# Patient Record
Sex: MALE | Race: WHITE | NOT HISPANIC OR LATINO | URBAN - METROPOLITAN AREA
[De-identification: names, ages, dates, MRNs, and addresses within clinical notes are randomized per-mention and may not be internally consistent; named-entity substitution may affect disease eponyms.]

---

## 2018-12-10 ENCOUNTER — INPATIENT (INPATIENT)
Facility: HOSPITAL | Age: 53
LOS: 86 days | Discharge: NURSING, REHAB OR LONG-TERM CARE | End: 2019-03-07
Attending: INTERNAL MEDICINE | Admitting: INTERNAL MEDICINE
Payer: MEDICAID

## 2018-12-10 VITALS
WEIGHT: 219.8 LBS | HEART RATE: 123 BPM | SYSTOLIC BLOOD PRESSURE: 78 MMHG | TEMPERATURE: 102 F | OXYGEN SATURATION: 96 % | RESPIRATION RATE: 20 BRPM | DIASTOLIC BLOOD PRESSURE: 49 MMHG | HEIGHT: 65 IN

## 2018-12-10 DIAGNOSIS — J96.00 ACUTE RESPIRATORY FAILURE, UNSPECIFIED WHETHER WITH HYPOXIA OR HYPERCAPNIA: ICD-10-CM

## 2018-12-10 DIAGNOSIS — Z93.1 GASTROSTOMY STATUS: Chronic | ICD-10-CM

## 2018-12-10 LAB
ALBUMIN SERPL ELPH-MCNC: 2.3 G/DL — LOW (ref 3.3–5)
ALP SERPL-CCNC: 95 U/L — SIGNIFICANT CHANGE UP (ref 40–120)
ALT FLD-CCNC: 39 U/L — SIGNIFICANT CHANGE UP (ref 4–41)
AMYLASE P1 CFR SERPL: 114 U/L — SIGNIFICANT CHANGE UP (ref 25–125)
ANISOCYTOSIS BLD QL: SIGNIFICANT CHANGE UP
APPEARANCE UR: SIGNIFICANT CHANGE UP
APTT BLD: 54.4 SEC — HIGH (ref 27.5–36.3)
AST SERPL-CCNC: 76 U/L — HIGH (ref 4–40)
BACTERIA # UR AUTO: HIGH
BASE EXCESS BLDA CALC-SCNC: 3.3 MMOL/L — SIGNIFICANT CHANGE UP
BASOPHILS # BLD AUTO: 0.17 K/UL — SIGNIFICANT CHANGE UP (ref 0–0.2)
BASOPHILS NFR BLD AUTO: 0.9 % — SIGNIFICANT CHANGE UP (ref 0–2)
BASOPHILS NFR SPEC: 0 % — SIGNIFICANT CHANGE UP (ref 0–2)
BILIRUB SERPL-MCNC: 1.6 MG/DL — HIGH (ref 0.2–1.2)
BILIRUB UR-MCNC: NEGATIVE — SIGNIFICANT CHANGE UP
BLASTS # FLD: 0 % — SIGNIFICANT CHANGE UP (ref 0–0)
BLD GP AB SCN SERPL QL: NEGATIVE — SIGNIFICANT CHANGE UP
BLOOD UR QL VISUAL: NEGATIVE — SIGNIFICANT CHANGE UP
BUN SERPL-MCNC: 35 MG/DL — HIGH (ref 7–23)
CALCIUM SERPL-MCNC: 6.9 MG/DL — LOW (ref 8.4–10.5)
CHLORIDE BLDA-SCNC: 106 MMOL/L — SIGNIFICANT CHANGE UP (ref 96–108)
CHLORIDE SERPL-SCNC: 102 MMOL/L — SIGNIFICANT CHANGE UP (ref 98–107)
CO2 SERPL-SCNC: 25 MMOL/L — SIGNIFICANT CHANGE UP (ref 22–31)
COLOR SPEC: SIGNIFICANT CHANGE UP
CREAT SERPL-MCNC: 0.76 MG/DL — SIGNIFICANT CHANGE UP (ref 0.5–1.3)
EOSINOPHIL # BLD AUTO: 0 K/UL — SIGNIFICANT CHANGE UP (ref 0–0.5)
EOSINOPHIL NFR BLD AUTO: 0 % — SIGNIFICANT CHANGE UP (ref 0–6)
EOSINOPHIL NFR FLD: 0.9 % — SIGNIFICANT CHANGE UP (ref 0–6)
GIANT PLATELETS BLD QL SMEAR: PRESENT — SIGNIFICANT CHANGE UP
GLUCOSE BLDA-MCNC: 132 MG/DL — HIGH (ref 70–99)
GLUCOSE SERPL-MCNC: 129 MG/DL — HIGH (ref 70–99)
GLUCOSE UR-MCNC: NEGATIVE — SIGNIFICANT CHANGE UP
GRAN CASTS # UR COMP ASSIST: SIGNIFICANT CHANGE UP
HCO3 BLDA-SCNC: 27 MMOL/L — HIGH (ref 22–26)
HCT VFR BLD CALC: 36.3 % — LOW (ref 39–50)
HCT VFR BLDA CALC: 37.7 % — LOW (ref 39–51)
HGB BLD-MCNC: 12.1 G/DL — LOW (ref 13–17)
HGB BLDA-MCNC: 12.2 G/DL — LOW (ref 13–17)
HYALINE CASTS # UR AUTO: HIGH
IMM GRANULOCYTES # BLD AUTO: 0.08 # — SIGNIFICANT CHANGE UP
IMM GRANULOCYTES NFR BLD AUTO: 0.4 % — SIGNIFICANT CHANGE UP (ref 0–1.5)
INR BLD: 1.75 — HIGH (ref 0.88–1.17)
KETONES UR-MCNC: SIGNIFICANT CHANGE UP
LACTATE BLDA-SCNC: 5.5 MMOL/L — CRITICAL HIGH (ref 0.5–2)
LACTATE BLDA-SCNC: 5.8 MMOL/L — CRITICAL HIGH (ref 0.5–2)
LEUKOCYTE ESTERASE UR-ACNC: NEGATIVE — SIGNIFICANT CHANGE UP
LIDOCAIN IGE QN: 122.6 U/L — HIGH (ref 7–60)
LYMPHOCYTES # BLD AUTO: 1.13 K/UL — SIGNIFICANT CHANGE UP (ref 1–3.3)
LYMPHOCYTES # BLD AUTO: 6.2 % — LOW (ref 13–44)
LYMPHOCYTES NFR SPEC AUTO: 0.9 % — LOW (ref 13–44)
MACROCYTES BLD QL: SIGNIFICANT CHANGE UP
MAGNESIUM SERPL-MCNC: 2.9 MG/DL — HIGH (ref 1.6–2.6)
MANUAL SMEAR VERIFICATION: SIGNIFICANT CHANGE UP
MCHC RBC-ENTMCNC: 33.3 % — SIGNIFICANT CHANGE UP (ref 32–36)
MCHC RBC-ENTMCNC: 33.6 PG — SIGNIFICANT CHANGE UP (ref 27–34)
MCV RBC AUTO: 100.8 FL — HIGH (ref 80–100)
METAMYELOCYTES # FLD: 15.2 % — HIGH (ref 0–1)
MONOCYTES # BLD AUTO: 0.56 K/UL — SIGNIFICANT CHANGE UP (ref 0–0.9)
MONOCYTES NFR BLD AUTO: 3.1 % — SIGNIFICANT CHANGE UP (ref 2–14)
MONOCYTES NFR BLD: 6.7 % — SIGNIFICANT CHANGE UP (ref 2–9)
MUCOUS THREADS # UR AUTO: SIGNIFICANT CHANGE UP
MYELOCYTES NFR BLD: 1.9 % — HIGH (ref 0–0)
NEUTROPHIL AB SER-ACNC: 64.8 % — SIGNIFICANT CHANGE UP (ref 43–77)
NEUTROPHILS # BLD AUTO: 16.38 K/UL — HIGH (ref 1.8–7.4)
NEUTROPHILS NFR BLD AUTO: 89.4 % — HIGH (ref 43–77)
NEUTS BAND # BLD: 8.6 % — HIGH (ref 0–6)
NITRITE UR-MCNC: NEGATIVE — SIGNIFICANT CHANGE UP
NRBC # BLD: 1.9 /100WBC — SIGNIFICANT CHANGE UP
NRBC # FLD: 0.1 — SIGNIFICANT CHANGE UP
OTHER - HEMATOLOGY %: 0 — SIGNIFICANT CHANGE UP
PCO2 BLDA: 46 MMHG — SIGNIFICANT CHANGE UP (ref 35–48)
PH BLDA: 7.4 PH — SIGNIFICANT CHANGE UP (ref 7.35–7.45)
PH UR: 6 — SIGNIFICANT CHANGE UP (ref 5–8)
PHOSPHATE SERPL-MCNC: 2.3 MG/DL — LOW (ref 2.5–4.5)
PLATELET # BLD AUTO: 102 K/UL — LOW (ref 150–400)
PLATELET COUNT - ESTIMATE: SIGNIFICANT CHANGE UP
PMV BLD: 11.5 FL — SIGNIFICANT CHANGE UP (ref 7–13)
PO2 BLDA: 101 MMHG — SIGNIFICANT CHANGE UP (ref 83–108)
POLYCHROMASIA BLD QL SMEAR: SLIGHT — SIGNIFICANT CHANGE UP
POTASSIUM BLDA-SCNC: 3.6 MMOL/L — SIGNIFICANT CHANGE UP (ref 3.4–4.5)
POTASSIUM SERPL-MCNC: 4 MMOL/L — SIGNIFICANT CHANGE UP (ref 3.5–5.3)
POTASSIUM SERPL-SCNC: 4 MMOL/L — SIGNIFICANT CHANGE UP (ref 3.5–5.3)
PROMYELOCYTES # FLD: 0 % — SIGNIFICANT CHANGE UP (ref 0–0)
PROT SERPL-MCNC: 4.3 G/DL — LOW (ref 6–8.3)
PROT UR-MCNC: 30 — SIGNIFICANT CHANGE UP
PROTHROM AB SERPL-ACNC: 20.3 SEC — HIGH (ref 9.8–13.1)
RBC # BLD: 3.6 M/UL — LOW (ref 4.2–5.8)
RBC # FLD: 15.2 % — HIGH (ref 10.3–14.5)
RBC CASTS # UR COMP ASSIST: HIGH (ref 0–?)
RH IG SCN BLD-IMP: POSITIVE — SIGNIFICANT CHANGE UP
SAO2 % BLDA: 98.1 % — SIGNIFICANT CHANGE UP (ref 95–99)
SMUDGE CELLS # BLD: PRESENT — SIGNIFICANT CHANGE UP
SODIUM BLDA-SCNC: 134 MMOL/L — LOW (ref 136–146)
SODIUM SERPL-SCNC: 142 MMOL/L — SIGNIFICANT CHANGE UP (ref 135–145)
SP GR SPEC: 1.03 — SIGNIFICANT CHANGE UP (ref 1–1.04)
SQUAMOUS # UR AUTO: SIGNIFICANT CHANGE UP
TROPONIN T, HIGH SENSITIVITY: 14 NG/L — SIGNIFICANT CHANGE UP (ref ?–14)
UROBILINOGEN FLD QL: HIGH
VARIANT LYMPHS # BLD: 1 % — SIGNIFICANT CHANGE UP
WBC # BLD: 18.32 K/UL — HIGH (ref 3.8–10.5)
WBC # FLD AUTO: 18.32 K/UL — HIGH (ref 3.8–10.5)
WBC UR QL: SIGNIFICANT CHANGE UP (ref 0–?)

## 2018-12-10 PROCEDURE — 99291 CRITICAL CARE FIRST HOUR: CPT

## 2018-12-10 PROCEDURE — 71045 X-RAY EXAM CHEST 1 VIEW: CPT | Mod: 26

## 2018-12-10 RX ORDER — NOREPINEPHRINE BITARTRATE/D5W 8 MG/250ML
0.05 PLASTIC BAG, INJECTION (ML) INTRAVENOUS
Qty: 16 | Refills: 0 | Status: DISCONTINUED | OUTPATIENT
Start: 2018-12-10 | End: 2018-12-12

## 2018-12-10 RX ORDER — VALACYCLOVIR 500 MG/1
1000 TABLET, FILM COATED ORAL EVERY 8 HOURS
Qty: 0 | Refills: 0 | Status: DISCONTINUED | OUTPATIENT
Start: 2018-12-10 | End: 2018-12-11

## 2018-12-10 RX ORDER — FENTANYL CITRATE 50 UG/ML
4 INJECTION INTRAVENOUS
Qty: 2500 | Refills: 0 | Status: DISCONTINUED | OUTPATIENT
Start: 2018-12-10 | End: 2018-12-11

## 2018-12-10 RX ORDER — ACETAMINOPHEN 500 MG
650 TABLET ORAL EVERY 6 HOURS
Qty: 0 | Refills: 0 | Status: DISCONTINUED | OUTPATIENT
Start: 2018-12-10 | End: 2019-03-07

## 2018-12-10 RX ORDER — SODIUM CHLORIDE 9 MG/ML
1000 INJECTION, SOLUTION INTRAVENOUS
Qty: 0 | Refills: 0 | Status: DISCONTINUED | OUTPATIENT
Start: 2018-12-10 | End: 2018-12-11

## 2018-12-10 RX ORDER — NOREPINEPHRINE BITARTRATE/D5W 8 MG/250ML
0.05 PLASTIC BAG, INJECTION (ML) INTRAVENOUS
Qty: 8 | Refills: 0 | Status: DISCONTINUED | OUTPATIENT
Start: 2018-12-10 | End: 2018-12-10

## 2018-12-10 RX ORDER — MEROPENEM 1 G/30ML
INJECTION INTRAVENOUS
Qty: 0 | Refills: 0 | Status: DISCONTINUED | OUTPATIENT
Start: 2018-12-10 | End: 2018-12-11

## 2018-12-10 RX ORDER — MEROPENEM 1 G/30ML
1000 INJECTION INTRAVENOUS ONCE
Qty: 0 | Refills: 0 | Status: COMPLETED | OUTPATIENT
Start: 2018-12-10 | End: 2018-12-10

## 2018-12-10 RX ORDER — PROPOFOL 10 MG/ML
10 INJECTION, EMULSION INTRAVENOUS
Qty: 1000 | Refills: 0 | Status: DISCONTINUED | OUTPATIENT
Start: 2018-12-10 | End: 2018-12-12

## 2018-12-10 RX ORDER — CHLORHEXIDINE GLUCONATE 213 G/1000ML
1 SOLUTION TOPICAL
Qty: 0 | Refills: 0 | Status: DISCONTINUED | OUTPATIENT
Start: 2018-12-10 | End: 2018-12-26

## 2018-12-10 RX ORDER — MEROPENEM 1 G/30ML
1000 INJECTION INTRAVENOUS EVERY 8 HOURS
Qty: 0 | Refills: 0 | Status: DISCONTINUED | OUTPATIENT
Start: 2018-12-10 | End: 2018-12-11

## 2018-12-10 RX ORDER — VASOPRESSIN 20 [USP'U]/ML
0.04 INJECTION INTRAVENOUS
Qty: 100 | Refills: 0 | Status: DISCONTINUED | OUTPATIENT
Start: 2018-12-10 | End: 2018-12-11

## 2018-12-10 RX ADMIN — Medication 650 MILLIGRAM(S): at 16:00

## 2018-12-10 RX ADMIN — PROPOFOL 5.98 MICROGRAM(S)/KG/MIN: 10 INJECTION, EMULSION INTRAVENOUS at 15:19

## 2018-12-10 RX ADMIN — VASOPRESSIN 2.4 UNIT(S)/MIN: 20 INJECTION INTRAVENOUS at 15:20

## 2018-12-10 RX ADMIN — Medication 4.67 MICROGRAM(S)/KG/MIN: at 20:08

## 2018-12-10 RX ADMIN — Medication 4.67 MICROGRAM(S)/KG/MIN: at 15:20

## 2018-12-10 RX ADMIN — CHLORHEXIDINE GLUCONATE 1 APPLICATION(S): 213 SOLUTION TOPICAL at 17:19

## 2018-12-10 RX ADMIN — SODIUM CHLORIDE 50 MILLILITER(S): 9 INJECTION, SOLUTION INTRAVENOUS at 15:20

## 2018-12-10 RX ADMIN — SODIUM CHLORIDE 200 MILLILITER(S): 9 INJECTION, SOLUTION INTRAVENOUS at 20:08

## 2018-12-10 RX ADMIN — PROPOFOL 5.98 MICROGRAM(S)/KG/MIN: 10 INJECTION, EMULSION INTRAVENOUS at 20:07

## 2018-12-10 RX ADMIN — FENTANYL CITRATE 4.99 MICROGRAM(S)/KG/HR: 50 INJECTION INTRAVENOUS at 15:19

## 2018-12-10 RX ADMIN — FENTANYL CITRATE 29.91 MICROGRAM(S)/KG/HR: 50 INJECTION INTRAVENOUS at 20:08

## 2018-12-10 RX ADMIN — FENTANYL CITRATE 4 MICROGRAM(S)/KG/HR: 50 INJECTION INTRAVENOUS at 15:20

## 2018-12-10 RX ADMIN — Medication 650 MILLIGRAM(S): at 15:19

## 2018-12-10 RX ADMIN — VASOPRESSIN 2.4 UNIT(S)/MIN: 20 INJECTION INTRAVENOUS at 20:09

## 2018-12-10 NOTE — CONSULT NOTE ADULT - SUBJECTIVE AND OBJECTIVE BOX
CONSULT RESULT - GENERAL SURGERY    Consulting surgical team: A Team  Consulting attending: Dr. Putnam    HPI:  54 yo M nursing home resident with a hx of TBI (residual right-sided paralysis and aphasia), seizures, contracture, G tube, presents as a transfer from [facility] 2/2 respiratory failure concerning for ARDS, likely secondary to pancreatitis. The patient was noted to be hypotensive and tachycardic to the 150s. The patient was also noted to have coffee ground material via the G tube. He was intubated, resuscitated and started on levophed and vasopressin drips, and started on empiric vancomycin, meropenem, and zosyn, as well as PPI drip. CT scan demonstrated extensive pancreatitis. The patient subsequently developed bilateral lung opacification concerning for ARDS and was transferred to [facility]. He was also noted have skin lesions possibly representing herpes zoster and was placed on contact and airborn precautions.    Patient is sedated and unable to participate in examination. He is sedated on fentanyl and propofol drips, with levophed and vasopressin for BP support.      PAST MEDICAL HISTORY:  Seizure  TBI (traumatic brain injury)      PAST SURGICAL HISTORY:  S/P percutaneous endoscopic gastrostomy (PEG) tube placement      MEDICATIONS:  acetaminophen    Suspension .. 650 milliGRAM(s) Oral every 6 hours PRN  chlorhexidine 4% Liquid 1 Application(s) Topical <User Schedule>  fentaNYL   Infusion 3 MICROgram(s)/kG/Hr IV Continuous <Continuous>  lactated ringers. 1000 milliLiter(s) IV Continuous <Continuous>  meropenem  IVPB 1000 milliGRAM(s) IV Intermittent once  meropenem  IVPB      meropenem  IVPB 1000 milliGRAM(s) IV Intermittent every 8 hours  norepinephrine Infusion 0.05 MICROgram(s)/kG/Min IV Continuous <Continuous>  propofol Infusion 10 MICROgram(s)/kG/Min IV Continuous <Continuous>  valACYclovir 1000 milliGRAM(s) Oral every 8 hours  vasopressin Infusion 0.04 Unit(s)/Min IV Continuous <Continuous>      ALLERGIES:  No Known Allergies      VITALS & I/Os:  Vital Signs Last 24 Hrs  T(C): 37.8 (10 Dec 2018 16:00), Max: 38.9 (10 Dec 2018 13:40)  T(F): 100 (10 Dec 2018 16:00), Max: 102 (10 Dec 2018 13:40)  HR: 94 (10 Dec 2018 18:00) (93 - 123)  BP: 78/49 (10 Dec 2018 13:40) (78/49 - 78/49)  BP(mean): 56 (10 Dec 2018 13:40) (56 - 56)  RR: 18 (10 Dec 2018 18:00) (17 - 20)  SpO2: 96% (10 Dec 2018 18:00) (95% - 97%)  Mode: AC/ CMV (Assist Control/ Continuous Mandatory Ventilation)  RR (machine): 20  TV (machine): 450  FiO2: 90  PEEP: 15  MAP: 21  PIP: 36    I&O's Summary    10 Dec 2018 07:01  -  10 Dec 2018 19:30  --------------------------------------------------------  IN: 992 mL / OUT: 210 mL / NET: 782 mL      PHYSICAL EXAM:  GEN: intubated, sedated  PULM: symmetric chest rise bilaterally  CV: regular rate, peripheral pulses intact  ABD: soft, ND  EXTR: no cyanosis or edema    LABS:                        12.1   18.32 )-----------( 102      ( 10 Dec 2018 15:00 )             36.3     12-10    142  |  102  |  35<H>  ----------------------------<  129<H>  4.0   |  25  |  0.76    Ca    6.9<L>      10 Dec 2018 15:00  Phos  2.3     12-10  Mg     2.9     12-10    TPro  4.3<L>  /  Alb  2.3<L>  /  TBili  1.6<H>  /  DBili  x   /  AST  76<H>  /  ALT  39  /  AlkPhos  95  12-10    Lactate:    PT/INR - ( 10 Dec 2018 15:00 )   PT: 20.3 SEC;   INR: 1.75          PTT - ( 10 Dec 2018 15:00 )  PTT:54.4 SEC  ABG - ( 10 Dec 2018 15:00 )  pH, Arterial: 7.40  pH, Blood: x     /  pCO2: 46    /  pO2: 101   / HCO3: 27    / Base Excess: 3.3   /  SaO2: 98.1      Urinalysis Basic - ( 10 Dec 2018 15:00 )    Color: IAN / Appearance: Lt TURBID / S.026 / pH: 6.0  Gluc: NEGATIVE / Ketone: TRACE  / Bili: NEGATIVE / Urobili: SMALL   Blood: NEGATIVE / Protein: 30 / Nitrite: NEGATIVE   Leuk Esterase: NEGATIVE / RBC: 11-25 / WBC 3-5   Sq Epi: FEW / Non Sq Epi: x / Bacteria: MODERATE      IMAGING:  CT A/P () [facility]  Gallbladder and ducts: gallbladder wall thickening with surrounding inflammation. Probable stone in the gallbladder fundus. Common bile duct is poorly visualized due to artifact    1. small bilateral pleural effusions  2. consolidative infiltrate or atelectasis in the lower lobes with near complete opacification of both lower lobes  3. gallbladder wall thickening with surrounding inflammation. Probable stone in GB fundus  4. Extensive inflammation around pancreas consistent with acute pancreatitis. Small amount of free fluid also seen in abdomen and pelvis  5. Fluid in the colon consistent with diarrhea    US Abdomen complete () [facility]  Limited image study with multiple organs not visualized due to rib and bowel gas shadowing. Gallbladder and pancreas not visualized  Trace perihepatic fluid

## 2018-12-10 NOTE — H&P ADULT - ATTENDING COMMENTS
Agree with above. Seen and examined with residents on rounds. Transferred from [facility] with pancreatitis and respiratory failure. Continue antibiotics. Surgery sugey, GABE jefferson. Vent management for ARDS. Supportive care.

## 2018-12-10 NOTE — H&P ADULT - NSHPPHYSICALEXAM_GEN_ALL_CORE
T(C): 38.9 (12-10-18 @ 13:40), Max: 38.9 (12-10-18 @ 13:40)  HR: 104 (12-10-18 @ 15:00) (93 - 123)  BP: 78/49 (12-10-18 @ 13:40) (78/49 - 78/49)  RR: 20 (12-10-18 @ 15:00) (17 - 20)  SpO2: 95% (12-10-18 @ 15:00) (95% - 97%)  Wt(kg): --  GENERAL: Sedated  HEAD:  Atraumatic, Normocephalic  EYES: EOMI, conjunctiva and sclera clear  NECK: Supple, No JVD  CHEST/LUNG: bilateral crackles  HEART: tachycardic, no murmur appreciated.  ABDOMEN: Soft, peg in place. Left flank crusted skin lesions.  EXTREMITIES:  2+ Peripheral Pulses, No clubbing, cyanosis, or edema  PSYCH: AAOx0  NEUROLOGY: unable to assess.
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
## 2018-12-10 NOTE — CONSULT NOTE ADULT - ASSESSMENT
54 yo M nursing home resident with a hx of TBI (residual right-sided paralysis and aphasia), seizures, contracture, G tube, presents as a transfer from [facility] 2/2 respiratory failure concerning for ARDS, likely secondary to pancreatitis. CT A/P demonstrates likely cholelithiasis.    NEURO: sedated, TBI with baseline paralysis, aphasia.  PULM: intubated, on full mechanical vent support. Moderate ARDS (P:F ratio 112).  CV: requiring levophed and vasopressin support.  GI: pancreatitis, gallstone vs. drug induced vs. idiopathic. Lipase 122.  : BUN/Cr stable.  ID: septic 2/2 pancreatitis. WBC 18. On empiric meropenem.  ENDO: no issues    -Consult interventional radiology for percutaneous cholecystostomy tube  -cont resuscitation, vasopressor support  -cont broad-spectrum abx  -A team will cont to follow, [id number]    Patient discussed with attending Dr. Putnam

## 2018-12-10 NOTE — H&P ADULT - ASSESSMENT
52yo M hx of TBI, G tube, contracture, seizure, resident of [facility] presented as a transfer from [facility] on 12/9 for resp failure concerning for ARDS s/p intubation, likely 2/2 pancreatitis.    Neuro: Sedated at this time.  - Will discuss with family regarding baseline neurologic status at baseline as hx of TBI.  - Currently sedated. Will need to add back anti-seizure meds once off sedation/after extubation.    CV: Sepsis leading to likely distributive shock  - Currently on levophed for BP control.  - C/w IVF for now.  - Will need to complete w/u to r/o cardiogenic shock once clinically stabilized.    Pulm: Patient 54yo M hx of TBI, G tube, contracture, seizure, resident of [facility] presented as a transfer from [facility] on 12/9 for resp failure concerning for ARDS s/p intubation, likely 2/2 pancreatitis.    Neuro: Sedated at this time.  - Will discuss with family regarding baseline neurologic status at baseline as hx of TBI.  - Currently sedated w/ propofol and fentanyl.   - Will need to add back anti-seizure meds once off sedation/after extubation.    CV: Sepsis leading to likely distributive shock  - Currently on levophed and vaso for BP control. Titrate to MAP>= 65.  - C/w IVF for now.  - Will need to complete w/u to r/o cardiogenic shock once clinically stabilized.    Pulm: Patient currently intubated for ARDS diagnosis.  - Will repeat blood gas to confirm diagnosis and repeat CT chest.  - Current vent setting: PEEP 15, FiO2 90, Tidal volume 450 and RR 20. Continue to monitor resp functions.     GI: Unclear etiology for pancreatitis at this time. Patient resident of NH and unclear alcohol history. OSH CT abd w/o evidence of ductal dilatation. Unclear medication hx: will need to inquire family for possible drug induced pancreatitis.  - Will repeat labs including TG, LFTs, lipase, amylase.  - C/w LR at high rate as tolerated.  - Keep patient off tube feeds for now. Restart when tolerated.  - OSH record identified coffee ground emesis requiring PPI drip.    :   - Monitor renal function.   - C/w tavares for now. Strict I&Os.     ID: Patient in shock on arrival. Likely source infectious, distributive.   - Repeat chest CT to r/o PNA.  - C/w broad spectrum abx vanc, zosyn for empiric coverage.  - Repeat BCx and UCx to identify source.  - Started valacyclovir for herpes zoster skin lesion.    Endo: No issues at this time.  - Continue to monitor FSG.   - Restart feeding when appropirate.    PPX:   - Pantoprazole BID  - DVT ppx w/ scds.

## 2018-12-11 LAB
ALBUMIN SERPL ELPH-MCNC: 1.6 G/DL — LOW (ref 3.3–5)
ALBUMIN SERPL ELPH-MCNC: 1.6 G/DL — LOW (ref 3.3–5)
ALP SERPL-CCNC: 126 U/L — HIGH (ref 40–120)
ALP SERPL-CCNC: 126 U/L — HIGH (ref 40–120)
ALT FLD-CCNC: 39 U/L — SIGNIFICANT CHANGE UP (ref 4–41)
ALT FLD-CCNC: 39 U/L — SIGNIFICANT CHANGE UP (ref 4–41)
APTT BLD: 46 SEC — HIGH (ref 27.5–36.3)
AST SERPL-CCNC: 76 U/L — HIGH (ref 4–40)
AST SERPL-CCNC: 76 U/L — HIGH (ref 4–40)
BASE EXCESS BLDA CALC-SCNC: 4.8 MMOL/L — SIGNIFICANT CHANGE UP
BASOPHILS # BLD AUTO: 0.09 K/UL — SIGNIFICANT CHANGE UP (ref 0–0.2)
BASOPHILS NFR BLD AUTO: 0.5 % — SIGNIFICANT CHANGE UP (ref 0–2)
BILIRUB DIRECT SERPL-MCNC: 1.7 MG/DL — HIGH (ref 0.1–0.2)
BILIRUB SERPL-MCNC: 2.1 MG/DL — HIGH (ref 0.2–1.2)
BILIRUB SERPL-MCNC: 2.1 MG/DL — HIGH (ref 0.2–1.2)
BUN SERPL-MCNC: 41 MG/DL — HIGH (ref 7–23)
BUN SERPL-MCNC: 41 MG/DL — HIGH (ref 7–23)
CA-I BLDA-SCNC: 1.07 MMOL/L — LOW (ref 1.15–1.29)
CALCIUM SERPL-MCNC: 6.8 MG/DL — LOW (ref 8.4–10.5)
CALCIUM SERPL-MCNC: 6.8 MG/DL — LOW (ref 8.4–10.5)
CHLORIDE SERPL-SCNC: 101 MMOL/L — SIGNIFICANT CHANGE UP (ref 98–107)
CHLORIDE SERPL-SCNC: 101 MMOL/L — SIGNIFICANT CHANGE UP (ref 98–107)
CO2 SERPL-SCNC: 26 MMOL/L — SIGNIFICANT CHANGE UP (ref 22–31)
CO2 SERPL-SCNC: 26 MMOL/L — SIGNIFICANT CHANGE UP (ref 22–31)
CREAT SERPL-MCNC: 0.67 MG/DL — SIGNIFICANT CHANGE UP (ref 0.5–1.3)
CREAT SERPL-MCNC: 0.67 MG/DL — SIGNIFICANT CHANGE UP (ref 0.5–1.3)
EOSINOPHIL # BLD AUTO: 0 K/UL — SIGNIFICANT CHANGE UP (ref 0–0.5)
EOSINOPHIL NFR BLD AUTO: 0 % — SIGNIFICANT CHANGE UP (ref 0–6)
GLUCOSE BLDA-MCNC: 104 MG/DL — HIGH (ref 70–99)
GLUCOSE SERPL-MCNC: 108 MG/DL — HIGH (ref 70–99)
GLUCOSE SERPL-MCNC: 108 MG/DL — HIGH (ref 70–99)
HCO3 BLDA-SCNC: 29 MMOL/L — HIGH (ref 22–26)
HCT VFR BLD CALC: 34.5 % — LOW (ref 39–50)
HCT VFR BLD CALC: 34.5 % — LOW (ref 39–50)
HCT VFR BLDA CALC: 35 % — LOW (ref 39–51)
HGB BLD-MCNC: 11.5 G/DL — LOW (ref 13–17)
HGB BLD-MCNC: 11.5 G/DL — LOW (ref 13–17)
HGB BLDA-MCNC: 11.4 G/DL — LOW (ref 13–17)
IMM GRANULOCYTES # BLD AUTO: 0.09 # — SIGNIFICANT CHANGE UP
IMM GRANULOCYTES NFR BLD AUTO: 0.5 % — SIGNIFICANT CHANGE UP (ref 0–1.5)
INR BLD: 1.34 — HIGH (ref 0.88–1.17)
LACTATE BLDA-SCNC: 3.7 MMOL/L — HIGH (ref 0.5–2)
LACTATE SERPL-SCNC: 4.7 MMOL/L — CRITICAL HIGH (ref 0.5–2)
LYMPHOCYTES # BLD AUTO: 1.69 K/UL — SIGNIFICANT CHANGE UP (ref 1–3.3)
LYMPHOCYTES # BLD AUTO: 9.9 % — LOW (ref 13–44)
MAGNESIUM SERPL-MCNC: 2.8 MG/DL — HIGH (ref 1.6–2.6)
MCHC RBC-ENTMCNC: 33.3 % — SIGNIFICANT CHANGE UP (ref 32–36)
MCHC RBC-ENTMCNC: 33.3 % — SIGNIFICANT CHANGE UP (ref 32–36)
MCHC RBC-ENTMCNC: 34.3 PG — HIGH (ref 27–34)
MCHC RBC-ENTMCNC: 34.3 PG — HIGH (ref 27–34)
MCV RBC AUTO: 103 FL — HIGH (ref 80–100)
MCV RBC AUTO: 103 FL — HIGH (ref 80–100)
MONOCYTES # BLD AUTO: 0.59 K/UL — SIGNIFICANT CHANGE UP (ref 0–0.9)
MONOCYTES NFR BLD AUTO: 3.5 % — SIGNIFICANT CHANGE UP (ref 2–14)
NEUTROPHILS # BLD AUTO: 14.63 K/UL — HIGH (ref 1.8–7.4)
NEUTROPHILS NFR BLD AUTO: 85.6 % — HIGH (ref 43–77)
NRBC # FLD: 0.12 — SIGNIFICANT CHANGE UP
NRBC # FLD: 0.12 — SIGNIFICANT CHANGE UP
PCO2 BLDA: 44 MMHG — SIGNIFICANT CHANGE UP (ref 35–48)
PH BLDA: 7.43 PH — SIGNIFICANT CHANGE UP (ref 7.35–7.45)
PHOSPHATE SERPL-MCNC: 2.1 MG/DL — LOW (ref 2.5–4.5)
PLATELET # BLD AUTO: 89 K/UL — LOW (ref 150–400)
PLATELET # BLD AUTO: 89 K/UL — LOW (ref 150–400)
PMV BLD: 11.9 FL — SIGNIFICANT CHANGE UP (ref 7–13)
PMV BLD: 11.9 FL — SIGNIFICANT CHANGE UP (ref 7–13)
PO2 BLDA: 110 MMHG — HIGH (ref 83–108)
POTASSIUM BLDA-SCNC: 3.3 MMOL/L — LOW (ref 3.4–4.5)
POTASSIUM SERPL-MCNC: 4.1 MMOL/L — SIGNIFICANT CHANGE UP (ref 3.5–5.3)
POTASSIUM SERPL-MCNC: 4.1 MMOL/L — SIGNIFICANT CHANGE UP (ref 3.5–5.3)
POTASSIUM SERPL-SCNC: 4.1 MMOL/L — SIGNIFICANT CHANGE UP (ref 3.5–5.3)
POTASSIUM SERPL-SCNC: 4.1 MMOL/L — SIGNIFICANT CHANGE UP (ref 3.5–5.3)
PROT SERPL-MCNC: 4 G/DL — LOW (ref 6–8.3)
PROT SERPL-MCNC: 4 G/DL — LOW (ref 6–8.3)
PROTHROM AB SERPL-ACNC: 15 SEC — HIGH (ref 9.8–13.1)
RBC # BLD: 3.35 M/UL — LOW (ref 4.2–5.8)
RBC # BLD: 3.35 M/UL — LOW (ref 4.2–5.8)
RBC # FLD: 15.4 % — HIGH (ref 10.3–14.5)
RBC # FLD: 15.4 % — HIGH (ref 10.3–14.5)
SAO2 % BLDA: 98.6 % — SIGNIFICANT CHANGE UP (ref 95–99)
SODIUM BLDA-SCNC: 135 MMOL/L — LOW (ref 136–146)
SODIUM SERPL-SCNC: 139 MMOL/L — SIGNIFICANT CHANGE UP (ref 135–145)
SODIUM SERPL-SCNC: 139 MMOL/L — SIGNIFICANT CHANGE UP (ref 135–145)
WBC # BLD: 17.09 K/UL — HIGH (ref 3.8–10.5)
WBC # BLD: 17.09 K/UL — HIGH (ref 3.8–10.5)
WBC # FLD AUTO: 17.09 K/UL — HIGH (ref 3.8–10.5)
WBC # FLD AUTO: 17.09 K/UL — HIGH (ref 3.8–10.5)

## 2018-12-11 PROCEDURE — 36556 INSERT NON-TUNNEL CV CATH: CPT

## 2018-12-11 PROCEDURE — 71045 X-RAY EXAM CHEST 1 VIEW: CPT | Mod: 26,76

## 2018-12-11 PROCEDURE — 99291 CRITICAL CARE FIRST HOUR: CPT

## 2018-12-11 PROCEDURE — 76700 US EXAM ABDOM COMPLETE: CPT | Mod: 26

## 2018-12-11 PROCEDURE — 74177 CT ABD & PELVIS W/CONTRAST: CPT | Mod: 26

## 2018-12-11 PROCEDURE — 36620 INSERTION CATHETER ARTERY: CPT

## 2018-12-11 RX ORDER — ACYCLOVIR SODIUM 500 MG
600 VIAL (EA) INTRAVENOUS EVERY 8 HOURS
Qty: 0 | Refills: 0 | Status: DISCONTINUED | OUTPATIENT
Start: 2018-12-12 | End: 2018-12-14

## 2018-12-11 RX ORDER — MIDAZOLAM HYDROCHLORIDE 1 MG/ML
0.12 INJECTION, SOLUTION INTRAMUSCULAR; INTRAVENOUS
Qty: 100 | Refills: 0 | Status: DISCONTINUED | OUTPATIENT
Start: 2018-12-11 | End: 2018-12-12

## 2018-12-11 RX ORDER — ACYCLOVIR SODIUM 500 MG
600 VIAL (EA) INTRAVENOUS ONCE
Qty: 0 | Refills: 0 | Status: COMPLETED | OUTPATIENT
Start: 2018-12-11 | End: 2018-12-11

## 2018-12-11 RX ORDER — PIPERACILLIN AND TAZOBACTAM 4; .5 G/20ML; G/20ML
3.38 INJECTION, POWDER, LYOPHILIZED, FOR SOLUTION INTRAVENOUS EVERY 8 HOURS
Qty: 0 | Refills: 0 | Status: DISCONTINUED | OUTPATIENT
Start: 2018-12-11 | End: 2018-12-12

## 2018-12-11 RX ORDER — ENOXAPARIN SODIUM 100 MG/ML
40 INJECTION SUBCUTANEOUS EVERY 24 HOURS
Qty: 0 | Refills: 0 | Status: DISCONTINUED | OUTPATIENT
Start: 2018-12-11 | End: 2018-12-24

## 2018-12-11 RX ORDER — DOCUSATE SODIUM 100 MG
100 CAPSULE ORAL
Qty: 0 | Refills: 0 | Status: DISCONTINUED | OUTPATIENT
Start: 2018-12-11 | End: 2018-12-18

## 2018-12-11 RX ORDER — VALPROIC ACID (AS SODIUM SALT) 250 MG/5ML
750 SOLUTION, ORAL ORAL EVERY 8 HOURS
Qty: 0 | Refills: 0 | Status: DISCONTINUED | OUTPATIENT
Start: 2018-12-11 | End: 2018-12-12

## 2018-12-11 RX ORDER — FUROSEMIDE 40 MG
40 TABLET ORAL ONCE
Qty: 0 | Refills: 0 | Status: COMPLETED | OUTPATIENT
Start: 2018-12-11 | End: 2018-12-11

## 2018-12-11 RX ORDER — SENNA PLUS 8.6 MG/1
10 TABLET ORAL DAILY
Qty: 0 | Refills: 0 | Status: DISCONTINUED | OUTPATIENT
Start: 2018-12-11 | End: 2018-12-18

## 2018-12-11 RX ORDER — POLYETHYLENE GLYCOL 3350 17 G/17G
17 POWDER, FOR SOLUTION ORAL DAILY
Qty: 0 | Refills: 0 | Status: DISCONTINUED | OUTPATIENT
Start: 2018-12-11 | End: 2018-12-18

## 2018-12-11 RX ORDER — PIPERACILLIN AND TAZOBACTAM 4; .5 G/20ML; G/20ML
3.38 INJECTION, POWDER, LYOPHILIZED, FOR SOLUTION INTRAVENOUS ONCE
Qty: 0 | Refills: 0 | Status: COMPLETED | OUTPATIENT
Start: 2018-12-11 | End: 2018-12-11

## 2018-12-11 RX ORDER — VALPROIC ACID (AS SODIUM SALT) 250 MG/5ML
1000 SOLUTION, ORAL ORAL ONCE
Qty: 0 | Refills: 0 | Status: COMPLETED | OUTPATIENT
Start: 2018-12-11 | End: 2018-12-11

## 2018-12-11 RX ORDER — ACYCLOVIR SODIUM 500 MG
VIAL (EA) INTRAVENOUS
Qty: 0 | Refills: 0 | Status: DISCONTINUED | OUTPATIENT
Start: 2018-12-11 | End: 2018-12-14

## 2018-12-11 RX ADMIN — Medication 100 MILLIGRAM(S): at 18:28

## 2018-12-11 RX ADMIN — MEROPENEM 100 MILLIGRAM(S): 1 INJECTION INTRAVENOUS at 08:26

## 2018-12-11 RX ADMIN — Medication 28.75 MILLIGRAM(S): at 23:56

## 2018-12-11 RX ADMIN — Medication 4.67 MICROGRAM(S)/KG/MIN: at 19:38

## 2018-12-11 RX ADMIN — VALACYCLOVIR 1000 MILLIGRAM(S): 500 TABLET, FILM COATED ORAL at 08:26

## 2018-12-11 RX ADMIN — Medication 40 MILLIGRAM(S): at 21:44

## 2018-12-11 RX ADMIN — Medication 10 MILLIGRAM(S): at 14:27

## 2018-12-11 RX ADMIN — SODIUM CHLORIDE 100 MILLILITER(S): 9 INJECTION, SOLUTION INTRAVENOUS at 19:38

## 2018-12-11 RX ADMIN — Medication 262 MILLIGRAM(S): at 21:43

## 2018-12-11 RX ADMIN — CHLORHEXIDINE GLUCONATE 1 APPLICATION(S): 213 SOLUTION TOPICAL at 16:36

## 2018-12-11 RX ADMIN — FENTANYL CITRATE 4 MICROGRAM(S)/KG/HR: 50 INJECTION INTRAVENOUS at 08:19

## 2018-12-11 RX ADMIN — SODIUM CHLORIDE 200 MILLILITER(S): 9 INJECTION, SOLUTION INTRAVENOUS at 08:18

## 2018-12-11 RX ADMIN — VALACYCLOVIR 1000 MILLIGRAM(S): 500 TABLET, FILM COATED ORAL at 01:38

## 2018-12-11 RX ADMIN — FENTANYL CITRATE 39.88 MICROGRAM(S)/KG/HR: 50 INJECTION INTRAVENOUS at 04:08

## 2018-12-11 RX ADMIN — ENOXAPARIN SODIUM 40 MILLIGRAM(S): 100 INJECTION SUBCUTANEOUS at 14:25

## 2018-12-11 RX ADMIN — MEROPENEM 100 MILLIGRAM(S): 1 INJECTION INTRAVENOUS at 01:13

## 2018-12-11 RX ADMIN — PIPERACILLIN AND TAZOBACTAM 200 GRAM(S): 4; .5 INJECTION, POWDER, LYOPHILIZED, FOR SOLUTION INTRAVENOUS at 15:13

## 2018-12-11 RX ADMIN — FENTANYL CITRATE 39.88 MICROGRAM(S)/KG/HR: 50 INJECTION INTRAVENOUS at 08:18

## 2018-12-11 RX ADMIN — Medication 4 MILLIGRAM(S): at 04:07

## 2018-12-11 RX ADMIN — Medication 4.67 MICROGRAM(S)/KG/MIN: at 08:18

## 2018-12-11 RX ADMIN — VALACYCLOVIR 1000 MILLIGRAM(S): 500 TABLET, FILM COATED ORAL at 18:27

## 2018-12-11 RX ADMIN — SENNA PLUS 10 MILLILITER(S): 8.6 TABLET ORAL at 18:28

## 2018-12-11 RX ADMIN — PROPOFOL 5.98 MICROGRAM(S)/KG/MIN: 10 INJECTION, EMULSION INTRAVENOUS at 19:38

## 2018-12-11 RX ADMIN — Medication 30 MILLIGRAM(S): at 14:26

## 2018-12-11 RX ADMIN — Medication 2 MILLIGRAM(S): at 13:50

## 2018-12-11 RX ADMIN — PROPOFOL 5.98 MICROGRAM(S)/KG/MIN: 10 INJECTION, EMULSION INTRAVENOUS at 08:18

## 2018-12-11 RX ADMIN — POLYETHYLENE GLYCOL 3350 17 GRAM(S): 17 POWDER, FOR SOLUTION ORAL at 14:26

## 2018-12-11 RX ADMIN — MIDAZOLAM HYDROCHLORIDE 1.99 MG/KG/HR: 1 INJECTION, SOLUTION INTRAMUSCULAR; INTRAVENOUS at 19:38

## 2018-12-11 RX ADMIN — PIPERACILLIN AND TAZOBACTAM 25 GRAM(S): 4; .5 INJECTION, POWDER, LYOPHILIZED, FOR SOLUTION INTRAVENOUS at 21:44

## 2018-12-11 NOTE — CHART NOTE - NSCHARTNOTEFT_GEN_A_CORE
: Oswaldo Alonzo MD    INDICATION: Hypoxic respiratory failure    PROCEDURE:  [ ] LIMITED ECHO  [ x ] LIMITED CHEST  [ ] LIMITED RETROPERITONEAL  [ ] LIMITED ABDOMINAL  [ ] LIMITED DVT  [ ] NEEDLE GUIDANCE VASCULAR  [ ] NEEDLE GUIDANCE THORACENTESIS  [ ] NEEDLE GUIDANCE PARACENTESIS  [ ] NEEDLE GUIDANCE PERICARDIOCENTESIS  [ ] OTHER    FINDINGS:  A-line predominant, otherwise limited heart and lung ultrasound exam.

## 2018-12-11 NOTE — PROGRESS NOTE ADULT - SUBJECTIVE AND OBJECTIVE BOX
CHIEF COMPLAINT: ARDS? pancreatitis, cholecystitis?    Interval Events: No acute events overnight. Patient remains intubated. Vasopressin stopped overnight and continued on levophed. Femoral line dced and A-line, left subclavian placed without complications. Last fever yesterday pm 102.    REVIEW OF SYSTEMS:  Constitutional: [ ] negative [ ] fevers [ ] chills [ ] weight loss [ ] weight gain  HEENT: [ ] negative [ ] dry eyes [ ] eye irritation [ ] postnasal drip [ ] nasal congestion  CV: [ ] negative  [ ] chest pain [ ] orthopnea [ ] palpitations [ ] murmur  Resp: [ ] negative [ ] cough [ ] shortness of breath [ ] dyspnea [ ] wheezing [ ] sputum [ ] hemoptysis  GI: [ ] negative [ ] nausea [ ] vomiting [ ] diarrhea [ ] constipation [ ] abd pain [ ] dysphagia   : [ ] negative [ ] dysuria [ ] nocturia [ ] hematuria [ ] increased urinary frequency  Musculoskeletal: [ ] negative [ ] back pain [ ] myalgias [ ] arthralgias [ ] fracture  Skin: [ ] negative [ ] rash [ ] itch  Neurological: [ ] negative [ ] headache [ ] dizziness [ ] syncope [ ] weakness [ ] numbness  Psychiatric: [ ] negative [ ] anxiety [ ] depression  Endocrine: [ ] negative [ ] diabetes [ ] thyroid problem  Hematologic/Lymphatic: [ ] negative [ ] anemia [ ] bleeding problem  Allergic/Immunologic: [ ] negative [ ] itchy eyes [ ] nasal discharge [ ] hives [ ] angioedema  [ ] All other systems negative  [x] Unable to assess ROS because patient sedated.    OBJECTIVE:  ICU Vital Signs Last 24 Hrs  T(C): 37.4 (11 Dec 2018 04:00), Max: 38.9 (10 Dec 2018 13:40)  T(F): 99.3 (11 Dec 2018 04:00), Max: 102 (10 Dec 2018 13:40)  HR: 96 (11 Dec 2018 07:34) (69 - 123)  BP: 78/49 (10 Dec 2018 13:40) (78/49 - 78/49)  BP(mean): 56 (10 Dec 2018 13:40) (56 - 56)  ABP: 101/58 (11 Dec 2018 06:00) (86/55 - 130/80)  ABP(mean): 72 (11 Dec 2018 06:00) (64 - 96)  RR: 18 (11 Dec 2018 06:00) (17 - 20)  SpO2: 98% (11 Dec 2018 07:34) (93% - 99%)    Mode: AC/ CMV (Assist Control/ Continuous Mandatory Ventilation), RR (machine): 18, TV (machine): 430, FiO2: 60, PEEP: 15, MAP: 20, PIP: 35    12-10 @ 07:01  -  12-11 @ 07:00  --------------------------------------------------------  IN: 3934.9 mL / OUT: 750 mL / NET: 3184.9 mL    GENERAL: Sedated  	HEAD:  Atraumatic, Normocephalic  	EYES: EOMI, conjunctiva and sclera clear  	NECK: Supple, No JVD  	CHEST/LUNG: bilateral crackles  	HEART: tachycardic, no murmur appreciated.  	ABDOMEN: distended, peg in place. Left flank/back crusted skin lesions.  	EXTREMITIES:  2+ Peripheral Pulses, No clubbing, cyanosis, or edema  	PSYCH: AAOx0  NEUROLOGY: unable to assess.    CAPILLARY BLOOD GLUCOSE    LINES: Left subclavian, left A-line.    HOSPITAL MEDICATIONS:    meropenem  IVPB      meropenem  IVPB 1000 milliGRAM(s) IV Intermittent every 8 hours  valACYclovir 1000 milliGRAM(s) Oral every 8 hours    norepinephrine Infusion 0.05 MICROgram(s)/kG/Min IV Continuous <Continuous>    vasopressin Infusion 0.04 Unit(s)/Min IV Continuous <Continuous>      acetaminophen    Suspension .. 650 milliGRAM(s) Oral every 6 hours PRN  fentaNYL   Infusion 4 MICROgram(s)/kG/Hr IV Continuous <Continuous>  propofol Infusion 10 MICROgram(s)/kG/Min IV Continuous <Continuous>          lactated ringers. 1000 milliLiter(s) IV Continuous <Continuous>      chlorhexidine 4% Liquid 1 Application(s) Topical <User Schedule>        LABS:                        11.5   17.09 )-----------( 89       ( 11 Dec 2018 03:00 )             34.5     Hgb Trend: 11.5<--, 12.1<--      139  |  101  |  41<H>  ----------------------------<  108<H>  4.1   |  26  |  0.67    Ca    6.8<L>      11 Dec 2018 03:00  Phos  2.1       Mg     2.8         TPro  4.0<L>  /  Alb  1.6<L>  /  TBili  2.1<H>  /  DBili  1.7<H>  /  AST  76<H>  /  ALT  39  /  AlkPhos  126<H>      Creatinine Trend: 0.67<--, 0.76<--  PT/INR - ( 10 Dec 2018 15:00 )   PT: 20.3 SEC;   INR: 1.75          PTT - ( 10 Dec 2018 15:00 )  PTT:54.4 SEC  Urinalysis Basic - ( 10 Dec 2018 15:00 )    Color: IAN / Appearance: Lt TURBID / S.026 / pH: 6.0  Gluc: NEGATIVE / Ketone: TRACE  / Bili: NEGATIVE / Urobili: SMALL   Blood: NEGATIVE / Protein: 30 / Nitrite: NEGATIVE   Leuk Esterase: NEGATIVE / RBC: 11-25 / WBC 3-5   Sq Epi: FEW / Non Sq Epi: x / Bacteria: MODERATE      Arterial Blood Gas:  12-10 @ 21:50  --/--/--/--/--/--  ABG lactate: 5.5  Arterial Blood Gas:  12-10 @ 15:00  7.40/46/101/27/98.1/3.3  ABG lactate: 5.8    MICROBIOLOGY:   BCx 12/10 NGTD    RADIOLOGY:  [x] Reviewed and interpreted by me: CT noncon abd/pelvis from OSH reveals pancreatitis, bilateral consolidation, gallbladder inflammation.    EKG: NSR

## 2018-12-11 NOTE — DIETITIAN INITIAL EVALUATION ADULT. - OTHER INFO
Pt referred for nutrition consult 2/2 enteral nutrition PTA, intubated greater than 48h.  At this time, PEG feeds not yet initiated 2/2 planned procedures.  Pt admitted w/dx of acute respiratory failure .  Pt transferred from OSH, but is a NH resident.  Met w/sister who provided hx,  Sister states pt was having difficulty swallowing several years  back, and PEG was placed about 4-5 years ago.  She is unaware of formula that pt had received in NH.  NH transfer documents not available in chart, and tube feeding information was not found in documents from OSH.  Noted pt had coffee ground emesis yesterday.

## 2018-12-11 NOTE — PROGRESS NOTE ADULT - ATTENDING COMMENTS
Acute pancreatitis complicated by acute hypoxic respiratory failure due to ARDS, transferred from [facility] for further care. Also with associated ileus, thrombocytopenia, lactic acidosis, and suspected acute cholecystitis on CT A/P.    - Continue diprivan. Hold fentanyl given ileus. Start midazolam. Restart home valproic acid  - Distributive shock, likely due to pancreatitis + vasoplegia 2/2 sedation. Taper norepinephrine as tolerated, goal MAP>65. No evidence of cardiogenic shock. Decrease IVF to 100/hr given anasarca  - Improved hypoxic respiratory failure. P:F today is 200 consistent with mild ARDS. Decrease FiO2 70-->40%, PEEP 15-->8. Keep TV at 6mL/kg. Continue lung-protective ventilation. If hypoxia continues to improve, will attempt SBT for extubation  - Change Meropenem to Zosyn given interaction with valproic acid (patient allergic to Keppra, phenytoin). F/up blood cultures. Also on valacyclovir for shingles in left lower back  - Keep NPO. Check CT A/P with po/IV contrast evaluate for acute rio, evaluate for pancreatitis. F/up IR for perc rio drainage  - Start senna, colace, miralax, lactulose for ileus. Rectal enema. May require reglan/erythromycin. Consider relistor for opioid-induced ileus  - Stable kidney function and lytes, adequate urine output  - DVT ppx  - Prognosis guarded, full code, discussed with sister at bedside  CC time spent: 35 min

## 2018-12-11 NOTE — PROGRESS NOTE ADULT - ASSESSMENT
52yo M hx of TBI, G tube, contracture, seizure, resident of [facility] presented as a transfer from [facility] on 12/9 for resp failure concerning for ARDS s/p intubation, likely 2/2 pancreatitis.    Neuro: Sedated at this time.  - Currently sedated w/ propofol and fentanyl.   - Will need to add back anti-seizure meds once off sedation/after extubation.    CV: Sepsis leading to likely distributive shock  - Currently on levophed for BP control. Titrate to MAP>= 65. Vasopressin turned off.  - C/w IVF for now.  - Will need to complete w/u to r/o cardiogenic shock once clinically stabilized.    Pulm: Patient currently intubated for ARDS diagnosis.  - Current vent setting: PEEP 15, FiO2 60, Tidal volume 450 and RR 18. Continue to monitor resp functions with serial blood gas.  - C/w meropenem for possible aspiration pna coverage.    GI: Unclear etiology for pancreatitis at this time. Patient resident of NH and unclear alcohol history. OSH CT abd w/ gallbladder pathology.  - Awaiting US abd to eval.  - C/w LR at high rate as tolerated 200cc/h.  - Keep patient off tube feeds for now. Restart when tolerated.  - OSH record identified coffee ground emesis requiring PPI drip. Continue to trend H&H.  - Consulted IR per surg recs for perc rio.    :   - Monitor renal function.   - C/w tavares for now. Strict I&Os.     ID: Patient in shock on arrival. Likely source infectious, distributive.   - Repeat chest CT to r/o PNA.  - C/w broad spectrum abx vanc, zosyn for empiric coverage.  - Repeat BCx and UCx to identify source.  - Started valacyclovir for herpes zoster skin lesion.    Endo: No issues at this time.  - Continue to monitor FSG.   - Restart feeding when appropirate.    PPX:   - DVT ppx w/ scds.

## 2018-12-12 LAB
ALBUMIN SERPL ELPH-MCNC: 1 G/DL — LOW (ref 3.3–5)
ALP SERPL-CCNC: 250 U/L — HIGH (ref 40–120)
ALT FLD-CCNC: 50 U/L — HIGH (ref 4–41)
AST SERPL-CCNC: 94 U/L — HIGH (ref 4–40)
BACTERIA UR CULT: SIGNIFICANT CHANGE UP
BASE EXCESS BLDA CALC-SCNC: 2.4 MMOL/L — SIGNIFICANT CHANGE UP
BASE EXCESS BLDA CALC-SCNC: 3.1 MMOL/L — SIGNIFICANT CHANGE UP
BASE EXCESS BLDA CALC-SCNC: 3.4 MMOL/L — SIGNIFICANT CHANGE UP
BASE EXCESS BLDA CALC-SCNC: 3.6 MMOL/L — SIGNIFICANT CHANGE UP
BASOPHILS # BLD AUTO: 0.05 K/UL — SIGNIFICANT CHANGE UP (ref 0–0.2)
BASOPHILS NFR BLD AUTO: 0.7 % — SIGNIFICANT CHANGE UP (ref 0–2)
BILIRUB SERPL-MCNC: 3.6 MG/DL — HIGH (ref 0.2–1.2)
BODY FLUID TYPE: SIGNIFICANT CHANGE UP
BUN SERPL-MCNC: 27 MG/DL — HIGH (ref 7–23)
CALCIUM SERPL-MCNC: 6.7 MG/DL — LOW (ref 8.4–10.5)
CHLORIDE BLDA-SCNC: 106 MMOL/L — SIGNIFICANT CHANGE UP (ref 96–108)
CHLORIDE BLDA-SCNC: 106 MMOL/L — SIGNIFICANT CHANGE UP (ref 96–108)
CHLORIDE SERPL-SCNC: 101 MMOL/L — SIGNIFICANT CHANGE UP (ref 98–107)
CLARITY SPEC: SIGNIFICANT CHANGE UP
CO2 SERPL-SCNC: 26 MMOL/L — SIGNIFICANT CHANGE UP (ref 22–31)
COLOR FLD: SIGNIFICANT CHANGE UP
CREAT SERPL-MCNC: 0.53 MG/DL — SIGNIFICANT CHANGE UP (ref 0.5–1.3)
EOSINOPHIL # BLD AUTO: 0.02 K/UL — SIGNIFICANT CHANGE UP (ref 0–0.5)
EOSINOPHIL NFR BLD AUTO: 0.3 % — SIGNIFICANT CHANGE UP (ref 0–6)
GLUCOSE BLDA-MCNC: 117 MG/DL — HIGH (ref 70–99)
GLUCOSE BLDA-MCNC: 196 MG/DL — HIGH (ref 70–99)
GLUCOSE SERPL-MCNC: 110 MG/DL — HIGH (ref 70–99)
GRAM STN SPT: SIGNIFICANT CHANGE UP
HCO3 BLDA-SCNC: 26 MMOL/L — SIGNIFICANT CHANGE UP (ref 22–26)
HCO3 BLDA-SCNC: 27 MMOL/L — HIGH (ref 22–26)
HCO3 BLDA-SCNC: 27 MMOL/L — HIGH (ref 22–26)
HCO3 BLDA-SCNC: 28 MMOL/L — HIGH (ref 22–26)
HCT VFR BLD CALC: 37.7 % — LOW (ref 39–50)
HCT VFR BLDA CALC: 37.6 % — LOW (ref 39–51)
HCT VFR BLDA CALC: 38.7 % — LOW (ref 39–51)
HGB BLD-MCNC: 12.9 G/DL — LOW (ref 13–17)
HGB BLDA-MCNC: 12.2 G/DL — LOW (ref 13–17)
HGB BLDA-MCNC: 12.6 G/DL — LOW (ref 13–17)
IMM GRANULOCYTES # BLD AUTO: 0.06 # — SIGNIFICANT CHANGE UP
IMM GRANULOCYTES NFR BLD AUTO: 0.9 % — SIGNIFICANT CHANGE UP (ref 0–1.5)
LACTATE BLDA-SCNC: 3.7 MMOL/L — HIGH (ref 0.5–2)
LACTATE BLDA-SCNC: 3.8 MMOL/L — HIGH (ref 0.5–2)
LYMPHOCYTES # BLD AUTO: 1.44 K/UL — SIGNIFICANT CHANGE UP (ref 1–3.3)
LYMPHOCYTES # BLD AUTO: 21.6 % — SIGNIFICANT CHANGE UP (ref 13–44)
LYMPHOCYTES NFR FLD: 3 % — SIGNIFICANT CHANGE UP
MAGNESIUM SERPL-MCNC: 2.6 MG/DL — SIGNIFICANT CHANGE UP (ref 1.6–2.6)
MCHC RBC-ENTMCNC: 34.2 % — SIGNIFICANT CHANGE UP (ref 32–36)
MCHC RBC-ENTMCNC: 34.3 PG — HIGH (ref 27–34)
MCV RBC AUTO: 100.3 FL — HIGH (ref 80–100)
MONOCYTES # BLD AUTO: 0.37 K/UL — SIGNIFICANT CHANGE UP (ref 0–0.9)
MONOCYTES # FLD: 6 % — SIGNIFICANT CHANGE UP
MONOCYTES NFR BLD AUTO: 5.5 % — SIGNIFICANT CHANGE UP (ref 2–14)
NEUTROPHILS # BLD AUTO: 4.74 K/UL — SIGNIFICANT CHANGE UP (ref 1.8–7.4)
NEUTROPHILS NFR BLD AUTO: 71 % — SIGNIFICANT CHANGE UP (ref 43–77)
NEUTS SEG NFR FLD MANUAL: 71 % — SIGNIFICANT CHANGE UP
NRBC # FLD: 0.17 — SIGNIFICANT CHANGE UP
NRBC FLD-RTO: 2.5 — SIGNIFICANT CHANGE UP
OTHER CELLS FLD MANUAL: 20 % — SIGNIFICANT CHANGE UP
PCO2 BLDA: 39 MMHG — SIGNIFICANT CHANGE UP (ref 35–48)
PCO2 BLDA: 41 MMHG — SIGNIFICANT CHANGE UP (ref 35–48)
PCO2 BLDA: 42 MMHG — SIGNIFICANT CHANGE UP (ref 35–48)
PCO2 BLDA: 42 MMHG — SIGNIFICANT CHANGE UP (ref 35–48)
PH BLDA: 7.42 PH — SIGNIFICANT CHANGE UP (ref 7.35–7.45)
PH BLDA: 7.43 PH — SIGNIFICANT CHANGE UP (ref 7.35–7.45)
PH BLDA: 7.43 PH — SIGNIFICANT CHANGE UP (ref 7.35–7.45)
PH BLDA: 7.46 PH — HIGH (ref 7.35–7.45)
PHOSPHATE SERPL-MCNC: 1.8 MG/DL — LOW (ref 2.5–4.5)
PLATELET # BLD AUTO: 78 K/UL — LOW (ref 150–400)
PMV BLD: 13.1 FL — HIGH (ref 7–13)
PO2 BLDA: 73 MMHG — LOW (ref 83–108)
PO2 BLDA: 84 MMHG — SIGNIFICANT CHANGE UP (ref 83–108)
PO2 BLDA: 91 MMHG — SIGNIFICANT CHANGE UP (ref 83–108)
PO2 BLDA: 94 MMHG — SIGNIFICANT CHANGE UP (ref 83–108)
POTASSIUM BLDA-SCNC: 3.1 MMOL/L — LOW (ref 3.4–4.5)
POTASSIUM BLDA-SCNC: 3.4 MMOL/L — SIGNIFICANT CHANGE UP (ref 3.4–4.5)
POTASSIUM SERPL-MCNC: 3.5 MMOL/L — SIGNIFICANT CHANGE UP (ref 3.5–5.3)
POTASSIUM SERPL-SCNC: 3.5 MMOL/L — SIGNIFICANT CHANGE UP (ref 3.5–5.3)
PROT SERPL-MCNC: 3.8 G/DL — LOW (ref 6–8.3)
RBC # BLD: 3.76 M/UL — LOW (ref 4.2–5.8)
RBC # FLD: 14.8 % — HIGH (ref 10.3–14.5)
SAO2 % BLDA: 95.8 % — SIGNIFICANT CHANGE UP (ref 95–99)
SAO2 % BLDA: 96.6 % — SIGNIFICANT CHANGE UP (ref 95–99)
SAO2 % BLDA: 97.3 % — SIGNIFICANT CHANGE UP (ref 95–99)
SAO2 % BLDA: 97.7 % — SIGNIFICANT CHANGE UP (ref 95–99)
SODIUM BLDA-SCNC: 132 MMOL/L — LOW (ref 136–146)
SODIUM BLDA-SCNC: 135 MMOL/L — LOW (ref 136–146)
SODIUM SERPL-SCNC: 138 MMOL/L — SIGNIFICANT CHANGE UP (ref 135–145)
SPECIMEN SOURCE: SIGNIFICANT CHANGE UP
TOTAL CELLS COUNTED, BODY FLUID: 100 CELLS — SIGNIFICANT CHANGE UP
TRIGL SERPL-MCNC: 1445 MG/DL — HIGH (ref 10–149)
WBC # BLD: 6.68 K/UL — SIGNIFICANT CHANGE UP (ref 3.8–10.5)
WBC # FLD AUTO: 6.68 K/UL — SIGNIFICANT CHANGE UP (ref 3.8–10.5)

## 2018-12-12 PROCEDURE — 88112 CYTOPATH CELL ENHANCE TECH: CPT | Mod: 26

## 2018-12-12 PROCEDURE — 99291 CRITICAL CARE FIRST HOUR: CPT | Mod: 25

## 2018-12-12 PROCEDURE — 93312 ECHO TRANSESOPHAGEAL: CPT | Mod: 26

## 2018-12-12 PROCEDURE — 88305 TISSUE EXAM BY PATHOLOGIST: CPT | Mod: 26

## 2018-12-12 PROCEDURE — 95819 EEG AWAKE AND ASLEEP: CPT | Mod: 26

## 2018-12-12 PROCEDURE — 31624 DX BRONCHOSCOPE/LAVAGE: CPT

## 2018-12-12 RX ORDER — POTASSIUM CHLORIDE 20 MEQ
20 PACKET (EA) ORAL
Qty: 0 | Refills: 0 | Status: COMPLETED | OUTPATIENT
Start: 2018-12-12 | End: 2018-12-12

## 2018-12-12 RX ORDER — IMIPENEM AND CILASTATIN 250; 250 MG/100ML; MG/100ML
500 INJECTION, POWDER, FOR SOLUTION INTRAVENOUS EVERY 6 HOURS
Qty: 0 | Refills: 0 | Status: DISCONTINUED | OUTPATIENT
Start: 2018-12-12 | End: 2018-12-22

## 2018-12-12 RX ORDER — IMIPENEM AND CILASTATIN 250; 250 MG/100ML; MG/100ML
500 INJECTION, POWDER, FOR SOLUTION INTRAVENOUS ONCE
Qty: 0 | Refills: 0 | Status: COMPLETED | OUTPATIENT
Start: 2018-12-12 | End: 2018-12-12

## 2018-12-12 RX ORDER — POTASSIUM PHOSPHATE, MONOBASIC POTASSIUM PHOSPHATE, DIBASIC 236; 224 MG/ML; MG/ML
15 INJECTION, SOLUTION INTRAVENOUS ONCE
Qty: 0 | Refills: 0 | Status: COMPLETED | OUTPATIENT
Start: 2018-12-12 | End: 2018-12-12

## 2018-12-12 RX ORDER — VANCOMYCIN HCL 1 G
1500 VIAL (EA) INTRAVENOUS EVERY 12 HOURS
Qty: 0 | Refills: 0 | Status: DISCONTINUED | OUTPATIENT
Start: 2018-12-12 | End: 2018-12-14

## 2018-12-12 RX ORDER — CISATRACURIUM BESYLATE 2 MG/ML
1 INJECTION INTRAVENOUS
Qty: 200 | Refills: 0 | Status: DISCONTINUED | OUTPATIENT
Start: 2018-12-12 | End: 2018-12-15

## 2018-12-12 RX ORDER — POTASSIUM CHLORIDE 20 MEQ
10 PACKET (EA) ORAL
Qty: 0 | Refills: 0 | Status: DISCONTINUED | OUTPATIENT
Start: 2018-12-12 | End: 2018-12-12

## 2018-12-12 RX ORDER — VANCOMYCIN HCL 1 G
1000 VIAL (EA) INTRAVENOUS ONCE
Qty: 0 | Refills: 0 | Status: DISCONTINUED | OUTPATIENT
Start: 2018-12-12 | End: 2018-12-12

## 2018-12-12 RX ORDER — NOREPINEPHRINE BITARTRATE/D5W 8 MG/250ML
0.32 PLASTIC BAG, INJECTION (ML) INTRAVENOUS
Qty: 16 | Refills: 0 | Status: DISCONTINUED | OUTPATIENT
Start: 2018-12-12 | End: 2018-12-17

## 2018-12-12 RX ORDER — FUROSEMIDE 40 MG
40 TABLET ORAL ONCE
Qty: 0 | Refills: 0 | Status: COMPLETED | OUTPATIENT
Start: 2018-12-12 | End: 2018-12-12

## 2018-12-12 RX ORDER — CISATRACURIUM BESYLATE 2 MG/ML
20 INJECTION INTRAVENOUS ONCE
Qty: 0 | Refills: 0 | Status: COMPLETED | OUTPATIENT
Start: 2018-12-12 | End: 2018-12-12

## 2018-12-12 RX ORDER — CHLORHEXIDINE GLUCONATE 213 G/1000ML
15 SOLUTION TOPICAL
Qty: 0 | Refills: 0 | Status: DISCONTINUED | OUTPATIENT
Start: 2018-12-12 | End: 2018-12-17

## 2018-12-12 RX ORDER — ACETAMINOPHEN 500 MG
1000 TABLET ORAL ONCE
Qty: 0 | Refills: 0 | Status: COMPLETED | OUTPATIENT
Start: 2018-12-12 | End: 2018-12-12

## 2018-12-12 RX ORDER — IMIPENEM AND CILASTATIN 250; 250 MG/100ML; MG/100ML
INJECTION, POWDER, FOR SOLUTION INTRAVENOUS
Qty: 0 | Refills: 0 | Status: DISCONTINUED | OUTPATIENT
Start: 2018-12-12 | End: 2018-12-22

## 2018-12-12 RX ORDER — MIDAZOLAM HYDROCHLORIDE 1 MG/ML
0.04 INJECTION, SOLUTION INTRAMUSCULAR; INTRAVENOUS
Qty: 100 | Refills: 0 | Status: DISCONTINUED | OUTPATIENT
Start: 2018-12-12 | End: 2018-12-19

## 2018-12-12 RX ORDER — CISATRACURIUM BESYLATE 2 MG/ML
1 INJECTION INTRAVENOUS
Qty: 200 | Refills: 0 | Status: DISCONTINUED | OUTPATIENT
Start: 2018-12-12 | End: 2018-12-12

## 2018-12-12 RX ADMIN — CISATRACURIUM BESYLATE 5.98 MICROGRAM(S)/KG/MIN: 2 INJECTION INTRAVENOUS at 12:31

## 2018-12-12 RX ADMIN — Medication 262 MILLIGRAM(S): at 16:55

## 2018-12-12 RX ADMIN — Medication 300 MILLIGRAM(S): at 18:57

## 2018-12-12 RX ADMIN — IMIPENEM AND CILASTATIN 100 MILLIGRAM(S): 250; 250 INJECTION, POWDER, FOR SOLUTION INTRAVENOUS at 23:51

## 2018-12-12 RX ADMIN — Medication 40 MILLIGRAM(S): at 16:13

## 2018-12-12 RX ADMIN — POLYETHYLENE GLYCOL 3350 17 GRAM(S): 17 POWDER, FOR SOLUTION ORAL at 22:36

## 2018-12-12 RX ADMIN — PIPERACILLIN AND TAZOBACTAM 25 GRAM(S): 4; .5 INJECTION, POWDER, LYOPHILIZED, FOR SOLUTION INTRAVENOUS at 05:30

## 2018-12-12 RX ADMIN — SENNA PLUS 10 MILLILITER(S): 8.6 TABLET ORAL at 22:36

## 2018-12-12 RX ADMIN — PROPOFOL 5.98 MICROGRAM(S)/KG/MIN: 10 INJECTION, EMULSION INTRAVENOUS at 07:42

## 2018-12-12 RX ADMIN — MIDAZOLAM HYDROCHLORIDE 11.96 MG/KG/HR: 1 INJECTION, SOLUTION INTRAMUSCULAR; INTRAVENOUS at 21:00

## 2018-12-12 RX ADMIN — IMIPENEM AND CILASTATIN 100 MILLIGRAM(S): 250; 250 INJECTION, POWDER, FOR SOLUTION INTRAVENOUS at 12:49

## 2018-12-12 RX ADMIN — Medication 262 MILLIGRAM(S): at 05:29

## 2018-12-12 RX ADMIN — Medication 40 MILLIGRAM(S): at 05:29

## 2018-12-12 RX ADMIN — Medication 50 MILLIEQUIVALENT(S): at 07:46

## 2018-12-12 RX ADMIN — Medication 4.67 MICROGRAM(S)/KG/MIN: at 07:42

## 2018-12-12 RX ADMIN — ENOXAPARIN SODIUM 40 MILLIGRAM(S): 100 INJECTION SUBCUTANEOUS at 16:15

## 2018-12-12 RX ADMIN — Medication 50 MILLIEQUIVALENT(S): at 05:52

## 2018-12-12 RX ADMIN — Medication 100 MILLIGRAM(S): at 17:45

## 2018-12-12 RX ADMIN — Medication 400 MILLIGRAM(S): at 00:29

## 2018-12-12 RX ADMIN — Medication 29.91 MICROGRAM(S)/KG/MIN: at 20:59

## 2018-12-12 RX ADMIN — Medication 1000 MILLIGRAM(S): at 01:08

## 2018-12-12 RX ADMIN — CISATRACURIUM BESYLATE 5.98 MICROGRAM(S)/KG/MIN: 2 INJECTION INTRAVENOUS at 21:00

## 2018-12-12 RX ADMIN — POTASSIUM PHOSPHATE, MONOBASIC POTASSIUM PHOSPHATE, DIBASIC 62.5 MILLIMOLE(S): 236; 224 INJECTION, SOLUTION INTRAVENOUS at 06:43

## 2018-12-12 RX ADMIN — CHLORHEXIDINE GLUCONATE 1 APPLICATION(S): 213 SOLUTION TOPICAL at 07:43

## 2018-12-12 RX ADMIN — Medication 28.75 MILLIGRAM(S): at 07:45

## 2018-12-12 RX ADMIN — CHLORHEXIDINE GLUCONATE 15 MILLILITER(S): 213 SOLUTION TOPICAL at 17:55

## 2018-12-12 RX ADMIN — MIDAZOLAM HYDROCHLORIDE 1.99 MG/KG/HR: 1 INJECTION, SOLUTION INTRAMUSCULAR; INTRAVENOUS at 07:43

## 2018-12-12 RX ADMIN — Medication 1 APPLICATION(S): at 17:45

## 2018-12-12 RX ADMIN — IMIPENEM AND CILASTATIN 100 MILLIGRAM(S): 250; 250 INJECTION, POWDER, FOR SOLUTION INTRAVENOUS at 17:51

## 2018-12-12 RX ADMIN — CISATRACURIUM BESYLATE 20 MILLIGRAM(S): 2 INJECTION INTRAVENOUS at 12:29

## 2018-12-12 NOTE — CHART NOTE - NSCHARTNOTEFT_GEN_A_CORE
: Judah De La Vega    INDICATION: Hypoxic respiratory failure    PROCEDURE:  [x] LIMITED ECHO  [x] LIMITED CHEST  [ ] LIMITED RETROPERITONEAL  [ ] LIMITED ABDOMINAL  [ ] LIMITED DVT  [ ] NEEDLE GUIDANCE VASCULAR  [ ] NEEDLE GUIDANCE THORACENTESIS  [ ] NEEDLE GUIDANCE PARACENTESIS  [ ] NEEDLE GUIDANCE PERICARDIOCENTESIS  [ ] OTHER    FINDINGS:  Anterior B-lines (L>R)  RLL and LLL consolidations  Limited heart exam, but EF appears grossly NL    INTERPRETATION:  Hypoxic respiratory failure 2/2 ARDS  LV functions appears grossly preserved (though exam was limited)

## 2018-12-12 NOTE — EEG REPORT - NS EEG TEXT BOX
[facility]   COMPREHENSIVE EPILEPSY CENTER   REPORT OF ROUTINE VIDEO EEG     [facility]: [address], [place] [address], Ph#: [phone]  [facility]: [address], [place] [address], Ph#: [phone]  Office: [address] [place] [address], [facility] [address], [place] [address] Ph#: [phone]    Patient Name: NADYA BRASHER  Age and : 53y ([birth year])  MRN #: [id number]  Location: [place] [address]  Referring Physician: Jimi Hare    Study Date: 18    _____________________________________________________________  TECHNICAL INFORMATION    Placement and Labeling of Electrodes:  The EEG was performed utilizing 20 channels referential EEG connections (coronal over temporal over parasagittal montage) using all standard 10-20 electrode placements with EKG.  Recording was at a sampling rate of 256 samples per second per channel.  Time synchronized digital video recording was done simultaneously with EEG recording.  A low light infrared camera was used for low light recording.  Shalom and seizure detection algorithms were utilized.    _____________________________________________________________  HISTORY    Patient is a 53y old  Male who presents with a chief complaint of ARDS?, sepsis (12 Dec 2018 07:49)      PERTINENT MEDICATION:  midazolam Infusion 0.02 mG/kG/Hr (1.994 mL/Hr) IV Continuous <Continuous>  propofol Infusion 10 MICROgram(s)/kG/Min (5.982 mL/Hr) IV Continuous <Continuous>  _____________________________________________________  STUDY INTERPRETATION    Findings: The background was discontinuous, non variable and non reactive.   Background predominantly consisted of theta and delta activities. No posterior dominant rhythm seen.    Focal Slowing:   None were present.    Sleep Background:  Stage II sleep transients were not recorded.    Other Non-Epileptiform Findings:  Diffuse attenuation intermixed with burst suppression for 2-4 s over the left hemisphere and 1-2 s over right one.    Interictal Epileptiform Activity:   None were present.    Events:  Clinical events: None recorded.  Seizures: None recorded.    Activation Procedures:   Hyperventilation was not performed.    Photic stimulation was not performed.     ECG:  The heart rate on single channel ECG was predominantly between 110-120 BPM.    _____________________________________________________________  EEG SUMMARY/CLASSIFICATION    Abnormal EEG in a sedated patient.  - Moderate to severe generalized slowing.    _____________________________________________________________  EEG IMPRESSION/CLINICAL CORRELATE  Abnormal EEG study.  1. Moderate to severe nonspecific diffuse or multifocal cerebral dysfunction ( burst suppression patern)  2. No epileptiform pattern or seizure seen.    Preliminary fellow report    Maria G Gallagher MD  Epilepsy Fellow, [facility] Epile[facility]   COMPREHENSIVE EPILEPSY CENTER   REPORT OF ROUTINE VIDEO EEG     [facility]: [address], [place] [address], Ph#: [phone]  [facility]: [address], [place] [address], Ph#: [phone]  Office: [address] [place] [address], [facility] [address], [place] [address] Ph#: [phone]    Patient Name: NADYA BRASHER  Age and : 53y (61)  MRN #: [id number]  Location: [place] [address]  Referring Physician: Jimi Hare    Study Date: 18    _____________________________________________________________  TECHNICAL INFORMATION    Placement and Labeling of Electrodes:  The EEG was performed utilizing 20 channels referential EEG connections (coronal over temporal over parasagittal montage) using all standard 10-20 electrode placements with EKG.  Recording was at a sampling rate of 256 samples per second per channel.  Time synchronized digital video recording was done simultaneously with EEG recording.  A low light infrared camera was used for low light recording.  Shalom and seizure detection algorithms were utilized.    _____________________________________________________________  HISTORY    Patient is a 53y old  Male who presents with a chief complaint of ARDS?, sepsis (12 Dec 2018 07:49)      PERTINENT MEDICATION:  midazolam Infusion 0.02 mG/kG/Hr (1.994 mL/Hr) IV Continuous <Continuous>  propofol Infusion 10 MICROgram(s)/kG/Min (5.982 mL/Hr) IV Continuous <Continuous>  _____________________________________________________  STUDY INTERPRETATION    Findings: The background was variable and nonreactive. Background was in burst suppression, predominantly consisted of asynchronous bursts of theta and delta activities with intermittent 2-8s of suppression. No posterior dominant rhythm seen.    Focal Slowing:   None were present.    Sleep Background:  Stage II sleep transients were not recorded.    Other Non-Epileptiform Findings:  None were present.    Interictal Epileptiform Activity:   None were present.    Events:  Clinical events: None recorded.  Seizures: None recorded.    Activation Procedures:   Hyperventilation was not performed.    Photic stimulation was not performed.     ECG:  The heart rate on single channel ECG was predominantly between 110-120 BPM.    _____________________________________________________________  EEG SUMMARY/CLASSIFICATION    Abnormal EEG in a sedated patient.  - Severe generalized slowing with burst suppression background.    _____________________________________________________________  EEG IMPRESSION/CLINICAL CORRELATE  Abnormal EEG study.  1. Severe nonspecific diffuse or multifocal cerebral dysfunction.  2. No epileptiform pattern or seizure seen.      Maria G Gallagher MD  Epilepsy Fellow, [facility]ile[facility] [place]    Shayla Murphy MD  Attending Physician, [facility] [place]

## 2018-12-12 NOTE — PROGRESS NOTE ADULT - ATTENDING COMMENTS
Patient seen and examined. Remains intubated and sedated requiring an increased amount of Levophed compared to yesterday morning.     < from: CT Abdomen and Pelvis w/ Oral Cont and w/ IV Cont (12.11.18 @ 17:33) >    GALLBLADDER: Punctate calcification in the gallbladder fundus wall.  < from: CT Abdomen and Pelvis w/ Oral Cont and w/ IV Cont (12.11.18 @ 17:33) >        < end of copied text >    < from: CT Abdomen and Pelvis w/ Oral Cont and w/ IV Cont (12.11.18 @ 17:33) >    Necrotizing pancreatitis with stable moderate peripancreatic free fluid   since 12/08/2018, but increased small abdominopelvic ascites and   increasing small to moderate bilateral pleural effusions. Additionally,   splenic vein thrombosis.    Modified CT severity index score: 8, compatible with severe pancreatitis.     Patchy opacities in the left upper lobe and lingula are suspicious for   pneumonia. Correlate clinically.    Mild rectal wall edema, nonspecific. Correlate clinically for proctitis.    68 year old man with pancreatitis  1. If concern for cholecystitis would obtain IR perc rio  2. Would not plan for cholecystectomy this admission  3. Continue supportive care for pancreatitis

## 2018-12-12 NOTE — CHART NOTE - NSCHARTNOTEFT_GEN_A_CORE
Indication: Hypoxemic respiratory failure    Operators: Genaro Cevallos    Pre-op Dx: ARDS, hypoxemic respiratory failure     History: Hx of TBI, G tube, seizure admitted with respiratory failure concerning for ARDS 2/2 pancreatitis.    Preop medications: propofol infusion, midazolam infusion, fentanyl injection, cisatracurium infusion    Xray/CT reviewed    Findings:  Bronchoscope inserted through ETT. ETT noted to be in good position. Airway inspection was performed to the subsegmental airways. All sub-segments were patent and no endobronchial lesions were noted. Airways were erythematous. Thin white secretions were noted in the right side, which were suctioned easily. Thick white secretions and mucus plugs were seen in the left side and were suctioned; BAL was performed in the LLL.  Bronchoscope then withdrawn from ETT. Pt tolerated procedure without complications.     Specimens:  BAL sent for cell count, micro, cytopath.     Xena Cevallos MD  Pulmonary Critical Care Fellow  [phone]

## 2018-12-12 NOTE — PROVIDER CONTACT NOTE (MEDICATION) - SITUATION
Pt critically ill on ventilator. Currently medically paralyzed. Propofol d/gonzalez due to hypertriglyceridemia. Midazolam increased due to d/c of propofol

## 2018-12-12 NOTE — PROGRESS NOTE ADULT - ASSESSMENT
52yo M hx of TBI, G tube, contracture, seizure, resident of [facility] presented as a transfer from [facility] on 12/9 for resp failure concerning for ARDS s/p intubation, likely 2/2 pancreatitis.    Neuro: Sedated at this time.  - Currently sedated w/ propofol and versed.   - Depakote added for seizure precautions. Prn ativan as needed.    CV: Sepsis leading to likely distributive shock  - Currently on levophed for BP control. Titrate to MAP>= 65. Vasopressin turned off.  - C/w IVF for now.  - Will need to complete w/u to r/o cardiogenic shock once clinically stabilized.    Pulm: Patient currently intubated for questionable ARDS diagnosis.  - Current vent setting: PEEP 8, FiO2 100, Tidal volume 450 and RR 18. Continue to monitor resp functions with serial blood gas.  - C/w zosyn for possible aspiration pna coverage.    GI: Unclear etiology for pancreatitis at this time. Patient resident of NH and unclear alcohol history. OSH CT abd w/ gallbladder pathology.  - Awaiting final read for CT a/p.  - C/w LR at high rate as tolerated 100cc/h  - Keep patient off tube feeds for now. Restart when tolerated.  - OSH record identified coffee ground emesis requiring PPI drip. Continue to trend H&H.  - Consulted IR and surgery for complicated pancreatitis. Consider GI consult?   - Will likely need per rio if confirmed diagnosis of cholecystitis.    :   - Monitor renal function.   - C/w tavares for now. Strict I&Os.     ID: Patient in shock on arrival. Likely source infectious, distributive.   - Repeat chest CT to r/o PNA.  - C/w broad spectrum abx zosyn for empiric coverage. Consider adding meropenem or flagyl for pancreatic penetration.  - Pan cultures NGTD.  - Started valacyclovir for herpes zoster skin lesion.    Endo: No issues at this time.  - Continue to monitor FSG.   - Restart feeding when appropirate.    PPX:   - DVT ppx w/ scds.

## 2018-12-12 NOTE — PROGRESS NOTE ADULT - ASSESSMENT
54 yo M nursing home resident with a hx of TBI (residual right-sided paralysis and aphasia), seizures, contracture, G tube, presents as a transfer from [facility] 2/2 respiratory failure concerning for ARDS, likely secondary to pancreatitis. CT A/P demonstrates cholelithiasis without gallbladder wall thickening or distention.    NEURO: sedated, TBI with baseline paralysis, aphasia.  PULM: intubated, on full mechanical vent support. Moderate ARDS (P:F ratio 140 from 112).  CV: requiring levophed for support, off vasopressin.  GI: pancreatitis, gallstone vs. drug induced vs. idiopathic. No signs of cholecystitis on CT (12/11). T. bili 3.6, alk phose 250, LFTs <100.  : BUN/Cr stable.  ID: septic 2/2 pancreatitis. Leukocytosis resolved, WBC 6.7. Afebrile. On zosyn. Blood cultures 12/11 negative. On acyclovir for possible disseminated zoster.  ENDO: no issues.    -patient not a candidate for cholecystectomy or other invasive surgical intervention due to hemodynamic instability and sepsis  -consult interventional radiology for percutaneous cholecystostomy tube  -follow up outpatient for elective cholecystectomy  -please recontact A team, [id number] if additional concerns arise    Patient discussed with attending Dr. Putnam 52 yo M nursing home resident with a hx of TBI (residual right-sided paralysis and aphasia), seizures, contracture, G tube, presents as a transfer from [facility] 2/2 respiratory failure concerning for ARDS, likely secondary to pancreatitis. CT A/P demonstrates cholelithiasis without gallbladder wall thickening or distention.    NEURO: sedated, TBI with baseline paralysis, aphasia.  PULM: intubated, on full mechanical vent support. Moderate ARDS (P:F ratio 140 from 112).  CV: requiring levophed for support, off vasopressin.  GI: pancreatitis, gallstone vs. drug induced vs. idiopathic. No signs of cholecystitis on CT (12/11). T. bili 3.6, alk phose 250, LFTs <100.  : BUN/Cr stable.  ID: septic 2/2 pancreatitis. Leukocytosis resolved, WBC 6.7. Afebrile. On zosyn. Blood cultures 12/11 negative. On acyclovir for possible disseminated zoster.  ENDO: no issues.    - If concerns for cholecystitis, would recommend IR percutaneous cholecystostomy tube.   - Would not plan for same admission cholecystectomy at this time  - please recontact A team, [phone] if additional concerns arise    Patient discussed with attending Dr. Putnam

## 2018-12-12 NOTE — PROGRESS NOTE ADULT - SUBJECTIVE AND OBJECTIVE BOX
SUBJECTIVE:  No acute overnight events. Patient weaned off vasopressin, currently on levophed. Sedated on propofol and versed drips.    OBJECTIVE:  Vital Signs Last 24 Hrs  T(C): 36.9 (12 Dec 2018 04:00), Max: 37.3 (12 Dec 2018 00:00)  T(F): 98.4 (12 Dec 2018 04:00), Max: 99.1 (12 Dec 2018 00:00)  HR: 113 (12 Dec 2018 07:19) (94 - 126)  BP: 96/61 (12 Dec 2018 02:00) (96/61 - 96/61)  BP(mean): 69 (12 Dec 2018 02:00) (69 - 69)  RR: 18 (12 Dec 2018 07:00) (16 - 18)  SpO2: 94% (12 Dec 2018 07:19) (90% - 98%)    I&O's Detail    11 Dec 2018 07:01  -  12 Dec 2018 07:00  --------------------------------------------------------  IN:    fentaNYL  Infusion: 199.5 mL    IV PiggyBack: 1100 mL    lactated ringers.: 1700 mL    midazolam Infusion: 57 mL    norepinephrine Infusion: 570 mL    propofol Infusion: 847.6 mL  Total IN: 4474.1 mL    OUT:    Indwelling Catheter - Urethral: 2325 mL  Total OUT: 2325 mL    Total NET: 2149.1 mL      Inpatient Medications:  MEDICATIONS  (STANDING):  acyclovir IVPB      acyclovir IVPB 600 milliGRAM(s) IV Intermittent every 8 hours  bisacodyl Suppository 10 milliGRAM(s) Rectal daily  chlorhexidine 4% Liquid 1 Application(s) Topical <User Schedule>  docusate sodium Liquid 100 milliGRAM(s) Oral two times a day  enoxaparin Injectable 40 milliGRAM(s) SubCutaneous every 24 hours  midazolam Infusion 0.02 mG/kG/Hr (1.994 mL/Hr) IV Continuous <Continuous>  norepinephrine Infusion 0.05 MICROgram(s)/kG/Min (4.673 mL/Hr) IV Continuous <Continuous>  piperacillin/tazobactam IVPB. 3.375 Gram(s) IV Intermittent every 8 hours  polyethylene glycol 3350 17 Gram(s) Oral daily  potassium chloride  20 mEq/100 mL IVPB 20 milliEquivalent(s) IV Intermittent every 2 hours  propofol Infusion 10 MICROgram(s)/kG/Min (5.982 mL/Hr) IV Continuous <Continuous>  senna Syrup 10 milliLiter(s) Oral daily  valproate sodium IVPB 750 milliGRAM(s) IV Intermittent every 8 hours    MEDICATIONS  (PRN):  acetaminophen    Suspension .. 650 milliGRAM(s) Oral every 6 hours PRN Temp greater or equal to 38C (100.4F), Mild Pain (1 - 3), Moderate Pain (4 - 6)      Physical Examination:  GEN: intubated, sedated  NEURO: sedated  PULM: symmetric chest rise bilaterally  CV: tachycardic, peripheral pulses intact  ABD: soft, abdominal wall edematous, PEG site c/d/i  EXTR: bilateral lower extremity and scrotal edema; right arm contracted    LABS:                        12.9   6.68  )-----------( 78       ( 12 Dec 2018 02:26 )             37.7       12-12    138  |  101  |  27<H>  ----------------------------<  110<H>  3.5   |  26  |  0.53    Ca    6.7<L>      12 Dec 2018 02:26  Phos  1.8     12-12  Mg     2.6     12-12    TPro  3.8<L>  /  Alb  1.0<L>  /  TBili  3.6<H>  /  DBili  x   /  AST  94<H>  /  ALT  50<H>  /  AlkPhos  250<H>  12-12      CULTURES:  BLOOD PERIPHERAL  12-11 @ 01:19 --    NO ORGANISMS ISOLATED  NO ORGANISMS ISOLATED AT 24 HOURS  --      IMAGING:  < from: CT Abdomen and Pelvis w/ Oral Cont and w/ IV Cont (12.11.18 @ 17:33) >  EXAM:  CT ABDOMEN AND PELVIS OC IC        PROCEDURE DATE:  Dec 11 2018     ******PRELIMINARY REPORT******    ******PRELIMINARY REPORT******            INTERPRETATION:  Comparison made to noncontrast CT on 12/8/2018. There is   redemonstration of acute interstitial pancreas which appears similar in   appearance to prior study. There is small to moderate amount of   peripancreatic edema, unchanged. Scattered areas of hypodensity   throughout the pancreatic parenchyma may represent edema versus early   necrosis. Close interval follow-up recommended.    Gallbladder stone, no gallbladder wall thickening, no distention      < from: US Abdomen Complete (12.11.18 @ 09:45) >  IMPRESSION:     Nondiagnostic examination due to patient body habitus and poor beam   penetration.

## 2018-12-12 NOTE — CHART NOTE - NSCHARTNOTEFT_GEN_A_CORE
Procedure: Transesophageal Echocardiogram  Indication:  Hypoxic Respiratory Failure  Consent: Obtained  Operators: DO Wayne; MD Genaro  Anesthesia:  Propofol gtt, Fentanyl gtt    Findings:    AV: appeared bicuspid in nature with evidence of doming; however, no evidence of AI      MV: normal morphology, trace MR     PV: normal function and morphology    TV: dec with mod enlargement, no fluid repsonse     LV: normal function with mild dysynchrony in the mid gastric view    RV: decreased function with mod enlargement, no fluid response      LA: normal size, no clot in GILL    RA: normal size     SVC:  no fluid response      Aorta: normal    Doppler:  Note: e-prime: 6.8    Assessment and Plan:  LV appears to be functioning normally with mild-moderate enlargement of RV  No evidence of fluid responsiveness  ?bicuspid aortic valve with no evidence of AI  Left pleural effusion with dense consolidation

## 2018-12-12 NOTE — PROGRESS NOTE ADULT - SUBJECTIVE AND OBJECTIVE BOX
CHIEF COMPLAINT:    Interval Events: No acute events overnight. Remains sedated and intubated in the am. Tolerated CT yesterday.    REVIEW OF SYSTEMS:  Constitutional: [ ] negative [ ] fevers [ ] chills [ ] weight loss [ ] weight gain  HEENT: [ ] negative [ ] dry eyes [ ] eye irritation [ ] postnasal drip [ ] nasal congestion  CV: [ ] negative  [ ] chest pain [ ] orthopnea [ ] palpitations [ ] murmur  Resp: [ ] negative [ ] cough [ ] shortness of breath [ ] dyspnea [ ] wheezing [ ] sputum [ ] hemoptysis  GI: [ ] negative [ ] nausea [ ] vomiting [ ] diarrhea [ ] constipation [ ] abd pain [ ] dysphagia   : [ ] negative [ ] dysuria [ ] nocturia [ ] hematuria [ ] increased urinary frequency  Musculoskeletal: [ ] negative [ ] back pain [ ] myalgias [ ] arthralgias [ ] fracture  Skin: [ ] negative [ ] rash [ ] itch  Neurological: [ ] negative [ ] headache [ ] dizziness [ ] syncope [ ] weakness [ ] numbness  Psychiatric: [ ] negative [ ] anxiety [ ] depression  Endocrine: [ ] negative [ ] diabetes [ ] thyroid problem  Hematologic/Lymphatic: [ ] negative [ ] anemia [ ] bleeding problem  Allergic/Immunologic: [ ] negative [ ] itchy eyes [ ] nasal discharge [ ] hives [ ] angioedema  [ ] All other systems negative  [x] Unable to assess ROS because ________    OBJECTIVE:  ICU Vital Signs Last 24 Hrs  T(C): 36.9 (12 Dec 2018 04:00), Max: 37.3 (12 Dec 2018 00:00)  T(F): 98.4 (12 Dec 2018 04:00), Max: 99.1 (12 Dec 2018 00:00)  HR: 113 (12 Dec 2018 07:19) (94 - 126)  BP: 96/61 (12 Dec 2018 02:00) (96/61 - 96/61)  BP(mean): 69 (12 Dec 2018 02:00) (69 - 69)  ABP: 112/72 (12 Dec 2018 07:00) (89/56 - 135/72)  ABP(mean): 84 (12 Dec 2018 07:00) (66 - 94)  RR: 18 (12 Dec 2018 07:00) (16 - 18)  SpO2: 94% (12 Dec 2018 07:19) (90% - 98%)    Mode: AC/ CMV (Assist Control/ Continuous Mandatory Ventilation), RR (machine): 18, TV (machine): 430, FiO2: 100, PEEP: 8, MAP: 14, PIP: 33    12-11 @ 07:01  -  12- @ 07:00  --------------------------------------------------------  IN: 4474.1 mL / OUT: 2325 mL / NET: 2149.1 mL    GENERAL: Sedated  	HEAD:  Atraumatic, Normocephalic  	EYES: EOMI, conjunctiva and sclera clear  	NECK: Supple, No JVD  	CHEST/LUNG: bilateral crackles  	HEART: tachycardic, no murmur appreciated.  	ABDOMEN: distended, peg in place. Left flank/back crusted skin lesions.  	EXTREMITIES:  2+ Peripheral Pulses, No clubbing, cyanosis, or edema  	PSYCH: AAOx0  NEUROLOGY: unable to assess.    LINES: left subclavian and left arterial line.    HOSPITAL MEDICATIONS:  enoxaparin Injectable 40 milliGRAM(s) SubCutaneous every 24 hours    acyclovir IVPB      acyclovir IVPB 600 milliGRAM(s) IV Intermittent every 8 hours  piperacillin/tazobactam IVPB. 3.375 Gram(s) IV Intermittent every 8 hours    norepinephrine Infusion 0.05 MICROgram(s)/kG/Min IV Continuous <Continuous>    acetaminophen    Suspension .. 650 milliGRAM(s) Oral every 6 hours PRN  midazolam Infusion 0.02 mG/kG/Hr IV Continuous <Continuous>  propofol Infusion 10 MICROgram(s)/kG/Min IV Continuous <Continuous>  valproate sodium IVPB 750 milliGRAM(s) IV Intermittent every 8 hours    bisacodyl Suppository 10 milliGRAM(s) Rectal daily  docusate sodium Liquid 100 milliGRAM(s) Oral two times a day  polyethylene glycol 3350 17 Gram(s) Oral daily  senna Syrup 10 milliLiter(s) Oral daily    chlorhexidine 4% Liquid 1 Application(s) Topical <User Schedule>    LABS:                        12.9   6.68  )-----------( 78       ( 12 Dec 2018 02:26 )             37.7     Hgb Trend: 12.9<--, 11.5<--, 12.1<--  12-12    138  |  101  |  27<H>  ----------------------------<  110<H>  3.5   |  26  |  0.53    Ca    6.7<L>      12 Dec 2018 02:26  Phos  1.8     1212  Mg     2.6     12    TPro  3.8<L>  /  Alb  1.0<L>  /  TBili  3.6<H>  /  DBili  x   /  AST  94<H>  /  ALT  50<H>  /  AlkPhos  250<H>      Creatinine Trend: 0.53<--, 0.67<--, 0.76<--  PT/INR - ( 11 Dec 2018 09:30 )   PT: 15.0 SEC;   INR: 1.34          PTT - ( 11 Dec 2018 09:30 )  PTT:46.0 SEC  Urinalysis Basic - ( 10 Dec 2018 15:00 )    Color: IAN / Appearance: Lt TURBID / S.026 / pH: 6.0  Gluc: NEGATIVE / Ketone: TRACE  / Bili: NEGATIVE / Urobili: SMALL   Blood: NEGATIVE / Protein: 30 / Nitrite: NEGATIVE   Leuk Esterase: NEGATIVE / RBC: 11-25 / WBC 3-5   Sq Epi: FEW / Non Sq Epi: x / Bacteria: MODERATE    Arterial Blood Gas:   @ 02:26  7.43/42/84/27/96.6/3.1  ABG lactate: 3.8  Arterial Blood Gas:   @ 09:30  7.43/44/110/29/98.6/4.8  ABG lactate: 3.7  Arterial Blood Gas:  12-10 @ 21:50  --/--/--/--/--/--  ABG lactate: 5.5  Arterial Blood Gas:  12-10 @ 15:00  7.40/46/101/27/98.1/3.3  ABG lactate: 5.8    MICROBIOLOGY:     RADIOLOGY:  [x] Reviewed and interpreted by me: CT a/p pancreatitis with peripancreatic fluid

## 2018-12-13 LAB
ALBUMIN SERPL ELPH-MCNC: 1.4 G/DL — LOW (ref 3.3–5)
ALP SERPL-CCNC: 269 U/L — HIGH (ref 40–120)
ALT FLD-CCNC: 59 U/L — HIGH (ref 4–41)
AST SERPL-CCNC: 89 U/L — HIGH (ref 4–40)
BASE EXCESS BLDA CALC-SCNC: 2.5 MMOL/L — SIGNIFICANT CHANGE UP
BASE EXCESS BLDA CALC-SCNC: 3.9 MMOL/L — SIGNIFICANT CHANGE UP
BASE EXCESS BLDA CALC-SCNC: 4.2 MMOL/L — SIGNIFICANT CHANGE UP
BASE EXCESS BLDA CALC-SCNC: 4.3 MMOL/L — SIGNIFICANT CHANGE UP
BASE EXCESS BLDA CALC-SCNC: 4.3 MMOL/L — SIGNIFICANT CHANGE UP
BASE EXCESS BLDA CALC-SCNC: 5.5 MMOL/L — SIGNIFICANT CHANGE UP
BASE EXCESS BLDA CALC-SCNC: 5.9 MMOL/L — SIGNIFICANT CHANGE UP
BASOPHILS # BLD AUTO: 0.04 K/UL — SIGNIFICANT CHANGE UP (ref 0–0.2)
BASOPHILS NFR BLD AUTO: 0.6 % — SIGNIFICANT CHANGE UP (ref 0–2)
BILIRUB DIRECT SERPL-MCNC: 4.7 MG/DL — HIGH (ref 0.1–0.2)
BILIRUB SERPL-MCNC: 5.4 MG/DL — HIGH (ref 0.2–1.2)
BUN SERPL-MCNC: 19 MG/DL — SIGNIFICANT CHANGE UP (ref 7–23)
BUN SERPL-MCNC: 22 MG/DL — SIGNIFICANT CHANGE UP (ref 7–23)
BUN SERPL-MCNC: 26 MG/DL — HIGH (ref 7–23)
CA-I BLDA-SCNC: 1.01 MMOL/L — LOW (ref 1.15–1.29)
CA-I BLDA-SCNC: 1.04 MMOL/L — LOW (ref 1.15–1.29)
CA-I BLDA-SCNC: 1.09 MMOL/L — LOW (ref 1.15–1.29)
CALCIUM SERPL-MCNC: 6.8 MG/DL — LOW (ref 8.4–10.5)
CALCIUM SERPL-MCNC: 7 MG/DL — LOW (ref 8.4–10.5)
CALCIUM SERPL-MCNC: 7 MG/DL — LOW (ref 8.4–10.5)
CHLORIDE BLDA-SCNC: 103 MMOL/L — SIGNIFICANT CHANGE UP (ref 96–108)
CHLORIDE BLDA-SCNC: 104 MMOL/L — SIGNIFICANT CHANGE UP (ref 96–108)
CHLORIDE BLDA-SCNC: 107 MMOL/L — SIGNIFICANT CHANGE UP (ref 96–108)
CHLORIDE SERPL-SCNC: 100 MMOL/L — SIGNIFICANT CHANGE UP (ref 98–107)
CHLORIDE SERPL-SCNC: 101 MMOL/L — SIGNIFICANT CHANGE UP (ref 98–107)
CHLORIDE SERPL-SCNC: 102 MMOL/L — SIGNIFICANT CHANGE UP (ref 98–107)
CHOLEST SERPL-MCNC: 96 MG/DL — LOW (ref 120–199)
CO2 SERPL-SCNC: 25 MMOL/L — SIGNIFICANT CHANGE UP (ref 22–31)
CO2 SERPL-SCNC: 27 MMOL/L — SIGNIFICANT CHANGE UP (ref 22–31)
CO2 SERPL-SCNC: 28 MMOL/L — SIGNIFICANT CHANGE UP (ref 22–31)
CREAT SERPL-MCNC: 0.35 MG/DL — LOW (ref 0.5–1.3)
CREAT SERPL-MCNC: 0.4 MG/DL — LOW (ref 0.5–1.3)
CREAT SERPL-MCNC: 0.44 MG/DL — LOW (ref 0.5–1.3)
CULTURE - ACID FAST SMEAR CONCENTRATED: SIGNIFICANT CHANGE UP
EOSINOPHIL # BLD AUTO: 0.02 K/UL — SIGNIFICANT CHANGE UP (ref 0–0.5)
EOSINOPHIL NFR BLD AUTO: 0.3 % — SIGNIFICANT CHANGE UP (ref 0–6)
GLUCOSE BLDA-MCNC: 172 MG/DL — HIGH (ref 70–99)
GLUCOSE BLDA-MCNC: 172 MG/DL — HIGH (ref 70–99)
GLUCOSE BLDA-MCNC: 173 MG/DL — HIGH (ref 70–99)
GLUCOSE BLDA-MCNC: 173 MG/DL — HIGH (ref 70–99)
GLUCOSE BLDA-MCNC: 177 MG/DL — HIGH (ref 70–99)
GLUCOSE BLDA-MCNC: 214 MG/DL — HIGH (ref 70–99)
GLUCOSE SERPL-MCNC: 164 MG/DL — HIGH (ref 70–99)
GLUCOSE SERPL-MCNC: 170 MG/DL — HIGH (ref 70–99)
GLUCOSE SERPL-MCNC: 170 MG/DL — HIGH (ref 70–99)
HCO3 BLDA-SCNC: 24 MMOL/L — SIGNIFICANT CHANGE UP (ref 22–26)
HCO3 BLDA-SCNC: 27 MMOL/L — HIGH (ref 22–26)
HCO3 BLDA-SCNC: 28 MMOL/L — HIGH (ref 22–26)
HCO3 BLDA-SCNC: 29 MMOL/L — HIGH (ref 22–26)
HCT VFR BLD CALC: 33.1 % — LOW (ref 39–50)
HCT VFR BLDA CALC: 33.8 % — LOW (ref 39–51)
HCT VFR BLDA CALC: 37 % — LOW (ref 39–51)
HCT VFR BLDA CALC: 37.1 % — LOW (ref 39–51)
HCT VFR BLDA CALC: 37.4 % — LOW (ref 39–51)
HCT VFR BLDA CALC: 37.9 % — LOW (ref 39–51)
HCT VFR BLDA CALC: 38.4 % — LOW (ref 39–51)
HDLC SERPL-MCNC: 8 MG/DL — LOW (ref 35–55)
HGB BLD-MCNC: 12 G/DL — LOW (ref 13–17)
HGB BLDA-MCNC: 11 G/DL — LOW (ref 13–17)
HGB BLDA-MCNC: 12 G/DL — LOW (ref 13–17)
HGB BLDA-MCNC: 12.1 G/DL — LOW (ref 13–17)
HGB BLDA-MCNC: 12.1 G/DL — LOW (ref 13–17)
HGB BLDA-MCNC: 12.3 G/DL — LOW (ref 13–17)
HGB BLDA-MCNC: 12.5 G/DL — LOW (ref 13–17)
IMM GRANULOCYTES # BLD AUTO: 0.54 # — SIGNIFICANT CHANGE UP
IMM GRANULOCYTES NFR BLD AUTO: 7.7 % — HIGH (ref 0–1.5)
LACTATE BLDA-SCNC: 2.2 MMOL/L — HIGH (ref 0.5–2)
LACTATE BLDA-SCNC: 2.3 MMOL/L — HIGH (ref 0.5–2)
LACTATE BLDA-SCNC: 2.3 MMOL/L — HIGH (ref 0.5–2)
LACTATE BLDA-SCNC: 2.5 MMOL/L — HIGH (ref 0.5–2)
LACTATE BLDA-SCNC: 3.2 MMOL/L — HIGH (ref 0.5–2)
LACTATE BLDA-SCNC: 3.7 MMOL/L — HIGH (ref 0.5–2)
LIPID PNL WITH DIRECT LDL SERPL: 12 MG/DL — SIGNIFICANT CHANGE UP
LYMPHOCYTES # BLD AUTO: 1.56 K/UL — SIGNIFICANT CHANGE UP (ref 1–3.3)
LYMPHOCYTES # BLD AUTO: 22.4 % — SIGNIFICANT CHANGE UP (ref 13–44)
MAGNESIUM SERPL-MCNC: 1.8 MG/DL — SIGNIFICANT CHANGE UP (ref 1.6–2.6)
MAGNESIUM SERPL-MCNC: 2 MG/DL — SIGNIFICANT CHANGE UP (ref 1.6–2.6)
MAGNESIUM SERPL-MCNC: 2.2 MG/DL — SIGNIFICANT CHANGE UP (ref 1.6–2.6)
MCHC RBC-ENTMCNC: 34 PG — SIGNIFICANT CHANGE UP (ref 27–34)
MCHC RBC-ENTMCNC: 36.3 % — HIGH (ref 32–36)
MCV RBC AUTO: 93.8 FL — SIGNIFICANT CHANGE UP (ref 80–100)
MONOCYTES # BLD AUTO: 0.34 K/UL — SIGNIFICANT CHANGE UP (ref 0–0.9)
MONOCYTES NFR BLD AUTO: 4.9 % — SIGNIFICANT CHANGE UP (ref 2–14)
NEUTROPHILS # BLD AUTO: 4.47 K/UL — SIGNIFICANT CHANGE UP (ref 1.8–7.4)
NEUTROPHILS NFR BLD AUTO: 64.1 % — SIGNIFICANT CHANGE UP (ref 43–77)
NRBC # FLD: 0.4 — SIGNIFICANT CHANGE UP
NRBC FLD-RTO: 5.7 — SIGNIFICANT CHANGE UP
PCO2 BLDA: 30 MMHG — LOW (ref 35–48)
PCO2 BLDA: 31 MMHG — LOW (ref 35–48)
PCO2 BLDA: 37 MMHG — SIGNIFICANT CHANGE UP (ref 35–48)
PCO2 BLDA: 44 MMHG — SIGNIFICANT CHANGE UP (ref 35–48)
PCO2 BLDA: 52 MMHG — HIGH (ref 35–48)
PCO2 BLDA: 65 MMHG — HIGH (ref 35–48)
PCO2 BLDA: 79 MMHG — CRITICAL HIGH (ref 35–48)
PH BLDA: 7.2 PH — CRITICAL LOW (ref 7.35–7.45)
PH BLDA: 7.29 PH — LOW (ref 7.35–7.45)
PH BLDA: 7.39 PH — SIGNIFICANT CHANGE UP (ref 7.35–7.45)
PH BLDA: 7.44 PH — SIGNIFICANT CHANGE UP (ref 7.35–7.45)
PH BLDA: 7.48 PH — HIGH (ref 7.35–7.45)
PH BLDA: 7.54 PH — HIGH (ref 7.35–7.45)
PH BLDA: 7.55 PH — HIGH (ref 7.35–7.45)
PHOSPHATE SERPL-MCNC: 1.9 MG/DL — LOW (ref 2.5–4.5)
PHOSPHATE SERPL-MCNC: 2.5 MG/DL — SIGNIFICANT CHANGE UP (ref 2.5–4.5)
PHOSPHATE SERPL-MCNC: 3 MG/DL — SIGNIFICANT CHANGE UP (ref 2.5–4.5)
PLATELET # BLD AUTO: 95 K/UL — LOW (ref 150–400)
PMV BLD: 12.4 FL — SIGNIFICANT CHANGE UP (ref 7–13)
PO2 BLDA: 65 MMHG — LOW (ref 83–108)
PO2 BLDA: 69 MMHG — LOW (ref 83–108)
PO2 BLDA: 73 MMHG — LOW (ref 83–108)
PO2 BLDA: 76 MMHG — LOW (ref 83–108)
PO2 BLDA: 77 MMHG — LOW (ref 83–108)
PO2 BLDA: 83 MMHG — SIGNIFICANT CHANGE UP (ref 83–108)
PO2 BLDA: 89 MMHG — SIGNIFICANT CHANGE UP (ref 83–108)
POTASSIUM BLDA-SCNC: 3.1 MMOL/L — LOW (ref 3.4–4.5)
POTASSIUM BLDA-SCNC: 3.1 MMOL/L — LOW (ref 3.4–4.5)
POTASSIUM BLDA-SCNC: 3.2 MMOL/L — LOW (ref 3.4–4.5)
POTASSIUM BLDA-SCNC: 3.3 MMOL/L — LOW (ref 3.4–4.5)
POTASSIUM BLDA-SCNC: 3.3 MMOL/L — LOW (ref 3.4–4.5)
POTASSIUM BLDA-SCNC: 3.9 MMOL/L — SIGNIFICANT CHANGE UP (ref 3.4–4.5)
POTASSIUM SERPL-MCNC: 3.5 MMOL/L — SIGNIFICANT CHANGE UP (ref 3.5–5.3)
POTASSIUM SERPL-MCNC: 3.7 MMOL/L — SIGNIFICANT CHANGE UP (ref 3.5–5.3)
POTASSIUM SERPL-MCNC: 4.3 MMOL/L — SIGNIFICANT CHANGE UP (ref 3.5–5.3)
POTASSIUM SERPL-SCNC: 3.5 MMOL/L — SIGNIFICANT CHANGE UP (ref 3.5–5.3)
POTASSIUM SERPL-SCNC: 3.7 MMOL/L — SIGNIFICANT CHANGE UP (ref 3.5–5.3)
POTASSIUM SERPL-SCNC: 4.3 MMOL/L — SIGNIFICANT CHANGE UP (ref 3.5–5.3)
PROT SERPL-MCNC: 4.1 G/DL — LOW (ref 6–8.3)
RBC # BLD: 3.53 M/UL — LOW (ref 4.2–5.8)
RBC # FLD: 14.5 % — SIGNIFICANT CHANGE UP (ref 10.3–14.5)
SAO2 % BLDA: 92.1 % — LOW (ref 95–99)
SAO2 % BLDA: 93.1 % — LOW (ref 95–99)
SAO2 % BLDA: 95 % — SIGNIFICANT CHANGE UP (ref 95–99)
SAO2 % BLDA: 95.6 % — SIGNIFICANT CHANGE UP (ref 95–99)
SAO2 % BLDA: 95.6 % — SIGNIFICANT CHANGE UP (ref 95–99)
SAO2 % BLDA: 96.1 % — SIGNIFICANT CHANGE UP (ref 95–99)
SAO2 % BLDA: 96.7 % — SIGNIFICANT CHANGE UP (ref 95–99)
SODIUM BLDA-SCNC: 133 MMOL/L — LOW (ref 136–146)
SODIUM BLDA-SCNC: 134 MMOL/L — LOW (ref 136–146)
SODIUM BLDA-SCNC: 135 MMOL/L — LOW (ref 136–146)
SODIUM BLDA-SCNC: 135 MMOL/L — LOW (ref 136–146)
SODIUM SERPL-SCNC: 138 MMOL/L — SIGNIFICANT CHANGE UP (ref 135–145)
SODIUM SERPL-SCNC: 138 MMOL/L — SIGNIFICANT CHANGE UP (ref 135–145)
SODIUM SERPL-SCNC: 140 MMOL/L — SIGNIFICANT CHANGE UP (ref 135–145)
SPECIMEN SOURCE: SIGNIFICANT CHANGE UP
TRIGL SERPL-MCNC: 337 MG/DL — HIGH (ref 10–149)
TRIGL SERPL-MCNC: 567 MG/DL — HIGH (ref 10–149)
WBC # BLD: 6.97 K/UL — SIGNIFICANT CHANGE UP (ref 3.8–10.5)
WBC # FLD AUTO: 6.97 K/UL — SIGNIFICANT CHANGE UP (ref 3.8–10.5)

## 2018-12-13 PROCEDURE — 99291 CRITICAL CARE FIRST HOUR: CPT

## 2018-12-13 PROCEDURE — 95819 EEG AWAKE AND ASLEEP: CPT | Mod: 26

## 2018-12-13 PROCEDURE — 74018 RADEX ABDOMEN 1 VIEW: CPT | Mod: 26

## 2018-12-13 PROCEDURE — 71045 X-RAY EXAM CHEST 1 VIEW: CPT | Mod: 26,76

## 2018-12-13 RX ORDER — FUROSEMIDE 40 MG
40 TABLET ORAL EVERY 6 HOURS
Qty: 0 | Refills: 0 | Status: DISCONTINUED | OUTPATIENT
Start: 2018-12-13 | End: 2018-12-18

## 2018-12-13 RX ORDER — POTASSIUM CHLORIDE 20 MEQ
20 PACKET (EA) ORAL
Qty: 0 | Refills: 0 | Status: COMPLETED | OUTPATIENT
Start: 2018-12-13 | End: 2018-12-13

## 2018-12-13 RX ADMIN — Medication 29.91 MICROGRAM(S)/KG/MIN: at 08:01

## 2018-12-13 RX ADMIN — CHLORHEXIDINE GLUCONATE 15 MILLILITER(S): 213 SOLUTION TOPICAL at 17:55

## 2018-12-13 RX ADMIN — CHLORHEXIDINE GLUCONATE 1 APPLICATION(S): 213 SOLUTION TOPICAL at 21:42

## 2018-12-13 RX ADMIN — IMIPENEM AND CILASTATIN 100 MILLIGRAM(S): 250; 250 INJECTION, POWDER, FOR SOLUTION INTRAVENOUS at 18:34

## 2018-12-13 RX ADMIN — Medication 63.75 MILLIMOLE(S): at 14:23

## 2018-12-13 RX ADMIN — IMIPENEM AND CILASTATIN 100 MILLIGRAM(S): 250; 250 INJECTION, POWDER, FOR SOLUTION INTRAVENOUS at 11:48

## 2018-12-13 RX ADMIN — MIDAZOLAM HYDROCHLORIDE 11.96 MG/KG/HR: 1 INJECTION, SOLUTION INTRAMUSCULAR; INTRAVENOUS at 05:06

## 2018-12-13 RX ADMIN — ENOXAPARIN SODIUM 40 MILLIGRAM(S): 100 INJECTION SUBCUTANEOUS at 14:23

## 2018-12-13 RX ADMIN — Medication 262 MILLIGRAM(S): at 16:35

## 2018-12-13 RX ADMIN — Medication 10 MILLIGRAM(S): at 11:47

## 2018-12-13 RX ADMIN — Medication 300 MILLIGRAM(S): at 17:40

## 2018-12-13 RX ADMIN — MIDAZOLAM HYDROCHLORIDE 11.96 MG/KG/HR: 1 INJECTION, SOLUTION INTRAMUSCULAR; INTRAVENOUS at 08:00

## 2018-12-13 RX ADMIN — IMIPENEM AND CILASTATIN 100 MILLIGRAM(S): 250; 250 INJECTION, POWDER, FOR SOLUTION INTRAVENOUS at 06:22

## 2018-12-13 RX ADMIN — CISATRACURIUM BESYLATE 5.98 MICROGRAM(S)/KG/MIN: 2 INJECTION INTRAVENOUS at 08:00

## 2018-12-13 RX ADMIN — SENNA PLUS 10 MILLILITER(S): 8.6 TABLET ORAL at 19:36

## 2018-12-13 RX ADMIN — Medication 50 MILLIEQUIVALENT(S): at 19:36

## 2018-12-13 RX ADMIN — CISATRACURIUM BESYLATE 5.98 MICROGRAM(S)/KG/MIN: 2 INJECTION INTRAVENOUS at 19:40

## 2018-12-13 RX ADMIN — Medication 100 MILLIGRAM(S): at 17:55

## 2018-12-13 RX ADMIN — POLYETHYLENE GLYCOL 3350 17 GRAM(S): 17 POWDER, FOR SOLUTION ORAL at 19:36

## 2018-12-13 RX ADMIN — Medication 40 MILLIGRAM(S): at 11:47

## 2018-12-13 RX ADMIN — Medication 262 MILLIGRAM(S): at 08:03

## 2018-12-13 RX ADMIN — CHLORHEXIDINE GLUCONATE 15 MILLILITER(S): 213 SOLUTION TOPICAL at 05:07

## 2018-12-13 RX ADMIN — Medication 100 MILLIGRAM(S): at 05:07

## 2018-12-13 RX ADMIN — IMIPENEM AND CILASTATIN 100 MILLIGRAM(S): 250; 250 INJECTION, POWDER, FOR SOLUTION INTRAVENOUS at 23:40

## 2018-12-13 RX ADMIN — Medication 262 MILLIGRAM(S): at 01:00

## 2018-12-13 RX ADMIN — Medication 29.91 MICROGRAM(S)/KG/MIN: at 19:40

## 2018-12-13 RX ADMIN — Medication 50 MILLIEQUIVALENT(S): at 17:55

## 2018-12-13 RX ADMIN — Medication 40 MILLIGRAM(S): at 23:40

## 2018-12-13 RX ADMIN — Medication 1 APPLICATION(S): at 05:07

## 2018-12-13 RX ADMIN — Medication 40 MILLIGRAM(S): at 17:55

## 2018-12-13 RX ADMIN — Medication 300 MILLIGRAM(S): at 04:00

## 2018-12-13 RX ADMIN — Medication 50 MILLIEQUIVALENT(S): at 20:44

## 2018-12-13 RX ADMIN — MIDAZOLAM HYDROCHLORIDE 11.96 MG/KG/HR: 1 INJECTION, SOLUTION INTRAMUSCULAR; INTRAVENOUS at 19:41
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
## 2018-12-13 NOTE — PROGRESS NOTE ADULT - SUBJECTIVE AND OBJECTIVE BOX
Interval Events: Patient remained on VDR overnight. Sedated in the am. Tolerated bronch and TY yesterday. Adequate UOP.    REVIEW OF SYSTEMS:  Constitutional: [ ] negative [ ] fevers [ ] chills [ ] weight loss [ ] weight gain  HEENT: [ ] negative [ ] dry eyes [ ] eye irritation [ ] postnasal drip [ ] nasal congestion  CV: [ ] negative  [ ] chest pain [ ] orthopnea [ ] palpitations [ ] murmur  Resp: [ ] negative [ ] cough [ ] shortness of breath [ ] dyspnea [ ] wheezing [ ] sputum [ ] hemoptysis  GI: [ ] negative [ ] nausea [ ] vomiting [ ] diarrhea [ ] constipation [ ] abd pain [ ] dysphagia   : [ ] negative [ ] dysuria [ ] nocturia [ ] hematuria [ ] increased urinary frequency  Musculoskeletal: [ ] negative [ ] back pain [ ] myalgias [ ] arthralgias [ ] fracture  Skin: [ ] negative [ ] rash [ ] itch  Neurological: [ ] negative [ ] headache [ ] dizziness [ ] syncope [ ] weakness [ ] numbness  Psychiatric: [ ] negative [ ] anxiety [ ] depression  Endocrine: [ ] negative [ ] diabetes [ ] thyroid problem  Hematologic/Lymphatic: [ ] negative [ ] anemia [ ] bleeding problem  Allergic/Immunologic: [ ] negative [ ] itchy eyes [ ] nasal discharge [ ] hives [ ] angioedema  [ ] All other systems negative  [x] Unable to assess ROS because sedated    OBJECTIVE:  T(C): 37.3 (12-13-18 @ 04:00), Max: 37.3 (12-12-18 @ 20:00)  HR: 116 (12-13-18 @ 07:08) (95 - 126)  BP: 102/61 (12-12-18 @ 09:00) (102/61 - 102/61)  RR: 15 (12-13-18 @ 06:00) (0 - 23)  SpO2: 97% (12-13-18 @ 07:08) (91% - 97%)  Wt(kg): --  GENERAL: sedated  HEAD:  Normocephalic  EYES: PERRLA  NECK: Supple, No JVD  CHEST/LUNG: bilateral rhonchi  HEART: Regular rate and rhythm; No murmurs, rubs, or gallops  ABDOMEN: Distended, hypoactive bowel sounds.  EXTREMITIES:  2+ Peripheral Pulses, No clubbing, cyanosis, or edema  NEUROLOGY: unable to eval.  SKIN: left back crusted lesion    LINES: Left arterial and left subclavian CVC    HOSPITAL MEDICATIONS:  enoxaparin Injectable 40 milliGRAM(s) SubCutaneous every 24 hours    acyclovir IVPB      acyclovir IVPB 600 milliGRAM(s) IV Intermittent every 8 hours  imipenem/cilastatin  IVPB      imipenem/cilastatin  IVPB 500 milliGRAM(s) IV Intermittent every 6 hours  vancomycin  IVPB 1500 milliGRAM(s) IV Intermittent every 12 hours    norepinephrine Infusion 0.32 MICROgram(s)/kG/Min IV Continuous <Continuous>        acetaminophen    Suspension .. 650 milliGRAM(s) Oral every 6 hours PRN  cisatracurium Infusion 1 MICROgram(s)/kG/Min IV Continuous <Continuous>  midazolam Infusion 0.12 mG/kG/Hr IV Continuous <Continuous>    bisacodyl Suppository 10 milliGRAM(s) Rectal daily  docusate sodium Liquid 100 milliGRAM(s) Oral two times a day  polyethylene glycol 3350 17 Gram(s) Oral daily  senna Syrup 10 milliLiter(s) Oral daily            chlorhexidine 0.12% Liquid 15 milliLiter(s) Oral Mucosa two times a day  chlorhexidine 4% Liquid 1 Application(s) Topical <User Schedule>  petrolatum Ophthalmic Ointment 1 Application(s) Both EYES two times a day        LABS:                        12.0   6.97  )-----------( 95       ( 13 Dec 2018 03:20 )             33.1     Hgb Trend: 12.0<--, 12.9<--, 11.5<--, 12.1<--  12-13    138  |  101  |  26<H>  ----------------------------<  170<H>  3.7   |  25  |  0.44<L>    Ca    6.8<L>      13 Dec 2018 03:20  Phos  1.9     12-13  Mg     2.2     12-13    TPro  4.1<L>  /  Alb  1.4<L>  /  TBili  5.4<H>  /  DBili  x   /  AST  89<H>  /  ALT  59<H>  /  AlkPhos  269<H>  12-13    Creatinine Trend: 0.44<--, 0.53<--, 0.67<--, 0.76<--  PT/INR - ( 11 Dec 2018 09:30 )   PT: 15.0 SEC;   INR: 1.34          PTT - ( 11 Dec 2018 09:30 )  PTT:46.0 SEC    MICROBIOLOGY:   12/8 BCx NGTD  12/10 Bronch: no organism identified    RADIOLOGY:  [x] Reviewed and interpreted by me: Necrotizing pancreatitis

## 2018-12-13 NOTE — PROGRESS NOTE ADULT - ATTENDING COMMENTS
sick irlanda with severe necritozing pancreatitis of unclear etiology/CT and ultrasound not demonstrate GB disease, in face of high bilirubin. he is in moderate to severe ARDS likely from the pancreatitis, we have him on the VDR, and now FiO2 down to 70%, his kidney function is normal for now and I have discussed his prognosis with family/very guarded

## 2018-12-13 NOTE — EEG REPORT - NS EEG TEXT BOX
[facility]   COMPREHENSIVE EPILEPSY CENTER   REPORT OF CONTINUOUS VIDEO EEG     [facility]: [address], [place] [address], Ph#: [phone]  [facility]: [address], [place] [address], Ph#: [phone]  Office: [address] [place] [address], [place] [address], [place] [address] Ph#: [phone]    Patient Name: NADYA BRASHER  Age and : 53y (61)  MRN #: [id number]  Location: [place] [address]  Referring Physician: Jimi Hare    Study Date: 18    _____________________________________________________________  STUDY INFORMATION    EEG Recording Technique:  The patient underwent continuous Video-EEG monitoring, using Telemetry System hardware on the XLTek Digital System. EEG and video data were stored on a computer hard drive with important events saved in digital archive files. The material was reviewed by a physician (electroencephalographer / epileptologist) on a daily basis. Shalom and seizure detection algorithms were utilized and reviewed. An EEG Technician attended to the patient, and was available throughout daytime work hours.  The epilepsy center neurologist was available in person or on call 24-hours per day.    EEG Placement and Labeling of Electrodes:  The EEG was performed utilizing 20 channel referential EEG connections (coronal over temporal over parasagittal montage) using all standard 10-20 electrode placements with EKG, with additional electrodes placed in the inferior temporal region using the modified 10-10 montage electrode placements for elective admissions, or if deemed necessary. Recording was at a sampling rate of 256 samples per second per channel. Time synchronized digital video recording was done simultaneously with EEG recording. A low light infrared camera was used for low light recording.     _____________________________________________________________  HISTORY    Patient is a 53y old  Male who presents with a chief complaint of ARDS?, sepsis (13 Dec 2018 07:33)      PERTINENT MEDICATION:  MEDICATIONS  (STANDING):  acyclovir IVPB      acyclovir IVPB 600 milliGRAM(s) IV Intermittent every 8 hours  bisacodyl Suppository 10 milliGRAM(s) Rectal daily  chlorhexidine 0.12% Liquid 15 milliLiter(s) Oral Mucosa two times a day  chlorhexidine 4% Liquid 1 Application(s) Topical <User Schedule>  cisatracurium Infusion 1 MICROgram(s)/kG/Min (5.982 mL/Hr) IV Continuous <Continuous>  docusate sodium Liquid 100 milliGRAM(s) Oral two times a day  enoxaparin Injectable 40 milliGRAM(s) SubCutaneous every 24 hours  imipenem/cilastatin  IVPB      imipenem/cilastatin  IVPB 500 milliGRAM(s) IV Intermittent every 6 hours  midazolam Infusion 0.12 mG/kG/Hr (11.964 mL/Hr) IV Continuous <Continuous>  norepinephrine Infusion 0.32 MICROgram(s)/kG/Min (29.91 mL/Hr) IV Continuous <Continuous>  petrolatum Ophthalmic Ointment 1 Application(s) Both EYES two times a day  polyethylene glycol 3350 17 Gram(s) Oral daily  senna Syrup 10 milliLiter(s) Oral daily  vancomycin  IVPB 1500 milliGRAM(s) IV Intermittent every 12 hours    _____________________________________________________________  INTERPRETATION    Findings: The background was diffusely suppressed, with a generalized burst-suppression pattern seen throughout the recording (burst duration of 1-2 seconds, interburst interval of 2-8 seconds).    Generalized Slowing:  -Burst Suppression, Generalized    Focal Slowing:   None was present.    Sleep Background:  Stage II sleep transients were not recorded.    Other Non-Epileptiform Findings:  None were present.    Interictal Epileptiform Activity:   None were present.    Events:  Clinical events: None recorded.  Seizures: None recorded.    Artifacts:  Intermittent myogenic and movement artifacts were noted.    ECG:  The heart rate on single channel ECG was predominantly between 60-80 BPM.    _____________________________________________________________  EEG SUMMARY/CLASSIFICATION  Abnormal EEG in an unresponsive patient / a sedated patient.    -Burst Suppression, Generalized  (burst duration of 1-2 seconds, interburst interval of 2-8 seconds)  _____________________________________________________________  EEG IMPRESSION/CLINICAL CORRELATE  There is evidence of a severe diffuse encephalopathy, with a generalized burst suppression pattern seen throughout the recording.  No seizures were recorded.    Santosh Munoz MD  EEG / Epilepsy Attending Physician [facility]   COMPREHENSIVE EPILEPSY CENTER   REPORT OF CONTINUOUS VIDEO EEG     [facility]: [address], [place] [address], Ph#: [phone]  [facility]: [address], [place] [address], Ph#: [phone]  Office: [address] [place] [address], [place] [address], [place] [address] Ph#: [phone]    Patient Name: NADYA BRASHER  Age and : 53y (61)  MRN #: [id number]  Location: [place] [address]  Referring Physician: Jimi Hare    Study Date: 18 - 18    _____________________________________________________________  STUDY INFORMATION    EEG Recording Technique:  The patient underwent continuous Video-EEG monitoring, using Telemetry System hardware on the XLTek Digital System. EEG and video data were stored on a computer hard drive with important events saved in digital archive files. The material was reviewed by a physician (electroencephalographer / epileptologist) on a daily basis. Shalom and seizure detection algorithms were utilized and reviewed. An EEG Technician attended to the patient, and was available throughout daytime work hours.  The epilepsy center neurologist was available in person or on call 24-hours per day.    EEG Placement and Labeling of Electrodes:  The EEG was performed utilizing 20 channel referential EEG connections (coronal over temporal over parasagittal montage) using all standard 10-20 electrode placements with EKG, with additional electrodes placed in the inferior temporal region using the modified 10-10 montage electrode placements for elective admissions, or if deemed necessary. Recording was at a sampling rate of 256 samples per second per channel. Time synchronized digital video recording was done simultaneously with EEG recording. A low light infrared camera was used for low light recording.     _____________________________________________________________  HISTORY    Patient is a 53y old  Male who presents with a chief complaint of ARDS?, sepsis (13 Dec 2018 07:33)      PERTINENT MEDICATION:  MEDICATIONS  (STANDING):  acyclovir IVPB      acyclovir IVPB 600 milliGRAM(s) IV Intermittent every 8 hours  bisacodyl Suppository 10 milliGRAM(s) Rectal daily  chlorhexidine 0.12% Liquid 15 milliLiter(s) Oral Mucosa two times a day  chlorhexidine 4% Liquid 1 Application(s) Topical <User Schedule>  cisatracurium Infusion 1 MICROgram(s)/kG/Min (5.982 mL/Hr) IV Continuous <Continuous>  docusate sodium Liquid 100 milliGRAM(s) Oral two times a day  enoxaparin Injectable 40 milliGRAM(s) SubCutaneous every 24 hours  imipenem/cilastatin  IVPB      imipenem/cilastatin  IVPB 500 milliGRAM(s) IV Intermittent every 6 hours  midazolam Infusion 0.12 mG/kG/Hr (11.964 mL/Hr) IV Continuous <Continuous>  norepinephrine Infusion 0.32 MICROgram(s)/kG/Min (29.91 mL/Hr) IV Continuous <Continuous>  petrolatum Ophthalmic Ointment 1 Application(s) Both EYES two times a day  polyethylene glycol 3350 17 Gram(s) Oral daily  senna Syrup 10 milliLiter(s) Oral daily  vancomycin  IVPB 1500 milliGRAM(s) IV Intermittent every 12 hours    _____________________________________________________________  INTERPRETATION    Findings: The background was diffusely suppressed, with a generalized burst-suppression pattern seen throughout the recording (burst duration of 1-2 seconds, interburst interval of 2-8 seconds).    Generalized Slowing:  -Burst Suppression, Generalized    Focal Slowing:   None was present.    Sleep Background:  Stage II sleep transients were not recorded.    Other Non-Epileptiform Findings:  None were present.    Interictal Epileptiform Activity:   None were present.    Events:  Clinical events: None recorded.  Seizures: None recorded.    Artifacts:  Intermittent myogenic and movement artifacts were noted.    ECG:  The heart rate on single channel ECG was predominantly between 60-80 BPM.    _____________________________________________________________  EEG SUMMARY/CLASSIFICATION  Abnormal EEG in an unresponsive patient / a sedated patient.    -Burst Suppression, Generalized  (burst duration of 1-2 seconds, interburst interval of 2-8 seconds)  _____________________________________________________________  EEG IMPRESSION/CLINICAL CORRELATE  There is evidence of a severe diffuse encephalopathy, with a generalized burst suppression pattern seen throughout the recording.  No seizures were recorded.    Santosh Munoz MD  EEG / Epilepsy Attending Physician

## 2018-12-14 LAB
ALBUMIN SERPL ELPH-MCNC: 1.5 G/DL — LOW (ref 3.3–5)
ALP SERPL-CCNC: 227 U/L — HIGH (ref 40–120)
ALT FLD-CCNC: 53 U/L — HIGH (ref 4–41)
ANISOCYTOSIS BLD QL: SIGNIFICANT CHANGE UP
AST SERPL-CCNC: 72 U/L — HIGH (ref 4–40)
BASE EXCESS BLDA CALC-SCNC: 5.9 MMOL/L — SIGNIFICANT CHANGE UP
BASE EXCESS BLDA CALC-SCNC: 6.5 MMOL/L — SIGNIFICANT CHANGE UP
BASE EXCESS BLDA CALC-SCNC: 7.4 MMOL/L — SIGNIFICANT CHANGE UP
BASE EXCESS BLDA CALC-SCNC: 7.5 MMOL/L — SIGNIFICANT CHANGE UP
BASE EXCESS BLDA CALC-SCNC: 8 MMOL/L — SIGNIFICANT CHANGE UP
BASOPHILS # BLD AUTO: 0.08 K/UL — SIGNIFICANT CHANGE UP (ref 0–0.2)
BASOPHILS NFR BLD AUTO: 0.8 % — SIGNIFICANT CHANGE UP (ref 0–2)
BASOPHILS NFR SPEC: 0.9 % — SIGNIFICANT CHANGE UP (ref 0–2)
BILIRUB SERPL-MCNC: 5.9 MG/DL — HIGH (ref 0.2–1.2)
BLASTS # FLD: 0 % — SIGNIFICANT CHANGE UP (ref 0–0)
BUN SERPL-MCNC: 17 MG/DL — SIGNIFICANT CHANGE UP (ref 7–23)
BUN SERPL-MCNC: 17 MG/DL — SIGNIFICANT CHANGE UP (ref 7–23)
BUN SERPL-MCNC: 18 MG/DL — SIGNIFICANT CHANGE UP (ref 7–23)
CA-I BLDA-SCNC: 1.12 MMOL/L — LOW (ref 1.15–1.29)
CA-I BLDA-SCNC: 1.15 MMOL/L — SIGNIFICANT CHANGE UP (ref 1.15–1.29)
CALCIUM SERPL-MCNC: 7.1 MG/DL — LOW (ref 8.4–10.5)
CALCIUM SERPL-MCNC: 7.1 MG/DL — LOW (ref 8.4–10.5)
CALCIUM SERPL-MCNC: 7.4 MG/DL — LOW (ref 8.4–10.5)
CHLORIDE BLDA-SCNC: 102 MMOL/L — SIGNIFICANT CHANGE UP (ref 96–108)
CHLORIDE BLDA-SCNC: 102 MMOL/L — SIGNIFICANT CHANGE UP (ref 96–108)
CHLORIDE BLDA-SCNC: 103 MMOL/L — SIGNIFICANT CHANGE UP (ref 96–108)
CHLORIDE SERPL-SCNC: 100 MMOL/L — SIGNIFICANT CHANGE UP (ref 98–107)
CHLORIDE SERPL-SCNC: 99 MMOL/L — SIGNIFICANT CHANGE UP (ref 98–107)
CHLORIDE SERPL-SCNC: 99 MMOL/L — SIGNIFICANT CHANGE UP (ref 98–107)
CO2 SERPL-SCNC: 29 MMOL/L — SIGNIFICANT CHANGE UP (ref 22–31)
CO2 SERPL-SCNC: 29 MMOL/L — SIGNIFICANT CHANGE UP (ref 22–31)
CO2 SERPL-SCNC: 30 MMOL/L — SIGNIFICANT CHANGE UP (ref 22–31)
CREAT SERPL-MCNC: 0.34 MG/DL — LOW (ref 0.5–1.3)
CREAT SERPL-MCNC: 0.37 MG/DL — LOW (ref 0.5–1.3)
CREAT SERPL-MCNC: 0.37 MG/DL — LOW (ref 0.5–1.3)
EOSINOPHIL # BLD AUTO: 0.04 K/UL — SIGNIFICANT CHANGE UP (ref 0–0.5)
EOSINOPHIL NFR BLD AUTO: 0.4 % — SIGNIFICANT CHANGE UP (ref 0–6)
EOSINOPHIL NFR FLD: 1.8 % — SIGNIFICANT CHANGE UP (ref 0–6)
GIANT PLATELETS BLD QL SMEAR: PRESENT — SIGNIFICANT CHANGE UP
GLUCOSE BLDA-MCNC: 134 MG/DL — HIGH (ref 70–99)
GLUCOSE BLDA-MCNC: 155 MG/DL — HIGH (ref 70–99)
GLUCOSE BLDA-MCNC: 157 MG/DL — HIGH (ref 70–99)
GLUCOSE BLDA-MCNC: 211 MG/DL — HIGH (ref 70–99)
GLUCOSE BLDA-MCNC: 239 MG/DL — HIGH (ref 70–99)
GLUCOSE SERPL-MCNC: 144 MG/DL — HIGH (ref 70–99)
GLUCOSE SERPL-MCNC: 158 MG/DL — HIGH (ref 70–99)
GLUCOSE SERPL-MCNC: 187 MG/DL — HIGH (ref 70–99)
HCO3 BLDA-SCNC: 29 MMOL/L — HIGH (ref 22–26)
HCO3 BLDA-SCNC: 30 MMOL/L — HIGH (ref 22–26)
HCO3 BLDA-SCNC: 30 MMOL/L — HIGH (ref 22–26)
HCO3 BLDA-SCNC: 31 MMOL/L — HIGH (ref 22–26)
HCO3 BLDA-SCNC: 31 MMOL/L — HIGH (ref 22–26)
HCT VFR BLD CALC: 31.9 % — LOW (ref 39–50)
HCT VFR BLDA CALC: 33.4 % — LOW (ref 39–51)
HCT VFR BLDA CALC: 34.9 % — LOW (ref 39–51)
HCT VFR BLDA CALC: 35.6 % — LOW (ref 39–51)
HCT VFR BLDA CALC: 35.7 % — LOW (ref 39–51)
HCT VFR BLDA CALC: 36.7 % — LOW (ref 39–51)
HGB BLD-MCNC: 11.2 G/DL — LOW (ref 13–17)
HGB BLDA-MCNC: 10.8 G/DL — LOW (ref 13–17)
HGB BLDA-MCNC: 11.3 G/DL — LOW (ref 13–17)
HGB BLDA-MCNC: 11.5 G/DL — LOW (ref 13–17)
HGB BLDA-MCNC: 11.6 G/DL — LOW (ref 13–17)
HGB BLDA-MCNC: 11.9 G/DL — LOW (ref 13–17)
HYPOCHROMIA BLD QL: SLIGHT — SIGNIFICANT CHANGE UP
IMM GRANULOCYTES # BLD AUTO: 0.93 # — SIGNIFICANT CHANGE UP
IMM GRANULOCYTES NFR BLD AUTO: 9.4 % — HIGH (ref 0–1.5)
LACTATE BLDA-SCNC: 1.7 MMOL/L — SIGNIFICANT CHANGE UP (ref 0.5–2)
LACTATE BLDA-SCNC: 1.9 MMOL/L — SIGNIFICANT CHANGE UP (ref 0.5–2)
LACTATE BLDA-SCNC: 1.9 MMOL/L — SIGNIFICANT CHANGE UP (ref 0.5–2)
LACTATE BLDA-SCNC: 2.1 MMOL/L — HIGH (ref 0.5–2)
LACTATE BLDA-SCNC: 2.8 MMOL/L — HIGH (ref 0.5–2)
LYMPHOCYTES # BLD AUTO: 1.65 K/UL — SIGNIFICANT CHANGE UP (ref 1–3.3)
LYMPHOCYTES # BLD AUTO: 16.6 % — SIGNIFICANT CHANGE UP (ref 13–44)
LYMPHOCYTES NFR SPEC AUTO: 3.7 % — LOW (ref 13–44)
MACROCYTES BLD QL: SIGNIFICANT CHANGE UP
MAGNESIUM SERPL-MCNC: 1.6 MG/DL — SIGNIFICANT CHANGE UP (ref 1.6–2.6)
MAGNESIUM SERPL-MCNC: 1.8 MG/DL — SIGNIFICANT CHANGE UP (ref 1.6–2.6)
MAGNESIUM SERPL-MCNC: 2.1 MG/DL — SIGNIFICANT CHANGE UP (ref 1.6–2.6)
MANUAL SMEAR VERIFICATION: SIGNIFICANT CHANGE UP
MCHC RBC-ENTMCNC: 34.5 PG — HIGH (ref 27–34)
MCHC RBC-ENTMCNC: 35.1 % — SIGNIFICANT CHANGE UP (ref 32–36)
MCV RBC AUTO: 98.2 FL — SIGNIFICANT CHANGE UP (ref 80–100)
METAMYELOCYTES # FLD: 0.9 % — SIGNIFICANT CHANGE UP (ref 0–1)
MONOCYTES # BLD AUTO: 0.5 K/UL — SIGNIFICANT CHANGE UP (ref 0–0.9)
MONOCYTES NFR BLD AUTO: 5 % — SIGNIFICANT CHANGE UP (ref 2–14)
MONOCYTES NFR BLD: 0.9 % — LOW (ref 2–9)
MYELOCYTES NFR BLD: 0 % — SIGNIFICANT CHANGE UP (ref 0–0)
NEUTROPHIL AB SER-ACNC: 79.8 % — HIGH (ref 43–77)
NEUTROPHILS # BLD AUTO: 6.72 K/UL — SIGNIFICANT CHANGE UP (ref 1.8–7.4)
NEUTROPHILS NFR BLD AUTO: 67.8 % — SIGNIFICANT CHANGE UP (ref 43–77)
NEUTS BAND # BLD: 9.2 % — HIGH (ref 0–6)
NRBC # BLD: 4.6 /100WBC — SIGNIFICANT CHANGE UP
NRBC # FLD: 0.37 — SIGNIFICANT CHANGE UP
NRBC FLD-RTO: 3.7 — SIGNIFICANT CHANGE UP
OTHER - HEMATOLOGY %: 0 — SIGNIFICANT CHANGE UP
PCO2 BLDA: 38 MMHG — SIGNIFICANT CHANGE UP (ref 35–48)
PCO2 BLDA: 52 MMHG — HIGH (ref 35–48)
PCO2 BLDA: 56 MMHG — HIGH (ref 35–48)
PCO2 BLDA: 56 MMHG — HIGH (ref 35–48)
PCO2 BLDA: 59 MMHG — HIGH (ref 35–48)
PH BLDA: 7.34 PH — LOW (ref 7.35–7.45)
PH BLDA: 7.38 PH — SIGNIFICANT CHANGE UP (ref 7.35–7.45)
PH BLDA: 7.39 PH — SIGNIFICANT CHANGE UP (ref 7.35–7.45)
PH BLDA: 7.4 PH — SIGNIFICANT CHANGE UP (ref 7.35–7.45)
PH BLDA: 7.51 PH — HIGH (ref 7.35–7.45)
PHOSPHATE SERPL-MCNC: 2.5 MG/DL — SIGNIFICANT CHANGE UP (ref 2.5–4.5)
PHOSPHATE SERPL-MCNC: 2.9 MG/DL — SIGNIFICANT CHANGE UP (ref 2.5–4.5)
PHOSPHATE SERPL-MCNC: 2.9 MG/DL — SIGNIFICANT CHANGE UP (ref 2.5–4.5)
PLATELET # BLD AUTO: 128 K/UL — LOW (ref 150–400)
PLATELET COUNT - ESTIMATE: SIGNIFICANT CHANGE UP
PMV BLD: 12 FL — SIGNIFICANT CHANGE UP (ref 7–13)
PO2 BLDA: 56 MMHG — LOW (ref 83–108)
PO2 BLDA: 60 MMHG — LOW (ref 83–108)
PO2 BLDA: 62 MMHG — LOW (ref 83–108)
PO2 BLDA: 64 MMHG — LOW (ref 83–108)
PO2 BLDA: 70 MMHG — LOW (ref 83–108)
POLYCHROMASIA BLD QL SMEAR: SLIGHT — SIGNIFICANT CHANGE UP
POTASSIUM BLDA-SCNC: 3.1 MMOL/L — LOW (ref 3.4–4.5)
POTASSIUM BLDA-SCNC: 3.3 MMOL/L — LOW (ref 3.4–4.5)
POTASSIUM BLDA-SCNC: 3.3 MMOL/L — LOW (ref 3.4–4.5)
POTASSIUM BLDA-SCNC: 3.4 MMOL/L — SIGNIFICANT CHANGE UP (ref 3.4–4.5)
POTASSIUM BLDA-SCNC: 3.5 MMOL/L — SIGNIFICANT CHANGE UP (ref 3.4–4.5)
POTASSIUM SERPL-MCNC: 3.5 MMOL/L — SIGNIFICANT CHANGE UP (ref 3.5–5.3)
POTASSIUM SERPL-MCNC: 3.7 MMOL/L — SIGNIFICANT CHANGE UP (ref 3.5–5.3)
POTASSIUM SERPL-MCNC: 3.8 MMOL/L — SIGNIFICANT CHANGE UP (ref 3.5–5.3)
POTASSIUM SERPL-SCNC: 3.5 MMOL/L — SIGNIFICANT CHANGE UP (ref 3.5–5.3)
POTASSIUM SERPL-SCNC: 3.7 MMOL/L — SIGNIFICANT CHANGE UP (ref 3.5–5.3)
POTASSIUM SERPL-SCNC: 3.8 MMOL/L — SIGNIFICANT CHANGE UP (ref 3.5–5.3)
PROMYELOCYTES # FLD: 0 % — SIGNIFICANT CHANGE UP (ref 0–0)
PROT SERPL-MCNC: 4.2 G/DL — LOW (ref 6–8.3)
RBC # BLD: 3.25 M/UL — LOW (ref 4.2–5.8)
RBC # FLD: 14.5 % — SIGNIFICANT CHANGE UP (ref 10.3–14.5)
SAO2 % BLDA: 87.6 % — LOW (ref 95–99)
SAO2 % BLDA: 90.3 % — LOW (ref 95–99)
SAO2 % BLDA: 91 % — LOW (ref 95–99)
SAO2 % BLDA: 91.8 % — LOW (ref 95–99)
SAO2 % BLDA: 95.1 % — SIGNIFICANT CHANGE UP (ref 95–99)
SMUDGE CELLS # BLD: PRESENT — SIGNIFICANT CHANGE UP
SODIUM BLDA-SCNC: 129 MMOL/L — LOW (ref 136–146)
SODIUM BLDA-SCNC: 132 MMOL/L — LOW (ref 136–146)
SODIUM BLDA-SCNC: 134 MMOL/L — LOW (ref 136–146)
SODIUM BLDA-SCNC: 134 MMOL/L — LOW (ref 136–146)
SODIUM BLDA-SCNC: 135 MMOL/L — LOW (ref 136–146)
SODIUM SERPL-SCNC: 136 MMOL/L — SIGNIFICANT CHANGE UP (ref 135–145)
SODIUM SERPL-SCNC: 137 MMOL/L — SIGNIFICANT CHANGE UP (ref 135–145)
SODIUM SERPL-SCNC: 137 MMOL/L — SIGNIFICANT CHANGE UP (ref 135–145)
VANCOMYCIN TROUGH SERPL-MCNC: 11.4 UG/ML — SIGNIFICANT CHANGE UP (ref 10–20)
VARIANT LYMPHS # BLD: 2.8 % — SIGNIFICANT CHANGE UP
WBC # BLD: 9.92 K/UL — SIGNIFICANT CHANGE UP (ref 3.8–10.5)
WBC # FLD AUTO: 9.92 K/UL — SIGNIFICANT CHANGE UP (ref 3.8–10.5)

## 2018-12-14 PROCEDURE — 71045 X-RAY EXAM CHEST 1 VIEW: CPT | Mod: 26

## 2018-12-14 PROCEDURE — 74018 RADEX ABDOMEN 1 VIEW: CPT | Mod: 26

## 2018-12-14 PROCEDURE — 95819 EEG AWAKE AND ASLEEP: CPT | Mod: 26

## 2018-12-14 RX ORDER — LACOSAMIDE 50 MG/1
100 TABLET ORAL EVERY 12 HOURS
Qty: 0 | Refills: 0 | Status: DISCONTINUED | OUTPATIENT
Start: 2018-12-15 | End: 2018-12-20

## 2018-12-14 RX ORDER — POTASSIUM CHLORIDE 20 MEQ
20 PACKET (EA) ORAL ONCE
Qty: 0 | Refills: 0 | Status: COMPLETED | OUTPATIENT
Start: 2018-12-14 | End: 2018-12-14

## 2018-12-14 RX ORDER — POTASSIUM CHLORIDE 20 MEQ
20 PACKET (EA) ORAL
Qty: 0 | Refills: 0 | Status: COMPLETED | OUTPATIENT
Start: 2018-12-14 | End: 2018-12-14

## 2018-12-14 RX ORDER — METOCLOPRAMIDE HCL 10 MG
10 TABLET ORAL EVERY 6 HOURS
Qty: 0 | Refills: 0 | Status: DISCONTINUED | OUTPATIENT
Start: 2018-12-14 | End: 2018-12-14

## 2018-12-14 RX ORDER — ERYTHROMYCIN ETHYLSUCCINATE 400 MG
TABLET ORAL
Qty: 0 | Refills: 0 | Status: DISCONTINUED | OUTPATIENT
Start: 2018-12-14 | End: 2018-12-14

## 2018-12-14 RX ORDER — MAGNESIUM SULFATE 500 MG/ML
2 VIAL (ML) INJECTION ONCE
Qty: 0 | Refills: 0 | Status: COMPLETED | OUTPATIENT
Start: 2018-12-14 | End: 2018-12-14

## 2018-12-14 RX ORDER — METHYLNALTREXONE BROMIDE 12 MG/.6ML
12 INJECTION, SOLUTION SUBCUTANEOUS ONCE
Qty: 0 | Refills: 0 | Status: COMPLETED | OUTPATIENT
Start: 2018-12-14 | End: 2018-12-14

## 2018-12-14 RX ORDER — LACTULOSE 10 G/15ML
200 SOLUTION ORAL ONCE
Qty: 0 | Refills: 0 | Status: COMPLETED | OUTPATIENT
Start: 2018-12-14 | End: 2018-12-14

## 2018-12-14 RX ORDER — LACOSAMIDE 50 MG/1
300 TABLET ORAL ONCE
Qty: 0 | Refills: 0 | Status: DISCONTINUED | OUTPATIENT
Start: 2018-12-14 | End: 2018-12-14

## 2018-12-14 RX ADMIN — Medication 29.91 MICROGRAM(S)/KG/MIN: at 08:02

## 2018-12-14 RX ADMIN — Medication 40 MILLIGRAM(S): at 11:53

## 2018-12-14 RX ADMIN — IMIPENEM AND CILASTATIN 100 MILLIGRAM(S): 250; 250 INJECTION, POWDER, FOR SOLUTION INTRAVENOUS at 06:53

## 2018-12-14 RX ADMIN — Medication 10 MILLIGRAM(S): at 11:56

## 2018-12-14 RX ADMIN — CHLORHEXIDINE GLUCONATE 15 MILLILITER(S): 213 SOLUTION TOPICAL at 05:53

## 2018-12-14 RX ADMIN — CISATRACURIUM BESYLATE 5.98 MICROGRAM(S)/KG/MIN: 2 INJECTION INTRAVENOUS at 08:03

## 2018-12-14 RX ADMIN — Medication 100 MILLIGRAM(S): at 17:41

## 2018-12-14 RX ADMIN — IMIPENEM AND CILASTATIN 100 MILLIGRAM(S): 250; 250 INJECTION, POWDER, FOR SOLUTION INTRAVENOUS at 11:52

## 2018-12-14 RX ADMIN — Medication 40 MILLIGRAM(S): at 05:53

## 2018-12-14 RX ADMIN — Medication 1 APPLICATION(S): at 17:41

## 2018-12-14 RX ADMIN — METHYLNALTREXONE BROMIDE 12 MILLIGRAM(S): 12 INJECTION, SOLUTION SUBCUTANEOUS at 12:10

## 2018-12-14 RX ADMIN — Medication 1 APPLICATION(S): at 05:53

## 2018-12-14 RX ADMIN — LACTULOSE 200 GRAM(S): 10 SOLUTION ORAL at 10:20

## 2018-12-14 RX ADMIN — CHLORHEXIDINE GLUCONATE 15 MILLILITER(S): 213 SOLUTION TOPICAL at 17:41

## 2018-12-14 RX ADMIN — Medication 262 MILLIGRAM(S): at 08:03

## 2018-12-14 RX ADMIN — IMIPENEM AND CILASTATIN 100 MILLIGRAM(S): 250; 250 INJECTION, POWDER, FOR SOLUTION INTRAVENOUS at 17:41

## 2018-12-14 RX ADMIN — Medication 50 GRAM(S): at 13:57

## 2018-12-14 RX ADMIN — Medication 100 MILLIEQUIVALENT(S): at 05:53

## 2018-12-14 RX ADMIN — Medication 100 MILLIEQUIVALENT(S): at 13:56

## 2018-12-14 RX ADMIN — Medication 100 MILLIEQUIVALENT(S): at 16:15

## 2018-12-14 RX ADMIN — Medication 100 MILLIGRAM(S): at 05:53

## 2018-12-14 RX ADMIN — SENNA PLUS 10 MILLILITER(S): 8.6 TABLET ORAL at 21:05

## 2018-12-14 RX ADMIN — ENOXAPARIN SODIUM 40 MILLIGRAM(S): 100 INJECTION SUBCUTANEOUS at 13:57

## 2018-12-14 RX ADMIN — Medication 300 MILLIGRAM(S): at 03:45

## 2018-12-14 RX ADMIN — MIDAZOLAM HYDROCHLORIDE 11.96 MG/KG/HR: 1 INJECTION, SOLUTION INTRAMUSCULAR; INTRAVENOUS at 08:05

## 2018-12-14 RX ADMIN — Medication 40 MILLIGRAM(S): at 17:41

## 2018-12-14 RX ADMIN — Medication 10 MILLIGRAM(S): at 08:43

## 2018-12-14 RX ADMIN — LACOSAMIDE 160 MILLIGRAM(S): 50 TABLET ORAL at 17:09

## 2018-12-14 RX ADMIN — Medication 262 MILLIGRAM(S): at 00:40

## 2018-12-14 RX ADMIN — POLYETHYLENE GLYCOL 3350 17 GRAM(S): 17 POWDER, FOR SOLUTION ORAL at 21:05

## 2018-12-14 RX ADMIN — CHLORHEXIDINE GLUCONATE 1 APPLICATION(S): 213 SOLUTION TOPICAL at 20:05
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
## 2018-12-14 NOTE — PROGRESS NOTE ADULT - SUBJECTIVE AND OBJECTIVE BOX
Interval Events: No acute events overnight. FiO2 weaned down to 50% on VDR. Tiger tube coiled on US. S/p manual disimpaction with large amount of residual stool.     REVIEW OF SYSTEMS:  Constitutional: [ ] negative [ ] fevers [ ] chills [ ] weight loss [ ] weight gain  HEENT: [ ] negative [ ] dry eyes [ ] eye irritation [ ] postnasal drip [ ] nasal congestion  CV: [ ] negative  [ ] chest pain [ ] orthopnea [ ] palpitations [ ] murmur  Resp: [ ] negative [ ] cough [ ] shortness of breath [ ] dyspnea [ ] wheezing [ ] sputum [ ] hemoptysis  GI: [ ] negative [ ] nausea [ ] vomiting [ ] diarrhea [ ] constipation [ ] abd pain [ ] dysphagia   : [ ] negative [ ] dysuria [ ] nocturia [ ] hematuria [ ] increased urinary frequency  Musculoskeletal: [ ] negative [ ] back pain [ ] myalgias [ ] arthralgias [ ] fracture  Skin: [ ] negative [ ] rash [ ] itch  Neurological: [ ] negative [ ] headache [ ] dizziness [ ] syncope [ ] weakness [ ] numbness  Psychiatric: [ ] negative [ ] anxiety [ ] depression  Endocrine: [ ] negative [ ] diabetes [ ] thyroid problem  Hematologic/Lymphatic: [ ] negative [ ] anemia [ ] bleeding problem  Allergic/Immunologic: [ ] negative [ ] itchy eyes [ ] nasal discharge [ ] hives [ ] angioedema  [ ] All other systems negative  [x] Unable to assess ROS because patient sedated.    OBJECTIVE:  ICU Vital Signs Last 24 Hrs  T(C): 36.2 (14 Dec 2018 04:00), Max: 36.8 (13 Dec 2018 08:00)  T(F): 97.1 (14 Dec 2018 04:00), Max: 98.2 (13 Dec 2018 08:00)  HR: 106 (14 Dec 2018 07:00) (95 - 123)  BP: --  BP(mean): --  ABP: 119/69 (14 Dec 2018 06:00) (93/57 - 128/72)  ABP(mean): 84 (14 Dec 2018 06:00) (69 - 90)  RR: 15 (14 Dec 2018 06:00) (14 - 15)  SpO2: 97% (14 Dec 2018 07:00) (88% - 99%)    Mode: VDR, RR (machine): 15, FiO2: 50, PEEP: 12, ITime: 2.6, MAP: 23, PIP: 26, Frequency: 410    12-13 @ 07:01  -  12-14 @ 07:00  --------------------------------------------------------  IN: 3844.7 mL / OUT: 4880 mL / NET: -1035.3 mL    CAPILLARY BLOOD GLUCOSE    PHYSICAL EXAM:  GENERAL: sedated  HEAD:  Normocephalic  EYES: PERRLA  NECK: Supple, No JVD  CHEST/LUNG: bilateral rhonchi  HEART: Regular rate and rhythm; No murmurs, rubs, or gallops  ABDOMEN: Distended, hypoactive bowel sounds.  EXTREMITIES:  2+ Peripheral Pulses, No clubbing, cyanosis, or edema  NEUROLOGY: unable to eval.  SKIN: left back crusted lesion    LINES: Left subclavian and A line    HOSPITAL MEDICATIONS:  enoxaparin Injectable 40 milliGRAM(s) SubCutaneous every 24 hours    acyclovir IVPB      acyclovir IVPB 600 milliGRAM(s) IV Intermittent every 8 hours  erythromycin   IVPB      imipenem/cilastatin  IVPB      imipenem/cilastatin  IVPB 500 milliGRAM(s) IV Intermittent every 6 hours  vancomycin  IVPB 1500 milliGRAM(s) IV Intermittent every 12 hours    furosemide   Injectable 40 milliGRAM(s) IV Push every 6 hours  norepinephrine Infusion 0.32 MICROgram(s)/kG/Min IV Continuous <Continuous>    acetaminophen    Suspension .. 650 milliGRAM(s) Oral every 6 hours PRN  cisatracurium Infusion 1 MICROgram(s)/kG/Min IV Continuous <Continuous>  midazolam Infusion 0.12 mG/kG/Hr IV Continuous <Continuous>    bisacodyl Suppository 10 milliGRAM(s) Rectal daily  docusate sodium Liquid 100 milliGRAM(s) Oral two times a day  lactulose Retention Enema 200 Gram(s) Rectal once  polyethylene glycol 3350 17 Gram(s) Oral daily  senna Syrup 10 milliLiter(s) Oral daily    chlorhexidine 0.12% Liquid 15 milliLiter(s) Oral Mucosa two times a day  chlorhexidine 4% Liquid 1 Application(s) Topical <User Schedule>  petrolatum Ophthalmic Ointment 1 Application(s) Both EYES two times a day    LABS:                        11.2   9.92  )-----------( 128      ( 14 Dec 2018 02:30 )             31.9     Hgb Trend: 11.2<--, 12.0<--, 12.9<--, 11.5<--, 12.1<--  12-14    136  |  99  |  18  ----------------------------<  187<H>  3.7   |  29  |  0.37<L>    Ca    7.1<L>      14 Dec 2018 02:30  Phos  2.5     12-14  Mg     1.8     12-14    TPro  4.2<L>  /  Alb  1.5<L>  /  TBili  5.9<H>  /  DBili  x   /  AST  72<H>  /  ALT  53<H>  /  AlkPhos  227<H>  12-14    Creatinine Trend: 0.37<--, 0.35<--, 0.40<--, 0.44<--, 0.53<--, 0.67<--      Arterial Blood Gas:  12-14 @ 05:30  7.51/38/70/31/95.1/7.4  ABG lactate: 2.8  Arterial Blood Gas:  12-14 @ 01:40  7.40/52/56/30/87.6/6.5  ABG lactate: 2.1  Arterial Blood Gas:  12-13 @ 21:50  7.39/52/65/29/93.1/5.9  ABG lactate: 2.5  Arterial Blood Gas:  12-13 @ 15:40  7.44/44/73/29/95.0/5.5  ABG lactate: 2.3  Arterial Blood Gas:  12-13 @ 12:45  7.29/65/89/27/95.6/4.3  ABG lactate: 2.3  Arterial Blood Gas:  12-13 @ 10:00  7.20/79/83/24/92.1/2.5  ABG lactate: 2.2  Arterial Blood Gas:  12-13 @ 05:40  7.55/30/77/29/96.7/4.2  ABG lactate: 3.2  Arterial Blood Gas:  12-13 @ 03:20  7.54/31/69/29/95.6/4.3  ABG lactate: 3.7  Arterial Blood Gas:  12-13 @ 00:10  7.48/37/76/28/96.1/3.9  ABG lactate: --  Arterial Blood Gas:  12-12 @ 20:00  7.46/39/94/28/97.7/3.6  ABG lactate: 3.7  Arterial Blood Gas:  12-12 @ 15:50  7.42/41/73/26/95.8/2.4  ABG lactate: --  Arterial Blood Gas:  12-12 @ 13:40  7.43/42/91/27/97.3/3.4  ABG lactate: --

## 2018-12-14 NOTE — PROGRESS NOTE ADULT - ASSESSMENT
54yo M hx of TBI, G tube, contracture, seizure, resident of [facility] presented as a transfer from [facility] on 12/9 for resp failure concerning for ARDS s/p intubation, likely 2/2 pancreatitis.    Neuro: Sedated at this time.  - Currently sedated w/ versed. Propofol stopped due to elevated TG.   - Depakote added for seizure precautions. Prn ativan as needed.   - EEG without signs for seizure.    CV: Sepsis leading to likely distributive shock  - Currently on levophed for BP control. Titrate to MAP>= 65. Currently dosage trending down.  - IVF dced as patient is edematous and net positive  - TY with LV appears to be functioning normally with mild-moderate enlargement of RV No evidence of fluid responsiveness ?bicuspid aortic valve with no evidence of AI    Pulm: Patient currently intubated for questionable ARDS diagnosis.  - Currently placed on VDR ventilator. Continue to monitor resp functions with serial blood gas.  - C/w impenem for possible aspiration pna coverage.  - Consider proning patient if worsening ARDS.    GI: Unclear etiology for pancreatitis at this time. Patient resident of NH and unclear alcohol history. OSH CT abd w/ gallbladder pathology. Repeat CT with normal gallbladder wall and duct. TG elevated to 1445 yesterday, likely 2/2 propofol.  - Keep patient off tube feeds for now. Restart when tolerated.  - Discussed with surgery and IR: no acute interventions planned for necrotizing pancreatitis  - C/w imipenem for necrotizing pancreatitis.  - Large stool burden on imaging: s/p manual disimpaction, lactulose enema ordered.   - Plan for Tiger tube placement for post-jejunal feeding; will give erythromycin for prokinetic effect.    :   - Monitor renal function. Currently at baseline.  - C/w tavares for now. Strict I&Os.     ID: Patient in shock on arrival. Likely source infectious, distributive.   - C/w broad spectrum abx imipenem + vanco for necrotizing pancreatitis and PNA coverage.  - Pan cultures NGTD.  - C/w valacyclovir for herpes zoster skin lesion.    Endo: No issues at this time.  - Continue to monitor FSG.   - Restart feeding when appropirate.    PPX:   - DVT ppx w/ scds and lovenox.

## 2018-12-14 NOTE — EEG REPORT - NS EEG TEXT BOX
[facility]   COMPREHENSIVE EPILEPSY CENTER   REPORT OF CONTINUOUS VIDEO EEG     [facility]: [address], [place] [address], Ph#: [phone]  [facility]: [address], [place] [address], Ph#: [phone]  Office: [address] [place] [address], [place] [address], [place] [address] Ph#: [phone]    Patient Name: NADYA BRASHER  Age and : 53y ([birth year])  MRN #: [id number]  Location: [place] [address]  Referring Physician: Jimi aHre    Study Date: 18 - 18    _____________________________________________________________  STUDY INFORMATION    EEG Recording Technique:  The patient underwent continuous Video-EEG monitoring, using Telemetry System hardware on the XLTek Digital System. EEG and video data were stored on a computer hard drive with important events saved in digital archive files. The material was reviewed by a physician (electroencephalographer / epileptologist) on a daily basis. Shalom and seizure detection algorithms were utilized and reviewed. An EEG Technician attended to the patient, and was available throughout daytime work hours.  The epilepsy center neurologist was available in person or on call 24-hours per day.    EEG Placement and Labeling of Electrodes:  The EEG was performed utilizing 20 channel referential EEG connections (coronal over temporal over parasagittal montage) using all standard 10-20 electrode placements with EKG, with additional electrodes placed in the inferior temporal region using the modified 10-10 montage electrode placements for elective admissions, or if deemed necessary. Recording was at a sampling rate of 256 samples per second per channel. Time synchronized digital video recording was done simultaneously with EEG recording. A low light infrared camera was used for low light recording.     _____________________________________________________________  HISTORY    Patient is a 53y old  Male who presents with a chief complaint of ARDS?, sepsis (13 Dec 2018 07:33)      PERTINENT MEDICATION:  MEDICATIONS  (STANDING):  acyclovir IVPB      acyclovir IVPB 600 milliGRAM(s) IV Intermittent every 8 hours  bisacodyl Suppository 10 milliGRAM(s) Rectal daily  chlorhexidine 0.12% Liquid 15 milliLiter(s) Oral Mucosa two times a day  chlorhexidine 4% Liquid 1 Application(s) Topical <User Schedule>  cisatracurium Infusion 1 MICROgram(s)/kG/Min (5.982 mL/Hr) IV Continuous <Continuous>  docusate sodium Liquid 100 milliGRAM(s) Oral two times a day  enoxaparin Injectable 40 milliGRAM(s) SubCutaneous every 24 hours  imipenem/cilastatin  IVPB      imipenem/cilastatin  IVPB 500 milliGRAM(s) IV Intermittent every 6 hours  midazolam Infusion 0.12 mG/kG/Hr (11.964 mL/Hr) IV Continuous <Continuous>  norepinephrine Infusion 0.32 MICROgram(s)/kG/Min (29.91 mL/Hr) IV Continuous <Continuous>  petrolatum Ophthalmic Ointment 1 Application(s) Both EYES two times a day  polyethylene glycol 3350 17 Gram(s) Oral daily  senna Syrup 10 milliLiter(s) Oral daily  vancomycin  IVPB 1500 milliGRAM(s) IV Intermittent every 12 hours    _____________________________________________________________  INTERPRETATION    Findings: The background consisted of diffuse delta and theta activity, non-reactive.  No PDR was seen.      Generalized Slowing:  -Continuous Slowing, Generalized (delta and theta)    Focal Slowing:   None was present.    Sleep Background:  Stage II sleep transients were not recorded.    Other Non-Epileptiform Findings:  None were present.    Interictal Epileptiform Activity:   None were present.    Events:  Clinical events: None recorded.  Seizures: None recorded.    Artifacts:  Intermittent myogenic and movement artifacts were noted.    ECG:  The heart rate on single channel ECG was predominantly between 60-80 BPM.    _____________________________________________________________  EEG SUMMARY/CLASSIFICATION  Abnormal EEG in an unresponsive patient / a sedated patient.    --Continuous Slowing, Generalized (delta and theta)  _____________________________________________________________  EEG IMPRESSION/CLINICAL CORRELATE  There is evidence of a severe diffuse encephalopathy.  No epileptiform abnormalities were recorded.    Santosh Munoz MD  EEG / Epilepsy Attending Physician
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
## 2018-12-15 LAB
-  AMIKACIN: SIGNIFICANT CHANGE UP
-  AZTREONAM: SIGNIFICANT CHANGE UP
-  CEFEPIME: SIGNIFICANT CHANGE UP
-  CEFTAZIDIME: SIGNIFICANT CHANGE UP
-  CIPROFLOXACIN: SIGNIFICANT CHANGE UP
-  GENTAMICIN: SIGNIFICANT CHANGE UP
-  IMIPENEM: SIGNIFICANT CHANGE UP
-  LEVOFLOXACIN: SIGNIFICANT CHANGE UP
-  MEROPENEM: SIGNIFICANT CHANGE UP
-  PIPERACILLIN/TAZOBACTAM: SIGNIFICANT CHANGE UP
-  TOBRAMYCIN: SIGNIFICANT CHANGE UP
ALBUMIN SERPL ELPH-MCNC: 1.5 G/DL — LOW (ref 3.3–5)
ALP SERPL-CCNC: 278 U/L — HIGH (ref 40–120)
ALT FLD-CCNC: 49 U/L — HIGH (ref 4–41)
AST SERPL-CCNC: 74 U/L — HIGH (ref 4–40)
BACTERIA SPT RESP CULT: SIGNIFICANT CHANGE UP
BASE EXCESS BLDA CALC-SCNC: 6.7 MMOL/L — SIGNIFICANT CHANGE UP
BASE EXCESS BLDA CALC-SCNC: 8.8 MMOL/L — SIGNIFICANT CHANGE UP
BASE EXCESS BLDA CALC-SCNC: 9.1 MMOL/L — SIGNIFICANT CHANGE UP
BASE EXCESS BLDA CALC-SCNC: 9.5 MMOL/L — SIGNIFICANT CHANGE UP
BASE EXCESS BLDA CALC-SCNC: 9.8 MMOL/L — SIGNIFICANT CHANGE UP
BILIRUB SERPL-MCNC: 4.9 MG/DL — HIGH (ref 0.2–1.2)
BUN SERPL-MCNC: 16 MG/DL — SIGNIFICANT CHANGE UP (ref 7–23)
BUN SERPL-MCNC: 18 MG/DL — SIGNIFICANT CHANGE UP (ref 7–23)
BUN SERPL-MCNC: 19 MG/DL — SIGNIFICANT CHANGE UP (ref 7–23)
BUN SERPL-MCNC: 20 MG/DL — SIGNIFICANT CHANGE UP (ref 7–23)
CA-I BLDA-SCNC: 1.08 MMOL/L — LOW (ref 1.15–1.29)
CA-I BLDA-SCNC: 1.11 MMOL/L — LOW (ref 1.15–1.29)
CA-I BLDA-SCNC: 1.12 MMOL/L — LOW (ref 1.15–1.29)
CALCIUM SERPL-MCNC: 7.3 MG/DL — LOW (ref 8.4–10.5)
CALCIUM SERPL-MCNC: 7.4 MG/DL — LOW (ref 8.4–10.5)
CALCIUM SERPL-MCNC: 7.5 MG/DL — LOW (ref 8.4–10.5)
CALCIUM SERPL-MCNC: 7.7 MG/DL — LOW (ref 8.4–10.5)
CHLORIDE BLDA-SCNC: 103 MMOL/L — SIGNIFICANT CHANGE UP (ref 96–108)
CHLORIDE BLDA-SCNC: 104 MMOL/L — SIGNIFICANT CHANGE UP (ref 96–108)
CHLORIDE SERPL-SCNC: 100 MMOL/L — SIGNIFICANT CHANGE UP (ref 98–107)
CHLORIDE SERPL-SCNC: 100 MMOL/L — SIGNIFICANT CHANGE UP (ref 98–107)
CHLORIDE SERPL-SCNC: 98 MMOL/L — SIGNIFICANT CHANGE UP (ref 98–107)
CHLORIDE SERPL-SCNC: 99 MMOL/L — SIGNIFICANT CHANGE UP (ref 98–107)
CO2 SERPL-SCNC: 29 MMOL/L — SIGNIFICANT CHANGE UP (ref 22–31)
CO2 SERPL-SCNC: 30 MMOL/L — SIGNIFICANT CHANGE UP (ref 22–31)
CO2 SERPL-SCNC: 31 MMOL/L — SIGNIFICANT CHANGE UP (ref 22–31)
CO2 SERPL-SCNC: 31 MMOL/L — SIGNIFICANT CHANGE UP (ref 22–31)
CREAT SERPL-MCNC: 0.38 MG/DL — LOW (ref 0.5–1.3)
CREAT SERPL-MCNC: 0.39 MG/DL — LOW (ref 0.5–1.3)
CREAT SERPL-MCNC: 0.4 MG/DL — LOW (ref 0.5–1.3)
CREAT SERPL-MCNC: 0.41 MG/DL — LOW (ref 0.5–1.3)
GLUCOSE BLDA-MCNC: 133 MG/DL — HIGH (ref 70–99)
GLUCOSE BLDA-MCNC: 133 MG/DL — HIGH (ref 70–99)
GLUCOSE BLDA-MCNC: 135 MG/DL — HIGH (ref 70–99)
GLUCOSE BLDA-MCNC: 144 MG/DL — HIGH (ref 70–99)
GLUCOSE BLDA-MCNC: 146 MG/DL — HIGH (ref 70–99)
GLUCOSE SERPL-MCNC: 123 MG/DL — HIGH (ref 70–99)
GLUCOSE SERPL-MCNC: 125 MG/DL — HIGH (ref 70–99)
GLUCOSE SERPL-MCNC: 130 MG/DL — HIGH (ref 70–99)
GLUCOSE SERPL-MCNC: 135 MG/DL — HIGH (ref 70–99)
GRAM STN SPT: SIGNIFICANT CHANGE UP
HCO3 BLDA-SCNC: 30 MMOL/L — HIGH (ref 22–26)
HCO3 BLDA-SCNC: 32 MMOL/L — HIGH (ref 22–26)
HCO3 BLDA-SCNC: 33 MMOL/L — HIGH (ref 22–26)
HCO3 BLDA-SCNC: 33 MMOL/L — HIGH (ref 22–26)
HCO3 BLDA-SCNC: 34 MMOL/L — HIGH (ref 22–26)
HCT VFR BLD CALC: 31.8 % — LOW (ref 39–50)
HCT VFR BLDA CALC: 29.2 % — LOW (ref 39–51)
HCT VFR BLDA CALC: 29.5 % — LOW (ref 39–51)
HCT VFR BLDA CALC: 30.1 % — LOW (ref 39–51)
HCT VFR BLDA CALC: 31.2 % — LOW (ref 39–51)
HCT VFR BLDA CALC: 32.1 % — LOW (ref 39–51)
HGB BLD-MCNC: 10.8 G/DL — LOW (ref 13–17)
HGB BLDA-MCNC: 10.1 G/DL — LOW (ref 13–17)
HGB BLDA-MCNC: 10.4 G/DL — LOW (ref 13–17)
HGB BLDA-MCNC: 9.4 G/DL — LOW (ref 13–17)
HGB BLDA-MCNC: 9.5 G/DL — LOW (ref 13–17)
HGB BLDA-MCNC: 9.7 G/DL — LOW (ref 13–17)
LACTATE BLDA-SCNC: 1.6 MMOL/L — SIGNIFICANT CHANGE UP (ref 0.5–2)
LACTATE BLDA-SCNC: 1.7 MMOL/L — SIGNIFICANT CHANGE UP (ref 0.5–2)
LACTATE BLDA-SCNC: 1.8 MMOL/L — SIGNIFICANT CHANGE UP (ref 0.5–2)
LACTATE BLDA-SCNC: 1.8 MMOL/L — SIGNIFICANT CHANGE UP (ref 0.5–2)
LACTATE BLDA-SCNC: 1.9 MMOL/L — SIGNIFICANT CHANGE UP (ref 0.5–2)
MAGNESIUM SERPL-MCNC: 1.8 MG/DL — SIGNIFICANT CHANGE UP (ref 1.6–2.6)
MAGNESIUM SERPL-MCNC: 1.9 MG/DL — SIGNIFICANT CHANGE UP (ref 1.6–2.6)
MCHC RBC-ENTMCNC: 33.1 PG — SIGNIFICANT CHANGE UP (ref 27–34)
MCHC RBC-ENTMCNC: 34 % — SIGNIFICANT CHANGE UP (ref 32–36)
MCV RBC AUTO: 97.5 FL — SIGNIFICANT CHANGE UP (ref 80–100)
METHOD TYPE: SIGNIFICANT CHANGE UP
NRBC # FLD: 0.76 — SIGNIFICANT CHANGE UP
NRBC FLD-RTO: 4.9 — SIGNIFICANT CHANGE UP
ORGANISM # SPEC MICROSCOPIC CNT: SIGNIFICANT CHANGE UP
ORGANISM # SPEC MICROSCOPIC CNT: SIGNIFICANT CHANGE UP
PCO2 BLDA: 41 MMHG — SIGNIFICANT CHANGE UP (ref 35–48)
PCO2 BLDA: 42 MMHG — SIGNIFICANT CHANGE UP (ref 35–48)
PCO2 BLDA: 42 MMHG — SIGNIFICANT CHANGE UP (ref 35–48)
PCO2 BLDA: 45 MMHG — SIGNIFICANT CHANGE UP (ref 35–48)
PCO2 BLDA: 47 MMHG — SIGNIFICANT CHANGE UP (ref 35–48)
PH BLDA: 7.43 PH — SIGNIFICANT CHANGE UP (ref 7.35–7.45)
PH BLDA: 7.49 PH — HIGH (ref 7.35–7.45)
PH BLDA: 7.5 PH — HIGH (ref 7.35–7.45)
PH BLDA: 7.5 PH — HIGH (ref 7.35–7.45)
PH BLDA: 7.52 PH — HIGH (ref 7.35–7.45)
PHOSPHATE SERPL-MCNC: 3 MG/DL — SIGNIFICANT CHANGE UP (ref 2.5–4.5)
PHOSPHATE SERPL-MCNC: 3.1 MG/DL — SIGNIFICANT CHANGE UP (ref 2.5–4.5)
PHOSPHATE SERPL-MCNC: 3.2 MG/DL — SIGNIFICANT CHANGE UP (ref 2.5–4.5)
PHOSPHATE SERPL-MCNC: 3.2 MG/DL — SIGNIFICANT CHANGE UP (ref 2.5–4.5)
PLATELET # BLD AUTO: 199 K/UL — SIGNIFICANT CHANGE UP (ref 150–400)
PMV BLD: 12.1 FL — SIGNIFICANT CHANGE UP (ref 7–13)
PO2 BLDA: 61 MMHG — LOW (ref 83–108)
PO2 BLDA: 64 MMHG — LOW (ref 83–108)
PO2 BLDA: 66 MMHG — LOW (ref 83–108)
PO2 BLDA: 69 MMHG — LOW (ref 83–108)
PO2 BLDA: 78 MMHG — LOW (ref 83–108)
POTASSIUM BLDA-SCNC: 3.1 MMOL/L — LOW (ref 3.4–4.5)
POTASSIUM BLDA-SCNC: 3.3 MMOL/L — LOW (ref 3.4–4.5)
POTASSIUM BLDA-SCNC: 3.3 MMOL/L — LOW (ref 3.4–4.5)
POTASSIUM SERPL-MCNC: 3.7 MMOL/L — SIGNIFICANT CHANGE UP (ref 3.5–5.3)
POTASSIUM SERPL-MCNC: 3.9 MMOL/L — SIGNIFICANT CHANGE UP (ref 3.5–5.3)
POTASSIUM SERPL-MCNC: 3.9 MMOL/L — SIGNIFICANT CHANGE UP (ref 3.5–5.3)
POTASSIUM SERPL-MCNC: 4 MMOL/L — SIGNIFICANT CHANGE UP (ref 3.5–5.3)
POTASSIUM SERPL-SCNC: 3.7 MMOL/L — SIGNIFICANT CHANGE UP (ref 3.5–5.3)
POTASSIUM SERPL-SCNC: 3.9 MMOL/L — SIGNIFICANT CHANGE UP (ref 3.5–5.3)
POTASSIUM SERPL-SCNC: 3.9 MMOL/L — SIGNIFICANT CHANGE UP (ref 3.5–5.3)
POTASSIUM SERPL-SCNC: 4 MMOL/L — SIGNIFICANT CHANGE UP (ref 3.5–5.3)
PROT SERPL-MCNC: 4.4 G/DL — LOW (ref 6–8.3)
RBC # BLD: 3.26 M/UL — LOW (ref 4.2–5.8)
RBC # FLD: 15 % — HIGH (ref 10.3–14.5)
SAO2 % BLDA: 90.9 % — LOW (ref 95–99)
SAO2 % BLDA: 93.5 % — LOW (ref 95–99)
SAO2 % BLDA: 93.6 % — LOW (ref 95–99)
SAO2 % BLDA: 95 % — SIGNIFICANT CHANGE UP (ref 95–99)
SAO2 % BLDA: 96.9 % — SIGNIFICANT CHANGE UP (ref 95–99)
SODIUM BLDA-SCNC: 129 MMOL/L — LOW (ref 136–146)
SODIUM BLDA-SCNC: 130 MMOL/L — LOW (ref 136–146)
SODIUM BLDA-SCNC: 132 MMOL/L — LOW (ref 136–146)
SODIUM BLDA-SCNC: 133 MMOL/L — LOW (ref 136–146)
SODIUM BLDA-SCNC: 135 MMOL/L — LOW (ref 136–146)
SODIUM SERPL-SCNC: 137 MMOL/L — SIGNIFICANT CHANGE UP (ref 135–145)
SODIUM SERPL-SCNC: 138 MMOL/L — SIGNIFICANT CHANGE UP (ref 135–145)
WBC # BLD: 15.42 K/UL — HIGH (ref 3.8–10.5)
WBC # FLD AUTO: 15.42 K/UL — HIGH (ref 3.8–10.5)

## 2018-12-15 PROCEDURE — 95819 EEG AWAKE AND ASLEEP: CPT | Mod: 26

## 2018-12-15 PROCEDURE — 99291 CRITICAL CARE FIRST HOUR: CPT

## 2018-12-15 RX ORDER — POTASSIUM CHLORIDE 20 MEQ
10 PACKET (EA) ORAL
Qty: 0 | Refills: 0 | Status: COMPLETED | OUTPATIENT
Start: 2018-12-15 | End: 2018-12-15

## 2018-12-15 RX ORDER — ACETAZOLAMIDE 250 MG/1
500 TABLET ORAL ONCE
Qty: 0 | Refills: 0 | Status: COMPLETED | OUTPATIENT
Start: 2018-12-15 | End: 2018-12-15

## 2018-12-15 RX ORDER — POTASSIUM CHLORIDE 20 MEQ
40 PACKET (EA) ORAL EVERY 4 HOURS
Qty: 0 | Refills: 0 | Status: COMPLETED | OUTPATIENT
Start: 2018-12-15 | End: 2018-12-15

## 2018-12-15 RX ORDER — ACETAZOLAMIDE 250 MG/1
400 TABLET ORAL ONCE
Qty: 0 | Refills: 0 | Status: DISCONTINUED | OUTPATIENT
Start: 2018-12-15 | End: 2018-12-15

## 2018-12-15 RX ADMIN — Medication 29.91 MICROGRAM(S)/KG/MIN: at 20:16

## 2018-12-15 RX ADMIN — Medication 1 APPLICATION(S): at 18:22

## 2018-12-15 RX ADMIN — MIDAZOLAM HYDROCHLORIDE 11.96 MG/KG/HR: 1 INJECTION, SOLUTION INTRAMUSCULAR; INTRAVENOUS at 07:58

## 2018-12-15 RX ADMIN — ENOXAPARIN SODIUM 40 MILLIGRAM(S): 100 INJECTION SUBCUTANEOUS at 15:16

## 2018-12-15 RX ADMIN — CHLORHEXIDINE GLUCONATE 15 MILLILITER(S): 213 SOLUTION TOPICAL at 06:02

## 2018-12-15 RX ADMIN — MIDAZOLAM HYDROCHLORIDE 11.96 MG/KG/HR: 1 INJECTION, SOLUTION INTRAMUSCULAR; INTRAVENOUS at 20:16

## 2018-12-15 RX ADMIN — Medication 100 MILLIEQUIVALENT(S): at 04:17

## 2018-12-15 RX ADMIN — IMIPENEM AND CILASTATIN 100 MILLIGRAM(S): 250; 250 INJECTION, POWDER, FOR SOLUTION INTRAVENOUS at 18:22

## 2018-12-15 RX ADMIN — CHLORHEXIDINE GLUCONATE 1 APPLICATION(S): 213 SOLUTION TOPICAL at 18:37

## 2018-12-15 RX ADMIN — LACOSAMIDE 120 MILLIGRAM(S): 50 TABLET ORAL at 04:46

## 2018-12-15 RX ADMIN — Medication 40 MILLIGRAM(S): at 05:17

## 2018-12-15 RX ADMIN — IMIPENEM AND CILASTATIN 100 MILLIGRAM(S): 250; 250 INJECTION, POWDER, FOR SOLUTION INTRAVENOUS at 23:05

## 2018-12-15 RX ADMIN — Medication 40 MILLIGRAM(S): at 23:06

## 2018-12-15 RX ADMIN — Medication 40 MILLIEQUIVALENT(S): at 22:06

## 2018-12-15 RX ADMIN — Medication 40 MILLIGRAM(S): at 13:07

## 2018-12-15 RX ADMIN — Medication 1 APPLICATION(S): at 06:02

## 2018-12-15 RX ADMIN — CISATRACURIUM BESYLATE 5.98 MICROGRAM(S)/KG/MIN: 2 INJECTION INTRAVENOUS at 07:58

## 2018-12-15 RX ADMIN — CHLORHEXIDINE GLUCONATE 15 MILLILITER(S): 213 SOLUTION TOPICAL at 18:22

## 2018-12-15 RX ADMIN — Medication 40 MILLIEQUIVALENT(S): at 18:22

## 2018-12-15 RX ADMIN — IMIPENEM AND CILASTATIN 100 MILLIGRAM(S): 250; 250 INJECTION, POWDER, FOR SOLUTION INTRAVENOUS at 13:07

## 2018-12-15 RX ADMIN — Medication 100 MILLIEQUIVALENT(S): at 05:17

## 2018-12-15 RX ADMIN — IMIPENEM AND CILASTATIN 100 MILLIGRAM(S): 250; 250 INJECTION, POWDER, FOR SOLUTION INTRAVENOUS at 01:03

## 2018-12-15 RX ADMIN — IMIPENEM AND CILASTATIN 100 MILLIGRAM(S): 250; 250 INJECTION, POWDER, FOR SOLUTION INTRAVENOUS at 06:02

## 2018-12-15 RX ADMIN — Medication 29.91 MICROGRAM(S)/KG/MIN: at 07:57

## 2018-12-15 RX ADMIN — Medication 40 MILLIGRAM(S): at 01:03

## 2018-12-15 RX ADMIN — LACOSAMIDE 120 MILLIGRAM(S): 50 TABLET ORAL at 17:36

## 2018-12-15 RX ADMIN — ACETAZOLAMIDE 500 MILLIGRAM(S): 250 TABLET ORAL at 22:06

## 2018-12-15 NOTE — EEG REPORT - NS EEG TEXT BOX
[facility]   COMPREHENSIVE EPILEPSY CENTER   REPORT OF CONTINUOUS VIDEO EEG     [facility]: [address], [place] [address], Ph#: [phone]  [facility]: [address], [place] [address], Ph#: [phone]  Office: [address] [place] [address], [place] [address], [place] [address] Ph#: [phone]    Patient Name: NADYA BRASHER  Age and : 53y (61)  MRN #: [id number]  Location: [place] [address]  Referring Physician: Jimi Hare    Study Date: 18 - 12-15-18    _____________________________________________________________  STUDY INFORMATION    EEG Recording Technique:  The patient underwent continuous Video-EEG monitoring, using Telemetry System hardware on the XLTek Digital System. EEG and video data were stored on a computer hard drive with important events saved in digital archive files. The material was reviewed by a physician (electroencephalographer / epileptologist) on a daily basis. Shalom and seizure detection algorithms were utilized and reviewed. An EEG Technician attended to the patient, and was available throughout daytime work hours.  The epilepsy center neurologist was available in person or on call 24-hours per day.    EEG Placement and Labeling of Electrodes:  The EEG was performed utilizing 20 channel referential EEG connections (coronal over temporal over parasagittal montage) using all standard 10-20 electrode placements with EKG, with additional electrodes placed in the inferior temporal region using the modified 10-10 montage electrode placements for elective admissions, or if deemed necessary. Recording was at a sampling rate of 256 samples per second per channel. Time synchronized digital video recording was done simultaneously with EEG recording. A low light infrared camera was used for low light recording.     _____________________________________________________________  HISTORY    Patient is a 53y old  Male who presents with a chief complaint of ARDS?, sepsis (13 Dec 2018 07:33)      PERTINENT MEDICATION:  lacosamide IVPB 100 milliGRAM(s) IV Intermittent every 12 hours  midazolam Infusion 0.12 mG/kG/Hr (11.964 mL/Hr) IV Continuous <Continuous>  _____________________________________________________________  INTERPRETATION    Findings: The background consisted of diffuse delta and theta activity, non-reactive.  No PDR was seen.      Generalized Slowing:  -Continuous Slowing, Generalized (delta and theta)    Focal Slowing:   None was present.    Sleep Background:  Stage II sleep transients were not recorded.    Other Non-Epileptiform Findings:  None were present.    Interictal Epileptiform Activity:   None were present.    Events:  Clinical events: None recorded.  Seizures: None recorded.    Artifacts:  Intermittent myogenic and movement artifacts were noted.    ECG:  The heart rate on single channel ECG was predominantly between 60-80 BPM.    _____________________________________________________________  EEG SUMMARY/CLASSIFICATION  Abnormal EEG in an unresponsive patient / a sedated patient.    --Continuous Slowing, Generalized (delta and theta)  _____________________________________________________________  EEG IMPRESSION/CLINICAL CORRELATE  There is evidence of a severe diffuse encephalopathy.  No epileptiform abnormalities were recorded.    Maria G Gallagher MD  Epilepsy fellow [facility]   COMPREHENSIVE EPILEPSY CENTER   REPORT OF CONTINUOUS VIDEO EEG     [facility]: [address], [place] [address], Ph#: [phone]  [facility]: [address], [place] [address], Ph#: [phone]  Office: [address] [place] [address], [place] [address], [place] [address] Ph#: [phone]    Patient Name: NADYA BRASHER  Age and : 53y (61)  MRN #: [id number]  Location: [place] [address]  Referring Physician: Jimi Hare    Study Date: 18 - 12-15-18    _____________________________________________________________  STUDY INFORMATION    EEG Recording Technique:  The patient underwent continuous Video-EEG monitoring, using Telemetry System hardware on the XLTek Digital System. EEG and video data were stored on a computer hard drive with important events saved in digital archive files. The material was reviewed by a physician (electroencephalographer / epileptologist) on a daily basis. Shalom and seizure detection algorithms were utilized and reviewed. An EEG Technician attended to the patient, and was available throughout daytime work hours.  The epilepsy center neurologist was available in person or on call 24-hours per day.    EEG Placement and Labeling of Electrodes:  The EEG was performed utilizing 20 channel referential EEG connections (coronal over temporal over parasagittal montage) using all standard 10-20 electrode placements with EKG, with additional electrodes placed in the inferior temporal region using the modified 10-10 montage electrode placements for elective admissions, or if deemed necessary. Recording was at a sampling rate of 256 samples per second per channel. Time synchronized digital video recording was done simultaneously with EEG recording. A low light infrared camera was used for low light recording.     _____________________________________________________________  HISTORY    Patient is a 53y old  Male who presents with a chief complaint of ARDS?, sepsis (13 Dec 2018 07:33)      PERTINENT MEDICATION:  lacosamide IVPB 100 milliGRAM(s) IV Intermittent every 12 hours  midazolam Infusion 0.12 mG/kG/Hr (11.964 mL/Hr) IV Continuous <Continuous>  _____________________________________________________________  INTERPRETATION    Findings: The background consisted of diffuse delta and theta activity, non-reactive.  No PDR was seen.      Generalized Slowing:  -Continuous Slowing, Generalized (delta and theta)    Focal Slowing:   None was present.    Sleep Background:  Stage II sleep transients were not recorded.    Other Non-Epileptiform Findings:  None were present.    Interictal Epileptiform Activity:   None were present.    Events:  Clinical events: None recorded.  Seizures: None recorded.    Artifacts:  Intermittent myogenic and movement artifacts were noted.    ECG:  The heart rate on single channel ECG was predominantly between 60-80 BPM.    _____________________________________________________________  EEG SUMMARY/CLASSIFICATION  Abnormal EEG in an unresponsive patient / a sedated patient.  -Continuous Slowing, Generalized (delta and theta)  _____________________________________________________________  EEG IMPRESSION/CLINICAL CORRELATE  There is evidence of a severe diffuse encephalopathy.  No epileptiform abnormalities were recorded.    Maria G Gallagher MD  Epilepsy fellow

## 2018-12-15 NOTE — PROGRESS NOTE ADULT - SUBJECTIVE AND OBJECTIVE BOX
CHIEF COMPLAINT:    Interval Events:    REVIEW OF SYSTEMS:  Constitutional: [ ] fevers [ ] chills [ ] weight loss [ ] weight gain  HEENT: [ ] dry eyes [ ] eye irritation [ ] postnasal drip [ ] nasal congestion  CV: [ ] chest pain [ ] orthopnea [ ] palpitations [ ] murmur  Resp: [ ] cough [ ] shortness of breath [ ] dyspnea [ ] wheezing [ ] sputum [ ] hemoptysis  GI: [ ] nausea [ ] vomiting [ ] diarrhea [ ] constipation [ ] abd pain [ ] dysphagia   : [ ] dysuria [ ] nocturia [ ] hematuria [ ] increased urinary frequency  Musculoskeletal: [ ] back pain [ ] myalgias [ ] arthralgias [ ] fracture  Skin: [ ] rash [ ] itch  Neurological: [ ] headache [ ] dizziness [ ] syncope [ ] weakness [ ] numbness  Psychiatric: [ ] anxiety [ ] depression  Endocrine: [ ] diabetes [ ] thyroid problem  Hematologic/Lymphatic: [ ] anemia [ ] bleeding problem  Allergic/Immunologic: [ ] itchy eyes [ ] nasal discharge [ ] hives [ ] angioedema  [ ] All other systems negative  [ ] Unable to assess ROS because ________    OBJECTIVE:  ICU Vital Signs Last 24 Hrs  T(C): 36.5 (15 Dec 2018 08:00), Max: 36.9 (15 Dec 2018 00:00)  T(F): 97.7 (15 Dec 2018 08:00), Max: 98.5 (15 Dec 2018 04:00)  HR: 107 (15 Dec 2018 08:00) (106 - 125)  BP: --  BP(mean): --  ABP: 89/61 (15 Dec 2018 08:00) (75/46 - 139/71)  ABP(mean): 71 (15 Dec 2018 08:00) (56 - 96)  RR: 0 (15 Dec 2018 08:00) (0 - 23)  SpO2: 98% (15 Dec 2018 08:00) (78% - 98%)    Mode: VDR4, RR (machine): 14, FiO2: 60, PEEP: 12, ITime: 1, MAP: 20, PIP: 26, Frequency: 415    12-14 @ 07:01  -  12-15 @ 07:00  --------------------------------------------------------  IN: 2229.2 mL / OUT: 2870 mL / NET: -640.8 mL    12-15 @ 07:01  -  12-15 @ 08:59  --------------------------------------------------------  IN: 64 mL / OUT: 200 mL / NET: -136 mL      CAPILLARY BLOOD GLUCOSE    ICU Vital Signs Last 24 Hrs  T(C): 36.5 (15 Dec 2018 08:00), Max: 36.9 (15 Dec 2018 00:00)  T(F): 97.7 (15 Dec 2018 08:00), Max: 98.5 (15 Dec 2018 04:00)  HR: 107 (15 Dec 2018 08:00) (106 - 125)  BP: --  BP(mean): --  ABP: 89/61 (15 Dec 2018 08:00) (75/46 - 139/71)  ABP(mean): 71 (15 Dec 2018 08:00) (56 - 96)  RR: 0 (15 Dec 2018 08:00) (0 - 23)  SpO2: 98% (15 Dec 2018 08:00) (78% - 98%)    PHYSICAL EXAM:  General:   HEENT:   Lymph Nodes:  Neck:   Respiratory:   Cardiovascular:   Abdomen:   Extremities:   Skin:   Neurological:  Psychiatry:    LINES:    HOSPITAL MEDICATIONS:  MEDICATIONS  (STANDING):  bisacodyl Suppository 10 milliGRAM(s) Rectal daily  chlorhexidine 0.12% Liquid 15 milliLiter(s) Oral Mucosa two times a day  chlorhexidine 4% Liquid 1 Application(s) Topical <User Schedule>  cisatracurium Infusion 1 MICROgram(s)/kG/Min (5.982 mL/Hr) IV Continuous <Continuous>  docusate sodium Liquid 100 milliGRAM(s) Oral two times a day  enoxaparin Injectable 40 milliGRAM(s) SubCutaneous every 24 hours  furosemide   Injectable 40 milliGRAM(s) IV Push every 6 hours  imipenem/cilastatin  IVPB      imipenem/cilastatin  IVPB 500 milliGRAM(s) IV Intermittent every 6 hours  lacosamide IVPB 100 milliGRAM(s) IV Intermittent every 12 hours  midazolam Infusion 0.12 mG/kG/Hr (11.964 mL/Hr) IV Continuous <Continuous>  norepinephrine Infusion 0.32 MICROgram(s)/kG/Min (29.91 mL/Hr) IV Continuous <Continuous>  petrolatum Ophthalmic Ointment 1 Application(s) Both EYES two times a day  polyethylene glycol 3350 17 Gram(s) Oral daily  senna Syrup 10 milliLiter(s) Oral daily    MEDICATIONS  (PRN):  acetaminophen    Suspension .. 650 milliGRAM(s) Oral every 6 hours PRN Temp greater or equal to 38C (100.4F), Mild Pain (1 - 3), Moderate Pain (4 - 6)      LABS:                        10.8   15.42 )-----------( 199      ( 15 Dec 2018 03:15 )             31.8     Hgb Trend: 10.8<--, 11.2<--, 12.0<--, 12.9<--, 11.5<--  12-15    137  |  98  |  18  ----------------------------<  130<H>  3.9   |  31  |  0.41<L>    Ca    7.5<L>      15 Dec 2018 06:00  Phos  3.1     12-15  Mg     1.8     12-15    TPro  4.4<L>  /  Alb  1.5<L>  /  TBili  4.9<H>  /  DBili  x   /  AST  74<H>  /  ALT  49<H>  /  AlkPhos  278<H>  12-15    Creatinine Trend: 0.41<--, 0.40<--, 0.37<--, 0.34<--, 0.37<--, 0.35<--      Arterial Blood Gas:  12-15 @ 03:15  7.43/47/61/30/90.9/6.7  ABG lactate: 1.8  Arterial Blood Gas:  12-14 @ 20:00  7.38/56/62/30/90.3/7.5  ABG lactate: 1.7  Arterial Blood Gas:  12-14 @ 14:38  7.39/56/64/31/91.0/8.0  ABG lactate: 1.9  Arterial Blood Gas:  12-14 @ 11:45  7.34/59/60/29/91.8/5.9  ABG lactate: 1.9  Arterial Blood Gas:  12-14 @ 05:30  7.51/38/70/31/95.1/7.4  ABG lactate: 2.8  Arterial Blood Gas:  12-14 @ 01:40  7.40/52/56/30/87.6/6.5  ABG lactate: 2.1  Arterial Blood Gas:  12-13 @ 21:50  7.39/52/65/29/93.1/5.9  ABG lactate: 2.5  Arterial Blood Gas:  12-13 @ 15:40  7.44/44/73/29/95.0/5.5  ABG lactate: 2.3  Arterial Blood Gas:  12-13 @ 12:45  7.29/65/89/27/95.6/4.3  ABG lactate: 2.3  Arterial Blood Gas:  12-13 @ 10:00  7.20/79/83/24/92.1/2.5  ABG lactate: 2.2        MICROBIOLOGY:     RADIOLOGY:  [ ] Reviewed and interpreted by me    EKG: CHIEF COMPLAINT: Shortness of breath    Interval Events: Patient with an episode of hypoxemia overnight which improved s/p filter exchange of VDR. Patient remains intubated and is on a Nimbex, Versed, and Levophed gtt. Unable to obtain further ROS as patient remains sedated.      REVIEW OF SYSTEMS:  [x] Unable to assess ROS because patient intubated and sedated    OBJECTIVE:  ICU Vital Signs Last 24 Hrs  T(C): 36.5 (15 Dec 2018 08:00), Max: 36.9 (15 Dec 2018 00:00)  T(F): 97.7 (15 Dec 2018 08:00), Max: 98.5 (15 Dec 2018 04:00)  HR: 107 (15 Dec 2018 08:00) (106 - 125)  BP: --  BP(mean): --  ABP: 89/61 (15 Dec 2018 08:00) (75/46 - 139/71)  ABP(mean): 71 (15 Dec 2018 08:00) (56 - 96)  RR: 0 (15 Dec 2018 08:00) (0 - 23)  SpO2: 98% (15 Dec 2018 08:00) (78% - 98%)    Mode: VDR4, RR (machine): 14, FiO2: 60, PEEP: 12, ITime: 1, MAP: 20, PIP: 26, Frequency: 415    12-14 @ 07:01  -  12-15 @ 07:00  --------------------------------------------------------  IN: 2229.2 mL / OUT: 2870 mL / NET: -640.8 mL    12-15 @ 07:01  -  12-15 @ 08:59  --------------------------------------------------------  IN: 64 mL / OUT: 200 mL / NET: -136 mL      CAPILLARY BLOOD GLUCOSE    ICU Vital Signs Last 24 Hrs  T(C): 36.5 (15 Dec 2018 08:00), Max: 36.9 (15 Dec 2018 00:00)  T(F): 97.7 (15 Dec 2018 08:00), Max: 98.5 (15 Dec 2018 04:00)  HR: 107 (15 Dec 2018 08:00) (106 - 125)  BP: --  BP(mean): --  ABP: 89/61 (15 Dec 2018 08:00) (75/46 - 139/71)  ABP(mean): 71 (15 Dec 2018 08:00) (56 - 96)  RR: 0 (15 Dec 2018 08:00) (0 - 23)  SpO2: 98% (15 Dec 2018 08:00) (78% - 98%)    PHYSICAL EXAM:  General:   HEENT:   Lymph Nodes:  Neck:   Respiratory:   Cardiovascular:   Abdomen:   Extremities:   Skin:   Neurological:  Psychiatry:    LINES:    HOSPITAL MEDICATIONS:  MEDICATIONS  (STANDING):  bisacodyl Suppository 10 milliGRAM(s) Rectal daily  chlorhexidine 0.12% Liquid 15 milliLiter(s) Oral Mucosa two times a day  chlorhexidine 4% Liquid 1 Application(s) Topical <User Schedule>  cisatracurium Infusion 1 MICROgram(s)/kG/Min (5.982 mL/Hr) IV Continuous <Continuous>  docusate sodium Liquid 100 milliGRAM(s) Oral two times a day  enoxaparin Injectable 40 milliGRAM(s) SubCutaneous every 24 hours  furosemide   Injectable 40 milliGRAM(s) IV Push every 6 hours  imipenem/cilastatin  IVPB      imipenem/cilastatin  IVPB 500 milliGRAM(s) IV Intermittent every 6 hours  lacosamide IVPB 100 milliGRAM(s) IV Intermittent every 12 hours  midazolam Infusion 0.12 mG/kG/Hr (11.964 mL/Hr) IV Continuous <Continuous>  norepinephrine Infusion 0.32 MICROgram(s)/kG/Min (29.91 mL/Hr) IV Continuous <Continuous>  petrolatum Ophthalmic Ointment 1 Application(s) Both EYES two times a day  polyethylene glycol 3350 17 Gram(s) Oral daily  senna Syrup 10 milliLiter(s) Oral daily    MEDICATIONS  (PRN):  acetaminophen    Suspension .. 650 milliGRAM(s) Oral every 6 hours PRN Temp greater or equal to 38C (100.4F), Mild Pain (1 - 3), Moderate Pain (4 - 6)      LABS:                        10.8   15.42 )-----------( 199      ( 15 Dec 2018 03:15 )             31.8     Hgb Trend: 10.8<--, 11.2<--, 12.0<--, 12.9<--, 11.5<--  12-15    137  |  98  |  18  ----------------------------<  130<H>  3.9   |  31  |  0.41<L>    Ca    7.5<L>      15 Dec 2018 06:00  Phos  3.1     12-15  Mg     1.8     12-15    TPro  4.4<L>  /  Alb  1.5<L>  /  TBili  4.9<H>  /  DBili  x   /  AST  74<H>  /  ALT  49<H>  /  AlkPhos  278<H>  12-15    Creatinine Trend: 0.41<--, 0.40<--, 0.37<--, 0.34<--, 0.37<--, 0.35<--      Arterial Blood Gas:  12-15 @ 03:15  7.43/47/61/30/90.9/6.7  ABG lactate: 1.8  Arterial Blood Gas:  12-14 @ 20:00  7.38/56/62/30/90.3/7.5  ABG lactate: 1.7  Arterial Blood Gas:  12-14 @ 14:38  7.39/56/64/31/91.0/8.0  ABG lactate: 1.9  Arterial Blood Gas:  12-14 @ 11:45  7.34/59/60/29/91.8/5.9  ABG lactate: 1.9  Arterial Blood Gas:  12-14 @ 05:30  7.51/38/70/31/95.1/7.4  ABG lactate: 2.8  Arterial Blood Gas:  12-14 @ 01:40  7.40/52/56/30/87.6/6.5  ABG lactate: 2.1  Arterial Blood Gas:  12-13 @ 21:50  7.39/52/65/29/93.1/5.9  ABG lactate: 2.5  Arterial Blood Gas:  12-13 @ 15:40  7.44/44/73/29/95.0/5.5  ABG lactate: 2.3  Arterial Blood Gas:  12-13 @ 12:45  7.29/65/89/27/95.6/4.3  ABG lactate: 2.3  Arterial Blood Gas:  12-13 @ 10:00  7.20/79/83/24/92.1/2.5  ABG lactate: 2.2        MICROBIOLOGY:     RADIOLOGY:  [ ] Reviewed and interpreted by me    EKG: CHIEF COMPLAINT: Shortness of breath    Interval Events: Patient with an episode of hypoxemia overnight which improved s/p filter exchange of VDR. Patient remains intubated and is on a Nimbex, Versed, and Levophed gtt. Unable to obtain further ROS as patient remains sedated.      REVIEW OF SYSTEMS:  [x] Unable to assess ROS because patient intubated and sedated    OBJECTIVE:  ICU Vital Signs Last 24 Hrs  T(C): 36.5 (15 Dec 2018 08:00), Max: 36.9 (15 Dec 2018 00:00)  T(F): 97.7 (15 Dec 2018 08:00), Max: 98.5 (15 Dec 2018 04:00)  HR: 107 (15 Dec 2018 08:00) (106 - 125)  BP: --  BP(mean): --  ABP: 89/61 (15 Dec 2018 08:00) (75/46 - 139/71)  ABP(mean): 71 (15 Dec 2018 08:00) (56 - 96)  RR: 0 (15 Dec 2018 08:00) (0 - 23)  SpO2: 98% (15 Dec 2018 08:00) (78% - 98%)    Mode: VDR4, RR (machine): 14, FiO2: 60, PEEP: 12, ITime: 1, MAP: 20, PIP: 26, Frequency: 415    12-14 @ 07:01  -  12-15 @ 07:00  --------------------------------------------------------  IN: 2229.2 mL / OUT: 2870 mL / NET: -640.8 mL    12-15 @ 07:01  -  12-15 @ 08:59  --------------------------------------------------------  IN: 64 mL / OUT: 200 mL / NET: -136 mL      CAPILLARY BLOOD GLUCOSE    ICU Vital Signs Last 24 Hrs  T(C): 36.5 (15 Dec 2018 08:00), Max: 36.9 (15 Dec 2018 00:00)  T(F): 97.7 (15 Dec 2018 08:00), Max: 98.5 (15 Dec 2018 04:00)  HR: 107 (15 Dec 2018 08:00) (106 - 125)  BP: --  BP(mean): --  ABP: 89/61 (15 Dec 2018 08:00) (75/46 - 139/71)  ABP(mean): 71 (15 Dec 2018 08:00) (56 - 96)  RR: 0 (15 Dec 2018 08:00) (0 - 23)  SpO2: 98% (15 Dec 2018 08:00) (78% - 98%)    PHYSICAL EXAM:  GENERAL: sedated  HEENT:  NC/AT, Slightly dry mucous membranes, PERRLA  NECK: Supple  CHEST/LUNG: bilateral rhonchi  HEART: Tachycardic, Regular rhyhtm  ABDOMEN: Distended, hypoactive bowel sounds, + PEG tube in place  GENITOURINARY: Ahuja in place draining clear yellow urine  EXTREMITIES:  2+ Peripheral Pulses, No clubbing, cyanosis, or edema  NEUROLOGY: Sedated, PERRLA  SKIN: left back crusted lesion, Warm and dry  PSYCHIATRY: Unable to assess    LINES: Left subclavian and A line    HOSPITAL MEDICATIONS:  MEDICATIONS  (STANDING):  bisacodyl Suppository 10 milliGRAM(s) Rectal daily  chlorhexidine 0.12% Liquid 15 milliLiter(s) Oral Mucosa two times a day  chlorhexidine 4% Liquid 1 Application(s) Topical <User Schedule>  cisatracurium Infusion 1 MICROgram(s)/kG/Min (5.982 mL/Hr) IV Continuous <Continuous>  docusate sodium Liquid 100 milliGRAM(s) Oral two times a day  enoxaparin Injectable 40 milliGRAM(s) SubCutaneous every 24 hours  furosemide   Injectable 40 milliGRAM(s) IV Push every 6 hours  imipenem/cilastatin  IVPB      imipenem/cilastatin  IVPB 500 milliGRAM(s) IV Intermittent every 6 hours  lacosamide IVPB 100 milliGRAM(s) IV Intermittent every 12 hours  midazolam Infusion 0.12 mG/kG/Hr (11.964 mL/Hr) IV Continuous <Continuous>  norepinephrine Infusion 0.32 MICROgram(s)/kG/Min (29.91 mL/Hr) IV Continuous <Continuous>  petrolatum Ophthalmic Ointment 1 Application(s) Both EYES two times a day  polyethylene glycol 3350 17 Gram(s) Oral daily  senna Syrup 10 milliLiter(s) Oral daily    MEDICATIONS  (PRN):  acetaminophen    Suspension .. 650 milliGRAM(s) Oral every 6 hours PRN Temp greater or equal to 38C (100.4F), Mild Pain (1 - 3), Moderate Pain (4 - 6)      LABS:                        10.8   15.42 )-----------( 199      ( 15 Dec 2018 03:15 )             31.8     Hgb Trend: 10.8<--, 11.2<--, 12.0<--, 12.9<--, 11.5<--  12-15    137  |  98  |  18  ----------------------------<  130<H>  3.9   |  31  |  0.41<L>    Ca    7.5<L>      15 Dec 2018 06:00  Phos  3.1     12-15  Mg     1.8     12-15    TPro  4.4<L>  /  Alb  1.5<L>  /  TBili  4.9<H>  /  DBili  x   /  AST  74<H>  /  ALT  49<H>  /  AlkPhos  278<H>  12-15    Creatinine Trend: 0.41<--, 0.40<--, 0.37<--, 0.34<--, 0.37<--, 0.35<--      Arterial Blood Gas:  12-15 @ 03:15  7.43/47/61/30/90.9/6.7  ABG lactate: 1.8  Arterial Blood Gas:  12-14 @ 20:00  7.38/56/62/30/90.3/7.5  ABG lactate: 1.7  Arterial Blood Gas:  12-14 @ 14:38  7.39/56/64/31/91.0/8.0  ABG lactate: 1.9  Arterial Blood Gas:  12-14 @ 11:45  7.34/59/60/29/91.8/5.9  ABG lactate: 1.9  Arterial Blood Gas:  12-14 @ 05:30  7.51/38/70/31/95.1/7.4  ABG lactate: 2.8  Arterial Blood Gas:  12-14 @ 01:40  7.40/52/56/30/87.6/6.5  ABG lactate: 2.1  Arterial Blood Gas:  12-13 @ 21:50  7.39/52/65/29/93.1/5.9  ABG lactate: 2.5  Arterial Blood Gas:  12-13 @ 15:40  7.44/44/73/29/95.0/5.5  ABG lactate: 2.3  Arterial Blood Gas:  12-13 @ 12:45  7.29/65/89/27/95.6/4.3  ABG lactate: 2.3  Arterial Blood Gas:  12-13 @ 10:00  7.20/79/83/24/92.1/2.5  ABG lactate: 2.2 CHIEF COMPLAINT: Shortness of breath    Interval Events: Patient with an episode of hypoxemia overnight which improved s/p filter exchange of VDR. Patient remains intubated and is on a Nimbex, Versed, and Levophed gtt. Unable to obtain further ROS as patient remains sedated.      REVIEW OF SYSTEMS:  [x] Unable to assess ROS because patient intubated and sedated    OBJECTIVE:  ICU Vital Signs Last 24 Hrs  T(C): 36.5 (15 Dec 2018 08:00), Max: 36.9 (15 Dec 2018 00:00)  T(F): 97.7 (15 Dec 2018 08:00), Max: 98.5 (15 Dec 2018 04:00)  HR: 107 (15 Dec 2018 08:00) (106 - 125)  BP: --  BP(mean): --  ABP: 89/61 (15 Dec 2018 08:00) (75/46 - 139/71)  ABP(mean): 71 (15 Dec 2018 08:00) (56 - 96)  RR: 0 (15 Dec 2018 08:00) (0 - 23)  SpO2: 98% (15 Dec 2018 08:00) (78% - 98%)    Mode: VDR4, RR (machine): 14, FiO2: 60, PEEP: 12, ITime: 1, MAP: 20, PIP: 26, Frequency: 415    12-14 @ 07:01  -  12-15 @ 07:00  --------------------------------------------------------  IN: 2229.2 mL / OUT: 2870 mL / NET: -640.8 mL    12-15 @ 07:01  -  12-15 @ 08:59  --------------------------------------------------------  IN: 64 mL / OUT: 200 mL / NET: -136 mL      CAPILLARY BLOOD GLUCOSE    ICU Vital Signs Last 24 Hrs  T(C): 36.5 (15 Dec 2018 08:00), Max: 36.9 (15 Dec 2018 00:00)  T(F): 97.7 (15 Dec 2018 08:00), Max: 98.5 (15 Dec 2018 04:00)  HR: 107 (15 Dec 2018 08:00) (106 - 125)  BP: --  BP(mean): --  ABP: 89/61 (15 Dec 2018 08:00) (75/46 - 139/71)  ABP(mean): 71 (15 Dec 2018 08:00) (56 - 96)  RR: 0 (15 Dec 2018 08:00) (0 - 23)  SpO2: 98% (15 Dec 2018 08:00) (78% - 98%)    PHYSICAL EXAM:  GENERAL: Sedated  HEENT:  NC/AT, Slightly dry mucous membranes  NECK: Supple  CHEST/LUNG: Vent sounds appreciated, Slightly decreased at bases  HEART: Tachycardic, Regular rhythm  ABDOMEN: Distended (slightly improved), hypoactive bowel sounds, + PEG tube in place  GENITOURINARY: Ahuja in place draining dark yellow urine  EXTREMITIES: 1+ edema of B/L LEs  NEUROLOGY: Sedated, (Pupils equal, round, and each react sluggishly to light)  SKIN: Left back crusted lesion, Warm and dry  PSYCHIATRY: Unable to assess    LINES: Left subclavian and A line    HOSPITAL MEDICATIONS:  MEDICATIONS  (STANDING):  bisacodyl Suppository 10 milliGRAM(s) Rectal daily  chlorhexidine 0.12% Liquid 15 milliLiter(s) Oral Mucosa two times a day  chlorhexidine 4% Liquid 1 Application(s) Topical <User Schedule>  cisatracurium Infusion 1 MICROgram(s)/kG/Min (5.982 mL/Hr) IV Continuous <Continuous>  docusate sodium Liquid 100 milliGRAM(s) Oral two times a day  enoxaparin Injectable 40 milliGRAM(s) SubCutaneous every 24 hours  furosemide   Injectable 40 milliGRAM(s) IV Push every 6 hours  imipenem/cilastatin  IVPB      imipenem/cilastatin  IVPB 500 milliGRAM(s) IV Intermittent every 6 hours  lacosamide IVPB 100 milliGRAM(s) IV Intermittent every 12 hours  midazolam Infusion 0.12 mG/kG/Hr (11.964 mL/Hr) IV Continuous <Continuous>  norepinephrine Infusion 0.32 MICROgram(s)/kG/Min (29.91 mL/Hr) IV Continuous <Continuous>  petrolatum Ophthalmic Ointment 1 Application(s) Both EYES two times a day  polyethylene glycol 3350 17 Gram(s) Oral daily  senna Syrup 10 milliLiter(s) Oral daily    MEDICATIONS  (PRN):  acetaminophen    Suspension .. 650 milliGRAM(s) Oral every 6 hours PRN Temp greater or equal to 38C (100.4F), Mild Pain (1 - 3), Moderate Pain (4 - 6)      LABS:                        10.8   15.42 )-----------( 199      ( 15 Dec 2018 03:15 )             31.8     Hgb Trend: 10.8<--, 11.2<--, 12.0<--, 12.9<--, 11.5<--  12-15    137  |  98  |  18  ----------------------------<  130<H>  3.9   |  31  |  0.41<L>    Ca    7.5<L>      15 Dec 2018 06:00  Phos  3.1     12-15  Mg     1.8     12-15    TPro  4.4<L>  /  Alb  1.5<L>  /  TBili  4.9<H>  /  DBili  x   /  AST  74<H>  /  ALT  49<H>  /  AlkPhos  278<H>  12-15    Creatinine Trend: 0.41<--, 0.40<--, 0.37<--, 0.34<--, 0.37<--, 0.35<--      Arterial Blood Gas:  12-15 @ 03:15  7.43/47/61/30/90.9/6.7  ABG lactate: 1.8  Arterial Blood Gas:  12-14 @ 20:00  7.38/56/62/30/90.3/7.5  ABG lactate: 1.7  Arterial Blood Gas:  12-14 @ 14:38  7.39/56/64/31/91.0/8.0  ABG lactate: 1.9  Arterial Blood Gas:  12-14 @ 11:45  7.34/59/60/29/91.8/5.9  ABG lactate: 1.9  Arterial Blood Gas:  12-14 @ 05:30  7.51/38/70/31/95.1/7.4  ABG lactate: 2.8  Arterial Blood Gas:  12-14 @ 01:40  7.40/52/56/30/87.6/6.5  ABG lactate: 2.1  Arterial Blood Gas:  12-13 @ 21:50  7.39/52/65/29/93.1/5.9  ABG lactate: 2.5  Arterial Blood Gas:  12-13 @ 15:40  7.44/44/73/29/95.0/5.5  ABG lactate: 2.3  Arterial Blood Gas:  12-13 @ 12:45  7.29/65/89/27/95.6/4.3  ABG lactate: 2.3  Arterial Blood Gas:  12-13 @ 10:00  7.20/79/83/24/92.1/2.5  ABG lactate: 2.2 CHIEF COMPLAINT: Shortness of breath    Interval Events: Patient with an episode of hypoxemia overnight which improved s/p filter exchange of VDR. Patient started having BMs overnight. Patient remains intubated and is on a Nimbex, Versed, and Levophed gtt. Unable to obtain further ROS as patient remains sedated.      REVIEW OF SYSTEMS:  [x] Unable to assess ROS because patient intubated and sedated    OBJECTIVE:  ICU Vital Signs Last 24 Hrs  T(C): 36.5 (15 Dec 2018 08:00), Max: 36.9 (15 Dec 2018 00:00)  T(F): 97.7 (15 Dec 2018 08:00), Max: 98.5 (15 Dec 2018 04:00)  HR: 107 (15 Dec 2018 08:00) (106 - 125)  BP: --  BP(mean): --  ABP: 89/61 (15 Dec 2018 08:00) (75/46 - 139/71)  ABP(mean): 71 (15 Dec 2018 08:00) (56 - 96)  RR: 0 (15 Dec 2018 08:00) (0 - 23)  SpO2: 98% (15 Dec 2018 08:00) (78% - 98%)    Mode: VDR4, RR (machine): 14, FiO2: 60, PEEP: 12, ITime: 1, MAP: 20, PIP: 26, Frequency: 415    12-14 @ 07:01  -  12-15 @ 07:00  --------------------------------------------------------  IN: 2229.2 mL / OUT: 2870 mL / NET: -640.8 mL    12-15 @ 07:01  -  12-15 @ 08:59  --------------------------------------------------------  IN: 64 mL / OUT: 200 mL / NET: -136 mL      CAPILLARY BLOOD GLUCOSE    ICU Vital Signs Last 24 Hrs  T(C): 36.5 (15 Dec 2018 08:00), Max: 36.9 (15 Dec 2018 00:00)  T(F): 97.7 (15 Dec 2018 08:00), Max: 98.5 (15 Dec 2018 04:00)  HR: 107 (15 Dec 2018 08:00) (106 - 125)  BP: --  BP(mean): --  ABP: 89/61 (15 Dec 2018 08:00) (75/46 - 139/71)  ABP(mean): 71 (15 Dec 2018 08:00) (56 - 96)  RR: 0 (15 Dec 2018 08:00) (0 - 23)  SpO2: 98% (15 Dec 2018 08:00) (78% - 98%)    PHYSICAL EXAM:  GENERAL: Sedated  HEENT:  NC/AT, Slightly dry mucous membranes  NECK: Supple  CHEST/LUNG: Vent sounds appreciated, Slightly decreased at bases  HEART: Tachycardic, Regular rhythm  ABDOMEN: Distended (slightly improved), hypoactive bowel sounds, + PEG tube in place  GENITOURINARY: Ahuja in place draining dark yellow urine  EXTREMITIES: 1+ edema of B/L LEs  NEUROLOGY: Sedated, (Pupils equal, round, and each react sluggishly to light)  SKIN: Left back crusted lesion, Warm and dry  PSYCHIATRY: Unable to assess    LINES: Left subclavian and A line    HOSPITAL MEDICATIONS:  MEDICATIONS  (STANDING):  bisacodyl Suppository 10 milliGRAM(s) Rectal daily  chlorhexidine 0.12% Liquid 15 milliLiter(s) Oral Mucosa two times a day  chlorhexidine 4% Liquid 1 Application(s) Topical <User Schedule>  cisatracurium Infusion 1 MICROgram(s)/kG/Min (5.982 mL/Hr) IV Continuous <Continuous>  docusate sodium Liquid 100 milliGRAM(s) Oral two times a day  enoxaparin Injectable 40 milliGRAM(s) SubCutaneous every 24 hours  furosemide   Injectable 40 milliGRAM(s) IV Push every 6 hours  imipenem/cilastatin  IVPB      imipenem/cilastatin  IVPB 500 milliGRAM(s) IV Intermittent every 6 hours  lacosamide IVPB 100 milliGRAM(s) IV Intermittent every 12 hours  midazolam Infusion 0.12 mG/kG/Hr (11.964 mL/Hr) IV Continuous <Continuous>  norepinephrine Infusion 0.32 MICROgram(s)/kG/Min (29.91 mL/Hr) IV Continuous <Continuous>  petrolatum Ophthalmic Ointment 1 Application(s) Both EYES two times a day  polyethylene glycol 3350 17 Gram(s) Oral daily  senna Syrup 10 milliLiter(s) Oral daily    MEDICATIONS  (PRN):  acetaminophen    Suspension .. 650 milliGRAM(s) Oral every 6 hours PRN Temp greater or equal to 38C (100.4F), Mild Pain (1 - 3), Moderate Pain (4 - 6)      LABS:                        10.8   15.42 )-----------( 199      ( 15 Dec 2018 03:15 )             31.8     Hgb Trend: 10.8<--, 11.2<--, 12.0<--, 12.9<--, 11.5<--  12-15    137  |  98  |  18  ----------------------------<  130<H>  3.9   |  31  |  0.41<L>    Ca    7.5<L>      15 Dec 2018 06:00  Phos  3.1     12-15  Mg     1.8     12-15    TPro  4.4<L>  /  Alb  1.5<L>  /  TBili  4.9<H>  /  DBili  x   /  AST  74<H>  /  ALT  49<H>  /  AlkPhos  278<H>  12-15    Creatinine Trend: 0.41<--, 0.40<--, 0.37<--, 0.34<--, 0.37<--, 0.35<--      Arterial Blood Gas:  12-15 @ 03:15  7.43/47/61/30/90.9/6.7  ABG lactate: 1.8  Arterial Blood Gas:  12-14 @ 20:00  7.38/56/62/30/90.3/7.5  ABG lactate: 1.7  Arterial Blood Gas:  12-14 @ 14:38  7.39/56/64/31/91.0/8.0  ABG lactate: 1.9  Arterial Blood Gas:  12-14 @ 11:45  7.34/59/60/29/91.8/5.9  ABG lactate: 1.9  Arterial Blood Gas:  12-14 @ 05:30  7.51/38/70/31/95.1/7.4  ABG lactate: 2.8  Arterial Blood Gas:  12-14 @ 01:40  7.40/52/56/30/87.6/6.5  ABG lactate: 2.1  Arterial Blood Gas:  12-13 @ 21:50  7.39/52/65/29/93.1/5.9  ABG lactate: 2.5  Arterial Blood Gas:  12-13 @ 15:40  7.44/44/73/29/95.0/5.5  ABG lactate: 2.3  Arterial Blood Gas:  12-13 @ 12:45  7.29/65/89/27/95.6/4.3  ABG lactate: 2.3  Arterial Blood Gas:  12-13 @ 10:00  7.20/79/83/24/92.1/2.5  ABG lactate: 2.2

## 2018-12-15 NOTE — PROGRESS NOTE ADULT - ATTENDING COMMENTS
ARDS in setting of severe necrotizing pancreatitis on vent support.  FiO2 requirements improving.   Will discontinue Nimbex.   Continue Imipenem.    Critical Care time: 40 minutes

## 2018-12-15 NOTE — PROGRESS NOTE ADULT - ASSESSMENT
Patient is a 52 y/o M (resident of [facility]) w/ a PMHx of TBI, s/p PEG tube, contracture, and seizure d/o who presented as a transfer from [facility] on 12/9 for respiratory failure 2/2 ARDS likely 2/2 necrotizing pancreatitis s/p intubation.    Neuro: Sedated at this time.  - Currently sedated w/ a Versed gtt. He is also on a Nimbex gtt. Propofol gtt was stopped due to elevated TG.   - Patient was on Depakote at home for history of seizure disorder; however, this was discontinued because Depakote can potentially cause pancreatitis. vEEGs have shown no epileptiform abnormalities.  - Neurology consulted yesterday for assistance with seizure management. Patient was subsequently started on IV Vimpat.      CV: Sepsis leading to likely distributive shock  - Currently on levophed for BP control. Titrate to MAP>= 65. Currently dosage trending down.  - IVF dced as patient is edematous and net positive  - TY with LV appears to be functioning normally with mild-moderate enlargement of RV No evidence of fluid responsiveness ?bicuspid aortic valve with no evidence of AI    Pulm: Patient currently intubated for questionable ARDS diagnosis.  - Currently placed on VDR ventilator. Continue to monitor resp functions with serial blood gas.  - C/w impenem for possible aspiration pna coverage.  - Consider proning patient if worsening ARDS.    GI: Unclear etiology for pancreatitis at this time. Patient resident of NH and unclear alcohol history. OSH CT abd w/ gallbladder pathology. Repeat CT with normal gallbladder wall and duct. TG elevated to 1445 yesterday, likely 2/2 propofol.  - Keep patient off tube feeds for now. Restart when tolerated.  - Discussed with surgery and IR: no acute interventions planned for necrotizing pancreatitis  - C/w imipenem for necrotizing pancreatitis.  - Large stool burden on imaging: s/p manual disimpaction, lactulose enema ordered.   - Plan for Tiger tube placement for post-jejunal feeding; will give erythromycin for prokinetic effect.    :   - Monitor renal function. Currently at baseline.  - C/w tavares for now. Strict I&Os.     ID: Patient in shock on arrival. Likely source infectious, distributive.   - C/w broad spectrum abx imipenem + vanco for necrotizing pancreatitis and PNA coverage.  - Pan cultures NGTD.  - C/w valacyclovir for herpes zoster skin lesion.    Endo: No issues at this time.  - Continue to monitor FSG.   - Restart feeding when appropirate.    PPX:   - DVT ppx w/ scds and lovenox. Patient is [facility] 54 [facility] w/ a PMHx of TBI, s/p PEG tube, contracture, and seizure d/o who presented as a transfer from [facility] on 12/9 for respiratory failure 2/2 ARDS likely 2/2 necrotizing pancreatitis s/p intubation.    Neuro: Sedated at this time.  - Currently sedated w/ a Versed gtt. He is also on a Nimbex gtt. Patient had been on a Propofol gtt earlier in his hospital course, but this was stopped due to elevated TG.   - Patient was on Depakote at home for history of seizure disorder; however, this was discontinued because Depakote can potentially cause pancreatitis. vEEGs have shown no epileptiform abnormalities.  - Neurology consulted yesterday for assistance with seizure management. Patient was subsequently started on IV Vimpat.      CV: Sepsis leading to likely distributive shock  - Currently on levophed for BP control. Titrate to MAP>= 65. Currently dosage trending down.  - IVF dced as patient is edematous and net positive  - TY with LV appears to be functioning normally with mild-moderate enlargement of RV No evidence of fluid responsiveness ?bicuspid aortic valve with no evidence of AI    Pulm: Patient currently intubated for questionable ARDS diagnosis.  - Currently placed on VDR ventilator. Continue to monitor resp functions with serial blood gas.  - C/w impenem for possible aspiration pna coverage.  - Consider proning patient if worsening ARDS.    GI: Unclear etiology for pancreatitis at this time. Patient resident of NH and unclear alcohol history. OSH CT abd w/ gallbladder pathology. Repeat CT with normal gallbladder wall and duct. TG elevated to 1445 yesterday, likely 2/2 propofol.  - Keep patient off tube feeds for now. Restart when tolerated.  - Discussed with surgery and IR: no acute interventions planned for necrotizing pancreatitis  - C/w imipenem for necrotizing pancreatitis.  - Large stool burden on imaging: s/p manual disimpaction, lactulose enema ordered.   - Plan for Tiger tube placement for post-jejunal feeding; will give erythromycin for prokinetic effect.    :   - Monitor renal function. Currently at baseline.  - C/w tavares for now. Strict I&Os.     ID: Patient in shock on arrival. Likely source infectious, distributive.   - C/w broad spectrum abx imipenem + vanco for necrotizing pancreatitis and PNA coverage.  - Pan cultures NGTD.  - C/w valacyclovir for herpes zoster skin lesion.    Endo: No issues at this time.  - Continue to monitor FSG.   - Restart feeding when appropirate.    PPX:   - DVT ppx w/ scds and lovenox. Patient is a 54 y/o M (resident of [facility]) w/ a PMHx of TBI, s/p PEG tube, contracture, and seizure d/o who presented as a transfer from [facility] on 12/9 for respiratory failure 2/2 ARDS likely 2/2 necrotizing pancreatitis s/p intubation.    Neuro: Sedated at this time.  - Currently sedated w/ a Versed gtt. He is also on a Nimbex gtt. Patient had been on a Propofol gtt earlier in his hospital course, but this was stopped due to elevated TG. Will try to wean off Nimbex gtt today.  - Patient was on Depakote at home for history of seizure disorder; however, this was discontinued because Depakote can potentially cause pancreatitis. vEEGs have shown no epileptiform abnormalities (though patient currently on a Versed gtt).  - Neurology consulted yesterday for assistance with seizure management. Patient was subsequently started on IV Vimpat.      CV: Sepsis leading to likely distributive shock  - Currently on levophed for BP control. Titrate to MAP>= 65. Currently dosage trending down.  - IVF dced as patient is edematous and net positive  - TY with LV appears to be functioning normally with mild-moderate enlargement of RV No evidence of fluid responsiveness ?bicuspid aortic valve with no evidence of AI    Pulm: Patient currently intubated for questionable ARDS diagnosis.  - Currently placed on VDR ventilator. Continue to monitor resp functions with serial blood gas.  - C/w impenem for possible aspiration pna coverage.  - Consider proning patient if worsening ARDS.    GI: Unclear etiology for pancreatitis at this time. Patient resident of NH and unclear alcohol history. OSH CT abd w/ gallbladder pathology. Repeat CT with normal gallbladder wall and duct. TG elevated to 1445 yesterday, likely 2/2 propofol.  - Keep patient off tube feeds for now. Restart when tolerated.  - Discussed with surgery and IR: no acute interventions planned for necrotizing pancreatitis  - C/w imipenem for necrotizing pancreatitis.  - Large stool burden on imaging: s/p manual disimpaction, lactulose enema ordered.   - Plan for Tiger tube placement for post-jejunal feeding; will give erythromycin for prokinetic effect.    :   - Monitor renal function. Currently at baseline.  - C/w tavares for now. Strict I&Os.     ID: Patient in shock on arrival. Likely source infectious, distributive.   - C/w broad spectrum abx imipenem + vanco for necrotizing pancreatitis and PNA coverage.  - Pan cultures NGTD.  - C/w valacyclovir for herpes zoster skin lesion.    Endo: No issues at this time.  - Continue to monitor FSG.   - Restart feeding when appropirate.    PPX:   - DVT ppx w/ scds and lovenox. Patient is a 54 y/o M (resident of [facility]) w/ a PMHx of TBI, s/p PEG tube, contracture, and seizure d/o who presented as a transfer from [facility] on 12/9 for respiratory failure 2/2 ARDS likely 2/2 necrotizing pancreatitis s/p intubation.    Neuro: Sedated at this time.  - Currently sedated w/ a Versed gtt. He is also on a Nimbex gtt. Patient had been on a Propofol gtt earlier in his hospital course, but this was stopped due to elevated TG. Will try to wean off Nimbex gtt today.  - Patient was on Depakote at home for history of seizure disorder; however, this was discontinued because Depakote can potentially cause pancreatitis. vEEGs have shown no epileptiform abnormalities (though patient currently on a Versed gtt).  - Neurology consulted yesterday for assistance with seizure management. Patient was subsequently started on IV Vimpat.  C/w IV Vimpat per Neuro recs    CV: Sepsis leading to likely distributive shock  - Currently on levophed for BP control. Titrate to MAP>= 65. Currently dosage trending down.  - IVF dced as patient is edematous and net positive  - TY with LV appears to be functioning normally with mild-moderate enlargement of RV No evidence of fluid responsiveness ?bicuspid aortic valve with no evidence of AI    Pulm: Patient currently intubated for questionable ARDS diagnosis.  - Currently placed on VDR ventilator. Continue to monitor resp functions with serial blood gas.  - C/w impenem for possible aspiration pna coverage.  - Consider proning patient if worsening ARDS.    GI: Unclear etiology for pancreatitis at this time. Patient resident of NH and unclear alcohol history. OSH CT abd w/ gallbladder pathology. Repeat CT with normal gallbladder wall and duct. TG elevated to 1445 yesterday, likely 2/2 propofol.  - Keep patient off tube feeds for now. Restart when tolerated.  - Discussed with surgery and IR: no acute interventions planned for necrotizing pancreatitis  - C/w imipenem for necrotizing pancreatitis.  - Large stool burden on imaging: s/p manual disimpaction, lactulose enema ordered.   - Plan for Tiger tube placement for post-jejunal feeding; will give erythromycin for prokinetic effect.    :   - Monitor renal function. Currently at baseline.  - C/w tavares for now. Strict I&Os.     ID: Patient in shock on arrival. Likely source infectious, distributive.   - C/w broad spectrum abx imipenem + vanco for necrotizing pancreatitis and PNA coverage.  - Pan cultures NGTD.  - C/w valacyclovir for herpes zoster skin lesion.    Endo: No issues at this time.  - Continue to monitor FSG.   - Restart feeding when appropirate.    PPX:   - DVT ppx w/ scds and lovenox. Patient is a 54 y/o M (resident of [facility]) w/ a PMHx of TBI, s/p PEG tube, contracture, and seizure d/o who presented as a transfer from [facility] on 12/9 for respiratory failure 2/2 ARDS likely 2/2 necrotizing pancreatitis s/p intubation.    # Neuro  - Currently sedated w/ a Versed gtt. He is also on a Nimbex gtt. Patient had been on a Propofol gtt earlier in his hospital course, but this was stopped due to elevated TG. Will try to wean off Nimbex gtt today.  - Patient was on Depakote at home for history of seizure disorder; however, this was discontinued because Depakote can potentially cause pancreatitis. vEEGs have shown no epileptiform abnormalities (though patient currently on a Versed gtt).  - Neurology consulted yesterday for assistance with seizure management. Patient was subsequently started on IV Vimpat. C/w IV Vimpat per Neuro recs    # CV   - Patient currently on Norepinephrine gtt for BP control. Hypotension likely 2/2 underlying pancreatitis and sedation. Will try to wean pressors as tolerated  - Bedside TY was performed which showed an LV that appears to be functioning normally with mild-moderate enlargement of RV, no evidence of fluid responsiveness, ?bicuspid aortic valve with no evidence of AI    # Resp:   - Patient currently intubated 2/2 moderate ARDS 2/2 necrotizing pancreatitis  - Patient currently on VDR ventilator. Will closely monitor respiratory status and check serial blood gases.  - C/w impenem for possible aspiration pna coverage    # GI  - Patient found to have necrotizing pancreatitis. Unclear etiology for pancreatitis at this time. Patient is a resident of NH and with no significant alcohol history. OSH CT A+P showed gallbladder pathology. Repeat CT with normal gallbladder wall and duct. TG was found to be elevated in the hospital, though patient was on a Propofol gtt at the time and it has been downtrending since the Propofol was discontinued.  - Patient currently on tube feeds  - Discussed with surgery and IR: no acute interventions planned for necrotizing pancreatitis  - C/w imipenem for necrotizing pancreatitis.  - Patient found to have a large stool burden on imaging. He is s/p manual disimpaction and has now started having BMs s/p aggressive bowel regimen     :   - Monitor renal function. Currently at baseline.  - C/w tavares for now. Strict I&Os.     ID: Patient in shock on arrival. Likely source infectious, distributive.   - C/w broad spectrum abx imipenem + vanco for necrotizing pancreatitis and PNA coverage.  - Pan cultures NGTD.  - C/w valacyclovir for herpes zoster skin lesion.    Endo: No issues at this time.  - Continue to monitor FSG.   - Restart feeding when appropirate.    PPX:   - DVT ppx w/ scds and lovenox. Patient is a 52 y/o M (resident of [facility]) w/ a PMHx of TBI, s/p PEG tube, contracture, and seizure d/o who presented as a transfer from [facility] on 12/9 for respiratory failure 2/2 ARDS likely 2/2 necrotizing pancreatitis s/p intubation.    # Neuro  - Currently sedated w/ a Versed gtt. He is also on a Nimbex gtt. Patient had been on a Propofol gtt earlier in his hospital course, but this was stopped due to elevated TG. Will try to wean off Nimbex gtt today.  - Patient was on Depakote at home for history of seizure disorder; however, this was discontinued because Depakote can potentially cause pancreatitis. vEEGs have shown no epileptiform abnormalities (though patient currently on a Versed gtt).  - Neurology consulted yesterday for assistance with seizure management. Patient was subsequently started on IV Vimpat. C/w IV Vimpat per Neuro recs    # CV   - Patient currently on Norepinephrine gtt for BP control. Hypotension likely 2/2 underlying pancreatitis and sedation. Will try to wean pressors as tolerated  - Bedside TY was performed which showed an LV that appears to be functioning normally with mild-moderate enlargement of RV, no evidence of fluid responsiveness, ?bicuspid aortic valve with no evidence of AI    # Resp:   - Patient currently intubated 2/2 moderate ARDS 2/2 necrotizing pancreatitis  - Patient currently on VDR ventilator. Will closely monitor respiratory status and check serial blood gases.  - C/w impenem for possible aspiration pna coverage    # GI  - Patient found to have necrotizing pancreatitis. Unclear etiology for pancreatitis at this time. Patient is a resident of NH and with no significant alcohol history. OSH CT A+P showed gallbladder pathology. Repeat CT with normal gallbladder wall and duct. TG was found to be elevated in the hospital, though patient was on a Propofol gtt at the time and it has been downtrending since the Propofol was discontinued.  - Patient currently on tube feeds  - Discussed with surgery and IR: no acute interventions planned for necrotizing pancreatitis  - C/w imipenem for necrotizing pancreatitis.  - Patient found to have a large stool burden on imaging. He is s/p manual disimpaction and has now started having BMs s/p aggressive bowel regimen     # /Renal   - ABG this AM was alkalotic (pH = 7.50). Possible respiratory in etiology as pCO2 decreased (56-->47-->42) vs ? contraction alkalosis (HCO3- 29-->31). PIP was decreased this AM. Will recheck an ABG and reevaluate vent settings at that time  - Patient was net negative ~640cc last 24 hours. C/w IV Lasix 40mg Q6H for now. Monitor Is/Os  - C/w Ahuja for now  - Monitor BMP    # ID   - Patient in shock on arrival. Likely source is infectious vs distributive   - C/w IV Imipenem for necrotizing pancreatitis and ? PNA coverage.  - Pan cultures NGTD.  - C/w valacyclovir for herpes zoster skin lesion.    Endo: No issues at this time.  - Continue to monitor FSG.   - Restart feeding when appropirate.    PPX:   - DVT ppx w/ scds and lovenox. Patient is a 52 y/o M (resident of [facility]) w/ a PMHx of TBI, s/p PEG tube, contracture, and seizure d/o who presented as a transfer from [facility] on 12/9 for respiratory failure 2/2 ARDS likely 2/2 necrotizing pancreatitis s/p intubation.    # Neuro  - Currently sedated w/ a Versed gtt. He is also on a Nimbex gtt. Patient had been on a Propofol gtt earlier in his hospital course, but this was stopped due to elevated TG. Will try to wean off Nimbex gtt today.  - Patient was on Depakote at home for history of seizure disorder; however, this was discontinued because Depakote can potentially cause pancreatitis. vEEGs have shown no epileptiform abnormalities (though patient currently on a Versed gtt).  - Neurology consulted yesterday for assistance with seizure management. Patient was subsequently started on IV Vimpat. C/w IV Vimpat per Neuro recs    # CV   - Patient currently on Norepinephrine gtt for BP control. Hypotension likely 2/2 underlying pancreatitis and sedation. Will try to wean pressors as tolerated  - Bedside TY was performed which showed an LV that appears to be functioning normally with mild-moderate enlargement of RV, no evidence of fluid responsiveness, ?bicuspid aortic valve with no evidence of AI    # Resp:   - Patient currently intubated 2/2 moderate ARDS 2/2 necrotizing pancreatitis  - Patient currently on VDR ventilator. Will closely monitor respiratory status and check serial blood gases.  - C/w impenem for possible aspiration pna coverage    # GI  - Patient found to have necrotizing pancreatitis. Unclear etiology for pancreatitis at this time. Patient is a resident of NH and with no significant alcohol history. OSH CT A+P showed gallbladder pathology. Repeat CT with normal gallbladder wall and duct. TG was found to be elevated in the hospital, though patient was on a Propofol gtt at the time and it has been downtrending since the Propofol was discontinued.  - Patient currently on tube feeds  - Discussed with surgery and IR: no acute interventions planned for necrotizing pancreatitis  - C/w imipenem for necrotizing pancreatitis.  - Patient found to have a large stool burden on imaging. He is s/p manual disimpaction and has now started having BMs s/p aggressive bowel regimen     # /Renal   - ABG this AM was alkalotic (pH = 7.50). Possible respiratory in etiology as pCO2 decreased (56-->47-->42) vs ? contraction alkalosis (HCO3- 29-->31). PIP was decreased this AM. Will recheck an ABG and reevaluate vent settings at that time  - Patient was net negative ~640cc last 24 hours. C/w IV Lasix 40mg Q6H for now. Monitor Is/Os  - C/w Ahuja for now  - Monitor BMP    # ID   - Patient in shock on arrival. Likely source is infectious vs distributive   - BAL on 12/12 was positive for pseudomonas aeruginosa.  C/w IV Imipenem for necrotizing pancreatitis and PNA coverage.  - Blood/urine Cx (12/11): NGTD  - Patient s/p antiviral regimen (Valtrex followed by acyclovir) for      Endo: No issues at this time.  - Continue to monitor FSG.   - Restart feeding when appropirate.    PPX:   - DVT ppx w/ scds and lovenox. Patient is a 54 y/o M (resident of [facility]) w/ a PMHx of TBI, s/p PEG tube, contracture, and seizure d/o who presented as a transfer from [facility] on 12/9 for respiratory failure 2/2 ARDS likely 2/2 necrotizing pancreatitis s/p intubation.    # Neuro  - Currently sedated w/ a Versed gtt. He is also on a Nimbex gtt. Patient had been on a Propofol gtt earlier in his hospital course, but this was stopped due to elevated TG. Will try to wean off Nimbex gtt today.  - Patient was on Depakote at home for history of seizure disorder; however, this was discontinued because Depakote can potentially cause pancreatitis. vEEGs have shown no epileptiform abnormalities (though patient currently on a Versed gtt).  - Neurology consulted yesterday for assistance with seizure management. Patient was subsequently started on IV Vimpat. C/w IV Vimpat per Neuro recs    # CV   - Patient currently on Norepinephrine gtt for BP control. Hypotension likely 2/2 underlying pancreatitis and sedation. Will try to wean pressors as tolerated  - Bedside TY was performed which showed an LV that appears to be functioning normally with mild-moderate enlargement of RV, no evidence of fluid responsiveness, ?bicuspid aortic valve with no evidence of AI    # Resp:   - Patient currently intubated 2/2 moderate ARDS 2/2 necrotizing pancreatitis  - Patient currently on VDR ventilator. Will closely monitor respiratory status and check serial blood gases.  - C/w impenem for possible aspiration pna coverage    # GI  - Patient found to have necrotizing pancreatitis. Unclear etiology for pancreatitis at this time. Patient is a resident of NH and with no significant alcohol history. OSH CT A+P showed gallbladder pathology. Repeat CT with normal gallbladder wall and duct. TG was found to be elevated in the hospital, though patient was on a Propofol gtt at the time and it has been downtrending since the Propofol was discontinued.  - Patient currently on tube feeds  - Discussed with surgery and IR: no acute interventions planned for necrotizing pancreatitis  - C/w imipenem for necrotizing pancreatitis.  - Patient found to have a large stool burden on imaging. He is s/p manual disimpaction and has now started having BMs s/p aggressive bowel regimen     # /Renal   - ABG this AM was alkalotic (pH = 7.50). Possible respiratory in etiology as pCO2 decreased (56-->47-->42) vs ? contraction alkalosis (HCO3- 29-->31). PIP was decreased this AM. Will recheck an ABG and reevaluate vent settings at that time  - Patient was net negative ~640cc last 24 hours. C/w IV Lasix 40mg Q6H for now. Monitor Is/Os  - C/w Ahuja for now  - Monitor BMP    # ID   - Patient in shock on arrival. Likely source is infectious vs distributive   - BAL on 12/12 was positive for pseudomonas aeruginosa.  C/w IV Imipenem for necrotizing pancreatitis and PNA coverage.  - Blood/urine Cx (12/11): NGTD  - Patient s/p antiviral regimen (Valtrex followed by acyclovir) for shingles (Acyclovir discontinued yesterday)  - Patient's WBC uptrended today (9.92 --> 15.42). Possibly reactionary to recent hypoxemia episode. Patient was afebrile over the past 24 hours.  - Monitor CBC and fever curve    # Endo  - No active issues (serum glucose WNL)    # DVT PPx:   - C/w Lovenox

## 2018-12-16 LAB
ALBUMIN SERPL ELPH-MCNC: 1.3 G/DL — LOW (ref 3.3–5)
ALP SERPL-CCNC: 295 U/L — HIGH (ref 40–120)
ALT FLD-CCNC: 47 U/L — HIGH (ref 4–41)
AST SERPL-CCNC: 73 U/L — HIGH (ref 4–40)
BACTERIA BLD CULT: SIGNIFICANT CHANGE UP
BACTERIA BLD CULT: SIGNIFICANT CHANGE UP
BASE EXCESS BLDA CALC-SCNC: 8.5 MMOL/L — SIGNIFICANT CHANGE UP
BASE EXCESS BLDA CALC-SCNC: 8.5 MMOL/L — SIGNIFICANT CHANGE UP
BASE EXCESS BLDA CALC-SCNC: 8.6 MMOL/L — SIGNIFICANT CHANGE UP
BASE EXCESS BLDA CALC-SCNC: 9 MMOL/L — SIGNIFICANT CHANGE UP
BASOPHILS # BLD AUTO: 0.06 K/UL — SIGNIFICANT CHANGE UP (ref 0–0.2)
BASOPHILS NFR BLD AUTO: 0.6 % — SIGNIFICANT CHANGE UP (ref 0–2)
BILIRUB SERPL-MCNC: 4.1 MG/DL — HIGH (ref 0.2–1.2)
BUN SERPL-MCNC: 17 MG/DL — SIGNIFICANT CHANGE UP (ref 7–23)
BUN SERPL-MCNC: 19 MG/DL — SIGNIFICANT CHANGE UP (ref 7–23)
CA-I BLDA-SCNC: 1.15 MMOL/L — SIGNIFICANT CHANGE UP (ref 1.15–1.29)
CALCIUM SERPL-MCNC: 7.3 MG/DL — LOW (ref 8.4–10.5)
CALCIUM SERPL-MCNC: 7.4 MG/DL — LOW (ref 8.4–10.5)
CHLORIDE BLDA-SCNC: 102 MMOL/L — SIGNIFICANT CHANGE UP (ref 96–108)
CHLORIDE BLDA-SCNC: 103 MMOL/L — SIGNIFICANT CHANGE UP (ref 96–108)
CHLORIDE BLDA-SCNC: 103 MMOL/L — SIGNIFICANT CHANGE UP (ref 96–108)
CHLORIDE SERPL-SCNC: 101 MMOL/L — SIGNIFICANT CHANGE UP (ref 98–107)
CHLORIDE SERPL-SCNC: 101 MMOL/L — SIGNIFICANT CHANGE UP (ref 98–107)
CO2 SERPL-SCNC: 27 MMOL/L — SIGNIFICANT CHANGE UP (ref 22–31)
CO2 SERPL-SCNC: 30 MMOL/L — SIGNIFICANT CHANGE UP (ref 22–31)
CREAT SERPL-MCNC: 0.36 MG/DL — LOW (ref 0.5–1.3)
CREAT SERPL-MCNC: 0.37 MG/DL — LOW (ref 0.5–1.3)
EOSINOPHIL # BLD AUTO: 0.02 K/UL — SIGNIFICANT CHANGE UP (ref 0–0.5)
EOSINOPHIL NFR BLD AUTO: 0.2 % — SIGNIFICANT CHANGE UP (ref 0–6)
GLUCOSE BLDA-MCNC: 134 MG/DL — HIGH (ref 70–99)
GLUCOSE BLDA-MCNC: 141 MG/DL — HIGH (ref 70–99)
GLUCOSE BLDA-MCNC: 168 MG/DL — HIGH (ref 70–99)
GLUCOSE BLDA-MCNC: 173 MG/DL — HIGH (ref 70–99)
GLUCOSE SERPL-MCNC: 135 MG/DL — HIGH (ref 70–99)
GLUCOSE SERPL-MCNC: 171 MG/DL — HIGH (ref 70–99)
HCO3 BLDA-SCNC: 32 MMOL/L — HIGH (ref 22–26)
HCO3 BLDA-SCNC: 33 MMOL/L — HIGH (ref 22–26)
HCT VFR BLD CALC: 27.8 % — LOW (ref 39–50)
HCT VFR BLDA CALC: 27.6 % — LOW (ref 39–51)
HCT VFR BLDA CALC: 29.6 % — LOW (ref 39–51)
HCT VFR BLDA CALC: 30 % — LOW (ref 39–51)
HCT VFR BLDA CALC: 30.8 % — LOW (ref 39–51)
HGB BLD-MCNC: 9.5 G/DL — LOW (ref 13–17)
HGB BLDA-MCNC: 8.9 G/DL — LOW (ref 13–17)
HGB BLDA-MCNC: 9.5 G/DL — LOW (ref 13–17)
HGB BLDA-MCNC: 9.7 G/DL — LOW (ref 13–17)
HGB BLDA-MCNC: 9.9 G/DL — LOW (ref 13–17)
IMM GRANULOCYTES # BLD AUTO: 0.48 # — SIGNIFICANT CHANGE UP
IMM GRANULOCYTES NFR BLD AUTO: 4.4 % — HIGH (ref 0–1.5)
LACTATE BLDA-SCNC: 1.2 MMOL/L — SIGNIFICANT CHANGE UP (ref 0.5–2)
LACTATE BLDA-SCNC: 1.3 MMOL/L — SIGNIFICANT CHANGE UP (ref 0.5–2)
LACTATE BLDA-SCNC: 1.5 MMOL/L — SIGNIFICANT CHANGE UP (ref 0.5–2)
LACTATE BLDA-SCNC: 1.6 MMOL/L — SIGNIFICANT CHANGE UP (ref 0.5–2)
LYMPHOCYTES # BLD AUTO: 1.69 K/UL — SIGNIFICANT CHANGE UP (ref 1–3.3)
LYMPHOCYTES # BLD AUTO: 15.6 % — SIGNIFICANT CHANGE UP (ref 13–44)
MAGNESIUM SERPL-MCNC: 1.7 MG/DL — SIGNIFICANT CHANGE UP (ref 1.6–2.6)
MAGNESIUM SERPL-MCNC: 2.2 MG/DL — SIGNIFICANT CHANGE UP (ref 1.6–2.6)
MANUAL SMEAR VERIFICATION: SIGNIFICANT CHANGE UP
MCHC RBC-ENTMCNC: 34.1 PG — HIGH (ref 27–34)
MCHC RBC-ENTMCNC: 34.2 % — SIGNIFICANT CHANGE UP (ref 32–36)
MCV RBC AUTO: 99.6 FL — SIGNIFICANT CHANGE UP (ref 80–100)
MONOCYTES # BLD AUTO: 0.39 K/UL — SIGNIFICANT CHANGE UP (ref 0–0.9)
MONOCYTES NFR BLD AUTO: 3.6 % — SIGNIFICANT CHANGE UP (ref 2–14)
NEUTROPHILS # BLD AUTO: 8.2 K/UL — HIGH (ref 1.8–7.4)
NEUTROPHILS NFR BLD AUTO: 75.6 % — SIGNIFICANT CHANGE UP (ref 43–77)
NRBC # FLD: 0.7 — SIGNIFICANT CHANGE UP
NRBC FLD-RTO: 6.5 — SIGNIFICANT CHANGE UP
PCO2 BLDA: 43 MMHG — SIGNIFICANT CHANGE UP (ref 35–48)
PCO2 BLDA: 46 MMHG — SIGNIFICANT CHANGE UP (ref 35–48)
PCO2 BLDA: 48 MMHG — SIGNIFICANT CHANGE UP (ref 35–48)
PCO2 BLDA: 54 MMHG — HIGH (ref 35–48)
PH BLDA: 7.41 PH — SIGNIFICANT CHANGE UP (ref 7.35–7.45)
PH BLDA: 7.45 PH — SIGNIFICANT CHANGE UP (ref 7.35–7.45)
PH BLDA: 7.47 PH — HIGH (ref 7.35–7.45)
PH BLDA: 7.49 PH — HIGH (ref 7.35–7.45)
PHOSPHATE SERPL-MCNC: 3.1 MG/DL — SIGNIFICANT CHANGE UP (ref 2.5–4.5)
PHOSPHATE SERPL-MCNC: 3.3 MG/DL — SIGNIFICANT CHANGE UP (ref 2.5–4.5)
PLATELET # BLD AUTO: 274 K/UL — SIGNIFICANT CHANGE UP (ref 150–400)
PMV BLD: 11.7 FL — SIGNIFICANT CHANGE UP (ref 7–13)
PO2 BLDA: 105 MMHG — SIGNIFICANT CHANGE UP (ref 83–108)
PO2 BLDA: 71 MMHG — LOW (ref 83–108)
PO2 BLDA: 74 MMHG — LOW (ref 83–108)
PO2 BLDA: 90 MMHG — SIGNIFICANT CHANGE UP (ref 83–108)
POTASSIUM BLDA-SCNC: 2.9 MMOL/L — LOW (ref 3.4–4.5)
POTASSIUM BLDA-SCNC: 3.2 MMOL/L — LOW (ref 3.4–4.5)
POTASSIUM BLDA-SCNC: 3.3 MMOL/L — LOW (ref 3.4–4.5)
POTASSIUM BLDA-SCNC: 3.6 MMOL/L — SIGNIFICANT CHANGE UP (ref 3.4–4.5)
POTASSIUM SERPL-MCNC: 3.3 MMOL/L — LOW (ref 3.5–5.3)
POTASSIUM SERPL-MCNC: 3.6 MMOL/L — SIGNIFICANT CHANGE UP (ref 3.5–5.3)
POTASSIUM SERPL-SCNC: 3.3 MMOL/L — LOW (ref 3.5–5.3)
POTASSIUM SERPL-SCNC: 3.6 MMOL/L — SIGNIFICANT CHANGE UP (ref 3.5–5.3)
PROT SERPL-MCNC: 4.2 G/DL — LOW (ref 6–8.3)
RBC # BLD: 2.79 M/UL — LOW (ref 4.2–5.8)
RBC # FLD: 15.1 % — HIGH (ref 10.3–14.5)
SAO2 % BLDA: 94.2 % — LOW (ref 95–99)
SAO2 % BLDA: 95.4 % — SIGNIFICANT CHANGE UP (ref 95–99)
SAO2 % BLDA: 96.5 % — SIGNIFICANT CHANGE UP (ref 95–99)
SAO2 % BLDA: 98.1 % — SIGNIFICANT CHANGE UP (ref 95–99)
SODIUM BLDA-SCNC: 134 MMOL/L — LOW (ref 136–146)
SODIUM BLDA-SCNC: 136 MMOL/L — SIGNIFICANT CHANGE UP (ref 136–146)
SODIUM SERPL-SCNC: 138 MMOL/L — SIGNIFICANT CHANGE UP (ref 135–145)
SODIUM SERPL-SCNC: 139 MMOL/L — SIGNIFICANT CHANGE UP (ref 135–145)
WBC # BLD: 10.84 K/UL — HIGH (ref 3.8–10.5)
WBC # FLD AUTO: 10.84 K/UL — HIGH (ref 3.8–10.5)

## 2018-12-16 PROCEDURE — 95819 EEG AWAKE AND ASLEEP: CPT | Mod: 26

## 2018-12-16 PROCEDURE — 99291 CRITICAL CARE FIRST HOUR: CPT

## 2018-12-16 RX ORDER — POTASSIUM CHLORIDE 20 MEQ
40 PACKET (EA) ORAL ONCE
Qty: 0 | Refills: 0 | Status: COMPLETED | OUTPATIENT
Start: 2018-12-16 | End: 2018-12-16

## 2018-12-16 RX ORDER — MAGNESIUM SULFATE 500 MG/ML
2 VIAL (ML) INJECTION ONCE
Qty: 0 | Refills: 0 | Status: COMPLETED | OUTPATIENT
Start: 2018-12-16 | End: 2018-12-16

## 2018-12-16 RX ADMIN — IMIPENEM AND CILASTATIN 100 MILLIGRAM(S): 250; 250 INJECTION, POWDER, FOR SOLUTION INTRAVENOUS at 13:13

## 2018-12-16 RX ADMIN — CHLORHEXIDINE GLUCONATE 1 APPLICATION(S): 213 SOLUTION TOPICAL at 11:28

## 2018-12-16 RX ADMIN — Medication 50 GRAM(S): at 11:28

## 2018-12-16 RX ADMIN — Medication 40 MILLIGRAM(S): at 18:38

## 2018-12-16 RX ADMIN — Medication 40 MILLIGRAM(S): at 13:10

## 2018-12-16 RX ADMIN — Medication 40 MILLIGRAM(S): at 05:08

## 2018-12-16 RX ADMIN — Medication 1 APPLICATION(S): at 05:09

## 2018-12-16 RX ADMIN — MIDAZOLAM HYDROCHLORIDE 11.96 MG/KG/HR: 1 INJECTION, SOLUTION INTRAMUSCULAR; INTRAVENOUS at 19:47

## 2018-12-16 RX ADMIN — IMIPENEM AND CILASTATIN 100 MILLIGRAM(S): 250; 250 INJECTION, POWDER, FOR SOLUTION INTRAVENOUS at 05:09

## 2018-12-16 RX ADMIN — MIDAZOLAM HYDROCHLORIDE 11.96 MG/KG/HR: 1 INJECTION, SOLUTION INTRAMUSCULAR; INTRAVENOUS at 08:14

## 2018-12-16 RX ADMIN — Medication 40 MILLIEQUIVALENT(S): at 18:37

## 2018-12-16 RX ADMIN — Medication 29.91 MICROGRAM(S)/KG/MIN: at 19:47

## 2018-12-16 RX ADMIN — CHLORHEXIDINE GLUCONATE 15 MILLILITER(S): 213 SOLUTION TOPICAL at 18:37

## 2018-12-16 RX ADMIN — Medication 1 APPLICATION(S): at 18:37

## 2018-12-16 RX ADMIN — LACOSAMIDE 120 MILLIGRAM(S): 50 TABLET ORAL at 16:43

## 2018-12-16 RX ADMIN — IMIPENEM AND CILASTATIN 100 MILLIGRAM(S): 250; 250 INJECTION, POWDER, FOR SOLUTION INTRAVENOUS at 18:37

## 2018-12-16 RX ADMIN — ENOXAPARIN SODIUM 40 MILLIGRAM(S): 100 INJECTION SUBCUTANEOUS at 14:00

## 2018-12-16 RX ADMIN — IMIPENEM AND CILASTATIN 100 MILLIGRAM(S): 250; 250 INJECTION, POWDER, FOR SOLUTION INTRAVENOUS at 23:22

## 2018-12-16 RX ADMIN — CHLORHEXIDINE GLUCONATE 15 MILLILITER(S): 213 SOLUTION TOPICAL at 05:09

## 2018-12-16 RX ADMIN — Medication 40 MILLIGRAM(S): at 23:17

## 2018-12-16 RX ADMIN — LACOSAMIDE 120 MILLIGRAM(S): 50 TABLET ORAL at 05:09

## 2018-12-16 RX ADMIN — Medication 29.91 MICROGRAM(S)/KG/MIN: at 08:15

## 2018-12-16 NOTE — PROGRESS NOTE ADULT - ASSESSMENT
Patient is a 54 y/o M (resident of [facility]) w/ a PMHx of TBI, s/p PEG tube, contracture, and seizure d/o who presented as a transfer from [facility] on 12/9 for respiratory failure 2/2 ARDS likely 2/2 necrotizing pancreatitis s/p intubation.    # Neuro  - Currently sedated w/ a Versed gtt. Nimbex dced. Patient had been on a Propofol gtt earlier in his hospital course, but this was stopped due to elevated TG.  - Patient was on Depakote at home for history of seizure disorder; however, this was discontinued because Depakote can potentially cause pancreatitis. vEEGs have shown no epileptiform abnormalities (though patient currently on a Versed gtt).  - Neurology consulted for assistance with seizure management. Patient was subsequently started on IV Vimpat. C/w IV Vimpat per Neuro recs    # CV   - Patient currently on Norepinephrine gtt for BP control. Hypotension likely 2/2 underlying pancreatitis and sedation. Will try to wean pressors as tolerated  - Bedside TY was performed which showed an LV that appears to be functioning normally with mild-moderate enlargement of RV, no evidence of fluid responsiveness, ?bicuspid aortic valve with no evidence of AI    # Resp:   - Patient currently intubated 2/2 moderate ARDS 2/2 necrotizing pancreatitis  - Patient currently on VDR ventilator. Will closely monitor respiratory status and check serial blood gases.  - C/w impenem for possible aspiration pna coverage    # GI  - Patient found to have necrotizing pancreatitis. Unclear etiology for pancreatitis at this time. Patient is a resident of NH and with no significant alcohol history. OSH CT A+P showed gallbladder pathology. Repeat CT with normal gallbladder wall and duct. TG was found to be elevated in the hospital, though patient was on a Propofol gtt at the time and it has been downtrending since the Propofol was discontinued.  - Patient currently on tube feeds via PEG tube  - Discussed with surgery and IR: no acute interventions planned for necrotizing pancreatitis  - C/w imipenem for necrotizing pancreatitis.  - Patient found to have a large stool burden on imaging. He is s/p manual disimpaction and has now started having BMs s/p aggressive bowel regimen     # /Renal   - C/w Ahuja for now  - Monitor BMP w/ aggressive diuresis lasix 40 QID. Cr stable.    # ID   - Patient in shock on arrival. Likely source is infectious vs distributive   - BAL on 12/12 was positive for pseudomonas aeruginosa.  C/w IV Imipenem for necrotizing pancreatitis and PNA coverage.  - Blood/urine Cx (12/11): NGTD  - Patient s/p antiviral regimen (Valtrex followed by acyclovir) for shingles (Acyclovir discontinued yesterday)  - Patient's WBC uptrended today (9.92 --> 15.42). Possibly reactionary to recent hypoxemia episode. Patient was afebrile over the past 24 hours.  - Monitor CBC and fever curve    # Endo  - No active issues (serum glucose WNL)    # DVT PPx:   - C/w Lovenox

## 2018-12-16 NOTE — PROGRESS NOTE ADULT - SUBJECTIVE AND OBJECTIVE BOX
Interval Events: No acute events overnight. Tiger tube extracted.    REVIEW OF SYSTEMS:  Constitutional: [ ] negative [ ] fevers [ ] chills [ ] weight loss [ ] weight gain  HEENT: [ ] negative [ ] dry eyes [ ] eye irritation [ ] postnasal drip [ ] nasal congestion  CV: [ ] negative  [ ] chest pain [ ] orthopnea [ ] palpitations [ ] murmur  Resp: [ ] negative [ ] cough [ ] shortness of breath [ ] dyspnea [ ] wheezing [ ] sputum [ ] hemoptysis  GI: [ ] negative [ ] nausea [ ] vomiting [ ] diarrhea [ ] constipation [ ] abd pain [ ] dysphagia   : [ ] negative [ ] dysuria [ ] nocturia [ ] hematuria [ ] increased urinary frequency  Musculoskeletal: [ ] negative [ ] back pain [ ] myalgias [ ] arthralgias [ ] fracture  Skin: [ ] negative [ ] rash [ ] itch  Neurological: [ ] negative [ ] headache [ ] dizziness [ ] syncope [ ] weakness [ ] numbness  Psychiatric: [ ] negative [ ] anxiety [ ] depression  Endocrine: [ ] negative [ ] diabetes [ ] thyroid problem  Hematologic/Lymphatic: [ ] negative [ ] anemia [ ] bleeding problem  Allergic/Immunologic: [ ] negative [ ] itchy eyes [ ] nasal discharge [ ] hives [ ] angioedema  [ ] All other systems negative  [x] Unable to assess ROS because ________    OBJECTIVE:  T(C): 36.3 (12-16-18 @ 04:00), Max: 36.6 (12-15-18 @ 12:00)  HR: 92 (12-16-18 @ 07:19) (84 - 108)  BP: --  RR: 0 (12-16-18 @ 07:00) (0 - 14)  SpO2: 98% (12-16-18 @ 07:19) (92% - 100%)  Wt(kg): --  GENERAL: Sedated  HEENT:  NC/AT, Slightly dry mucous membranes  NECK: Supple  CHEST/LUNG: Vent sounds appreciated, Slightly decreased at bases  HEART: Tachycardic, Regular rhythm  ABDOMEN: Distended (slightly improved), hypoactive bowel sounds, + PEG tube in place  GENITOURINARY: Ahuja in place draining dark yellow urine  EXTREMITIES: 1+ edema of B/L LEs  NEUROLOGY: Sedated, (Pupils equal, round, and each react sluggishly to light)  SKIN: Left back crusted lesion, Warm and dry  PSYCHIATRY: Unable to assess    HOSPITAL MEDICATIONS:  enoxaparin Injectable 40 milliGRAM(s) SubCutaneous every 24 hours  imipenem/cilastatin  IVPB      imipenem/cilastatin  IVPB 500 milliGRAM(s) IV Intermittent every 6 hours  furosemide   Injectable 40 milliGRAM(s) IV Push every 6 hours  norepinephrine Infusion 0.32 MICROgram(s)/kG/Min IV Continuous <Continuous>  acetaminophen    Suspension .. 650 milliGRAM(s) Oral every 6 hours PRN  lacosamide IVPB 100 milliGRAM(s) IV Intermittent every 12 hours  midazolam Infusion 0.12 mG/kG/Hr IV Continuous <Continuous>  bisacodyl Suppository 10 milliGRAM(s) Rectal daily  docusate sodium Liquid 100 milliGRAM(s) Oral two times a day  polyethylene glycol 3350 17 Gram(s) Oral daily  senna Syrup 10 milliLiter(s) Oral daily    chlorhexidine 0.12% Liquid 15 milliLiter(s) Oral Mucosa two times a day  chlorhexidine 4% Liquid 1 Application(s) Topical <User Schedule>  petrolatum Ophthalmic Ointment 1 Application(s) Both EYES two times a day    LABS:                        9.5    10.84 )-----------( 274      ( 16 Dec 2018 05:05 )             27.8     Hgb Trend: 9.5<--, 10.8<--, 11.2<--, 12.0<--, 12.9<--  12-16    138  |  101  |  19  ----------------------------<  135<H>  3.6   |  30  |  0.37<L>    Ca    7.3<L>      16 Dec 2018 05:05  Phos  3.1     12-16  Mg     1.7     12-16    TPro  4.2<L>  /  Alb  1.3<L>  /  TBili  4.1<H>  /  DBili  x   /  AST  73<H>  /  ALT  47<H>  /  AlkPhos  295<H>  12-16    Creatinine Trend: 0.37<--, 0.38<--, 0.39<--, 0.41<--, 0.40<--, 0.37<--      Arterial Blood Gas:  12-16 @ 05:05  7.45/48/105/32/98.1/8.5  ABG lactate: 1.2  Arterial Blood Gas:  12-16 @ 01:28  7.41/54/90/32/96.5/8.6  ABG lactate: 1.3  Arterial Blood Gas:  12-15 @ 18:00  7.49/45/66/33/93.6/9.5  ABG lactate: 1.6  Arterial Blood Gas:  12-15 @ 16:35  7.52/41/69/34/95.0/9.8  ABG lactate: 1.7  Arterial Blood Gas:  12-15 @ 11:20  7.50/42/64/32/93.5/8.8  ABG lactate: 1.8  Arterial Blood Gas:  12-15 @ 08:50  7.50/42/78/33/96.9/9.1  ABG lactate: 1.9  Arterial Blood Gas:  12-15 @ 03:15  7.43/47/61/30/90.9/6.7  ABG lactate: 1.8  Arterial Blood Gas:  12-14 @ 20:00  7.38/56/62/30/90.3/7.5  ABG lactate: 1.7  Arterial Blood Gas:  12-14 @ 14:38  7.39/56/64/31/91.0/8.0  ABG lactate: 1.9  Arterial Blood Gas:  12-14 @ 11:45  7.34/59/60/29/91.8/5.9  ABG lactate: 1.9    MICROBIOLOGY: NGTD Interval Events: No acute events overnight. Tiger tube extracted.    REVIEW OF SYSTEMS:  Constitutional: [ ] negative [ ] fevers [ ] chills [ ] weight loss [ ] weight gain  HEENT: [ ] negative [ ] dry eyes [ ] eye irritation [ ] postnasal drip [ ] nasal congestion  CV: [ ] negative  [ ] chest pain [ ] orthopnea [ ] palpitations [ ] murmur  Resp: [ ] negative [ ] cough [ ] shortness of breath [ ] dyspnea [ ] wheezing [ ] sputum [ ] hemoptysis  GI: [ ] negative [ ] nausea [ ] vomiting [ ] diarrhea [ ] constipation [ ] abd pain [ ] dysphagia   : [ ] negative [ ] dysuria [ ] nocturia [ ] hematuria [ ] increased urinary frequency  Musculoskeletal: [ ] negative [ ] back pain [ ] myalgias [ ] arthralgias [ ] fracture  Skin: [ ] negative [ ] rash [ ] itch  Neurological: [ ] negative [ ] headache [ ] dizziness [ ] syncope [ ] weakness [ ] numbness  Psychiatric: [ ] negative [ ] anxiety [ ] depression  Endocrine: [ ] negative [ ] diabetes [ ] thyroid problem  Hematologic/Lymphatic: [ ] negative [ ] anemia [ ] bleeding problem  Allergic/Immunologic: [ ] negative [ ] itchy eyes [ ] nasal discharge [ ] hives [ ] angioedema  [ ] All other systems negative  [x] Unable to assess ROS because patient sedated    OBJECTIVE:  T(C): 36.3 (12-16-18 @ 04:00), Max: 36.6 (12-15-18 @ 12:00)  HR: 92 (12-16-18 @ 07:19) (84 - 108)  BP: --  RR: 0 (12-16-18 @ 07:00) (0 - 14)  SpO2: 98% (12-16-18 @ 07:19) (92% - 100%)  Wt(kg): --  GENERAL: Sedated  HEENT:  NC/AT, Slightly dry mucous membranes  NECK: Supple  CHEST/LUNG: Vent sounds appreciated, Slightly decreased at bases  HEART: Tachycardic, Regular rhythm  ABDOMEN: Distended (slightly improved), hypoactive bowel sounds, + PEG tube in place  GENITOURINARY: Ahuja in place draining dark yellow urine  EXTREMITIES: 1+ edema of B/L LEs  NEUROLOGY: Sedated, (Pupils equal, round, and each react sluggishly to light)  SKIN: Left back crusted lesion, Warm and dry  PSYCHIATRY: Unable to assess    HOSPITAL MEDICATIONS:  enoxaparin Injectable 40 milliGRAM(s) SubCutaneous every 24 hours  imipenem/cilastatin  IVPB      imipenem/cilastatin  IVPB 500 milliGRAM(s) IV Intermittent every 6 hours  furosemide   Injectable 40 milliGRAM(s) IV Push every 6 hours  norepinephrine Infusion 0.32 MICROgram(s)/kG/Min IV Continuous <Continuous>  acetaminophen    Suspension .. 650 milliGRAM(s) Oral every 6 hours PRN  lacosamide IVPB 100 milliGRAM(s) IV Intermittent every 12 hours  midazolam Infusion 0.12 mG/kG/Hr IV Continuous <Continuous>  bisacodyl Suppository 10 milliGRAM(s) Rectal daily  docusate sodium Liquid 100 milliGRAM(s) Oral two times a day  polyethylene glycol 3350 17 Gram(s) Oral daily  senna Syrup 10 milliLiter(s) Oral daily    chlorhexidine 0.12% Liquid 15 milliLiter(s) Oral Mucosa two times a day  chlorhexidine 4% Liquid 1 Application(s) Topical <User Schedule>  petrolatum Ophthalmic Ointment 1 Application(s) Both EYES two times a day    LABS:                        9.5    10.84 )-----------( 274      ( 16 Dec 2018 05:05 )             27.8     Hgb Trend: 9.5<--, 10.8<--, 11.2<--, 12.0<--, 12.9<--  12-16    138  |  101  |  19  ----------------------------<  135<H>  3.6   |  30  |  0.37<L>    Ca    7.3<L>      16 Dec 2018 05:05  Phos  3.1     12-16  Mg     1.7     12-16    TPro  4.2<L>  /  Alb  1.3<L>  /  TBili  4.1<H>  /  DBili  x   /  AST  73<H>  /  ALT  47<H>  /  AlkPhos  295<H>  12-16    Creatinine Trend: 0.37<--, 0.38<--, 0.39<--, 0.41<--, 0.40<--, 0.37<--      Arterial Blood Gas:  12-16 @ 05:05  7.45/48/105/32/98.1/8.5  ABG lactate: 1.2  Arterial Blood Gas:  12-16 @ 01:28  7.41/54/90/32/96.5/8.6  ABG lactate: 1.3  Arterial Blood Gas:  12-15 @ 18:00  7.49/45/66/33/93.6/9.5  ABG lactate: 1.6  Arterial Blood Gas:  12-15 @ 16:35  7.52/41/69/34/95.0/9.8  ABG lactate: 1.7  Arterial Blood Gas:  12-15 @ 11:20  7.50/42/64/32/93.5/8.8  ABG lactate: 1.8  Arterial Blood Gas:  12-15 @ 08:50  7.50/42/78/33/96.9/9.1  ABG lactate: 1.9  Arterial Blood Gas:  12-15 @ 03:15  7.43/47/61/30/90.9/6.7  ABG lactate: 1.8  Arterial Blood Gas:  12-14 @ 20:00  7.38/56/62/30/90.3/7.5  ABG lactate: 1.7  Arterial Blood Gas:  12-14 @ 14:38  7.39/56/64/31/91.0/8.0  ABG lactate: 1.9  Arterial Blood Gas:  12-14 @ 11:45  7.34/59/60/29/91.8/5.9  ABG lactate: 1.9    MICROBIOLOGY: NGTD

## 2018-12-16 NOTE — PROGRESS NOTE ADULT - ATTENDING COMMENTS
ARDS in setting of severe necrotizing pancreatitis on vent support.  FiO2 requirements improving, down to 50%. Attempted transitioned to regular vent but patient did not tolerate and is back on VDR.  Wean sedation as tolerated.  Continue Imipenem.    Critical care time: 40 minutes

## 2018-12-16 NOTE — EEG REPORT - NS EEG TEXT BOX
[facility]   COMPREHENSIVE EPILEPSY CENTER   REPORT OF CONTINUOUS VIDEO EEG     [facility]: [address], [place] [address], Ph#: [phone]  [facility]: [address], [place] [address], Ph#: [phone]  Office: [address] [place] [address], [place] [address], [place] [address] Ph#: [phone]    Patient Name: NADYA BRASHER  Age and : 53y ([birth year])  MRN #: [id number]  Location: [place] [address]  Referring Physician: Jimi Hare    Study Date: 12-15-18 - 18    _____________________________________________________________  STUDY INFORMATION    EEG Recording Technique:  The patient underwent continuous Video-EEG monitoring, using Telemetry System hardware on the XLTek Digital System. EEG and video data were stored on a computer hard drive with important events saved in digital archive files. The material was reviewed by a physician (electroencephalographer / epileptologist) on a daily basis. Shalom and seizure detection algorithms were utilized and reviewed. An EEG Technician attended to the patient, and was available throughout daytime work hours.  The epilepsy center neurologist was available in person or on call 24-hours per day.    EEG Placement and Labeling of Electrodes:  The EEG was performed utilizing 20 channel referential EEG connections (coronal over temporal over parasagittal montage) using all standard 10-20 electrode placements with EKG, with additional electrodes placed in the inferior temporal region using the modified 10-10 montage electrode placements for elective admissions, or if deemed necessary. Recording was at a sampling rate of 256 samples per second per channel. Time synchronized digital video recording was done simultaneously with EEG recording. A low light infrared camera was used for low light recording.     _____________________________________________________________  HISTORY    Patient is a 53y old  Male who presents with a chief complaint of ARDS?, sepsis (13 Dec 2018 07:33)    MEDICATIONS  (STANDING):  bisacodyl Suppository 10 milliGRAM(s) Rectal daily  chlorhexidine 0.12% Liquid 15 milliLiter(s) Oral Mucosa two times a day  chlorhexidine 4% Liquid 1 Application(s) Topical <User Schedule>  docusate sodium Liquid 100 milliGRAM(s) Oral two times a day  enoxaparin Injectable 40 milliGRAM(s) SubCutaneous every 24 hours  furosemide   Injectable 40 milliGRAM(s) IV Push every 6 hours  imipenem/cilastatin  IVPB      imipenem/cilastatin  IVPB 500 milliGRAM(s) IV Intermittent every 6 hours  lacosamide IVPB 100 milliGRAM(s) IV Intermittent every 12 hours  magnesium sulfate  IVPB 2 Gram(s) IV Intermittent once  metolazone 5 milliGRAM(s) Oral once  midazolam Infusion 0.12 mG/kG/Hr (11.964 mL/Hr) IV Continuous <Continuous>  norepinephrine Infusion 0.32 MICROgram(s)/kG/Min (29.91 mL/Hr) IV Continuous <Continuous>  petrolatum Ophthalmic Ointment 1 Application(s) Both EYES two times a day  polyethylene glycol 3350 17 Gram(s) Oral daily  senna Syrup 10 milliLiter(s) Oral daily    _____________________________________________________________  INTERPRETATION    Findings: The background consisted of diffuse delta and theta activity, reactive.  No PDR was seen.    Continuous Slowing, Generalized (delta and theta).  Intermittent periodic attenuation and light burst suppression with attenuation for 1-4 seconds and alternating mixed frequency bursts of cerebral activity.    Focal Slowing:   None was present.    Sleep Background:  Stage II sleep transients were not recorded.    Other Non-Epileptiform Findings:  None were present.    Interictal Epileptiform Activity:   None were present.    Events:  Clinical events: None recorded.  Seizures: None recorded.    Artifacts:  Intermittent myogenic and movement artifacts were noted.    ECG:  The heart rate on single channel ECG was predominantly between 70-90BPM.    _____________________________________________________________  EEG SUMMARY/CLASSIFICATION  Abnormal EEG in an unresponsive patient / a sedated patient.  -Continuous Slowing, Generalized (delta and theta)  -Intermittent burst suppression, discontinuity  _____________________________________________________________  EEG IMPRESSION/CLINICAL CORRELATE  There is evidence of a severe diffuse encephalopathy.  No epileptiform abnormalities were recorded.    Maria G Gallagher MD  Epilepsy fellow [facility]   COMPREHENSIVE EPILEPSY CENTER   REPORT OF CONTINUOUS VIDEO EEG     [facility]: [address], [place] [address], Ph#: [phone]  [facility]: [address], [place] [address], Ph#: [phone]  Office: [address] [place] [address], [place] [address], [place] [address] Ph#: [phone]    Patient Name: NADYA BRASHER  Age and : 53y (61)  MRN #: [id number]  Location: [place] [address]  Referring Physician: Jimi Hare    Study Date: 12-15-18 - 18    _____________________________________________________________  STUDY INFORMATION    EEG Recording Technique:  The patient underwent continuous Video-EEG monitoring, using Telemetry System hardware on the XLTek Digital System. EEG and video data were stored on a computer hard drive with important events saved in digital archive files. The material was reviewed by a physician (electroencephalographer / epileptologist) on a daily basis. Shalom and seizure detection algorithms were utilized and reviewed. An EEG Technician attended to the patient, and was available throughout daytime work hours.  The epilepsy center neurologist was available in person or on call 24-hours per day.    EEG Placement and Labeling of Electrodes:  The EEG was performed utilizing 20 channel referential EEG connections (coronal over temporal over parasagittal montage) using all standard 10-20 electrode placements with EKG, with additional electrodes placed in the inferior temporal region using the modified 10-10 montage electrode placements for elective admissions, or if deemed necessary. Recording was at a sampling rate of 256 samples per second per channel. Time synchronized digital video recording was done simultaneously with EEG recording. A low light infrared camera was used for low light recording.     _____________________________________________________________  HISTORY    Patient is a 53y old  Male who presents with a chief complaint of ARDS?, sepsis (13 Dec 2018 07:33)    MEDICATIONS  (STANDING):  bisacodyl Suppository 10 milliGRAM(s) Rectal daily  chlorhexidine 0.12% Liquid 15 milliLiter(s) Oral Mucosa two times a day  chlorhexidine 4% Liquid 1 Application(s) Topical <User Schedule>  docusate sodium Liquid 100 milliGRAM(s) Oral two times a day  enoxaparin Injectable 40 milliGRAM(s) SubCutaneous every 24 hours  furosemide   Injectable 40 milliGRAM(s) IV Push every 6 hours  imipenem/cilastatin  IVPB      imipenem/cilastatin  IVPB 500 milliGRAM(s) IV Intermittent every 6 hours  lacosamide IVPB 100 milliGRAM(s) IV Intermittent every 12 hours  magnesium sulfate  IVPB 2 Gram(s) IV Intermittent once  metolazone 5 milliGRAM(s) Oral once  midazolam Infusion 0.12 mG/kG/Hr (11.964 mL/Hr) IV Continuous <Continuous>  norepinephrine Infusion 0.32 MICROgram(s)/kG/Min (29.91 mL/Hr) IV Continuous <Continuous>  petrolatum Ophthalmic Ointment 1 Application(s) Both EYES two times a day  polyethylene glycol 3350 17 Gram(s) Oral daily  senna Syrup 10 milliLiter(s) Oral daily    _____________________________________________________________  INTERPRETATION    Findings: The background consisted of diffuse delta and theta activity, reactive.  No PDR was seen.  More diffuse 7hz activity to 30uV seen.  Continuous Slowing, Generalized (delta and theta).  Intermittent periodic attenuation and light burst suppression with attenuation for 1-4 seconds and alternating mixed frequency bursts of cerebral activity, more so in earlier portions.    Focal Slowing:   None was present.    Sleep Background:  Stage II sleep transients were not recorded.    Other Non-Epileptiform Findings:  None were present.    Interictal Epileptiform Activity:   None were present.    Events:  Clinical events: None recorded.  Seizures: None recorded.    Artifacts:  Intermittent myogenic and movement artifacts were noted.    ECG:  The heart rate on single channel ECG was predominantly between 70-90BPM.    _____________________________________________________________  EEG SUMMARY/CLASSIFICATION  Abnormal EEG in an unresponsive patient / a sedated patient.  -Continuous Slowing, Generalized (delta and theta)  -Intermittent burst suppression, discontinuity, more so in earlier portions of the recording.  _____________________________________________________________  EEG IMPRESSION/CLINICAL CORRELATE  There is evidence of a severe diffuse encephalopathy.  No epileptiform abnormalities were recorded.    Maria G Gallagher MD  Epilepsy fellow

## 2018-12-17 LAB
ALBUMIN SERPL ELPH-MCNC: 1.4 G/DL — LOW (ref 3.3–5)
ALP SERPL-CCNC: 359 U/L — HIGH (ref 40–120)
ALT FLD-CCNC: 45 U/L — HIGH (ref 4–41)
AST SERPL-CCNC: 71 U/L — HIGH (ref 4–40)
BASE EXCESS BLDA CALC-SCNC: 7 MMOL/L — SIGNIFICANT CHANGE UP
BASE EXCESS BLDA CALC-SCNC: 7.8 MMOL/L — SIGNIFICANT CHANGE UP
BASE EXCESS BLDA CALC-SCNC: 8.5 MMOL/L — SIGNIFICANT CHANGE UP
BASE EXCESS BLDA CALC-SCNC: 9.3 MMOL/L — SIGNIFICANT CHANGE UP
BILIRUB SERPL-MCNC: 3.5 MG/DL — HIGH (ref 0.2–1.2)
BUN SERPL-MCNC: 16 MG/DL — SIGNIFICANT CHANGE UP (ref 7–23)
BUN SERPL-MCNC: 17 MG/DL — SIGNIFICANT CHANGE UP (ref 7–23)
CA-I BLDA-SCNC: 1.09 MMOL/L — LOW (ref 1.15–1.29)
CALCIUM SERPL-MCNC: 7.1 MG/DL — LOW (ref 8.4–10.5)
CALCIUM SERPL-MCNC: 7.5 MG/DL — LOW (ref 8.4–10.5)
CALCIUM SERPL-MCNC: 7.6 MG/DL — LOW (ref 8.4–10.5)
CALCIUM SERPL-MCNC: 7.7 MG/DL — LOW (ref 8.4–10.5)
CHLORIDE BLDA-SCNC: 100 MMOL/L — SIGNIFICANT CHANGE UP (ref 96–108)
CHLORIDE BLDA-SCNC: 102 MMOL/L — SIGNIFICANT CHANGE UP (ref 96–108)
CHLORIDE BLDA-SCNC: 102 MMOL/L — SIGNIFICANT CHANGE UP (ref 96–108)
CHLORIDE SERPL-SCNC: 100 MMOL/L — SIGNIFICANT CHANGE UP (ref 98–107)
CHLORIDE SERPL-SCNC: 97 MMOL/L — LOW (ref 98–107)
CHLORIDE SERPL-SCNC: 98 MMOL/L — SIGNIFICANT CHANGE UP (ref 98–107)
CHLORIDE SERPL-SCNC: 98 MMOL/L — SIGNIFICANT CHANGE UP (ref 98–107)
CO2 SERPL-SCNC: 28 MMOL/L — SIGNIFICANT CHANGE UP (ref 22–31)
CO2 SERPL-SCNC: 29 MMOL/L — SIGNIFICANT CHANGE UP (ref 22–31)
CO2 SERPL-SCNC: 30 MMOL/L — SIGNIFICANT CHANGE UP (ref 22–31)
CO2 SERPL-SCNC: 30 MMOL/L — SIGNIFICANT CHANGE UP (ref 22–31)
CREAT SERPL-MCNC: 0.33 MG/DL — LOW (ref 0.5–1.3)
CREAT SERPL-MCNC: 0.35 MG/DL — LOW (ref 0.5–1.3)
CREAT SERPL-MCNC: 0.36 MG/DL — LOW (ref 0.5–1.3)
CREAT SERPL-MCNC: 0.36 MG/DL — LOW (ref 0.5–1.3)
GLUCOSE BLDA-MCNC: 151 MG/DL — HIGH (ref 70–99)
GLUCOSE BLDA-MCNC: 166 MG/DL — HIGH (ref 70–99)
GLUCOSE BLDA-MCNC: 173 MG/DL — HIGH (ref 70–99)
GLUCOSE BLDA-MCNC: 192 MG/DL — HIGH (ref 70–99)
GLUCOSE SERPL-MCNC: 154 MG/DL — HIGH (ref 70–99)
GLUCOSE SERPL-MCNC: 155 MG/DL — HIGH (ref 70–99)
GLUCOSE SERPL-MCNC: 164 MG/DL — HIGH (ref 70–99)
GLUCOSE SERPL-MCNC: 181 MG/DL — HIGH (ref 70–99)
HCO3 BLDA-SCNC: 31 MMOL/L — HIGH (ref 22–26)
HCO3 BLDA-SCNC: 31 MMOL/L — HIGH (ref 22–26)
HCO3 BLDA-SCNC: 32 MMOL/L — HIGH (ref 22–26)
HCO3 BLDA-SCNC: 33 MMOL/L — HIGH (ref 22–26)
HCT VFR BLD CALC: 28.8 % — LOW (ref 39–50)
HCT VFR BLDA CALC: 26.5 % — LOW (ref 39–51)
HCT VFR BLDA CALC: 27.1 % — LOW (ref 39–51)
HCT VFR BLDA CALC: 27.6 % — LOW (ref 39–51)
HCT VFR BLDA CALC: 27.8 % — LOW (ref 39–51)
HGB BLD-MCNC: 9.8 G/DL — LOW (ref 13–17)
HGB BLDA-MCNC: 8.5 G/DL — LOW (ref 13–17)
HGB BLDA-MCNC: 8.7 G/DL — LOW (ref 13–17)
HGB BLDA-MCNC: 8.9 G/DL — LOW (ref 13–17)
HGB BLDA-MCNC: 8.9 G/DL — LOW (ref 13–17)
LACTATE BLDA-SCNC: 2.1 MMOL/L — HIGH (ref 0.5–2)
LACTATE BLDA-SCNC: 2.1 MMOL/L — HIGH (ref 0.5–2)
LACTATE BLDA-SCNC: 2.2 MMOL/L — HIGH (ref 0.5–2)
LACTATE BLDA-SCNC: 2.5 MMOL/L — HIGH (ref 0.5–2)
MAGNESIUM SERPL-MCNC: 1.9 MG/DL — SIGNIFICANT CHANGE UP (ref 1.6–2.6)
MAGNESIUM SERPL-MCNC: 2 MG/DL — SIGNIFICANT CHANGE UP (ref 1.6–2.6)
MAGNESIUM SERPL-MCNC: 2 MG/DL — SIGNIFICANT CHANGE UP (ref 1.6–2.6)
MAGNESIUM SERPL-MCNC: 2.1 MG/DL — SIGNIFICANT CHANGE UP (ref 1.6–2.6)
MCHC RBC-ENTMCNC: 33.8 PG — SIGNIFICANT CHANGE UP (ref 27–34)
MCHC RBC-ENTMCNC: 34 % — SIGNIFICANT CHANGE UP (ref 32–36)
MCV RBC AUTO: 99.3 FL — SIGNIFICANT CHANGE UP (ref 80–100)
NRBC # FLD: 0.51 — SIGNIFICANT CHANGE UP
NRBC FLD-RTO: 5.6 — SIGNIFICANT CHANGE UP
PCO2 BLDA: 40 MMHG — SIGNIFICANT CHANGE UP (ref 35–48)
PCO2 BLDA: 43 MMHG — SIGNIFICANT CHANGE UP (ref 35–48)
PCO2 BLDA: 44 MMHG — SIGNIFICANT CHANGE UP (ref 35–48)
PCO2 BLDA: 46 MMHG — SIGNIFICANT CHANGE UP (ref 35–48)
PH BLDA: 7.47 PH — HIGH (ref 7.35–7.45)
PH BLDA: 7.52 PH — HIGH (ref 7.35–7.45)
PHOSPHATE SERPL-MCNC: 3.8 MG/DL — SIGNIFICANT CHANGE UP (ref 2.5–4.5)
PHOSPHATE SERPL-MCNC: 3.9 MG/DL — SIGNIFICANT CHANGE UP (ref 2.5–4.5)
PHOSPHATE SERPL-MCNC: 4 MG/DL — SIGNIFICANT CHANGE UP (ref 2.5–4.5)
PHOSPHATE SERPL-MCNC: 4 MG/DL — SIGNIFICANT CHANGE UP (ref 2.5–4.5)
PLATELET # BLD AUTO: 307 K/UL — SIGNIFICANT CHANGE UP (ref 150–400)
PMV BLD: 11.7 FL — SIGNIFICANT CHANGE UP (ref 7–13)
PO2 BLDA: 110 MMHG — HIGH (ref 83–108)
PO2 BLDA: 70 MMHG — LOW (ref 83–108)
PO2 BLDA: 71 MMHG — LOW (ref 83–108)
PO2 BLDA: 82 MMHG — LOW (ref 83–108)
POTASSIUM BLDA-SCNC: 3.2 MMOL/L — LOW (ref 3.4–4.5)
POTASSIUM BLDA-SCNC: 3.2 MMOL/L — LOW (ref 3.4–4.5)
POTASSIUM BLDA-SCNC: 3.4 MMOL/L — SIGNIFICANT CHANGE UP (ref 3.4–4.5)
POTASSIUM BLDA-SCNC: 3.6 MMOL/L — SIGNIFICANT CHANGE UP (ref 3.4–4.5)
POTASSIUM SERPL-MCNC: 3.3 MMOL/L — LOW (ref 3.5–5.3)
POTASSIUM SERPL-MCNC: 3.6 MMOL/L — SIGNIFICANT CHANGE UP (ref 3.5–5.3)
POTASSIUM SERPL-MCNC: 3.7 MMOL/L — SIGNIFICANT CHANGE UP (ref 3.5–5.3)
POTASSIUM SERPL-MCNC: 3.8 MMOL/L — SIGNIFICANT CHANGE UP (ref 3.5–5.3)
POTASSIUM SERPL-SCNC: 3.3 MMOL/L — LOW (ref 3.5–5.3)
POTASSIUM SERPL-SCNC: 3.6 MMOL/L — SIGNIFICANT CHANGE UP (ref 3.5–5.3)
POTASSIUM SERPL-SCNC: 3.7 MMOL/L — SIGNIFICANT CHANGE UP (ref 3.5–5.3)
POTASSIUM SERPL-SCNC: 3.8 MMOL/L — SIGNIFICANT CHANGE UP (ref 3.5–5.3)
PROT SERPL-MCNC: 4.7 G/DL — LOW (ref 6–8.3)
RBC # BLD: 2.9 M/UL — LOW (ref 4.2–5.8)
RBC # FLD: 15.1 % — HIGH (ref 10.3–14.5)
SAO2 % BLDA: 94.8 % — LOW (ref 95–99)
SAO2 % BLDA: 95.2 % — SIGNIFICANT CHANGE UP (ref 95–99)
SAO2 % BLDA: 97.2 % — SIGNIFICANT CHANGE UP (ref 95–99)
SAO2 % BLDA: 98.3 % — SIGNIFICANT CHANGE UP (ref 95–99)
SODIUM BLDA-SCNC: 133 MMOL/L — LOW (ref 136–146)
SODIUM BLDA-SCNC: 135 MMOL/L — LOW (ref 136–146)
SODIUM BLDA-SCNC: 136 MMOL/L — SIGNIFICANT CHANGE UP (ref 136–146)
SODIUM BLDA-SCNC: 137 MMOL/L — SIGNIFICANT CHANGE UP (ref 136–146)
SODIUM SERPL-SCNC: 136 MMOL/L — SIGNIFICANT CHANGE UP (ref 135–145)
SODIUM SERPL-SCNC: 138 MMOL/L — SIGNIFICANT CHANGE UP (ref 135–145)
SODIUM SERPL-SCNC: 138 MMOL/L — SIGNIFICANT CHANGE UP (ref 135–145)
SODIUM SERPL-SCNC: 139 MMOL/L — SIGNIFICANT CHANGE UP (ref 135–145)
WBC # BLD: 9.12 K/UL — SIGNIFICANT CHANGE UP (ref 3.8–10.5)
WBC # FLD AUTO: 9.12 K/UL — SIGNIFICANT CHANGE UP (ref 3.8–10.5)

## 2018-12-17 PROCEDURE — 95951: CPT | Mod: 26

## 2018-12-17 PROCEDURE — 99291 CRITICAL CARE FIRST HOUR: CPT

## 2018-12-17 RX ORDER — POTASSIUM CHLORIDE 20 MEQ
40 PACKET (EA) ORAL EVERY 4 HOURS
Qty: 0 | Refills: 0 | Status: COMPLETED | OUTPATIENT
Start: 2018-12-17 | End: 2018-12-17

## 2018-12-17 RX ORDER — POTASSIUM CHLORIDE 20 MEQ
40 PACKET (EA) ORAL EVERY 4 HOURS
Qty: 0 | Refills: 0 | Status: DISCONTINUED | OUTPATIENT
Start: 2018-12-17 | End: 2018-12-17

## 2018-12-17 RX ADMIN — Medication 40 MILLIEQUIVALENT(S): at 05:08

## 2018-12-17 RX ADMIN — IMIPENEM AND CILASTATIN 100 MILLIGRAM(S): 250; 250 INJECTION, POWDER, FOR SOLUTION INTRAVENOUS at 05:09

## 2018-12-17 RX ADMIN — CHLORHEXIDINE GLUCONATE 15 MILLILITER(S): 213 SOLUTION TOPICAL at 18:42

## 2018-12-17 RX ADMIN — CHLORHEXIDINE GLUCONATE 15 MILLILITER(S): 213 SOLUTION TOPICAL at 05:08

## 2018-12-17 RX ADMIN — Medication 40 MILLIEQUIVALENT(S): at 23:40

## 2018-12-17 RX ADMIN — Medication 40 MILLIGRAM(S): at 05:08

## 2018-12-17 RX ADMIN — Medication 1 APPLICATION(S): at 05:08

## 2018-12-17 RX ADMIN — IMIPENEM AND CILASTATIN 100 MILLIGRAM(S): 250; 250 INJECTION, POWDER, FOR SOLUTION INTRAVENOUS at 23:39

## 2018-12-17 RX ADMIN — MIDAZOLAM HYDROCHLORIDE 7.98 MG/KG/HR: 1 INJECTION, SOLUTION INTRAMUSCULAR; INTRAVENOUS at 23:40

## 2018-12-17 RX ADMIN — IMIPENEM AND CILASTATIN 100 MILLIGRAM(S): 250; 250 INJECTION, POWDER, FOR SOLUTION INTRAVENOUS at 11:31

## 2018-12-17 RX ADMIN — Medication 40 MILLIEQUIVALENT(S): at 20:56

## 2018-12-17 RX ADMIN — Medication 40 MILLIGRAM(S): at 18:42

## 2018-12-17 RX ADMIN — Medication 40 MILLIGRAM(S): at 11:31

## 2018-12-17 RX ADMIN — LACOSAMIDE 120 MILLIGRAM(S): 50 TABLET ORAL at 18:40

## 2018-12-17 RX ADMIN — Medication 40 MILLIGRAM(S): at 23:39

## 2018-12-17 RX ADMIN — Medication 1 APPLICATION(S): at 18:42

## 2018-12-17 RX ADMIN — IMIPENEM AND CILASTATIN 100 MILLIGRAM(S): 250; 250 INJECTION, POWDER, FOR SOLUTION INTRAVENOUS at 18:42

## 2018-12-17 RX ADMIN — CHLORHEXIDINE GLUCONATE 1 APPLICATION(S): 213 SOLUTION TOPICAL at 07:47

## 2018-12-17 RX ADMIN — LACOSAMIDE 120 MILLIGRAM(S): 50 TABLET ORAL at 04:20

## 2018-12-17 RX ADMIN — ENOXAPARIN SODIUM 40 MILLIGRAM(S): 100 INJECTION SUBCUTANEOUS at 13:51

## 2018-12-17 RX ADMIN — Medication 40 MILLIEQUIVALENT(S): at 02:37

## 2018-12-17 NOTE — EEG REPORT - NS EEG TEXT BOX
[facility]   COMPREHENSIVE EPILEPSY CENTER   REPORT OF CONTINUOUS VIDEO EEG     [facility]: [address], [place] [address], Ph#: [phone]  [facility]: [address], [place] [address], Ph#: [phone]  Office: [address] [place] [address], [place] [address], [place] [address] Ph#: [phone]    Patient Name: NADYA BRASHER  Age and : 53y (61)  MRN #: [id number]  Location: [place] [address]  Referring Physician: Jimi Hare    Study Date: 18 - 18    _____________________________________________________________  STUDY INFORMATION    EEG Recording Technique:  The patient underwent continuous Video-EEG monitoring, using Telemetry System hardware on the XLTek Digital System. EEG and video data were stored on a computer hard drive with important events saved in digital archive files. The material was reviewed by a physician (electroencephalographer / epileptologist) on a daily basis. Shalom and seizure detection algorithms were utilized and reviewed. An EEG Technician attended to the patient, and was available throughout daytime work hours.  The epilepsy center neurologist was available in person or on call 24-hours per day.    EEG Placement and Labeling of Electrodes:  The EEG was performed utilizing 20 channel referential EEG connections (coronal over temporal over parasagittal montage) using all standard 10-20 electrode placements with EKG, with additional electrodes placed in the inferior temporal region using the modified 10-10 montage electrode placements for elective admissions, or if deemed necessary. Recording was at a sampling rate of 256 samples per second per channel. Time synchronized digital video recording was done simultaneously with EEG recording. A low light infrared camera was used for low light recording.     _____________________________________________________________  HISTORY    Patient is a 53y old  Male who presents with a chief complaint of ARDS?, sepsis (13 Dec 2018 07:33)    PERTINENT MEDICATIONS:  lacosamide IVPB 100 milliGRAM(s) IV Intermittent every 12 hours  midazolam Infusion 0.08 mG/kG/Hr (7.976 mL/Hr) IV Continuous <Continuous>    _____________________________________________________________  INTERPRETATION    Findings: The background spontaneously variable and reactive. Background was near continuous, predominantly consisted of diffuse delta and theta activities with intermittent 1-3s diffuse suppression.  No posterior dominant rhythm seen.    Focal Slowing:   None was present.    Sleep Background:  Drowsiness was characterized by fragmentation, attenuation, and slowing of the background activity.    Stage II sleep transients were not recorded.    Other Non-Epileptiform Findings:  None were present.    Interictal Epileptiform Activity:   None were present.    Events:  Clinical events: None recorded.  Seizures: None recorded.    Artifacts:  Intermittent myogenic and movement artifacts were noted.    ECG:  The heart rate on single channel ECG was predominantly between  BPM.    _____________________________________________________________  EEG SUMMARY/CLASSIFICATION    Abnormal EEG in a sedated patient.  - Moderate to severe generalized slowing.  _____________________________________________________________  EEG IMPRESSION/CLINICAL CORRELATE    Abnormal EEG.  1. Moderate to severe nonspecific diffuse or multifocal cerebral dysfunction.    2. No epileptiform abnormalities were recorded.      Shayla Murphy MD  Attending Physician, [facility] [place]

## 2018-12-17 NOTE — PROGRESS NOTE ADULT - ASSESSMENT
Patient is a 54 y/o M (resident of [facility]) w/ a PMHx of TBI, s/p PEG tube, contracture, and seizure d/o who presented as a transfer from [facility] on 12/9 for respiratory failure 2/2 ARDS likely 2/2 necrotizing pancreatitis s/p intubation.    # Neuro  - Currently sedated w/ a Versed gtt.  - Patient was on Depakote at home for history of seizure disorder; however, this was discontinued because Depakote can potentially cause pancreatitis. vEEGs have shown no epileptiform abnormalities (though patient currently on a Versed gtt).  - Neurology consulted for assistance with seizure management. Patient was subsequently started on IV Vimpat. C/w IV Vimpat per Neuro recs    # CV   - Patient currently off Norepinephrine gtt. Continue to monitor BP.  - Bedside TY was performed which showed an LV that appears to be functioning normally with mild-moderate enlargement of RV, no evidence of fluid responsiveness, ?bicuspid aortic valve with no evidence of AI    # Resp:   - Patient currently intubated 2/2 moderate ARDS 2/2 necrotizing pancreatitis  - Patient currently on VDR ventilator. Will closely monitor respiratory status and check serial blood gases.  - C/w impenem for possible aspiration pna coverage    # GI  - Patient found to have necrotizing pancreatitis. Unclear etiology for pancreatitis at this time. Patient is a resident of NH and with no significant alcohol history. OSH CT A+P showed gallbladder pathology. Repeat CT with normal gallbladder wall and duct. TG was found to be elevated in the hospital, though patient was on a Propofol gtt at the time and it has been downtrending since the Propofol was discontinued.  - Patient currently on tube feeds via PEG tube  - Discussed with surgery and IR: no acute interventions planned for necrotizing pancreatitis  - C/w imipenem for necrotizing pancreatitis.  - Patient found to have a large stool burden on imaging. He is s/p manual disimpaction and has now started having BMs s/p aggressive bowel regimen     # /Renal   - C/w Ahuja for now  - Monitor BMP w/ aggressive diuresis lasix 40 QID. Cr stable.    # ID   - Patient in shock on arrival. Likely source is infectious vs distributive   - BAL on 12/12 was positive for pseudomonas aeruginosa.  C/w IV Imipenem for necrotizing pancreatitis and PNA coverage.  - Blood/urine Cx (12/11): NGTD  - Patient s/p antiviral regimen (Valtrex followed by acyclovir) for shingles (Acyclovir discontinued yesterday)  - Monitor CBC and fever curve    # Endo  - No active issues (serum glucose WNL)    # DVT PPx:   - C/w Lovenox

## 2018-12-17 NOTE — PROGRESS NOTE ADULT - SUBJECTIVE AND OBJECTIVE BOX
Interval Events: No acute events overnight. Remained on VDR. Adequate UOP on diuretics.    REVIEW OF SYSTEMS:  Constitutional: [ ] negative [ ] fevers [ ] chills [ ] weight loss [ ] weight gain  HEENT: [ ] negative [ ] dry eyes [ ] eye irritation [ ] postnasal drip [ ] nasal congestion  CV: [ ] negative  [ ] chest pain [ ] orthopnea [ ] palpitations [ ] murmur  Resp: [ ] negative [ ] cough [ ] shortness of breath [ ] dyspnea [ ] wheezing [ ] sputum [ ] hemoptysis  GI: [ ] negative [ ] nausea [ ] vomiting [ ] diarrhea [ ] constipation [ ] abd pain [ ] dysphagia   : [ ] negative [ ] dysuria [ ] nocturia [ ] hematuria [ ] increased urinary frequency  Musculoskeletal: [ ] negative [ ] back pain [ ] myalgias [ ] arthralgias [ ] fracture  Skin: [ ] negative [ ] rash [ ] itch  Neurological: [ ] negative [ ] headache [ ] dizziness [ ] syncope [ ] weakness [ ] numbness  Psychiatric: [ ] negative [ ] anxiety [ ] depression  Endocrine: [ ] negative [ ] diabetes [ ] thyroid problem  Hematologic/Lymphatic: [ ] negative [ ] anemia [ ] bleeding problem  Allergic/Immunologic: [ ] negative [ ] itchy eyes [ ] nasal discharge [ ] hives [ ] angioedema  [ ] All other systems negative  [x] Unable to assess ROS because patient sedated.    T(C): 36.3 (12-17-18 @ 04:00), Max: 36.3 (12-16-18 @ 12:00)  HR: 94 (12-17-18 @ 07:00) (83 - 99)  BP: --  RR: 14 (12-17-18 @ 07:00) (11 - 18)  SpO2: 97% (12-17-18 @ 07:00) (86% - 99%)  Wt(kg): --  GENERAL: Sedated  HEENT:  NC/AT, Slightly dry mucous membranes  NECK: Supple  CHEST/LUNG: Vent sounds appreciated, Slightly decreased at bases  HEART: Tachycardic, Regular rhythm  ABDOMEN: Distended (slightly improved), hypoactive bowel sounds, + PEG tube in place  GENITOURINARY: Ahuja in place draining dark yellow urine  EXTREMITIES: 1+ edema of B/L LEs  NEUROLOGY: Sedated, (Pupils equal, round, and each react sluggishly to light)  SKIN: Left back crusted lesion, Warm and dry  PSYCHIATRY: Unable to assess    LINES: left subclavian, left arterial line.    HOSPITAL MEDICATIONS:  enoxaparin Injectable 40 milliGRAM(s) SubCutaneous every 24 hours    imipenem/cilastatin  IVPB      imipenem/cilastatin  IVPB 500 milliGRAM(s) IV Intermittent every 6 hours    furosemide   Injectable 40 milliGRAM(s) IV Push every 6 hours    acetaminophen    Suspension .. 650 milliGRAM(s) Oral every 6 hours PRN  lacosamide IVPB 100 milliGRAM(s) IV Intermittent every 12 hours  midazolam Infusion 0.08 mG/kG/Hr IV Continuous <Continuous>    bisacodyl Suppository 10 milliGRAM(s) Rectal daily  docusate sodium Liquid 100 milliGRAM(s) Oral two times a day  polyethylene glycol 3350 17 Gram(s) Oral daily  senna Syrup 10 milliLiter(s) Oral daily    chlorhexidine 0.12% Liquid 15 milliLiter(s) Oral Mucosa two times a day  chlorhexidine 4% Liquid 1 Application(s) Topical <User Schedule>  petrolatum Ophthalmic Ointment 1 Application(s) Both EYES two times a day    LABS:                        9.8    9.12  )-----------( 307      ( 17 Dec 2018 00:54 )             28.8     Hgb Trend: 9.8<--, 9.5<--, 10.8<--, 11.2<--, 12.0<--  12-17    138  |  98  |  16  ----------------------------<  154<H>  3.7   |  30  |  0.35<L>    Ca    7.5<L>      17 Dec 2018 05:00  Phos  4.0     12-17  Mg     2.1     12-17    TPro  4.7<L>  /  Alb  1.4<L>  /  TBili  3.5<H>  /  DBili  x   /  AST  71<H>  /  ALT  45<H>  /  AlkPhos  359<H>  12-17    Creatinine Trend: 0.35<--, 0.36<--, 0.36<--, 0.37<--, 0.38<--, 0.39<--      Arterial Blood Gas:  12-17 @ 05:00  7.47/43/110/31/98.3/7.0  ABG lactate: 2.1  Arterial Blood Gas:  12-17 @ 00:54  7.47/46/82/33/97.2/9.3  ABG lactate: 2.1  Arterial Blood Gas:  12-16 @ 17:39  7.49/43/74/33/95.4/9.0  ABG lactate: 1.6  Arterial Blood Gas:  12-16 @ 12:05  7.47/46/71/32/94.2/8.5  ABG lactate: 1.5  Arterial Blood Gas:  12-16 @ 05:05  7.45/48/105/32/98.1/8.5  ABG lactate: 1.2  Arterial Blood Gas:  12-16 @ 01:28  7.41/54/90/32/96.5/8.6  ABG lactate: 1.3  Arterial Blood Gas:  12-15 @ 18:00  7.49/45/66/33/93.6/9.5  ABG lactate: 1.6  Arterial Blood Gas:  12-15 @ 16:35  7.52/41/69/34/95.0/9.8  ABG lactate: 1.7  Arterial Blood Gas:  12-15 @ 11:20  7.50/42/64/32/93.5/8.8  ABG lactate: 1.8  Arterial Blood Gas:  12-15 @ 08:50  7.50/42/78/33/96.9/9.1  ABG lactate: 1.9

## 2018-12-18 LAB
ALBUMIN SERPL ELPH-MCNC: 1.5 G/DL — LOW (ref 3.3–5)
ALP SERPL-CCNC: 373 U/L — HIGH (ref 40–120)
ALT FLD-CCNC: 40 U/L — SIGNIFICANT CHANGE UP (ref 4–41)
APPEARANCE UR: CLEAR — SIGNIFICANT CHANGE UP
AST SERPL-CCNC: 64 U/L — HIGH (ref 4–40)
BACTERIA # UR AUTO: SIGNIFICANT CHANGE UP
BASE EXCESS BLDA CALC-SCNC: 7.7 MMOL/L — SIGNIFICANT CHANGE UP
BASE EXCESS BLDA CALC-SCNC: 7.8 MMOL/L — SIGNIFICANT CHANGE UP
BASE EXCESS BLDA CALC-SCNC: 8.1 MMOL/L — SIGNIFICANT CHANGE UP
BASE EXCESS BLDA CALC-SCNC: 8.7 MMOL/L — SIGNIFICANT CHANGE UP
BASOPHILS # BLD AUTO: 0.01 K/UL — SIGNIFICANT CHANGE UP (ref 0–0.2)
BASOPHILS # BLD AUTO: 0.03 K/UL — SIGNIFICANT CHANGE UP (ref 0–0.2)
BASOPHILS # BLD AUTO: 0.03 K/UL — SIGNIFICANT CHANGE UP (ref 0–0.2)
BASOPHILS NFR BLD AUTO: 0.1 % — SIGNIFICANT CHANGE UP (ref 0–2)
BASOPHILS NFR BLD AUTO: 0.2 % — SIGNIFICANT CHANGE UP (ref 0–2)
BASOPHILS NFR BLD AUTO: 0.3 % — SIGNIFICANT CHANGE UP (ref 0–2)
BILIRUB SERPL-MCNC: 2.8 MG/DL — HIGH (ref 0.2–1.2)
BILIRUB UR-MCNC: SIGNIFICANT CHANGE UP
BLOOD UR QL VISUAL: SIGNIFICANT CHANGE UP
BUN SERPL-MCNC: 16 MG/DL — SIGNIFICANT CHANGE UP (ref 7–23)
BUN SERPL-MCNC: 17 MG/DL — SIGNIFICANT CHANGE UP (ref 7–23)
BUN SERPL-MCNC: 18 MG/DL — SIGNIFICANT CHANGE UP (ref 7–23)
CA-I BLDA-SCNC: 1.1 MMOL/L — LOW (ref 1.15–1.29)
CA-I BLDA-SCNC: 1.11 MMOL/L — LOW (ref 1.15–1.29)
CALCIUM SERPL-MCNC: 7.2 MG/DL — LOW (ref 8.4–10.5)
CALCIUM SERPL-MCNC: 7.3 MG/DL — LOW (ref 8.4–10.5)
CALCIUM SERPL-MCNC: 7.6 MG/DL — LOW (ref 8.4–10.5)
CHLORIDE BLDA-SCNC: 102 MMOL/L — SIGNIFICANT CHANGE UP (ref 96–108)
CHLORIDE BLDA-SCNC: 104 MMOL/L — SIGNIFICANT CHANGE UP (ref 96–108)
CHLORIDE SERPL-SCNC: 100 MMOL/L — SIGNIFICANT CHANGE UP (ref 98–107)
CHLORIDE SERPL-SCNC: 101 MMOL/L — SIGNIFICANT CHANGE UP (ref 98–107)
CHLORIDE SERPL-SCNC: 98 MMOL/L — SIGNIFICANT CHANGE UP (ref 98–107)
CO2 SERPL-SCNC: 28 MMOL/L — SIGNIFICANT CHANGE UP (ref 22–31)
CO2 SERPL-SCNC: 29 MMOL/L — SIGNIFICANT CHANGE UP (ref 22–31)
CO2 SERPL-SCNC: 30 MMOL/L — SIGNIFICANT CHANGE UP (ref 22–31)
COLOR SPEC: SIGNIFICANT CHANGE UP
CREAT SERPL-MCNC: 0.35 MG/DL — LOW (ref 0.5–1.3)
CREAT SERPL-MCNC: 0.39 MG/DL — LOW (ref 0.5–1.3)
CREAT SERPL-MCNC: 0.41 MG/DL — LOW (ref 0.5–1.3)
EOSINOPHIL # BLD AUTO: 0.01 K/UL — SIGNIFICANT CHANGE UP (ref 0–0.5)
EOSINOPHIL # BLD AUTO: 0.01 K/UL — SIGNIFICANT CHANGE UP (ref 0–0.5)
EOSINOPHIL # BLD AUTO: 0.02 K/UL — SIGNIFICANT CHANGE UP (ref 0–0.5)
EOSINOPHIL NFR BLD AUTO: 0.1 % — SIGNIFICANT CHANGE UP (ref 0–6)
EOSINOPHIL NFR BLD AUTO: 0.1 % — SIGNIFICANT CHANGE UP (ref 0–6)
EOSINOPHIL NFR BLD AUTO: 0.2 % — SIGNIFICANT CHANGE UP (ref 0–6)
GLUCOSE BLDA-MCNC: 143 MG/DL — HIGH (ref 70–99)
GLUCOSE BLDA-MCNC: 158 MG/DL — HIGH (ref 70–99)
GLUCOSE BLDA-MCNC: 160 MG/DL — HIGH (ref 70–99)
GLUCOSE BLDA-MCNC: 178 MG/DL — HIGH (ref 70–99)
GLUCOSE SERPL-MCNC: 148 MG/DL — HIGH (ref 70–99)
GLUCOSE SERPL-MCNC: 164 MG/DL — HIGH (ref 70–99)
GLUCOSE SERPL-MCNC: 165 MG/DL — HIGH (ref 70–99)
GLUCOSE UR-MCNC: NEGATIVE — SIGNIFICANT CHANGE UP
GRAM STN SPT: SIGNIFICANT CHANGE UP
HCO3 BLDA-SCNC: 32 MMOL/L — HIGH (ref 22–26)
HCT VFR BLD CALC: 24.3 % — LOW (ref 39–50)
HCT VFR BLD CALC: 25.6 % — LOW (ref 39–50)
HCT VFR BLD CALC: 25.8 % — LOW (ref 39–50)
HCT VFR BLDA CALC: 25.7 % — LOW (ref 39–51)
HCT VFR BLDA CALC: 26.8 % — LOW (ref 39–51)
HCT VFR BLDA CALC: 27.3 % — LOW (ref 39–51)
HCT VFR BLDA CALC: 27.7 % — LOW (ref 39–51)
HGB BLD-MCNC: 8 G/DL — LOW (ref 13–17)
HGB BLD-MCNC: 8.6 G/DL — LOW (ref 13–17)
HGB BLD-MCNC: 8.8 G/DL — LOW (ref 13–17)
HGB BLDA-MCNC: 8.3 G/DL — LOW (ref 13–17)
HGB BLDA-MCNC: 8.6 G/DL — LOW (ref 13–17)
HGB BLDA-MCNC: 8.8 G/DL — LOW (ref 13–17)
HGB BLDA-MCNC: 8.9 G/DL — LOW (ref 13–17)
HYALINE CASTS # UR AUTO: HIGH
IMM GRANULOCYTES # BLD AUTO: 0.06 # — SIGNIFICANT CHANGE UP
IMM GRANULOCYTES # BLD AUTO: 0.08 # — SIGNIFICANT CHANGE UP
IMM GRANULOCYTES # BLD AUTO: 0.13 # — SIGNIFICANT CHANGE UP
IMM GRANULOCYTES NFR BLD AUTO: 0.7 % — SIGNIFICANT CHANGE UP (ref 0–1.5)
IMM GRANULOCYTES NFR BLD AUTO: 0.8 % — SIGNIFICANT CHANGE UP (ref 0–1.5)
IMM GRANULOCYTES NFR BLD AUTO: 1.1 % — SIGNIFICANT CHANGE UP (ref 0–1.5)
KETONES UR-MCNC: SIGNIFICANT CHANGE UP
LACTATE BLDA-SCNC: 1.4 MMOL/L — SIGNIFICANT CHANGE UP (ref 0.5–2)
LACTATE BLDA-SCNC: 1.8 MMOL/L — SIGNIFICANT CHANGE UP (ref 0.5–2)
LACTATE BLDA-SCNC: 2.3 MMOL/L — HIGH (ref 0.5–2)
LACTATE BLDA-SCNC: 2.4 MMOL/L — HIGH (ref 0.5–2)
LEUKOCYTE ESTERASE UR-ACNC: NEGATIVE — SIGNIFICANT CHANGE UP
LYMPHOCYTES # BLD AUTO: 1.35 K/UL — SIGNIFICANT CHANGE UP (ref 1–3.3)
LYMPHOCYTES # BLD AUTO: 1.71 K/UL — SIGNIFICANT CHANGE UP (ref 1–3.3)
LYMPHOCYTES # BLD AUTO: 14.4 % — SIGNIFICANT CHANGE UP (ref 13–44)
LYMPHOCYTES # BLD AUTO: 17.4 % — SIGNIFICANT CHANGE UP (ref 13–44)
LYMPHOCYTES # BLD AUTO: 17.9 % — SIGNIFICANT CHANGE UP (ref 13–44)
LYMPHOCYTES # BLD AUTO: 2.16 K/UL — SIGNIFICANT CHANGE UP (ref 1–3.3)
MAGNESIUM SERPL-MCNC: 1.9 MG/DL — SIGNIFICANT CHANGE UP (ref 1.6–2.6)
MAGNESIUM SERPL-MCNC: 1.9 MG/DL — SIGNIFICANT CHANGE UP (ref 1.6–2.6)
MAGNESIUM SERPL-MCNC: 2 MG/DL — SIGNIFICANT CHANGE UP (ref 1.6–2.6)
MCHC RBC-ENTMCNC: 32.9 % — SIGNIFICANT CHANGE UP (ref 32–36)
MCHC RBC-ENTMCNC: 33.2 PG — SIGNIFICANT CHANGE UP (ref 27–34)
MCHC RBC-ENTMCNC: 33.6 % — SIGNIFICANT CHANGE UP (ref 32–36)
MCHC RBC-ENTMCNC: 33.7 PG — SIGNIFICANT CHANGE UP (ref 27–34)
MCHC RBC-ENTMCNC: 33.8 PG — SIGNIFICANT CHANGE UP (ref 27–34)
MCHC RBC-ENTMCNC: 34.1 % — SIGNIFICANT CHANGE UP (ref 32–36)
MCV RBC AUTO: 100.4 FL — HIGH (ref 80–100)
MCV RBC AUTO: 100.8 FL — HIGH (ref 80–100)
MCV RBC AUTO: 99.2 FL — SIGNIFICANT CHANGE UP (ref 80–100)
MONOCYTES # BLD AUTO: 0.24 K/UL — SIGNIFICANT CHANGE UP (ref 0–0.9)
MONOCYTES # BLD AUTO: 0.46 K/UL — SIGNIFICANT CHANGE UP (ref 0–0.9)
MONOCYTES # BLD AUTO: 0.52 K/UL — SIGNIFICANT CHANGE UP (ref 0–0.9)
MONOCYTES NFR BLD AUTO: 3.2 % — SIGNIFICANT CHANGE UP (ref 2–14)
MONOCYTES NFR BLD AUTO: 3.9 % — SIGNIFICANT CHANGE UP (ref 2–14)
MONOCYTES NFR BLD AUTO: 4.2 % — SIGNIFICANT CHANGE UP (ref 2–14)
NEUTROPHILS # BLD AUTO: 5.88 K/UL — SIGNIFICANT CHANGE UP (ref 1.8–7.4)
NEUTROPHILS # BLD AUTO: 9.52 K/UL — HIGH (ref 1.8–7.4)
NEUTROPHILS # BLD AUTO: 9.59 K/UL — HIGH (ref 1.8–7.4)
NEUTROPHILS NFR BLD AUTO: 76.9 % — SIGNIFICANT CHANGE UP (ref 43–77)
NEUTROPHILS NFR BLD AUTO: 77.9 % — HIGH (ref 43–77)
NEUTROPHILS NFR BLD AUTO: 80.6 % — HIGH (ref 43–77)
NITRITE UR-MCNC: NEGATIVE — SIGNIFICANT CHANGE UP
NRBC # FLD: 0.3 — SIGNIFICANT CHANGE UP
NRBC # FLD: 0.33 — SIGNIFICANT CHANGE UP
NRBC # FLD: 0.36 — SIGNIFICANT CHANGE UP
NRBC FLD-RTO: 2.8 — SIGNIFICANT CHANGE UP
NRBC FLD-RTO: 2.9 — SIGNIFICANT CHANGE UP
NRBC FLD-RTO: 4 — SIGNIFICANT CHANGE UP
PCO2 BLDA: 39 MMHG — SIGNIFICANT CHANGE UP (ref 35–48)
PCO2 BLDA: 42 MMHG — SIGNIFICANT CHANGE UP (ref 35–48)
PCO2 BLDA: 42 MMHG — SIGNIFICANT CHANGE UP (ref 35–48)
PCO2 BLDA: 43 MMHG — SIGNIFICANT CHANGE UP (ref 35–48)
PH BLDA: 7.49 PH — HIGH (ref 7.35–7.45)
PH BLDA: 7.52 PH — HIGH (ref 7.35–7.45)
PH UR: 6.5 — SIGNIFICANT CHANGE UP (ref 5–8)
PHOSPHATE SERPL-MCNC: 3.4 MG/DL — SIGNIFICANT CHANGE UP (ref 2.5–4.5)
PHOSPHATE SERPL-MCNC: 3.7 MG/DL — SIGNIFICANT CHANGE UP (ref 2.5–4.5)
PHOSPHATE SERPL-MCNC: 4 MG/DL — SIGNIFICANT CHANGE UP (ref 2.5–4.5)
PLATELET # BLD AUTO: 288 K/UL — SIGNIFICANT CHANGE UP (ref 150–400)
PLATELET # BLD AUTO: 369 K/UL — SIGNIFICANT CHANGE UP (ref 150–400)
PLATELET # BLD AUTO: 389 K/UL — SIGNIFICANT CHANGE UP (ref 150–400)
PMV BLD: 11 FL — SIGNIFICANT CHANGE UP (ref 7–13)
PMV BLD: 11.2 FL — SIGNIFICANT CHANGE UP (ref 7–13)
PMV BLD: 11.5 FL — SIGNIFICANT CHANGE UP (ref 7–13)
PO2 BLDA: 104 MMHG — SIGNIFICANT CHANGE UP (ref 83–108)
PO2 BLDA: 68 MMHG — LOW (ref 83–108)
PO2 BLDA: 76 MMHG — LOW (ref 83–108)
PO2 BLDA: 77 MMHG — LOW (ref 83–108)
POTASSIUM BLDA-SCNC: 3.2 MMOL/L — LOW (ref 3.4–4.5)
POTASSIUM BLDA-SCNC: 3.3 MMOL/L — LOW (ref 3.4–4.5)
POTASSIUM BLDA-SCNC: 3.4 MMOL/L — SIGNIFICANT CHANGE UP (ref 3.4–4.5)
POTASSIUM BLDA-SCNC: 3.6 MMOL/L — SIGNIFICANT CHANGE UP (ref 3.4–4.5)
POTASSIUM SERPL-MCNC: 3.5 MMOL/L — SIGNIFICANT CHANGE UP (ref 3.5–5.3)
POTASSIUM SERPL-MCNC: 3.7 MMOL/L — SIGNIFICANT CHANGE UP (ref 3.5–5.3)
POTASSIUM SERPL-MCNC: 3.8 MMOL/L — SIGNIFICANT CHANGE UP (ref 3.5–5.3)
POTASSIUM SERPL-SCNC: 3.5 MMOL/L — SIGNIFICANT CHANGE UP (ref 3.5–5.3)
POTASSIUM SERPL-SCNC: 3.7 MMOL/L — SIGNIFICANT CHANGE UP (ref 3.5–5.3)
POTASSIUM SERPL-SCNC: 3.8 MMOL/L — SIGNIFICANT CHANGE UP (ref 3.5–5.3)
PROT SERPL-MCNC: 4.9 G/DL — LOW (ref 6–8.3)
PROT UR-MCNC: 30 — SIGNIFICANT CHANGE UP
RBC # BLD: 2.41 M/UL — LOW (ref 4.2–5.8)
RBC # BLD: 2.55 M/UL — LOW (ref 4.2–5.8)
RBC # BLD: 2.6 M/UL — LOW (ref 4.2–5.8)
RBC # FLD: 16.3 % — HIGH (ref 10.3–14.5)
RBC # FLD: 17.6 % — HIGH (ref 10.3–14.5)
RBC # FLD: 17.9 % — HIGH (ref 10.3–14.5)
RBC CASTS # UR COMP ASSIST: HIGH (ref 0–?)
SAO2 % BLDA: 93.9 % — LOW (ref 95–99)
SAO2 % BLDA: 95.1 % — SIGNIFICANT CHANGE UP (ref 95–99)
SAO2 % BLDA: 95.6 % — SIGNIFICANT CHANGE UP (ref 95–99)
SAO2 % BLDA: 98.3 % — SIGNIFICANT CHANGE UP (ref 95–99)
SODIUM BLDA-SCNC: 133 MMOL/L — LOW (ref 136–146)
SODIUM BLDA-SCNC: 133 MMOL/L — LOW (ref 136–146)
SODIUM BLDA-SCNC: 134 MMOL/L — LOW (ref 136–146)
SODIUM BLDA-SCNC: 137 MMOL/L — SIGNIFICANT CHANGE UP (ref 136–146)
SODIUM SERPL-SCNC: 137 MMOL/L — SIGNIFICANT CHANGE UP (ref 135–145)
SODIUM SERPL-SCNC: 137 MMOL/L — SIGNIFICANT CHANGE UP (ref 135–145)
SODIUM SERPL-SCNC: 139 MMOL/L — SIGNIFICANT CHANGE UP (ref 135–145)
SP GR SPEC: 1.03 — SIGNIFICANT CHANGE UP (ref 1–1.04)
SPECIMEN SOURCE: SIGNIFICANT CHANGE UP
SQUAMOUS # UR AUTO: SIGNIFICANT CHANGE UP
UROBILINOGEN FLD QL: HIGH
WBC # BLD: 11.88 K/UL — HIGH (ref 3.8–10.5)
WBC # BLD: 12.38 K/UL — HIGH (ref 3.8–10.5)
WBC # BLD: 7.55 K/UL — SIGNIFICANT CHANGE UP (ref 3.8–10.5)
WBC # FLD AUTO: 11.88 K/UL — HIGH (ref 3.8–10.5)
WBC # FLD AUTO: 12.38 K/UL — HIGH (ref 3.8–10.5)
WBC # FLD AUTO: 7.55 K/UL — SIGNIFICANT CHANGE UP (ref 3.8–10.5)
WBC UR QL: HIGH (ref 0–?)
YEAST BUDDING # UR COMP ASSIST: SIGNIFICANT CHANGE UP

## 2018-12-18 PROCEDURE — 99291 CRITICAL CARE FIRST HOUR: CPT

## 2018-12-18 PROCEDURE — 71045 X-RAY EXAM CHEST 1 VIEW: CPT | Mod: 26

## 2018-12-18 RX ORDER — VANCOMYCIN HCL 1 G
VIAL (EA) INTRAVENOUS
Qty: 0 | Refills: 0 | Status: DISCONTINUED | OUTPATIENT
Start: 2018-12-18 | End: 2018-12-19

## 2018-12-18 RX ORDER — VANCOMYCIN HCL 1 G
1500 VIAL (EA) INTRAVENOUS EVERY 12 HOURS
Qty: 0 | Refills: 0 | Status: DISCONTINUED | OUTPATIENT
Start: 2018-12-19 | End: 2018-12-19

## 2018-12-18 RX ORDER — METOCLOPRAMIDE HCL 10 MG
5 TABLET ORAL EVERY 8 HOURS
Qty: 0 | Refills: 0 | Status: DISCONTINUED | OUTPATIENT
Start: 2018-12-18 | End: 2018-12-18

## 2018-12-18 RX ORDER — NOREPINEPHRINE BITARTRATE/D5W 8 MG/250ML
0.1 PLASTIC BAG, INJECTION (ML) INTRAVENOUS
Qty: 16 | Refills: 0 | Status: DISCONTINUED | OUTPATIENT
Start: 2018-12-18 | End: 2018-12-20

## 2018-12-18 RX ORDER — ACETAMINOPHEN 500 MG
1000 TABLET ORAL ONCE
Qty: 0 | Refills: 0 | Status: COMPLETED | OUTPATIENT
Start: 2018-12-18 | End: 2018-12-18

## 2018-12-18 RX ORDER — NOREPINEPHRINE BITARTRATE/D5W 8 MG/250ML
0.05 PLASTIC BAG, INJECTION (ML) INTRAVENOUS
Qty: 8 | Refills: 0 | Status: DISCONTINUED | OUTPATIENT
Start: 2018-12-18 | End: 2018-12-18

## 2018-12-18 RX ORDER — METOCLOPRAMIDE HCL 10 MG
5 TABLET ORAL EVERY 8 HOURS
Qty: 0 | Refills: 0 | Status: DISCONTINUED | OUTPATIENT
Start: 2018-12-18 | End: 2018-12-28

## 2018-12-18 RX ORDER — VANCOMYCIN HCL 1 G
1500 VIAL (EA) INTRAVENOUS ONCE
Qty: 0 | Refills: 0 | Status: COMPLETED | OUTPATIENT
Start: 2018-12-18 | End: 2018-12-18

## 2018-12-18 RX ADMIN — MIDAZOLAM HYDROCHLORIDE 3.99 MG/KG/HR: 1 INJECTION, SOLUTION INTRAMUSCULAR; INTRAVENOUS at 22:45

## 2018-12-18 RX ADMIN — MIDAZOLAM HYDROCHLORIDE 3.99 MG/KG/HR: 1 INJECTION, SOLUTION INTRAMUSCULAR; INTRAVENOUS at 13:19

## 2018-12-18 RX ADMIN — IMIPENEM AND CILASTATIN 100 MILLIGRAM(S): 250; 250 INJECTION, POWDER, FOR SOLUTION INTRAVENOUS at 06:02

## 2018-12-18 RX ADMIN — CHLORHEXIDINE GLUCONATE 1 APPLICATION(S): 213 SOLUTION TOPICAL at 12:56

## 2018-12-18 RX ADMIN — Medication 1000 MILLIGRAM(S): at 22:45

## 2018-12-18 RX ADMIN — Medication 40 MILLIGRAM(S): at 06:02

## 2018-12-18 RX ADMIN — Medication 1 APPLICATION(S): at 17:26

## 2018-12-18 RX ADMIN — Medication 400 MILLIGRAM(S): at 22:42

## 2018-12-18 RX ADMIN — LACOSAMIDE 120 MILLIGRAM(S): 50 TABLET ORAL at 17:26

## 2018-12-18 RX ADMIN — Medication 9.35 MICROGRAM(S)/KG/MIN: at 13:19

## 2018-12-18 RX ADMIN — ENOXAPARIN SODIUM 40 MILLIGRAM(S): 100 INJECTION SUBCUTANEOUS at 13:20

## 2018-12-18 RX ADMIN — Medication 5 MILLIGRAM(S): at 13:18

## 2018-12-18 RX ADMIN — IMIPENEM AND CILASTATIN 100 MILLIGRAM(S): 250; 250 INJECTION, POWDER, FOR SOLUTION INTRAVENOUS at 12:56

## 2018-12-18 RX ADMIN — Medication 300 MILLIGRAM(S): at 22:44

## 2018-12-18 RX ADMIN — LACOSAMIDE 120 MILLIGRAM(S): 50 TABLET ORAL at 05:08

## 2018-12-18 RX ADMIN — IMIPENEM AND CILASTATIN 100 MILLIGRAM(S): 250; 250 INJECTION, POWDER, FOR SOLUTION INTRAVENOUS at 23:49

## 2018-12-18 RX ADMIN — Medication 9.35 MICROGRAM(S)/KG/MIN: at 22:44

## 2018-12-18 RX ADMIN — Medication 1 APPLICATION(S): at 06:02

## 2018-12-18 RX ADMIN — IMIPENEM AND CILASTATIN 100 MILLIGRAM(S): 250; 250 INJECTION, POWDER, FOR SOLUTION INTRAVENOUS at 17:26

## 2018-12-18 NOTE — PROGRESS NOTE ADULT - SUBJECTIVE AND OBJECTIVE BOX
Patient is a 53y old  Male who presents with a chief complaint of ARDS?, sepsis (17 Dec 2018 07:50)      SUBJECTIVE / OVERNIGHT EVENTS: No acute events overnight. Weaning down on versed. Patient continued with diuresis with adequate UOP. Diarrhea overnight, holding laxatives.    MEDICATIONS  (STANDING):  chlorhexidine 4% Liquid 1 Application(s) Topical <User Schedule>  enoxaparin Injectable 40 milliGRAM(s) SubCutaneous every 24 hours  furosemide   Injectable 40 milliGRAM(s) IV Push every 6 hours  imipenem/cilastatin  IVPB      imipenem/cilastatin  IVPB 500 milliGRAM(s) IV Intermittent every 6 hours  lacosamide IVPB 100 milliGRAM(s) IV Intermittent every 12 hours  midazolam Infusion 0.04 mG/kG/Hr (3.988 mL/Hr) IV Continuous <Continuous>  petrolatum Ophthalmic Ointment 1 Application(s) Both EYES two times a day    MEDICATIONS  (PRN):  acetaminophen    Suspension .. 650 milliGRAM(s) Oral every 6 hours PRN Temp greater or equal to 38C (100.4F), Mild Pain (1 - 3), Moderate Pain (4 - 6)    CAPILLARY BLOOD GLUCOSE    I&O's Summary    17 Dec 2018 07:01  -  18 Dec 2018 07:00  --------------------------------------------------------  IN: 1412 mL / OUT: 3685 mL / NET: -2273 mL    T(C): 37.1 (12-18-18 @ 04:00), Max: 37.3 (12-18-18 @ 00:00)  HR: 111 (12-18-18 @ 07:10) (97 - 111)  BP: --  RR: 14 (12-18-18 @ 06:00) (0 - 19)  SpO2: 95% (12-18-18 @ 07:10) (93% - 98%)  PHYSICAL EXAM:  GENERAL: Sedated  HEENT:  NC/AT, Slightly dry mucous membranes  NECK: Supple  CHEST/LUNG: Vent sounds appreciated, Slightly decreased at bases  HEART: Tachycardic, Regular rhythm  ABDOMEN: Distended (slightly improved), hypoactive bowel sounds, + PEG tube in place  GENITOURINARY: Ahuja in place draining dark yellow urine  EXTREMITIES: 1+ edema of B/L LEs  NEUROLOGY: Sedated, (Pupils equal, round, and each react sluggishly to light)  SKIN: Left back crusted lesion, Warm and dry  PSYCHIATRY: Unable to assess    LINES: left subclavian, left arterial line.    LABS:                        8.0    7.55  )-----------( 288      ( 18 Dec 2018 05:20 )             24.3     WBC Trend: 7.55<--, 9.12<--, 10.84<--  12-18    137  |  100  |  17  ----------------------------<  165<H>  3.8   |  28  |  0.39<L>    Ca    7.2<L>      18 Dec 2018 05:20  Phos  3.7     12-18  Mg     1.9     12-18    TPro  4.9<L>  /  Alb  1.5<L>  /  TBili  2.8<H>  /  DBili  x   /  AST  64<H>  /  ALT  40  /  AlkPhos  373<H>  12-18    Creatinine Trend: 0.39<--, 0.35<--, 0.36<--, 0.33<--, 0.35<--, 0.36<--

## 2018-12-18 NOTE — PROGRESS NOTE ADULT - ATTENDING COMMENTS
improving slowly, on 40% on VDR, all idicies slowly getting better, still severe necrotizing pancreatitis/guarded

## 2018-12-18 NOTE — PROGRESS NOTE ADULT - ASSESSMENT
Patient is a 54 y/o M (resident of [facility]) w/ a PMHx of TBI, s/p PEG tube, contracture, and seizure d/o who presented as a transfer from [facility] on 12/9 for respiratory failure 2/2 ARDS likely 2/2 necrotizing pancreatitis s/p intubation.    # Neuro  - Currently sedated w/ a Versed gtt. Weaning down on dose.  - Patient was on Depakote at home for history of seizure disorder; however, this was discontinued because Depakote can potentially cause pancreatitis. vEEGs have shown no epileptiform abnormalities (though patient currently on a Versed gtt).  - Neurology consulted for assistance with seizure management. Patient was subsequently started on IV Vimpat. C/w IV Vimpat per Neuro recs    # CV   - Patient currently off Norepinephrine gtt. Continue to monitor BP.  - Bedside TY was performed which showed an LV that appears to be functioning normally with mild-moderate enlargement of RV, no evidence of fluid responsiveness, ?bicuspid aortic valve with no evidence of AI    # Resp:   - Patient currently intubated 2/2 moderate ARDS 2/2 necrotizing pancreatitis  - Patient currently on VDR ventilator. Will closely monitor respiratory status and check serial blood gases.  - C/w impenem for possible aspiration pna coverage.    # GI  - Patient found to have necrotizing pancreatitis. Unclear etiology for pancreatitis at this time. Patient is a resident of NH and with no significant alcohol history. OSH CT A+P showed gallbladder pathology. Repeat CT with normal gallbladder wall and duct. TG was found to be elevated in the hospital, though patient was on a Propofol gtt at the time and it has been downtrending since the Propofol was discontinued.  - Patient currently on tube feeds via PEG tube  - Discussed with surgery and IR: no acute interventions planned for necrotizing pancreatitis  - C/w imipenem for necrotizing pancreatitis for now. Consider stopping abx no source identified and patient clinically stabilized.  - Patient found to have a large stool burden on imaging initially. He is s/p manual disimpaction and has now started having BMs s/p aggressive bowel regimen.     # /Renal   - C/w Ahuja for now  - Monitor BMP w/ aggressive diuresis lasix 40 QID. Cr stable.    # ID   - Patient in shock on arrival. Likely source is infectious vs distributive   - BAL on 12/12 was positive for pseudomonas aeruginosa.  C/w IV Imipenem for necrotizing pancreatitis and PNA coverage. Considering stopping abx after today (finished 7 day course).  - Patient s/p antiviral regimen (Valtrex followed by acyclovir) for shingles (Acyclovir discontinued yesterday)  - Monitor CBC and fever curve    # Endo  - No active issues (serum glucose WNL)    # DVT PPx:   - C/w Lovenox

## 2018-12-19 LAB
ALBUMIN SERPL ELPH-MCNC: 1.6 G/DL — LOW (ref 3.3–5)
ALP SERPL-CCNC: 390 U/L — HIGH (ref 40–120)
ALT FLD-CCNC: 38 U/L — SIGNIFICANT CHANGE UP (ref 4–41)
AST SERPL-CCNC: 58 U/L — HIGH (ref 4–40)
BASE EXCESS BLDA CALC-SCNC: 3.7 MMOL/L — SIGNIFICANT CHANGE UP
BASE EXCESS BLDA CALC-SCNC: 5.4 MMOL/L — SIGNIFICANT CHANGE UP
BASE EXCESS BLDA CALC-SCNC: 7.1 MMOL/L — SIGNIFICANT CHANGE UP
BASOPHILS # BLD AUTO: 0.02 K/UL — SIGNIFICANT CHANGE UP (ref 0–0.2)
BASOPHILS NFR BLD AUTO: 0.2 % — SIGNIFICANT CHANGE UP (ref 0–2)
BILIRUB SERPL-MCNC: 2.9 MG/DL — HIGH (ref 0.2–1.2)
BUN SERPL-MCNC: 17 MG/DL — SIGNIFICANT CHANGE UP (ref 7–23)
CA-I BLDA-SCNC: 1.11 MMOL/L — LOW (ref 1.15–1.29)
CA-I BLDA-SCNC: 1.12 MMOL/L — LOW (ref 1.15–1.29)
CA-I BLDA-SCNC: 1.18 MMOL/L — SIGNIFICANT CHANGE UP (ref 1.15–1.29)
CALCIUM SERPL-MCNC: 7.4 MG/DL — LOW (ref 8.4–10.5)
CHLORIDE SERPL-SCNC: 101 MMOL/L — SIGNIFICANT CHANGE UP (ref 98–107)
CO2 SERPL-SCNC: 26 MMOL/L — SIGNIFICANT CHANGE UP (ref 22–31)
CREAT SERPL-MCNC: 0.35 MG/DL — LOW (ref 0.5–1.3)
EOSINOPHIL # BLD AUTO: 0.01 K/UL — SIGNIFICANT CHANGE UP (ref 0–0.5)
EOSINOPHIL NFR BLD AUTO: 0.1 % — SIGNIFICANT CHANGE UP (ref 0–6)
GLUCOSE BLDA-MCNC: 139 MG/DL — HIGH (ref 70–99)
GLUCOSE BLDA-MCNC: 162 MG/DL — HIGH (ref 70–99)
GLUCOSE BLDA-MCNC: 169 MG/DL — HIGH (ref 70–99)
GLUCOSE SERPL-MCNC: 151 MG/DL — HIGH (ref 70–99)
HCO3 BLDA-SCNC: 28 MMOL/L — HIGH (ref 22–26)
HCO3 BLDA-SCNC: 29 MMOL/L — HIGH (ref 22–26)
HCO3 BLDA-SCNC: 31 MMOL/L — HIGH (ref 22–26)
HCT VFR BLD CALC: 25 % — LOW (ref 39–50)
HCT VFR BLDA CALC: 23.5 % — LOW (ref 39–51)
HCT VFR BLDA CALC: 25.1 % — LOW (ref 39–51)
HCT VFR BLDA CALC: 26 % — LOW (ref 39–51)
HGB BLD-MCNC: 8.3 G/DL — LOW (ref 13–17)
HGB BLDA-MCNC: 7.5 G/DL — LOW (ref 13–17)
HGB BLDA-MCNC: 8.1 G/DL — LOW (ref 13–17)
HGB BLDA-MCNC: 8.4 G/DL — LOW (ref 13–17)
IMM GRANULOCYTES # BLD AUTO: 0.05 # — SIGNIFICANT CHANGE UP
IMM GRANULOCYTES NFR BLD AUTO: 0.5 % — SIGNIFICANT CHANGE UP (ref 0–1.5)
LACTATE BLDA-SCNC: 1.6 MMOL/L — SIGNIFICANT CHANGE UP (ref 0.5–2)
LACTATE BLDA-SCNC: 1.6 MMOL/L — SIGNIFICANT CHANGE UP (ref 0.5–2)
LACTATE BLDA-SCNC: 2.1 MMOL/L — HIGH (ref 0.5–2)
LYMPHOCYTES # BLD AUTO: 1.97 K/UL — SIGNIFICANT CHANGE UP (ref 1–3.3)
LYMPHOCYTES # BLD AUTO: 21 % — SIGNIFICANT CHANGE UP (ref 13–44)
MAGNESIUM SERPL-MCNC: 2.2 MG/DL — SIGNIFICANT CHANGE UP (ref 1.6–2.6)
MCHC RBC-ENTMCNC: 33.2 % — SIGNIFICANT CHANGE UP (ref 32–36)
MCHC RBC-ENTMCNC: 33.9 PG — SIGNIFICANT CHANGE UP (ref 27–34)
MCV RBC AUTO: 102 FL — HIGH (ref 80–100)
MONOCYTES # BLD AUTO: 0.47 K/UL — SIGNIFICANT CHANGE UP (ref 0–0.9)
MONOCYTES NFR BLD AUTO: 5 % — SIGNIFICANT CHANGE UP (ref 2–14)
NEUTROPHILS # BLD AUTO: 6.86 K/UL — SIGNIFICANT CHANGE UP (ref 1.8–7.4)
NEUTROPHILS NFR BLD AUTO: 73.2 % — SIGNIFICANT CHANGE UP (ref 43–77)
NON-GYNECOLOGICAL CYTOLOGY STUDY: SIGNIFICANT CHANGE UP
NRBC # FLD: 0.2 — SIGNIFICANT CHANGE UP
NRBC FLD-RTO: 2.1 — SIGNIFICANT CHANGE UP
PCO2 BLDA: 38 MMHG — SIGNIFICANT CHANGE UP (ref 35–48)
PCO2 BLDA: 41 MMHG — SIGNIFICANT CHANGE UP (ref 35–48)
PCO2 BLDA: 43 MMHG — SIGNIFICANT CHANGE UP (ref 35–48)
PH BLDA: 7.43 PH — SIGNIFICANT CHANGE UP (ref 7.35–7.45)
PH BLDA: 7.49 PH — HIGH (ref 7.35–7.45)
PH BLDA: 7.5 PH — HIGH (ref 7.35–7.45)
PHOSPHATE SERPL-MCNC: 3 MG/DL — SIGNIFICANT CHANGE UP (ref 2.5–4.5)
PLATELET # BLD AUTO: 358 K/UL — SIGNIFICANT CHANGE UP (ref 150–400)
PMV BLD: 11 FL — SIGNIFICANT CHANGE UP (ref 7–13)
PO2 BLDA: 68 MMHG — LOW (ref 83–108)
PO2 BLDA: 76 MMHG — LOW (ref 83–108)
PO2 BLDA: 92 MMHG — SIGNIFICANT CHANGE UP (ref 83–108)
POTASSIUM BLDA-SCNC: 3.2 MMOL/L — LOW (ref 3.4–4.5)
POTASSIUM BLDA-SCNC: 3.3 MMOL/L — LOW (ref 3.4–4.5)
POTASSIUM BLDA-SCNC: 3.3 MMOL/L — LOW (ref 3.4–4.5)
POTASSIUM SERPL-MCNC: 3.4 MMOL/L — LOW (ref 3.5–5.3)
POTASSIUM SERPL-SCNC: 3.4 MMOL/L — LOW (ref 3.5–5.3)
PROT SERPL-MCNC: 5.4 G/DL — LOW (ref 6–8.3)
RBC # BLD: 2.45 M/UL — LOW (ref 4.2–5.8)
RBC # FLD: 17.6 % — HIGH (ref 10.3–14.5)
SAO2 % BLDA: 93.6 % — LOW (ref 95–99)
SAO2 % BLDA: 95.8 % — SIGNIFICANT CHANGE UP (ref 95–99)
SAO2 % BLDA: 97.6 % — SIGNIFICANT CHANGE UP (ref 95–99)
SODIUM BLDA-SCNC: 133 MMOL/L — LOW (ref 136–146)
SODIUM BLDA-SCNC: 133 MMOL/L — LOW (ref 136–146)
SODIUM BLDA-SCNC: 136 MMOL/L — SIGNIFICANT CHANGE UP (ref 136–146)
SODIUM SERPL-SCNC: 136 MMOL/L — SIGNIFICANT CHANGE UP (ref 135–145)
SPECIMEN SOURCE: SIGNIFICANT CHANGE UP
WBC # BLD: 9.38 K/UL — SIGNIFICANT CHANGE UP (ref 3.8–10.5)
WBC # FLD AUTO: 9.38 K/UL — SIGNIFICANT CHANGE UP (ref 3.8–10.5)

## 2018-12-19 PROCEDURE — 74177 CT ABD & PELVIS W/CONTRAST: CPT | Mod: 26

## 2018-12-19 PROCEDURE — 99291 CRITICAL CARE FIRST HOUR: CPT

## 2018-12-19 PROCEDURE — 71260 CT THORAX DX C+: CPT | Mod: 26

## 2018-12-19 RX ORDER — ALBUMIN HUMAN 25 %
50 VIAL (ML) INTRAVENOUS EVERY 6 HOURS
Qty: 0 | Refills: 0 | Status: COMPLETED | OUTPATIENT
Start: 2018-12-19 | End: 2018-12-20

## 2018-12-19 RX ORDER — MIDAZOLAM HYDROCHLORIDE 1 MG/ML
0.04 INJECTION, SOLUTION INTRAMUSCULAR; INTRAVENOUS
Qty: 100 | Refills: 0 | Status: DISCONTINUED | OUTPATIENT
Start: 2018-12-20 | End: 2018-12-20

## 2018-12-19 RX ORDER — POTASSIUM CHLORIDE 20 MEQ
40 PACKET (EA) ORAL ONCE
Qty: 0 | Refills: 0 | Status: COMPLETED | OUTPATIENT
Start: 2018-12-19 | End: 2018-12-19

## 2018-12-19 RX ORDER — FUROSEMIDE 40 MG
40 TABLET ORAL ONCE
Qty: 0 | Refills: 0 | Status: COMPLETED | OUTPATIENT
Start: 2018-12-19 | End: 2018-12-19

## 2018-12-19 RX ORDER — POTASSIUM CHLORIDE 20 MEQ
40 PACKET (EA) ORAL ONCE
Qty: 0 | Refills: 0 | Status: DISCONTINUED | OUTPATIENT
Start: 2018-12-19 | End: 2018-12-19

## 2018-12-19 RX ORDER — VANCOMYCIN HCL 1 G
1250 VIAL (EA) INTRAVENOUS EVERY 12 HOURS
Qty: 0 | Refills: 0 | Status: DISCONTINUED | OUTPATIENT
Start: 2018-12-19 | End: 2018-12-20

## 2018-12-19 RX ADMIN — CHLORHEXIDINE GLUCONATE 1 APPLICATION(S): 213 SOLUTION TOPICAL at 12:14

## 2018-12-19 RX ADMIN — Medication 1 APPLICATION(S): at 05:23

## 2018-12-19 RX ADMIN — Medication 1 APPLICATION(S): at 17:51

## 2018-12-19 RX ADMIN — MIDAZOLAM HYDROCHLORIDE 3.99 MG/KG/HR: 1 INJECTION, SOLUTION INTRAMUSCULAR; INTRAVENOUS at 02:17

## 2018-12-19 RX ADMIN — IMIPENEM AND CILASTATIN 100 MILLIGRAM(S): 250; 250 INJECTION, POWDER, FOR SOLUTION INTRAVENOUS at 23:56

## 2018-12-19 RX ADMIN — IMIPENEM AND CILASTATIN 100 MILLIGRAM(S): 250; 250 INJECTION, POWDER, FOR SOLUTION INTRAVENOUS at 17:48

## 2018-12-19 RX ADMIN — IMIPENEM AND CILASTATIN 100 MILLIGRAM(S): 250; 250 INJECTION, POWDER, FOR SOLUTION INTRAVENOUS at 05:54

## 2018-12-19 RX ADMIN — LACOSAMIDE 120 MILLIGRAM(S): 50 TABLET ORAL at 05:11

## 2018-12-19 RX ADMIN — Medication 50 MILLILITER(S): at 23:40

## 2018-12-19 RX ADMIN — Medication 40 MILLIEQUIVALENT(S): at 05:12

## 2018-12-19 RX ADMIN — Medication 50 MILLILITER(S): at 12:33

## 2018-12-19 RX ADMIN — ENOXAPARIN SODIUM 40 MILLIGRAM(S): 100 INJECTION SUBCUTANEOUS at 15:36

## 2018-12-19 RX ADMIN — Medication 9.35 MICROGRAM(S)/KG/MIN: at 09:46

## 2018-12-19 RX ADMIN — Medication 40 MILLIGRAM(S): at 12:13

## 2018-12-19 RX ADMIN — MIDAZOLAM HYDROCHLORIDE 3.99 MG/KG/HR: 1 INJECTION, SOLUTION INTRAMUSCULAR; INTRAVENOUS at 09:47

## 2018-12-19 RX ADMIN — Medication 9.35 MICROGRAM(S)/KG/MIN: at 02:17

## 2018-12-19 RX ADMIN — Medication 50 MILLILITER(S): at 17:48

## 2018-12-19 RX ADMIN — LACOSAMIDE 120 MILLIGRAM(S): 50 TABLET ORAL at 17:48

## 2018-12-19 RX ADMIN — Medication 166.67 MILLIGRAM(S): at 15:28

## 2018-12-19 RX ADMIN — IMIPENEM AND CILASTATIN 100 MILLIGRAM(S): 250; 250 INJECTION, POWDER, FOR SOLUTION INTRAVENOUS at 12:13

## 2018-12-19 NOTE — PROGRESS NOTE ADULT - ASSESSMENT
Patient is a 52 y/o M (resident of [facility]) w/ a PMHx of TBI, s/p PEG tube, contracture, and seizure d/o who presented as a transfer from [facility] on 12/9 for respiratory failure 2/2 ARDS likely 2/2 necrotizing pancreatitis s/p intubation.    # Neuro  - Currently sedated w/ a Versed gtt. Weaning down on dose.  - Patient was on Depakote at home for history of seizure disorder; however, this was discontinued because Depakote can potentially cause pancreatitis. vEEGs have shown no epileptiform abnormalities (though patient currently on a Versed gtt).  - Neurology consulted for assistance with seizure management. Patient was subsequently started on IV Vimpat. C/w IV Vimpat per Neuro recs    # CV   - Patient currently off Norepinephrine gtt. Continue to monitor BP.  - Bedside TY was performed which showed an LV that appears to be functioning normally with mild-moderate enlargement of RV, no evidence of fluid responsiveness, ?bicuspid aortic valve with no evidence of AI. ? small pericardial effusion, no tamponade physiology.    # Resp:   - Patient currently intubated 2/2 moderate ARDS 2/2 necrotizing pancreatitis  - Patient currently on VDR ventilator. Will closely monitor respiratory status and check serial blood gases.    # GI  - Patient found to have necrotizing pancreatitis. Unclear etiology for pancreatitis at this time. Patient is a resident of NH and with no significant alcohol history. OSH CT A+P showed gallbladder pathology. Repeat CT with normal gallbladder wall and duct. TG was found to be elevated in the hospital, though patient was on a Propofol gtt at the time and it has been downtrending since the Propofol was discontinued.  - C/w imipenem day 8 for necrotizing pancreatitis for now.   - Patient found to have a large stool burden on imaging initially. He is s/p manual disimpaction and has now started having BMs s/p aggressive bowel regimen.   - Patient currently on tube feeds via PEG tube    # /Renal   - C/w Ahuja for now  - Diuresis on hold as patient was hypotensive requiring pressors since yesterday.    # ID   - Patient in shock on arrival. Likely source is infectious vs distributive   - BAL on 12/12 was positive for pseudomonas aeruginosa.  C/w IV Imipenem for necrotizing pancreatitis and PNA coverage.   - Started on vanco 12/18 overnight for empiric coverage. Patient spiked fever.  - Patient s/p antiviral regimen (Valtrex followed by acyclovir) for shingles (Acyclovir discontinued yesterday)  - Monitor CBC and fever curve    # Endo  - No active issues (serum glucose WNL)    # DVT PPx:   - C/w Lovenox

## 2018-12-19 NOTE — CHART NOTE - NSCHARTNOTEFT_GEN_A_CORE
INTERVENTIONAL RADIOLOGY    53 year old male with necrotic pancreatitis and peripancreatic fluid collections, with increased size of the fluid in the lesser omentum noted on recent CT dated 12/19/2018, and recent fever. Imaging was reviewed with Dr. Ellis. There is no window to access the peripancreatic fluid in the lesser sac percutaneously, so percutaneous drainage is not feasible at this time.

## 2018-12-19 NOTE — CHART NOTE - NSCHARTNOTEFT_GEN_A_CORE
: Oswaldo Alonzo    INDICATION: ARDS    PROCEDURE:  [ x ] LIMITED ECHO  [ x ] LIMITED CHEST  [ ] LIMITED RETROPERITONEAL  [ ] LIMITED ABDOMINAL  [ ] LIMITED DVT  [ ] NEEDLE GUIDANCE VASCULAR  [ ] NEEDLE GUIDANCE THORACENTESIS  [ ] NEEDLE GUIDANCE PARACENTESIS  [ ] NEEDLE GUIDANCE PERICARDIOCENTESIS  [ ] OTHER    FINDINGS:  Bibasilar consolidations, improving on right.   Scattered anterior b-lines.  Small pericardial effusion.  Grossly preserved LV function.    INTERPRETATION:  Bibasilar consolidations consistent with ARDS.

## 2018-12-19 NOTE — PROGRESS NOTE ADULT - SUBJECTIVE AND OBJECTIVE BOX
Interval Events: Spiked fever last night. BCx sent. Patient required pressor support since yesterday.    REVIEW OF SYSTEMS:  Constitutional: [ ] negative [ ] fevers [ ] chills [ ] weight loss [ ] weight gain  HEENT: [ ] negative [ ] dry eyes [ ] eye irritation [ ] postnasal drip [ ] nasal congestion  CV: [ ] negative  [ ] chest pain [ ] orthopnea [ ] palpitations [ ] murmur  Resp: [ ] negative [ ] cough [ ] shortness of breath [ ] dyspnea [ ] wheezing [ ] sputum [ ] hemoptysis  GI: [ ] negative [ ] nausea [ ] vomiting [ ] diarrhea [ ] constipation [ ] abd pain [ ] dysphagia   : [ ] negative [ ] dysuria [ ] nocturia [ ] hematuria [ ] increased urinary frequency  Musculoskeletal: [ ] negative [ ] back pain [ ] myalgias [ ] arthralgias [ ] fracture  Skin: [ ] negative [ ] rash [ ] itch  Neurological: [ ] negative [ ] headache [ ] dizziness [ ] syncope [ ] weakness [ ] numbness  Psychiatric: [ ] negative [ ] anxiety [ ] depression  Endocrine: [ ] negative [ ] diabetes [ ] thyroid problem  Hematologic/Lymphatic: [ ] negative [ ] anemia [ ] bleeding problem  Allergic/Immunologic: [ ] negative [ ] itchy eyes [ ] nasal discharge [ ] hives [ ] angioedema  [ ] All other systems negative  [x] Unable to assess ROS because patient intubated.    OBJECTIVE:  ICU Vital Signs Last 24 Hrs  T(C): 37.1 (19 Dec 2018 00:00), Max: 38.4 (18 Dec 2018 20:00)  T(F): 98.8 (19 Dec 2018 00:00), Max: 101.2 (18 Dec 2018 20:00)  HR: 88 (19 Dec 2018 07:05) (80 - 127)  BP: 86/45 (18 Dec 2018 09:23) (86/45 - 86/45)  BP(mean): 53 (18 Dec 2018 09:23) (53 - 53)  ABP: 127/62 (19 Dec 2018 07:00) (77/55 - 127/62)  ABP(mean): 83 (19 Dec 2018 07:00) (55 - 85)  RR: 20 (19 Dec 2018 07:00) (7 - 20)  SpO2: 96% (19 Dec 2018 07:05) (94% - 97%)    Mode: VDR, RR (machine): 14, PEEP: 12, ITime: 1, MAP: 19, PC: 23, PIP: 43, Frequency: 600    12-18 @ 07:01  -   @ 07:00  --------------------------------------------------------  IN: 1868.7 mL / OUT: 1355 mL / NET: 513.7 mL    GENERAL: Sedated  HEENT:  NC/AT, Slightly dry mucous membranes  NECK: Supple  CHEST/LUNG: Vent sounds appreciated, Slightly decreased at bases  HEART: Tachycardic, Regular rhythm  ABDOMEN: Distended (slightly improved), hypoactive bowel sounds, + PEG tube in place  GENITOURINARY: Ahuja in place draining dark yellow urine  EXTREMITIES: 1+ edema of B/L LEs  NEUROLOGY: Sedated, (Pupils equal, round, and each react sluggishly to light)  SKIN: Left back crusted lesion, Warm and dry  PSYCHIATRY: Unable to assess    HOSPITAL MEDICATIONS:  enoxaparin Injectable 40 milliGRAM(s) SubCutaneous every 24 hours    imipenem/cilastatin  IVPB      imipenem/cilastatin  IVPB 500 milliGRAM(s) IV Intermittent every 6 hours  vancomycin  IVPB 1500 milliGRAM(s) IV Intermittent every 12 hours  vancomycin  IVPB        norepinephrine Infusion 0.1 MICROgram(s)/kG/Min IV Continuous <Continuous>    acetaminophen    Suspension .. 650 milliGRAM(s) Oral every 6 hours PRN  lacosamide IVPB 100 milliGRAM(s) IV Intermittent every 12 hours  metoclopramide Injectable 5 milliGRAM(s) IV Push every 8 hours PRN  midazolam Infusion 0.04 mG/kG/Hr IV Continuous <Continuous    chlorhexidine 4% Liquid 1 Application(s) Topical <User Schedule>  petrolatum Ophthalmic Ointment 1 Application(s) Both EYES two times a day        LABS:                        8.3    9.38  )-----------( 358      ( 19 Dec 2018 03:40 )             25.0     Hgb Trend: 8.3<--, 8.6<--, 8.8<--, 8.0<--, 9.8<--      136  |  101  |  17  ----------------------------<  151<H>  3.4<L>   |  26  |  0.35<L>    Ca    7.4<L>      19 Dec 2018 03:40  Phos  3.0       Mg     2.2         TPro  5.4<L>  /  Alb  1.6<L>  /  TBili  2.9<H>  /  DBili  x   /  AST  58<H>  /  ALT  38  /  AlkPhos  390<H>      Creatinine Trend: 0.35<--, 0.41<--, 0.39<--, 0.35<--, 0.36<--, 0.33<--    Urinalysis Basic - ( 18 Dec 2018 21:32 )    Color: DARK YELLOW / Appearance: CLEAR / S.029 / pH: 6.5  Gluc: NEGATIVE / Ketone: TRACE  / Bili: TRACE / Urobili: MODERATE   Blood: TRACE / Protein: 30 / Nitrite: NEGATIVE   Leuk Esterase: NEGATIVE / RBC: 6-10 / WBC 11-25   Sq Epi: FEW / Non Sq Epi: x / Bacteria: SMALL      Arterial Blood Gas:   @ 03:40  7.49/41/68/31/93.6/7.1  ABG lactate: 2.1  Arterial Blood Gas:   @ 18:30  7.49/42/76/32/95.1/7.8  ABG lactate: 1.4  Arterial Blood Gas:   @ 12:55  7.49/42/68/32/93.9/8.1  ABG lactate: 1.8  Arterial Blood Gas:   @ 05:20  7.52/39/77/32/95.6/7.7  ABG lactate: 2.3  Arterial Blood Gas:   @ 00:21  7.49/43/104/32/98.3/8.7  ABG lactate: 2.4  Arterial Blood Gas:   @ 18:52  7.52/40/71/32/94.8/8.5  ABG lactate: 2.5  Arterial Blood Gas:   @ 11:40  7.47/44/70/31/95.2/7.8  ABG lactate: 2.2

## 2018-12-20 LAB
ALBUMIN SERPL ELPH-MCNC: 2 G/DL — LOW (ref 3.3–5)
ALP SERPL-CCNC: 350 U/L — HIGH (ref 40–120)
ALT FLD-CCNC: 28 U/L — SIGNIFICANT CHANGE UP (ref 4–41)
AST SERPL-CCNC: 40 U/L — SIGNIFICANT CHANGE UP (ref 4–40)
BASE EXCESS BLDA CALC-SCNC: 5.1 MMOL/L — SIGNIFICANT CHANGE UP
BASOPHILS # BLD AUTO: 0.01 K/UL — SIGNIFICANT CHANGE UP (ref 0–0.2)
BASOPHILS NFR BLD AUTO: 0.2 % — SIGNIFICANT CHANGE UP (ref 0–2)
BILIRUB SERPL-MCNC: 2.3 MG/DL — HIGH (ref 0.2–1.2)
BUN SERPL-MCNC: 13 MG/DL — SIGNIFICANT CHANGE UP (ref 7–23)
CALCIUM SERPL-MCNC: 7.7 MG/DL — LOW (ref 8.4–10.5)
CHLORIDE BLDA-SCNC: 106 MMOL/L — SIGNIFICANT CHANGE UP (ref 96–108)
CHLORIDE SERPL-SCNC: 103 MMOL/L — SIGNIFICANT CHANGE UP (ref 98–107)
CO2 SERPL-SCNC: 27 MMOL/L — SIGNIFICANT CHANGE UP (ref 22–31)
CREAT SERPL-MCNC: 0.3 MG/DL — LOW (ref 0.5–1.3)
EOSINOPHIL # BLD AUTO: 0.02 K/UL — SIGNIFICANT CHANGE UP (ref 0–0.5)
EOSINOPHIL NFR BLD AUTO: 0.4 % — SIGNIFICANT CHANGE UP (ref 0–6)
GLUCOSE BLDA-MCNC: 141 MG/DL — HIGH (ref 70–99)
GLUCOSE SERPL-MCNC: 143 MG/DL — HIGH (ref 70–99)
HCO3 BLDA-SCNC: 29 MMOL/L — HIGH (ref 22–26)
HCT VFR BLD CALC: 22.4 % — LOW (ref 39–50)
HCT VFR BLDA CALC: 23.2 % — LOW (ref 39–51)
HGB BLD-MCNC: 7.2 G/DL — LOW (ref 13–17)
HGB BLDA-MCNC: 7.4 G/DL — LOW (ref 13–17)
IMM GRANULOCYTES # BLD AUTO: 0.01 # — SIGNIFICANT CHANGE UP
IMM GRANULOCYTES NFR BLD AUTO: 0.2 % — SIGNIFICANT CHANGE UP (ref 0–1.5)
LACTATE BLDA-SCNC: 1.7 MMOL/L — SIGNIFICANT CHANGE UP (ref 0.5–2)
LYMPHOCYTES # BLD AUTO: 0.94 K/UL — LOW (ref 1–3.3)
LYMPHOCYTES # BLD AUTO: 20.1 % — SIGNIFICANT CHANGE UP (ref 13–44)
MAGNESIUM SERPL-MCNC: 2.1 MG/DL — SIGNIFICANT CHANGE UP (ref 1.6–2.6)
MCHC RBC-ENTMCNC: 32.1 % — SIGNIFICANT CHANGE UP (ref 32–36)
MCHC RBC-ENTMCNC: 33.8 PG — SIGNIFICANT CHANGE UP (ref 27–34)
MCV RBC AUTO: 105.2 FL — HIGH (ref 80–100)
MONOCYTES # BLD AUTO: 0.31 K/UL — SIGNIFICANT CHANGE UP (ref 0–0.9)
MONOCYTES NFR BLD AUTO: 6.6 % — SIGNIFICANT CHANGE UP (ref 2–14)
NEUTROPHILS # BLD AUTO: 3.38 K/UL — SIGNIFICANT CHANGE UP (ref 1.8–7.4)
NEUTROPHILS NFR BLD AUTO: 72.5 % — SIGNIFICANT CHANGE UP (ref 43–77)
NRBC # FLD: 0.09 — SIGNIFICANT CHANGE UP
NRBC FLD-RTO: 1.9 — SIGNIFICANT CHANGE UP
PCO2 BLDA: 43 MMHG — SIGNIFICANT CHANGE UP (ref 35–48)
PH BLDA: 7.45 PH — SIGNIFICANT CHANGE UP (ref 7.35–7.45)
PHOSPHATE SERPL-MCNC: 2.6 MG/DL — SIGNIFICANT CHANGE UP (ref 2.5–4.5)
PLATELET # BLD AUTO: 253 K/UL — SIGNIFICANT CHANGE UP (ref 150–400)
PMV BLD: 10.7 FL — SIGNIFICANT CHANGE UP (ref 7–13)
PO2 BLDA: 107 MMHG — SIGNIFICANT CHANGE UP (ref 83–108)
POTASSIUM BLDA-SCNC: 3.4 MMOL/L — SIGNIFICANT CHANGE UP (ref 3.4–4.5)
POTASSIUM SERPL-MCNC: 3.6 MMOL/L — SIGNIFICANT CHANGE UP (ref 3.5–5.3)
POTASSIUM SERPL-SCNC: 3.6 MMOL/L — SIGNIFICANT CHANGE UP (ref 3.5–5.3)
PROT SERPL-MCNC: 5.3 G/DL — LOW (ref 6–8.3)
RBC # BLD: 2.13 M/UL — LOW (ref 4.2–5.8)
RBC # FLD: 18.1 % — HIGH (ref 10.3–14.5)
SAO2 % BLDA: 98.7 % — SIGNIFICANT CHANGE UP (ref 95–99)
SODIUM BLDA-SCNC: 134 MMOL/L — LOW (ref 136–146)
SODIUM SERPL-SCNC: 138 MMOL/L — SIGNIFICANT CHANGE UP (ref 135–145)
VANCOMYCIN TROUGH SERPL-MCNC: 7.6 UG/ML — LOW (ref 10–20)
WBC # BLD: 4.67 K/UL — SIGNIFICANT CHANGE UP (ref 3.8–10.5)
WBC # FLD AUTO: 4.67 K/UL — SIGNIFICANT CHANGE UP (ref 3.8–10.5)

## 2018-12-20 PROCEDURE — 99291 CRITICAL CARE FIRST HOUR: CPT

## 2018-12-20 RX ORDER — LACOSAMIDE 50 MG/1
100 TABLET ORAL EVERY 12 HOURS
Qty: 0 | Refills: 0 | Status: DISCONTINUED | OUTPATIENT
Start: 2018-12-20 | End: 2018-12-21

## 2018-12-20 RX ORDER — FOLIC ACID 0.8 MG
1 TABLET ORAL ONCE
Qty: 0 | Refills: 0 | Status: COMPLETED | OUTPATIENT
Start: 2018-12-20 | End: 2018-12-20

## 2018-12-20 RX ORDER — VANCOMYCIN HCL 1 G
1500 VIAL (EA) INTRAVENOUS EVERY 12 HOURS
Qty: 0 | Refills: 0 | Status: DISCONTINUED | OUTPATIENT
Start: 2018-12-20 | End: 2018-12-20

## 2018-12-20 RX ORDER — PREGABALIN 225 MG/1
1000 CAPSULE ORAL ONCE
Qty: 0 | Refills: 0 | Status: COMPLETED | OUTPATIENT
Start: 2018-12-20 | End: 2018-12-20

## 2018-12-20 RX ORDER — FUROSEMIDE 40 MG
40 TABLET ORAL ONCE
Qty: 0 | Refills: 0 | Status: COMPLETED | OUTPATIENT
Start: 2018-12-20 | End: 2018-12-20

## 2018-12-20 RX ORDER — ALBUMIN HUMAN 25 %
50 VIAL (ML) INTRAVENOUS ONCE
Qty: 0 | Refills: 0 | Status: COMPLETED | OUTPATIENT
Start: 2018-12-20 | End: 2018-12-20

## 2018-12-20 RX ADMIN — ENOXAPARIN SODIUM 40 MILLIGRAM(S): 100 INJECTION SUBCUTANEOUS at 13:47

## 2018-12-20 RX ADMIN — Medication 50 MILLILITER(S): at 16:49

## 2018-12-20 RX ADMIN — PREGABALIN 1000 MICROGRAM(S): 225 CAPSULE ORAL at 16:50

## 2018-12-20 RX ADMIN — Medication 1 APPLICATION(S): at 06:16

## 2018-12-20 RX ADMIN — CHLORHEXIDINE GLUCONATE 1 APPLICATION(S): 213 SOLUTION TOPICAL at 07:19

## 2018-12-20 RX ADMIN — IMIPENEM AND CILASTATIN 100 MILLIGRAM(S): 250; 250 INJECTION, POWDER, FOR SOLUTION INTRAVENOUS at 13:46

## 2018-12-20 RX ADMIN — IMIPENEM AND CILASTATIN 100 MILLIGRAM(S): 250; 250 INJECTION, POWDER, FOR SOLUTION INTRAVENOUS at 18:46

## 2018-12-20 RX ADMIN — LACOSAMIDE 120 MILLIGRAM(S): 50 TABLET ORAL at 05:43

## 2018-12-20 RX ADMIN — IMIPENEM AND CILASTATIN 100 MILLIGRAM(S): 250; 250 INJECTION, POWDER, FOR SOLUTION INTRAVENOUS at 23:22

## 2018-12-20 RX ADMIN — Medication 166.67 MILLIGRAM(S): at 03:42

## 2018-12-20 RX ADMIN — Medication 40 MILLIGRAM(S): at 13:47

## 2018-12-20 RX ADMIN — Medication 1 MILLIGRAM(S): at 13:47

## 2018-12-20 RX ADMIN — IMIPENEM AND CILASTATIN 100 MILLIGRAM(S): 250; 250 INJECTION, POWDER, FOR SOLUTION INTRAVENOUS at 06:16

## 2018-12-20 RX ADMIN — LACOSAMIDE 120 MILLIGRAM(S): 50 TABLET ORAL at 17:18

## 2018-12-20 RX ADMIN — Medication 50 MILLILITER(S): at 06:14

## 2018-12-20 RX ADMIN — Medication 1 APPLICATION(S): at 17:18

## 2018-12-20 RX ADMIN — MIDAZOLAM HYDROCHLORIDE 3.99 MG/KG/HR: 1 INJECTION, SOLUTION INTRAMUSCULAR; INTRAVENOUS at 00:05

## 2018-12-20 RX ADMIN — Medication 9.35 MICROGRAM(S)/KG/MIN: at 00:06

## 2018-12-20 NOTE — PROGRESS NOTE ADULT - ASSESSMENT
54 y/o M [facility] w/ a PMHx of TBI, s/p PEG tube, contracture, and seizure d/o who presented as a transfer from [facility] on 12/9 for respiratory failure 2/2 ARDS likely 2/2 necrotizing pancreatitis s/p intubation.    # Neuro  - Currently sedated w/ a Versed gtt. Weaning down on dose.  - Patient was on Depakote at home for history of seizure disorder; however, this was discontinued because Depakote can potentially cause pancreatitis. vEEGs have shown no epileptiform abnormalities (though patient currently on a Versed gtt).  - Neurology consulted for assistance with seizure management. Patient was subsequently started on IV Vimpat. C/w IV Vimpat per Neuro recs    # CV   - Patient currently off Norepinephrine gtt. Continue to monitor BP.  - Bedside TY was performed which showed an LV that appears to be functioning normally with mild-moderate enlargement of RV, no evidence of fluid responsiveness, ?bicuspid aortic valve with no evidence of AI. ? small pericardial effusion, no tamponade physiology.    # Resp:   - Patient currently intubated 2/2 moderate ARDS 2/2 necrotizing pancreatitis  - Will closely monitor respiratory status and check serial blood gases.    # GI  - Patient found to have necrotizing pancreatitis. Unclear etiology for pancreatitis at this time. Patient is a resident of NH and with no significant alcohol history. OSH CT A+P showed gallbladder pathology. Repeat CT with normal gallbladder wall and duct. TG was found to be elevated in the hospital, though patient was on a Propofol gtt at the time and it has been downtrending since the Propofol was discontinued.  - C/w imipenem day 10 for necrotizing pancreatitis for now.   - Colitis? on CT 12/19. Monitor for diarrhea. Unlikely c. diff given clinical scenario.  - Patient currently on tube feeds via PEG tube    # /Renal   - C/w Ahuja for now  - Diuresis as tolerated as bp now acceptable off pressors.    # ID   - Patient in shock on arrival. Likely source is infectious vs distributive   - BAL on 12/12 was positive for pseudomonas aeruginosa. Repeat sputum cx w/ GNR. C/w IV Imipenem for necrotizing pancreatitis and PNA coverage.   - Started on vanco 12/18 overnight for empiric coverage. Patient afebrile last 24h.  - Patient s/p antiviral regimen (Valtrex followed by acyclovir) for shingles (Acyclovir discontinued yesterday)  - Monitor CBC and fever curve    # Endo  - No active issues (serum glucose WNL)    # DVT PPx:   - C/w Lovenox

## 2018-12-20 NOTE — PROGRESS NOTE ADULT - SUBJECTIVE AND OBJECTIVE BOX
Interval Events: No acute events overnight. Last fever > 24h ago. Adequate UOP after lasix yesterday. Changed back to conventional AC vent settings.    REVIEW OF SYSTEMS:  Constitutional: [ ] negative [ ] fevers [ ] chills [ ] weight loss [ ] weight gain  HEENT: [ ] negative [ ] dry eyes [ ] eye irritation [ ] postnasal drip [ ] nasal congestion  CV: [ ] negative  [ ] chest pain [ ] orthopnea [ ] palpitations [ ] murmur  Resp: [ ] negative [ ] cough [ ] shortness of breath [ ] dyspnea [ ] wheezing [ ] sputum [ ] hemoptysis  GI: [ ] negative [ ] nausea [ ] vomiting [ ] diarrhea [ ] constipation [ ] abd pain [ ] dysphagia   : [ ] negative [ ] dysuria [ ] nocturia [ ] hematuria [ ] increased urinary frequency  Musculoskeletal: [ ] negative [ ] back pain [ ] myalgias [ ] arthralgias [ ] fracture  Skin: [ ] negative [ ] rash [ ] itch  Neurological: [ ] negative [ ] headache [ ] dizziness [ ] syncope [ ] weakness [ ] numbness  Psychiatric: [ ] negative [ ] anxiety [ ] depression  Endocrine: [ ] negative [ ] diabetes [ ] thyroid problem  Hematologic/Lymphatic: [ ] negative [ ] anemia [ ] bleeding problem  Allergic/Immunologic: [ ] negative [ ] itchy eyes [ ] nasal discharge [ ] hives [ ] angioedema  [ ] All other systems negative  [x] Unable to assess ROS because patient sedated.    OBJECTIVE:  T(C): 37.1 (18 @ 04:00), Max: 37.8 (18 @ 16:00)  HR: 102 (18 @ 07:00) (79 - 123)  BP: 108/53 (18 @ 07:00) (108/53 - 122/60)  RR: 20 (18 @ 07:00) (12 - 25)  SpO2: 99% (18 @ 07:00) (93% - 100%)  Wt(kg): --  GENERAL: Sedated  HEENT:  NC/AT, Slightly dry mucous membranes  NECK: Supple  CHEST/LUNG: Vent sounds appreciated, Slightly decreased at bases  HEART: Tachycardic, Regular rhythm  ABDOMEN: Distended (slightly improved), hypoactive bowel sounds, + PEG tube in place  GENITOURINARY: Ahuja in place draining dark yellow urine  EXTREMITIES: 1+ edema of B/L LEs  NEUROLOGY: Sedated, (Pupils equal, round, and each react sluggishly to light)  SKIN: Left back crusted lesion, Warm and dry  PSYCHIATRY: Unable to assess    HOSPITAL MEDICATIONS:  enoxaparin Injectable 40 milliGRAM(s) SubCutaneous every 24 hours    imipenem/cilastatin  IVPB      imipenem/cilastatin  IVPB 500 milliGRAM(s) IV Intermittent every 6 hours  vancomycin  IVPB 1500 milliGRAM(s) IV Intermittent every 12 hours    norepinephrine Infusion 0.1 MICROgram(s)/kG/Min IV Continuous <Continuous>    acetaminophen    Suspension .. 650 milliGRAM(s) Oral every 6 hours PRN  lacosamide IVPB 100 milliGRAM(s) IV Intermittent every 12 hours  metoclopramide Injectable 5 milliGRAM(s) IV Push every 8 hours PRN  midazolam Infusion 0.04 mG/kG/Hr IV Continuous <Continuous>    chlorhexidine 4% Liquid 1 Application(s) Topical <User Schedule>  petrolatum Ophthalmic Ointment 1 Application(s) Both EYES two times a day    LABS:                        7.2    4.67  )-----------( 253      ( 20 Dec 2018 03:30 )             22.4     Hgb Trend: 7.2<--, 8.3<--, 8.6<--, 8.8<--, 8.0<--  12-20    138  |  103  |  13  ----------------------------<  143<H>  3.6   |  27  |  0.30<L>    Ca    7.7<L>      20 Dec 2018 03:30  Phos  2.6     12-20  Mg     2.1     12-20    TPro  5.3<L>  /  Alb  2.0<L>  /  TBili  2.3<H>  /  DBili  x   /  AST  40  /  ALT  28  /  AlkPhos  350<H>  12-20    Creatinine Trend: 0.30<--, 0.35<--, 0.41<--, 0.39<--, 0.35<--, 0.36<--    Urinalysis Basic - ( 18 Dec 2018 21:32 )    Color: DARK YELLOW / Appearance: CLEAR / S.029 / pH: 6.5  Gluc: NEGATIVE / Ketone: TRACE  / Bili: TRACE / Urobili: MODERATE   Blood: TRACE / Protein: 30 / Nitrite: NEGATIVE   Leuk Esterase: NEGATIVE / RBC: 6-10 / WBC 11-25   Sq Epi: FEW / Non Sq Epi: x / Bacteria: SMALL      Arterial Blood Gas:   @ 03:30  7.45/43/107/29/98.7/5.1  ABG lactate: 1.7  Arterial Blood Gas:   @ 15:35  7.43/43/92/28/97.6/3.7  ABG lactate: 1.6  Arterial Blood Gas:   @ 13:10  7.50/38/76/29/95.8/5.4  ABG lactate: 1.6  Arterial Blood Gas:   @ 03:40  7.49/41/68/31/93.6/7.1  ABG lactate: 2.1  Arterial Blood Gas:   @ 18:30  7.49/42/76/32/95.1/7.8  ABG lactate: 1.4  Arterial Blood Gas:   @ 12:55  7.49/42/68/32/93.9/8.1  ABG lactate: 1.8    MICROBIOLOGY: GNR in sputum culture, BCx NGTD    RADIOLOGY:  [x] Reviewed and interpreted by me  Colitis, pancreatic small collection fluid in lessor sac, splenic vein thrombosis redemonstrated

## 2018-12-20 NOTE — PROGRESS NOTE ADULT - ATTENDING COMMENTS
doing better all around, despite CT findings of pancreatitis, ards resolving, on vent, guarded but improving

## 2018-12-21 LAB
ALBUMIN SERPL ELPH-MCNC: 2.2 G/DL — LOW (ref 3.3–5)
ALP SERPL-CCNC: 373 U/L — HIGH (ref 40–120)
ALT FLD-CCNC: 29 U/L — SIGNIFICANT CHANGE UP (ref 4–41)
AST SERPL-CCNC: 46 U/L — HIGH (ref 4–40)
BASOPHILS # BLD AUTO: 0.01 K/UL — SIGNIFICANT CHANGE UP (ref 0–0.2)
BASOPHILS NFR BLD AUTO: 0.3 % — SIGNIFICANT CHANGE UP (ref 0–2)
BILIRUB SERPL-MCNC: 2.2 MG/DL — HIGH (ref 0.2–1.2)
BUN SERPL-MCNC: 11 MG/DL — SIGNIFICANT CHANGE UP (ref 7–23)
CALCIUM SERPL-MCNC: 8.2 MG/DL — LOW (ref 8.4–10.5)
CHLORIDE SERPL-SCNC: 101 MMOL/L — SIGNIFICANT CHANGE UP (ref 98–107)
CO2 SERPL-SCNC: 27 MMOL/L — SIGNIFICANT CHANGE UP (ref 22–31)
CREAT SERPL-MCNC: 0.26 MG/DL — LOW (ref 0.5–1.3)
EOSINOPHIL # BLD AUTO: 0.01 K/UL — SIGNIFICANT CHANGE UP (ref 0–0.5)
EOSINOPHIL NFR BLD AUTO: 0.3 % — SIGNIFICANT CHANGE UP (ref 0–6)
GLUCOSE SERPL-MCNC: 112 MG/DL — HIGH (ref 70–99)
HCT VFR BLD CALC: 27.3 % — LOW (ref 39–50)
HGB BLD-MCNC: 8.6 G/DL — LOW (ref 13–17)
IMM GRANULOCYTES # BLD AUTO: 0.01 # — SIGNIFICANT CHANGE UP
IMM GRANULOCYTES NFR BLD AUTO: 0.3 % — SIGNIFICANT CHANGE UP (ref 0–1.5)
LYMPHOCYTES # BLD AUTO: 1.2 K/UL — SIGNIFICANT CHANGE UP (ref 1–3.3)
LYMPHOCYTES # BLD AUTO: 31.9 % — SIGNIFICANT CHANGE UP (ref 13–44)
MAGNESIUM SERPL-MCNC: 2.1 MG/DL — SIGNIFICANT CHANGE UP (ref 1.6–2.6)
MCHC RBC-ENTMCNC: 31.5 % — LOW (ref 32–36)
MCHC RBC-ENTMCNC: 33.6 PG — SIGNIFICANT CHANGE UP (ref 27–34)
MCV RBC AUTO: 106.6 FL — HIGH (ref 80–100)
MONOCYTES # BLD AUTO: 0.27 K/UL — SIGNIFICANT CHANGE UP (ref 0–0.9)
MONOCYTES NFR BLD AUTO: 7.2 % — SIGNIFICANT CHANGE UP (ref 2–14)
NEUTROPHILS # BLD AUTO: 2.26 K/UL — SIGNIFICANT CHANGE UP (ref 1.8–7.4)
NEUTROPHILS NFR BLD AUTO: 60 % — SIGNIFICANT CHANGE UP (ref 43–77)
NRBC # FLD: 0.06 — SIGNIFICANT CHANGE UP
NRBC FLD-RTO: 1.6 — SIGNIFICANT CHANGE UP
PHOSPHATE SERPL-MCNC: 2.4 MG/DL — LOW (ref 2.5–4.5)
PLATELET # BLD AUTO: 241 K/UL — SIGNIFICANT CHANGE UP (ref 150–400)
PMV BLD: 11.4 FL — SIGNIFICANT CHANGE UP (ref 7–13)
POTASSIUM SERPL-MCNC: 4.1 MMOL/L — SIGNIFICANT CHANGE UP (ref 3.5–5.3)
POTASSIUM SERPL-SCNC: 4.1 MMOL/L — SIGNIFICANT CHANGE UP (ref 3.5–5.3)
PROT SERPL-MCNC: 6 G/DL — SIGNIFICANT CHANGE UP (ref 6–8.3)
RBC # BLD: 2.56 M/UL — LOW (ref 4.2–5.8)
RBC # FLD: 19 % — HIGH (ref 10.3–14.5)
SODIUM SERPL-SCNC: 137 MMOL/L — SIGNIFICANT CHANGE UP (ref 135–145)
WBC # BLD: 3.76 K/UL — LOW (ref 3.8–10.5)
WBC # FLD AUTO: 3.76 K/UL — LOW (ref 3.8–10.5)

## 2018-12-21 PROCEDURE — 99291 CRITICAL CARE FIRST HOUR: CPT

## 2018-12-21 RX ORDER — FUROSEMIDE 40 MG
40 TABLET ORAL EVERY 12 HOURS
Qty: 0 | Refills: 0 | Status: DISCONTINUED | OUTPATIENT
Start: 2018-12-21 | End: 2018-12-24

## 2018-12-21 RX ORDER — LACOSAMIDE 50 MG/1
100 TABLET ORAL
Qty: 0 | Refills: 0 | Status: DISCONTINUED | OUTPATIENT
Start: 2018-12-21 | End: 2018-12-26

## 2018-12-21 RX ORDER — RISPERIDONE 4 MG/1
1 TABLET ORAL DAILY
Qty: 0 | Refills: 0 | Status: DISCONTINUED | OUTPATIENT
Start: 2018-12-21 | End: 2018-12-21

## 2018-12-21 RX ORDER — FUROSEMIDE 40 MG
40 TABLET ORAL EVERY 6 HOURS
Qty: 0 | Refills: 0 | Status: DISCONTINUED | OUTPATIENT
Start: 2018-12-21 | End: 2018-12-21

## 2018-12-21 RX ORDER — QUETIAPINE FUMARATE 200 MG/1
25 TABLET, FILM COATED ORAL DAILY
Qty: 0 | Refills: 0 | Status: DISCONTINUED | OUTPATIENT
Start: 2018-12-21 | End: 2019-01-04

## 2018-12-21 RX ORDER — ACETAMINOPHEN 500 MG
1000 TABLET ORAL ONCE
Qty: 0 | Refills: 0 | Status: COMPLETED | OUTPATIENT
Start: 2018-12-21 | End: 2018-12-21

## 2018-12-21 RX ORDER — ALBUMIN HUMAN 25 %
50 VIAL (ML) INTRAVENOUS ONCE
Qty: 0 | Refills: 0 | Status: COMPLETED | OUTPATIENT
Start: 2018-12-21 | End: 2018-12-21

## 2018-12-21 RX ORDER — FUROSEMIDE 40 MG
40 TABLET ORAL
Qty: 0 | Refills: 0 | Status: DISCONTINUED | OUTPATIENT
Start: 2018-12-21 | End: 2018-12-21

## 2018-12-21 RX ORDER — RISPERIDONE 4 MG/1
1 TABLET ORAL DAILY
Qty: 0 | Refills: 0 | Status: DISCONTINUED | OUTPATIENT
Start: 2018-12-21 | End: 2019-01-04

## 2018-12-21 RX ORDER — LACOSAMIDE 50 MG/1
100 TABLET ORAL
Qty: 0 | Refills: 0 | Status: DISCONTINUED | OUTPATIENT
Start: 2018-12-21 | End: 2018-12-21

## 2018-12-21 RX ADMIN — LACOSAMIDE 100 MILLIGRAM(S): 50 TABLET ORAL at 17:58

## 2018-12-21 RX ADMIN — Medication 1000 MILLIGRAM(S): at 21:30

## 2018-12-21 RX ADMIN — Medication 1 APPLICATION(S): at 17:42

## 2018-12-21 RX ADMIN — IMIPENEM AND CILASTATIN 100 MILLIGRAM(S): 250; 250 INJECTION, POWDER, FOR SOLUTION INTRAVENOUS at 11:33

## 2018-12-21 RX ADMIN — IMIPENEM AND CILASTATIN 100 MILLIGRAM(S): 250; 250 INJECTION, POWDER, FOR SOLUTION INTRAVENOUS at 23:35

## 2018-12-21 RX ADMIN — ENOXAPARIN SODIUM 40 MILLIGRAM(S): 100 INJECTION SUBCUTANEOUS at 15:41

## 2018-12-21 RX ADMIN — QUETIAPINE FUMARATE 25 MILLIGRAM(S): 200 TABLET, FILM COATED ORAL at 12:45

## 2018-12-21 RX ADMIN — LACOSAMIDE 120 MILLIGRAM(S): 50 TABLET ORAL at 05:42

## 2018-12-21 RX ADMIN — Medication 40 MILLIGRAM(S): at 21:10

## 2018-12-21 RX ADMIN — CHLORHEXIDINE GLUCONATE 1 APPLICATION(S): 213 SOLUTION TOPICAL at 07:55

## 2018-12-21 RX ADMIN — Medication 50 MILLILITER(S): at 12:44

## 2018-12-21 RX ADMIN — Medication 1 APPLICATION(S): at 05:43

## 2018-12-21 RX ADMIN — RISPERIDONE 1 MILLIGRAM(S): 4 TABLET ORAL at 15:41

## 2018-12-21 RX ADMIN — IMIPENEM AND CILASTATIN 100 MILLIGRAM(S): 250; 250 INJECTION, POWDER, FOR SOLUTION INTRAVENOUS at 17:42

## 2018-12-21 RX ADMIN — Medication 63.75 MILLIMOLE(S): at 06:29

## 2018-12-21 RX ADMIN — Medication 400 MILLIGRAM(S): at 21:12

## 2018-12-21 RX ADMIN — Medication 40 MILLIGRAM(S): at 11:33

## 2018-12-21 RX ADMIN — IMIPENEM AND CILASTATIN 100 MILLIGRAM(S): 250; 250 INJECTION, POWDER, FOR SOLUTION INTRAVENOUS at 05:43
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
## 2018-12-21 NOTE — PROGRESS NOTE ADULT - ASSESSMENT
54 y/o M (resident of [facility]) w/ a PMHx of TBI, s/p PEG tube, contracture, and seizure d/o who presented as a transfer from [facility] on 12/9 for respiratory failure 2/2 ARDS likely 2/2 necrotizing pancreatitis s/p intubation.    # Neuro  - Currently sedated w/ a Versed gtt. Weaning down on dose.  - Patient was on Depakote at home for history of seizure disorder; however, this was discontinued because Depakote can potentially cause pancreatitis. vEEGs have shown no epileptiform abnormalities (though patient currently on a Versed gtt).  - Neurology consulted for assistance with seizure management. Patient was subsequently started on IV Vimpat. C/w IV Vimpat per Neuro recs    # CV   - Patient currently off Norepinephrine gtt. Continue to monitor BP.  - Bedside TY was performed which showed an LV that appears to be functioning normally with mild-moderate enlargement of RV, no evidence of fluid responsiveness, ?bicuspid aortic valve with no evidence of AI. ? small pericardial effusion, no tamponade physiology.    # Resp:   - Patient currently intubated 2/2 moderate ARDS 2/2 necrotizing pancreatitis  - Will closely monitor respiratory status and check serial blood gases.  - CPAP again for resp function recovery.    # GI  - Patient found to have necrotizing pancreatitis. Unclear etiology for pancreatitis at this time. Patient is a resident of NH and with no significant alcohol history. OSH CT A+P showed gallbladder pathology. Repeat CT with normal gallbladder wall and duct. TG was found to be elevated in the hospital, though patient was on a Propofol gtt at the time and it has been downtrending since the Propofol was discontinued.  - C/w imipenem day 10 for necrotizing pancreatitis for now.   - Colitis? on CT 12/19. Monitor for diarrhea. Unlikely c. diff given clinical scenario.  - Patient currently on tube feeds via PEG tube    # /Renal   - C/w Ahuja for now  - Diuresis as tolerated as bp now acceptable off pressors.    # ID   - Patient in shock on arrival. Likely source is infectious vs distributive   - BAL on 12/12 was positive for pseudomonas aeruginosa. Repeat sputum cx w/ Acinetobacter. C/w IV Imipenem for necrotizing pancreatitis and PNA coverage.   - Started on vanco 12/18 overnight for empiric coverage. Patient afebrile last 24h.  - Patient s/p antiviral regimen (Valtrex followed by acyclovir) for shingles (Acyclovir discontinued yesterday)  - Monitor CBC and fever curve    # Endo  - No active issues (serum glucose WNL)    # DVT PPx:   - C/w Lovenox

## 2018-12-21 NOTE — CHART NOTE - NSCHARTNOTEFT_GEN_A_CORE
: Oswaldo Alonzo MD    INDICATION: ARDS, Pericardial effusion     PROCEDURE:  [ x ] LIMITED ECHO  [ x ] LIMITED CHEST  [ ] LIMITED RETROPERITONEAL  [ ] LIMITED ABDOMINAL  [ ] LIMITED DVT  [ ] NEEDLE GUIDANCE VASCULAR  [ ] NEEDLE GUIDANCE THORACENTESIS  [ ] NEEDLE GUIDANCE PARACENTESIS  [ ] NEEDLE GUIDANCE PERICARDIOCENTESIS  [ ] OTHER    FINDINGS:  Bilateral basilar consolidation.   Small right pleural effusion.  Preserved left ventricular function.   Small unchanged pericardial effusion.     INTERPRETATION:  Small unchanged pericardial effusion. Basilar consolidations appear to be improving.

## 2018-12-21 NOTE — PROGRESS NOTE ADULT - SUBJECTIVE AND OBJECTIVE BOX
Interval Events: No acute events overnight. Patient off sedation and awake in the am. Tolerated CPAP at 10 yesterday 2 hours.     REVIEW OF SYSTEMS:  Constitutional: [ ] negative [ ] fevers [ ] chills [ ] weight loss [ ] weight gain  HEENT: [ ] negative [ ] dry eyes [ ] eye irritation [ ] postnasal drip [ ] nasal congestion  CV: [ ] negative  [ ] chest pain [ ] orthopnea [ ] palpitations [ ] murmur  Resp: [ ] negative [ ] cough [ ] shortness of breath [ ] dyspnea [ ] wheezing [ ] sputum [ ] hemoptysis  GI: [ ] negative [ ] nausea [ ] vomiting [ ] diarrhea [ ] constipation [ ] abd pain [ ] dysphagia   : [ ] negative [ ] dysuria [ ] nocturia [ ] hematuria [ ] increased urinary frequency  Musculoskeletal: [ ] negative [ ] back pain [ ] myalgias [ ] arthralgias [ ] fracture  Skin: [ ] negative [ ] rash [ ] itch  Neurological: [ ] negative [ ] headache [ ] dizziness [ ] syncope [ ] weakness [ ] numbness  Psychiatric: [ ] negative [ ] anxiety [ ] depression  Endocrine: [ ] negative [ ] diabetes [ ] thyroid problem  Hematologic/Lymphatic: [ ] negative [ ] anemia [ ] bleeding problem  Allergic/Immunologic: [ ] negative [ ] itchy eyes [ ] nasal discharge [ ] hives [ ] angioedema  [ ] All other systems negative  [x] Unable to assess ROS because patient intubated.    OBJECTIVE:  T(C): 37.2 (12-21-18 @ 04:00), Max: 37.9 (12-20-18 @ 16:00)  HR: 114 (12-21-18 @ 06:51) (99 - 117)  BP: 161/69 (12-21-18 @ 06:00) (105/52 - 161/69)  RR: 18 (12-21-18 @ 06:00) (17 - 29)  SpO2: 96% (12-21-18 @ 06:51) (94% - 100%)  Wt(kg): --  GENERAL: Intubated  HEAD:  Atraumatic, Normocephalic  EYES: EOMI, PERRLA, conjunctiva and sclera clear  NECK: Supple, No JVD  CHEST/LUNG: Clear to auscultation bilaterally; No wheeze  HEART: Regular rate and rhythm; No murmurs, rubs, or gallops  ABDOMEN: Soft, Nontender, Nondistended; Bowel sounds present  EXTREMITIES:  2+ LE [facility] MEDICATIONS:  enoxaparin Injectable 40 milliGRAM(s) SubCutaneous every 24 hours    imipenem/cilastatin  IVPB      imipenem/cilastatin  IVPB 500 milliGRAM(s) IV Intermittent every 6 hours    acetaminophen    Suspension .. 650 milliGRAM(s) Oral every 6 hours PRN  lacosamide IVPB 100 milliGRAM(s) IV Intermittent every 12 hours  metoclopramide Injectable 5 milliGRAM(s) IV Push every 8 hours PRN    chlorhexidine 4% Liquid 1 Application(s) Topical <User Schedule>  petrolatum Ophthalmic Ointment 1 Application(s) Both EYES two times a day    LABS:                        8.6    3.76  )-----------( 241      ( 21 Dec 2018 02:50 )             27.3     Hgb Trend: 8.6<--, 7.2<--, 8.3<--, 8.6<--, 8.8<--  12-21    137  |  101  |  11  ----------------------------<  112<H>  4.1   |  27  |  0.26<L>    Ca    8.2<L>      21 Dec 2018 02:50  Phos  2.4     12-21  Mg     2.1     12-21    TPro  6.0  /  Alb  2.2<L>  /  TBili  2.2<H>  /  DBili  x   /  AST  46<H>  /  ALT  29  /  AlkPhos  373<H>  12-21    Creatinine Trend: 0.26<--, 0.30<--, 0.35<--, 0.41<--, 0.39<--, 0.35<--    Arterial Blood Gas:  12-20 @ 03:30  7.45/43/107/29/98.7/5.1  ABG lactate: 1.7  Arterial Blood Gas:  12-19 @ 15:35  7.43/43/92/28/97.6/3.7  ABG lactate: 1.6  Arterial Blood Gas:  12-19 @ 13:10  7.50/38/76/29/95.8/5.4  ABG lactate: 1.6

## 2018-12-22 LAB
-  AMIKACIN: SIGNIFICANT CHANGE UP
-  AMPICILLIN/SULBACTAM: SIGNIFICANT CHANGE UP
-  CEFEPIME: SIGNIFICANT CHANGE UP
-  CEFTAZIDIME: SIGNIFICANT CHANGE UP
-  CIPROFLOXACIN: SIGNIFICANT CHANGE UP
-  GENTAMICIN: SIGNIFICANT CHANGE UP
-  LEVOFLOXACIN: SIGNIFICANT CHANGE UP
-  MEROPENEM: SIGNIFICANT CHANGE UP
-  TOBRAMYCIN: SIGNIFICANT CHANGE UP
ALBUMIN SERPL ELPH-MCNC: 2.1 G/DL — LOW (ref 3.3–5)
ALP SERPL-CCNC: 295 U/L — HIGH (ref 40–120)
ALT FLD-CCNC: 26 U/L — SIGNIFICANT CHANGE UP (ref 4–41)
AST SERPL-CCNC: 34 U/L — SIGNIFICANT CHANGE UP (ref 4–40)
BACTERIA SPT RESP CULT: SIGNIFICANT CHANGE UP
BASOPHILS # BLD AUTO: 0.01 K/UL — SIGNIFICANT CHANGE UP (ref 0–0.2)
BASOPHILS NFR BLD AUTO: 0.3 % — SIGNIFICANT CHANGE UP (ref 0–2)
BILIRUB SERPL-MCNC: 1.7 MG/DL — HIGH (ref 0.2–1.2)
BUN SERPL-MCNC: 11 MG/DL — SIGNIFICANT CHANGE UP (ref 7–23)
CALCIUM SERPL-MCNC: 8 MG/DL — LOW (ref 8.4–10.5)
CHLORIDE SERPL-SCNC: 102 MMOL/L — SIGNIFICANT CHANGE UP (ref 98–107)
CO2 SERPL-SCNC: 29 MMOL/L — SIGNIFICANT CHANGE UP (ref 22–31)
CREAT SERPL-MCNC: 0.26 MG/DL — LOW (ref 0.5–1.3)
EOSINOPHIL # BLD AUTO: 0 K/UL — SIGNIFICANT CHANGE UP (ref 0–0.5)
EOSINOPHIL NFR BLD AUTO: 0 % — SIGNIFICANT CHANGE UP (ref 0–6)
GLUCOSE SERPL-MCNC: 122 MG/DL — HIGH (ref 70–99)
GRAM STN SPT: SIGNIFICANT CHANGE UP
HCT VFR BLD CALC: 23 % — LOW (ref 39–50)
HGB BLD-MCNC: 7.2 G/DL — LOW (ref 13–17)
IMM GRANULOCYTES # BLD AUTO: 0.02 # — SIGNIFICANT CHANGE UP
IMM GRANULOCYTES NFR BLD AUTO: 0.6 % — SIGNIFICANT CHANGE UP (ref 0–1.5)
LYMPHOCYTES # BLD AUTO: 1.18 K/UL — SIGNIFICANT CHANGE UP (ref 1–3.3)
LYMPHOCYTES # BLD AUTO: 34.4 % — SIGNIFICANT CHANGE UP (ref 13–44)
MAGNESIUM SERPL-MCNC: 2 MG/DL — SIGNIFICANT CHANGE UP (ref 1.6–2.6)
MCHC RBC-ENTMCNC: 31.3 % — LOW (ref 32–36)
MCHC RBC-ENTMCNC: 33.6 PG — SIGNIFICANT CHANGE UP (ref 27–34)
MCV RBC AUTO: 107.5 FL — HIGH (ref 80–100)
METHOD TYPE: SIGNIFICANT CHANGE UP
MONOCYTES # BLD AUTO: 0.34 K/UL — SIGNIFICANT CHANGE UP (ref 0–0.9)
MONOCYTES NFR BLD AUTO: 9.9 % — SIGNIFICANT CHANGE UP (ref 2–14)
NEUTROPHILS # BLD AUTO: 1.88 K/UL — SIGNIFICANT CHANGE UP (ref 1.8–7.4)
NEUTROPHILS NFR BLD AUTO: 54.8 % — SIGNIFICANT CHANGE UP (ref 43–77)
NRBC # FLD: 0.06 — SIGNIFICANT CHANGE UP
NRBC FLD-RTO: 1.7 — SIGNIFICANT CHANGE UP
ORGANISM # SPEC MICROSCOPIC CNT: SIGNIFICANT CHANGE UP
ORGANISM # SPEC MICROSCOPIC CNT: SIGNIFICANT CHANGE UP
PHOSPHATE SERPL-MCNC: 2.5 MG/DL — SIGNIFICANT CHANGE UP (ref 2.5–4.5)
PLATELET # BLD AUTO: 275 K/UL — SIGNIFICANT CHANGE UP (ref 150–400)
PMV BLD: 10.7 FL — SIGNIFICANT CHANGE UP (ref 7–13)
POTASSIUM SERPL-MCNC: 4.1 MMOL/L — SIGNIFICANT CHANGE UP (ref 3.5–5.3)
POTASSIUM SERPL-SCNC: 4.1 MMOL/L — SIGNIFICANT CHANGE UP (ref 3.5–5.3)
PROT SERPL-MCNC: 5.8 G/DL — LOW (ref 6–8.3)
RBC # BLD: 2.14 M/UL — LOW (ref 4.2–5.8)
RBC # FLD: 18.6 % — HIGH (ref 10.3–14.5)
SODIUM SERPL-SCNC: 137 MMOL/L — SIGNIFICANT CHANGE UP (ref 135–145)
WBC # BLD: 3.43 K/UL — LOW (ref 3.8–10.5)
WBC # FLD AUTO: 3.43 K/UL — LOW (ref 3.8–10.5)

## 2018-12-22 PROCEDURE — 99223 1ST HOSP IP/OBS HIGH 75: CPT

## 2018-12-22 PROCEDURE — 99291 CRITICAL CARE FIRST HOUR: CPT

## 2018-12-22 RX ORDER — METRONIDAZOLE 500 MG
TABLET ORAL
Qty: 0 | Refills: 0 | Status: DISCONTINUED | OUTPATIENT
Start: 2018-12-22 | End: 2018-12-28

## 2018-12-22 RX ORDER — ACETAMINOPHEN 500 MG
1000 TABLET ORAL ONCE
Qty: 0 | Refills: 0 | Status: COMPLETED | OUTPATIENT
Start: 2018-12-22 | End: 2018-12-22

## 2018-12-22 RX ORDER — METRONIDAZOLE 500 MG
500 TABLET ORAL ONCE
Qty: 0 | Refills: 0 | Status: COMPLETED | OUTPATIENT
Start: 2018-12-22 | End: 2018-12-22

## 2018-12-22 RX ORDER — METRONIDAZOLE 500 MG
500 TABLET ORAL EVERY 8 HOURS
Qty: 0 | Refills: 0 | Status: DISCONTINUED | OUTPATIENT
Start: 2018-12-22 | End: 2018-12-28

## 2018-12-22 RX ADMIN — Medication 40 MILLIGRAM(S): at 10:30

## 2018-12-22 RX ADMIN — IMIPENEM AND CILASTATIN 100 MILLIGRAM(S): 250; 250 INJECTION, POWDER, FOR SOLUTION INTRAVENOUS at 05:17

## 2018-12-22 RX ADMIN — RISPERIDONE 1 MILLIGRAM(S): 4 TABLET ORAL at 11:00

## 2018-12-22 RX ADMIN — ENOXAPARIN SODIUM 40 MILLIGRAM(S): 100 INJECTION SUBCUTANEOUS at 15:18

## 2018-12-22 RX ADMIN — LACOSAMIDE 100 MILLIGRAM(S): 50 TABLET ORAL at 21:23

## 2018-12-22 RX ADMIN — IMIPENEM AND CILASTATIN 100 MILLIGRAM(S): 250; 250 INJECTION, POWDER, FOR SOLUTION INTRAVENOUS at 11:00

## 2018-12-22 RX ADMIN — Medication 40 MILLIGRAM(S): at 21:22

## 2018-12-22 RX ADMIN — Medication 650 MILLIGRAM(S): at 17:46

## 2018-12-22 RX ADMIN — Medication 100 MILLIGRAM(S): at 15:18

## 2018-12-22 RX ADMIN — Medication 1 APPLICATION(S): at 17:45

## 2018-12-22 RX ADMIN — Medication 650 MILLIGRAM(S): at 16:30

## 2018-12-22 RX ADMIN — Medication 400 MILLIGRAM(S): at 05:25

## 2018-12-22 RX ADMIN — Medication 1 APPLICATION(S): at 05:17

## 2018-12-22 RX ADMIN — Medication 100 MILLIGRAM(S): at 21:23

## 2018-12-22 RX ADMIN — LACOSAMIDE 100 MILLIGRAM(S): 50 TABLET ORAL at 10:45

## 2018-12-22 RX ADMIN — Medication 1000 MILLIGRAM(S): at 06:00

## 2018-12-22 RX ADMIN — QUETIAPINE FUMARATE 25 MILLIGRAM(S): 200 TABLET, FILM COATED ORAL at 11:00

## 2018-12-22 RX ADMIN — CHLORHEXIDINE GLUCONATE 1 APPLICATION(S): 213 SOLUTION TOPICAL at 08:57

## 2018-12-22 NOTE — CONSULT NOTE ADULT - ATTENDING COMMENTS
Mr. Stewart is intubated and sedated, still requiring Levophed.     CT from [facility]'s reviewed. Inflammation around pancreas, duodenum, and gallbladder.     Suggest IR perc rio for concerns of cholecystitis.
I agree with the resident assessment and plan.  The patient was on versed during my evaluation making physical examination difficult.  I would recommended calling neurology for re-eval as patient is weaned off of sedation.  Continue with vimpat 100mg BID if seizure increase to 150 mg BID with low threshold to raise to 200mg BID.      If the patient is not going to be weaned of versed over next 24-48 hours consider EEG holiday to prevent any scalp irritation.
I have seen and evaluated the patient with the GI Fellow and GI Team.  I agree with the findings, formulation and plan of care as documented in the resident / fellows note and edited where appropriate.

## 2018-12-22 NOTE — PROGRESS NOTE ADULT - ATTENDING COMMENTS
Mr. Stewart is a 53 year old  male ([facility]) w/ a PMHx of TBI, s/p PEG tube, contracture, and seizure d/o who presented as a transfer from [facility] on 12/9 for respiratory failure 2/2 ARDS likely 2/2 necrotizing pancreatitis s/p intubation. Patient initially growing pseudomonas which was treated but now growing Acinetobacter which is resistant to Imipenem. Will call ID for recommendation on bed antibiotic given the fact that the antibiotic choices are limited and he will need an extended course. Tried CPAP today but unable to tolerate a pressure support of 10. Currently off sedation. Family at bedside and participated in rounds.    Critical Care Time >45 Minutes
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
## 2018-12-22 NOTE — CONSULT NOTE ADULT - ASSESSMENT
52 y/o M (resident of [facility]) w/ a PMHx of TBI, s/p PEG tube, contracture, and seizure d/o who presented as a transfer from [facility] on 12/9 for respiratory failure 2/2 ARDS likely 2/2 necrotizing pancreatitis s/p intubation.  spiking fevers, tachycardic, downtrending leukopenia   CT showing enlarged fluid collection, colitis   Is now on day 11 of Imipenem   Recultured last night  sputum while intubated is growing  acinetobacter. Unclear significance but in the setting of fevers and tachycardia as well as CT showing pneumonia would treat it as a true infection    Overall, Necrotising Pancreatitis with fluid collection, PNA, Sepsis    Suggest:  Stop Imipenem  Start Avycaz 2.5g q8hrs  Add flagyl for colitis and fluid collection in abdomen   Reconsult GI for possible necrosectomy   Reconsult General Surgery for enlarging fluid collection  follow up on repeat cultures sent yesterday   Discussed with MICu team 53  M (resident of [facility]) with TBI, s/p PEG tube, contracture, and seizure d/o who presented as a transfer from [facility] on 12/9 for respiratory failure 2/2 ARDS likely 2/2 necrotizing pancreatitis s/p intubation.   now on day 11 of Imipenem   bronc 12/11 with pseudomonas  pt with thick secretions and again febrile, sputum cx with  acinteobacter  repeat Ct with increased size of pancreatic collection    sepsis with fever, leukopenia tachycardia   Necrotising Pancreatitis with fluid collection,    acinetobacter PNA vs colonization     Suggest:  Stop Imipenem  Start Avycaz 2.5g q8hrs  Add flagyl for fluid collection in abdomen    GI f/u for possible necrosectomy    General Surgery f/u for enlarging fluid collection  follow up on repeat cultures sent yesterday   Discussed with MICu team

## 2018-12-22 NOTE — CHART NOTE - NSCHARTNOTEFT_GEN_A_CORE
Surgery re-consulted for increasing danay-pancreatic fluid collection appreciated on CT scan obtained for fever work up which could be a source for possible ongoign infection given pancreatitis. Images reviewed with attending and senior resident.    Gen: Intubated, pleasant, able to nod yes or no  Pulm: slight rhonchi to anterior chest wall  Cardiac: S1S2, tachy, (-)m/r/g  Abd: Softly distended, hypoactive bowel sounds; non-tender; dullness to percussion  Ext: 2+ pulses to bilat UE's; B/l LE's slightly cool to touch; 2+ b/l LE edema appreciated; b/l knees warm to touch    Images reviewed with attending and senior resident.  No surgical intervention warranted at this time.  Agree that chronic pancreatitis may be etiology of persistent fevers, however no role for surgical intervention to drain abscess to obtain fluid for culture and sensitivity.  Agree with either GI consult for EGD guided aspiration or IR guided aspiration of fluid. Continue current excellent medical management.  Please call with any questions or concerns.  Thank you.

## 2018-12-22 NOTE — CONSULT NOTE ADULT - ASSESSMENT
Impression:  1) Enlarging danay-pancreatic collection with pancreatitis  2) Septic, distributive shock- currently off pressors. Pt remained febrile with  acinetobacter PNA and pseudomonas in bronch.  3) Seizure disorder  4) Macrocytic anemia- Pt has no overt bleed     Recommendations:  -Will review imaging with radiology and advanced endoscopist to see if there is a window for necrosectomy/danay-pancreatic fluid drainage  -Check IgG4 level   -Rest of care per primary team  -Trend fever curves Impression:  1) Enlarging danay-pancreatic collection with pancreatitis  2) Septic, distributive shock- currently off pressors. Pt remained febrile with  acinetobacter PNA and pseudomonas in bronch.  3) Seizure disorder  4) Macrocytic anemia- Pt has no overt bleed     Recommendations:  -Will review imaging with radiology and advanced endoscopist to see if there is a window for necrosectomy/danay-pancreatic fluid drainage  -Check IgG4 level   -Rest of care per primary team  -Trend fever curves, abx per primary team

## 2018-12-22 NOTE — PROGRESS NOTE ADULT - SUBJECTIVE AND OBJECTIVE BOX
Asmita Muñoz, PGY3  Pager [phone]    CHIEF COMPLAINT: ARDS 2/2 to pancreatitis     Interval Events:    REVIEW OF SYSTEMS:  Constitutional: [ ] negative [ ] fevers [ ] chills [ ] weight loss [ ] weight gain  HEENT: [ ] negative [ ] dry eyes [ ] eye irritation [ ] postnasal drip [ ] nasal congestion  CV: [ ] negative  [ ] chest pain [ ] orthopnea [ ] palpitations [ ] murmur  Resp: [ ] negative [ ] cough [ ] shortness of breath [ ] dyspnea [ ] wheezing [ ] sputum [ ] hemoptysis  GI: [ ] negative [ ] nausea [ ] vomiting [ ] diarrhea [ ] constipation [ ] abd pain [ ] dysphagia   : [ ] negative [ ] dysuria [ ] nocturia [ ] hematuria [ ] increased urinary frequency  Musculoskeletal: [ ] negative [ ] back pain [ ] myalgias [ ] arthralgias [ ] fracture  Skin: [ ] negative [ ] rash [ ] itch  Neurological: [ ] negative [ ] headache [ ] dizziness [ ] syncope [ ] weakness [ ] numbness  Psychiatric: [ ] negative [ ] anxiety [ ] depression  Endocrine: [ ] negative [ ] diabetes [ ] thyroid problem  Hematologic/Lymphatic: [ ] negative [ ] anemia [ ] bleeding problem  Allergic/Immunologic: [ ] negative [ ] itchy eyes [ ] nasal discharge [ ] hives [ ] angioedema  [ ] All other systems negative  [ x] Unable to assess ROS because intubated    OBJECTIVE:  ICU Vital Signs Last 24 Hrs  T(C): 38.5 (22 Dec 2018 04:00), Max: 38.5 (22 Dec 2018 04:00)  T(F): 101.3 (22 Dec 2018 04:00), Max: 101.3 (22 Dec 2018 04:00)  HR: 98 (22 Dec 2018 06:40) (94 - 116)  BP: 128/63 (22 Dec 2018 05:00) (100/48 - 137/61)  BP(mean): 78 (22 Dec 2018 05:00) (57 - 79)  ABP: --  ABP(mean): --  RR: 26 (22 Dec 2018 05:00) (17 - 36)  SpO2: 99% (22 Dec 2018 06:40) (92% - 100%)    Mode: AC/ CMV (Assist Control/ Continuous Mandatory Ventilation), RR (machine): 18, TV (machine): 380, FiO2: 40, PEEP: 10, ITime: 0.5, MAP: 14, PIP: 28    12-20 @ 07:01  -  12-21 @ 07:00  --------------------------------------------------------  IN: 1790 mL / OUT: 1775 mL / NET: 15 mL    12-21 @ 07:01  -  12-22 @ 06:53  --------------------------------------------------------  IN: 1000 mL / OUT: 2895 mL / NET: -1895 mL      CAPILLARY BLOOD GLUCOSE          PHYSICAL EXAM:  GENERAL: Intubated  HEAD:  Atraumatic, Normocephalic  EYES: EOMI, PERRLA, conjunctiva and sclera clear  NECK: Supple, No JVD  CHEST/LUNG: vent sounds  HEART: Regular rate and rhythm; No murmurs, rubs, or gallops  ABDOMEN: Soft, Nontender, Nondistended; Bowel sounds present  EXTREMITIES:  2+ LE swelling  LINES:    HOSPITAL MEDICATIONS:  enoxaparin Injectable 40 milliGRAM(s) SubCutaneous every 24 hours    imipenem/cilastatin  IVPB      imipenem/cilastatin  IVPB 500 milliGRAM(s) IV Intermittent every 6 hours    furosemide   Injectable 40 milliGRAM(s) IV Push every 12 hours        acetaminophen    Suspension .. 650 milliGRAM(s) Oral every 6 hours PRN  lacosamide Solution 100 milliGRAM(s) Oral two times a day  metoclopramide Injectable 5 milliGRAM(s) IV Push every 8 hours PRN  QUEtiapine 25 milliGRAM(s) Oral daily  risperiDONE   Solution 1 milliGRAM(s) Oral daily              chlorhexidine 4% Liquid 1 Application(s) Topical <User Schedule>  petrolatum Ophthalmic Ointment 1 Application(s) Both EYES two times a day        LABS:                        7.2    3.43  )-----------( 275      ( 22 Dec 2018 05:35 )             23.0     Hgb Trend: 7.2<--, 8.6<--, 7.2<--, 8.3<--, 8.6<--  12-22    137  |  102  |  11  ----------------------------<  122<H>  4.1   |  29  |  0.26<L>    Ca    8.0<L>      22 Dec 2018 05:35  Phos  2.5     12-22  Mg     2.0     12-22    TPro  5.8<L>  /  Alb  2.1<L>  /  TBili  1.7<H>  /  DBili  x   /  AST  34  /  ALT  26  /  AlkPhos  295<H>  12-22    Creatinine Trend: 0.26<--, 0.26<--, 0.30<--, 0.35<--, 0.41<--, 0.39<--            MICROBIOLOGY:   Culture - Respiratory with Gram Stain (12.18.18 @ 21:36)    Culture - Respiratory:   ACIBAU^Acinetobacter baumannii  QUANTITY OF GROWTH: MANY    Culture - Respiratory:   Normal Respiratory Trisha Also Present  ACIBAU^Acinetobacter baumannii  QUANTITY OF GROWTH: MANY    Gram Stain Sputum:   WBC^White Blood Cells  QNTY CELLS IN GRAM STAIN: MODERATE (3+)  GNR^Gram Neg Rods  QUANTITY OF BACTERIA SEEN: FEW (2+)    Specimen Source: SPUTUM    Culture - Blood (12.18.18 @ 21:31)    Culture - Blood:   NO ORGANISMS ISOLATED  NO ORGANISMS ISOLATED AT 72 HRS.    Specimen Source: BLOOD VENOUS        RADIOLOGY:  [ ] Reviewed and interpreted by me    EKG: Asmita Muñoz, PGY3  Pager [phone]    CHIEF COMPLAINT: ARDS 2/2 to pancreatitis     Interval Events: Off sedation starting to wake up, grimaces to pain. Failed cpap trial this morning.    REVIEW OF SYSTEMS:  Constitutional: [ ] negative [ ] fevers [ ] chills [ ] weight loss [ ] weight gain  HEENT: [ ] negative [ ] dry eyes [ ] eye irritation [ ] postnasal drip [ ] nasal congestion  CV: [ ] negative  [ ] chest pain [ ] orthopnea [ ] palpitations [ ] murmur  Resp: [ ] negative [ ] cough [ ] shortness of breath [ ] dyspnea [ ] wheezing [ ] sputum [ ] hemoptysis  GI: [ ] negative [ ] nausea [ ] vomiting [ ] diarrhea [ ] constipation [ ] abd pain [ ] dysphagia   : [ ] negative [ ] dysuria [ ] nocturia [ ] hematuria [ ] increased urinary frequency  Musculoskeletal: [ ] negative [ ] back pain [ ] myalgias [ ] arthralgias [ ] fracture  Skin: [ ] negative [ ] rash [ ] itch  Neurological: [ ] negative [ ] headache [ ] dizziness [ ] syncope [ ] weakness [ ] numbness  Psychiatric: [ ] negative [ ] anxiety [ ] depression  Endocrine: [ ] negative [ ] diabetes [ ] thyroid problem  Hematologic/Lymphatic: [ ] negative [ ] anemia [ ] bleeding problem  Allergic/Immunologic: [ ] negative [ ] itchy eyes [ ] nasal discharge [ ] hives [ ] angioedema  [ ] All other systems negative  [ x] Unable to assess ROS because intubated    OBJECTIVE:  ICU Vital Signs Last 24 Hrs  T(C): 38.5 (22 Dec 2018 04:00), Max: 38.5 (22 Dec 2018 04:00)  T(F): 101.3 (22 Dec 2018 04:00), Max: 101.3 (22 Dec 2018 04:00)  HR: 98 (22 Dec 2018 06:40) (94 - 116)  BP: 128/63 (22 Dec 2018 05:00) (100/48 - 137/61)  BP(mean): 78 (22 Dec 2018 05:00) (57 - 79)  ABP: --  ABP(mean): --  RR: 26 (22 Dec 2018 05:00) (17 - 36)  SpO2: 99% (22 Dec 2018 06:40) (92% - 100%)    Mode: AC/ CMV (Assist Control/ Continuous Mandatory Ventilation), RR (machine): 18, TV (machine): 380, FiO2: 40, PEEP: 10, ITime: 0.5, MAP: 14, PIP: 28    12-20 @ 07:01  -  12-21 @ 07:00  --------------------------------------------------------  IN: 1790 mL / OUT: 1775 mL / NET: 15 mL    12-21 @ 07:01  -  12-22 @ 06:53  --------------------------------------------------------  IN: 1000 mL / OUT: 2895 mL / NET: -1895 mL      CAPILLARY BLOOD GLUCOSE          PHYSICAL EXAM:  GENERAL: Intubated  HEAD:  Atraumatic, Normocephalic  EYES: EOMI, PERRLA, conjunctiva and sclera clear  NECK: Supple, No JVD  CHEST/LUNG: vent sounds  HEART: Regular rate and rhythm; No murmurs, rubs, or gallops  ABDOMEN: Soft, Nontender, Nondistended; Bowel sounds present  EXTREMITIES:  2+ LE swelling  NEURO: grimaces to pain. contracted  LINES:    HOSPITAL MEDICATIONS:  enoxaparin Injectable 40 milliGRAM(s) SubCutaneous every 24 hours    imipenem/cilastatin  IVPB      imipenem/cilastatin  IVPB 500 milliGRAM(s) IV Intermittent every 6 hours    furosemide   Injectable 40 milliGRAM(s) IV Push every 12 hours        acetaminophen    Suspension .. 650 milliGRAM(s) Oral every 6 hours PRN  lacosamide Solution 100 milliGRAM(s) Oral two times a day  metoclopramide Injectable 5 milliGRAM(s) IV Push every 8 hours PRN  QUEtiapine 25 milliGRAM(s) Oral daily  risperiDONE   Solution 1 milliGRAM(s) Oral daily              chlorhexidine 4% Liquid 1 Application(s) Topical <User Schedule>  petrolatum Ophthalmic Ointment 1 Application(s) Both EYES two times a day        LABS:                        7.2    3.43  )-----------( 275      ( 22 Dec 2018 05:35 )             23.0     Hgb Trend: 7.2<--, 8.6<--, 7.2<--, 8.3<--, 8.6<--  12-22    137  |  102  |  11  ----------------------------<  122<H>  4.1   |  29  |  0.26<L>    Ca    8.0<L>      22 Dec 2018 05:35  Phos  2.5     12-22  Mg     2.0     12-22    TPro  5.8<L>  /  Alb  2.1<L>  /  TBili  1.7<H>  /  DBili  x   /  AST  34  /  ALT  26  /  AlkPhos  295<H>  12-22    Creatinine Trend: 0.26<--, 0.26<--, 0.30<--, 0.35<--, 0.41<--, 0.39<--            MICROBIOLOGY:   Culture - Respiratory with Gram Stain (12.18.18 @ 21:36)    -  Tobramycin: R >8 LITZY    -  Gentamicin: R >8 LITZY    -  Levofloxacin: R >4 LITZY    -  Meropenem: R >8 LITZY    Culture - Respiratory:   ACIBAU^Acinetobacter baumannii  QUANTITY OF GROWTH: MANY    Culture - Respiratory:   Normal Respiratory Trisha Also Present  RESULT CALLED TO / READ BACK: MD LACY,G/Y  DATE / TIME CALLED: 12/22/18 1103  CALLED BY: MARTI RANDLE    Gram Stain Sputum:   WBC White Blood Cells  QNTY CELLS IN GRAM STAIN: MODERATE (3+)  GNR^Gram Neg Rods  QUANTITY OF BACTERIA SEEN: FEW (2+)    -  Amikacin: S <=8 LITZY    -  Ampicillin/Sulbactam: R >16/8 LITZY    -  Cefepime: R >16 LITZY    -  Ceftazidime: S 4 LITZY    -  Ciprofloxacin: R >2 LITZY    Specimen Source: SPUTUM    Organism Identification: Acinetobacter baumannii     Organism: Acinetobacter baumannii   QUANTITY OF GROWTH: MANY    Method Type: NEGATIVE LITZY 43        Culture - Blood (12.18.18 @ 21:31)    Culture - Blood:   NO ORGANISMS ISOLATED  NO ORGANISMS ISOLATED AT 72 HRS.    Specimen Source: BLOOD VENOUS        RADIOLOGY:  [ ] Reviewed and interpreted by me    EKG:

## 2018-12-22 NOTE — PROGRESS NOTE ADULT - ASSESSMENT
54 y/o M (resident of [facility]) w/ a PMHx of TBI, s/p PEG tube, contracture, and seizure d/o who presented as a transfer from [facility] on 12/9 for respiratory failure 2/2 ARDS likely 2/2 necrotizing pancreatitis s/p intubation.    # Neuro  - Currently sedated w/ a Versed gtt. Weaning down on dose.  - Patient was on Depakote at home for history of seizure disorder; however, this was discontinued because Depakote can potentially cause pancreatitis. vEEGs have shown no epileptiform abnormalities (though patient currently on a Versed gtt).  - Neurology consulted for assistance with seizure management. Patient was subsequently started on IV Vimpat. C/w IV Vimpat per Neuro recs    # CV   - Patient currently off Norepinephrine gtt. Continue to monitor BP.  - Bedside TY was performed which showed an LV that appears to be functioning normally with mild-moderate enlargement of RV, no evidence of fluid responsiveness, ?bicuspid aortic valve with no evidence of AI. ? small pericardial effusion, no tamponade physiology.    # Resp:   - Patient currently intubated 2/2 moderate ARDS 2/2 necrotizing pancreatitis. Previously on VDR vent, now on conventional vent.  - Will closely monitor respiratory status and check serial blood gases.  - CPAP again for resp function recovery.    # GI  - Patient found to have necrotizing pancreatitis. Unclear etiology for pancreatitis at this time. Patient is a resident of NH and with no significant alcohol history. OSH CT A+P showed gallbladder pathology. Repeat CT with normal gallbladder wall and duct. TG was found to be elevated in the hospital, though patient was on a Propofol gtt at the time and it has been downtrending since the Propofol was discontinued.  - C/w imipenem day 11 for necrotizing pancreatitis for now.   - Colitis? on CT 12/19. Monitor for diarrhea. Unlikely c. diff given clinical scenario.  - Patient currently on tube feeds via PEG tube    # /Renal   - C/w Ahuja for now  - Diuresis as tolerated as bp now acceptable off pressors.    # ID   - Patient in shock on arrival. Likely source is infectious vs distributive   - BAL on 12/12 was positive for pseudomonas aeruginosa. Repeat sputum cx w/ Acinetobacter. C/w IV Imipenem for necrotizing pancreatitis and PNA coverage.   - Started on vanco 12/18 overnight for empiric coverage. Patient afebrile last 24h. Vanc discontinued 12/20  - Patient s/p antiviral regimen (Valtrex followed by acyclovir) for shingles (Acyclovir discontinued yesterday)  - Monitor CBC and fever curve    # Endo  - No active issues (serum glucose WNL)    # DVT PPx:   - C/w Lovenox 54 y/o M (resident of [facility]) w/ a PMHx of TBI, s/p PEG tube, contracture, and seizure d/o who presented as a transfer from [facility] on 12/9 for respiratory failure 2/2 ARDS likely 2/2 necrotizing pancreatitis s/p intubation.    # Neuro  - Off sedation, slowly waking up  - Patient was on Depakote at home for history of seizure disorder; however, this was discontinued because Depakote can potentially cause pancreatitis. vEEGs have shown no epileptiform abnormalities (though patient currently on a Versed gtt).  - Neurology consulted for assistance with seizure management. Patient was subsequently started on IV Vimpat. C/w IV Vimpat per Neuro recs    # CV   - Patient currently off Norepinephrine gtt. Continue to monitor BP.  - Bedside TY was performed which showed an LV that appears to be functioning normally with mild-moderate enlargement of RV, no evidence of fluid responsiveness, ?bicuspid aortic valve with no evidence of AI. ? small pericardial effusion, no tamponade physiology.    # Resp:   - Patient currently intubated 2/2 moderate ARDS 2/2 necrotizing pancreatitis. Previously on VDR vent, now on conventional vent.  - Will closely monitor respiratory status and check serial blood gases.  - CPAP again for resp function recovery.    # GI  - Patient found to have necrotizing pancreatitis. Unclear etiology for pancreatitis at this time. Patient is a resident of NH and with no significant alcohol history. OSH CT A+P showed gallbladder pathology. Repeat CT with normal gallbladder wall and duct. TG was found to be elevated in the hospital, though patient was on a Propofol gtt at the time and it has been downtrending since the Propofol was discontinued.  - C/w imipenem day 11 for necrotizing pancreatitis for now.   - Colitis? on CT 12/19. Monitor for diarrhea. Unlikely c. diff given clinical scenario.  - Patient currently on tube feeds via PEG tube    # /Renal   - C/w Ahuja for now  - Diuresis as tolerated as bp now acceptable off pressors.    # ID   - Patient in shock on arrival. Likely source is infectious vs distributive   - BAL on 12/12 was positive for pseudomonas aeruginosa. Repeat sputum cx w/ Acinetobacter resistant to penems. ID consulted for assistance with abx coverage for acinetobacter   - Started on vanco 12/18 overnight for empiric coverage. Patient afebrile last 24h. Vanc discontinued 12/20  - Patient s/p antiviral regimen (Valtrex followed by acyclovir) for shingles (Acyclovir discontinued yesterday)  - Monitor CBC and fever curve    # Endo  - No active issues (serum glucose WNL)    # DVT PPx:   - C/w Lovenox
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
## 2018-12-23 LAB
ALBUMIN SERPL ELPH-MCNC: 2.1 G/DL — LOW (ref 3.3–5)
ALP SERPL-CCNC: 263 U/L — HIGH (ref 40–120)
ALT FLD-CCNC: 24 U/L — SIGNIFICANT CHANGE UP (ref 4–41)
ANISOCYTOSIS BLD QL: SIGNIFICANT CHANGE UP
AST SERPL-CCNC: 25 U/L — SIGNIFICANT CHANGE UP (ref 4–40)
BACTERIA BLD CULT: SIGNIFICANT CHANGE UP
BACTERIA BLD CULT: SIGNIFICANT CHANGE UP
BASOPHILS # BLD AUTO: 0.02 K/UL — SIGNIFICANT CHANGE UP (ref 0–0.2)
BASOPHILS NFR BLD AUTO: 0.5 % — SIGNIFICANT CHANGE UP (ref 0–2)
BASOPHILS NFR SPEC: 0 % — SIGNIFICANT CHANGE UP (ref 0–2)
BILIRUB SERPL-MCNC: 1.5 MG/DL — HIGH (ref 0.2–1.2)
BLASTS # FLD: 0 % — SIGNIFICANT CHANGE UP (ref 0–0)
BUN SERPL-MCNC: 13 MG/DL — SIGNIFICANT CHANGE UP (ref 7–23)
CALCIUM SERPL-MCNC: 8 MG/DL — LOW (ref 8.4–10.5)
CHLORIDE SERPL-SCNC: 99 MMOL/L — SIGNIFICANT CHANGE UP (ref 98–107)
CO2 SERPL-SCNC: 29 MMOL/L — SIGNIFICANT CHANGE UP (ref 22–31)
CREAT SERPL-MCNC: 0.31 MG/DL — LOW (ref 0.5–1.3)
EOSINOPHIL # BLD AUTO: 0.01 K/UL — SIGNIFICANT CHANGE UP (ref 0–0.5)
EOSINOPHIL NFR BLD AUTO: 0.2 % — SIGNIFICANT CHANGE UP (ref 0–6)
EOSINOPHIL NFR FLD: 0 % — SIGNIFICANT CHANGE UP (ref 0–6)
GIANT PLATELETS BLD QL SMEAR: PRESENT — SIGNIFICANT CHANGE UP
GLUCOSE SERPL-MCNC: 122 MG/DL — HIGH (ref 70–99)
HCT VFR BLD CALC: 24.5 % — LOW (ref 39–50)
HGB BLD-MCNC: 7.7 G/DL — LOW (ref 13–17)
IMM GRANULOCYTES # BLD AUTO: 0.04 # — SIGNIFICANT CHANGE UP
IMM GRANULOCYTES NFR BLD AUTO: 1 % — SIGNIFICANT CHANGE UP (ref 0–1.5)
LYMPHOCYTES # BLD AUTO: 1.62 K/UL — SIGNIFICANT CHANGE UP (ref 1–3.3)
LYMPHOCYTES # BLD AUTO: 38.8 % — SIGNIFICANT CHANGE UP (ref 13–44)
LYMPHOCYTES NFR SPEC AUTO: 25.2 % — SIGNIFICANT CHANGE UP (ref 13–44)
MACROCYTES BLD QL: SIGNIFICANT CHANGE UP
MAGNESIUM SERPL-MCNC: 2 MG/DL — SIGNIFICANT CHANGE UP (ref 1.6–2.6)
MCHC RBC-ENTMCNC: 31.4 % — LOW (ref 32–36)
MCHC RBC-ENTMCNC: 33.8 PG — SIGNIFICANT CHANGE UP (ref 27–34)
MCV RBC AUTO: 107.5 FL — HIGH (ref 80–100)
METAMYELOCYTES # FLD: 0 % — SIGNIFICANT CHANGE UP (ref 0–1)
METHOD TYPE: SIGNIFICANT CHANGE UP
MONOCYTES # BLD AUTO: 0.46 K/UL — SIGNIFICANT CHANGE UP (ref 0–0.9)
MONOCYTES NFR BLD AUTO: 11 % — SIGNIFICANT CHANGE UP (ref 2–14)
MONOCYTES NFR BLD: 11.7 % — HIGH (ref 2–9)
MYELOCYTES NFR BLD: 1.8 % — HIGH (ref 0–0)
NEUTROPHIL AB SER-ACNC: 46 % — SIGNIFICANT CHANGE UP (ref 43–77)
NEUTROPHILS # BLD AUTO: 2.03 K/UL — SIGNIFICANT CHANGE UP (ref 1.8–7.4)
NEUTROPHILS NFR BLD AUTO: 48.5 % — SIGNIFICANT CHANGE UP (ref 43–77)
NEUTS BAND # BLD: 5.4 % — SIGNIFICANT CHANGE UP (ref 0–6)
NRBC # BLD: 2.7 /100WBC — SIGNIFICANT CHANGE UP
NRBC # FLD: 0.13 — SIGNIFICANT CHANGE UP
NRBC FLD-RTO: 3.1 — SIGNIFICANT CHANGE UP
ORGANISM # SPEC MICROSCOPIC CNT: SIGNIFICANT CHANGE UP
ORGANISM # SPEC MICROSCOPIC CNT: SIGNIFICANT CHANGE UP
OTHER - HEMATOLOGY %: 0 — SIGNIFICANT CHANGE UP
PHOSPHATE SERPL-MCNC: 2.9 MG/DL — SIGNIFICANT CHANGE UP (ref 2.5–4.5)
PLATELET # BLD AUTO: 350 K/UL — SIGNIFICANT CHANGE UP (ref 150–400)
PLATELET COUNT - ESTIMATE: NORMAL — SIGNIFICANT CHANGE UP
PMV BLD: 10.9 FL — SIGNIFICANT CHANGE UP (ref 7–13)
POLYCHROMASIA BLD QL SMEAR: SIGNIFICANT CHANGE UP
POTASSIUM SERPL-MCNC: 3.7 MMOL/L — SIGNIFICANT CHANGE UP (ref 3.5–5.3)
POTASSIUM SERPL-SCNC: 3.7 MMOL/L — SIGNIFICANT CHANGE UP (ref 3.5–5.3)
PROMYELOCYTES # FLD: 0 % — SIGNIFICANT CHANGE UP (ref 0–0)
PROT SERPL-MCNC: 6 G/DL — SIGNIFICANT CHANGE UP (ref 6–8.3)
RBC # BLD: 2.28 M/UL — LOW (ref 4.2–5.8)
RBC # FLD: 18.5 % — HIGH (ref 10.3–14.5)
SMUDGE CELLS # BLD: PRESENT — SIGNIFICANT CHANGE UP
SODIUM SERPL-SCNC: 137 MMOL/L — SIGNIFICANT CHANGE UP (ref 135–145)
SPECIMEN SOURCE: SIGNIFICANT CHANGE UP
TARGETS BLD QL SMEAR: SLIGHT — SIGNIFICANT CHANGE UP
VARIANT LYMPHS # BLD: 9.9 % — SIGNIFICANT CHANGE UP
WBC # BLD: 4.18 K/UL — SIGNIFICANT CHANGE UP (ref 3.8–10.5)
WBC # FLD AUTO: 4.18 K/UL — SIGNIFICANT CHANGE UP (ref 3.8–10.5)

## 2018-12-23 PROCEDURE — 99291 CRITICAL CARE FIRST HOUR: CPT

## 2018-12-23 PROCEDURE — 99222 1ST HOSP IP/OBS MODERATE 55: CPT | Mod: GC

## 2018-12-23 RX ADMIN — ENOXAPARIN SODIUM 40 MILLIGRAM(S): 100 INJECTION SUBCUTANEOUS at 14:48

## 2018-12-23 RX ADMIN — QUETIAPINE FUMARATE 25 MILLIGRAM(S): 200 TABLET, FILM COATED ORAL at 11:59

## 2018-12-23 RX ADMIN — LACOSAMIDE 100 MILLIGRAM(S): 50 TABLET ORAL at 10:18

## 2018-12-23 RX ADMIN — CHLORHEXIDINE GLUCONATE 1 APPLICATION(S): 213 SOLUTION TOPICAL at 11:59

## 2018-12-23 RX ADMIN — Medication 100 MILLIGRAM(S): at 14:48

## 2018-12-23 RX ADMIN — Medication 650 MILLIGRAM(S): at 22:30

## 2018-12-23 RX ADMIN — Medication 650 MILLIGRAM(S): at 08:00

## 2018-12-23 RX ADMIN — Medication 40 MILLIGRAM(S): at 10:17

## 2018-12-23 RX ADMIN — Medication 40 MILLIGRAM(S): at 22:05

## 2018-12-23 RX ADMIN — Medication 650 MILLIGRAM(S): at 06:39

## 2018-12-23 RX ADMIN — Medication 650 MILLIGRAM(S): at 21:41

## 2018-12-23 RX ADMIN — Medication 100 MILLIGRAM(S): at 22:07

## 2018-12-23 RX ADMIN — RISPERIDONE 1 MILLIGRAM(S): 4 TABLET ORAL at 11:59

## 2018-12-23 RX ADMIN — Medication 100 MILLIGRAM(S): at 05:38

## 2018-12-23 RX ADMIN — Medication 1 APPLICATION(S): at 18:29

## 2018-12-23 RX ADMIN — LACOSAMIDE 100 MILLIGRAM(S): 50 TABLET ORAL at 22:07

## 2018-12-23 RX ADMIN — Medication 1 APPLICATION(S): at 05:38
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
## 2018-12-23 NOTE — CHART NOTE - NSCHARTNOTEFT_GEN_A_CORE
Please keep patient NPO for potential endoscopic intervention tomorrow pending discussion with advanced team.

## 2018-12-23 NOTE — CHART NOTE - NSCHARTNOTEFT_GEN_A_CORE
Surgery re-consulted for increasing danay-pancreatic fluid collection appreciated on CT scan obtained for fever work up which could be a source for possible ongoign infection given pancreatitis. Images reviewed.    Gen: Intubated, pleasant, able to nod yes or no  Pulm: slight rhonchi to anterior chest wall  Cardiac: S1S2, tachy, (-)m/r/g  Abd: Softly distended, hypoactive bowel sounds; non-tender; dullness to percussion  Ext: 2+ pulses to bilat UE's; B/l LE's slightly cool to touch; 2+ b/l LE edema appreciated; b/l knees warm to touch    d/w MICU team.  No surgical intervention warranted at this time.  Agree that chronic pancreatitis may be etiology of persistent fevers, however no role for surgical intervention to drain abscess to obtain fluid for culture and sensitivity.  Agree with either GI consult for EGD guided aspiration or IR guided aspiration of fluid. Continue current excellent medical management.  Please call with any questions or concerns.  Thank you.

## 2018-12-23 NOTE — PROGRESS NOTE ADULT - SUBJECTIVE AND OBJECTIVE BOX
Asmita Muñoz, PGY3  Pager [phone]    CHIEF COMPLAINT: hypoxic resp failure     Interval Events: Pt continues to spike low grade fevers. Cultures growing coag negative staph.    REVIEW OF SYSTEMS:  Constitutional: [ ] negative [ ] fevers [ ] chills [ ] weight loss [ ] weight gain  HEENT: [ ] negative [ ] dry eyes [ ] eye irritation [ ] postnasal drip [ ] nasal congestion  CV: [ ] negative  [ ] chest pain [ ] orthopnea [ ] palpitations [ ] murmur  Resp: [ ] negative [ ] cough [ ] shortness of breath [ ] dyspnea [ ] wheezing [ ] sputum [ ] hemoptysis  GI: [ ] negative [ ] nausea [ ] vomiting [ ] diarrhea [ ] constipation [ ] abd pain [ ] dysphagia   : [ ] negative [ ] dysuria [ ] nocturia [ ] hematuria [ ] increased urinary frequency  Musculoskeletal: [ ] negative [ ] back pain [ ] myalgias [ ] arthralgias [ ] fracture  Skin: [ ] negative [ ] rash [ ] itch  Neurological: [ ] negative [ ] headache [ ] dizziness [ ] syncope [ ] weakness [ ] numbness  Psychiatric: [ ] negative [ ] anxiety [ ] depression  Endocrine: [ ] negative [ ] diabetes [ ] thyroid problem  Hematologic/Lymphatic: [ ] negative [ ] anemia [ ] bleeding problem  Allergic/Immunologic: [ ] negative [ ] itchy eyes [ ] nasal discharge [ ] hives [ ] angioedema  [ ] All other systems negative  [X ] Unable to assess ROS because intubated    OBJECTIVE:  ICU Vital Signs Last 24 Hrs  T(C): 38.2 (23 Dec 2018 04:00), Max: 38.2 (23 Dec 2018 04:00)  T(F): 100.7 (23 Dec 2018 04:00), Max: 100.7 (23 Dec 2018 04:00)  HR: 108 (23 Dec 2018 07:05) (93 - 124)  BP: 111/54 (23 Dec 2018 07:00) (99/50 - 130/59)  BP(mean): 64 (23 Dec 2018 07:00) (61 - 81)  ABP: --  ABP(mean): --  RR: 18 (23 Dec 2018 07:00) (14 - 32)  SpO2: 100% (23 Dec 2018 07:05) (88% - 100%)    Mode: AC/ CMV (Assist Control/ Continuous Mandatory Ventilation), RR (machine): 18, TV (machine): 380, FiO2: 40, PEEP: 10, MAP: 14, PIP: 29    12-22 @ 07:01  -  12-23 @ 07:00  --------------------------------------------------------  IN: 1240 mL / OUT: 3360 mL / NET: -2120 mL      CAPILLARY BLOOD GLUCOSE          PHYSICAL EXAM:  GENERAL: Intubated  HEAD:  Atraumatic, Normocephalic  EYES: EOMI, PERRLA, conjunctiva and sclera clear  NECK: Supple, No JVD  CHEST/LUNG: vent sounds  HEART: Regular rate and rhythm; No murmurs, rubs, or gallops  ABDOMEN: Soft, Nontender, Nondistended; Bowel sounds present  EXTREMITIES:  2+ LE swelling  NEURO: grimaces to pain. contracted  LINES:      HOSPITAL MEDICATIONS:  enoxaparin Injectable 40 milliGRAM(s) SubCutaneous every 24 hours    cefTAZidime/avibactam IVPB 2.5 Gram(s) IV Intermittent every 8 hours  metroNIDAZOLE  IVPB      metroNIDAZOLE  IVPB 500 milliGRAM(s) IV Intermittent every 8 hours    furosemide   Injectable 40 milliGRAM(s) IV Push every 12 hours        acetaminophen    Suspension .. 650 milliGRAM(s) Oral every 6 hours PRN  lacosamide Solution 100 milliGRAM(s) Oral two times a day  metoclopramide Injectable 5 milliGRAM(s) IV Push every 8 hours PRN  QUEtiapine 25 milliGRAM(s) Oral daily  risperiDONE   Solution 1 milliGRAM(s) Oral daily              chlorhexidine 4% Liquid 1 Application(s) Topical <User Schedule>  petrolatum Ophthalmic Ointment 1 Application(s) Both EYES two times a day        LABS:                        7.7    4.18  )-----------( 350      ( 23 Dec 2018 03:48 )             24.5     Hgb Trend: 7.7<--, 7.2<--, 8.6<--, 7.2<--, 8.3<--  12-23    137  |  99  |  13  ----------------------------<  122<H>  3.7   |  29  |  0.31<L>    Ca    8.0<L>      23 Dec 2018 03:48  Phos  2.9     12-23  Mg     2.0     12-23    TPro  6.0  /  Alb  2.1<L>  /  TBili  1.5<H>  /  DBili  x   /  AST  25  /  ALT  24  /  AlkPhos  263<H>  12-23    Creatinine Trend: 0.31<--, 0.26<--, 0.26<--, 0.30<--, 0.35<--, 0.41<--            MICROBIOLOGY:   Culture - Blood in AM (12.22.18 @ 06:03)    Culture - Blood:   ***Blood Panel PCR results on this specimen are available  approximately 3 hours after the Gram stain result***  Gram stain, PCR, and/or culture results may not always  correspond due to difference in methodologies  ------------------------------------------------------------  This PCR assay was performed using [employer].  The  following targets are tested for:  Enterococcus, vancomycin  resistant enterococci, Listeria monocytogenes,  coagulase  negative staphylococci, S. aureus, methicillin resistant S.  aureus, Streptococcus agalactiae (Group B), S. pneumoniae,  S. pyogenes (Group A), Acinetobacter baumannii, Enterobacter  cloacae, E. coli, Klebsiella oxytoca, K. pneumoniae, Proteus  sp., Serratia marcescens, Haemophilus influenzae, Neisseria  meningitidis, Pseudomonas aeruginosa, Candida albicans, C.  glabrata, C. krusei, C. parapsilosis, C. tropicalis and the  KPC resistance gene.  **NOTE: Due to technical problems, Proteus sp. will NOT be  reported as part of the BCID paneluntil further notice.    -  Coagulase negative Staphylococcus: + DETECT LITZY    Specimen Source: BLOOD VENOUS    Organism: BLOOD CULTURE PCR    Gram Stain Blood:   ***** CRITICAL RESULT *****  PERSON CALLED / READ-BACK: ALEJANDRO WHITLEY/PANCHO  DATE / TIME CALLED: 12/23/18 0423  CALLED BY: MARIANN PERRY  GPCCL^Gram Pos Cocci In Clusters  AFTER: 21 HOURS INCUBATION  BOTTLE: AEROBIC BOTTLE    Organism Identification: BLOOD CULTURE PCR    Method Type: PCR        RADIOLOGY:  Culture - Respiratory with Gram Stain (12.18.18 @ 21:36)    -  Gentamicin: R >8 LITZY    -  Levofloxacin: R >4 LITZY    -  Meropenem: R >8 ILTZY    Culture - Respiratory:   ACIBAU^Acinetobacter baumannii  QUANTITY OF GROWTH: MANY    Culture - Respiratory:   Normal Respiratory Trisha Also Present  RESULT CALLED TO / READ BACK: MD LACYG/Y  DATE / TIME CALLED: 12/22/18 1103  CALLED BY: MARTI RANDLE    -  Tobramycin: R >8 LITZY    -  Amikacin: S <=8 LITZY    -  Ampicillin/Sulbactam: R >16/8 LITZY    Gram Stain Sputum:   WBC White Blood Cells  QNTY CELLS IN GRAM STAIN: MODERATE (3+)  GNR^Gram Neg Rods  QUANTITY OF BACTERIA SEEN: FEW (2+)    -  Cefepime: R >16 LITZY    -  Ceftazidime: S 4 LITZY    -  Ciprofloxacin: R >2 LITZY    Specimen Source: SPUTUM    Organism Identification: Acinetobacter baumannii     Organism: Acinetobacter baumannii   QUANTITY OF GROWTH: MANY    Method Type: NEGATIVE LITZY 43    [x ] Reviewed and interpreted by me  < from: CT Abdomen and Pelvis w/ IV Cont (12.19.18 @ 14:50) >  IMPRESSION: Small bilateral pleural effusions, with associated   compressive atelectasis.    Patchy nodular parenchymal opacities in the upper lobes bilaterally,   suspicious for pneumonia.     Pancreatitis with areas of pancreatic necrosis, unchanged. Peripancreatic   fluid collections, with interval increase in size of a collection in the   lesser sac. 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    Moderate ascites, unchanged.    Moderate fecal material in the rectosigmoid colon, decreased since   12/11/2018. Mild wall thickening of the rectosigmoid colon is again   noted. Mild wall thickening of the mid descending colon may be secondary   to a colitis.    < end of copied text >    EKG: Asmita Muñoz, PGY3  Pager [phone]    CHIEF COMPLAINT: hypoxic resp failure     Interval Events: Pt continues to spike low grade fevers. Cultures growing coag negative staph. Pt awake, following commands. Able to nod yes or no to questions    REVIEW OF SYSTEMS:  Constitutional: [ ] negative [ ] fevers [ ] chills [ ] weight loss [ ] weight gain  HEENT: [ ] negative [ ] dry eyes [ ] eye irritation [ ] postnasal drip [ ] nasal congestion  CV: [ ] negative  [ ] chest pain [ ] orthopnea [ ] palpitations [ ] murmur  Resp: [ ] negative [ ] cough [ ] shortness of breath [ ] dyspnea [ ] wheezing [ ] sputum [ ] hemoptysis  GI: [ ] negative [ ] nausea [ ] vomiting [ ] diarrhea [ ] constipation [ ] abd pain [ ] dysphagia   : [ ] negative [ ] dysuria [ ] nocturia [ ] hematuria [ ] increased urinary frequency  Musculoskeletal: [ ] negative [ ] back pain [ ] myalgias [ ] arthralgias [ ] fracture  Skin: [ ] negative [ ] rash [ ] itch  Neurological: [ ] negative [ ] headache [ ] dizziness [ ] syncope [ ] weakness [ ] numbness  Psychiatric: [ ] negative [ ] anxiety [ ] depression  Endocrine: [ ] negative [ ] diabetes [ ] thyroid problem  Hematologic/Lymphatic: [ ] negative [ ] anemia [ ] bleeding problem  Allergic/Immunologic: [ ] negative [ ] itchy eyes [ ] nasal discharge [ ] hives [ ] angioedema  [ ] All other systems negative  [X ] Unable to assess ROS because intubated    OBJECTIVE:  ICU Vital Signs Last 24 Hrs  T(C): 38.2 (23 Dec 2018 04:00), Max: 38.2 (23 Dec 2018 04:00)  T(F): 100.7 (23 Dec 2018 04:00), Max: 100.7 (23 Dec 2018 04:00)  HR: 108 (23 Dec 2018 07:05) (93 - 124)  BP: 111/54 (23 Dec 2018 07:00) (99/50 - 130/59)  BP(mean): 64 (23 Dec 2018 07:00) (61 - 81)  ABP: --  ABP(mean): --  RR: 18 (23 Dec 2018 07:00) (14 - 32)  SpO2: 100% (23 Dec 2018 07:05) (88% - 100%)    Mode: AC/ CMV (Assist Control/ Continuous Mandatory Ventilation), RR (machine): 18, TV (machine): 380, FiO2: 40, PEEP: 10, MAP: 14, PIP: 29    12-22 @ 07:01  -  12-23 @ 07:00  --------------------------------------------------------  IN: 1240 mL / OUT: 3360 mL / NET: -2120 mL      CAPILLARY BLOOD GLUCOSE          PHYSICAL EXAM:  GENERAL: Intubated  HEAD:  Atraumatic, Normocephalic  EYES: EOMI, PERRLA, conjunctiva and sclera clear  NECK: Supple, No JVD  CHEST/LUNG: vent sounds  HEART: Regular rate and rhythm; No murmurs, rubs, or gallops  ABDOMEN: Soft, Nontender, Nondistended; Bowel sounds present  EXTREMITIES:  2+ LE swelling  NEURO: contracted.  Pt awake, following commands. Able to nod yes or no to questions  LINES:      HOSPITAL MEDICATIONS:  enoxaparin Injectable 40 milliGRAM(s) SubCutaneous every 24 hours    cefTAZidime/avibactam IVPB 2.5 Gram(s) IV Intermittent every 8 hours  metroNIDAZOLE  IVPB      metroNIDAZOLE  IVPB 500 milliGRAM(s) IV Intermittent every 8 hours    furosemide   Injectable 40 milliGRAM(s) IV Push every 12 hours        acetaminophen    Suspension .. 650 milliGRAM(s) Oral every 6 hours PRN  lacosamide Solution 100 milliGRAM(s) Oral two times a day  metoclopramide Injectable 5 milliGRAM(s) IV Push every 8 hours PRN  QUEtiapine 25 milliGRAM(s) Oral daily  risperiDONE   Solution 1 milliGRAM(s) Oral daily              chlorhexidine 4% Liquid 1 Application(s) Topical <User Schedule>  petrolatum Ophthalmic Ointment 1 Application(s) Both EYES two times a day        LABS:                        7.7    4.18  )-----------( 350      ( 23 Dec 2018 03:48 )             24.5     Hgb Trend: 7.7<--, 7.2<--, 8.6<--, 7.2<--, 8.3<--  12-23    137  |  99  |  13  ----------------------------<  122<H>  3.7   |  29  |  0.31<L>    Ca    8.0<L>      23 Dec 2018 03:48  Phos  2.9     12-23  Mg     2.0     12-23    TPro  6.0  /  Alb  2.1<L>  /  TBili  1.5<H>  /  DBili  x   /  AST  25  /  ALT  24  /  AlkPhos  263<H>  12-23    Creatinine Trend: 0.31<--, 0.26<--, 0.26<--, 0.30<--, 0.35<--, 0.41<--            MICROBIOLOGY:   Culture - Blood in AM (12.22.18 @ 06:03)    Culture - Blood:   ***Blood Panel PCR results on this specimen are available  approximately 3 hours after the Gram stain result***  Gram stain, PCR, and/or culture results may not always  correspond due to difference in methodologies  ------------------------------------------------------------  This PCR assay was performed using [employer].  The  following targets are tested for:  Enterococcus, vancomycin  resistant enterococci, Listeria monocytogenes,  coagulase  negative staphylococci, S. aureus, methicillin resistant S.  aureus, Streptococcus agalactiae (Group B), S. pneumoniae,  S. pyogenes (Group A), Acinetobacter baumannii, Enterobacter  cloacae, E. coli, Klebsiella oxytoca, K. pneumoniae, Proteus  sp., Serratia marcescens, Haemophilus influenzae, Neisseria  meningitidis, Pseudomonas aeruginosa, Candida albicans, C.  glabrata, C. krusei, C. parapsilosis, C. tropicalis and the  KPC resistance gene.  **NOTE: Due to technical problems, Proteus sp. will NOT be  reported as part of the BCID paneluntil further notice.    -  Coagulase negative Staphylococcus: + DETECT LITZY    Specimen Source: BLOOD VENOUS    Organism: BLOOD CULTURE PCR    Gram Stain Blood:   ***** CRITICAL RESULT *****  PERSON CALLED / READ-BACK: ALEJANDRO WHITLEY/Y  DATE / TIME CALLED: 12/23/18 0423  CALLED BY: MARIANN PERRY  GPCCL^Gram Pos Cocci In Clusters  AFTER: 21 HOURS INCUBATION  BOTTLE: AEROBIC BOTTLE    Organism Identification: BLOOD CULTURE PCR    Method Type: PCR        RADIOLOGY:  Culture - Respiratory with Gram Stain (12.18.18 @ 21:36)    -  Gentamicin: R >8 LITZY    -  Levofloxacin: R >4 LITZY    -  Meropenem: R >8 LITZY    Culture - Respiratory:   ACIBAU^Acinetobacter baumannii  QUANTITY OF GROWTH: MANY    Culture - Respiratory:   Normal Respiratory Trisha Also Present  RESULT CALLED TO / READ BACK: BIANKA SPRINGER/Y  DATE / TIME CALLED: 12/22/18 1103  CALLED BY: MARTI RANDLE    -  Tobramycin: R >8 LITZY    -  Amikacin: S <=8 LITZY    -  Ampicillin/Sulbactam: R >16/8 LITZY    Gram Stain Sputum:   WBC White Blood Cells  QNTY CELLS IN GRAM STAIN: MODERATE (3+)  GNR^Gram Neg Rods  QUANTITY OF BACTERIA SEEN: FEW (2+)    -  Cefepime: R >16 LITZY    -  Ceftazidime: S 4 LITZY    -  Ciprofloxacin: R >2 LITZY    Specimen Source: SPUTUM    Organism Identification: Acinetobacter baumannii     Organism: Acinetobacter baumannii   QUANTITY OF GROWTH: MANY    Method Type: NEGATIVE LITZY 43    [x ] Reviewed and interpreted by me  < from: CT Abdomen and Pelvis w/ IV Cont (12.19.18 @ 14:50) >  IMPRESSION: Small bilateral pleural effusions, with associated   compressive atelectasis.    Patchy nodular parenchymal opacities in the upper lobes bilaterally,   suspicious for pneumonia.     Pancreatitis with areas of pancreatic necrosis, unchanged. Peripancreatic   fluid collections, with interval increase in size of a collection in the   lesser sac. 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    Moderate ascites, unchanged.    Moderate fecal material in the rectosigmoid colon, decreased since   12/11/2018. Mild wall thickening of the rectosigmoid colon is again   noted. Mild wall thickening of the mid descending colon may be secondary   to a colitis.    < end of copied text >    EKG:
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
## 2018-12-24 LAB
-  COAGULASE NEGATIVE STAPHYLOCOCCUS: SIGNIFICANT CHANGE UP
ALBUMIN SERPL ELPH-MCNC: 1.7 G/DL — LOW (ref 3.3–5)
ALP SERPL-CCNC: 203 U/L — HIGH (ref 40–120)
ALT FLD-CCNC: 16 U/L — SIGNIFICANT CHANGE UP (ref 4–41)
APTT BLD: 33.4 SEC — SIGNIFICANT CHANGE UP (ref 27.5–36.3)
AST SERPL-CCNC: 15 U/L — SIGNIFICANT CHANGE UP (ref 4–40)
BACTERIA BLD CULT: SIGNIFICANT CHANGE UP
BASE EXCESS BLDA CALC-SCNC: 7.8 MMOL/L — SIGNIFICANT CHANGE UP
BASOPHILS # BLD AUTO: 0.01 K/UL — SIGNIFICANT CHANGE UP (ref 0–0.2)
BASOPHILS NFR BLD AUTO: 0.3 % — SIGNIFICANT CHANGE UP (ref 0–2)
BILIRUB SERPL-MCNC: 1.1 MG/DL — SIGNIFICANT CHANGE UP (ref 0.2–1.2)
BLD GP AB SCN SERPL QL: NEGATIVE — SIGNIFICANT CHANGE UP
BUN SERPL-MCNC: 12 MG/DL — SIGNIFICANT CHANGE UP (ref 7–23)
CALCIUM SERPL-MCNC: 7.7 MG/DL — LOW (ref 8.4–10.5)
CHLORIDE BLDA-SCNC: 102 MMOL/L — SIGNIFICANT CHANGE UP (ref 96–108)
CHLORIDE SERPL-SCNC: 99 MMOL/L — SIGNIFICANT CHANGE UP (ref 98–107)
CO2 SERPL-SCNC: 30 MMOL/L — SIGNIFICANT CHANGE UP (ref 22–31)
CREAT SERPL-MCNC: 0.32 MG/DL — LOW (ref 0.5–1.3)
EOSINOPHIL # BLD AUTO: 0.02 K/UL — SIGNIFICANT CHANGE UP (ref 0–0.5)
EOSINOPHIL NFR BLD AUTO: 0.5 % — SIGNIFICANT CHANGE UP (ref 0–6)
GLUCOSE BLDA-MCNC: 95 MG/DL — SIGNIFICANT CHANGE UP (ref 70–99)
GLUCOSE SERPL-MCNC: 91 MG/DL — SIGNIFICANT CHANGE UP (ref 70–99)
HCO3 BLDA-SCNC: 32 MMOL/L — HIGH (ref 22–26)
HCT VFR BLD CALC: 20.6 % — CRITICAL LOW (ref 39–50)
HCT VFR BLD CALC: 28.2 % — LOW (ref 39–50)
HCT VFR BLDA CALC: 20.5 % — CRITICAL LOW (ref 39–51)
HGB BLD-MCNC: 6.4 G/DL — CRITICAL LOW (ref 13–17)
HGB BLD-MCNC: 8.8 G/DL — LOW (ref 13–17)
HGB BLDA-MCNC: 6.5 G/DL — CRITICAL LOW (ref 13–17)
IMM GRANULOCYTES # BLD AUTO: 0.03 # — SIGNIFICANT CHANGE UP
IMM GRANULOCYTES NFR BLD AUTO: 0.8 % — SIGNIFICANT CHANGE UP (ref 0–1.5)
INR BLD: 1.54 — HIGH (ref 0.88–1.17)
LACTATE BLDA-SCNC: 2.7 MMOL/L — HIGH (ref 0.5–2)
LYMPHOCYTES # BLD AUTO: 1.41 K/UL — SIGNIFICANT CHANGE UP (ref 1–3.3)
LYMPHOCYTES # BLD AUTO: 37.7 % — SIGNIFICANT CHANGE UP (ref 13–44)
MAGNESIUM SERPL-MCNC: 2 MG/DL — SIGNIFICANT CHANGE UP (ref 1.6–2.6)
MCHC RBC-ENTMCNC: 31.1 % — LOW (ref 32–36)
MCHC RBC-ENTMCNC: 31.2 % — LOW (ref 32–36)
MCHC RBC-ENTMCNC: 32.2 PG — SIGNIFICANT CHANGE UP (ref 27–34)
MCHC RBC-ENTMCNC: 33.9 PG — SIGNIFICANT CHANGE UP (ref 27–34)
MCV RBC AUTO: 103.3 FL — HIGH (ref 80–100)
MCV RBC AUTO: 109 FL — HIGH (ref 80–100)
MONOCYTES # BLD AUTO: 0.43 K/UL — SIGNIFICANT CHANGE UP (ref 0–0.9)
MONOCYTES NFR BLD AUTO: 11.5 % — SIGNIFICANT CHANGE UP (ref 2–14)
NEUTROPHILS # BLD AUTO: 1.84 K/UL — SIGNIFICANT CHANGE UP (ref 1.8–7.4)
NEUTROPHILS NFR BLD AUTO: 49.2 % — SIGNIFICANT CHANGE UP (ref 43–77)
NRBC # FLD: 0.21 — SIGNIFICANT CHANGE UP
NRBC # FLD: 0.38 — SIGNIFICANT CHANGE UP
NRBC FLD-RTO: 5.6 — SIGNIFICANT CHANGE UP
NRBC FLD-RTO: 7.3 — SIGNIFICANT CHANGE UP
ORGANISM # SPEC MICROSCOPIC CNT: SIGNIFICANT CHANGE UP
PCO2 BLDA: 44 MMHG — SIGNIFICANT CHANGE UP (ref 35–48)
PH BLDA: 7.47 PH — HIGH (ref 7.35–7.45)
PHOSPHATE SERPL-MCNC: 3.1 MG/DL — SIGNIFICANT CHANGE UP (ref 2.5–4.5)
PLATELET # BLD AUTO: 363 K/UL — SIGNIFICANT CHANGE UP (ref 150–400)
PLATELET # BLD AUTO: 505 K/UL — HIGH (ref 150–400)
PMV BLD: 10.5 FL — SIGNIFICANT CHANGE UP (ref 7–13)
PMV BLD: 11.1 FL — SIGNIFICANT CHANGE UP (ref 7–13)
PO2 BLDA: 98 MMHG — SIGNIFICANT CHANGE UP (ref 83–108)
POTASSIUM BLDA-SCNC: 3.1 MMOL/L — LOW (ref 3.4–4.5)
POTASSIUM SERPL-MCNC: 3.3 MMOL/L — LOW (ref 3.5–5.3)
POTASSIUM SERPL-SCNC: 3.3 MMOL/L — LOW (ref 3.5–5.3)
PROT SERPL-MCNC: 5.2 G/DL — LOW (ref 6–8.3)
PROTHROM AB SERPL-ACNC: 17.8 SEC — HIGH (ref 9.8–13.1)
RBC # BLD: 1.89 M/UL — LOW (ref 4.2–5.8)
RBC # BLD: 2.73 M/UL — LOW (ref 4.2–5.8)
RBC # FLD: 18.6 % — HIGH (ref 10.3–14.5)
RBC # FLD: 22.1 % — HIGH (ref 10.3–14.5)
RH IG SCN BLD-IMP: POSITIVE — SIGNIFICANT CHANGE UP
SAO2 % BLDA: 98.9 % — SIGNIFICANT CHANGE UP (ref 95–99)
SODIUM BLDA-SCNC: 136 MMOL/L — SIGNIFICANT CHANGE UP (ref 136–146)
SODIUM SERPL-SCNC: 138 MMOL/L — SIGNIFICANT CHANGE UP (ref 135–145)
SPECIMEN SOURCE: SIGNIFICANT CHANGE UP
SPECIMEN SOURCE: SIGNIFICANT CHANGE UP
WBC # BLD: 3.74 K/UL — LOW (ref 3.8–10.5)
WBC # BLD: 5.23 K/UL — SIGNIFICANT CHANGE UP (ref 3.8–10.5)
WBC # FLD AUTO: 3.74 K/UL — LOW (ref 3.8–10.5)
WBC # FLD AUTO: 5.23 K/UL — SIGNIFICANT CHANGE UP (ref 3.8–10.5)

## 2018-12-24 PROCEDURE — 99233 SBSQ HOSP IP/OBS HIGH 50: CPT

## 2018-12-24 PROCEDURE — 99232 SBSQ HOSP IP/OBS MODERATE 35: CPT | Mod: GC

## 2018-12-24 PROCEDURE — 99291 CRITICAL CARE FIRST HOUR: CPT

## 2018-12-24 RX ORDER — ALBUMIN HUMAN 25 %
50 VIAL (ML) INTRAVENOUS EVERY 6 HOURS
Qty: 0 | Refills: 0 | Status: DISCONTINUED | OUTPATIENT
Start: 2018-12-24 | End: 2018-12-26

## 2018-12-24 RX ORDER — POTASSIUM CHLORIDE 20 MEQ
40 PACKET (EA) ORAL ONCE
Qty: 0 | Refills: 0 | Status: COMPLETED | OUTPATIENT
Start: 2018-12-24 | End: 2018-12-24

## 2018-12-24 RX ORDER — VANCOMYCIN HCL 1 G
1250 VIAL (EA) INTRAVENOUS ONCE
Qty: 0 | Refills: 0 | Status: COMPLETED | OUTPATIENT
Start: 2018-12-24 | End: 2018-12-24

## 2018-12-24 RX ORDER — FENTANYL CITRATE 50 UG/ML
100 INJECTION INTRAVENOUS ONCE
Qty: 0 | Refills: 0 | Status: DISCONTINUED | OUTPATIENT
Start: 2018-12-24 | End: 2018-12-24

## 2018-12-24 RX ORDER — MIDODRINE HYDROCHLORIDE 2.5 MG/1
10 TABLET ORAL ONCE
Qty: 0 | Refills: 0 | Status: COMPLETED | OUTPATIENT
Start: 2018-12-24 | End: 2018-12-24

## 2018-12-24 RX ORDER — PANTOPRAZOLE SODIUM 20 MG/1
40 TABLET, DELAYED RELEASE ORAL EVERY 12 HOURS
Qty: 0 | Refills: 0 | Status: DISCONTINUED | OUTPATIENT
Start: 2018-12-24 | End: 2018-12-26

## 2018-12-24 RX ORDER — NOREPINEPHRINE BITARTRATE/D5W 8 MG/250ML
0.05 PLASTIC BAG, INJECTION (ML) INTRAVENOUS
Qty: 8 | Refills: 0 | Status: DISCONTINUED | OUTPATIENT
Start: 2018-12-24 | End: 2018-12-29

## 2018-12-24 RX ORDER — PREGABALIN 225 MG/1
1000 CAPSULE ORAL DAILY
Qty: 0 | Refills: 0 | Status: DISCONTINUED | OUTPATIENT
Start: 2018-12-24 | End: 2019-01-04

## 2018-12-24 RX ORDER — POTASSIUM CHLORIDE 20 MEQ
40 PACKET (EA) ORAL EVERY 4 HOURS
Qty: 0 | Refills: 0 | Status: DISCONTINUED | OUTPATIENT
Start: 2018-12-24 | End: 2018-12-24

## 2018-12-24 RX ORDER — FOLIC ACID 0.8 MG
1 TABLET ORAL DAILY
Qty: 0 | Refills: 0 | Status: DISCONTINUED | OUTPATIENT
Start: 2018-12-24 | End: 2019-01-04

## 2018-12-24 RX ADMIN — PANTOPRAZOLE SODIUM 40 MILLIGRAM(S): 20 TABLET, DELAYED RELEASE ORAL at 18:15

## 2018-12-24 RX ADMIN — PANTOPRAZOLE SODIUM 40 MILLIGRAM(S): 20 TABLET, DELAYED RELEASE ORAL at 06:06

## 2018-12-24 RX ADMIN — Medication 50 MILLILITER(S): at 20:01

## 2018-12-24 RX ADMIN — Medication 9.35 MICROGRAM(S)/KG/MIN: at 08:47

## 2018-12-24 RX ADMIN — LACOSAMIDE 100 MILLIGRAM(S): 50 TABLET ORAL at 10:30

## 2018-12-24 RX ADMIN — LACOSAMIDE 100 MILLIGRAM(S): 50 TABLET ORAL at 21:32

## 2018-12-24 RX ADMIN — Medication 40 MILLIEQUIVALENT(S): at 08:48

## 2018-12-24 RX ADMIN — Medication 100 MILLIGRAM(S): at 21:32

## 2018-12-24 RX ADMIN — MIDODRINE HYDROCHLORIDE 10 MILLIGRAM(S): 2.5 TABLET ORAL at 00:21

## 2018-12-24 RX ADMIN — Medication 9.35 MICROGRAM(S)/KG/MIN: at 01:12

## 2018-12-24 RX ADMIN — CHLORHEXIDINE GLUCONATE 1 APPLICATION(S): 213 SOLUTION TOPICAL at 20:01

## 2018-12-24 RX ADMIN — Medication 166.67 MILLIGRAM(S): at 01:53

## 2018-12-24 RX ADMIN — Medication 100 MILLIGRAM(S): at 13:53

## 2018-12-24 RX ADMIN — FENTANYL CITRATE 100 MICROGRAM(S): 50 INJECTION INTRAVENOUS at 01:37

## 2018-12-24 RX ADMIN — Medication 50 MILLILITER(S): at 13:23

## 2018-12-24 RX ADMIN — Medication 1 MILLIGRAM(S): at 18:15

## 2018-12-24 RX ADMIN — Medication 100 MILLIGRAM(S): at 06:00

## 2018-12-24 RX ADMIN — PREGABALIN 1000 MICROGRAM(S): 225 CAPSULE ORAL at 18:15

## 2018-12-24 RX ADMIN — Medication 650 MILLIGRAM(S): at 13:21

## 2018-12-24 RX ADMIN — QUETIAPINE FUMARATE 25 MILLIGRAM(S): 200 TABLET, FILM COATED ORAL at 12:20

## 2018-12-24 RX ADMIN — Medication 650 MILLIGRAM(S): at 15:48

## 2018-12-24 RX ADMIN — Medication 1 APPLICATION(S): at 06:07

## 2018-12-24 RX ADMIN — RISPERIDONE 1 MILLIGRAM(S): 4 TABLET ORAL at 12:20
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
## 2018-12-24 NOTE — PROGRESS NOTE ADULT - ASSESSMENT
52 y/o M (resident of [facility]) w/ a PMHx of TBI, s/p PEG tube, contracture, and seizure d/o who presented as a transfer from [facility] on 12/9 for respiratory failure 2/2 ARDS likely 2/2 necrotizing pancreatitis s/p intubation.    # Neuro  - Off sedation,  Pt awake, following commands. Able to nod yes or no to questions  - Patient was on Depakote at home for history of seizure disorder; however, this was discontinued because Depakote can potentially cause pancreatitis. vEEGs have shown no epileptiform abnormalities   - Neurology consulted for assistance with seizure management. Patient was subsequently started on IV Vimpat. C/w IV Vimpat as per Neuro recs    # CV   - Patient currently on Norepinephrine gtt. Continue to monitor BP and wean as tolerated.   - Bedside TY was performed which showed an LV that appears to be functioning normally with mild-moderate enlargement of RV, no evidence of fluid responsiveness, ?bicuspid aortic valve with no evidence of AI. ? small pericardial effusion, no tamponade physiology.    # Resp:   - Patient currently intubated 2/2 moderate ARDS 2/2 necrotizing pancreatitis. Previously on VDR vent, now on conventional vent.  - Will closely monitor respiratory status and check serial blood gases.  - CPAP again for resp function recovery.    # GI  - Patient found to have necrotizing pancreatitis. Unclear etiology for pancreatitis at this time. Patient is a resident of NH and with no significant alcohol history. OSH CT A+P showed gallbladder pathology. Repeat CT with normal gallbladder wall and duct. TG was found to be elevated in the hospital, though patient was on a Propofol gtt at the time and it has been downtrending since the Propofol was discontinued.  - switched to avycaz and flagyl for necrotizing pancreatitis for now.   - Colitis? on CT 12/19. Monitor for diarrhea. Unlikely c. diff given clinical scenario.   - Repeat CT scan demonstrates pancreatitis unchanged but concern for enlarging danay-pancreatic fluid collection. Surgery reconsulted, rec GI/IR evaluation. Appreciate GI recs, awaiting review with radiology and advanced endoscopist to see if there is a window for necrosectomy/danay-pancreatic fluid drainage. NPO for now pending discussion with advanced team.     # /Renal   - C/w Ahuja for now    # ID   - Patient in shock on arrival. Likely source is infectious vs distributive   - BAL on 12/12 was positive for pseudomonas aeruginosa. Repeat sputum cx w/ Acinetobacter resistant to carbapenems. Appreciate ID recs, switched to avycaz and flagyl. Source likely PNA, pancreatitis and peripancreatic fluid collection. Blood cx growing coag neg staph likely contaminant. Follow up repeat cultures.   - Started on vanco 12/18 overnight for empiric coverage, but then discontinued 12/20 as patient no longer febrile   - Patient s/p antiviral regimen (Valtrex followed by acyclovir) for shingles   - Monitor CBC and fever curve    # Heme:  - Pancytopenic, with Hgb 6.4 this morning, no active signs of bleeding  - s/p 1 unit of pRBC's, will monitor response to prBC's   - continue to monitor CBC     # Endo  - No active issues (serum glucose WNL)    # DVT PPx:   - C/w Lovenox 54 y/o M (resident of [facility]) w/ a PMHx of TBI, s/p PEG tube, contracture, and seizure d/o who presented as a transfer from [facility] on 12/9 for respiratory failure 2/2 ARDS likely 2/2 necrotizing pancreatitis s/p intubation.    # Neuro  - Off sedation. Pt awake, following commands. Able to nod yes or no to questions  - Patient was on Depakote at home for history of seizure disorder; however, this was discontinued because Depakote can potentially cause pancreatitis. vEEGs have shown no epileptiform abnormalities   - Neurology consulted for assistance with seizure management. Patient was subsequently started on IV Vimpat. C/w IV Vimpat as per Neuro recs    # CV   - Patient currently on Norepinephrine gtt as hypotensive overnight. Continue to monitor BP and wean as tolerated.   - Bedside TY was performed which showed an LV that appears to be functioning normally with mild-moderate enlargement of RV, no evidence of fluid responsiveness, ?bicuspid aortic valve with no evidence of AI. ? small pericardial effusion, no tamponade physiology.    # Resp:   - Patient currently intubated 2/2 moderate ARDS 2/2 necrotizing pancreatitis. Previously on VDR vent, now on conventional vent.  - Will closely monitor respiratory status and check serial blood gases.  - CPAP again for resp function recovery.    # GI  - Patient found to have necrotizing pancreatitis. Unclear etiology for pancreatitis at this time. Patient is a resident of NH and with no significant alcohol history. OSH CT A+P showed gallbladder pathology. Repeat CT with normal gallbladder wall and duct. TG was found to be elevated in the hospital, though patient was on a Propofol gtt at the time and it has been downtrending since the Propofol was discontinued.  - switched to avycaz and flagyl for necrotizing pancreatitis for now.   - Colitis? on CT 12/19. Monitor for diarrhea. Unlikely c. diff given clinical scenario.   - Repeat CT scan demonstrates pancreatitis unchanged but concern for enlarging danay-pancreatic fluid collection. Surgery reconsulted, rec GI/IR evaluation. Appreciate GI recs, awaiting review with radiology and advanced endoscopist to see if there is a window for necrosectomy/danay-pancreatic fluid drainage. NPO for now pending discussion with advanced team.     # /Renal   - C/w Ahuja for now    # ID   - Patient in shock on arrival. Likely source is infectious vs distributive   - BAL on 12/12 was positive for pseudomonas aeruginosa. Repeat sputum cx w/ Acinetobacter resistant to carbapenems. Appreciate ID recs, switched to avycaz and flagyl. Source likely PNA, pancreatitis and peripancreatic fluid collection. Blood cx growing coag neg staph likely contaminant. Follow up repeat cultures.   - Started on vanco 12/18 overnight for empiric coverage, but then discontinued 12/20 as patient no longer febrile. Febrile to 100.4 overnight and received another dose of Vanc.    - Patient s/p antiviral regimen (Valtrex followed by acyclovir) for shingles   - Monitor CBC and fever curve    # Heme:  - Pancytopenic, with Hgb 6.4 this morning, no active signs of bleeding  - s/p 1 unit of pRBC's, will monitor response to prBC's   - continue to monitor CBC     # Endo  - No active issues (serum glucose WNL)    # DVT PPx:   - C/w Lovenox 54 y/o M (resident of [facility]) w/ a PMHx of TBI, s/p PEG tube, contracture, and seizure d/o who presented as a transfer from [facility] on 12/9 for respiratory failure 2/2 ARDS likely 2/2 necrotizing pancreatitis s/p intubation.    # Neuro  - Off sedation. Pt awake, following commands. Able to nod yes or no to questions  - Patient was on Depakote at home for history of seizure disorder; however, this was discontinued because Depakote can potentially cause pancreatitis. vEEGs have shown no epileptiform abnormalities   - Neurology consulted for assistance with seizure management. Patient was subsequently started on Vimpat. C/w Vimpat as per Neuro recs    # CV   - Patient currently on Norepinephrine gtt as hypotensive overnight. Continue to monitor BP and wean as tolerated.   - Bedside TY was performed which showed an LV that appears to be functioning normally with mild-moderate enlargement of RV, no evidence of fluid responsiveness, ?bicuspid aortic valve with no evidence of AI. ? small pericardial effusion, no tamponade physiology.    # Resp:   - Patient currently intubated 2/2 moderate ARDS 2/2 necrotizing pancreatitis. Previously on VDR vent, now on conventional vent.  - Will closely monitor respiratory status and check serial blood gases.  - CPAP again for resp function recovery.    # GI  - Patient found to have necrotizing pancreatitis. Unclear etiology for pancreatitis at this time. Patient is a resident of NH and with no significant alcohol history. OSH CT A+P showed gallbladder pathology. Repeat CT with normal gallbladder wall and duct. TG was found to be elevated in the hospital, though patient was on a Propofol gtt at the time and it has been downtrending since the Propofol was discontinued.  - switched to avycaz and flagyl for necrotizing pancreatitis for now.   - Colitis? on CT 12/19. Monitor for diarrhea. Unlikely c. diff given clinical scenario.   - Repeat CT scan demonstrates pancreatitis unchanged but concern for enlarging danay-pancreatic fluid collection. Surgery reconsulted, rec GI/IR evaluation. Appreciate GI recs, awaiting review with radiology and advanced endoscopist to see if there is a window for necrosectomy/danay-pancreatic fluid drainage. NPO for now pending discussion with advanced team.     # /Renal   - C/w Ahuja for now    # ID   - Patient in shock on arrival. Likely source is infectious vs distributive   - BAL on 12/12 was positive for pseudomonas aeruginosa. Repeat sputum cx w/ Acinetobacter resistant to carbapenems. Appreciate ID recs, switched to avycaz and flagyl. Source likely PNA, pancreatitis and peripancreatic fluid collection. Blood cx growing coag neg staph likely contaminant. Follow up repeat cultures.   - Started on vanco 12/18 overnight for empiric coverage, but then discontinued 12/20 as patient no longer febrile. Febrile to 100.4 overnight and received another dose of Vanc.    - Patient s/p antiviral regimen (Valtrex followed by acyclovir) for shingles   - Monitor CBC and fever curve    # Heme:  - Pancytopenic, with Hgb 6.4 this morning, no active signs of bleeding  - s/p 1 unit of pRBC's, will monitor response to prBC's   - continue to monitor CBC     # Endo  - No active issues (serum glucose WNL)    # DVT PPx:   - C/w Lovenox 52 y/o M (resident of [facility]) w/ a PMHx of TBI, s/p PEG tube, contracture, and seizure d/o who presented as a transfer from [facility] on 12/9 for respiratory failure 2/2 ARDS likely 2/2 necrotizing pancreatitis s/p intubation.    # Neuro  - Off sedation. Pt awake, following commands. Able to nod yes or no to questions  - Patient was on Depakote at home for history of seizure disorder; however, this was discontinued because Depakote can potentially cause pancreatitis. vEEGs have shown no epileptiform abnormalities   - Neurology consulted for assistance with seizure management. Patient was subsequently started on Vimpat. C/w Vimpat as per Neuro recs    # CV   - Patient currently on Norepinephrine gtt as hypotensive overnight. Continue to monitor BP and wean as tolerated.   - Bedside TY was performed which showed an LV that appears to be functioning normally with mild-moderate enlargement of RV, no evidence of fluid responsiveness, ?bicuspid aortic valve with no evidence of AI. ? small pericardial effusion, no tamponade physiology.    # Resp:   - Patient currently intubated 2/2 moderate ARDS 2/2 necrotizing pancreatitis. Previously on VDR vent, now on conventional vent.  - Will closely monitor respiratory status and check serial blood gases.  - CPAP again for resp function recovery.    # GI  - Patient found to have necrotizing pancreatitis. Unclear etiology for pancreatitis at this time. Patient is a resident of NH and with no significant alcohol history. OSH CT A+P showed gallbladder pathology. Repeat CT with normal gallbladder wall and duct. TG was found to be elevated in the hospital, though patient was on a Propofol gtt at the time and it has been downtrending since the Propofol was discontinued.  - switched to avycaz and flagyl for necrotizing pancreatitis for now.   - Colitis? on CT 12/19. Monitor for diarrhea. Unlikely c. diff given clinical scenario.   - Repeat CT scan demonstrates pancreatitis unchanged but concern for enlarging danay-pancreatic fluid collection. Surgery reconsulted, rec GI/IR evaluation. Appreciate GI recs, awaiting review with radiology and advanced endoscopist to see if there is a window for necrosectomy/danay-pancreatic fluid drainage. NPO for now pending discussion with advanced team.     # /Renal   - C/w Ahuja for now    # ID   - Patient in shock on arrival. Likely source is infectious vs distributive   - BAL on 12/12 was positive for pseudomonas aeruginosa. Repeat sputum cx w/ Acinetobacter resistant to carbapenems. Appreciate ID recs, switched to avycaz and flagyl. Source likely PNA, pancreatitis and peripancreatic fluid collection. Blood cx growing coag neg staph likely contaminant. Follow up repeat cultures.   - Started on vanco 12/18 overnight for empiric coverage, but then discontinued 12/20 as patient no longer febrile. Febrile to 100.4 overnight and received another dose of Vanc.    - Patient s/p antiviral regimen (Valtrex followed by acyclovir) for shingles   - Monitor CBC and fever curve    # Heme:  - Hgb 6.4 this morning, no active signs of bleeding  - Gastric lavage without bleeding  - s/p 1 unit of pRBC's, will monitor response to prBC's   - continue to monitor CBC     # Endo  - No active issues (serum glucose WNL)    # DVT PPx:   - C/w Lovenox 54 y/o M (resident of [facility]) w/ a PMHx of TBI, s/p PEG tube, contracture, and seizure d/o who presented as a transfer from [facility] on 12/9 for respiratory failure 2/2 ARDS likely 2/2 necrotizing pancreatitis s/p intubation.    # Neuro  - Off sedation. Pt awake, following commands. Able to nod yes or no to questions.   - Patient was on Depakote at home for history of seizure disorder; however, this was discontinued because Depakote can potentially cause pancreatitis. vEEGs have shown no epileptiform abnormalities   - Neurology consulted for assistance with seizure management. Patient was subsequently started on Vimpat. C/w Vimpat as per Neuro recs    # CV   - Patient currently on Norepinephrine gtt as hypotensive overnight. Continue to monitor BP and wean as tolerated.   - Bedside TY was performed which showed an LV that appears to be functioning normally with mild-moderate enlargement of RV, no evidence of fluid responsiveness, ?bicuspid aortic valve with no evidence of AI. ? small pericardial effusion, no tamponade physiology.    # Resp:   - Patient currently intubated 2/2 moderate ARDS 2/2 necrotizing pancreatitis. Previously on VDR vent, now on conventional vent.  - Will closely monitor respiratory status and check serial blood gases.  - CPAP again for resp function recovery.    # GI  - Patient found to have necrotizing pancreatitis. Unclear etiology for pancreatitis at this time. Patient is a resident of NH and with no significant alcohol history. OSH CT A+P showed gallbladder pathology. Repeat CT with normal gallbladder wall and duct. TG was found to be elevated in the hospital, though patient was on a Propofol gtt at the time and it has been downtrending since the Propofol was discontinued.  - switched to avycaz and flagyl for necrotizing pancreatitis for now.   - Colitis? on CT 12/19. Monitor for diarrhea. Unlikely c. diff given clinical scenario.   - Repeat CT scan demonstrates pancreatitis unchanged but concern for enlarging danay-pancreatic fluid collection. Surgery reconsulted, rec GI/IR evaluation. Appreciate GI recs, awaiting review with radiology and advanced endoscopist to see if there is a window for necrosectomy/danay-pancreatic fluid drainage. NPO for now pending discussion with advanced team.     # /Renal   - C/w Ahuja for now    # ID   - Patient in shock on arrival. Likely source is infectious vs distributive   - BAL on 12/12 was positive for pseudomonas aeruginosa. Repeat sputum cx w/ Acinetobacter resistant to carbapenems. Appreciate ID recs, switched to avycaz and flagyl. Source likely PNA, pancreatitis and peripancreatic fluid collection. Blood cx growing coag neg staph likely contaminant. Follow up repeat cultures.   - Started on vanco 12/18 overnight for empiric coverage, but then discontinued 12/20 as patient no longer febrile. Febrile to 100.4 overnight and received another dose of Vanc.    - Patient s/p antiviral regimen (Valtrex followed by acyclovir) for shingles   - Monitor CBC and fever curve    # Heme:  - Hgb 6.4 this morning, no active signs of bleeding  - Gastric lavage without bleeding  - s/p 1 unit of pRBC's, will monitor response to prBC's   - continue to monitor CBC     # Endo  - No active issues (serum glucose WNL)    # DVT PPx:   - C/w Lovenox 52 y/o M (resident of [facility]) w/ a PMHx of TBI, s/p PEG tube, contracture, and seizure d/o who presented as a transfer from [facility] on 12/9 for respiratory failure 2/2 ARDS likely 2/2 necrotizing pancreatitis s/p intubation.    # Neuro  - Off sedation. Pt awake, following commands. Able to nod yes or no to questions.   - Patient was on Depakote at home for history of seizure disorder; however, this was discontinued because Depakote can potentially cause pancreatitis. vEEGs have shown no epileptiform abnormalities   - Neurology consulted for assistance with seizure management. Patient was subsequently started on Vimpat. C/w Vimpat as per Neuro recs    # CV   - Patient currently on Norepinephrine gtt as hypotensive overnight. Continue to monitor BP and wean as tolerated.   - Bedside TY was performed which showed an LV that appears to be functioning normally with mild-moderate enlargement of RV, no evidence of fluid responsiveness, ?bicuspid aortic valve with no evidence of AI. ? small pericardial effusion, no tamponade physiology.    # Resp:   - Patient currently intubated 2/2 moderate ARDS 2/2 necrotizing pancreatitis. Previously on VDR vent, now on conventional vent.  - Will closely monitor respiratory status and check serial blood gases.  - CPAP again for resp function recovery.    # GI  - Patient found to have necrotizing pancreatitis. Unclear etiology for pancreatitis at this time. Patient is a resident of NH and with no significant alcohol history. OSH CT A+P showed gallbladder pathology. Repeat CT with normal gallbladder wall and duct. TG was found to be elevated in the hospital, though patient was on a Propofol gtt at the time and it has been downtrending since the Propofol was discontinued.  - switched to avycaz and flagyl for necrotizing pancreatitis for now.   - Colitis? on CT 12/19. Monitor for diarrhea. Unlikely c. diff given clinical scenario.   - Repeat CT scan demonstrates pancreatitis unchanged but concern for enlarging danay-pancreatic fluid collection. Surgery reconsulted, rec GI/IR evaluation. Appreciate GI recs, awaiting review with radiology and advanced endoscopist to see if there is a window for necrosectomy/danay-pancreatic fluid drainage. Would like repeat CT abdomen with IV contrast.      # /Renal   - C/w Ahuja for now    # ID   - BAL on 12/12 was positive for pseudomonas aeruginosa. Repeat sputum cx w/ Acinetobacter resistant to carbapenems. Appreciate ID recs, switched to avycaz and flagyl. Source likely PNA, pancreatitis and peripancreatic fluid collection. Blood cx growing coag neg staph likely contaminant. Follow up repeat cultures.   - Started on vanco 12/18 overnight for empiric coverage, but then discontinued 12/20 as patient no longer febrile. Febrile to 100.4 overnight and received another dose of Vanc.    - Patient s/p antiviral regimen (Valtrex followed by acyclovir) for shingles   - Monitor CBC and fever curve    # Heme:  - Hgb 6.4 this morning, no active signs of bleeding  - Gastric lavage without bleeding  - s/p 1 unit of pRBC's, with appropriate response to Hgb 8.8  - repeat CT abdomen with IV contrast as above   - continue to monitor CBC     # Endo  - No active issues (serum glucose WNL)    # DVT PPx:   - SCD's due to anemia

## 2018-12-24 NOTE — PROGRESS NOTE ADULT - SUBJECTIVE AND OBJECTIVE BOX
Teri Rea M.D.   PGY-2 | Internal Medicine   [phone] | [id number]      SUBJECTIVE / OVERNIGHT EVENTS: Patient's Hgb 6.4 overnight, received 1 unit of pRBC's. Patient seen and examined at bedside this morning.     ROS: [ - ] Fever [ - ] Chills [ - ] Nausea/Vomiting [ - ] Chest Pain [ - ] Shortness of breath      OBJECTIVE:  ICU Vital Signs Last 24 Hrs  T(C): 37.7 (24 Dec 2018 05:45), Max: 38 (23 Dec 2018 20:00)  T(F): 99.8 (24 Dec 2018 05:45), Max: 100.4 (23 Dec 2018 20:00)  HR: 104 (24 Dec 2018 06:00) (76 - 115)  BP: 106/48 (24 Dec 2018 06:00) (77/43 - 114/51)  BP(mean): 62 (24 Dec 2018 06:00) (50 - 71)  ABP: --  ABP(mean): --  RR: 22 (24 Dec 2018 06:00) (17 - 33)  SpO2: 100% (24 Dec 2018 06:00) (90% - 100%)    Mode: AC/ CMV (Assist Control/ Continuous Mandatory Ventilation), RR (machine): 18, TV (machine): 380, FiO2: 40, PEEP: 5, MAP: 8, PIP: 19    12-22 @ 07:01  -  12-23 @ 07:00  --------------------------------------------------------  IN: 1240 mL / OUT: 3360 mL / NET: -2120 mL    12-23 @ 07:01  -  12-24 @ 06:42  --------------------------------------------------------  IN: 1745.8 mL / OUT: 2010 mL / NET: -264.2 mL      CAPILLARY BLOOD GLUCOSE      PHYSICAL EXAM:    PHYSICAL EXAM:  GENERAL: Intubated  HEAD:  Atraumatic, Normocephalic  EYES: EOMI, PERRLA, conjunctiva and sclera clear  NECK: Supple, No JVD  CHEST/LUNG: vent sounds  HEART: Regular rate and rhythm; No murmurs, rubs, or gallops  ABDOMEN: Soft, Nontender, Nondistended; Bowel sounds present  EXTREMITIES:  2+ LE swelling  NEURO: contracted.  Pt awake, following commands. Able to nod yes or no to questions  LINES:    HOSPITAL MEDICATIONS:  Standing Meds:  cefTAZidime/avibactam IVPB 2.5 Gram(s) IV Intermittent every 8 hours  chlorhexidine 4% Liquid 1 Application(s) Topical <User Schedule>  lacosamide Solution 100 milliGRAM(s) Oral two times a day  metroNIDAZOLE  IVPB      metroNIDAZOLE  IVPB 500 milliGRAM(s) IV Intermittent every 8 hours  norepinephrine Infusion 0.05 MICROgram(s)/kG/Min IV Continuous <Continuous>  pantoprazole  Injectable 40 milliGRAM(s) IV Push every 12 hours  petrolatum Ophthalmic Ointment 1 Application(s) Both EYES two times a day  potassium chloride   Solution 40 milliEquivalent(s) Oral once  QUEtiapine 25 milliGRAM(s) Oral daily  risperiDONE   Solution 1 milliGRAM(s) Oral daily      PRN Meds:  acetaminophen    Suspension .. 650 milliGRAM(s) Oral every 6 hours PRN  metoclopramide Injectable 5 milliGRAM(s) IV Push every 8 hours PRN      LABS:                        6.4    3.74  )-----------( 363      ( 24 Dec 2018 01:18 )             20.6     Hgb Trend: 6.4<--, 7.7<--, 7.2<--, 8.6<--, 7.2<--  12-24    138  |  99  |  12  ----------------------------<  91  3.3<L>   |  30  |  0.32<L>    Ca    7.7<L>      24 Dec 2018 01:18  Phos  3.1     12-24  Mg     2.0     12-24    TPro  5.2<L>  /  Alb  1.7<L>  /  TBili  1.1  /  DBili  x   /  AST  15  /  ALT  16  /  AlkPhos  203<H>  12-24    Creatinine Trend: 0.32<--, 0.31<--, 0.26<--, 0.26<--, 0.30<--, 0.35<--  PT/INR - ( 24 Dec 2018 01:18 )   PT: 17.8 SEC;   INR: 1.54          PTT - ( 24 Dec 2018 01:18 )  PTT:33.4 SEC    Arterial Blood Gas:  12-24 @ 01:18  7.47/44/98/32/98.9/7.8  ABG lactate: 2.7 Teri Rea M.D.   PGY-2 | Internal Medicine   [phone] | [id number]      SUBJECTIVE / OVERNIGHT EVENTS: Patient's Hgb 6.4 overnight, received 1 unit of pRBC's. Patient seen and examined at bedside this morning.     ROS: unable to obtain       OBJECTIVE:  ICU Vital Signs Last 24 Hrs  T(C): 37.7 (24 Dec 2018 05:45), Max: 38 (23 Dec 2018 20:00)  T(F): 99.8 (24 Dec 2018 05:45), Max: 100.4 (23 Dec 2018 20:00)  HR: 104 (24 Dec 2018 06:00) (76 - 115)  BP: 106/48 (24 Dec 2018 06:00) (77/43 - 114/51)  BP(mean): 62 (24 Dec 2018 06:00) (50 - 71)  ABP: --  ABP(mean): --  RR: 22 (24 Dec 2018 06:00) (17 - 33)  SpO2: 100% (24 Dec 2018 06:00) (90% - 100%)    Mode: AC/ CMV (Assist Control/ Continuous Mandatory Ventilation), RR (machine): 18, TV (machine): 380, FiO2: 40, PEEP: 5, MAP: 8, PIP: 19    12-22 @ 07:01  -  12-23 @ 07:00  --------------------------------------------------------  IN: 1240 mL / OUT: 3360 mL / NET: -2120 mL    12-23 @ 07:01  -  12-24 @ 06:42  --------------------------------------------------------  IN: 1745.8 mL / OUT: 2010 mL / NET: -264.2 mL      CAPILLARY BLOOD GLUCOSE      PHYSICAL EXAM:    PHYSICAL EXAM:  GENERAL: Intubated  HEAD:  Atraumatic, Normocephalic  EYES: EOMI, PERRLA, conjunctiva and sclera clear  NECK: Supple, No JVD  CHEST/LUNG: vent sounds  HEART: Regular rate and rhythm; No murmurs, rubs, or gallops  ABDOMEN: Soft, Nontender, Nondistended; Bowel sounds present  EXTREMITIES:  2+ LE swelling  NEURO: contracted.  Pt awake, following commands. Able to nod yes or no to questions  LINES:    HOSPITAL MEDICATIONS:  Standing Meds:  cefTAZidime/avibactam IVPB 2.5 Gram(s) IV Intermittent every 8 hours  chlorhexidine 4% Liquid 1 Application(s) Topical <User Schedule>  lacosamide Solution 100 milliGRAM(s) Oral two times a day  metroNIDAZOLE  IVPB      metroNIDAZOLE  IVPB 500 milliGRAM(s) IV Intermittent every 8 hours  norepinephrine Infusion 0.05 MICROgram(s)/kG/Min IV Continuous <Continuous>  pantoprazole  Injectable 40 milliGRAM(s) IV Push every 12 hours  petrolatum Ophthalmic Ointment 1 Application(s) Both EYES two times a day  potassium chloride   Solution 40 milliEquivalent(s) Oral once  QUEtiapine 25 milliGRAM(s) Oral daily  risperiDONE   Solution 1 milliGRAM(s) Oral daily      PRN Meds:  acetaminophen    Suspension .. 650 milliGRAM(s) Oral every 6 hours PRN  metoclopramide Injectable 5 milliGRAM(s) IV Push every 8 hours PRN      LABS:                        6.4    3.74  )-----------( 363      ( 24 Dec 2018 01:18 )             20.6     Hgb Trend: 6.4<--, 7.7<--, 7.2<--, 8.6<--, 7.2<--  12-24    138  |  99  |  12  ----------------------------<  91  3.3<L>   |  30  |  0.32<L>    Ca    7.7<L>      24 Dec 2018 01:18  Phos  3.1     12-24  Mg     2.0     12-24    TPro  5.2<L>  /  Alb  1.7<L>  /  TBili  1.1  /  DBili  x   /  AST  15  /  ALT  16  /  AlkPhos  203<H>  12-24    Creatinine Trend: 0.32<--, 0.31<--, 0.26<--, 0.26<--, 0.30<--, 0.35<--  PT/INR - ( 24 Dec 2018 01:18 )   PT: 17.8 SEC;   INR: 1.54          PTT - ( 24 Dec 2018 01:18 )  PTT:33.4 SEC    Arterial Blood Gas:  12-24 @ 01:18  7.47/44/98/32/98.9/7.8  ABG lactate: 2.7 Teri Rea M.D.   PGY-2 | Internal Medicine   [phone] | [id number]      SUBJECTIVE / OVERNIGHT EVENTS: Patient's Hgb 6.4 overnight, received 1 unit of pRBC's. Patient with Tmax 100.4 overnight and hypotensive, started on Levophed gtt. Received 1 dose of Vancomycin. Patient seen and examined at bedside this morning.     ROS: unable to obtain       OBJECTIVE:  ICU Vital Signs Last 24 Hrs  T(C): 37.7 (24 Dec 2018 05:45), Max: 38 (23 Dec 2018 20:00)  T(F): 99.8 (24 Dec 2018 05:45), Max: 100.4 (23 Dec 2018 20:00)  HR: 104 (24 Dec 2018 06:00) (76 - 115)  BP: 106/48 (24 Dec 2018 06:00) (77/43 - 114/51)  BP(mean): 62 (24 Dec 2018 06:00) (50 - 71)  ABP: --  ABP(mean): --  RR: 22 (24 Dec 2018 06:00) (17 - 33)  SpO2: 100% (24 Dec 2018 06:00) (90% - 100%)    Mode: AC/ CMV (Assist Control/ Continuous Mandatory Ventilation), RR (machine): 18, TV (machine): 380, FiO2: 40, PEEP: 5, MAP: 8, PIP: 19    12-22 @ 07:01  -  12-23 @ 07:00  --------------------------------------------------------  IN: 1240 mL / OUT: 3360 mL / NET: -2120 mL    12-23 @ 07:01  -  12-24 @ 06:42  --------------------------------------------------------  IN: 1745.8 mL / OUT: 2010 mL / NET: -264.2 mL      CAPILLARY BLOOD GLUCOSE      PHYSICAL EXAM:    PHYSICAL EXAM:  GENERAL: Intubated  HEAD:  Atraumatic, Normocephalic  EYES: EOMI, PERRLA, conjunctiva and sclera clear  NECK: Supple, No JVD  CHEST/LUNG: vent sounds  HEART: Regular rate and rhythm; No murmurs, rubs, or gallops  ABDOMEN: Soft, Nontender, Nondistended; Bowel sounds present  EXTREMITIES:  2+ LE swelling  NEURO: contracted.  Pt awake, following commands. Able to nod yes or no to questions  LINES:    HOSPITAL MEDICATIONS:  Standing Meds:  cefTAZidime/avibactam IVPB 2.5 Gram(s) IV Intermittent every 8 hours  chlorhexidine 4% Liquid 1 Application(s) Topical <User Schedule>  lacosamide Solution 100 milliGRAM(s) Oral two times a day  metroNIDAZOLE  IVPB      metroNIDAZOLE  IVPB 500 milliGRAM(s) IV Intermittent every 8 hours  norepinephrine Infusion 0.05 MICROgram(s)/kG/Min IV Continuous <Continuous>  pantoprazole  Injectable 40 milliGRAM(s) IV Push every 12 hours  petrolatum Ophthalmic Ointment 1 Application(s) Both EYES two times a day  potassium chloride   Solution 40 milliEquivalent(s) Oral once  QUEtiapine 25 milliGRAM(s) Oral daily  risperiDONE   Solution 1 milliGRAM(s) Oral daily      PRN Meds:  acetaminophen    Suspension .. 650 milliGRAM(s) Oral every 6 hours PRN  metoclopramide Injectable 5 milliGRAM(s) IV Push every 8 hours PRN      LABS:                        6.4    3.74  )-----------( 363      ( 24 Dec 2018 01:18 )             20.6     Hgb Trend: 6.4<--, 7.7<--, 7.2<--, 8.6<--, 7.2<--  12-24    138  |  99  |  12  ----------------------------<  91  3.3<L>   |  30  |  0.32<L>    Ca    7.7<L>      24 Dec 2018 01:18  Phos  3.1     12-24  Mg     2.0     12-24    TPro  5.2<L>  /  Alb  1.7<L>  /  TBili  1.1  /  DBili  x   /  AST  15  /  ALT  16  /  AlkPhos  203<H>  12-24    Creatinine Trend: 0.32<--, 0.31<--, 0.26<--, 0.26<--, 0.30<--, 0.35<--  PT/INR - ( 24 Dec 2018 01:18 )   PT: 17.8 SEC;   INR: 1.54          PTT - ( 24 Dec 2018 01:18 )  PTT:33.4 SEC    Arterial Blood Gas:  12-24 @ 01:18  7.47/44/98/32/98.9/7.8  ABG lactate: 2.7

## 2018-12-24 NOTE — PROGRESS NOTE ADULT - SUBJECTIVE AND OBJECTIVE BOX
Mr. Stewart is intubated on Levophed in bed.    ICU Vital Signs Last 24 Hrs  T(C): 37.7 (24 Dec 2018 05:45), Max: 38 (23 Dec 2018 20:00)  T(F): 99.8 (24 Dec 2018 05:45), Max: 100.4 (23 Dec 2018 20:00)  HR: 107 (24 Dec 2018 08:00) (76 - 115)  BP: 99/48 (24 Dec 2018 08:00) (77/43 - 114/51)  BP(mean): 60 (24 Dec 2018 08:00) (50 - 71)  ABP: --  ABP(mean): --  RR: 16 (24 Dec 2018 08:00) (16 - 33)  SpO2: 100% (24 Dec 2018 08:00) (90% - 100%)    Gen: intubated on Levophed at 0.18  Abd: Soft, not distended    CT reviewed    53 year old man with pancreatitis, intubated on Levophed  1. No surgical intervention indicated at this time. Too early for necrosectomy.   2. Continue supportive care per MICU team

## 2018-12-24 NOTE — PROGRESS NOTE ADULT - ASSESSMENT
53  [facility] with TBI, s/p PEG tube, contracture, and seizure d/o who presented as a transfer from [facility] on 12/9 for respiratory failure 2/2 ARDS likely 2/2 necrotizing pancreatitis s/p intubation.   now on day 11 of Imipenem   bronc 12/11 with pseudomonas  pt with thick secretions and again febrile, sputum cx with  acinteobacter  repeat Ct with increased size of pancreatic collection    sepsis with fever, leukopenia tachycardia   Necrotising Pancreatitis with fluid collection,    acinetobacter PNA vs colonization   coag neg staph bacteremia likely contaminant, repeat negative      * c/w Avycaz 2.5g q8hrs, started 12/22, now day 3  * c/w flagyl for fluid collection in abdomen   * f/u with GI regarding the necrotizing pancreatitis   * f/u the repeat abd CT

## 2018-12-24 NOTE — CHART NOTE - NSCHARTNOTEFT_GEN_A_CORE
Discussed case with advanced attending, recommend repeating CT abdomen with IV contrast to further evaluate pancreatic necrosis (as pancreatic necrosis needs to be walled off in order for necrosectomy to be done).

## 2018-12-24 NOTE — PROGRESS NOTE ADULT - SUBJECTIVE AND OBJECTIVE BOX
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 650 milliGRAM(s) Oral every 6 hours PRN  albumin human 25% IVPB 50 milliLiter(s) IV Intermittent every 6 hours  chlorhexidine 4% Liquid 1 Application(s) Topical <User Schedule>  cyanocobalamin 1000 MICROGram(s) Oral daily  folic acid 1 milliGRAM(s) Enteral Tube daily  lacosamide Solution 100 milliGRAM(s) Oral two times a day  metoclopramide Injectable 5 milliGRAM(s) IV Push every 8 hours PRN  norepinephrine Infusion 0.05 MICROgram(s)/kG/Min IV Continuous <Continuous>  pantoprazole  Injectable 40 milliGRAM(s) IV Push every 12 hours  QUEtiapine 25 milliGRAM(s) Oral daily  risperiDONE   Solution 1 milliGRAM(s) Oral daily      Vital Signs Last 24 Hrs  T(C): 37.7 (24 Dec 2018 16:00), Max: 38.2 (24 Dec 2018 12:00)  T(F): 99.9 (24 Dec 2018 16:00), Max: 100.7 (24 Dec 2018 12:00)  HR: 87 (24 Dec 2018 17:00) (76 - 121)  BP: 110/61 (24 Dec 2018 17:00) (77/43 - 118/75)  BP(mean): 71 (24 Dec 2018 17:00) (50 - 83)  RR: 18 (24 Dec 2018 17:00) (16 - 28)  SpO2: 98% (24 Dec 2018 17:00) (94% - 100%)    Physical Exam:  General:    NAD,  non toxic  Head: atraumatic, normocephalic  Eye: normal sclera and conjunctiva  ENT:    ET tube,  no LAD,   neck supple  Cardio:     regular S1, S2,  no murmur  Respiratory:    clear b/l,    no wheezing  abd:     soft,   BS +,   no tenderness  :   no CVAT,  no suprapubic tenderness,   no  tavares  Musculoskeletal:   no joint swelling,   no edema  vascular: no lines, normal pulses  Skin:    no rash  Neurologic:    unable to assess as pt intubated                          8.8    5.23  )-----------( 505      ( 24 Dec 2018 12:36 )             28.2       12-24    138  |  99  |  12  ----------------------------<  91  3.3<L>   |  30  |  0.32<L>    Ca    7.7<L>      24 Dec 2018 01:18  Phos  3.1     12-24  Mg     2.0     12-24    TPro  5.2<L>  /  Alb  1.7<L>  /  TBili  1.1  /  DBili  x   /  AST  15  /  ALT  16  /  AlkPhos  203<H>  12-24          MICROBIOLOGY:  v  BLOOD PERIPHERAL  12-23-18 --  --  --      BLOOD VENOUS  12-22-18 --  --  BLOOD CULTURE PCR  Staphylococcus sp.,coag neg      SPUTUM  12-18-18 --  --  Acinetobacter baumannii       BLOOD PERIPHERAL  12-18-18 --  --  --      BRONCHIAL LAVAGE  12-12-18 --  --  Pseudomonas aeruginosa      URINE CATHETER  12-11-18 --  --  --      BLOOD PERIPHERAL  12-11-18 --  --  --                RADIOLOGY:  Images below reviewed personally  < from: CT Abdomen and Pelvis w/ IV Cont (12.19.18 @ 14:50) >  IMPRESSION: Small bilateral pleural effusions, with associated   compressive atelectasis.    Patchy nodular parenchymal opacities in the upper lobes bilaterally,   suspicious for pneumonia.     Pancreatitis with areas of pancreatic necrosis, unchanged. Peripancreatic   fluid collections, with interval increase in size of a collection in the   lesser sac. 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    Moderate ascites, unchanged.    Moderate fecal material in the rectosigmoid colon, decreased since   12/11/2018. Mild wall thickening of the rectosigmoid colon is again   noted. Mild wall thickening of the mid descending colon may be secondary   to a colitis.

## 2018-12-25 LAB
ALBUMIN SERPL ELPH-MCNC: 2.6 G/DL — LOW (ref 3.3–5)
ALP SERPL-CCNC: 225 U/L — HIGH (ref 40–120)
ALT FLD-CCNC: 16 U/L — SIGNIFICANT CHANGE UP (ref 4–41)
AST SERPL-CCNC: 19 U/L — SIGNIFICANT CHANGE UP (ref 4–40)
BASOPHILS # BLD AUTO: 0.03 K/UL — SIGNIFICANT CHANGE UP (ref 0–0.2)
BASOPHILS NFR BLD AUTO: 0.8 % — SIGNIFICANT CHANGE UP (ref 0–2)
BILIRUB SERPL-MCNC: 1.5 MG/DL — HIGH (ref 0.2–1.2)
BUN SERPL-MCNC: 7 MG/DL — SIGNIFICANT CHANGE UP (ref 7–23)
CALCIUM SERPL-MCNC: 8.2 MG/DL — LOW (ref 8.4–10.5)
CHLORIDE SERPL-SCNC: 101 MMOL/L — SIGNIFICANT CHANGE UP (ref 98–107)
CO2 SERPL-SCNC: 27 MMOL/L — SIGNIFICANT CHANGE UP (ref 22–31)
CREAT SERPL-MCNC: 0.28 MG/DL — LOW (ref 0.5–1.3)
EOSINOPHIL # BLD AUTO: 0.01 K/UL — SIGNIFICANT CHANGE UP (ref 0–0.5)
EOSINOPHIL NFR BLD AUTO: 0.3 % — SIGNIFICANT CHANGE UP (ref 0–6)
GLUCOSE SERPL-MCNC: 99 MG/DL — SIGNIFICANT CHANGE UP (ref 70–99)
HCT VFR BLD CALC: 28.3 % — LOW (ref 39–50)
HGB BLD-MCNC: 9 G/DL — LOW (ref 13–17)
IGG1 SER-MCNC: 1190 MG/DL — HIGH (ref 248–810)
IGG2 SER-MCNC: 679 MG/DL — HIGH (ref 130–555)
IGG3 SER-MCNC: 141 MG/DL — HIGH (ref 15–102)
IGG4 SER-MCNC: 101 MG/DL — HIGH (ref 2–96)
IMM GRANULOCYTES # BLD AUTO: 0.05 # — SIGNIFICANT CHANGE UP
IMM GRANULOCYTES NFR BLD AUTO: 1.3 % — SIGNIFICANT CHANGE UP (ref 0–1.5)
LYMPHOCYTES # BLD AUTO: 1.56 K/UL — SIGNIFICANT CHANGE UP (ref 1–3.3)
LYMPHOCYTES # BLD AUTO: 39 % — SIGNIFICANT CHANGE UP (ref 13–44)
MAGNESIUM SERPL-MCNC: 2.1 MG/DL — SIGNIFICANT CHANGE UP (ref 1.6–2.6)
MCHC RBC-ENTMCNC: 31.8 % — LOW (ref 32–36)
MCHC RBC-ENTMCNC: 32.8 PG — SIGNIFICANT CHANGE UP (ref 27–34)
MCV RBC AUTO: 103.3 FL — HIGH (ref 80–100)
MONOCYTES # BLD AUTO: 0.43 K/UL — SIGNIFICANT CHANGE UP (ref 0–0.9)
MONOCYTES NFR BLD AUTO: 10.8 % — SIGNIFICANT CHANGE UP (ref 2–14)
NEUTROPHILS # BLD AUTO: 1.92 K/UL — SIGNIFICANT CHANGE UP (ref 1.8–7.4)
NEUTROPHILS NFR BLD AUTO: 47.8 % — SIGNIFICANT CHANGE UP (ref 43–77)
NRBC # FLD: 0.19 — SIGNIFICANT CHANGE UP
NRBC FLD-RTO: 4.8 — SIGNIFICANT CHANGE UP
PHOSPHATE SERPL-MCNC: 2.9 MG/DL — SIGNIFICANT CHANGE UP (ref 2.5–4.5)
PLATELET # BLD AUTO: 410 K/UL — HIGH (ref 150–400)
PMV BLD: 10.7 FL — SIGNIFICANT CHANGE UP (ref 7–13)
POTASSIUM SERPL-MCNC: 4.2 MMOL/L — SIGNIFICANT CHANGE UP (ref 3.5–5.3)
POTASSIUM SERPL-SCNC: 4.2 MMOL/L — SIGNIFICANT CHANGE UP (ref 3.5–5.3)
PROT SERPL-MCNC: 6.6 G/DL — SIGNIFICANT CHANGE UP (ref 6–8.3)
RBC # BLD: 2.74 M/UL — LOW (ref 4.2–5.8)
RBC # FLD: 22.4 % — HIGH (ref 10.3–14.5)
SODIUM SERPL-SCNC: 138 MMOL/L — SIGNIFICANT CHANGE UP (ref 135–145)
SPECIMEN SOURCE: SIGNIFICANT CHANGE UP
SPECIMEN SOURCE: SIGNIFICANT CHANGE UP
WBC # BLD: 4 K/UL — SIGNIFICANT CHANGE UP (ref 3.8–10.5)
WBC # FLD AUTO: 4 K/UL — SIGNIFICANT CHANGE UP (ref 3.8–10.5)

## 2018-12-25 PROCEDURE — 99233 SBSQ HOSP IP/OBS HIGH 50: CPT | Mod: GC

## 2018-12-25 PROCEDURE — 74177 CT ABD & PELVIS W/CONTRAST: CPT | Mod: 26

## 2018-12-25 PROCEDURE — 99291 CRITICAL CARE FIRST HOUR: CPT

## 2018-12-25 RX ADMIN — Medication 9.35 MICROGRAM(S)/KG/MIN: at 08:17

## 2018-12-25 RX ADMIN — LACOSAMIDE 100 MILLIGRAM(S): 50 TABLET ORAL at 10:41

## 2018-12-25 RX ADMIN — Medication 100 MILLIGRAM(S): at 14:07

## 2018-12-25 RX ADMIN — Medication 9.35 MICROGRAM(S)/KG/MIN: at 05:14

## 2018-12-25 RX ADMIN — PANTOPRAZOLE SODIUM 40 MILLIGRAM(S): 20 TABLET, DELAYED RELEASE ORAL at 06:01

## 2018-12-25 RX ADMIN — PANTOPRAZOLE SODIUM 40 MILLIGRAM(S): 20 TABLET, DELAYED RELEASE ORAL at 18:07

## 2018-12-25 RX ADMIN — Medication 1 MILLIGRAM(S): at 12:04

## 2018-12-25 RX ADMIN — QUETIAPINE FUMARATE 25 MILLIGRAM(S): 200 TABLET, FILM COATED ORAL at 12:04

## 2018-12-25 RX ADMIN — PREGABALIN 1000 MICROGRAM(S): 225 CAPSULE ORAL at 12:04

## 2018-12-25 RX ADMIN — Medication 50 MILLILITER(S): at 20:59

## 2018-12-25 RX ADMIN — Medication 50 MILLILITER(S): at 02:15

## 2018-12-25 RX ADMIN — Medication 100 MILLIGRAM(S): at 21:39

## 2018-12-25 RX ADMIN — CHLORHEXIDINE GLUCONATE 1 APPLICATION(S): 213 SOLUTION TOPICAL at 14:08

## 2018-12-25 RX ADMIN — Medication 50 MILLILITER(S): at 08:17

## 2018-12-25 RX ADMIN — Medication 50 MILLILITER(S): at 14:07

## 2018-12-25 RX ADMIN — Medication 100 MILLIGRAM(S): at 06:02

## 2018-12-25 RX ADMIN — LACOSAMIDE 100 MILLIGRAM(S): 50 TABLET ORAL at 23:03

## 2018-12-25 RX ADMIN — RISPERIDONE 1 MILLIGRAM(S): 4 TABLET ORAL at 12:04

## 2018-12-25 NOTE — PROGRESS NOTE ADULT - SUBJECTIVE AND OBJECTIVE BOX
SUBJECTIVE / OVERNIGHT EVENTS: Patient had no acute events overnight. Patient seen and examined at bedside this morning.     ROS: unable to obtain       OBJECTIVE:  ICU Vital Signs Last 24 Hrs  T(C): 37.9 (25 Dec 2018 04:00), Max: 38.2 (24 Dec 2018 12:00)  T(F): 100.2 (25 Dec 2018 04:00), Max: 100.7 (24 Dec 2018 12:00)  HR: 107 (25 Dec 2018 06:00) (81 - 121)  BP: 119/61 (25 Dec 2018 06:00) (96/49 - 122/66)  BP(mean): 73 (25 Dec 2018 06:00) (59 - 83)  ABP: --  ABP(mean): --  RR: 22 (25 Dec 2018 06:00) (16 - 28)  SpO2: 97% (25 Dec 2018 06:00) (94% - 100%)    Mode: AC/ CMV (Assist Control/ Continuous Mandatory Ventilation), RR (machine): 18, TV (machine): 380, FiO2: 40, PEEP: 5, MAP: 11, PIP: 26    12-23 @ 07:01  -  12-24 @ 07:00  --------------------------------------------------------  IN: 1779.4 mL / OUT: 2010 mL / NET: -230.6 mL    12-24 @ 07:01  -  12-25 @ 06:45  --------------------------------------------------------  IN: 1370.1 mL / OUT: 1090 mL / NET: 280.1 mL        PHYSICAL EXAM:  GENERAL: Intubated  HEAD:  Atraumatic, Normocephalic  EYES: EOMI, PERRLA, conjunctiva and sclera clear  NECK: Supple, No JVD  CHEST/LUNG: vent sounds  HEART: Regular rate and rhythm; No murmurs, rubs, or gallops  ABDOMEN: Soft, Nontender, Nondistended; Bowel sounds present  EXTREMITIES:  2+ LE swelling  NEURO: contracted.  Pt awake, following commands. Able to nod yes or no to questions    HOSPITAL MEDICATIONS:  Standing Meds:  albumin human 25% IVPB 50 milliLiter(s) IV Intermittent every 6 hours  cefTAZidime/avibactam IVPB 2.5 Gram(s) IV Intermittent every 8 hours  chlorhexidine 4% Liquid 1 Application(s) Topical <User Schedule>  cyanocobalamin 1000 MICROGram(s) Oral daily  folic acid 1 milliGRAM(s) Enteral Tube daily  lacosamide Solution 100 milliGRAM(s) Oral two times a day  metroNIDAZOLE  IVPB      metroNIDAZOLE  IVPB 500 milliGRAM(s) IV Intermittent every 8 hours  norepinephrine Infusion 0.05 MICROgram(s)/kG/Min IV Continuous <Continuous>  pantoprazole  Injectable 40 milliGRAM(s) IV Push every 12 hours  QUEtiapine 25 milliGRAM(s) Oral daily  risperiDONE   Solution 1 milliGRAM(s) Oral daily      PRN Meds:  acetaminophen    Suspension .. 650 milliGRAM(s) Oral every 6 hours PRN  metoclopramide Injectable 5 milliGRAM(s) IV Push every 8 hours PRN      LABS:                        9.0    4.00  )-----------( 410      ( 25 Dec 2018 04:02 )             28.3     Hgb Trend: 9.0<--, 8.8<--, 6.4<--, 7.7<--, 7.2<--  12-25    138  |  101  |  7   ----------------------------<  99  4.2   |  27  |  0.28<L>    Ca    8.2<L>      25 Dec 2018 04:02  Phos  2.9     12-25  Mg     2.1     12-25    TPro  6.6  /  Alb  2.6<L>  /  TBili  1.5<H>  /  DBili  x   /  AST  19  /  ALT  16  /  AlkPhos  225<H>  12-25    Creatinine Trend: 0.28<--, 0.32<--, 0.31<--, 0.26<--, 0.26<--, 0.30<--  PT/INR - ( 24 Dec 2018 01:18 )   PT: 17.8 SEC;   INR: 1.54          PTT - ( 24 Dec 2018 01:18 )  PTT:33.4 SEC    Arterial Blood Gas:  12-24 @ 01:18  7.47/44/98/32/98.9/7.8  ABG lactate: 2.7

## 2018-12-25 NOTE — PROGRESS NOTE ADULT - ASSESSMENT
52 y/o M (resident of [facility]) w/ a PMHx of TBI, s/p PEG tube, contracture, and seizure d/o who presented as a transfer from [facility] on 12/9 for respiratory failure 2/2 ARDS likely 2/2 necrotizing pancreatitis s/p intubation.    # Neuro  - Off sedation. Pt awake, following commands. Able to nod yes or no to questions.   - Patient was on Depakote at home for history of seizure disorder; however, this was discontinued because Depakote can potentially cause pancreatitis. vEEGs have shown no epileptiform abnormalities   - Neurology consulted for assistance with seizure management. Patient was subsequently started on Vimpat. C/w Vimpat as per Neuro recs    # CV   - Patient currently on Norepinephrine gtt , continue to monitor BP and wean as tolerated.     # Resp:   - Patient currently intubated 2/2 moderate ARDS 2/2 necrotizing pancreatitis. Previously on VDR vent, now on conventional vent.  - Will closely monitor respiratory status  - CPAP again for resp function recovery.    # GI  - Patient found to have necrotizing pancreatitis. Unclear etiology for pancreatitis at this time. Patient is a resident of NH and with no significant alcohol history. OSH CT A+P showed gallbladder pathology. Repeat CT with normal gallbladder wall and duct. TG was found to be elevated in the hospital, though patient was on a Propofol gtt at the time and it has been downtrending since the Propofol was discontinued.  - switched to avycaz and flagyl for necrotizing pancreatitis as per ID  - Colitis? on CT 12/19. Monitor for diarrhea. Unlikely c. diff given clinical scenario.   - Repeat CT scan demonstrates pancreatitis unchanged but concern for enlarging danay-pancreatic fluid collection. Surgery reconsulted, rec GI/IR evaluation. Appreciate GI recs, discussed case with advanced attending, recommend repeating CT abdomen with IV contrast to further evaluate pancreatic necrosis (as pancreatic necrosis needs to be walled off in order for necrosectomy to be done).  - f/u CT abdomen with IV contrast     # /Renal   - C/w Ahuja for now    # ID   - BAL on 12/12 was positive for pseudomonas aeruginosa. Repeat sputum cx w/ Acinetobacter resistant to carbapenems. Appreciate ID recs, switched to avycaz and flagyl. Source likely PNA, pancreatitis and peripancreatic fluid collection. Blood cx growing coag neg staph likely contaminant. Follow up repeat cultures.   - Received one dose of Vanco 12/18 and one dose on 12/24 as with low grade fever, would hold off on further vanc at this time and f/u repeat blood cultures   - Patient s/p antiviral regimen (Valtrex followed by acyclovir) for shingles   - Monitor CBC and fever curve    # Heme:  - s/p 1 unit of pRBC's on 12/25 with appropriate response, Hgb stable this morning   - no active signs of bleeding, Gastric lavage without bleeding  - repeat CT abdomen with IV contrast as above   - continue to monitor CBC     # Endo  - No active issues (serum glucose WNL)    # DVT PPx:   - SCD's due to anemia

## 2018-12-25 NOTE — PROGRESS NOTE ADULT - ATTENDING COMMENTS
Agree with above. Seen and examined with residents at the bedside. For repeat abd CT today. Surgery and advanced endoscopy following. Continue supportive care, weaning trials.   Total CC time 35 min

## 2018-12-25 NOTE — PROGRESS NOTE ADULT - SUBJECTIVE AND OBJECTIVE BOX
Follow Up:  necrotizing pancreatitis and CRE acinetobacter in the sputum    Interval History: pt still febrile, surgery did not recommend any interventions, repeat CT with unchanged necrotizing pancreatitis and peripancreatic collections    ROS:    Unobtainable because: pt intubated          Allergies  Valproate Sodium (Other (Severe))        ANTIMICROBIALS:  cefTAZidime/avibactam IVPB 2.5 every 8 hours  metroNIDAZOLE  IVPB    metroNIDAZOLE  IVPB 500 every 8 hours      OTHER MEDS:  acetaminophen    Suspension .. 650 milliGRAM(s) Oral every 6 hours PRN  albumin human 25% IVPB 50 milliLiter(s) IV Intermittent every 6 hours  chlorhexidine 4% Liquid 1 Application(s) Topical <User Schedule>  cyanocobalamin 1000 MICROGram(s) Oral daily  folic acid 1 milliGRAM(s) Enteral Tube daily  lacosamide Solution 100 milliGRAM(s) Oral two times a day  metoclopramide Injectable 5 milliGRAM(s) IV Push every 8 hours PRN  norepinephrine Infusion 0.05 MICROgram(s)/kG/Min IV Continuous <Continuous>  pantoprazole  Injectable 40 milliGRAM(s) IV Push every 12 hours  QUEtiapine 25 milliGRAM(s) Oral daily  risperiDONE   Solution 1 milliGRAM(s) Oral daily      Vital Signs Last 24 Hrs  T(C): 37.8 (25 Dec 2018 12:00), Max: 37.9 (25 Dec 2018 04:00)  T(F): 100 (25 Dec 2018 12:00), Max: 100.2 (25 Dec 2018 04:00)  HR: 110 (25 Dec 2018 12:00) (81 - 111)  BP: 112/63 (25 Dec 2018 12:00) (96/49 - 122/66)  BP(mean): 73 (25 Dec 2018 12:00) (57 - 79)  RR: 29 (25 Dec 2018 12:00) (18 - 29)  SpO2: 96% (25 Dec 2018 12:00) (94% - 100%)    Physical Exam:  General:    NAD,  non toxic  Head: atraumatic, normocephalic  Eye: normal sclera and conjunctiva  ENT:    ET tube,  no LAD,   neck supple  Cardio:     regular S1, S2,  no murmur  Respiratory:    clear b/l,    no wheezing, still thick secretions  abd:     soft,   BS +,   no tenderness  :   no CVAT,  no suprapubic tenderness,  +  tavares  Musculoskeletal:   no joint swelling,   no edema  vascular: no lines, normal pulses  Skin:    no rash  Neurologic:    unable to assess as pt intubated                            9.0    4.00  )-----------( 410      ( 25 Dec 2018 04:02 )             28.3       12-25    138  |  101  |  7   ----------------------------<  99  4.2   |  27  |  0.28<L>    Ca    8.2<L>      25 Dec 2018 04:02  Phos  2.9     12-25  Mg     2.1     12-25    TPro  6.6  /  Alb  2.6<L>  /  TBili  1.5<H>  /  DBili  x   /  AST  19  /  ALT  16  /  AlkPhos  225<H>  12-25          MICROBIOLOGY:  v  BLOOD VENOUS  12-24-18 --  --  --      BLOOD PERIPHERAL  12-24-18 --  --  --      BLOOD PERIPHERAL  12-23-18 --  --  --      BLOOD VENOUS  12-22-18 --  --  BLOOD CULTURE PCR  Staphylococcus sp.,coag neg      SPUTUM  12-18-18 --  --  Acinetobacter baumannii       BLOOD PERIPHERAL  12-18-18 --  --  --      BRONCHIAL LAVAGE  12-12-18 --  --  Pseudomonas aeruginosa      URINE CATHETER  12-11-18 --  --  --      BLOOD PERIPHERAL  12-11-18 --  --  --                RADIOLOGY:  Images below reviewed personally  < from: CT Abdomen and Pelvis w/ IV Cont (12.25.18 @ 11:28) >  IMPRESSION: Stable appearance of the necrotizing pancreatitis with   multiple peripancreatic collections. No interval development of foci of   gas or thick peripheral wall to suggest infection of the collections.    Unchanged thrombosed splenic vein.    Unchanged wall thickening of the descending colon, which may compatible   with colitis. Moderate fecal material is noted in the rectosigmoid colon   with associated wall thickening, raising consideration of stercoral   colitis.    Slightly increased moderate bilateral pleural effusions with associated   atelectasis of the lower lobes. Patchy opacities in the lingula and right   middle lobe may be infectious in etiology.
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
## 2018-12-26 LAB
ALBUMIN SERPL ELPH-MCNC: 2.4 G/DL — LOW (ref 3.3–5)
ALP SERPL-CCNC: 194 U/L — HIGH (ref 40–120)
ALT FLD-CCNC: 13 U/L — SIGNIFICANT CHANGE UP (ref 4–41)
AST SERPL-CCNC: 22 U/L — SIGNIFICANT CHANGE UP (ref 4–40)
BASOPHILS # BLD AUTO: 0.01 K/UL — SIGNIFICANT CHANGE UP (ref 0–0.2)
BASOPHILS NFR BLD AUTO: 0.3 % — SIGNIFICANT CHANGE UP (ref 0–2)
BILIRUB SERPL-MCNC: 1.1 MG/DL — SIGNIFICANT CHANGE UP (ref 0.2–1.2)
BUN SERPL-MCNC: 4 MG/DL — LOW (ref 7–23)
CALCIUM SERPL-MCNC: 8.4 MG/DL — SIGNIFICANT CHANGE UP (ref 8.4–10.5)
CHLORIDE SERPL-SCNC: 104 MMOL/L — SIGNIFICANT CHANGE UP (ref 98–107)
CO2 SERPL-SCNC: 24 MMOL/L — SIGNIFICANT CHANGE UP (ref 22–31)
CREAT SERPL-MCNC: 0.27 MG/DL — LOW (ref 0.5–1.3)
EOSINOPHIL # BLD AUTO: 0.03 K/UL — SIGNIFICANT CHANGE UP (ref 0–0.5)
EOSINOPHIL NFR BLD AUTO: 0.8 % — SIGNIFICANT CHANGE UP (ref 0–6)
GLUCOSE SERPL-MCNC: 126 MG/DL — HIGH (ref 70–99)
HCT VFR BLD CALC: 25.8 % — LOW (ref 39–50)
HGB BLD-MCNC: 8 G/DL — LOW (ref 13–17)
IMM GRANULOCYTES # BLD AUTO: 0.02 # — SIGNIFICANT CHANGE UP
IMM GRANULOCYTES NFR BLD AUTO: 0.5 % — SIGNIFICANT CHANGE UP (ref 0–1.5)
INR BLD: 2 — HIGH (ref 0.88–1.17)
LYMPHOCYTES # BLD AUTO: 1.4 K/UL — SIGNIFICANT CHANGE UP (ref 1–3.3)
LYMPHOCYTES # BLD AUTO: 35.2 % — SIGNIFICANT CHANGE UP (ref 13–44)
MAGNESIUM SERPL-MCNC: 2.1 MG/DL — SIGNIFICANT CHANGE UP (ref 1.6–2.6)
MCHC RBC-ENTMCNC: 31 % — LOW (ref 32–36)
MCHC RBC-ENTMCNC: 33.3 PG — SIGNIFICANT CHANGE UP (ref 27–34)
MCV RBC AUTO: 107.5 FL — HIGH (ref 80–100)
MONOCYTES # BLD AUTO: 0.42 K/UL — SIGNIFICANT CHANGE UP (ref 0–0.9)
MONOCYTES NFR BLD AUTO: 10.6 % — SIGNIFICANT CHANGE UP (ref 2–14)
NEUTROPHILS # BLD AUTO: 2.1 K/UL — SIGNIFICANT CHANGE UP (ref 1.8–7.4)
NEUTROPHILS NFR BLD AUTO: 52.6 % — SIGNIFICANT CHANGE UP (ref 43–77)
NRBC # FLD: 0.09 — SIGNIFICANT CHANGE UP
NRBC FLD-RTO: 2.3 — SIGNIFICANT CHANGE UP
PHOSPHATE SERPL-MCNC: 2.7 MG/DL — SIGNIFICANT CHANGE UP (ref 2.5–4.5)
PLATELET # BLD AUTO: 375 K/UL — SIGNIFICANT CHANGE UP (ref 150–400)
PMV BLD: 10.2 FL — SIGNIFICANT CHANGE UP (ref 7–13)
POTASSIUM SERPL-MCNC: 4 MMOL/L — SIGNIFICANT CHANGE UP (ref 3.5–5.3)
POTASSIUM SERPL-SCNC: 4 MMOL/L — SIGNIFICANT CHANGE UP (ref 3.5–5.3)
PROT SERPL-MCNC: 5.7 G/DL — LOW (ref 6–8.3)
PROTHROM AB SERPL-ACNC: 22.7 SEC — HIGH (ref 9.8–13.1)
RBC # BLD: 2.4 M/UL — LOW (ref 4.2–5.8)
RBC # FLD: 21.9 % — HIGH (ref 10.3–14.5)
SODIUM SERPL-SCNC: 138 MMOL/L — SIGNIFICANT CHANGE UP (ref 135–145)
WBC # BLD: 3.98 K/UL — SIGNIFICANT CHANGE UP (ref 3.8–10.5)
WBC # FLD AUTO: 3.98 K/UL — SIGNIFICANT CHANGE UP (ref 3.8–10.5)

## 2018-12-26 PROCEDURE — 99233 SBSQ HOSP IP/OBS HIGH 50: CPT

## 2018-12-26 PROCEDURE — 99291 CRITICAL CARE FIRST HOUR: CPT

## 2018-12-26 PROCEDURE — 99232 SBSQ HOSP IP/OBS MODERATE 35: CPT | Mod: GC

## 2018-12-26 RX ORDER — CHLORHEXIDINE GLUCONATE 213 G/1000ML
1 SOLUTION TOPICAL
Qty: 0 | Refills: 0 | Status: DISCONTINUED | OUTPATIENT
Start: 2018-12-26 | End: 2019-01-10

## 2018-12-26 RX ORDER — PANTOPRAZOLE SODIUM 20 MG/1
40 TABLET, DELAYED RELEASE ORAL DAILY
Qty: 0 | Refills: 0 | Status: DISCONTINUED | OUTPATIENT
Start: 2018-12-26 | End: 2018-12-27

## 2018-12-26 RX ORDER — CHLORHEXIDINE GLUCONATE 213 G/1000ML
15 SOLUTION TOPICAL
Qty: 0 | Refills: 0 | Status: DISCONTINUED | OUTPATIENT
Start: 2018-12-26 | End: 2019-01-04

## 2018-12-26 RX ORDER — LACOSAMIDE 50 MG/1
100 TABLET ORAL
Qty: 0 | Refills: 0 | Status: DISCONTINUED | OUTPATIENT
Start: 2018-12-26 | End: 2019-01-02

## 2018-12-26 RX ADMIN — Medication 100 MILLIGRAM(S): at 05:38

## 2018-12-26 RX ADMIN — LACOSAMIDE 100 MILLIGRAM(S): 50 TABLET ORAL at 05:38

## 2018-12-26 RX ADMIN — Medication 9.35 MICROGRAM(S)/KG/MIN: at 17:05

## 2018-12-26 RX ADMIN — CHLORHEXIDINE GLUCONATE 1 APPLICATION(S): 213 SOLUTION TOPICAL at 12:18

## 2018-12-26 RX ADMIN — Medication 1 MILLIGRAM(S): at 12:18

## 2018-12-26 RX ADMIN — Medication 100 MILLIGRAM(S): at 14:43

## 2018-12-26 RX ADMIN — Medication 50 MILLILITER(S): at 03:41

## 2018-12-26 RX ADMIN — Medication 50 MILLILITER(S): at 08:04

## 2018-12-26 RX ADMIN — LACOSAMIDE 100 MILLIGRAM(S): 50 TABLET ORAL at 17:18

## 2018-12-26 RX ADMIN — CHLORHEXIDINE GLUCONATE 15 MILLILITER(S): 213 SOLUTION TOPICAL at 17:05

## 2018-12-26 RX ADMIN — PANTOPRAZOLE SODIUM 40 MILLIGRAM(S): 20 TABLET, DELAYED RELEASE ORAL at 05:38

## 2018-12-26 RX ADMIN — Medication 100 MILLIGRAM(S): at 21:27

## 2018-12-26 RX ADMIN — PANTOPRAZOLE SODIUM 40 MILLIGRAM(S): 20 TABLET, DELAYED RELEASE ORAL at 12:18

## 2018-12-26 RX ADMIN — Medication 650 MILLIGRAM(S): at 15:20

## 2018-12-26 RX ADMIN — QUETIAPINE FUMARATE 25 MILLIGRAM(S): 200 TABLET, FILM COATED ORAL at 12:18

## 2018-12-26 RX ADMIN — PREGABALIN 1000 MICROGRAM(S): 225 CAPSULE ORAL at 12:18

## 2018-12-26 RX ADMIN — Medication 650 MILLIGRAM(S): at 16:20

## 2018-12-26 RX ADMIN — RISPERIDONE 1 MILLIGRAM(S): 4 TABLET ORAL at 16:42

## 2018-12-26 RX ADMIN — Medication 9.35 MICROGRAM(S)/KG/MIN: at 21:27
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
## 2018-12-26 NOTE — PROGRESS NOTE ADULT - SUBJECTIVE AND OBJECTIVE BOX
SUBJECTIVE / OVERNIGHT EVENTS: Patient had no acute events overnight. Patient seen and examined at bedside this morning.     ROS: unable to obtain       OBJECTIVE:  ICU Vital Signs Last 24 Hrs  T(C): 37.9 (26 Dec 2018 04:00), Max: 37.9 (25 Dec 2018 16:00)  T(F): 100.3 (26 Dec 2018 04:00), Max: 100.3 (26 Dec 2018 04:00)  HR: 113 (26 Dec 2018 06:00) (94 - 114)  BP: 114/69 (26 Dec 2018 06:00) (96/46 - 124/64)  BP(mean): 79 (26 Dec 2018 06:00) (54 - 87)  ABP: --  ABP(mean): --  RR: 27 (26 Dec 2018 06:00) (16 - 32)  SpO2: 100% (26 Dec 2018 06:00) (95% - 100%)    Mode: AC/ CMV (Assist Control/ Continuous Mandatory Ventilation), RR (machine): 18, TV (machine): 380, FiO2: 40, PEEP: 5, MAP: 11, PIP: 26    12-24 @ 07:01 - 12-25 @ 07:00  --------------------------------------------------------  IN: 1388.8 mL / OUT: 1090 mL / NET: 298.8 mL    12-25 @ 07:01 - 12-26 @ 06:59  --------------------------------------------------------  IN: 1137.9 mL / OUT: 690 mL / NET: 447.9 mL      CAPILLARY BLOOD GLUCOSE          PHYSICAL EXAM:  GENERAL: Intubated  HEAD:  Atraumatic, Normocephalic  EYES: EOMI, PERRLA, conjunctiva and sclera clear  NECK: Supple, No JVD  CHEST/LUNG: vent sounds  HEART: Regular rate and rhythm; No murmurs, rubs, or gallops  ABDOMEN: Soft, Nontender, Nondistended; Bowel sounds present  EXTREMITIES:  2+ LE swelling  NEURO: contracted.  Pt awake, following commands. Able to nod yes or no to questions      HOSPITAL MEDICATIONS:  Standing Meds:  albumin human 25% IVPB 50 milliLiter(s) IV Intermittent every 6 hours  cefTAZidime/avibactam IVPB 2.5 Gram(s) IV Intermittent every 8 hours  chlorhexidine 4% Liquid 1 Application(s) Topical <User Schedule>  cyanocobalamin 1000 MICROGram(s) Oral daily  folic acid 1 milliGRAM(s) Enteral Tube daily  lacosamide Solution 100 milliGRAM(s) Oral two times a day  metroNIDAZOLE  IVPB      metroNIDAZOLE  IVPB 500 milliGRAM(s) IV Intermittent every 8 hours  norepinephrine Infusion 0.05 MICROgram(s)/kG/Min IV Continuous <Continuous>  pantoprazole  Injectable 40 milliGRAM(s) IV Push every 12 hours  QUEtiapine 25 milliGRAM(s) Oral daily  risperiDONE   Solution 1 milliGRAM(s) Oral daily      PRN Meds:  acetaminophen    Suspension .. 650 milliGRAM(s) Oral every 6 hours PRN  metoclopramide Injectable 5 milliGRAM(s) IV Push every 8 hours PRN      LABS:                        8.0    3.98  )-----------( 375      ( 26 Dec 2018 03:30 )             25.8     Hgb Trend: 8.0<--, 9.0<--, 8.8<--, 6.4<--, 7.7<--  12-26    138  |  104  |  4<L>  ----------------------------<  126<H>  4.0   |  24  |  0.27<L>    Ca    8.4      26 Dec 2018 03:30  Phos  2.7     12-26  Mg     2.1     12-26    TPro  5.7<L>  /  Alb  2.4<L>  /  TBili  1.1  /  DBili  x   /  AST  22  /  ALT  13  /  AlkPhos  194<H>  12-26    Creatinine Trend: 0.27<--, 0.28<--, 0.32<--, 0.31<--, 0.26<--, 0.26<--  PT/INR - ( 26 Dec 2018 03:30 )   PT: 22.7 SEC;   INR: 2.00              RADIOLOGY:  [x] Reviewed and interpreted by me      < from: CT Abdomen and Pelvis w/ IV Cont (12.25.18 @ 11:28) >  IMPRESSION: Stable appearance of the necrotizing pancreatitis with   multiple peripancreatic collections. No interval development of foci of   gas or thick peripheral wall to suggest infection of the collections.    Unchanged thrombosed splenic vein.    Unchanged wall thickening of the descending colon, which may compatible   with colitis. Moderate fecal material is noted in the rectosigmoid colon   with associated wall thickening, raising consideration of stercoral   colitis.    Slightly increased moderate bilateral pleural effusions with associated   atelectasis of the lower lobes. Patchy opacities in the lingula and right   middle lobe may be infectious in etiology.        < end of copied text >

## 2018-12-26 NOTE — PROGRESS NOTE ADULT - ASSESSMENT
52 y/o M (resident of [facility]) w/ a PMHx of TBI, s/p PEG tube, contracture, and seizure d/o who presented as a transfer from [facility] on 12/9 for respiratory failure 2/2 ARDS likely 2/2 necrotizing pancreatitis s/p intubation.    # Neuro  - Off sedation. Pt awake, following commands. Able to nod yes or no to questions.   - Patient was on Depakote at home for history of seizure disorder; however, this was discontinued because Depakote can potentially cause pancreatitis. vEEGs have shown no epileptiform abnormalities   - Neurology consulted for assistance with seizure management. Patient was subsequently started on Vimpat. C/w Vimpat as per Neuro recs    # CV   - Patient off Norepinephrine gtt , BP stable, continue to monitor BP     # Resp:   - Patient currently intubated 2/2 moderate ARDS 2/2 necrotizing pancreatitis. Previously on VDR vent, now on conventional vent.  - Will closely monitor respiratory status  - CPAP again for resp function recovery.    # GI  - Patient found to have necrotizing pancreatitis. Unclear etiology for pancreatitis at this time. Patient is a resident of NH and with no significant alcohol history. OSH CT A+P showed gallbladder pathology. Repeat CT with normal gallbladder wall and duct. TG was found to be elevated in the hospital, though patient was on a Propofol gtt at the time and it has been downtrending since the Propofol was discontinued.  - switched to avycaz and flagyl for necrotizing pancreatitis as per ID, day 5   - Colitis? on CT 12/19. Monitor for diarrhea. Unlikely c. diff given clinical scenario.   - Repeat CT scan demonstrates pancreatitis unchanged but concern for enlarging danay-pancreatic fluid collection. Surgery following, no acute surgical intervention.   - CT scan 12/25: stable Stable appearance of the necrotizing pancreatitis with multiple peripancreatic collections. No interval development of foci of gas or thick peripheral wall to suggest infection of the collections., GI will potentially do procedure today     # /Renal   - C/w Ahuja for now    # ID   - BAL on 12/12 was positive for pseudomonas aeruginosa. Repeat sputum cx w/ Acinetobacter resistant to carbapenems. Appreciate ID recs, switched to avycaz and flagyl. Source likely PNA, pancreatitis and peripancreatic fluid collection. Blood cx growing coag neg staph likely contaminant. Repeat cultures 12/24 NGTD .   - Received one dose of Vanco 12/18 and one dose on 12/24 as with low grade fever, would hold off on further vanc at this time and f/u repeat blood cultures   - Patient s/p antiviral regimen (Valtrex followed by acyclovir) for shingles   - Monitor CBC and fever curve    # Heme:  - s/p 1 unit of pRBC's on 12/25 with appropriate response, Hgb stable   - no active signs of bleeding, Gastric lavage without bleeding  - repeat CT abdomen stable  - continue to monitor CBC     # Endo  - No active issues (serum glucose WNL)    # DVT PPx:   - SCD's due to anemia 52 y/o M (resident of [facility]) w/ a PMHx of TBI, s/p PEG tube, contracture, and seizure d/o who presented as a transfer from [facility] on 12/9 for respiratory failure 2/2 ARDS likely 2/2 necrotizing pancreatitis s/p intubation.    # Neuro  - Off sedation. Pt awake, following commands. Able to nod yes or no to questions.   - Patient was on Depakote at home for history of seizure disorder; however, this was discontinued because Depakote can potentially cause pancreatitis. vEEGs have shown no epileptiform abnormalities   - Neurology consulted for assistance with seizure management. Patient was subsequently started on Vimpat. C/w Vimpat as per Neuro recs    # CV   - Patient currently on Norepinephrine gtt , continue to monitor BP and wean as tolerated.     # Resp:   - Patient currently intubated 2/2 moderate ARDS 2/2 necrotizing pancreatitis. Previously on VDR vent, now on conventional vent.  - Will closely monitor respiratory status  - CPAP again for resp function recovery.    # GI  - Patient found to have necrotizing pancreatitis. Unclear etiology for pancreatitis at this time. Patient is a resident of NH and with no significant alcohol history. OSH CT A+P showed gallbladder pathology. Repeat CT with normal gallbladder wall and duct. TG was found to be elevated in the hospital, though patient was on a Propofol gtt at the time and it has been downtrending since the Propofol was discontinued.  - switched to avycaz and flagyl for necrotizing pancreatitis as per ID, day 5   - Colitis? on CT 12/19. Monitor for diarrhea. Unlikely c. diff given clinical scenario.   - Repeat CT scan demonstrates pancreatitis unchanged but concern for enlarging danay-pancreatic fluid collection. Surgery following, no acute surgical intervention.   - CT scan 12/25: stable Stable appearance of the necrotizing pancreatitis with multiple peripancreatic collections. No interval development of foci of gas or thick peripheral wall to suggest infection of the collections., GI will potentially do procedure today     # /Renal   - C/w Ahuja for now    # ID   - BAL on 12/12 was positive for pseudomonas aeruginosa. Repeat sputum cx w/ Acinetobacter resistant to carbapenems. Appreciate ID recs, switched to avycaz and flagyl. Source likely PNA, pancreatitis and peripancreatic fluid collection. Blood cx growing coag neg staph likely contaminant. Repeat cultures 12/24 NGTD .   - Received one dose of Vanco 12/18 and one dose on 12/24 as with low grade fever, would hold off on further vanc at this time and f/u repeat blood cultures   - Patient s/p antiviral regimen (Valtrex followed by acyclovir) for shingles   - Monitor CBC and fever curve    # Heme:  - s/p 1 unit of pRBC's on 12/25 with appropriate response, Hgb stable   - no active signs of bleeding, Gastric lavage without bleeding  - repeat CT abdomen stable  - continue to monitor CBC     # Endo  - No active issues (serum glucose WNL)    # DVT PPx:   - SCD's due to anemia 54 y/o M (resident of [facility]) w/ a PMHx of TBI, s/p PEG tube, contracture, and seizure d/o who presented as a transfer from [facility] on 12/9 for respiratory failure 2/2 ARDS likely 2/2 necrotizing pancreatitis s/p intubation.    # Neuro  - Off sedation. Pt awake, following commands. Able to nod yes or no to questions.   - Patient was on Depakote at home for history of seizure disorder; however, this was discontinued because Depakote can potentially cause pancreatitis. vEEGs have shown no epileptiform abnormalities   - Neurology consulted for assistance with seizure management. Patient was subsequently started on Vimpat. C/w Vimpat as per Neuro recs    # CV   - Patient currently on Norepinephrine gtt , continue to monitor BP and wean as tolerated.     # Resp:   - Patient currently intubated 2/2 moderate ARDS 2/2 necrotizing pancreatitis. Previously on VDR vent, now on conventional vent.  - Will closely monitor respiratory status  - CPAP again for resp function recovery.    # GI  - Patient found to have necrotizing pancreatitis. Unclear etiology for pancreatitis at this time. Patient is a resident of NH and with no significant alcohol history. OSH CT A+P showed gallbladder pathology. Repeat CT with normal gallbladder wall and duct. TG was found to be elevated in the hospital, though patient was on a Propofol gtt at the time and it has been downtrending since the Propofol was discontinued.  - switched to avycaz and flagyl for necrotizing pancreatitis as per ID, day 5   - Colitis? on CT 12/19. Monitor for diarrhea. Unlikely c. diff given clinical scenario.   - CT scan 12/19: demonstrates pancreatitis unchanged but concern for enlarging danay-pancreatic fluid collection. Surgery following, no acute surgical intervention.   - GI following: repeat CT 12/25 with pancreatic necrosis, but no wall formed yet, thus patient is currently not a candidate for necrosectomy/danay-pancreatic fluid drainage with cystgastrostomy. Will recommend a repeat CT in 5 days to evaluate the pancreatic lesion.    # /Renal   - C/w Ahuja for now    # ID   - BAL on 12/12 was positive for pseudomonas aeruginosa. Repeat sputum cx w/ Acinetobacter resistant to carbapenems. Switched to avycaz and flagyl, day 5, will switch back to Imipenem after 7 days as per ID.   - Source likely PNA, pancreatitis and peripancreatic fluid collection. Blood cx growing coag neg staph likely contaminant. Repeat cultures 12/24 NGTD .   - Received one dose of Vanco 12/18 and one dose on 12/24 as with low grade fever, would hold off on further vanc at this time and f/u repeat blood cultures   - Patient s/p antiviral regimen (Valtrex followed by acyclovir) for shingles   - Monitor CBC and fever curve    # Heme:  - s/p 1 unit of pRBC's on 12/25 with appropriate response, Hgb stable   - no active signs of bleeding, Gastric lavage without bleeding  - repeat CT abdomen stable  - continue to monitor CBC     # Endo  - No active issues (serum glucose WNL)    # DVT PPx:   - SCD's due to anemia

## 2018-12-26 NOTE — PROGRESS NOTE ADULT - SUBJECTIVE AND OBJECTIVE BOX
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 650 milliGRAM(s) Oral every 6 hours PRN  chlorhexidine 4% Liquid 1 Application(s) Topical <User Schedule>  cyanocobalamin 1000 MICROGram(s) Oral daily  folic acid 1 milliGRAM(s) Enteral Tube daily  lacosamide Solution 100 milliGRAM(s) Oral two times a day  metoclopramide Injectable 5 milliGRAM(s) IV Push every 8 hours PRN  norepinephrine Infusion 0.05 MICROgram(s)/kG/Min IV Continuous <Continuous>  pantoprazole   Suspension 40 milliGRAM(s) Enteral Tube daily  QUEtiapine 25 milliGRAM(s) Oral daily  risperiDONE   Solution 1 milliGRAM(s) Oral daily      Vital Signs Last 24 Hrs  T(C): 37.9 (26 Dec 2018 08:00), Max: 37.9 (25 Dec 2018 16:00)  T(F): 100.3 (26 Dec 2018 08:00), Max: 100.3 (26 Dec 2018 04:00)  HR: 113 (26 Dec 2018 09:43) (94 - 119)  BP: 116/61 (26 Dec 2018 09:43) (96/46 - 124/64)  BP(mean): 72 (26 Dec 2018 09:43) (54 - 87)  RR: 32 (26 Dec 2018 09:43) (16 - 33)  SpO2: 97% (26 Dec 2018 09:43) (95% - 100%)    Physical Exam:  General:    NAD,  non toxic  Head: atraumatic, normocephalic  Eye: normal sclera and conjunctiva  ENT:    ET tube,  no LAD,   neck supple  Cardio:     regular S1, S2,  no murmur  Respiratory:    clear b/l,    no wheezing, still thick secretions  abd:     soft,   BS +,   no tenderness, PEG with no erythema or drainage  :   no CVAT,  no suprapubic tenderness,  +  tavares  Musculoskeletal:   no joint swelling,   no edema  vascular: no lines, normal pulses  Skin:    no rash  Neurologic:    unable to assess as pt intubated                          8.0    3.98  )-----------( 375      ( 26 Dec 2018 03:30 )             25.8       12-26    138  |  104  |  4<L>  ----------------------------<  126<H>  4.0   |  24  |  0.27<L>    Ca    8.4      26 Dec 2018 03:30  Phos  2.7     12-26  Mg     2.1     12-26    TPro  5.7<L>  /  Alb  2.4<L>  /  TBili  1.1  /  DBili  x   /  AST  22  /  ALT  13  /  AlkPhos  194<H>  12-26          MICROBIOLOGY:  v  BLOOD VENOUS  12-24-18 --  --  --      BLOOD PERIPHERAL  12-24-18 --  --  --      BLOOD PERIPHERAL  12-23-18 --  --  --      BLOOD VENOUS  12-22-18 --  --  BLOOD CULTURE PCR  Staphylococcus sp.,coag neg      SPUTUM  12-18-18 --  --  Acinetobacter baumannii       BLOOD PERIPHERAL  12-18-18 --  --  --      BRONCHIAL LAVAGE  12-12-18 --  --  Pseudomonas aeruginosa      URINE CATHETER  12-11-18 --  --  --      BLOOD PERIPHERAL  12-11-18 --  --  --                RADIOLOGY:  Images below reviewed personally  < from: CT Abdomen and Pelvis w/ IV Cont (12.25.18 @ 11:28) >  IMPRESSION: Stable appearance of the necrotizing pancreatitis with   multiple peripancreatic collections. No interval development of foci of   gas or thick peripheral wall to suggest infection of the collections.    Unchanged thrombosed splenic vein.    Unchanged wall thickening of the descending colon, which may compatible   with colitis. Moderate fecal material is noted in the rectosigmoid colon   with associated wall thickening, raising consideration of stercoral   colitis.    Slightly increased moderate bilateral pleural effusions with associated   atelectasis of the lower lobes. Patchy opacities in the lingula and right   middle lobe may be infectious in etiology.

## 2018-12-26 NOTE — PROGRESS NOTE ADULT - ASSESSMENT
Impression  1) Enlarging danay-pancreatic collection with pancreatitis  2) Septic, distributive shock.  acinetobacter PNA and pseudomonas in bronch.  3) Seizure disorder  4) Macrocytic anemia- Pt has no overt bleed     Recommendations    - trend CBC, CMP and fever curve  - repeat CT 12.25 with pancreatic necrosis, but no wall formed yet, thus patient is currently not a candidate for necrosectomy/danay-pancreatic fluid drainage with cystgastrostomy   - will recommend a repeat CT in 5 days to evaluate the pancreatic lesion  - rest of care per primary team      Isa Mike, PGY-5  GI fellow  B- [id number]/ [phone]  Please call GI fellow on call after 5pm and on weekends

## 2018-12-27 LAB
ALBUMIN SERPL ELPH-MCNC: 2.3 G/DL — LOW (ref 3.3–5)
ALP SERPL-CCNC: 156 U/L — HIGH (ref 40–120)
ALT FLD-CCNC: 10 U/L — SIGNIFICANT CHANGE UP (ref 4–41)
APTT BLD: 36.7 SEC — HIGH (ref 27.5–36.3)
APTT BLD: 36.9 SEC — HIGH (ref 27.5–36.3)
AST SERPL-CCNC: 15 U/L — SIGNIFICANT CHANGE UP (ref 4–40)
BASE EXCESS BLDA CALC-SCNC: 2.3 MMOL/L — SIGNIFICANT CHANGE UP
BASOPHILS # BLD AUTO: 0.02 K/UL — SIGNIFICANT CHANGE UP (ref 0–0.2)
BASOPHILS NFR BLD AUTO: 0.5 % — SIGNIFICANT CHANGE UP (ref 0–2)
BILIRUB SERPL-MCNC: 1.1 MG/DL — SIGNIFICANT CHANGE UP (ref 0.2–1.2)
BLD GP AB SCN SERPL QL: NEGATIVE — SIGNIFICANT CHANGE UP
BUN SERPL-MCNC: 4 MG/DL — LOW (ref 7–23)
CA-I BLDA-SCNC: 1.2 MMOL/L — SIGNIFICANT CHANGE UP (ref 1.15–1.29)
CALCIUM SERPL-MCNC: 8 MG/DL — LOW (ref 8.4–10.5)
CHLORIDE SERPL-SCNC: 105 MMOL/L — SIGNIFICANT CHANGE UP (ref 98–107)
CO2 SERPL-SCNC: 25 MMOL/L — SIGNIFICANT CHANGE UP (ref 22–31)
CREAT SERPL-MCNC: 0.23 MG/DL — LOW (ref 0.5–1.3)
EOSINOPHIL # BLD AUTO: 0.02 K/UL — SIGNIFICANT CHANGE UP (ref 0–0.5)
EOSINOPHIL NFR BLD AUTO: 0.5 % — SIGNIFICANT CHANGE UP (ref 0–6)
GLUCOSE BLDA-MCNC: 107 MG/DL — HIGH (ref 70–99)
GLUCOSE SERPL-MCNC: 108 MG/DL — HIGH (ref 70–99)
HCO3 BLDA-SCNC: 27 MMOL/L — HIGH (ref 22–26)
HCT VFR BLD CALC: 20.8 % — CRITICAL LOW (ref 39–50)
HCT VFR BLD CALC: 21.3 % — LOW (ref 39–50)
HCT VFR BLD CALC: 24.4 % — LOW (ref 39–50)
HCT VFR BLD CALC: 24.5 % — LOW (ref 39–50)
HCT VFR BLDA CALC: 24.2 % — LOW (ref 39–51)
HGB BLD-MCNC: 6.5 G/DL — CRITICAL LOW (ref 13–17)
HGB BLD-MCNC: 6.7 G/DL — CRITICAL LOW (ref 13–17)
HGB BLD-MCNC: 7.5 G/DL — LOW (ref 13–17)
HGB BLD-MCNC: 7.6 G/DL — LOW (ref 13–17)
HGB BLDA-MCNC: 7.8 G/DL — LOW (ref 13–17)
IMM GRANULOCYTES # BLD AUTO: 0.02 # — SIGNIFICANT CHANGE UP
IMM GRANULOCYTES NFR BLD AUTO: 0.5 % — SIGNIFICANT CHANGE UP (ref 0–1.5)
INR BLD: 1.94 — HIGH (ref 0.88–1.17)
INR BLD: 2.22 — HIGH (ref 0.88–1.17)
LACTATE BLDA-SCNC: 1.4 MMOL/L — SIGNIFICANT CHANGE UP (ref 0.5–2)
LYMPHOCYTES # BLD AUTO: 1.11 K/UL — SIGNIFICANT CHANGE UP (ref 1–3.3)
LYMPHOCYTES # BLD AUTO: 27.8 % — SIGNIFICANT CHANGE UP (ref 13–44)
MAGNESIUM SERPL-MCNC: 1.9 MG/DL — SIGNIFICANT CHANGE UP (ref 1.6–2.6)
MCHC RBC-ENTMCNC: 30.7 % — LOW (ref 32–36)
MCHC RBC-ENTMCNC: 31 % — LOW (ref 32–36)
MCHC RBC-ENTMCNC: 31.3 % — LOW (ref 32–36)
MCHC RBC-ENTMCNC: 31.5 % — LOW (ref 32–36)
MCHC RBC-ENTMCNC: 32.3 PG — SIGNIFICANT CHANGE UP (ref 27–34)
MCHC RBC-ENTMCNC: 32.8 PG — SIGNIFICANT CHANGE UP (ref 27–34)
MCHC RBC-ENTMCNC: 33 PG — SIGNIFICANT CHANGE UP (ref 27–34)
MCHC RBC-ENTMCNC: 33 PG — SIGNIFICANT CHANGE UP (ref 27–34)
MCV RBC AUTO: 104.3 FL — HIGH (ref 80–100)
MCV RBC AUTO: 104.4 FL — HIGH (ref 80–100)
MCV RBC AUTO: 105.6 FL — HIGH (ref 80–100)
MCV RBC AUTO: 107.5 FL — HIGH (ref 80–100)
MONOCYTES # BLD AUTO: 0.38 K/UL — SIGNIFICANT CHANGE UP (ref 0–0.9)
MONOCYTES NFR BLD AUTO: 9.5 % — SIGNIFICANT CHANGE UP (ref 2–14)
NEUTROPHILS # BLD AUTO: 2.44 K/UL — SIGNIFICANT CHANGE UP (ref 1.8–7.4)
NEUTROPHILS NFR BLD AUTO: 61.2 % — SIGNIFICANT CHANGE UP (ref 43–77)
NRBC # FLD: 0.04 — SIGNIFICANT CHANGE UP
NRBC # FLD: 0.05 — SIGNIFICANT CHANGE UP
NRBC # FLD: 0.05 — SIGNIFICANT CHANGE UP
NRBC # FLD: 0.06 — SIGNIFICANT CHANGE UP
NRBC FLD-RTO: 1 — SIGNIFICANT CHANGE UP
NRBC FLD-RTO: 1.1 — SIGNIFICANT CHANGE UP
NRBC FLD-RTO: 1.2 — SIGNIFICANT CHANGE UP
OB PNL STL: POSITIVE — SIGNIFICANT CHANGE UP
PCO2 BLDA: 40 MMHG — SIGNIFICANT CHANGE UP (ref 35–48)
PH BLDA: 7.43 PH — SIGNIFICANT CHANGE UP (ref 7.35–7.45)
PHOSPHATE SERPL-MCNC: 2.6 MG/DL — SIGNIFICANT CHANGE UP (ref 2.5–4.5)
PLATELET # BLD AUTO: 352 K/UL — SIGNIFICANT CHANGE UP (ref 150–400)
PLATELET # BLD AUTO: 400 K/UL — SIGNIFICANT CHANGE UP (ref 150–400)
PLATELET # BLD AUTO: 421 K/UL — HIGH (ref 150–400)
PLATELET # BLD AUTO: 560 K/UL — HIGH (ref 150–400)
PMV BLD: 10.3 FL — SIGNIFICANT CHANGE UP (ref 7–13)
PMV BLD: 10.6 FL — SIGNIFICANT CHANGE UP (ref 7–13)
PMV BLD: 10.7 FL — SIGNIFICANT CHANGE UP (ref 7–13)
PMV BLD: 11 FL — SIGNIFICANT CHANGE UP (ref 7–13)
PO2 BLDA: 86 MMHG — SIGNIFICANT CHANGE UP (ref 83–108)
POTASSIUM BLDA-SCNC: 3.8 MMOL/L — SIGNIFICANT CHANGE UP (ref 3.4–4.5)
POTASSIUM SERPL-MCNC: 3.8 MMOL/L — SIGNIFICANT CHANGE UP (ref 3.5–5.3)
POTASSIUM SERPL-SCNC: 3.8 MMOL/L — SIGNIFICANT CHANGE UP (ref 3.5–5.3)
PROT SERPL-MCNC: 5.7 G/DL — LOW (ref 6–8.3)
PROTHROM AB SERPL-ACNC: 22.6 SEC — HIGH (ref 9.8–13.1)
PROTHROM AB SERPL-ACNC: 25.3 SEC — HIGH (ref 9.8–13.1)
RBC # BLD: 1.97 M/UL — LOW (ref 4.2–5.8)
RBC # BLD: 2.04 M/UL — LOW (ref 4.2–5.8)
RBC # BLD: 2.27 M/UL — LOW (ref 4.2–5.8)
RBC # BLD: 2.35 M/UL — LOW (ref 4.2–5.8)
RBC # FLD: 21.2 % — HIGH (ref 10.3–14.5)
RBC # FLD: 21.2 % — HIGH (ref 10.3–14.5)
RBC # FLD: 21.3 % — HIGH (ref 10.3–14.5)
RBC # FLD: 21.3 % — HIGH (ref 10.3–14.5)
RH IG SCN BLD-IMP: POSITIVE — SIGNIFICANT CHANGE UP
SAO2 % BLDA: 97.3 % — SIGNIFICANT CHANGE UP (ref 95–99)
SODIUM BLDA-SCNC: 136 MMOL/L — SIGNIFICANT CHANGE UP (ref 136–146)
SODIUM SERPL-SCNC: 137 MMOL/L — SIGNIFICANT CHANGE UP (ref 135–145)
WBC # BLD: 3.99 K/UL — SIGNIFICANT CHANGE UP (ref 3.8–10.5)
WBC # BLD: 4.24 K/UL — SIGNIFICANT CHANGE UP (ref 3.8–10.5)
WBC # BLD: 4.52 K/UL — SIGNIFICANT CHANGE UP (ref 3.8–10.5)
WBC # BLD: 6.62 K/UL — SIGNIFICANT CHANGE UP (ref 3.8–10.5)
WBC # FLD AUTO: 3.99 K/UL — SIGNIFICANT CHANGE UP (ref 3.8–10.5)
WBC # FLD AUTO: 4.24 K/UL — SIGNIFICANT CHANGE UP (ref 3.8–10.5)
WBC # FLD AUTO: 4.52 K/UL — SIGNIFICANT CHANGE UP (ref 3.8–10.5)
WBC # FLD AUTO: 6.62 K/UL — SIGNIFICANT CHANGE UP (ref 3.8–10.5)

## 2018-12-27 PROCEDURE — 74174 CTA ABD&PLVS W/CONTRAST: CPT | Mod: 26

## 2018-12-27 PROCEDURE — 99232 SBSQ HOSP IP/OBS MODERATE 35: CPT | Mod: GC

## 2018-12-27 PROCEDURE — 99291 CRITICAL CARE FIRST HOUR: CPT

## 2018-12-27 PROCEDURE — 99233 SBSQ HOSP IP/OBS HIGH 50: CPT

## 2018-12-27 RX ORDER — PHYTONADIONE (VIT K1) 5 MG
10 TABLET ORAL ONCE
Qty: 0 | Refills: 0 | Status: COMPLETED | OUTPATIENT
Start: 2018-12-27 | End: 2018-12-27

## 2018-12-27 RX ORDER — SOD SULF/SODIUM/NAHCO3/KCL/PEG
1000 SOLUTION, RECONSTITUTED, ORAL ORAL
Qty: 0 | Refills: 0 | Status: COMPLETED | OUTPATIENT
Start: 2018-12-27 | End: 2018-12-28

## 2018-12-27 RX ORDER — PANTOPRAZOLE SODIUM 20 MG/1
40 TABLET, DELAYED RELEASE ORAL EVERY 12 HOURS
Qty: 0 | Refills: 0 | Status: DISCONTINUED | OUTPATIENT
Start: 2018-12-27 | End: 2018-12-28

## 2018-12-27 RX ADMIN — CHLORHEXIDINE GLUCONATE 15 MILLILITER(S): 213 SOLUTION TOPICAL at 17:09

## 2018-12-27 RX ADMIN — LACOSAMIDE 100 MILLIGRAM(S): 50 TABLET ORAL at 06:14

## 2018-12-27 RX ADMIN — Medication 9.35 MICROGRAM(S)/KG/MIN: at 07:44

## 2018-12-27 RX ADMIN — CHLORHEXIDINE GLUCONATE 15 MILLILITER(S): 213 SOLUTION TOPICAL at 06:14

## 2018-12-27 RX ADMIN — PANTOPRAZOLE SODIUM 40 MILLIGRAM(S): 20 TABLET, DELAYED RELEASE ORAL at 17:09

## 2018-12-27 RX ADMIN — Medication 100 MILLIGRAM(S): at 13:15

## 2018-12-27 RX ADMIN — Medication 102 MILLIGRAM(S): at 11:55

## 2018-12-27 RX ADMIN — LACOSAMIDE 100 MILLIGRAM(S): 50 TABLET ORAL at 17:09

## 2018-12-27 RX ADMIN — CHLORHEXIDINE GLUCONATE 1 APPLICATION(S): 213 SOLUTION TOPICAL at 11:55

## 2018-12-27 RX ADMIN — Medication 650 MILLIGRAM(S): at 19:08

## 2018-12-27 RX ADMIN — Medication 9.35 MICROGRAM(S)/KG/MIN: at 20:55

## 2018-12-27 RX ADMIN — QUETIAPINE FUMARATE 25 MILLIGRAM(S): 200 TABLET, FILM COATED ORAL at 11:55

## 2018-12-27 RX ADMIN — Medication 100 MILLIGRAM(S): at 21:30

## 2018-12-27 RX ADMIN — Medication 102 MILLIGRAM(S): at 20:55

## 2018-12-27 RX ADMIN — Medication 5 MILLIGRAM(S): at 23:20

## 2018-12-27 RX ADMIN — RISPERIDONE 1 MILLIGRAM(S): 4 TABLET ORAL at 11:55

## 2018-12-27 RX ADMIN — Medication 650 MILLIGRAM(S): at 17:10

## 2018-12-27 RX ADMIN — Medication 1 MILLIGRAM(S): at 11:55

## 2018-12-27 RX ADMIN — Medication 100 MILLIGRAM(S): at 06:14

## 2018-12-27 RX ADMIN — PREGABALIN 1000 MICROGRAM(S): 225 CAPSULE ORAL at 11:55

## 2018-12-27 NOTE — PROGRESS NOTE ADULT - SUBJECTIVE AND OBJECTIVE BOX
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 650 milliGRAM(s) Oral every 6 hours PRN  chlorhexidine 0.12% Liquid 15 milliLiter(s) Oral Mucosa two times a day  chlorhexidine 4% Liquid 1 Application(s) Topical <User Schedule>  cyanocobalamin 1000 MICROGram(s) Oral daily  folic acid 1 milliGRAM(s) Enteral Tube daily  lacosamide Solution 100 milliGRAM(s) Oral two times a day  metoclopramide Injectable 5 milliGRAM(s) IV Push every 8 hours PRN  norepinephrine Infusion 0.05 MICROgram(s)/kG/Min IV Continuous <Continuous>  pantoprazole  Injectable 40 milliGRAM(s) IV Push every 12 hours  QUEtiapine 25 milliGRAM(s) Oral daily  risperiDONE   Solution 1 milliGRAM(s) Oral daily      Vital Signs Last 24 Hrs  T(C): 37.7 (27 Dec 2018 12:00), Max: 37.7 (27 Dec 2018 08:00)  T(F): 99.8 (27 Dec 2018 12:00), Max: 99.9 (27 Dec 2018 08:00)  HR: 118 (27 Dec 2018 14:00) (88 - 119)  BP: 112/65 (27 Dec 2018 14:00) (88/44 - 118/69)  BP(mean): 76 (27 Dec 2018 14:00) (53 - 82)  RR: 20 (27 Dec 2018 14:00) (15 - 34)  SpO2: 96% (27 Dec 2018 14:00) (91% - 99%)    Physical Exam:  General:    NAD,  non toxic  Head: atraumatic, normocephalic  Eye: normal sclera and conjunctiva  ENT:    ET tube,  no LAD,   neck supple  Cardio:     regular S1, S2,  no murmur  Respiratory:    clear b/l,    no wheezing, still thick secretions  abd:     soft,   BS +,   no tenderness, PEG with no erythema or drainage  :   no CVAT,  no suprapubic tenderness,  +  tavares  Musculoskeletal:   no joint swelling,   no edema  vascular: no lines, normal pulses  Skin:    no rash  Neurologic:    unable to assess as pt intubated                        7.5    4.24  )-----------( 400      ( 27 Dec 2018 09:40 )             24.4       12-27    137  |  105  |  4<L>  ----------------------------<  108<H>  3.8   |  25  |  0.23<L>    Ca    8.0<L>      27 Dec 2018 03:30  Phos  2.6     12-27  Mg     1.9     12-27    TPro  5.7<L>  /  Alb  2.3<L>  /  TBili  1.1  /  DBili  x   /  AST  15  /  ALT  10  /  AlkPhos  156<H>  12-27          MICROBIOLOGY:  v  BLOOD VENOUS  12-24-18 --  --  --      BLOOD PERIPHERAL  12-24-18 --  --  --      BLOOD PERIPHERAL  12-23-18 --  --  --      BLOOD VENOUS  12-22-18 --  --  BLOOD CULTURE PCR  Staphylococcus sp.,coag neg      SPUTUM  12-18-18 --  --  Acinetobacter baumannii       BLOOD PERIPHERAL  12-18-18 --  --  --      BRONCHIAL LAVAGE  12-12-18 --  --  Pseudomonas aeruginosa      URINE CATHETER  12-11-18 --  --  --      BLOOD PERIPHERAL  12-11-18 --  --  --                RADIOLOGY:  Images below reviewed personally  < from: CT Abdomen and Pelvis w/ IV Cont (12.25.18 @ 11:28) >  IMPRESSION: Stable appearance of the necrotizing pancreatitis with   multiple peripancreatic collections. No interval development of foci of   gas or thick peripheral wall to suggest infection of the collections.    Unchanged thrombosed splenic vein.    Unchanged wall thickening of the descending colon, which may compatible   with colitis. Moderate fecal material is noted in the rectosigmoid colon   with associated wall thickening, raising consideration of stercoral   colitis.    Slightly increased moderate bilateral pleural effusions with associated   atelectasis of the lower lobes. Patchy opacities in the lingula and right   middle lobe may be infectious in etiology.

## 2018-12-27 NOTE — PROGRESS NOTE ADULT - ASSESSMENT
53  M (resident of [facility]) with TBI, s/p PEG tube, contracture, and seizure d/o who presented as a transfer from [facility] on 12/9 for respiratory failure 2/2 ARDS likely 2/2 necrotizing pancreatitis s/p intubation.   now on day 11 of Imipenem   bronc 12/11 with pseudomonas  pt with thick secretions and again febrile, sputum cx with  acinetobacter  repeat Ct with increased size of pancreatic collection    sepsis with fever, leukopenia tachycardia   Necrotising Pancreatitis with multiple peripancreatitis collections    acinetobacter PNA vs colonization, is being treated as pt had thick secretions  coag neg staph bacteremia likely contaminant, repeat negative  stercoral colitis    * c/w Avycaz 2.5g q8hrs, started 12/22, now day 6  * c/w flagyl for fluid collection in abdomen   * will switch back to Imipenem after 7 days of avycaz  * pt is going for EGD/colon tomorrow  * repeat CT in a few days
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
## 2018-12-27 NOTE — PROGRESS NOTE ADULT - SUBJECTIVE AND OBJECTIVE BOX
SUBJECTIVE / OVERNIGHT EVENTS: Patient had no acute events overnight. Patient seen and examined at bedside this morning.     ROS: unable to obtain       OBJECTIVE:  ICU Vital Signs Last 24 Hrs  T(C): 37.3 (27 Dec 2018 04:00), Max: 38.2 (26 Dec 2018 15:00)  T(F): 99.1 (27 Dec 2018 04:00), Max: 100.7 (26 Dec 2018 15:00)  HR: 114 (27 Dec 2018 06:00) (88 - 119)  BP: 113/61 (27 Dec 2018 06:00) (88/44 - 118/69)  BP(mean): 74 (27 Dec 2018 06:00) (53 - 82)  ABP: --  ABP(mean): --  RR: 18 (27 Dec 2018 06:00) (15 - 33)  SpO2: 96% (27 Dec 2018 06:00) (91% - 99%)    Mode: AC/ CMV (Assist Control/ Continuous Mandatory Ventilation), RR (machine): 18, TV (machine): 380, FiO2: 40, PEEP: 5, MAP: 10, PIP: 29    12-25 @ 07:01  -  12-26 @ 07:00  --------------------------------------------------------  IN: 1137.9 mL / OUT: 690 mL / NET: 447.9 mL    12-26 @ 07:01  -  12-27 @ 06:59  --------------------------------------------------------  IN: 857.3 mL / OUT: 1070 mL / NET: -212.7 mL        PHYSICAL EXAM:  GENERAL: Intubated  HEAD:  Atraumatic, Normocephalic  EYES: EOMI, PERRLA, conjunctiva and sclera clear  NECK: Supple, No JVD  CHEST/LUNG: vent sounds  HEART: Regular rate and rhythm; No murmurs, rubs, or gallops  ABDOMEN: Soft, Nontender, Nondistended; Bowel sounds present  EXTREMITIES:  2+ LE swelling  NEURO: contracted.  Pt awake, following commands. Able to nod yes or no to questions      HOSPITAL MEDICATIONS:  Standing Meds:  cefTAZidime/avibactam IVPB 2.5 Gram(s) IV Intermittent every 8 hours  chlorhexidine 0.12% Liquid 15 milliLiter(s) Oral Mucosa two times a day  chlorhexidine 4% Liquid 1 Application(s) Topical <User Schedule>  cyanocobalamin 1000 MICROGram(s) Oral daily  folic acid 1 milliGRAM(s) Enteral Tube daily  lacosamide Solution 100 milliGRAM(s) Oral two times a day  metroNIDAZOLE  IVPB      metroNIDAZOLE  IVPB 500 milliGRAM(s) IV Intermittent every 8 hours  norepinephrine Infusion 0.05 MICROgram(s)/kG/Min IV Continuous <Continuous>  pantoprazole   Suspension 40 milliGRAM(s) Enteral Tube daily  QUEtiapine 25 milliGRAM(s) Oral daily  risperiDONE   Solution 1 milliGRAM(s) Oral daily      PRN Meds:  acetaminophen    Suspension .. 650 milliGRAM(s) Oral every 6 hours PRN  metoclopramide Injectable 5 milliGRAM(s) IV Push every 8 hours PRN      LABS:                        7.6    3.99  )-----------( 352      ( 27 Dec 2018 03:30 )             24.5     Hgb Trend: 7.6<--, 8.0<--, 9.0<--, 8.8<--, 6.4<--  12-27    137  |  105  |  4<L>  ----------------------------<  108<H>  3.8   |  25  |  0.23<L>    Ca    8.0<L>      27 Dec 2018 03:30  Phos  2.6     12-27  Mg     1.9     12-27    TPro  5.7<L>  /  Alb  2.3<L>  /  TBili  1.1  /  DBili  x   /  AST  15  /  ALT  10  /  AlkPhos  156<H>  12-27    Creatinine Trend: 0.23<--, 0.27<--, 0.28<--, 0.32<--, 0.31<--, 0.26<--  PT/INR - ( 26 Dec 2018 03:30 )   PT: 22.7 SEC;   INR: 2.00              Arterial Blood Gas:  12-27 @ 03:00  7.43/40/86/27/97.3/2.3  ABG lactate: 1.4 SUBJECTIVE / OVERNIGHT EVENTS: Patient with red/brown stool overnight, Hg stable. Patient seen and examined at bedside this morning.     ROS: unable to obtain       OBJECTIVE:  ICU Vital Signs Last 24 Hrs  T(C): 37.3 (27 Dec 2018 04:00), Max: 38.2 (26 Dec 2018 15:00)  T(F): 99.1 (27 Dec 2018 04:00), Max: 100.7 (26 Dec 2018 15:00)  HR: 114 (27 Dec 2018 06:00) (88 - 119)  BP: 113/61 (27 Dec 2018 06:00) (88/44 - 118/69)  BP(mean): 74 (27 Dec 2018 06:00) (53 - 82)  ABP: --  ABP(mean): --  RR: 18 (27 Dec 2018 06:00) (15 - 33)  SpO2: 96% (27 Dec 2018 06:00) (91% - 99%)    Mode: AC/ CMV (Assist Control/ Continuous Mandatory Ventilation), RR (machine): 18, TV (machine): 380, FiO2: 40, PEEP: 5, MAP: 10, PIP: 29    12-25 @ 07:01 - 12-26 @ 07:00  --------------------------------------------------------  IN: 1137.9 mL / OUT: 690 mL / NET: 447.9 mL    12-26 @ 07:01 - 12-27 @ 06:59  --------------------------------------------------------  IN: 857.3 mL / OUT: 1070 mL / NET: -212.7 mL        PHYSICAL EXAM:  GENERAL: Intubated  HEAD:  Atraumatic, Normocephalic  EYES: EOMI, PERRLA, conjunctiva and sclera clear  NECK: Supple, No JVD  CHEST/LUNG: vent sounds  HEART: Regular rate and rhythm; No murmurs, rubs, or gallops  ABDOMEN: Soft, Nontender, Nondistended; Bowel sounds present  EXTREMITIES:  2+ LE swelling  NEURO: contracted.  Pt awake, following commands. Able to nod yes or no to questions      HOSPITAL MEDICATIONS:  Standing Meds:  cefTAZidime/avibactam IVPB 2.5 Gram(s) IV Intermittent every 8 hours  chlorhexidine 0.12% Liquid 15 milliLiter(s) Oral Mucosa two times a day  chlorhexidine 4% Liquid 1 Application(s) Topical <User Schedule>  cyanocobalamin 1000 MICROGram(s) Oral daily  folic acid 1 milliGRAM(s) Enteral Tube daily  lacosamide Solution 100 milliGRAM(s) Oral two times a day  metroNIDAZOLE  IVPB      metroNIDAZOLE  IVPB 500 milliGRAM(s) IV Intermittent every 8 hours  norepinephrine Infusion 0.05 MICROgram(s)/kG/Min IV Continuous <Continuous>  pantoprazole   Suspension 40 milliGRAM(s) Enteral Tube daily  QUEtiapine 25 milliGRAM(s) Oral daily  risperiDONE   Solution 1 milliGRAM(s) Oral daily      PRN Meds:  acetaminophen    Suspension .. 650 milliGRAM(s) Oral every 6 hours PRN  metoclopramide Injectable 5 milliGRAM(s) IV Push every 8 hours PRN      LABS:                        7.6    3.99  )-----------( 352      ( 27 Dec 2018 03:30 )             24.5     Hgb Trend: 7.6<--, 8.0<--, 9.0<--, 8.8<--, 6.4<--  12-27    137  |  105  |  4<L>  ----------------------------<  108<H>  3.8   |  25  |  0.23<L>    Ca    8.0<L>      27 Dec 2018 03:30  Phos  2.6     12-27  Mg     1.9     12-27    TPro  5.7<L>  /  Alb  2.3<L>  /  TBili  1.1  /  DBili  x   /  AST  15  /  ALT  10  /  AlkPhos  156<H>  12-27    Creatinine Trend: 0.23<--, 0.27<--, 0.28<--, 0.32<--, 0.31<--, 0.26<--  PT/INR - ( 26 Dec 2018 03:30 )   PT: 22.7 SEC;   INR: 2.00              Arterial Blood Gas:  12-27 @ 03:00  7.43/40/86/27/97.3/2.3  ABG lactate: 1.4

## 2018-12-27 NOTE — PROGRESS NOTE ADULT - ATTENDING COMMENTS
Pt seen and examined with GI fellow. I agree with the above. Plan for repeat CT. Once this collection matures the pt should be a candidate for eus-guided drainage of the danay-pancreatic collection.

## 2018-12-27 NOTE — CHART NOTE - NSCHARTNOTEFT_GEN_A_CORE
Interventional Radiology Brief Consult Note    Contacted by MICU, history obtained from resident and chart review.     52 y/o M w/ a PMHx of TBI, s/p PEG tube, contracture, and seizure d/o transfered from [facility] on 12/9 for respiratory failure 2/2 ARDS likely 2/2 necrotizing pancreatitis s/p intubation. Patient had an acute drop in Hg and hematochozia, CT a/p showed blush in the colon. The patient is receiving one unit PRBC, no repeat CBC yet, and INR of 2.2. AC stopped 3-4 days ago.     - Transfuse as needed.   - Correct the INR, after the INR is 1.5 or less, if at that point the patient does not respond to transfusion will consider embolization.   - Case discussed with IR chuck Coyne Interventional Radiology Brief Consult Note    Contacted by MICU, history obtained from resident and chart review.     52 y/o M w/ a PMHx of TBI, s/p PEG tube, contracture, and seizure d/o transfered from [facility] on 12/9 for respiratory failure 2/2 ARDS likely 2/2 necrotizing pancreatitis s/p intubation. Patient had an acute drop in Hg and hematochozia, CT a/p showed blush in the colon. The patient is receiving one unit PRBC, no repeat CBC yet, and INR of 2.2. AC stopped 3-4 days ago.     - Transfuse as needed.   - Correct the INR, after the INR is 1.5 or less, if after that point the patient does not respond to transfusion will consider embolization.   - Case discussed with IR chuck Coyne Interventional Radiology Brief Consult Note    Contacted by MICU, history obtained from resident and chart review.     52 y/o M w/ a PMHx of TBI, s/p PEG tube, contracture, and seizure d/o transferred from [facility] on 12/9 for respiratory failure 2/2 ARDS likely 2/2 necrotizing pancreatitis s/p intubation. Patient had an acute drop in Hg and hematochozia, CT a/p showed blush in the colon. The patient is receiving one unit PRBC, no repeat CBC yet, and INR of 2.2. AC stopped 3-4 days ago.     - Transfuse PRBCs, factors and platelets, as needed.   - Correct the INR, after the INR is 1.5 or less, if after that point the patient does not respond to transfusion, will consider embolization.   - Case discussed with IR chuck Coyne

## 2018-12-27 NOTE — PROGRESS NOTE ADULT - ASSESSMENT
Impression  - Hematochezia, Ddx: rectal ulcer, diverticulosis, colon cancer, large colonic polyps, angioectasia vs brisk UGI bleeding   - Enlarging danay-pancreatic collection with pancreatitis  - Septic, distributive shock.  acinetobacter PNA and pseudomonas in bronch.  - Seizure disorder    Recommendations    - trend CBC, CMP and fever curve  - repeat CT 12.25 with pancreatic necrosis, but no wall formed yet, thus patient is currently not a candidate for necrosectomy/danay-pancreatic fluid drainage with cystgastrostomy   - will recommend a repeat CT in 5 days to evaluate the pancreatic lesion  - will plan for EGD and colonoscopy tomorrow   - rest of care per primary team      Isa Mike, PGY-5  GI fellow  B- [id number]/ [phone]  Please call GI fellow on call after 5pm and on weekends Impression  - Hematochezia, Ddx: rectal ulcer, diverticulosis, colon cancer, large colonic polyps, angioectasia vs brisk UGI bleeding   - Enlarging danay-pancreatic collection with pancreatitis  - Septic, distributive shock.  acinetobacter PNA and pseudomonas in bronch.  - Seizure disorder    Recommendations    - trend CBC, CMP and fever curve  - repeat CT 12.25 with pancreatic necrosis, but no wall formed yet, thus patient is currently not a candidate for necrosectomy/danay-pancreatic fluid drainage with cystgastrostomy   - will recommend a repeat CT in 5 days to evaluate the pancreatic lesion  - will plan for EGD and colonoscopy tomorrow   - npo after midnight   - moviprep in pm tonight (ordered by GI fellow)  - rest of care per primary team      Isa Mike, PGY-5  GI fellow  B- [id number]/ [phone]  Please call GI fellow on call after 5pm and on weekends
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
## 2018-12-28 LAB
ALBUMIN SERPL ELPH-MCNC: 2.4 G/DL — LOW (ref 3.3–5)
ALP SERPL-CCNC: 153 U/L — HIGH (ref 40–120)
ALT FLD-CCNC: 10 U/L — SIGNIFICANT CHANGE UP (ref 4–41)
AST SERPL-CCNC: 13 U/L — SIGNIFICANT CHANGE UP (ref 4–40)
BACTERIA BLD CULT: SIGNIFICANT CHANGE UP
BACTERIA BLD CULT: SIGNIFICANT CHANGE UP
BILIRUB SERPL-MCNC: 1.6 MG/DL — HIGH (ref 0.2–1.2)
BUN SERPL-MCNC: 5 MG/DL — LOW (ref 7–23)
CALCIUM SERPL-MCNC: 7.7 MG/DL — LOW (ref 8.4–10.5)
CHLORIDE SERPL-SCNC: 99 MMOL/L — SIGNIFICANT CHANGE UP (ref 98–107)
CO2 SERPL-SCNC: 23 MMOL/L — SIGNIFICANT CHANGE UP (ref 22–31)
CREAT SERPL-MCNC: 0.31 MG/DL — LOW (ref 0.5–1.3)
GLUCOSE BLDC GLUCOMTR-MCNC: 124 MG/DL — HIGH (ref 70–99)
GLUCOSE BLDC GLUCOMTR-MCNC: 141 MG/DL — HIGH (ref 70–99)
GLUCOSE SERPL-MCNC: 298 MG/DL — HIGH (ref 70–99)
HBA1C BLD-MCNC: 4.6 % — SIGNIFICANT CHANGE UP (ref 4–5.6)
HCT VFR BLD CALC: 23.4 % — LOW (ref 39–50)
HCT VFR BLD CALC: 24.2 % — LOW (ref 39–50)
HCT VFR BLD CALC: 28.2 % — LOW (ref 39–50)
HGB BLD-MCNC: 7.7 G/DL — LOW (ref 13–17)
HGB BLD-MCNC: 7.7 G/DL — LOW (ref 13–17)
HGB BLD-MCNC: 8.9 G/DL — LOW (ref 13–17)
INR BLD: 1.86 — HIGH (ref 0.88–1.17)
INR BLD: 1.92 — HIGH (ref 0.88–1.17)
MAGNESIUM SERPL-MCNC: 1.8 MG/DL — SIGNIFICANT CHANGE UP (ref 1.6–2.6)
MCHC RBC-ENTMCNC: 30.9 PG — SIGNIFICANT CHANGE UP (ref 27–34)
MCHC RBC-ENTMCNC: 31.6 % — LOW (ref 32–36)
MCHC RBC-ENTMCNC: 31.8 % — LOW (ref 32–36)
MCHC RBC-ENTMCNC: 32.4 PG — SIGNIFICANT CHANGE UP (ref 27–34)
MCHC RBC-ENTMCNC: 32.8 PG — SIGNIFICANT CHANGE UP (ref 27–34)
MCHC RBC-ENTMCNC: 32.9 % — SIGNIFICANT CHANGE UP (ref 32–36)
MCV RBC AUTO: 102.5 FL — HIGH (ref 80–100)
MCV RBC AUTO: 103 FL — HIGH (ref 80–100)
MCV RBC AUTO: 94 FL — SIGNIFICANT CHANGE UP (ref 80–100)
NRBC # FLD: 0.08 — SIGNIFICANT CHANGE UP
NRBC # FLD: 0.11 — SIGNIFICANT CHANGE UP
NRBC # FLD: 0.15 — SIGNIFICANT CHANGE UP
NRBC FLD-RTO: 1.6 — SIGNIFICANT CHANGE UP
NRBC FLD-RTO: 2.1 — SIGNIFICANT CHANGE UP
NRBC FLD-RTO: 3.6 — SIGNIFICANT CHANGE UP
PHOSPHATE SERPL-MCNC: 3.1 MG/DL — SIGNIFICANT CHANGE UP (ref 2.5–4.5)
PLATELET # BLD AUTO: 400 K/UL — SIGNIFICANT CHANGE UP (ref 150–400)
PLATELET # BLD AUTO: 444 K/UL — HIGH (ref 150–400)
PLATELET # BLD AUTO: 525 K/UL — HIGH (ref 150–400)
PMV BLD: 10.2 FL — SIGNIFICANT CHANGE UP (ref 7–13)
PMV BLD: 10.5 FL — SIGNIFICANT CHANGE UP (ref 7–13)
PMV BLD: 10.5 FL — SIGNIFICANT CHANGE UP (ref 7–13)
POTASSIUM SERPL-MCNC: 4 MMOL/L — SIGNIFICANT CHANGE UP (ref 3.5–5.3)
POTASSIUM SERPL-SCNC: 4 MMOL/L — SIGNIFICANT CHANGE UP (ref 3.5–5.3)
PROT SERPL-MCNC: 6.3 G/DL — SIGNIFICANT CHANGE UP (ref 6–8.3)
PROTHROM AB SERPL-ACNC: 21 SEC — HIGH (ref 9.8–13.1)
PROTHROM AB SERPL-ACNC: 21.8 SEC — HIGH (ref 9.8–13.1)
RBC # BLD: 2.35 M/UL — LOW (ref 4.2–5.8)
RBC # BLD: 2.49 M/UL — LOW (ref 4.2–5.8)
RBC # BLD: 2.75 M/UL — LOW (ref 4.2–5.8)
RBC # FLD: 22.4 % — HIGH (ref 10.3–14.5)
RBC # FLD: 22.9 % — HIGH (ref 10.3–14.5)
RBC # FLD: 23.8 % — HIGH (ref 10.3–14.5)
SODIUM SERPL-SCNC: 135 MMOL/L — SIGNIFICANT CHANGE UP (ref 135–145)
SPECIMEN SOURCE: SIGNIFICANT CHANGE UP
SPECIMEN SOURCE: SIGNIFICANT CHANGE UP
WBC # BLD: 4.2 K/UL — SIGNIFICANT CHANGE UP (ref 3.8–10.5)
WBC # BLD: 5.02 K/UL — SIGNIFICANT CHANGE UP (ref 3.8–10.5)
WBC # BLD: 5.17 K/UL — SIGNIFICANT CHANGE UP (ref 3.8–10.5)
WBC # FLD AUTO: 4.2 K/UL — SIGNIFICANT CHANGE UP (ref 3.8–10.5)
WBC # FLD AUTO: 5.02 K/UL — SIGNIFICANT CHANGE UP (ref 3.8–10.5)
WBC # FLD AUTO: 5.17 K/UL — SIGNIFICANT CHANGE UP (ref 3.8–10.5)

## 2018-12-28 PROCEDURE — 45380 COLONOSCOPY AND BIOPSY: CPT | Mod: GC

## 2018-12-28 PROCEDURE — 88305 TISSUE EXAM BY PATHOLOGIST: CPT | Mod: 26

## 2018-12-28 PROCEDURE — 99291 CRITICAL CARE FIRST HOUR: CPT

## 2018-12-28 PROCEDURE — 99233 SBSQ HOSP IP/OBS HIGH 50: CPT

## 2018-12-28 RX ORDER — IMIPENEM AND CILASTATIN 250; 250 MG/100ML; MG/100ML
500 INJECTION, POWDER, FOR SOLUTION INTRAVENOUS EVERY 6 HOURS
Qty: 0 | Refills: 0 | Status: DISCONTINUED | OUTPATIENT
Start: 2018-12-28 | End: 2019-01-09

## 2018-12-28 RX ORDER — GLUCAGON INJECTION, SOLUTION 0.5 MG/.1ML
1 INJECTION, SOLUTION SUBCUTANEOUS ONCE
Qty: 0 | Refills: 0 | Status: DISCONTINUED | OUTPATIENT
Start: 2018-12-28 | End: 2019-03-07

## 2018-12-28 RX ORDER — DEXTROSE 50 % IN WATER 50 %
15 SYRINGE (ML) INTRAVENOUS ONCE
Qty: 0 | Refills: 0 | Status: DISCONTINUED | OUTPATIENT
Start: 2018-12-28 | End: 2019-03-07

## 2018-12-28 RX ORDER — PROPOFOL 10 MG/ML
20 INJECTION, EMULSION INTRAVENOUS
Qty: 1000 | Refills: 0 | Status: DISCONTINUED | OUTPATIENT
Start: 2018-12-28 | End: 2018-12-29

## 2018-12-28 RX ORDER — INSULIN LISPRO 100/ML
VIAL (ML) SUBCUTANEOUS AT BEDTIME
Qty: 0 | Refills: 0 | Status: DISCONTINUED | OUTPATIENT
Start: 2018-12-28 | End: 2018-12-28

## 2018-12-28 RX ORDER — INSULIN LISPRO 100/ML
VIAL (ML) SUBCUTANEOUS
Qty: 0 | Refills: 0 | Status: DISCONTINUED | OUTPATIENT
Start: 2018-12-28 | End: 2018-12-28

## 2018-12-28 RX ORDER — DEXTROSE 50 % IN WATER 50 %
25 SYRINGE (ML) INTRAVENOUS ONCE
Qty: 0 | Refills: 0 | Status: DISCONTINUED | OUTPATIENT
Start: 2018-12-28 | End: 2019-03-07

## 2018-12-28 RX ORDER — DEXTROSE 50 % IN WATER 50 %
12.5 SYRINGE (ML) INTRAVENOUS ONCE
Qty: 0 | Refills: 0 | Status: DISCONTINUED | OUTPATIENT
Start: 2018-12-28 | End: 2019-03-07

## 2018-12-28 RX ORDER — SODIUM CHLORIDE 9 MG/ML
1000 INJECTION, SOLUTION INTRAVENOUS
Qty: 0 | Refills: 0 | Status: DISCONTINUED | OUTPATIENT
Start: 2018-12-28 | End: 2019-03-07

## 2018-12-28 RX ORDER — INSULIN LISPRO 100/ML
VIAL (ML) SUBCUTANEOUS EVERY 6 HOURS
Qty: 0 | Refills: 0 | Status: DISCONTINUED | OUTPATIENT
Start: 2018-12-28 | End: 2019-03-07

## 2018-12-28 RX ADMIN — LACOSAMIDE 100 MILLIGRAM(S): 50 TABLET ORAL at 17:14

## 2018-12-28 RX ADMIN — Medication 1000 MILLILITER(S): at 08:04

## 2018-12-28 RX ADMIN — LACOSAMIDE 100 MILLIGRAM(S): 50 TABLET ORAL at 05:18

## 2018-12-28 RX ADMIN — Medication 1 MILLIGRAM(S): at 12:40

## 2018-12-28 RX ADMIN — Medication 100 MILLIGRAM(S): at 05:18

## 2018-12-28 RX ADMIN — IMIPENEM AND CILASTATIN 100 MILLIGRAM(S): 250; 250 INJECTION, POWDER, FOR SOLUTION INTRAVENOUS at 12:43

## 2018-12-28 RX ADMIN — QUETIAPINE FUMARATE 25 MILLIGRAM(S): 200 TABLET, FILM COATED ORAL at 12:40

## 2018-12-28 RX ADMIN — Medication 650 MILLIGRAM(S): at 09:15

## 2018-12-28 RX ADMIN — Medication 650 MILLIGRAM(S): at 08:42

## 2018-12-28 RX ADMIN — PREGABALIN 1000 MICROGRAM(S): 225 CAPSULE ORAL at 12:42

## 2018-12-28 RX ADMIN — Medication 1000 MILLILITER(S): at 04:12

## 2018-12-28 RX ADMIN — CHLORHEXIDINE GLUCONATE 15 MILLILITER(S): 213 SOLUTION TOPICAL at 05:18

## 2018-12-28 RX ADMIN — CHLORHEXIDINE GLUCONATE 15 MILLILITER(S): 213 SOLUTION TOPICAL at 17:13

## 2018-12-28 RX ADMIN — RISPERIDONE 1 MILLIGRAM(S): 4 TABLET ORAL at 12:43

## 2018-12-28 RX ADMIN — Medication 9.35 MICROGRAM(S)/KG/MIN: at 20:22

## 2018-12-28 RX ADMIN — PANTOPRAZOLE SODIUM 40 MILLIGRAM(S): 20 TABLET, DELAYED RELEASE ORAL at 05:18

## 2018-12-28 RX ADMIN — IMIPENEM AND CILASTATIN 100 MILLIGRAM(S): 250; 250 INJECTION, POWDER, FOR SOLUTION INTRAVENOUS at 17:13

## 2018-12-28 RX ADMIN — CHLORHEXIDINE GLUCONATE 1 APPLICATION(S): 213 SOLUTION TOPICAL at 12:43
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
## 2018-12-28 NOTE — PROGRESS NOTE ADULT - SUBJECTIVE AND OBJECTIVE BOX
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 650 milliGRAM(s) Oral every 6 hours PRN  chlorhexidine 0.12% Liquid 15 milliLiter(s) Oral Mucosa two times a day  chlorhexidine 4% Liquid 1 Application(s) Topical <User Schedule>  cyanocobalamin 1000 MICROGram(s) Oral daily  dextrose 40% Gel 15 Gram(s) Oral once PRN  dextrose 5%. 1000 milliLiter(s) IV Continuous <Continuous>  dextrose 50% Injectable 12.5 Gram(s) IV Push once  dextrose 50% Injectable 25 Gram(s) IV Push once  dextrose 50% Injectable 25 Gram(s) IV Push once  folic acid 1 milliGRAM(s) Enteral Tube daily  glucagon  Injectable 1 milliGRAM(s) IntraMuscular once PRN  insulin lispro (HumaLOG) corrective regimen sliding scale   SubCutaneous three times a day before meals  insulin lispro (HumaLOG) corrective regimen sliding scale   SubCutaneous at bedtime  lacosamide Solution 100 milliGRAM(s) Oral two times a day  norepinephrine Infusion 0.05 MICROgram(s)/kG/Min IV Continuous <Continuous>  phentolamine Injectable 5 milliGRAM(s) SubCutaneous once  propofol Infusion 20 MICROgram(s)/kG/Min IV Continuous <Continuous>  QUEtiapine 25 milliGRAM(s) Oral daily  risperiDONE   Solution 1 milliGRAM(s) Oral daily      Vital Signs Last 24 Hrs  T(C): 37.3 (28 Dec 2018 12:00), Max: 38.2 (28 Dec 2018 08:00)  T(F): 99.2 (28 Dec 2018 12:00), Max: 100.7 (28 Dec 2018 08:00)  HR: 118 (28 Dec 2018 13:00) (100 - 133)  BP: 105/61 (28 Dec 2018 13:00) (80/41 - 125/75)  BP(mean): 70 (28 Dec 2018 13:00) (49 - 102)  RR: 21 (28 Dec 2018 13:00) (17 - 42)  SpO2: 96% (28 Dec 2018 13:00) (93% - 100%)    Physical Exam:  General:    NAD,  non toxic  Head: atraumatic, normocephalic  Eye: normal sclera and conjunctiva  ENT:    ET tube,  no LAD,   neck supple  Cardio:     regular S1, S2,  no murmur  Respiratory:    clear b/l,    no wheezing, still thick secretions  abd:     soft,   BS +,   no tenderness, PEG with no erythema or drainage  :   no CVAT,  no suprapubic tenderness,  +  tavares  Musculoskeletal:   no joint swelling,   no edema  vascular: no lines, normal pulses  Skin:    no rash  Neurologic:    unable to assess as pt intubated                          7.7    5.17  )-----------( 444      ( 28 Dec 2018 08:30 )             24.2       12-28    135  |  99  |  5<L>  ----------------------------<  298<H>  4.0   |  23  |  0.31<L>    Ca    7.7<L>      28 Dec 2018 01:00  Phos  3.1     12-28  Mg     1.8     12-28    TPro  6.3  /  Alb  2.4<L>  /  TBili  1.6<H>  /  DBili  x   /  AST  13  /  ALT  10  /  AlkPhos  153<H>  12-28          MICROBIOLOGY:  v  BLOOD VENOUS  12-24-18 --  --  --      BLOOD PERIPHERAL  12-24-18 --  --  --      BLOOD PERIPHERAL  12-23-18 --  --  --      BLOOD VENOUS  12-22-18 --  --  BLOOD CULTURE PCR  Staphylococcus sp.,coag neg      SPUTUM  12-18-18 --  --  Acinetobacter baumannii       BLOOD PERIPHERAL  12-18-18 --  --  --      BRONCHIAL LAVAGE  12-12-18 --  --  Pseudomonas aeruginosa      URINE CATHETER  12-11-18 --  --  --      BLOOD PERIPHERAL  12-11-18 --  --  --                RADIOLOGY:  Images below reviewed personally  < from: CT Angio Abdomen and Pelvis w/ IV Cont (12.27.18 @ 16:00) >  IMPRESSION:     Focal site of active site of bleeding in the distal transverse colon.     Fluid-filled colon with stable nonspecific mural thickening of the   descending colon may represent colitis.    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 No interval development of foci of gas or   thick peripheral wall to suggest infection of the collections. Unchanged   thrombosed splenic vein.    Moderate bilateral pleural effusions with adjacent atelectasis. Patchy   opacities in the lingula and right middle lobe are stable and may be   infectious in etiology.

## 2018-12-28 NOTE — CHART NOTE - NSCHARTNOTEFT_GEN_A_CORE
NUTRITION FOLLOW-UP:  Pt. NPO pending EGD/Colonoscopy.  Prior to NPO status, was reportedly tolerating enteral feeding regimen.  No noted/stated vomiting/diarrhea/constipation at this time.  Significant weight decrease since admission (18.6%) is likely mostly fluid related.  Given frequently elevated glucose values, would obtain HbA1c.      Weight: 86kg on 12/28/18              93.7kg on 12/21/18              105.7kg on 12/14/18      Edema:  1+ generalized, 2+ scrotum    Skin:  No noted pressure injuries.    Pertinent Medications: MEDICATIONS  (STANDING):  cefTAZidime/avibactam IVPB 2.5 Gram(s) IV Intermittent every 8 hours  chlorhexidine 0.12% Liquid 15 milliLiter(s) Oral Mucosa two times a day  chlorhexidine 4% Liquid 1 Application(s) Topical <User Schedule>  cyanocobalamin 1000 MICROGram(s) Oral daily  folic acid 1 milliGRAM(s) Enteral Tube daily  lacosamide Solution 100 milliGRAM(s) Oral two times a day  metroNIDAZOLE  IVPB      metroNIDAZOLE  IVPB 500 milliGRAM(s) IV Intermittent every 8 hours  norepinephrine Infusion 0.05 MICROgram(s)/kG/Min (9.347 mL/Hr) IV Continuous <Continuous>  pantoprazole  Injectable 40 milliGRAM(s) IV Push every 12 hours  phentolamine Injectable 5 milliGRAM(s) SubCutaneous once  QUEtiapine 25 milliGRAM(s) Oral daily  risperiDONE   Solution 1 milliGRAM(s) Oral daily    MEDICATIONS  (PRN):  acetaminophen    Suspension .. 650 milliGRAM(s) Oral every 6 hours PRN Temp greater or equal to 38C (100.4F), Mild Pain (1 - 3), Moderate Pain (4 - 6)    Pertinent Labs:  12-28 Na135 mmol/L Glu 298 mg/dL<H> K+ 4.0 mmol/L Cr  0.31 mg/dL<L> BUN 5 mg/dL<L> 12-28 Phos 3.1 mg/dL 12-28 Alb 2.4 g/dL<L> 12-13 Chol --    LDL --    HDL --    Trig 337 mg/dL<H>          Current Diet:  NPO past midnight, except medications        PLAN/RECOMMENDATIONS:    1) If/when medically appropriate, resume enteral feedings of Jevity 1.2 via PEG @ 30mL/hr then increase by 10mL q 4hrs as tolerated to goal of 53mL/hr x 24hrs  + NoCarb Prosource 1 packet (30mL) daily              [provides 1526 Kcals & 85g protein, daily]  2) Obtain daily weights  3) Obtain HbA1c (depending on value, would consider changing enteral formula to Glucerna 1.2)  4) RDN remains available and will f/u PRN.          Amparo Pugh, FEDE, CDN pager [phone]

## 2018-12-28 NOTE — PROGRESS NOTE ADULT - SUBJECTIVE AND OBJECTIVE BOX
SUBJECTIVE / OVERNIGHT EVENTS: CT angio abdomen showed active bleeding in transverse colon. Hgb dropped to 6.5, patient received 1 unit of pRBC's and responded appropriately to 8.9. Surgery was called and deferred to IR. IR will not intervene unless INR <1.5. Patient seen and examined at bedside this morning. Patient planned for EGD/Colonoscopy today.     ROS: unable to obtain    OBJECTIVE:  ICU Vital Signs Last 24 Hrs  T(C): 37.1 (28 Dec 2018 04:00), Max: 38.1 (27 Dec 2018 16:00)  T(F): 98.8 (28 Dec 2018 04:00), Max: 100.6 (27 Dec 2018 16:00)  HR: 126 (28 Dec 2018 07:00) (100 - 129)  BP: 111/68 (28 Dec 2018 07:00) (80/41 - 125/75)  BP(mean): 76 (28 Dec 2018 07:00) (49 - 102)  ABP: --  ABP(mean): --  RR: 17 (28 Dec 2018 07:00) (17 - 39)  SpO2: 96% (28 Dec 2018 07:00) (93% - 100%)    Mode: AC/ CMV (Assist Control/ Continuous Mandatory Ventilation), RR (machine): 18, TV (machine): 380, FiO2: 40, PEEP: 5, MAP: 10, PIP: 26    12-27 @ 07:01  -  12-28 @ 07:00  --------------------------------------------------------  IN: 1531.7 mL / OUT: 1455 mL / NET: 76.7 mL        PHYSICAL EXAM:  GENERAL: Intubated  HEAD:  Atraumatic, Normocephalic  EYES: EOMI, PERRLA, conjunctiva and sclera clear  NECK: Supple, No JVD  CHEST/LUNG: vent sounds  HEART: Regular rate and rhythm; No murmurs, rubs, or gallops  ABDOMEN: Soft, Nontender, Nondistended; Bowel sounds present  EXTREMITIES:  2+ LE swelling  NEURO: contracted.  Pt awake, following commands. Able to nod yes or no to questions    HOSPITAL MEDICATIONS:  Standing Meds:  cefTAZidime/avibactam IVPB 2.5 Gram(s) IV Intermittent every 8 hours  chlorhexidine 0.12% Liquid 15 milliLiter(s) Oral Mucosa two times a day  chlorhexidine 4% Liquid 1 Application(s) Topical <User Schedule>  cyanocobalamin 1000 MICROGram(s) Oral daily  folic acid 1 milliGRAM(s) Enteral Tube daily  lacosamide Solution 100 milliGRAM(s) Oral two times a day  metroNIDAZOLE  IVPB      metroNIDAZOLE  IVPB 500 milliGRAM(s) IV Intermittent every 8 hours  norepinephrine Infusion 0.05 MICROgram(s)/kG/Min IV Continuous <Continuous>  pantoprazole  Injectable 40 milliGRAM(s) IV Push every 12 hours  phentolamine Injectable 5 milliGRAM(s) SubCutaneous once  polyethylene glycol/electrolyte Solution 1000 milliLiter(s) Oral every 2 hours  QUEtiapine 25 milliGRAM(s) Oral daily  risperiDONE   Solution 1 milliGRAM(s) Oral daily      PRN Meds:  acetaminophen    Suspension .. 650 milliGRAM(s) Oral every 6 hours PRN  metoclopramide Injectable 5 milliGRAM(s) IV Push every 8 hours PRN      LABS:                        8.9    5.02  )-----------( 525      ( 28 Dec 2018 01:00 )             28.2     Hgb Trend: 8.9<--, 6.5<--, 6.7<--, 7.5<--, 7.6<--  12-28    135  |  99  |  5<L>  ----------------------------<  298<H>  4.0   |  23  |  0.31<L>    Ca    7.7<L>      28 Dec 2018 01:00  Phos  3.1     12-28  Mg     1.8     12-28    TPro  6.3  /  Alb  2.4<L>  /  TBili  1.6<H>  /  DBili  x   /  AST  13  /  ALT  10  /  AlkPhos  153<H>  12-28    Creatinine Trend: 0.31<--, 0.23<--, 0.27<--, 0.28<--, 0.32<--, 0.31<--  PT/INR - ( 28 Dec 2018 01:00 )   PT: 21.0 SEC;   INR: 1.86          PTT - ( 27 Dec 2018 19:02 )  PTT:36.7 SEC    Arterial Blood Gas:  12-27 @ 03:00  7.43/40/86/27/97.3/2.3  ABG lactate: 1.4        MICROBIOLOGY:     Culture - Blood (12.24.18 @ 01:17)    Culture - Blood:   NO ORGANISMS ISOLATED  NO ORGANISMS ISOLATED AT 96 HOURS    Specimen Source: BLOOD VENOUS        RADIOLOGY:  [x ] Reviewed and interpreted by me    < from: CT Angio Abdomen and Pelvis w/ IV Cont (12.27.18 @ 16:00) >  IMPRESSION:     Focal site of active site of bleeding in the distal transverse colon.     Fluid-filled colon with stable nonspecific mural thickening of the   descending colon may represent colitis.    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 No interval development of foci of gas or   thick peripheral wall to suggest infection of the collections. Unchanged   thrombosed splenic vein.    Moderate bilateral pleural effusions with adjacent atelectasis. Patchy   opacities in the lingula and right middle lobe are stable and may be   infectious in etiology.    < end of copied text >

## 2018-12-28 NOTE — PROGRESS NOTE ADULT - ASSESSMENT
54 y/o M (resident of [facility]) w/ a PMHx of TBI, s/p PEG tube, contracture, and seizure d/o who presented as a transfer from [facility] on 12/9 for respiratory failure 2/2 ARDS likely 2/2 necrotizing pancreatitis s/p intubation, course complicated by GI bleed.     # Neuro  - Off sedation. Pt awake, following commands. Able to nod yes or no to questions.   - Patient was on Depakote at home for history of seizure disorder; however, this was discontinued because Depakote can potentially cause pancreatitis. vEEGs have shown no epileptiform abnormalities.   - Neurology consulted for assistance with seizure management. Patient was subsequently started on Vimpat. C/w Vimpat as per Neuro recs.    # CV   - Patient currently off Norepinephrine gtt , continue to monitor BP closely    # Resp:   - Patient currently intubated 2/2 moderate ARDS 2/2 necrotizing pancreatitis. Previously on VDR vent, now on conventional vent.  - Will closely monitor respiratory status  - CPAP again for resp function recovery.    # GI  #Pancreatitis   - Patient found to have necrotizing pancreatitis. Unclear etiology for pancreatitis at this time. Patient is a resident of NH and with no significant alcohol history. OSH CT A+P showed gallbladder pathology. Repeat CT with normal gallbladder wall and duct. TG was found to be elevated in the hospital, though patient was on a Propofol gtt at the time and it has been downtrending since the Propofol was discontinued.  - CT scan 12/19: demonstrates pancreatitis unchanged but concern for enlarging danay-pancreatic fluid collection. Surgery following, no acute surgical intervention.   - GI following: repeat CT 12/25 with pancreatic necrosis, but no wall formed yet, thus patient is currently not a candidate for necrosectomy/danay-pancreatic fluid drainage with cystgastrostomy. Will recommend a repeat CT in 5 days to evaluate the pancreatic lesion.  - switched to avycaz and flagyl for necrotizing pancreatitis as per ID, day 5 (will give 7 days total and switch back to Imipenem as per ID)    #GI bleed:  - Patient with bloody bowel movements on 12/27. CT angio with active bleeding in transverse colon. Surgery deferred to IR, who will not interven unless INR<1.5, vitamin K given overnight, will f/u repeat INR.   - Plan for EGD/colonoscopy today    - Hgb 6.5 yesterday, s/p 1 unit of pRBC's, responded appropriately to 8.9, CBC q 6 hrs      # /Renal   - C/w Ahuja for now    # ID   - BAL on 12/12 was positive for pseudomonas aeruginosa. Repeat sputum cx w/ Acinetobacter resistant to carbapenems. Switched to avycaz and flagyl, day 7, will switch back to Imipenem tomorrow  as per ID.   - Source likely PNA, pancreatitis and peripancreatic fluid collection. Blood cx growing coag neg staph likely contaminant. Repeat cultures 12/24 NGTD .    - Spiked a fever yesterday, recultured  - Received one dose of Vanco 12/18 and one dose on 12/24 as with low grade fever, would hold off on further vanc at this time - Patient s/p antiviral regimen (Valtrex followed by acyclovir) for shingles   - Monitor CBC and fever curve    # Heme:  - GI bleed as above with active bleeding on CT angio  - Hgb 6.5 yesterday, s/p 1 unit overnight, responded appropriately to 8.9    - continue to monitor CBC q 6hrs    # Endo  - No active issues (serum glucose WNL)    # DVT PPx:   - SCD's due to anemia 52 y/o M (resident of [facility]) w/ a PMHx of TBI, s/p PEG tube, contracture, and seizure d/o who presented as a transfer from [facility] on 12/9 for respiratory failure 2/2 ARDS likely 2/2 necrotizing pancreatitis s/p intubation, course complicated by GI bleed.     # Neuro  - Off sedation. Pt awake, following commands. Able to nod yes or no to questions.   - Patient was on Depakote at home for history of seizure disorder; however, this was discontinued because Depakote can potentially cause pancreatitis. vEEGs have shown no epileptiform abnormalities.   - Neurology consulted for assistance with seizure management. Patient was subsequently started on Vimpat. C/w Vimpat as per Neuro recs.    # CV   - Patient currently off Norepinephrine gtt , continue to monitor BP closely    # Resp:   - Patient currently intubated 2/2 moderate ARDS 2/2 necrotizing pancreatitis. Previously on VDR vent, now on conventional vent.  - Will closely monitor respiratory status  - CPAP for resp function recovery, will likely need Trach    # GI  #Pancreatitis   - Patient found to have necrotizing pancreatitis. Unclear etiology for pancreatitis at this time. Patient is a resident of NH and with no significant alcohol history. OSH CT A+P showed gallbladder pathology. Repeat CT with normal gallbladder wall and duct. TG was found to be elevated in the hospital, though patient was on a Propofol gtt at the time and it has been downtrending since the Propofol was discontinued.  - CT scan 12/19: demonstrates pancreatitis unchanged but concern for enlarging danay-pancreatic fluid collection. Surgery following, no acute surgical intervention.   - repeat CT 12/25 with pancreatic necrosis, but no wall formed yet, as per GI, patient is currently not a candidate for necrosectomy/danay-pancreatic fluid drainage with cystgastrostomy. Will recommend a repeat CT in 5 days to evaluate the pancreatic lesion.  - switched to avycaz and flagyl for necrotizing pancreatitis as per ID, day 7, will switch to Imipenem tomorrow as per ID    #GI bleed:  - Patient with bloody bowel movements on 12/27. CT angio with active bleeding in transverse colon. Surgery deferred to IR, who will consider emobolization if INR<1.5 and patient not responding to transfusions, vitamin K given overnight, will f/u repeat INR.   - Plan for EGD/colonoscopy today    - Hgb 6.5 yesterday, s/p 1 unit of pRBC's, responded appropriately to Hgb 8.9, CBC q 6 hrs      # /Renal   - C/w Ahuja for now    # ID   - BAL on 12/12 was positive for pseudomonas aeruginosa. Repeat sputum cx w/ Acinetobacter resistant to carbapenems. Switched to avycaz and flagyl, day 7, will switch back to Imipenem tomorrow  as per ID.   - Source likely PNA, pancreatitis and peripancreatic fluid collection. Blood cx growing coag neg staph likely contaminant. Repeat cultures 12/24 NGTD .    - Spiked a fever 12/27m recultured  - Received one dose of Vanco 12/18 and one dose on 12/24 as with low grade fever, would hold off on further vanc at this time - Patient s/p antiviral regimen (Valtrex followed by acyclovir) for shingles   - Monitor CBC and fever curve    # Heme:  - GI bleed as above with active bleeding on CT angio  - Hgb 6.5 yesterday, s/p 1 unit overnight, responded appropriately to 8.9    - continue to monitor CBC q 6hrs    # Endo  - No active issues (serum glucose WNL)    # DVT PPx:   - SCD's due to anemia 52 y/o M (resident of [facility]) w/ a PMHx of TBI, s/p PEG tube, contracture, and seizure d/o who presented as a transfer from [facility] on 12/9 for respiratory failure 2/2 ARDS likely 2/2 necrotizing pancreatitis s/p intubation, course complicated by GI bleed.     # Neuro  - Off sedation. Pt awake, following commands. Able to nod yes or no to questions.   - Patient was on Depakote at home for history of seizure disorder; however, this was discontinued because Depakote can potentially cause pancreatitis. vEEGs have shown no epileptiform abnormalities.   - Neurology consulted for assistance with seizure management. Patient was subsequently started on Vimpat. C/w Vimpat as per Neuro recs.    # CV   - Patient currently off Norepinephrine gtt , continue to monitor BP closely    # Resp:   - Patient currently intubated 2/2 moderate ARDS 2/2 necrotizing pancreatitis. Previously on VDR vent, now on conventional vent.  - Will closely monitor respiratory status  - CPAP for resp function recovery, will likely need Trach    # GI  #Pancreatitis   - Patient found to have necrotizing pancreatitis. Unclear etiology for pancreatitis at this time. Patient is a resident of NH and with no significant alcohol history. OSH CT A+P showed gallbladder pathology. Repeat CT with normal gallbladder wall and duct. TG was found to be elevated in the hospital, though patient was on a Propofol gtt at the time and it has been downtrending since the Propofol was discontinued.  - CT scan 12/19: demonstrates pancreatitis unchanged but concern for enlarging danay-pancreatic fluid collection. Surgery following, no acute surgical intervention.   - repeat CT 12/25 with pancreatic necrosis, but no wall formed yet, as per GI, patient is currently not a candidate for necrosectomy/danay-pancreatic fluid drainage with cystgastrostomy. Will recommend a repeat CT in 5 days to evaluate the pancreatic lesion.  - switched to avycaz and flagyl for necrotizing pancreatitis as per ID, day 7, will switch to Imipenem tomorrow as per ID    #GI bleed:  - Patient with bloody bowel movements on 12/27. CT angio with active bleeding in transverse colon. Surgery deferred to IR, who will consider emobolization if INR<1.5 and patient not responding to transfusions, vitamin K given overnight, will f/u repeat INR.   - Plan for EGD/colonoscopy today    - Hgb 6.5 yesterday, s/p 1 unit of pRBC's, responded appropriately to Hgb 8.9, CBC q 6 hrs      # /Renal   - C/w Ahuja for now    # ID   - BAL on 12/12 was positive for pseudomonas aeruginosa. Repeat sputum cx w/ Acinetobacter resistant to carbapenems. Switched to avycaz and flagyl, day 7, will switch back to Imipenem tomorrow  as per ID.   - Source likely PNA, pancreatitis and peripancreatic fluid collection. Blood cx growing coag neg staph likely contaminant. Repeat cultures 12/24 NGTD .    - Spiked a fever 12/27m recultured  - Received one dose of Vanco 12/18 and one dose on 12/24 as with low grade fever, would hold off on further vanc at this time - Patient s/p antiviral regimen (Valtrex followed by acyclovir) for shingles   - Monitor CBC and fever curve    # Heme:  - GI bleed as above with active bleeding on CT angio  - Hgb 6.5 yesterday, s/p 1 unit overnight, responded appropriately to 8.9    - continue to monitor CBC q 6hrs    # Endo  - serum glucose elevated today, may be stress from GI bleed and infection  - will monitor FSG's     # DVT PPx:   - SCD's due to anemia 54 y/o M (resident of [facility]) w/ a PMHx of TBI, s/p PEG tube, contracture, and seizure d/o who presented as a transfer from [facility] on 12/9 for respiratory failure 2/2 ARDS likely 2/2 necrotizing pancreatitis s/p intubation, course complicated by GI bleed.     # Neuro  - Off sedation. Pt awake, following commands. Able to nod yes or no to questions.   - Patient was on Depakote at home for history of seizure disorder; however, this was discontinued because Depakote can potentially cause pancreatitis. vEEGs have shown no epileptiform abnormalities.   - Neurology consulted for assistance with seizure management. Patient was subsequently started on Vimpat. C/w Vimpat as per Neuro recs.    # CV   - Patient currently off Norepinephrine gtt , continue to monitor BP closely    # Resp:   - Patient currently intubated 2/2 moderate ARDS 2/2 necrotizing pancreatitis. Previously on VDR vent, now on conventional vent.  - Will closely monitor respiratory status  - CPAP for resp function recovery, will likely need Trach    # GI  #Pancreatitis   - Patient found to have necrotizing pancreatitis. Unclear etiology for pancreatitis at this time. Patient is a resident of NH and with no significant alcohol history. OSH CT A+P showed gallbladder pathology. Repeat CT with normal gallbladder wall and duct. TG was found to be elevated in the hospital, though patient was on a Propofol gtt at the time and it has been downtrending since the Propofol was discontinued.  - CT scan 12/19: demonstrates pancreatitis unchanged but concern for enlarging danay-pancreatic fluid collection. Surgery following, no acute surgical intervention.   - repeat CT 12/25 with pancreatic necrosis, but no wall formed yet, as per GI, patient is currently not a candidate for necrosectomy/danay-pancreatic fluid drainage with cystgastrostomy. Will recommend a repeat CT in 5 days to evaluate the pancreatic lesion.  - switched to avycaz and flagyl for necrotizing pancreatitis as per ID, day 7, will switch to Imipenem tomorrow as per ID    #GI bleed:  - Patient with bloody bowel movements on 12/27. CT angio with active bleeding in transverse colon. Surgery deferred to IR, who will consider emobolization if INR<1.5 and patient not responding to transfusions, vitamin K given overnight, will f/u repeat INR.   - Plan for EGD/colonoscopy today    - Hgb 6.5 yesterday, s/p 1 unit of pRBC's, responded appropriately to Hgb 8.9, CBC q 6 hrs      # /Renal   - C/w Ahuja for now    # ID   - BAL on 12/12 was positive for pseudomonas aeruginosa. Repeat sputum cx w/ Acinetobacter resistant to carbapenems. Switched to avycaz and flagyl, day 7, will switch back to Imipenem tomorrow  as per ID.   - Source likely PNA, pancreatitis and peripancreatic fluid collection. Blood cx growing coag neg staph likely contaminant. Repeat cultures 12/24 NGTD .    - Spiked a fever 12/27m recultured  - Received one dose of Vanco 12/18 and one dose on 12/24 as with low grade fever, would hold off on further vanc at this time - Patient s/p antiviral regimen (Valtrex followed by acyclovir) for shingles   - Monitor CBC and fever curve    # Heme:  - GI bleed as above with active bleeding on CT angio  - Hgb 6.5 yesterday, s/p 1 unit overnight, responded appropriately to 8.9    - continue to monitor CBC q 6hrs    # Endo  - serum glucose elevated today, may be stress/sepsis related  - will monitor FSG's and start ISS, order A1C     # DVT PPx:   - SCD's due to anemia 52 y/o M (resident of [facility]) w/ a PMHx of TBI, s/p PEG tube, contracture, and seizure d/o who presented as a transfer from [facility] on 12/9 for respiratory failure 2/2 ARDS likely 2/2 necrotizing pancreatitis s/p intubation, course complicated by GI bleed.     # Neuro  - Off sedation. Pt awake, following commands. Able to nod yes or no to questions.   - Patient was on Depakote at home for history of seizure disorder; however, this was discontinued because Depakote can potentially cause pancreatitis. vEEGs have shown no epileptiform abnormalities.   - Neurology consulted for assistance with seizure management. Patient was subsequently started on Vimpat. C/w Vimpat as per Neuro recs.    # CV   - Patient currently off Norepinephrine gtt , continue to monitor BP closely    # Resp:   - Patient currently intubated 2/2 moderate ARDS 2/2 necrotizing pancreatitis. Previously on VDR vent, now on conventional vent.  - Will closely monitor respiratory status  - CPAP for resp function recovery, will likely need Trach    # GI  --Pancreatitis   - Patient found to have necrotizing pancreatitis. Unclear etiology for pancreatitis at this time. Patient is a resident of NH and with no significant alcohol history. OSH CT A+P showed gallbladder pathology. Repeat CT with normal gallbladder wall and duct. TG was found to be elevated in the hospital, though patient was on a Propofol gtt at the time and it has been downtrending since the Propofol was discontinued.  - CT scan 12/19: demonstrates pancreatitis unchanged but concern for enlarging danay-pancreatic fluid collection. Surgery following, no acute surgical intervention.   - repeat CT 12/25 with pancreatic necrosis, but no wall formed yet, as per GI, patient is currently not a candidate for necrosectomy/danay-pancreatic fluid drainage with cystgastrostomy. Will recommend a repeat CT in 5 days to evaluate the pancreatic lesion.  - switched to avycaz and flagyl for necrotizing pancreatitis as per ID, day 7, will switch to Imipenem tomorrow as per ID    --GI bleed:  - Patient with bloody bowel movements on 12/27. CT angio with active bleeding in transverse colon. Surgery deferred to IR, who will consider emobolization if INR<1.5 and patient not responding to transfusions, vitamin K given overnight, will give FFP and f/u repeat INR.   - Plan for EGD/colonoscopy today    - b 6.5 yesterday, s/p 1 unit of pRBC's, responded appropriately to Hgb 8.9, CBC q 6 hrs        # /Renal   - C/w Ahuja for now    # ID   - BAL on 12/12 was positive for pseudomonas aeruginosa. Repeat sputum cx w/ Acinetobacter resistant to carbapenems. Switched to avycaz and flagyl, day 7, will switch back to Imipenem tomorrow  as per ID.   - Source likely PNA, pancreatitis and peripancreatic fluid collection. Blood cx growing coag neg staph likely contaminant. Repeat cultures 12/24 NGTD .    - Spiked a fever 12/27m recultured  - Received one dose of Vanco 12/18 and one dose on 12/24 as with low grade fever, would hold off on further vanc at this time - Patient s/p antiviral regimen (Valtrex followed by acyclovir) for shingles   - Monitor CBC and fever curve    # Heme:  - GI bleed as above with active bleeding on CT angio  - Hgb 6.5 yesterday, s/p 1 unit overnight, responded appropriately to 8.9    - continue to monitor CBC q 6hrs    # Endo  - serum glucose elevated today, may be stress/sepsis related  - will monitor FSG's and start ISS, order A1C     # DVT PPx:   - SCD's due to anemia 54 y/o M (resident of [facility]) w/ a PMHx of TBI, s/p PEG tube, contracture, and seizure d/o who presented as a transfer from [facility] on 12/9 for respiratory failure 2/2 ARDS likely 2/2 necrotizing pancreatitis s/p intubation, course complicated by GI bleed.     # Neuro  - Off sedation. Pt awake, following commands. Able to nod yes or no to questions.   - Patient was on Depakote at home for history of seizure disorder; however, this was discontinued because Depakote can potentially cause pancreatitis. vEEGs have shown no epileptiform abnormalities.   - Neurology consulted for assistance with seizure management. Patient was subsequently started on Vimpat. C/w Vimpat as per Neuro recs.    # CV   - Patient currently off Norepinephrine gtt , continue to monitor BP closely    # Resp:   - Patient currently intubated 2/2 moderate ARDS 2/2 necrotizing pancreatitis. Previously on VDR vent, now on conventional vent.  - Will closely monitor respiratory status  - CPAP for resp function recovery, will likely need Trach    # GI  --Pancreatitis   - Patient found to have necrotizing pancreatitis. Unclear etiology for pancreatitis at this time. Patient is a resident of NH and with no significant alcohol history. OSH CT A+P showed gallbladder pathology. Repeat CT with normal gallbladder wall and duct. TG was found to be elevated in the hospital, though patient was on a Propofol gtt at the time and it has been downtrending since the Propofol was discontinued.  - CT scan 12/19: demonstrates pancreatitis unchanged but concern for enlarging danay-pancreatic fluid collection. Surgery following, no acute surgical intervention.   - repeat CT 12/25 with pancreatic necrosis, but no wall formed yet, as per GI, patient is currently not a candidate for necrosectomy/danay-pancreatic fluid drainage with cystgastrostomy. Will recommend a repeat CT in 5 days to evaluate the pancreatic lesion.  - switched to avycaz and flagyl for necrotizing pancreatitis as per ID, day 7, will switch to Imipenem as per ID    --GI bleed:  - Patient with bloody bowel movements on 12/27. CT angio with active bleeding in transverse colon. Surgery deferred to IR, who will consider emobolization if INR<1.5 and patient not responding to transfusions, vitamin K given overnight, will give FFP and f/u repeat INR.   - Plan for EGD/colonoscopy today    - Hgb 6.5 yesterday, s/p 1 unit of pRBC's, responded appropriately to Hgb 8.9, CBC q 6 hrs        # /Renal   - C/w Ahuja for now    # ID   - BAL on 12/12 was positive for pseudomonas aeruginosa. Repeat sputum cx w/ Acinetobacter resistant to carbapenems. Switched to avycaz and flagyl, day 7, will switch back to Imipenem as per ID.   - Source likely PNA, pancreatitis and peripancreatic fluid collection. Blood cx growing coag neg staph likely contaminant. Repeat cultures 12/24 NGTD .    - Spiked a fever 12/27m recultured  - Received one dose of Vanco 12/18 and one dose on 12/24 as with low grade fever, would hold off on further vanc at this time - Patient s/p antiviral regimen (Valtrex followed by acyclovir) for shingles   - Monitor CBC and fever curve    # Heme:  - GI bleed as above with active bleeding on CT angio  - Hgb 6.5 yesterday, s/p 1 unit overnight, responded appropriately to 8.9    - continue to monitor CBC q 6hrs    # Endo  - serum glucose elevated today, may be stress/sepsis related  - will monitor FSG's and start ISS, order A1C     # DVT PPx:   - SCD's due to anemia

## 2018-12-29 LAB
ALBUMIN SERPL ELPH-MCNC: 2 G/DL — LOW (ref 3.3–5)
ALP SERPL-CCNC: 134 U/L — HIGH (ref 40–120)
ALT FLD-CCNC: 10 U/L — SIGNIFICANT CHANGE UP (ref 4–41)
AST SERPL-CCNC: 14 U/L — SIGNIFICANT CHANGE UP (ref 4–40)
BACTERIA BLD CULT: SIGNIFICANT CHANGE UP
BACTERIA BLD CULT: SIGNIFICANT CHANGE UP
BILIRUB SERPL-MCNC: 1.1 MG/DL — SIGNIFICANT CHANGE UP (ref 0.2–1.2)
BUN SERPL-MCNC: 8 MG/DL — SIGNIFICANT CHANGE UP (ref 7–23)
BUN SERPL-MCNC: 8 MG/DL — SIGNIFICANT CHANGE UP (ref 7–23)
CALCIUM SERPL-MCNC: 7.6 MG/DL — LOW (ref 8.4–10.5)
CALCIUM SERPL-MCNC: 7.8 MG/DL — LOW (ref 8.4–10.5)
CHLORIDE SERPL-SCNC: 111 MMOL/L — HIGH (ref 98–107)
CHLORIDE SERPL-SCNC: 112 MMOL/L — HIGH (ref 98–107)
CO2 SERPL-SCNC: 25 MMOL/L — SIGNIFICANT CHANGE UP (ref 22–31)
CO2 SERPL-SCNC: 27 MMOL/L — SIGNIFICANT CHANGE UP (ref 22–31)
CREAT SERPL-MCNC: 0.25 MG/DL — LOW (ref 0.5–1.3)
CREAT SERPL-MCNC: 0.27 MG/DL — LOW (ref 0.5–1.3)
GLUCOSE BLDC GLUCOMTR-MCNC: 115 MG/DL — HIGH (ref 70–99)
GLUCOSE BLDC GLUCOMTR-MCNC: 76 MG/DL — SIGNIFICANT CHANGE UP (ref 70–99)
GLUCOSE BLDC GLUCOMTR-MCNC: 80 MG/DL — SIGNIFICANT CHANGE UP (ref 70–99)
GLUCOSE BLDC GLUCOMTR-MCNC: 99 MG/DL — SIGNIFICANT CHANGE UP (ref 70–99)
GLUCOSE SERPL-MCNC: 75 MG/DL — SIGNIFICANT CHANGE UP (ref 70–99)
GLUCOSE SERPL-MCNC: 97 MG/DL — SIGNIFICANT CHANGE UP (ref 70–99)
HBA1C BLD-MCNC: 4.8 % — SIGNIFICANT CHANGE UP (ref 4–5.6)
HCT VFR BLD CALC: 26.9 % — LOW (ref 39–50)
HCT VFR BLD CALC: 27.1 % — LOW (ref 39–50)
HCT VFR BLD CALC: 28.2 % — LOW (ref 39–50)
HGB BLD-MCNC: 8.9 G/DL — LOW (ref 13–17)
HGB BLD-MCNC: 9 G/DL — LOW (ref 13–17)
HGB BLD-MCNC: 9.2 G/DL — LOW (ref 13–17)
INR BLD: 1.76 — HIGH (ref 0.88–1.17)
MAGNESIUM SERPL-MCNC: 1.9 MG/DL — SIGNIFICANT CHANGE UP (ref 1.6–2.6)
MCHC RBC-ENTMCNC: 30.6 PG — SIGNIFICANT CHANGE UP (ref 27–34)
MCHC RBC-ENTMCNC: 30.6 PG — SIGNIFICANT CHANGE UP (ref 27–34)
MCHC RBC-ENTMCNC: 31.6 PG — SIGNIFICANT CHANGE UP (ref 27–34)
MCHC RBC-ENTMCNC: 32.6 % — SIGNIFICANT CHANGE UP (ref 32–36)
MCHC RBC-ENTMCNC: 33.1 % — SIGNIFICANT CHANGE UP (ref 32–36)
MCHC RBC-ENTMCNC: 33.2 % — SIGNIFICANT CHANGE UP (ref 32–36)
MCV RBC AUTO: 92.4 FL — SIGNIFICANT CHANGE UP (ref 80–100)
MCV RBC AUTO: 93.7 FL — SIGNIFICANT CHANGE UP (ref 80–100)
MCV RBC AUTO: 95.1 FL — SIGNIFICANT CHANGE UP (ref 80–100)
NRBC # FLD: 0.17 — SIGNIFICANT CHANGE UP
NRBC # FLD: 0.19 — SIGNIFICANT CHANGE UP
NRBC # FLD: 0.23 — SIGNIFICANT CHANGE UP
NRBC FLD-RTO: 4.1 — SIGNIFICANT CHANGE UP
NRBC FLD-RTO: 4.4 — SIGNIFICANT CHANGE UP
NRBC FLD-RTO: 4.9 — SIGNIFICANT CHANGE UP
PHOSPHATE SERPL-MCNC: 2.2 MG/DL — LOW (ref 2.5–4.5)
PLATELET # BLD AUTO: 370 K/UL — SIGNIFICANT CHANGE UP (ref 150–400)
PLATELET # BLD AUTO: 389 K/UL — SIGNIFICANT CHANGE UP (ref 150–400)
PLATELET # BLD AUTO: 393 K/UL — SIGNIFICANT CHANGE UP (ref 150–400)
PMV BLD: 10.1 FL — SIGNIFICANT CHANGE UP (ref 7–13)
PMV BLD: 10.1 FL — SIGNIFICANT CHANGE UP (ref 7–13)
PMV BLD: 10.6 FL — SIGNIFICANT CHANGE UP (ref 7–13)
POTASSIUM SERPL-MCNC: 3.3 MMOL/L — LOW (ref 3.5–5.3)
POTASSIUM SERPL-MCNC: 3.6 MMOL/L — SIGNIFICANT CHANGE UP (ref 3.5–5.3)
POTASSIUM SERPL-SCNC: 3.3 MMOL/L — LOW (ref 3.5–5.3)
POTASSIUM SERPL-SCNC: 3.6 MMOL/L — SIGNIFICANT CHANGE UP (ref 3.5–5.3)
PROT SERPL-MCNC: 5.3 G/DL — LOW (ref 6–8.3)
PROTHROM AB SERPL-ACNC: 20.4 SEC — HIGH (ref 9.8–13.1)
RBC # BLD: 2.85 M/UL — LOW (ref 4.2–5.8)
RBC # BLD: 2.91 M/UL — LOW (ref 4.2–5.8)
RBC # BLD: 3.01 M/UL — LOW (ref 4.2–5.8)
RBC # FLD: 22.5 % — HIGH (ref 10.3–14.5)
RBC # FLD: 23 % — HIGH (ref 10.3–14.5)
RBC # FLD: 23.6 % — HIGH (ref 10.3–14.5)
SODIUM SERPL-SCNC: 145 MMOL/L — SIGNIFICANT CHANGE UP (ref 135–145)
SODIUM SERPL-SCNC: 147 MMOL/L — HIGH (ref 135–145)
WBC # BLD: 4.15 K/UL — SIGNIFICANT CHANGE UP (ref 3.8–10.5)
WBC # BLD: 4.36 K/UL — SIGNIFICANT CHANGE UP (ref 3.8–10.5)
WBC # BLD: 4.68 K/UL — SIGNIFICANT CHANGE UP (ref 3.8–10.5)
WBC # FLD AUTO: 4.15 K/UL — SIGNIFICANT CHANGE UP (ref 3.8–10.5)
WBC # FLD AUTO: 4.36 K/UL — SIGNIFICANT CHANGE UP (ref 3.8–10.5)
WBC # FLD AUTO: 4.68 K/UL — SIGNIFICANT CHANGE UP (ref 3.8–10.5)

## 2018-12-29 PROCEDURE — 99291 CRITICAL CARE FIRST HOUR: CPT

## 2018-12-29 RX ORDER — POTASSIUM CHLORIDE 20 MEQ
40 PACKET (EA) ORAL ONCE
Qty: 0 | Refills: 0 | Status: DISCONTINUED | OUTPATIENT
Start: 2018-12-29 | End: 2018-12-29

## 2018-12-29 RX ORDER — POTASSIUM PHOSPHATE, MONOBASIC POTASSIUM PHOSPHATE, DIBASIC 236; 224 MG/ML; MG/ML
15 INJECTION, SOLUTION INTRAVENOUS ONCE
Qty: 0 | Refills: 0 | Status: COMPLETED | OUTPATIENT
Start: 2018-12-29 | End: 2018-12-29

## 2018-12-29 RX ADMIN — IMIPENEM AND CILASTATIN 100 MILLIGRAM(S): 250; 250 INJECTION, POWDER, FOR SOLUTION INTRAVENOUS at 17:48

## 2018-12-29 RX ADMIN — LACOSAMIDE 100 MILLIGRAM(S): 50 TABLET ORAL at 06:34

## 2018-12-29 RX ADMIN — CHLORHEXIDINE GLUCONATE 1 APPLICATION(S): 213 SOLUTION TOPICAL at 11:52

## 2018-12-29 RX ADMIN — Medication 1 MILLIGRAM(S): at 11:53

## 2018-12-29 RX ADMIN — LACOSAMIDE 100 MILLIGRAM(S): 50 TABLET ORAL at 17:49

## 2018-12-29 RX ADMIN — IMIPENEM AND CILASTATIN 100 MILLIGRAM(S): 250; 250 INJECTION, POWDER, FOR SOLUTION INTRAVENOUS at 06:33

## 2018-12-29 RX ADMIN — RISPERIDONE 1 MILLIGRAM(S): 4 TABLET ORAL at 11:53

## 2018-12-29 RX ADMIN — QUETIAPINE FUMARATE 25 MILLIGRAM(S): 200 TABLET, FILM COATED ORAL at 11:53

## 2018-12-29 RX ADMIN — IMIPENEM AND CILASTATIN 100 MILLIGRAM(S): 250; 250 INJECTION, POWDER, FOR SOLUTION INTRAVENOUS at 11:53

## 2018-12-29 RX ADMIN — IMIPENEM AND CILASTATIN 100 MILLIGRAM(S): 250; 250 INJECTION, POWDER, FOR SOLUTION INTRAVENOUS at 00:54

## 2018-12-29 RX ADMIN — CHLORHEXIDINE GLUCONATE 15 MILLILITER(S): 213 SOLUTION TOPICAL at 06:34

## 2018-12-29 RX ADMIN — CHLORHEXIDINE GLUCONATE 15 MILLILITER(S): 213 SOLUTION TOPICAL at 17:48

## 2018-12-29 RX ADMIN — PREGABALIN 1000 MICROGRAM(S): 225 CAPSULE ORAL at 11:53

## 2018-12-29 RX ADMIN — POTASSIUM PHOSPHATE, MONOBASIC POTASSIUM PHOSPHATE, DIBASIC 62.5 MILLIMOLE(S): 236; 224 INJECTION, SOLUTION INTRAVENOUS at 06:33

## 2018-12-29 NOTE — PROGRESS NOTE ADULT - ASSESSMENT
54 [facility] M [facility] w/ a PMHx of TBI, s/p PEG tube, contracture, and seizure d/o who presented as a transfer from [facility] on 12/9 for respiratory failure 2/2 ARDS likely 2/2 necrotizing pancreatitis s/p intubation. Course c/b Acinetobacter PNA s/p Avycaz and Flagyl 7 day course.  Course complicated by acute blood loss anemia s/p coloscopy with findings suggestive of ischemic colitis.     Neurology/Psych  Off sedation. Awake. Follows simple commands  Seizure Disorder- Continue Lacosamide.   Continue Risperidone and Quetiapine     Cardiovascular  Currently hemodynamically stable. Continue to monitor BP closely.    Respiratory  ARDS 2/2 Necrotizing Pancreatitis.   Acinetobacter PNA  s/p intubation. Continue daily CPAP trials.     GI  Necrotizing Pancreatitis with enlarging danay-pancreatitic fluid collections  Acute Blood Loss Anemia 2/2 Ischemic Colitis   Per GI currently not a candidate for endoscopic intervention. Consider r/p CT Scan in 5 days.   s/p Avycaz and flagyl for necrotizing pancreatitis. Currently on Imipenem Day 12/14.   Follow up ID recommendations.   Pending Colorectral Sx recommendations re: possible total colectomy.   CBC q6h. If stable in afternoon CBC q8h.    as per ID, day 7, will switch to Imipenem as per ID      Ahuja placement.    Renal/Electrolytes  No DIANA. Replete electrolytes as needed.     ID   Pseudomonas PNA  BAL on 12/12 was positive for pseudomonas aeruginosa. Repeat sputum cx w/ Acinetobacter resistant to carbapenems. s/p Avycaz and flagyl for 7 day course.     Hematology/Oncology   Acute Blood Loss Anemia as described above. CBC q6h. Transfusion goal Hgb >7.     Endocrine  Continue to monitor ISS and FS.     DVT PPx:   SCD's due to anemia     Ligia Trammell MD  Internal Medicine, PGY3  Pager [phone]

## 2018-12-29 NOTE — PROGRESS NOTE ADULT - ATTENDING COMMENTS
Necrotizing pancreatitis with complicated hospital course ( ARDS, GIB).  Sedation vacation, PST.  Continue abx for pancreatitis.  Serial cbc to monitor for bleed likely secondary to ischemic colitis

## 2018-12-29 NOTE — PROGRESS NOTE ADULT - SUBJECTIVE AND OBJECTIVE BOX
General Surgery    Called to evaluate as patient has been having bloody bowel movement. He underwent colonoscopy which showed a large area of oozing at the hepatic and splenic flexures as well as the transverse and descending colon. He received 2U of PRBCs yesterday and 1 unit of FFP. He is currently hemodynamically stable, no longer on pressors. He is awake and able to nod yes or no to our questions.    Vital Signs Last 24 Hrs  T(C): 37.3 (12-29-18 @ 16:00), Max: 37.3 (12-29-18 @ 08:00)  T(F): 99.2 (12-29-18 @ 16:00), Max: 99.2 (12-29-18 @ 08:00)  HR: 104 (12-29-18 @ 16:00) (75 - 105)  BP: 105/64 (12-29-18 @ 16:00) (98/57 - 134/87)  BP(mean): 74 (12-29-18 @ 16:00) (65 - 98)  RR: 18 (12-29-18 @ 16:00) (15 - 26)  SpO2: 99% (12-29-18 @ 16:00) (95% - 100%)  I&O's Detail    28 Dec 2018 07:01  -  29 Dec 2018 07:00  --------------------------------------------------------  IN:    Enteral Tube Flush: 900 mL    IV PiggyBack: 750 mL    norepinephrine Infusion: 226.6 mL  Total IN: 1876.6 mL    OUT:    Indwelling Catheter - Urethral: 875 mL    Rectal Tube: 1850 mL  Total OUT: 2725 mL    Total NET: -848.4 mL      29 Dec 2018 07:01  -  29 Dec 2018 17:06  --------------------------------------------------------  IN:    IV PiggyBack: 100 mL  Total IN: 100 mL    OUT:    Indwelling Catheter - Urethral: 145 mL  Total OUT: 145 mL    Total NET: -45 mL      PE  Gen: NAD  ABD: distended, moderately tender                        8.9    4.68  )-----------( 393      ( 29 Dec 2018 15:30 )             26.9     12-29    147<H>  |  112<H>  |  8   ----------------------------<  75  3.6   |  25  |  0.27<L>    Ca    7.8<L>      29 Dec 2018 15:30  Phos  2.2     12-29  Mg     1.9     12-29    TPro  5.3<L>  /  Alb  2.0<L>  /  TBili  1.1  /  DBili  x   /  AST  14  /  ALT  10  /  AlkPhos  134<H>  12-29    PT/INR - ( 29 Dec 2018 03:20 )   PT: 20.4 SEC;   INR: 1.76          PTT - ( 27 Dec 2018 19:02 )  PTT:36.7 SEC  CAPILLARY BLOOD GLUCOSE      POCT Blood Glucose.: 80 mg/dL (29 Dec 2018 12:14)  POCT Blood Glucose.: 99 mg/dL (29 Dec 2018 06:39)  POCT Blood Glucose.: 115 mg/dL (29 Dec 2018 00:53)  POCT Blood Glucose.: 124 mg/dL (28 Dec 2018 17:29)      MEDICATIONS  (STANDING):  chlorhexidine 0.12% Liquid 15 milliLiter(s) Oral Mucosa two times a day  chlorhexidine 4% Liquid 1 Application(s) Topical <User Schedule>  cyanocobalamin 1000 MICROGram(s) Oral daily  dextrose 5%. 1000 milliLiter(s) (50 mL/Hr) IV Continuous <Continuous>  dextrose 50% Injectable 12.5 Gram(s) IV Push once  dextrose 50% Injectable 25 Gram(s) IV Push once  dextrose 50% Injectable 25 Gram(s) IV Push once  folic acid 1 milliGRAM(s) Enteral Tube daily  imipenem/cilastatin  IVPB 500 milliGRAM(s) IV Intermittent every 6 hours  insulin lispro (HumaLOG) corrective regimen sliding scale   SubCutaneous every 6 hours  lacosamide Solution 100 milliGRAM(s) Oral two times a day  norepinephrine Infusion 0.05 MICROgram(s)/kG/Min (9.347 mL/Hr) IV Continuous <Continuous>  propofol Infusion 20 MICROgram(s)/kG/Min (11.964 mL/Hr) IV Continuous <Continuous>  QUEtiapine 25 milliGRAM(s) Oral daily  risperiDONE   Solution 1 milliGRAM(s) Oral daily    MEDICATIONS  (PRN):  acetaminophen    Suspension .. 650 milliGRAM(s) Oral every 6 hours PRN Temp greater or equal to 38C (100.4F), Mild Pain (1 - 3), Moderate Pain (4 - 6)  dextrose 40% Gel 15 Gram(s) Oral once PRN Blood Glucose LESS THAN 70 milliGRAM(s)/deciliter  glucagon  Injectable 1 milliGRAM(s) IntraMuscular once PRN Glucose LESS THAN 70 milligrams/deciliter

## 2018-12-29 NOTE — PROGRESS NOTE ADULT - SUBJECTIVE AND OBJECTIVE BOX
CHIEF COMPLAINT:    Interval Events:    REVIEW OF SYSTEMS:  Constitutional: [ ] negative [ ] fevers [ ] chills [ ] weight loss [ ] weight gain  HEENT: [ ] negative [ ] dry eyes [ ] eye irritation [ ] postnasal drip [ ] nasal congestion  CV: [ ] negative  [ ] chest pain [ ] orthopnea [ ] palpitations [ ] murmur  Resp: [ ] negative [ ] cough [ ] shortness of breath [ ] dyspnea [ ] wheezing [ ] sputum [ ] hemoptysis  GI: [ ] negative [ ] nausea [ ] vomiting [ ] diarrhea [ ] constipation [ ] abd pain [ ] dysphagia   : [ ] negative [ ] dysuria [ ] nocturia [ ] hematuria [ ] increased urinary frequency  Musculoskeletal: [ ] negative [ ] back pain [ ] myalgias [ ] arthralgias [ ] fracture  Skin: [ ] negative [ ] rash [ ] itch  Neurological: [ ] negative [ ] headache [ ] dizziness [ ] syncope [ ] weakness [ ] numbness  Psychiatric: [ ] negative [ ] anxiety [ ] depression  Endocrine: [ ] negative [ ] diabetes [ ] thyroid problem  Hematologic/Lymphatic: [ ] negative [ ] anemia [ ] bleeding problem  Allergic/Immunologic: [ ] negative [ ] itchy eyes [ ] nasal discharge [ ] hives [ ] angioedema  [ ] All other systems negative  [ ] Unable to assess ROS because ________    OBJECTIVE:  ICU Vital Signs Last 24 Hrs  T(C): 36.8 (29 Dec 2018 12:00), Max: 37.3 (29 Dec 2018 08:00)  T(F): 98.2 (29 Dec 2018 12:00), Max: 99.2 (29 Dec 2018 08:00)  HR: 102 (29 Dec 2018 12:52) (75 - 117)  BP: 103/61 (29 Dec 2018 12:00) (77/38 - 134/87)  BP(mean): 70 (29 Dec 2018 12:00) (47 - 98)  ABP: --  ABP(mean): --  RR: 22 (29 Dec 2018 12:00) (15 - 26)  SpO2: 97% (29 Dec 2018 12:52) (90% - 100%)    Mode: AC/ CMV (Assist Control/ Continuous Mandatory Ventilation), RR (machine): 18, TV (machine): 380, FiO2: 50, PEEP: 5, ITime: 1, MAP: 9, PIP: 21    12-28 @ 07:01 - 12-29 @ 07:00  --------------------------------------------------------  IN: 1876.6 mL / OUT: 2725 mL / NET: -848.4 mL    12-29 @ 07:01 - 12-29 @ 14:08  --------------------------------------------------------  IN: 100 mL / OUT: 80 mL / NET: 20 mL      CAPILLARY BLOOD GLUCOSE      POCT Blood Glucose.: 80 mg/dL (29 Dec 2018 12:14)      PHYSICAL EXAM:  General:   HEENT:   Lymph Nodes:  Neck:   Respiratory:   Cardiovascular:   Abdomen:   Extremities:   Skin:   Neurological:  Psychiatry:    LINES:    HOSPITAL MEDICATIONS:    imipenem/cilastatin  IVPB 500 milliGRAM(s) IV Intermittent every 6 hours    norepinephrine Infusion 0.05 MICROgram(s)/kG/Min IV Continuous <Continuous>    dextrose 40% Gel 15 Gram(s) Oral once PRN  dextrose 50% Injectable 12.5 Gram(s) IV Push once  dextrose 50% Injectable 25 Gram(s) IV Push once  dextrose 50% Injectable 25 Gram(s) IV Push once  glucagon  Injectable 1 milliGRAM(s) IntraMuscular once PRN  insulin lispro (HumaLOG) corrective regimen sliding scale   SubCutaneous every 6 hours      acetaminophen    Suspension .. 650 milliGRAM(s) Oral every 6 hours PRN  lacosamide Solution 100 milliGRAM(s) Oral two times a day  propofol Infusion 20 MICROgram(s)/kG/Min IV Continuous <Continuous>  QUEtiapine 25 milliGRAM(s) Oral daily  risperiDONE   Solution 1 milliGRAM(s) Oral daily          cyanocobalamin 1000 MICROGram(s) Oral daily  dextrose 5%. 1000 milliLiter(s) IV Continuous <Continuous>  folic acid 1 milliGRAM(s) Enteral Tube daily      chlorhexidine 0.12% Liquid 15 milliLiter(s) Oral Mucosa two times a day  chlorhexidine 4% Liquid 1 Application(s) Topical <User Schedule>        LABS:                        9.0    4.36  )-----------( 370      ( 29 Dec 2018 09:00 )             27.1     Hgb Trend: 9.0<--, 9.2<--, 7.7<--, 7.7<--, 8.9<--  12-29    145  |  111<H>  |  8   ----------------------------<  97  3.3<L>   |  27  |  0.25<L>    Ca    7.6<L>      29 Dec 2018 03:22  Phos  2.2     12-29  Mg     1.9     12-29    TPro  5.3<L>  /  Alb  2.0<L>  /  TBili  1.1  /  DBili  x   /  AST  14  /  ALT  10  /  AlkPhos  134<H>  12-29    Creatinine Trend: 0.25<--, 0.31<--, 0.23<--, 0.27<--, 0.28<--, 0.32<--  PT/INR - ( 29 Dec 2018 03:20 )   PT: 20.4 SEC;   INR: 1.76          PTT - ( 27 Dec 2018 19:02 )  PTT:36.7 SEC          MICROBIOLOGY:     RADIOLOGY:  [ ] Reviewed and interpreted by me    EKG: CHIEF COMPLAINT: Patient is a 53y old  Male who presents with a chief complaint of ARDS?, sepsis (29 Dec 2018 14:08)    Interval Events: s/p 2u overnight. Off Levophed since 6am. Not on sedation.     REVIEW OF SYSTEMS:  Constitutional: [ ] negative [ ] fevers [ ] chills [ ] weight loss [ ] weight gain  HEENT: [ ] negative [ ] dry eyes [ ] eye irritation [ ] postnasal drip [ ] nasal congestion  CV: [ ] negative  [ ] chest pain [ ] orthopnea [ ] palpitations [ ] murmur  Resp: [ ] negative [ ] cough [ ] shortness of breath [ ] dyspnea [ ] wheezing [ ] sputum [ ] hemoptysis  GI: [ ] negative [ ] nausea [ ] vomiting [ ] diarrhea [ ] constipation [ ] abd pain [ ] dysphagia   : [ ] negative [ ] dysuria [ ] nocturia [ ] hematuria [ ] increased urinary frequency  Musculoskeletal: [ ] negative [ ] back pain [ ] myalgias [ ] arthralgias [ ] fracture  Skin: [ ] negative [ ] rash [ ] itch  Neurological: [ ] negative [ ] headache [ ] dizziness [ ] syncope [ ] weakness [ ] numbness  Psychiatric: [ ] negative [ ] anxiety [ ] depression  Endocrine: [ ] negative [ ] diabetes [ ] thyroid problem  Hematologic/Lymphatic: [ ] negative [ ] anemia [ ] bleeding problem  Allergic/Immunologic: [ ] negative [ ] itchy eyes [ ] nasal discharge [ ] hives [ ] angioedema  [ ] All other systems negative  [x] Unable to assess ROS because intubated.    OBJECTIVE:  ICU Vital Signs Last 24 Hrs  T(C): 36.8 (29 Dec 2018 12:00), Max: 37.3 (29 Dec 2018 08:00)  T(F): 98.2 (29 Dec 2018 12:00), Max: 99.2 (29 Dec 2018 08:00)  HR: 102 (29 Dec 2018 12:52) (75 - 117)  BP: 103/61 (29 Dec 2018 12:00) (77/38 - 134/87)  BP(mean): 70 (29 Dec 2018 12:00) (47 - 98)  ABP: --  ABP(mean): --  RR: 22 (29 Dec 2018 12:00) (15 - 26)  SpO2: 97% (29 Dec 2018 12:52) (90% - 100%)    Mode: AC/ CMV (Assist Control/ Continuous Mandatory Ventilation), RR (machine): 18, TV (machine): 380, FiO2: 50, PEEP: 5, ITime: 1, MAP: 9, PIP: 21    12-28 @ 07:01  -  12-29 @ 07:00  --------------------------------------------------------  IN: 1876.6 mL / OUT: 2725 mL / NET: -848.4 mL    12-29 @ 07:01 - 12-29 @ 14:08  --------------------------------------------------------  IN: 100 mL / OUT: 80 mL / NET: 20 mL    CAPILLARY BLOOD GLUCOSE    POCT Blood Glucose.: 80 mg/dL (29 Dec 2018 12:14)    PHYSICAL EXAM  GENERAL: Intubated  HEAD:  Atraumatic, Normocephalic  EYES: EOMI, PERRLA, conjunctiva and sclera clear  NECK: Supple, No JVD  CHEST/LUNG: On Ventilator. CTABL   HEART: Regular rate and rhythm; No murmurs, rubs, or gallops  ABDOMEN: Soft, Nontender, Nondistended; Bowel sounds present  EXTREMITIES:  2+ LE swelling  NEURO: contracted.  Able to track. Follow limited commands    LINES:    HOSPITAL MEDICATIONS:  imipenem/cilastatin  IVPB 500 milliGRAM(s) IV Intermittent every 6 hours  norepinephrine Infusion 0.05 MICROgram(s)/kG/Min IV Continuous <Continuous>  dextrose 40% Gel 15 Gram(s) Oral once PRN  dextrose 50% Injectable 12.5 Gram(s) IV Push once  dextrose 50% Injectable 25 Gram(s) IV Push once  dextrose 50% Injectable 25 Gram(s) IV Push once  glucagon  Injectable 1 milliGRAM(s) IntraMuscular once PRN  insulin lispro (HumaLOG) corrective regimen sliding scale   SubCutaneous every 6 hours  acetaminophen    Suspension .. 650 milliGRAM(s) Oral every 6 hours PRN  lacosamide Solution 100 milliGRAM(s) Oral two times a day  propofol Infusion 20 MICROgram(s)/kG/Min IV Continuous <Continuous>  QUEtiapine 25 milliGRAM(s) Oral daily  risperiDONE   Solution 1 milliGRAM(s) Oral daily  cyanocobalamin 1000 MICROGram(s) Oral daily  dextrose 5%. 1000 milliLiter(s) IV Continuous <Continuous>  folic acid 1 milliGRAM(s) Enteral Tube daily  chlorhexidine 0.12% Liquid 15 milliLiter(s) Oral Mucosa two times a day  chlorhexidine 4% Liquid 1 Application(s) Topical <User Schedule>    LABS:                        9.0    4.36  )-----------( 370      ( 29 Dec 2018 09:00 )             27.1     Hgb Trend: 9.0<--, 9.2<--, 7.7<--, 7.7<--, 8.9<--  12-29    145  |  111<H>  |  8   ----------------------------<  97  3.3<L>   |  27  |  0.25<L>    Ca    7.6<L>      29 Dec 2018 03:22  Phos  2.2     12-29  Mg     1.9     12-29    TPro  5.3<L>  /  Alb  2.0<L>  /  TBili  1.1  /  DBili  x   /  AST  14  /  ALT  10  /  AlkPhos  134<H>  12-29    Creatinine Trend: 0.25<--, 0.31<--, 0.23<--, 0.27<--, 0.28<--, 0.32<--  PT/INR - ( 29 Dec 2018 03:20 )   PT: 20.4 SEC;   INR: 1.76     PTT - ( 27 Dec 2018 19:02 )  PTT:36.7 SEC    MICROBIOLOGY:     RADIOLOGY:  [x] Reviewed and interpreted by me    EKG: No new EKG

## 2018-12-29 NOTE — PROGRESS NOTE ADULT - ASSESSMENT
52 yo M nursing home resident with a hx of TBI (residual right-sided paralysis and aphasia), seizures, contracture, G tube, presents as a transfer from [facility] 2/2 respiratory failure concerning for ARDS, likely secondary to pancreatitis, now with lower GI bleeding likely secondary to ischemic colitis  - Trend CBCS, transfuse as necessary  - No surgical intervention at this time  - Recommend IR for embolization  - D/w Dr. Putnam    [id number]

## 2018-12-30 LAB
ALBUMIN SERPL ELPH-MCNC: 1.9 G/DL — LOW (ref 3.3–5)
ALP SERPL-CCNC: 150 U/L — HIGH (ref 40–120)
ALT FLD-CCNC: 8 U/L — SIGNIFICANT CHANGE UP (ref 4–41)
APTT BLD: 31.4 SEC — SIGNIFICANT CHANGE UP (ref 27.5–36.3)
AST SERPL-CCNC: 13 U/L — SIGNIFICANT CHANGE UP (ref 4–40)
BILIRUB SERPL-MCNC: 0.9 MG/DL — SIGNIFICANT CHANGE UP (ref 0.2–1.2)
BUN SERPL-MCNC: 10 MG/DL — SIGNIFICANT CHANGE UP (ref 7–23)
CALCIUM SERPL-MCNC: 7.8 MG/DL — LOW (ref 8.4–10.5)
CHLORIDE SERPL-SCNC: 113 MMOL/L — HIGH (ref 98–107)
CO2 SERPL-SCNC: 24 MMOL/L — SIGNIFICANT CHANGE UP (ref 22–31)
CREAT SERPL-MCNC: 0.28 MG/DL — LOW (ref 0.5–1.3)
GLUCOSE BLDC GLUCOMTR-MCNC: 103 MG/DL — HIGH (ref 70–99)
GLUCOSE BLDC GLUCOMTR-MCNC: 108 MG/DL — HIGH (ref 70–99)
GLUCOSE BLDC GLUCOMTR-MCNC: 130 MG/DL — HIGH (ref 70–99)
GLUCOSE BLDC GLUCOMTR-MCNC: 72 MG/DL — SIGNIFICANT CHANGE UP (ref 70–99)
GLUCOSE BLDC GLUCOMTR-MCNC: 73 MG/DL — SIGNIFICANT CHANGE UP (ref 70–99)
GLUCOSE BLDC GLUCOMTR-MCNC: 84 MG/DL — SIGNIFICANT CHANGE UP (ref 70–99)
GLUCOSE SERPL-MCNC: 78 MG/DL — SIGNIFICANT CHANGE UP (ref 70–99)
HCT VFR BLD CALC: 29.1 % — LOW (ref 39–50)
HCT VFR BLD CALC: 29.3 % — LOW (ref 39–50)
HGB BLD-MCNC: 9.4 G/DL — LOW (ref 13–17)
HGB BLD-MCNC: 9.6 G/DL — LOW (ref 13–17)
INR BLD: 1.59 — HIGH (ref 0.88–1.17)
MAGNESIUM SERPL-MCNC: 1.9 MG/DL — SIGNIFICANT CHANGE UP (ref 1.6–2.6)
MCHC RBC-ENTMCNC: 31.1 PG — SIGNIFICANT CHANGE UP (ref 27–34)
MCHC RBC-ENTMCNC: 31.2 PG — SIGNIFICANT CHANGE UP (ref 27–34)
MCHC RBC-ENTMCNC: 32.3 % — SIGNIFICANT CHANGE UP (ref 32–36)
MCHC RBC-ENTMCNC: 32.8 % — SIGNIFICANT CHANGE UP (ref 32–36)
MCV RBC AUTO: 94.8 FL — SIGNIFICANT CHANGE UP (ref 80–100)
MCV RBC AUTO: 96.7 FL — SIGNIFICANT CHANGE UP (ref 80–100)
NRBC # FLD: 0.12 — SIGNIFICANT CHANGE UP
NRBC # FLD: 0.15 — SIGNIFICANT CHANGE UP
NRBC FLD-RTO: 2.2 — SIGNIFICANT CHANGE UP
NRBC FLD-RTO: 3.3 — SIGNIFICANT CHANGE UP
PHOSPHATE SERPL-MCNC: 2.8 MG/DL — SIGNIFICANT CHANGE UP (ref 2.5–4.5)
PLATELET # BLD AUTO: 390 K/UL — SIGNIFICANT CHANGE UP (ref 150–400)
PLATELET # BLD AUTO: 411 K/UL — HIGH (ref 150–400)
PMV BLD: 10 FL — SIGNIFICANT CHANGE UP (ref 7–13)
PMV BLD: 10 FL — SIGNIFICANT CHANGE UP (ref 7–13)
POTASSIUM SERPL-MCNC: 3.6 MMOL/L — SIGNIFICANT CHANGE UP (ref 3.5–5.3)
POTASSIUM SERPL-SCNC: 3.6 MMOL/L — SIGNIFICANT CHANGE UP (ref 3.5–5.3)
PROT SERPL-MCNC: 5.7 G/DL — LOW (ref 6–8.3)
PROTHROM AB SERPL-ACNC: 18.4 SEC — HIGH (ref 9.8–13.1)
RBC # BLD: 3.01 M/UL — LOW (ref 4.2–5.8)
RBC # BLD: 3.09 M/UL — LOW (ref 4.2–5.8)
RBC # FLD: 24 % — HIGH (ref 10.3–14.5)
RBC # FLD: 24.2 % — HIGH (ref 10.3–14.5)
SODIUM SERPL-SCNC: 147 MMOL/L — HIGH (ref 135–145)
WBC # BLD: 4.59 K/UL — SIGNIFICANT CHANGE UP (ref 3.8–10.5)
WBC # BLD: 5.45 K/UL — SIGNIFICANT CHANGE UP (ref 3.8–10.5)
WBC # FLD AUTO: 4.59 K/UL — SIGNIFICANT CHANGE UP (ref 3.8–10.5)
WBC # FLD AUTO: 5.45 K/UL — SIGNIFICANT CHANGE UP (ref 3.8–10.5)

## 2018-12-30 PROCEDURE — 99291 CRITICAL CARE FIRST HOUR: CPT

## 2018-12-30 RX ORDER — SODIUM CHLORIDE 9 MG/ML
1000 INJECTION, SOLUTION INTRAVENOUS
Qty: 0 | Refills: 0 | Status: DISCONTINUED | OUTPATIENT
Start: 2018-12-30 | End: 2018-12-31

## 2018-12-30 RX ADMIN — IMIPENEM AND CILASTATIN 100 MILLIGRAM(S): 250; 250 INJECTION, POWDER, FOR SOLUTION INTRAVENOUS at 17:59

## 2018-12-30 RX ADMIN — SODIUM CHLORIDE 75 MILLILITER(S): 9 INJECTION, SOLUTION INTRAVENOUS at 18:06

## 2018-12-30 RX ADMIN — Medication 1 MILLIGRAM(S): at 12:30

## 2018-12-30 RX ADMIN — IMIPENEM AND CILASTATIN 100 MILLIGRAM(S): 250; 250 INJECTION, POWDER, FOR SOLUTION INTRAVENOUS at 12:29

## 2018-12-30 RX ADMIN — SODIUM CHLORIDE 75 MILLILITER(S): 9 INJECTION, SOLUTION INTRAVENOUS at 01:33

## 2018-12-30 RX ADMIN — Medication 650 MILLIGRAM(S): at 14:00

## 2018-12-30 RX ADMIN — PREGABALIN 1000 MICROGRAM(S): 225 CAPSULE ORAL at 12:33

## 2018-12-30 RX ADMIN — Medication 650 MILLIGRAM(S): at 19:54

## 2018-12-30 RX ADMIN — CHLORHEXIDINE GLUCONATE 15 MILLILITER(S): 213 SOLUTION TOPICAL at 05:44

## 2018-12-30 RX ADMIN — IMIPENEM AND CILASTATIN 100 MILLIGRAM(S): 250; 250 INJECTION, POWDER, FOR SOLUTION INTRAVENOUS at 00:12

## 2018-12-30 RX ADMIN — Medication 650 MILLIGRAM(S): at 12:36

## 2018-12-30 RX ADMIN — CHLORHEXIDINE GLUCONATE 1 APPLICATION(S): 213 SOLUTION TOPICAL at 12:30

## 2018-12-30 RX ADMIN — QUETIAPINE FUMARATE 25 MILLIGRAM(S): 200 TABLET, FILM COATED ORAL at 12:30

## 2018-12-30 RX ADMIN — CHLORHEXIDINE GLUCONATE 15 MILLILITER(S): 213 SOLUTION TOPICAL at 17:59

## 2018-12-30 RX ADMIN — LACOSAMIDE 100 MILLIGRAM(S): 50 TABLET ORAL at 17:58

## 2018-12-30 RX ADMIN — LACOSAMIDE 100 MILLIGRAM(S): 50 TABLET ORAL at 05:44

## 2018-12-30 RX ADMIN — IMIPENEM AND CILASTATIN 100 MILLIGRAM(S): 250; 250 INJECTION, POWDER, FOR SOLUTION INTRAVENOUS at 05:44

## 2018-12-30 RX ADMIN — RISPERIDONE 1 MILLIGRAM(S): 4 TABLET ORAL at 12:30

## 2018-12-30 NOTE — PROGRESS NOTE ADULT - ASSESSMENT
54 y/o M (resident of [facility]) w/ a PMHx of TBI, s/p PEG tube, contracture, and seizure d/o who presented as a transfer from [facility] on 12/9 for respiratory failure 2/2 ARDS likely 2/2 necrotizing pancreatitis s/p intubation. Course c/b Acinetobacter PNA s/p Avycaz and Flagyl 7 day course.  Course complicated by acute blood loss anemia s/p coloscopy with findings suggestive of ischemic colitis.     #Neurology/Psych  - Off sedation. Awake. Follows simple commands  - Seizure Disorder- Continue Lacosamide.   - Continue Risperidone and Quetiapine     # Cardiovascular  - Currently hemodynamically stable. Continue to monitor BP closely.    # Respiratory  - ARDS 2/2 Necrotizing Pancreatitis.   - Acinetobacter PNA  - s/p intubation. Continue daily CPAP trials.     #GI  - Necrotizing Pancreatitis with enlarging danay-pancreatitic fluid collections  - Acute Blood Loss Anemia 2/2 Ischemic Colitis   - Per GI currently not a candidate for endoscopic intervention. Consider r/p CT Scan in 5 days.   - s/p Avycaz and flagyl for necrotizing pancreatitis. Currently on Imipenem Day 13/14. Follow up ID recommendations.   - olorectral Sx: no surgical intervention at this time, consider IR for embolization    - CBC q8h.     #  - Ahuja placement.    # Renal/Electrolytes  - No active issues. Replete electrolytes as needed.     #ID   - Pseudomonas PNA: BAL on 12/12 was positive for pseudomonas aeruginosa. Repeat sputum cx w/ Acinetobacter resistant to carbapenems. s/p Avycaz and flagyl for 7 day course.   - Imipenem for pancreatitis as above     #Hematology/Oncology   - Acute Blood Loss Anemia as described above. CBC q6h. Transfusion goal Hgb >7.     #Endocrine  - Continue to monitor ISS and FS.     #DVT PPx:   - SCD's due to anemia 52 y/o M (resident of [facility]) w/ a PMHx of TBI, s/p PEG tube, contracture, and seizure d/o who presented as a transfer from [facility] on 12/9 for respiratory failure 2/2 ARDS likely 2/2 necrotizing pancreatitis s/p intubation. Course c/b Acinetobacter PNA s/p Avycaz and Flagyl 7 day course.  Course complicated by acute blood loss anemia s/p coloscopy with findings suggestive of ischemic colitis.     #Neurology/Psych  - Off sedation. Awake. Follows simple commands  - Seizure Disorder- Continue Lacosamide.   - Continue Risperidone and Quetiapine     # Cardiovascular  - Currently hemodynamically stable. Continue to monitor BP closely.    # Respiratory  - ARDS 2/2 Necrotizing Pancreatitis.   - Acinetobacter PNA  - s/p intubation. Continue daily CPAP trials.     #GI  - Necrotizing Pancreatitis with enlarging danay-pancreatitic fluid collections  - Acute Blood Loss Anemia 2/2 Ischemic Colitis   - Per GI currently not a candidate for endoscopic intervention. Consider r/p CT Scan in 5 days.   - s/p Avycaz and flagyl for necrotizing pancreatitis. Currently on Imipenem Day 13/14. Follow up ID recommendations.   - olorectral Sx: no surgical intervention at this time, consider IR for embolization    - CBC q8h.     #  - Ahuja placement.    # Renal/Electrolytes  - No active issues. Replete electrolytes as needed.     #ID   - Pseudomonas PNA: BAL on 12/12 was positive for pseudomonas aeruginosa. Repeat sputum cx w/ Acinetobacter resistant to carbapenems. s/p Avycaz and flagyl for 7 day course.   - Imipenem for pancreatitis as above     #Hematology/Oncology   - Acute Blood Loss Anemia as described above. CBC q6h. Transfusion goal Hgb >7.     #Endocrine  - FSG's on 70's overnight, started on D5 1/2 NS at75 cc/hr   - Continue to monitor ISS and FS.     #DVT PPx:   - SCD's due to anemia 54 y/o M (resident of [facility]) w/ a PMHx of TBI, s/p PEG tube, contracture, and seizure d/o who presented as a transfer from [facility] on 12/9 for respiratory failure 2/2 ARDS likely 2/2 necrotizing pancreatitis s/p intubation. Course c/b Acinetobacter PNA s/p Avycaz and Flagyl 7 day course.  Course complicated by acute blood loss anemia s/p coloscopy with findings suggestive of ischemic colitis.     #Neurology/Psych  - Off sedation. Awake. Follows simple commands  - Seizure Disorder- Continue Lacosamide.   - Continue Risperidone and Quetiapine     # Cardiovascular  - Currently hemodynamically stable. Continue to monitor BP closely.    # Respiratory  - ARDS 2/2 Necrotizing Pancreatitis.   - Acinetobacter PNA  - s/p intubation. Continue daily CPAP trials.     #GI  - Necrotizing Pancreatitis with enlarging danay-pancreatitic fluid collections  - Acute Blood Loss Anemia 2/2 Ischemic Colitis   - Per GI currently not a candidate for endoscopic intervention. Consider r/p CT Scan in 5 days.   - s/p Avycaz and flagyl for necrotizing pancreatitis. Currently on Imipenem. Follow up ID recommendations about duration.   - Colorectral Sx: no surgical intervention at this time, consider IR for embolization    - CBC q12h for now as Hgb has been stable above 9.     #  - Ahuja placement.    # Renal/Electrolytes  - No active issues. Replete electrolytes as needed.     #ID   - Pseudomonas PNA: BAL on 12/12 was positive for pseudomonas aeruginosa. Repeat sputum cx w/ Acinetobacter resistant to carbapenems. s/p Avycaz and flagyl for 7 day course.   - Imipenem for pancreatitis as above     #Hematology/Oncology   - Acute Blood Loss Anemia as described above. CBC q12h. Transfusion goal Hgb >7.     #Endocrine  - FSG's on 70's overnight, started on D5 1/2 NS at 75 cc/hr   - Continue to monitor ISS and FS.     #DVT PPx:   - SCD's due to anemia

## 2018-12-30 NOTE — PROGRESS NOTE ADULT - SUBJECTIVE AND OBJECTIVE BOX
SUBJECTIVE / OVERNIGHT EVENTS: Patient had no acute events overnight. Patient seen and examined at bedside this morning.     ROS: unable to obtain       OBJECTIVE:  ICU Vital Signs Last 24 Hrs  T(C): 37 (30 Dec 2018 04:00), Max: 37.3 (29 Dec 2018 08:00)  T(F): 98.6 (30 Dec 2018 04:00), Max: 99.2 (29 Dec 2018 08:00)  HR: 102 (30 Dec 2018 06:00) (93 - 110)  BP: 105/59 (30 Dec 2018 06:00) (93/55 - 109/64)  BP(mean): 69 (30 Dec 2018 06:00) (62 - 78)  ABP: --  ABP(mean): --  RR: 17 (30 Dec 2018 06:00) (15 - 24)  SpO2: 98% (30 Dec 2018 06:00) (95% - 100%)    Mode: AC/ CMV (Assist Control/ Continuous Mandatory Ventilation), RR (machine): 18, TV (machine): 380, FiO2: 50, PEEP: 5, ITime: 1, MAP: 9, PIP: 17    12-28 @ 07:01 - 12-29 @ 07:00  --------------------------------------------------------  IN: 1876.6 mL / OUT: 2725 mL / NET: -848.4 mL    12-29 @ 07:01 - 12-30 @ 06:49  --------------------------------------------------------  IN: 955 mL / OUT: 675 mL / NET: 280 mL      CAPILLARY BLOOD GLUCOSE      POCT Blood Glucose.: 84 mg/dL (30 Dec 2018 05:57)      PHYSICAL EXAM:    GENERAL: Intubated  HEAD:  Atraumatic, Normocephalic  EYES: EOMI, PERRLA, conjunctiva and sclera clear  NECK: Supple, No JVD  CHEST/LUNG: On Ventilator. CTABL   HEART: Regular rate and rhythm; No murmurs, rubs, or gallops  ABDOMEN: Soft, Nontender, Nondistended; Bowel sounds present  EXTREMITIES:  2+ LE swelling  NEURO: contracted.  Able to track. Follow limited commands      HOSPITAL MEDICATIONS:  Standing Meds:  chlorhexidine 0.12% Liquid 15 milliLiter(s) Oral Mucosa two times a day  chlorhexidine 4% Liquid 1 Application(s) Topical <User Schedule>  cyanocobalamin 1000 MICROGram(s) Oral daily  dextrose 5% + sodium chloride 0.45%. 1000 milliLiter(s) IV Continuous <Continuous>  dextrose 5%. 1000 milliLiter(s) IV Continuous <Continuous>  dextrose 50% Injectable 12.5 Gram(s) IV Push once  dextrose 50% Injectable 25 Gram(s) IV Push once  dextrose 50% Injectable 25 Gram(s) IV Push once  folic acid 1 milliGRAM(s) Enteral Tube daily  imipenem/cilastatin  IVPB 500 milliGRAM(s) IV Intermittent every 6 hours  insulin lispro (HumaLOG) corrective regimen sliding scale   SubCutaneous every 6 hours  lacosamide Solution 100 milliGRAM(s) Oral two times a day  QUEtiapine 25 milliGRAM(s) Oral daily  risperiDONE   Solution 1 milliGRAM(s) Oral daily      PRN Meds:  acetaminophen    Suspension .. 650 milliGRAM(s) Oral every 6 hours PRN  dextrose 40% Gel 15 Gram(s) Oral once PRN  glucagon  Injectable 1 milliGRAM(s) IntraMuscular once PRN      LABS:                        9.4    4.59  )-----------( 390      ( 30 Dec 2018 00:20 )             29.1     Hgb Trend: 9.4<--, 8.9<--, 9.0<--, 9.2<--, 7.7<--  12-30    147<H>  |  113<H>  |  10  ----------------------------<  78  3.6   |  24  |  0.28<L>    Ca    7.8<L>      30 Dec 2018 00:30  Phos  2.8     12-30  Mg     1.9     12-30    TPro  5.7<L>  /  Alb  1.9<L>  /  TBili  0.9  /  DBili  x   /  AST  13  /  ALT  8   /  AlkPhos  150<H>  12-30    Creatinine Trend: 0.28<--, 0.27<--, 0.25<--, 0.31<--, 0.23<--, 0.27<--  PT/INR - ( 30 Dec 2018 00:30 )   PT: 18.4 SEC;   INR: 1.59          PTT - ( 30 Dec 2018 00:30 )  PTT:31.4 SEC

## 2018-12-30 NOTE — PROGRESS NOTE ADULT - ATTENDING COMMENTS
Necrotizing pancreatitis with complicated hospital course ( ARDS, GIB).  Sedation vacation, PST daily again.  Continue abx for pancreatitis.  Serial cbc show stable counts, rectal tube output is decreasing. Will d/w GI and surgery restarting feeds considering no need to transfusion and stable count for last 48 hours.

## 2018-12-30 NOTE — PROGRESS NOTE ADULT - SUBJECTIVE AND OBJECTIVE BOX
Mr. Stewart is intubated, his sister is at the bedside.    ICU Vital Signs Last 24 Hrs  T(C): 38 (30 Dec 2018 12:00), Max: 38 (30 Dec 2018 12:00)  T(F): 100.4 (30 Dec 2018 12:00), Max: 100.4 (30 Dec 2018 12:00)  HR: 117 (30 Dec 2018 13:00) (93 - 117)  BP: 98/43 (30 Dec 2018 13:00) (92/53 - 110/76)  BP(mean): 54 (30 Dec 2018 13:00) (54 - 84)  ABP: --  ABP(mean): --  RR: 15 (30 Dec 2018 13:00) (15 - 21)  SpO2: 98% (30 Dec 2018 13:00) (97% - 100%)                          9.6    5.45  )-----------( 411      ( 30 Dec 2018 10:40 )             29.3     MEDICATIONS  (STANDING):  chlorhexidine 0.12% Liquid 15 milliLiter(s) Oral Mucosa two times a day  chlorhexidine 4% Liquid 1 Application(s) Topical <User Schedule>  cyanocobalamin 1000 MICROGram(s) Oral daily  dextrose 5% + sodium chloride 0.45%. 1000 milliLiter(s) (75 mL/Hr) IV Continuous <Continuous>  dextrose 5%. 1000 milliLiter(s) (50 mL/Hr) IV Continuous <Continuous>  dextrose 50% Injectable 12.5 Gram(s) IV Push once  dextrose 50% Injectable 25 Gram(s) IV Push once  dextrose 50% Injectable 25 Gram(s) IV Push once  folic acid 1 milliGRAM(s) Enteral Tube daily  imipenem/cilastatin  IVPB 500 milliGRAM(s) IV Intermittent every 6 hours  insulin lispro (HumaLOG) corrective regimen sliding scale   SubCutaneous every 6 hours  lacosamide Solution 100 milliGRAM(s) Oral two times a day  QUEtiapine 25 milliGRAM(s) Oral daily  risperiDONE   Solution 1 milliGRAM(s) Oral daily      Gen: intubated  Abd: Soft, distended, G-tube in place, rectal tube that is non-bloody.    53 year old man with necrotizing pancreatitis, and GI bleed that seems to be resolving.  1. Continue to trend H/H and transfuse as needed. No surgical intervention required at this time for GI bleed. If re-bleeds, consider localization study.   2. No surgical intervention indicated for necrotizing pancreatitis at this time, continue supportive care.   3. Will follow.

## 2018-12-31 LAB
ALBUMIN SERPL ELPH-MCNC: 1.6 G/DL — LOW (ref 3.3–5)
ALP SERPL-CCNC: 143 U/L — HIGH (ref 40–120)
ALT FLD-CCNC: 11 U/L — SIGNIFICANT CHANGE UP (ref 4–41)
APTT BLD: 34.7 SEC — SIGNIFICANT CHANGE UP (ref 27.5–36.3)
AST SERPL-CCNC: 18 U/L — SIGNIFICANT CHANGE UP (ref 4–40)
BILIRUB SERPL-MCNC: 0.8 MG/DL — SIGNIFICANT CHANGE UP (ref 0.2–1.2)
BUN SERPL-MCNC: 11 MG/DL — SIGNIFICANT CHANGE UP (ref 7–23)
CALCIUM SERPL-MCNC: 7.4 MG/DL — LOW (ref 8.4–10.5)
CHLORIDE SERPL-SCNC: 112 MMOL/L — HIGH (ref 98–107)
CO2 SERPL-SCNC: 23 MMOL/L — SIGNIFICANT CHANGE UP (ref 22–31)
CREAT SERPL-MCNC: 0.25 MG/DL — LOW (ref 0.5–1.3)
GLUCOSE BLDC GLUCOMTR-MCNC: 108 MG/DL — HIGH (ref 70–99)
GLUCOSE BLDC GLUCOMTR-MCNC: 110 MG/DL — HIGH (ref 70–99)
GLUCOSE BLDC GLUCOMTR-MCNC: 119 MG/DL — HIGH (ref 70–99)
GLUCOSE SERPL-MCNC: 112 MG/DL — HIGH (ref 70–99)
HCT VFR BLD CALC: 28.5 % — LOW (ref 39–50)
HGB BLD-MCNC: 9.2 G/DL — LOW (ref 13–17)
INR BLD: 1.64 — HIGH (ref 0.88–1.17)
MAGNESIUM SERPL-MCNC: 1.7 MG/DL — SIGNIFICANT CHANGE UP (ref 1.6–2.6)
MCHC RBC-ENTMCNC: 31.3 PG — SIGNIFICANT CHANGE UP (ref 27–34)
MCHC RBC-ENTMCNC: 32.3 % — SIGNIFICANT CHANGE UP (ref 32–36)
MCV RBC AUTO: 96.9 FL — SIGNIFICANT CHANGE UP (ref 80–100)
NRBC # FLD: 0.04 — SIGNIFICANT CHANGE UP
PHOSPHATE SERPL-MCNC: 2.9 MG/DL — SIGNIFICANT CHANGE UP (ref 2.5–4.5)
PLATELET # BLD AUTO: 386 K/UL — SIGNIFICANT CHANGE UP (ref 150–400)
PMV BLD: 10.3 FL — SIGNIFICANT CHANGE UP (ref 7–13)
POTASSIUM SERPL-MCNC: 3.5 MMOL/L — SIGNIFICANT CHANGE UP (ref 3.5–5.3)
POTASSIUM SERPL-SCNC: 3.5 MMOL/L — SIGNIFICANT CHANGE UP (ref 3.5–5.3)
PROT SERPL-MCNC: 5.2 G/DL — LOW (ref 6–8.3)
PROTHROM AB SERPL-ACNC: 19 SEC — HIGH (ref 9.8–13.1)
RBC # BLD: 2.94 M/UL — LOW (ref 4.2–5.8)
RBC # FLD: 24.4 % — HIGH (ref 10.3–14.5)
SODIUM SERPL-SCNC: 142 MMOL/L — SIGNIFICANT CHANGE UP (ref 135–145)
WBC # BLD: 4.96 K/UL — SIGNIFICANT CHANGE UP (ref 3.8–10.5)
WBC # FLD AUTO: 4.96 K/UL — SIGNIFICANT CHANGE UP (ref 3.8–10.5)

## 2018-12-31 PROCEDURE — 99232 SBSQ HOSP IP/OBS MODERATE 35: CPT | Mod: GC

## 2018-12-31 PROCEDURE — 99233 SBSQ HOSP IP/OBS HIGH 50: CPT

## 2018-12-31 PROCEDURE — 99291 CRITICAL CARE FIRST HOUR: CPT

## 2018-12-31 RX ORDER — SODIUM CHLORIDE 9 MG/ML
1000 INJECTION, SOLUTION INTRAVENOUS
Qty: 0 | Refills: 0 | Status: DISCONTINUED | OUTPATIENT
Start: 2018-12-31 | End: 2018-12-31

## 2018-12-31 RX ORDER — MIDODRINE HYDROCHLORIDE 2.5 MG/1
10 TABLET ORAL THREE TIMES A DAY
Qty: 0 | Refills: 0 | Status: DISCONTINUED | OUTPATIENT
Start: 2018-12-31 | End: 2019-01-01

## 2018-12-31 RX ORDER — FUROSEMIDE 40 MG
40 TABLET ORAL ONCE
Qty: 0 | Refills: 0 | Status: DISCONTINUED | OUTPATIENT
Start: 2018-12-31 | End: 2019-01-01

## 2018-12-31 RX ADMIN — LACOSAMIDE 100 MILLIGRAM(S): 50 TABLET ORAL at 05:07

## 2018-12-31 RX ADMIN — Medication 1 MILLIGRAM(S): at 11:17

## 2018-12-31 RX ADMIN — CHLORHEXIDINE GLUCONATE 15 MILLILITER(S): 213 SOLUTION TOPICAL at 17:28

## 2018-12-31 RX ADMIN — QUETIAPINE FUMARATE 25 MILLIGRAM(S): 200 TABLET, FILM COATED ORAL at 11:17

## 2018-12-31 RX ADMIN — IMIPENEM AND CILASTATIN 100 MILLIGRAM(S): 250; 250 INJECTION, POWDER, FOR SOLUTION INTRAVENOUS at 17:28

## 2018-12-31 RX ADMIN — IMIPENEM AND CILASTATIN 100 MILLIGRAM(S): 250; 250 INJECTION, POWDER, FOR SOLUTION INTRAVENOUS at 05:07

## 2018-12-31 RX ADMIN — LACOSAMIDE 100 MILLIGRAM(S): 50 TABLET ORAL at 17:28

## 2018-12-31 RX ADMIN — CHLORHEXIDINE GLUCONATE 1 APPLICATION(S): 213 SOLUTION TOPICAL at 11:17

## 2018-12-31 RX ADMIN — IMIPENEM AND CILASTATIN 100 MILLIGRAM(S): 250; 250 INJECTION, POWDER, FOR SOLUTION INTRAVENOUS at 11:17

## 2018-12-31 RX ADMIN — IMIPENEM AND CILASTATIN 100 MILLIGRAM(S): 250; 250 INJECTION, POWDER, FOR SOLUTION INTRAVENOUS at 00:07

## 2018-12-31 RX ADMIN — PREGABALIN 1000 MICROGRAM(S): 225 CAPSULE ORAL at 11:17

## 2018-12-31 RX ADMIN — CHLORHEXIDINE GLUCONATE 15 MILLILITER(S): 213 SOLUTION TOPICAL at 05:07

## 2018-12-31 RX ADMIN — MIDODRINE HYDROCHLORIDE 10 MILLIGRAM(S): 2.5 TABLET ORAL at 18:47

## 2018-12-31 RX ADMIN — RISPERIDONE 1 MILLIGRAM(S): 4 TABLET ORAL at 11:18
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
## 2018-12-31 NOTE — PROGRESS NOTE ADULT - ASSESSMENT
Impression  - Hematochezia, s/p colonoscopy on 12/28/2018 with Severe segemental colitis localized to the transverse colon and ascending colon (possible ischemic colitis, possible colitis related to contiguous danay-pancreatic inflammatory process). Biopsied.   - Enlarging danay-pancreatic collection with pancreatitis, CT 12.25 with pancreatic necrosis, but no wall formed yet  - Septic, distributive shock.  acinetobacter PNA and pseudomonas in bronch.  - Seizure disorder    Recommendations    - trend CBC, CMP and fever curve  - recommend a repeat CT abdomen on 1.1.2019 to evaluate the pancreatic lesion   - rest of care per primary team      Isa Mike, PGY-5  GI fellow  B- [phone]/ [phone]  Please call GI fellow on call after 5pm and on weekends

## 2018-12-31 NOTE — PROGRESS NOTE ADULT - SUBJECTIVE AND OBJECTIVE BOX
Follow Up:  necrotizing pancreatitis and CRE acinetobacter in the sputum    Interval History: pt still with low grad fevers, s/p colonoscopy 12/28 with active colitis  pt going for EGD, colon tomorrow    ROS:    Unobtainable because: pt intubated            Allergies  Valproate Sodium (Other (Severe))        ANTIMICROBIALS:  imipenem/cilastatin  IVPB 500 every 6 hours      OTHER MEDS:  acetaminophen    Suspension .. 650 milliGRAM(s) Oral every 6 hours PRN  chlorhexidine 0.12% Liquid 15 milliLiter(s) Oral Mucosa two times a day  chlorhexidine 4% Liquid 1 Application(s) Topical <User Schedule>  cyanocobalamin 1000 MICROGram(s) Oral daily  dextrose 40% Gel 15 Gram(s) Oral once PRN  dextrose 5%. 1000 milliLiter(s) IV Continuous <Continuous>  dextrose 50% Injectable 12.5 Gram(s) IV Push once  dextrose 50% Injectable 25 Gram(s) IV Push once  dextrose 50% Injectable 25 Gram(s) IV Push once  folic acid 1 milliGRAM(s) Enteral Tube daily  glucagon  Injectable 1 milliGRAM(s) IntraMuscular once PRN  insulin lispro (HumaLOG) corrective regimen sliding scale   SubCutaneous every 6 hours  lacosamide Solution 100 milliGRAM(s) Oral two times a day  QUEtiapine 25 milliGRAM(s) Oral daily  risperiDONE   Solution 1 milliGRAM(s) Oral daily      Vital Signs Last 24 Hrs  T(C): 37.7 (31 Dec 2018 08:00), Max: 37.7 (31 Dec 2018 08:00)  T(F): 99.9 (31 Dec 2018 08:00), Max: 99.9 (31 Dec 2018 08:00)  HR: 119 (31 Dec 2018 12:00) (96 - 119)  BP: 98/54 (31 Dec 2018 12:00) (92/51 - 107/69)  BP(mean): 64 (31 Dec 2018 12:00) (54 - 77)  RR: 27 (31 Dec 2018 12:00) (12 - 38)  SpO2: 98% (31 Dec 2018 12:00) (96% - 99%)    Physical Exam:  General:    NAD,  non toxic  Head: atraumatic, normocephalic  Eye: normal sclera and conjunctiva  ENT:    ET tube,  no LAD,   neck supple  Cardio:     regular S1, S2,  no murmur  Respiratory:    clear b/l,    no wheezing, still thick secretions  abd:     soft,   BS +,   no tenderness, PEG with no erythema or drainage  :   no CVAT,  no suprapubic tenderness,  +  tavares  Musculoskeletal:   no joint swelling  vascular: no lines, normal pulses  Skin:    no rash  Neurologic:    unable to assess as pt intubated                          9.2    4.96  )-----------( 386      ( 31 Dec 2018 00:05 )             28.5       12-31    142  |  112<H>  |  11  ----------------------------<  112<H>  3.5   |  23  |  0.25<L>    Ca    7.4<L>      31 Dec 2018 00:05  Phos  2.9     12-31  Mg     1.7     12-31    TPro  5.2<L>  /  Alb  1.6<L>  /  TBili  0.8  /  DBili  x   /  AST  18  /  ALT  11  /  AlkPhos  143<H>  12-31          MICROBIOLOGY:  v  BLOOD PERIPHERAL  12-27-18 --  --  --      BLOOD VENOUS  12-24-18 --  --  --      BLOOD PERIPHERAL  12-24-18 --  --  --      BLOOD PERIPHERAL  12-23-18 --  --  --      BLOOD VENOUS  12-22-18 --  --  BLOOD CULTURE PCR  Staphylococcus sp.,coag neg      SPUTUM  12-18-18 --  --  Acinetobacter baumannii       BLOOD PERIPHERAL  12-18-18 --  --  --      BRONCHIAL LAVAGE  12-12-18 --  --  Pseudomonas aeruginosa      URINE CATHETER  12-11-18 --  --  --      BLOOD PERIPHERAL  12-11-18 --  --  --                RADIOLOGY:  Images below reviewed personally  < from: CT Angio Abdomen and Pelvis w/ IV Cont (12.27.18 @ 16:00) >  IMPRESSION:     Focal site of active site of bleeding in the distal transverse colon.     Fluid-filled colon with stable nonspecific mural thickening of the   descending colon may represent colitis.    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 No interval development of foci of gas or   thick peripheral wall to suggest infection of the collections. Unchanged   thrombosed splenic vein.    Moderate bilateral pleural effusions with adjacent atelectasis. Patchy   opacities in the lingula and right middle lobe are stable and may be   infectious in etiology.

## 2018-12-31 NOTE — PROGRESS NOTE ADULT - ASSESSMENT
54 y/o M w/ a PMHx of TBI, s/p PEG tube, contracture, and seizure d/o transferred from [facility] on 12/9 for respiratory failure 2/2 ARDS likely 2/2 necrotizing pancreatitis s/p intubation. Patient had acute drop in Hgb/Hct on 12/27 and responded appropriately to transfusions. Pt s/p colonoscopy that showed findings of ischemic colitis at the splenic flexure.    Upon further review of CT images on 12/27, there is no active GI bleed seen. The CT findings likely represents a prominent enhancing vessel, because there was no pooling of contrast on delayed phase images.    Patient currently hemodynamically stable with stable Hgb/Hct.    Plan:  - No plans for angiography or embolization at this time.  - Will sign off. Please reconsult as needed.    Case discussed with Dr. Monte.

## 2019-01-01 LAB
ALBUMIN SERPL ELPH-MCNC: 1.6 G/DL — LOW (ref 3.3–5)
ALP SERPL-CCNC: 136 U/L — HIGH (ref 40–120)
ALT FLD-CCNC: 11 U/L — SIGNIFICANT CHANGE UP (ref 4–41)
APTT BLD: 36.8 SEC — HIGH (ref 27.5–36.3)
AST SERPL-CCNC: 19 U/L — SIGNIFICANT CHANGE UP (ref 4–40)
BACTERIA BLD CULT: SIGNIFICANT CHANGE UP
BACTERIA BLD CULT: SIGNIFICANT CHANGE UP
BASOPHILS # BLD AUTO: 0.02 K/UL — SIGNIFICANT CHANGE UP (ref 0–0.2)
BASOPHILS NFR BLD AUTO: 0.4 % — SIGNIFICANT CHANGE UP (ref 0–2)
BILIRUB SERPL-MCNC: 0.6 MG/DL — SIGNIFICANT CHANGE UP (ref 0.2–1.2)
BUN SERPL-MCNC: 10 MG/DL — SIGNIFICANT CHANGE UP (ref 7–23)
CALCIUM SERPL-MCNC: 7.6 MG/DL — LOW (ref 8.4–10.5)
CHLORIDE SERPL-SCNC: 112 MMOL/L — HIGH (ref 98–107)
CO2 SERPL-SCNC: 22 MMOL/L — SIGNIFICANT CHANGE UP (ref 22–31)
CREAT SERPL-MCNC: 0.24 MG/DL — LOW (ref 0.5–1.3)
EOSINOPHIL # BLD AUTO: 0.07 K/UL — SIGNIFICANT CHANGE UP (ref 0–0.5)
EOSINOPHIL NFR BLD AUTO: 1.2 % — SIGNIFICANT CHANGE UP (ref 0–6)
GLUCOSE BLDC GLUCOMTR-MCNC: 110 MG/DL — HIGH (ref 70–99)
GLUCOSE BLDC GLUCOMTR-MCNC: 113 MG/DL — HIGH (ref 70–99)
GLUCOSE BLDC GLUCOMTR-MCNC: 139 MG/DL — HIGH (ref 70–99)
GLUCOSE BLDC GLUCOMTR-MCNC: 88 MG/DL — SIGNIFICANT CHANGE UP (ref 70–99)
GLUCOSE BLDC GLUCOMTR-MCNC: 99 MG/DL — SIGNIFICANT CHANGE UP (ref 70–99)
GLUCOSE SERPL-MCNC: 111 MG/DL — HIGH (ref 70–99)
HCT VFR BLD CALC: 28.5 % — LOW (ref 39–50)
HGB BLD-MCNC: 9 G/DL — LOW (ref 13–17)
IMM GRANULOCYTES # BLD AUTO: 0.04 # — SIGNIFICANT CHANGE UP
IMM GRANULOCYTES NFR BLD AUTO: 0.7 % — SIGNIFICANT CHANGE UP (ref 0–1.5)
INR BLD: 1.7 — HIGH (ref 0.88–1.17)
LYMPHOCYTES # BLD AUTO: 1.52 K/UL — SIGNIFICANT CHANGE UP (ref 1–3.3)
LYMPHOCYTES # BLD AUTO: 27 % — SIGNIFICANT CHANGE UP (ref 13–44)
MAGNESIUM SERPL-MCNC: 1.8 MG/DL — SIGNIFICANT CHANGE UP (ref 1.6–2.6)
MCHC RBC-ENTMCNC: 31.3 PG — SIGNIFICANT CHANGE UP (ref 27–34)
MCHC RBC-ENTMCNC: 31.6 % — LOW (ref 32–36)
MCV RBC AUTO: 99 FL — SIGNIFICANT CHANGE UP (ref 80–100)
MONOCYTES # BLD AUTO: 0.48 K/UL — SIGNIFICANT CHANGE UP (ref 0–0.9)
MONOCYTES NFR BLD AUTO: 8.5 % — SIGNIFICANT CHANGE UP (ref 2–14)
NEUTROPHILS # BLD AUTO: 3.51 K/UL — SIGNIFICANT CHANGE UP (ref 1.8–7.4)
NEUTROPHILS NFR BLD AUTO: 62.2 % — SIGNIFICANT CHANGE UP (ref 43–77)
NRBC # FLD: 0.03 — SIGNIFICANT CHANGE UP
PHOSPHATE SERPL-MCNC: 2.7 MG/DL — SIGNIFICANT CHANGE UP (ref 2.5–4.5)
PLATELET # BLD AUTO: 411 K/UL — HIGH (ref 150–400)
PMV BLD: 10.5 FL — SIGNIFICANT CHANGE UP (ref 7–13)
POTASSIUM SERPL-MCNC: 3.7 MMOL/L — SIGNIFICANT CHANGE UP (ref 3.5–5.3)
POTASSIUM SERPL-SCNC: 3.7 MMOL/L — SIGNIFICANT CHANGE UP (ref 3.5–5.3)
PROT SERPL-MCNC: 5.2 G/DL — LOW (ref 6–8.3)
PROTHROM AB SERPL-ACNC: 19.2 SEC — HIGH (ref 9.8–13.1)
RBC # BLD: 2.88 M/UL — LOW (ref 4.2–5.8)
RBC # FLD: 23.9 % — HIGH (ref 10.3–14.5)
SODIUM SERPL-SCNC: 141 MMOL/L — SIGNIFICANT CHANGE UP (ref 135–145)
WBC # BLD: 5.64 K/UL — SIGNIFICANT CHANGE UP (ref 3.8–10.5)
WBC # FLD AUTO: 5.64 K/UL — SIGNIFICANT CHANGE UP (ref 3.8–10.5)

## 2019-01-01 PROCEDURE — 99291 CRITICAL CARE FIRST HOUR: CPT

## 2019-01-01 PROCEDURE — 71045 X-RAY EXAM CHEST 1 VIEW: CPT | Mod: 26

## 2019-01-01 RX ORDER — HEPARIN SODIUM 5000 [USP'U]/ML
5000 INJECTION INTRAVENOUS; SUBCUTANEOUS EVERY 8 HOURS
Qty: 0 | Refills: 0 | Status: DISCONTINUED | OUTPATIENT
Start: 2019-01-01 | End: 2019-01-03

## 2019-01-01 RX ORDER — MIDODRINE HYDROCHLORIDE 2.5 MG/1
20 TABLET ORAL THREE TIMES A DAY
Qty: 0 | Refills: 0 | Status: DISCONTINUED | OUTPATIENT
Start: 2019-01-01 | End: 2019-01-04

## 2019-01-01 RX ORDER — MIDAZOLAM HYDROCHLORIDE 1 MG/ML
4 INJECTION, SOLUTION INTRAMUSCULAR; INTRAVENOUS ONCE
Qty: 0 | Refills: 0 | Status: DISCONTINUED | OUTPATIENT
Start: 2019-01-01 | End: 2019-01-01

## 2019-01-01 RX ADMIN — IMIPENEM AND CILASTATIN 100 MILLIGRAM(S): 250; 250 INJECTION, POWDER, FOR SOLUTION INTRAVENOUS at 23:39

## 2019-01-01 RX ADMIN — IMIPENEM AND CILASTATIN 100 MILLIGRAM(S): 250; 250 INJECTION, POWDER, FOR SOLUTION INTRAVENOUS at 05:47

## 2019-01-01 RX ADMIN — LACOSAMIDE 100 MILLIGRAM(S): 50 TABLET ORAL at 05:47

## 2019-01-01 RX ADMIN — IMIPENEM AND CILASTATIN 100 MILLIGRAM(S): 250; 250 INJECTION, POWDER, FOR SOLUTION INTRAVENOUS at 00:44

## 2019-01-01 RX ADMIN — QUETIAPINE FUMARATE 25 MILLIGRAM(S): 200 TABLET, FILM COATED ORAL at 11:09

## 2019-01-01 RX ADMIN — PREGABALIN 1000 MICROGRAM(S): 225 CAPSULE ORAL at 11:09

## 2019-01-01 RX ADMIN — IMIPENEM AND CILASTATIN 100 MILLIGRAM(S): 250; 250 INJECTION, POWDER, FOR SOLUTION INTRAVENOUS at 17:37

## 2019-01-01 RX ADMIN — HEPARIN SODIUM 5000 UNIT(S): 5000 INJECTION INTRAVENOUS; SUBCUTANEOUS at 23:38

## 2019-01-01 RX ADMIN — HEPARIN SODIUM 5000 UNIT(S): 5000 INJECTION INTRAVENOUS; SUBCUTANEOUS at 14:00

## 2019-01-01 RX ADMIN — RISPERIDONE 1 MILLIGRAM(S): 4 TABLET ORAL at 11:09

## 2019-01-01 RX ADMIN — CHLORHEXIDINE GLUCONATE 15 MILLILITER(S): 213 SOLUTION TOPICAL at 05:47

## 2019-01-01 RX ADMIN — Medication 1 MILLIGRAM(S): at 11:09

## 2019-01-01 RX ADMIN — CHLORHEXIDINE GLUCONATE 15 MILLILITER(S): 213 SOLUTION TOPICAL at 17:37

## 2019-01-01 RX ADMIN — MIDODRINE HYDROCHLORIDE 20 MILLIGRAM(S): 2.5 TABLET ORAL at 00:44

## 2019-01-01 RX ADMIN — IMIPENEM AND CILASTATIN 100 MILLIGRAM(S): 250; 250 INJECTION, POWDER, FOR SOLUTION INTRAVENOUS at 11:09

## 2019-01-01 RX ADMIN — MIDODRINE HYDROCHLORIDE 20 MILLIGRAM(S): 2.5 TABLET ORAL at 17:37

## 2019-01-01 RX ADMIN — CHLORHEXIDINE GLUCONATE 1 APPLICATION(S): 213 SOLUTION TOPICAL at 11:09

## 2019-01-01 RX ADMIN — MIDODRINE HYDROCHLORIDE 20 MILLIGRAM(S): 2.5 TABLET ORAL at 08:27

## 2019-01-01 RX ADMIN — LACOSAMIDE 100 MILLIGRAM(S): 50 TABLET ORAL at 17:37

## 2019-01-01 RX ADMIN — MIDAZOLAM HYDROCHLORIDE 4 MILLIGRAM(S): 1 INJECTION, SOLUTION INTRAMUSCULAR; INTRAVENOUS at 13:26

## 2019-01-01 NOTE — PROGRESS NOTE ADULT - ATTENDING COMMENTS
Necrotizing pancreatitis with complicated hospital course ( ARDS, GIB).  Sedation vacation, PST daily again.  Continue abx for pancreatitis, will need 2 week course. Will check with GI feasibility of intervention and obtaining CT for that purpose.  Cbc stable, continue feeds, restating DVT prophylaxis.

## 2019-01-01 NOTE — PROGRESS NOTE ADULT - SUBJECTIVE AND OBJECTIVE BOX
SUBJECTIVE / OVERNIGHT EVENTS: Patient had no acute events overnight. Patient seen and examined at bedside this morning.     ROS: unable to obtain       OBJECTIVE:  ICU Vital Signs Last 24 Hrs  T(C): 37.4 (01 Jan 2019 04:00), Max: 37.7 (31 Dec 2018 08:00)  T(F): 99.4 (01 Jan 2019 04:00), Max: 99.9 (31 Dec 2018 08:00)  HR: 100 (01 Jan 2019 06:00) (87 - 119)  BP: 92/50 (01 Jan 2019 06:00) (83/43 - 108/53)  BP(mean): 59 (01 Jan 2019 06:00) (52 - 68)  ABP: --  ABP(mean): --  RR: 21 (01 Jan 2019 06:00) (12 - 38)  SpO2: 97% (01 Jan 2019 06:00) (94% - 99%)    Mode: AC/ CMV (Assist Control/ Continuous Mandatory Ventilation), RR (machine): 18, TV (machine): 380, FiO2: 50, PEEP: 5, MAP: 11, PIP: 24    12-30 @ 07:01  -  12-31 @ 07:00  --------------------------------------------------------  IN: 2195 mL / OUT: 405 mL / NET: 1790 mL    12-31 @ 07:01  - 01-01 @ 06:52  --------------------------------------------------------  IN: 1625 mL / OUT: 580 mL / NET: 1045 mL        POCT Blood Glucose.: 113 mg/dL (01 Jan 2019 05:43)      PHYSICAL EXAM:    GENERAL: Intubated  HEAD:  Atraumatic, Normocephalic  EYES: EOMI, PERRLA, conjunctiva and sclera clear  NECK: Supple, No JVD  CHEST/LUNG: On Ventilator. CTABL   HEART: Regular rate and rhythm; No murmurs, rubs, or gallops  ABDOMEN: Soft, Nontender, Nondistended; Bowel sounds present  EXTREMITIES:  2+ LE swelling  NEURO: contracted.  Able to track. Follow limited commands    HOSPITAL MEDICATIONS:  Standing Meds:  chlorhexidine 0.12% Liquid 15 milliLiter(s) Oral Mucosa two times a day  chlorhexidine 4% Liquid 1 Application(s) Topical <User Schedule>  cyanocobalamin 1000 MICROGram(s) Oral daily  dextrose 5%. 1000 milliLiter(s) IV Continuous <Continuous>  dextrose 50% Injectable 12.5 Gram(s) IV Push once  dextrose 50% Injectable 25 Gram(s) IV Push once  dextrose 50% Injectable 25 Gram(s) IV Push once  folic acid 1 milliGRAM(s) Enteral Tube daily  furosemide   Injectable 40 milliGRAM(s) IV Push once  imipenem/cilastatin  IVPB 500 milliGRAM(s) IV Intermittent every 6 hours  insulin lispro (HumaLOG) corrective regimen sliding scale   SubCutaneous every 6 hours  lacosamide Solution 100 milliGRAM(s) Oral two times a day  midodrine 20 milliGRAM(s) Oral three times a day  QUEtiapine 25 milliGRAM(s) Oral daily  risperiDONE   Solution 1 milliGRAM(s) Oral daily      PRN Meds:  acetaminophen    Suspension .. 650 milliGRAM(s) Oral every 6 hours PRN  dextrose 40% Gel 15 Gram(s) Oral once PRN  glucagon  Injectable 1 milliGRAM(s) IntraMuscular once PRN      LABS:                        9.0    5.64  )-----------( 411      ( 01 Jan 2019 02:30 )             28.5     Hgb Trend: 9.0<--, 9.2<--, 9.6<--, 9.4<--, 8.9<--  01-01    141  |  112<H>  |  10  ----------------------------<  111<H>  3.7   |  22  |  0.24<L>    Ca    7.6<L>      01 Jan 2019 02:30  Phos  2.7     01-01  Mg     1.8     01-01    TPro  5.2<L>  /  Alb  1.6<L>  /  TBili  0.6  /  DBili  x   /  AST  19  /  ALT  11  /  AlkPhos  136<H>  01-01    Creatinine Trend: 0.24<--, 0.25<--, 0.28<--, 0.27<--, 0.25<--, 0.31<--  PT/INR - ( 01 Jan 2019 02:30 )   PT: 19.2 SEC;   INR: 1.70          PTT - ( 01 Jan 2019 02:30 )  PTT:36.8 SEC

## 2019-01-01 NOTE — PROGRESS NOTE ADULT - ASSESSMENT
52 y/o M (resident of [facility]) w/ a PMHx of TBI, s/p PEG tube, contracture, and seizure d/o who presented as a transfer from [facility] on 12/9 for respiratory failure 2/2 ARDS likely 2/2 necrotizing pancreatitis s/p intubation. Course c/b Acinetobacter PNA s/p Avycaz and Flagyl 7 day course.  Course complicated by acute blood loss anemia s/p colonoscopy with findings suggestive of ischemic colitis, without further bleeding and Hgb remaining stable.     #Neurology/Psych  - Off sedation. Awake. Follows simple commands  - Seizure Disorder- Continue Lacosamide.   - Continue Risperidone and Quetiapine     # Cardiovascular  - Currently hemodynamically stable. On Midodrine 20 TID. Continue to monitor BP closely.    # Respiratory  - ARDS 2/2 Necrotizing Pancreatitis.   - Acinetobacter PNA  - s/p intubation. Continue daily CPAP trials.     #GI  - Necrotizing Pancreatitis with enlarging danay-pancreatitic fluid collections  - Acute Blood Loss Anemia 2/2 Ischemic Colitis   - Per GI currently not a candidate for endoscopic intervention. Consider r/p CT Scan in 5 days.   - s/p Avycaz and flagyl for necrotizing pancreatitis. Currently on Imipenem.  - Colorectal Sx: no surgical intervention at this time, can restart feeds  - IR: no role for embolization at this time as patient with ischemic colitis       #  - Ahuja placement.    # Renal/Electrolytes  - No active issues. Replete electrolytes as needed.     #ID   - Pseudomonas PNA: BAL on 12/12 was positive for pseudomonas aeruginosa. Repeat sputum cx w/ Acinetobacter resistant to carbapenems. s/p Avycaz and flagyl for 7 day course.   - Imipenem for pancreatitis as above     #Hematology/Oncology   - Acute Blood Loss Anemia as described above. Hgb stable above 9. Transfusion goal Hgb >7.     #Endocrine  - Continue to monitor ISS and FS.     #DVT PPx:   - SCD's due to anemia 54 y/o M (resident of [facility]) w/ a PMHx of TBI, s/p PEG tube, contracture, and seizure d/o who presented as a transfer from [facility] on 12/9 for respiratory failure 2/2 ARDS likely 2/2 necrotizing pancreatitis s/p intubation. Course c/b Acinetobacter PNA s/p Avycaz and Flagyl 7 day course.  Course complicated by acute blood loss anemia s/p colonoscopy with findings suggestive of ischemic colitis, without further bleeding and Hgb remaining stable.     #Neurology/Psych  - Off sedation. Awake. Follows simple commands  - Seizure Disorder- Continue Lacosamide.   - Continue Risperidone and Quetiapine     # Cardiovascular  - Currently hemodynamically stable. On Midodrine 20 TID. Continue to monitor BP closely.    # Respiratory  - ARDS 2/2 Necrotizing Pancreatitis.   - Acinetobacter PNA  - s/p intubation. Continue daily CPAP trials.     #GI  - Necrotizing Pancreatitis with enlarging danay-pancreatitic fluid collections  - Acute Blood Loss Anemia 2/2 Ischemic Colitis   - Per GI currently not a candidate for endoscopic intervention. Consider r/p CT Scan in 5 days.   - s/p Avycaz and flagyl for necrotizing pancreatitis. Currently on Imipenem.  - Colorectal Sx: no surgical intervention at this time, can restart feeds  - IR: no role for embolization at this time as patient with ischemic colitis       #  - Ahuja placement.    # Renal/Electrolytes  - No active issues. Replete electrolytes as needed.     #ID   - Pseudomonas PNA: BAL on 12/12 was positive for pseudomonas aeruginosa. Repeat sputum cx w/ Acinetobacter resistant to carbapenems. s/p Avycaz and flagyl for 7 day course.   - Imipenem for pancreatitis as above     #Hematology/Oncology   - Acute Blood Loss Anemia as described above. Hgb stable above 9. Transfusion goal Hgb >7.     #Endocrine  - Continue to monitor ISS and FS.     #DVT PPx:   - will try to restart hep sub q today as Hgb has remained stable

## 2019-01-02 LAB
ALBUMIN SERPL ELPH-MCNC: 0.6 G/DL — LOW (ref 3.3–5)
ALP SERPL-CCNC: 144 U/L — HIGH (ref 40–120)
ALT FLD-CCNC: 8 U/L — SIGNIFICANT CHANGE UP (ref 4–41)
APTT BLD: 40.7 SEC — HIGH (ref 27.5–36.3)
AST SERPL-CCNC: 28 U/L — SIGNIFICANT CHANGE UP (ref 4–40)
BILIRUB SERPL-MCNC: 0.7 MG/DL — SIGNIFICANT CHANGE UP (ref 0.2–1.2)
BLD GP AB SCN SERPL QL: NEGATIVE — SIGNIFICANT CHANGE UP
BUN SERPL-MCNC: 8 MG/DL — SIGNIFICANT CHANGE UP (ref 7–23)
CALCIUM SERPL-MCNC: 7.7 MG/DL — LOW (ref 8.4–10.5)
CHLORIDE SERPL-SCNC: 111 MMOL/L — HIGH (ref 98–107)
CO2 SERPL-SCNC: 16 MMOL/L — LOW (ref 22–31)
CORTIS SERPL-MCNC: 9.4 UG/DL — SIGNIFICANT CHANGE UP (ref 2.7–18.4)
CREAT SERPL-MCNC: < 0.2 MG/DL — LOW (ref 0.5–1.3)
GLUCOSE BLDC GLUCOMTR-MCNC: 103 MG/DL — HIGH (ref 70–99)
GLUCOSE BLDC GLUCOMTR-MCNC: 116 MG/DL — HIGH (ref 70–99)
GLUCOSE BLDC GLUCOMTR-MCNC: 118 MG/DL — HIGH (ref 70–99)
GLUCOSE SERPL-MCNC: 108 MG/DL — HIGH (ref 70–99)
HCT VFR BLD CALC: 31.8 % — LOW (ref 39–50)
HGB BLD-MCNC: 9.9 G/DL — LOW (ref 13–17)
INR BLD: 1.6 — HIGH (ref 0.88–1.17)
MAGNESIUM SERPL-MCNC: 1.7 MG/DL — SIGNIFICANT CHANGE UP (ref 1.6–2.6)
MCHC RBC-ENTMCNC: 31.1 % — LOW (ref 32–36)
MCHC RBC-ENTMCNC: 32.5 PG — SIGNIFICANT CHANGE UP (ref 27–34)
MCV RBC AUTO: 104.3 FL — HIGH (ref 80–100)
NRBC # FLD: 0 — SIGNIFICANT CHANGE UP
PHOSPHATE SERPL-MCNC: 2.9 MG/DL — SIGNIFICANT CHANGE UP (ref 2.5–4.5)
PLATELET # BLD AUTO: 180 K/UL — SIGNIFICANT CHANGE UP (ref 150–400)
PMV BLD: 11 FL — SIGNIFICANT CHANGE UP (ref 7–13)
POTASSIUM SERPL-MCNC: 4.5 MMOL/L — SIGNIFICANT CHANGE UP (ref 3.5–5.3)
POTASSIUM SERPL-SCNC: 4.5 MMOL/L — SIGNIFICANT CHANGE UP (ref 3.5–5.3)
PROT SERPL-MCNC: 5.3 G/DL — LOW (ref 6–8.3)
PROTHROM AB SERPL-ACNC: 18.5 SEC — HIGH (ref 9.8–13.1)
RBC # BLD: 3.05 M/UL — LOW (ref 4.2–5.8)
RBC # FLD: 23.1 % — HIGH (ref 10.3–14.5)
RH IG SCN BLD-IMP: POSITIVE — SIGNIFICANT CHANGE UP
SODIUM SERPL-SCNC: 139 MMOL/L — SIGNIFICANT CHANGE UP (ref 135–145)
SURGICAL PATHOLOGY STUDY: SIGNIFICANT CHANGE UP
WBC # BLD: 5.08 K/UL — SIGNIFICANT CHANGE UP (ref 3.8–10.5)
WBC # FLD AUTO: 5.08 K/UL — SIGNIFICANT CHANGE UP (ref 3.8–10.5)

## 2019-01-02 PROCEDURE — 99232 SBSQ HOSP IP/OBS MODERATE 35: CPT | Mod: GC

## 2019-01-02 PROCEDURE — 99291 CRITICAL CARE FIRST HOUR: CPT

## 2019-01-02 PROCEDURE — 99233 SBSQ HOSP IP/OBS HIGH 50: CPT

## 2019-01-02 RX ORDER — DEXMEDETOMIDINE HYDROCHLORIDE IN 0.9% SODIUM CHLORIDE 4 UG/ML
0.2 INJECTION INTRAVENOUS
Qty: 200 | Refills: 0 | Status: DISCONTINUED | OUTPATIENT
Start: 2019-01-02 | End: 2019-01-03

## 2019-01-02 RX ORDER — LACOSAMIDE 50 MG/1
100 TABLET ORAL
Qty: 0 | Refills: 0 | Status: DISCONTINUED | OUTPATIENT
Start: 2019-01-02 | End: 2019-01-04

## 2019-01-02 RX ADMIN — IMIPENEM AND CILASTATIN 100 MILLIGRAM(S): 250; 250 INJECTION, POWDER, FOR SOLUTION INTRAVENOUS at 05:44

## 2019-01-02 RX ADMIN — Medication 1 MILLIGRAM(S): at 12:31

## 2019-01-02 RX ADMIN — IMIPENEM AND CILASTATIN 100 MILLIGRAM(S): 250; 250 INJECTION, POWDER, FOR SOLUTION INTRAVENOUS at 18:25

## 2019-01-02 RX ADMIN — CHLORHEXIDINE GLUCONATE 15 MILLILITER(S): 213 SOLUTION TOPICAL at 17:09

## 2019-01-02 RX ADMIN — Medication 650 MILLIGRAM(S): at 18:27

## 2019-01-02 RX ADMIN — MIDODRINE HYDROCHLORIDE 20 MILLIGRAM(S): 2.5 TABLET ORAL at 17:09

## 2019-01-02 RX ADMIN — RISPERIDONE 1 MILLIGRAM(S): 4 TABLET ORAL at 12:32

## 2019-01-02 RX ADMIN — HEPARIN SODIUM 5000 UNIT(S): 5000 INJECTION INTRAVENOUS; SUBCUTANEOUS at 13:43

## 2019-01-02 RX ADMIN — CHLORHEXIDINE GLUCONATE 1 APPLICATION(S): 213 SOLUTION TOPICAL at 12:31

## 2019-01-02 RX ADMIN — MIDODRINE HYDROCHLORIDE 20 MILLIGRAM(S): 2.5 TABLET ORAL at 09:00

## 2019-01-02 RX ADMIN — PREGABALIN 1000 MICROGRAM(S): 225 CAPSULE ORAL at 12:30

## 2019-01-02 RX ADMIN — LACOSAMIDE 100 MILLIGRAM(S): 50 TABLET ORAL at 18:25

## 2019-01-02 RX ADMIN — HEPARIN SODIUM 5000 UNIT(S): 5000 INJECTION INTRAVENOUS; SUBCUTANEOUS at 05:45

## 2019-01-02 RX ADMIN — IMIPENEM AND CILASTATIN 100 MILLIGRAM(S): 250; 250 INJECTION, POWDER, FOR SOLUTION INTRAVENOUS at 12:32

## 2019-01-02 RX ADMIN — MIDODRINE HYDROCHLORIDE 20 MILLIGRAM(S): 2.5 TABLET ORAL at 03:27

## 2019-01-02 RX ADMIN — LACOSAMIDE 100 MILLIGRAM(S): 50 TABLET ORAL at 05:45

## 2019-01-02 RX ADMIN — CHLORHEXIDINE GLUCONATE 15 MILLILITER(S): 213 SOLUTION TOPICAL at 05:45

## 2019-01-02 RX ADMIN — QUETIAPINE FUMARATE 25 MILLIGRAM(S): 200 TABLET, FILM COATED ORAL at 12:30

## 2019-01-02 NOTE — PROGRESS NOTE ADULT - ASSESSMENT
Impression  - Hematochezia, s/p colonoscopy on 12/28/2018 with Severe segemental colitis localized to the transverse colon and ascending colon (possible ischemic colitis, possible colitis related to contiguous danay-pancreatic inflammatory process). Biopsied.   - Enlarging danay-pancreatic collection with pancreatitis, CT 12.25 with pancreatic necrosis, but no wall formed yet  - Septic, distributive shock.  acinetobacter PNA and pseudomonas in bronch.  - Seizure disorder    Recommendations    - trend CBC, CMP and fever curve  - pending CT abdomen to evaluate the pancreatic lesion (if patient has a wall around the collection, then will consider endoscopic intervention)  - rest of care per primary team      Isa Mike, PGY-5  GI fellow  B- [phone]/ [phone]  Please call GI fellow on call after 5pm and on weekends

## 2019-01-02 NOTE — PROGRESS NOTE ADULT - ASSESSMENT
54 y/o M (resident of [facility]) w/ a PMHx of TBI, s/p PEG tube, contracture, and seizure d/o who presented as a transfer from [facility] on 12/9 for respiratory failure 2/2 ARDS likely 2/2 necrotizing pancreatitis s/p intubation. Course c/b Acinetobacter PNA s/p Avycaz and Flagyl 7 day course.  Course complicated by acute blood loss anemia s/p colonoscopy with findings suggestive of ischemic colitis, without further bleeding and Hgb remaining stable.     #Neurology/Psych  - Off sedation. Awake. Follows simple commands  - Seizure Disorder- Continue Lacosamide.   - Continue Risperidone and Quetiapine     # Cardiovascular  - Currently hemodynamically stable. On Midodrine 20 TID. Continue to monitor BP closely.    # Respiratory  - ARDS 2/2 Necrotizing Pancreatitis.   - Acinetobacter PNA  - s/p intubation. Continue daily CPAP trials.     #GI  - Necrotizing Pancreatitis with enlarging danay-pancreatitic fluid collections  - Acute Blood Loss Anemia 2/2 Ischemic Colitis   - Per GI currently not a candidate for endoscopic intervention. Consider repeat CT Scan in 5 days, will obtain today.   - s/p Avycaz and flagyl for necrotizing pancreatitis. Currently on Imipenem.  - Colorectal Sx: no surgical intervention at this time, can restart feeds  - IR: no role for embolization at this time as patient with ischemic colitis       #  - Ahuja placement.    # Renal/Electrolytes  - No active issues. Replete electrolytes as needed.     #ID   - Pseudomonas PNA: BAL on 12/12 was positive for pseudomonas aeruginosa. Repeat sputum cx w/ Acinetobacter resistant to carbapenems. s/p Avycaz and flagyl for 7 day course.   - Imipenem for pancreatitis as above     #Hematology/Oncology   - Acute Blood Loss Anemia as described above. Hgb stable above 9. Transfusion goal Hgb >7.     #Endocrine  - Continue to monitor ISS and FS.     #DVT PPx:   - Hep subq as Hgb has remained stable 52 y/o M (resident of [facility]) w/ a PMHx of TBI, s/p PEG tube, contracture, and seizure d/o who presented as a transfer from [facility] on 12/9 for respiratory failure 2/2 ARDS likely 2/2 necrotizing pancreatitis s/p intubation. Course c/b Acinetobacter PNA s/p Avycaz and Flagyl 7 day course.  Course complicated by acute blood loss anemia s/p colonoscopy with findings suggestive of ischemic colitis, without further bleeding and Hgb remaining stable.     #Neurology/Psych  - Off sedation. Awake. Follows simple commands  - Seizure Disorder- Continue Lacosamide.   - Continue Risperidone and Quetiapine     # Cardiovascular  - On Midodrine 20 TID, but still with soft BP. Continue to monitor BP closely.  - will check Cortisol     # Respiratory  - ARDS 2/2 Necrotizing Pancreatitis.   - Acinetobacter PNA  - s/p intubation. Continue daily CPAP trials.     #GI  - Necrotizing Pancreatitis with enlarging danay-pancreatitic fluid collections  - Acute Blood Loss Anemia 2/2 Ischemic Colitis   - Per GI currently not a candidate for endoscopic intervention.   - s/p Avycaz and flagyl for necrotizing pancreatitis. Currently on Imipenem.  - Colorectal Sx: no surgical intervention at this time, can restart feeds  - IR: no role for embolization at this time as patient with ischemic colitis     - will check amylase/lipase     #  - Ahuja placement.    # Renal/Electrolytes  - No active issues. Replete electrolytes as needed.     #ID   - Pseudomonas PNA: BAL on 12/12 was positive for pseudomonas aeruginosa. Repeat sputum cx w/ Acinetobacter resistant to carbapenems. s/p Avycaz and flagyl for 7 day course.   - Imipenem for pancreatitis as above     #Hematology/Oncology   - Acute Blood Loss Anemia as described above. Hgb stable above 9. Transfusion goal Hgb >7.     #Endocrine  - Continue to monitor ISS and FS.   - will check cortisol as above     #DVT PPx:   - Hep subq as Hgb has remained stable

## 2019-01-02 NOTE — PROGRESS NOTE ADULT - ATTENDING COMMENTS
Agree with above. Seen and examined with residents. Intubated with pancreatitis, GI bleed, long hospital course. Weaning trials, supportive care, plan for tracheostomy. Total CC time 35 min
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
## 2019-01-02 NOTE — CHART NOTE - NSCHARTNOTEFT_GEN_A_CORE
: Miya Valle     INDICATION: ARDS    PROCEDURE:  [x ] LIMITED ECHO  [x ] LIMITED CHEST  [ ] LIMITED RETROPERITONEAL  [ ] LIMITED ABDOMINAL  [ ] LIMITED DVT  [ ] NEEDLE GUIDANCE VASCULAR  [ ] NEEDLE GUIDANCE THORACENTESIS  [ ] NEEDLE GUIDANCE PARACENTESIS  [ ] NEEDLE GUIDANCE PERICARDIOCENTESIS  [ ] OTHER    FINDINGS:  - Left lung A line predominant   - Right lung scattered B lines   - Large left lung consolidation with small pleural effusion  - Large right pleural effusion    INTERPRETATION: Hypoxic respiratory failure due to ARDS/left base pneumonia : Miya Valle     INDICATION: Hypoxic respiratory failure with ARDS    PROCEDURE:  [x ] LIMITED ECHO  [x ] LIMITED CHEST  [ ] LIMITED RETROPERITONEAL  [ ] LIMITED ABDOMINAL  [ ] LIMITED DVT  [ ] NEEDLE GUIDANCE VASCULAR  [ ] NEEDLE GUIDANCE THORACENTESIS  [ ] NEEDLE GUIDANCE PARACENTESIS  [ ] NEEDLE GUIDANCE PERICARDIOCENTESIS  [ ] OTHER    FINDINGS:  - Left lung A line predominant   - Right lung scattered B lines   - Large left lung consolidation with small pleural effusion  - Large right pleural effusion    INTERPRETATION: Hypoxic respiratory failure due to ARDS/left sided pneumonia : Miya Valle     INDICATION: Hypoxic respiratory failure with ARDS    PROCEDURE:  [x ] LIMITED ECHO  [x ] LIMITED CHEST  [ ] LIMITED RETROPERITONEAL  [ ] LIMITED ABDOMINAL  [ ] LIMITED DVT  [ ] NEEDLE GUIDANCE VASCULAR  [ ] NEEDLE GUIDANCE THORACENTESIS  [ ] NEEDLE GUIDANCE PARACENTESIS  [ ] NEEDLE GUIDANCE PERICARDIOCENTESIS  [ ] OTHER    FINDINGS:  - Left lung A line predominant   - Right lung scattered B lines   - Large left lung consolidation with small pleural effusion  - Large right pleural effusion  - Normal LV systolic function    INTERPRETATION: Hypoxic respiratory failure due to ARDS/left sided pneumonia

## 2019-01-02 NOTE — PROGRESS NOTE ADULT - SUBJECTIVE AND OBJECTIVE BOX
SUBJECTIVE / OVERNIGHT EVENTS: Patient had no acute events overnight. Patient seen and examined at bedside this morning.     ROS: unable to obtain       OBJECTIVE:  ICU Vital Signs Last 24 Hrs  T(C): 37.2 (02 Jan 2019 04:00), Max: 37.6 (01 Jan 2019 12:00)  T(F): 99 (02 Jan 2019 04:00), Max: 99.7 (01 Jan 2019 12:00)  HR: 118 (02 Jan 2019 07:00) (97 - 118)  BP: 90/50 (02 Jan 2019 07:00) (85/47 - 129/64)  BP(mean): 59 (02 Jan 2019 07:00) (55 - 71)  ABP: --  ABP(mean): --  RR: 26 (02 Jan 2019 07:00) (10 - 26)  SpO2: 97% (02 Jan 2019 07:00) (96% - 100%)    Mode: AC/ CMV (Assist Control/ Continuous Mandatory Ventilation), RR (machine): 14, TV (machine): 380, FiO2: 50, PEEP: 5, MAP: 10, PIP: 22    01-01 @ 07:01  -  01-02 @ 07:00  --------------------------------------------------------  IN: 1020 mL / OUT: 915 mL / NET: 105 mL      POCT Blood Glucose.: 103 mg/dL (02 Jan 2019 05:29)      PHYSICAL EXAM:    GENERAL: Intubated  HEAD:  Atraumatic, Normocephalic  EYES: EOMI, PERRLA, conjunctiva and sclera clear  NECK: Supple, No JVD  CHEST/LUNG: On Ventilator. CTABL   HEART: Regular rate and rhythm; No murmurs, rubs, or gallops  ABDOMEN: Soft, Nontender, Nondistended; Bowel sounds present  EXTREMITIES:  2+ LE swelling  NEURO: contracted.  Able to track. Follow limited commands    HOSPITAL MEDICATIONS:  Standing Meds:  chlorhexidine 0.12% Liquid 15 milliLiter(s) Oral Mucosa two times a day  chlorhexidine 4% Liquid 1 Application(s) Topical <User Schedule>  cyanocobalamin 1000 MICROGram(s) Oral daily  dextrose 5%. 1000 milliLiter(s) IV Continuous <Continuous>  dextrose 50% Injectable 12.5 Gram(s) IV Push once  dextrose 50% Injectable 25 Gram(s) IV Push once  dextrose 50% Injectable 25 Gram(s) IV Push once  folic acid 1 milliGRAM(s) Enteral Tube daily  heparin  Injectable 5000 Unit(s) SubCutaneous every 8 hours  imipenem/cilastatin  IVPB 500 milliGRAM(s) IV Intermittent every 6 hours  insulin lispro (HumaLOG) corrective regimen sliding scale   SubCutaneous every 6 hours  lacosamide Solution 100 milliGRAM(s) Oral two times a day  midodrine 20 milliGRAM(s) Oral three times a day  QUEtiapine 25 milliGRAM(s) Oral daily  risperiDONE   Solution 1 milliGRAM(s) Oral daily      PRN Meds:  acetaminophen    Suspension .. 650 milliGRAM(s) Oral every 6 hours PRN  dextrose 40% Gel 15 Gram(s) Oral once PRN  glucagon  Injectable 1 milliGRAM(s) IntraMuscular once PRN      LABS:                        9.9    5.08  )-----------( 180      ( 02 Jan 2019 06:05 )             31.8     Hgb Trend: 9.9<--, 9.0<--, 9.2<--, 9.6<--, 9.4<--  01-02    139  |  111<H>  |  8   ----------------------------<  108<H>  4.5   |  16<L>  |  < 0.20<L>    Ca    7.7<L>      02 Jan 2019 03:20  Phos  2.9     01-02  Mg     1.7     01-02    TPro  5.3<L>  /  Alb  0.6<L>  /  TBili  0.7  /  DBili  x   /  AST  28  /  ALT  8   /  AlkPhos  144<H>  01-02    Creatinine Trend: < 0.20<--, 0.24<--, 0.25<--, 0.28<--, 0.27<--, 0.25<--  PT/INR - ( 02 Jan 2019 03:20 )   PT: 18.5 SEC;   INR: 1.60          PTT - ( 02 Jan 2019 03:20 )  PTT:40.7 SEC

## 2019-01-02 NOTE — PROGRESS NOTE ADULT - ASSESSMENT
53  M (resident of [facility]) with TBI, s/p PEG tube, contracture, and seizure d/o who presented as a transfer from [facility] on 12/9 for respiratory failure 2/2 ARDS likely 2/2 necrotizing pancreatitis s/p intubation.   now on day 11 of Imipenem   [facility] 12/11 with pseudomonas  pt with thick secretions and again febrile, sputum cx with  acinetobacter  repeat Ct with increased size of pancreatic collection    sepsis with fever, leukopenia tachycardia   Necrotising Pancreatitis with multiple peripancreatitis collections    acinetobacter PNA vs colonization, was treated as pt had thick secretions  coag neg staph bacteremia likely contaminant, repeat negative  colitis, ?due to contiguous necrotizing pancreatitis and collection vs ischemic    no plans for IR embolization for surgical interventions    * s/p 7 days of acycaz and flagyl for  acinetobacter in the sputum  *  c/w Imipenem for the necrotizing pancreatitis for now as pt still febrile   * going for  repeat CT today

## 2019-01-02 NOTE — PROGRESS NOTE ADULT - SUBJECTIVE AND OBJECTIVE BOX
Follow Up:  necrotizing pancreatitis and CRE acinetobacter in the sputum    Interval History: pt still with low grad fevers, s/p colonoscopy 12/28 with active colitis  pending repeat CT for collection evaluation     ROS:    Unobtainable because: pt intubated            Allergies  Valproate Sodium (Other (Severe))        ANTIMICROBIALS:  imipenem/cilastatin  IVPB 500 every 6 hours      OTHER MEDS:  acetaminophen    Suspension .. 650 milliGRAM(s) Oral every 6 hours PRN  chlorhexidine 0.12% Liquid 15 milliLiter(s) Oral Mucosa two times a day  chlorhexidine 4% Liquid 1 Application(s) Topical <User Schedule>  cyanocobalamin 1000 MICROGram(s) Oral daily  dextrose 40% Gel 15 Gram(s) Oral once PRN  dextrose 5%. 1000 milliLiter(s) IV Continuous <Continuous>  dextrose 50% Injectable 12.5 Gram(s) IV Push once  dextrose 50% Injectable 25 Gram(s) IV Push once  dextrose 50% Injectable 25 Gram(s) IV Push once  folic acid 1 milliGRAM(s) Enteral Tube daily  glucagon  Injectable 1 milliGRAM(s) IntraMuscular once PRN  heparin  Injectable 5000 Unit(s) SubCutaneous every 8 hours  insulin lispro (HumaLOG) corrective regimen sliding scale   SubCutaneous every 6 hours  lacosamide Solution 100 milliGRAM(s) Oral two times a day  midodrine 20 milliGRAM(s) Oral three times a day  QUEtiapine 25 milliGRAM(s) Oral daily  risperiDONE   Solution 1 milliGRAM(s) Oral daily      Vital Signs Last 24 Hrs  T(C): 37.9 (02 Jan 2019 08:00), Max: 37.9 (02 Jan 2019 08:00)  T(F): 100.3 (02 Jan 2019 08:00), Max: 100.3 (02 Jan 2019 08:00)  HR: 117 (02 Jan 2019 12:00) (98 - 127)  BP: 101/57 (02 Jan 2019 12:00) (85/47 - 129/64)  BP(mean): 67 (02 Jan 2019 12:00) (55 - 72)  RR: 16 (02 Jan 2019 12:00) (10 - 36)  SpO2: 99% (02 Jan 2019 12:00) (96% - 100%)    Physical Exam:  General:    NAD,  non toxic  Head: atraumatic, normocephalic  Eye: normal sclera and conjunctiva  ENT:    ET tube,  no LAD,   neck supple  Cardio:     regular S1, S2,  no murmur  Respiratory:    clear b/l,    no wheezing, still thick secretions  abd:     soft,   BS +,   no tenderness, PEG with no erythema or drainage  :   no CVAT,  no suprapubic tenderness,  +  tavares  Musculoskeletal:   no joint swelling  vascular: no lines, normal pulses  Skin:    no rash  Neurologic:    unable to assess as pt intubated                          9.9    5.08  )-----------( 180      ( 02 Jan 2019 06:05 )             31.8       01-02    139  |  111<H>  |  8   ----------------------------<  108<H>  4.5   |  16<L>  |  < 0.20<L>    Ca    7.7<L>      02 Jan 2019 03:20  Phos  2.9     01-02  Mg     1.7     01-02    TPro  5.3<L>  /  Alb  0.6<L>  /  TBili  0.7  /  DBili  x   /  AST  28  /  ALT  8   /  AlkPhos  144<H>  01-02          MICROBIOLOGY:  v  BLOOD PERIPHERAL  12-27-18 --  --  --      BLOOD VENOUS  12-24-18 --  --  --      BLOOD PERIPHERAL  12-24-18 --  --  --      BLOOD PERIPHERAL  12-23-18 --  --  --      BLOOD VENOUS  12-22-18 --  --  BLOOD CULTURE PCR  Staphylococcus sp.,coag neg      SPUTUM  12-18-18 --  --  Acinetobacter baumannii       BLOOD PERIPHERAL  12-18-18 --  --  --      BRONCHIAL LAVAGE  12-12-18 --  --  Pseudomonas aeruginosa      URINE CATHETER  12-11-18 --  --  --      BLOOD PERIPHERAL  12-11-18 --  --  --                RADIOLOGY:  Images below reviewed personally  < from: CT Angio Abdomen and Pelvis w/ IV Cont (12.27.18 @ 16:00) >  IMPRESSION:     Focal site of active site of bleeding in the distal transverse colon.     Fluid-filled colon with stable nonspecific mural thickening of the   descending colon may represent colitis.    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 No interval development of foci of gas or   thick peripheral wall to suggest infection of the collections. Unchanged   thrombosed splenic vein.    Moderate bilateral pleural effusions with adjacent atelectasis. Patchy   opacities in the lingula and right middle lobe are stable and may be   infectious in etiology.

## 2019-01-03 LAB
AMYLASE P1 CFR SERPL: 13 U/L — LOW (ref 25–125)
APTT BLD: 35.3 SEC — SIGNIFICANT CHANGE UP (ref 27.5–36.3)
BASE EXCESS BLDA CALC-SCNC: 2.5 MMOL/L — SIGNIFICANT CHANGE UP
BASOPHILS # BLD AUTO: 0.02 K/UL — SIGNIFICANT CHANGE UP (ref 0–0.2)
BASOPHILS NFR BLD AUTO: 0.3 % — SIGNIFICANT CHANGE UP (ref 0–2)
BUN SERPL-MCNC: 7 MG/DL — SIGNIFICANT CHANGE UP (ref 7–23)
CA-I BLDA-SCNC: 1.22 MMOL/L — SIGNIFICANT CHANGE UP (ref 1.15–1.29)
CALCIUM SERPL-MCNC: 8 MG/DL — LOW (ref 8.4–10.5)
CHLORIDE SERPL-SCNC: 107 MMOL/L — SIGNIFICANT CHANGE UP (ref 98–107)
CO2 SERPL-SCNC: 25 MMOL/L — SIGNIFICANT CHANGE UP (ref 22–31)
CREAT SERPL-MCNC: 0.22 MG/DL — LOW (ref 0.5–1.3)
EOSINOPHIL # BLD AUTO: 0.07 K/UL — SIGNIFICANT CHANGE UP (ref 0–0.5)
EOSINOPHIL NFR BLD AUTO: 1.1 % — SIGNIFICANT CHANGE UP (ref 0–6)
GLUCOSE BLDA-MCNC: 104 MG/DL — HIGH (ref 70–99)
GLUCOSE BLDC GLUCOMTR-MCNC: 115 MG/DL — HIGH (ref 70–99)
GLUCOSE BLDC GLUCOMTR-MCNC: 126 MG/DL — HIGH (ref 70–99)
GLUCOSE BLDC GLUCOMTR-MCNC: 129 MG/DL — HIGH (ref 70–99)
GLUCOSE BLDC GLUCOMTR-MCNC: 79 MG/DL — SIGNIFICANT CHANGE UP (ref 70–99)
GLUCOSE BLDC GLUCOMTR-MCNC: 97 MG/DL — SIGNIFICANT CHANGE UP (ref 70–99)
GLUCOSE SERPL-MCNC: 86 MG/DL — SIGNIFICANT CHANGE UP (ref 70–99)
HCO3 BLDA-SCNC: 26 MMOL/L — SIGNIFICANT CHANGE UP (ref 22–26)
HCT VFR BLD CALC: 32.1 % — LOW (ref 39–50)
HCT VFR BLDA CALC: 29.1 % — LOW (ref 39–51)
HGB BLD-MCNC: 9.8 G/DL — LOW (ref 13–17)
HGB BLDA-MCNC: 9.4 G/DL — LOW (ref 13–17)
IMM GRANULOCYTES NFR BLD AUTO: 0.6 % — SIGNIFICANT CHANGE UP (ref 0–1.5)
INR BLD: 1.56 — HIGH (ref 0.88–1.17)
LACTATE BLDA-SCNC: 1.7 MMOL/L — SIGNIFICANT CHANGE UP (ref 0.5–2)
LIDOCAIN IGE QN: 14.7 U/L — SIGNIFICANT CHANGE UP (ref 7–60)
LYMPHOCYTES # BLD AUTO: 1.32 K/UL — SIGNIFICANT CHANGE UP (ref 1–3.3)
LYMPHOCYTES # BLD AUTO: 21.3 % — SIGNIFICANT CHANGE UP (ref 13–44)
MAGNESIUM SERPL-MCNC: 1.8 MG/DL — SIGNIFICANT CHANGE UP (ref 1.6–2.6)
MCHC RBC-ENTMCNC: 30.5 % — LOW (ref 32–36)
MCHC RBC-ENTMCNC: 31.6 PG — SIGNIFICANT CHANGE UP (ref 27–34)
MCV RBC AUTO: 103.5 FL — HIGH (ref 80–100)
MONOCYTES # BLD AUTO: 0.56 K/UL — SIGNIFICANT CHANGE UP (ref 0–0.9)
MONOCYTES NFR BLD AUTO: 9 % — SIGNIFICANT CHANGE UP (ref 2–14)
NEUTROPHILS # BLD AUTO: 4.19 K/UL — SIGNIFICANT CHANGE UP (ref 1.8–7.4)
NEUTROPHILS NFR BLD AUTO: 67.7 % — SIGNIFICANT CHANGE UP (ref 43–77)
NRBC # FLD: 0.02 — SIGNIFICANT CHANGE UP
PCO2 BLDA: 48 MMHG — SIGNIFICANT CHANGE UP (ref 35–48)
PH BLDA: 7.37 PH — SIGNIFICANT CHANGE UP (ref 7.35–7.45)
PHOSPHATE SERPL-MCNC: 2.9 MG/DL — SIGNIFICANT CHANGE UP (ref 2.5–4.5)
PLATELET # BLD AUTO: 371 K/UL — SIGNIFICANT CHANGE UP (ref 150–400)
PMV BLD: 10.7 FL — SIGNIFICANT CHANGE UP (ref 7–13)
PO2 BLDA: 88 MMHG — SIGNIFICANT CHANGE UP (ref 83–108)
POTASSIUM BLDA-SCNC: 4 MMOL/L — SIGNIFICANT CHANGE UP (ref 3.4–4.5)
POTASSIUM SERPL-MCNC: 4.1 MMOL/L — SIGNIFICANT CHANGE UP (ref 3.5–5.3)
POTASSIUM SERPL-SCNC: 4.1 MMOL/L — SIGNIFICANT CHANGE UP (ref 3.5–5.3)
PROTHROM AB SERPL-ACNC: 18 SEC — HIGH (ref 9.8–13.1)
RBC # BLD: 3.1 M/UL — LOW (ref 4.2–5.8)
RBC # FLD: 22.4 % — HIGH (ref 10.3–14.5)
SAO2 % BLDA: 97.5 % — SIGNIFICANT CHANGE UP (ref 95–99)
SODIUM BLDA-SCNC: 135 MMOL/L — LOW (ref 136–146)
SODIUM SERPL-SCNC: 140 MMOL/L — SIGNIFICANT CHANGE UP (ref 135–145)
SPECIMEN SOURCE: SIGNIFICANT CHANGE UP
SPECIMEN SOURCE: SIGNIFICANT CHANGE UP
WBC # BLD: 6.2 K/UL — SIGNIFICANT CHANGE UP (ref 3.8–10.5)
WBC # FLD AUTO: 6.2 K/UL — SIGNIFICANT CHANGE UP (ref 3.8–10.5)

## 2019-01-03 PROCEDURE — 99291 CRITICAL CARE FIRST HOUR: CPT

## 2019-01-03 PROCEDURE — 93308 TTE F-UP OR LMTD: CPT | Mod: 26,GC

## 2019-01-03 PROCEDURE — 99233 SBSQ HOSP IP/OBS HIGH 50: CPT

## 2019-01-03 PROCEDURE — 99232 SBSQ HOSP IP/OBS MODERATE 35: CPT | Mod: GC

## 2019-01-03 PROCEDURE — 76604 US EXAM CHEST: CPT | Mod: 26,GC

## 2019-01-03 RX ORDER — SODIUM CHLORIDE 9 MG/ML
1000 INJECTION, SOLUTION INTRAVENOUS
Qty: 0 | Refills: 0 | Status: DISCONTINUED | OUTPATIENT
Start: 2019-01-03 | End: 2019-01-04

## 2019-01-03 RX ORDER — HYDROCORTISONE 20 MG
100 TABLET ORAL EVERY 8 HOURS
Qty: 0 | Refills: 0 | Status: DISCONTINUED | OUTPATIENT
Start: 2019-01-03 | End: 2019-01-05

## 2019-01-03 RX ADMIN — CHLORHEXIDINE GLUCONATE 15 MILLILITER(S): 213 SOLUTION TOPICAL at 06:23

## 2019-01-03 RX ADMIN — SODIUM CHLORIDE 50 MILLILITER(S): 9 INJECTION, SOLUTION INTRAVENOUS at 12:06

## 2019-01-03 RX ADMIN — PREGABALIN 1000 MICROGRAM(S): 225 CAPSULE ORAL at 11:22

## 2019-01-03 RX ADMIN — HEPARIN SODIUM 5000 UNIT(S): 5000 INJECTION INTRAVENOUS; SUBCUTANEOUS at 22:00

## 2019-01-03 RX ADMIN — IMIPENEM AND CILASTATIN 100 MILLIGRAM(S): 250; 250 INJECTION, POWDER, FOR SOLUTION INTRAVENOUS at 23:41

## 2019-01-03 RX ADMIN — IMIPENEM AND CILASTATIN 100 MILLIGRAM(S): 250; 250 INJECTION, POWDER, FOR SOLUTION INTRAVENOUS at 12:04

## 2019-01-03 RX ADMIN — QUETIAPINE FUMARATE 25 MILLIGRAM(S): 200 TABLET, FILM COATED ORAL at 11:21

## 2019-01-03 RX ADMIN — MIDODRINE HYDROCHLORIDE 20 MILLIGRAM(S): 2.5 TABLET ORAL at 00:00

## 2019-01-03 RX ADMIN — Medication 100 MILLIGRAM(S): at 13:50

## 2019-01-03 RX ADMIN — Medication 100 MILLIGRAM(S): at 21:05

## 2019-01-03 RX ADMIN — CHLORHEXIDINE GLUCONATE 1 APPLICATION(S): 213 SOLUTION TOPICAL at 11:22

## 2019-01-03 RX ADMIN — MIDODRINE HYDROCHLORIDE 20 MILLIGRAM(S): 2.5 TABLET ORAL at 17:49

## 2019-01-03 RX ADMIN — IMIPENEM AND CILASTATIN 100 MILLIGRAM(S): 250; 250 INJECTION, POWDER, FOR SOLUTION INTRAVENOUS at 00:00

## 2019-01-03 RX ADMIN — CHLORHEXIDINE GLUCONATE 15 MILLILITER(S): 213 SOLUTION TOPICAL at 17:49

## 2019-01-03 RX ADMIN — MIDODRINE HYDROCHLORIDE 20 MILLIGRAM(S): 2.5 TABLET ORAL at 09:38

## 2019-01-03 RX ADMIN — Medication 650 MILLIGRAM(S): at 06:24

## 2019-01-03 RX ADMIN — Medication 1 MILLIGRAM(S): at 11:22

## 2019-01-03 RX ADMIN — RISPERIDONE 1 MILLIGRAM(S): 4 TABLET ORAL at 11:22

## 2019-01-03 RX ADMIN — Medication 650 MILLIGRAM(S): at 06:32

## 2019-01-03 RX ADMIN — SODIUM CHLORIDE 50 MILLILITER(S): 9 INJECTION, SOLUTION INTRAVENOUS at 06:25

## 2019-01-03 RX ADMIN — LACOSAMIDE 100 MILLIGRAM(S): 50 TABLET ORAL at 06:24

## 2019-01-03 RX ADMIN — IMIPENEM AND CILASTATIN 100 MILLIGRAM(S): 250; 250 INJECTION, POWDER, FOR SOLUTION INTRAVENOUS at 17:49

## 2019-01-03 RX ADMIN — LACOSAMIDE 100 MILLIGRAM(S): 50 TABLET ORAL at 19:23

## 2019-01-03 RX ADMIN — IMIPENEM AND CILASTATIN 100 MILLIGRAM(S): 250; 250 INJECTION, POWDER, FOR SOLUTION INTRAVENOUS at 06:23

## 2019-01-03 NOTE — PROGRESS NOTE ADULT - SUBJECTIVE AND OBJECTIVE BOX
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 650 milliGRAM(s) Oral every 6 hours PRN  chlorhexidine 0.12% Liquid 15 milliLiter(s) Oral Mucosa two times a day  chlorhexidine 4% Liquid 1 Application(s) Topical <User Schedule>  cyanocobalamin 1000 MICROGram(s) Oral daily  dextrose 40% Gel 15 Gram(s) Oral once PRN  dextrose 5% + sodium chloride 0.45%. 1000 milliLiter(s) IV Continuous <Continuous>  dextrose 5%. 1000 milliLiter(s) IV Continuous <Continuous>  dextrose 50% Injectable 12.5 Gram(s) IV Push once  dextrose 50% Injectable 25 Gram(s) IV Push once  dextrose 50% Injectable 25 Gram(s) IV Push once  folic acid 1 milliGRAM(s) Enteral Tube daily  glucagon  Injectable 1 milliGRAM(s) IntraMuscular once PRN  hydrocortisone sodium succinate Injectable 100 milliGRAM(s) IV Push every 8 hours  insulin lispro (HumaLOG) corrective regimen sliding scale   SubCutaneous every 6 hours  lacosamide Solution 100 milliGRAM(s) Oral two times a day  midodrine 20 milliGRAM(s) Oral three times a day  QUEtiapine 25 milliGRAM(s) Oral daily  risperiDONE   Solution 1 milliGRAM(s) Oral daily      Vital Signs Last 24 Hrs  T(C): 37.2 (03 Jan 2019 06:50), Max: 38.2 (02 Jan 2019 16:00)  T(F): 99 (03 Jan 2019 06:50), Max: 100.7 (02 Jan 2019 16:00)  HR: 118 (03 Jan 2019 09:00) (100 - 128)  BP: 91/58 (03 Jan 2019 09:00) (85/45 - 107/85)  BP(mean): 63 (03 Jan 2019 09:00) (50 - 89)  RR: 17 (03 Jan 2019 09:00) (14 - 28)  SpO2: 96% (03 Jan 2019 09:00) (92% - 100%)    Physical Exam:  General:    NAD,  non toxic, intubated on vent  Head: atraumatic, normocephalic  Eye: normal sclera and conjunctiva  ENT:    ET tube,  no LAD,   neck supple  Cardio:     regular S1, S2,  no murmur  Respiratory:    clear b/l,    no wheezing, still thick secretions  abd:     soft,   BS +,   no tenderness, PEG with no erythema or drainage  :   no CVAT,  no suprapubic tenderness,  +  tavares  Musculoskeletal:   no joint swelling  vascular: no lines, normal pulses  Skin:    no rash  Neurologic:    unable to assess as pt intubated                          9.8    6.20  )-----------( 371      ( 03 Jan 2019 03:40 )             32.1       01-03    140  |  107  |  7   ----------------------------<  86  4.1   |  25  |  0.22<L>    Ca    8.0<L>      03 Jan 2019 03:40  Phos  2.9     01-03  Mg     1.8     01-03    TPro  5.3<L>  /  Alb  0.6<L>  /  TBili  0.7  /  DBili  x   /  AST  28  /  ALT  8   /  AlkPhos  144<H>  01-02          MICROBIOLOGY:  v  BLOOD PERIPHERAL  12-27-18 --  --  --      BLOOD VENOUS  12-24-18 --  --  --      BLOOD PERIPHERAL  12-24-18 --  --  --      BLOOD PERIPHERAL  12-23-18 --  --  --      BLOOD VENOUS  12-22-18 --  --  BLOOD CULTURE PCR  Staphylococcus sp.,coag neg      SPUTUM  12-18-18 --  --  Acinetobacter baumannii       BLOOD PERIPHERAL  12-18-18 --  --  --      BRONCHIAL LAVAGE  12-12-18 --  --  Pseudomonas aeruginosa      URINE CATHETER  12-11-18 --  --  --      BLOOD PERIPHERAL  12-11-18 --  --  --                RADIOLOGY:  Images below reviewed personally  < from: Xray Chest 1 View- PORTABLE-Urgent (01.01.19 @ 13:48) >      IMPRESSION:  Bilateral layering pleural effusions with endotracheal tube   tip above the kieran.      < from: CT Angio Abdomen and Pelvis w/ IV Cont (12.27.18 @ 16:00) >    Focal site of active site of bleeding in the distal transverse colon.     Fluid-filled colon with stable nonspecific mural thickening of the   descending colon may represent colitis.    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 No interval development of foci of gas or   thick peripheral wall to suggest infection of the collections. Unchanged   thrombosed splenic vein.    Moderate bilateral pleural effusions with adjacent atelectasis. Patchy   opacities in the lingula and right middle lobe are stable and may be   infectious in etiology.

## 2019-01-03 NOTE — CHART NOTE - NSCHARTNOTEFT_GEN_A_CORE
Source:  nursing and EMR     Diet : NPO     Patient s/p extubation, maintained NPO for procedure , was on EN support, EN ordered was insufficient , pt. unable to speak, noted 2+ generalized edema and 3+ scrotum edema / penis edema . Will benefit from increased EN if enteral feed indicated .     Current Weight: - 92.3 kg on 1/3/18; 88kg on 1/2/18; 99.7 kg on 12/10/18 - wt. fluctuating; IBW - 62 KG      Pertinent Medications: MEDICATIONS  (STANDING):  chlorhexidine 0.12% Liquid 15 milliLiter(s) Oral Mucosa two times a day  chlorhexidine 4% Liquid 1 Application(s) Topical <User Schedule>  cyanocobalamin 1000 MICROGram(s) Oral daily  dextrose 5% + sodium chloride 0.45%. 1000 milliLiter(s) (50 mL/Hr) IV Continuous <Continuous>  dextrose 5%. 1000 milliLiter(s) (50 mL/Hr) IV Continuous <Continuous>  dextrose 50% Injectable 12.5 Gram(s) IV Push once  dextrose 50% Injectable 25 Gram(s) IV Push once  dextrose 50% Injectable 25 Gram(s) IV Push once  folic acid 1 milliGRAM(s) Enteral Tube daily  hydrocortisone sodium succinate Injectable 100 milliGRAM(s) IV Push every 8 hours  imipenem/cilastatin  IVPB 500 milliGRAM(s) IV Intermittent every 6 hours  insulin lispro (HumaLOG) corrective regimen sliding scale   SubCutaneous every 6 hours  lacosamide Solution 100 milliGRAM(s) Oral two times a day  midodrine 20 milliGRAM(s) Oral three times a day  QUEtiapine 25 milliGRAM(s) Oral daily  risperiDONE   Solution 1 milliGRAM(s) Oral daily    MEDICATIONS  (PRN):  acetaminophen    Suspension .. 650 milliGRAM(s) Oral every 6 hours PRN Temp greater or equal to 38C (100.4F), Mild Pain (1 - 3), Moderate Pain (4 - 6)  dextrose 40% Gel 15 Gram(s) Oral once PRN Blood Glucose LESS THAN 70 milliGRAM(s)/deciliter  glucagon  Injectable 1 milliGRAM(s) IntraMuscular once PRN Glucose LESS THAN 70 milligrams/deciliter    Pertinent Labs:  01-03 Na140 mmol/L Glu 86 mg/dL K+ 4.1 mmol/L Cr  0.22 mg/dL<L> BUN 7 mg/dL 01-03 Phos 2.9 mg/dL 01-02 Alb 0.6 g/dL<L> 12-29 [id number] 4.8 % 12-13 Chol --    LDL --    HDL --    Trig 337 mg/dL<H>      Recommend Jevity 1.2 @ 55ml/hr 24hrs if EN indicated = 1584 kcal &73 gm protein/d VS PO as per evaluation.    Monitoring and Evaluation: Tolerance to diet prescription , weights and follow up per protocol

## 2019-01-03 NOTE — CHART NOTE - NSCHARTNOTEFT_GEN_A_CORE
: Miya Valle    INDICATION: Hypoxic respiratory failure with ARDS    PROCEDURE:  [x ] LIMITED ECHO  [x ] LIMITED CHEST  [ ] LIMITED RETROPERITONEAL  [ ] LIMITED ABDOMINAL  [ ] LIMITED DVT  [ ] NEEDLE GUIDANCE VASCULAR  [ ] NEEDLE GUIDANCE THORACENTESIS  [ ] NEEDLE GUIDANCE PARACENTESIS  [ ] NEEDLE GUIDANCE PERICARDIOCENTESIS  [ ] OTHER    FINDINGS:  - A line predominant bilaterally  - Left lung consolidation   - Normal LV systolic function    INTERPRETATION: Hypoxic respiratory failure due to left-sided pneumonia; ARDS likely resolved : Miya Valle    INDICATION: Hypoxic respiratory failure with ARDS    PROCEDURE:  [X ] LIMITED ECHO  [X ] LIMITED CHEST  [ ] LIMITED RETROPERITONEAL  [ ] LIMITED ABDOMINAL  [ ] LIMITED DVT  [ ] NEEDLE GUIDANCE VASCULAR  [ ] NEEDLE GUIDANCE THORACENTESIS  [ ] NEEDLE GUIDANCE PARACENTESIS  [ ] NEEDLE GUIDANCE PERICARDIOCENTESIS  [ ] OTHER    FINDINGS:  - A line predominant bilaterally  - Left lung consolidation   - Normal LV systolic function    INTERPRETATION: Left-sided pneumonia; ARDS likely resolved : Miya Valle    INDICATION: Hypoxic respiratory failure with ARDS    PROCEDURE:  [X ] LIMITED ECHO  [X ] LIMITED CHEST  [ ] LIMITED RETROPERITONEAL  [ ] LIMITED ABDOMINAL  [ ] LIMITED DVT  [ ] NEEDLE GUIDANCE VASCULAR  [ ] NEEDLE GUIDANCE THORACENTESIS  [ ] NEEDLE GUIDANCE PARACENTESIS  [ ] NEEDLE GUIDANCE PERICARDIOCENTESIS  [ ] OTHER    FINDINGS:  - A line predominant bilaterally  - Left lung consolidation   - Normal LV systolic function    INTERPRETATION: Left-sided pneumonia; ARDS likely resolved    Attending note:  At bedside for procedure, agree with above.

## 2019-01-03 NOTE — PROGRESS NOTE ADULT - ASSESSMENT
52 y/o Male (resident of [facility]) w/ a PMHx of TBI, s/p PEG tube, contracture, and seizure d/o who presented as a transfer from [facility] on 12/9 for respiratory failure 2/2 ARDS likely 2/2 necrotizing pancreatitis s/p intubation. Course c/b Acinetobacter PNA s/p Avycaz and Flagyl 7 day course.  Course complicated by acute blood loss anemia s/p colonoscopy with findings suggestive of ischemic colitis, without further bleeding and Hgb remaining stable. Plan for trach today.     #Neurology/Psych  - Off sedation. Awake. Follows simple commands  - Seizure Disorder- Continue Lacosamide.   - Continue Risperidone and Quetiapine     # Cardiovascular  - On Midodrine 20 TID, but still with soft BP. Continue to monitor BP closely.  - Cortisol wnl    # Respiratory  - ARDS 2/2 Necrotizing Pancreatitis.   - Acinetobacter PNA  - s/p intubation. Plan for trach today.      #GI  - Necrotizing Pancreatitis with enlarging danay-pancreatitic fluid collections  - Acute Blood Loss Anemia 2/2 Ischemic Colitis   - Per GI currently not a candidate for endoscopic intervention.   - s/p Avycaz and flagyl, Currently on Imipenem for necrotizing pancreatitis.  - Colorectal Sx: no surgical intervention at this time, can restart feeds  - IR: no role for embolization at this time as patient with ischemic colitis     - amylase/lipase wnl this morning      #  - Ahuja placement.    # Renal/Electrolytes  - No active issues. Replete electrolytes as needed.     #ID   - Pseudomonas PNA: BAL on 12/12 was positive for pseudomonas aeruginosa. Repeat sputum cx w/ Acinetobacter resistant to carbapenems. s/p Avycaz and flagyl for 7 day course.   - Imipenem for pancreatitis as above     #Hematology/Oncology   - Acute Blood Loss Anemia as described above. Hgb stable above 9. Transfusion goal Hgb >7.     #Endocrine  - Continue to monitor ISS and FS.   - cortisol wnl     #DVT PPx:   - Hep subq (held this morning for Trach) 52 y/o Male (resident of [facility]) w/ a PMHx of TBI, s/p PEG tube, contracture, and seizure d/o who presented as a transfer from [facility] on 12/9 for respiratory failure 2/2 ARDS likely 2/2 necrotizing pancreatitis s/p intubation. Course c/b Acinetobacter PNA s/p Avycaz and Flagyl 7 day course.  Course complicated by acute blood loss anemia s/p colonoscopy with findings suggestive of ischemic colitis, without further bleeding and Hgb remaining stable. Plan for trach today.     #Neurology/Psych  - Off sedation. Awake. Follows simple commands  - Seizure Disorder- Continue Lacosamide.   - Continue Risperidone and Quetiapine     # Cardiovascular  - On Midodrine 20 TID, but still with soft BP. Continue to monitor BP closely.  - Cortisol wnl    # Respiratory  - ARDS 2/2 Necrotizing Pancreatitis.   - Acinetobacter PNA  - s/p intubation. Plan for trach today.      #GI  - Necrotizing Pancreatitis with enlarging danay-pancreatitic fluid collections  - Acute Blood Loss Anemia 2/2 Ischemic Colitis   - Per GI currently not a candidate for endoscopic intervention.   - s/p Avycaz and flagyl, Currently on Imipenem for necrotizing pancreatitis.  - Colorectal Sx: no surgical intervention at this time, can restart feeds  - IR: no role for embolization at this time as patient with ischemic colitis     - amylase/lipase wnl this morning      #  - Ahuja placement.    # Renal/Electrolytes  - No active issues. Replete electrolytes as needed.     #ID   - Pseudomonas PNA: BAL on 12/12 was positive for pseudomonas aeruginosa. Repeat sputum cx w/ Acinetobacter resistant to carbapenems. s/p Avycaz and flagyl for 7 day course.   - Imipenem for pancreatitis as above     #Hematology/Oncology   - Acute Blood Loss Anemia as described above. Hgb stable above 9. Transfusion goal Hgb >7.     #Endocrine  - FSG in 70's this morning, started D5 at 50 cc/hr   - Continue to monitor FSG's.   - cortisol wnl     #DVT PPx:   - Hep subq (held this morning for Trach) 54 y/o Male (resident of [facility]) w/ a PMHx of TBI, s/p PEG tube, contracture, and seizure d/o who presented as a transfer from [facility] on 12/9 for respiratory failure 2/2 ARDS likely 2/2 necrotizing pancreatitis s/p intubation. Course c/b Acinetobacter PNA s/p Avycaz and Flagyl 7 day course.  Course complicated by acute blood loss anemia s/p colonoscopy with findings suggestive of ischemic colitis, without further bleeding and Hgb remaining stable. Plan for trach today.     #Neurology/Psych  - Off sedation. Awake. Follows simple commands  - Seizure Disorder- Continue Lacosamide.   - Continue Risperidone and Quetiapine     # Cardiovascular  - On Midodrine 20 TID, but still with soft BP. Cortisol 9. Will start stress dose steroids. Continue to monitor BP closely.    # Respiratory  - ARDS 2/2 Necrotizing Pancreatitis.   - Acinetobacter PNA  - s/p intubation. Plan for trach today.      #GI  - Necrotizing Pancreatitis with enlarging danay-pancreatitic fluid collections  - Acute Blood Loss Anemia 2/2 Ischemic Colitis   - Per GI currently not a candidate for endoscopic intervention.   - s/p Avycaz and flagyl, Currently on Imipenem for necrotizing pancreatitis.  - Colorectal Sx: no surgical intervention at this time, can restart feeds  - IR: no role for embolization at this time as patient with ischemic colitis     - amylase/lipase wnl this morning      #  - Ahuja placement.    # Renal/Electrolytes  - No active issues. Replete electrolytes as needed.     #ID   - Pseudomonas PNA: BAL on 12/12 was positive for pseudomonas aeruginosa. Repeat sputum cx w/ Acinetobacter resistant to carbapenems. s/p Avycaz and flagyl for 7 day course.   - Imipenem for pancreatitis as above     #Hematology/Oncology   - Acute Blood Loss Anemia as described above. Hgb stable above 9. Transfusion goal Hgb >7.     #Endocrine  - FSG in 70's this morning, started D5 at 50 cc/hr   - Continue to monitor FSG's.   - cortisol wnl     #DVT PPx:   - Hep subq (held this morning for Trach)

## 2019-01-03 NOTE — PROGRESS NOTE ADULT - ASSESSMENT
53  M (resident of [facility]) with TBI, s/p PEG tube, contracture, and seizure d/o who presented as a transfer from [facility] on 12/9 for respiratory failure 2/2 ARDS likely 2/2 necrotizing pancreatitis s/p intubation.   now on day 11 of Imipenem   [facility] 12/11 with pseudomonas  pt with thick secretions and again febrile, sputum cx with  acinetobacter  repeat Ct with increased size of pancreatic collection but not walled off so no interventions were recommended  also had GIB and colitis due to ?contiguous necrotizing pancreatitis vs ischemic, s/p colonoscopy but not actively bleeding now  completed a 7 day course of avycaz and flagyl for the acinetobacter now back on imipenem    sepsis with fever, leukopenia resolved but still respiratory failure on vent   Necrotising Pancreatitis with multiple peripancreatitis collections    acinetobacter PNA vs colonization, was treated as pt had thick secretions, still with LLL pneumonia on u/s  coag neg staph bacteremia likely contaminant, repeat negative  colitis, ?due to contiguous necrotizing pancreatitis and collection vs ischemic    no plans for IR embolization for surgical interventions    * f/u the blood cx  * s/p 7 days of acycaz and flagyl for  acinetobacter in the sputum  *  c/w Imipenem for the necrotizing pancreatitis for now as pt still febrile   * f/u the  repeat CT

## 2019-01-03 NOTE — PROGRESS NOTE ADULT - ASSESSMENT
Impression  - Hematochezia, s/p colonoscopy on 12/28/2018 with Severe segemental colitis localized to the transverse colon and ascending colon (possible ischemic colitis, possible colitis related to contiguous danay-pancreatic inflammatory process). Biopsied.   - Enlarging danay-pancreatic collection with pancreatitis, CT 12.25 with pancreatic necrosis, but no wall formed yet  - Septic, distributive shock.  acinetobacter PNA and pseudomonas in bronch.  - Seizure disorder    Recommendations    - trend CBC, CMP and fever curve  - pending CT abdomen to evaluate the pancreatic lesion (if patient has a wall around the collection, then will consider endoscopic intervention)  - rest of care per primary team      Isa Mike, PGY-5  GI fellow  B- [phone]/ [phone]  Please call GI fellow on call after 5pm and on weekends

## 2019-01-03 NOTE — PROGRESS NOTE ADULT - ATTENDING COMMENTS
Agree with above. Seen and examined with residents. Weaning trials today. If fails will do tracheostomy.   Stable pancreatitis.
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
## 2019-01-03 NOTE — PROGRESS NOTE ADULT - SUBJECTIVE AND OBJECTIVE BOX
SUBJECTIVE / OVERNIGHT EVENTS: Patient had no acute events overnight. Patient seen and examined at bedside this morning.     ROS: unable to obtain       OBJECTIVE:  ICU Vital Signs Last 24 Hrs  T(C): 37.2 (03 Jan 2019 06:50), Max: 38.2 (02 Jan 2019 16:00)  T(F): 99 (03 Jan 2019 06:50), Max: 100.7 (02 Jan 2019 16:00)  HR: 114 (03 Jan 2019 07:00) (100 - 128)  BP: 99/72 (03 Jan 2019 07:00) (85/45 - 107/85)  BP(mean): 79 (03 Jan 2019 07:00) (50 - 89)  ABP: --  ABP(mean): --  RR: 18 (03 Jan 2019 07:00) (14 - 36)  SpO2: 99% (03 Jan 2019 07:00) (92% - 100%)    Mode: AC/ CMV (Assist Control/ Continuous Mandatory Ventilation), RR (machine): 14, TV (machine): 380, FiO2: 70, PEEP: 5, MAP: 10, PIP: 24    01-02 @ 07:01  -  01-03 @ 07:00  --------------------------------------------------------  IN: 1040 mL / OUT: 935 mL / NET: 105 mL      CAPILLARY BLOOD GLUCOSE      POCT Blood Glucose.: 79 mg/dL (03 Jan 2019 05:26)      PHYSICAL EXAM:    GENERAL: Intubated  HEAD:  Atraumatic, Normocephalic  EYES: EOMI, PERRLA, conjunctiva and sclera clear  NECK: Supple, No JVD  CHEST/LUNG: On Ventilator. CTABL   HEART: Regular rate and rhythm; No murmurs, rubs, or gallops  ABDOMEN: Soft, Nontender, Nondistended; Bowel sounds present  EXTREMITIES:  2+ LE swelling  NEURO: contracted.  Able to track. Follow limited commands    HOSPITAL MEDICATIONS:  Standing Meds:  chlorhexidine 0.12% Liquid 15 milliLiter(s) Oral Mucosa two times a day  chlorhexidine 4% Liquid 1 Application(s) Topical <User Schedule>  cyanocobalamin 1000 MICROGram(s) Oral daily  dextrose 5% + sodium chloride 0.45%. 1000 milliLiter(s) IV Continuous <Continuous>  dextrose 5%. 1000 milliLiter(s) IV Continuous <Continuous>  dextrose 50% Injectable 12.5 Gram(s) IV Push once  dextrose 50% Injectable 25 Gram(s) IV Push once  dextrose 50% Injectable 25 Gram(s) IV Push once  folic acid 1 milliGRAM(s) Enteral Tube daily  imipenem/cilastatin  IVPB 500 milliGRAM(s) IV Intermittent every 6 hours  insulin lispro (HumaLOG) corrective regimen sliding scale   SubCutaneous every 6 hours  lacosamide Solution 100 milliGRAM(s) Oral two times a day  midodrine 20 milliGRAM(s) Oral three times a day  QUEtiapine 25 milliGRAM(s) Oral daily  risperiDONE   Solution 1 milliGRAM(s) Oral daily      PRN Meds:  acetaminophen    Suspension .. 650 milliGRAM(s) Oral every 6 hours PRN  dextrose 40% Gel 15 Gram(s) Oral once PRN  glucagon  Injectable 1 milliGRAM(s) IntraMuscular once PRN      LABS:                        9.8    6.20  )-----------( 371      ( 03 Jan 2019 03:40 )             32.1     Hgb Trend: 9.8<--, 9.9<--, 9.0<--, 9.2<--, 9.6<--  01-03    140  |  107  |  7   ----------------------------<  86  4.1   |  25  |  0.22<L>    Ca    8.0<L>      03 Jan 2019 03:40  Phos  2.9     01-03  Mg     1.8     01-03    TPro  5.3<L>  /  Alb  0.6<L>  /  TBili  0.7  /  DBili  x   /  AST  28  /  ALT  8   /  AlkPhos  144<H>  01-02    Creatinine Trend: 0.22<--, < 0.20<--, 0.24<--, 0.25<--, 0.28<--, 0.27<--  PT/INR - ( 03 Jan 2019 03:40 )   PT: 18.0 SEC;   INR: 1.56          PTT - ( 03 Jan 2019 03:40 )  PTT:35.3 SEC

## 2019-01-04 LAB
ALBUMIN SERPL ELPH-MCNC: 0.9 G/DL — LOW (ref 3.3–5)
ALP SERPL-CCNC: 155 U/L — HIGH (ref 40–120)
ALT FLD-CCNC: 11 U/L — SIGNIFICANT CHANGE UP (ref 4–41)
AST SERPL-CCNC: 26 U/L — SIGNIFICANT CHANGE UP (ref 4–40)
BASE EXCESS BLDA CALC-SCNC: 4.3 MMOL/L — SIGNIFICANT CHANGE UP
BILIRUB SERPL-MCNC: 0.6 MG/DL — SIGNIFICANT CHANGE UP (ref 0.2–1.2)
BUN SERPL-MCNC: 6 MG/DL — LOW (ref 7–23)
BUN SERPL-MCNC: 7 MG/DL — SIGNIFICANT CHANGE UP (ref 7–23)
CALCIUM SERPL-MCNC: 7.8 MG/DL — LOW (ref 8.4–10.5)
CALCIUM SERPL-MCNC: 7.8 MG/DL — LOW (ref 8.4–10.5)
CHLORIDE BLDA-SCNC: 106 MMOL/L — SIGNIFICANT CHANGE UP (ref 96–108)
CHLORIDE SERPL-SCNC: 105 MMOL/L — SIGNIFICANT CHANGE UP (ref 98–107)
CHLORIDE SERPL-SCNC: 108 MMOL/L — HIGH (ref 98–107)
CO2 SERPL-SCNC: 21 MMOL/L — LOW (ref 22–31)
CO2 SERPL-SCNC: 25 MMOL/L — SIGNIFICANT CHANGE UP (ref 22–31)
CREAT SERPL-MCNC: < 0.2 MG/DL — LOW (ref 0.5–1.3)
CREAT SERPL-MCNC: < 0.2 MG/DL — LOW (ref 0.5–1.3)
GLUCOSE BLDA-MCNC: 146 MG/DL — HIGH (ref 70–99)
GLUCOSE BLDC GLUCOMTR-MCNC: 133 MG/DL — HIGH (ref 70–99)
GLUCOSE BLDC GLUCOMTR-MCNC: 139 MG/DL — HIGH (ref 70–99)
GLUCOSE BLDC GLUCOMTR-MCNC: 149 MG/DL — HIGH (ref 70–99)
GLUCOSE BLDC GLUCOMTR-MCNC: 152 MG/DL — HIGH (ref 70–99)
GLUCOSE SERPL-MCNC: 142 MG/DL — HIGH (ref 70–99)
GLUCOSE SERPL-MCNC: 151 MG/DL — HIGH (ref 70–99)
HCO3 BLDA-SCNC: 28 MMOL/L — HIGH (ref 22–26)
HCT VFR BLD CALC: 29.9 % — LOW (ref 39–50)
HCT VFR BLDA CALC: 17.3 % — CRITICAL LOW (ref 39–51)
HGB BLD-MCNC: 9.2 G/DL — LOW (ref 13–17)
HGB BLDA-MCNC: 5.5 G/DL — CRITICAL LOW (ref 13–17)
LACTATE BLDA-SCNC: 1 MMOL/L — SIGNIFICANT CHANGE UP (ref 0.5–2)
MAGNESIUM SERPL-MCNC: 1.8 MG/DL — SIGNIFICANT CHANGE UP (ref 1.6–2.6)
MAGNESIUM SERPL-MCNC: 1.8 MG/DL — SIGNIFICANT CHANGE UP (ref 1.6–2.6)
MCHC RBC-ENTMCNC: 30.8 % — LOW (ref 32–36)
MCHC RBC-ENTMCNC: 31.8 PG — SIGNIFICANT CHANGE UP (ref 27–34)
MCV RBC AUTO: 103.5 FL — HIGH (ref 80–100)
NRBC # FLD: 0 — SIGNIFICANT CHANGE UP
PCO2 BLDA: 58 MMHG — HIGH (ref 35–48)
PH BLDA: 7.33 PH — LOW (ref 7.35–7.45)
PHOSPHATE SERPL-MCNC: 3.3 MG/DL — SIGNIFICANT CHANGE UP (ref 2.5–4.5)
PHOSPHATE SERPL-MCNC: 3.7 MG/DL — SIGNIFICANT CHANGE UP (ref 2.5–4.5)
PLATELET # BLD AUTO: 441 K/UL — HIGH (ref 150–400)
PMV BLD: 10.7 FL — SIGNIFICANT CHANGE UP (ref 7–13)
PO2 BLDA: 140 MMHG — HIGH (ref 83–108)
POTASSIUM BLDA-SCNC: 6.3 MMOL/L — CRITICAL HIGH (ref 3.4–4.5)
POTASSIUM SERPL-MCNC: 4.6 MMOL/L — SIGNIFICANT CHANGE UP (ref 3.5–5.3)
POTASSIUM SERPL-MCNC: SIGNIFICANT CHANGE UP MMOL/L (ref 3.5–5.3)
POTASSIUM SERPL-SCNC: 4.6 MMOL/L — SIGNIFICANT CHANGE UP (ref 3.5–5.3)
POTASSIUM SERPL-SCNC: SIGNIFICANT CHANGE UP MMOL/L (ref 3.5–5.3)
PROT SERPL-MCNC: 5.5 G/DL — LOW (ref 6–8.3)
RBC # BLD: 2.89 M/UL — LOW (ref 4.2–5.8)
RBC # FLD: 21.2 % — HIGH (ref 10.3–14.5)
SAO2 % BLDA: 99.7 % — HIGH (ref 95–99)
SODIUM BLDA-SCNC: 132 MMOL/L — LOW (ref 136–146)
SODIUM SERPL-SCNC: 134 MMOL/L — LOW (ref 135–145)
SODIUM SERPL-SCNC: 135 MMOL/L — SIGNIFICANT CHANGE UP (ref 135–145)
WBC # BLD: 3.76 K/UL — LOW (ref 3.8–10.5)
WBC # FLD AUTO: 3.76 K/UL — LOW (ref 3.8–10.5)

## 2019-01-04 PROCEDURE — 99232 SBSQ HOSP IP/OBS MODERATE 35: CPT

## 2019-01-04 PROCEDURE — 99232 SBSQ HOSP IP/OBS MODERATE 35: CPT | Mod: GC

## 2019-01-04 PROCEDURE — 99291 CRITICAL CARE FIRST HOUR: CPT

## 2019-01-04 RX ORDER — RISPERIDONE 4 MG/1
1 TABLET ORAL DAILY
Qty: 0 | Refills: 0 | Status: DISCONTINUED | OUTPATIENT
Start: 2019-01-04 | End: 2019-03-07

## 2019-01-04 RX ORDER — MIDODRINE HYDROCHLORIDE 2.5 MG/1
20 TABLET ORAL THREE TIMES A DAY
Qty: 0 | Refills: 0 | Status: DISCONTINUED | OUTPATIENT
Start: 2019-01-04 | End: 2019-01-07

## 2019-01-04 RX ORDER — QUETIAPINE FUMARATE 200 MG/1
25 TABLET, FILM COATED ORAL DAILY
Qty: 0 | Refills: 0 | Status: DISCONTINUED | OUTPATIENT
Start: 2019-01-04 | End: 2019-03-07

## 2019-01-04 RX ORDER — LACOSAMIDE 50 MG/1
100 TABLET ORAL
Qty: 0 | Refills: 0 | Status: DISCONTINUED | OUTPATIENT
Start: 2019-01-04 | End: 2019-01-10

## 2019-01-04 RX ORDER — PREGABALIN 225 MG/1
1000 CAPSULE ORAL DAILY
Qty: 0 | Refills: 0 | Status: DISCONTINUED | OUTPATIENT
Start: 2019-01-04 | End: 2019-03-07

## 2019-01-04 RX ORDER — FOLIC ACID 0.8 MG
1 TABLET ORAL DAILY
Qty: 0 | Refills: 0 | Status: DISCONTINUED | OUTPATIENT
Start: 2019-01-04 | End: 2019-03-07

## 2019-01-04 RX ORDER — SODIUM CHLORIDE 9 MG/ML
4 INJECTION INTRAMUSCULAR; INTRAVENOUS; SUBCUTANEOUS EVERY 6 HOURS
Qty: 0 | Refills: 0 | Status: DISCONTINUED | OUTPATIENT
Start: 2019-01-04 | End: 2019-01-13

## 2019-01-04 RX ADMIN — QUETIAPINE FUMARATE 25 MILLIGRAM(S): 200 TABLET, FILM COATED ORAL at 11:41

## 2019-01-04 RX ADMIN — MIDODRINE HYDROCHLORIDE 20 MILLIGRAM(S): 2.5 TABLET ORAL at 14:13

## 2019-01-04 RX ADMIN — IMIPENEM AND CILASTATIN 100 MILLIGRAM(S): 250; 250 INJECTION, POWDER, FOR SOLUTION INTRAVENOUS at 17:01

## 2019-01-04 RX ADMIN — Medication 650 MILLIGRAM(S): at 17:30

## 2019-01-04 RX ADMIN — SODIUM CHLORIDE 4 MILLILITER(S): 9 INJECTION INTRAMUSCULAR; INTRAVENOUS; SUBCUTANEOUS at 10:26

## 2019-01-04 RX ADMIN — LACOSAMIDE 100 MILLIGRAM(S): 50 TABLET ORAL at 06:11

## 2019-01-04 RX ADMIN — MIDODRINE HYDROCHLORIDE 20 MILLIGRAM(S): 2.5 TABLET ORAL at 05:25

## 2019-01-04 RX ADMIN — PREGABALIN 1000 MICROGRAM(S): 225 CAPSULE ORAL at 11:41

## 2019-01-04 RX ADMIN — Medication 100 MILLIGRAM(S): at 14:12

## 2019-01-04 RX ADMIN — LACOSAMIDE 100 MILLIGRAM(S): 50 TABLET ORAL at 17:16

## 2019-01-04 RX ADMIN — Medication 2: at 23:38

## 2019-01-04 RX ADMIN — Medication 100 MILLIGRAM(S): at 05:25

## 2019-01-04 RX ADMIN — SODIUM CHLORIDE 4 MILLILITER(S): 9 INJECTION INTRAMUSCULAR; INTRAVENOUS; SUBCUTANEOUS at 04:19

## 2019-01-04 RX ADMIN — IMIPENEM AND CILASTATIN 100 MILLIGRAM(S): 250; 250 INJECTION, POWDER, FOR SOLUTION INTRAVENOUS at 11:55

## 2019-01-04 RX ADMIN — MIDODRINE HYDROCHLORIDE 20 MILLIGRAM(S): 2.5 TABLET ORAL at 21:02

## 2019-01-04 RX ADMIN — CHLORHEXIDINE GLUCONATE 1 APPLICATION(S): 213 SOLUTION TOPICAL at 11:41

## 2019-01-04 RX ADMIN — Medication 1 MILLIGRAM(S): at 11:41

## 2019-01-04 RX ADMIN — SODIUM CHLORIDE 4 MILLILITER(S): 9 INJECTION INTRAMUSCULAR; INTRAVENOUS; SUBCUTANEOUS at 16:53

## 2019-01-04 RX ADMIN — Medication 100 MILLIGRAM(S): at 21:02

## 2019-01-04 RX ADMIN — SODIUM CHLORIDE 4 MILLILITER(S): 9 INJECTION INTRAMUSCULAR; INTRAVENOUS; SUBCUTANEOUS at 21:55

## 2019-01-04 RX ADMIN — RISPERIDONE 1 MILLIGRAM(S): 4 TABLET ORAL at 11:42

## 2019-01-04 RX ADMIN — Medication 650 MILLIGRAM(S): at 17:01

## 2019-01-04 RX ADMIN — IMIPENEM AND CILASTATIN 100 MILLIGRAM(S): 250; 250 INJECTION, POWDER, FOR SOLUTION INTRAVENOUS at 05:25

## 2019-01-04 RX ADMIN — SODIUM CHLORIDE 50 MILLILITER(S): 9 INJECTION, SOLUTION INTRAVENOUS at 01:43

## 2019-01-04 NOTE — PROGRESS NOTE ADULT - ASSESSMENT
52 y/o Male (resident of [facility]) w/ a PMHx of TBI (s/p PEG tube, contracture, and seizure d/o) presented as a transfer from Health system on 12/9 for respiratory failure 2/2 ARDS likely 2/2 necrotizing pancreatitis s/p intubation. Course c/b Acinetobacter PNA s/p Avycaz and Flagyl 7 day course.  Course further complicated by acute blood loss anemia s/p colonoscopy with findings suggestive of ischemic colitis, without further bleeding and Hgb remaining stable. S/p extubation to high flow 1/3.      #Neurology/Psych  - Off sedation. Awake. Follows simple commands  - Seizure Disorder- Continue Lacosamide.   - Continue Risperidone and Quetiapine     # Cardiovascular  - On Midodrine 20 TID, but still with soft BP.   - Cortisol 9. Started on stress dose steroids 1/3  - Continue to monitor BP closely.    # Respiratory  - ARDS 2/2 Necrotizing Pancreatitis.   - Acinetobacter PNA s/p abx course   - s/p extubation 1/3 to high flow, airway clearance vest and metanebs ordered to mobilize secretions     #GI  - Necrotizing Pancreatitis with enlarging danay-pancreatitic fluid collections, per GI: currently not a candidate for endoscopic intervention  - On Imipenem for necrotizing pancreatitis, ID following   - Acute Blood Loss Anemia 2/2 Ischemic Colitis, colorectal surgery: no surgical intervention, Hgb now stable without further bleeding      #  - Ahuja placement.    # Renal/Electrolytes  - No active issues. Replete electrolytes as needed.     #ID   - Pseudomonas PNA: BAL on 12/12 was positive for pseudomonas aeruginosa. Repeat sputum cx w/ Acinetobacter resistant to carbapenems. s/p Avycaz and flagyl for 7 day course.   - Imipenem for pancreatitis as above     #Hematology/Oncology   - Acute Blood Loss Anemia as described above. Hgb stable above 9. Transfusion goal Hgb >7.     #Endocrine  - 's this morning, continue to monitor FSG's.     #DVT PPx:   - Hep subq 54 y/o Male (resident of [facility]) w/ a PMHx of TBI (s/p PEG tube, contracture, and seizure d/o) presented as a transfer from [facility] on 12/9 for respiratory failure 2/2 ARDS likely 2/2 necrotizing pancreatitis s/p intubation. Course c/b Acinetobacter PNA s/p Avycaz and Flagyl 7 day course.  Course further complicated by acute blood loss anemia s/p colonoscopy with findings suggestive of ischemic colitis, without further bleeding and Hgb remaining stable. S/p extubation to high flow 1/3.      #Neurology/Psych  - Off sedation. Awake. Follows simple commands. More lethargic this morning.   - Seizure Disorder- Continue Lacosamide.   - Continue Risperidone and Quetiapine     # Cardiovascular  - On Midodrine 20 TID, but still with soft BP.   - Cortisol 9. Started on stress dose steroids 1/3  - Continue to monitor BP closely.    # Respiratory  - ARDS 2/2 Necrotizing Pancreatitis.   - Acinetobacter PNA s/p abx course   - s/p extubation 1/3 to high flow, airway clearance vest and metanebs ordered to mobilize secretions     #GI  - Necrotizing Pancreatitis with enlarging danay-pancreatitic fluid collections, per GI: currently not a candidate for endoscopic intervention  - On Imipenem for necrotizing pancreatitis, ID following   - Acute Blood Loss Anemia 2/2 Ischemic Colitis, colorectal surgery: no surgical intervention, Hgb now stable without further bleeding      #  - Ahuja placement.    # Renal/Electrolytes  - No active issues. Replete electrolytes as needed.     #ID   - Pseudomonas PNA: BAL on 12/12 was positive for pseudomonas aeruginosa. Repeat sputum cx w/ Acinetobacter resistant to carbapenems. s/p Avycaz and flagyl for 7 day course.   - Imipenem for pancreatitis as above     #Hematology/Oncology   - Acute Blood Loss Anemia as described above. Hgb stable above 9. Transfusion goal Hgb >7.     #Endocrine  - 's this morning, continue to monitor FSG's.     #DVT PPx:   - Hep subq 52 y/o Male (resident of [facility]) w/ a PMHx of TBI (s/p PEG tube, contracture, and seizure d/o) presented as a transfer from [facility] on 12/9 for respiratory failure 2/2 ARDS likely 2/2 necrotizing pancreatitis s/p intubation. Course c/b Acinetobacter PNA s/p Avycaz and Flagyl 7 day course.  Course further complicated by acute blood loss anemia s/p colonoscopy with findings suggestive of ischemic colitis, without further bleeding and Hgb remaining stable. S/p extubation to high flow 1/3.      #Neurology/Psych  - Off sedation. Awake. Follows simple commands. More lethargic this morning.   - Seizure Disorder- Continue Lacosamide.   - Continue Risperidone and Quetiapine     # Cardiovascular  - On Midodrine 20 TID, but still with soft BP.   - Cortisol 9. Started on stress dose steroids 1/3  - Continue to monitor BP closely.    # Respiratory  - ARDS 2/2 Necrotizing Pancreatitis.   - Acinetobacter PNA s/p abx course   - s/p extubation 1/3 to high flow, c/w frequent suctioning, airway clearance vest and hypertonic saline to mobilize secretions     #GI  - Necrotizing Pancreatitis with enlarging danay-pancreatitic fluid collections, per GI: currently not a candidate for endoscopic intervention  - On Imipenem for necrotizing pancreatitis, ID following   - Acute Blood Loss Anemia 2/2 Ischemic Colitis, colorectal surgery: no surgical intervention, Hgb now stable without further bleeding      #  - Ahuja placement.    # Renal/Electrolytes  - No active issues. Replete electrolytes as needed.     #ID   - Pseudomonas PNA: BAL on 12/12 was positive for pseudomonas aeruginosa. Repeat sputum cx w/ Acinetobacter resistant to carbapenems. s/p Avycaz and flagyl for 7 day course.   - Imipenem for pancreatitis as above     #Hematology/Oncology   - Acute Blood Loss Anemia as described above. Hgb stable above 9. Transfusion goal Hgb >7.     #Endocrine  - 's this morning, continue to monitor FSG's.     #DVT PPx:   - Hep subq

## 2019-01-04 NOTE — PROGRESS NOTE ADULT - ASSESSMENT
53  M (resident of [facility]) with TBI, s/p PEG tube, contracture, and seizure d/o who presented as a transfer from [facility] on 12/9 for respiratory failure 2/2 ARDS likely 2/2 necrotizing pancreatitis s/p intubation.   now on day 11 of Imipenem   [facility] 12/11 with pseudomonas  pt with thick secretions and again febrile, sputum cx with  acinetobacter  repeat Ct with increased size of pancreatic collection but not walled off so no interventions were recommended  also had GIB and colitis due to ?contiguous necrotizing pancreatitis vs ischemic, s/p colonoscopy but not actively bleeding now  completed a 7 day course of avycaz and flagyl for the acinetobacter now back on imipenem    sepsis with fever, leukopenia resolved now extubated   Necrotising Pancreatitis with multiple peripancreatitis collections    acinetobacter PNA vs colonization, was treated as pt had thick secretions, still with LLL pneumonia on u/s  coag neg staph bacteremia likely contaminant, repeat negative  colitis, ?due to contiguous necrotizing pancreatitis and collection vs ischemic    no plans for IR embolization for surgical interventions      * s/p 7 days of acycaz and flagyl for  acinetobacter in the sputum  *  c/w Imipenem for the necrotizing pancreatitis for now as pt still febrile   * f/u the  repeat CT

## 2019-01-04 NOTE — PROGRESS NOTE ADULT - SUBJECTIVE AND OBJECTIVE BOX
Follow Up:  necrotizing pancreatitis and CRE acinetobacter in the sputum    Interval History: pt afebrile not, was extubated yesterday      ROS:    Unobtainable because: pt lethargic          Allergies  Valproate Sodium (Other (Severe))        ANTIMICROBIALS:  imipenem/cilastatin  IVPB 500 every 6 hours      OTHER MEDS:  acetaminophen    Suspension .. 650 milliGRAM(s) Oral every 6 hours PRN  chlorhexidine 4% Liquid 1 Application(s) Topical <User Schedule>  cyanocobalamin 1000 MICROGram(s) Oral daily  dextrose 40% Gel 15 Gram(s) Oral once PRN  dextrose 5%. 1000 milliLiter(s) IV Continuous <Continuous>  dextrose 50% Injectable 12.5 Gram(s) IV Push once  dextrose 50% Injectable 25 Gram(s) IV Push once  dextrose 50% Injectable 25 Gram(s) IV Push once  folic acid 1 milliGRAM(s) Enteral Tube daily  glucagon  Injectable 1 milliGRAM(s) IntraMuscular once PRN  hydrocortisone sodium succinate Injectable 100 milliGRAM(s) IV Push every 8 hours  insulin lispro (HumaLOG) corrective regimen sliding scale   SubCutaneous every 6 hours  lacosamide Solution 100 milliGRAM(s) Oral two times a day  midodrine 20 milliGRAM(s) Oral three times a day  QUEtiapine 25 milliGRAM(s) Oral daily  risperiDONE   Solution 1 milliGRAM(s) Oral daily  sodium chloride 3%  Inhalation 4 milliLiter(s) Inhalation every 6 hours      Vital Signs Last 24 Hrs  T(C): 35.8 (04 Jan 2019 08:00), Max: 37.2 (03 Jan 2019 16:00)  T(F): 96.4 (04 Jan 2019 08:00), Max: 98.9 (03 Jan 2019 16:00)  HR: 73 (04 Jan 2019 10:29) (72 - 118)  BP: 98/52 (04 Jan 2019 10:00) (90/52 - 119/70)  BP(mean): 64 (04 Jan 2019 10:00) (55 - 84)  RR: 25 (04 Jan 2019 10:29) (18 - 39)  SpO2: 94% (04 Jan 2019 10:29) (80% - 100%)    Physical Exam:  General:    NAD,  on high flow  Head: atraumatic, normocephalic  Eye: normal sclera and conjunctiva  ENT:    no LAD,   neck supple  Cardio:     regular S1, S2,  no murmur  Respiratory:    clear b/l,    no wheezing  abd:     soft,   BS +,   no tenderness, PEG with no erythema or drainage  :   no CVAT,  no suprapubic tenderness,  +  tavares  Musculoskeletal:   no joint swelling  vascular: no lines, normal pulses  Skin:    no rash  Neurologic:    unable to assess as pt was lethargic                        9.2    3.76  )-----------( 441      ( 04 Jan 2019 07:25 )             29.9       01-04    135  |  108<H>  |  6<L>  ----------------------------<  142<H>  4.6   |  21<L>  |  < 0.20<L>    Ca    7.8<L>      04 Jan 2019 07:25  Phos  3.3     01-04  Mg     1.8     01-04    TPro  5.5<L>  /  Alb  0.9<L>  /  TBili  0.6  /  DBili  x   /  AST  26  /  ALT  11  /  AlkPhos  155<H>  01-04          MICROBIOLOGY:  v  BLOOD VENOUS  01-02-19 --  --  --      BLOOD PERIPHERAL  01-02-19 --  --  --      BLOOD PERIPHERAL  12-27-18 --  --  --      BLOOD VENOUS  12-24-18 --  --  --      BLOOD PERIPHERAL  12-24-18 --  --  --      BLOOD PERIPHERAL  12-23-18 --  --  --      BLOOD VENOUS  12-22-18 --  --  BLOOD CULTURE PCR  Staphylococcus sp.,coag neg      SPUTUM  12-18-18 --  --  Acinetobacter baumannii       BLOOD PERIPHERAL  12-18-18 --  --  --      BRONCHIAL LAVAGE  12-12-18 --  --  Pseudomonas aeruginosa      URINE CATHETER  12-11-18 --  --  --      BLOOD PERIPHERAL  12-11-18 --  --  --                RADIOLOGY:  Images below reviewed personally  < from: CT Angio Abdomen and Pelvis w/ IV Cont (12.27.18 @ 16:00) >  IMPRESSION:     Focal site of active site of bleeding in the distal transverse colon.     Fluid-filled colon with stable nonspecific mural thickening of the   descending colon may represent colitis.    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 No interval development of foci of gas or   thick peripheral wall to suggest infection of the collections. Unchanged   thrombosed splenic vein.    Moderate bilateral pleural effusions with adjacent atelectasis. Patchy   opacities in the lingula and right middle lobe are stable and may be   infectious in etiology.

## 2019-01-04 NOTE — PROGRESS NOTE ADULT - ATTENDING COMMENTS
Agree with above. Seen and examined with residents on rounds. Extubated yesterday. Supportive care. Frequent suctioning. Still on imipenem for pancreatitis.

## 2019-01-04 NOTE — CHART NOTE - NSCHARTNOTEFT_GEN_A_CORE
GI follow up    - pending CT abdomen to evaluate the pancreatic necrosis  - please call us back when the CT is done       Isa Mike, PGY-5  GI fellow  B- [id number]/ [phone]  Please call GI fellow on call after 5pm and on weekends

## 2019-01-05 LAB
BASE EXCESS BLDA CALC-SCNC: 5.1 MMOL/L — SIGNIFICANT CHANGE UP
BASOPHILS # BLD AUTO: 0 K/UL — SIGNIFICANT CHANGE UP (ref 0–0.2)
BASOPHILS NFR BLD AUTO: 0 % — SIGNIFICANT CHANGE UP (ref 0–2)
BUN SERPL-MCNC: 9 MG/DL — SIGNIFICANT CHANGE UP (ref 7–23)
C DIFF TOX GENS STL QL NAA+PROBE: SIGNIFICANT CHANGE UP
CALCIUM SERPL-MCNC: 8.3 MG/DL — LOW (ref 8.4–10.5)
CHLORIDE BLDA-SCNC: 111 MMOL/L — HIGH (ref 96–108)
CHLORIDE SERPL-SCNC: 107 MMOL/L — SIGNIFICANT CHANGE UP (ref 98–107)
CO2 SERPL-SCNC: 27 MMOL/L — SIGNIFICANT CHANGE UP (ref 22–31)
CREAT SERPL-MCNC: 0.2 MG/DL — LOW (ref 0.5–1.3)
EOSINOPHIL # BLD AUTO: 0 K/UL — SIGNIFICANT CHANGE UP (ref 0–0.5)
EOSINOPHIL NFR BLD AUTO: 0 % — SIGNIFICANT CHANGE UP (ref 0–6)
GLUCOSE BLDA-MCNC: 143 MG/DL — HIGH (ref 70–99)
GLUCOSE BLDC GLUCOMTR-MCNC: 126 MG/DL — HIGH (ref 70–99)
GLUCOSE BLDC GLUCOMTR-MCNC: 132 MG/DL — HIGH (ref 70–99)
GLUCOSE BLDC GLUCOMTR-MCNC: 139 MG/DL — HIGH (ref 70–99)
GLUCOSE BLDC GLUCOMTR-MCNC: 139 MG/DL — HIGH (ref 70–99)
GLUCOSE SERPL-MCNC: 149 MG/DL — HIGH (ref 70–99)
HCO3 BLDA-SCNC: 28 MMOL/L — HIGH (ref 22–26)
HCT VFR BLD CALC: 31.4 % — LOW (ref 39–50)
HCT VFR BLDA CALC: 31.3 % — LOW (ref 39–51)
HGB BLD-MCNC: 9.6 G/DL — LOW (ref 13–17)
HGB BLDA-MCNC: 10.1 G/DL — LOW (ref 13–17)
IMM GRANULOCYTES NFR BLD AUTO: 0.9 % — SIGNIFICANT CHANGE UP (ref 0–1.5)
LACTATE BLDA-SCNC: 1.7 MMOL/L — SIGNIFICANT CHANGE UP (ref 0.5–2)
LYMPHOCYTES # BLD AUTO: 0.88 K/UL — LOW (ref 1–3.3)
LYMPHOCYTES # BLD AUTO: 19.4 % — SIGNIFICANT CHANGE UP (ref 13–44)
MAGNESIUM SERPL-MCNC: 1.8 MG/DL — SIGNIFICANT CHANGE UP (ref 1.6–2.6)
MCHC RBC-ENTMCNC: 30.6 % — LOW (ref 32–36)
MCHC RBC-ENTMCNC: 31 PG — SIGNIFICANT CHANGE UP (ref 27–34)
MCV RBC AUTO: 101.3 FL — HIGH (ref 80–100)
MONOCYTES # BLD AUTO: 0.24 K/UL — SIGNIFICANT CHANGE UP (ref 0–0.9)
MONOCYTES NFR BLD AUTO: 5.3 % — SIGNIFICANT CHANGE UP (ref 2–14)
NEUTROPHILS # BLD AUTO: 3.37 K/UL — SIGNIFICANT CHANGE UP (ref 1.8–7.4)
NEUTROPHILS NFR BLD AUTO: 74.4 % — SIGNIFICANT CHANGE UP (ref 43–77)
NRBC # FLD: 0 K/UL — LOW (ref 25–125)
PCO2 BLDA: 52 MMHG — HIGH (ref 35–48)
PH BLDA: 7.38 PH — SIGNIFICANT CHANGE UP (ref 7.35–7.45)
PHOSPHATE SERPL-MCNC: 2.5 MG/DL — SIGNIFICANT CHANGE UP (ref 2.5–4.5)
PLATELET # BLD AUTO: 443 K/UL — HIGH (ref 150–400)
PMV BLD: 10.4 FL — SIGNIFICANT CHANGE UP (ref 7–13)
PO2 BLDA: 62 MMHG — LOW (ref 83–108)
POTASSIUM BLDA-SCNC: 4 MMOL/L — SIGNIFICANT CHANGE UP (ref 3.4–4.5)
POTASSIUM SERPL-MCNC: 4.4 MMOL/L — SIGNIFICANT CHANGE UP (ref 3.5–5.3)
POTASSIUM SERPL-SCNC: 4.4 MMOL/L — SIGNIFICANT CHANGE UP (ref 3.5–5.3)
RBC # BLD: 3.1 M/UL — LOW (ref 4.2–5.8)
RBC # FLD: 20.8 % — HIGH (ref 10.3–14.5)
SAO2 % BLDA: 90.9 % — LOW (ref 95–99)
SODIUM BLDA-SCNC: 136 MMOL/L — SIGNIFICANT CHANGE UP (ref 136–146)
SODIUM SERPL-SCNC: 140 MMOL/L — SIGNIFICANT CHANGE UP (ref 135–145)
WBC # BLD: 4.53 K/UL — SIGNIFICANT CHANGE UP (ref 3.8–10.5)
WBC # FLD AUTO: 4.53 K/UL — SIGNIFICANT CHANGE UP (ref 3.8–10.5)

## 2019-01-05 PROCEDURE — 99291 CRITICAL CARE FIRST HOUR: CPT

## 2019-01-05 RX ORDER — HEPARIN SODIUM 5000 [USP'U]/ML
5000 INJECTION INTRAVENOUS; SUBCUTANEOUS EVERY 12 HOURS
Qty: 0 | Refills: 0 | Status: DISCONTINUED | OUTPATIENT
Start: 2019-01-05 | End: 2019-01-05

## 2019-01-05 RX ORDER — HYDROCORTISONE 20 MG
50 TABLET ORAL EVERY 8 HOURS
Qty: 0 | Refills: 0 | Status: DISCONTINUED | OUTPATIENT
Start: 2019-01-05 | End: 2019-01-06

## 2019-01-05 RX ADMIN — PREGABALIN 1000 MICROGRAM(S): 225 CAPSULE ORAL at 11:10

## 2019-01-05 RX ADMIN — SODIUM CHLORIDE 4 MILLILITER(S): 9 INJECTION INTRAMUSCULAR; INTRAVENOUS; SUBCUTANEOUS at 04:39

## 2019-01-05 RX ADMIN — IMIPENEM AND CILASTATIN 100 MILLIGRAM(S): 250; 250 INJECTION, POWDER, FOR SOLUTION INTRAVENOUS at 23:21

## 2019-01-05 RX ADMIN — LACOSAMIDE 100 MILLIGRAM(S): 50 TABLET ORAL at 05:54

## 2019-01-05 RX ADMIN — Medication 650 MILLIGRAM(S): at 11:40

## 2019-01-05 RX ADMIN — IMIPENEM AND CILASTATIN 100 MILLIGRAM(S): 250; 250 INJECTION, POWDER, FOR SOLUTION INTRAVENOUS at 00:05

## 2019-01-05 RX ADMIN — SODIUM CHLORIDE 4 MILLILITER(S): 9 INJECTION INTRAMUSCULAR; INTRAVENOUS; SUBCUTANEOUS at 16:58

## 2019-01-05 RX ADMIN — RISPERIDONE 1 MILLIGRAM(S): 4 TABLET ORAL at 11:10

## 2019-01-05 RX ADMIN — IMIPENEM AND CILASTATIN 100 MILLIGRAM(S): 250; 250 INJECTION, POWDER, FOR SOLUTION INTRAVENOUS at 05:28

## 2019-01-05 RX ADMIN — Medication 100 MILLIGRAM(S): at 05:28

## 2019-01-05 RX ADMIN — Medication 50 MILLIGRAM(S): at 23:21

## 2019-01-05 RX ADMIN — IMIPENEM AND CILASTATIN 100 MILLIGRAM(S): 250; 250 INJECTION, POWDER, FOR SOLUTION INTRAVENOUS at 11:11

## 2019-01-05 RX ADMIN — SODIUM CHLORIDE 4 MILLILITER(S): 9 INJECTION INTRAMUSCULAR; INTRAVENOUS; SUBCUTANEOUS at 23:18

## 2019-01-05 RX ADMIN — MIDODRINE HYDROCHLORIDE 20 MILLIGRAM(S): 2.5 TABLET ORAL at 14:57

## 2019-01-05 RX ADMIN — Medication 1 MILLIGRAM(S): at 11:10

## 2019-01-05 RX ADMIN — QUETIAPINE FUMARATE 25 MILLIGRAM(S): 200 TABLET, FILM COATED ORAL at 11:10

## 2019-01-05 RX ADMIN — MIDODRINE HYDROCHLORIDE 20 MILLIGRAM(S): 2.5 TABLET ORAL at 05:28

## 2019-01-05 RX ADMIN — CHLORHEXIDINE GLUCONATE 1 APPLICATION(S): 213 SOLUTION TOPICAL at 11:11

## 2019-01-05 RX ADMIN — IMIPENEM AND CILASTATIN 100 MILLIGRAM(S): 250; 250 INJECTION, POWDER, FOR SOLUTION INTRAVENOUS at 17:05

## 2019-01-05 RX ADMIN — LACOSAMIDE 100 MILLIGRAM(S): 50 TABLET ORAL at 17:05

## 2019-01-05 RX ADMIN — SODIUM CHLORIDE 4 MILLILITER(S): 9 INJECTION INTRAMUSCULAR; INTRAVENOUS; SUBCUTANEOUS at 11:04

## 2019-01-05 RX ADMIN — Medication 650 MILLIGRAM(S): at 11:10

## 2019-01-05 RX ADMIN — MIDODRINE HYDROCHLORIDE 20 MILLIGRAM(S): 2.5 TABLET ORAL at 23:21

## 2019-01-05 NOTE — PROGRESS NOTE ADULT - ASSESSMENT
54 y/o Male (resident of [facility]) w/ a PMHx of TBI (s/p PEG tube, contracture, and seizure d/o) presented as a transfer from [facility] on 12/9 for respiratory failure 2/2 ARDS likely 2/2 necrotizing pancreatitis s/p intubation. Course c/b Acinetobacter PNA s/p Avycaz and Flagyl 7 day course.  Course further complicated by acute blood loss anemia s/p colonoscopy with findings suggestive of ischemic colitis, without further bleeding and Hgb remaining stable. S/p extubation to high flow 1/3.      #Neurology/Psych  - Off sedation. Awake. Follows simple commands. More lethargic this morning.   - Seizure Disorder- Continue Lacosamide.   - Continue Risperidone and Quetiapine     # Cardiovascular  - On Midodrine 20 TID, but still with soft BP.   - Cortisol 9. Started on stress dose steroids 1/3  - Continue to monitor BP closely.    # Respiratory  - ARDS 2/2 Necrotizing Pancreatitis.   - Acinetobacter PNA s/p abx course   - s/p extubation 1/3 to high flow, c/w frequent suctioning, airway clearance vest and hypertonic saline to mobilize secretions     #GI  - Necrotizing Pancreatitis with enlarging danay-pancreatitic fluid collections, per GI: currently not a candidate for endoscopic intervention  - On Imipenem for necrotizing pancreatitis, ID following   - Acute Blood Loss Anemia 2/2 Ischemic Colitis, colorectal surgery: no surgical intervention, Hgb now stable without further bleeding      #  - Ahuja placement.    # Renal/Electrolytes  - No active issues. Replete electrolytes as needed.     #ID   - Pseudomonas PNA: BAL on 12/12 was positive for pseudomonas aeruginosa. Repeat sputum cx w/ Acinetobacter resistant to carbapenems. s/p Avycaz and flagyl for 7 day course.   - Imipenem for pancreatitis as above     #Hematology/Oncology   - Acute Blood Loss Anemia as described above. Hgb stable above 9. Transfusion goal Hgb >7.     #Endocrine  - 's this morning, continue to monitor FSG's.     #DVT PPx:   - Hep subq 52 y/o Male (resident of [facility]) w/ a PMHx of TBI (s/p PEG tube, contracture, and seizure d/o) presented as a transfer from [facility] on 12/9 for respiratory failure 2/2 ARDS likely 2/2 necrotizing pancreatitis s/p intubation. Course c/b Acinetobacter PNA s/p Avycaz and Flagyl 7 day course.  Course further complicated by acute blood loss anemia s/p colonoscopy with findings suggestive of ischemic colitis, without further bleeding and Hgb remaining stable. S/p extubation to high flow 1/3.      #Neurology/Psych  - Off sedation. Awake. Follows simple commands.    - Seizure Disorder- Continue Lacosamide.   - Continue Risperidone and Quetiapine     # Cardiovascular  - On Midodrine 20 TID   - Cortisol 9. Started on stress dose steroids 1/3  - Continue to monitor BP closely.    # Respiratory  - ARDS 2/2 Necrotizing Pancreatitis.   - Acinetobacter PNA s/p abx course   - s/p extubation 1/3 to high flow, c/w frequent suctioning, airway clearance vest and hypertonic saline to mobilize secretions     #GI  - Necrotizing Pancreatitis with enlarging danay-pancreatitic fluid collections, per GI: currently not a candidate for endoscopic intervention  - On Imipenem for necrotizing pancreatitis, ID following   - Acute Blood Loss Anemia 2/2 Ischemic Colitis, colorectal surgery: no surgical intervention, Hgb now stable without further bleeding      #  - Ahuja placement.    # Renal/Electrolytes  - No active issues. Replete electrolytes as needed.     #ID   - Pseudomonas PNA: BAL on 12/12 was positive for pseudomonas aeruginosa. Repeat sputum cx w/ Acinetobacter resistant to carbapenems. s/p Avycaz and flagyl for 7 day course.   - Imipenem for pancreatitis as above     #Hematology/Oncology   - Acute Blood Loss Anemia as described above. Hgb stable above 9. Transfusion goal Hgb >7.     #Endocrine  - 's this morning, continue to monitor FSG's.     #DVT PPx:   - Hep subq 52 y/o Male (resident of [facility]) w/ a PMHx of TBI (s/p PEG tube, contracture, and seizure d/o) presented as a transfer from [facility] on 12/9 for respiratory failure 2/2 ARDS likely 2/2 necrotizing pancreatitis s/p intubation. Course c/b Acinetobacter PNA s/p Avycaz and Flagyl 7 day course.  Course further complicated by acute blood loss anemia s/p colonoscopy with findings suggestive of ischemic colitis, without further bleeding and Hgb remaining stable. S/p extubation to high flow 1/3.      #Neurology/Psych  - Off sedation. Awake. Follows simple commands.    - Seizure Disorder- Continue Lacosamide.   - Continue Risperidone and Quetiapine     # Cardiovascular  - On Midodrine 20 TID   - Cortisol 9. Started on stress dose steroids 1/3  - Continue to monitor BP closely.    # Respiratory  - ARDS 2/2 Necrotizing Pancreatitis.   - Acinetobacter PNA s/p abx course   - s/p extubation 1/3 to high flow, c/w frequent suctioning, airway clearance vest and hypertonic saline to mobilize secretions     #GI  - Necrotizing Pancreatitis with enlarging danay-pancreatitic fluid collections, per GI: currently not a candidate for endoscopic intervention  - On Imipenem for necrotizing pancreatitis, ID following   - Acute Blood Loss Anemia 2/2 Ischemic Colitis, colorectal surgery: no surgical intervention, Hgb now stable without further bleeding      #  - Ahuja placement.    # Renal/Electrolytes  - No active issues. Replete electrolytes as needed.     #ID   - Pseudomonas PNA: BAL on 12/12 was positive for pseudomonas aeruginosa. Repeat sputum cx w/ Acinetobacter resistant to carbapenems. s/p Avycaz and flagyl for 7 day course.   - Imipenem for pancreatitis as above     #Hematology/Oncology   - Acute Blood Loss Anemia as described above. Hgb stable above 9. Transfusion goal Hgb >7.     #Endocrine  - 's this morning, continue to monitor FSG's.     #DVT PPx:   - scd

## 2019-01-05 NOTE — PROGRESS NOTE ADULT - SUBJECTIVE AND OBJECTIVE BOX
INTERVAL HPI/OVERNIGHT EVENTS:    SUBJECTIVE: Patient seen and examined at bedside.     CONSTITUTIONAL: No weakness, fevers or chills  EYES/ENT: No visual changes;  No vertigo or throat pain   NECK: No pain or stiffness  RESPIRATORY: No cough, wheezing, hemoptysis; No shortness of breath  CARDIOVASCULAR: No chest pain or palpitations  GASTROINTESTINAL: No abdominal or epigastric pain. No nausea, vomiting, or hematemesis; No diarrhea or constipation. No melena or hematochezia.  GENITOURINARY: No dysuria, frequency or hematuria  NEUROLOGICAL: No numbness or weakness  SKIN: No itching, rashes    OBJECTIVE:    VITAL SIGNS:  ICU Vital Signs Last 24 Hrs  T(C): 35.6 (05 Jan 2019 04:00), Max: 36 (04 Jan 2019 12:00)  T(F): 96.1 (05 Jan 2019 04:00), Max: 96.8 (04 Jan 2019 12:00)  HR: 89 (05 Jan 2019 06:00) (72 - 95)  BP: 113/80 (05 Jan 2019 06:00) (95/57 - 115/80)  BP(mean): 86 (05 Jan 2019 06:00) (64 - 88)  ABP: --  ABP(mean): --  RR: 29 (05 Jan 2019 06:00) (18 - 36)  SpO2: 93% (05 Jan 2019 06:00) (87% - 96%)        01-04 @ 07:01  -  01-05 @ 07:00  --------------------------------------------------------  IN: 1030 mL / OUT: 645 mL / NET: 385 mL      CAPILLARY BLOOD GLUCOSE      POCT Blood Glucose.: 139 mg/dL (05 Jan 2019 05:23)      PHYSICAL EXAM:    GENERAL: NAD, on high flow   HEAD:  Atraumatic, Normocephalic  EYES: EOMI, PERRLA, conjunctiva and sclera clear  NECK: Supple, No JVD  CHEST/LUNG: chest congestion   HEART: Regular rate and rhythm; No murmurs, rubs, or gallops  ABDOMEN: Soft, Nontender, Nondistended; Bowel sounds present  EXTREMITIES:  2+ LE swelling  NEURO: contracted.     MEDICATIONS:  MEDICATIONS  (STANDING):  chlorhexidine 4% Liquid 1 Application(s) Topical <User Schedule>  cyanocobalamin 1000 MICROGram(s) Oral daily  dextrose 5%. 1000 milliLiter(s) (50 mL/Hr) IV Continuous <Continuous>  dextrose 50% Injectable 12.5 Gram(s) IV Push once  dextrose 50% Injectable 25 Gram(s) IV Push once  dextrose 50% Injectable 25 Gram(s) IV Push once  folic acid 1 milliGRAM(s) Enteral Tube daily  hydrocortisone sodium succinate Injectable 100 milliGRAM(s) IV Push every 8 hours  imipenem/cilastatin  IVPB 500 milliGRAM(s) IV Intermittent every 6 hours  insulin lispro (HumaLOG) corrective regimen sliding scale   SubCutaneous every 6 hours  lacosamide Solution 100 milliGRAM(s) Oral two times a day  midodrine 20 milliGRAM(s) Oral three times a day  QUEtiapine 25 milliGRAM(s) Oral daily  risperiDONE   Solution 1 milliGRAM(s) Oral daily  sodium chloride 3%  Inhalation 4 milliLiter(s) Inhalation every 6 hours    MEDICATIONS  (PRN):  acetaminophen    Suspension .. 650 milliGRAM(s) Oral every 6 hours PRN Temp greater or equal to 38C (100.4F), Mild Pain (1 - 3), Moderate Pain (4 - 6)  dextrose 40% Gel 15 Gram(s) Oral once PRN Blood Glucose LESS THAN 70 milliGRAM(s)/deciliter  glucagon  Injectable 1 milliGRAM(s) IntraMuscular once PRN Glucose LESS THAN 70 milligrams/deciliter      ALLERGIES:  Allergies    Valproate Sodium (Other (Severe))    Intolerances        LABS:                        9.6    4.53  )-----------( 443      ( 05 Jan 2019 02:31 )             31.4     01-05    140  |  107  |  9   ----------------------------<  149<H>  4.4   |  27  |  0.20<L>    Ca    8.3<L>      05 Jan 2019 02:31  Phos  2.5     01-05  Mg     1.8     01-05    TPro  5.5<L>  /  Alb  0.9<L>  /  TBili  0.6  /  DBili  x   /  AST  26  /  ALT  11  /  AlkPhos  155<H>  01-04          RADIOLOGY & ADDITIONAL TESTS: Reviewed. INTERVAL HPI/OVERNIGHT EVENTS:    SUBJECTIVE: Patient seen and examined at bedside. No acute events over night- on high flow satting well. Frequent suctioning o/n.    CONSTITUTIONAL: No weakness, fevers or chills  EYES/ENT: No visual changes;  No vertigo or throat pain   NECK: No pain or stiffness  RESPIRATORY: No cough, wheezing, hemoptysis; No shortness of breath  CARDIOVASCULAR: No chest pain or palpitations  GASTROINTESTINAL: No abdominal or epigastric pain. No nausea, vomiting, or hematemesis; No diarrhea or constipation. No melena or hematochezia.  GENITOURINARY: No dysuria, frequency or hematuria  NEUROLOGICAL: No numbness or weakness  SKIN: No itching, rashes    OBJECTIVE:    VITAL SIGNS:  ICU Vital Signs Last 24 Hrs  T(C): 35.6 (05 Jan 2019 04:00), Max: 36 (04 Jan 2019 12:00)  T(F): 96.1 (05 Jan 2019 04:00), Max: 96.8 (04 Jan 2019 12:00)  HR: 89 (05 Jan 2019 06:00) (72 - 95)  BP: 113/80 (05 Jan 2019 06:00) (95/57 - 115/80)  BP(mean): 86 (05 Jan 2019 06:00) (64 - 88)  ABP: --  ABP(mean): --  RR: 29 (05 Jan 2019 06:00) (18 - 36)  SpO2: 93% (05 Jan 2019 06:00) (87% - 96%)        01-04 @ 07:01  -  01-05 @ 07:00  --------------------------------------------------------  IN: 1030 mL / OUT: 645 mL / NET: 385 mL      CAPILLARY BLOOD GLUCOSE      POCT Blood Glucose.: 139 mg/dL (05 Jan 2019 05:23)      PHYSICAL EXAM:    GENERAL: NAD, on high flow   HEAD:  Atraumatic, Normocephalic  EYES: EOMI, PERRLA, conjunctiva and sclera clear  NECK: Supple, No JVD  CHEST/LUNG: chest congestion   HEART: Regular rate and rhythm; No murmurs, rubs, or gallops  ABDOMEN: Soft, Nontender, Nondistended; Bowel sounds present  EXTREMITIES:  2+ LE swelling  NEURO: contracted.     MEDICATIONS:  MEDICATIONS  (STANDING):  chlorhexidine 4% Liquid 1 Application(s) Topical <User Schedule>  cyanocobalamin 1000 MICROGram(s) Oral daily  dextrose 5%. 1000 milliLiter(s) (50 mL/Hr) IV Continuous <Continuous>  dextrose 50% Injectable 12.5 Gram(s) IV Push once  dextrose 50% Injectable 25 Gram(s) IV Push once  dextrose 50% Injectable 25 Gram(s) IV Push once  folic acid 1 milliGRAM(s) Enteral Tube daily  hydrocortisone sodium succinate Injectable 100 milliGRAM(s) IV Push every 8 hours  imipenem/cilastatin  IVPB 500 milliGRAM(s) IV Intermittent every 6 hours  insulin lispro (HumaLOG) corrective regimen sliding scale   SubCutaneous every 6 hours  lacosamide Solution 100 milliGRAM(s) Oral two times a day  midodrine 20 milliGRAM(s) Oral three times a day  QUEtiapine 25 milliGRAM(s) Oral daily  risperiDONE   Solution 1 milliGRAM(s) Oral daily  sodium chloride 3%  Inhalation 4 milliLiter(s) Inhalation every 6 hours    MEDICATIONS  (PRN):  acetaminophen    Suspension .. 650 milliGRAM(s) Oral every 6 hours PRN Temp greater or equal to 38C (100.4F), Mild Pain (1 - 3), Moderate Pain (4 - 6)  dextrose 40% Gel 15 Gram(s) Oral once PRN Blood Glucose LESS THAN 70 milliGRAM(s)/deciliter  glucagon  Injectable 1 milliGRAM(s) IntraMuscular once PRN Glucose LESS THAN 70 milligrams/deciliter      ALLERGIES:  Allergies    Valproate Sodium (Other (Severe))    Intolerances        LABS:                        9.6    4.53  )-----------( 443      ( 05 Jan 2019 02:31 )             31.4     01-05    140  |  107  |  9   ----------------------------<  149<H>  4.4   |  27  |  0.20<L>    Ca    8.3<L>      05 Jan 2019 02:31  Phos  2.5     01-05  Mg     1.8     01-05    TPro  5.5<L>  /  Alb  0.9<L>  /  TBili  0.6  /  DBili  x   /  AST  26  /  ALT  11  /  AlkPhos  155<H>  01-04          RADIOLOGY & ADDITIONAL TESTS: Reviewed.

## 2019-01-05 NOTE — PROGRESS NOTE ADULT - ATTENDING COMMENTS
Continues to be hypoxemic requiring HFNC.   Has intermittent mucus plugging causing desaturations.   Continue aggressive airway clearance.  Had discussion with sister that he has high risk of requiring reintubation.  Continue imipenem for pancreatitis.   Check stook for C. Diff given diarrhea.    Critical care time: 45 minutes

## 2019-01-06 LAB
APTT BLD: 29.1 SEC — SIGNIFICANT CHANGE UP (ref 27.5–36.3)
B-OH-BUTYR SERPL-SCNC: 0.1 MMOL/L — SIGNIFICANT CHANGE UP (ref 0–0.4)
BASE EXCESS BLDA CALC-SCNC: 4.5 MMOL/L — SIGNIFICANT CHANGE UP
BASE EXCESS BLDA CALC-SCNC: 5.4 MMOL/L — SIGNIFICANT CHANGE UP
BASE EXCESS BLDV CALC-SCNC: 4.7 MMOL/L — SIGNIFICANT CHANGE UP
BLOOD GAS VENOUS - CREATININE: SIGNIFICANT CHANGE UP MG/DL (ref 0.5–1.3)
BUN SERPL-MCNC: 14 MG/DL — SIGNIFICANT CHANGE UP (ref 7–23)
CALCIUM SERPL-MCNC: 8.4 MG/DL — SIGNIFICANT CHANGE UP (ref 8.4–10.5)
CHLORIDE BLDA-SCNC: 109 MMOL/L — HIGH (ref 96–108)
CHLORIDE BLDA-SCNC: 110 MMOL/L — HIGH (ref 96–108)
CHLORIDE BLDV-SCNC: 109 MMOL/L — HIGH (ref 96–108)
CHLORIDE SERPL-SCNC: 107 MMOL/L — SIGNIFICANT CHANGE UP (ref 98–107)
CO2 SERPL-SCNC: 27 MMOL/L — SIGNIFICANT CHANGE UP (ref 22–31)
CREAT SERPL-MCNC: 0.21 MG/DL — LOW (ref 0.5–1.3)
GAS PNL BLDV: 140 MMOL/L — SIGNIFICANT CHANGE UP (ref 136–146)
GLUCOSE BLDA-MCNC: 121 MG/DL — HIGH (ref 70–99)
GLUCOSE BLDA-MCNC: 137 MG/DL — HIGH (ref 70–99)
GLUCOSE BLDC GLUCOMTR-MCNC: 116 MG/DL — HIGH (ref 70–99)
GLUCOSE BLDC GLUCOMTR-MCNC: 119 MG/DL — HIGH (ref 70–99)
GLUCOSE BLDC GLUCOMTR-MCNC: 134 MG/DL — HIGH (ref 70–99)
GLUCOSE BLDC GLUCOMTR-MCNC: 134 MG/DL — HIGH (ref 70–99)
GLUCOSE BLDV-MCNC: 127 — HIGH (ref 70–99)
GLUCOSE SERPL-MCNC: 124 MG/DL — HIGH (ref 70–99)
GRAM STN SPT: SIGNIFICANT CHANGE UP
HCO3 BLDA-SCNC: 27 MMOL/L — HIGH (ref 22–26)
HCO3 BLDA-SCNC: 29 MMOL/L — HIGH (ref 22–26)
HCO3 BLDV-SCNC: 28 MMOL/L — HIGH (ref 20–27)
HCT VFR BLD CALC: 26.6 % — LOW (ref 39–50)
HCT VFR BLD CALC: 26.6 % — LOW (ref 39–50)
HCT VFR BLD CALC: 28.5 % — LOW (ref 39–50)
HCT VFR BLDA CALC: 26.8 % — LOW (ref 39–51)
HCT VFR BLDA CALC: 33 % — LOW (ref 39–51)
HCT VFR BLDV CALC: 29.3 % — LOW (ref 39–51)
HGB BLD-MCNC: 8.3 G/DL — LOW (ref 13–17)
HGB BLD-MCNC: 8.3 G/DL — LOW (ref 13–17)
HGB BLD-MCNC: 8.7 G/DL — LOW (ref 13–17)
HGB BLDA-MCNC: 10.7 G/DL — LOW (ref 13–17)
HGB BLDA-MCNC: 8.6 G/DL — LOW (ref 13–17)
HGB BLDV-MCNC: 9.5 G/DL — LOW (ref 13–17)
LACTATE BLDA-SCNC: 1.3 MMOL/L — SIGNIFICANT CHANGE UP (ref 0.5–2)
LACTATE BLDA-SCNC: 1.8 MMOL/L — SIGNIFICANT CHANGE UP (ref 0.5–2)
LACTATE BLDV-MCNC: 1.6 MMOL/L — SIGNIFICANT CHANGE UP (ref 0.5–2)
MCHC RBC-ENTMCNC: 30.5 % — LOW (ref 32–36)
MCHC RBC-ENTMCNC: 31.2 % — LOW (ref 32–36)
MCHC RBC-ENTMCNC: 31.2 % — LOW (ref 32–36)
MCHC RBC-ENTMCNC: 31.6 PG — SIGNIFICANT CHANGE UP (ref 27–34)
MCHC RBC-ENTMCNC: 31.9 PG — SIGNIFICANT CHANGE UP (ref 27–34)
MCHC RBC-ENTMCNC: 32 PG — SIGNIFICANT CHANGE UP (ref 27–34)
MCV RBC AUTO: 101.1 FL — HIGH (ref 80–100)
MCV RBC AUTO: 102.3 FL — HIGH (ref 80–100)
MCV RBC AUTO: 104.8 FL — HIGH (ref 80–100)
NRBC # FLD: 0 K/UL — LOW (ref 25–125)
NRBC # FLD: 0.02 K/UL — LOW (ref 25–125)
NRBC # FLD: 0.02 K/UL — LOW (ref 25–125)
PCO2 BLDA: 52 MMHG — HIGH (ref 35–48)
PCO2 BLDA: 77 MMHG — CRITICAL HIGH (ref 35–48)
PCO2 BLDV: 66 MMHG — HIGH (ref 41–51)
PH BLDA: 7.24 PH — LOW (ref 7.35–7.45)
PH BLDA: 7.38 PH — SIGNIFICANT CHANGE UP (ref 7.35–7.45)
PH BLDV: 7.29 PH — LOW (ref 7.32–7.43)
PLATELET # BLD AUTO: 332 K/UL — SIGNIFICANT CHANGE UP (ref 150–400)
PLATELET # BLD AUTO: 371 K/UL — SIGNIFICANT CHANGE UP (ref 150–400)
PLATELET # BLD AUTO: 406 K/UL — HIGH (ref 150–400)
PMV BLD: 10.3 FL — SIGNIFICANT CHANGE UP (ref 7–13)
PMV BLD: 10.5 FL — SIGNIFICANT CHANGE UP (ref 7–13)
PMV BLD: 10.6 FL — SIGNIFICANT CHANGE UP (ref 7–13)
PO2 BLDA: 173 MMHG — HIGH (ref 83–108)
PO2 BLDA: 65 MMHG — LOW (ref 83–108)
PO2 BLDV: 76 MMHG — HIGH (ref 35–40)
POTASSIUM BLDA-SCNC: 3.9 MMOL/L — SIGNIFICANT CHANGE UP (ref 3.4–4.5)
POTASSIUM BLDA-SCNC: 4.1 MMOL/L — SIGNIFICANT CHANGE UP (ref 3.4–4.5)
POTASSIUM BLDV-SCNC: 4 MMOL/L — SIGNIFICANT CHANGE UP (ref 3.4–4.5)
POTASSIUM SERPL-MCNC: 4.2 MMOL/L — SIGNIFICANT CHANGE UP (ref 3.5–5.3)
POTASSIUM SERPL-SCNC: 4.2 MMOL/L — SIGNIFICANT CHANGE UP (ref 3.5–5.3)
RBC # BLD: 2.6 M/UL — LOW (ref 4.2–5.8)
RBC # BLD: 2.63 M/UL — LOW (ref 4.2–5.8)
RBC # BLD: 2.72 M/UL — LOW (ref 4.2–5.8)
RBC # FLD: 20.6 % — HIGH (ref 10.3–14.5)
RBC # FLD: 20.7 % — HIGH (ref 10.3–14.5)
RBC # FLD: 20.8 % — HIGH (ref 10.3–14.5)
SAO2 % BLDA: 88.5 % — LOW (ref 95–99)
SAO2 % BLDA: 99.7 % — HIGH (ref 95–99)
SAO2 % BLDV: 94.3 % — HIGH (ref 60–85)
SODIUM BLDA-SCNC: 138 MMOL/L — SIGNIFICANT CHANGE UP (ref 136–146)
SODIUM BLDA-SCNC: 139 MMOL/L — SIGNIFICANT CHANGE UP (ref 136–146)
SODIUM SERPL-SCNC: 141 MMOL/L — SIGNIFICANT CHANGE UP (ref 135–145)
SPECIMEN SOURCE: SIGNIFICANT CHANGE UP
WBC # BLD: 4.74 K/UL — SIGNIFICANT CHANGE UP (ref 3.8–10.5)
WBC # BLD: 6.99 K/UL — SIGNIFICANT CHANGE UP (ref 3.8–10.5)
WBC # BLD: 7.15 K/UL — SIGNIFICANT CHANGE UP (ref 3.8–10.5)
WBC # FLD AUTO: 4.74 K/UL — SIGNIFICANT CHANGE UP (ref 3.8–10.5)
WBC # FLD AUTO: 6.99 K/UL — SIGNIFICANT CHANGE UP (ref 3.8–10.5)
WBC # FLD AUTO: 7.15 K/UL — SIGNIFICANT CHANGE UP (ref 3.8–10.5)

## 2019-01-06 PROCEDURE — 71045 X-RAY EXAM CHEST 1 VIEW: CPT | Mod: 26

## 2019-01-06 PROCEDURE — 99291 CRITICAL CARE FIRST HOUR: CPT | Mod: 25

## 2019-01-06 PROCEDURE — 31500 INSERT EMERGENCY AIRWAY: CPT

## 2019-01-06 RX ORDER — PROPOFOL 10 MG/ML
20 INJECTION, EMULSION INTRAVENOUS
Qty: 500 | Refills: 0 | Status: DISCONTINUED | OUTPATIENT
Start: 2019-01-06 | End: 2019-01-06

## 2019-01-06 RX ORDER — NOREPINEPHRINE BITARTRATE/D5W 8 MG/250ML
0.05 PLASTIC BAG, INJECTION (ML) INTRAVENOUS
Qty: 8 | Refills: 0 | Status: DISCONTINUED | OUTPATIENT
Start: 2019-01-06 | End: 2019-01-06

## 2019-01-06 RX ORDER — PROPOFOL 10 MG/ML
50 INJECTION, EMULSION INTRAVENOUS
Qty: 1000 | Refills: 0 | Status: DISCONTINUED | OUTPATIENT
Start: 2019-01-06 | End: 2019-01-07

## 2019-01-06 RX ORDER — HEPARIN SODIUM 5000 [USP'U]/ML
5000 INJECTION INTRAVENOUS; SUBCUTANEOUS EVERY 8 HOURS
Qty: 0 | Refills: 0 | Status: COMPLETED | OUTPATIENT
Start: 2019-01-06 | End: 2019-01-06

## 2019-01-06 RX ORDER — HYDROCORTISONE 20 MG
25 TABLET ORAL EVERY 8 HOURS
Qty: 0 | Refills: 0 | Status: DISCONTINUED | OUTPATIENT
Start: 2019-01-06 | End: 2019-01-07

## 2019-01-06 RX ORDER — MIDAZOLAM HYDROCHLORIDE 1 MG/ML
2 INJECTION, SOLUTION INTRAMUSCULAR; INTRAVENOUS ONCE
Qty: 0 | Refills: 0 | Status: DISCONTINUED | OUTPATIENT
Start: 2019-01-06 | End: 2019-01-06

## 2019-01-06 RX ORDER — ADENOSINE 3 MG/ML
12 INJECTION INTRAVENOUS ONCE
Qty: 0 | Refills: 0 | Status: DISCONTINUED | OUTPATIENT
Start: 2019-01-06 | End: 2019-01-06

## 2019-01-06 RX ORDER — HEPARIN SODIUM 5000 [USP'U]/ML
5000 INJECTION INTRAVENOUS; SUBCUTANEOUS EVERY 12 HOURS
Qty: 0 | Refills: 0 | Status: DISCONTINUED | OUTPATIENT
Start: 2019-01-06 | End: 2019-01-06

## 2019-01-06 RX ORDER — HEPARIN SODIUM 5000 [USP'U]/ML
5000 INJECTION INTRAVENOUS; SUBCUTANEOUS EVERY 8 HOURS
Qty: 0 | Refills: 0 | Status: DISCONTINUED | OUTPATIENT
Start: 2019-01-06 | End: 2019-01-06

## 2019-01-06 RX ADMIN — IMIPENEM AND CILASTATIN 100 MILLIGRAM(S): 250; 250 INJECTION, POWDER, FOR SOLUTION INTRAVENOUS at 11:26

## 2019-01-06 RX ADMIN — PROPOFOL 11.96 MICROGRAM(S)/KG/MIN: 10 INJECTION, EMULSION INTRAVENOUS at 09:32

## 2019-01-06 RX ADMIN — SODIUM CHLORIDE 4 MILLILITER(S): 9 INJECTION INTRAMUSCULAR; INTRAVENOUS; SUBCUTANEOUS at 16:00

## 2019-01-06 RX ADMIN — SODIUM CHLORIDE 4 MILLILITER(S): 9 INJECTION INTRAMUSCULAR; INTRAVENOUS; SUBCUTANEOUS at 23:57

## 2019-01-06 RX ADMIN — MIDODRINE HYDROCHLORIDE 20 MILLIGRAM(S): 2.5 TABLET ORAL at 06:33

## 2019-01-06 RX ADMIN — MIDODRINE HYDROCHLORIDE 20 MILLIGRAM(S): 2.5 TABLET ORAL at 14:50

## 2019-01-06 RX ADMIN — HEPARIN SODIUM 5000 UNIT(S): 5000 INJECTION INTRAVENOUS; SUBCUTANEOUS at 14:53

## 2019-01-06 RX ADMIN — PROPOFOL 29.91 MICROGRAM(S)/KG/MIN: 10 INJECTION, EMULSION INTRAVENOUS at 22:15

## 2019-01-06 RX ADMIN — IMIPENEM AND CILASTATIN 100 MILLIGRAM(S): 250; 250 INJECTION, POWDER, FOR SOLUTION INTRAVENOUS at 18:05

## 2019-01-06 RX ADMIN — PROPOFOL 11.96 MICROGRAM(S)/KG/MIN: 10 INJECTION, EMULSION INTRAVENOUS at 02:10

## 2019-01-06 RX ADMIN — MIDODRINE HYDROCHLORIDE 20 MILLIGRAM(S): 2.5 TABLET ORAL at 22:06

## 2019-01-06 RX ADMIN — QUETIAPINE FUMARATE 25 MILLIGRAM(S): 200 TABLET, FILM COATED ORAL at 11:26

## 2019-01-06 RX ADMIN — Medication 1 MILLIGRAM(S): at 11:26

## 2019-01-06 RX ADMIN — MIDAZOLAM HYDROCHLORIDE 2 MILLIGRAM(S): 1 INJECTION, SOLUTION INTRAMUSCULAR; INTRAVENOUS at 01:59

## 2019-01-06 RX ADMIN — IMIPENEM AND CILASTATIN 100 MILLIGRAM(S): 250; 250 INJECTION, POWDER, FOR SOLUTION INTRAVENOUS at 06:01

## 2019-01-06 RX ADMIN — LACOSAMIDE 100 MILLIGRAM(S): 50 TABLET ORAL at 18:04

## 2019-01-06 RX ADMIN — CHLORHEXIDINE GLUCONATE 1 APPLICATION(S): 213 SOLUTION TOPICAL at 11:26

## 2019-01-06 RX ADMIN — PREGABALIN 1000 MICROGRAM(S): 225 CAPSULE ORAL at 11:26

## 2019-01-06 RX ADMIN — Medication 25 MILLIGRAM(S): at 22:06

## 2019-01-06 RX ADMIN — SODIUM CHLORIDE 4 MILLILITER(S): 9 INJECTION INTRAMUSCULAR; INTRAVENOUS; SUBCUTANEOUS at 04:04

## 2019-01-06 RX ADMIN — MIDAZOLAM HYDROCHLORIDE 2 MILLIGRAM(S): 1 INJECTION, SOLUTION INTRAMUSCULAR; INTRAVENOUS at 01:54

## 2019-01-06 RX ADMIN — HEPARIN SODIUM 5000 UNIT(S): 5000 INJECTION INTRAVENOUS; SUBCUTANEOUS at 22:07

## 2019-01-06 RX ADMIN — SODIUM CHLORIDE 4 MILLILITER(S): 9 INJECTION INTRAMUSCULAR; INTRAVENOUS; SUBCUTANEOUS at 11:59

## 2019-01-06 RX ADMIN — LACOSAMIDE 100 MILLIGRAM(S): 50 TABLET ORAL at 06:34

## 2019-01-06 RX ADMIN — Medication 50 MILLIGRAM(S): at 06:01

## 2019-01-06 RX ADMIN — Medication 25 MILLIGRAM(S): at 14:50

## 2019-01-06 RX ADMIN — RISPERIDONE 1 MILLIGRAM(S): 4 TABLET ORAL at 11:47

## 2019-01-06 NOTE — PROGRESS NOTE ADULT - SUBJECTIVE AND OBJECTIVE BOX
SUBJECTIVE / OVERNIGHT EVENTS: Patient desated to 70's overnight, and was subsequently re-intubated. Patient seen and examined at bedside this morning.     ROS: unable to obtain       OBJECTIVE:  ICU Vital Signs Last 24 Hrs  T(C): 35.1 (06 Jan 2019 04:00), Max: 35.8 (05 Jan 2019 12:00)  T(F): 95.1 (06 Jan 2019 04:00), Max: 96.4 (05 Jan 2019 12:00)  HR: 83 (06 Jan 2019 06:00) (74 - 142)  BP: 99/64 (06 Jan 2019 06:00) (89/56 - 132/79)  BP(mean): 72 (06 Jan 2019 06:00) (63 - 97)  ABP: --  ABP(mean): --  RR: 16 (06 Jan 2019 06:00) (12 - 42)  SpO2: 100% (06 Jan 2019 06:00) (85% - 100%)    Mode: AC/ CMV (Assist Control/ Continuous Mandatory Ventilation), RR (machine): 14, TV (machine): 350, FiO2: 60, PEEP: 5, MAP: 8, PIP: 20    01-04 @ 07:01  -  01-05 @ 07:00  --------------------------------------------------------  IN: 1030 mL / OUT: 645 mL / NET: 385 mL    01-05 @ 07:01 - 01-06 @ 06:53  --------------------------------------------------------  IN: 934 mL / OUT: 455 mL / NET: 479 mL        POCT Blood Glucose.: 116 mg/dL (06 Jan 2019 05:56)      PHYSICAL EXAM:  GENERAL: intubated, sedated   HEAD:  Atraumatic, Normocephalic  EYES: EOMI, PERRLA, conjunctiva and sclera clear  NECK: Supple, No JVD  CHEST/LUNG: ventilator sounds   HEART: Regular rate and rhythm; No murmurs, rubs, or gallops  ABDOMEN: Soft, Nontender, Nondistended; Bowel sounds present  EXTREMITIES:  2+ LE swelling  NEURO: sedated, contracted.       HOSPITAL MEDICATIONS:  Standing Meds:  chlorhexidine 4% Liquid 1 Application(s) Topical <User Schedule>  cyanocobalamin 1000 MICROGram(s) Oral daily  dextrose 5%. 1000 milliLiter(s) IV Continuous <Continuous>  dextrose 50% Injectable 12.5 Gram(s) IV Push once  dextrose 50% Injectable 25 Gram(s) IV Push once  dextrose 50% Injectable 25 Gram(s) IV Push once  folic acid 1 milliGRAM(s) Enteral Tube daily  hydrocortisone sodium succinate Injectable 50 milliGRAM(s) IV Push every 8 hours  imipenem/cilastatin  IVPB 500 milliGRAM(s) IV Intermittent every 6 hours  insulin lispro (HumaLOG) corrective regimen sliding scale   SubCutaneous every 6 hours  lacosamide Solution 100 milliGRAM(s) Oral two times a day  midodrine 20 milliGRAM(s) Oral three times a day  norepinephrine Infusion 0.05 MICROgram(s)/kG/Min IV Continuous <Continuous>  propofol Infusion 20 MICROgram(s)/kG/Min IV Continuous <Continuous>  QUEtiapine 25 milliGRAM(s) Oral daily  risperiDONE   Solution 1 milliGRAM(s) Oral daily  sodium chloride 3%  Inhalation 4 milliLiter(s) Inhalation every 6 hours      PRN Meds:  acetaminophen    Suspension .. 650 milliGRAM(s) Oral every 6 hours PRN  dextrose 40% Gel 15 Gram(s) Oral once PRN  glucagon  Injectable 1 milliGRAM(s) IntraMuscular once PRN      LABS:                        8.3    7.15  )-----------( 371      ( 06 Jan 2019 03:30 )             26.6     Hgb Trend: 8.3<--, 9.6<--, 9.2<--, 9.8<--, 9.9<--  01-06    141  |  107  |  14  ----------------------------<  124<H>  4.2   |  27  |  0.21<L>    Ca    8.4      06 Jan 2019 03:30  Phos  2.5     01-05  Mg     1.8     01-05    TPro  5.5<L>  /  Alb  0.9<L>  /  TBili  0.6  /  DBili  x   /  AST  26  /  ALT  11  /  AlkPhos  155<H>  01-04    Creatinine Trend: 0.21<--, 0.20<--, < 0.20<--, < 0.20<--, 0.22<--, < 0.20<--      Arterial Blood Gas:  01-06 @ 03:30  7.38/52/173/29/99.7/5.4  ABG lactate: 1.8  Arterial Blood Gas:  01-06 @ 01:56  7.24/77/65/27/88.5/4.5  ABG lactate: 1.3  Arterial Blood Gas:  01-05 @ 16:15  7.38/52/62/28/90.9/5.1  ABG lactate: 1.7    Venous Blood Gas:  01-06 @ 02:30  7.29/66/76/28/94.3  VBG Lactate: 1.6

## 2019-01-06 NOTE — PROGRESS NOTE ADULT - ATTENDING COMMENTS
Intubated overnight.  Continue aggressive airway clearance.  Patient will likely need trach.   Will discuss with family.     Critical Care Time: 35 minutes

## 2019-01-06 NOTE — PROCEDURE NOTE - NSINDICATIONS_GEN_A_CORE
respiratory failure
critical illness/venous access
monitoring purposes/critical patient/arterial puncture to obtain ABG's

## 2019-01-06 NOTE — PROGRESS NOTE ADULT - ASSESSMENT
54 y/o Male (resident of [facility]) w/ a PMHx of TBI (s/p PEG tube, contracture, and seizure d/o) presented as a transfer from [facility] on 12/9 for respiratory failure 2/2 ARDS likely 2/2 necrotizing pancreatitis s/p intubation. Course c/b Acinetobacter PNA s/p Avycaz and Flagyl 7 day course.  Course further complicated by acute blood loss anemia s/p colonoscopy with findings suggestive of ischemic colitis, without further bleeding and Hgb remaining stable. S/p extubation 1/3, and re-intubation 1/6.       #Neurology/Psych  - Sedated on propofol    - Seizure Disorder- Continue Lacosamide.   - Continue Risperidone and Quetiapine     # Cardiovascular  - On Levophed post intubation    - Cortisol 9. Started on stress dose steroids 1/3  - Continue to monitor BP closely.    # Respiratory  - ARDS 2/2 Necrotizing Pancreatitis.   - Acinetobacter PNA s/p abx course   - s/p extubation 1/3 to high flow, desated overnight, re-intubated 1/6     #GI  - Necrotizing Pancreatitis with enlarging danay-pancreatitic fluid collections, per GI: currently not a candidate for endoscopic intervention  - On Imipenem for necrotizing pancreatitis, ID following   - Acute Blood Loss Anemia 2/2 Ischemic Colitis, colorectal surgery: no surgical intervention, Hgb now stable without further bleeding      #  - Ahuja placement.    # Renal/Electrolytes  - No active issues. Replete electrolytes as needed.     #ID   - Pseudomonas PNA: BAL on 12/12 was positive for pseudomonas aeruginosa. Repeat sputum cx w/ Acinetobacter resistant to carbapenems. s/p Avycaz and flagyl for 7 day course.   - Imipenem for pancreatitis as above     #Hematology/Oncology   - Acute Blood Loss Anemia as described above. Hgb stable above 9. Transfusion goal Hgb >7.     #Endocrine  - 's this morning, continue to monitor FSG's.     #DVT PPx:   - scd 52 y/o Male (resident of [facility]) w/ a PMHx of TBI (s/p PEG tube, contracture, and seizure d/o) presented as a transfer from [facility] on 12/9 for respiratory failure 2/2 ARDS likely 2/2 necrotizing pancreatitis s/p intubation. Course c/b Acinetobacter PNA s/p Avycaz and Flagyl 7 day course.  Course further complicated by acute blood loss anemia s/p colonoscopy with findings suggestive of ischemic colitis, without further bleeding and Hgb remaining stable. S/p extubation 1/3, and re-intubation 1/6.       #Neurology/Psych  - Sedated on propofol    - Seizure Disorder- Continue Lacosamide.   - Continue Risperidone and Quetiapine     # Cardiovascular  - On Midodrine 20 TID     - Cortisol 9. Started on stress dose steroids 1/3  - Continue to monitor BP closely.    # Respiratory  - ARDS 2/2 Necrotizing Pancreatitis.   - Acinetobacter PNA s/p abx course   - s/p extubation 1/3 to high flow, desated overnight, re-intubated 1/6     #GI  - Necrotizing Pancreatitis with enlarging danay-pancreatitic fluid collections, per GI: currently not a candidate for endoscopic intervention  - On Imipenem for necrotizing pancreatitis, ID following   - Acute Blood Loss Anemia 2/2 Ischemic Colitis, colorectal surgery: no surgical intervention, Hgb now stable without further bleeding      #  - Ahuja placement.    # Renal/Electrolytes  - No active issues. Replete electrolytes as needed.     #ID   - Pseudomonas PNA: BAL on 12/12 was positive for pseudomonas aeruginosa. Repeat sputum cx w/ Acinetobacter resistant to carbapenems. s/p Avycaz and flagyl for 7 day course.   - Imipenem for pancreatitis as above     #Hematology/Oncology   - Acute Blood Loss Anemia as described above. Hgb stable above 9. Transfusion goal Hgb >7.     #Endocrine  - 's this morning, continue to monitor FSG's.     #DVT PPx:   - scd 52 y/o Male (resident of [facility]) w/ a PMHx of TBI (s/p PEG tube, contracture, and seizure d/o) presented as a transfer from [facility] on 12/9 for respiratory failure 2/2 ARDS likely 2/2 necrotizing pancreatitis s/p intubation. Course c/b Acinetobacter PNA s/p Avycaz and Flagyl 7 day course.  Course further complicated by acute blood loss anemia s/p colonoscopy with findings suggestive of ischemic colitis, without further bleeding and Hgb remaining stable. S/p extubation 1/3, and re-intubation 1/6.       #Neurology/Psych  - Sedated on propofol, will wean today    - Seizure Disorder- Continue Lacosamide.   - Continue Risperidone and Quetiapine     # Cardiovascular  - On Midodrine 20 TID     - Cortisol 9. Started on stress dose steroids 1/3, will switch to 25 q 8hrs today  - Continue to monitor BP closely.    # Respiratory  - ARDS 2/2 Necrotizing Pancreatitis.   - Acinetobacter PNA s/p abx course   - s/p extubation 1/3 to high flow, desated overnight, re-intubated 1/6   - will c/w Metanebs and frequent suctioning     #GI  - Necrotizing Pancreatitis with enlarging danay-pancreatitic fluid collections, per GI: currently not a candidate for endoscopic intervention  - On Imipenem for necrotizing pancreatitis, ID following   - Acute Blood Loss Anemia 2/2 Ischemic Colitis, colorectal surgery: no surgical intervention, without further bleeding      #  - Ahuja placement.    # Renal/Electrolytes  - No active issues. Replete electrolytes as needed.     #ID   - Pseudomonas PNA: BAL on 12/12 was positive for pseudomonas aeruginosa. Repeat sputum cx w/ Acinetobacter resistant to carbapenems. s/p Avycaz and flagyl for 7 day course.   - Imipenem for pancreatitis as above     #Hematology/Oncology   - Acute Blood Loss Anemia as described above. Hgb dropped to 8.3 today but without further bleeding, monitor closely. Transfusion goal Hgb >7.     #Endocrine  - 's this morning, continue to monitor FSG's.     #DVT PPx:   - scd for now, will repeat CBC and consider restarting hep subq

## 2019-01-07 LAB
APTT BLD: 25.5 SEC — LOW (ref 27.5–36.3)
BACTERIA BLD CULT: SIGNIFICANT CHANGE UP
BACTERIA BLD CULT: SIGNIFICANT CHANGE UP
BASOPHILS # BLD AUTO: 0 K/UL — SIGNIFICANT CHANGE UP (ref 0–0.2)
BASOPHILS NFR BLD AUTO: 0 % — SIGNIFICANT CHANGE UP (ref 0–2)
BLD GP AB SCN SERPL QL: NEGATIVE — SIGNIFICANT CHANGE UP
BUN SERPL-MCNC: 11 MG/DL — SIGNIFICANT CHANGE UP (ref 7–23)
CALCIUM SERPL-MCNC: 8.2 MG/DL — LOW (ref 8.4–10.5)
CHLORIDE SERPL-SCNC: 108 MMOL/L — HIGH (ref 98–107)
CO2 SERPL-SCNC: 28 MMOL/L — SIGNIFICANT CHANGE UP (ref 22–31)
CREAT SERPL-MCNC: 0.21 MG/DL — LOW (ref 0.5–1.3)
EOSINOPHIL # BLD AUTO: 0 K/UL — SIGNIFICANT CHANGE UP (ref 0–0.5)
EOSINOPHIL NFR BLD AUTO: 0 % — SIGNIFICANT CHANGE UP (ref 0–6)
GLUCOSE BLDC GLUCOMTR-MCNC: 105 MG/DL — HIGH (ref 70–99)
GLUCOSE BLDC GLUCOMTR-MCNC: 106 MG/DL — HIGH (ref 70–99)
GLUCOSE BLDC GLUCOMTR-MCNC: 107 MG/DL — HIGH (ref 70–99)
GLUCOSE BLDC GLUCOMTR-MCNC: 116 MG/DL — HIGH (ref 70–99)
GLUCOSE SERPL-MCNC: 114 MG/DL — HIGH (ref 70–99)
HCT VFR BLD CALC: 27.2 % — LOW (ref 39–50)
HGB BLD-MCNC: 8.2 G/DL — LOW (ref 13–17)
IMM GRANULOCYTES NFR BLD AUTO: 0.9 % — SIGNIFICANT CHANGE UP (ref 0–1.5)
INR BLD: 1.12 — SIGNIFICANT CHANGE UP (ref 0.88–1.17)
LYMPHOCYTES # BLD AUTO: 0.71 K/UL — LOW (ref 1–3.3)
LYMPHOCYTES # BLD AUTO: 12.1 % — LOW (ref 13–44)
MAGNESIUM SERPL-MCNC: 1.8 MG/DL — SIGNIFICANT CHANGE UP (ref 1.6–2.6)
MCHC RBC-ENTMCNC: 30.1 % — LOW (ref 32–36)
MCHC RBC-ENTMCNC: 31.7 PG — SIGNIFICANT CHANGE UP (ref 27–34)
MCV RBC AUTO: 105 FL — HIGH (ref 80–100)
MONOCYTES # BLD AUTO: 0.36 K/UL — SIGNIFICANT CHANGE UP (ref 0–0.9)
MONOCYTES NFR BLD AUTO: 6.1 % — SIGNIFICANT CHANGE UP (ref 2–14)
NEUTROPHILS # BLD AUTO: 4.75 K/UL — SIGNIFICANT CHANGE UP (ref 1.8–7.4)
NEUTROPHILS NFR BLD AUTO: 80.9 % — HIGH (ref 43–77)
NRBC # FLD: 0.03 K/UL — LOW (ref 25–125)
PHOSPHATE SERPL-MCNC: 2.8 MG/DL — SIGNIFICANT CHANGE UP (ref 2.5–4.5)
PLATELET # BLD AUTO: 350 K/UL — SIGNIFICANT CHANGE UP (ref 150–400)
PMV BLD: 10.1 FL — SIGNIFICANT CHANGE UP (ref 7–13)
POTASSIUM SERPL-MCNC: 3.9 MMOL/L — SIGNIFICANT CHANGE UP (ref 3.5–5.3)
POTASSIUM SERPL-SCNC: 3.9 MMOL/L — SIGNIFICANT CHANGE UP (ref 3.5–5.3)
PREALB SERPL-MCNC: 12 MG/DL — LOW (ref 20–40)
PROTHROM AB SERPL-ACNC: 12.8 SEC — SIGNIFICANT CHANGE UP (ref 9.8–13.1)
RBC # BLD: 2.59 M/UL — LOW (ref 4.2–5.8)
RBC # FLD: 20.6 % — HIGH (ref 10.3–14.5)
RH IG SCN BLD-IMP: POSITIVE — SIGNIFICANT CHANGE UP
SODIUM SERPL-SCNC: 143 MMOL/L — SIGNIFICANT CHANGE UP (ref 135–145)
WBC # BLD: 5.87 K/UL — SIGNIFICANT CHANGE UP (ref 3.8–10.5)
WBC # FLD AUTO: 5.87 K/UL — SIGNIFICANT CHANGE UP (ref 3.8–10.5)

## 2019-01-07 PROCEDURE — 76604 US EXAM CHEST: CPT | Mod: 26

## 2019-01-07 PROCEDURE — 99291 CRITICAL CARE FIRST HOUR: CPT | Mod: 25

## 2019-01-07 PROCEDURE — 99232 SBSQ HOSP IP/OBS MODERATE 35: CPT | Mod: GC

## 2019-01-07 PROCEDURE — 99233 SBSQ HOSP IP/OBS HIGH 50: CPT | Mod: GC

## 2019-01-07 PROCEDURE — 93308 TTE F-UP OR LMTD: CPT | Mod: 26

## 2019-01-07 RX ORDER — MIDODRINE HYDROCHLORIDE 2.5 MG/1
30 TABLET ORAL THREE TIMES A DAY
Qty: 0 | Refills: 0 | Status: DISCONTINUED | OUTPATIENT
Start: 2019-01-07 | End: 2019-01-11

## 2019-01-07 RX ORDER — HEPARIN SODIUM 5000 [USP'U]/ML
INJECTION INTRAVENOUS; SUBCUTANEOUS
Qty: 25000 | Refills: 0 | Status: DISCONTINUED | OUTPATIENT
Start: 2019-01-07 | End: 2019-01-07

## 2019-01-07 RX ORDER — FUROSEMIDE 40 MG
10 TABLET ORAL ONCE
Qty: 0 | Refills: 0 | Status: COMPLETED | OUTPATIENT
Start: 2019-01-07 | End: 2019-01-07

## 2019-01-07 RX ORDER — HYDROCORTISONE 20 MG
50 TABLET ORAL EVERY 8 HOURS
Qty: 0 | Refills: 0 | Status: DISCONTINUED | OUTPATIENT
Start: 2019-01-07 | End: 2019-01-11

## 2019-01-07 RX ORDER — SODIUM CHLORIDE 9 MG/ML
500 INJECTION, SOLUTION INTRAVENOUS ONCE
Qty: 0 | Refills: 0 | Status: COMPLETED | OUTPATIENT
Start: 2019-01-07 | End: 2019-01-07

## 2019-01-07 RX ORDER — MIDAZOLAM HYDROCHLORIDE 1 MG/ML
0.02 INJECTION, SOLUTION INTRAMUSCULAR; INTRAVENOUS
Qty: 100 | Refills: 0 | Status: DISCONTINUED | OUTPATIENT
Start: 2019-01-07 | End: 2019-01-10

## 2019-01-07 RX ADMIN — LACOSAMIDE 100 MILLIGRAM(S): 50 TABLET ORAL at 05:50

## 2019-01-07 RX ADMIN — QUETIAPINE FUMARATE 25 MILLIGRAM(S): 200 TABLET, FILM COATED ORAL at 12:35

## 2019-01-07 RX ADMIN — IMIPENEM AND CILASTATIN 100 MILLIGRAM(S): 250; 250 INJECTION, POWDER, FOR SOLUTION INTRAVENOUS at 00:11

## 2019-01-07 RX ADMIN — MIDAZOLAM HYDROCHLORIDE 1.99 MG/KG/HR: 1 INJECTION, SOLUTION INTRAMUSCULAR; INTRAVENOUS at 20:00

## 2019-01-07 RX ADMIN — SODIUM CHLORIDE 500 MILLILITER(S): 9 INJECTION, SOLUTION INTRAVENOUS at 01:25

## 2019-01-07 RX ADMIN — SODIUM CHLORIDE 4 MILLILITER(S): 9 INJECTION INTRAMUSCULAR; INTRAVENOUS; SUBCUTANEOUS at 22:20

## 2019-01-07 RX ADMIN — MIDAZOLAM HYDROCHLORIDE 1.99 MG/KG/HR: 1 INJECTION, SOLUTION INTRAMUSCULAR; INTRAVENOUS at 10:42

## 2019-01-07 RX ADMIN — SODIUM CHLORIDE 4 MILLILITER(S): 9 INJECTION INTRAMUSCULAR; INTRAVENOUS; SUBCUTANEOUS at 10:50

## 2019-01-07 RX ADMIN — MIDODRINE HYDROCHLORIDE 20 MILLIGRAM(S): 2.5 TABLET ORAL at 05:51

## 2019-01-07 RX ADMIN — CHLORHEXIDINE GLUCONATE 1 APPLICATION(S): 213 SOLUTION TOPICAL at 12:39

## 2019-01-07 RX ADMIN — IMIPENEM AND CILASTATIN 100 MILLIGRAM(S): 250; 250 INJECTION, POWDER, FOR SOLUTION INTRAVENOUS at 05:49

## 2019-01-07 RX ADMIN — Medication 10 MILLIGRAM(S): at 10:43

## 2019-01-07 RX ADMIN — HEPARIN SODIUM 1800 UNIT(S)/HR: 5000 INJECTION INTRAVENOUS; SUBCUTANEOUS at 10:41

## 2019-01-07 RX ADMIN — MIDODRINE HYDROCHLORIDE 30 MILLIGRAM(S): 2.5 TABLET ORAL at 21:17

## 2019-01-07 RX ADMIN — Medication 25 MILLIGRAM(S): at 05:48

## 2019-01-07 RX ADMIN — IMIPENEM AND CILASTATIN 100 MILLIGRAM(S): 250; 250 INJECTION, POWDER, FOR SOLUTION INTRAVENOUS at 12:35

## 2019-01-07 RX ADMIN — Medication 50 MILLIGRAM(S): at 13:00

## 2019-01-07 RX ADMIN — MIDODRINE HYDROCHLORIDE 30 MILLIGRAM(S): 2.5 TABLET ORAL at 13:00

## 2019-01-07 RX ADMIN — RISPERIDONE 1 MILLIGRAM(S): 4 TABLET ORAL at 12:35

## 2019-01-07 RX ADMIN — IMIPENEM AND CILASTATIN 100 MILLIGRAM(S): 250; 250 INJECTION, POWDER, FOR SOLUTION INTRAVENOUS at 23:22

## 2019-01-07 RX ADMIN — Medication 50 MILLIGRAM(S): at 21:17

## 2019-01-07 RX ADMIN — Medication 1 MILLIGRAM(S): at 12:35

## 2019-01-07 RX ADMIN — SODIUM CHLORIDE 4 MILLILITER(S): 9 INJECTION INTRAMUSCULAR; INTRAVENOUS; SUBCUTANEOUS at 04:38

## 2019-01-07 RX ADMIN — PREGABALIN 1000 MICROGRAM(S): 225 CAPSULE ORAL at 12:35

## 2019-01-07 RX ADMIN — IMIPENEM AND CILASTATIN 100 MILLIGRAM(S): 250; 250 INJECTION, POWDER, FOR SOLUTION INTRAVENOUS at 18:17

## 2019-01-07 RX ADMIN — LACOSAMIDE 100 MILLIGRAM(S): 50 TABLET ORAL at 18:21

## 2019-01-07 NOTE — PROGRESS NOTE ADULT - ASSESSMENT
Impression  - Hematochezia, s/p colonoscopy on 12/28/2018 with Severe segemental colitis localized to the transverse colon and ascending colon (possible ischemic colitis, possible colitis related to contiguous danay-pancreatic inflammatory process). Biopsied.   - Enlarging danay-pancreatic collection with pancreatitis, CT 12.25 with pancreatic necrosis, but no wall formed yet  - Septic, distributive shock.  acinetobacter PNA and pseudomonas in bronch, with resp failure requiring repeat intubation   - Seizure disorder    Recommendations    - trend CBC, CMP and fever curve  - please obtain a CT abdomen to evaluate the pancreatic lesion (if patient has a wall around the collection, then will consider endoscopic intervention for drainage)  - continue antibiotics with imepenem  - rest of care per primary team      Isa Mike, PGY-5  GI fellow  B- [id number]/ [phone]  Please call GI fellow on call after 5pm and on weekends

## 2019-01-07 NOTE — PROGRESS NOTE ADULT - SUBJECTIVE AND OBJECTIVE BOX
INTERVAL HPI/OVERNIGHT EVENTS: Low UO overnight, given 500cc LR. FiO2 increased from 60 to 70% this morning.     SUBJECTIVE: Patient seen and examined at bedside. Intubated and sedated, unable to obtain ROS    OBJECTIVE:    VITAL SIGNS:  ICU Vital Signs Last 24 Hrs  T(C): 36.2 (07 Jan 2019 04:00), Max: 36.3 (07 Jan 2019 00:00)  T(F): 97.2 (07 Jan 2019 04:00), Max: 97.4 (07 Jan 2019 00:00)  HR: 91 (07 Jan 2019 07:20) (62 - 102)  BP: 103/61 (07 Jan 2019 07:00) (87/48 - 107/65)  BP(mean): 71 (07 Jan 2019 07:00) (57 - 74)  RR: 14 (07 Jan 2019 07:20) (9 - 17)  SpO2: 93% (07 Jan 2019 07:20) (89% - 99%)    Mode: AC/ CMV (Assist Control/ Continuous Mandatory Ventilation), RR (machine): 14, TV (machine): 350, FiO2: 60, PEEP: 5, MAP: 9, PIP: 23    01-06 @ 07:01  -  01-07 @ 07:00  --------------------------------------------------------  IN: 2156.6 mL / OUT: 1035 mL / NET: 1121.6 mL      CAPILLARY BLOOD GLUCOSE      POCT Blood Glucose.: 107 mg/dL (07 Jan 2019 06:01)      PHYSICAL EXAM:    General: NAD  HEENT: NC/AT; clear conjunctiva  Neck: supple  Respiratory: CTA b/l  Cardiovascular: +S1/S2; RRR  Abdomen: soft, NT/ND  Extremities: WWP, no LE edema  Skin: normal color and turgor; no rash  Neurological: intubated, sedated    MEDICATIONS:  MEDICATIONS  (STANDING):  chlorhexidine 4% Liquid 1 Application(s) Topical <User Schedule>  cyanocobalamin 1000 MICROGram(s) Oral daily  dextrose 5%. 1000 milliLiter(s) (50 mL/Hr) IV Continuous <Continuous>  dextrose 50% Injectable 12.5 Gram(s) IV Push once  dextrose 50% Injectable 25 Gram(s) IV Push once  dextrose 50% Injectable 25 Gram(s) IV Push once  folic acid 1 milliGRAM(s) Enteral Tube daily  hydrocortisone sodium succinate Injectable 25 milliGRAM(s) IV Push every 8 hours  imipenem/cilastatin  IVPB 500 milliGRAM(s) IV Intermittent every 6 hours  insulin lispro (HumaLOG) corrective regimen sliding scale   SubCutaneous every 6 hours  lacosamide Solution 100 milliGRAM(s) Oral two times a day  midodrine 20 milliGRAM(s) Oral three times a day  propofol Infusion 50 MICROgram(s)/kG/Min (29.91 mL/Hr) IV Continuous <Continuous>  QUEtiapine 25 milliGRAM(s) Oral daily  risperiDONE   Solution 1 milliGRAM(s) Oral daily  sodium chloride 3%  Inhalation 4 milliLiter(s) Inhalation every 6 hours    MEDICATIONS  (PRN):  acetaminophen    Suspension .. 650 milliGRAM(s) Oral every 6 hours PRN Temp greater or equal to 38C (100.4F), Mild Pain (1 - 3), Moderate Pain (4 - 6)  dextrose 40% Gel 15 Gram(s) Oral once PRN Blood Glucose LESS THAN 70 milliGRAM(s)/deciliter  glucagon  Injectable 1 milliGRAM(s) IntraMuscular once PRN Glucose LESS THAN 70 milligrams/deciliter      ALLERGIES:  Allergies    Valproate Sodium (Other (Severe))    Intolerances        LABS:                        8.2    5.87  )-----------( 350      ( 07 Jan 2019 02:17 )             27.2     01-07    143  |  108<H>  |  11  ----------------------------<  114<H>  3.9   |  28  |  0.21<L>    Ca    8.2<L>      07 Jan 2019 02:17  Phos  2.8     01-07  Mg     1.8     01-07      PT/INR - ( 07 Jan 2019 02:12 )   PT: 12.8 SEC;   INR: 1.12          PTT - ( 07 Jan 2019 02:12 )  PTT:25.5 SEC      RADIOLOGY & ADDITIONAL TESTS: Reviewed. INTERVAL HPI/OVERNIGHT EVENTS: Low UO overnight, given 500cc LR. FiO2 increased from 60 to 70% this morning.     SUBJECTIVE: Patient seen and examined at bedside. Intubated and sedated, unable to obtain ROS    OBJECTIVE:    VITAL SIGNS:  ICU Vital Signs Last 24 Hrs  T(C): 36.2 (07 Jan 2019 04:00), Max: 36.3 (07 Jan 2019 00:00)  T(F): 97.2 (07 Jan 2019 04:00), Max: 97.4 (07 Jan 2019 00:00)  HR: 91 (07 Jan 2019 07:20) (62 - 102)  BP: 103/61 (07 Jan 2019 07:00) (87/48 - 107/65)  BP(mean): 71 (07 Jan 2019 07:00) (57 - 74)  RR: 14 (07 Jan 2019 07:20) (9 - 17)  SpO2: 93% (07 Jan 2019 07:20) (89% - 99%)    Mode: AC/ CMV (Assist Control/ Continuous Mandatory Ventilation), RR (machine): 14, TV (machine): 350, FiO2: 60, PEEP: 5, MAP: 9, PIP: 23    01-06 @ 07:01  -  01-07 @ 07:00  --------------------------------------------------------  IN: 2156.6 mL / OUT: 1035 mL / NET: 1121.6 mL      CAPILLARY BLOOD GLUCOSE      POCT Blood Glucose.: 107 mg/dL (07 Jan 2019 06:01)      PHYSICAL EXAM:    General: NAD  HEENT: NC/AT; clear conjunctiva  Neck: supple  Respiratory: CTA b/l  Cardiovascular: +S1/S2; RRR  Abdomen: soft, NT/ND  Extremities: WWP, +anasarca  Skin: normal color and turgor; no rash  Neurological: intubated, sedated    MEDICATIONS:  MEDICATIONS  (STANDING):  chlorhexidine 4% Liquid 1 Application(s) Topical <User Schedule>  cyanocobalamin 1000 MICROGram(s) Oral daily  dextrose 5%. 1000 milliLiter(s) (50 mL/Hr) IV Continuous <Continuous>  dextrose 50% Injectable 12.5 Gram(s) IV Push once  dextrose 50% Injectable 25 Gram(s) IV Push once  dextrose 50% Injectable 25 Gram(s) IV Push once  folic acid 1 milliGRAM(s) Enteral Tube daily  hydrocortisone sodium succinate Injectable 25 milliGRAM(s) IV Push every 8 hours  imipenem/cilastatin  IVPB 500 milliGRAM(s) IV Intermittent every 6 hours  insulin lispro (HumaLOG) corrective regimen sliding scale   SubCutaneous every 6 hours  lacosamide Solution 100 milliGRAM(s) Oral two times a day  midodrine 20 milliGRAM(s) Oral three times a day  propofol Infusion 50 MICROgram(s)/kG/Min (29.91 mL/Hr) IV Continuous <Continuous>  QUEtiapine 25 milliGRAM(s) Oral daily  risperiDONE   Solution 1 milliGRAM(s) Oral daily  sodium chloride 3%  Inhalation 4 milliLiter(s) Inhalation every 6 hours    MEDICATIONS  (PRN):  acetaminophen    Suspension .. 650 milliGRAM(s) Oral every 6 hours PRN Temp greater or equal to 38C (100.4F), Mild Pain (1 - 3), Moderate Pain (4 - 6)  dextrose 40% Gel 15 Gram(s) Oral once PRN Blood Glucose LESS THAN 70 milliGRAM(s)/deciliter  glucagon  Injectable 1 milliGRAM(s) IntraMuscular once PRN Glucose LESS THAN 70 milligrams/deciliter      ALLERGIES:  Allergies    Valproate Sodium (Other (Severe))    Intolerances        LABS:                        8.2    5.87  )-----------( 350      ( 07 Jan 2019 02:17 )             27.2     01-07    143  |  108<H>  |  11  ----------------------------<  114<H>  3.9   |  28  |  0.21<L>    Ca    8.2<L>      07 Jan 2019 02:17  Phos  2.8     01-07  Mg     1.8     01-07      PT/INR - ( 07 Jan 2019 02:12 )   PT: 12.8 SEC;   INR: 1.12          PTT - ( 07 Jan 2019 02:12 )  PTT:25.5 SEC      RADIOLOGY & ADDITIONAL TESTS: Reviewed.

## 2019-01-07 NOTE — PROGRESS NOTE ADULT - ASSESSMENT
54 y/o Male (resident of [facility]) w/ a PMHx of TBI (s/p PEG tube, contracture, and seizure d/o) presented as a transfer from Health system on 12/9 for respiratory failure 2/2 ARDS likely 2/2 necrotizing pancreatitis s/p intubation. Course c/b Acinetobacter PNA s/p Avycaz and Flagyl 7 day course.  Course further complicated by acute blood loss anemia s/p colonoscopy with findings suggestive of ischemic colitis, without further bleeding and Hgb remaining stable. S/p extubation 1/3, and re-intubation 1/6.       #Neurology/Psych  - Sedated on propofol, will wean today    - Seizure Disorder- Continue Lacosamide.   - Continue Risperidone and Quetiapine     # Cardiovascular  - On Midodrine 20 TID     - Cortisol 9. Started on stress dose steroids 1/3, will switch to 25 q 8hrs today  - Continue to monitor BP closely.    # Respiratory  - ARDS 2/2 Necrotizing Pancreatitis.   - Acinetobacter PNA s/p abx course   - s/p extubation 1/3 to high flow, desated overnight, re-intubated 1/6   - will c/w Metanebs and frequent suctioning     #GI  - Necrotizing Pancreatitis with enlarging danay-pancreatitic fluid collections, per GI: currently not a candidate for endoscopic intervention  - On Imipenem for necrotizing pancreatitis, ID following   - Acute Blood Loss Anemia 2/2 Ischemic Colitis, colorectal surgery: no surgical intervention, without further bleeding      #  - Ahuja placement.    # Renal/Electrolytes  - No active issues. Replete electrolytes as needed.     #ID   - Pseudomonas PNA: BAL on 12/12 was positive for pseudomonas aeruginosa. Repeat sputum cx w/ Acinetobacter resistant to carbapenems. s/p Avycaz and flagyl for 7 day course.   - Imipenem for pancreatitis as above     #Hematology/Oncology   - Acute Blood Loss Anemia as described above. Hgb dropped to 8.3 today but without further bleeding, monitor closely. Transfusion goal Hgb >7.     #Endocrine  - 's this morning, continue to monitor FSG's.     #DVT PPx:   - scd for now, will repeat CBC and consider restarting hep subq 53 Male w/ a PMHx of TBI (s/p PEG tube, contracture, and seizure d/o) presented as a transfer from [facility] on 12/9 for respiratory failure 2/2 ARDS likely 2/2 necrotizing pancreatitis s/p intubation. Course c/b Acinetobacter PNA s/p Avycaz and Flagyl 7 day course.  Course further complicated by acute blood loss anemia s/p colonoscopy with findings suggestive of ischemic colitis, without further bleeding and Hgb remaining stable. S/p extubation 1/3, and re-intubation 1/6.       #Neurology/Psych  - Seizure Disorder- Continue Lacosamide.   - Continue Risperidone and Quetiapine     # Cardiovascular  - Midodrine increased to 30 TID given borderline BP. Solucortef also increased to [id number]   - Lasix, goal net negative of -1L given anasarca   - Concern for LUE DVT, started on empiric hep gtt, close monitoring given recent GIB. F/u official E [facility] duplex     # Respiratory  - ARDS 2/2 Necrotizing Pancreatitis.   - Acinetobacter PNA s/p abx course   - s/p extubation 1/3 to high flow, desated overnight, re-intubated 1/6   - will c/w Metanebs and frequent suctioning     #GI  - Necrotizing Pancreatitis with enlarging danay-pancreatitic fluid collections, repeat CT A/P pending.   - On Imipenem for necrotizing pancreatitis, ID following   - Acute Blood Loss Anemia 2/2 Ischemic Colitis, colorectal surgery: no surgical intervention, without further bleeding      #  - Ahuja placement.    # Renal/Electrolytes  - lasix for goal net -1L . Replete electrolytes as needed.     #ID   - Pseudomonas PNA: BAL on 12/12 was positive for pseudomonas aeruginosa. Repeat sputum cx w/ Acinetobacter resistant to carbapenems. s/p Avycaz and flagyl for 7 day course.   - Imipenem for pancreatitis as above     #Hematology/Oncology   - Acute Blood Loss Anemia as described above. Hgb stable without further bleeding, monitor closely. Transfusion goal Hgb >7.     #Endocrine  - Continue to monitor FS, ISS    #DVT PPx:   - hep gtt as above

## 2019-01-07 NOTE — PROGRESS NOTE ADULT - ASSESSMENT
53  M (resident of [facility]) with TBI, s/p PEG tube, contracture, and seizure d/o who presented as a transfer from [facility] on 12/9 for respiratory failure 2/2 ARDS likely 2/2 necrotizing pancreatitis s/p intubation.  Imipenem 12/28/18 - continued   bronc 12/11 with pseudomonas  pt with thick secretions and again febrile, sputum cx with  acinetobacter  repeat Ct with increased size of pancreatic collection but not walled off so no interventions were recommended  also had GIB and colitis due to ?contiguous necrotizing pancreatitis vs ischemic, s/p colonoscopy but not actively bleeding now  completed a 7 day course of avycaz and flagyl for the acinetobacter now back on imipenem    sepsis with fever, leukopenia resolved, extubated then reintubated 1/6/19 for hypoxia, CXR with small effusions. aspiration event?    Necrotising Pancreatitis with multiple peripancreatitis collections    acinetobacter PNA vs colonization, was treated as pt had thick secretions, still with LLL pneumonia on u/s  coag neg staph bacteremia likely contaminant, repeat negative  colitis, ?due to contiguous necrotizing pancreatitis and collection vs ischemic    no plans for IR embolization for surgical interventions      * s/p 7 days of acycaz and flagyl for  acinetobacter in the sputum 12/22-12/28   *  c/w Imipenem for the necrotizing pancreatitis for now as pt still febrile   * planned for repeat CT abdomen today but would also do chest given worsening respiratory status 53  M (resident of [facility]) with TBI, s/p PEG tube, contracture, and seizure d/o who presented as a transfer from [facility] on 12/9 for respiratory failure 2/2 ARDS likely 2/2 necrotizing pancreatitis s/p intubation.  Imipenem 12/28/18 - continued   bronc 12/11 with pseudomonas  pt with thick secretions and again febrile, sputum cx with  acinetobacter  repeat Ct with increased size of pancreatic collection but not walled off so no interventions were recommended  also had GIB and colitis due to ?contiguous necrotizing pancreatitis vs ischemic, s/p colonoscopy but not actively bleeding now  completed a 7 day course of avycaz and flagyl for the acinetobacter now back on imipenem    sepsis with fever, leukopenia resolved, extubated then reintubated 1/6/19 for hypoxia and poor cough along with profuse secretions   Necrotising Pancreatitis with multiple peripancreatitis collections    acinetobacter PNA vs colonization, was treated as pt had thick secretions, still with LLL pneumonia on u/s  coag neg staph bacteremia likely contaminant, repeat negative  colitis, ?due to contiguous necrotizing pancreatitis and collection vs ischemic    no plans for IR embolization for surgical interventions      * s/p 7 days of avcaz and flagyl for  acinetobacter in the sputum 12/22-12/28   * on Imipenem for the necrotizing pancreatitis , was reintubated for resp failure and profuse thick secretions and also hypothermic  * planned for repeat CT abdomen today but would also do chest given worsening respiratory status   * if fever or hypothermia would switch back to avycaz and flagyl  * f/u the sputum cx

## 2019-01-07 NOTE — CHART NOTE - NSCHARTNOTEFT_GEN_A_CORE
Discussed w/ patient's sister Yeny Hoffman who also discussed with his mother Mrs. Katie Stewart and they are in agreement with proceeding with tracheostomy. Advised that the trach will likely be done tomorrow. Consent also obtained for IV contrast for CT A/P to assess necrotizing pancreatitis.     Thiago Moreira PGY3

## 2019-01-07 NOTE — CHART NOTE - NSCHARTNOTEFT_GEN_A_CORE
A line predominance with few scattered B lines  Bilateral PLEFF, right > left  IVC indeterminate    :    INDICATION:    PROCEDURE:  [ ] LIMITED ECHO  [ ] LIMITED CHEST  [ ] LIMITED RETROPERITONEAL  [ ] LIMITED ABDOMINAL  [ ] LIMITED DVT  [ ] NEEDLE GUIDANCE VASCULAR  [ ] NEEDLE GUIDANCE THORACENTESIS  [ ] NEEDLE GUIDANCE PARACENTESIS  [ ] NEEDLE GUIDANCE PERICARDIOCENTESIS  [ ] OTHER    FINDINGS:      INTERPRETATION: : Miya Valle     INDICATION: Hypoxic respiratory failure.     PROCEDURE:  [X ] LIMITED ECHO  [X ] LIMITED CHEST  [ ] LIMITED RETROPERITONEAL  [ ] LIMITED ABDOMINAL  [ ] LIMITED DVT  [ ] NEEDLE GUIDANCE VASCULAR  [ ] NEEDLE GUIDANCE THORACENTESIS  [ ] NEEDLE GUIDANCE PARACENTESIS  [ ] NEEDLE GUIDANCE PERICARDIOCENTESIS  [ ] OTHER    FINDINGS:  -A line predominance with few scattered B lines  -Bilateral PLEFF, R>L  -Normal LV systolic function  -IVC indeterminate  -Impaired compressibility of brachial vein    INTERPRETATION: No cardiac limitation. Left upper extremity DVT. Heparin infusion started.

## 2019-01-07 NOTE — PROGRESS NOTE ADULT - SUBJECTIVE AND OBJECTIVE BOX
Patient examined and case reviewed in detail on bedside rounds  Critically ill on vent with failed extubation Will need trach  Frequent bedside visits with therapy change today.   Crit Care Time Today 35 min+

## 2019-01-07 NOTE — PROGRESS NOTE ADULT - SUBJECTIVE AND OBJECTIVE BOX
Follow Up:  necrotizing pancreatitis and CRE acinetobacter in the sputum    Interval History/ROS: Reintubated 1/6/19 early morning due to hypoxia. Afebrile since 1/3 but hypothermic over the weekend. As per RN secretions are very thick. Increased FiO2 today from 60 to 70%.     Allergies  Valproate Sodium (Other (Severe))    ANTIMICROBIALS:  imipenem/cilastatin  IVPB 500 every 6 hours      OTHER MEDS:  acetaminophen    Suspension .. 650 milliGRAM(s) Oral every 6 hours PRN  chlorhexidine 4% Liquid 1 Application(s) Topical <User Schedule>  cyanocobalamin 1000 MICROGram(s) Oral daily  dextrose 40% Gel 15 Gram(s) Oral once PRN  dextrose 5%. 1000 milliLiter(s) IV Continuous <Continuous>  dextrose 50% Injectable 12.5 Gram(s) IV Push once  dextrose 50% Injectable 25 Gram(s) IV Push once  dextrose 50% Injectable 25 Gram(s) IV Push once  folic acid 1 milliGRAM(s) Enteral Tube daily  furosemide   Injectable 10 milliGRAM(s) IV Push once  glucagon  Injectable 1 milliGRAM(s) IntraMuscular once PRN  heparin  Infusion.  Unit(s)/Hr IV Continuous <Continuous>  hydrocortisone sodium succinate Injectable 50 milliGRAM(s) IV Push every 8 hours  insulin lispro (HumaLOG) corrective regimen sliding scale   SubCutaneous every 6 hours  lacosamide Solution 100 milliGRAM(s) Oral two times a day  midazolam Infusion 0.02 mG/kG/Hr IV Continuous <Continuous>  midodrine 30 milliGRAM(s) Oral three times a day  QUEtiapine 25 milliGRAM(s) Oral daily  risperiDONE   Solution 1 milliGRAM(s) Oral daily  sodium chloride 3%  Inhalation 4 milliLiter(s) Inhalation every 6 hours      Vital Signs Last 24 Hrs  T(C): 36.3 (07 Jan 2019 08:00), Max: 36.3 (07 Jan 2019 00:00)  T(F): 97.3 (07 Jan 2019 08:00), Max: 97.4 (07 Jan 2019 00:00)  HR: 67 (07 Jan 2019 10:00) (63 - 102)  BP: 85/54 (07 Jan 2019 10:00) (85/54 - 106/64)  BP(mean): 61 (07 Jan 2019 10:00) (57 - 73)  RR: 15 (07 Jan 2019 10:00) (10 - 17)  SpO2: 98% (07 Jan 2019 10:00) (89% - 98%)    Physical Exam:  General: not awake or alert. intubated   HEENT:  clear conjunctiva. ET tube.   Cardiovascular: regular rate and rhythm   Respiratory:  intubated mechanical ventilation. nonlabored. coarse breath sounds bilaterally   abd:  PEG tube. soft, bowel sounds present, nontender. rectal tube   :  no suprapubic tenderness. tavares dark yellow urine   Musculoskeletal:   no joint swelling. contractures, RUE mostly   Skin:    no rash  Neurologic:     Vascular: no phlebitis, no edema   Psych: unable to assess                           8.2    5.87  )-----------( 350      ( 07 Jan 2019 02:17 )             27.2       01-07    143  |  108<H>  |  11  ----------------------------<  114<H>  3.9   |  28  |  0.21<L>    Ca    8.2<L>      07 Jan 2019 02:17  Phos  2.8     01-07  Mg     1.8     01-07      MICROBIOLOGY:  v  ENDOTRACHEAL SPECIMEN  01-06-19 --  --  --      BLOOD VENOUS  01-02-19 --  --  --      BLOOD PERIPHERAL  01-02-19 --  --  --      BLOOD PERIPHERAL  12-27-18 --  --  --      BLOOD VENOUS  12-24-18 --  --  --      BLOOD PERIPHERAL  12-24-18 --  --  --      BLOOD PERIPHERAL  12-23-18 --  --  --      BLOOD VENOUS  12-22-18 --  --  BLOOD CULTURE PCR  Staphylococcus sp.,coag neg      SPUTUM  12-18-18 --  --  Acinetobacter baumannii       BLOOD PERIPHERAL  12-18-18 --  --  --      BRONCHIAL LAVAGE  12-12-18 --  --  Pseudomonas aeruginosa      URINE CATHETER  12-11-18 --  --  --      BLOOD PERIPHERAL  12-11-18 --  --  --    RADIOLOGY:  Xray Chest 1 View- PORTABLE-Urgent (01.06.19 @ 02:49)   Cardiac silhouette normal evaluated. Enteric tube in mid trachea. Small   bilateral pleural effusions. No pneumothorax. Follow Up:  necrotizing pancreatitis and CRE acinetobacter in the sputum    Interval History/ROS: Reintubated 1/6/19 early morning due to hypoxia. Afebrile since 1/3 but hypothermic over the weekend. As per RN secretions are very thick. Increased FiO2 today from 60 to 70%.         ROS:    Unobtainable because: pt intuabted        Allergies  Valproate Sodium (Other (Severe))    ANTIMICROBIALS:  imipenem/cilastatin  IVPB 500 every 6 hours      OTHER MEDS:  acetaminophen    Suspension .. 650 milliGRAM(s) Oral every 6 hours PRN  chlorhexidine 4% Liquid 1 Application(s) Topical <User Schedule>  cyanocobalamin 1000 MICROGram(s) Oral daily  dextrose 40% Gel 15 Gram(s) Oral once PRN  dextrose 5%. 1000 milliLiter(s) IV Continuous <Continuous>  dextrose 50% Injectable 12.5 Gram(s) IV Push once  dextrose 50% Injectable 25 Gram(s) IV Push once  dextrose 50% Injectable 25 Gram(s) IV Push once  folic acid 1 milliGRAM(s) Enteral Tube daily  furosemide   Injectable 10 milliGRAM(s) IV Push once  glucagon  Injectable 1 milliGRAM(s) IntraMuscular once PRN  heparin  Infusion.  Unit(s)/Hr IV Continuous <Continuous>  hydrocortisone sodium succinate Injectable 50 milliGRAM(s) IV Push every 8 hours  insulin lispro (HumaLOG) corrective regimen sliding scale   SubCutaneous every 6 hours  lacosamide Solution 100 milliGRAM(s) Oral two times a day  midazolam Infusion 0.02 mG/kG/Hr IV Continuous <Continuous>  midodrine 30 milliGRAM(s) Oral three times a day  QUEtiapine 25 milliGRAM(s) Oral daily  risperiDONE   Solution 1 milliGRAM(s) Oral daily  sodium chloride 3%  Inhalation 4 milliLiter(s) Inhalation every 6 hours      Vital Signs Last 24 Hrs  T(C): 36.3 (07 Jan 2019 08:00), Max: 36.3 (07 Jan 2019 00:00)  T(F): 97.3 (07 Jan 2019 08:00), Max: 97.4 (07 Jan 2019 00:00)  HR: 67 (07 Jan 2019 10:00) (63 - 102)  BP: 85/54 (07 Jan 2019 10:00) (85/54 - 106/64)  BP(mean): 61 (07 Jan 2019 10:00) (57 - 73)  RR: 15 (07 Jan 2019 10:00) (10 - 17)  SpO2: 98% (07 Jan 2019 10:00) (89% - 98%)    Physical Exam:  General: not awake or alert. intubated   HEENT:  clear conjunctiva. ET tube.   Cardiovascular: regular rate and rhythm   Respiratory:  intubated mechanical ventilation. nonlabored. coarse breath sounds bilaterally   abd:  PEG tube. soft, bowel sounds present, nontender. rectal tube   :  no suprapubic tenderness. tavares dark yellow urine   Musculoskeletal:   no joint swelling. contractures, RUE mostly   Skin:    no rash  Neurologic:     Vascular: no phlebitis, no edema   Psych: unable to assess                           8.2    5.87  )-----------( 350      ( 07 Jan 2019 02:17 )             27.2       01-07    143  |  108<H>  |  11  ----------------------------<  114<H>  3.9   |  28  |  0.21<L>    Ca    8.2<L>      07 Jan 2019 02:17  Phos  2.8     01-07  Mg     1.8     01-07      MICROBIOLOGY:  v  ENDOTRACHEAL SPECIMEN  01-06-19 --  --  --      BLOOD VENOUS  01-02-19 --  --  --      BLOOD PERIPHERAL  01-02-19 --  --  --      BLOOD PERIPHERAL  12-27-18 --  --  --      BLOOD VENOUS  12-24-18 --  --  --      BLOOD PERIPHERAL  12-24-18 --  --  --      BLOOD PERIPHERAL  12-23-18 --  --  --      BLOOD VENOUS  12-22-18 --  --  BLOOD CULTURE PCR  Staphylococcus sp.,coag neg      SPUTUM  12-18-18 --  --  Acinetobacter baumannii       BLOOD PERIPHERAL  12-18-18 --  --  --      BRONCHIAL LAVAGE  12-12-18 --  --  Pseudomonas aeruginosa      URINE CATHETER  12-11-18 --  --  --      BLOOD PERIPHERAL  12-11-18 --  --  --    RADIOLOGY:  Xray Chest 1 View- PORTABLE-Urgent (01.06.19 @ 02:49)   Cardiac silhouette normal evaluated. Enteric tube in mid trachea. Small   bilateral pleural effusions. No pneumothorax.
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
## 2019-01-08 LAB
-  AMIKACIN: SIGNIFICANT CHANGE UP
-  AMPICILLIN/SULBACTAM: SIGNIFICANT CHANGE UP
-  CEFEPIME: SIGNIFICANT CHANGE UP
-  CEFTAZIDIME: SIGNIFICANT CHANGE UP
-  CIPROFLOXACIN: SIGNIFICANT CHANGE UP
-  GENTAMICIN: SIGNIFICANT CHANGE UP
-  LEVOFLOXACIN: SIGNIFICANT CHANGE UP
-  MEROPENEM: SIGNIFICANT CHANGE UP
-  TOBRAMYCIN: SIGNIFICANT CHANGE UP
APTT BLD: 25.9 SEC — LOW (ref 27.5–36.3)
BACTERIA SPT RESP CULT: SIGNIFICANT CHANGE UP
BUN SERPL-MCNC: 7 MG/DL — SIGNIFICANT CHANGE UP (ref 7–23)
CALCIUM SERPL-MCNC: 7.7 MG/DL — LOW (ref 8.4–10.5)
CHLORIDE SERPL-SCNC: 105 MMOL/L — SIGNIFICANT CHANGE UP (ref 98–107)
CO2 SERPL-SCNC: 28 MMOL/L — SIGNIFICANT CHANGE UP (ref 22–31)
CREAT SERPL-MCNC: < 0.2 MG/DL — LOW (ref 0.5–1.3)
GLUCOSE BLDC GLUCOMTR-MCNC: 101 MG/DL — HIGH (ref 70–99)
GLUCOSE BLDC GLUCOMTR-MCNC: 114 MG/DL — HIGH (ref 70–99)
GLUCOSE BLDC GLUCOMTR-MCNC: 90 MG/DL — SIGNIFICANT CHANGE UP (ref 70–99)
GLUCOSE BLDC GLUCOMTR-MCNC: 95 MG/DL — SIGNIFICANT CHANGE UP (ref 70–99)
GLUCOSE SERPL-MCNC: 113 MG/DL — HIGH (ref 70–99)
GRAM STN SPT: SIGNIFICANT CHANGE UP
HCT VFR BLD CALC: 27.5 % — LOW (ref 39–50)
HGB BLD-MCNC: 8.7 G/DL — LOW (ref 13–17)
INR BLD: 1.12 — SIGNIFICANT CHANGE UP (ref 0.88–1.17)
MAGNESIUM SERPL-MCNC: 1.7 MG/DL — SIGNIFICANT CHANGE UP (ref 1.6–2.6)
MCHC RBC-ENTMCNC: 31.6 % — LOW (ref 32–36)
MCHC RBC-ENTMCNC: 32.1 PG — SIGNIFICANT CHANGE UP (ref 27–34)
MCV RBC AUTO: 101.5 FL — HIGH (ref 80–100)
METHOD TYPE: SIGNIFICANT CHANGE UP
NRBC # FLD: 0.02 K/UL — LOW (ref 25–125)
ORGANISM # SPEC MICROSCOPIC CNT: SIGNIFICANT CHANGE UP
ORGANISM # SPEC MICROSCOPIC CNT: SIGNIFICANT CHANGE UP
PHOSPHATE SERPL-MCNC: 3.4 MG/DL — SIGNIFICANT CHANGE UP (ref 2.5–4.5)
PLATELET # BLD AUTO: 342 K/UL — SIGNIFICANT CHANGE UP (ref 150–400)
PMV BLD: 10.5 FL — SIGNIFICANT CHANGE UP (ref 7–13)
POTASSIUM SERPL-MCNC: 3.7 MMOL/L — SIGNIFICANT CHANGE UP (ref 3.5–5.3)
POTASSIUM SERPL-SCNC: 3.7 MMOL/L — SIGNIFICANT CHANGE UP (ref 3.5–5.3)
PROTHROM AB SERPL-ACNC: 12.8 SEC — SIGNIFICANT CHANGE UP (ref 9.8–13.1)
RBC # BLD: 2.71 M/UL — LOW (ref 4.2–5.8)
RBC # FLD: 20.3 % — HIGH (ref 10.3–14.5)
SODIUM SERPL-SCNC: 142 MMOL/L — SIGNIFICANT CHANGE UP (ref 135–145)
TRIGL SERPL-MCNC: 181 MG/DL — HIGH (ref 10–149)
WBC # BLD: 4.85 K/UL — SIGNIFICANT CHANGE UP (ref 3.8–10.5)
WBC # FLD AUTO: 4.85 K/UL — SIGNIFICANT CHANGE UP (ref 3.8–10.5)

## 2019-01-08 PROCEDURE — 74178 CT ABD&PLV WO CNTR FLWD CNTR: CPT | Mod: 26

## 2019-01-08 PROCEDURE — 99233 SBSQ HOSP IP/OBS HIGH 50: CPT | Mod: GC

## 2019-01-08 PROCEDURE — 99232 SBSQ HOSP IP/OBS MODERATE 35: CPT | Mod: GC

## 2019-01-08 PROCEDURE — 99291 CRITICAL CARE FIRST HOUR: CPT

## 2019-01-08 PROCEDURE — 71260 CT THORAX DX C+: CPT | Mod: 26

## 2019-01-08 RX ORDER — HEPARIN SODIUM 5000 [USP'U]/ML
5000 INJECTION INTRAVENOUS; SUBCUTANEOUS EVERY 8 HOURS
Qty: 0 | Refills: 0 | Status: DISCONTINUED | OUTPATIENT
Start: 2019-01-08 | End: 2019-01-09

## 2019-01-08 RX ORDER — MIDAZOLAM HYDROCHLORIDE 1 MG/ML
2 INJECTION, SOLUTION INTRAMUSCULAR; INTRAVENOUS ONCE
Qty: 0 | Refills: 0 | Status: DISCONTINUED | OUTPATIENT
Start: 2019-01-08 | End: 2019-01-08

## 2019-01-08 RX ORDER — SODIUM CHLORIDE 9 MG/ML
250 INJECTION, SOLUTION INTRAVENOUS ONCE
Qty: 0 | Refills: 0 | Status: COMPLETED | OUTPATIENT
Start: 2019-01-08 | End: 2019-01-08

## 2019-01-08 RX ORDER — FUROSEMIDE 40 MG
10 TABLET ORAL ONCE
Qty: 0 | Refills: 0 | Status: COMPLETED | OUTPATIENT
Start: 2019-01-08 | End: 2019-01-08

## 2019-01-08 RX ADMIN — MIDODRINE HYDROCHLORIDE 30 MILLIGRAM(S): 2.5 TABLET ORAL at 13:07

## 2019-01-08 RX ADMIN — LACOSAMIDE 100 MILLIGRAM(S): 50 TABLET ORAL at 05:37

## 2019-01-08 RX ADMIN — RISPERIDONE 1 MILLIGRAM(S): 4 TABLET ORAL at 12:05

## 2019-01-08 RX ADMIN — IMIPENEM AND CILASTATIN 100 MILLIGRAM(S): 250; 250 INJECTION, POWDER, FOR SOLUTION INTRAVENOUS at 13:09

## 2019-01-08 RX ADMIN — IMIPENEM AND CILASTATIN 100 MILLIGRAM(S): 250; 250 INJECTION, POWDER, FOR SOLUTION INTRAVENOUS at 18:25

## 2019-01-08 RX ADMIN — MIDODRINE HYDROCHLORIDE 30 MILLIGRAM(S): 2.5 TABLET ORAL at 23:10

## 2019-01-08 RX ADMIN — Medication 50 MILLIGRAM(S): at 23:10

## 2019-01-08 RX ADMIN — MIDAZOLAM HYDROCHLORIDE 1.99 MG/KG/HR: 1 INJECTION, SOLUTION INTRAMUSCULAR; INTRAVENOUS at 23:11

## 2019-01-08 RX ADMIN — Medication 1 MILLIGRAM(S): at 12:05

## 2019-01-08 RX ADMIN — CHLORHEXIDINE GLUCONATE 1 APPLICATION(S): 213 SOLUTION TOPICAL at 18:25

## 2019-01-08 RX ADMIN — Medication 50 MILLIGRAM(S): at 05:37

## 2019-01-08 RX ADMIN — LACOSAMIDE 100 MILLIGRAM(S): 50 TABLET ORAL at 19:08

## 2019-01-08 RX ADMIN — SODIUM CHLORIDE 500 MILLILITER(S): 9 INJECTION, SOLUTION INTRAVENOUS at 06:28

## 2019-01-08 RX ADMIN — SODIUM CHLORIDE 4 MILLILITER(S): 9 INJECTION INTRAMUSCULAR; INTRAVENOUS; SUBCUTANEOUS at 11:01

## 2019-01-08 RX ADMIN — MIDODRINE HYDROCHLORIDE 30 MILLIGRAM(S): 2.5 TABLET ORAL at 05:37

## 2019-01-08 RX ADMIN — IMIPENEM AND CILASTATIN 100 MILLIGRAM(S): 250; 250 INJECTION, POWDER, FOR SOLUTION INTRAVENOUS at 05:37

## 2019-01-08 RX ADMIN — Medication 50 MILLIGRAM(S): at 13:07

## 2019-01-08 RX ADMIN — SODIUM CHLORIDE 4 MILLILITER(S): 9 INJECTION INTRAMUSCULAR; INTRAVENOUS; SUBCUTANEOUS at 03:20

## 2019-01-08 RX ADMIN — HEPARIN SODIUM 5000 UNIT(S): 5000 INJECTION INTRAVENOUS; SUBCUTANEOUS at 23:11

## 2019-01-08 RX ADMIN — MIDAZOLAM HYDROCHLORIDE 1.99 MG/KG/HR: 1 INJECTION, SOLUTION INTRAMUSCULAR; INTRAVENOUS at 08:09

## 2019-01-08 RX ADMIN — Medication 10 MILLIGRAM(S): at 13:06

## 2019-01-08 RX ADMIN — SODIUM CHLORIDE 4 MILLILITER(S): 9 INJECTION INTRAMUSCULAR; INTRAVENOUS; SUBCUTANEOUS at 22:06

## 2019-01-08 RX ADMIN — PREGABALIN 1000 MICROGRAM(S): 225 CAPSULE ORAL at 12:05

## 2019-01-08 RX ADMIN — QUETIAPINE FUMARATE 25 MILLIGRAM(S): 200 TABLET, FILM COATED ORAL at 12:06

## 2019-01-08 NOTE — PROGRESS NOTE ADULT - ASSESSMENT
53 Male w/ a PMHx of TBI (s/p PEG tube, contracture, and seizure d/o) presented as a transfer from [facility] on 12/9 for respiratory failure 2/2 ARDS likely 2/2 necrotizing pancreatitis s/p intubation. Course c/b Acinetobacter PNA s/p Avycaz and Flagyl 7 day course.  Course further complicated by acute blood loss anemia s/p colonoscopy with findings suggestive of ischemic colitis, without further bleeding and Hgb remaining stable. S/p extubation 1/3, and re-intubation 1/6.       #Neurology/Psych  - Seizure Disorder- Continue Lacosamide.   - Continue Risperidone and Quetiapine     # Cardiovascular  - Midodrine increased to 30 TID given borderline BP. Solucortef also increased to 50q8hh   - Lasix, goal net negative of -1L given anasarca   - Concern for LUE DVT, started on empiric hep gtt, close monitoring given recent GIB. F/u official E [facility] duplex     # Respiratory  - ARDS 2/2 Necrotizing Pancreatitis.   - Acinetobacter PNA s/p abx course   - s/p extubation 1/3 to high flow, desated overnight, re-intubated 1/6   - will c/w Metanebs and frequent suctioning     #GI  - Necrotizing Pancreatitis with enlarging danay-pancreatitic fluid collections, repeat CT A/P pending.   - On Imipenem for necrotizing pancreatitis, ID following   - Acute Blood Loss Anemia 2/2 Ischemic Colitis, colorectal surgery: no surgical intervention, without further bleeding      #  - Ahuja placement.    # Renal/Electrolytes  - lasix for goal net -1L . Replete electrolytes as needed.     #ID   - Pseudomonas PNA: BAL on 12/12 was positive for pseudomonas aeruginosa. Repeat sputum cx w/ Acinetobacter resistant to carbapenems. s/p Avycaz and flagyl for 7 day course.   - Imipenem for pancreatitis as above     #Hematology/Oncology   - Acute Blood Loss Anemia as described above. Hgb stable without further bleeding, monitor closely. Transfusion goal Hgb >7.     #Endocrine  - Continue to monitor FS, ISS    #DVT PPx:   - hep gtt as above 53 Male w/ a PMHx of TBI (s/p PEG tube, contracture, and seizure d/o) presented as a transfer from [facility] on 12/9 for respiratory failure 2/2 ARDS likely 2/2 necrotizing pancreatitis s/p intubation. Course c/b Acinetobacter PNA s/p Avycaz and Flagyl 7 day course.  Course further complicated by acute blood loss anemia s/p colonoscopy with findings suggestive of ischemic colitis, without further bleeding and Hgb remaining stable. S/p extubation 1/3, and re-intubation 1/6.       #Neurology/Psych  - Seizure Disorder- Continue Lacosamide.   - Continue Risperidone and Quetiapine     # Cardiovascular  - Midodrine increased to 30 TID given borderline BP. Solucortef also increased to 50q8hh   - Lasix, goal net negative of -1L given anasarca   - Concern for LUE DVT, started on empiric hep gtt, close monitoring given recent GIB. F/u official LUE [facility] duplex     # Respiratory  - ARDS 2/2 Necrotizing Pancreatitis.   - Acinetobacter PNA s/p abx course   - s/p extubation 1/3 to high flow, desated overnight, re-intubated 1/6   - will c/w Metanebs and frequent suctioning     #GI  - Necrotizing Pancreatitis with enlarging danay-pancreatitic fluid collections, repeat CT A/P pending.   - On Imipenem for necrotizing pancreatitis, ID following   - Acute Blood Loss Anemia 2/2 Ischemic Colitis, colorectal surgery: no surgical intervention, without further bleeding      #  - Ahuja placement.    # Renal/Electrolytes  - Lasix 10mg IVP PRN for goal net -1L .   - Replete electrolytes as needed.     #ID   - Pseudomonas PNA: BAL on 12/12 was positive for pseudomonas aeruginosa. Repeat sputum cx w/ Acinetobacter resistant to carbapenems. s/p Avycaz and flagyl for 7 day course.   - Imipenem for pancreatitis as above   - Follow up ID recs re: avycaz and flagyl    #Hematology/Oncology   - Acute Blood Loss Anemia as described above. Hgb stable without further bleeding, monitor closely. Transfusion goal Hgb >7.     #Endocrine  - Continue to monitor FS, ISS    #DVT PPx:   - hep gtt as above

## 2019-01-08 NOTE — PROGRESS NOTE ADULT - ASSESSMENT
Impression  - Enlarging danay-pancreatic collection with pancreatitis, CT 12.25 with pancreatic necrosis, but no wall formed yet  - Hematochezia, s/p colonoscopy on 12/28/2018 with Severe segemental colitis localized to the transverse colon and ascending colon (possible ischemic colitis, possible colitis related to contiguous danay-pancreatic inflammatory process). Biopsied.   - Septic, distributive shock.  acinetobacter PNA and pseudomonas in bronch, with resp failure requiring repeat intubation   - Seizure disorder    Recommendations    - trend CBC, CMP and fever curve  - please obtain a CT abdomen to evaluate the pancreatic lesion (if patient has a wall around the collection, then will consider endoscopic intervention for drainage)  - continue antibiotics with imepenem  - rest of care per primary team      Isa Mike, PGY-5  GI fellow  B- [phone]/ [phone]  Please call GI fellow on call after 5pm and on weekends

## 2019-01-08 NOTE — PROGRESS NOTE ADULT - ASSESSMENT
53  M (resident of [facility]) with TBI, s/p PEG tube, contracture, and seizure d/o who presented as a transfer from [facility] on 12/9 for respiratory failure 2/2 ARDS likely 2/2 necrotizing pancreatitis s/p intubation.  Imipenem 12/28/18 - continued   bronc 12/11 with pseudomonas  pt with thick secretions and again febrile, sputum cx with  acinetobacter  repeat Ct with increased size of pancreatic collection but not walled off so no interventions were recommended  also had GIB and colitis due to ?contiguous necrotizing pancreatitis vs ischemic, s/p colonoscopy but not actively bleeding now  completed a 7 day course of avycaz and flagyl for the acinetobacter now back on imipenem    sepsis with fever, leukopenia resolved, extubated but reintubated 1/6/19 for hypoxia and poor cough along with profuse secretions and sputum cx again with acinetobacter    Necrotising Pancreatitis with multiple peripancreatitis collections    acinetobacter PNA vs colonization, but still with resp failure and thick secretions  coag neg staph bacteremia likely contaminant, repeat negative  colitis, ?due to contiguous necrotizing pancreatitis and collection vs ischemic    no plans for IR embolization for surgical interventions      * s/p 7 days of avcaz and flagyl for  acinetobacter in the sputum 12/22-12/28   * on Imipenem for the necrotizing pancreatitis , was reintubated for resp failure and profuse thick secretions and also hypothermic, would switch back to avycaz and flagyl to cover the acinetobacter  * f/u the repeat CT chest/abdomen   * f/u the sputum cx sensitivities

## 2019-01-08 NOTE — PROGRESS NOTE ADULT - SUBJECTIVE AND OBJECTIVE BOX
Follow Up:  necrotizing pancreatitis and CRE acinetobacter in the sputum    Interval History/ROS: Reintubated 1/6/19  due to hypoxia and respiratory failure also hypothermic         ROS:    Unobtainable because: pt intuabted        Allergies  Valproate Sodium (Other (Severe))        ANTIMICROBIALS:  imipenem/cilastatin  IVPB 500 every 6 hours      OTHER MEDS:  acetaminophen    Suspension .. 650 milliGRAM(s) Oral every 6 hours PRN  chlorhexidine 4% Liquid 1 Application(s) Topical <User Schedule>  cyanocobalamin 1000 MICROGram(s) Oral daily  dextrose 40% Gel 15 Gram(s) Oral once PRN  dextrose 5%. 1000 milliLiter(s) IV Continuous <Continuous>  dextrose 50% Injectable 12.5 Gram(s) IV Push once  dextrose 50% Injectable 25 Gram(s) IV Push once  dextrose 50% Injectable 25 Gram(s) IV Push once  folic acid 1 milliGRAM(s) Enteral Tube daily  furosemide   Injectable 10 milliGRAM(s) IV Push once  glucagon  Injectable 1 milliGRAM(s) IntraMuscular once PRN  hydrocortisone sodium succinate Injectable 50 milliGRAM(s) IV Push every 8 hours  insulin lispro (HumaLOG) corrective regimen sliding scale   SubCutaneous every 6 hours  lacosamide Solution 100 milliGRAM(s) Oral two times a day  midazolam Infusion 0.02 mG/kG/Hr IV Continuous <Continuous>  midodrine 30 milliGRAM(s) Oral three times a day  QUEtiapine 25 milliGRAM(s) Oral daily  risperiDONE   Solution 1 milliGRAM(s) Oral daily  sodium chloride 3%  Inhalation 4 milliLiter(s) Inhalation every 6 hours      Vital Signs Last 24 Hrs  T(C): 36.3 (08 Jan 2019 08:00), Max: 36.7 (08 Jan 2019 04:00)  T(F): 97.3 (08 Jan 2019 08:00), Max: 98 (08 Jan 2019 04:00)  HR: 69 (08 Jan 2019 11:03) (64 - 95)  BP: 103/58 (08 Jan 2019 11:00) (87/50 - 123/58)  BP(mean): 69 (08 Jan 2019 11:00) (58 - 81)  RR: 14 (08 Jan 2019 11:00) (12 - 22)  SpO2: 97% (08 Jan 2019 11:03) (92% - 98%)    Physical Exam:  General: not awake or alert. intubated   Head: atraumatic normocephalic  eyes: normal sclera and conjunctivae   ENT:   ET tube, no LAD  Cardiovascular: regular rate and rhythm   Respiratory:  intubated mechanical ventilation. nonlabored, coarse breath sounds bilaterally with profuse thick secretions  abd:  PEG tube. soft, bowel sounds present, nontender. rectal tube   :  no suprapubic tenderness. tavares dark yellow urine   Musculoskeletal:   no joint swelling, contractures, RUE mostly   Skin:    no rash  Neurologic:     Vascular: no phlebitis, no edema   Psych: unable to assess                           8.7    4.85  )-----------( 342      ( 08 Jan 2019 02:40 )             27.5       01-08    142  |  105  |  7   ----------------------------<  113<H>  3.7   |  28  |  < 0.20<L>    Ca    7.7<L>      08 Jan 2019 02:40  Phos  3.4     01-08  Mg     1.7     01-08            MICROBIOLOGY:  v  ENDOTRACHEAL SPECIMEN  01-06-19 --  --  Acin.baumannii/haemoly [facility]      BLOOD VENOUS  01-02-19 --  --  --      BLOOD PERIPHERAL  01-02-19 --  --  --      BLOOD PERIPHERAL  12-27-18 --  --  --      BLOOD VENOUS  12-24-18 --  --  --      BLOOD PERIPHERAL  12-24-18 --  --  --      BLOOD PERIPHERAL  12-23-18 --  --  --      BLOOD VENOUS  12-22-18 --  --  BLOOD CULTURE PCR  Staphylococcus sp.,coag neg      SPUTUM  12-18-18 --  --  Acinetobacter baumannii       BLOOD PERIPHERAL  12-18-18 --  --  --      BRONCHIAL LAVAGE  12-12-18 --  --  Pseudomonas aeruginosa      URINE CATHETER  12-11-18 --  --  --      BLOOD PERIPHERAL  12-11-18 --  --  --                RADIOLOGY:  Images below reviewed personally  < from: Xray Chest 1 View- PORTABLE-Urgent (01.06.19 @ 02:49) >    IMPRESSION:   Small bilateral pleural effusions.      < from: CT Angio Abdomen and Pelvis w/ IV Cont (12.27.18 @ 16:00) >  IMPRESSION:     Focal site of active site of bleeding in the distal transverse colon.     Fluid-filled colon with stable nonspecific mural thickening of the   descending colon may represent colitis.    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 No interval development of foci of gas or   thick peripheral wall to suggest infection of the collections. Unchanged   thrombosed splenic vein.    Moderate bilateral pleural effusions with adjacent atelectasis. Patchy   opacities in the lingula and right middle lobe are stable and may be   infectious in etiology.

## 2019-01-08 NOTE — PROGRESS NOTE ADULT - SUBJECTIVE AND OBJECTIVE BOX
Patient is a 53y old  Male who presents with a chief complaint of ARDS?, sepsis (07 Jan 2019 16:43)        SUBJECTIVE / OVERNIGHT EVENTS:      MEDICATIONS  (STANDING):  chlorhexidine 4% Liquid 1 Application(s) Topical <User Schedule>  cyanocobalamin 1000 MICROGram(s) Oral daily  dextrose 5%. 1000 milliLiter(s) (50 mL/Hr) IV Continuous <Continuous>  dextrose 50% Injectable 12.5 Gram(s) IV Push once  dextrose 50% Injectable 25 Gram(s) IV Push once  dextrose 50% Injectable 25 Gram(s) IV Push once  folic acid 1 milliGRAM(s) Enteral Tube daily  hydrocortisone sodium succinate Injectable 50 milliGRAM(s) IV Push every 8 hours  imipenem/cilastatin  IVPB 500 milliGRAM(s) IV Intermittent every 6 hours  insulin lispro (HumaLOG) corrective regimen sliding scale   SubCutaneous every 6 hours  lacosamide Solution 100 milliGRAM(s) Oral two times a day  midazolam Infusion 0.02 mG/kG/Hr (1.994 mL/Hr) IV Continuous <Continuous>  midodrine 30 milliGRAM(s) Oral three times a day  QUEtiapine 25 milliGRAM(s) Oral daily  risperiDONE   Solution 1 milliGRAM(s) Oral daily  sodium chloride 3%  Inhalation 4 milliLiter(s) Inhalation every 6 hours    MEDICATIONS  (PRN):  acetaminophen    Suspension .. 650 milliGRAM(s) Oral every 6 hours PRN Temp greater or equal to 38C (100.4F), Mild Pain (1 - 3), Moderate Pain (4 - 6)  dextrose 40% Gel 15 Gram(s) Oral once PRN Blood Glucose LESS THAN 70 milliGRAM(s)/deciliter  glucagon  Injectable 1 milliGRAM(s) IntraMuscular once PRN Glucose LESS THAN 70 milligrams/deciliter        CAPILLARY BLOOD GLUCOSE      POCT Blood Glucose.: 114 mg/dL (08 Jan 2019 05:35)  POCT Blood Glucose.: 116 mg/dL (07 Jan 2019 23:18)  POCT Blood Glucose.: 106 mg/dL (07 Jan 2019 18:20)  POCT Blood Glucose.: 105 mg/dL (07 Jan 2019 12:43)    I&O's Summary    06 Jan 2019 07:01  -  07 Jan 2019 07:00  --------------------------------------------------------  IN: 2156.6 mL / OUT: 1035 mL / NET: 1121.6 mL    07 Jan 2019 07:01  -  08 Jan 2019 06:56  --------------------------------------------------------  IN: 1296.3 mL / OUT: 2715 mL / NET: -1418.7 mL        PHYSICAL EXAM  GENERAL: NAD, well-developed  HEAD:  Atraumatic, Normocephalic  EYES: EOMI, PERRLA, conjunctiva and sclera clear  NECK: Supple, No JVD  CHEST/LUNG: Clear to auscultation bilaterally; No wheeze  HEART: Regular rate and rhythm; No murmurs, rubs, or gallops  ABDOMEN: Soft, Nontender, Nondistended; Bowel sounds present  EXTREMITIES:  2+ Peripheral Pulses, No clubbing, cyanosis, or edema  PSYCH: AAOx3  SKIN: No rashes or lesions    LABS:                        8.7    4.85  )-----------( 342      ( 08 Jan 2019 02:40 )             27.5     01-08    142  |  105  |  7   ----------------------------<  113<H>  3.7   |  28  |  < 0.20<L>    Ca    7.7<L>      08 Jan 2019 02:40  Phos  3.4     01-08  Mg     1.7     01-08      PT/INR - ( 07 Jan 2019 02:12 )   PT: 12.8 SEC;   INR: 1.12          PTT - ( 07 Jan 2019 02:12 )  PTT:25.5 SEC          RADIOLOGY & ADDITIONAL TESTS:    Imaging Personally Reviewed:  Consultant(s) Notes Reviewed:    Care Discussed with Consultants/Other Providers: Patient is a 53y old  Male who presents with a chief complaint of ARDS?, sepsis (07 Jan 2019 16:43)        SUBJECTIVE / OVERNIGHT EVENTS: No events overnight      MEDICATIONS  (STANDING):  chlorhexidine 4% Liquid 1 Application(s) Topical <User Schedule>  cyanocobalamin 1000 MICROGram(s) Oral daily  dextrose 5%. 1000 milliLiter(s) (50 mL/Hr) IV Continuous <Continuous>  dextrose 50% Injectable 12.5 Gram(s) IV Push once  dextrose 50% Injectable 25 Gram(s) IV Push once  dextrose 50% Injectable 25 Gram(s) IV Push once  folic acid 1 milliGRAM(s) Enteral Tube daily  hydrocortisone sodium succinate Injectable 50 milliGRAM(s) IV Push every 8 hours  imipenem/cilastatin  IVPB 500 milliGRAM(s) IV Intermittent every 6 hours  insulin lispro (HumaLOG) corrective regimen sliding scale   SubCutaneous every 6 hours  lacosamide Solution 100 milliGRAM(s) Oral two times a day  midazolam Infusion 0.02 mG/kG/Hr (1.994 mL/Hr) IV Continuous <Continuous>  midodrine 30 milliGRAM(s) Oral three times a day  QUEtiapine 25 milliGRAM(s) Oral daily  risperiDONE   Solution 1 milliGRAM(s) Oral daily  sodium chloride 3%  Inhalation 4 milliLiter(s) Inhalation every 6 hours    MEDICATIONS  (PRN):  acetaminophen    Suspension .. 650 milliGRAM(s) Oral every 6 hours PRN Temp greater or equal to 38C (100.4F), Mild Pain (1 - 3), Moderate Pain (4 - 6)  dextrose 40% Gel 15 Gram(s) Oral once PRN Blood Glucose LESS THAN 70 milliGRAM(s)/deciliter  glucagon  Injectable 1 milliGRAM(s) IntraMuscular once PRN Glucose LESS THAN 70 milligrams/deciliter        CAPILLARY BLOOD GLUCOSE      POCT Blood Glucose.: 114 mg/dL (08 Jan 2019 05:35)  POCT Blood Glucose.: 116 mg/dL (07 Jan 2019 23:18)  POCT Blood Glucose.: 106 mg/dL (07 Jan 2019 18:20)  POCT Blood Glucose.: 105 mg/dL (07 Jan 2019 12:43)    I&O's Summary    06 Jan 2019 07:01  -  07 Jan 2019 07:00  --------------------------------------------------------  IN: 2156.6 mL / OUT: 1035 mL / NET: 1121.6 mL    07 Jan 2019 07:01  -  08 Jan 2019 06:56  --------------------------------------------------------  IN: 1296.3 mL / OUT: 2715 mL / NET: -1418.7 mL        PHYSICAL EXAM  GENERAL: NAD, well-developed  HEAD:  Atraumatic, Normocephalic  EYES: EOMI, PERRLA, conjunctiva and sclera clear  NECK: Supple, No JVD  CHEST/LUNG: Clear to auscultation bilaterally; No wheeze  HEART: Regular rate and rhythm; No murmurs, rubs, or gallops  ABDOMEN: Soft, Nontender, Nondistended; Bowel sounds present  EXTREMITIES:  2+ Peripheral Pulses, No clubbing, cyanosis, or edema  PSYCH: sedated  SKIN: No rashes or lesions    LABS:                        8.7    4.85  )-----------( 342      ( 08 Jan 2019 02:40 )             27.5     01-08    142  |  105  |  7   ----------------------------<  113<H>  3.7   |  28  |  < 0.20<L>    Ca    7.7<L>      08 Jan 2019 02:40  Phos  3.4     01-08  Mg     1.7     01-08      PT/INR - ( 07 Jan 2019 02:12 )   PT: 12.8 SEC;   INR: 1.12          PTT - ( 07 Jan 2019 02:12 )  PTT:25.5 SEC          RADIOLOGY & ADDITIONAL TESTS:    Imaging Personally Reviewed:  Consultant(s) Notes Reviewed:    Care Discussed with Consultants/Other Providers:
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
## 2019-01-08 NOTE — PROGRESS NOTE ADULT - ATTENDING COMMENTS
Patient examined and case reviewed in detail on bedside rounds  Critically ill on vent with failed extubation for trach  Frequent bedside visits with therapy change today.   Crit Care Time Today 35 min+

## 2019-01-09 LAB
ALBUMIN SERPL ELPH-MCNC: 1.7 G/DL — LOW (ref 3.3–5)
ALP SERPL-CCNC: 199 U/L — HIGH (ref 40–120)
ALT FLD-CCNC: 13 U/L — SIGNIFICANT CHANGE UP (ref 4–41)
ANION GAP SERPL CALC-SCNC: 8 MEQ/L — SIGNIFICANT CHANGE UP (ref 7–14)
ANION GAP SERPL CALC-SCNC: 8 MEQ/L — SIGNIFICANT CHANGE UP (ref 7–14)
APTT BLD: 20.8 SEC — LOW (ref 27.5–36.3)
AST SERPL-CCNC: 16 U/L — SIGNIFICANT CHANGE UP (ref 4–40)
BILIRUB SERPL-MCNC: 0.4 MG/DL — SIGNIFICANT CHANGE UP (ref 0.2–1.2)
BUN SERPL-MCNC: 6 MG/DL — LOW (ref 7–23)
BUN SERPL-MCNC: 7 MG/DL — SIGNIFICANT CHANGE UP (ref 7–23)
CALCIUM SERPL-MCNC: 7.4 MG/DL — LOW (ref 8.4–10.5)
CALCIUM SERPL-MCNC: 7.8 MG/DL — LOW (ref 8.4–10.5)
CHLORIDE SERPL-SCNC: 104 MMOL/L — SIGNIFICANT CHANGE UP (ref 98–107)
CHLORIDE SERPL-SCNC: 105 MMOL/L — SIGNIFICANT CHANGE UP (ref 98–107)
CO2 SERPL-SCNC: 29 MMOL/L — SIGNIFICANT CHANGE UP (ref 22–31)
CO2 SERPL-SCNC: 29 MMOL/L — SIGNIFICANT CHANGE UP (ref 22–31)
CREAT SERPL-MCNC: 0.2 MG/DL — LOW (ref 0.5–1.3)
CREAT SERPL-MCNC: < 0.2 MG/DL — LOW (ref 0.5–1.3)
GLUCOSE BLDC GLUCOMTR-MCNC: 100 MG/DL — HIGH (ref 70–99)
GLUCOSE BLDC GLUCOMTR-MCNC: 104 MG/DL — HIGH (ref 70–99)
GLUCOSE BLDC GLUCOMTR-MCNC: 119 MG/DL — HIGH (ref 70–99)
GLUCOSE BLDC GLUCOMTR-MCNC: 133 MG/DL — HIGH (ref 70–99)
GLUCOSE SERPL-MCNC: 104 MG/DL — HIGH (ref 70–99)
GLUCOSE SERPL-MCNC: 96 MG/DL — SIGNIFICANT CHANGE UP (ref 70–99)
HCT VFR BLD CALC: 27.3 % — LOW (ref 39–50)
HGB BLD-MCNC: 8.7 G/DL — LOW (ref 13–17)
INR BLD: 1.14 — SIGNIFICANT CHANGE UP (ref 0.88–1.17)
MAGNESIUM SERPL-MCNC: 1.7 MG/DL — SIGNIFICANT CHANGE UP (ref 1.6–2.6)
MAGNESIUM SERPL-MCNC: 2 MG/DL — SIGNIFICANT CHANGE UP (ref 1.6–2.6)
MCHC RBC-ENTMCNC: 31.8 PG — SIGNIFICANT CHANGE UP (ref 27–34)
MCHC RBC-ENTMCNC: 31.9 % — LOW (ref 32–36)
MCV RBC AUTO: 99.6 FL — SIGNIFICANT CHANGE UP (ref 80–100)
NRBC # FLD: 0 K/UL — LOW (ref 25–125)
PHOSPHATE SERPL-MCNC: 3.1 MG/DL — SIGNIFICANT CHANGE UP (ref 2.5–4.5)
PHOSPHATE SERPL-MCNC: 3.2 MG/DL — SIGNIFICANT CHANGE UP (ref 2.5–4.5)
PLATELET # BLD AUTO: 381 K/UL — SIGNIFICANT CHANGE UP (ref 150–400)
PMV BLD: 9.9 FL — SIGNIFICANT CHANGE UP (ref 7–13)
POTASSIUM SERPL-MCNC: 2.8 MMOL/L — CRITICAL LOW (ref 3.5–5.3)
POTASSIUM SERPL-MCNC: 3.9 MMOL/L — SIGNIFICANT CHANGE UP (ref 3.5–5.3)
POTASSIUM SERPL-SCNC: 2.8 MMOL/L — CRITICAL LOW (ref 3.5–5.3)
POTASSIUM SERPL-SCNC: 3.9 MMOL/L — SIGNIFICANT CHANGE UP (ref 3.5–5.3)
PROT SERPL-MCNC: 4.8 G/DL — LOW (ref 6–8.3)
PROTHROM AB SERPL-ACNC: 13.1 SEC — SIGNIFICANT CHANGE UP (ref 9.8–13.1)
RBC # BLD: 2.74 M/UL — LOW (ref 4.2–5.8)
RBC # FLD: 20.8 % — HIGH (ref 10.3–14.5)
SODIUM SERPL-SCNC: 141 MMOL/L — SIGNIFICANT CHANGE UP (ref 135–145)
SODIUM SERPL-SCNC: 142 MMOL/L — SIGNIFICANT CHANGE UP (ref 135–145)
WBC # BLD: 5.41 K/UL — SIGNIFICANT CHANGE UP (ref 3.8–10.5)
WBC # FLD AUTO: 5.41 K/UL — SIGNIFICANT CHANGE UP (ref 3.8–10.5)

## 2019-01-09 PROCEDURE — 71045 X-RAY EXAM CHEST 1 VIEW: CPT | Mod: 26

## 2019-01-09 PROCEDURE — 99291 CRITICAL CARE FIRST HOUR: CPT | Mod: 25

## 2019-01-09 PROCEDURE — 99232 SBSQ HOSP IP/OBS MODERATE 35: CPT | Mod: GC

## 2019-01-09 PROCEDURE — 31600 PLANNED TRACHEOSTOMY: CPT

## 2019-01-09 PROCEDURE — 99233 SBSQ HOSP IP/OBS HIGH 50: CPT | Mod: GC

## 2019-01-09 RX ORDER — POTASSIUM CHLORIDE 20 MEQ
40 PACKET (EA) ORAL ONCE
Qty: 0 | Refills: 0 | Status: DISCONTINUED | OUTPATIENT
Start: 2019-01-09 | End: 2019-01-09

## 2019-01-09 RX ORDER — FUROSEMIDE 40 MG
20 TABLET ORAL ONCE
Qty: 0 | Refills: 0 | Status: COMPLETED | OUTPATIENT
Start: 2019-01-09 | End: 2019-01-09

## 2019-01-09 RX ORDER — MAGNESIUM SULFATE 500 MG/ML
1 VIAL (ML) INJECTION ONCE
Qty: 0 | Refills: 0 | Status: COMPLETED | OUTPATIENT
Start: 2019-01-09 | End: 2019-01-09

## 2019-01-09 RX ORDER — CISATRACURIUM BESYLATE 2 MG/ML
20 INJECTION INTRAVENOUS ONCE
Qty: 0 | Refills: 0 | Status: COMPLETED | OUTPATIENT
Start: 2019-01-09 | End: 2019-01-09

## 2019-01-09 RX ORDER — PROPOFOL 10 MG/ML
5 INJECTION, EMULSION INTRAVENOUS
Qty: 500 | Refills: 0 | Status: DISCONTINUED | OUTPATIENT
Start: 2019-01-09 | End: 2019-01-10

## 2019-01-09 RX ORDER — CISATRACURIUM BESYLATE 2 MG/ML
20 INJECTION INTRAVENOUS ONCE
Qty: 0 | Refills: 0 | Status: DISCONTINUED | OUTPATIENT
Start: 2019-01-09 | End: 2019-01-09

## 2019-01-09 RX ORDER — METRONIDAZOLE 500 MG
500 TABLET ORAL EVERY 8 HOURS
Qty: 0 | Refills: 0 | Status: DISCONTINUED | OUTPATIENT
Start: 2019-01-09 | End: 2019-03-04

## 2019-01-09 RX ORDER — POTASSIUM CHLORIDE 20 MEQ
40 PACKET (EA) ORAL ONCE
Qty: 0 | Refills: 0 | Status: COMPLETED | OUTPATIENT
Start: 2019-01-09 | End: 2019-01-09

## 2019-01-09 RX ORDER — NOREPINEPHRINE BITARTRATE/D5W 8 MG/250ML
0.05 PLASTIC BAG, INJECTION (ML) INTRAVENOUS
Qty: 8 | Refills: 0 | Status: DISCONTINUED | OUTPATIENT
Start: 2019-01-09 | End: 2019-01-10

## 2019-01-09 RX ORDER — POTASSIUM CHLORIDE 20 MEQ
10 PACKET (EA) ORAL
Qty: 0 | Refills: 0 | Status: COMPLETED | OUTPATIENT
Start: 2019-01-09 | End: 2019-01-09

## 2019-01-09 RX ORDER — METRONIDAZOLE 500 MG
TABLET ORAL
Qty: 0 | Refills: 0 | Status: DISCONTINUED | OUTPATIENT
Start: 2019-01-09 | End: 2019-03-04

## 2019-01-09 RX ORDER — METRONIDAZOLE 500 MG
500 TABLET ORAL ONCE
Qty: 0 | Refills: 0 | Status: COMPLETED | OUTPATIENT
Start: 2019-01-09 | End: 2019-01-09

## 2019-01-09 RX ADMIN — CHLORHEXIDINE GLUCONATE 1 APPLICATION(S): 213 SOLUTION TOPICAL at 11:37

## 2019-01-09 RX ADMIN — Medication 50 MILLIGRAM(S): at 13:37

## 2019-01-09 RX ADMIN — Medication 50 MILLIGRAM(S): at 05:14

## 2019-01-09 RX ADMIN — Medication 100 GRAM(S): at 06:32

## 2019-01-09 RX ADMIN — SODIUM CHLORIDE 4 MILLILITER(S): 9 INJECTION INTRAMUSCULAR; INTRAVENOUS; SUBCUTANEOUS at 21:25

## 2019-01-09 RX ADMIN — Medication 100 MILLIEQUIVALENT(S): at 08:13

## 2019-01-09 RX ADMIN — Medication 20 MILLIGRAM(S): at 11:35

## 2019-01-09 RX ADMIN — LACOSAMIDE 100 MILLIGRAM(S): 50 TABLET ORAL at 05:15

## 2019-01-09 RX ADMIN — SODIUM CHLORIDE 4 MILLILITER(S): 9 INJECTION INTRAMUSCULAR; INTRAVENOUS; SUBCUTANEOUS at 10:44

## 2019-01-09 RX ADMIN — IMIPENEM AND CILASTATIN 100 MILLIGRAM(S): 250; 250 INJECTION, POWDER, FOR SOLUTION INTRAVENOUS at 00:16

## 2019-01-09 RX ADMIN — Medication 100 MILLIEQUIVALENT(S): at 11:34

## 2019-01-09 RX ADMIN — Medication 1 MILLIGRAM(S): at 11:36

## 2019-01-09 RX ADMIN — Medication 50 MILLIGRAM(S): at 21:50

## 2019-01-09 RX ADMIN — RISPERIDONE 1 MILLIGRAM(S): 4 TABLET ORAL at 12:27

## 2019-01-09 RX ADMIN — Medication 100 MILLIEQUIVALENT(S): at 10:36

## 2019-01-09 RX ADMIN — QUETIAPINE FUMARATE 25 MILLIGRAM(S): 200 TABLET, FILM COATED ORAL at 11:36

## 2019-01-09 RX ADMIN — LACOSAMIDE 100 MILLIGRAM(S): 50 TABLET ORAL at 20:16

## 2019-01-09 RX ADMIN — MIDODRINE HYDROCHLORIDE 30 MILLIGRAM(S): 2.5 TABLET ORAL at 05:15

## 2019-01-09 RX ADMIN — HEPARIN SODIUM 5000 UNIT(S): 5000 INJECTION INTRAVENOUS; SUBCUTANEOUS at 05:14

## 2019-01-09 RX ADMIN — PREGABALIN 1000 MICROGRAM(S): 225 CAPSULE ORAL at 11:36

## 2019-01-09 RX ADMIN — Medication 40 MILLIEQUIVALENT(S): at 08:13

## 2019-01-09 RX ADMIN — SODIUM CHLORIDE 4 MILLILITER(S): 9 INJECTION INTRAMUSCULAR; INTRAVENOUS; SUBCUTANEOUS at 03:36

## 2019-01-09 RX ADMIN — MIDODRINE HYDROCHLORIDE 30 MILLIGRAM(S): 2.5 TABLET ORAL at 21:50

## 2019-01-09 RX ADMIN — MIDODRINE HYDROCHLORIDE 30 MILLIGRAM(S): 2.5 TABLET ORAL at 13:37

## 2019-01-09 RX ADMIN — MIDAZOLAM HYDROCHLORIDE 1.99 MG/KG/HR: 1 INJECTION, SOLUTION INTRAMUSCULAR; INTRAVENOUS at 18:33

## 2019-01-09 RX ADMIN — Medication 100 MILLIGRAM(S): at 11:35

## 2019-01-09 RX ADMIN — IMIPENEM AND CILASTATIN 100 MILLIGRAM(S): 250; 250 INJECTION, POWDER, FOR SOLUTION INTRAVENOUS at 05:15

## 2019-01-09 RX ADMIN — CISATRACURIUM BESYLATE 20 MILLIGRAM(S): 2 INJECTION INTRAVENOUS at 15:00

## 2019-01-09 RX ADMIN — Medication 100 MILLIGRAM(S): at 21:50

## 2019-01-09 RX ADMIN — PROPOFOL 2.99 MICROGRAM(S)/KG/MIN: 10 INJECTION, EMULSION INTRAVENOUS at 18:34

## 2019-01-09 NOTE — PROGRESS NOTE ADULT - SUBJECTIVE AND OBJECTIVE BOX
Patient is a 53y old  Male who presents with a chief complaint of ARDS?, sepsis (08 Jan 2019 17:01)        SUBJECTIVE / OVERNIGHT EVENTS: No events overnight      MEDICATIONS  (STANDING):  chlorhexidine 4% Liquid 1 Application(s) Topical <User Schedule>  cyanocobalamin 1000 MICROGram(s) Oral daily  dextrose 5%. 1000 milliLiter(s) (50 mL/Hr) IV Continuous <Continuous>  dextrose 50% Injectable 12.5 Gram(s) IV Push once  dextrose 50% Injectable 25 Gram(s) IV Push once  dextrose 50% Injectable 25 Gram(s) IV Push once  folic acid 1 milliGRAM(s) Enteral Tube daily  heparin  Injectable 5000 Unit(s) SubCutaneous every 8 hours  hydrocortisone sodium succinate Injectable 50 milliGRAM(s) IV Push every 8 hours  imipenem/cilastatin  IVPB 500 milliGRAM(s) IV Intermittent every 6 hours  insulin lispro (HumaLOG) corrective regimen sliding scale   SubCutaneous every 6 hours  lacosamide Solution 100 milliGRAM(s) Oral two times a day  midazolam Infusion 0.02 mG/kG/Hr (1.994 mL/Hr) IV Continuous <Continuous>  midodrine 30 milliGRAM(s) Oral three times a day  potassium chloride   Powder 40 milliEquivalent(s) Oral once  potassium chloride  10 mEq/100 mL IVPB 10 milliEquivalent(s) IV Intermittent every 1 hour  QUEtiapine 25 milliGRAM(s) Oral daily  risperiDONE   Solution 1 milliGRAM(s) Oral daily  sodium chloride 3%  Inhalation 4 milliLiter(s) Inhalation every 6 hours    MEDICATIONS  (PRN):  acetaminophen    Suspension .. 650 milliGRAM(s) Oral every 6 hours PRN Temp greater or equal to 38C (100.4F), Mild Pain (1 - 3), Moderate Pain (4 - 6)  dextrose 40% Gel 15 Gram(s) Oral once PRN Blood Glucose LESS THAN 70 milliGRAM(s)/deciliter  glucagon  Injectable 1 milliGRAM(s) IntraMuscular once PRN Glucose LESS THAN 70 milligrams/deciliter        CAPILLARY BLOOD GLUCOSE      POCT Blood Glucose.: 100 mg/dL (09 Jan 2019 05:27)  POCT Blood Glucose.: 95 mg/dL (08 Jan 2019 23:33)  POCT Blood Glucose.: 101 mg/dL (08 Jan 2019 18:23)  POCT Blood Glucose.: 90 mg/dL (08 Jan 2019 12:05)    I&O's Summary    08 Jan 2019 07:01  -  09 Jan 2019 07:00  --------------------------------------------------------  IN: 724 mL / OUT: 2230 mL / NET: -1506 mL        PHYSICAL EXAM  GENERAL: NAD, well-developed  HEAD:  Atraumatic, Normocephalic  EYES: EOMI, PERRLA, conjunctiva and sclera clear  NECK: Supple, No JVD  CHEST/LUNG: Clear to auscultation bilaterally; No wheeze  HEART: Regular rate and rhythm; No murmurs, rubs, or gallops  ABDOMEN: Soft, Nontender, Nondistended; Bowel sounds present  EXTREMITIES:  2+ Peripheral Pulses, No clubbing, cyanosis, or edema  PSYCH: AAOx3  SKIN: No rashes or lesions    LABS:                        8.7    5.41  )-----------( 381      ( 09 Jan 2019 04:50 )             27.3     01-09    141  |  104  |  7   ----------------------------<  96  2.8<LL>   |  29  |  < 0.20<L>    Ca    7.8<L>      09 Jan 2019 04:50  Phos  3.1     01-09  Mg     1.7     01-09    TPro  4.8<L>  /  Alb  1.7<L>  /  TBili  0.4  /  DBili  x   /  AST  16  /  ALT  13  /  AlkPhos  199<H>  01-09    PT/INR - ( 09 Jan 2019 04:50 )   PT: 13.1 SEC;   INR: 1.14          PTT - ( 09 Jan 2019 04:50 )  PTT:20.8 SEC          RADIOLOGY & ADDITIONAL TESTS:    Imaging Personally Reviewed:  Consultant(s) Notes Reviewed:    Care Discussed with Consultants/Other Providers:

## 2019-01-09 NOTE — PROGRESS NOTE ADULT - SUBJECTIVE AND OBJECTIVE BOX
Follow Up:  necrotizing pancreatitis and CRE acinetobacter in the sputum    Interval History/ROS: Reintubated 1/6/19  due to hypoxia and respiratory failure also hypothermic, the sputum cx again with  acinetobacter        ROS:    Unobtainable because: pt intuabted          Allergies  Valproate Sodium (Other (Severe))        ANTIMICROBIALS:  cefTAZidime/avibactam IVPB 2.5 every 8 hours  cefTAZidime/avibactam IVPB    metroNIDAZOLE  IVPB    metroNIDAZOLE  IVPB 500 every 8 hours      OTHER MEDS:  acetaminophen    Suspension .. 650 milliGRAM(s) Oral every 6 hours PRN  chlorhexidine 4% Liquid 1 Application(s) Topical <User Schedule>  cyanocobalamin 1000 MICROGram(s) Oral daily  dextrose 40% Gel 15 Gram(s) Oral once PRN  dextrose 5%. 1000 milliLiter(s) IV Continuous <Continuous>  dextrose 50% Injectable 12.5 Gram(s) IV Push once  dextrose 50% Injectable 25 Gram(s) IV Push once  dextrose 50% Injectable 25 Gram(s) IV Push once  folic acid 1 milliGRAM(s) Enteral Tube daily  glucagon  Injectable 1 milliGRAM(s) IntraMuscular once PRN  hydrocortisone sodium succinate Injectable 50 milliGRAM(s) IV Push every 8 hours  insulin lispro (HumaLOG) corrective regimen sliding scale   SubCutaneous every 6 hours  lacosamide Solution 100 milliGRAM(s) Oral two times a day  midazolam Infusion 0.02 mG/kG/Hr IV Continuous <Continuous>  midodrine 30 milliGRAM(s) Oral three times a day  QUEtiapine 25 milliGRAM(s) Oral daily  risperiDONE   Solution 1 milliGRAM(s) Oral daily  sodium chloride 3%  Inhalation 4 milliLiter(s) Inhalation every 6 hours      Vital Signs Last 24 Hrs  T(C): 35.9 (09 Jan 2019 08:00), Max: 36.4 (08 Jan 2019 20:00)  T(F): 96.7 (09 Jan 2019 08:00), Max: 97.5 (08 Jan 2019 20:00)  HR: 62 (09 Jan 2019 11:00) (43 - 95)  BP: 119/67 (09 Jan 2019 11:00) (86/47 - 122/58)  BP(mean): 80 (09 Jan 2019 11:00) (56 - 81)  RR: 14 (09 Jan 2019 11:00) (11 - 23)  SpO2: 97% (09 Jan 2019 11:00) (92% - 100%)    Physical Exam:  General: not awake or alert. intubated   Head: atraumatic normocephalic  eyes: normal sclera and conjunctivae   ENT:   ET tube, no LAD  Cardiovascular: regular rate and rhythm   Respiratory:  intubated mechanical ventilation, coarse breath sounds bilaterally with profuse thick secretions  abd:  PEG tube. soft, bowel sounds present, nontender. rectal tube   :  no suprapubic tenderness. tavares    Musculoskeletal:   no joint swelling, contractures, RUE mostly   Skin:    no rash  Neurologic:     Vascular: no phlebitis, no edema   Psych: unable to assess                             8.7    5.41  )-----------( 381      ( 09 Jan 2019 04:50 )             27.3       01-09    141  |  104  |  7   ----------------------------<  96  2.8<LL>   |  29  |  < 0.20<L>    Ca    7.8<L>      09 Jan 2019 04:50  Phos  3.1     01-09  Mg     1.7     01-09    TPro  4.8<L>  /  Alb  1.7<L>  /  TBili  0.4  /  DBili  x   /  AST  16  /  ALT  13  /  AlkPhos  199<H>  01-09          MICROBIOLOGY:  v  ENDOTRACHEAL SPECIMEN  01-06-19 --  --  Acin.baumannii/[facility]emoly [facility]      BLOOD VENOUS  01-02-19 --  --  --      BLOOD PERIPHERAL  01-02-19 --  --  --      BLOOD PERIPHERAL  12-27-18 --  --  --      BLOOD VENOUS  12-24-18 --  --  --      BLOOD PERIPHERAL  12-24-18 --  --  --      BLOOD PERIPHERAL  12-23-18 --  --  --      BLOOD VENOUS  12-22-18 --  --  BLOOD CULTURE PCR  Staphylococcus sp.,coag neg      SPUTUM  12-18-18 --  --  Acinetobacter baumannii       BLOOD PERIPHERAL  12-18-18 --  --  --      BRONCHIAL LAVAGE  12-12-18 --  --  Pseudomonas aeruginosa      URINE CATHETER  12-11-18 --  --  --      BLOOD PERIPHERAL  12-11-18 --  --  --                RADIOLOGY:  Images below reviewed personally  < from: CT Chest w/ IV Cont (01.08.19 @ 15:29) >  IMPRESSION:   Unchangednecrotizing pancreatitis with acute necrotic collections.    Small bilateral pleural effusions with complete atelectasis of both lower   lobes with multiple probable parenchymal lung abscesses, measuring up to   1.1 cm in the superior segment.    Probable colitis involving the distal transverse colon and descending   colon.

## 2019-01-09 NOTE — PROGRESS NOTE ADULT - ASSESSMENT
Impression  - Enlarging danay-pancreatic collection with pancreatitis, CT 12.25 with pancreatic necrosis, but no wall formed yet  - Hematochezia, s/p colonoscopy on 12/28/2018 with Severe segemental colitis localized to the transverse colon and ascending colon (possible ischemic colitis, possible colitis related to contiguous danay-pancreatic inflammatory process). Biopsied.   - Septic, distributive shock.  acinetobacter PNA and pseudomonas in bronch, with resp failure requiring repeat intubation   - Seizure disorder    Recommendations    - trend CBC, CMP and fever curve  - given the CT abdomen on 1/9 shows pancreas necrosis but no clear wall, he is not a candidate for endoscopic drainage, we recommend a repeat CT in 7 days because these lesions usually form a wall over time and then it is possible to drain endoscopically   - continue antibiotics with imepenem  - rest of care per primary team      Isa Mike, PGY-5  GI fellow  B- [phone]/ [phone]  Please call GI fellow on call after 5pm and on weekends

## 2019-01-09 NOTE — CHART NOTE - NSCHARTNOTEFT_GEN_A_CORE
Consent was obtained explaining the risks and benefits of the procedure. The patient was placed supine with a roll under shoulders to hyperextend the neck.  The FiO2 was increased to 1.0 and endotracheal tube was adjusted under fiberoptic guidance to the highest position safely possible. The bronchoscope was introduced into the distal end of the endotracheal tube to monitor the procedure.  Tracheal landmarks were identified and marked.  The procedure site was prepped and draped and the patient sedated on a propofol drip.  The overlying skin was anesthetized with 1% lidocaine with epinephrine and a thin gauge needle gradually inserted below the second tracheal ring in the midline until air was aspirated and it was visualized endoscopically.  A wire was then threaded into the trachea cephalad.  A small horizontal incision was made adjacent to the wire through the skin only.  The dilator was then introduced into the trachea.  Subsequently, a tracheostomy tube was inserted over the obturator and the position confirmed and the wire and obturator were removed and the cuff inflated.  The ventilator circuit was then switched from the endotracheal tube to the tracheostomy and adequate ventilation assured.  The endotracheal tube cuff was then deflated and the endotracheal tube removed.  The entire procedure was monitored with ventilator checks, vital signs, ECG, pulse oximetry and endoscopy.  No complications were witnessed. Minimal blood loss noted. Follow up chest x-ray ordered.      ----------------------------------------  Rosita Moore MD PGY-5  Pulmonary/Critical Care Fellow  Pager # [phone] (NS), [phone] (RANDI)
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
## 2019-01-09 NOTE — PROGRESS NOTE ADULT - ASSESSMENT
53 Male w/ a PMHx of TBI (s/p PEG tube, contracture, and seizure d/o) presented as a transfer from [facility] on 12/9 for respiratory failure 2/2 ARDS likely 2/2 necrotizing pancreatitis s/p intubation. Course c/b Acinetobacter PNA s/p Avycaz and Flagyl 7 day course.  Course further complicated by acute blood loss anemia s/p colonoscopy with findings suggestive of ischemic colitis, without further bleeding and Hgb remaining stable. S/p extubation 1/3, and re-intubation 1/6.       #Neurology/Psych  - Seizure Disorder- Continue Lacosamide.   - Continue Risperidone and Quetiapine     # Cardiovascular  - Midodrine increased to 30 TID given borderline BP. Solucortef also increased to [id number]   - Lasix, goal net negative of -1L given anasarca   - Concern for LUE DVT, started on empiric hep gtt, close monitoring given recent GIB. F/u official LUE VA duplex     # Respiratory  - ARDS 2/2 Necrotizing Pancreatitis.   - Acinetobacter PNA s/p abx course   - s/p extubation 1/3 to high flow, desated overnight, re-intubated 1/6   - will c/w Metanebs and frequent suctioning     #GI  - Necrotizing Pancreatitis with enlarging danay-pancreatitic fluid collections, repeat CT A/P pending.   - On Imipenem for necrotizing pancreatitis, ID following   - Acute Blood Loss Anemia 2/2 Ischemic Colitis, colorectal surgery: no surgical intervention, without further bleeding      #  - Ahuja placement.    # Renal/Electrolytes  - Lasix 10mg IVP PRN for goal net -1L .   - Replete electrolytes as needed.     #ID   - Pseudomonas PNA: BAL on 12/12 was positive for pseudomonas aeruginosa. Repeat sputum cx w/ Acinetobacter resistant to carbapenems. s/p Avycaz and flagyl for 7 day course.   - Imipenem for pancreatitis as above   - Follow up ID recs re: avycaz and flagyl    #Hematology/Oncology   - Acute Blood Loss Anemia as described above. Hgb stable without further bleeding, monitor closely. Transfusion goal Hgb >7.     #Endocrine  - Continue to monitor FS, ISS    #DVT PPx:   - hep gtt as above

## 2019-01-09 NOTE — PROGRESS NOTE ADULT - ASSESSMENT
53  M (resident of [facility]) with TBI, s/p PEG tube, contracture, and seizure d/o who presented as a transfer from [facility] on 12/9 for respiratory failure 2/2 ARDS likely 2/2 necrotizing pancreatitis s/p intubation.  Imipenem 12/28/18 - continued   bronc 12/11 with pseudomonas  pt with thick secretions and again febrile, sputum cx with  acinetobacter  repeat Ct with increased size of pancreatic collection but not walled off so no interventions were recommended  also had GIB and colitis due to ?contiguous necrotizing pancreatitis vs ischemic, s/p colonoscopy but not actively bleeding now  completed a 7 day course of avycaz and flagyl for the acinetobacter now back on imipenem    sepsis with fever, leukopenia resolved, extubated but reintubated 1/6/19 for hypoxia and poor cough along with profuse secretions and sputum cx again with acinetobacter    Necrotising Pancreatitis with multiple peripancreatitis collections not walled off yet   acinetobacter PNA  with lung abscesses  coag neg staph bacteremia likely contaminant, repeat negative  colitis, ?due to contiguous necrotizing pancreatitis and collection vs ischemic    no plans for IR embolization for surgical interventions      * s/p 7 days of avcaz and flagyl for  acinetobacter in the sputum 12/22-12/28   * on Imipenem for the necrotizing pancreatitis , was reintubated for resp failure and profuse thick secretions and also hypothermic, would switch back to avycaz and flagyl to cover the acinetobacter  * repeat CT chest/abdomen showed unchanged acute necrotizing pancreatitis with acute collections and multiple probable lung abscesses, probable colitis  * f/u with GI

## 2019-01-10 ENCOUNTER — TRANSCRIPTION ENCOUNTER (OUTPATIENT)
Age: 54
End: 2019-01-10

## 2019-01-10 LAB
ALBUMIN SERPL ELPH-MCNC: 1.9 G/DL — LOW (ref 3.3–5)
ALP SERPL-CCNC: 189 U/L — HIGH (ref 40–120)
ALT FLD-CCNC: 12 U/L — SIGNIFICANT CHANGE UP (ref 4–41)
ANION GAP SERPL CALC-SCNC: 8 MEQ/L — SIGNIFICANT CHANGE UP (ref 7–14)
AST SERPL-CCNC: 17 U/L — SIGNIFICANT CHANGE UP (ref 4–40)
BILIRUB SERPL-MCNC: 0.5 MG/DL — SIGNIFICANT CHANGE UP (ref 0.2–1.2)
BUN SERPL-MCNC: 6 MG/DL — LOW (ref 7–23)
CALCIUM SERPL-MCNC: 7.8 MG/DL — LOW (ref 8.4–10.5)
CHLORIDE SERPL-SCNC: 103 MMOL/L — SIGNIFICANT CHANGE UP (ref 98–107)
CO2 SERPL-SCNC: 30 MMOL/L — SIGNIFICANT CHANGE UP (ref 22–31)
CREAT SERPL-MCNC: < 0.2 MG/DL — LOW (ref 0.5–1.3)
FUNGUS SPEC QL CULT: SIGNIFICANT CHANGE UP
GLUCOSE BLDC GLUCOMTR-MCNC: 107 MG/DL — HIGH (ref 70–99)
GLUCOSE BLDC GLUCOMTR-MCNC: 109 MG/DL — HIGH (ref 70–99)
GLUCOSE BLDC GLUCOMTR-MCNC: 131 MG/DL — HIGH (ref 70–99)
GLUCOSE BLDC GLUCOMTR-MCNC: 134 MG/DL — HIGH (ref 70–99)
GLUCOSE SERPL-MCNC: 117 MG/DL — HIGH (ref 70–99)
HCT VFR BLD CALC: 29.2 % — LOW (ref 39–50)
HGB BLD-MCNC: 9.3 G/DL — LOW (ref 13–17)
MAGNESIUM SERPL-MCNC: 1.9 MG/DL — SIGNIFICANT CHANGE UP (ref 1.6–2.6)
MCHC RBC-ENTMCNC: 31.5 PG — SIGNIFICANT CHANGE UP (ref 27–34)
MCHC RBC-ENTMCNC: 31.8 % — LOW (ref 32–36)
MCV RBC AUTO: 99 FL — SIGNIFICANT CHANGE UP (ref 80–100)
NRBC # FLD: 0.02 K/UL — LOW (ref 25–125)
PHOSPHATE SERPL-MCNC: 2.9 MG/DL — SIGNIFICANT CHANGE UP (ref 2.5–4.5)
PLATELET # BLD AUTO: 379 K/UL — SIGNIFICANT CHANGE UP (ref 150–400)
PMV BLD: 9.9 FL — SIGNIFICANT CHANGE UP (ref 7–13)
POTASSIUM SERPL-MCNC: 3 MMOL/L — LOW (ref 3.5–5.3)
POTASSIUM SERPL-SCNC: 3 MMOL/L — LOW (ref 3.5–5.3)
PROT SERPL-MCNC: 5.1 G/DL — LOW (ref 6–8.3)
RBC # BLD: 2.95 M/UL — LOW (ref 4.2–5.8)
RBC # FLD: 20.5 % — HIGH (ref 10.3–14.5)
SODIUM SERPL-SCNC: 141 MMOL/L — SIGNIFICANT CHANGE UP (ref 135–145)
WBC # BLD: 5.28 K/UL — SIGNIFICANT CHANGE UP (ref 3.8–10.5)
WBC # FLD AUTO: 5.28 K/UL — SIGNIFICANT CHANGE UP (ref 3.8–10.5)

## 2019-01-10 PROCEDURE — 99291 CRITICAL CARE FIRST HOUR: CPT

## 2019-01-10 PROCEDURE — 99233 SBSQ HOSP IP/OBS HIGH 50: CPT

## 2019-01-10 RX ORDER — POTASSIUM CHLORIDE 20 MEQ
40 PACKET (EA) ORAL EVERY 4 HOURS
Qty: 0 | Refills: 0 | Status: COMPLETED | OUTPATIENT
Start: 2019-01-10 | End: 2019-01-10

## 2019-01-10 RX ORDER — PROPOFOL 10 MG/ML
5 INJECTION, EMULSION INTRAVENOUS
Qty: 1000 | Refills: 0 | Status: DISCONTINUED | OUTPATIENT
Start: 2019-01-10 | End: 2019-01-10

## 2019-01-10 RX ORDER — LACOSAMIDE 50 MG/1
100 TABLET ORAL
Qty: 0 | Refills: 0 | Status: DISCONTINUED | OUTPATIENT
Start: 2019-01-10 | End: 2019-01-17

## 2019-01-10 RX ORDER — HEPARIN SODIUM 5000 [USP'U]/ML
5000 INJECTION INTRAVENOUS; SUBCUTANEOUS EVERY 8 HOURS
Qty: 0 | Refills: 0 | Status: DISCONTINUED | OUTPATIENT
Start: 2019-01-10 | End: 2019-01-18

## 2019-01-10 RX ORDER — ACETAMINOPHEN 500 MG
650 TABLET ORAL ONCE
Qty: 0 | Refills: 0 | Status: COMPLETED | OUTPATIENT
Start: 2019-01-10 | End: 2019-01-10

## 2019-01-10 RX ADMIN — LACOSAMIDE 100 MILLIGRAM(S): 50 TABLET ORAL at 05:59

## 2019-01-10 RX ADMIN — PREGABALIN 1000 MICROGRAM(S): 225 CAPSULE ORAL at 11:38

## 2019-01-10 RX ADMIN — RISPERIDONE 1 MILLIGRAM(S): 4 TABLET ORAL at 11:38

## 2019-01-10 RX ADMIN — Medication 1 MILLIGRAM(S): at 11:38

## 2019-01-10 RX ADMIN — Medication 50 MILLIGRAM(S): at 05:58

## 2019-01-10 RX ADMIN — HEPARIN SODIUM 5000 UNIT(S): 5000 INJECTION INTRAVENOUS; SUBCUTANEOUS at 14:37

## 2019-01-10 RX ADMIN — SODIUM CHLORIDE 4 MILLILITER(S): 9 INJECTION INTRAMUSCULAR; INTRAVENOUS; SUBCUTANEOUS at 09:10

## 2019-01-10 RX ADMIN — Medication 100 MILLIGRAM(S): at 21:51

## 2019-01-10 RX ADMIN — QUETIAPINE FUMARATE 25 MILLIGRAM(S): 200 TABLET, FILM COATED ORAL at 11:38

## 2019-01-10 RX ADMIN — Medication 650 MILLIGRAM(S): at 12:15

## 2019-01-10 RX ADMIN — Medication 50 MILLIGRAM(S): at 14:38

## 2019-01-10 RX ADMIN — Medication 40 MILLIEQUIVALENT(S): at 05:59

## 2019-01-10 RX ADMIN — Medication 100 MILLIGRAM(S): at 03:36

## 2019-01-10 RX ADMIN — SODIUM CHLORIDE 4 MILLILITER(S): 9 INJECTION INTRAMUSCULAR; INTRAVENOUS; SUBCUTANEOUS at 22:51

## 2019-01-10 RX ADMIN — Medication 50 MILLIGRAM(S): at 21:53

## 2019-01-10 RX ADMIN — SODIUM CHLORIDE 4 MILLILITER(S): 9 INJECTION INTRAMUSCULAR; INTRAVENOUS; SUBCUTANEOUS at 04:01

## 2019-01-10 RX ADMIN — HEPARIN SODIUM 5000 UNIT(S): 5000 INJECTION INTRAVENOUS; SUBCUTANEOUS at 21:54

## 2019-01-10 RX ADMIN — Medication 100 MILLIGRAM(S): at 12:15

## 2019-01-10 RX ADMIN — Medication 40 MILLIEQUIVALENT(S): at 09:23

## 2019-01-10 RX ADMIN — MIDODRINE HYDROCHLORIDE 30 MILLIGRAM(S): 2.5 TABLET ORAL at 22:03

## 2019-01-10 RX ADMIN — Medication 650 MILLIGRAM(S): at 12:45

## 2019-01-10 RX ADMIN — MIDODRINE HYDROCHLORIDE 30 MILLIGRAM(S): 2.5 TABLET ORAL at 05:58

## 2019-01-10 RX ADMIN — LACOSAMIDE 100 MILLIGRAM(S): 50 TABLET ORAL at 19:57

## 2019-01-10 RX ADMIN — MIDODRINE HYDROCHLORIDE 30 MILLIGRAM(S): 2.5 TABLET ORAL at 14:37

## 2019-01-10 NOTE — PROGRESS NOTE ADULT - ATTENDING COMMENTS
Critically ill on vent with new trach     Frequent bedside visits with therapy change today    Critical Care Time Today 35 min +

## 2019-01-10 NOTE — PROGRESS NOTE ADULT - SUBJECTIVE AND OBJECTIVE BOX
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 650 milliGRAM(s) Oral every 6 hours PRN  cyanocobalamin 1000 MICROGram(s) Oral daily  dextrose 40% Gel 15 Gram(s) Oral once PRN  dextrose 5%. 1000 milliLiter(s) IV Continuous <Continuous>  dextrose 50% Injectable 12.5 Gram(s) IV Push once  dextrose 50% Injectable 25 Gram(s) IV Push once  dextrose 50% Injectable 25 Gram(s) IV Push once  folic acid 1 milliGRAM(s) Enteral Tube daily  glucagon  Injectable 1 milliGRAM(s) IntraMuscular once PRN  heparin  Injectable 5000 Unit(s) SubCutaneous every 8 hours  hydrocortisone sodium succinate Injectable 50 milliGRAM(s) IV Push every 8 hours  insulin lispro (HumaLOG) corrective regimen sliding scale   SubCutaneous every 6 hours  lacosamide Solution 100 milliGRAM(s) Oral two times a day  midodrine 30 milliGRAM(s) Oral three times a day  QUEtiapine 25 milliGRAM(s) Oral daily  risperiDONE   Solution 1 milliGRAM(s) Oral daily  sodium chloride 3%  Inhalation 4 milliLiter(s) Inhalation every 6 hours      Vital Signs Last 24 Hrs  T(C): 35.6 (10 Antonio 2019 08:00), Max: 35.8 (09 Jan 2019 12:00)  T(F): 96 (10 Antonio 2019 08:00), Max: 96.5 (09 Jan 2019 12:00)  HR: 72 (10 Antonio 2019 10:10) (50 - 111)  BP: 131/71 (10 Antonio 2019 08:00) (91/50 - 148/70)  BP(mean): 86 (10 Antonio 2019 08:00) (59 - 88)  RR: 14 (10 Antonio 2019 08:00) (7 - 24)  SpO2: 97% (10 Antonio 2019 10:10) (96% - 100%)    Physical Exam:  General: not awake or alert. intubated   Head: atraumatic normocephalic  eyes: normal sclera and conjunctivae   ENT:   trach with some bloody drainage, no LAD  Cardiovascular: regular S1,S2  Respiratory:  intubated mechanical ventilation, coarse breath sounds bilaterally with profuse thick and bloody secretions  abd:  PEG tube. soft, bowel sounds present, nontender. rectal tube   :  no suprapubic tenderness. tavares    Musculoskeletal:   no joint swelling, contractures, RUE mostly   Skin:    no rash  Neurologic: awake, answers some questions with nodding    Vascular: no phlebitis  Psych: unable to assess                           9.3    5.28  )-----------( 379      ( 10 Antonio 2019 03:20 )             29.2       01-10    141  |  103  |  6<L>  ----------------------------<  117<H>  3.0<L>   |  30  |  < 0.20<L>    Ca    7.8<L>      10 Antonio 2019 03:20  Phos  2.9     01-10  Mg     1.9     01-10    TPro  5.1<L>  /  Alb  1.9<L>  /  TBili  0.5  /  DBili  x   /  AST  17  /  ALT  12  /  AlkPhos  189<H>  01-10          MICROBIOLOGY:  v  ENDOTRACHEAL SPECIMEN  01-06-19 --  --  Acin.baumannii/haemoly [facility]      BLOOD VENOUS  01-02-19 --  --  --      BLOOD PERIPHERAL  01-02-19 --  --  --      BLOOD PERIPHERAL  12-27-18 --  --  --      BLOOD VENOUS  12-24-18 --  --  --      BLOOD PERIPHERAL  12-24-18 --  --  --      BLOOD PERIPHERAL  12-23-18 --  --  --      BLOOD VENOUS  12-22-18 --  --  BLOOD CULTURE PCR  Staphylococcus sp.,coag neg      SPUTUM  12-18-18 --  --  Acinetobacter baumannii       BLOOD PERIPHERAL  12-18-18 --  --  --      BRONCHIAL LAVAGE  12-12-18 --  --  Pseudomonas aeruginosa                RADIOLOGY:  Images below reviewed personally  < from: CT Chest w/ IV Cont (01.08.19 @ 15:29) >  IMPRESSION:   Unchangednecrotizing pancreatitis with acute necrotic collections.    Small bilateral pleural effusions with complete atelectasis of both lower   lobes with multiple probable parenchymal lung abscesses, measuring up to   1.1 cm in the superior segment.    Probable colitis involving the distal transverse colon and descending   colon.

## 2019-01-10 NOTE — DISCHARGE NOTE ADULT - CARE PROVIDER_API CALL
Santo Jules)  Gastroenterology; Internal Medicine  [address] [place] [address] [address]  [place] [address]  Phone: [phone]  Fax: [phone]  Follow Up Time:     Lisker, Gita N (MD)  Medicine  Pulmonary Medicine  [address] [place] [address] [address]  [place] [address]  Phone: [phone]  Fax: [phone]  Follow Up Time:

## 2019-01-10 NOTE — DISCHARGE NOTE ADULT - PATIENT PORTAL LINK FT
You can access the [facility] Patient Portal, offered by [facility], by registering with the following website: [email or web address]

## 2019-01-10 NOTE — DISCHARGE NOTE ADULT - MEDICATION SUMMARY - MEDICATIONS TO TAKE
I will START or STAY ON the medications listed below when I get home from the hospital:    acetaminophen 160 mg/5 mL oral suspension  -- 20.31 milliliter(s) by mouth every 6 hours, As needed, Temp greater or equal to 38C (100.4F), Mild Pain (1 - 3), Moderate Pain (4 - 6)  -- Indication: For Fever    lacosamide 10 mg/mL oral solution  -- 10 milliliter(s) by mouth 2 times a day  -- Indication: For Seizure    HumaLOG 100 units/mL subcutaneous solution  -- 2 Unit(s) if Glucose 151 - 200  4 Unit(s) if Glucose 201 - 250  6 Unit(s) if Glucose 251 - 300  8 Unit(s) if Glucose 301 - 350  10 Unit(s) if Glucose 351 - 400  12 Unit(s) if Glucose Greater Than 400  -- Indication: For Tube Feeds    QUEtiapine 25 mg oral tablet  -- 1 tab(s) by mouth once a day  -- Indication: For Prophylactic measure    risperiDONE 1 mg/mL oral solution  -- 1 milliliter(s) by mouth once a day  -- Indication: For Prophylactic measure    albuterol 90 mcg/inh inhalation aerosol  -- 2 puff(s) inhaled every 6 hours  -- Indication: For Acute respiratory failure    bacitracin 500 units/g topical ointment  -- 1 application on skin once a day  -- Indication: For Skin/ PEG    zinc oxide 20% topical ointment  -- 1 application on skin 2 times a day  -- Indication: For Skin/ PEG    psyllium 3.4 g/7 g oral powder for reconstitution  -- 1 packet(s) by mouth once a day  -- Indication: For Stool bulking    sodium chloride 3% inhalation solution  -- 3 milliliter(s) inhaled 4 times a day  -- Indication: For Acute respiratory failure    lactobacillus acidophilus oral capsule  -- 1 tab(s) by mouth 3 times a day  -- Indication: For Probiotic    Protonix 40 mg oral granule, delayed release  -- 1 each by mouth once a day  -- Indication: For GERD    cyanocobalamin 1000 mcg oral tablet  -- 1 tab(s) by mouth once a day  -- Indication: For Vitamins    folic acid 1 mg oral tablet  -- 1 tab(s) by mouth once a day  -- Indication: For Vitamins

## 2019-01-10 NOTE — PROGRESS NOTE ADULT - ASSESSMENT
53 Male w/ a PMHx of TBI (s/p PEG tube, contracture, and seizure d/o) presented as a transfer from [facility] on 12/9 for respiratory failure 2/2 ARDS likely 2/2 necrotizing pancreatitis s/p intubation. Course c/b Acinetobacter PNA s/p Avycaz and Flagyl 7 day course.  Course further complicated by acute blood loss anemia s/p colonoscopy with findings suggestive of ischemic colitis, without further bleeding and Hgb remaining stable. S/p extubation 1/3, and re-intubation 1/6 now s/p tracheostomy    #Neurology/Psych  - Seizure Disorder- Continue Lacosamide.   - Continue Risperidone and Quetiapine     # Cardiovascular  - Continue Midodrine 30 TID given borderline BP. Solucortef [id number]   - Lasix, goal net negative of -1L given anasarca       # Respiratory: ARDS 2/2 Necrotizing Pancreatitis. With BAL showing psudomonas and carbapenem-resistant actinobacter  - Acinetobacter PNA s/p abx course   - s/p extubation 1/3 to high flow, desated overnight, re-intubated 1/6 -> now s/p trach  - will c/w Metanebs and frequent suctioning     #GI  - Necrotizing Pancreatitis with enlarging danay-pancreatitic fluid collections, repeat CT A/P pending.   - On Imipenem for necrotizing pancreatitis, ID following   - Acute Blood Loss Anemia 2/2 Ischemic Colitis, colorectal surgery: no surgical intervention, without further bleeding      #  - Ahuja placement.    # Renal/Electrolytes  - Lasix 10mg IVP PRN for goal net -1L .   - Replete electrolytes as needed.     #ID   - Pseudomonas PNA: BAL on 12/12 was positive for pseudomonas aeruginosa. Repeat sputum cx w/ Acinetobacter resistant to carbapenems. s/p Avycaz and flagyl for 7 day course.   - Now on second course of avycaz/flagyl  - Follow up ID recs    #Hematology/Oncology   - Acute Blood Loss Anemia as described above. Hgb stable without further bleeding, monitor closely. Transfusion goal Hgb >7.     #Endocrine  - Continue to monitor FS, ISS    #DVT PPx:   - HSQ

## 2019-01-10 NOTE — DISCHARGE NOTE ADULT - PLAN OF CARE
Trach placed on 1/9/19. Continue PEG feeds.  Aspiration precautions. Supportive care Resolution and return to baseline Monitor for worsening of disease, such as, abdominal pain, nausea vomiting, fever/chills or changes in mental status.  Follow up with your PCP as an outpatient for further medical management. Trach placed on 1/9/19.  Monitor for worsening of disease, such as, increased cough/sputum, difficulty breathing, fever/chills, or changes in mental status. Follow-up with your PCP as an outpatient for further medical care/recommendations. resolution Underwent EUS and drainage on 1/31.  Underwent necrostomy on -------------- Trach placed on 1/9/19.  Settings AC 14/350/5/40%.  Monitor for worsening of disease, such as, increased cough/sputum, difficulty breathing, fever/chills, or changes in mental status. Follow-up with your PCP as an outpatient for further medical care/recommendations. Trach placed on 1/9/19.  Tolerating trach collar 24/7.  Trach care daily, suction PRN.  Monitor for worsening of disease, such as, increased cough/sputum, difficulty breathing, fever/chills, or changes in mental status. Follow-up with your PCP as an outpatient for further medical care/recommendations. Supportive care. PT/OT. Underwent EUS and drainage on 1/31.  Underwent necrosectomy with stent placement, then subsequently removed on 3/1.  Completed antibiotics.  Please follow up with GI within 2-3 weeks of dischrge Trach placed on 1/9/19.  Vent settings AC 14/4002/5/50%.  Trach collar day, vent HS.  Chest PT and metanebs 4x daily to mobilize secretions.  Trach care daily, suction PRN.  Monitor for worsening of disease, such as, increased cough/sputum, difficulty breathing, fever/chills, or changes in mental status. Follow-up with your PCP as an outpatient for further medical care/recommendations. Continue PEG feeds.  Aspiration precautions.  Make sure bumper is at 3.5cm.  Cleanse danay peg area with water and dry apply skin prep around bumper.  Apply barrier cream around whole of area under PEG/bacitracin to open area and place fenestrated dressing under bumper and change PRN. Completed 6 weeks antibiotics with improving CT chest scans.  Repeat CT in 4 weeks.

## 2019-01-10 NOTE — DISCHARGE NOTE ADULT - CARE PROVIDERS DIRECT ADDRESSES
[email or web address]

## 2019-01-10 NOTE — DISCHARGE NOTE ADULT - SECONDARY DIAGNOSIS.
ARDS (adult respiratory distress syndrome) PEG (percutaneous endoscopic gastrostomy) status TBI (traumatic brain injury) Lung abscess

## 2019-01-10 NOTE — PROGRESS NOTE ADULT - ASSESSMENT
53  M (resident of [facility]) with TBI, s/p PEG tube, contracture, and seizure d/o who presented as a transfer from [facility] on 12/9 for respiratory failure 2/2 ARDS likely 2/2 necrotizing pancreatitis s/p intubation.  Imipenem 12/28/18 - continued   bronc 12/11 with pseudomonas  pt with thick secretions and again febrile, sputum cx with  acinetobacter  repeat Ct with increased size of pancreatic collection but not walled off so no interventions were recommended  also had GIB and colitis due to ?contiguous necrotizing pancreatitis vs ischemic, s/p colonoscopy but not actively bleeding now  completed a 7 day course of avycaz and flagyl for the acinetobacter then switched back to imipenem, but pt again had hypothermia with the same acinetobacter in sputum now s/p trach and back on avycaz + flagyl    sepsis with fever, leukopenia resolved, extubated but reintubated 1/6/19 for hypoxia and poor cough along with profuse secretions and sputum cx again with acinetobacter    Necrotising Pancreatitis with multiple peripancreatitis collections not walled off yet   acinetobacter PNA  with lung abscesses  coag neg staph bacteremia likely contaminant, repeat negative  colitis, ?due to contiguous necrotizing pancreatitis and collection vs ischemic    no plans for IR embolization for surgical interventions      * s/p 7 days of avcaz and flagyl for  acinetobacter in the sputum 12/22-12/28  then back on imipenem for necrotizing pancreatis  * restarted on  avycaz and flagyl to cover the acinetobacter again 1/9, now day 2  * repeat CT chest/abdomen showed unchanged acute necrotizing pancreatitis with acute collections and multiple probable lung abscesses, probable colitis  * no plan for endoscopic drainage yet No

## 2019-01-10 NOTE — CHART NOTE - NSCHARTNOTEFT_GEN_A_CORE
53 Male w/ a PMHx of TBI (s/p PEG tube, contracture, and seizure d/o) presented as a transfer from [facility] on 12/9 for respiratory failure 2/2 ARDS likely 2/2 necrotizing pancreatitis s/p intubation. Course c/b Acinetobacter PNA s/p Avycaz and Flagyl 7 day course.  Course further complicated by acute blood loss anemia s/p colonoscopy with findings suggestive of ischemic colitis, without further bleeding and Hgb remaining stable. S/p extubation 1/3, and re-intubation 1/6 now s/p tracheostomy    PLAN  - Patient seems to be Ventilator dependent at this time. Metanebs and suctioning prn   - Necrotizing Pancreatitis with enlarging danay-pancreatitic fluid collections, repeat CT A/P pending.   - On Imipenem for necrotizing pancreatitis, ID following   - Acute Blood Loss Anemia 2/2 Ischemic Colitis, colorectal surgery: no surgical intervention, without further bleeding      MD SHERINE Su
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
## 2019-01-10 NOTE — DISCHARGE NOTE ADULT - CARE PLAN
Principal Discharge DX:	Necrotizing pancreatitis  Secondary Diagnosis:	ARDS (adult respiratory distress syndrome)  Assessment and plan of treatment:	Trach placed on 1/9/19.  Secondary Diagnosis:	PEG (percutaneous endoscopic gastrostomy) status  Assessment and plan of treatment:	Continue PEG feeds.  Aspiration precautions.  Secondary Diagnosis:	TBI (traumatic brain injury)  Assessment and plan of treatment:	Supportive care Principal Discharge DX:	Necrotizing pancreatitis  Goal:	Resolution and return to baseline  Assessment and plan of treatment:	Monitor for worsening of disease, such as, abdominal pain, nausea vomiting, fever/chills or changes in mental status.  Follow up with your PCP as an outpatient for further medical management.  Secondary Diagnosis:	ARDS (adult respiratory distress syndrome)  Goal:	Resolution and return to baseline  Assessment and plan of treatment:	Trach placed on 1/9/19.  Monitor for worsening of disease, such as, increased cough/sputum, difficulty breathing, fever/chills, or changes in mental status. Follow-up with your PCP as an outpatient for further medical care/recommendations.  Secondary Diagnosis:	PEG (percutaneous endoscopic gastrostomy) status  Goal:	Resolution and return to baseline  Assessment and plan of treatment:	Continue PEG feeds.  Aspiration precautions.  Secondary Diagnosis:	TBI (traumatic brain injury)  Goal:	Resolution and return to baseline  Assessment and plan of treatment:	Supportive care Principal Discharge DX:	Necrotizing pancreatitis  Goal:	resolution  Assessment and plan of treatment:	Underwent EUS and drainage on 1/31.  Underwent necrostomy on --------------  Secondary Diagnosis:	ARDS (adult respiratory distress syndrome)  Assessment and plan of treatment:	Trach placed on 1/9/19.  Settings  14/350/5/40%.  Monitor for worsening of disease, such as, increased cough/sputum, difficulty breathing, fever/chills, or changes in mental status. Follow-up with your PCP as an outpatient for further medical care/recommendations.  Secondary Diagnosis:	PEG (percutaneous endoscopic gastrostomy) status  Assessment and plan of treatment:	Continue PEG feeds.  Aspiration precautions.  Secondary Diagnosis:	TBI (traumatic brain injury)  Assessment and plan of treatment:	Supportive care Principal Discharge DX:	Necrotizing pancreatitis  Goal:	resolution  Assessment and plan of treatment:	Underwent EUS and drainage on 1/31.  Underwent necrostomy on --------------  Secondary Diagnosis:	ARDS (adult respiratory distress syndrome)  Assessment and plan of treatment:	Trach placed on 1/9/19.  Tolerating trach collar 24/7.  Trach care daily, suction PRN.  Monitor for worsening of disease, such as, increased cough/sputum, difficulty breathing, fever/chills, or changes in mental status. Follow-up with your PCP as an outpatient for further medical care/recommendations.  Secondary Diagnosis:	PEG (percutaneous endoscopic gastrostomy) status  Assessment and plan of treatment:	Continue PEG feeds.  Aspiration precautions.  Secondary Diagnosis:	TBI (traumatic brain injury)  Assessment and plan of treatment:	Supportive care. PT/OT. Principal Discharge DX:	Necrotizing pancreatitis  Goal:	resolution  Assessment and plan of treatment:	Underwent EUS and drainage on 1/31.  Underwent necrosectomy with stent placement, then subsequently removed on 3/1.  Completed antibiotics.  Please follow up with GI within 2-3 weeks of dischrge  Secondary Diagnosis:	ARDS (adult respiratory distress syndrome)  Assessment and plan of treatment:	Trach placed on 1/9/19.  Vent settings AC 14/4002/5/50%.  Trach collar day, vent HS.  Chest PT and metanebs 4x daily to mobilize secretions.  Trach care daily, suction PRN.  Monitor for worsening of disease, such as, increased cough/sputum, difficulty breathing, fever/chills, or changes in mental status. Follow-up with your PCP as an outpatient for further medical care/recommendations.  Secondary Diagnosis:	PEG (percutaneous endoscopic gastrostomy) status  Assessment and plan of treatment:	Continue PEG feeds.  Aspiration precautions.  Make sure bumper is at 3.5cm.  Cleanse danay peg area with water and dry apply skin prep around bumper.  Apply barrier cream around whole of area under PEG/bacitracin to open area and place fenestrated dressing under bumper and change PRN.  Secondary Diagnosis:	TBI (traumatic brain injury)  Assessment and plan of treatment:	Supportive care. PT/OT.  Secondary Diagnosis:	Lung abscess  Assessment and plan of treatment:	Completed 6 weeks antibiotics with improving CT chest scans.  Repeat CT in 4 weeks.

## 2019-01-10 NOTE — DISCHARGE NOTE ADULT - BECAUSE OF A PHYSICAL, MENTAL OR EMOTIONAL CONDITION, DO YOU HAVE DIFFICULTY DOING  ERRANDS ALONE LIKE VISITING A DOCTOR'S OFFICE OR SHOPPING (15 YEARS AND OLDER)
We discussed the results of her bone density which is showing osteoporosis. She still on the Fosamax. Unfortunately this most recent bone density was done on a different scan and was unable to compare with a 2015 bone density. She is checking with her insurance regarding the verge for bone density. However at this time recommend stand the Fosamax and recheck bone density at [facility] in 1 year get back to me if there is any additional questions   Yes

## 2019-01-11 DIAGNOSIS — K85.91 ACUTE PANCREATITIS WITH UNINFECTED NECROSIS, UNSPECIFIED: ICD-10-CM

## 2019-01-11 DIAGNOSIS — S06.9X9A UNSPECIFIED INTRACRANIAL INJURY WITH LOSS OF CONSCIOUSNESS OF UNSPECIFIED DURATION, INITIAL ENCOUNTER: ICD-10-CM

## 2019-01-11 DIAGNOSIS — R56.9 UNSPECIFIED CONVULSIONS: ICD-10-CM

## 2019-01-11 DIAGNOSIS — Z29.9 ENCOUNTER FOR PROPHYLACTIC MEASURES, UNSPECIFIED: ICD-10-CM

## 2019-01-11 DIAGNOSIS — J18.9 PNEUMONIA, UNSPECIFIED ORGANISM: ICD-10-CM

## 2019-01-11 DIAGNOSIS — J80 ACUTE RESPIRATORY DISTRESS SYNDROME: ICD-10-CM

## 2019-01-11 LAB
ANION GAP SERPL CALC-SCNC: 10 MEQ/L — SIGNIFICANT CHANGE UP (ref 7–14)
ANION GAP SERPL CALC-SCNC: 6 MEQ/L — LOW (ref 7–14)
BUN SERPL-MCNC: 7 MG/DL — SIGNIFICANT CHANGE UP (ref 7–23)
BUN SERPL-MCNC: 8 MG/DL — SIGNIFICANT CHANGE UP (ref 7–23)
CALCIUM SERPL-MCNC: 7.5 MG/DL — LOW (ref 8.4–10.5)
CALCIUM SERPL-MCNC: 7.6 MG/DL — LOW (ref 8.4–10.5)
CHLORIDE SERPL-SCNC: 106 MMOL/L — SIGNIFICANT CHANGE UP (ref 98–107)
CHLORIDE SERPL-SCNC: 106 MMOL/L — SIGNIFICANT CHANGE UP (ref 98–107)
CO2 SERPL-SCNC: 23 MMOL/L — SIGNIFICANT CHANGE UP (ref 22–31)
CO2 SERPL-SCNC: 29 MMOL/L — SIGNIFICANT CHANGE UP (ref 22–31)
CREAT SERPL-MCNC: 0.22 MG/DL — LOW (ref 0.5–1.3)
CREAT SERPL-MCNC: < 0.2 MG/DL — LOW (ref 0.5–1.3)
GLUCOSE BLDC GLUCOMTR-MCNC: 104 MG/DL — HIGH (ref 70–99)
GLUCOSE BLDC GLUCOMTR-MCNC: 106 MG/DL — HIGH (ref 70–99)
GLUCOSE BLDC GLUCOMTR-MCNC: 110 MG/DL — HIGH (ref 70–99)
GLUCOSE BLDC GLUCOMTR-MCNC: 140 MG/DL — HIGH (ref 70–99)
GLUCOSE SERPL-MCNC: 100 MG/DL — HIGH (ref 70–99)
GLUCOSE SERPL-MCNC: 120 MG/DL — HIGH (ref 70–99)
HCT VFR BLD CALC: 28.9 % — LOW (ref 39–50)
HGB BLD-MCNC: 9 G/DL — LOW (ref 13–17)
MAGNESIUM SERPL-MCNC: 1.8 MG/DL — SIGNIFICANT CHANGE UP (ref 1.6–2.6)
MCHC RBC-ENTMCNC: 31.1 % — LOW (ref 32–36)
MCHC RBC-ENTMCNC: 32.1 PG — SIGNIFICANT CHANGE UP (ref 27–34)
MCV RBC AUTO: 103.2 FL — HIGH (ref 80–100)
NRBC # FLD: 0 K/UL — LOW (ref 25–125)
PHOSPHATE SERPL-MCNC: 2.5 MG/DL — SIGNIFICANT CHANGE UP (ref 2.5–4.5)
PLATELET # BLD AUTO: 387 K/UL — SIGNIFICANT CHANGE UP (ref 150–400)
PMV BLD: 10.3 FL — SIGNIFICANT CHANGE UP (ref 7–13)
POTASSIUM SERPL-MCNC: 3 MMOL/L — LOW (ref 3.5–5.3)
POTASSIUM SERPL-MCNC: 4.9 MMOL/L — SIGNIFICANT CHANGE UP (ref 3.5–5.3)
POTASSIUM SERPL-SCNC: 3 MMOL/L — LOW (ref 3.5–5.3)
POTASSIUM SERPL-SCNC: 4.9 MMOL/L — SIGNIFICANT CHANGE UP (ref 3.5–5.3)
RBC # BLD: 2.8 M/UL — LOW (ref 4.2–5.8)
RBC # FLD: 21.7 % — HIGH (ref 10.3–14.5)
SODIUM SERPL-SCNC: 139 MMOL/L — SIGNIFICANT CHANGE UP (ref 135–145)
SODIUM SERPL-SCNC: 141 MMOL/L — SIGNIFICANT CHANGE UP (ref 135–145)
WBC # BLD: 4.78 K/UL — SIGNIFICANT CHANGE UP (ref 3.8–10.5)
WBC # FLD AUTO: 4.78 K/UL — SIGNIFICANT CHANGE UP (ref 3.8–10.5)

## 2019-01-11 PROCEDURE — 99232 SBSQ HOSP IP/OBS MODERATE 35: CPT | Mod: GC

## 2019-01-11 PROCEDURE — 99233 SBSQ HOSP IP/OBS HIGH 50: CPT | Mod: GC

## 2019-01-11 PROCEDURE — 99232 SBSQ HOSP IP/OBS MODERATE 35: CPT

## 2019-01-11 RX ORDER — MIDODRINE HYDROCHLORIDE 2.5 MG/1
10 TABLET ORAL ONCE
Qty: 0 | Refills: 0 | Status: COMPLETED | OUTPATIENT
Start: 2019-01-11 | End: 2019-01-11

## 2019-01-11 RX ORDER — HYDROCORTISONE 20 MG
25 TABLET ORAL EVERY 8 HOURS
Qty: 0 | Refills: 0 | Status: DISCONTINUED | OUTPATIENT
Start: 2019-01-11 | End: 2019-01-14

## 2019-01-11 RX ORDER — MIDODRINE HYDROCHLORIDE 2.5 MG/1
15 TABLET ORAL THREE TIMES A DAY
Qty: 0 | Refills: 0 | Status: DISCONTINUED | OUTPATIENT
Start: 2019-01-11 | End: 2019-01-14

## 2019-01-11 RX ORDER — POTASSIUM CHLORIDE 20 MEQ
40 PACKET (EA) ORAL EVERY 4 HOURS
Qty: 0 | Refills: 0 | Status: COMPLETED | OUTPATIENT
Start: 2019-01-11 | End: 2019-01-11

## 2019-01-11 RX ORDER — CHLORHEXIDINE GLUCONATE 213 G/1000ML
1 SOLUTION TOPICAL
Qty: 0 | Refills: 0 | Status: DISCONTINUED | OUTPATIENT
Start: 2019-01-11 | End: 2019-03-07

## 2019-01-11 RX ADMIN — SODIUM CHLORIDE 4 MILLILITER(S): 9 INJECTION INTRAMUSCULAR; INTRAVENOUS; SUBCUTANEOUS at 15:37

## 2019-01-11 RX ADMIN — LACOSAMIDE 100 MILLIGRAM(S): 50 TABLET ORAL at 06:55

## 2019-01-11 RX ADMIN — SODIUM CHLORIDE 4 MILLILITER(S): 9 INJECTION INTRAMUSCULAR; INTRAVENOUS; SUBCUTANEOUS at 09:22

## 2019-01-11 RX ADMIN — HEPARIN SODIUM 5000 UNIT(S): 5000 INJECTION INTRAVENOUS; SUBCUTANEOUS at 05:51

## 2019-01-11 RX ADMIN — Medication 100 MILLIGRAM(S): at 05:51

## 2019-01-11 RX ADMIN — CHLORHEXIDINE GLUCONATE 1 APPLICATION(S): 213 SOLUTION TOPICAL at 10:42

## 2019-01-11 RX ADMIN — Medication 650 MILLIGRAM(S): at 17:38

## 2019-01-11 RX ADMIN — Medication 100 MILLIGRAM(S): at 12:50

## 2019-01-11 RX ADMIN — Medication 50 MILLIGRAM(S): at 05:52

## 2019-01-11 RX ADMIN — MIDODRINE HYDROCHLORIDE 15 MILLIGRAM(S): 2.5 TABLET ORAL at 21:30

## 2019-01-11 RX ADMIN — MIDODRINE HYDROCHLORIDE 30 MILLIGRAM(S): 2.5 TABLET ORAL at 05:52

## 2019-01-11 RX ADMIN — MIDODRINE HYDROCHLORIDE 10 MILLIGRAM(S): 2.5 TABLET ORAL at 19:04

## 2019-01-11 RX ADMIN — Medication 40 MILLIEQUIVALENT(S): at 10:39

## 2019-01-11 RX ADMIN — Medication 40 MILLIEQUIVALENT(S): at 14:23

## 2019-01-11 RX ADMIN — Medication 650 MILLIGRAM(S): at 17:08

## 2019-01-11 RX ADMIN — Medication 25 MILLIGRAM(S): at 21:26

## 2019-01-11 RX ADMIN — HEPARIN SODIUM 5000 UNIT(S): 5000 INJECTION INTRAVENOUS; SUBCUTANEOUS at 14:23

## 2019-01-11 RX ADMIN — LACOSAMIDE 100 MILLIGRAM(S): 50 TABLET ORAL at 17:08

## 2019-01-11 RX ADMIN — HEPARIN SODIUM 5000 UNIT(S): 5000 INJECTION INTRAVENOUS; SUBCUTANEOUS at 21:26

## 2019-01-11 RX ADMIN — Medication 100 MILLIGRAM(S): at 21:26

## 2019-01-11 RX ADMIN — SODIUM CHLORIDE 4 MILLILITER(S): 9 INJECTION INTRAMUSCULAR; INTRAVENOUS; SUBCUTANEOUS at 03:36

## 2019-01-11 RX ADMIN — SODIUM CHLORIDE 4 MILLILITER(S): 9 INJECTION INTRAMUSCULAR; INTRAVENOUS; SUBCUTANEOUS at 21:36

## 2019-01-11 RX ADMIN — Medication 1 MILLIGRAM(S): at 12:51

## 2019-01-11 RX ADMIN — RISPERIDONE 1 MILLIGRAM(S): 4 TABLET ORAL at 12:50

## 2019-01-11 RX ADMIN — PREGABALIN 1000 MICROGRAM(S): 225 CAPSULE ORAL at 12:51

## 2019-01-11 RX ADMIN — QUETIAPINE FUMARATE 25 MILLIGRAM(S): 200 TABLET, FILM COATED ORAL at 12:51

## 2019-01-11 NOTE — PROGRESS NOTE ADULT - SUBJECTIVE AND OBJECTIVE BOX
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 650 milliGRAM(s) Oral every 6 hours PRN  chlorhexidine 4% Liquid 1 Application(s) Topical <User Schedule>  cyanocobalamin 1000 MICROGram(s) Oral daily  dextrose 40% Gel 15 Gram(s) Oral once PRN  dextrose 5%. 1000 milliLiter(s) IV Continuous <Continuous>  dextrose 50% Injectable 12.5 Gram(s) IV Push once  dextrose 50% Injectable 25 Gram(s) IV Push once  dextrose 50% Injectable 25 Gram(s) IV Push once  folic acid 1 milliGRAM(s) Enteral Tube daily  glucagon  Injectable 1 milliGRAM(s) IntraMuscular once PRN  heparin  Injectable 5000 Unit(s) SubCutaneous every 8 hours  hydrocortisone sodium succinate Injectable 50 milliGRAM(s) IV Push every 8 hours  insulin lispro (HumaLOG) corrective regimen sliding scale   SubCutaneous every 6 hours  lacosamide Solution 100 milliGRAM(s) Oral two times a day  midodrine 30 milliGRAM(s) Oral three times a day  potassium chloride   Powder 40 milliEquivalent(s) Oral every 4 hours  QUEtiapine 25 milliGRAM(s) Oral daily  risperiDONE   Solution 1 milliGRAM(s) Oral daily  sodium chloride 3%  Inhalation 4 milliLiter(s) Inhalation every 6 hours      Vital Signs Last 24 Hrs  T(C): 36.7 (11 Jan 2019 09:00), Max: 36.7 (11 Jan 2019 09:00)  T(F): 98 (11 Jan 2019 09:00), Max: 98 (11 Jan 2019 09:00)  HR: 94 (11 Jan 2019 11:39) (80 - 110)  BP: 129/81 (11 Jan 2019 09:00) (89/51 - 130/77)  BP(mean): 58 (10 Antonio 2019 15:00) (58 - 59)  RR: 17 (11 Jan 2019 09:00) (17 - 25)  SpO2: 100% (11 Jan 2019 11:39) (97% - 100%)    Physical Exam:  General: not awake or alert. intubated   Head: atraumatic normocephalic  eyes: normal sclera and conjunctivae   ENT:   trach with some bloody drainage, no LAD  Cardiovascular: regular S1,S2  Respiratory:  intubated mechanical ventilation, coarse breath sounds bilaterally with profuse thick secretions  abd:  PEG tube. soft, bowel sounds present, nontender. rectal tube   :  no suprapubic tenderness. tavares    Musculoskeletal:   no joint swelling, contractures, RUE mostly   Skin:    no rash  Neurologic: awake, answers some questions with nodding    Vascular: no phlebitis  Psych: unable to assess                             9.0    4.78  )-----------( 387      ( 11 Jan 2019 07:12 )             28.9       01-11    141  |  106  |  8   ----------------------------<  120<H>  3.0<L>   |  29  |  0.22<L>    Ca    7.5<L>      11 Jan 2019 07:12  Phos  2.5     01-11  Mg     1.8     01-11    TPro  5.1<L>  /  Alb  1.9<L>  /  TBili  0.5  /  DBili  x   /  AST  17  /  ALT  12  /  AlkPhos  189<H>  01-10          MICROBIOLOGY:  v  ENDOTRACHEAL SPECIMEN  01-06-19 --  --  Acin.baumannii/haemoly [facility]      BLOOD VENOUS  01-02-19 --  --  --      BLOOD PERIPHERAL  01-02-19 --  --  --      BLOOD PERIPHERAL  12-27-18 --  --  --      BLOOD VENOUS  12-24-18 --  --  --      BLOOD PERIPHERAL  12-24-18 --  --  --      BLOOD PERIPHERAL  12-23-18 --  --  --      BLOOD VENOUS  12-22-18 --  --  BLOOD CULTURE PCR  Staphylococcus sp.,coag neg      SPUTUM  12-18-18 --  --  Acinetobacter baumannii       BLOOD PERIPHERAL  12-18-18 --  --  --      BRONCHIAL LAVAGE  12-12-18 --  --  Pseudomonas aeruginosa                RADIOLOGY:  Images below reviewed personally  < from: CT Chest w/ IV Cont (01.08.19 @ 15:29) >  IMPRESSION:   Unchangednecrotizing pancreatitis with acute necrotic collections.    Small bilateral pleural effusions with complete atelectasis of both lower   lobes with multiple probable parenchymal lung abscesses, measuring up to   1.1 cm in the superior segment.    Probable colitis involving the distal transverse colon and descending   colon. 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 650 milliGRAM(s) Oral every 6 hours PRN  chlorhexidine 4% Liquid 1 Application(s) Topical <User Schedule>  cyanocobalamin 1000 MICROGram(s) Oral daily  dextrose 40% Gel 15 Gram(s) Oral once PRN  dextrose 5%. 1000 milliLiter(s) IV Continuous <Continuous>  dextrose 50% Injectable 12.5 Gram(s) IV Push once  dextrose 50% Injectable 25 Gram(s) IV Push once  dextrose 50% Injectable 25 Gram(s) IV Push once  folic acid 1 milliGRAM(s) Enteral Tube daily  glucagon  Injectable 1 milliGRAM(s) IntraMuscular once PRN  heparin  Injectable 5000 Unit(s) SubCutaneous every 8 hours  hydrocortisone sodium succinate Injectable 50 milliGRAM(s) IV Push every 8 hours  insulin lispro (HumaLOG) corrective regimen sliding scale   SubCutaneous every 6 hours  lacosamide Solution 100 milliGRAM(s) Oral two times a day  midodrine 30 milliGRAM(s) Oral three times a day  potassium chloride   Powder 40 milliEquivalent(s) Oral every 4 hours  QUEtiapine 25 milliGRAM(s) Oral daily  risperiDONE   Solution 1 milliGRAM(s) Oral daily  sodium chloride 3%  Inhalation 4 milliLiter(s) Inhalation every 6 hours      Vital Signs Last 24 Hrs  T(C): 36.7 (11 Jan 2019 09:00), Max: 36.7 (11 Jan 2019 09:00)  T(F): 98 (11 Jan 2019 09:00), Max: 98 (11 Jan 2019 09:00)  HR: 94 (11 Jan 2019 11:39) (80 - 110)  BP: 129/81 (11 Jan 2019 09:00) (89/51 - 130/77)  BP(mean): 58 (10 Antonio 2019 15:00) (58 - 59)  RR: 17 (11 Jan 2019 09:00) (17 - 25)  SpO2: 100% (11 Jan 2019 11:39) (97% - 100%)    Physical Exam:  General: NAD non toxic  Head: atraumatic normocephalic  eyes: normal sclera and conjunctivae   ENT:   trach with some bloody drainage, no LAD  Cardiovascular: regular S1,S2  Respiratory:  on mechanical ventilation, coarse breath sounds bilaterally with profuse thick secretions  abd:  PEG tube. soft, bowel sounds present, nontender. rectal tube   :  no suprapubic tenderness. tavares    Musculoskeletal:   no joint swelling, contractures, RUE mostly   Skin:    no rash  Neurologic: awake, answers some questions with nodding    Vascular: no phlebitis  Psych: unable to assess                             9.0    4.78  )-----------( 387      ( 11 Jan 2019 07:12 )             28.9       01-11    141  |  106  |  8   ----------------------------<  120<H>  3.0<L>   |  29  |  0.22<L>    Ca    7.5<L>      11 Jan 2019 07:12  Phos  2.5     01-11  Mg     1.8     01-11    TPro  5.1<L>  /  Alb  1.9<L>  /  TBili  0.5  /  DBili  x   /  AST  17  /  ALT  12  /  AlkPhos  189<H>  01-10          MICROBIOLOGY:  v  ENDOTRACHEAL SPECIMEN  01-06-19 --  --  Acin.baumannii/haemoly [facility]      BLOOD VENOUS  01-02-19 --  --  --      BLOOD PERIPHERAL  01-02-19 --  --  --      BLOOD PERIPHERAL  12-27-18 --  --  --      BLOOD VENOUS  12-24-18 --  --  --      BLOOD PERIPHERAL  12-24-18 --  --  --      BLOOD PERIPHERAL  12-23-18 --  --  --      BLOOD VENOUS  12-22-18 --  --  BLOOD CULTURE PCR  Staphylococcus sp.,coag neg      SPUTUM  12-18-18 --  --  Acinetobacter baumannii       BLOOD PERIPHERAL  12-18-18 --  --  --      BRONCHIAL LAVAGE  12-12-18 --  --  Pseudomonas aeruginosa                RADIOLOGY:  Images below reviewed personally  < from: CT Chest w/ IV Cont (01.08.19 @ 15:29) >  IMPRESSION:   Unchangednecrotizing pancreatitis with acute necrotic collections.    Small bilateral pleural effusions with complete atelectasis of both lower   lobes with multiple probable parenchymal lung abscesses, measuring up to   1.1 cm in the superior segment.    Probable colitis involving the distal transverse colon and descending   colon.
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
## 2019-01-11 NOTE — PROGRESS NOTE ADULT - PROBLEM [facility] PLAN 1
- Enlarging per-pancreatic fluid collections   -FU CT A/P in one week   - On avycaz/flagyl  followed by ID for 7 day course     - - Continue Midodrine tapering dose  given borderline BP. Solucortef [id number]

## 2019-01-11 NOTE — PROGRESS NOTE ADULT - SUBJECTIVE AND OBJECTIVE BOX
CHIEF COMPLAINT:    Interval Events:    REVIEW OF SYSTEMS:  Constitutional:   Eyes:  ENT:  CV:  Resp:  GI:  :  MSK:  Integumentary:  Neurological:  Psychiatric:  Endocrine:  Hematologic/Lymphatic:  Allergic/Immunologic:  [ ] All other systems negative  [ ] Unable to assess ROS because ________    OBJECTIVE:  ICU Vital Signs Last 24 Hrs  T(C): 36.6 (11 Jan 2019 05:51), Max: 36.6 (11 Jan 2019 05:51)  T(F): 97.9 (11 Jan 2019 05:51), Max: 97.9 (11 Jan 2019 05:51)  HR: 85 (11 Jan 2019 07:46) (50 - 110)  BP: 130/77 (11 Jan 2019 05:51) (89/51 - 139/66)  BP(mean): 58 (10 Antonio 2019 15:00) (56 - 86)  ABP: --  ABP(mean): --  RR: 18 (11 Jan 2019 05:51) (14 - 25)  SpO2: 100% (11 Jan 2019 07:46) (95% - 100%)    Mode: AC/ CMV (Assist Control/ Continuous Mandatory Ventilation), RR (machine): 14, TV (machine): 350, FiO2: 50, PEEP: 5, MAP: 7, PIP: 14    01-10 @ 07:01  -  01-11 @ 07:00  --------------------------------------------------------  IN: 510 mL / OUT: 840 mL / NET: -330 mL      CAPILLARY BLOOD GLUCOSE      POCT Blood Glucose.: 140 mg/dL (11 Jan 2019 05:41)      PHYSICAL EXAM:  General:   HEENT:   Lymph Nodes:  Neck:   Respiratory:   Cardiovascular:   Abdomen:   Extremities:   Skin:   Neurological:  Psychiatry:    HOSPITAL MEDICATIONS:  MEDICATIONS  (STANDING):  cefTAZidime/avibactam IVPB 2.5 Gram(s) IV Intermittent every 8 hours  cefTAZidime/avibactam IVPB      cyanocobalamin 1000 MICROGram(s) Oral daily  dextrose 5%. 1000 milliLiter(s) (50 mL/Hr) IV Continuous <Continuous>  dextrose 50% Injectable 12.5 Gram(s) IV Push once  dextrose 50% Injectable 25 Gram(s) IV Push once  dextrose 50% Injectable 25 Gram(s) IV Push once  folic acid 1 milliGRAM(s) Enteral Tube daily  heparin  Injectable 5000 Unit(s) SubCutaneous every 8 hours  hydrocortisone sodium succinate Injectable 50 milliGRAM(s) IV Push every 8 hours  insulin lispro (HumaLOG) corrective regimen sliding scale   SubCutaneous every 6 hours  lacosamide Solution 100 milliGRAM(s) Oral two times a day  metroNIDAZOLE  IVPB      metroNIDAZOLE  IVPB 500 milliGRAM(s) IV Intermittent every 8 hours  midodrine 30 milliGRAM(s) Oral three times a day  QUEtiapine 25 milliGRAM(s) Oral daily  risperiDONE   Solution 1 milliGRAM(s) Oral daily  sodium chloride 3%  Inhalation 4 milliLiter(s) Inhalation every 6 hours    MEDICATIONS  (PRN):  acetaminophen    Suspension .. 650 milliGRAM(s) Oral every 6 hours PRN Temp greater or equal to 38C (100.4F), Mild Pain (1 - 3), Moderate Pain (4 - 6)  dextrose 40% Gel 15 Gram(s) Oral once PRN Blood Glucose LESS THAN 70 milliGRAM(s)/deciliter  glucagon  Injectable 1 milliGRAM(s) IntraMuscular once PRN Glucose LESS THAN 70 milligrams/deciliter      LABS:                        9.3    5.28  )-----------( 379      ( 10 Antonio 2019 03:20 )             29.2     01-11    141  |  106  |  8   ----------------------------<  120<H>  3.0<L>   |  29  |  0.22<L>    Ca    7.5<L>      11 Jan 2019 07:12  Phos  2.5     01-11  Mg     1.8     01-11    TPro  5.1<L>  /  Alb  1.9<L>  /  TBili  0.5  /  DBili  x   /  AST  17  /  ALT  12  /  AlkPhos  189<H>  01-10              MICROBIOLOGY:     RADIOLOGY:  [ ] Reviewed and interpreted by me    PULMONARY FUNCTION TESTS:    EKG: CHIEF COMPLAINT: Patient is a 53y old  Male who presents with a chief complaint of ARDS?, sepsis (11 Jan 2019 13:07)      Interval Events: transferred from MICU     REVIEW OF SYSTEMS::e  Cor denies   GI denies   Resp denies     OBJECTIVE:  ICU Vital Signs Last 24 Hrs  T(C): 36.6 (11 Jan 2019 05:51), Max: 36.6 (11 Jan 2019 05:51)  T(F): 97.9 (11 Jan 2019 05:51), Max: 97.9 (11 Jan 2019 05:51)  HR: 85 (11 Jan 2019 07:46) (50 - 110)  BP: 130/77 (11 Jan 2019 05:51) (89/51 - 139/66)  BP(mean): 58 (10 Antonio 2019 15:00) (56 - 86)  ABP: --  ABP(mean): --  RR: 18 (11 Jan 2019 05:51) (14 - 25)  SpO2: 100% (11 Jan 2019 07:46) (95% - 100%)    Mode: AC/ CMV (Assist Control/ Continuous Mandatory Ventilation), RR (machine): 14, TV (machine): 350, FiO2: 50, PEEP: 5, MAP: 7, PIP: 14    01-10 @ 07:01  -  01-11 @ 07:00  --------------------------------------------------------  IN: 510 mL / OUT: 840 mL / NET: -330 mL      CAPILLARY BLOOD GLUCOSE      POCT Blood Glucose.: 140 mg/dL (11 Jan 2019 05:41)    HOSPITAL MEDICATIONS:  MEDICATIONS  (STANDING):  cefTAZidime/avibactam IVPB 2.5 Gram(s) IV Intermittent every 8 hours  cefTAZidime/avibactam IVPB      cyanocobalamin 1000 MICROGram(s) Oral daily  dextrose 5%. 1000 milliLiter(s) (50 mL/Hr) IV Continuous <Continuous>  dextrose 50% Injectable 12.5 Gram(s) IV Push once  dextrose 50% Injectable 25 Gram(s) IV Push once  dextrose 50% Injectable 25 Gram(s) IV Push once  folic acid 1 milliGRAM(s) Enteral Tube daily  heparin  Injectable 5000 Unit(s) SubCutaneous every 8 hours  hydrocortisone sodium succinate Injectable 50 milliGRAM(s) IV Push every 8 hours  insulin lispro (HumaLOG) corrective regimen sliding scale   SubCutaneous every 6 hours  lacosamide Solution 100 milliGRAM(s) Oral two times a day  metroNIDAZOLE  IVPB      metroNIDAZOLE  IVPB 500 milliGRAM(s) IV Intermittent every 8 hours  midodrine 30 milliGRAM(s) Oral three times a day  QUEtiapine 25 milliGRAM(s) Oral daily  risperiDONE   Solution 1 milliGRAM(s) Oral daily  sodium chloride 3%  Inhalation 4 milliLiter(s) Inhalation every 6 hours    MEDICATIONS  (PRN):  acetaminophen    Suspension .. 650 milliGRAM(s) Oral every 6 hours PRN Temp greater or equal to 38C (100.4F), Mild Pain (1 - 3), Moderate Pain (4 - 6)  dextrose 40% Gel 15 Gram(s) Oral once PRN Blood Glucose LESS THAN 70 milliGRAM(s)/deciliter  glucagon  Injectable 1 milliGRAM(s) IntraMuscular once PRN Glucose LESS THAN 70 milligrams/deciliter      LABS:                        9.3    5.28  )-----------( 379      ( 10 Antonio 2019 03:20 )             29.2     01-11    141  |  106  |  8   ----------------------------<  120<H>  3.0<L>   |  29  |  0.22<L>    Ca    7.5<L>      11 Jan 2019 07:12  Phos  2.5     01-11  Mg     1.8     01-11    TPro  5.1<L>  /  Alb  1.9<L>  /  TBili  0.5  /  DBili  x   /  AST  17  /  ALT  12  /  AlkPhos  189<H>  01-10              MICROBIOLOGY:     RADIOLOGY:  [ ] Reviewed and interpreted by me    PULMONARY FUNCTION TESTS:    EKG:

## 2019-01-11 NOTE — PROGRESS NOTE ADULT - ASSESSMENT
53 Male w/ a PMHx of TBI (s/p PEG tube, contracture, and seizure d/o) presented as a transfer from [facility] on 12/9 for respiratory failure 2/2 ARDS likely 2/2 necrotizing pancreatitis s/p intubation. Course c/b Acinetobacter PNA s/p Avycaz and Flagyl 7 day course.  Course further complicated by acute blood loss anemia s/p colonoscopy with findings suggestive of ischemic colitis, without further bleeding and Hgb remaining stable. S/p extubation 1/3, and re-intubation 1/6 now s/p tracheostomy    #Neurology/Psych  - Seizure Disorder- Continue Lacosamide.   - Continue Risperidone and Quetiapine     # Cardiovascular  - Continue Midodrine 30 TID given borderline BP. Solucortef [id number]   - Lasix, goal net negative of -1L given anasarca       # Respiratory: ARDS 2/2 Necrotizing Pancreatitis. With BAL showing psudomonas and carbapenem-resistant actinobacter  - Acinetobacter PNA s/p abx course   - s/p extubation 1/3 to high flow, desated overnight, re-intubated 1/6 -> now s/p trach  - will c/w Metanebs and frequent suctioning     #GI  - Necrotizing Pancreatitis with enlarging danay-pancreatitic fluid collections, repeat CT A/P pending.   - On Imipenem for necrotizing pancreatitis, ID following   - Acute Blood Loss Anemia 2/2 Ischemic Colitis, colorectal surgery: no surgical intervention, without further bleeding      #  - Ahuja placement.    # Renal/Electrolytes  - Lasix 10mg IVP PRN for goal net -1L .   - Replete electrolytes as needed.     #ID   - Pseudomonas PNA: BAL on 12/12 was positive for pseudomonas aeruginosa. Repeat sputum cx w/ Acinetobacter resistant to carbapenems. s/p Avycaz and flagyl for 7 day course.   - Now on second course of avycaz/flagyl  - Follow up ID recs    #Hematology/Oncology   - Acute Blood Loss Anemia as described above. Hgb stable without further bleeding, monitor closely. Transfusion goal Hgb >7.     #Endocrine  - Continue to monitor FS, ISS    #DVT PPx:   - HSQ 53 Male w/ a PMHx of TBI (s/p PEG tube, contracture, and seizure d/o) presented as a transfer from [facility] on 12/9 for respiratory failure 2/2 ARDS likely 2/2 necrotizing pancreatitis s/p intubation. Course c/b Acinetobacter PNA s/p Avycaz and Flagyl 7 day course.  Course further complicated by acute blood loss anemia s/p colonoscopy with findings suggestive of ischemic colitis, without further bleeding and Hgb remaining stable. S/p extubation 1/3, and re-intubation 1/6 now s/p tracheostomy

## 2019-01-11 NOTE — PROGRESS NOTE ADULT - ATTENDING COMMENTS
Pt tx from MICU  Sister at bedside (Yeny)  h/o TBI, lives at a facility, PEG in place, but at baseline up and around in his wheelchair, cognitively intact.  Admitted with ARDS secondary to nec pancreatitis, complicated by pna, acinetobacter  on avycaz and flagyl  s/p trach.  GI, ID cont follow up appreciated. On abx, repeat CT abd next week  Pt is awake alert, right sided contractures, answers questions by head nodding  wean as tolerated

## 2019-01-11 NOTE — PROGRESS NOTE ADULT - ASSESSMENT
53  M (resident of [facility]) with TBI, s/p PEG tube, contracture, and seizure d/o who presented as a transfer from [facility] on 12/9 for respiratory failure 2/2 ARDS likely 2/2 necrotizing pancreatitis s/p intubation.  Imipenem 12/28/18 - continued   bronc 12/11 with pseudomonas  pt with thick secretions and again febrile, sputum cx with  acinetobacter  repeat Ct with increased size of pancreatic collection but not walled off so no interventions were recommended  also had GIB and colitis due to ?contiguous necrotizing pancreatitis vs ischemic, s/p colonoscopy but not actively bleeding now  completed a 7 day course of avycaz and flagyl for the acinetobacter then switched back to imipenem, but pt again had hypothermia with the same acinetobacter in sputum now s/p trach and back on avycaz + flagyl    sepsis with fever, leukopenia resolved, extubated but reintubated 1/6/19 for hypoxia and poor cough along with profuse secretions and sputum cx again with acinetobacter    Necrotising Pancreatitis with multiple peripancreatitis collections not walled off yet   acinetobacter PNA  with lung abscesses  coag neg staph bacteremia likely contaminant, repeat negative  colitis, ?due to contiguous necrotizing pancreatitis and collection vs ischemic    no plans for IR embolization for surgical interventions      * s/p 7 days of avcaz and flagyl for  acinetobacter in the sputum 12/22-12/28  then back on imipenem for necrotizing pancreatis  * restarted on  avycaz and flagyl to cover the acinetobacter again 1/9, now day 3  * repeat CT chest/abdomen showed unchanged acute necrotizing pancreatitis with acute collections and multiple probable lung abscesses, probable colitis  * no plan for endoscopic drainage yet, plan for repeat CT in a week

## 2019-01-12 LAB
ANION GAP SERPL CALC-SCNC: 8 MEQ/L — SIGNIFICANT CHANGE UP (ref 7–14)
BUN SERPL-MCNC: 6 MG/DL — LOW (ref 7–23)
CALCIUM SERPL-MCNC: 7.8 MG/DL — LOW (ref 8.4–10.5)
CHLORIDE SERPL-SCNC: 104 MMOL/L — SIGNIFICANT CHANGE UP (ref 98–107)
CO2 SERPL-SCNC: 26 MMOL/L — SIGNIFICANT CHANGE UP (ref 22–31)
CREAT SERPL-MCNC: < 0.2 MG/DL — LOW (ref 0.5–1.3)
GLUCOSE BLDC GLUCOMTR-MCNC: 108 MG/DL — HIGH (ref 70–99)
GLUCOSE BLDC GLUCOMTR-MCNC: 112 MG/DL — HIGH (ref 70–99)
GLUCOSE BLDC GLUCOMTR-MCNC: 119 MG/DL — HIGH (ref 70–99)
GLUCOSE BLDC GLUCOMTR-MCNC: 122 MG/DL — HIGH (ref 70–99)
GLUCOSE SERPL-MCNC: 115 MG/DL — HIGH (ref 70–99)
HCT VFR BLD CALC: 30.5 % — LOW (ref 39–50)
HGB BLD-MCNC: 9.3 G/DL — LOW (ref 13–17)
MAGNESIUM SERPL-MCNC: 1.8 MG/DL — SIGNIFICANT CHANGE UP (ref 1.6–2.6)
MCHC RBC-ENTMCNC: 30.5 % — LOW (ref 32–36)
MCHC RBC-ENTMCNC: 31.6 PG — SIGNIFICANT CHANGE UP (ref 27–34)
MCV RBC AUTO: 103.7 FL — HIGH (ref 80–100)
NRBC # FLD: 0 K/UL — LOW (ref 25–125)
PHOSPHATE SERPL-MCNC: 2.7 MG/DL — SIGNIFICANT CHANGE UP (ref 2.5–4.5)
PLATELET # BLD AUTO: 318 K/UL — SIGNIFICANT CHANGE UP (ref 150–400)
PMV BLD: 10.5 FL — SIGNIFICANT CHANGE UP (ref 7–13)
POTASSIUM SERPL-MCNC: 4.3 MMOL/L — SIGNIFICANT CHANGE UP (ref 3.5–5.3)
POTASSIUM SERPL-SCNC: 4.3 MMOL/L — SIGNIFICANT CHANGE UP (ref 3.5–5.3)
RBC # BLD: 2.94 M/UL — LOW (ref 4.2–5.8)
RBC # FLD: 21.3 % — HIGH (ref 10.3–14.5)
SODIUM SERPL-SCNC: 138 MMOL/L — SIGNIFICANT CHANGE UP (ref 135–145)
WBC # BLD: 8.22 K/UL — SIGNIFICANT CHANGE UP (ref 3.8–10.5)
WBC # FLD AUTO: 8.22 K/UL — SIGNIFICANT CHANGE UP (ref 3.8–10.5)

## 2019-01-12 PROCEDURE — 99223 1ST HOSP IP/OBS HIGH 75: CPT | Mod: GC

## 2019-01-12 RX ADMIN — SODIUM CHLORIDE 4 MILLILITER(S): 9 INJECTION INTRAMUSCULAR; INTRAVENOUS; SUBCUTANEOUS at 09:57

## 2019-01-12 RX ADMIN — LACOSAMIDE 100 MILLIGRAM(S): 50 TABLET ORAL at 17:28

## 2019-01-12 RX ADMIN — HEPARIN SODIUM 5000 UNIT(S): 5000 INJECTION INTRAVENOUS; SUBCUTANEOUS at 06:19

## 2019-01-12 RX ADMIN — Medication 100 MILLIGRAM(S): at 21:16

## 2019-01-12 RX ADMIN — Medication 1 MILLIGRAM(S): at 12:32

## 2019-01-12 RX ADMIN — CHLORHEXIDINE GLUCONATE 1 APPLICATION(S): 213 SOLUTION TOPICAL at 09:21

## 2019-01-12 RX ADMIN — Medication 25 MILLIGRAM(S): at 13:00

## 2019-01-12 RX ADMIN — SODIUM CHLORIDE 4 MILLILITER(S): 9 INJECTION INTRAMUSCULAR; INTRAVENOUS; SUBCUTANEOUS at 15:28

## 2019-01-12 RX ADMIN — SODIUM CHLORIDE 4 MILLILITER(S): 9 INJECTION INTRAMUSCULAR; INTRAVENOUS; SUBCUTANEOUS at 03:25

## 2019-01-12 RX ADMIN — HEPARIN SODIUM 5000 UNIT(S): 5000 INJECTION INTRAVENOUS; SUBCUTANEOUS at 21:16

## 2019-01-12 RX ADMIN — PREGABALIN 1000 MICROGRAM(S): 225 CAPSULE ORAL at 12:34

## 2019-01-12 RX ADMIN — Medication 25 MILLIGRAM(S): at 21:16

## 2019-01-12 RX ADMIN — RISPERIDONE 1 MILLIGRAM(S): 4 TABLET ORAL at 12:32

## 2019-01-12 RX ADMIN — HEPARIN SODIUM 5000 UNIT(S): 5000 INJECTION INTRAVENOUS; SUBCUTANEOUS at 13:00

## 2019-01-12 RX ADMIN — SODIUM CHLORIDE 4 MILLILITER(S): 9 INJECTION INTRAMUSCULAR; INTRAVENOUS; SUBCUTANEOUS at 22:40

## 2019-01-12 RX ADMIN — LACOSAMIDE 100 MILLIGRAM(S): 50 TABLET ORAL at 06:19

## 2019-01-12 RX ADMIN — Medication 100 MILLIGRAM(S): at 06:16

## 2019-01-12 RX ADMIN — QUETIAPINE FUMARATE 25 MILLIGRAM(S): 200 TABLET, FILM COATED ORAL at 12:32

## 2019-01-12 RX ADMIN — Medication 25 MILLIGRAM(S): at 06:19

## 2019-01-12 RX ADMIN — Medication 100 MILLIGRAM(S): at 12:58

## 2019-01-12 NOTE — PROGRESS NOTE ADULT - PROBLEM SELECTOR PLAN 1
- Enlarging per-pancreatic fluid collections   - FU CT A/P in one week   - On avycaz/flagyl  followed by ID for 7 day course   - Continue Midodrine and Solucortef tapering dose given borderline BP

## 2019-01-12 NOTE — PROGRESS NOTE ADULT - ASSESSMENT
53 Male w/ a PMHx of TBI (s/p PEG tube, contracture, and seizure d/o) presented as a transfer from [facility] on 12/9 for respiratory failure 2/2 ARDS likely 2/2 necrotizing pancreatitis s/p intubation. Course c/b Acinetobacter PNA s/p Avycaz and Flagyl 7 day course.  Course further complicated by acute blood loss anemia s/p colonoscopy with findings suggestive of ischemic colitis, without further bleeding and Hgb remaining stable. S/p extubation 1/3, and re-intubation 1/6 now s/p tracheostomy

## 2019-01-12 NOTE — PROGRESS NOTE ADULT - SUBJECTIVE AND OBJECTIVE BOX
CHIEF COMPLAINT:  Patient is a 53y old  Male who presents with a chief complaint of Mode: AC/ CMV (Assist Control/ Continuous Mandatory Ventilation), RR (machine): 14, TV (machine): 350, FiO2: 50, PEEP: 5, MAP: 8, PIP: 16  Interval Events:    REVIEW OF SYSTEMS:  Constitutional:   Eyes:  ENT:  CV:  Resp:  GI:  :  MSK:  Integumentary:  Neurological:  Psychiatric:  Endocrine:  Hematologic/Lymphatic:  Allergic/Immunologic:  [ ] All other systems negative  [ ] Unable to assess ROS because ________    OBJECTIVE:  ICU Vital Signs Last 24 Hrs  T(C): 36.7 (12 Jan 2019 06:15), Max: 37.4 (12 Jan 2019 02:00)  T(F): 98 (12 Jan 2019 06:15), Max: 99.3 (12 Jan 2019 02:00)  HR: 85 (12 Jan 2019 07:24) (85 - 121)  BP: 113/72 (12 Jan 2019 06:15) (92/50 - 129/81)  BP(mean): --  ABP: --  ABP(mean): --  RR: 18 (12 Jan 2019 06:15) (17 - 19)  SpO2: 100% (12 Jan 2019 07:24) (97% - 100%)    Mode: AC/ CMV (Assist Control/ Continuous Mandatory Ventilation), RR (machine): 14, TV (machine): 350, FiO2: 50, PEEP: 5, MAP: 8, PIP: 16    01-11 @ 07:01  -  01-12 @ 07:00  --------------------------------------------------------  IN: 0 mL / OUT: 950 mL / NET: -950 mL      CAPILLARY BLOOD GLUCOSE      POCT Blood Glucose.: 122 mg/dL (12 Jan 2019 05:12)      PHYSICAL EXAM:  General:   HEENT:   Lymph Nodes:  Neck:   Respiratory:   Cardiovascular:   Abdomen:   Extremities:   Skin:   Neurological:  Psychiatry:    HOSPITAL MEDICATIONS:  MEDICATIONS  (STANDING):  cefTAZidime/avibactam IVPB 2.5 Gram(s) IV Intermittent every 8 hours  cefTAZidime/avibactam IVPB      chlorhexidine 4% Liquid 1 Application(s) Topical <User Schedule>  cyanocobalamin 1000 MICROGram(s) Oral daily  dextrose 5%. 1000 milliLiter(s) (50 mL/Hr) IV Continuous <Continuous>  dextrose 50% Injectable 12.5 Gram(s) IV Push once  dextrose 50% Injectable 25 Gram(s) IV Push once  dextrose 50% Injectable 25 Gram(s) IV Push once  folic acid 1 milliGRAM(s) Enteral Tube daily  heparin  Injectable 5000 Unit(s) SubCutaneous every 8 hours  hydrocortisone sodium succinate Injectable 25 milliGRAM(s) IV Push every 8 hours  insulin lispro (HumaLOG) corrective regimen sliding scale   SubCutaneous every 6 hours  lacosamide Solution 100 milliGRAM(s) Oral two times a day  metroNIDAZOLE  IVPB      metroNIDAZOLE  IVPB 500 milliGRAM(s) IV Intermittent every 8 hours  midodrine 15 milliGRAM(s) Oral three times a day  QUEtiapine 25 milliGRAM(s) Oral daily  risperiDONE   Solution 1 milliGRAM(s) Oral daily  sodium chloride 3%  Inhalation 4 milliLiter(s) Inhalation every 6 hours    MEDICATIONS  (PRN):  acetaminophen    Suspension .. 650 milliGRAM(s) Oral every 6 hours PRN Temp greater or equal to 38C (100.4F), Mild Pain (1 - 3), Moderate Pain (4 - 6)  dextrose 40% Gel 15 Gram(s) Oral once PRN Blood Glucose LESS THAN 70 milliGRAM(s)/deciliter  glucagon  Injectable 1 milliGRAM(s) IntraMuscular once PRN Glucose LESS THAN 70 milligrams/deciliter      LABS:                        9.3    8.22  )-----------( 318      ( 12 Jan 2019 03:48 )             30.5     01-12    138  |  104  |  6<L>  ----------------------------<  115<H>  4.3   |  26  |  < 0.20<L>    Ca    7.8<L>      12 Jan 2019 03:48  Phos  2.7     01-12  Mg     1.8     01-12                MICROBIOLOGY:     RADIOLOGY:  [ ] Reviewed and interpreted by me    PULMONARY FUNCTION TESTS:    EKG:    I&O's Summary    11 Jan 2019 07:01  -  12 Jan 2019 07:00  --------------------------------------------------------  IN: 0 mL / OUT: 950 mL / NET: -950 mL CHIEF COMPLAINT:  Patient is a 53y old  Male who presents with a chief complaint of Mode: AC/ CMV (Assist Control/ Continuous Mandatory Ventilation), RR (machine): 14, TV (machine): 350, FiO2: 50, PEEP: 5, MAP: 8, PIP: 16  Interval Events:    REVIEW OF SYSTEMS:  Constitutional:   Eyes:  ENT:  CV:  Resp:  GI:  :  MSK:  Integumentary:  Neurological:  Psychiatric:  Endocrine:  Hematologic/Lymphatic:  Allergic/Immunologic:  [ ] All other systems negative  [ ] Unable to assess ROS because ________    OBJECTIVE:  ICU Vital Signs Last 24 Hrs  T(C): 36.7 (12 Jan 2019 06:15), Max: 37.4 (12 Jan 2019 02:00)  T(F): 98 (12 Jan 2019 06:15), Max: 99.3 (12 Jan 2019 02:00)  HR: 85 (12 Jan 2019 07:24) (85 - 121)  BP: 113/72 (12 Jan 2019 06:15) (92/50 - 129/81)  BP(mean): --  ABP: --  ABP(mean): --  RR: 18 (12 Jan 2019 06:15) (17 - 19)  SpO2: 100% (12 Jan 2019 07:24) (97% - 100%)    Mode: AC/ CMV (Assist Control/ Continuous Mandatory Ventilation), RR (machine): 14, TV (machine): 350, FiO2: 50, PEEP: 5, MAP: 8, PIP: 16    01-11 @ 07:01  -  01-12 @ 07:00  --------------------------------------------------------  IN: 0 mL / OUT: 950 mL / NET: -950 mL      CAPILLARY BLOOD GLUCOSE      POCT Blood Glucose.: 122 mg/dL (12 Jan 2019 05:12)      PHYSICAL EXAM:  General:   HEENT:   Lymph Nodes:  Neck:   Respiratory:   Cardiovascular:   Abdomen:   Extremities:   Skin:   Neurological:  Psychiatry:  Physical Exam:   · Constitutional	detailed exam	  · Constitutional Details	no distress	  · Eyes	detailed exam	  · Eyes Details	conjunctiva clear	  · ENMT	detailed exam	  · ENMT Details	mouth	  · Mouth	moist	  · Neck	detailed exam 	  · Neck Details	supple; trach	  · Respiratory	detailed exam	  · Respiratory Details	breath sounds equal; good air movement; bilat rhonchi	  · Cardiovascular	detailed exam	  · Cardiovascular Details	regular rate and rhythm 	  · Gastrointestinal	detailed exam	  · GI Normal	soft; nontender; no distention; + PEG	  · Genitourinary	detailed exam 	  · Extremities	detailed exam 	  · Extremities Details	no clubbing; no cyanosis; contractures	  · Vascular	detailed exam 	  · Radial Pulse	right normal; left normal	  · Neurological	detailed exam	  · Mental Status	Eyes open tracks movement	  · Skin	detailed exam	  · Skin Details	warm and dry	  · Skin Comments	AAI sheets for monitoring	  · Musculoskeletal	detailed exam	  · Musculoskeletal Details	decreased ROM; diminished strength	    HOSPITAL MEDICATIONS:  MEDICATIONS  (STANDING):  cefTAZidime/avibactam IVPB 2.5 Gram(s) IV Intermittent every 8 hours  cefTAZidime/avibactam IVPB      chlorhexidine 4% Liquid 1 Application(s) Topical <User Schedule>  cyanocobalamin 1000 MICROGram(s) Oral daily  dextrose 5%. 1000 milliLiter(s) (50 mL/Hr) IV Continuous <Continuous>  dextrose 50% Injectable 12.5 Gram(s) IV Push once  dextrose 50% Injectable 25 Gram(s) IV Push once  dextrose 50% Injectable 25 Gram(s) IV Push once  folic acid 1 milliGRAM(s) Enteral Tube daily  heparin  Injectable 5000 Unit(s) SubCutaneous every 8 hours  hydrocortisone sodium succinate Injectable 25 milliGRAM(s) IV Push every 8 hours  insulin lispro (HumaLOG) corrective regimen sliding scale   SubCutaneous every 6 hours  lacosamide Solution 100 milliGRAM(s) Oral two times a day  metroNIDAZOLE  IVPB      metroNIDAZOLE  IVPB 500 milliGRAM(s) IV Intermittent every 8 hours  midodrine 15 milliGRAM(s) Oral three times a day  QUEtiapine 25 milliGRAM(s) Oral daily  risperiDONE   Solution 1 milliGRAM(s) Oral daily  sodium chloride 3%  Inhalation 4 milliLiter(s) Inhalation every 6 hours    MEDICATIONS  (PRN):  acetaminophen    Suspension .. 650 milliGRAM(s) Oral every 6 hours PRN Temp greater or equal to 38C (100.4F), Mild Pain (1 - 3), Moderate Pain (4 - 6)  dextrose 40% Gel 15 Gram(s) Oral once PRN Blood Glucose LESS THAN 70 milliGRAM(s)/deciliter  glucagon  Injectable 1 milliGRAM(s) IntraMuscular once PRN Glucose LESS THAN 70 milligrams/deciliter      LABS:                        9.3    8.22  )-----------( 318      ( 12 Jan 2019 03:48 )             30.5     01-12    138  |  104  |  6<L>  ----------------------------<  115<H>  4.3   |  26  |  < 0.20<L>    Ca    7.8<L>      12 Jan 2019 03:48  Phos  2.7     01-12  Mg     1.8     01-12                MICROBIOLOGY:     RADIOLOGY:  [ ] Reviewed and interpreted by me    PULMONARY FUNCTION TESTS:    EKG:    I&O's Summary    11 Jan 2019 07:01  -  12 Jan 2019 07:00  --------------------------------------------------------  IN: 0 mL / OUT: 950 mL / NET: -950 mL CHIEF COMPLAINT:  Patient is a 53y old  Male who presents with a chief complaint of Mode: AC/ CMV (Assist Control/ Continuous Mandatory Ventilation), RR (machine): 14, TV (machine): 350, FiO2: 50, PEEP: 5, MAP: 8, PIP: 16  Interval Events: No new events overnight.     REVIEW OF SYSTEMS:  Constitutional: No complaints  CV: Denies  Resp: Denies  GI: Denies  [ x ] All other systems negative    OBJECTIVE:  ICU Vital Signs Last 24 Hrs  T(C): 36.7 (12 Jan 2019 06:15), Max: 37.4 (12 Jan 2019 02:00)  T(F): 98 (12 Jan 2019 06:15), Max: 99.3 (12 Jan 2019 02:00)  HR: 85 (12 Jan 2019 07:24) (85 - 121)  BP: 113/72 (12 Jan 2019 06:15) (92/50 - 129/81)  BP(mean): --  ABP: --  ABP(mean): --  RR: 18 (12 Jan 2019 06:15) (17 - 19)  SpO2: 100% (12 Jan 2019 07:24) (97% - 100%)    Mode: AC/ CMV (Assist Control/ Continuous Mandatory Ventilation), RR (machine): 14, TV (machine): 350, FiO2: 50, PEEP: 5, MAP: 8, PIP: 16    01-11 @ 07:01  -  01-12 @ 07:00  --------------------------------------------------------  IN: 0 mL / OUT: 950 mL / NET: -950 mL    POCT Blood Glucose.: 122 mg/dL (12 Jan 2019 05:12)    Physical Exam:   · Constitutional	detailed exam	  · Constitutional Details	no distress	  · Eyes	detailed exam	  · Eyes Details	conjunctiva clear	  · ENMT	detailed exam	  · ENMT Details	mouth	  · Mouth	moist	  · Neck	detailed exam 	  · Neck Details	supple; trach	  · Respiratory	detailed exam	  · Respiratory Details	breath sounds equal; good air movement; bilat rhonchi, course breath sounds, thick secretions continue	  · Cardiovascular	detailed exam	  · Cardiovascular Details	regular rate and rhythm, regular S1 and S2	  · Gastrointestinal	detailed exam	  · GI Normal	soft; nontender; no distention; + PEG, rectal tube	  · Genitourinary	detailed exam, tavares in place	  · Extremities	detailed exam 	  · Extremities Details	no clubbing; no cyanosis; contractures	  · Vascular	detailed exam 	  · Radial Pulse	right normal; left normal	  · Neurological	detailed exam	  · Mental Status	Eyes open tracks movement	  · Skin	detailed exam	  · Skin Details	warm and dry	  · Skin Comments	AAI sheets for monitoring	  · Musculoskeletal	detailed exam	  · Musculoskeletal Details	decreased ROM; diminished strength, contractures	    HOSPITAL MEDICATIONS:  MEDICATIONS  (STANDING):  cefTAZidime/avibactam IVPB 2.5 Gram(s) IV Intermittent every 8 hours  cefTAZidime/avibactam IVPB      chlorhexidine 4% Liquid 1 Application(s) Topical <User Schedule>  cyanocobalamin 1000 MICROGram(s) Oral daily  dextrose 5%. 1000 milliLiter(s) (50 mL/Hr) IV Continuous <Continuous>  dextrose 50% Injectable 12.5 Gram(s) IV Push once  dextrose 50% Injectable 25 Gram(s) IV Push once  dextrose 50% Injectable 25 Gram(s) IV Push once  folic acid 1 milliGRAM(s) Enteral Tube daily  heparin  Injectable 5000 Unit(s) SubCutaneous every 8 hours  hydrocortisone sodium succinate Injectable 25 milliGRAM(s) IV Push every 8 hours  insulin lispro (HumaLOG) corrective regimen sliding scale   SubCutaneous every 6 hours  lacosamide Solution 100 milliGRAM(s) Oral two times a day  metroNIDAZOLE  IVPB      metroNIDAZOLE  IVPB 500 milliGRAM(s) IV Intermittent every 8 hours  midodrine 15 milliGRAM(s) Oral three times a day  QUEtiapine 25 milliGRAM(s) Oral daily  risperiDONE   Solution 1 milliGRAM(s) Oral daily  sodium chloride 3%  Inhalation 4 milliLiter(s) Inhalation every 6 hours    MEDICATIONS  (PRN):  acetaminophen    Suspension .. 650 milliGRAM(s) Oral every 6 hours PRN Temp greater or equal to 38C (100.4F), Mild Pain (1 - 3), Moderate Pain (4 - 6)  dextrose 40% Gel 15 Gram(s) Oral once PRN Blood Glucose LESS THAN 70 milliGRAM(s)/deciliter  glucagon  Injectable 1 milliGRAM(s) IntraMuscular once PRN Glucose LESS THAN 70 milligrams/deciliter      LABS:                        9.3    8.22  )-----------( 318      ( 12 Jan 2019 03:48 )             30.5     01-12    138  |  104  |  6<L>  ----------------------------<  115<H>  4.3   |  26  |  < 0.20<L>    Ca    7.8<L>      12 Jan 2019 03:48  Phos  2.7     01-12  Mg     1.8     01-12                MICROBIOLOGY:     RADIOLOGY:  [ ] Reviewed and interpreted by me    PULMONARY FUNCTION TESTS:    EKG:    I&O's Summary    11 Jan 2019 07:01  -  12 Jan 2019 07:00  --------------------------------------------------------  IN: 0 mL / OUT: 950 mL / NET: -950 mL

## 2019-01-13 DIAGNOSIS — Z93.1 GASTROSTOMY STATUS: ICD-10-CM

## 2019-01-13 LAB
ANION GAP SERPL CALC-SCNC: 9 MEQ/L — SIGNIFICANT CHANGE UP (ref 7–14)
BUN SERPL-MCNC: 7 MG/DL — SIGNIFICANT CHANGE UP (ref 7–23)
CALCIUM SERPL-MCNC: 7.5 MG/DL — LOW (ref 8.4–10.5)
CHLORIDE SERPL-SCNC: 102 MMOL/L — SIGNIFICANT CHANGE UP (ref 98–107)
CO2 SERPL-SCNC: 25 MMOL/L — SIGNIFICANT CHANGE UP (ref 22–31)
CREAT SERPL-MCNC: < 0.2 MG/DL — LOW (ref 0.5–1.3)
GLUCOSE BLDC GLUCOMTR-MCNC: 105 MG/DL — HIGH (ref 70–99)
GLUCOSE BLDC GLUCOMTR-MCNC: 110 MG/DL — HIGH (ref 70–99)
GLUCOSE BLDC GLUCOMTR-MCNC: 125 MG/DL — HIGH (ref 70–99)
GLUCOSE BLDC GLUCOMTR-MCNC: 130 MG/DL — HIGH (ref 70–99)
GLUCOSE SERPL-MCNC: 99 MG/DL — SIGNIFICANT CHANGE UP (ref 70–99)
HCT VFR BLD CALC: 27.6 % — LOW (ref 39–50)
HGB BLD-MCNC: 8.6 G/DL — LOW (ref 13–17)
MAGNESIUM SERPL-MCNC: 1.8 MG/DL — SIGNIFICANT CHANGE UP (ref 1.6–2.6)
MCHC RBC-ENTMCNC: 31.2 % — LOW (ref 32–36)
MCHC RBC-ENTMCNC: 32 PG — SIGNIFICANT CHANGE UP (ref 27–34)
MCV RBC AUTO: 102.6 FL — HIGH (ref 80–100)
NRBC # FLD: 0 K/UL — LOW (ref 25–125)
PHOSPHATE SERPL-MCNC: 2.6 MG/DL — SIGNIFICANT CHANGE UP (ref 2.5–4.5)
PLATELET # BLD AUTO: 270 K/UL — SIGNIFICANT CHANGE UP (ref 150–400)
PMV BLD: 10.2 FL — SIGNIFICANT CHANGE UP (ref 7–13)
POTASSIUM SERPL-MCNC: 3.7 MMOL/L — SIGNIFICANT CHANGE UP (ref 3.5–5.3)
POTASSIUM SERPL-SCNC: 3.7 MMOL/L — SIGNIFICANT CHANGE UP (ref 3.5–5.3)
RBC # BLD: 2.69 M/UL — LOW (ref 4.2–5.8)
RBC # FLD: 20.2 % — HIGH (ref 10.3–14.5)
SODIUM SERPL-SCNC: 136 MMOL/L — SIGNIFICANT CHANGE UP (ref 135–145)
WBC # BLD: 5.99 K/UL — SIGNIFICANT CHANGE UP (ref 3.8–10.5)
WBC # FLD AUTO: 5.99 K/UL — SIGNIFICANT CHANGE UP (ref 3.8–10.5)

## 2019-01-13 RX ADMIN — Medication 1 MILLIGRAM(S): at 12:56

## 2019-01-13 RX ADMIN — Medication 25 MILLIGRAM(S): at 21:07

## 2019-01-13 RX ADMIN — RISPERIDONE 1 MILLIGRAM(S): 4 TABLET ORAL at 12:56

## 2019-01-13 RX ADMIN — SODIUM CHLORIDE 4 MILLILITER(S): 9 INJECTION INTRAMUSCULAR; INTRAVENOUS; SUBCUTANEOUS at 03:32

## 2019-01-13 RX ADMIN — Medication 100 MILLIGRAM(S): at 21:14

## 2019-01-13 RX ADMIN — LACOSAMIDE 100 MILLIGRAM(S): 50 TABLET ORAL at 05:06

## 2019-01-13 RX ADMIN — HEPARIN SODIUM 5000 UNIT(S): 5000 INJECTION INTRAVENOUS; SUBCUTANEOUS at 21:07

## 2019-01-13 RX ADMIN — PREGABALIN 1000 MICROGRAM(S): 225 CAPSULE ORAL at 12:56

## 2019-01-13 RX ADMIN — CHLORHEXIDINE GLUCONATE 1 APPLICATION(S): 213 SOLUTION TOPICAL at 10:14

## 2019-01-13 RX ADMIN — HEPARIN SODIUM 5000 UNIT(S): 5000 INJECTION INTRAVENOUS; SUBCUTANEOUS at 05:03

## 2019-01-13 RX ADMIN — HEPARIN SODIUM 5000 UNIT(S): 5000 INJECTION INTRAVENOUS; SUBCUTANEOUS at 13:07

## 2019-01-13 RX ADMIN — Medication 100 MILLIGRAM(S): at 05:04

## 2019-01-13 RX ADMIN — QUETIAPINE FUMARATE 25 MILLIGRAM(S): 200 TABLET, FILM COATED ORAL at 12:56

## 2019-01-13 RX ADMIN — SODIUM CHLORIDE 4 MILLILITER(S): 9 INJECTION INTRAMUSCULAR; INTRAVENOUS; SUBCUTANEOUS at 10:22

## 2019-01-13 RX ADMIN — Medication 25 MILLIGRAM(S): at 05:04

## 2019-01-13 RX ADMIN — LACOSAMIDE 100 MILLIGRAM(S): 50 TABLET ORAL at 18:26

## 2019-01-13 RX ADMIN — Medication 25 MILLIGRAM(S): at 13:06

## 2019-01-13 RX ADMIN — Medication 100 MILLIGRAM(S): at 12:56

## 2019-01-13 NOTE — PROGRESS NOTE ADULT - PROBLEM SELECTOR PLAN 2
- s/p trach 1/9   - cont avycaz for now  - ID note appreciated   - wean as tolerated  - will try trach collar today  - trach care, suction PRN  - chest PT

## 2019-01-13 NOTE — PROGRESS NOTE ADULT - ASSESSMENT
53 Male w/ a PMHx of TBI (s/p PEG tube, contracture, and seizure d/o) presented as a transfer from [facility] on 12/9 for respiratory failure 2/2 ARDS likely 2/2 necrotizing pancreatitis s/p intubation. Course c/b Acinetobacter PNA s/p Avycaz and Flagyl 7 day course.  Course further complicated by acute blood loss anemia s/p colonoscopy with findings suggestive of ischemic colitis, without further bleeding and Hgb remaining stable. S/p extubation 1/3, and re-intubation 1/6 now s/p tracheostomy

## 2019-01-13 NOTE — PROGRESS NOTE ADULT - PROBLEM SELECTOR PLAN 1
- enlarging per-pancreatic fluid collections   - GI note appreciated  - needs repeat CT on 1/16  - cont abx  - ID note appreciated

## 2019-01-13 NOTE — PROGRESS NOTE ADULT - SUBJECTIVE AND OBJECTIVE BOX
CHIEF COMPLAINT: Patient is a 53y old  Male who presents with a chief complaint of ARDS?, sepsis (12 Jan 2019 07:46)    Interval Events:      REVIEW OF SYSTEMS:  Constitutional:   Eyes:  ENT:  CV:  Resp:  GI:  :  MSK:  Integumentary:  Neurological:  Psychiatric:  Endocrine:  Hematologic/Lymphatic:  Allergic/Immunologic:  [ ] All other systems negative  [ ] Unable to assess ROS because ________      OBJECTIVE:  ICU Vital Signs Last 24 Hrs  T(C): 37.4 (13 Jan 2019 05:00), Max: 37.4 (13 Jan 2019 05:00)  T(F): 99.3 (13 Jan 2019 05:00), Max: 99.3 (13 Jan 2019 05:00)  HR: 92 (13 Jan 2019 07:33) (88 - 100)  BP: 136/80 (13 Jan 2019 05:00) (119/62 - 140/85)  BP(mean): --  ABP: --  ABP(mean): --  RR: 12 (13 Jan 2019 05:00) (12 - 20)  SpO2: 98% (13 Jan 2019 07:33) (97% - 100%)    Mode: CPAP with PS, FiO2: 40, PEEP: 5, PS: 5    01-12 @ 07:01  -  01-13 @ 07:00  --------------------------------------------------------  IN: 400 mL / OUT: 1800 mL / NET: -1400 mL    POCT Blood Glucose.: 105 mg/dL (13 Jan 2019 05:24)    HOSPITAL MEDICATIONS:  MEDICATIONS  (STANDING):  cefTAZidime/avibactam IVPB 2.5 Gram(s) IV Intermittent every 8 hours  cefTAZidime/avibactam IVPB      chlorhexidine 4% Liquid 1 Application(s) Topical <User Schedule>  cyanocobalamin 1000 MICROGram(s) Oral daily  dextrose 5%. 1000 milliLiter(s) (50 mL/Hr) IV Continuous <Continuous>  dextrose 50% Injectable 12.5 Gram(s) IV Push once  dextrose 50% Injectable 25 Gram(s) IV Push once  dextrose 50% Injectable 25 Gram(s) IV Push once  folic acid 1 milliGRAM(s) Enteral Tube daily  heparin  Injectable 5000 Unit(s) SubCutaneous every 8 hours  hydrocortisone sodium succinate Injectable 25 milliGRAM(s) IV Push every 8 hours  insulin lispro (HumaLOG) corrective regimen sliding scale   SubCutaneous every 6 hours  lacosamide Solution 100 milliGRAM(s) Oral two times a day  metroNIDAZOLE  IVPB      metroNIDAZOLE  IVPB 500 milliGRAM(s) IV Intermittent every 8 hours  midodrine 15 milliGRAM(s) Oral three times a day  QUEtiapine 25 milliGRAM(s) Oral daily  risperiDONE   Solution 1 milliGRAM(s) Oral daily  sodium chloride 3%  Inhalation 4 milliLiter(s) Inhalation every 6 hours    MEDICATIONS  (PRN):  acetaminophen    Suspension .. 650 milliGRAM(s) Oral every 6 hours PRN Temp greater or equal to 38C (100.4F), Mild Pain (1 - 3), Moderate Pain (4 - 6)  dextrose 40% Gel 15 Gram(s) Oral once PRN Blood Glucose LESS THAN 70 milliGRAM(s)/deciliter  glucagon  Injectable 1 milliGRAM(s) IntraMuscular once PRN Glucose LESS THAN 70 milligrams/deciliter      LABS:                        8.6    5.99  )-----------( 270      ( 13 Jan 2019 05:35 )             27.6     01-13    136  |  102  |  7   ----------------------------<  99  3.7   |  25  |  < 0.20<L>    Ca    7.5<L>      13 Jan 2019 05:35  Phos  2.6     01-13  Mg     1.8     01-13                MICROBIOLOGY:     RADIOLOGY:  [ ] Reviewed and interpreted by me    PULMONARY FUNCTION TESTS:    EKG: CHIEF COMPLAINT: Patient is a 53y old  Male who presents with a chief complaint of ARDS?, sepsis (12 Jan 2019 07:46)    Interval Events: none overnight      REVIEW OF SYSTEMS:  Constitutional: no complaints  CV: denies  Resp: denies  GI: denies  [x] All other systems negative  [ ] Unable to assess ROS because ________      OBJECTIVE:  ICU Vital Signs Last 24 Hrs  T(C): 37.4 (13 Jan 2019 05:00), Max: 37.4 (13 Jan 2019 05:00)  T(F): 99.3 (13 Jan 2019 05:00), Max: 99.3 (13 Jan 2019 05:00)  HR: 92 (13 Jan 2019 07:33) (88 - 100)  BP: 136/80 (13 Jan 2019 05:00) (119/62 - 140/85)  BP(mean): --  ABP: --  ABP(mean): --  RR: 12 (13 Jan 2019 05:00) (12 - 20)  SpO2: 98% (13 Jan 2019 07:33) (97% - 100%)    Mode: CPAP with PS, FiO2: 40, PEEP: 5, PS: 5    01-12 @ 07:01  -  01-13 @ 07:00  --------------------------------------------------------  IN: 400 mL / OUT: 1800 mL / NET: -1400 mL    POCT Blood Glucose.: 105 mg/dL (13 Jan 2019 05:24)    HOSPITAL MEDICATIONS:  MEDICATIONS  (STANDING):  cefTAZidime/avibactam IVPB 2.5 Gram(s) IV Intermittent every 8 hours  cefTAZidime/avibactam IVPB      chlorhexidine 4% Liquid 1 Application(s) Topical <User Schedule>  cyanocobalamin 1000 MICROGram(s) Oral daily  dextrose 5%. 1000 milliLiter(s) (50 mL/Hr) IV Continuous <Continuous>  dextrose 50% Injectable 12.5 Gram(s) IV Push once  dextrose 50% Injectable 25 Gram(s) IV Push once  dextrose 50% Injectable 25 Gram(s) IV Push once  folic acid 1 milliGRAM(s) Enteral Tube daily  heparin  Injectable 5000 Unit(s) SubCutaneous every 8 hours  hydrocortisone sodium succinate Injectable 25 milliGRAM(s) IV Push every 8 hours  insulin lispro (HumaLOG) corrective regimen sliding scale   SubCutaneous every 6 hours  lacosamide Solution 100 milliGRAM(s) Oral two times a day  metroNIDAZOLE  IVPB      metroNIDAZOLE  IVPB 500 milliGRAM(s) IV Intermittent every 8 hours  midodrine 15 milliGRAM(s) Oral three times a day  QUEtiapine 25 milliGRAM(s) Oral daily  risperiDONE   Solution 1 milliGRAM(s) Oral daily  sodium chloride 3%  Inhalation 4 milliLiter(s) Inhalation every 6 hours    MEDICATIONS  (PRN):  acetaminophen    Suspension .. 650 milliGRAM(s) Oral every 6 hours PRN Temp greater or equal to 38C (100.4F), Mild Pain (1 - 3), Moderate Pain (4 - 6)  dextrose 40% Gel 15 Gram(s) Oral once PRN Blood Glucose LESS THAN 70 milliGRAM(s)/deciliter  glucagon  Injectable 1 milliGRAM(s) IntraMuscular once PRN Glucose LESS THAN 70 milligrams/deciliter      LABS:                        8.6    5.99  )-----------( 270      ( 13 Jan 2019 05:35 )             27.6     01-13    136  |  102  |  7   ----------------------------<  99  3.7   |  25  |  < 0.20<L>    Ca    7.5<L>      13 Jan 2019 05:35  Phos  2.6     01-13  Mg     1.8     01-13

## 2019-01-14 LAB
GLUCOSE BLDC GLUCOMTR-MCNC: 110 MG/DL — HIGH (ref 70–99)
GLUCOSE BLDC GLUCOMTR-MCNC: 115 MG/DL — HIGH (ref 70–99)
GLUCOSE BLDC GLUCOMTR-MCNC: 120 MG/DL — HIGH (ref 70–99)
GLUCOSE BLDC GLUCOMTR-MCNC: 134 MG/DL — HIGH (ref 70–99)

## 2019-01-14 PROCEDURE — 99232 SBSQ HOSP IP/OBS MODERATE 35: CPT

## 2019-01-14 PROCEDURE — 99232 SBSQ HOSP IP/OBS MODERATE 35: CPT | Mod: GC

## 2019-01-14 PROCEDURE — 99233 SBSQ HOSP IP/OBS HIGH 50: CPT | Mod: GC

## 2019-01-14 RX ORDER — HYDROCORTISONE 20 MG
10 TABLET ORAL EVERY 8 HOURS
Qty: 0 | Refills: 0 | Status: DISCONTINUED | OUTPATIENT
Start: 2019-01-14 | End: 2019-01-23

## 2019-01-14 RX ORDER — MIDODRINE HYDROCHLORIDE 2.5 MG/1
10 TABLET ORAL THREE TIMES A DAY
Qty: 0 | Refills: 0 | Status: DISCONTINUED | OUTPATIENT
Start: 2019-01-14 | End: 2019-01-16

## 2019-01-14 RX ADMIN — Medication 100 MILLIGRAM(S): at 21:36

## 2019-01-14 RX ADMIN — LACOSAMIDE 100 MILLIGRAM(S): 50 TABLET ORAL at 06:04

## 2019-01-14 RX ADMIN — RISPERIDONE 1 MILLIGRAM(S): 4 TABLET ORAL at 12:45

## 2019-01-14 RX ADMIN — Medication 100 MILLIGRAM(S): at 14:02

## 2019-01-14 RX ADMIN — LACOSAMIDE 100 MILLIGRAM(S): 50 TABLET ORAL at 17:08

## 2019-01-14 RX ADMIN — Medication 100 MILLIGRAM(S): at 04:45

## 2019-01-14 RX ADMIN — Medication 25 MILLIGRAM(S): at 14:04

## 2019-01-14 RX ADMIN — HEPARIN SODIUM 5000 UNIT(S): 5000 INJECTION INTRAVENOUS; SUBCUTANEOUS at 14:03

## 2019-01-14 RX ADMIN — HEPARIN SODIUM 5000 UNIT(S): 5000 INJECTION INTRAVENOUS; SUBCUTANEOUS at 06:03

## 2019-01-14 RX ADMIN — Medication 10 MILLIGRAM(S): at 21:35

## 2019-01-14 RX ADMIN — CHLORHEXIDINE GLUCONATE 1 APPLICATION(S): 213 SOLUTION TOPICAL at 10:54

## 2019-01-14 RX ADMIN — Medication 1 MILLIGRAM(S): at 11:23

## 2019-01-14 RX ADMIN — QUETIAPINE FUMARATE 25 MILLIGRAM(S): 200 TABLET, FILM COATED ORAL at 11:24

## 2019-01-14 RX ADMIN — HEPARIN SODIUM 5000 UNIT(S): 5000 INJECTION INTRAVENOUS; SUBCUTANEOUS at 21:36

## 2019-01-14 RX ADMIN — PREGABALIN 1000 MICROGRAM(S): 225 CAPSULE ORAL at 11:24

## 2019-01-14 RX ADMIN — Medication 25 MILLIGRAM(S): at 06:04

## 2019-01-14 NOTE — CHART NOTE - NSCHARTNOTEFT_GEN_A_CORE
Source:  nursing , EMR and NP     Diet : NPO with tube feed - Jevity 1.2 @ 40 ml/hr 24hrs daily    Patient alert, non verbal, with 3+ generalized edema and per nursing with loose BM. Noted EN insufficient , not increased as suggested in last nutrition note , met with NP , discussed the nutrition recommendation and adjusted EN to provide estimated nutrition need .      Enteral :  EN provides 1152 kcal & 53 gm protein/d       Current Weight: - 81.6 kg on 1/14/19 ??? ; 94.7 kg on 1/13/19; 94.5 kg on 1/12/19; 92.3 kg on 1/3/19; Admit wt. - 99.7 kg ; IBW - 62 kg     Pertinent Medications: MEDICATIONS  (STANDING):  cefTAZidime/avibactam IVPB 2.5 Gram(s) IV Intermittent every 8 hours  cefTAZidime/avibactam IVPB      chlorhexidine 4% Liquid 1 Application(s) Topical <User Schedule>  cyanocobalamin 1000 MICROGram(s) Oral daily  dextrose 5%. 1000 milliLiter(s) (50 mL/Hr) IV Continuous <Continuous>  dextrose 50% Injectable 12.5 Gram(s) IV Push once  dextrose 50% Injectable 25 Gram(s) IV Push once  dextrose 50% Injectable 25 Gram(s) IV Push once  folic acid 1 milliGRAM(s) Enteral Tube daily  heparin  Injectable 5000 Unit(s) SubCutaneous every 8 hours  hydrocortisone sodium succinate Injectable 25 milliGRAM(s) IV Push every 8 hours  insulin lispro (HumaLOG) corrective regimen sliding scale   SubCutaneous every 6 hours  lacosamide Solution 100 milliGRAM(s) Oral two times a day  metroNIDAZOLE  IVPB      metroNIDAZOLE  IVPB 500 milliGRAM(s) IV Intermittent every 8 hours  midodrine 10 milliGRAM(s) Oral three times a day  QUEtiapine 25 milliGRAM(s) Oral daily  risperiDONE   Solution 1 milliGRAM(s) Oral daily    MEDICATIONS  (PRN):  acetaminophen    Suspension .. 650 milliGRAM(s) Oral every 6 hours PRN Temp greater or equal to 38C (100.4F), Mild Pain (1 - 3), Moderate Pain (4 - 6)  dextrose 40% Gel 15 Gram(s) Oral once PRN Blood Glucose LESS THAN 70 milliGRAM(s)/deciliter  glucagon  Injectable 1 milliGRAM(s) IntraMuscular once PRN Glucose LESS THAN 70 milligrams/deciliter    Pertinent Labs:  01-13 Na136 mmol/L Glu 99 mg/dL K+ 3.7 mmol/L Cr  < 0.20 mg/dL<L> BUN 7 mg/dL 01-13 Phos 2.6 mg/dL 01-10 Alb 1.9 g/dL<L> 01-07 PAB 12 mg/dL<L> 12-29 [id number] 4.8 % 01-08 Chol --    LDL --    HDL --    Trig 181 mg/dL<H>      Estimated Needs:   no change since previous assessment[      Recommend Jevity 1.2 @ 55 ml/hr 24hrs daily ; consider re weigh  pt.     Monitoring and Evaluation: Tolerance to diet prescription , weights and follow up per protocol

## 2019-01-14 NOTE — PROGRESS NOTE ADULT - ASSESSMENT
Impression  - Enlarging danay-pancreatic collection with pancreatitis, CT 12.25 and 1.9.2019 with pancreatic necrosis, but no wall formed yet  - Hematochezia, s/p colonoscopy on 12/28/2018 with Severe segmental colitis localized to the transverse colon and ascending colon (possible ischemic colitis, possible colitis related to contiguous danay-pancreatic inflammatory process). Biopsies with granulation tissue but no infection/malignancy  - Resp failure in the setting of pneumonia, requiring tracheostomy     Recommendations    - Trend CBC, CMP and fever curve, Hb stable for now.   - given the CT abdomen on 1/9 shows pancreas necrosis but no clear wall, he is not a candidate for endoscopic drainage, we recommend a repeat CT in 7 days (later this week ) because these lesions usually form a wall over time and then it is possible to drain endoscopically   - Continue antibiotics as per ID: on ceftazadime and metronidazole   - Rest of care per primary team

## 2019-01-14 NOTE — PROVIDER CONTACT NOTE (MEDICATION) - SITUATION
patient with temp ranging between 99.1 and 99.6 during shift. patient with repirations in 20s, heart rate 110s.

## 2019-01-14 NOTE — PROGRESS NOTE ADULT - ASSESSMENT
53 Male w/ a PMHx of TBI (s/p PEG tube, contracture, and seizure d/o) presented as a transfer from [facility] on 12/9 for respiratory failure 2/2 ARDS likely 2/2 necrotizing pancreatitis s/p intubation. Course c/b Acinetobacter PNA s/p Avycaz and Flagyl 7 day course.  Course further complicated by acute blood loss anemia s/p colonoscopy with findings suggestive of ischemic colitis, without further bleeding and Hgb remaining stable. S/p extubation 1/3, and re-intubation 1/6 now s/p tracheostomy

## 2019-01-14 NOTE — PROGRESS NOTE ADULT - ASSESSMENT
53  M (resident of [facility]) with TBI, s/p PEG tube, contracture, and seizure d/o who presented as a transfer from [facility] on 12/9 for respiratory failure 2/2 ARDS likely 2/2 necrotizing pancreatitis s/p intubation.  Imipenem 12/28/18 - continued   bronc 12/11 with pseudomonas  pt with thick secretions and again febrile, sputum cx with  acinetobacter  repeat Ct with increased size of pancreatic collection but not walled off so no interventions were recommended  also had GIB and colitis due to ?contiguous necrotizing pancreatitis vs ischemic, s/p colonoscopy but not actively bleeding now  completed a 7 day course of avycaz and flagyl for the acinetobacter then switched back to imipenem, but pt again had hypothermia with the same acinetobacter in sputum now s/p trach and back on avycaz + flagyl    sepsis with fever, leukopenia resolved, extubated but reintubated 1/6/19 for hypoxia and poor cough along with profuse secretions and sputum cx again with acinetobacter    Necrotising Pancreatitis with multiple peripancreatitis collections not walled off yet   acinetobacter PNA  with lung abscesses  coag neg staph bacteremia likely contaminant, repeat negative  colitis, ?due to contiguous necrotizing pancreatitis and collection vs ischemic    no plans for IR embolization for surgical interventions      * s/p 7 days of avcaz and flagyl for  acinetobacter in the sputum 12/22-12/28  then back on imipenem for necrotizing pancreatis  * restarted on  avycaz and flagyl to cover the acinetobacter again 1/9, now day 6  * repeat CT chest/abdomen showed unchanged acute necrotizing pancreatitis with acute collections and multiple probable lung abscesses, probable colitis  * no plan for endoscopic drainage yet, repeat CT in a few days

## 2019-01-14 NOTE — PROGRESS NOTE ADULT - SUBJECTIVE AND OBJECTIVE BOX
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 650 milliGRAM(s) Oral every 6 hours PRN  chlorhexidine 4% Liquid 1 Application(s) Topical <User Schedule>  cyanocobalamin 1000 MICROGram(s) Oral daily  dextrose 40% Gel 15 Gram(s) Oral once PRN  dextrose 5%. 1000 milliLiter(s) IV Continuous <Continuous>  dextrose 50% Injectable 12.5 Gram(s) IV Push once  dextrose 50% Injectable 25 Gram(s) IV Push once  dextrose 50% Injectable 25 Gram(s) IV Push once  folic acid 1 milliGRAM(s) Enteral Tube daily  glucagon  Injectable 1 milliGRAM(s) IntraMuscular once PRN  heparin  Injectable 5000 Unit(s) SubCutaneous every 8 hours  hydrocortisone sodium succinate Injectable 25 milliGRAM(s) IV Push every 8 hours  insulin lispro (HumaLOG) corrective regimen sliding scale   SubCutaneous every 6 hours  lacosamide Solution 100 milliGRAM(s) Oral two times a day  midodrine 10 milliGRAM(s) Oral three times a day  QUEtiapine 25 milliGRAM(s) Oral daily  risperiDONE   Solution 1 milliGRAM(s) Oral daily      Vital Signs Last 24 Hrs  T(C): 37.4 (14 Jan 2019 05:50), Max: 37.7 (14 Jan 2019 02:00)  T(F): 99.3 (14 Jan 2019 05:50), Max: 99.9 (14 Jan 2019 02:00)  HR: 91 (14 Jan 2019 07:20) (90 - 108)  BP: 138/76 (14 Jan 2019 05:50) (121/68 - 138/76)  BP(mean): --  RR: 21 (14 Jan 2019 05:50) (18 - 21)  SpO2: 96% (14 Jan 2019 07:20) (93% - 98%)    Physical Exam:  General: not awake or alert. intubated   Head: atraumatic normocephalic  eyes: normal sclera and conjunctivae   ENT:   trach with some bloody drainage, no LAD  Cardiovascular: regular S1,S2  Respiratory:  intubated mechanical ventilation, coarse breath sounds bilaterally   abd:  PEG tube, soft, bowel sounds present, nontender, rectal tube  :  no suprapubic tenderness, [place] cath    Musculoskeletal:   no joint swelling, contractures, RUE mostly   Skin:    no rash  Neurologic: awake, answers some questions with nodding    Vascular: no phlebitis  Psych: unable to assess                           8.6    5.99  )-----------( 270      ( 13 Jan 2019 05:35 )             27.6       01-13    136  |  102  |  7   ----------------------------<  99  3.7   |  25  |  < 0.20<L>    Ca    7.5<L>      13 Jan 2019 05:35  Phos  2.6     01-13  Mg     1.8     01-13            MICROBIOLOGY:  v  ENDOTRACHEAL SPECIMEN  01-06-19 --  --  Acin.baumannii/haemoly [facility]      BLOOD VENOUS  01-02-19 --  --  --      BLOOD PERIPHERAL  01-02-19 --  --  --      BLOOD PERIPHERAL  12-27-18 --  --  --      BLOOD VENOUS  12-24-18 --  --  --      BLOOD PERIPHERAL  12-24-18 --  --  --      BLOOD PERIPHERAL  12-23-18 --  --  --      BLOOD VENOUS  12-22-18 --  --  BLOOD CULTURE PCR  Staphylococcus sp.,coag neg      SPUTUM  12-18-18 --  --  Acinetobacter baumannii       BLOOD PERIPHERAL  12-18-18 --  --  --                RADIOLOGY:  Images below reviewed personally  < from: CT Chest w/ IV Cont (01.08.19 @ 15:29) >  IMPRESSION:   Unchangednecrotizing pancreatitis with acute necrotic collections.    Small bilateral pleural effusions with complete atelectasis of both lower   lobes with multiple probable parenchymal lung abscesses, measuring up to   1.1 cm in the superior segment.    Probable colitis involving the distal transverse colon and descending   colon. 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 650 milliGRAM(s) Oral every 6 hours PRN  chlorhexidine 4% Liquid 1 Application(s) Topical <User Schedule>  cyanocobalamin 1000 MICROGram(s) Oral daily  dextrose 40% Gel 15 Gram(s) Oral once PRN  dextrose 5%. 1000 milliLiter(s) IV Continuous <Continuous>  dextrose 50% Injectable 12.5 Gram(s) IV Push once  dextrose 50% Injectable 25 Gram(s) IV Push once  dextrose 50% Injectable 25 Gram(s) IV Push once  folic acid 1 milliGRAM(s) Enteral Tube daily  glucagon  Injectable 1 milliGRAM(s) IntraMuscular once PRN  heparin  Injectable 5000 Unit(s) SubCutaneous every 8 hours  hydrocortisone sodium succinate Injectable 25 milliGRAM(s) IV Push every 8 hours  insulin lispro (HumaLOG) corrective regimen sliding scale   SubCutaneous every 6 hours  lacosamide Solution 100 milliGRAM(s) Oral two times a day  midodrine 10 milliGRAM(s) Oral three times a day  QUEtiapine 25 milliGRAM(s) Oral daily  risperiDONE   Solution 1 milliGRAM(s) Oral daily      Vital Signs Last 24 Hrs  T(C): 37.4 (14 Jan 2019 05:50), Max: 37.7 (14 Jan 2019 02:00)  T(F): 99.3 (14 Jan 2019 05:50), Max: 99.9 (14 Jan 2019 02:00)  HR: 91 (14 Jan 2019 07:20) (90 - 108)  BP: 138/76 (14 Jan 2019 05:50) (121/68 - 138/76)  BP(mean): --  RR: 21 (14 Jan 2019 05:50) (18 - 21)  SpO2: 96% (14 Jan 2019 07:20) (93% - 98%)    Physical Exam:  General: NAD, non toxid   Head: atraumatic normocephalic  eyes: normal sclera and conjunctivae   ENT:   trach, b/l coarse breath sounds  Cardiovascular: regular S1,S2  Respiratory:  intubated mechanical ventilation, coarse breath sounds bilaterally   abd:  PEG tube, soft, bowel sounds present, nontender, rectal tube  :  no suprapubic tenderness, [place] cath    Musculoskeletal:   no joint swelling, contractures, RUE mostly   Skin:    no rash  Neurologic: awake, answers some questions with nodding    Vascular: no phlebitis  Psych: unable to assess                           8.6    5.99  )-----------( 270      ( 13 Jan 2019 05:35 )             27.6       01-13    136  |  102  |  7   ----------------------------<  99  3.7   |  25  |  < 0.20<L>    Ca    7.5<L>      13 Jan 2019 05:35  Phos  2.6     01-13  Mg     1.8     01-13            MICROBIOLOGY:  v  ENDOTRACHEAL SPECIMEN  01-06-19 --  --  Acin.baumannii/haemoly [facility]      BLOOD VENOUS  01-02-19 --  --  --      BLOOD PERIPHERAL  01-02-19 --  --  --      BLOOD PERIPHERAL  12-27-18 --  --  --      BLOOD VENOUS  12-24-18 --  --  --      BLOOD PERIPHERAL  12-24-18 --  --  --      BLOOD PERIPHERAL  12-23-18 --  --  --      BLOOD VENOUS  12-22-18 --  --  BLOOD CULTURE PCR  Staphylococcus sp.,coag neg      SPUTUM  12-18-18 --  --  Acinetobacter baumannii       BLOOD PERIPHERAL  12-18-18 --  --  --                RADIOLOGY:  Images below reviewed personally  < from: CT Chest w/ IV Cont (01.08.19 @ 15:29) >  IMPRESSION:   Unchangednecrotizing pancreatitis with acute necrotic collections.    Small bilateral pleural effusions with complete atelectasis of both lower   lobes with multiple probable parenchymal lung abscesses, measuring up to   1.1 cm in the superior segment.    Probable colitis involving the distal transverse colon and descending   colon.

## 2019-01-14 NOTE — PROVIDER CONTACT NOTE (MEDICATION) - BACKGROUND
53 year  old male admitted with respiratory failure, Trach care and suctioning provided. dependent, ventilator, Trach care and suctioning provided. collar day time

## 2019-01-14 NOTE — PROGRESS NOTE ADULT - SUBJECTIVE AND OBJECTIVE BOX
CHIEF COMPLAINT:    Interval Events:    REVIEW OF SYSTEMS:  Constitutional:   Eyes:  ENT:  CV:  Resp:  GI:  :  MSK:  Integumentary:  Neurological:  Psychiatric:  Endocrine:  Hematologic/Lymphatic:  Allergic/Immunologic:  [ ] All other systems negative  [ ] Unable to assess ROS because ________    OBJECTIVE:  ICU Vital Signs Last 24 Hrs  T(C): 37.4 (14 Jan 2019 05:50), Max: 37.7 (14 Jan 2019 02:00)  T(F): 99.3 (14 Jan 2019 05:50), Max: 99.9 (14 Jan 2019 02:00)  HR: 91 (14 Jan 2019 07:20) (90 - 108)  BP: 138/76 (14 Jan 2019 05:50) (121/68 - 138/76)  BP(mean): --  ABP: --  ABP(mean): --  RR: 21 (14 Jan 2019 05:50) (17 - 21)  SpO2: 96% (14 Jan 2019 07:20) (93% - 100%)    Mode: CPAP with PS, FiO2: 40, PEEP: 5, PS: 5, MAP: 7    01-13 @ 07:01  -  01-14 @ 07:00  --------------------------------------------------------  IN: 0 mL / OUT: 500 mL / NET: -500 mL      CAPILLARY BLOOD GLUCOSE      POCT Blood Glucose.: 110 mg/dL (14 Jan 2019 06:28)      PHYSICAL EXAM:  General:   HEENT:   Lymph Nodes:  Neck:   Respiratory:   Cardiovascular:   Abdomen:   Extremities:   Skin:   Neurological:  Psychiatry:    HOSPITAL MEDICATIONS:  MEDICATIONS  (STANDING):  cefTAZidime/avibactam IVPB 2.5 Gram(s) IV Intermittent every 8 hours  cefTAZidime/avibactam IVPB      chlorhexidine 4% Liquid 1 Application(s) Topical <User Schedule>  cyanocobalamin 1000 MICROGram(s) Oral daily  dextrose 5%. 1000 milliLiter(s) (50 mL/Hr) IV Continuous <Continuous>  dextrose 50% Injectable 12.5 Gram(s) IV Push once  dextrose 50% Injectable 25 Gram(s) IV Push once  dextrose 50% Injectable 25 Gram(s) IV Push once  folic acid 1 milliGRAM(s) Enteral Tube daily  heparin  Injectable 5000 Unit(s) SubCutaneous every 8 hours  hydrocortisone sodium succinate Injectable 25 milliGRAM(s) IV Push every 8 hours  insulin lispro (HumaLOG) corrective regimen sliding scale   SubCutaneous every 6 hours  lacosamide Solution 100 milliGRAM(s) Oral two times a day  metroNIDAZOLE  IVPB      metroNIDAZOLE  IVPB 500 milliGRAM(s) IV Intermittent every 8 hours  midodrine 15 milliGRAM(s) Oral three times a day  QUEtiapine 25 milliGRAM(s) Oral daily  risperiDONE   Solution 1 milliGRAM(s) Oral daily    MEDICATIONS  (PRN):  acetaminophen    Suspension .. 650 milliGRAM(s) Oral every 6 hours PRN Temp greater or equal to 38C (100.4F), Mild Pain (1 - 3), Moderate Pain (4 - 6)  dextrose 40% Gel 15 Gram(s) Oral once PRN Blood Glucose LESS THAN 70 milliGRAM(s)/deciliter  glucagon  Injectable 1 milliGRAM(s) IntraMuscular once PRN Glucose LESS THAN 70 milligrams/deciliter      LABS:                        8.6    5.99  )-----------( 270      ( 13 Jan 2019 05:35 )             27.6     01-13    136  |  102  |  7   ----------------------------<  99  3.7   |  25  |  < 0.20<L>    Ca    7.5<L>      13 Jan 2019 05:35  Phos  2.6     01-13  Mg     1.8     01-13                MICROBIOLOGY:     RADIOLOGY:  [ ] Reviewed and interpreted by me    PULMONARY FUNCTION TESTS:    EKG: CHIEF COMPLAINT: Patient is a 53y old  Male who presents with a chief complaint of ARDS?, sepsis (14 Jan 2019 13:12)      Interval Events: none     REVIEW OF SYSTEMS:  Constitutional: denies   GI denies   [x ] All other systems negative      OBJECTIVE:  ICU Vital Signs Last 24 Hrs  T(C): 37.4 (14 Jan 2019 05:50), Max: 37.7 (14 Jan 2019 02:00)  T(F): 99.3 (14 Jan 2019 05:50), Max: 99.9 (14 Jan 2019 02:00)  HR: 91 (14 Jan 2019 07:20) (90 - 108)  BP: 138/76 (14 Jan 2019 05:50) (121/68 - 138/76)  BP(mean): --  ABP: --  ABP(mean): --  RR: 21 (14 Jan 2019 05:50) (17 - 21)  SpO2: 96% (14 Jan 2019 07:20) (93% - 100%)    Mode: CPAP with PS, FiO2: 40, PEEP: 5, PS: 5, MAP: 7    01-13 @ 07:01  -  01-14 @ 07:00  --------------------------------------------------------  IN: 0 mL / OUT: 500 mL / NET: -500 mL      CAPILLARY BLOOD GLUCOSE      POCT Blood Glucose.: 110 mg/dL (14 Jan 2019 06:28)      HOSPITAL MEDICATIONS:  MEDICATIONS  (STANDING):  cefTAZidime/avibactam IVPB 2.5 Gram(s) IV Intermittent every 8 hours  cefTAZidime/avibactam IVPB      chlorhexidine 4% Liquid 1 Application(s) Topical <User Schedule>  cyanocobalamin 1000 MICROGram(s) Oral daily  dextrose 5%. 1000 milliLiter(s) (50 mL/Hr) IV Continuous <Continuous>  dextrose 50% Injectable 12.5 Gram(s) IV Push once  dextrose 50% Injectable 25 Gram(s) IV Push once  dextrose 50% Injectable 25 Gram(s) IV Push once  folic acid 1 milliGRAM(s) Enteral Tube daily  heparin  Injectable 5000 Unit(s) SubCutaneous every 8 hours  hydrocortisone sodium succinate Injectable 25 milliGRAM(s) IV Push every 8 hours  insulin lispro (HumaLOG) corrective regimen sliding scale   SubCutaneous every 6 hours  lacosamide Solution 100 milliGRAM(s) Oral two times a day  metroNIDAZOLE  IVPB      metroNIDAZOLE  IVPB 500 milliGRAM(s) IV Intermittent every 8 hours  midodrine 15 milliGRAM(s) Oral three times a day  QUEtiapine 25 milliGRAM(s) Oral daily  risperiDONE   Solution 1 milliGRAM(s) Oral daily    MEDICATIONS  (PRN):  acetaminophen    Suspension .. 650 milliGRAM(s) Oral every 6 hours PRN Temp greater or equal to 38C (100.4F), Mild Pain (1 - 3), Moderate Pain (4 - 6)  dextrose 40% Gel 15 Gram(s) Oral once PRN Blood Glucose LESS THAN 70 milliGRAM(s)/deciliter  glucagon  Injectable 1 milliGRAM(s) IntraMuscular once PRN Glucose LESS THAN 70 milligrams/deciliter      LABS:                        8.6    5.99  )-----------( 270      ( 13 Jan 2019 05:35 )             27.6     01-13    136  |  102  |  7   ----------------------------<  99  3.7   |  25  |  < 0.20<L>    Ca    7.5<L>      13 Jan 2019 05:35  Phos  2.6     01-13  Mg     1.8     01-13                MICROBIOLOGY:     RADIOLOGY:  [ ] Reviewed and interpreted by me    PULMONARY FUNCTION TESTS:    EKG:

## 2019-01-14 NOTE — PROGRESS NOTE ADULT - SUBJECTIVE AND OBJECTIVE BOX
Chief Complaint:  Patient is a 53y old  Male who presents with a chief complaint of ARDS?, sepsis (2019 12:44)    Interval Events: No events overnight, feeding through G tube, on trach     Allergies:  Valproate Sodium (Other (Severe))      Hospital Medications:  acetaminophen    Suspension .. 650 milliGRAM(s) Oral every 6 hours PRN  cefTAZidime/avibactam IVPB 2.5 Gram(s) IV Intermittent every 8 hours  cefTAZidime/avibactam IVPB      chlorhexidine 4% Liquid 1 Application(s) Topical <User Schedule>  cyanocobalamin 1000 MICROGram(s) Oral daily  dextrose 40% Gel 15 Gram(s) Oral once PRN  dextrose 5%. 1000 milliLiter(s) IV Continuous <Continuous>  dextrose 50% Injectable 12.5 Gram(s) IV Push once  dextrose 50% Injectable 25 Gram(s) IV Push once  dextrose 50% Injectable 25 Gram(s) IV Push once  folic acid 1 milliGRAM(s) Enteral Tube daily  glucagon  Injectable 1 milliGRAM(s) IntraMuscular once PRN  heparin  Injectable 5000 Unit(s) SubCutaneous every 8 hours  hydrocortisone sodium succinate Injectable 25 milliGRAM(s) IV Push every 8 hours  insulin lispro (HumaLOG) corrective regimen sliding scale   SubCutaneous every 6 hours  lacosamide Solution 100 milliGRAM(s) Oral two times a day  metroNIDAZOLE  IVPB      metroNIDAZOLE  IVPB 500 milliGRAM(s) IV Intermittent every 8 hours  midodrine 10 milliGRAM(s) Oral three times a day  QUEtiapine 25 milliGRAM(s) Oral daily  risperiDONE   Solution 1 milliGRAM(s) Oral daily      PMHX/PSHX:  Seizure  TBI (traumatic brain injury)  S/P percutaneous endoscopic gastrostomy (PEG) tube placement  No significant past surgical history      Family history:      ROS:   Can not asses due to trach however, looks comfortable and smiling during clinical encounter.       PHYSICAL EXAM:     GENERAL:  Appears stated age,  no distress, trached and vented  HEENT:  NC/AT,  conjunctivae clear, sclera -anicteric  CHEST:  Full & symmetric excursion, no increased effort, breath sounds clear  HEART:  Regular rhythm, S1, S2, no added sounds  ABDOMEN:  Soft, non-tender, non-distended, normoactive bowel sounds.   NEURO:  Alert, oriented    Vital Signs:  Vital Signs Last 24 Hrs  T(C): 37.4 (2019 05:50), Max: 37.7 (2019 02:00)  T(F): 99.3 (2019 05:50), Max: 99.9 (2019 02:00)  HR: 91 (2019 07:20) (90 - 108)  BP: 138/76 (2019 05:50) (126/74 - 138/76)  BP(mean): --  RR: 21 (2019 05:50) (18 - 21)  SpO2: 96% (2019 07:20) (93% - 97%)  Daily     Daily Weight in k.6 (2019 02:00)    LABS:                        8.6    5.99  )-----------( 270      ( 2019 05:35 )             27.6     13    136  |  102  |  7   ----------------------------<  99  3.7   |  25  |  < 0.20<L>    Ca    7.5<L>      2019 05:35  Phos  2.6       Mg     1.8           Imaging: < from: CT Chest w/ IV Cont (19 @ 15:29) >  CHEST:     LUNGS AND LARGE AIRWAYS: Endotracheal tube with tip above the kieran.   Complete atelectasis of both lower lobes and partial atelectasis of both   upper lobes with heterogeneous appearance of the lung parenchyma.  PLEURA: Small bilateral pleural effusions.  VESSELS: Atherosclerotic calcifications.   HEART: Heart size is normal. No pericardial effusion.  MEDIASTINUM AND FANNY: No lymphadenopathy.  CHEST WALL AND LOWER NECK: Within normal limits.    ABDOMEN AND PELVIS:    LIVER: Within normal limits.  BILE DUCTS: Normal caliber.   GALLBLADDER: Within normal limits.  SPLEEN: Within normal limits.  PANCREAS: Necrotizing pancreatitis with unchanged acute necrotic   collections.  ADRENALS: Within normal limits.  KIDNEYS/URETERS: Within normal limits.     BLADDER: Contains a Ahuja catheter balloon.  REPRODUCTIVE ORGANS: The prostate is within normal limits.    BOWEL: Mucosal hyperenhancement of the distal transverse colon and   descending colon, probably colitis. Percutaneous gastrostomy tube with   tip in the stomach. No bowel obstruction. Normal appendix. Rectal tube.   PERITONEUM: Smallvolume of abdominopelvic ascites.  VESSELS:  Atherosclerotic calcifications. Unchanged splenic vein   thrombosis.  RETROPERITONEUM: No lymphadenopathy.    ABDOMINAL WALL: Anasarca.  BONES: Degenerative changes of the spine.    < end of copied text >

## 2019-01-15 LAB
ANION GAP SERPL CALC-SCNC: 8 MEQ/L — SIGNIFICANT CHANGE UP (ref 7–14)
BUN SERPL-MCNC: 8 MG/DL — SIGNIFICANT CHANGE UP (ref 7–23)
CALCIUM SERPL-MCNC: 7.8 MG/DL — LOW (ref 8.4–10.5)
CHLORIDE SERPL-SCNC: 102 MMOL/L — SIGNIFICANT CHANGE UP (ref 98–107)
CO2 SERPL-SCNC: 28 MMOL/L — SIGNIFICANT CHANGE UP (ref 22–31)
CREAT SERPL-MCNC: < 0.2 MG/DL — LOW (ref 0.5–1.3)
GLUCOSE BLDC GLUCOMTR-MCNC: 116 MG/DL — HIGH (ref 70–99)
GLUCOSE BLDC GLUCOMTR-MCNC: 133 MG/DL — HIGH (ref 70–99)
GLUCOSE BLDC GLUCOMTR-MCNC: 149 MG/DL — HIGH (ref 70–99)
GLUCOSE SERPL-MCNC: 119 MG/DL — HIGH (ref 70–99)
HCT VFR BLD CALC: 29.2 % — LOW (ref 39–50)
HGB BLD-MCNC: 10 G/DL — LOW (ref 13–17)
MAGNESIUM SERPL-MCNC: 1.9 MG/DL — SIGNIFICANT CHANGE UP (ref 1.6–2.6)
MCHC RBC-ENTMCNC: 32.8 PG — SIGNIFICANT CHANGE UP (ref 27–34)
MCHC RBC-ENTMCNC: 34.2 % — SIGNIFICANT CHANGE UP (ref 32–36)
MCV RBC AUTO: 95.7 FL — SIGNIFICANT CHANGE UP (ref 80–100)
NRBC # FLD: 0 K/UL — LOW (ref 25–125)
PHOSPHATE SERPL-MCNC: 2.5 MG/DL — SIGNIFICANT CHANGE UP (ref 2.5–4.5)
PLATELET # BLD AUTO: 197 K/UL — SIGNIFICANT CHANGE UP (ref 150–400)
PMV BLD: SIGNIFICANT CHANGE UP FL (ref 7–13)
POTASSIUM SERPL-MCNC: 4 MMOL/L — SIGNIFICANT CHANGE UP (ref 3.5–5.3)
POTASSIUM SERPL-SCNC: 4 MMOL/L — SIGNIFICANT CHANGE UP (ref 3.5–5.3)
RBC # BLD: 3.05 M/UL — LOW (ref 4.2–5.8)
RBC # FLD: SIGNIFICANT CHANGE UP % (ref 10.3–14.5)
SODIUM SERPL-SCNC: 138 MMOL/L — SIGNIFICANT CHANGE UP (ref 135–145)
WBC # BLD: 4.03 K/UL — SIGNIFICANT CHANGE UP (ref 3.8–10.5)
WBC # FLD AUTO: 4.03 K/UL — SIGNIFICANT CHANGE UP (ref 3.8–10.5)

## 2019-01-15 PROCEDURE — 99232 SBSQ HOSP IP/OBS MODERATE 35: CPT

## 2019-01-15 PROCEDURE — 99233 SBSQ HOSP IP/OBS HIGH 50: CPT | Mod: GC

## 2019-01-15 RX ADMIN — Medication 100 MILLIGRAM(S): at 05:05

## 2019-01-15 RX ADMIN — HEPARIN SODIUM 5000 UNIT(S): 5000 INJECTION INTRAVENOUS; SUBCUTANEOUS at 21:34

## 2019-01-15 RX ADMIN — QUETIAPINE FUMARATE 25 MILLIGRAM(S): 200 TABLET, FILM COATED ORAL at 11:17

## 2019-01-15 RX ADMIN — Medication 10 MILLIGRAM(S): at 15:01

## 2019-01-15 RX ADMIN — HEPARIN SODIUM 5000 UNIT(S): 5000 INJECTION INTRAVENOUS; SUBCUTANEOUS at 15:02

## 2019-01-15 RX ADMIN — CHLORHEXIDINE GLUCONATE 1 APPLICATION(S): 213 SOLUTION TOPICAL at 11:17

## 2019-01-15 RX ADMIN — Medication 100 MILLIGRAM(S): at 21:34

## 2019-01-15 RX ADMIN — RISPERIDONE 1 MILLIGRAM(S): 4 TABLET ORAL at 11:17

## 2019-01-15 RX ADMIN — Medication 100 MILLIGRAM(S): at 15:01

## 2019-01-15 RX ADMIN — PREGABALIN 1000 MICROGRAM(S): 225 CAPSULE ORAL at 11:17

## 2019-01-15 RX ADMIN — Medication 1 MILLIGRAM(S): at 11:17

## 2019-01-15 RX ADMIN — HEPARIN SODIUM 5000 UNIT(S): 5000 INJECTION INTRAVENOUS; SUBCUTANEOUS at 05:05

## 2019-01-15 RX ADMIN — Medication 10 MILLIGRAM(S): at 05:05

## 2019-01-15 RX ADMIN — Medication 10 MILLIGRAM(S): at 21:34

## 2019-01-15 RX ADMIN — LACOSAMIDE 100 MILLIGRAM(S): 50 TABLET ORAL at 17:50

## 2019-01-15 RX ADMIN — LACOSAMIDE 100 MILLIGRAM(S): 50 TABLET ORAL at 05:06

## 2019-01-15 NOTE — PROGRESS NOTE ADULT - ASSESSMENT
53  M (resident of [facility]) with TBI, s/p PEG tube, contracture, and seizure d/o who presented as a transfer from [facility] on 12/9 for respiratory failure 2/2 ARDS likely 2/2 necrotizing pancreatitis s/p intubation.  Imipenem 12/28/18 - continued   bronc 12/11 with pseudomonas  pt with thick secretions and again febrile, sputum cx with  acinetobacter  repeat Ct with increased size of pancreatic collection but not walled off so no interventions were recommended  also had GIB and colitis due to ?contiguous necrotizing pancreatitis vs ischemic, s/p colonoscopy but not actively bleeding now  completed a 7 day course of avycaz and flagyl for the acinetobacter then switched back to imipenem, but pt again had hypothermia with the same acinetobacter in sputum now s/p trach and back on avycaz + flagyl    sepsis with fever, leukopenia resolved, extubated but reintubated 1/6/19 for hypoxia and poor cough along with profuse secretions and sputum cx again with acinetobacter    Necrotising Pancreatitis with multiple peripancreatitis collections not walled off yet   acinetobacter PNA  with lung abscesses  coag neg staph bacteremia likely contaminant, repeat negative  colitis, ?due to contiguous necrotizing pancreatitis and collection vs ischemic    no plans for IR embolization for surgical interventions      * s/p 7 days of avcaz and flagyl for  acinetobacter in the sputum 12/22-12/28  then back on imipenem for necrotizing pancreatis  * restarted on  avycaz and flagyl to cover the acinetobacter again 1/9, now day 7, not hypothermic anymore, improved respiratory status  * repeat CT chest/abdomen showed unchanged acute necrotizing pancreatitis with acute collections and multiple probable lung abscesses, probable colitis  * no plan for endoscopic drainage yet, repeat CT this week

## 2019-01-15 NOTE — PROGRESS NOTE ADULT - SUBJECTIVE AND OBJECTIVE BOX
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 650 milliGRAM(s) Oral every 6 hours PRN  chlorhexidine 4% Liquid 1 Application(s) Topical <User Schedule>  cyanocobalamin 1000 MICROGram(s) Oral daily  dextrose 40% Gel 15 Gram(s) Oral once PRN  dextrose 5%. 1000 milliLiter(s) IV Continuous <Continuous>  dextrose 50% Injectable 12.5 Gram(s) IV Push once  dextrose 50% Injectable 25 Gram(s) IV Push once  dextrose 50% Injectable 25 Gram(s) IV Push once  folic acid 1 milliGRAM(s) Enteral Tube daily  glucagon  Injectable 1 milliGRAM(s) IntraMuscular once PRN  heparin  Injectable 5000 Unit(s) SubCutaneous every 8 hours  hydrocortisone sodium succinate Injectable 10 milliGRAM(s) IV Push every 8 hours  insulin lispro (HumaLOG) corrective regimen sliding scale   SubCutaneous every 6 hours  lacosamide Solution 100 milliGRAM(s) Oral two times a day  midodrine 10 milliGRAM(s) Oral three times a day  QUEtiapine 25 milliGRAM(s) Oral daily  risperiDONE   Solution 1 milliGRAM(s) Oral daily      Vital Signs Last 24 Hrs  T(C): 37 (15 Antonio 2019 10:00), Max: 37.8 (14 Jan 2019 21:32)  T(F): 98.6 (15 Antonio 2019 10:00), Max: 100.1 (14 Jan 2019 21:32)  HR: 100 (15 Antonio 2019 11:27) (77 - 113)  BP: 160/86 (15 Antonio 2019 10:00) (113/69 - 160/86)  BP(mean): --  RR: 20 (15 Antonio 2019 10:00) (18 - 23)  SpO2: 93% (15 Antonio 2019 11:27) (92% - 99%)    Physical Exam:  General: not awake or alert. intubated   Head: atraumatic normocephalic  eyes: normal sclera and conjunctivae   ENT:   trach with some bloody drainage, no LAD  Cardiovascular: regular S1,S2  Respiratory:  intubated mechanical ventilation, coarse breath sounds bilaterally   abd:  PEG tube, soft, bowel sounds present, nontender, rectal tube  :  no suprapubic tenderness, [place] cath    Musculoskeletal:   no joint swelling, contractures, RUE mostly   Skin:    no rash  Neurologic: awake, answers some questions with nodding    Vascular: no phlebitis  Psych: unable to assess                           10.0   4.03  )-----------( 197      ( 15 Antonio 2019 05:22 )             29.2       01-15    138  |  102  |  8   ----------------------------<  119<H>  4.0   |  28  |  < 0.20<L>    Ca    7.8<L>      15 Antonio 2019 05:22  Phos  2.5     01-15  Mg     1.9     01-15            MICROBIOLOGY:  v  ENDOTRACHEAL SPECIMEN  01-06-19 --  --  Acin.baumannii/haemoly [facility]      BLOOD VENOUS  01-02-19 --  --  --      BLOOD PERIPHERAL  01-02-19 --  --  --      BLOOD PERIPHERAL  12-27-18 --  --  --      BLOOD VENOUS  12-24-18 --  --  --      BLOOD PERIPHERAL  12-24-18 --  --  --      BLOOD PERIPHERAL  12-23-18 --  --  --      BLOOD VENOUS  12-22-18 --  --  BLOOD CULTURE PCR  Staphylococcus sp.,coag neg      SPUTUM  12-18-18 --  --  Acinetobacter baumannii       BLOOD PERIPHERAL  12-18-18 --  --  --                RADIOLOGY:  Images below reviewed personally  < from: CT Chest w/ IV Cont (01.08.19 @ 15:29) >  IMPRESSION:   Unchangednecrotizing pancreatitis with acute necrotic collections.    Small bilateral pleural effusions with complete atelectasis of both lower   lobes with multiple probable parenchymal lung abscesses, measuring up to   1.1 cm in the superior segment.    Probable colitis involving the distal transverse colon and descending   colon. 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 650 milliGRAM(s) Oral every 6 hours PRN  chlorhexidine 4% Liquid 1 Application(s) Topical <User Schedule>  cyanocobalamin 1000 MICROGram(s) Oral daily  dextrose 40% Gel 15 Gram(s) Oral once PRN  dextrose 5%. 1000 milliLiter(s) IV Continuous <Continuous>  dextrose 50% Injectable 12.5 Gram(s) IV Push once  dextrose 50% Injectable 25 Gram(s) IV Push once  dextrose 50% Injectable 25 Gram(s) IV Push once  folic acid 1 milliGRAM(s) Enteral Tube daily  glucagon  Injectable 1 milliGRAM(s) IntraMuscular once PRN  heparin  Injectable 5000 Unit(s) SubCutaneous every 8 hours  hydrocortisone sodium succinate Injectable 10 milliGRAM(s) IV Push every 8 hours  insulin lispro (HumaLOG) corrective regimen sliding scale   SubCutaneous every 6 hours  lacosamide Solution 100 milliGRAM(s) Oral two times a day  midodrine 10 milliGRAM(s) Oral three times a day  QUEtiapine 25 milliGRAM(s) Oral daily  risperiDONE   Solution 1 milliGRAM(s) Oral daily      Vital Signs Last 24 Hrs  T(C): 37 (15 Antonio 2019 10:00), Max: 37.8 (14 Jan 2019 21:32)  T(F): 98.6 (15 Antonio 2019 10:00), Max: 100.1 (14 Jan 2019 21:32)  HR: 100 (15 Antonio 2019 11:27) (77 - 113)  BP: 160/86 (15 Antonio 2019 10:00) (113/69 - 160/86)  BP(mean): --  RR: 20 (15 Antonio 2019 10:00) (18 - 23)  SpO2: 93% (15 Antonio 2019 11:27) (92% - 99%)    Physical Exam:  General: NAD, non toxic   Head: atraumatic normocephalic  eyes: normal sclera and conjunctivae   ENT:   trach with no drainage around it, b/l coarse breath sounds  Cardiovascular: regular S1,S2  Respiratory:  intubated mechanical ventilation, coarse breath sounds bilaterally   abd:  PEG tube, soft, bowel sounds present, nontender, rectal tube  :  no suprapubic tenderness, [place] cath    Musculoskeletal:   no joint swelling, contractures, RUE mostly   Skin:    no rash  Neurologic: awake, answers some questions with nodding    Vascular: no phlebitis  Psych: unable to assess                           10.0   4.03  )-----------( 197      ( 15 Antonio 2019 05:22 )             29.2       01-15    138  |  102  |  8   ----------------------------<  119<H>  4.0   |  28  |  < 0.20<L>    Ca    7.8<L>      15 Antonio 2019 05:22  Phos  2.5     01-15  Mg     1.9     01-15            MICROBIOLOGY:  v  ENDOTRACHEAL SPECIMEN  01-06-19 --  --  Acin.baumannii/haemoly [facility]      BLOOD VENOUS  01-02-19 --  --  --      BLOOD PERIPHERAL  01-02-19 --  --  --      BLOOD PERIPHERAL  12-27-18 --  --  --      BLOOD VENOUS  12-24-18 --  --  --      BLOOD PERIPHERAL  12-24-18 --  --  --      BLOOD PERIPHERAL  12-23-18 --  --  --      BLOOD VENOUS  12-22-18 --  --  BLOOD CULTURE PCR  Staphylococcus sp.,coag neg      SPUTUM  12-18-18 --  --  Acinetobacter baumannii       BLOOD PERIPHERAL  12-18-18 --  --  --                RADIOLOGY:  Images below reviewed personally  < from: CT Chest w/ IV Cont (01.08.19 @ 15:29) >  IMPRESSION:   Unchangednecrotizing pancreatitis with acute necrotic collections.    Small bilateral pleural effusions with complete atelectasis of both lower   lobes with multiple probable parenchymal lung abscesses, measuring up to   1.1 cm in the superior segment.    Probable colitis involving the distal transverse colon and descending   colon.

## 2019-01-15 NOTE — PHYSICAL THERAPY INITIAL EVALUATION ADULT - CRITERIA FOR SKILLED THERAPEUTIC INTERVENTIONS
Patient presented with contractures of right UE/LE and plantar flexion contractures of bilateral feet/ankles ,dependent with ADL's ,is non ambulatory and is wheel chair bound as per patient's mom and sister , hence not a candidate for [facility].

## 2019-01-15 NOTE — PROGRESS NOTE ADULT - PROBLEM SELECTOR PLAN 1
- enlarging per-pancreatic fluid collections   - GI note appreciated  - needs repeat CT on 1/16  - cont abx  - ID note appreciated - enlarging per-pancreatic fluid collections   - GI note appreciated  - needs repeat CT on 1/16; ordered  - cont ceftazidime/avibactam

## 2019-01-15 NOTE — PROGRESS NOTE ADULT - ASSESSMENT
53 Male w/ a PMHx of TBI (s/p PEG tube, contracture, and seizure d/o) presented as a transfer from [facility] on 12/9 for respiratory failure 2/2 ARDS likely 2/2 necrotizing pancreatitis s/p intubation. Course c/b Acinetobacter PNA s/p Avycaz and Flagyl 7 day course.  Course further complicated by acute blood loss anemia s/p colonoscopy with findings suggestive of ischemic colitis, without further bleeding and Hgb remaining stable. S/p extubation 1/3, and re-intubation 1/6 now s/p tracheostomy

## 2019-01-15 NOTE — PROGRESS NOTE ADULT - SUBJECTIVE AND OBJECTIVE BOX
CHIEF COMPLAINT:    Interval Events:    REVIEW OF SYSTEMS:  Constitutional: denies fever, chills, general malaise, fatigue, weight loss, weight gain, diaphoresis   HEENT: denies dry mouth, sore throat, runny nose, photophobia, blurry vision, double vision, discharge, eye pain, difficulty hearing, vertigo, dysphagia, epistaxis, recent dental work    Neck: denies pain or stiffness  Breasts: denies pain, masses, or nipple discharge  Respiratory: denies SOB, NATH, cough, phlegm, wheezing, hemoptysis  Cardiovascular: denies CP, palpitations, edema, diaphoresis   Gastrointestinal: denies nausea, vomiting or hematemesis, diarrhea, constipation, abdominal or epigastric pain, melena or hematochezia   Genitourinary: denies dysuria, frequency, urgency, incontinence, hematuria   Skin: denies rash, hives, itching, burning, masses  Musculoskeletal: denies myalgias, arthritis, joint swelling, muscle weakness  Neurologic: denies syncope, LOC, headache, weakness, dizziness, parasthesias, numbness, seizures, confusion, dementia   Psychiatric: denies feeling anxious, depressed, suicidal, homicidal  Endocrine: denies increased fingerstick glucoses, cold or heat intolerance, polydipsia, polyuria, polyphagia, hair loss  Hematology/Oncology: denies bruising, tender or enlarged lymph nodes   Allergy/immunologic: denies hives or eczema  ROS negative x 10 systems except as noted above    OBJECTIVE:  ICU Vital Signs Last 24 Hrs  T(C): 37 (15 Antonio 2019 05:02), Max: 37.8 (14 Jan 2019 21:32)  T(F): 98.6 (15 Antonio 2019 05:02), Max: 100.1 (14 Jan 2019 21:32)  HR: 85 (15 Antonio 2019 07:42) (77 - 113)  BP: 142/97 (15 Antonio 2019 05:02) (113/69 - 150/75)  BP(mean): --  ABP: --  ABP(mean): --  RR: 18 (15 Antonio 2019 07:42) (18 - 23)  SpO2: 95% (15 Antonio 2019 07:42) (92% - 99%)    Mode: standby    01-14 @ 07:01  -  01-15 @ 07:00  --------------------------------------------------------  IN: 860 mL / OUT: 700 mL / NET: 160 mL      CAPILLARY BLOOD GLUCOSE      POCT Blood Glucose.: 116 mg/dL (15 Antonio 2019 05:21)      PHYSICAL EXAM:  Constitutional: NAD, well groomed, well-developed  HEENT: PERRLA, EOMI, no drainage or redness  Neck: Supple, No JVD  Back: Normal spine flexure, No CVA tenderness, No deformity or limitation of movement  Respiratory: Breath sounds equal & clear bilaterally to auscultation, no accessory muscle use noted  CV: Regular rate and rhythm, normal S1,S2; no murmurs or rubs  GI: Soft, non-tender, non distended, no hepatosplenomegaly, normal bowel sounds  Extremities: LOZADA x 4, no peripheral edema, no cyanosis, no clubbing  Vascular: Equal and normal pulses; 2+ peripheral pulses noted throughout  Neuro: A&O x 3; speech clear and intact; no sensory or motor deficits, normal reflexes  Psych: calm, normal mood and affect  MSK: No joint swelling or deformity; no limitation of movement  Skin: warm, dry, well perfused, no rashes    LINES:    HOSPITAL MEDICATIONS:  Standing Meds:  cefTAZidime/avibactam IVPB 2.5 Gram(s) IV Intermittent every 8 hours  cefTAZidime/avibactam IVPB      chlorhexidine 4% Liquid 1 Application(s) Topical <User Schedule>  cyanocobalamin 1000 MICROGram(s) Oral daily  dextrose 5%. 1000 milliLiter(s) IV Continuous <Continuous>  dextrose 50% Injectable 12.5 Gram(s) IV Push once  dextrose 50% Injectable 25 Gram(s) IV Push once  dextrose 50% Injectable 25 Gram(s) IV Push once  folic acid 1 milliGRAM(s) Enteral Tube daily  heparin  Injectable 5000 Unit(s) SubCutaneous every 8 hours  hydrocortisone sodium succinate Injectable 10 milliGRAM(s) IV Push every 8 hours  insulin lispro (HumaLOG) corrective regimen sliding scale   SubCutaneous every 6 hours  lacosamide Solution 100 milliGRAM(s) Oral two times a day  metroNIDAZOLE  IVPB      metroNIDAZOLE  IVPB 500 milliGRAM(s) IV Intermittent every 8 hours  midodrine 10 milliGRAM(s) Oral three times a day  QUEtiapine 25 milliGRAM(s) Oral daily  risperiDONE   Solution 1 milliGRAM(s) Oral daily      PRN Meds:  acetaminophen    Suspension .. 650 milliGRAM(s) Oral every 6 hours PRN  dextrose 40% Gel 15 Gram(s) Oral once PRN  glucagon  Injectable 1 milliGRAM(s) IntraMuscular once PRN      LABS:                        10.0   4.03  )-----------( 197      ( 15 Antonio 2019 05:22 )             29.2     Hgb Trend: 10.0<--, 8.6<--, 9.3<--, 9.0<--, 9.3<--  01-15    138  |  102  |  8   ----------------------------<  119<H>  4.0   |  28  |  < 0.20<L>    Ca    7.8<L>      15 Antonio 2019 05:22  Phos  2.5     01-15  Mg     1.9     01-15      Creatinine Trend: < 0.20<--, < 0.20<--, < 0.20<--, < 0.20<--, 0.22<--, < 0.20<--            MICROBIOLOGY:     RADIOLOGY:  [ ] Reviewed and interpreted by me    EKG: CHIEF COMPLAINT:    Interval Events: No acute events overnight.  Patient on TC 50% saturating %    REVIEW OF SYSTEMS:  Unable to obtain due to cognitive status.    OBJECTIVE:  ICU Vital Signs Last 24 Hrs  T(C): 37 (15 Antonio 2019 05:02), Max: 37.8 (14 Jan 2019 21:32)  T(F): 98.6 (15 Antonio 2019 05:02), Max: 100.1 (14 Jan 2019 21:32)  HR: 85 (15 Antonio 2019 07:42) (77 - 113)  BP: 142/97 (15 Antonio 2019 05:02) (113/69 - 150/75)  BP(mean): --  ABP: --  ABP(mean): --  RR: 18 (15 Antonio 2019 07:42) (18 - 23)  SpO2: 95% (15 Antonio 2019 07:42) (92% - 99%)    Mode: standby    01-14 @ 07:01  -  01-15 @ 07:00  --------------------------------------------------------  IN: 860 mL / OUT: 700 mL / NET: 160 mL      CAPILLARY BLOOD GLUCOSE      POCT Blood Glucose.: 116 mg/dL (15 Antonio 2019 05:21)      PHYSICAL EXAM:  Constitutional: NAD  HEENT: PERRLA, EOMI, no drainage or redness  Neck: No JVD  Respiratory: Breath sounds equal & coarse bilterally to auscultation, no accessory muscle use noted  CV: Regular rate and rhythm, normal S1,S2; no murmurs or rubs  GI: Soft non distended, no hepatosplenomegaly, normal bowel sounds  Extremities: LOZADA x 4, no peripheral edema, no cyanosis, no clubbing  Vascular: Equal and normal pulses; 2+ peripheral pulses noted throughout  Neuro: Impaired 2/2 hx TBI  Psych: calm  Skin: warm, dry, well perfused, no rashes    LINES:    HOSPITAL MEDICATIONS:  Standing Meds:  cefTAZidime/avibactam IVPB 2.5 Gram(s) IV Intermittent every 8 hours  cefTAZidime/avibactam IVPB      chlorhexidine 4% Liquid 1 Application(s) Topical <User Schedule>  cyanocobalamin 1000 MICROGram(s) Oral daily  dextrose 5%. 1000 milliLiter(s) IV Continuous <Continuous>  dextrose 50% Injectable 12.5 Gram(s) IV Push once  dextrose 50% Injectable 25 Gram(s) IV Push once  dextrose 50% Injectable 25 Gram(s) IV Push once  folic acid 1 milliGRAM(s) Enteral Tube daily  heparin  Injectable 5000 Unit(s) SubCutaneous every 8 hours  hydrocortisone sodium succinate Injectable 10 milliGRAM(s) IV Push every 8 hours  insulin lispro (HumaLOG) corrective regimen sliding scale   SubCutaneous every 6 hours  lacosamide Solution 100 milliGRAM(s) Oral two times a day  metroNIDAZOLE  IVPB      metroNIDAZOLE  IVPB 500 milliGRAM(s) IV Intermittent every 8 hours  midodrine 10 milliGRAM(s) Oral three times a day  QUEtiapine 25 milliGRAM(s) Oral daily  risperiDONE   Solution 1 milliGRAM(s) Oral daily      PRN Meds:  acetaminophen    Suspension .. 650 milliGRAM(s) Oral every 6 hours PRN  dextrose 40% Gel 15 Gram(s) Oral once PRN  glucagon  Injectable 1 milliGRAM(s) IntraMuscular once PRN      LABS:                        10.0   4.03  )-----------( 197      ( 15 Antonio 2019 05:22 )             29.2     Hgb Trend: 10.0<--, 8.6<--, 9.3<--, 9.0<--, 9.3<--  01-15    138  |  102  |  8   ----------------------------<  119<H>  4.0   |  28  |  < 0.20<L>    Ca    7.8<L>      15 Antonio 2019 05:22  Phos  2.5     01-15  Mg     1.9     01-15      Creatinine Trend: < 0.20<--, < 0.20<--, < 0.20<--, < 0.20<--, 0.22<--, < 0.20<--            MICROBIOLOGY:     RADIOLOGY:  [ ] Reviewed and interpreted by me    EKG:

## 2019-01-15 NOTE — PROGRESS NOTE ADULT - ATTENDING COMMENTS
Patient doing well from pulmonary standpoint, and no abdominal pain. awaiting repeat CT abdomen tomorrow.

## 2019-01-15 NOTE — PROGRESS NOTE ADULT - PROBLEM SELECTOR PLAN 2
- s/p trach 1/9   - cont avycaz for now  - ID note appreciated   - wean as tolerated  - will try trach collar today  - trach care, suction PRN  - chest PT - s/p trach 1/9   - cont avycaz for now  - wean as tolerated  - saturating % on 50% Fi02 TC  - trach care, suction PRN  - chest PT

## 2019-01-16 LAB
ALBUMIN SERPL ELPH-MCNC: 1.9 G/DL — LOW (ref 3.3–5)
ALP SERPL-CCNC: 126 U/L — HIGH (ref 40–120)
ALT FLD-CCNC: 8 U/L — SIGNIFICANT CHANGE UP (ref 4–41)
ANION GAP SERPL CALC-SCNC: 8 MEQ/L — SIGNIFICANT CHANGE UP (ref 7–14)
AST SERPL-CCNC: 9 U/L — SIGNIFICANT CHANGE UP (ref 4–40)
BILIRUB SERPL-MCNC: 0.3 MG/DL — SIGNIFICANT CHANGE UP (ref 0.2–1.2)
BUN SERPL-MCNC: 9 MG/DL — SIGNIFICANT CHANGE UP (ref 7–23)
CALCIUM SERPL-MCNC: 7.5 MG/DL — LOW (ref 8.4–10.5)
CHLORIDE SERPL-SCNC: 102 MMOL/L — SIGNIFICANT CHANGE UP (ref 98–107)
CO2 SERPL-SCNC: 30 MMOL/L — SIGNIFICANT CHANGE UP (ref 22–31)
CREAT SERPL-MCNC: < 0.2 MG/DL — LOW (ref 0.5–1.3)
GLUCOSE BLDC GLUCOMTR-MCNC: 114 MG/DL — HIGH (ref 70–99)
GLUCOSE BLDC GLUCOMTR-MCNC: 119 MG/DL — HIGH (ref 70–99)
GLUCOSE BLDC GLUCOMTR-MCNC: 125 MG/DL — HIGH (ref 70–99)
GLUCOSE BLDC GLUCOMTR-MCNC: 125 MG/DL — HIGH (ref 70–99)
GLUCOSE BLDC GLUCOMTR-MCNC: 141 MG/DL — HIGH (ref 70–99)
GLUCOSE SERPL-MCNC: 112 MG/DL — HIGH (ref 70–99)
HCT VFR BLD CALC: 28.7 % — LOW (ref 39–50)
HGB BLD-MCNC: 9.1 G/DL — LOW (ref 13–17)
MAGNESIUM SERPL-MCNC: 1.8 MG/DL — SIGNIFICANT CHANGE UP (ref 1.6–2.6)
MCHC RBC-ENTMCNC: 31.7 % — LOW (ref 32–36)
MCHC RBC-ENTMCNC: 32 PG — SIGNIFICANT CHANGE UP (ref 27–34)
MCV RBC AUTO: 101.1 FL — HIGH (ref 80–100)
NRBC # FLD: 0.02 K/UL — LOW (ref 25–125)
PHOSPHATE SERPL-MCNC: 2.3 MG/DL — LOW (ref 2.5–4.5)
PLATELET # BLD AUTO: 239 K/UL — SIGNIFICANT CHANGE UP (ref 150–400)
PMV BLD: 9.9 FL — SIGNIFICANT CHANGE UP (ref 7–13)
POTASSIUM SERPL-MCNC: 3.1 MMOL/L — LOW (ref 3.5–5.3)
POTASSIUM SERPL-SCNC: 3.1 MMOL/L — LOW (ref 3.5–5.3)
PROT SERPL-MCNC: 5.4 G/DL — LOW (ref 6–8.3)
RBC # BLD: 2.84 M/UL — LOW (ref 4.2–5.8)
RBC # FLD: 19.5 % — HIGH (ref 10.3–14.5)
SODIUM SERPL-SCNC: 140 MMOL/L — SIGNIFICANT CHANGE UP (ref 135–145)
WBC # BLD: 5.21 K/UL — SIGNIFICANT CHANGE UP (ref 3.8–10.5)
WBC # FLD AUTO: 5.21 K/UL — SIGNIFICANT CHANGE UP (ref 3.8–10.5)

## 2019-01-16 PROCEDURE — 99232 SBSQ HOSP IP/OBS MODERATE 35: CPT

## 2019-01-16 PROCEDURE — 99232 SBSQ HOSP IP/OBS MODERATE 35: CPT | Mod: GC

## 2019-01-16 PROCEDURE — 99233 SBSQ HOSP IP/OBS HIGH 50: CPT | Mod: GC

## 2019-01-16 RX ORDER — POTASSIUM CHLORIDE 20 MEQ
40 PACKET (EA) ORAL EVERY 4 HOURS
Qty: 0 | Refills: 0 | Status: COMPLETED | OUTPATIENT
Start: 2019-01-16 | End: 2019-01-16

## 2019-01-16 RX ORDER — POTASSIUM PHOSPHATE, MONOBASIC POTASSIUM PHOSPHATE, DIBASIC 236; 224 MG/ML; MG/ML
15 INJECTION, SOLUTION INTRAVENOUS ONCE
Qty: 0 | Refills: 0 | Status: COMPLETED | OUTPATIENT
Start: 2019-01-16 | End: 2019-01-16

## 2019-01-16 RX ORDER — MIDODRINE HYDROCHLORIDE 2.5 MG/1
5 TABLET ORAL THREE TIMES A DAY
Qty: 0 | Refills: 0 | Status: DISCONTINUED | OUTPATIENT
Start: 2019-01-16 | End: 2019-02-07

## 2019-01-16 RX ADMIN — POTASSIUM PHOSPHATE, MONOBASIC POTASSIUM PHOSPHATE, DIBASIC 62.5 MILLIMOLE(S): 236; 224 INJECTION, SOLUTION INTRAVENOUS at 15:38

## 2019-01-16 RX ADMIN — Medication 100 MILLIGRAM(S): at 21:18

## 2019-01-16 RX ADMIN — Medication 100 MILLIGRAM(S): at 13:52

## 2019-01-16 RX ADMIN — Medication 650 MILLIGRAM(S): at 00:35

## 2019-01-16 RX ADMIN — QUETIAPINE FUMARATE 25 MILLIGRAM(S): 200 TABLET, FILM COATED ORAL at 11:02

## 2019-01-16 RX ADMIN — Medication 10 MILLIGRAM(S): at 05:22

## 2019-01-16 RX ADMIN — LACOSAMIDE 100 MILLIGRAM(S): 50 TABLET ORAL at 05:21

## 2019-01-16 RX ADMIN — LACOSAMIDE 100 MILLIGRAM(S): 50 TABLET ORAL at 17:39

## 2019-01-16 RX ADMIN — HEPARIN SODIUM 5000 UNIT(S): 5000 INJECTION INTRAVENOUS; SUBCUTANEOUS at 21:17

## 2019-01-16 RX ADMIN — Medication 1 MILLIGRAM(S): at 11:02

## 2019-01-16 RX ADMIN — HEPARIN SODIUM 5000 UNIT(S): 5000 INJECTION INTRAVENOUS; SUBCUTANEOUS at 13:52

## 2019-01-16 RX ADMIN — Medication 10 MILLIGRAM(S): at 13:52

## 2019-01-16 RX ADMIN — CHLORHEXIDINE GLUCONATE 1 APPLICATION(S): 213 SOLUTION TOPICAL at 11:07

## 2019-01-16 RX ADMIN — Medication 100 MILLIGRAM(S): at 05:21

## 2019-01-16 RX ADMIN — Medication 40 MILLIEQUIVALENT(S): at 11:08

## 2019-01-16 RX ADMIN — Medication 10 MILLIGRAM(S): at 21:17

## 2019-01-16 RX ADMIN — RISPERIDONE 1 MILLIGRAM(S): 4 TABLET ORAL at 11:02

## 2019-01-16 RX ADMIN — Medication 40 MILLIEQUIVALENT(S): at 13:52

## 2019-01-16 RX ADMIN — HEPARIN SODIUM 5000 UNIT(S): 5000 INJECTION INTRAVENOUS; SUBCUTANEOUS at 05:21

## 2019-01-16 RX ADMIN — PREGABALIN 1000 MICROGRAM(S): 225 CAPSULE ORAL at 11:02

## 2019-01-16 NOTE — PROGRESS NOTE ADULT - SUBJECTIVE AND OBJECTIVE BOX
Chief Complaint:  Patient is a 53y old  Male who presents with a chief complaint of ARDS?, sepsis (2019 12:44)  GI following the patient for necrotizing pancreatitis with evolving wall.     Interval Events: No events overnight, feeding through G tube, on trach     Allergies:  Valproate Sodium (Other (Severe))      Hospital Medications:  acetaminophen    Suspension .. 650 milliGRAM(s) Oral every 6 hours PRN  cefTAZidime/avibactam IVPB 2.5 Gram(s) IV Intermittent every 8 hours  cefTAZidime/avibactam IVPB      chlorhexidine 4% Liquid 1 Application(s) Topical <User Schedule>  cyanocobalamin 1000 MICROGram(s) Oral daily  dextrose 40% Gel 15 Gram(s) Oral once PRN  dextrose 5%. 1000 milliLiter(s) IV Continuous <Continuous>  dextrose 50% Injectable 12.5 Gram(s) IV Push once  dextrose 50% Injectable 25 Gram(s) IV Push once  dextrose 50% Injectable 25 Gram(s) IV Push once  folic acid 1 milliGRAM(s) Enteral Tube daily  glucagon  Injectable 1 milliGRAM(s) IntraMuscular once PRN  heparin  Injectable 5000 Unit(s) SubCutaneous every 8 hours  hydrocortisone sodium succinate Injectable 10 milliGRAM(s) IV Push every 8 hours  insulin lispro (HumaLOG) corrective regimen sliding scale   SubCutaneous every 6 hours  lacosamide Solution 100 milliGRAM(s) Oral two times a day  metroNIDAZOLE  IVPB      metroNIDAZOLE  IVPB 500 milliGRAM(s) IV Intermittent every 8 hours  midodrine 10 milliGRAM(s) Oral three times a day  potassium chloride   Powder 40 milliEquivalent(s) Oral every 4 hours  potassium phosphate IVPB 15 milliMole(s) IV Intermittent once  QUEtiapine 25 milliGRAM(s) Oral daily  risperiDONE   Solution 1 milliGRAM(s) Oral daily      PMHX/PSHX:  Seizure  TBI (traumatic brain injury)  S/P percutaneous endoscopic gastrostomy (PEG) tube placement  No significant past surgical history      Family history:      ROS:   Can  not assess completely because of overall mental status but looks comfortable,  trach and vent      PHYSICAL EXAM:     GENERAL:  Appears stated age,  no distress  HEENT:  NC/AT,  conjunctivae clear, sclera -anicteric  CHEST:  Full & symmetric excursion, no increased effort, breath sounds clear  HEART:  Regular rhythm, S1, S2, no added sounds  ABDOMEN:  Soft, non-tender, non-distended, normoactive bowel sounds  NEURO:  Can  not assess completely because of overall mental status but looks comfortable,  trach and vent    Vital Signs:  Vital Signs Last 24 Hrs  T(C): 36.9 (2019 05:20), Max: 37.9 (15 Antonio 2019 21:31)  T(F): 98.5 (2019 05:20), Max: 100.3 (15 Antonio 2019 21:31)  HR: 109 (2019 06:46) (97 - 116)  BP: 136/85 (2019 05:20) (124/84 - 160/86)  BP(mean): --  RR: 20 (2019 05:20) (18 - 22)  SpO2: 98% (2019 06:46) (90% - 100%)  Daily     Daily Weight in k.5 (2019 01:40)    LABS:                        9.1    5.21  )-----------( 239      ( 2019 05:40 )             28.7         140  |  102  |  9   ----------------------------<  112<H>  3.1<L>   |  30  |  < 0.20<L>    Ca    7.5<L>      2019 05:40  Phos  2.3       Mg     1.8         TPro  5.4<L>  /  Alb  1.9<L>  /  TBili  0.3  /  DBili  x   /  AST  9   /  ALT  8   /  AlkPhos  126<H>      LIVER FUNCTIONS - ( 2019 05:40 )  Alb: 1.9 g/dL / Pro: 5.4 g/dL / ALK PHOS: 126 u/L / ALT: 8 u/L / AST: 9 u/L / GGT: x

## 2019-01-16 NOTE — PROGRESS NOTE ADULT - PROBLEM SELECTOR PLAN 1
- enlarging per-pancreatic fluid collections   - GI note appreciated  - needs repeat CT on 1/16; ordered  - cont ceftazidime/avibactam - enlarging per-pancreatic fluid collections   - GI note appreciated  - will have repeat CT today  - cont ceftazidime/avibactam

## 2019-01-16 NOTE — PROGRESS NOTE ADULT - ASSESSMENT
53 Male w/ a PMHx of TBI (s/p PEG tube, contracture, and seizure d/o) presented as a transfer from [facility] on 12/9 for respiratory failure 2/2 ARDS likely 2/2 necrotizing pancreatitis s/p intubation. Course c/b Acinetobacter PNA s/p Avycaz and Flagyl 7 day course.  Course further complicated by acute blood loss anemia s/p colonoscopy with findings suggestive of ischemic colitis, without further bleeding and Hgb remaining stable. S/p extubation 1/3, and re-intubation 1/6 now s/p tracheostomy 53 Male w/ a PMHx of TBI (s/p PEG tube, contracture, and seizure d/o) presented as a transfer from [facility] on 12/9 for respiratory failure 2/2 ARDS likely 2/2 necrotizing pancreatitis s/p intubation. Course c/b Acinetobacter PNA s/p Avycaz and Flagyl 7 day course.  Course further complicated by acute blood loss anemia s/p colonoscopy with findings suggestive of ischemic colitis, without further bleeding and Hgb remaining stable. S/p extubation 1/3, and re-intubation 1/6 now s/p tracheostomy.

## 2019-01-16 NOTE — PROGRESS NOTE ADULT - SUBJECTIVE AND OBJECTIVE BOX
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 650 milliGRAM(s) Oral every 6 hours PRN  chlorhexidine 4% Liquid 1 Application(s) Topical <User Schedule>  cyanocobalamin 1000 MICROGram(s) Oral daily  dextrose 40% Gel 15 Gram(s) Oral once PRN  dextrose 5%. 1000 milliLiter(s) IV Continuous <Continuous>  dextrose 50% Injectable 12.5 Gram(s) IV Push once  dextrose 50% Injectable 25 Gram(s) IV Push once  dextrose 50% Injectable 25 Gram(s) IV Push once  folic acid 1 milliGRAM(s) Enteral Tube daily  glucagon  Injectable 1 milliGRAM(s) IntraMuscular once PRN  heparin  Injectable 5000 Unit(s) SubCutaneous every 8 hours  hydrocortisone sodium succinate Injectable 10 milliGRAM(s) IV Push every 8 hours  insulin lispro (HumaLOG) corrective regimen sliding scale   SubCutaneous every 6 hours  lacosamide Solution 100 milliGRAM(s) Oral two times a day  midodrine 10 milliGRAM(s) Oral three times a day  potassium chloride   Powder 40 milliEquivalent(s) Oral every 4 hours  potassium phosphate IVPB 15 milliMole(s) IV Intermittent once  QUEtiapine 25 milliGRAM(s) Oral daily  risperiDONE   Solution 1 milliGRAM(s) Oral daily      Vital Signs Last 24 Hrs  T(C): 36.7 (16 Jan 2019 11:05), Max: 37.9 (15 Antonio 2019 21:31)  T(F): 98.1 (16 Jan 2019 11:05), Max: 100.3 (15 Antonio 2019 21:31)  HR: 110 (16 Jan 2019 11:13) (97 - 116)  BP: 109/75 (16 Jan 2019 11:05) (109/75 - 156/73)  BP(mean): --  RR: 26 (16 Jan 2019 11:13) (18 - 28)  SpO2: 95% (16 Jan 2019 11:13) (90% - 100%)    Physical Exam:  General: not awake or alert. intubated   Head: atraumatic normocephalic  eyes: normal sclera and conjunctivae   ENT:   trach with some bloody drainage, no LAD  Cardiovascular: regular S1,S2  Respiratory:  intubated mechanical ventilation, coarse breath sounds bilaterally   abd:  PEG tube, soft, bowel sounds present, nontender, rectal tube  :  no suprapubic tenderness, [place] cath    Musculoskeletal:   no joint swelling, contractures, RUE mostly   Skin:    no rash  Neurologic: awake, answers some questions with nodding    Vascular: no phlebitis  Psych: unable to assess                           9.1    5.21  )-----------( 239      ( 16 Jan 2019 05:40 )             28.7       01-16    140  |  102  |  9   ----------------------------<  112<H>  3.1<L>   |  30  |  < 0.20<L>    Ca    7.5<L>      16 Jan 2019 05:40  Phos  2.3     01-16  Mg     1.8     01-16    TPro  5.4<L>  /  Alb  1.9<L>  /  TBili  0.3  /  DBili  x   /  AST  9   /  ALT  8   /  AlkPhos  126<H>  01-16          MICROBIOLOGY:  v  ENDOTRACHEAL SPECIMEN  01-06-19 --  --  Acin.baumannii/haemoly [facility]      BLOOD VENOUS  01-02-19 --  --  --      BLOOD PERIPHERAL  01-02-19 --  --  --      BLOOD PERIPHERAL  12-27-18 --  --  --      BLOOD VENOUS  12-24-18 --  --  --      BLOOD PERIPHERAL  12-24-18 --  --  --      BLOOD PERIPHERAL  12-23-18 --  --  --      BLOOD VENOUS  12-22-18 --  --  BLOOD CULTURE PCR  Staphylococcus sp.,coag neg      SPUTUM  12-18-18 --  --  Acinetobacter baumannii       BLOOD PERIPHERAL  12-18-18 --  --  --                RADIOLOGY:  Images below reviewed personally  < from: CT Chest w/ IV Cont (01.08.19 @ 15:29) >  IMPRESSION:   Unchangednecrotizing pancreatitis with acute necrotic collections.    Small bilateral pleural effusions with complete atelectasis of both lower   lobes with multiple probable parenchymal lung abscesses, measuring up to   1.1 cm in the superior segment.    Probable colitis involving the distal transverse colon and descending   colon. 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 650 milliGRAM(s) Oral every 6 hours PRN  chlorhexidine 4% Liquid 1 Application(s) Topical <User Schedule>  cyanocobalamin 1000 MICROGram(s) Oral daily  dextrose 40% Gel 15 Gram(s) Oral once PRN  dextrose 5%. 1000 milliLiter(s) IV Continuous <Continuous>  dextrose 50% Injectable 12.5 Gram(s) IV Push once  dextrose 50% Injectable 25 Gram(s) IV Push once  dextrose 50% Injectable 25 Gram(s) IV Push once  folic acid 1 milliGRAM(s) Enteral Tube daily  glucagon  Injectable 1 milliGRAM(s) IntraMuscular once PRN  heparin  Injectable 5000 Unit(s) SubCutaneous every 8 hours  hydrocortisone sodium succinate Injectable 10 milliGRAM(s) IV Push every 8 hours  insulin lispro (HumaLOG) corrective regimen sliding scale   SubCutaneous every 6 hours  lacosamide Solution 100 milliGRAM(s) Oral two times a day  midodrine 10 milliGRAM(s) Oral three times a day  potassium chloride   Powder 40 milliEquivalent(s) Oral every 4 hours  potassium phosphate IVPB 15 milliMole(s) IV Intermittent once  QUEtiapine 25 milliGRAM(s) Oral daily  risperiDONE   Solution 1 milliGRAM(s) Oral daily      Vital Signs Last 24 Hrs  T(C): 36.7 (16 Jan 2019 11:05), Max: 37.9 (15 Antonio 2019 21:31)  T(F): 98.1 (16 Jan 2019 11:05), Max: 100.3 (15 Antonio 2019 21:31)  HR: 110 (16 Jan 2019 11:13) (97 - 116)  BP: 109/75 (16 Jan 2019 11:05) (109/75 - 156/73)  BP(mean): --  RR: 26 (16 Jan 2019 11:13) (18 - 28)  SpO2: 95% (16 Jan 2019 11:13) (90% - 100%)    Physical Exam:  General: NAD, non toxic  Head: atraumatic normocephalic  eyes: normal sclera and conjunctivae   ENT:   trach with coarse breath sounds  Cardiovascular: regular S1,S2  Respiratory:  intubated mechanical ventilation, coarse breath sounds bilaterally   abd:  PEG tube, soft, bowel sounds present, nontender, rectal tube  :  no suprapubic tenderness, [place] cath    Musculoskeletal:   no joint swelling, contractures, RUE mostly   Skin:    no rash  Neurologic: awake, answers some questions with nodding    Vascular: no phlebitis  Psych: unable to assess                           9.1    5.21  )-----------( 239      ( 16 Jan 2019 05:40 )             28.7       01-16    140  |  102  |  9   ----------------------------<  112<H>  3.1<L>   |  30  |  < 0.20<L>    Ca    7.5<L>      16 Jan 2019 05:40  Phos  2.3     01-16  Mg     1.8     01-16    TPro  5.4<L>  /  Alb  1.9<L>  /  TBili  0.3  /  DBili  x   /  AST  9   /  ALT  8   /  AlkPhos  126<H>  01-16          MICROBIOLOGY:  v  ENDOTRACHEAL SPECIMEN  01-06-19 --  --  Acin.baumannii/haemoly [facility]      BLOOD VENOUS  01-02-19 --  --  --      BLOOD PERIPHERAL  01-02-19 --  --  --      BLOOD PERIPHERAL  12-27-18 --  --  --      BLOOD VENOUS  12-24-18 --  --  --      BLOOD PERIPHERAL  12-24-18 --  --  --      BLOOD PERIPHERAL  12-23-18 --  --  --      BLOOD VENOUS  12-22-18 --  --  BLOOD CULTURE PCR  Staphylococcus sp.,coag neg      SPUTUM  12-18-18 --  --  Acinetobacter baumannii       BLOOD PERIPHERAL  12-18-18 --  --  --                RADIOLOGY:  Images below reviewed personally  < from: CT Chest w/ IV Cont (01.08.19 @ 15:29) >  IMPRESSION:   Unchangednecrotizing pancreatitis with acute necrotic collections.    Small bilateral pleural effusions with complete atelectasis of both lower   lobes with multiple probable parenchymal lung abscesses, measuring up to   1.1 cm in the superior segment.    Probable colitis involving the distal transverse colon and descending   colon.

## 2019-01-16 NOTE — PROGRESS NOTE ADULT - ASSESSMENT
Impression  - Enlarging danay-pancreatic collection with pancreatitis, CT 12.25 and 1.9.2019 with pancreatic necrosis, but no wall formed yet  - Hematochezia, s/p colonoscopy on 12/28/2018 with Severe segmental colitis localized to the transverse colon and ascending colon (possible ischemic colitis, possible colitis related to contiguous danay-pancreatic inflammatory process). Biopsies with granulation tissue but no infection/malignancy  - Resp failure in the setting of pneumonia, requiring tracheostomy     Recommendations    - Trend CBC, CMP and fever curve, Hb stable for now.   - given the CT abdomen on 1/9 shows pancreas necrosis but no clear wall, he is not a candidate for endoscopic drainage, repeat CT abdomen today, because these lesions usually form a wall over time and then it is possible to drain endoscopically   - Continue antibiotics as per ID: on ceftazidime and metronidazole   - Rest of care per primary team

## 2019-01-16 NOTE — PROGRESS NOTE ADULT - ASSESSMENT
53  M (resident of [facility]) with TBI, s/p PEG tube, contracture, and seizure d/o who presented as a transfer from [facility] on 12/9 for respiratory failure 2/2 ARDS likely 2/2 necrotizing pancreatitis s/p intubation.  Imipenem 12/28/18 - continued   bronc 12/11 with pseudomonas  pt with thick secretions and again febrile, sputum cx with  acinetobacter  repeat Ct with increased size of pancreatic collection but not walled off so no interventions were recommended  also had GIB and colitis due to ?contiguous necrotizing pancreatitis vs ischemic, s/p colonoscopy but not actively bleeding now  completed a 7 day course of avycaz and flagyl for the acinetobacter then switched back to imipenem, but pt again had hypothermia with the same acinetobacter in sputum now s/p trach and back on avycaz + flagyl    sepsis with fever, leukopenia resolved, extubated but reintubated 1/6/19 for hypoxia and poor cough along with profuse secretions and sputum cx again with acinetobacter    Necrotising Pancreatitis with multiple peripancreatitis collections not walled off yet   acinetobacter PNA  with lung abscesses  coag neg staph bacteremia likely contaminant, repeat negative  colitis, ?due to contiguous necrotizing pancreatitis and collection vs ischemic    no plans for IR embolization for surgical interventions      * s/p 7 days of avcaz and flagyl for  acinetobacter in the sputum 12/22-12/28  then back on imipenem for necrotizing pancreatis  * restarted on  avycaz and flagyl to cover the acinetobacter again 1/9, now day 7, not hypothermic anymore, improved respiratory status  * repeat CT chest/abdomen showed unchanged acute necrotizing pancreatitis with acute collections and multiple probable lung abscesses, probable colitis  * no plan for endoscopic drainage yet, repeat CT today 53  M (resident of [facility]) with TBI, s/p PEG tube, contracture, and seizure d/o who presented as a transfer from [facility] on 12/9 for respiratory failure 2/2 ARDS likely 2/2 necrotizing pancreatitis s/p intubation.  Imipenem 12/28/18 - continued   bronc 12/11 with pseudomonas  pt with thick secretions and again febrile, sputum cx with  acinetobacter  repeat Ct with increased size of pancreatic collection but not walled off so no interventions were recommended  also had GIB and colitis due to ?contiguous necrotizing pancreatitis vs ischemic, s/p colonoscopy but not actively bleeding now  completed a 7 day course of avycaz and flagyl for the acinetobacter then switched back to imipenem, but pt again had hypothermia with the same acinetobacter in sputum now s/p trach and back on avycaz + flagyl    sepsis with fever, leukopenia resolved, extubated but reintubated 1/6/19 for hypoxia and poor cough along with profuse secretions and sputum cx again with acinetobacter    Necrotising Pancreatitis with multiple peripancreatitis collections not walled off yet   acinetobacter PNA  with lung abscesses  coag neg staph bacteremia likely contaminant, repeat negative  colitis, ?due to contiguous necrotizing pancreatitis and collection vs ischemic    no plans for IR embolization for surgical interventions      * s/p 7 days of avcaz and flagyl for  acinetobacter in the sputum 12/22-12/28  then back on imipenem for necrotizing pancreatis  * restarted on  avycaz and flagyl to cover the acinetobacter again 1/9, now day 8, not hypothermic anymore, improved respiratory status  * will c/w avycaz for at least 2 weeks for the pneumonia and lung abscesses  * repeat CT chest/abdomen showed unchanged acute necrotizing pancreatitis with acute collections and multiple probable lung abscesses, probable colitis  * no plan for endoscopic drainage yet, repeat CT today

## 2019-01-16 NOTE — PROGRESS NOTE ADULT - RS GEN PE MLT RESP DETAILS PC
trach to trach collar/breath sounds equal/respirations non-labored/clear to auscultation bilaterally

## 2019-01-16 NOTE — PROGRESS NOTE ADULT - ATTENDING COMMENTS
Patient clinically doing well, no abdominal pain, afebrile, no tachypnea. Repeat CT abdomen today to see if any collections suitable for drainage

## 2019-01-16 NOTE — PROGRESS NOTE ADULT - PROBLEM SELECTOR PLAN 2
- s/p trach 1/9   - cont avycaz for now  - wean as tolerated  - saturating % on 50% Fi02 TC  - trach care, suction PRN  - chest PT - s/p trach 1/9   - cont avycaz for now-- will obtain ID input for duration  - weaning from vent as tolerated, tolerating trach collar during the day  - trach care, suction PRN  - chest PT

## 2019-01-16 NOTE — PROGRESS NOTE ADULT - SUBJECTIVE AND OBJECTIVE BOX
CHIEF COMPLAINT:    Interval Events:    REVIEW OF SYSTEMS:  Constitutional:   Eyes:  ENT:  CV:  Resp:  GI:  :  MSK:  Integumentary:  Neurological:  Psychiatric:  Endocrine:  Hematologic/Lymphatic:  Allergic/Immunologic:  [ ] All other systems negative  [ ] Unable to assess ROS because ________    OBJECTIVE:  ICU Vital Signs Last 24 Hrs  T(C): 36.9 (16 Jan 2019 05:20), Max: 37.9 (15 Antonio 2019 21:31)  T(F): 98.5 (16 Jan 2019 05:20), Max: 100.3 (15 Antonio 2019 21:31)  HR: 109 (16 Jan 2019 06:46) (97 - 116)  BP: 136/85 (16 Jan 2019 05:20) (124/84 - 160/86)  BP(mean): --  ABP: --  ABP(mean): --  RR: 20 (16 Jan 2019 05:20) (18 - 22)  SpO2: 98% (16 Jan 2019 06:46) (90% - 100%)    Mode: AC/ CMV (Assist Control/ Continuous Mandatory Ventilation), RR (machine): 14, TV (machine): 350, FiO2: 40, PEEP: 5, ITime: 1, MAP: 10, PIP: 18    01-15 @ 07:01  -  01-16 @ 07:00  --------------------------------------------------------  IN: 660 mL / OUT: 750 mL / NET: -90 mL      CAPILLARY BLOOD GLUCOSE      POCT Blood Glucose.: 119 mg/dL (16 Jan 2019 05:33)        HOSPITAL MEDICATIONS:  MEDICATIONS  (STANDING):  cefTAZidime/avibactam IVPB 2.5 Gram(s) IV Intermittent every 8 hours  cefTAZidime/avibactam IVPB      chlorhexidine 4% Liquid 1 Application(s) Topical <User Schedule>  cyanocobalamin 1000 MICROGram(s) Oral daily  dextrose 5%. 1000 milliLiter(s) (50 mL/Hr) IV Continuous <Continuous>  dextrose 50% Injectable 12.5 Gram(s) IV Push once  dextrose 50% Injectable 25 Gram(s) IV Push once  dextrose 50% Injectable 25 Gram(s) IV Push once  folic acid 1 milliGRAM(s) Enteral Tube daily  heparin  Injectable 5000 Unit(s) SubCutaneous every 8 hours  hydrocortisone sodium succinate Injectable 10 milliGRAM(s) IV Push every 8 hours  insulin lispro (HumaLOG) corrective regimen sliding scale   SubCutaneous every 6 hours  lacosamide Solution 100 milliGRAM(s) Oral two times a day  metroNIDAZOLE  IVPB      metroNIDAZOLE  IVPB 500 milliGRAM(s) IV Intermittent every 8 hours  midodrine 10 milliGRAM(s) Oral three times a day  potassium chloride   Powder 40 milliEquivalent(s) Oral every 4 hours  QUEtiapine 25 milliGRAM(s) Oral daily  risperiDONE   Solution 1 milliGRAM(s) Oral daily    MEDICATIONS  (PRN):  acetaminophen    Suspension .. 650 milliGRAM(s) Oral every 6 hours PRN Temp greater or equal to 38C (100.4F), Mild Pain (1 - 3), Moderate Pain (4 - 6)  dextrose 40% Gel 15 Gram(s) Oral once PRN Blood Glucose LESS THAN 70 milliGRAM(s)/deciliter  glucagon  Injectable 1 milliGRAM(s) IntraMuscular once PRN Glucose LESS THAN 70 milligrams/deciliter      LABS:                        9.1    5.21  )-----------( 239      ( 16 Jan 2019 05:40 )             28.7     01-16    140  |  102  |  9   ----------------------------<  112<H>  3.1<L>   |  30  |  < 0.20<L>    Ca    7.5<L>      16 Jan 2019 05:40  Phos  2.3     01-16  Mg     1.8     01-16    TPro  5.4<L>  /  Alb  1.9<L>  /  TBili  0.3  /  DBili  x   /  AST  9   /  ALT  8   /  AlkPhos  126<H>  01-16              MICROBIOLOGY:     RADIOLOGY:  [ ] Reviewed and interpreted by me    PULMONARY FUNCTION TESTS:    EKG: CHIEF COMPLAINT: Patient is a 53y old  Male who presented with a chief complaint of ARDS?, sepsis (16 Jan 2019 12:50)      Interval Events: no events overnight    REVIEW OF SYSTEMS:  CV: denies chest pain  Resp: denies SOB  GI: denies abdominal pain  [x] All other systems negative    OBJECTIVE:  ICU Vital Signs Last 24 Hrs  T(C): 36.9 (16 Jan 2019 05:20), Max: 37.9 (15 Antonio 2019 21:31)  T(F): 98.5 (16 Jan 2019 05:20), Max: 100.3 (15 Antonio 2019 21:31)  HR: 109 (16 Jan 2019 06:46) (97 - 116)  BP: 136/85 (16 Jan 2019 05:20) (124/84 - 160/86)  RR: 20 (16 Jan 2019 05:20) (18 - 22)  SpO2: 98% (16 Jan 2019 06:46) (90% - 100%)    Mode: AC/ CMV (Assist Control/ Continuous Mandatory Ventilation), RR (machine): 14, TV (machine): 350, FiO2: 40, PEEP: 5, ITime: 1, MAP: 10, PIP: 18    01-15 @ 07:01  -  01-16 @ 07:00  --------------------------------------------------------  IN: 660 mL / OUT: 750 mL / NET: -90 mL      CAPILLARY BLOOD GLUCOSE      POCT Blood Glucose.: 119 mg/dL (16 Jan 2019 05:33)        HOSPITAL MEDICATIONS:  MEDICATIONS  (STANDING):  cefTAZidime/avibactam IVPB 2.5 Gram(s) IV Intermittent every 8 hours  cefTAZidime/avibactam IVPB      chlorhexidine 4% Liquid 1 Application(s) Topical <User Schedule>  cyanocobalamin 1000 MICROGram(s) Oral daily  dextrose 5%. 1000 milliLiter(s) (50 mL/Hr) IV Continuous <Continuous>  dextrose 50% Injectable 12.5 Gram(s) IV Push once  dextrose 50% Injectable 25 Gram(s) IV Push once  dextrose 50% Injectable 25 Gram(s) IV Push once  folic acid 1 milliGRAM(s) Enteral Tube daily  heparin  Injectable 5000 Unit(s) SubCutaneous every 8 hours  hydrocortisone sodium succinate Injectable 10 milliGRAM(s) IV Push every 8 hours  insulin lispro (HumaLOG) corrective regimen sliding scale   SubCutaneous every 6 hours  lacosamide Solution 100 milliGRAM(s) Oral two times a day  metroNIDAZOLE  IVPB      metroNIDAZOLE  IVPB 500 milliGRAM(s) IV Intermittent every 8 hours  midodrine 10 milliGRAM(s) Oral three times a day  potassium chloride   Powder 40 milliEquivalent(s) Oral every 4 hours  QUEtiapine 25 milliGRAM(s) Oral daily  risperiDONE   Solution 1 milliGRAM(s) Oral daily    MEDICATIONS  (PRN):  acetaminophen    Suspension .. 650 milliGRAM(s) Oral every 6 hours PRN Temp greater or equal to 38C (100.4F), Mild Pain (1 - 3), Moderate Pain (4 - 6)  dextrose 40% Gel 15 Gram(s) Oral once PRN Blood Glucose LESS THAN 70 milliGRAM(s)/deciliter  glucagon  Injectable 1 milliGRAM(s) IntraMuscular once PRN Glucose LESS THAN 70 milligrams/deciliter      LABS:                        9.1    5.21  )-----------( 239      ( 16 Jan 2019 05:40 )             28.7     01-16    140  |  102  |  9   ----------------------------<  112<H>  3.1<L>   |  30  |  < 0.20<L>    Ca    7.5<L>      16 Jan 2019 05:40  Phos  2.3     01-16  Mg     1.8     01-16    TPro  5.4<L>  /  Alb  1.9<L>  /  TBili  0.3  /  DBili  x   /  AST  9   /  ALT  8   /  AlkPhos  126<H>  01-16              MICROBIOLOGY:     RADIOLOGY:  [ ] Reviewed and interpreted by me    PULMONARY FUNCTION TESTS:    EKG:

## 2019-01-17 LAB
ALBUMIN SERPL ELPH-MCNC: 1.8 G/DL — LOW (ref 3.3–5)
ALP SERPL-CCNC: 124 U/L — HIGH (ref 40–120)
ALT FLD-CCNC: 8 U/L — SIGNIFICANT CHANGE UP (ref 4–41)
ANION GAP SERPL CALC-SCNC: 8 MMO/L — SIGNIFICANT CHANGE UP (ref 7–14)
AST SERPL-CCNC: 9 U/L — SIGNIFICANT CHANGE UP (ref 4–40)
BILIRUB SERPL-MCNC: 0.3 MG/DL — SIGNIFICANT CHANGE UP (ref 0.2–1.2)
BUN SERPL-MCNC: 5 MG/DL — LOW (ref 7–23)
CALCIUM SERPL-MCNC: 7.5 MG/DL — LOW (ref 8.4–10.5)
CHLORIDE SERPL-SCNC: 100 MMOL/L — SIGNIFICANT CHANGE UP (ref 98–107)
CO2 SERPL-SCNC: 26 MMOL/L — SIGNIFICANT CHANGE UP (ref 22–31)
CREAT SERPL-MCNC: < 0.2 MG/DL — LOW (ref 0.5–1.3)
GLUCOSE BLDC GLUCOMTR-MCNC: 106 MG/DL — HIGH (ref 70–99)
GLUCOSE BLDC GLUCOMTR-MCNC: 113 MG/DL — HIGH (ref 70–99)
GLUCOSE BLDC GLUCOMTR-MCNC: 116 MG/DL — HIGH (ref 70–99)
GLUCOSE BLDC GLUCOMTR-MCNC: 122 MG/DL — HIGH (ref 70–99)
GLUCOSE SERPL-MCNC: 107 MG/DL — HIGH (ref 70–99)
HCT VFR BLD CALC: 27.6 % — LOW (ref 39–50)
HGB BLD-MCNC: 8.6 G/DL — LOW (ref 13–17)
MAGNESIUM SERPL-MCNC: 1.8 MG/DL — SIGNIFICANT CHANGE UP (ref 1.6–2.6)
MCHC RBC-ENTMCNC: 31.2 % — LOW (ref 32–36)
MCHC RBC-ENTMCNC: 31.6 PG — SIGNIFICANT CHANGE UP (ref 27–34)
MCV RBC AUTO: 101.5 FL — HIGH (ref 80–100)
NRBC # FLD: 0.02 K/UL — LOW (ref 25–125)
PHOSPHATE SERPL-MCNC: 2.7 MG/DL — SIGNIFICANT CHANGE UP (ref 2.5–4.5)
PLATELET # BLD AUTO: 234 K/UL — SIGNIFICANT CHANGE UP (ref 150–400)
PMV BLD: 10.1 FL — SIGNIFICANT CHANGE UP (ref 7–13)
POTASSIUM SERPL-MCNC: 4 MMOL/L — SIGNIFICANT CHANGE UP (ref 3.5–5.3)
POTASSIUM SERPL-SCNC: 4 MMOL/L — SIGNIFICANT CHANGE UP (ref 3.5–5.3)
PROT SERPL-MCNC: 5.4 G/DL — LOW (ref 6–8.3)
RBC # BLD: 2.72 M/UL — LOW (ref 4.2–5.8)
RBC # FLD: 19.8 % — HIGH (ref 10.3–14.5)
SODIUM SERPL-SCNC: 134 MMOL/L — LOW (ref 135–145)
SPECIMEN SOURCE: SIGNIFICANT CHANGE UP
WBC # BLD: 5.05 K/UL — SIGNIFICANT CHANGE UP (ref 3.8–10.5)
WBC # FLD AUTO: 5.05 K/UL — SIGNIFICANT CHANGE UP (ref 3.8–10.5)

## 2019-01-17 PROCEDURE — 99232 SBSQ HOSP IP/OBS MODERATE 35: CPT | Mod: GC

## 2019-01-17 PROCEDURE — 74177 CT ABD & PELVIS W/CONTRAST: CPT | Mod: 26

## 2019-01-17 RX ORDER — LACOSAMIDE 50 MG/1
100 TABLET ORAL
Qty: 0 | Refills: 0 | Status: DISCONTINUED | OUTPATIENT
Start: 2019-01-17 | End: 2019-01-24

## 2019-01-17 RX ADMIN — Medication 10 MILLIGRAM(S): at 22:25

## 2019-01-17 RX ADMIN — QUETIAPINE FUMARATE 25 MILLIGRAM(S): 200 TABLET, FILM COATED ORAL at 11:07

## 2019-01-17 RX ADMIN — Medication 100 MILLIGRAM(S): at 05:03

## 2019-01-17 RX ADMIN — LACOSAMIDE 100 MILLIGRAM(S): 50 TABLET ORAL at 18:49

## 2019-01-17 RX ADMIN — Medication 1 MILLIGRAM(S): at 11:08

## 2019-01-17 RX ADMIN — Medication 10 MILLIGRAM(S): at 05:02

## 2019-01-17 RX ADMIN — Medication 100 MILLIGRAM(S): at 22:25

## 2019-01-17 RX ADMIN — HEPARIN SODIUM 5000 UNIT(S): 5000 INJECTION INTRAVENOUS; SUBCUTANEOUS at 14:05

## 2019-01-17 RX ADMIN — Medication 10 MILLIGRAM(S): at 14:04

## 2019-01-17 RX ADMIN — PREGABALIN 1000 MICROGRAM(S): 225 CAPSULE ORAL at 11:07

## 2019-01-17 RX ADMIN — CHLORHEXIDINE GLUCONATE 1 APPLICATION(S): 213 SOLUTION TOPICAL at 10:18

## 2019-01-17 RX ADMIN — LACOSAMIDE 100 MILLIGRAM(S): 50 TABLET ORAL at 05:03

## 2019-01-17 RX ADMIN — HEPARIN SODIUM 5000 UNIT(S): 5000 INJECTION INTRAVENOUS; SUBCUTANEOUS at 05:02

## 2019-01-17 RX ADMIN — HEPARIN SODIUM 5000 UNIT(S): 5000 INJECTION INTRAVENOUS; SUBCUTANEOUS at 22:25

## 2019-01-17 RX ADMIN — RISPERIDONE 1 MILLIGRAM(S): 4 TABLET ORAL at 11:07

## 2019-01-17 RX ADMIN — MIDODRINE HYDROCHLORIDE 5 MILLIGRAM(S): 2.5 TABLET ORAL at 22:25

## 2019-01-17 RX ADMIN — Medication 100 MILLIGRAM(S): at 14:05

## 2019-01-17 NOTE — PROGRESS NOTE ADULT - PROBLEM SELECTOR PLAN 2
- s/p trach 1/9   - cont avycaz for now-- will obtain ID input for duration  - weaning from vent as tolerated, tolerating trach collar during the day  - trach care, suction PRN  - chest PT - s/p trach 1/9   - cont avycaz for now-- will obtain ID input for duration - prelim 2 weeks   - weaning from vent as tolerated, tolerating trach collar during the day  - trach care, suction PRN  - chest PT

## 2019-01-17 NOTE — PROGRESS NOTE ADULT - PROBLEM SELECTOR PLAN 1
- enlarging per-pancreatic fluid collections   - GI note appreciated  - will have repeat CT today  - cont ceftazidime/avibactam - enlarging per-pancreatic fluid collections   - GI note appreciated  - Repeat CT done - awaiting GI/ID recs   - cont ceftazidime/avibactam

## 2019-01-17 NOTE — PROGRESS NOTE ADULT - SUBJECTIVE AND OBJECTIVE BOX
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 650 milliGRAM(s) Oral every 6 hours PRN  chlorhexidine 4% Liquid 1 Application(s) Topical <User Schedule>  cyanocobalamin 1000 MICROGram(s) Oral daily  dextrose 40% Gel 15 Gram(s) Oral once PRN  dextrose 5%. 1000 milliLiter(s) IV Continuous <Continuous>  dextrose 50% Injectable 12.5 Gram(s) IV Push once  dextrose 50% Injectable 25 Gram(s) IV Push once  dextrose 50% Injectable 25 Gram(s) IV Push once  folic acid 1 milliGRAM(s) Enteral Tube daily  glucagon  Injectable 1 milliGRAM(s) IntraMuscular once PRN  heparin  Injectable 5000 Unit(s) SubCutaneous every 8 hours  hydrocortisone sodium succinate Injectable 10 milliGRAM(s) IV Push every 8 hours  insulin lispro (HumaLOG) corrective regimen sliding scale   SubCutaneous every 6 hours  lacosamide Solution 100 milliGRAM(s) Oral two times a day  midodrine 5 milliGRAM(s) Oral three times a day  QUEtiapine 25 milliGRAM(s) Oral daily  risperiDONE   Solution 1 milliGRAM(s) Oral daily      Vital Signs Last 24 Hrs  T(C): 37.7 (17 Jan 2019 10:14), Max: 37.9 (16 Jan 2019 21:15)  T(F): 99.9 (17 Jan 2019 10:14), Max: 100.3 (16 Jan 2019 21:15)  HR: 103 (17 Jan 2019 10:14) (103 - 114)  BP: 108/69 (17 Jan 2019 10:14) (108/69 - 136/79)  BP(mean): --  RR: 23 (17 Jan 2019 10:14) (19 - 24)  SpO2: 96% (17 Jan 2019 10:14) (95% - 100%)    Physical Exam:  General: NAD, non toxic  Head: atraumatic normocephalic  eyes: normal sclera and conjunctivae   ENT:   trach , no LAD  Cardiovascular: regular S1,S2  Respiratory:  trach, gargling sounds with secrestions  abd:  PEG tube, soft, bowel sounds present, nontender, rectal tube  :  no suprapubic tenderness, [place] cath    Musculoskeletal:   no joint swelling, contractures, RUE mostly, LUE edematous  Skin:    no rash  Neurologic: awake, answers some questions with nodding    Vascular: no phlebitis  Psych: unable to assess                           8.6    5.05  )-----------( 234      ( 17 Jan 2019 05:30 )             27.6       01-17    134<L>  |  100  |  5<L>  ----------------------------<  107<H>  4.0   |  26  |  < 0.20<L>    Ca    7.5<L>      17 Jan 2019 05:30  Phos  2.7     01-17  Mg     1.8     01-17    TPro  5.4<L>  /  Alb  1.8<L>  /  TBili  0.3  /  DBili  x   /  AST  9   /  ALT  8   /  AlkPhos  124<H>  01-17          MICROBIOLOGY:  v  BLOOD PERIPHERAL  01-16-19 --  --  --      ENDOTRACHEAL SPECIMEN  01-06-19 --  --  Acin.baumannii/haemoly [facility]      BLOOD VENOUS  01-02-19 --  --  --      BLOOD PERIPHERAL  01-02-19 --  --  --      BLOOD PERIPHERAL  12-27-18 --  --  --      BLOOD VENOUS  12-24-18 --  --  --      BLOOD PERIPHERAL  12-24-18 --  --  --      BLOOD PERIPHERAL  12-23-18 --  --  --      BLOOD VENOUS  12-22-18 --  --  BLOOD CULTURE PCR  Staphylococcus sp.,coag neg      SPUTUM  12-18-18 --  --  Acinetobacter baumannii       BLOOD PERIPHERAL  12-18-18 --  --  --                RADIOLOGY:  Images below reviewed personally  < from: CT Chest w/ IV Cont (01.08.19 @ 15:29) >  IMPRESSION:   Unchangednecrotizing pancreatitis with acute necrotic collections.    Small bilateral pleural effusions with complete atelectasis of both lower   lobes with multiple probable parenchymal lung abscesses, measuring up to   1.1 cm in the superior segment.    Probable colitis involving the distal transverse colon and descending   colon.

## 2019-01-17 NOTE — PROGRESS NOTE ADULT - ASSESSMENT
Impression  - Enlarging danay-pancreatic collection with pancreatitis, CT 12.25 and 1.9.2019 with pancreatic necrosis, but no wall formed yet  - Hematochezia, s/p colonoscopy on 12/28/2018 with Severe segmental colitis localized to the transverse colon and ascending colon (possible ischemic colitis, possible colitis related to contiguous danay-pancreatic inflammatory process). Biopsies with granulation tissue but no infection/malignancy  - Resp failure in the setting of pneumonia, requiring tracheostomy     Recommendations    - Trend CBC, CMP and fever curve, Hb stable for now.   - given the CT abdomen on 1/9 shows pancreas necrosis but no clear wall, he is not a candidate for endoscopic drainage, repeat CT abdomen today, because these lesions usually form a wall over time and then it is possible to drain endoscopically   - Continue antibiotics as per ID: on ceftazidime and metronidazole   - Rest of care per primary team Impression  1) Enlarging danay-pancreatic collection with pancreatitis, CT 12.25 and 1.9.2019 with pancreatic necrosis, but no wall formed yet  Awaiting repeat CTAP  2) Hematochezia, s/p colonoscopy on 12/28/2018 with severe segmental colitis localized to the transverse colon and ascending colon (possible ischemic colitis, possible colitis related to contiguous danay-pancreatic inflammatory process). Biopsies with granulation tissue but no infection/malignancy  3) Resp failure in the setting of pneumonia, requiring tracheostomy     Recommendations  - Trend CBC, CMP and fever curve, Hb stable for now.   - given the CT abdomen on 1/9 shows pancreas necrosis but no clear wall, he is not a candidate for endoscopic drainage, repeat CT abdomen today, because these lesions usually form a wall over time and then it is possible to drain endoscopically   - Continue antibiotics as per ID: on ceftazidime and metronidazole   - Rest of care per primary team

## 2019-01-17 NOTE — PROGRESS NOTE ADULT - SUBJECTIVE AND OBJECTIVE BOX
CHIEF COMPLAINT:    Interval Events:    REVIEW OF SYSTEMS:  Constitutional:   Eyes:  ENT:  CV:  Resp:  GI:  :  MSK:  Integumentary:  Neurological:  Psychiatric:  Endocrine:  Hematologic/Lymphatic:  Allergic/Immunologic:  [ ] All other systems negative  [ ] Unable to assess ROS because ________    OBJECTIVE:  ICU Vital Signs Last 24 Hrs  T(C): 37.2 (17 Jan 2019 05:00), Max: 37.9 (16 Jan 2019 21:15)  T(F): 99 (17 Jan 2019 05:00), Max: 100.3 (16 Jan 2019 21:15)  HR: 108 (17 Jan 2019 06:50) (106 - 114)  BP: 136/79 (17 Jan 2019 05:00) (109/75 - 136/79)  BP(mean): --  ABP: --  ABP(mean): --  RR: 20 (17 Jan 2019 05:00) (19 - 28)  SpO2: 96% (17 Jan 2019 06:50) (95% - 100%)    Mode: AC/ CMV (Assist Control/ Continuous Mandatory Ventilation), RR (machine): 14, TV (machine): 350, FiO2: 40, PEEP: 5, ITime: 1, MAP: 10, PIP: 18    01-16 @ 07:01  -  01-17 @ 07:00  --------------------------------------------------------  IN: 1445 mL / OUT: 1500 mL / NET: -55 mL      CAPILLARY BLOOD GLUCOSE      POCT Blood Glucose.: 122 mg/dL (17 Jan 2019 05:02)      PHYSICAL EXAM:  General:   HEENT:   Lymph Nodes:  Neck:   Respiratory:   Cardiovascular:   Abdomen:   Extremities:   Skin:   Neurological:  Psychiatry:    HOSPITAL MEDICATIONS:  MEDICATIONS  (STANDING):  cefTAZidime/avibactam IVPB 2.5 Gram(s) IV Intermittent every 8 hours  cefTAZidime/avibactam IVPB      chlorhexidine 4% Liquid 1 Application(s) Topical <User Schedule>  cyanocobalamin 1000 MICROGram(s) Oral daily  dextrose 5%. 1000 milliLiter(s) (50 mL/Hr) IV Continuous <Continuous>  dextrose 50% Injectable 12.5 Gram(s) IV Push once  dextrose 50% Injectable 25 Gram(s) IV Push once  dextrose 50% Injectable 25 Gram(s) IV Push once  folic acid 1 milliGRAM(s) Enteral Tube daily  heparin  Injectable 5000 Unit(s) SubCutaneous every 8 hours  hydrocortisone sodium succinate Injectable 10 milliGRAM(s) IV Push every 8 hours  insulin lispro (HumaLOG) corrective regimen sliding scale   SubCutaneous every 6 hours  lacosamide Solution 100 milliGRAM(s) Oral two times a day  metroNIDAZOLE  IVPB      metroNIDAZOLE  IVPB 500 milliGRAM(s) IV Intermittent every 8 hours  midodrine 5 milliGRAM(s) Oral three times a day  QUEtiapine 25 milliGRAM(s) Oral daily  risperiDONE   Solution 1 milliGRAM(s) Oral daily    MEDICATIONS  (PRN):  acetaminophen    Suspension .. 650 milliGRAM(s) Oral every 6 hours PRN Temp greater or equal to 38C (100.4F), Mild Pain (1 - 3), Moderate Pain (4 - 6)  dextrose 40% Gel 15 Gram(s) Oral once PRN Blood Glucose LESS THAN 70 milliGRAM(s)/deciliter  glucagon  Injectable 1 milliGRAM(s) IntraMuscular once PRN Glucose LESS THAN 70 milligrams/deciliter      LABS:                        8.6    5.05  )-----------( 234      ( 17 Jan 2019 05:30 )             27.6     01-17    134<L>  |  100  |  5<L>  ----------------------------<  107<H>  4.0   |  26  |  < 0.20<L>    Ca    7.5<L>      17 Jan 2019 05:30  Phos  2.7     01-17  Mg     1.8     01-17    TPro  5.4<L>  /  Alb  1.8<L>  /  TBili  0.3  /  DBili  x   /  AST  9   /  ALT  8   /  AlkPhos  124<H>  01-17              MICROBIOLOGY:     RADIOLOGY:  [ ] Reviewed and interpreted by me    PULMONARY FUNCTION TESTS:    EKG: CHIEF COMPLAINT: Patient is a 53y old  Male who presents with a chief complaint of ARDS?, sepsis (17 Jan 2019 11:12)      Interval Events: none     REVIEW OF SYSTEMS:  Constitutional: No fever /pain   Eyes:  ENT:  CV: Denies   Resp: Denies   GI: Denies   [x ] All other systems negative      OBJECTIVE:  ICU Vital Signs Last 24 Hrs  T(C): 37.2 (17 Jan 2019 05:00), Max: 37.9 (16 Jan 2019 21:15)  T(F): 99 (17 Jan 2019 05:00), Max: 100.3 (16 Jan 2019 21:15)  HR: 108 (17 Jan 2019 06:50) (106 - 114)  BP: 136/79 (17 Jan 2019 05:00) (109/75 - 136/79)  BP(mean): --  ABP: --  ABP(mean): --  RR: 20 (17 Jan 2019 05:00) (19 - 28)  SpO2: 96% (17 Jan 2019 06:50) (95% - 100%)    Mode: AC/ CMV (Assist Control/ Continuous Mandatory Ventilation), RR (machine): 14, TV (machine): 350, FiO2: 40, PEEP: 5, ITime: 1, MAP: 10, PIP: 18    01-16 @ 07:01  -  01-17 @ 07:00  --------------------------------------------------------  IN: 1445 mL / OUT: 1500 mL / NET: -55 mL      CAPILLARY BLOOD GLUCOSE      POCT Blood Glucose.: 122 mg/dL (17 Jan 2019 05:02)      PHYSICAL EXAM:  General:   HEENT:   Lymph Nodes:  Neck:   Respiratory:   Cardiovascular:   Abdomen:   Extremities:   Skin:   Neurological:  Psychiatry:    HOSPITAL MEDICATIONS:  MEDICATIONS  (STANDING):  cefTAZidime/avibactam IVPB 2.5 Gram(s) IV Intermittent every 8 hours  cefTAZidime/avibactam IVPB      chlorhexidine 4% Liquid 1 Application(s) Topical <User Schedule>  cyanocobalamin 1000 MICROGram(s) Oral daily  dextrose 5%. 1000 milliLiter(s) (50 mL/Hr) IV Continuous <Continuous>  dextrose 50% Injectable 12.5 Gram(s) IV Push once  dextrose 50% Injectable 25 Gram(s) IV Push once  dextrose 50% Injectable 25 Gram(s) IV Push once  folic acid 1 milliGRAM(s) Enteral Tube daily  heparin  Injectable 5000 Unit(s) SubCutaneous every 8 hours  hydrocortisone sodium succinate Injectable 10 milliGRAM(s) IV Push every 8 hours  insulin lispro (HumaLOG) corrective regimen sliding scale   SubCutaneous every 6 hours  lacosamide Solution 100 milliGRAM(s) Oral two times a day  metroNIDAZOLE  IVPB      metroNIDAZOLE  IVPB 500 milliGRAM(s) IV Intermittent every 8 hours  midodrine 5 milliGRAM(s) Oral three times a day  QUEtiapine 25 milliGRAM(s) Oral daily  risperiDONE   Solution 1 milliGRAM(s) Oral daily    MEDICATIONS  (PRN):  acetaminophen    Suspension .. 650 milliGRAM(s) Oral every 6 hours PRN Temp greater or equal to 38C (100.4F), Mild Pain (1 - 3), Moderate Pain (4 - 6)  dextrose 40% Gel 15 Gram(s) Oral once PRN Blood Glucose LESS THAN 70 milliGRAM(s)/deciliter  glucagon  Injectable 1 milliGRAM(s) IntraMuscular once PRN Glucose LESS THAN 70 milligrams/deciliter      LABS:                        8.6    5.05  )-----------( 234      ( 17 Jan 2019 05:30 )             27.6     01-17    134<L>  |  100  |  5<L>  ----------------------------<  107<H>  4.0   |  26  |  < 0.20<L>    Ca    7.5<L>      17 Jan 2019 05:30  Phos  2.7     01-17  Mg     1.8     01-17    TPro  5.4<L>  /  Alb  1.8<L>  /  TBili  0.3  /  DBili  x   /  AST  9   /  ALT  8   /  AlkPhos  124<H>  01-17              MICROBIOLOGY:     RADIOLOGY:  [ ] Reviewed and interpreted by me    PULMONARY FUNCTION TESTS:    EKG: CHIEF COMPLAINT: Patient is a 53y old  Male who presents with a chief complaint of ARDS?, sepsis (17 Jan 2019 11:12)      Interval Events: none     REVIEW OF SYSTEMS:  Constitutional: No fever /pain   Eyes:  ENT:  CV: Denies   Resp: Denies   GI: Denies   [x ] All other systems negative      OBJECTIVE:  ICU Vital Signs Last 24 Hrs  T(C): 37.2 (17 Jan 2019 05:00), Max: 37.9 (16 Jan 2019 21:15)  T(F): 99 (17 Jan 2019 05:00), Max: 100.3 (16 Jan 2019 21:15)  HR: 108 (17 Jan 2019 06:50) (106 - 114)  BP: 136/79 (17 Jan 2019 05:00) (109/75 - 136/79)  BP(mean): --  ABP: --  ABP(mean): --  RR: 20 (17 Jan 2019 05:00) (19 - 28)  SpO2: 96% (17 Jan 2019 06:50) (95% - 100%)    Mode: AC/ CMV (Assist Control/ Continuous Mandatory Ventilation), RR (machine): 14, TV (machine): 350, FiO2: 40, PEEP: 5, ITime: 1, MAP: 10, PIP: 18    01-16 @ 07:01  -  01-17 @ 07:00  --------------------------------------------------------  IN: 1445 mL / OUT: 1500 mL / NET: -55 mL      CAPILLARY BLOOD GLUCOSE      POCT Blood Glucose.: 122 mg/dL (17 Jan 2019 05:02)          HOSPITAL MEDICATIONS:  MEDICATIONS  (STANDING):  cefTAZidime/avibactam IVPB 2.5 Gram(s) IV Intermittent every 8 hours  cefTAZidime/avibactam IVPB      chlorhexidine 4% Liquid 1 Application(s) Topical <User Schedule>  cyanocobalamin 1000 MICROGram(s) Oral daily  dextrose 5%. 1000 milliLiter(s) (50 mL/Hr) IV Continuous <Continuous>  dextrose 50% Injectable 12.5 Gram(s) IV Push once  dextrose 50% Injectable 25 Gram(s) IV Push once  dextrose 50% Injectable 25 Gram(s) IV Push once  folic acid 1 milliGRAM(s) Enteral Tube daily  heparin  Injectable 5000 Unit(s) SubCutaneous every 8 hours  hydrocortisone sodium succinate Injectable 10 milliGRAM(s) IV Push every 8 hours  insulin lispro (HumaLOG) corrective regimen sliding scale   SubCutaneous every 6 hours  lacosamide Solution 100 milliGRAM(s) Oral two times a day  metroNIDAZOLE  IVPB      metroNIDAZOLE  IVPB 500 milliGRAM(s) IV Intermittent every 8 hours  midodrine 5 milliGRAM(s) Oral three times a day  QUEtiapine 25 milliGRAM(s) Oral daily  risperiDONE   Solution 1 milliGRAM(s) Oral daily    MEDICATIONS  (PRN):  acetaminophen    Suspension .. 650 milliGRAM(s) Oral every 6 hours PRN Temp greater or equal to 38C (100.4F), Mild Pain (1 - 3), Moderate Pain (4 - 6)  dextrose 40% Gel 15 Gram(s) Oral once PRN Blood Glucose LESS THAN 70 milliGRAM(s)/deciliter  glucagon  Injectable 1 milliGRAM(s) IntraMuscular once PRN Glucose LESS THAN 70 milligrams/deciliter      LABS:                        8.6    5.05  )-----------( 234      ( 17 Jan 2019 05:30 )             27.6     01-17    134<L>  |  100  |  5<L>  ----------------------------<  107<H>  4.0   |  26  |  < 0.20<L>    Ca    7.5<L>      17 Jan 2019 05:30  Phos  2.7     01-17  Mg     1.8     01-17    TPro  5.4<L>  /  Alb  1.8<L>  /  TBili  0.3  /  DBili  x   /  AST  9   /  ALT  8   /  AlkPhos  124<H>  01-17              MICROBIOLOGY:     RADIOLOGY:  [ ] Reviewed and interpreted by me    PULMONARY FUNCTION TESTS:    EKG:

## 2019-01-17 NOTE — PROGRESS NOTE ADULT - SUBJECTIVE AND OBJECTIVE BOX
Chief Complaint:  Patient is a 53y old  Male who presents with a chief complaint of ARDS?, sepsis (2019 12:50)      Interval Events:   CTAP pending    Allergies:  Valproate Sodium (Other (Severe))      Home Medications:    Hospital Medications:  acetaminophen    Suspension .. 650 milliGRAM(s) Oral every 6 hours PRN  cefTAZidime/avibactam IVPB 2.5 Gram(s) IV Intermittent every 8 hours  cefTAZidime/avibactam IVPB      chlorhexidine 4% Liquid 1 Application(s) Topical <User Schedule>  cyanocobalamin 1000 MICROGram(s) Oral daily  dextrose 40% Gel 15 Gram(s) Oral once PRN  dextrose 5%. 1000 milliLiter(s) IV Continuous <Continuous>  dextrose 50% Injectable 12.5 Gram(s) IV Push once  dextrose 50% Injectable 25 Gram(s) IV Push once  dextrose 50% Injectable 25 Gram(s) IV Push once  folic acid 1 milliGRAM(s) Enteral Tube daily  glucagon  Injectable 1 milliGRAM(s) IntraMuscular once PRN  heparin  Injectable 5000 Unit(s) SubCutaneous every 8 hours  hydrocortisone sodium succinate Injectable 10 milliGRAM(s) IV Push every 8 hours  insulin lispro (HumaLOG) corrective regimen sliding scale   SubCutaneous every 6 hours  lacosamide Solution 100 milliGRAM(s) Oral two times a day  metroNIDAZOLE  IVPB      metroNIDAZOLE  IVPB 500 milliGRAM(s) IV Intermittent every 8 hours  midodrine 5 milliGRAM(s) Oral three times a day  QUEtiapine 25 milliGRAM(s) Oral daily  risperiDONE   Solution 1 milliGRAM(s) Oral daily      PMHX/PSHX:  Seizure  TBI (traumatic brain injury)  S/P percutaneous endoscopic gastrostomy (PEG) tube placement  No significant past surgical history      Family history:      ROS:     General:  No wt loss, fevers, chills, night sweats, fatigue,   Eyes:  Good vision, no reported pain  ENT:  No sore throat, pain, runny nose, dysphagia  CV:  No pain, palpitations, hypo/hypertension  Resp:  No dyspnea, cough, tachypnea, wheezing  GI:  No pain, No nausea, No vomiting, No diarrhea, No constipation, No weight loss, No fever, No pruritis, No rectal bleeding, No tarry stools, No dysphagia,  :  No pain, bleeding, incontinence, nocturia  Muscle:  No pain, weakness  Neuro:  No weakness, tingling, memory problems  Psych:  No fatigue, insomnia, mood problems, depression  Endocrine:  No polyuria, polydipsia, cold/heat intolerance  Heme:  No petechiae, ecchymosis, easy bruisability  Skin:  No rash, tattoos, scars, edema      PHYSICAL EXAM:   Vital Signs:  Vital Signs Last 24 Hrs  T(C): 37.2 (2019 05:00), Max: 37.9 (2019 21:15)  T(F): 99 (2019 05:00), Max: 100.3 (2019 21:15)  HR: 108 (2019 06:50) (106 - 114)  BP: 136/79 (2019 05:00) (109/75 - 136/79)  BP(mean): --  RR: 20 (2019 05:00) (19 - 28)  SpO2: 96% (2019 06:50) (95% - 100%)  Daily     Daily Weight in k (2019 05:00)    GENERAL:  Appears stated age,  no distress  HEENT:  NC/AT,  conjunctivae clear, sclera -anicteric  CHEST:  Full & symmetric excursion, no increased effort, breath sounds clear  HEART:  Regular rhythm, S1, S2, no added sounds  ABDOMEN:  Soft, non-tender, non-distended, normoactive bowel sounds  NEURO:  Can  not assess completely because of overall mental status but looks comfortable,  trach and vent      LABS:                        8.6    5.05  )-----------( 234      ( 2019 05:30 )             27.6     17    134<L>  |  100  |  5<L>  ----------------------------<  107<H>  4.0   |  26  |  < 0.20<L>    Ca    7.5<L>      2019 05:30  Phos  2.7       Mg     1.8         TPro  5.4<L>  /  Alb  1.8<L>  /  TBili  0.3  /  DBili  x   /  AST  9   /  ALT  8   /  AlkPhos  124<H>  17    LIVER FUNCTIONS - ( 2019 05:30 )  Alb: 1.8 g/dL / Pro: 5.4 g/dL / ALK PHOS: 124 u/L / ALT: 8 u/L / AST: 9 u/L / GGT: x                   Imaging: Chief Complaint:  Patient is a 53y old  Male who presents with a chief complaint of ARDS?, sepsis (2019 12:50)      Interval Events:   Plan for CTAP today.  No other events overnight.    Allergies:  Valproate Sodium (Other (Severe))      Home Medications:    Hospital Medications:  acetaminophen    Suspension .. 650 milliGRAM(s) Oral every 6 hours PRN  cefTAZidime/avibactam IVPB 2.5 Gram(s) IV Intermittent every 8 hours  cefTAZidime/avibactam IVPB      chlorhexidine 4% Liquid 1 Application(s) Topical <User Schedule>  cyanocobalamin 1000 MICROGram(s) Oral daily  dextrose 40% Gel 15 Gram(s) Oral once PRN  dextrose 5%. 1000 milliLiter(s) IV Continuous <Continuous>  dextrose 50% Injectable 12.5 Gram(s) IV Push once  dextrose 50% Injectable 25 Gram(s) IV Push once  dextrose 50% Injectable 25 Gram(s) IV Push once  folic acid 1 milliGRAM(s) Enteral Tube daily  glucagon  Injectable 1 milliGRAM(s) IntraMuscular once PRN  heparin  Injectable 5000 Unit(s) SubCutaneous every 8 hours  hydrocortisone sodium succinate Injectable 10 milliGRAM(s) IV Push every 8 hours  insulin lispro (HumaLOG) corrective regimen sliding scale   SubCutaneous every 6 hours  lacosamide Solution 100 milliGRAM(s) Oral two times a day  metroNIDAZOLE  IVPB      metroNIDAZOLE  IVPB 500 milliGRAM(s) IV Intermittent every 8 hours  midodrine 5 milliGRAM(s) Oral three times a day  QUEtiapine 25 milliGRAM(s) Oral daily  risperiDONE   Solution 1 milliGRAM(s) Oral daily      PMHX/PSHX:  Seizure  TBI (traumatic brain injury)  S/P percutaneous endoscopic gastrostomy (PEG) tube placement  No significant past surgical history      Family history:      ROS:     General:  No wt loss, fevers, chills, night sweats, fatigue,   Eyes:  Good vision, no reported pain  ENT:  No sore throat, pain, runny nose, dysphagia  CV:  No pain, palpitations, hypo/hypertension  Resp:  No dyspnea, cough, tachypnea, wheezing  GI:  No pain, No nausea, No vomiting, No diarrhea, No constipation, No weight loss, No fever, No pruritis, No rectal bleeding, No tarry stools, No dysphagia,  :  No pain, bleeding, incontinence, nocturia  Muscle:  No pain, weakness  Neuro:  No weakness, tingling, memory problems  Psych:  No fatigue, insomnia, mood problems, depression  Endocrine:  No polyuria, polydipsia, cold/heat intolerance  Heme:  No petechiae, ecchymosis, easy bruisability  Skin:  No rash, tattoos, scars, edema      PHYSICAL EXAM:   Vital Signs:  Vital Signs Last 24 Hrs  T(C): 37.2 (2019 05:00), Max: 37.9 (2019 21:15)  T(F): 99 (2019 05:00), Max: 100.3 (2019 21:15)  HR: 108 (2019 06:50) (106 - 114)  BP: 136/79 (2019 05:00) (109/75 - 136/79)  BP(mean): --  RR: 20 (2019 05:00) (19 - 28)  SpO2: 96% (2019 06:50) (95% - 100%)  Daily     Daily Weight in k (2019 05:00)    GENERAL:  Appears stated age,  no distress  HEENT:  NC/AT,  conjunctivae clear, sclera -anicteric  CHEST:  Full & symmetric excursion, no increased effort, breath sounds clear  HEART:  Regular rhythm, S1, S2, no added sounds  ABDOMEN:  Soft, non-tender, non-distended, normoactive bowel sounds  NEURO:  Can  not assess completely because of overall mental status but looks comfortable,  trach and vent      LABS:                        8.6    5.05  )-----------( 234      ( 2019 05:30 )             27.6         134<L>  |  100  |  5<L>  ----------------------------<  107<H>  4.0   |  26  |  < 0.20<L>    Ca    7.5<L>      2019 05:30  Phos  2.7       Mg     1.8         TPro  5.4<L>  /  Alb  1.8<L>  /  TBili  0.3  /  DBili  x   /  AST  9   /  ALT  8   /  AlkPhos  124<H>      LIVER FUNCTIONS - ( 2019 05:30 )  Alb: 1.8 g/dL / Pro: 5.4 g/dL / ALK PHOS: 124 u/L / ALT: 8 u/L / AST: 9 u/L / GGT: x                   Imaging:

## 2019-01-17 NOTE — PROGRESS NOTE ADULT - ASSESSMENT
53  M (resident of [facility]) with TBI, s/p PEG tube, contracture, and seizure d/o who presented as a transfer from [facility] on 12/9 for respiratory failure 2/2 ARDS likely 2/2 necrotizing pancreatitis s/p intubation.  Imipenem 12/28/18 - continued   bronc 12/11 with pseudomonas  pt with thick secretions and again febrile, sputum cx with  acinetobacter  repeat Ct with increased size of pancreatic collection but not walled off so no interventions were recommended  also had GIB and colitis due to ?contiguous necrotizing pancreatitis vs ischemic, s/p colonoscopy but not actively bleeding now  completed a 7 day course of avycaz and flagyl for the acinetobacter then switched back to imipenem, but pt again had hypothermia with the same acinetobacter in sputum now s/p trach and back on avycaz + flagyl    sepsis with fever, leukopenia resolved, extubated but reintubated 1/6/19 for hypoxia and poor cough along with profuse secretions and sputum cx again with acinetobacter    Necrotising Pancreatitis with multiple peripancreatitis collections not walled off yet   acinetobacter PNA  with lung abscesses  coag neg staph bacteremia likely contaminant, repeat negative  colitis, ?due to contiguous necrotizing pancreatitis and collection vs ischemic    no plans for IR embolization for surgical interventions      * s/p 7 days of avcaz and flagyl for  acinetobacter in the sputum 12/22-12/28  then back on imipenem for necrotizing pancreatis  * restarted on  avycaz and flagyl to cover the acinetobacter again 1/9, now day 9, not hypothermic anymore, improved respiratory status  * will c/w avycaz for at least 2 weeks for the pneumonia and lung abscesses  * repeat CT chest/abdomen 1/8 showed unchanged acute necrotizing pancreatitis with acute collections and multiple probable lung abscesses, probable colitis  * no plan for endoscopic drainage yet, repeat CT again

## 2019-01-17 NOTE — PROGRESS NOTE ADULT - ASSESSMENT
53 Male w/ a PMHx of TBI (s/p PEG tube, contracture, and seizure d/o) presented as a transfer from [facility] on 12/9 for respiratory failure 2/2 ARDS likely 2/2 necrotizing pancreatitis s/p intubation. Course c/b Acinetobacter PNA s/p Avycaz and Flagyl 7 day course.  Course further complicated by acute blood loss anemia s/p colonoscopy with findings suggestive of ischemic colitis, without further bleeding and Hgb remaining stable. S/p extubation 1/3, and re-intubation 1/6 now s/p tracheostomy.

## 2019-01-18 LAB
ANION GAP SERPL CALC-SCNC: 9 MMO/L — SIGNIFICANT CHANGE UP (ref 7–14)
BASOPHILS # BLD AUTO: 0.01 K/UL — SIGNIFICANT CHANGE UP (ref 0–0.2)
BASOPHILS NFR BLD AUTO: 0.2 % — SIGNIFICANT CHANGE UP (ref 0–2)
BUN SERPL-MCNC: 4 MG/DL — LOW (ref 7–23)
CALCIUM SERPL-MCNC: 7.8 MG/DL — LOW (ref 8.4–10.5)
CHLORIDE SERPL-SCNC: 101 MMOL/L — SIGNIFICANT CHANGE UP (ref 98–107)
CO2 SERPL-SCNC: 27 MMOL/L — SIGNIFICANT CHANGE UP (ref 22–31)
CREAT SERPL-MCNC: < 0.2 MG/DL — LOW (ref 0.5–1.3)
EOSINOPHIL # BLD AUTO: 0.01 K/UL — SIGNIFICANT CHANGE UP (ref 0–0.5)
EOSINOPHIL NFR BLD AUTO: 0.2 % — SIGNIFICANT CHANGE UP (ref 0–6)
GLUCOSE BLDC GLUCOMTR-MCNC: 106 MG/DL — HIGH (ref 70–99)
GLUCOSE BLDC GLUCOMTR-MCNC: 112 MG/DL — HIGH (ref 70–99)
GLUCOSE BLDC GLUCOMTR-MCNC: 96 MG/DL — SIGNIFICANT CHANGE UP (ref 70–99)
GLUCOSE BLDC GLUCOMTR-MCNC: 98 MG/DL — SIGNIFICANT CHANGE UP (ref 70–99)
GLUCOSE SERPL-MCNC: 122 MG/DL — HIGH (ref 70–99)
HCT VFR BLD CALC: 28.2 % — LOW (ref 39–50)
HGB BLD-MCNC: 8.8 G/DL — LOW (ref 13–17)
IMM GRANULOCYTES NFR BLD AUTO: 0.3 % — SIGNIFICANT CHANGE UP (ref 0–1.5)
LYMPHOCYTES # BLD AUTO: 0.93 K/UL — LOW (ref 1–3.3)
LYMPHOCYTES # BLD AUTO: 15.1 % — SIGNIFICANT CHANGE UP (ref 13–44)
MAGNESIUM SERPL-MCNC: 1.8 MG/DL — SIGNIFICANT CHANGE UP (ref 1.6–2.6)
MCHC RBC-ENTMCNC: 31.2 % — LOW (ref 32–36)
MCHC RBC-ENTMCNC: 32.1 PG — SIGNIFICANT CHANGE UP (ref 27–34)
MCV RBC AUTO: 102.9 FL — HIGH (ref 80–100)
MONOCYTES # BLD AUTO: 0.31 K/UL — SIGNIFICANT CHANGE UP (ref 0–0.9)
MONOCYTES NFR BLD AUTO: 5 % — SIGNIFICANT CHANGE UP (ref 2–14)
NEUTROPHILS # BLD AUTO: 4.87 K/UL — SIGNIFICANT CHANGE UP (ref 1.8–7.4)
NEUTROPHILS NFR BLD AUTO: 79.2 % — HIGH (ref 43–77)
NRBC # FLD: 0 K/UL — LOW (ref 25–125)
PHOSPHATE SERPL-MCNC: 3.1 MG/DL — SIGNIFICANT CHANGE UP (ref 2.5–4.5)
PLATELET # BLD AUTO: 249 K/UL — SIGNIFICANT CHANGE UP (ref 150–400)
PMV BLD: 10.2 FL — SIGNIFICANT CHANGE UP (ref 7–13)
POTASSIUM SERPL-MCNC: 3.8 MMOL/L — SIGNIFICANT CHANGE UP (ref 3.5–5.3)
POTASSIUM SERPL-SCNC: 3.8 MMOL/L — SIGNIFICANT CHANGE UP (ref 3.5–5.3)
RBC # BLD: 2.74 M/UL — LOW (ref 4.2–5.8)
RBC # FLD: 20.1 % — HIGH (ref 10.3–14.5)
SODIUM SERPL-SCNC: 137 MMOL/L — SIGNIFICANT CHANGE UP (ref 135–145)
WBC # BLD: 6.15 K/UL — SIGNIFICANT CHANGE UP (ref 3.8–10.5)
WBC # FLD AUTO: 6.15 K/UL — SIGNIFICANT CHANGE UP (ref 3.8–10.5)

## 2019-01-18 PROCEDURE — 99232 SBSQ HOSP IP/OBS MODERATE 35: CPT | Mod: GC

## 2019-01-18 PROCEDURE — 93010 ELECTROCARDIOGRAM REPORT: CPT

## 2019-01-18 RX ORDER — HEPARIN SODIUM 5000 [USP'U]/ML
5000 INJECTION INTRAVENOUS; SUBCUTANEOUS EVERY 8 HOURS
Qty: 0 | Refills: 0 | Status: COMPLETED | OUTPATIENT
Start: 2019-01-18 | End: 2019-02-14

## 2019-01-18 RX ADMIN — Medication 10 MILLIGRAM(S): at 22:38

## 2019-01-18 RX ADMIN — MIDODRINE HYDROCHLORIDE 5 MILLIGRAM(S): 2.5 TABLET ORAL at 05:21

## 2019-01-18 RX ADMIN — Medication 650 MILLIGRAM(S): at 02:20

## 2019-01-18 RX ADMIN — LACOSAMIDE 100 MILLIGRAM(S): 50 TABLET ORAL at 05:21

## 2019-01-18 RX ADMIN — Medication 10 MILLIGRAM(S): at 15:06

## 2019-01-18 RX ADMIN — Medication 1 MILLIGRAM(S): at 12:23

## 2019-01-18 RX ADMIN — Medication 650 MILLIGRAM(S): at 02:50

## 2019-01-18 RX ADMIN — LACOSAMIDE 100 MILLIGRAM(S): 50 TABLET ORAL at 18:32

## 2019-01-18 RX ADMIN — Medication 100 MILLIGRAM(S): at 12:23

## 2019-01-18 RX ADMIN — Medication 650 MILLIGRAM(S): at 19:11

## 2019-01-18 RX ADMIN — Medication 10 MILLIGRAM(S): at 05:20

## 2019-01-18 RX ADMIN — Medication 650 MILLIGRAM(S): at 18:41

## 2019-01-18 RX ADMIN — RISPERIDONE 1 MILLIGRAM(S): 4 TABLET ORAL at 12:23

## 2019-01-18 RX ADMIN — HEPARIN SODIUM 5000 UNIT(S): 5000 INJECTION INTRAVENOUS; SUBCUTANEOUS at 05:21

## 2019-01-18 RX ADMIN — CHLORHEXIDINE GLUCONATE 1 APPLICATION(S): 213 SOLUTION TOPICAL at 10:39

## 2019-01-18 RX ADMIN — Medication 100 MILLIGRAM(S): at 05:21

## 2019-01-18 RX ADMIN — PREGABALIN 1000 MICROGRAM(S): 225 CAPSULE ORAL at 12:23

## 2019-01-18 RX ADMIN — QUETIAPINE FUMARATE 25 MILLIGRAM(S): 200 TABLET, FILM COATED ORAL at 12:23

## 2019-01-18 RX ADMIN — HEPARIN SODIUM 5000 UNIT(S): 5000 INJECTION INTRAVENOUS; SUBCUTANEOUS at 22:38

## 2019-01-18 RX ADMIN — MIDODRINE HYDROCHLORIDE 5 MILLIGRAM(S): 2.5 TABLET ORAL at 22:38

## 2019-01-18 RX ADMIN — Medication 100 MILLIGRAM(S): at 22:34

## 2019-01-18 NOTE — PROGRESS NOTE ADULT - SUBJECTIVE AND OBJECTIVE BOX
CHIEF COMPLAINT:    Interval Events:    REVIEW OF SYSTEMS:  Constitutional:   Eyes:  ENT:  CV:  Resp:  GI:  :  MSK:  Integumentary:  Neurological:  Psychiatric:  Endocrine:  Hematologic/Lymphatic:  Allergic/Immunologic:  [ ] All other systems negative  [ ] Unable to assess ROS because ________    OBJECTIVE:  ICU Vital Signs Last 24 Hrs  T(C): 37.3 (18 Jan 2019 05:13), Max: 38.1 (18 Jan 2019 01:40)  T(F): 99.1 (18 Jan 2019 05:13), Max: 100.6 (18 Jan 2019 01:40)  HR: 108 (18 Jan 2019 07:52) (100 - 114)  BP: 98/65 (18 Jan 2019 05:13) (98/65 - 118/80)  BP(mean): --  ABP: --  ABP(mean): --  RR: 23 (18 Jan 2019 05:13) (20 - 29)  SpO2: 95% (18 Jan 2019 07:52) (94% - 98%)    Mode: standby    01-17 @ 07:01  -  01-18 @ 07:00  --------------------------------------------------------  IN: 780 mL / OUT: 1300 mL / NET: -520 mL      CAPILLARY BLOOD GLUCOSE      POCT Blood Glucose.: 106 mg/dL (18 Jan 2019 05:11)      PHYSICAL EXAM:  General:   HEENT:   Lymph Nodes:  Neck:   Respiratory:   Cardiovascular:   Abdomen:   Extremities:   Skin:   Neurological:  Psychiatry:    HOSPITAL MEDICATIONS:  MEDICATIONS  (STANDING):  cefTAZidime/avibactam IVPB 2.5 Gram(s) IV Intermittent every 8 hours  cefTAZidime/avibactam IVPB      chlorhexidine 4% Liquid 1 Application(s) Topical <User Schedule>  cyanocobalamin 1000 MICROGram(s) Oral daily  dextrose 5%. 1000 milliLiter(s) (50 mL/Hr) IV Continuous <Continuous>  dextrose 50% Injectable 12.5 Gram(s) IV Push once  dextrose 50% Injectable 25 Gram(s) IV Push once  dextrose 50% Injectable 25 Gram(s) IV Push once  folic acid 1 milliGRAM(s) Enteral Tube daily  hydrocortisone sodium succinate Injectable 10 milliGRAM(s) IV Push every 8 hours  insulin lispro (HumaLOG) corrective regimen sliding scale   SubCutaneous every 6 hours  lacosamide Solution 100 milliGRAM(s) Oral two times a day  metroNIDAZOLE  IVPB      metroNIDAZOLE  IVPB 500 milliGRAM(s) IV Intermittent every 8 hours  midodrine 5 milliGRAM(s) Oral three times a day  QUEtiapine 25 milliGRAM(s) Oral daily  risperiDONE   Solution 1 milliGRAM(s) Oral daily    MEDICATIONS  (PRN):  acetaminophen    Suspension .. 650 milliGRAM(s) Oral every 6 hours PRN Temp greater or equal to 38C (100.4F), Mild Pain (1 - 3), Moderate Pain (4 - 6)  dextrose 40% Gel 15 Gram(s) Oral once PRN Blood Glucose LESS THAN 70 milliGRAM(s)/deciliter  glucagon  Injectable 1 milliGRAM(s) IntraMuscular once PRN Glucose LESS THAN 70 milligrams/deciliter      LABS:                        8.8    6.15  )-----------( 249      ( 18 Jan 2019 02:25 )             28.2     01-18    137  |  101  |  4<L>  ----------------------------<  122<H>  3.8   |  27  |  < 0.20<L>    Ca    7.8<L>      18 Jan 2019 02:35  Phos  3.1     01-18  Mg     1.8     01-18    TPro  5.4<L>  /  Alb  1.8<L>  /  TBili  0.3  /  DBili  x   /  AST  9   /  ALT  8   /  AlkPhos  124<H>  01-17              MICROBIOLOGY:     RADIOLOGY:  [ ] Reviewed and interpreted by me    PULMONARY FUNCTION TESTS:    EKG: CHIEF COMPLAINT:   Patient is a 53y old  Male who presents with a chief complaint of ARDS?, sepsis (18 Jan 2019 08:53)      Interval Events: None     REVIEW OF SYSTEMS:  Constitutional: No pain /fever or chills   CV: Denies   Resp: Denies   GI: Denies   [ x] All other systems negative  ________    OBJECTIVE:  ICU Vital Signs Last 24 Hrs  T(C): 37.3 (18 Jan 2019 05:13), Max: 38.1 (18 Jan 2019 01:40)  T(F): 99.1 (18 Jan 2019 05:13), Max: 100.6 (18 Jan 2019 01:40)  HR: 108 (18 Jan 2019 07:52) (100 - 114)  BP: 98/65 (18 Jan 2019 05:13) (98/65 - 118/80)  BP(mean): --  ABP: --  ABP(mean): --  RR: 23 (18 Jan 2019 05:13) (20 - 29)  SpO2: 95% (18 Jan 2019 07:52) (94% - 98%)    Mode: standby    01-17 @ 07:01  -  01-18 @ 07:00  --------------------------------------------------------  IN: 780 mL / OUT: 1300 mL / NET: -520 mL      CAPILLARY BLOOD GLUCOSE      POCT Blood Glucose.: 106 mg/dL (18 Jan 2019 05:11)        HOSPITAL MEDICATIONS:  MEDICATIONS  (STANDING):  cefTAZidime/avibactam IVPB 2.5 Gram(s) IV Intermittent every 8 hours  cefTAZidime/avibactam IVPB      chlorhexidine 4% Liquid 1 Application(s) Topical <User Schedule>  cyanocobalamin 1000 MICROGram(s) Oral daily  dextrose 5%. 1000 milliLiter(s) (50 mL/Hr) IV Continuous <Continuous>  dextrose 50% Injectable 12.5 Gram(s) IV Push once  dextrose 50% Injectable 25 Gram(s) IV Push once  dextrose 50% Injectable 25 Gram(s) IV Push once  folic acid 1 milliGRAM(s) Enteral Tube daily  hydrocortisone sodium succinate Injectable 10 milliGRAM(s) IV Push every 8 hours  insulin lispro (HumaLOG) corrective regimen sliding scale   SubCutaneous every 6 hours  lacosamide Solution 100 milliGRAM(s) Oral two times a day  metroNIDAZOLE  IVPB      metroNIDAZOLE  IVPB 500 milliGRAM(s) IV Intermittent every 8 hours  midodrine 5 milliGRAM(s) Oral three times a day  QUEtiapine 25 milliGRAM(s) Oral daily  risperiDONE   Solution 1 milliGRAM(s) Oral daily    MEDICATIONS  (PRN):  acetaminophen    Suspension .. 650 milliGRAM(s) Oral every 6 hours PRN Temp greater or equal to 38C (100.4F), Mild Pain (1 - 3), Moderate Pain (4 - 6)  dextrose 40% Gel 15 Gram(s) Oral once PRN Blood Glucose LESS THAN 70 milliGRAM(s)/deciliter  glucagon  Injectable 1 milliGRAM(s) IntraMuscular once PRN Glucose LESS THAN 70 milligrams/deciliter      LABS:                        8.8    6.15  )-----------( 249      ( 18 Jan 2019 02:25 )             28.2     01-18    137  |  101  |  4<L>  ----------------------------<  122<H>  3.8   |  27  |  < 0.20<L>    Ca    7.8<L>      18 Jan 2019 02:35  Phos  3.1     01-18  Mg     1.8     01-18    TPro  5.4<L>  /  Alb  1.8<L>  /  TBili  0.3  /  DBili  x   /  AST  9   /  ALT  8   /  AlkPhos  124<H>  01-17              MICROBIOLOGY:     RADIOLOGY:  [ ] Reviewed and interpreted by me    PULMONARY FUNCTION TESTS:    EKG:

## 2019-01-18 NOTE — PROGRESS NOTE ADULT - PROBLEM SELECTOR PLAN 2
- s/p trach 1/9   - cont avycaz for now-- will obtain ID input for duration - prelim 2 weeks   - weaning from vent as tolerated, tolerating trach collar during the day  - trach care, suction PRN  - chest PT

## 2019-01-18 NOTE — PROGRESS NOTE ADULT - SUBJECTIVE AND OBJECTIVE BOX
GI following the patient for Necrotizing Pancreatitis with an evolving Walled Off Necrosis , CT scan done revealed a wall but still thin lining.     Interval Events: On tube feeds, tolerating it, CT reviewed with Radiology , wall not thick enough for AXIOS stent.     Allergies:  Valproate Sodium (Other (Severe))      Hospital Medications:  acetaminophen    Suspension .. 650 milliGRAM(s) Oral every 6 hours PRN  cefTAZidime/avibactam IVPB 2.5 Gram(s) IV Intermittent every 8 hours  cefTAZidime/avibactam IVPB      chlorhexidine 4% Liquid 1 Application(s) Topical <User Schedule>  cyanocobalamin 1000 MICROGram(s) Oral daily  dextrose 40% Gel 15 Gram(s) Oral once PRN  dextrose 5%. 1000 milliLiter(s) IV Continuous <Continuous>  dextrose 50% Injectable 12.5 Gram(s) IV Push once  dextrose 50% Injectable 25 Gram(s) IV Push once  dextrose 50% Injectable 25 Gram(s) IV Push once  folic acid 1 milliGRAM(s) Enteral Tube daily  glucagon  Injectable 1 milliGRAM(s) IntraMuscular once PRN  heparin  Injectable 5000 Unit(s) SubCutaneous every 8 hours  hydrocortisone sodium succinate Injectable 10 milliGRAM(s) IV Push every 8 hours  insulin lispro (HumaLOG) corrective regimen sliding scale   SubCutaneous every 6 hours  lacosamide Solution 100 milliGRAM(s) Oral two times a day  metroNIDAZOLE  IVPB      metroNIDAZOLE  IVPB 500 milliGRAM(s) IV Intermittent every 8 hours  midodrine 5 milliGRAM(s) Oral three times a day  QUEtiapine 25 milliGRAM(s) Oral daily  risperiDONE   Solution 1 milliGRAM(s) Oral daily      PMHX/PSHX:  Seizure  TBI (traumatic brain injury)  S/P percutaneous endoscopic gastrostomy (PEG) tube placement  No significant past surgical history      Family history:      ROS:   Can not assess as patient is trached, on vent. but looks comfortable and pleasant.       PHYSICAL EXAM:     GENERAL:  Appears stated age,  no distress  HEENT:  NC/AT,  conjunctivae clear, sclera -anicteric  CHEST:  Full & symmetric excursion, no increased effort, breath sounds clear  HEART:  Regular rhythm, S1, S2, no added sounds  ABDOMEN:  Soft, non-tender, non-distended, normoactive bowel sounds.  NEURO:  Alert, oriented    Vital Signs:  Vital Signs Last 24 Hrs  T(C): 37.4 (2019 15:08), Max: 38.1 (2019 01:40)  T(F): 99.3 (2019 15:08), Max: 100.6 (2019 01:40)  HR: 103 (2019 15:38) (100 - 120)  BP: 136/71 (2019 15:08) (98/65 - 149/88)  BP(mean): --  RR: 21 (2019 15:08) (21 - 29)  SpO2: 97% (2019 15:38) (93% - 97%)  Daily     Daily Weight in k.7 (2019 23:53)    LABS:                        8.8    6.15  )-----------( 249      ( 2019 02:25 )             28.2     18    137  |  101  |  4<L>  ----------------------------<  122<H>  3.8   |  27  |  < 0.20<L>    Ca    7.8<L>      2019 02:35  Phos  3.1       Mg     1.8         TPro  5.4<L>  /  Alb  1.8<L>  /  TBili  0.3  /  DBili  x   /  AST  9   /  ALT  8   /  AlkPhos  124<H>      LIVER FUNCTIONS - ( 2019 05:30 )  Alb: 1.8 g/dL / Pro: 5.4 g/dL / ALK PHOS: 124 u/L / ALT: 8 u/L / AST: 9 u/L / GGT: x                   Imaging: < from: CT Abdomen and Pelvis w/ IV Cont (19 @ 12:37) >  LOWER CHEST: Moderate bilateral pleural effusions and associated   atelectasis are unchanged.    LIVER: Within normal limits.  SPLEEN: Within normal limits.  PANCREAS: Extensive pancreatic necrosis involving portions of the tail,   body and neck, unchanged. Overall mild decrease in walled-offnecrosis   with the largest pocket measuring 8.2 x 4.6 cm in maximal AP and   transverse dimensions and located in the region of the pancreatic neck.  GALLBLADDER: Punctate focus of mural calcification of the gallbladder   fundus. Otherwise within normal limits.  BILE DUCTS: Normal caliber.  ADRENALS: Within normal limits.  KIDNEYS/URETERS: No mass, stone or hydronephrosis.    RETROPERITONEUM: No lymphadenopathy.    VESSELS:  Widely patent celiac and superior mesenteric arteries. No   evidence ofpseudoaneurysm. Splenic vein is not visualized, compatible   with thrombosis. The portal veins are patent.    BOWEL: Mural hyperenhancement involving the distal transverse and   descending colon compatible with colitis, unchanged. No obstruction.   Catheter in the rectum. Percutaneous gastrostomy with tip in the stomach.  PERITONEUM: Small amount of ascites, decreased. No pneumoperitoneum.      REPRODUCTIVE ORGANS: The prostate and seminal vesicles are within normal   limits.  BLADDER: Within normal limits.    ABDOMINAL WALL: Within normal limits.  BONES: No acute bony abnormality.

## 2019-01-18 NOTE — PROGRESS NOTE ADULT - ATTENDING COMMENTS
Patient seen and examined with GI fellow. I agree with the above A/P. Awaiting a mature wall prior to possible endoscopic intervention.

## 2019-01-18 NOTE — PROGRESS NOTE ADULT - PROBLEM SELECTOR PLAN 1
- enlarging per-pancreatic fluid collections   - GI note appreciated  - Repeat CT done - awaiting GI/ID recs   - cont ceftazidime/avibactam + fluid collection somewhat smaller   - abx for 14 days Avacyz   - Repeat CT done - wall around fluid collection still not mature - need fu in 2 weeks can be OP   - cont ceftazidime/avibactam  for 2 weeks

## 2019-01-18 NOTE — PROGRESS NOTE ADULT - ASSESSMENT
Impression  1) Walled off necrosis, but wall is still thin, CT reviewed with radiology   2) Hematochezia, s/p colonoscopy on 12/28/2018 with severe segmental colitis localized to the transverse colon and ascending colon (possible ischemic colitis, possible colitis related to contiguous danay-pancreatic inflammatory process). Biopsies with granulation tissue but no infection/malignancy  3) Resp failure in the setting of pneumonia, requiring tracheostomy     Recommendations  - Trend CBC, CMP and fever curve, Hb stable for now.   - given the CT abdomen showing walled off Necrosis with thin wall,  will repeat CT scan in 2 weeks ( can be outpatient ) to assess the size of the collection and maturity of wall for possible drainage via AXIOS.   - Continue antibiotics as per ID: on ceftazidime and metronidazole   - Rest of care per primary team  - Call GI as needed.

## 2019-01-18 NOTE — PROGRESS NOTE ADULT - ASSESSMENT
53 Male w/ a PMHx of TBI (s/p PEG tube, contracture, and seizure d/o) presented as a transfer from [facility] on 12/9 for respiratory failure 2/2 ARDS likely 2/2 necrotizing pancreatitis s/p intubation. Course c/b Acinetobacter PNA s/p Avycaz and Flagyl 7 day course.  Course further complicated by acute blood loss anemia s/p colonoscopy with findings suggestive of ischemic colitis, without further bleeding and Hgb remaining stable. S/p extubation 1/3, and re-intubation 1/6 now s/p tracheostomy.

## 2019-01-18 NOTE — PROGRESS NOTE ADULT - ASSESSMENT
53  M (resident of [facility]) with TBI, s/p PEG tube, contracture, and seizure d/o who presented as a transfer from [facility] on 12/9 for respiratory failure 2/2 ARDS likely 2/2 necrotizing pancreatitis s/p intubation.  Imipenem 12/28/18 - continued   bronc 12/11 with pseudomonas  pt with thick secretions and again febrile, sputum cx with  acinetobacter  repeat Ct with increased size of pancreatic collection but not walled off so no interventions were recommended  also had GIB and colitis due to ?contiguous necrotizing pancreatitis vs ischemic, s/p colonoscopy but not actively bleeding now  completed a 7 day course of avycaz and flagyl for the acinetobacter then switched back to imipenem, but pt again had hypothermia with the same acinetobacter in sputum now s/p trach and back on avycaz + flagyl    sepsis with fever, leukopenia resolved, extubated but reintubated 1/6/19 for hypoxia and poor cough along with profuse secretions and sputum cx again with acinetobacter    Necrotising Pancreatitis with multiple peripancreatitis collections not walled off yet   acinetobacter PNA  with lung abscesses  coag neg staph bacteremia likely contaminant, repeat negative  colitis, ?due to contiguous necrotizing pancreatitis and collection vs ischemic    no plans for IR embolization for surgical interventions      * s/p 7 days of avcaz and flagyl for  acinetobacter in the sputum 12/22-12/28  then back on imipenem for necrotizing pancreatis  * restarted on  avycaz and flagyl to cover the acinetobacter again 1/9, now day 10, not hypothermic anymore, improved respiratory status  * will c/w avycaz for at least 2 weeks for the pneumonia and lung abscesses  * repeat CT chest/abdomen 1/17 showed unchanged acute necrotizing pancreatitis with decrease in necrotic collections  * no plan for endoscopic drainage yet

## 2019-01-18 NOTE — PROGRESS NOTE ADULT - SUBJECTIVE AND OBJECTIVE BOX
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 650 milliGRAM(s) Oral every 6 hours PRN  chlorhexidine 4% Liquid 1 Application(s) Topical <User Schedule>  cyanocobalamin 1000 MICROGram(s) Oral daily  dextrose 40% Gel 15 Gram(s) Oral once PRN  dextrose 5%. 1000 milliLiter(s) IV Continuous <Continuous>  dextrose 50% Injectable 12.5 Gram(s) IV Push once  dextrose 50% Injectable 25 Gram(s) IV Push once  dextrose 50% Injectable 25 Gram(s) IV Push once  folic acid 1 milliGRAM(s) Enteral Tube daily  glucagon  Injectable 1 milliGRAM(s) IntraMuscular once PRN  heparin  Injectable 5000 Unit(s) SubCutaneous every 8 hours  hydrocortisone sodium succinate Injectable 10 milliGRAM(s) IV Push every 8 hours  insulin lispro (HumaLOG) corrective regimen sliding scale   SubCutaneous every 6 hours  lacosamide Solution 100 milliGRAM(s) Oral two times a day  midodrine 5 milliGRAM(s) Oral three times a day  QUEtiapine 25 milliGRAM(s) Oral daily  risperiDONE   Solution 1 milliGRAM(s) Oral daily      Vital Signs Last 24 Hrs  T(C): 37.4 (18 Jan 2019 15:08), Max: 38.1 (18 Jan 2019 01:40)  T(F): 99.3 (18 Jan 2019 15:08), Max: 100.6 (18 Jan 2019 01:40)  HR: 103 (18 Jan 2019 15:38) (100 - 120)  BP: 136/71 (18 Jan 2019 15:08) (98/65 - 149/88)  BP(mean): --  RR: 21 (18 Jan 2019 15:08) (21 - 29)  SpO2: 97% (18 Jan 2019 15:38) (93% - 97%)    Physical Exam:  General: NAD, non toxic  Head: atraumatic normocephalic  eyes: normal sclera and conjunctivae   ENT:   trach , no LAD  Cardiovascular: regular S1,S2  Respiratory:  trach, gargling sounds with secrestions  abd:  PEG tube, soft, bowel sounds present, nontender, rectal tube  :  no suprapubic tenderness, [place] cath    Musculoskeletal:   no joint swelling, contractures, RUE mostly, LUE edematous  Skin:    no rash  Neurologic: awake, answers some questions with nodding    Vascular: no phlebitis  Psych: unable to assess                           8.8    6.15  )-----------( 249      ( 18 Jan 2019 02:25 )             28.2       01-18    137  |  101  |  4<L>  ----------------------------<  122<H>  3.8   |  27  |  < 0.20<L>    Ca    7.8<L>      18 Jan 2019 02:35  Phos  3.1     01-18  Mg     1.8     01-18    TPro  5.4<L>  /  Alb  1.8<L>  /  TBili  0.3  /  DBili  x   /  AST  9   /  ALT  8   /  AlkPhos  124<H>  01-17          MICROBIOLOGY:  v  BLOOD PERIPHERAL  01-16-19 --  --  --      ENDOTRACHEAL SPECIMEN  01-06-19 --  --  Acin.baumannii/haemoly       BLOOD VENOUS  01-02-19 --  --  --      BLOOD PERIPHERAL  01-02-19 --  --  --      BLOOD PERIPHERAL  12-27-18 --  --  --      BLOOD VENOUS  12-24-18 --  --  --      BLOOD PERIPHERAL  12-24-18 --  --  --      BLOOD PERIPHERAL  12-23-18 --  --  --      BLOOD VENOUS  12-22-18 --  --  BLOOD CULTURE PCR  Staphylococcus sp.,coag neg                RADIOLOGY:  Images below reviewed personally  < from: CT Abdomen and Pelvis w/ IV Cont (01.17.19 @ 12:37) >  IMPRESSION:     Unchanged necrotizing pancreatitis with mild decrease in acute necrotic   collections.  Decreased amount of ascites.  Unchanged bilateral pleural effusions and atelectasis.  Unchanged colitis involving the distal transverse descending colon.

## 2019-01-19 LAB
GLUCOSE BLDC GLUCOMTR-MCNC: 102 MG/DL — HIGH (ref 70–99)
GLUCOSE BLDC GLUCOMTR-MCNC: 121 MG/DL — HIGH (ref 70–99)
GLUCOSE BLDC GLUCOMTR-MCNC: 124 MG/DL — HIGH (ref 70–99)
GLUCOSE BLDC GLUCOMTR-MCNC: 129 MG/DL — HIGH (ref 70–99)
SPECIMEN SOURCE: SIGNIFICANT CHANGE UP

## 2019-01-19 PROCEDURE — 99232 SBSQ HOSP IP/OBS MODERATE 35: CPT | Mod: GC

## 2019-01-19 RX ADMIN — Medication 100 MILLIGRAM(S): at 06:30

## 2019-01-19 RX ADMIN — Medication 1 MILLIGRAM(S): at 11:15

## 2019-01-19 RX ADMIN — Medication 10 MILLIGRAM(S): at 22:24

## 2019-01-19 RX ADMIN — LACOSAMIDE 100 MILLIGRAM(S): 50 TABLET ORAL at 07:40

## 2019-01-19 RX ADMIN — HEPARIN SODIUM 5000 UNIT(S): 5000 INJECTION INTRAVENOUS; SUBCUTANEOUS at 06:27

## 2019-01-19 RX ADMIN — QUETIAPINE FUMARATE 25 MILLIGRAM(S): 200 TABLET, FILM COATED ORAL at 11:14

## 2019-01-19 RX ADMIN — Medication 10 MILLIGRAM(S): at 13:03

## 2019-01-19 RX ADMIN — HEPARIN SODIUM 5000 UNIT(S): 5000 INJECTION INTRAVENOUS; SUBCUTANEOUS at 13:03

## 2019-01-19 RX ADMIN — LACOSAMIDE 100 MILLIGRAM(S): 50 TABLET ORAL at 18:23

## 2019-01-19 RX ADMIN — Medication 100 MILLIGRAM(S): at 22:22

## 2019-01-19 RX ADMIN — MIDODRINE HYDROCHLORIDE 5 MILLIGRAM(S): 2.5 TABLET ORAL at 06:30

## 2019-01-19 RX ADMIN — Medication 650 MILLIGRAM(S): at 16:00

## 2019-01-19 RX ADMIN — CHLORHEXIDINE GLUCONATE 1 APPLICATION(S): 213 SOLUTION TOPICAL at 11:14

## 2019-01-19 RX ADMIN — Medication 10 MILLIGRAM(S): at 06:29

## 2019-01-19 RX ADMIN — PREGABALIN 1000 MICROGRAM(S): 225 CAPSULE ORAL at 11:14

## 2019-01-19 RX ADMIN — Medication 100 MILLIGRAM(S): at 13:02

## 2019-01-19 RX ADMIN — HEPARIN SODIUM 5000 UNIT(S): 5000 INJECTION INTRAVENOUS; SUBCUTANEOUS at 22:24

## 2019-01-19 RX ADMIN — RISPERIDONE 1 MILLIGRAM(S): 4 TABLET ORAL at 11:14

## 2019-01-19 RX ADMIN — Medication 650 MILLIGRAM(S): at 14:45

## 2019-01-19 NOTE — PROGRESS NOTE ADULT - PROBLEM SELECTOR PLAN 1
+ fluid collection somewhat smaller   - abx for 14 days Avacyz   - Repeat CT done - wall around fluid collection still not mature - need fu in 2 weeks can be OP   - cont ceftazidime/avibactam  for 2 weeks

## 2019-01-19 NOTE — PROGRESS NOTE ADULT - SUBJECTIVE AND OBJECTIVE BOX
CHIEF COMPLAINT:    Interval Events:    REVIEW OF SYSTEMS:  Constitutional:   Eyes:  ENT:  CV:  Resp:  GI:  :  MSK:  Integumentary:  Neurological:  Psychiatric:  Endocrine:  Hematologic/Lymphatic:  Allergic/Immunologic:  [ ] All other systems negative  [ ] Unable to assess ROS because ________    OBJECTIVE:  ICU Vital Signs Last 24 Hrs  T(C): 37.2 (19 Jan 2019 06:23), Max: 38.3 (18 Jan 2019 18:30)  T(F): 98.9 (19 Jan 2019 06:23), Max: 100.9 (18 Jan 2019 18:30)  HR: 118 (19 Jan 2019 07:26) (98 - 125)  BP: 105/64 (19 Jan 2019 06:23) (99/65 - 149/88)  BP(mean): --  ABP: --  ABP(mean): --  RR: 24 (19 Jan 2019 06:23) (20 - 24)  SpO2: 94% (19 Jan 2019 07:26) (93% - 97%)    Mode: AC/ CMV (Assist Control/ Continuous Mandatory Ventilation), RR (machine): 14, TV (machine): 350, FiO2: 40, PEEP: 5, MAP: 8, PIP: 18    01-18 @ 07:01  -  01-19 @ 07:00  --------------------------------------------------------  IN: 650 mL / OUT: 0 mL / NET: 650 mL      CAPILLARY BLOOD GLUCOSE      POCT Blood Glucose.: 124 mg/dL (19 Jan 2019 06:01)      PHYSICAL EXAM:  General:   HEENT:   Lymph Nodes:  Neck:   Respiratory:   Cardiovascular:   Abdomen:   Extremities:   Skin:   Neurological:  Psychiatry:    HOSPITAL MEDICATIONS:  MEDICATIONS  (STANDING):  cefTAZidime/avibactam IVPB 2.5 Gram(s) IV Intermittent every 8 hours  cefTAZidime/avibactam IVPB      chlorhexidine 4% Liquid 1 Application(s) Topical <User Schedule>  cyanocobalamin 1000 MICROGram(s) Oral daily  dextrose 5%. 1000 milliLiter(s) (50 mL/Hr) IV Continuous <Continuous>  dextrose 50% Injectable 12.5 Gram(s) IV Push once  dextrose 50% Injectable 25 Gram(s) IV Push once  dextrose 50% Injectable 25 Gram(s) IV Push once  folic acid 1 milliGRAM(s) Enteral Tube daily  heparin  Injectable 5000 Unit(s) SubCutaneous every 8 hours  hydrocortisone sodium succinate Injectable 10 milliGRAM(s) IV Push every 8 hours  insulin lispro (HumaLOG) corrective regimen sliding scale   SubCutaneous every 6 hours  lacosamide Solution 100 milliGRAM(s) Oral two times a day  metroNIDAZOLE  IVPB      metroNIDAZOLE  IVPB 500 milliGRAM(s) IV Intermittent every 8 hours  midodrine 5 milliGRAM(s) Oral three times a day  QUEtiapine 25 milliGRAM(s) Oral daily  risperiDONE   Solution 1 milliGRAM(s) Oral daily    MEDICATIONS  (PRN):  acetaminophen    Suspension .. 650 milliGRAM(s) Oral every 6 hours PRN Temp greater or equal to 38C (100.4F), Mild Pain (1 - 3), Moderate Pain (4 - 6)  dextrose 40% Gel 15 Gram(s) Oral once PRN Blood Glucose LESS THAN 70 milliGRAM(s)/deciliter  glucagon  Injectable 1 milliGRAM(s) IntraMuscular once PRN Glucose LESS THAN 70 milligrams/deciliter      LABS:                        8.8    6.15  )-----------( 249      ( 18 Jan 2019 02:25 )             28.2     01-18    137  |  101  |  4<L>  ----------------------------<  122<H>  3.8   |  27  |  < 0.20<L>    Ca    7.8<L>      18 Jan 2019 02:35  Phos  3.1     01-18  Mg     1.8     01-18                MICROBIOLOGY:     RADIOLOGY:  [ ] Reviewed and interpreted by me    PULMONARY FUNCTION TESTS:    EKG: CHIEF COMPLAINT: Patient is a 53y old  Male who presents with a chief complaint of ARDS?, sepsis (19 Jan 2019 08:27)      Interval Events: none     REVIEW OF SYSTEMS:  Constitutional: no fever or pain   ENT: Denies   CV: Denies   Resp: Denies   GI: Denies   [x ] All other systems negative      OBJECTIVE:  ICU Vital Signs Last 24 Hrs  T(C): 37.2 (19 Jan 2019 06:23), Max: 38.3 (18 Jan 2019 18:30)  T(F): 98.9 (19 Jan 2019 06:23), Max: 100.9 (18 Jan 2019 18:30)  HR: 118 (19 Jan 2019 07:26) (98 - 125)  BP: 105/64 (19 Jan 2019 06:23) (99/65 - 149/88)  BP(mean): --  ABP: --  ABP(mean): --  RR: 24 (19 Jan 2019 06:23) (20 - 24)  SpO2: 94% (19 Jan 2019 07:26) (93% - 97%)    Mode: AC/ CMV (Assist Control/ Continuous Mandatory Ventilation), RR (machine): 14, TV (machine): 350, FiO2: 40, PEEP: 5, MAP: 8, PIP: 18    01-18 @ 07:01  -  01-19 @ 07:00  --------------------------------------------------------  IN: 650 mL / OUT: 0 mL / NET: 650 mL      CAPILLARY BLOOD GLUCOSE      POCT Blood Glucose.: 124 mg/dL (19 Jan 2019 06:01)      HOSPITAL MEDICATIONS:  MEDICATIONS  (STANDING):  cefTAZidime/avibactam IVPB 2.5 Gram(s) IV Intermittent every 8 hours  cefTAZidime/avibactam IVPB      chlorhexidine 4% Liquid 1 Application(s) Topical <User Schedule>  cyanocobalamin 1000 MICROGram(s) Oral daily  dextrose 5%. 1000 milliLiter(s) (50 mL/Hr) IV Continuous <Continuous>  dextrose 50% Injectable 12.5 Gram(s) IV Push once  dextrose 50% Injectable 25 Gram(s) IV Push once  dextrose 50% Injectable 25 Gram(s) IV Push once  folic acid 1 milliGRAM(s) Enteral Tube daily  heparin  Injectable 5000 Unit(s) SubCutaneous every 8 hours  hydrocortisone sodium succinate Injectable 10 milliGRAM(s) IV Push every 8 hours  insulin lispro (HumaLOG) corrective regimen sliding scale   SubCutaneous every 6 hours  lacosamide Solution 100 milliGRAM(s) Oral two times a day  metroNIDAZOLE  IVPB      metroNIDAZOLE  IVPB 500 milliGRAM(s) IV Intermittent every 8 hours  midodrine 5 milliGRAM(s) Oral three times a day  QUEtiapine 25 milliGRAM(s) Oral daily  risperiDONE   Solution 1 milliGRAM(s) Oral daily    MEDICATIONS  (PRN):  acetaminophen    Suspension .. 650 milliGRAM(s) Oral every 6 hours PRN Temp greater or equal to 38C (100.4F), Mild Pain (1 - 3), Moderate Pain (4 - 6)  dextrose 40% Gel 15 Gram(s) Oral once PRN Blood Glucose LESS THAN 70 milliGRAM(s)/deciliter  glucagon  Injectable 1 milliGRAM(s) IntraMuscular once PRN Glucose LESS THAN 70 milligrams/deciliter      LABS:                        8.8    6.15  )-----------( 249      ( 18 Jan 2019 02:25 )             28.2     01-18    137  |  101  |  4<L>  ----------------------------<  122<H>  3.8   |  27  |  < 0.20<L>    Ca    7.8<L>      18 Jan 2019 02:35  Phos  3.1     01-18  Mg     1.8     01-18                MICROBIOLOGY:     RADIOLOGY:  [ ] Reviewed and interpreted by me    PULMONARY FUNCTION TESTS:    EKG:

## 2019-01-19 NOTE — PROGRESS NOTE ADULT - ATTENDING COMMENTS
Patient doing well, denies abdominal pain. Plan for repeat CT in 2 weeks as outpaitient. Tolerated trach collar till about 4AM, needed to be put back on vent, will continue progressive weaning attempts.

## 2019-01-19 NOTE — PROGRESS NOTE ADULT - ASSESSMENT
53 Male w/ a PMHx of TBI (s/p PEG tube, contracture, and seizure d/o) presented as a transfer from [facility] on 12/9 for respiratory failure 2/2 ARDS likely 2/2 necrotizing pancreatitis s/p intubation. Course c/b Acinetobacter PNA s/p Avycaz and Flagyl 7 day course.  Course further complicated by acute blood loss anemia s/p colonoscopy with findings suggestive of ischemic colitis, without further bleeding and Hgb remaining stable. S/p extubation 1/3, and re-intubation 1/6 now s/p tracheostomy.

## 2019-01-20 LAB
ANION GAP SERPL CALC-SCNC: 7 MMO/L — SIGNIFICANT CHANGE UP (ref 7–14)
BASOPHILS # BLD AUTO: 0.01 K/UL — SIGNIFICANT CHANGE UP (ref 0–0.2)
BASOPHILS NFR BLD AUTO: 0.2 % — SIGNIFICANT CHANGE UP (ref 0–2)
BUN SERPL-MCNC: 8 MG/DL — SIGNIFICANT CHANGE UP (ref 7–23)
CALCIUM SERPL-MCNC: 7.7 MG/DL — LOW (ref 8.4–10.5)
CHLORIDE SERPL-SCNC: 101 MMOL/L — SIGNIFICANT CHANGE UP (ref 98–107)
CO2 SERPL-SCNC: 29 MMOL/L — SIGNIFICANT CHANGE UP (ref 22–31)
CREAT SERPL-MCNC: < 0.2 MG/DL — LOW (ref 0.5–1.3)
EOSINOPHIL # BLD AUTO: 0.02 K/UL — SIGNIFICANT CHANGE UP (ref 0–0.5)
EOSINOPHIL NFR BLD AUTO: 0.4 % — SIGNIFICANT CHANGE UP (ref 0–6)
GLUCOSE BLDC GLUCOMTR-MCNC: 118 MG/DL — HIGH (ref 70–99)
GLUCOSE BLDC GLUCOMTR-MCNC: 121 MG/DL — HIGH (ref 70–99)
GLUCOSE BLDC GLUCOMTR-MCNC: 124 MG/DL — HIGH (ref 70–99)
GLUCOSE BLDC GLUCOMTR-MCNC: 128 MG/DL — HIGH (ref 70–99)
GLUCOSE SERPL-MCNC: 120 MG/DL — HIGH (ref 70–99)
HCT VFR BLD CALC: 29.8 % — LOW (ref 39–50)
HGB BLD-MCNC: 9.2 G/DL — LOW (ref 13–17)
IMM GRANULOCYTES NFR BLD AUTO: 0.4 % — SIGNIFICANT CHANGE UP (ref 0–1.5)
LYMPHOCYTES # BLD AUTO: 1.3 K/UL — SIGNIFICANT CHANGE UP (ref 1–3.3)
LYMPHOCYTES # BLD AUTO: 26 % — SIGNIFICANT CHANGE UP (ref 13–44)
MAGNESIUM SERPL-MCNC: 1.8 MG/DL — SIGNIFICANT CHANGE UP (ref 1.6–2.6)
MCHC RBC-ENTMCNC: 30.9 % — LOW (ref 32–36)
MCHC RBC-ENTMCNC: 32.3 PG — SIGNIFICANT CHANGE UP (ref 27–34)
MCV RBC AUTO: 104.6 FL — HIGH (ref 80–100)
MONOCYTES # BLD AUTO: 0.27 K/UL — SIGNIFICANT CHANGE UP (ref 0–0.9)
MONOCYTES NFR BLD AUTO: 5.4 % — SIGNIFICANT CHANGE UP (ref 2–14)
NEUTROPHILS # BLD AUTO: 3.38 K/UL — SIGNIFICANT CHANGE UP (ref 1.8–7.4)
NEUTROPHILS NFR BLD AUTO: 67.6 % — SIGNIFICANT CHANGE UP (ref 43–77)
NRBC # FLD: 0 K/UL — LOW (ref 25–125)
PHOSPHATE SERPL-MCNC: 2.5 MG/DL — SIGNIFICANT CHANGE UP (ref 2.5–4.5)
PLATELET # BLD AUTO: 252 K/UL — SIGNIFICANT CHANGE UP (ref 150–400)
PMV BLD: 9.7 FL — SIGNIFICANT CHANGE UP (ref 7–13)
POTASSIUM SERPL-MCNC: 3.6 MMOL/L — SIGNIFICANT CHANGE UP (ref 3.5–5.3)
POTASSIUM SERPL-SCNC: 3.6 MMOL/L — SIGNIFICANT CHANGE UP (ref 3.5–5.3)
RBC # BLD: 2.85 M/UL — LOW (ref 4.2–5.8)
RBC # FLD: 20.6 % — HIGH (ref 10.3–14.5)
SODIUM SERPL-SCNC: 137 MMOL/L — SIGNIFICANT CHANGE UP (ref 135–145)
WBC # BLD: 5 K/UL — SIGNIFICANT CHANGE UP (ref 3.8–10.5)
WBC # FLD AUTO: 5 K/UL — SIGNIFICANT CHANGE UP (ref 3.8–10.5)

## 2019-01-20 PROCEDURE — 99232 SBSQ HOSP IP/OBS MODERATE 35: CPT | Mod: GC

## 2019-01-20 RX ADMIN — HEPARIN SODIUM 5000 UNIT(S): 5000 INJECTION INTRAVENOUS; SUBCUTANEOUS at 13:55

## 2019-01-20 RX ADMIN — Medication 650 MILLIGRAM(S): at 03:45

## 2019-01-20 RX ADMIN — RISPERIDONE 1 MILLIGRAM(S): 4 TABLET ORAL at 12:25

## 2019-01-20 RX ADMIN — PREGABALIN 1000 MICROGRAM(S): 225 CAPSULE ORAL at 12:18

## 2019-01-20 RX ADMIN — CHLORHEXIDINE GLUCONATE 1 APPLICATION(S): 213 SOLUTION TOPICAL at 09:17

## 2019-01-20 RX ADMIN — Medication 650 MILLIGRAM(S): at 02:40

## 2019-01-20 RX ADMIN — Medication 10 MILLIGRAM(S): at 21:46

## 2019-01-20 RX ADMIN — QUETIAPINE FUMARATE 25 MILLIGRAM(S): 200 TABLET, FILM COATED ORAL at 12:14

## 2019-01-20 RX ADMIN — Medication 10 MILLIGRAM(S): at 13:55

## 2019-01-20 RX ADMIN — Medication 10 MILLIGRAM(S): at 06:32

## 2019-01-20 RX ADMIN — LACOSAMIDE 100 MILLIGRAM(S): 50 TABLET ORAL at 06:33

## 2019-01-20 RX ADMIN — Medication 100 MILLIGRAM(S): at 06:31

## 2019-01-20 RX ADMIN — LACOSAMIDE 100 MILLIGRAM(S): 50 TABLET ORAL at 18:50

## 2019-01-20 RX ADMIN — Medication 1 MILLIGRAM(S): at 12:14

## 2019-01-20 RX ADMIN — Medication 100 MILLIGRAM(S): at 21:47

## 2019-01-20 RX ADMIN — Medication 100 MILLIGRAM(S): at 12:18

## 2019-01-20 RX ADMIN — HEPARIN SODIUM 5000 UNIT(S): 5000 INJECTION INTRAVENOUS; SUBCUTANEOUS at 06:32

## 2019-01-20 RX ADMIN — HEPARIN SODIUM 5000 UNIT(S): 5000 INJECTION INTRAVENOUS; SUBCUTANEOUS at 21:47

## 2019-01-20 NOTE — PROGRESS NOTE ADULT - SUBJECTIVE AND OBJECTIVE BOX
CHIEF COMPLAINT:  Patient is a 53y old  Male who presents with a chief complaint of Mode: AC/ CMV (Assist Control/ Continuous Mandatory Ventilation), RR (machine): 14, TV (machine): 350, FiO2: 40, PEEP: 5, MAP: 9, PIP: 15  Interval Events:    REVIEW OF SYSTEMS:  Constitutional:   Eyes:  ENT:  CV:  Resp:  GI:  :  MSK:  Integumentary:  Neurological:  Psychiatric:  Endocrine:  Hematologic/Lymphatic:  Allergic/Immunologic:  [ ] All other systems negative  [ ] Unable to assess ROS because ________    OBJECTIVE:  ICU Vital Signs Last 24 Hrs  T(C): 37.4 (20 Jan 2019 05:14), Max: 38 (19 Jan 2019 14:04)  T(F): 99.3 (20 Jan 2019 05:14), Max: 100.4 (19 Jan 2019 14:04)  HR: 112 (20 Jan 2019 05:14) (97 - 124)  BP: 120/65 (20 Jan 2019 05:14) (112/72 - 131/79)  BP(mean): --  ABP: --  ABP(mean): --  RR: 29 (20 Jan 2019 05:14) (20 - 30)  SpO2: 99% (20 Jan 2019 05:14) (92% - 99%)    Mode: AC/ CMV (Assist Control/ Continuous Mandatory Ventilation), RR (machine): 14, TV (machine): 350, FiO2: 40, PEEP: 5, MAP: 9, PIP: 15    01-19 @ 07:01  -  01-20 @ 07:00  --------------------------------------------------------  IN: 0 mL / OUT: 400 mL / NET: -400 mL      CAPILLARY BLOOD GLUCOSE      POCT Blood Glucose.: 124 mg/dL (20 Jan 2019 06:23)      PHYSICAL EXAM:  General:   HEENT:   Lymph Nodes:  Neck:   Respiratory:   Cardiovascular:   Abdomen:   Extremities:   Skin:   Neurological:  Psychiatry:    HOSPITAL MEDICATIONS:  MEDICATIONS  (STANDING):  cefTAZidime/avibactam IVPB 2.5 Gram(s) IV Intermittent every 8 hours  cefTAZidime/avibactam IVPB      chlorhexidine 4% Liquid 1 Application(s) Topical <User Schedule>  cyanocobalamin 1000 MICROGram(s) Oral daily  dextrose 5%. 1000 milliLiter(s) (50 mL/Hr) IV Continuous <Continuous>  dextrose 50% Injectable 12.5 Gram(s) IV Push once  dextrose 50% Injectable 25 Gram(s) IV Push once  dextrose 50% Injectable 25 Gram(s) IV Push once  folic acid 1 milliGRAM(s) Enteral Tube daily  heparin  Injectable 5000 Unit(s) SubCutaneous every 8 hours  hydrocortisone sodium succinate Injectable 10 milliGRAM(s) IV Push every 8 hours  insulin lispro (HumaLOG) corrective regimen sliding scale   SubCutaneous every 6 hours  lacosamide Solution 100 milliGRAM(s) Oral two times a day  metroNIDAZOLE  IVPB      metroNIDAZOLE  IVPB 500 milliGRAM(s) IV Intermittent every 8 hours  midodrine 5 milliGRAM(s) Oral three times a day  QUEtiapine 25 milliGRAM(s) Oral daily  risperiDONE   Solution 1 milliGRAM(s) Oral daily    MEDICATIONS  (PRN):  acetaminophen    Suspension .. 650 milliGRAM(s) Oral every 6 hours PRN Temp greater or equal to 38C (100.4F), Mild Pain (1 - 3), Moderate Pain (4 - 6)  dextrose 40% Gel 15 Gram(s) Oral once PRN Blood Glucose LESS THAN 70 milliGRAM(s)/deciliter  glucagon  Injectable 1 milliGRAM(s) IntraMuscular once PRN Glucose LESS THAN 70 milligrams/deciliter      LABS:                        9.2    5.00  )-----------( 252      ( 20 Jan 2019 06:31 )             29.8     01-20    137  |  101  |  8   ----------------------------<  120<H>  3.6   |  29  |  < 0.20<L>    Ca    7.7<L>      20 Jan 2019 06:31  Phos  2.5     01-20  Mg     1.8     01-20                MICROBIOLOGY:     RADIOLOGY:  [ ] Reviewed and interpreted by me    PULMONARY FUNCTION TESTS:    EKG:    I&O's Summary    19 Jan 2019 07:01  -  20 Jan 2019 07:00  --------------------------------------------------------  IN: 0 mL / OUT: 400 mL / NET: -400 mL CHIEF COMPLAINT:  Patient is a 53y old  Male who presents with a chief complaint of     Mode: AC/ CMV (Assist Control/ Continuous Mandatory Ventilation), RR (machine): 14, TV (machine): 350, FiO2: 40, PEEP: 5, MAP: 9, PIP: 15    Interval Events: No new events overnight    REVIEW OF SYSTEMS:  Constitutional:  No acute distress  CV: Denies  Resp: Denies  GI: Denies  [ X ] All other systems negative    OBJECTIVE:  ICU Vital Signs Last 24 Hrs  T(C): 37.4 (20 Jan 2019 05:14), Max: 38 (19 Jan 2019 14:04)  T(F): 99.3 (20 Jan 2019 05:14), Max: 100.4 (19 Jan 2019 14:04)  HR: 112 (20 Jan 2019 05:14) (97 - 124)  BP: 120/65 (20 Jan 2019 05:14) (112/72 - 131/79)  BP(mean): --  ABP: --  ABP(mean): --  RR: 29 (20 Jan 2019 05:14) (20 - 30)  SpO2: 99% (20 Jan 2019 05:14) (92% - 99%)    Mode: AC/ CMV (Assist Control/ Continuous Mandatory Ventilation), RR (machine): 14, TV (machine): 350, FiO2: 40, PEEP: 5, MAP: 9, PIP: 15    01-19 @ 07:01  -  01-20 @ 07:00  --------------------------------------------------------  IN: 0 mL / OUT: 400 mL / NET: -400 mL    CAPILLARY BLOOD GLUCOSE    POCT Blood Glucose.: 124 mg/dL (20 Jan 2019 06:23)    GENERAL: NAD, well-groomed, well-developed  HEAD:  Atraumatic, Normocephalic  EYES: EOMI, PERRLA, conjunctiva and sclera clear  ENMT:  Moist mucous membranes, No lesions  NECK: Supple, No JVD, Normal thyroid, positive trach  NERVOUS SYSTEM:  Alert & Oriented X3,   CHEST/LUNG: Clear to percussion bilaterally; No rales, rhonchi, wheezing, or rubs  HEART: Regular rate and rhythm; No murmurs, rubs, or gallops  ABDOMEN: Soft, Nontender, Nondistended; Bowel sounds present, Positive PEG tube  EXTREMITIES:  2+ Peripheral Pulses, No clubbing, cyanosis, or edema  LYMPH: No lymphadenopathy noted  SKIN: No rashes or lesions    HOSPITAL MEDICATIONS:  MEDICATIONS  (STANDING):  cefTAZidime/avibactam IVPB 2.5 Gram(s) IV Intermittent every 8 hours  cefTAZidime/avibactam IVPB      chlorhexidine 4% Liquid 1 Application(s) Topical <User Schedule>  cyanocobalamin 1000 MICROGram(s) Oral daily  dextrose 5%. 1000 milliLiter(s) (50 mL/Hr) IV Continuous <Continuous>  dextrose 50% Injectable 12.5 Gram(s) IV Push once  dextrose 50% Injectable 25 Gram(s) IV Push once  dextrose 50% Injectable 25 Gram(s) IV Push once  folic acid 1 milliGRAM(s) Enteral Tube daily  heparin  Injectable 5000 Unit(s) SubCutaneous every 8 hours  hydrocortisone sodium succinate Injectable 10 milliGRAM(s) IV Push every 8 hours  insulin lispro (HumaLOG) corrective regimen sliding scale   SubCutaneous every 6 hours  lacosamide Solution 100 milliGRAM(s) Oral two times a day  metroNIDAZOLE  IVPB      metroNIDAZOLE  IVPB 500 milliGRAM(s) IV Intermittent every 8 hours  midodrine 5 milliGRAM(s) Oral three times a day  QUEtiapine 25 milliGRAM(s) Oral daily  risperiDONE   Solution 1 milliGRAM(s) Oral daily    MEDICATIONS  (PRN):  acetaminophen    Suspension .. 650 milliGRAM(s) Oral every 6 hours PRN Temp greater or equal to 38C (100.4F), Mild Pain (1 - 3), Moderate Pain (4 - 6)  dextrose 40% Gel 15 Gram(s) Oral once PRN Blood Glucose LESS THAN 70 milliGRAM(s)/deciliter  glucagon  Injectable 1 milliGRAM(s) IntraMuscular once PRN Glucose LESS THAN 70 milligrams/deciliter    LABS:                        9.2    5.00  )-----------( 252      ( 20 Jan 2019 06:31 )             29.8     01-20    137  |  101  |  8   ----------------------------<  120<H>  3.6   |  29  |  < 0.20<L>    Ca    7.7<L>      20 Jan 2019 06:31  Phos  2.5     01-20  Mg     1.8     01-20      MICROBIOLOGY:     RADIOLOGY:  [ ] Reviewed and interpreted by me    PULMONARY FUNCTION TESTS:    EKG:    I&O's Summary    19 Jan 2019 07:01  -  20 Jan 2019 07:00  --------------------------------------------------------  IN: 0 mL / OUT: 400 mL / NET: -400 mL

## 2019-01-20 NOTE — PROGRESS NOTE ADULT - PROBLEM SELECTOR PLAN 2
- s/p trach 1/9   - weaning from vent as tolerated, tolerating trach collar during the day  - trach care, suction PRN  - chest PT

## 2019-01-20 NOTE — PROGRESS NOTE ADULT - ASSESSMENT
53 Male w/ a PMHx of TBI (s/p PEG tube, contracture, and seizure d/o) presented as a transfer from [facility] on 12/9 for respiratory failure 2/2 ARDS likely 2/2 necrotizing pancreatitis s/p intubation. Course c/b Acinetobacter PNA s/p Avycaz and Flagyl 7 day course.  Course further complicated by acute blood loss anemia s/p colonoscopy with findings suggestive of ischemic colitis, without further bleeding and Hgb remaining stable. S/p extubation 1/3, and re-intubation 1/6 now s/p tracheostomy.

## 2019-01-20 NOTE — CHART NOTE - NSCHARTNOTEFT_GEN_A_CORE
Informed by RN of swollen left hand. Pt seen and examined. Left hand swollen w/ PIV. Positive pulses. Likely IV infiltrate. RN asked to remove PIV; elevate extremity w/ warm compress application. Will continue to monitor

## 2019-01-20 NOTE — PROGRESS NOTE ADULT - PROBLEM SELECTOR PLAN 1
+ fluid collection persists but somewhat smaller   - Avacyz continued - will need 14 day course  - Repeat CT done - wall around fluid collection still not mature - need fu in 2    weeks can be OP

## 2019-01-21 LAB
BACTERIA BLD CULT: SIGNIFICANT CHANGE UP
GLUCOSE BLDC GLUCOMTR-MCNC: 144 MG/DL — HIGH (ref 70–99)
GLUCOSE BLDC GLUCOMTR-MCNC: 147 MG/DL — HIGH (ref 70–99)
GLUCOSE BLDC GLUCOMTR-MCNC: 170 MG/DL — HIGH (ref 70–99)

## 2019-01-21 RX ADMIN — LACOSAMIDE 100 MILLIGRAM(S): 50 TABLET ORAL at 07:04

## 2019-01-21 RX ADMIN — HEPARIN SODIUM 5000 UNIT(S): 5000 INJECTION INTRAVENOUS; SUBCUTANEOUS at 21:27

## 2019-01-21 RX ADMIN — Medication 2: at 12:17

## 2019-01-21 RX ADMIN — LACOSAMIDE 100 MILLIGRAM(S): 50 TABLET ORAL at 18:42

## 2019-01-21 RX ADMIN — Medication 100 MILLIGRAM(S): at 20:48

## 2019-01-21 RX ADMIN — RISPERIDONE 1 MILLIGRAM(S): 4 TABLET ORAL at 12:17

## 2019-01-21 RX ADMIN — Medication 1 MILLIGRAM(S): at 12:19

## 2019-01-21 RX ADMIN — Medication 100 MILLIGRAM(S): at 07:03

## 2019-01-21 RX ADMIN — Medication 10 MILLIGRAM(S): at 07:04

## 2019-01-21 RX ADMIN — HEPARIN SODIUM 5000 UNIT(S): 5000 INJECTION INTRAVENOUS; SUBCUTANEOUS at 13:51

## 2019-01-21 RX ADMIN — Medication 100 MILLIGRAM(S): at 12:18

## 2019-01-21 RX ADMIN — HEPARIN SODIUM 5000 UNIT(S): 5000 INJECTION INTRAVENOUS; SUBCUTANEOUS at 07:05

## 2019-01-21 RX ADMIN — PREGABALIN 1000 MICROGRAM(S): 225 CAPSULE ORAL at 12:17

## 2019-01-21 RX ADMIN — CHLORHEXIDINE GLUCONATE 1 APPLICATION(S): 213 SOLUTION TOPICAL at 09:45

## 2019-01-21 RX ADMIN — Medication 10 MILLIGRAM(S): at 21:27

## 2019-01-21 RX ADMIN — QUETIAPINE FUMARATE 25 MILLIGRAM(S): 200 TABLET, FILM COATED ORAL at 12:18

## 2019-01-21 RX ADMIN — MIDODRINE HYDROCHLORIDE 5 MILLIGRAM(S): 2.5 TABLET ORAL at 21:27

## 2019-01-21 RX ADMIN — Medication 10 MILLIGRAM(S): at 13:51

## 2019-01-21 NOTE — PROGRESS NOTE ADULT - SUBJECTIVE AND OBJECTIVE BOX
CHIEF COMPLAINT:    Interval Events:    REVIEW OF SYSTEMS:  Constitutional:   Eyes:  ENT:  CV:  Resp:  GI:  :  MSK:  Integumentary:  Neurological:  Psychiatric:  Endocrine:  Hematologic/Lymphatic:  Allergic/Immunologic:  [ ] All other systems negative  [ ] Unable to assess ROS because ________    OBJECTIVE:  ICU Vital Signs Last 24 Hrs  T(C): 36.8 (21 Jan 2019 06:59), Max: 37.7 (20 Jan 2019 13:53)  T(F): 98.3 (21 Jan 2019 06:59), Max: 99.8 (20 Jan 2019 13:53)  HR: 105 (21 Jan 2019 06:59) (102 - 117)  BP: 121/71 (21 Jan 2019 06:59) (121/71 - 134/75)  BP(mean): --  ABP: --  ABP(mean): --  RR: 22 (21 Jan 2019 06:59) (22 - 32)  SpO2: 100% (21 Jan 2019 07:36) (97% - 100%)    Mode: CPAP with PS, FiO2: 40, PEEP: 5, PS: 5, MAP: 7    CAPILLARY BLOOD GLUCOSE      POCT Blood Glucose.: 147 mg/dL (21 Jan 2019 05:12)      PHYSICAL EXAM:  General:   HEENT:   Lymph Nodes:  Neck:   Respiratory:   Cardiovascular:   Abdomen:   Extremities:   Skin:   Neurological:  Psychiatry:    HOSPITAL MEDICATIONS:  MEDICATIONS  (STANDING):  cefTAZidime/avibactam IVPB 2.5 Gram(s) IV Intermittent every 8 hours  cefTAZidime/avibactam IVPB      chlorhexidine 4% Liquid 1 Application(s) Topical <User Schedule>  cyanocobalamin 1000 MICROGram(s) Oral daily  dextrose 5%. 1000 milliLiter(s) (50 mL/Hr) IV Continuous <Continuous>  dextrose 50% Injectable 12.5 Gram(s) IV Push once  dextrose 50% Injectable 25 Gram(s) IV Push once  dextrose 50% Injectable 25 Gram(s) IV Push once  folic acid 1 milliGRAM(s) Enteral Tube daily  heparin  Injectable 5000 Unit(s) SubCutaneous every 8 hours  hydrocortisone sodium succinate Injectable 10 milliGRAM(s) IV Push every 8 hours  insulin lispro (HumaLOG) corrective regimen sliding scale   SubCutaneous every 6 hours  lacosamide Solution 100 milliGRAM(s) Oral two times a day  metroNIDAZOLE  IVPB      metroNIDAZOLE  IVPB 500 milliGRAM(s) IV Intermittent every 8 hours  midodrine 5 milliGRAM(s) Oral three times a day  QUEtiapine 25 milliGRAM(s) Oral daily  risperiDONE   Solution 1 milliGRAM(s) Oral daily    MEDICATIONS  (PRN):  acetaminophen    Suspension .. 650 milliGRAM(s) Oral every 6 hours PRN Temp greater or equal to 38C (100.4F), Mild Pain (1 - 3), Moderate Pain (4 - 6)  dextrose 40% Gel 15 Gram(s) Oral once PRN Blood Glucose LESS THAN 70 milliGRAM(s)/deciliter  glucagon  Injectable 1 milliGRAM(s) IntraMuscular once PRN Glucose LESS THAN 70 milligrams/deciliter      LABS:                        9.2    5.00  )-----------( 252      ( 20 Jan 2019 06:31 )             29.8     01-20    137  |  101  |  8   ----------------------------<  120<H>  3.6   |  29  |  < 0.20<L>    Ca    7.7<L>      20 Jan 2019 06:31  Phos  2.5     01-20  Mg     1.8     01-20                MICROBIOLOGY:     RADIOLOGY:  [ ] Reviewed and interpreted by me    PULMONARY FUNCTION TESTS:    EKG: CHIEF COMPLAINT: Patient is a 53y old  Male who presents with a chief complaint of ARDS?, sepsis (21 Jan 2019 08:31)      Interval Events: none     REVIEW OF SYSTEMS:  Constitutional: Denies pain/fever   Eyes:  ENT:  CV: Denies   Resp: Denies   GI: No pain  [x ] All other systems negative      OBJECTIVE:  ICU Vital Signs Last 24 Hrs  T(C): 36.8 (21 Jan 2019 06:59), Max: 37.7 (20 Jan 2019 13:53)  T(F): 98.3 (21 Jan 2019 06:59), Max: 99.8 (20 Jan 2019 13:53)  HR: 105 (21 Jan 2019 06:59) (102 - 117)  BP: 121/71 (21 Jan 2019 06:59) (121/71 - 134/75)  BP(mean): --  ABP: --  ABP(mean): --  RR: 22 (21 Jan 2019 06:59) (22 - 32)  SpO2: 100% (21 Jan 2019 07:36) (97% - 100%)    Mode: CPAP with PS, FiO2: 40, PEEP: 5, PS: 5, MAP: 7    CAPILLARY BLOOD GLUCOSE      POCT Blood Glucose.: 147 mg/dL (21 Jan 2019 05:12)        HOSPITAL MEDICATIONS:  MEDICATIONS  (STANDING):  cefTAZidime/avibactam IVPB 2.5 Gram(s) IV Intermittent every 8 hours  cefTAZidime/avibactam IVPB      chlorhexidine 4% Liquid 1 Application(s) Topical <User Schedule>  cyanocobalamin 1000 MICROGram(s) Oral daily  dextrose 5%. 1000 milliLiter(s) (50 mL/Hr) IV Continuous <Continuous>  dextrose 50% Injectable 12.5 Gram(s) IV Push once  dextrose 50% Injectable 25 Gram(s) IV Push once  dextrose 50% Injectable 25 Gram(s) IV Push once  folic acid 1 milliGRAM(s) Enteral Tube daily  heparin  Injectable 5000 Unit(s) SubCutaneous every 8 hours  hydrocortisone sodium succinate Injectable 10 milliGRAM(s) IV Push every 8 hours  insulin lispro (HumaLOG) corrective regimen sliding scale   SubCutaneous every 6 hours  lacosamide Solution 100 milliGRAM(s) Oral two times a day  metroNIDAZOLE  IVPB      metroNIDAZOLE  IVPB 500 milliGRAM(s) IV Intermittent every 8 hours  midodrine 5 milliGRAM(s) Oral three times a day  QUEtiapine 25 milliGRAM(s) Oral daily  risperiDONE   Solution 1 milliGRAM(s) Oral daily    MEDICATIONS  (PRN):  acetaminophen    Suspension .. 650 milliGRAM(s) Oral every 6 hours PRN Temp greater or equal to 38C (100.4F), Mild Pain (1 - 3), Moderate Pain (4 - 6)  dextrose 40% Gel 15 Gram(s) Oral once PRN Blood Glucose LESS THAN 70 milliGRAM(s)/deciliter  glucagon  Injectable 1 milliGRAM(s) IntraMuscular once PRN Glucose LESS THAN 70 milligrams/deciliter      LABS:                        9.2    5.00  )-----------( 252      ( 20 Jan 2019 06:31 )             29.8     01-20    137  |  101  |  8   ----------------------------<  120<H>  3.6   |  29  |  < 0.20<L>    Ca    7.7<L>      20 Jan 2019 06:31  Phos  2.5     01-20  Mg     1.8     01-20                MICROBIOLOGY:     RADIOLOGY:  [ ] Reviewed and interpreted by me    PULMONARY FUNCTION TESTS:    EKG:

## 2019-01-21 NOTE — PROGRESS NOTE ADULT - PROBLEM SELECTOR PLAN 1
+ fluid collection persists but somewhat smaller   - Avacyz continued - will need 14 day course  - Repeat CT done - wall around fluid collection still not mature - need fu in 2    weeks can be OP + fluid collection persists but somewhat smaller   - Avacyz continued - will need 14 day course  - Repeat CT done - wall around fluid collection still not mature - need fu in 2    weeks can be OP  - Dispo planning

## 2019-01-21 NOTE — PROGRESS NOTE ADULT - ASSESSMENT
53 Male w/ a PMHx of TBI (s/p PEG tube, contracture, and seizure d/o) presented as a transfer from [facility] on 12/9 for respiratory failure 2/2 ARDS likely 2/2 necrotizing pancreatitis s/p intubation. Course c/b Acinetobacter PNA s/p Avycaz and Flagyl 7 day course.  Course further complicated by acute blood loss anemia s/p colonoscopy with findings suggestive of ischemic colitis, without further bleeding and Hgb remaining stable. S/p extubation 1/3, and re-intubation 1/6 now s/p tracheostomy.

## 2019-01-22 LAB
ANION GAP SERPL CALC-SCNC: 5 MMO/L — LOW (ref 7–14)
BASOPHILS # BLD AUTO: 0.01 K/UL — SIGNIFICANT CHANGE UP (ref 0–0.2)
BASOPHILS NFR BLD AUTO: 0.2 % — SIGNIFICANT CHANGE UP (ref 0–2)
BUN SERPL-MCNC: 10 MG/DL — SIGNIFICANT CHANGE UP (ref 7–23)
CALCIUM SERPL-MCNC: 8 MG/DL — LOW (ref 8.4–10.5)
CHLORIDE SERPL-SCNC: 104 MMOL/L — SIGNIFICANT CHANGE UP (ref 98–107)
CO2 SERPL-SCNC: 30 MMOL/L — SIGNIFICANT CHANGE UP (ref 22–31)
CREAT SERPL-MCNC: < 0.2 MG/DL — LOW (ref 0.5–1.3)
EOSINOPHIL # BLD AUTO: 0.02 K/UL — SIGNIFICANT CHANGE UP (ref 0–0.5)
EOSINOPHIL NFR BLD AUTO: 0.5 % — SIGNIFICANT CHANGE UP (ref 0–6)
GLUCOSE BLDC GLUCOMTR-MCNC: 102 MG/DL — HIGH (ref 70–99)
GLUCOSE BLDC GLUCOMTR-MCNC: 122 MG/DL — HIGH (ref 70–99)
GLUCOSE BLDC GLUCOMTR-MCNC: 139 MG/DL — HIGH (ref 70–99)
GLUCOSE BLDC GLUCOMTR-MCNC: 149 MG/DL — HIGH (ref 70–99)
GLUCOSE SERPL-MCNC: 123 MG/DL — HIGH (ref 70–99)
GRAM STN SPT: SIGNIFICANT CHANGE UP
HCT VFR BLD CALC: 31.6 % — LOW (ref 39–50)
HGB BLD-MCNC: 9.5 G/DL — LOW (ref 13–17)
IMM GRANULOCYTES NFR BLD AUTO: 0.5 % — SIGNIFICANT CHANGE UP (ref 0–1.5)
LYMPHOCYTES # BLD AUTO: 1.47 K/UL — SIGNIFICANT CHANGE UP (ref 1–3.3)
LYMPHOCYTES # BLD AUTO: 35 % — SIGNIFICANT CHANGE UP (ref 13–44)
MAGNESIUM SERPL-MCNC: 1.9 MG/DL — SIGNIFICANT CHANGE UP (ref 1.6–2.6)
MCHC RBC-ENTMCNC: 30.1 % — LOW (ref 32–36)
MCHC RBC-ENTMCNC: 31.7 PG — SIGNIFICANT CHANGE UP (ref 27–34)
MCV RBC AUTO: 105.3 FL — HIGH (ref 80–100)
MONOCYTES # BLD AUTO: 0.22 K/UL — SIGNIFICANT CHANGE UP (ref 0–0.9)
MONOCYTES NFR BLD AUTO: 5.2 % — SIGNIFICANT CHANGE UP (ref 2–14)
NEUTROPHILS # BLD AUTO: 2.46 K/UL — SIGNIFICANT CHANGE UP (ref 1.8–7.4)
NEUTROPHILS NFR BLD AUTO: 58.6 % — SIGNIFICANT CHANGE UP (ref 43–77)
NRBC # FLD: 0 K/UL — LOW (ref 25–125)
PHOSPHATE SERPL-MCNC: 2.8 MG/DL — SIGNIFICANT CHANGE UP (ref 2.5–4.5)
PLATELET # BLD AUTO: 281 K/UL — SIGNIFICANT CHANGE UP (ref 150–400)
PMV BLD: 10 FL — SIGNIFICANT CHANGE UP (ref 7–13)
POTASSIUM SERPL-MCNC: 3.7 MMOL/L — SIGNIFICANT CHANGE UP (ref 3.5–5.3)
POTASSIUM SERPL-SCNC: 3.7 MMOL/L — SIGNIFICANT CHANGE UP (ref 3.5–5.3)
RBC # BLD: 3 M/UL — LOW (ref 4.2–5.8)
RBC # FLD: 19.4 % — HIGH (ref 10.3–14.5)
SODIUM SERPL-SCNC: 139 MMOL/L — SIGNIFICANT CHANGE UP (ref 135–145)
SPECIMEN SOURCE: SIGNIFICANT CHANGE UP
WBC # BLD: 4.2 K/UL — SIGNIFICANT CHANGE UP (ref 3.8–10.5)
WBC # FLD AUTO: 4.2 K/UL — SIGNIFICANT CHANGE UP (ref 3.8–10.5)

## 2019-01-22 PROCEDURE — 99232 SBSQ HOSP IP/OBS MODERATE 35: CPT | Mod: GC

## 2019-01-22 PROCEDURE — 99232 SBSQ HOSP IP/OBS MODERATE 35: CPT

## 2019-01-22 PROCEDURE — 99233 SBSQ HOSP IP/OBS HIGH 50: CPT | Mod: GC

## 2019-01-22 PROCEDURE — 93010 ELECTROCARDIOGRAM REPORT: CPT

## 2019-01-22 RX ORDER — KETOROLAC TROMETHAMINE 30 MG/ML
15 SYRINGE (ML) INJECTION ONCE
Qty: 0 | Refills: 0 | Status: DISCONTINUED | OUTPATIENT
Start: 2019-01-22 | End: 2019-01-22

## 2019-01-22 RX ORDER — VANCOMYCIN HCL 1 G
1000 VIAL (EA) INTRAVENOUS ONCE
Qty: 0 | Refills: 0 | Status: COMPLETED | OUTPATIENT
Start: 2019-01-22 | End: 2019-01-22

## 2019-01-22 RX ORDER — VANCOMYCIN HCL 1 G
1000 VIAL (EA) INTRAVENOUS EVERY 12 HOURS
Qty: 0 | Refills: 0 | Status: DISCONTINUED | OUTPATIENT
Start: 2019-01-23 | End: 2019-01-24

## 2019-01-22 RX ORDER — VANCOMYCIN HCL 1 G
VIAL (EA) INTRAVENOUS
Qty: 0 | Refills: 0 | Status: DISCONTINUED | OUTPATIENT
Start: 2019-01-22 | End: 2019-01-24

## 2019-01-22 RX ADMIN — RISPERIDONE 1 MILLIGRAM(S): 4 TABLET ORAL at 11:40

## 2019-01-22 RX ADMIN — Medication 100 MILLIGRAM(S): at 14:32

## 2019-01-22 RX ADMIN — Medication 250 MILLIGRAM(S): at 17:37

## 2019-01-22 RX ADMIN — LACOSAMIDE 100 MILLIGRAM(S): 50 TABLET ORAL at 06:13

## 2019-01-22 RX ADMIN — HEPARIN SODIUM 5000 UNIT(S): 5000 INJECTION INTRAVENOUS; SUBCUTANEOUS at 14:31

## 2019-01-22 RX ADMIN — Medication 10 MILLIGRAM(S): at 14:31

## 2019-01-22 RX ADMIN — Medication 10 MILLIGRAM(S): at 21:39

## 2019-01-22 RX ADMIN — Medication 15 MILLIGRAM(S): at 18:34

## 2019-01-22 RX ADMIN — MIDODRINE HYDROCHLORIDE 5 MILLIGRAM(S): 2.5 TABLET ORAL at 21:38

## 2019-01-22 RX ADMIN — HEPARIN SODIUM 5000 UNIT(S): 5000 INJECTION INTRAVENOUS; SUBCUTANEOUS at 06:12

## 2019-01-22 RX ADMIN — Medication 1 MILLIGRAM(S): at 11:40

## 2019-01-22 RX ADMIN — QUETIAPINE FUMARATE 25 MILLIGRAM(S): 200 TABLET, FILM COATED ORAL at 11:40

## 2019-01-22 RX ADMIN — Medication 10 MILLIGRAM(S): at 06:13

## 2019-01-22 RX ADMIN — HEPARIN SODIUM 5000 UNIT(S): 5000 INJECTION INTRAVENOUS; SUBCUTANEOUS at 21:37

## 2019-01-22 RX ADMIN — PREGABALIN 1000 MICROGRAM(S): 225 CAPSULE ORAL at 11:40

## 2019-01-22 RX ADMIN — Medication 100 MILLIGRAM(S): at 06:11

## 2019-01-22 RX ADMIN — CHLORHEXIDINE GLUCONATE 1 APPLICATION(S): 213 SOLUTION TOPICAL at 09:31

## 2019-01-22 RX ADMIN — Medication 100 MILLIGRAM(S): at 21:47

## 2019-01-22 RX ADMIN — Medication 15 MILLIGRAM(S): at 19:04

## 2019-01-22 RX ADMIN — LACOSAMIDE 100 MILLIGRAM(S): 50 TABLET ORAL at 17:38

## 2019-01-22 NOTE — PROGRESS NOTE ADULT - SUBJECTIVE AND OBJECTIVE BOX
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 650 milliGRAM(s) Oral every 6 hours PRN  chlorhexidine 4% Liquid 1 Application(s) Topical <User Schedule>  cyanocobalamin 1000 MICROGram(s) Oral daily  dextrose 40% Gel 15 Gram(s) Oral once PRN  dextrose 5%. 1000 milliLiter(s) IV Continuous <Continuous>  dextrose 50% Injectable 12.5 Gram(s) IV Push once  dextrose 50% Injectable 25 Gram(s) IV Push once  dextrose 50% Injectable 25 Gram(s) IV Push once  folic acid 1 milliGRAM(s) Enteral Tube daily  glucagon  Injectable 1 milliGRAM(s) IntraMuscular once PRN  heparin  Injectable 5000 Unit(s) SubCutaneous every 8 hours  hydrocortisone sodium succinate Injectable 10 milliGRAM(s) IV Push every 8 hours  insulin lispro (HumaLOG) corrective regimen sliding scale   SubCutaneous every 6 hours  lacosamide Solution 100 milliGRAM(s) Oral two times a day  midodrine 5 milliGRAM(s) Oral three times a day  QUEtiapine 25 milliGRAM(s) Oral daily  risperiDONE   Solution 1 milliGRAM(s) Oral daily      Vital Signs Last 24 Hrs  T(C): 37.1 (22 Jan 2019 09:31), Max: 37.1 (22 Jan 2019 09:31)  T(F): 98.8 (22 Jan 2019 09:31), Max: 98.8 (22 Jan 2019 09:31)  HR: 114 (22 Jan 2019 09:31) (88 - 114)  BP: 116/78 (22 Jan 2019 09:31) (108/77 - 132/80)  BP(mean): --  RR: 20 (22 Jan 2019 09:31) (16 - 20)  SpO2: 96% (22 Jan 2019 09:31) (96% - 99%)    Physical Exam:  General: NAD, non toxic  Head: atraumatic normocephalic  eyes: normal sclera and conjunctivae   ENT:   trach , no LAD  Cardiovascular: regular S1,S2  Respiratory:  trach, gargling sounds with secrestions  abd:  PEG tube, soft, bowel sounds present, nontender, rectal tube  :  no suprapubic tenderness, [place] cath    Musculoskeletal:   no joint swelling, contractures, RUE mostly, LUE edematous  Skin:    no rash  Neurologic: awake, answers some questions with nodding    Vascular: no phlebitis  Psych: unable to assess                           9.5    4.20  )-----------( 281      ( 22 Jan 2019 06:11 )             31.6       01-22    139  |  104  |  10  ----------------------------<  123<H>  3.7   |  30  |  < 0.20<L>    Ca    8.0<L>      22 Jan 2019 06:11  Phos  2.8     01-22  Mg     1.9     01-22            MICROBIOLOGY:  v  BLOOD  01-18-19 --  --  --      BLOOD PERIPHERAL  01-16-19 --  --  --      ENDOTRACHEAL SPECIMEN  01-06-19 --  --  Acin.baumannii/haemoly       BLOOD VENOUS  01-02-19 --  --  --      BLOOD PERIPHERAL  01-02-19 --  --  --      BLOOD PERIPHERAL  12-27-18 --  --  --      BLOOD VENOUS  12-24-18 --  --  --      BLOOD PERIPHERAL  12-24-18 --  --  --      BLOOD PERIPHERAL  12-23-18 --  --  --                RADIOLOGY:  Images below reviewed personally  < from: CT Abdomen and Pelvis w/ IV Cont (01.17.19 @ 12:37) >  IMPRESSION:     Unchanged necrotizing pancreatitis with mild decrease in acute necrotic   collections.  Decreased amount of ascites.  Unchanged bilateral pleural effusions and atelectasis.  Unchanged colitis involving the distal transverse descending colon.

## 2019-01-22 NOTE — CHART NOTE - NSCHARTNOTEFT_GEN_A_CORE
Source:  nursing and EMR    Diet : NPO with tube feed - Jevity 1.2 @ 55ml/hr 24hrs daily     Patient tolerating EN, receiving adequate nutrition, pt. presented lethargic, and unable to speak, noted 1+ generalized edema and 3+ scrotal edema . Wt. hx. appears fluctuating .      Enteral : EN provides 1584 kcal & 73 gm protein/d ; i.e. 25.5 kcal/kg IBW & 1.18 gm protein/kg IBW       Current Weight: - 91.1 KG on 1/19/19; 85.7 kg on 1/17/19; 81.6 kg on 1/14/19; Admit wt. - 99.7 kg ; 62 kg IBW     Pertinent Medications: MEDICATIONS  (STANDING):  cefTAZidime/avibactam IVPB 2.5 Gram(s) IV Intermittent every 8 hours  cefTAZidime/avibactam IVPB      chlorhexidine 4% Liquid 1 Application(s) Topical <User Schedule>  cyanocobalamin 1000 MICROGram(s) Oral daily  dextrose 5%. 1000 milliLiter(s) (50 mL/Hr) IV Continuous <Continuous>  dextrose 50% Injectable 12.5 Gram(s) IV Push once  dextrose 50% Injectable 25 Gram(s) IV Push once  dextrose 50% Injectable 25 Gram(s) IV Push once  folic acid 1 milliGRAM(s) Enteral Tube daily  heparin  Injectable 5000 Unit(s) SubCutaneous every 8 hours  hydrocortisone sodium succinate Injectable 10 milliGRAM(s) IV Push every 8 hours  insulin lispro (HumaLOG) corrective regimen sliding scale   SubCutaneous every 6 hours  lacosamide Solution 100 milliGRAM(s) Oral two times a day  metroNIDAZOLE  IVPB      metroNIDAZOLE  IVPB 500 milliGRAM(s) IV Intermittent every 8 hours  midodrine 5 milliGRAM(s) Oral three times a day  QUEtiapine 25 milliGRAM(s) Oral daily  risperiDONE   Solution 1 milliGRAM(s) Oral daily    MEDICATIONS  (PRN):  acetaminophen    Suspension .. 650 milliGRAM(s) Oral every 6 hours PRN Temp greater or equal to 38C (100.4F), Mild Pain (1 - 3), Moderate Pain (4 - 6)  dextrose 40% Gel 15 Gram(s) Oral once PRN Blood Glucose LESS THAN 70 milliGRAM(s)/deciliter  glucagon  Injectable 1 milliGRAM(s) IntraMuscular once PRN Glucose LESS THAN 70 milligrams/deciliter    Pertinent Labs:  01-22 Na139 mmol/L Glu 123 mg/dL<H> K+ 3.7 mmol/L Cr  < 0.20 mg/dL<L> BUN 10 mg/dL 01-22 Phos 2.8 mg/dL 01-17 Alb 1.8 g/dL<L> 01-07 PAB 12 mg/dL<L> 12-29 UpbacxwacnP2H 4.8 % 01-08 Chol --    LDL --    HDL --    Trig 181 mg/dL<H>      Recommend to continue EN      Monitoring and Evaluation:  Tolerance to diet prescription ,weights and follow up per protocol

## 2019-01-22 NOTE — CHART NOTE - NSCHARTNOTEFT_GEN_A_CORE
Pt had #8 shiley trach with cuff and disposible inner cannula changed today due to leaking cuff with Pulmonology fellow Dr Dakota Carlos.  Trach change done without incident and pt saturating well after.

## 2019-01-22 NOTE — PROGRESS NOTE ADULT - ASSESSMENT
Impression  1) Walled off necrosis, but wall is still thin, CT reviewed with radiology   2) Hematochezia, s/p colonoscopy on 12/28/2018 with severe segmental colitis localized to the transverse colon and ascending colon (possible ischemic colitis, possible colitis related to contiguous danay-pancreatic inflammatory process). Biopsies with granulation tissue but no infection/malignancy.  3) Resp failure in the setting of pneumonia, requiring tracheostomy     Recommendations  - Trend CBC, CMP and fever curve, Hb stable for now.   - Given the CT abdomen showing walled off Necrosis with thin wall,  will repeat CT scan in 2 weeks ( can be outpatient ) to assess the size of the collection and maturity of wall for possible drainage via AXIOS.   - Continue antibiotics as per ID: on ceftazidime and metronidazole   - Pt has been given outpatient appointment with Dr Jules as out-patient on 01/30/2019.    - Rest of care per primary team  - Call GI as needed.

## 2019-01-22 NOTE — PROGRESS NOTE ADULT - PROBLEM SELECTOR PLAN 2
- s/p trach 1/9   - weaning from vent as tolerated, tolerating trach collar during the day  - trach care, suction PRN  - chest PT - s/p trach 1/9   - weaning from vent as tolerated, tolerating trach collar during the day  - trach change done secondary to leak from collar   - trach care, suction PRN  - chest PT  - New fever today - cx sent and d/w ID add vanco 1gm bid for now and follow up cx   POCUS showed effusions and consolidation in both lungs

## 2019-01-22 NOTE — PROGRESS NOTE ADULT - ASSESSMENT
53 Male w/ a PMHx of TBI (s/p PEG tube, contracture, and seizure d/o) presented as a transfer from [facility] on [facility] for respiratory failure 2/2 ARDS likely 2/2 necrotizing pancreatitis s/p intubation. Course c/b Acinetobacter PNA s/p Avycaz and Flagyl 7 day course.  Course further complicated by acute blood loss anemia s/p colonoscopy with findings suggestive of ischemic colitis, without further bleeding and Hgb remaining stable. S/p extubation 1/3, and re-intubation 1/6 now s/p tracheostomy.

## 2019-01-22 NOTE — PROGRESS NOTE ADULT - PROBLEM SELECTOR PLAN 1
+ fluid collection persists but somewhat smaller   - Avacyz continued - will need 14 day course  - Repeat CT done - wall around fluid collection still not mature - need fu in 2    weeks can be OP  - Dispo planning + fluid collection persists but somewhat smaller   - Avacyz continued for now per ID   - Repeat CT done - wall around fluid collection still not mature - need fu in 2    weeks can be OP- appointment made

## 2019-01-22 NOTE — PROGRESS NOTE ADULT - SUBJECTIVE AND OBJECTIVE BOX
CHIEF COMPLAINT:    Interval Events:    REVIEW OF SYSTEMS:  Constitutional:   Eyes:  ENT:  CV:  Resp:  GI:  :  MSK:  Integumentary:  Neurological:  Psychiatric:  Endocrine:  Hematologic/Lymphatic:  Allergic/Immunologic:  [ ] All other systems negative  [ ] Unable to assess ROS because ________    OBJECTIVE:  ICU Vital Signs Last 24 Hrs  T(C): 36.8 (22 Jan 2019 06:09), Max: 36.8 (21 Jan 2019 09:43)  T(F): 98.3 (22 Jan 2019 06:09), Max: 98.3 (21 Jan 2019 09:43)  HR: 106 (22 Jan 2019 07:40) (88 - 107)  BP: 117/76 (22 Jan 2019 06:09) (108/77 - 132/80)  BP(mean): --  ABP: --  ABP(mean): --  RR: 20 (22 Jan 2019 07:40) (16 - 25)  SpO2: 96% (22 Jan 2019 07:40) (96% - 99%)    Mode: standby    CAPILLARY BLOOD GLUCOSE      POCT Blood Glucose.: 122 mg/dL (22 Jan 2019 05:58)      PHYSICAL EXAM:  General:   HEENT:   Lymph Nodes:  Neck:   Respiratory:   Cardiovascular:   Abdomen:   Extremities:   Skin:   Neurological:  Psychiatry:    HOSPITAL MEDICATIONS:  MEDICATIONS  (STANDING):  cefTAZidime/avibactam IVPB 2.5 Gram(s) IV Intermittent every 8 hours  cefTAZidime/avibactam IVPB      chlorhexidine 4% Liquid 1 Application(s) Topical <User Schedule>  cyanocobalamin 1000 MICROGram(s) Oral daily  dextrose 5%. 1000 milliLiter(s) (50 mL/Hr) IV Continuous <Continuous>  dextrose 50% Injectable 12.5 Gram(s) IV Push once  dextrose 50% Injectable 25 Gram(s) IV Push once  dextrose 50% Injectable 25 Gram(s) IV Push once  folic acid 1 milliGRAM(s) Enteral Tube daily  heparin  Injectable 5000 Unit(s) SubCutaneous every 8 hours  hydrocortisone sodium succinate Injectable 10 milliGRAM(s) IV Push every 8 hours  insulin lispro (HumaLOG) corrective regimen sliding scale   SubCutaneous every 6 hours  lacosamide Solution 100 milliGRAM(s) Oral two times a day  metroNIDAZOLE  IVPB      metroNIDAZOLE  IVPB 500 milliGRAM(s) IV Intermittent every 8 hours  midodrine 5 milliGRAM(s) Oral three times a day  QUEtiapine 25 milliGRAM(s) Oral daily  risperiDONE   Solution 1 milliGRAM(s) Oral daily    MEDICATIONS  (PRN):  acetaminophen    Suspension .. 650 milliGRAM(s) Oral every 6 hours PRN Temp greater or equal to 38C (100.4F), Mild Pain (1 - 3), Moderate Pain (4 - 6)  dextrose 40% Gel 15 Gram(s) Oral once PRN Blood Glucose LESS THAN 70 milliGRAM(s)/deciliter  glucagon  Injectable 1 milliGRAM(s) IntraMuscular once PRN Glucose LESS THAN 70 milligrams/deciliter      LABS:                        9.5    4.20  )-----------( 281      ( 22 Jan 2019 06:11 )             31.6     01-22    139  |  104  |  10  ----------------------------<  123<H>  3.7   |  30  |  < 0.20<L>    Ca    8.0<L>      22 Jan 2019 06:11  Phos  2.8     01-22  Mg     1.9     01-22                MICROBIOLOGY:     RADIOLOGY:  [ ] Reviewed and interpreted by me    PULMONARY FUNCTION TESTS:    EKG: CHIEF COMPLAINT: Patient is a 53y old  Male who presents with a chief complaint of ARDS?, sepsis (22 Jan 2019 13:31)      Interval Events: Fever today     REVIEW OF SYSTEMS:  Constitutional: no pain + fever   ENT: Denies   CV: Denies   Resp: denies   GI: Denies :  [x ] All other systems negative      OBJECTIVE:  ICU Vital Signs Last 24 Hrs  T(C): 36.8 (22 Jan 2019 06:09), Max: 36.8 (21 Jan 2019 09:43)  T(F): 98.3 (22 Jan 2019 06:09), Max: 98.3 (21 Jan 2019 09:43)  HR: 106 (22 Jan 2019 07:40) (88 - 107)  BP: 117/76 (22 Jan 2019 06:09) (108/77 - 132/80)  BP(mean): --  ABP: --  ABP(mean): --  RR: 20 (22 Jan 2019 07:40) (16 - 25)  SpO2: 96% (22 Jan 2019 07:40) (96% - 99%)    Mode: standby    CAPILLARY BLOOD GLUCOSE      POCT Blood Glucose.: 122 mg/dL (22 Jan 2019 05:58)        HOSPITAL MEDICATIONS:  MEDICATIONS  (STANDING):  cefTAZidime/avibactam IVPB 2.5 Gram(s) IV Intermittent every 8 hours  cefTAZidime/avibactam IVPB      chlorhexidine 4% Liquid 1 Application(s) Topical <User Schedule>  cyanocobalamin 1000 MICROGram(s) Oral daily  dextrose 5%. 1000 milliLiter(s) (50 mL/Hr) IV Continuous <Continuous>  dextrose 50% Injectable 12.5 Gram(s) IV Push once  dextrose 50% Injectable 25 Gram(s) IV Push once  dextrose 50% Injectable 25 Gram(s) IV Push once  folic acid 1 milliGRAM(s) Enteral Tube daily  heparin  Injectable 5000 Unit(s) SubCutaneous every 8 hours  hydrocortisone sodium succinate Injectable 10 milliGRAM(s) IV Push every 8 hours  insulin lispro (HumaLOG) corrective regimen sliding scale   SubCutaneous every 6 hours  lacosamide Solution 100 milliGRAM(s) Oral two times a day  metroNIDAZOLE  IVPB      metroNIDAZOLE  IVPB 500 milliGRAM(s) IV Intermittent every 8 hours  midodrine 5 milliGRAM(s) Oral three times a day  QUEtiapine 25 milliGRAM(s) Oral daily  risperiDONE   Solution 1 milliGRAM(s) Oral daily    MEDICATIONS  (PRN):  acetaminophen    Suspension .. 650 milliGRAM(s) Oral every 6 hours PRN Temp greater or equal to 38C (100.4F), Mild Pain (1 - 3), Moderate Pain (4 - 6)  dextrose 40% Gel 15 Gram(s) Oral once PRN Blood Glucose LESS THAN 70 milliGRAM(s)/deciliter  glucagon  Injectable 1 milliGRAM(s) IntraMuscular once PRN Glucose LESS THAN 70 milligrams/deciliter      LABS:                        9.5    4.20  )-----------( 281      ( 22 Jan 2019 06:11 )             31.6     01-22    139  |  104  |  10  ----------------------------<  123<H>  3.7   |  30  |  < 0.20<L>    Ca    8.0<L>      22 Jan 2019 06:11  Phos  2.8     01-22  Mg     1.9     01-22                MICROBIOLOGY:     RADIOLOGY:  [ ] Reviewed and interpreted by me    PULMONARY FUNCTION TESTS:    EKG:

## 2019-01-22 NOTE — PROGRESS NOTE ADULT - ATTENDING COMMENTS
Prolonged hospital course in setting of ARDS likely 2/2 necrotizing pancreatitis s/p trach after several extubation attempts. Completed course of Avycaz and Flagyl and Acinetobacter pna. Doing well on trach collar. Dispo planning. Need repeat abd CT in 2 weeks as outpatient.

## 2019-01-22 NOTE — PROGRESS NOTE ADULT - ASSESSMENT
53  M (resident of [facility]) with TBI, s/p PEG tube, contracture, and seizure d/o who presented as a transfer from [facility] on 12/9 for respiratory failure 2/2 ARDS likely 2/2 necrotizing pancreatitis s/p intubation.  Imipenem 12/28/18 - continued   bronc 12/11 with pseudomonas  pt with thick secretions and again febrile, sputum cx with  acinetobacter  repeat Ct with increased size of pancreatic collection but not walled off so no interventions were recommended  also had GIB and colitis due to ?contiguous necrotizing pancreatitis vs ischemic, s/p colonoscopy but not actively bleeding now  completed a 7 day course of avycaz and flagyl for the acinetobacter then switched back to imipenem, but pt again had hypothermia with the same acinetobacter in sputum now s/p trach and back on avycaz + flagyl    sepsis with fever, leukopenia resolved, extubated but reintubated 1/6/19 for hypoxia and poor cough along with profuse secretions and sputum cx again with acinetobacter    Necrotising Pancreatitis with multiple peripancreatitis collections not walled off yet   acinetobacter PNA  with lung abscesses  coag neg staph bacteremia likely contaminant, repeat negative  colitis, ?due to contiguous necrotizing pancreatitis and collection vs ischemic    no plans for IR embolization for surgical interventions      * s/p 7 days of avcaz and flagyl for  acinetobacter in the sputum 12/22-12/28  then back on imipenem for necrotizing pancreatis  * restarted on  avycaz and flagyl to cover the acinetobacter again 1/9, now day 14  * will c/w avycaz while in the hospital for the pneumonia and lung abscesses  * repeat CT chest/abdomen 1/17 showed unchanged acute necrotizing pancreatitis with decrease in necrotic collections  * no plan for endoscopic drainage and GI will follow up as outpatient  * if no acute events can DC antibiotics on discharge

## 2019-01-23 LAB
ACID FAST STN SPEC: SIGNIFICANT CHANGE UP
ANION GAP SERPL CALC-SCNC: 8 MMO/L — SIGNIFICANT CHANGE UP (ref 7–14)
APPEARANCE UR: SIGNIFICANT CHANGE UP
BACTERIA # UR AUTO: SIGNIFICANT CHANGE UP
BACTERIA BLD CULT: SIGNIFICANT CHANGE UP
BASOPHILS # BLD AUTO: 0.01 K/UL — SIGNIFICANT CHANGE UP (ref 0–0.2)
BASOPHILS NFR BLD AUTO: 0.2 % — SIGNIFICANT CHANGE UP (ref 0–2)
BILIRUB UR-MCNC: NEGATIVE — SIGNIFICANT CHANGE UP
BLOOD UR QL VISUAL: HIGH
BUN SERPL-MCNC: 10 MG/DL — SIGNIFICANT CHANGE UP (ref 7–23)
CALCIUM SERPL-MCNC: 7.7 MG/DL — LOW (ref 8.4–10.5)
CHLORIDE SERPL-SCNC: 102 MMOL/L — SIGNIFICANT CHANGE UP (ref 98–107)
CO2 SERPL-SCNC: 28 MMOL/L — SIGNIFICANT CHANGE UP (ref 22–31)
COLOR SPEC: SIGNIFICANT CHANGE UP
CREAT SERPL-MCNC: < 0.2 MG/DL — LOW (ref 0.5–1.3)
EOSINOPHIL # BLD AUTO: 0.01 K/UL — SIGNIFICANT CHANGE UP (ref 0–0.5)
EOSINOPHIL NFR BLD AUTO: 0.2 % — SIGNIFICANT CHANGE UP (ref 0–6)
GLUCOSE BLDC GLUCOMTR-MCNC: 116 MG/DL — HIGH (ref 70–99)
GLUCOSE BLDC GLUCOMTR-MCNC: 118 MG/DL — HIGH (ref 70–99)
GLUCOSE BLDC GLUCOMTR-MCNC: 138 MG/DL — HIGH (ref 70–99)
GLUCOSE BLDC GLUCOMTR-MCNC: 141 MG/DL — HIGH (ref 70–99)
GLUCOSE BLDC GLUCOMTR-MCNC: 141 MG/DL — HIGH (ref 70–99)
GLUCOSE SERPL-MCNC: 115 MG/DL — HIGH (ref 70–99)
GLUCOSE UR-MCNC: NEGATIVE — SIGNIFICANT CHANGE UP
HCT VFR BLD CALC: 29.6 % — LOW (ref 39–50)
HGB BLD-MCNC: 9.2 G/DL — LOW (ref 13–17)
HYALINE CASTS # UR AUTO: HIGH
IMM GRANULOCYTES NFR BLD AUTO: 0.2 % — SIGNIFICANT CHANGE UP (ref 0–1.5)
KETONES UR-MCNC: SIGNIFICANT CHANGE UP
LEUKOCYTE ESTERASE UR-ACNC: SIGNIFICANT CHANGE UP
LYMPHOCYTES # BLD AUTO: 1.61 K/UL — SIGNIFICANT CHANGE UP (ref 1–3.3)
LYMPHOCYTES # BLD AUTO: 33.3 % — SIGNIFICANT CHANGE UP (ref 13–44)
MAGNESIUM SERPL-MCNC: 1.8 MG/DL — SIGNIFICANT CHANGE UP (ref 1.6–2.6)
MCHC RBC-ENTMCNC: 31.1 % — LOW (ref 32–36)
MCHC RBC-ENTMCNC: 32.3 PG — SIGNIFICANT CHANGE UP (ref 27–34)
MCV RBC AUTO: 103.9 FL — HIGH (ref 80–100)
MONOCYTES # BLD AUTO: 0.28 K/UL — SIGNIFICANT CHANGE UP (ref 0–0.9)
MONOCYTES NFR BLD AUTO: 5.8 % — SIGNIFICANT CHANGE UP (ref 2–14)
NEUTROPHILS # BLD AUTO: 2.91 K/UL — SIGNIFICANT CHANGE UP (ref 1.8–7.4)
NEUTROPHILS NFR BLD AUTO: 60.3 % — SIGNIFICANT CHANGE UP (ref 43–77)
NITRITE UR-MCNC: NEGATIVE — SIGNIFICANT CHANGE UP
NRBC # FLD: 0 K/UL — LOW (ref 25–125)
PH UR: 6 — SIGNIFICANT CHANGE UP (ref 5–8)
PHOSPHATE SERPL-MCNC: 2.7 MG/DL — SIGNIFICANT CHANGE UP (ref 2.5–4.5)
PLATELET # BLD AUTO: 293 K/UL — SIGNIFICANT CHANGE UP (ref 150–400)
PMV BLD: 9.8 FL — SIGNIFICANT CHANGE UP (ref 7–13)
POTASSIUM SERPL-MCNC: 3.9 MMOL/L — SIGNIFICANT CHANGE UP (ref 3.5–5.3)
POTASSIUM SERPL-SCNC: 3.9 MMOL/L — SIGNIFICANT CHANGE UP (ref 3.5–5.3)
PROT UR-MCNC: 200 — HIGH
RBC # BLD: 2.85 M/UL — LOW (ref 4.2–5.8)
RBC # FLD: 19.6 % — HIGH (ref 10.3–14.5)
RBC CASTS # UR COMP ASSIST: >50 — HIGH (ref 0–?)
SODIUM SERPL-SCNC: 138 MMOL/L — SIGNIFICANT CHANGE UP (ref 135–145)
SP GR SPEC: > 1.05 — HIGH (ref 1–1.04)
SPECIMEN SOURCE: SIGNIFICANT CHANGE UP
SPECIMEN SOURCE: SIGNIFICANT CHANGE UP
SQUAMOUS # UR AUTO: SIGNIFICANT CHANGE UP
UROBILINOGEN FLD QL: SIGNIFICANT CHANGE UP
WBC # BLD: 4.83 K/UL — SIGNIFICANT CHANGE UP (ref 3.8–10.5)
WBC # FLD AUTO: 4.83 K/UL — SIGNIFICANT CHANGE UP (ref 3.8–10.5)
WBC UR QL: HIGH (ref 0–?)

## 2019-01-23 PROCEDURE — 99233 SBSQ HOSP IP/OBS HIGH 50: CPT | Mod: GC

## 2019-01-23 PROCEDURE — 99233 SBSQ HOSP IP/OBS HIGH 50: CPT

## 2019-01-23 RX ORDER — HYDROCORTISONE 20 MG
TABLET ORAL
Qty: 0 | Refills: 0 | Status: COMPLETED | OUTPATIENT
Start: 2019-01-24 | End: 2019-01-28

## 2019-01-23 RX ORDER — HYDROCORTISONE 20 MG
10 TABLET ORAL DAILY
Qty: 0 | Refills: 0 | Status: COMPLETED | OUTPATIENT
Start: 2019-01-26 | End: 2019-01-27

## 2019-01-23 RX ORDER — HYDROCORTISONE 20 MG
10 TABLET ORAL EVERY 12 HOURS
Qty: 0 | Refills: 0 | Status: COMPLETED | OUTPATIENT
Start: 2019-01-24 | End: 2019-01-25

## 2019-01-23 RX ORDER — FUROSEMIDE 40 MG
40 TABLET ORAL ONCE
Qty: 0 | Refills: 0 | Status: COMPLETED | OUTPATIENT
Start: 2019-01-23 | End: 2019-01-23

## 2019-01-23 RX ADMIN — Medication 10 MILLIGRAM(S): at 06:08

## 2019-01-23 RX ADMIN — Medication 650 MILLIGRAM(S): at 19:45

## 2019-01-23 RX ADMIN — LACOSAMIDE 100 MILLIGRAM(S): 50 TABLET ORAL at 17:58

## 2019-01-23 RX ADMIN — Medication 100 MILLIGRAM(S): at 06:02

## 2019-01-23 RX ADMIN — HEPARIN SODIUM 5000 UNIT(S): 5000 INJECTION INTRAVENOUS; SUBCUTANEOUS at 06:09

## 2019-01-23 RX ADMIN — Medication 100 MILLIGRAM(S): at 21:25

## 2019-01-23 RX ADMIN — MIDODRINE HYDROCHLORIDE 5 MILLIGRAM(S): 2.5 TABLET ORAL at 15:07

## 2019-01-23 RX ADMIN — MIDODRINE HYDROCHLORIDE 5 MILLIGRAM(S): 2.5 TABLET ORAL at 22:12

## 2019-01-23 RX ADMIN — Medication 40 MILLIGRAM(S): at 15:06

## 2019-01-23 RX ADMIN — RISPERIDONE 1 MILLIGRAM(S): 4 TABLET ORAL at 12:09

## 2019-01-23 RX ADMIN — Medication 250 MILLIGRAM(S): at 07:50

## 2019-01-23 RX ADMIN — QUETIAPINE FUMARATE 25 MILLIGRAM(S): 200 TABLET, FILM COATED ORAL at 12:09

## 2019-01-23 RX ADMIN — HEPARIN SODIUM 5000 UNIT(S): 5000 INJECTION INTRAVENOUS; SUBCUTANEOUS at 15:07

## 2019-01-23 RX ADMIN — PREGABALIN 1000 MICROGRAM(S): 225 CAPSULE ORAL at 12:09

## 2019-01-23 RX ADMIN — Medication 1 MILLIGRAM(S): at 12:09

## 2019-01-23 RX ADMIN — Medication 650 MILLIGRAM(S): at 18:37

## 2019-01-23 RX ADMIN — Medication 10 MILLIGRAM(S): at 15:06

## 2019-01-23 RX ADMIN — CHLORHEXIDINE GLUCONATE 1 APPLICATION(S): 213 SOLUTION TOPICAL at 10:10

## 2019-01-23 RX ADMIN — Medication 250 MILLIGRAM(S): at 20:17

## 2019-01-23 RX ADMIN — HEPARIN SODIUM 5000 UNIT(S): 5000 INJECTION INTRAVENOUS; SUBCUTANEOUS at 22:11

## 2019-01-23 RX ADMIN — LACOSAMIDE 100 MILLIGRAM(S): 50 TABLET ORAL at 06:09

## 2019-01-23 RX ADMIN — Medication 100 MILLIGRAM(S): at 12:09

## 2019-01-23 NOTE — PROGRESS NOTE ADULT - SUBJECTIVE AND OBJECTIVE BOX
CHIEF COMPLAINT: Patient is a 53y old  Male who presents with a chief complaint of ARDS?, sepsis (23 Jan 2019 12:55)      Interval Events: Fever yesterday     REVIEW OF SYSTEMS:  Constitutional: No pain/chills   CV: Denies   Resp: Denies   GI: No pain   [x ] All other systems negative      OBJECTIVE:  ICU Vital Signs Last 24 Hrs  T(C): 37.8 (23 Jan 2019 15:13), Max: 38.4 (22 Jan 2019 17:36)  T(F): 100 (23 Jan 2019 15:13), Max: 101.1 (22 Jan 2019 17:36)  HR: 125 (23 Jan 2019 15:21) (117 - 131)  BP: 108/67 (23 Jan 2019 15:13) (107/65 - 128/81)  BP(mean): --  ABP: --  ABP(mean): --  RR: 22 (23 Jan 2019 15:13) (22 - 26)  SpO2: 98% (23 Jan 2019 15:21) (95% - 99%)    Mode: AC/ CMV (Assist Control/ Continuous Mandatory Ventilation), RR (machine): 14, TV (machine): 350, FiO2: 40, PEEP: 5, PS: , MAP: 8, PIP: 16    CAPILLARY BLOOD GLUCOSE      POCT Blood Glucose.: 138 mg/dL (23 Jan 2019 12:06)        HOSPITAL MEDICATIONS:  MEDICATIONS  (STANDING):  cefTAZidime/avibactam IVPB 2.5 Gram(s) IV Intermittent every 8 hours  cefTAZidime/avibactam IVPB      chlorhexidine 4% Liquid 1 Application(s) Topical <User Schedule>  cyanocobalamin 1000 MICROGram(s) Oral daily  dextrose 5%. 1000 milliLiter(s) (50 mL/Hr) IV Continuous <Continuous>  dextrose 50% Injectable 12.5 Gram(s) IV Push once  dextrose 50% Injectable 25 Gram(s) IV Push once  dextrose 50% Injectable 25 Gram(s) IV Push once  folic acid 1 milliGRAM(s) Enteral Tube daily  heparin  Injectable 5000 Unit(s) SubCutaneous every 8 hours  insulin lispro (HumaLOG) corrective regimen sliding scale   SubCutaneous every 6 hours  lacosamide Solution 100 milliGRAM(s) Oral two times a day  metroNIDAZOLE  IVPB      metroNIDAZOLE  IVPB 500 milliGRAM(s) IV Intermittent every 8 hours  midodrine 5 milliGRAM(s) Oral three times a day  QUEtiapine 25 milliGRAM(s) Oral daily  risperiDONE   Solution 1 milliGRAM(s) Oral daily  vancomycin  IVPB      vancomycin  IVPB 1000 milliGRAM(s) IV Intermittent every 12 hours    MEDICATIONS  (PRN):  acetaminophen    Suspension .. 650 milliGRAM(s) Oral every 6 hours PRN Temp greater or equal to 38C (100.4F), Mild Pain (1 - 3), Moderate Pain (4 - 6)  dextrose 40% Gel 15 Gram(s) Oral once PRN Blood Glucose LESS THAN 70 milliGRAM(s)/deciliter  glucagon  Injectable 1 milliGRAM(s) IntraMuscular once PRN Glucose LESS THAN 70 milligrams/deciliter      LABS:                        9.2    4.83  )-----------( 293      ( 23 Jan 2019 06:50 )             29.6     01-23    138  |  102  |  10  ----------------------------<  115<H>  3.9   |  28  |  < 0.20<L>    Ca    7.7<L>      23 Jan 2019 06:50  Phos  2.7     01-23  Mg     1.8     01-23        Urinalysis Basic - ( 23 Jan 2019 02:35 )    Color: DARK YELLOW / Appearance: Lt TURBID / SG: > 1.050 / pH: 6.0  Gluc: NEGATIVE / Ketone: SMALL  / Bili: NEGATIVE / Urobili: TRACE   Blood: MODERATE / Protein: 200 / Nitrite: NEGATIVE   Leuk Esterase: SMALL / RBC: >50 / WBC 26-50   Sq Epi: FEW / Non Sq Epi: x / Bacteria: SMALL            MICROBIOLOGY:     RADIOLOGY:  [ ] Reviewed and interpreted by me    PULMONARY FUNCTION TESTS:    EKG:

## 2019-01-23 NOTE — PROGRESS NOTE ADULT - SUBJECTIVE AND OBJECTIVE BOX
Follow Up:  necrotizing pancreatitis and CRE acinetobacter in the sputum    Interval History/ROS: pt spiked to 101 yesterday with thicker purulent secretions and he was started on vanco as well        Allergies  Valproate Sodium (Other (Severe))        ANTIMICROBIALS:  cefTAZidime/avibactam IVPB 2.5 every 8 hours  cefTAZidime/avibactam IVPB    metroNIDAZOLE  IVPB    metroNIDAZOLE  IVPB 500 every 8 hours  vancomycin  IVPB    vancomycin  IVPB 1000 every 12 hours      OTHER MEDS:  acetaminophen    Suspension .. 650 milliGRAM(s) Oral every 6 hours PRN  chlorhexidine 4% Liquid 1 Application(s) Topical <User Schedule>  cyanocobalamin 1000 MICROGram(s) Oral daily  dextrose 40% Gel 15 Gram(s) Oral once PRN  dextrose 5%. 1000 milliLiter(s) IV Continuous <Continuous>  dextrose 50% Injectable 12.5 Gram(s) IV Push once  dextrose 50% Injectable 25 Gram(s) IV Push once  dextrose 50% Injectable 25 Gram(s) IV Push once  folic acid 1 milliGRAM(s) Enteral Tube daily  furosemide   Injectable 40 milliGRAM(s) IV Push once  glucagon  Injectable 1 milliGRAM(s) IntraMuscular once PRN  heparin  Injectable 5000 Unit(s) SubCutaneous every 8 hours  hydrocortisone sodium succinate Injectable 10 milliGRAM(s) IV Push every 8 hours  insulin lispro (HumaLOG) corrective regimen sliding scale   SubCutaneous every 6 hours  lacosamide Solution 100 milliGRAM(s) Oral two times a day  midodrine 5 milliGRAM(s) Oral three times a day  QUEtiapine 25 milliGRAM(s) Oral daily  risperiDONE   Solution 1 milliGRAM(s) Oral daily      Vital Signs Last 24 Hrs  T(C): 37.4 (23 Jan 2019 10:09), Max: 38.4 (22 Jan 2019 17:36)  T(F): 99.4 (23 Jan 2019 10:09), Max: 101.1 (22 Jan 2019 17:36)  HR: 119 (23 Jan 2019 11:30) (117 - 145)  BP: 108/68 (23 Jan 2019 10:09) (107/65 - 134/79)  BP(mean): --  RR: 26 (23 Jan 2019 10:09) (22 - 26)  SpO2: 99% (23 Jan 2019 11:30) (93% - 99%)    Physical Exam:  General: NAD, non toxic  Head: atraumatic normocephalic  eyes: normal sclera and conjunctivae   ENT:   trach , no LAD  Cardiovascular: regular S1,S2  Respiratory:  trach with some drainage around it and thicker, purulent secretions, now back on the vent  abd:  PEG tube, soft, bowel sounds present, nontender, rectal tube  :  no suprapubic tenderness, [place] cath    Musculoskeletal:   no joint swelling, contractures, RUE mostly, LUE edematous  Skin:    no rash  Neurologic: awake, answers some questions with nodding    Vascular: no phlebitis  Psych: unable to assess                         9.2    4.83  )-----------( 293      ( 23 Jan 2019 06:50 )             29.6       01-23    138  |  102  |  10  ----------------------------<  115<H>  3.9   |  28  |  < 0.20<L>    Ca    7.7<L>      23 Jan 2019 06:50  Phos  2.7     01-23  Mg     1.8     01-23        Urinalysis Basic - ( 23 Jan 2019 02:35 )    Color: DARK YELLOW / Appearance: Lt TURBID / SG: > 1.050 / pH: 6.0  Gluc: NEGATIVE / Ketone: SMALL  / Bili: NEGATIVE / Urobili: TRACE   Blood: MODERATE / Protein: 200 / Nitrite: NEGATIVE   Leuk Esterase: SMALL / RBC: >50 / WBC 26-50   Sq Epi: FEW / Non Sq Epi: x / Bacteria: SMALL        MICROBIOLOGY:  v  ENDOTRACHEAL SPECIMEN  01-22-19 --  --  --      BLOOD  01-18-19 --  --  --      BLOOD PERIPHERAL  01-16-19 --  --  --      ENDOTRACHEAL SPECIMEN  01-06-19 --  --  Acin.baumannii/haemoly       BLOOD VENOUS  01-02-19 --  --  --      BLOOD PERIPHERAL  01-02-19 --  --  --      BLOOD PERIPHERAL  12-27-18 --  --  --                RADIOLOGY:  Images below reviewed personally  < from: CT Abdomen and Pelvis w/ IV Cont (01.17.19 @ 12:37) >  IMPRESSION:     Unchanged necrotizing pancreatitis with mild decrease in acute necrotic   collections.  Decreased amount of ascites.  Unchanged bilateral pleural effusions and atelectasis.  Unchanged colitis involving the distal transverse descending colon.

## 2019-01-23 NOTE — PROGRESS NOTE ADULT - PROBLEM SELECTOR PLAN 2
- s/p trach 1/9   - weaning from vent as tolerated, tolerating trach collar during the day  - trach change done secondary to leak from collar   - trach care, suction PRN  - chest PT  - New fever today - cx sent and d/w ID add vanco 1gm bid for now and follow up cx   POCUS showed effusions and consolidation in both lungs - s/p trach 1/9   - weaning from vent as tolerated, tolerating trach collar during the day  - trach change done secondary to leak from collar 1/22/19 no leaks noted   - trach care, suction PRN  - chest PT  - New fever today - cx sent and d/w ID add vanco 1gm bid for now and follow up cx   POCUS showed effusions and consolidation in both lungs

## 2019-01-23 NOTE — PROGRESS NOTE ADULT - ASSESSMENT
53 Male w/ a PMHx of TBI (s/p PEG tube, contracture, and seizure d/o) presented as a transfer from [facility] on 12/9 for respiratory failure 2/2 ARDS likely 2/2 necrotizing pancreatitis s/p intubation. Course c/b Acinetobacter PNA s/p Avycaz and Flagyl 7 day course.  Course further complicated by acute blood loss anemia s/p colonoscopy with findings suggestive of ischemic colitis, without further bleeding and Hgb remaining stable. S/p extubation 1/3, and re-intubation 1/6 now s/p tracheostomy.

## 2019-01-23 NOTE — PROGRESS NOTE ADULT - PROBLEM SELECTOR PLAN 1
+ fluid collection persists but somewhat smaller   - Avacyz continued for now per ID   - Repeat CT done - wall around fluid collection still not mature - need fu in 2    weeks can be OP- appointment made

## 2019-01-23 NOTE — PROGRESS NOTE ADULT - ASSESSMENT
53  M (resident of [facility]) with TBI, s/p PEG tube, contracture, and seizure d/o who presented as a transfer from [facility] on 12/9 for respiratory failure 2/2 ARDS likely 2/2 necrotizing pancreatitis s/p intubation.  Imipenem 12/28/18 - continued   bronc 12/11 with pseudomonas  pt with thick secretions and again febrile, sputum cx with  acinetobacter  repeat Ct with increased size of pancreatic collection but not walled off so no interventions were recommended  also had GIB and colitis due to ?contiguous necrotizing pancreatitis vs ischemic, s/p colonoscopy but not actively bleeding now  completed a 7 day course of avycaz and flagyl for the acinetobacter then switched back to imipenem, but pt again had hypothermia with the same acinetobacter in sputum now s/p trach and back on avycaz + flagyl    sepsis with fever, leukopenia resolved, extubated but reintubated 1/6/19 for hypoxia and poor cough along with profuse secretions and sputum cx again with acinetobacter    Necrotising Pancreatitis with multiple peripancreatitis collections not walled off yet   acinetobacter PNA  with lung abscesses  now with new fever and respiratory failure and purulent secretions, new pneumonia   coag neg staph bacteremia likely contaminant, repeat negative  colitis, ?due to contiguous necrotizing pancreatitis and collection vs ischemic    no plans for IR embolization for surgical interventions      * s/p 7 days of avcaz and flagyl for  acinetobacter in the sputum 12/22-12/28  then back on imipenem for necrotizing pancreatis  * restarted on  avycaz and flagyl to cover the acinetobacter again 1/9, now day 15  * vanco was added 1/22, c/w 1 g q 12 and monitor the trough  * f/u the blood and trach culutres  * if fever or worsening status would repeat the chest/abd CT

## 2019-01-24 DIAGNOSIS — R50.9 FEVER, UNSPECIFIED: ICD-10-CM

## 2019-01-24 LAB
ANION GAP SERPL CALC-SCNC: 9 MMO/L — SIGNIFICANT CHANGE UP (ref 7–14)
BASOPHILS # BLD AUTO: 0 K/UL — SIGNIFICANT CHANGE UP (ref 0–0.2)
BASOPHILS NFR BLD AUTO: 0 % — SIGNIFICANT CHANGE UP (ref 0–2)
BUN SERPL-MCNC: 10 MG/DL — SIGNIFICANT CHANGE UP (ref 7–23)
CALCIUM SERPL-MCNC: 7.3 MG/DL — LOW (ref 8.4–10.5)
CHLORIDE SERPL-SCNC: 99 MMOL/L — SIGNIFICANT CHANGE UP (ref 98–107)
CO2 SERPL-SCNC: 27 MMOL/L — SIGNIFICANT CHANGE UP (ref 22–31)
CREAT SERPL-MCNC: < 0.2 MG/DL — LOW (ref 0.5–1.3)
EOSINOPHIL # BLD AUTO: 0.02 K/UL — SIGNIFICANT CHANGE UP (ref 0–0.5)
EOSINOPHIL NFR BLD AUTO: 0.4 % — SIGNIFICANT CHANGE UP (ref 0–6)
GLUCOSE BLDC GLUCOMTR-MCNC: 107 MG/DL — HIGH (ref 70–99)
GLUCOSE BLDC GLUCOMTR-MCNC: 112 MG/DL — HIGH (ref 70–99)
GLUCOSE BLDC GLUCOMTR-MCNC: 113 MG/DL — HIGH (ref 70–99)
GLUCOSE BLDC GLUCOMTR-MCNC: 124 MG/DL — HIGH (ref 70–99)
GLUCOSE SERPL-MCNC: 124 MG/DL — HIGH (ref 70–99)
HCT VFR BLD CALC: 28.1 % — LOW (ref 39–50)
HGB BLD-MCNC: 8.7 G/DL — LOW (ref 13–17)
IMM GRANULOCYTES NFR BLD AUTO: 0 % — SIGNIFICANT CHANGE UP (ref 0–1.5)
LYMPHOCYTES # BLD AUTO: 1.52 K/UL — SIGNIFICANT CHANGE UP (ref 1–3.3)
LYMPHOCYTES # BLD AUTO: 33.9 % — SIGNIFICANT CHANGE UP (ref 13–44)
MAGNESIUM SERPL-MCNC: 1.7 MG/DL — SIGNIFICANT CHANGE UP (ref 1.6–2.6)
MCHC RBC-ENTMCNC: 31 % — LOW (ref 32–36)
MCHC RBC-ENTMCNC: 31.9 PG — SIGNIFICANT CHANGE UP (ref 27–34)
MCV RBC AUTO: 102.9 FL — HIGH (ref 80–100)
MONOCYTES # BLD AUTO: 0.29 K/UL — SIGNIFICANT CHANGE UP (ref 0–0.9)
MONOCYTES NFR BLD AUTO: 6.5 % — SIGNIFICANT CHANGE UP (ref 2–14)
NEUTROPHILS # BLD AUTO: 2.66 K/UL — SIGNIFICANT CHANGE UP (ref 1.8–7.4)
NEUTROPHILS NFR BLD AUTO: 59.2 % — SIGNIFICANT CHANGE UP (ref 43–77)
NRBC # FLD: 0 K/UL — LOW (ref 25–125)
PHOSPHATE SERPL-MCNC: 3.1 MG/DL — SIGNIFICANT CHANGE UP (ref 2.5–4.5)
PLATELET # BLD AUTO: 310 K/UL — SIGNIFICANT CHANGE UP (ref 150–400)
PMV BLD: 9.5 FL — SIGNIFICANT CHANGE UP (ref 7–13)
POTASSIUM SERPL-MCNC: 3.8 MMOL/L — SIGNIFICANT CHANGE UP (ref 3.5–5.3)
POTASSIUM SERPL-SCNC: 3.8 MMOL/L — SIGNIFICANT CHANGE UP (ref 3.5–5.3)
RBC # BLD: 2.73 M/UL — LOW (ref 4.2–5.8)
RBC # FLD: 19.4 % — HIGH (ref 10.3–14.5)
SODIUM SERPL-SCNC: 135 MMOL/L — SIGNIFICANT CHANGE UP (ref 135–145)
WBC # BLD: 4.49 K/UL — SIGNIFICANT CHANGE UP (ref 3.8–10.5)
WBC # FLD AUTO: 4.49 K/UL — SIGNIFICANT CHANGE UP (ref 3.8–10.5)

## 2019-01-24 PROCEDURE — 99233 SBSQ HOSP IP/OBS HIGH 50: CPT | Mod: GC

## 2019-01-24 PROCEDURE — 99233 SBSQ HOSP IP/OBS HIGH 50: CPT

## 2019-01-24 PROCEDURE — 71260 CT THORAX DX C+: CPT | Mod: 26

## 2019-01-24 PROCEDURE — 74177 CT ABD & PELVIS W/CONTRAST: CPT | Mod: 26

## 2019-01-24 RX ORDER — ACETAMINOPHEN 500 MG
1000 TABLET ORAL ONCE
Qty: 0 | Refills: 0 | Status: COMPLETED | OUTPATIENT
Start: 2019-01-24 | End: 2019-01-24

## 2019-01-24 RX ORDER — LACOSAMIDE 50 MG/1
100 TABLET ORAL
Qty: 0 | Refills: 0 | Status: DISCONTINUED | OUTPATIENT
Start: 2019-01-24 | End: 2019-01-31

## 2019-01-24 RX ADMIN — HEPARIN SODIUM 5000 UNIT(S): 5000 INJECTION INTRAVENOUS; SUBCUTANEOUS at 13:06

## 2019-01-24 RX ADMIN — QUETIAPINE FUMARATE 25 MILLIGRAM(S): 200 TABLET, FILM COATED ORAL at 11:52

## 2019-01-24 RX ADMIN — Medication 400 MILLIGRAM(S): at 05:29

## 2019-01-24 RX ADMIN — HEPARIN SODIUM 5000 UNIT(S): 5000 INJECTION INTRAVENOUS; SUBCUTANEOUS at 21:47

## 2019-01-24 RX ADMIN — Medication 1 MILLIGRAM(S): at 11:52

## 2019-01-24 RX ADMIN — PREGABALIN 1000 MICROGRAM(S): 225 CAPSULE ORAL at 11:52

## 2019-01-24 RX ADMIN — Medication 100 MILLIGRAM(S): at 21:46

## 2019-01-24 RX ADMIN — Medication 650 MILLIGRAM(S): at 19:30

## 2019-01-24 RX ADMIN — MIDODRINE HYDROCHLORIDE 5 MILLIGRAM(S): 2.5 TABLET ORAL at 13:06

## 2019-01-24 RX ADMIN — RISPERIDONE 1 MILLIGRAM(S): 4 TABLET ORAL at 11:53

## 2019-01-24 RX ADMIN — LACOSAMIDE 100 MILLIGRAM(S): 50 TABLET ORAL at 06:18

## 2019-01-24 RX ADMIN — Medication 100 MILLIGRAM(S): at 06:16

## 2019-01-24 RX ADMIN — LACOSAMIDE 100 MILLIGRAM(S): 50 TABLET ORAL at 18:38

## 2019-01-24 RX ADMIN — Medication 250 MILLIGRAM(S): at 09:32

## 2019-01-24 RX ADMIN — Medication 100 MILLIGRAM(S): at 13:06

## 2019-01-24 RX ADMIN — Medication 10 MILLIGRAM(S): at 18:38

## 2019-01-24 RX ADMIN — HEPARIN SODIUM 5000 UNIT(S): 5000 INJECTION INTRAVENOUS; SUBCUTANEOUS at 06:17

## 2019-01-24 RX ADMIN — Medication 650 MILLIGRAM(S): at 18:38

## 2019-01-24 RX ADMIN — CHLORHEXIDINE GLUCONATE 1 APPLICATION(S): 213 SOLUTION TOPICAL at 09:32

## 2019-01-24 RX ADMIN — MIDODRINE HYDROCHLORIDE 5 MILLIGRAM(S): 2.5 TABLET ORAL at 21:46

## 2019-01-24 RX ADMIN — Medication 10 MILLIGRAM(S): at 06:17

## 2019-01-24 NOTE — PROGRESS NOTE ADULT - SUBJECTIVE AND OBJECTIVE BOX
Follow Up:  necrotizing pancreatitis and CRE acinetobacter in the sputum    Interval History/ROS: now afebrile, repeat chest/abd CT showed improved but still present lung abscesses and a new pancreatic collection with better formed old abscesses          Allergies  Valproate Sodium (Other (Severe))        ANTIMICROBIALS:  cefTAZidime/avibactam IVPB 2.5 every 8 hours  cefTAZidime/avibactam IVPB    metroNIDAZOLE  IVPB    metroNIDAZOLE  IVPB 500 every 8 hours  vancomycin  IVPB    vancomycin  IVPB 1000 every 12 hours      OTHER MEDS:  acetaminophen    Suspension .. 650 milliGRAM(s) Oral every 6 hours PRN  chlorhexidine 4% Liquid 1 Application(s) Topical <User Schedule>  cyanocobalamin 1000 MICROGram(s) Oral daily  dextrose 40% Gel 15 Gram(s) Oral once PRN  dextrose 5%. 1000 milliLiter(s) IV Continuous <Continuous>  dextrose 50% Injectable 12.5 Gram(s) IV Push once  dextrose 50% Injectable 25 Gram(s) IV Push once  dextrose 50% Injectable 25 Gram(s) IV Push once  folic acid 1 milliGRAM(s) Enteral Tube daily  glucagon  Injectable 1 milliGRAM(s) IntraMuscular once PRN  heparin  Injectable 5000 Unit(s) SubCutaneous every 8 hours  hydrocortisone sodium succinate Injectable 10 milliGRAM(s) IV Push every 12 hours  insulin lispro (HumaLOG) corrective regimen sliding scale   SubCutaneous every 6 hours  lacosamide Solution 100 milliGRAM(s) Oral two times a day  midodrine 5 milliGRAM(s) Oral three times a day  QUEtiapine 25 milliGRAM(s) Oral daily  risperiDONE   Solution 1 milliGRAM(s) Oral daily      Vital Signs Last 24 Hrs  T(C): 37.3 (24 Jan 2019 13:04), Max: 38.7 (24 Jan 2019 05:08)  T(F): 99.2 (24 Jan 2019 13:04), Max: 101.7 (24 Jan 2019 05:08)  HR: 110 (24 Jan 2019 15:35) (109 - 131)  BP: 94/68 (24 Jan 2019 13:04) (94/68 - 116/77)  BP(mean): --  RR: 20 (24 Jan 2019 13:04) (20 - 22)  SpO2: 99% (24 Jan 2019 15:35) (96% - 100%)    Physical Exam:  General: NAD, non toxic  Head: atraumatic normocephalic  eyes: normal sclera and conjunctivae   ENT:   trach , no LAD  Cardiovascular: regular S1,S2  Respiratory:  trach with some drainage around it and thicker, purulent secretions, now back on the vent  abd:  PEG tube, soft, bowel sounds present, nontender, rectal tube  :  no suprapubic tenderness, [place] cath    Musculoskeletal:   no joint swelling, contractures, RUE mostly, LUE edematous  Skin:    no rash  Neurologic: awake, answers some questions with nodding    Vascular: no phlebitis  Psych: unable to assess                                      8.7    4.49  )-----------( 310      ( 24 Jan 2019 05:43 )             28.1       01-24    135  |  99  |  10  ----------------------------<  124<H>  3.8   |  27  |  < 0.20<L>    Ca    7.3<L>      24 Jan 2019 05:43  Phos  3.1     01-24  Mg     1.7     01-24        Urinalysis Basic - ( 23 Jan 2019 02:35 )    Color: DARK YELLOW / Appearance: Lt TURBID / SG: > 1.050 / pH: 6.0  Gluc: NEGATIVE / Ketone: SMALL  / Bili: NEGATIVE / Urobili: TRACE   Blood: MODERATE / Protein: 200 / Nitrite: NEGATIVE   Leuk Esterase: SMALL / RBC: >50 / WBC 26-50   Sq Epi: FEW / Non Sq Epi: x / Bacteria: SMALL        MICROBIOLOGY:  v  ENDOTRACHEAL SPECIMEN  01-22-19 --  --  --      BLOOD VENOUS  01-22-19 --  --  --      BLOOD  01-18-19 --  --  --      BLOOD PERIPHERAL  01-16-19 --  --  --      ENDOTRACHEAL SPECIMEN  01-06-19 --  --  Acin.baumannii/haemoly       BLOOD VENOUS  01-02-19 --  --  --      BLOOD PERIPHERAL  01-02-19 --  --  --      BLOOD PERIPHERAL  12-27-18 --  --  --                RADIOLOGY:  Images below reviewed personally  < from: CT Chest w/ IV Cont (01.24.19 @ 15:07) >  IMPRESSION: Necrotizing pancreatitis with acute necrotic collections   again noted. Fluid collections along the proximal gastric wall are better   formed with a newcollection more caudally measuring 7.0 x 4.8 cm. Small   ascites has decreased.    Moderate bilateral pleural effusions with complete atelectasis of both   lower lobes and partial atelectasis of the right middle lobe and lingula   as seen previously.Heterogeneous lung enhancement has improved but is   still present and suggestive of lung abscesses.

## 2019-01-24 NOTE — PROGRESS NOTE ADULT - SUBJECTIVE AND OBJECTIVE BOX
CHIEF COMPLAINT: Patient is a 53y old  Male who presents with a chief complaint of ARDS?, sepsis (23 Jan 2019 12:55)    Interval Events:      REVIEW OF SYSTEMS:  Constitutional:   Eyes:  ENT:  CV:  Resp:  GI:  :  MSK:  Integumentary:  Neurological:  Psychiatric:  Endocrine:  Hematologic/Lymphatic:  Allergic/Immunologic:  [ ] All other systems negative  [ ] Unable to assess ROS because ________      OBJECTIVE:  ICU Vital Signs Last 24 Hrs  T(C): 37.9 (24 Jan 2019 07:04), Max: 38.7 (24 Jan 2019 05:08)  T(F): 100.2 (24 Jan 2019 07:04), Max: 101.7 (24 Jan 2019 05:08)  HR: 118 (24 Jan 2019 07:24) (115 - 131)  BP: 113/71 (24 Jan 2019 05:08) (100/69 - 116/77)  BP(mean): --  ABP: --  ABP(mean): --  RR: 22 (23 Jan 2019 21:21) (22 - 26)  SpO2: 99% (24 Jan 2019 07:24) (96% - 100%)    Mode: AC/ CMV (Assist Control/ Continuous Mandatory Ventilation), RR (machine): 14, TV (machine): 350, FiO2: 40, PEEP: 5, PS: , MAP: 8.8, PIP: 17    POCT Blood Glucose.: 124 mg/dL (24 Jan 2019 05:02)    HOSPITAL MEDICATIONS:  MEDICATIONS  (STANDING):  cefTAZidime/avibactam IVPB 2.5 Gram(s) IV Intermittent every 8 hours  cefTAZidime/avibactam IVPB      chlorhexidine 4% Liquid 1 Application(s) Topical <User Schedule>  cyanocobalamin 1000 MICROGram(s) Oral daily  dextrose 5%. 1000 milliLiter(s) (50 mL/Hr) IV Continuous <Continuous>  dextrose 50% Injectable 12.5 Gram(s) IV Push once  dextrose 50% Injectable 25 Gram(s) IV Push once  dextrose 50% Injectable 25 Gram(s) IV Push once  folic acid 1 milliGRAM(s) Enteral Tube daily  heparin  Injectable 5000 Unit(s) SubCutaneous every 8 hours  hydrocortisone sodium succinate Injectable 10 milliGRAM(s) IV Push every 12 hours  insulin lispro (HumaLOG) corrective regimen sliding scale   SubCutaneous every 6 hours  lacosamide Solution 100 milliGRAM(s) Oral two times a day  metroNIDAZOLE  IVPB      metroNIDAZOLE  IVPB 500 milliGRAM(s) IV Intermittent every 8 hours  midodrine 5 milliGRAM(s) Oral three times a day  QUEtiapine 25 milliGRAM(s) Oral daily  risperiDONE   Solution 1 milliGRAM(s) Oral daily  vancomycin  IVPB      vancomycin  IVPB 1000 milliGRAM(s) IV Intermittent every 12 hours    MEDICATIONS  (PRN):  acetaminophen    Suspension .. 650 milliGRAM(s) Oral every 6 hours PRN Temp greater or equal to 38C (100.4F), Mild Pain (1 - 3), Moderate Pain (4 - 6)  dextrose 40% Gel 15 Gram(s) Oral once PRN Blood Glucose LESS THAN 70 milliGRAM(s)/deciliter  glucagon  Injectable 1 milliGRAM(s) IntraMuscular once PRN Glucose LESS THAN 70 milligrams/deciliter      LABS:                        8.7    4.49  )-----------( 310      ( 24 Jan 2019 05:43 )             28.1     01-24    135  |  99  |  10  ----------------------------<  124<H>  3.8   |  27  |  < 0.20<L>    Ca    7.3<L>      24 Jan 2019 05:43  Phos  3.1     01-24  Mg     1.7     01-24        Urinalysis Basic - ( 23 Jan 2019 02:35 )    Color: DARK YELLOW / Appearance: Lt TURBID / SG: > 1.050 / pH: 6.0  Gluc: NEGATIVE / Ketone: SMALL  / Bili: NEGATIVE / Urobili: TRACE   Blood: MODERATE / Protein: 200 / Nitrite: NEGATIVE   Leuk Esterase: SMALL / RBC: >50 / WBC 26-50   Sq Epi: FEW / Non Sq Epi: x / Bacteria: SMALL            MICROBIOLOGY:     RADIOLOGY:  [ ] Reviewed and interpreted by me    PULMONARY FUNCTION TESTS:    EKG: CHIEF COMPLAINT: Patient is a 53y old  Male who presents with a chief complaint of ARDS?, sepsis (23 Jan 2019 12:55)    Interval Events: febrile overnight      REVIEW OF SYSTEMS:  Constitutional: when asked if has complaints, shakes head no  [ ] All other systems negative  [x] Unable to assess ROS because: nonverbal      OBJECTIVE:  ICU Vital Signs Last 24 Hrs  T(C): 37.9 (24 Jan 2019 07:04), Max: 38.7 (24 Jan 2019 05:08)  T(F): 100.2 (24 Jan 2019 07:04), Max: 101.7 (24 Jan 2019 05:08)  HR: 118 (24 Jan 2019 07:24) (115 - 131)  BP: 113/71 (24 Jan 2019 05:08) (100/69 - 116/77)  BP(mean): --  ABP: --  ABP(mean): --  RR: 22 (23 Jan 2019 21:21) (22 - 26)  SpO2: 99% (24 Jan 2019 07:24) (96% - 100%)    Mode: AC/ CMV (Assist Control/ Continuous Mandatory Ventilation), RR (machine): 14, TV (machine): 350, FiO2: 40, PEEP: 5, PS: , MAP: 8.8, PIP: 17    POCT Blood Glucose.: 124 mg/dL (24 Jan 2019 05:02)    HOSPITAL MEDICATIONS:  MEDICATIONS  (STANDING):  cefTAZidime/avibactam IVPB 2.5 Gram(s) IV Intermittent every 8 hours  cefTAZidime/avibactam IVPB      chlorhexidine 4% Liquid 1 Application(s) Topical <User Schedule>  cyanocobalamin 1000 MICROGram(s) Oral daily  dextrose 5%. 1000 milliLiter(s) (50 mL/Hr) IV Continuous <Continuous>  dextrose 50% Injectable 12.5 Gram(s) IV Push once  dextrose 50% Injectable 25 Gram(s) IV Push once  dextrose 50% Injectable 25 Gram(s) IV Push once  folic acid 1 milliGRAM(s) Enteral Tube daily  heparin  Injectable 5000 Unit(s) SubCutaneous every 8 hours  hydrocortisone sodium succinate Injectable 10 milliGRAM(s) IV Push every 12 hours  insulin lispro (HumaLOG) corrective regimen sliding scale   SubCutaneous every 6 hours  lacosamide Solution 100 milliGRAM(s) Oral two times a day  metroNIDAZOLE  IVPB      metroNIDAZOLE  IVPB 500 milliGRAM(s) IV Intermittent every 8 hours  midodrine 5 milliGRAM(s) Oral three times a day  QUEtiapine 25 milliGRAM(s) Oral daily  risperiDONE   Solution 1 milliGRAM(s) Oral daily  vancomycin  IVPB      vancomycin  IVPB 1000 milliGRAM(s) IV Intermittent every 12 hours    MEDICATIONS  (PRN):  acetaminophen    Suspension .. 650 milliGRAM(s) Oral every 6 hours PRN Temp greater or equal to 38C (100.4F), Mild Pain (1 - 3), Moderate Pain (4 - 6)  dextrose 40% Gel 15 Gram(s) Oral once PRN Blood Glucose LESS THAN 70 milliGRAM(s)/deciliter  glucagon  Injectable 1 milliGRAM(s) IntraMuscular once PRN Glucose LESS THAN 70 milligrams/deciliter      LABS:                        8.7    4.49  )-----------( 310      ( 24 Jan 2019 05:43 )             28.1     01-24    135  |  99  |  10  ----------------------------<  124<H>  3.8   |  27  |  < 0.20<L>    Ca    7.3<L>      24 Jan 2019 05:43  Phos  3.1     01-24  Mg     1.7     01-24        Urinalysis Basic - ( 23 Jan 2019 02:35 )    Color: DARK YELLOW / Appearance: Lt TURBID / SG: > 1.050 / pH: 6.0  Gluc: NEGATIVE / Ketone: SMALL  / Bili: NEGATIVE / Urobili: TRACE   Blood: MODERATE / Protein: 200 / Nitrite: NEGATIVE   Leuk Esterase: SMALL / RBC: >50 / WBC 26-50   Sq Epi: FEW / Non Sq Epi: x / Bacteria: SMALL        MICROBIOLOGY:     Culture - Respiratory with Gram Stain (01.22.19 @ 16:27)    Culture - Respiratory:   Normal Respiratory Trisha Also Present  PSA^Pseudomonas aeruginosa  QUANTITY OF GROWTH: FEW    Gram Stain Sputum:   GPR^Gram Positive Rods  QUANTITY OF BACTERIA SEEN: MANY (4+)  WBC^White Blood Cells  QNTY CELLS IN GRAM STAIN: FEW (2+)    Specimen Source: ENDOTRACHEAL SPECIMEN    Culture - Blood (01.22.19 @ 16:15)    Culture - Blood:   NO ORGANISMS ISOLATED  NO ORGANISMS ISOLATED AT 24 HOURS    Specimen Source: BLOOD PERIPHERAL    Culture - Blood (01.22.19 @ 16:15)    Culture - Blood:   NO ORGANISMS ISOLATED  NO ORGANISMS ISOLATED AT 24 HOURS    Specimen Source: BLOOD VENOUS

## 2019-01-24 NOTE — PROGRESS NOTE ADULT - ASSESSMENT
53 Male w/ a PMHx of TBI (s/p PEG tube, contracture, and seizure d/o) presented as a transfer from [facility] on 12/9 for respiratory failure 2/2 ARDS likely 2/2 necrotizing pancreatitis s/p intubation. Course c/b Acinetobacter PNA s/p Avycaz and Flagyl 7 day course.  Course further complicated by acute blood loss anemia s/p colonoscopy with findings suggestive of ischemic colitis, without further bleeding and Hgb remaining stable. S/p extubation 1/3, and re-intubation 1/6 now s/p tracheostomy.

## 2019-01-24 NOTE — PROGRESS NOTE ADULT - PROBLEM SELECTOR PLAN 2
- s/p trach 1/9   - weaning from vent as tolerated, tolerating trach collar during the day  - trach change done secondary to leak from collar 1/22/19  - trach care, suction PRN  - chest PT

## 2019-01-24 NOTE — PROGRESS NOTE ADULT - PROBLEM SELECTOR PLAN 1
- CT on 1/17 with persistent necrotic fluid accumulation but improved     - cont avycaz per ID  - cont to monitor

## 2019-01-24 NOTE — PROGRESS NOTE ADULT - ASSESSMENT
3  M (resident of [facility]) with TBI, s/p PEG tube, contracture, and seizure d/o who presented as a transfer from [facility] on 12/9 for respiratory failure 2/2 ARDS likely 2/2 necrotizing pancreatitis s/p intubation.  Imipenem 12/28/18 - continued   bronc 12/11 with pseudomonas  pt with thick secretions and again febrile, sputum cx with  acinetobacter  repeat Ct with increased size of pancreatic collection but not walled off so no interventions were recommended  also had GIB and colitis due to ?contiguous necrotizing pancreatitis vs ischemic, s/p colonoscopy but not actively bleeding now  completed a 7 day course of avycaz and flagyl for the acinetobacter then switched back to imipenem, but pt again had hypothermia with the same acinetobacter in sputum now s/p trach and back on avycaz + flagyl    sepsis with fever, leukopenia resolved, extubated but reintubated 1/6/19 for hypoxia and poor cough along with profuse secretions and sputum cx again with acinetobacter    Necrotising Pancreatitis with multiple peripancreatitis collections better formed on CT 1/24 and a new 7 cm abscess  improved but still present lung abscesses and new sputum cx with pseudomonas, ?VAP   acinetobacter PNA  with lung abscesses  coag neg staph bacteremia likely contaminant, repeat negative  colitis, ?due to contiguous necrotizing pancreatitis and collection vs ischemic         * s/p 7 days of avcaz and flagyl for  acinetobacter in the sputum 12/22-12/28  then back on imipenem for necrotizing pancreatis  * restarted on  avycaz and flagyl to cover the acinetobacter again 1/9, now day 16  * can DC vanco as the sputum cx had pseudomonas, on vanco 1/22-1/24  * GI f/u as the repeat CT showed better formed abscesses and a new 7 cm abscess  * frequent suctioning and pulmonary toileting

## 2019-01-24 NOTE — PROGRESS NOTE ADULT - PROBLEM SELECTOR PLAN 3
- persistent fevers through abx  - ID note appreciated  - will check CT C/A/P with IV contrast today to r/o infection  - cont abx for now  - sputum culture with pseudomonas pending final

## 2019-01-25 LAB
-  AMIKACIN: SIGNIFICANT CHANGE UP
-  AZTREONAM: SIGNIFICANT CHANGE UP
-  CEFEPIME: SIGNIFICANT CHANGE UP
-  CEFTAZIDIME: SIGNIFICANT CHANGE UP
-  CIPROFLOXACIN: SIGNIFICANT CHANGE UP
-  GENTAMICIN: SIGNIFICANT CHANGE UP
-  IMIPENEM: SIGNIFICANT CHANGE UP
-  LEVOFLOXACIN: SIGNIFICANT CHANGE UP
-  MEROPENEM: SIGNIFICANT CHANGE UP
-  PIPERACILLIN/TAZOBACTAM: SIGNIFICANT CHANGE UP
-  TOBRAMYCIN: SIGNIFICANT CHANGE UP
BACTERIA SPT RESP CULT: SIGNIFICANT CHANGE UP
BACTERIA UR CULT: SIGNIFICANT CHANGE UP
GLUCOSE BLDC GLUCOMTR-MCNC: 104 MG/DL — HIGH (ref 70–99)
GLUCOSE BLDC GLUCOMTR-MCNC: 110 MG/DL — HIGH (ref 70–99)
GLUCOSE BLDC GLUCOMTR-MCNC: 136 MG/DL — HIGH (ref 70–99)
GLUCOSE BLDC GLUCOMTR-MCNC: 160 MG/DL — HIGH (ref 70–99)
GRAM STN SPT: SIGNIFICANT CHANGE UP
METHOD TYPE: SIGNIFICANT CHANGE UP
METHOD TYPE: SIGNIFICANT CHANGE UP
ORGANISM # SPEC MICROSCOPIC CNT: SIGNIFICANT CHANGE UP
SPECIMEN SOURCE: SIGNIFICANT CHANGE UP
VANCOMYCIN FLD-MCNC: 0.9 UG/ML — SIGNIFICANT CHANGE UP

## 2019-01-25 PROCEDURE — 99223 1ST HOSP IP/OBS HIGH 75: CPT | Mod: GC

## 2019-01-25 PROCEDURE — 99233 SBSQ HOSP IP/OBS HIGH 50: CPT | Mod: GC

## 2019-01-25 PROCEDURE — 99232 SBSQ HOSP IP/OBS MODERATE 35: CPT

## 2019-01-25 RX ORDER — LACTOBACILLUS ACIDOPHILUS 100MM CELL
1 CAPSULE ORAL
Qty: 0 | Refills: 0 | Status: DISCONTINUED | OUTPATIENT
Start: 2019-01-25 | End: 2019-03-07

## 2019-01-25 RX ORDER — VANCOMYCIN HCL 1 G
VIAL (EA) INTRAVENOUS
Qty: 0 | Refills: 0 | Status: DISCONTINUED | OUTPATIENT
Start: 2019-01-26 | End: 2019-01-26

## 2019-01-25 RX ORDER — VANCOMYCIN HCL 1 G
1000 VIAL (EA) INTRAVENOUS ONCE
Qty: 0 | Refills: 0 | Status: COMPLETED | OUTPATIENT
Start: 2019-01-25 | End: 2019-01-26

## 2019-01-25 RX ORDER — PSYLLIUM SEED (WITH DEXTROSE)
1 POWDER (GRAM) ORAL DAILY
Qty: 0 | Refills: 0 | Status: DISCONTINUED | OUTPATIENT
Start: 2019-01-25 | End: 2019-03-07

## 2019-01-25 RX ORDER — VANCOMYCIN HCL 1 G
1000 VIAL (EA) INTRAVENOUS EVERY 12 HOURS
Qty: 0 | Refills: 0 | Status: DISCONTINUED | OUTPATIENT
Start: 2019-01-26 | End: 2019-01-26

## 2019-01-25 RX ADMIN — Medication 100 MILLIGRAM(S): at 05:15

## 2019-01-25 RX ADMIN — HEPARIN SODIUM 5000 UNIT(S): 5000 INJECTION INTRAVENOUS; SUBCUTANEOUS at 05:17

## 2019-01-25 RX ADMIN — Medication 100 MILLIGRAM(S): at 21:09

## 2019-01-25 RX ADMIN — MIDODRINE HYDROCHLORIDE 5 MILLIGRAM(S): 2.5 TABLET ORAL at 13:26

## 2019-01-25 RX ADMIN — Medication 1 TABLET(S): at 12:12

## 2019-01-25 RX ADMIN — PREGABALIN 1000 MICROGRAM(S): 225 CAPSULE ORAL at 12:12

## 2019-01-25 RX ADMIN — CHLORHEXIDINE GLUCONATE 1 APPLICATION(S): 213 SOLUTION TOPICAL at 09:30

## 2019-01-25 RX ADMIN — Medication 1 PACKET(S): at 12:12

## 2019-01-25 RX ADMIN — HEPARIN SODIUM 5000 UNIT(S): 5000 INJECTION INTRAVENOUS; SUBCUTANEOUS at 21:25

## 2019-01-25 RX ADMIN — Medication 1 MILLIGRAM(S): at 12:12

## 2019-01-25 RX ADMIN — LACOSAMIDE 100 MILLIGRAM(S): 50 TABLET ORAL at 17:56

## 2019-01-25 RX ADMIN — QUETIAPINE FUMARATE 25 MILLIGRAM(S): 200 TABLET, FILM COATED ORAL at 12:13

## 2019-01-25 RX ADMIN — Medication 1 TABLET(S): at 17:58

## 2019-01-25 RX ADMIN — RISPERIDONE 1 MILLIGRAM(S): 4 TABLET ORAL at 13:25

## 2019-01-25 RX ADMIN — HEPARIN SODIUM 5000 UNIT(S): 5000 INJECTION INTRAVENOUS; SUBCUTANEOUS at 13:26

## 2019-01-25 RX ADMIN — Medication 1 TABLET(S): at 09:30

## 2019-01-25 RX ADMIN — Medication 650 MILLIGRAM(S): at 18:55

## 2019-01-25 RX ADMIN — Medication 650 MILLIGRAM(S): at 17:56

## 2019-01-25 RX ADMIN — Medication 10 MILLIGRAM(S): at 17:58

## 2019-01-25 RX ADMIN — LACOSAMIDE 100 MILLIGRAM(S): 50 TABLET ORAL at 05:15

## 2019-01-25 RX ADMIN — Medication 100 MILLIGRAM(S): at 13:25

## 2019-01-25 RX ADMIN — Medication 2: at 12:12

## 2019-01-25 RX ADMIN — MIDODRINE HYDROCHLORIDE 5 MILLIGRAM(S): 2.5 TABLET ORAL at 05:16

## 2019-01-25 RX ADMIN — Medication 10 MILLIGRAM(S): at 05:17

## 2019-01-25 NOTE — PROGRESS NOTE ADULT - SUBJECTIVE AND OBJECTIVE BOX
GI following the patient for Necrotizing Pancreatitis with an evolving Walled Off Necrosis , patient was supposed to follow up with DR Jules for possible Endoscopic drainage of the walled aly necrosis after wall maturation. but patient stayed in hospital for new fever, had repeat CT Chest and abdomen that revealed walled off necrosis of a 7.8 cm collection and CT chest findings suggestive of lung abscesses      Interval Events: On tube feeds, tolerating it. back on Abx because of sepsis         Allergies:  Valproate Sodium (Other (Severe))      Hospital Medications:  acetaminophen    Suspension .. 650 milliGRAM(s) Oral every 6 hours PRN  cefTAZidime/avibactam IVPB 2.5 Gram(s) IV Intermittent every 8 hours  cefTAZidime/avibactam IVPB      chlorhexidine 4% Liquid 1 Application(s) Topical <User Schedule>  cyanocobalamin 1000 MICROGram(s) Oral daily  dextrose 40% Gel 15 Gram(s) Oral once PRN  dextrose 5%. 1000 milliLiter(s) IV Continuous <Continuous>  dextrose 50% Injectable 12.5 Gram(s) IV Push once  dextrose 50% Injectable 25 Gram(s) IV Push once  dextrose 50% Injectable 25 Gram(s) IV Push once  folic acid 1 milliGRAM(s) Enteral Tube daily  glucagon  Injectable 1 milliGRAM(s) IntraMuscular once PRN  heparin  Injectable 5000 Unit(s) SubCutaneous every 8 hours  hydrocortisone sodium succinate Injectable 10 milliGRAM(s) IV Push every 12 hours  insulin lispro (HumaLOG) corrective regimen sliding scale   SubCutaneous every 6 hours  lacosamide Solution 100 milliGRAM(s) Oral two times a day  lactobacillus acidophilus 1 Tablet(s) Oral three times a day with meals  metroNIDAZOLE  IVPB      metroNIDAZOLE  IVPB 500 milliGRAM(s) IV Intermittent every 8 hours  midodrine 5 milliGRAM(s) Oral three times a day  psyllium Powder 1 Packet(s) Oral daily  QUEtiapine 25 milliGRAM(s) Oral daily  risperiDONE   Solution 1 milliGRAM(s) Oral daily      PMHX/PSHX:  Seizure  TBI (traumatic brain injury)  S/P percutaneous endoscopic gastrostomy (PEG) tube placement  No significant past surgical history      Family history:      ROS:     can not assess because of overall mental status but looks comfortable.     PHYSICAL EXAM:     GENERAL:  Appears stated age, no distress  HEENT:  NC/AT,  conjunctivae clear, sclera -anicteric  CHEST:  decreased breath sounds at bases   HEART:  Regular rhythm, S1, S2, no added sounds  ABDOMEN:  Soft, non-tender, non-distended, normoactive bowel sounds      Vital Signs:  Vital Signs Last 24 Hrs  T(C): 37 (2019 05:13), Max: 38.6 (2019 18:16)  T(F): 98.6 (2019 05:13), Max: 101.4 (2019 18:16)  HR: 103 (2019 06:44) (103 - 127)  BP: 100/61 (2019 05:13) (94/68 - 105/76)  BP(mean): --  RR: 20 (2019 05:13) (20 - 20)  SpO2: 99% (2019 06:44) (98% - 100%)  Daily     Daily Weight in k.6 (2019 01:30)    LABS:                        8.7    4.49  )-----------( 310      ( 2019 05:43 )             28.1     24    135  |  99  |  10  ----------------------------<  124<H>  3.8   |  27  |  < 0.20<L>    Ca    7.3<L>      2019 05:43  Phos  3.1       Mg     1.7           Imaging: < from: CT Chest w/ IV Cont (19 @ 15:07) >  LUNGS AND LARGE AIRWAYS: Endotracheal tube is noted in satisfactory   position. There is partial atelectasis of the right middle lobe and   lingula and complete atelectasis of both lower lobes with a heterogeneous   appearance the lung parenchyma which has improved since 2019.  PLEURA: Moderate bilateral pleural effusions not significant change since   2019.  VESSELS: Within normal limits.  HEART: Heart size is normal. Small pericardial fluid.  MEDIASTINUM AND FANNY: No lymphadenopathy.  CHEST WALL AND LOWER NECK: Within normal limits.    ABDOMEN AND PELVIS:    LIVER: Within normal limits.  BILE DUCTS: Normal caliber.  GALLBLADDER: Focal wall calcification.  SPLEEN: Peripheral calcification at spleen, unchanged.  PANCREAS: Necrotizing pancreatitis with acute necrotic collections again   noted.  ADRENALS: Within normal limits.  KIDNEYS/URETERS: Subcentimeter hypodense right renal lesion which is too   small to characterize.    BLADDER: Within normal limits.  REPRODUCTIVE ORGANS: The prostate gland is within normal limits.    BOWEL: Gastrostomy tube is noted. Mild mucosal hyperenhancement and bowel   wall thickening at the splenic flexure and descending colon withadjacent   fat stranding consistent with colitis which is also seen previously.   Rectal tube is again noted. Appendix is normal.  PERITONEUM: Fluid collections along the gastric wall are better formed   proximally with a new collection more caudallymeasuring 7.0 x 4.8 cm (2,   78). Small ascites has decreased.  VESSELS:  Unchanged splenic vein thrombosis. Scattered arterial   calcifications.  RETROPERITONEUM: No lymphadenopathy.    ABDOMINAL WALL: Foci of air nodules within the ventral subcutaneous   tissues are consistent with recent injections.  BONES: Within normal limits.    IMPRESSION: Necrotizing pancreatitis with acute necrotic collections   again noted. Fluid collections along the proximal gastric wall are better   formed with a newcollection more caudally measuring 7.0 x 4.8 cm. Small   ascites has decreased.    Moderate bilateral pleural effusions with complete atelectasis of both   lower lobes and partial atelectasis of the right middle lobe and lingula   as seen previously.Heterogeneous lung enhancement has improved but is   still present and suggestive of lung abscesses.      < end of copied text >

## 2019-01-25 NOTE — PROGRESS NOTE ADULT - SUBJECTIVE AND OBJECTIVE BOX
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 Last fever yesterday. Blood culture with GPC cluster in one bottle.     Rest of ROS negative.    Allergies  Valproate Sodium (Other (Severe))      ANTIMICROBIALS:  cefTAZidime/avibactam IVPB 2.5 every 8 hours  cefTAZidime/avibactam IVPB    metroNIDAZOLE  IVPB    metroNIDAZOLE  IVPB 500 every 8 hours  vancomycin  IVPB        OTHER MEDS:  acetaminophen    Suspension .. 650 milliGRAM(s) Oral every 6 hours PRN  chlorhexidine 4% Liquid 1 Application(s) Topical <User Schedule>  cyanocobalamin 1000 MICROGram(s) Oral daily  dextrose 40% Gel 15 Gram(s) Oral once PRN  dextrose 5%. 1000 milliLiter(s) IV Continuous <Continuous>  dextrose 50% Injectable 12.5 Gram(s) IV Push once  dextrose 50% Injectable 25 Gram(s) IV Push once  dextrose 50% Injectable 25 Gram(s) IV Push once  folic acid 1 milliGRAM(s) Enteral Tube daily  glucagon  Injectable 1 milliGRAM(s) IntraMuscular once PRN  heparin  Injectable 5000 Unit(s) SubCutaneous every 8 hours  insulin lispro (HumaLOG) corrective regimen sliding scale   SubCutaneous every 6 hours  lacosamide Solution 100 milliGRAM(s) Oral two times a day  lactobacillus acidophilus 1 Tablet(s) Oral three times a day with meals  midodrine 5 milliGRAM(s) Oral three times a day  psyllium Powder 1 Packet(s) Oral daily  QUEtiapine 25 milliGRAM(s) Oral daily  risperiDONE   Solution 1 milliGRAM(s) Oral daily      PE:    Vital Signs Last 24 Hrs  T(C): 36.7 (25 Jan 2019 17:54), Max: 37.6 (25 Jan 2019 01:30)  T(F): 98 (25 Jan 2019 17:54), Max: 99.6 (25 Jan 2019 01:30)  HR: 90 (25 Jan 2019 18:50) (87 - 121)  BP: 100/69 (25 Jan 2019 17:54) (95/58 - 100/69)  BP(mean): --  RR: 24 (25 Jan 2019 17:54) (20 - 24)  SpO2: 98% (25 Jan 2019 18:50) (98% - 100%)    General: NAD, non toxic  Head: atraumatic normocephalic  eyes: normal sclera and conjunctivae   ENT:   trach , no LAD  Cardiovascular: regular S1,S2  Respiratory:  trach on vent, coarse breath sounds bilaterally  abd:  PEG tube with some surrounding erythema, soft, bowel sounds present, nontender, rectal tube  :  no suprapubic tenderness, [place] cath    Musculoskeletal:   no joint swelling, contractures, RUE mostly, LUE edematous  Skin:    no rash  Neurologic: awake  Vascular: no phlebitis                 LABS:                          8.7    4.49  )-----------( 310      ( 24 Jan 2019 05:43 )             28.1       01-24    135  |  99  |  10  ----------------------------<  124<H>  3.8   |  27  |  < 0.20<L>    Ca    7.3<L>      24 Jan 2019 05:43  Phos  3.1     01-24  Mg     1.7     01-24            MICROBIOLOGY:  v  BLOOD VENOUS  01-24-19 --  --  --      BLOOD PERIPHERAL  01-24-19 --  --  --      URINE MIDSTREAM  01-23-19 --  --  --      ENDOTRACHEAL SPECIMEN  01-22-19 --  --  Pseudomonas aeruginosa      BLOOD VENOUS  01-22-19 --  --  --      BLOOD  01-18-19 --  --  --      BLOOD PERIPHERAL  01-16-19 --  --  --      ENDOTRACHEAL SPECIMEN  01-06-19 --  --  Acin.baumannii/haemoly       BLOOD VENOUS  01-02-19 --  --  --      BLOOD PERIPHERAL  01-02-19 --  --  --      BLOOD PERIPHERAL  12-27-18 --  --  --    RADIOLOGY:    < from: CT Chest w/ IV Cont (01.24.19 @ 15:07) >  IMPRESSION: Necrotizing pancreatitis with acute necrotic collections   again noted. Fluid collections along the proximal gastric wall are better   formed with a newcollection more caudally measuring 7.0 x 4.8 cm. Small   ascites has decreased.    Moderate bilateral pleural effusions with complete atelectasis of both   lower lobes and partial atelectasis of the right middle lobe and lingula   as seen previously.Heterogeneous lung enhancement has improved but is   still present and suggestive of lung abscesses.    < end of copied text >

## 2019-01-25 NOTE — PROGRESS NOTE ADULT - ASSESSMENT
53 year old male (resident of [facility]) with TBI, s/p PEG tube, contracture, and seizure d/o who presented as a transfer from [facility] on 12/9 for respiratory failure 2/2 ARDS likely 2/2 necrotizing pancreatitis s/p intubation.    Thick secretions and again febrile, sputum cx with  acinetobacter  Repeat CT with increased size of pancreatic collection but not walled off so no interventions were recommended  Also had GIB and colitis due to contiguous necrotizing pancreatitis vs ischemic, s/p colonoscopy but not actively bleeding now    Reintubated 1/6/19 for hypoxia and poor cough along with profuse secretions and sputum cx again with acinetobacter   Necrotising Pancreatitis with multiple peripancreatitis collections better formed on CT 1/24 and a new 7 cm abscess  Improved but still present lung abscesses and new sputum cx with pseudomonas   acinetobacter PNA  with lung abscesses  colitis, ?due to contiguous necrotizing pancreatitis and collection vs ischemic   Positive blood cultures for GPC cluster pending speciation    Recommend:  Continue avycaz and flagyl  Repeat blood cultures x 2 sets  If CoNS in blood culture, can discontinue vancomycin  Appreciate GI following as the repeat CT showed better formed abscesses and a new 7 cm abscess

## 2019-01-25 NOTE — PROGRESS NOTE ADULT - ASSESSMENT
Impression  1) Walled off necrosis, doubt cause of sepsis , patient also has Lung Abscesses seen on CT scan.   2) Hematochezia, s/p colonoscopy on 12/28/2018 with severe segmental colitis localized to the transverse colon and ascending colon (possible ischemic colitis, possible colitis related to contiguous danay-pancreatic inflammatory process). Biopsies with granulation tissue but no infection/malignancy  3) Resp failure in the setting of pneumonia, requiring tracheostomy     Recommendations  - Trend CBC, CMP and fever curve, Hb stable for now.   - Continue IV antibiotics as per ID recommendations.   - Rest of care per primary team  - No indication for urgent Endoscopic Drainage of the walled off necrosis at this point.   - Will consider Endoscopic Drainage of walled off necrosis after adequate treatment of Lung abscesses.   - GI will follow up.

## 2019-01-25 NOTE — PROGRESS NOTE ADULT - ATTENDING COMMENTS
New abdominal collections noted on CT.  Also with probable lung abscess - noted on prior imaging as well.   Continue current antibiotic regimen.  Discuss with GI regarding new collections as the lung findings have been present.  Will plan for diagnostic thoracentesis of pleural effusion.

## 2019-01-25 NOTE — PROGRESS NOTE ADULT - SUBJECTIVE AND OBJECTIVE BOX
CHIEF COMPLAINT: Patient is a 53y old  Male who presents with a chief complaint of ARDS?, sepsis (24 Jan 2019 17:04)    Interval Events:      REVIEW OF SYSTEMS:  Constitutional:   Eyes:  ENT:  CV:  Resp:  GI:  :  MSK:  Integumentary:  Neurological:  Psychiatric:  Endocrine:  Hematologic/Lymphatic:  Allergic/Immunologic:  [ ] All other systems negative  [ ] Unable to assess ROS because ________      OBJECTIVE:  ICU Vital Signs Last 24 Hrs  T(C): 37 (25 Jan 2019 05:13), Max: 38.6 (24 Jan 2019 18:16)  T(F): 98.6 (25 Jan 2019 05:13), Max: 101.4 (24 Jan 2019 18:16)  HR: 103 (25 Jan 2019 06:44) (103 - 127)  BP: 100/61 (25 Jan 2019 05:13) (94/68 - 105/76)  BP(mean): --  ABP: --  ABP(mean): --  RR: 20 (25 Jan 2019 05:13) (20 - 20)  SpO2: 99% (25 Jan 2019 06:44) (98% - 100%)    Mode: AC/ CMV (Assist Control/ Continuous Mandatory Ventilation), RR (machine): 14, TV (machine): 350, FiO2: 40, PEEP: 5, MAP: 8.7, PIP: 17    POCT Blood Glucose.: 104 mg/dL (25 Jan 2019 05:15)    HOSPITAL MEDICATIONS:  MEDICATIONS  (STANDING):  cefTAZidime/avibactam IVPB 2.5 Gram(s) IV Intermittent every 8 hours  cefTAZidime/avibactam IVPB      chlorhexidine 4% Liquid 1 Application(s) Topical <User Schedule>  cyanocobalamin 1000 MICROGram(s) Oral daily  dextrose 5%. 1000 milliLiter(s) (50 mL/Hr) IV Continuous <Continuous>  dextrose 50% Injectable 12.5 Gram(s) IV Push once  dextrose 50% Injectable 25 Gram(s) IV Push once  dextrose 50% Injectable 25 Gram(s) IV Push once  folic acid 1 milliGRAM(s) Enteral Tube daily  heparin  Injectable 5000 Unit(s) SubCutaneous every 8 hours  hydrocortisone sodium succinate Injectable 10 milliGRAM(s) IV Push every 12 hours  insulin lispro (HumaLOG) corrective regimen sliding scale   SubCutaneous every 6 hours  lacosamide Solution 100 milliGRAM(s) Oral two times a day  lactobacillus acidophilus 1 Tablet(s) Oral three times a day with meals  metroNIDAZOLE  IVPB      metroNIDAZOLE  IVPB 500 milliGRAM(s) IV Intermittent every 8 hours  midodrine 5 milliGRAM(s) Oral three times a day  psyllium Powder 1 Packet(s) Oral daily  QUEtiapine 25 milliGRAM(s) Oral daily  risperiDONE   Solution 1 milliGRAM(s) Oral daily    MEDICATIONS  (PRN):  acetaminophen    Suspension .. 650 milliGRAM(s) Oral every 6 hours PRN Temp greater or equal to 38C (100.4F), Mild Pain (1 - 3), Moderate Pain (4 - 6)  dextrose 40% Gel 15 Gram(s) Oral once PRN Blood Glucose LESS THAN 70 milliGRAM(s)/deciliter  glucagon  Injectable 1 milliGRAM(s) IntraMuscular once PRN Glucose LESS THAN 70 milligrams/deciliter      LABS:                        8.7    4.49  )-----------( 310      ( 24 Jan 2019 05:43 )             28.1     01-24    135  |  99  |  10  ----------------------------<  124<H>  3.8   |  27  |  < 0.20<L>    Ca    7.3<L>      24 Jan 2019 05:43  Phos  3.1     01-24  Mg     1.7     01-24                MICROBIOLOGY:     RADIOLOGY:  [ ] Reviewed and interpreted by me    PULMONARY FUNCTION TESTS:    EKG:

## 2019-01-26 LAB
ANION GAP SERPL CALC-SCNC: 8 MMO/L — SIGNIFICANT CHANGE UP (ref 7–14)
BUN SERPL-MCNC: 8 MG/DL — SIGNIFICANT CHANGE UP (ref 7–23)
CALCIUM SERPL-MCNC: 7.4 MG/DL — LOW (ref 8.4–10.5)
CHLORIDE SERPL-SCNC: 99 MMOL/L — SIGNIFICANT CHANGE UP (ref 98–107)
CO2 SERPL-SCNC: 28 MMOL/L — SIGNIFICANT CHANGE UP (ref 22–31)
CREAT SERPL-MCNC: < 0.2 MG/DL — LOW (ref 0.5–1.3)
GLUCOSE BLDC GLUCOMTR-MCNC: 112 MG/DL — HIGH (ref 70–99)
GLUCOSE BLDC GLUCOMTR-MCNC: 122 MG/DL — HIGH (ref 70–99)
GLUCOSE BLDC GLUCOMTR-MCNC: 166 MG/DL — HIGH (ref 70–99)
GLUCOSE SERPL-MCNC: 109 MG/DL — HIGH (ref 70–99)
HCT VFR BLD CALC: 28.4 % — LOW (ref 39–50)
HGB BLD-MCNC: 8.6 G/DL — LOW (ref 13–17)
MCHC RBC-ENTMCNC: 30.3 % — LOW (ref 32–36)
MCHC RBC-ENTMCNC: 31 PG — SIGNIFICANT CHANGE UP (ref 27–34)
MCV RBC AUTO: 102.5 FL — HIGH (ref 80–100)
NRBC # FLD: 0 K/UL — LOW (ref 25–125)
PLATELET # BLD AUTO: 328 K/UL — SIGNIFICANT CHANGE UP (ref 150–400)
PMV BLD: 9.5 FL — SIGNIFICANT CHANGE UP (ref 7–13)
POTASSIUM SERPL-MCNC: 3.6 MMOL/L — SIGNIFICANT CHANGE UP (ref 3.5–5.3)
POTASSIUM SERPL-SCNC: 3.6 MMOL/L — SIGNIFICANT CHANGE UP (ref 3.5–5.3)
RBC # BLD: 2.77 M/UL — LOW (ref 4.2–5.8)
RBC # FLD: 18.6 % — HIGH (ref 10.3–14.5)
SODIUM SERPL-SCNC: 135 MMOL/L — SIGNIFICANT CHANGE UP (ref 135–145)
SPECIMEN SOURCE: SIGNIFICANT CHANGE UP
SPECIMEN SOURCE: SIGNIFICANT CHANGE UP
WBC # BLD: 3.79 K/UL — LOW (ref 3.8–10.5)
WBC # FLD AUTO: 3.79 K/UL — LOW (ref 3.8–10.5)

## 2019-01-26 PROCEDURE — 99232 SBSQ HOSP IP/OBS MODERATE 35: CPT

## 2019-01-26 RX ADMIN — Medication 100 MILLIGRAM(S): at 20:55

## 2019-01-26 RX ADMIN — HEPARIN SODIUM 5000 UNIT(S): 5000 INJECTION INTRAVENOUS; SUBCUTANEOUS at 22:43

## 2019-01-26 RX ADMIN — HEPARIN SODIUM 5000 UNIT(S): 5000 INJECTION INTRAVENOUS; SUBCUTANEOUS at 14:10

## 2019-01-26 RX ADMIN — Medication 1 MILLIGRAM(S): at 14:09

## 2019-01-26 RX ADMIN — MIDODRINE HYDROCHLORIDE 5 MILLIGRAM(S): 2.5 TABLET ORAL at 05:31

## 2019-01-26 RX ADMIN — HEPARIN SODIUM 5000 UNIT(S): 5000 INJECTION INTRAVENOUS; SUBCUTANEOUS at 05:31

## 2019-01-26 RX ADMIN — Medication 1 TABLET(S): at 09:34

## 2019-01-26 RX ADMIN — Medication 1 TABLET(S): at 18:06

## 2019-01-26 RX ADMIN — Medication 250 MILLIGRAM(S): at 12:23

## 2019-01-26 RX ADMIN — CHLORHEXIDINE GLUCONATE 1 APPLICATION(S): 213 SOLUTION TOPICAL at 09:40

## 2019-01-26 RX ADMIN — RISPERIDONE 1 MILLIGRAM(S): 4 TABLET ORAL at 12:24

## 2019-01-26 RX ADMIN — Medication 1 PACKET(S): at 12:24

## 2019-01-26 RX ADMIN — Medication 100 MILLIGRAM(S): at 14:09

## 2019-01-26 RX ADMIN — PREGABALIN 1000 MICROGRAM(S): 225 CAPSULE ORAL at 12:23

## 2019-01-26 RX ADMIN — LACOSAMIDE 100 MILLIGRAM(S): 50 TABLET ORAL at 18:06

## 2019-01-26 RX ADMIN — Medication 2: at 12:24

## 2019-01-26 RX ADMIN — QUETIAPINE FUMARATE 25 MILLIGRAM(S): 200 TABLET, FILM COATED ORAL at 12:24

## 2019-01-26 RX ADMIN — Medication 1 TABLET(S): at 12:24

## 2019-01-26 RX ADMIN — LACOSAMIDE 100 MILLIGRAM(S): 50 TABLET ORAL at 05:31

## 2019-01-26 RX ADMIN — Medication 100 MILLIGRAM(S): at 05:31

## 2019-01-26 RX ADMIN — Medication 250 MILLIGRAM(S): at 00:01

## 2019-01-26 RX ADMIN — Medication 10 MILLIGRAM(S): at 05:31

## 2019-01-26 NOTE — PROGRESS NOTE ADULT - ATTENDING COMMENTS
New abdominal collections noted on CT.  Also with probable lung abscess - noted on prior imaging as well.   Continue current antibiotic regimen.  Discuss with GI regarding new collections as the lung findings have been present.  Will plan for diagnostic thoracentesis of pleural effusion. as above  Respiratory Failure- trach to trach collar, pulmonary toilet in progress  ID-New abdominal collections noted on CT, also with probable lung abscess, Continue current antibiotic regimen (Avibactam, flagyl, and Vanco  GI followup regarding new collections  IR consult for possible lung abscess     Gerry Borden MD-Pulmonary   [phone]

## 2019-01-26 NOTE — PROGRESS NOTE ADULT - PROBLEM SELECTOR PLAN 3
- persistent fevers through abx  - ID note appreciated  - will check CT C/A/P with IV contrast today to r/o infection  - cont abx for now  - sputum culture with pseudomonas pending final mom states feeds by bottle - persistent fevers through abx, currently afebrile  - ID note appreciated  - s/p CT C/A/P with IV contrast to r/o infection  - cont abx for now  - sputum culture with pseudomonas pending final

## 2019-01-26 NOTE — PROGRESS NOTE ADULT - ASSESSMENT
53 Male w/ a PMHx of TBI (s/p PEG tube, contracture, and seizure d/o) presented as a transfer from [facility] on 12/9 for respiratory failure 2/2 ARDS likely 2/2 necrotizing pancreatitis s/p intubation. Course c/b Acinetobacter PNA s/p Avycaz and Flagyl 7 day course.  Course further complicated by acute blood loss anemia s/p colonoscopy with findings suggestive of ischemic colitis, without further bleeding and Hgb remaining stable. S/p extubation 1/3, and re-intubation 1/6 now s/p tracheostomy. 53 Male w/ a PMHx of TBI (s/p PEG tube, contracture, and seizure d/o) presented as a transfer from [facility] on 12/9 for respiratory failure 2/2 ARDS likely 2/2 necrotizing pancreatitis s/p intubation. Course c/b Acinetobacter PNA s/p Avycaz and Flagyl 7 day course.  Course further complicated by acute blood loss anemia s/p colonoscopy with findings suggestive of ischemic colitis, without further bleeding and Hgb remaining stable. S/p extubation 1/3, and re-intubation 1/6 now s/p tracheostomy.      1/26 pulmonary stable on trach collar, following up LFTs and GI, continue abx

## 2019-01-26 NOTE — PROGRESS NOTE ADULT - SUBJECTIVE AND OBJECTIVE BOX
CHIEF COMPLAINT:    Interval Events:    REVIEW OF SYSTEMS:  Constitutional:   Eyes:  ENT:  CV:  Resp:  GI:  :  MSK:  Integumentary:  Neurological:  Psychiatric:  Endocrine:  Hematologic/Lymphatic:  Allergic/Immunologic:  [ ] All other systems negative  [ ] Unable to assess ROS because ________    OBJECTIVE:  ICU Vital Signs Last 24 Hrs  T(C): 37 (26 Jan 2019 05:27), Max: 37.5 (26 Jan 2019 01:30)  T(F): 98.6 (26 Jan 2019 05:27), Max: 99.5 (26 Jan 2019 01:30)  HR: 107 (26 Jan 2019 07:56) (87 - 107)  BP: 107/58 (26 Jan 2019 05:27) (95/62 - 114/71)  BP(mean): --  ABP: --  ABP(mean): --  RR: 20 (26 Jan 2019 05:27) (20 - 24)  SpO2: 97% (26 Jan 2019 07:56) (94% - 100%)    Mode: AC/ CMV (Assist Control/ Continuous Mandatory Ventilation), RR (machine): 14, TV (machine): 350, FiO2: 40, PEEP: 5, MAP: 8, PIP: 16    01-25 @ 07:01  -  01-26 @ 07:00  --------------------------------------------------------  IN: 1306 mL / OUT: 1000 mL / NET: 306 mL      CAPILLARY BLOOD GLUCOSE      POCT Blood Glucose.: 112 mg/dL (26 Jan 2019 05:29)        HOSPITAL MEDICATIONS:  MEDICATIONS  (STANDING):  cefTAZidime/avibactam IVPB 2.5 Gram(s) IV Intermittent every 8 hours  cefTAZidime/avibactam IVPB      chlorhexidine 4% Liquid 1 Application(s) Topical <User Schedule>  cyanocobalamin 1000 MICROGram(s) Oral daily  dextrose 5%. 1000 milliLiter(s) (50 mL/Hr) IV Continuous <Continuous>  dextrose 50% Injectable 12.5 Gram(s) IV Push once  dextrose 50% Injectable 25 Gram(s) IV Push once  dextrose 50% Injectable 25 Gram(s) IV Push once  folic acid 1 milliGRAM(s) Enteral Tube daily  heparin  Injectable 5000 Unit(s) SubCutaneous every 8 hours  hydrocortisone sodium succinate Injectable 10 milliGRAM(s) IV Push daily  insulin lispro (HumaLOG) corrective regimen sliding scale   SubCutaneous every 6 hours  lacosamide Solution 100 milliGRAM(s) Oral two times a day  lactobacillus acidophilus 1 Tablet(s) Oral three times a day with meals  metroNIDAZOLE  IVPB      metroNIDAZOLE  IVPB 500 milliGRAM(s) IV Intermittent every 8 hours  midodrine 5 milliGRAM(s) Oral three times a day  psyllium Powder 1 Packet(s) Oral daily  QUEtiapine 25 milliGRAM(s) Oral daily  risperiDONE   Solution 1 milliGRAM(s) Oral daily  vancomycin  IVPB      vancomycin  IVPB 1000 milliGRAM(s) IV Intermittent every 12 hours    MEDICATIONS  (PRN):  acetaminophen    Suspension .. 650 milliGRAM(s) Oral every 6 hours PRN Temp greater or equal to 38C (100.4F), Mild Pain (1 - 3), Moderate Pain (4 - 6)  dextrose 40% Gel 15 Gram(s) Oral once PRN Blood Glucose LESS THAN 70 milliGRAM(s)/deciliter  glucagon  Injectable 1 milliGRAM(s) IntraMuscular once PRN Glucose LESS THAN 70 milligrams/deciliter      LABS:                        8.6    3.79  )-----------( 328      ( 26 Jan 2019 06:12 )             28.4     01-26    135  |  99  |  8   ----------------------------<  109<H>  3.6   |  28  |  < 0.20<L>    Ca    7.4<L>      26 Jan 2019 06:12                MICROBIOLOGY:     RADIOLOGY:  [ ] Reviewed and interpreted by me    PULMONARY FUNCTION TESTS:    EKG: CHIEF COMPLAINT:    Interval Events:    REVIEW OF SYSTEMS:  Constitutional:   Eyes:  ENT:  CV:  Resp:  GI:  :  MSK:  Integumentary:  Neurological:  Psychiatric:  Endocrine:  Hematologic/Lymphatic:  Allergic/Immunologic:  [ ] All other systems negative  [ ] Unable to assess ROS because ________    OBJECTIVE:  ICU Vital Signs Last 24 Hrs  T(C): 37 (26 Jan 2019 05:27), Max: 37.5 (26 Jan 2019 01:30)  T(F): 98.6 (26 Jan 2019 05:27), Max: 99.5 (26 Jan 2019 01:30)  HR: 107 (26 Jan 2019 07:56) (87 - 107)  BP: 107/58 (26 Jan 2019 05:27) (95/62 - 114/71)  BP(mean): --  ABP: --  ABP(mean): --  RR: 20 (26 Jan 2019 05:27) (20 - 24)  SpO2: 97% (26 Jan 2019 07:56) (94% - 100%)    Mode: AC/ CMV (Assist Control/ Continuous Mandatory Ventilation), RR (machine): 14, TV (machine): 350, FiO2: 40, PEEP: 5, MAP: 8, PIP: 16    01-25 @ 07:01  -  01-26 @ 07:00  --------------------------------------------------------  IN: 1306 mL / OUT: 1000 mL / NET: 306 mL      CAPILLARY BLOOD GLUCOSE      POCT Blood Glucose.: 112 mg/dL (26 Jan 2019 05:29)        HOSPITAL MEDICATIONS:  MEDICATIONS  (STANDING):  cefTAZidime/avibactam IVPB 2.5 Gram(s) IV Intermittent every 8 hours  cefTAZidime/avibactam IVPB      chlorhexidine 4% Liquid 1 Application(s) Topical <User Schedule>  cyanocobalamin 1000 MICROGram(s) Oral daily  dextrose 5%. 1000 milliLiter(s) (50 mL/Hr) IV Continuous <Continuous>  dextrose 50% Injectable 12.5 Gram(s) IV Push once  dextrose 50% Injectable 25 Gram(s) IV Push once  dextrose 50% Injectable 25 Gram(s) IV Push once  folic acid 1 milliGRAM(s) Enteral Tube daily  heparin  Injectable 5000 Unit(s) SubCutaneous every 8 hours  hydrocortisone sodium succinate Injectable 10 milliGRAM(s) IV Push daily  insulin lispro (HumaLOG) corrective regimen sliding scale   SubCutaneous every 6 hours  lacosamide Solution 100 milliGRAM(s) Oral two times a day  lactobacillus acidophilus 1 Tablet(s) Oral three times a day with meals  metroNIDAZOLE  IVPB      metroNIDAZOLE  IVPB 500 milliGRAM(s) IV Intermittent every 8 hours  midodrine 5 milliGRAM(s) Oral three times a day  psyllium Powder 1 Packet(s) Oral daily  QUEtiapine 25 milliGRAM(s) Oral daily  risperiDONE   Solution 1 milliGRAM(s) Oral daily  vancomycin  IVPB      vancomycin  IVPB 1000 milliGRAM(s) IV Intermittent every 12 hours    MEDICATIONS  (PRN):  acetaminophen    Suspension .. 650 milliGRAM(s) Oral every 6 hours PRN Temp greater or equal to 38C (100.4F), Mild Pain (1 - 3), Moderate Pain (4 - 6)  dextrose 40% Gel 15 Gram(s) Oral once PRN Blood Glucose LESS THAN 70 milliGRAM(s)/deciliter  glucagon  Injectable 1 milliGRAM(s) IntraMuscular once PRN Glucose LESS THAN 70 milligrams/deciliter    Physical Exam:   · Constitutional	detailed exam	  · Constitutional Details	no distress	  · Eyes	EOMI; PERRL; no drainage or redness	  · ENMT	No oral lesions; no gross abnormalities	  · Neck	detailed exam 	  · Neck Details	tracheostomy present	  · Respiratory	detailed exam	  · Respiratory Details	airway patent; breath sounds equal; good air movement	  · Cardiovascular	detailed exam	  · Cardiovascular Details	regular rate and rhythm  no rub  no murmur 	  · Gastrointestinal	detailed exam	  · GI Normal	soft; nontender	  · Gastrointestinal Comments	PEG present	  · Extremities	detailed exam 	  · Extremities Details	no clubbing; no cyanosis; no pedal edema	  · Extremities Comments	contractures	  · Neurological	detailed exam	  · Neurological Details	strength decreased	  · Mental Status	alert, responds appropriately nods to questions	  · Musculoskeletal	detailed exam	  · Musculoskeletal Details	decreased ROM; diminished strength	    LABS:                        8.6    3.79  )-----------( 328      ( 26 Jan 2019 06:12 )             28.4     01-26    135  |  99  |  8   ----------------------------<  109<H>  3.6   |  28  |  < 0.20<L>    Ca    7.4<L>      26 Jan 2019 06:12                MICROBIOLOGY:     RADIOLOGY:  [ ] Reviewed and interpreted by me    PULMONARY FUNCTION TESTS:    EKG: CHIEF COMPLAINT: Patient is a 53y old  Male who presents with a chief complaint of ARDS?, sepsis (26 Jan 2019 08:05)  Follow up for ARDS, pneumonia, pulmonary abscess, necrotizing pancreatitis    Interval Events:    REVIEW OF SYSTEMS:  Constitutional:   Eyes:  ENT:  CV:  Resp:  GI:  :  MSK:  Integumentary:  Neurological:  Psychiatric:  Endocrine:  Hematologic/Lymphatic:  Allergic/Immunologic:  [ ] All other systems negative  [ ] Unable to assess ROS because ________    OBJECTIVE:  ICU Vital Signs Last 24 Hrs  T(C): 37 (26 Jan 2019 05:27), Max: 37.5 (26 Jan 2019 01:30)  T(F): 98.6 (26 Jan 2019 05:27), Max: 99.5 (26 Jan 2019 01:30)  HR: 107 (26 Jan 2019 07:56) (87 - 107)  BP: 107/58 (26 Jan 2019 05:27) (95/62 - 114/71)  BP(mean): --  ABP: --  ABP(mean): --  RR: 20 (26 Jan 2019 05:27) (20 - 24)  SpO2: 97% (26 Jan 2019 07:56) (94% - 100%)    Mode: AC/ CMV (Assist Control/ Continuous Mandatory Ventilation), RR (machine): 14, TV (machine): 350, FiO2: 40, PEEP: 5, MAP: 8, PIP: 16    01-25 @ 07:01  -  01-26 @ 07:00  --------------------------------------------------------  IN: 1306 mL / OUT: 1000 mL / NET: 306 mL      CAPILLARY BLOOD GLUCOSE      POCT Blood Glucose.: 112 mg/dL (26 Jan 2019 05:29)        HOSPITAL MEDICATIONS:  MEDICATIONS  (STANDING):  cefTAZidime/avibactam IVPB 2.5 Gram(s) IV Intermittent every 8 hours  cefTAZidime/avibactam IVPB      chlorhexidine 4% Liquid 1 Application(s) Topical <User Schedule>  cyanocobalamin 1000 MICROGram(s) Oral daily  dextrose 5%. 1000 milliLiter(s) (50 mL/Hr) IV Continuous <Continuous>  dextrose 50% Injectable 12.5 Gram(s) IV Push once  dextrose 50% Injectable 25 Gram(s) IV Push once  dextrose 50% Injectable 25 Gram(s) IV Push once  folic acid 1 milliGRAM(s) Enteral Tube daily  heparin  Injectable 5000 Unit(s) SubCutaneous every 8 hours  hydrocortisone sodium succinate Injectable 10 milliGRAM(s) IV Push daily  insulin lispro (HumaLOG) corrective regimen sliding scale   SubCutaneous every 6 hours  lacosamide Solution 100 milliGRAM(s) Oral two times a day  lactobacillus acidophilus 1 Tablet(s) Oral three times a day with meals  metroNIDAZOLE  IVPB      metroNIDAZOLE  IVPB 500 milliGRAM(s) IV Intermittent every 8 hours  midodrine 5 milliGRAM(s) Oral three times a day  psyllium Powder 1 Packet(s) Oral daily  QUEtiapine 25 milliGRAM(s) Oral daily  risperiDONE   Solution 1 milliGRAM(s) Oral daily  vancomycin  IVPB      vancomycin  IVPB 1000 milliGRAM(s) IV Intermittent every 12 hours    MEDICATIONS  (PRN):  acetaminophen    Suspension .. 650 milliGRAM(s) Oral every 6 hours PRN Temp greater or equal to 38C (100.4F), Mild Pain (1 - 3), Moderate Pain (4 - 6)  dextrose 40% Gel 15 Gram(s) Oral once PRN Blood Glucose LESS THAN 70 milliGRAM(s)/deciliter  glucagon  Injectable 1 milliGRAM(s) IntraMuscular once PRN Glucose LESS THAN 70 milligrams/deciliter    Physical Exam:   · Constitutional	detailed exam	  · Constitutional Details	no distress	  · Eyes	EOMI; PERRL; no drainage or redness	  · ENMT	No oral lesions; no gross abnormalities	  · Neck	detailed exam 	  · Neck Details	tracheostomy present	  · Respiratory	detailed exam	  · Respiratory Details	airway patent; breath sounds equal; good air movement	  · Cardiovascular	detailed exam	  · Cardiovascular Details	regular rate and rhythm  no rub  no murmur 	  · Gastrointestinal	detailed exam	  · GI Normal	soft; nontender	  · Gastrointestinal Comments	PEG present	  · Extremities	detailed exam 	  · Extremities Details	no clubbing; no cyanosis; no pedal edema	  · Extremities Comments	contractures	  · Neurological	detailed exam	  · Neurological Details	strength decreased	  · Mental Status	alert, responds appropriately nods to questions	  · Musculoskeletal	detailed exam	  · Musculoskeletal Details	decreased ROM; diminished strength	    LABS:                        8.6    3.79  )-----------( 328      ( 26 Jan 2019 06:12 )             28.4     01-26    135  |  99  |  8   ----------------------------<  109<H>  3.6   |  28  |  < 0.20<L>    Ca    7.4<L>      26 Jan 2019 06:12                MICROBIOLOGY:     RADIOLOGY:  [ ] Reviewed and interpreted by me    PULMONARY FUNCTION TESTS:    EKG: CHIEF COMPLAINT: Patient is a 53y old  Male who presents with a chief complaint of ARDS?, sepsis (26 Jan 2019 08:05)  Follow up for ARDS, pneumonia, pulmonary abscess, necrotizing pancreatitis    Interval Events:    REVIEW OF SYSTEMS:  Constitutional:   Eyes:  ENT:  CV:  Resp:  GI:  :  MSK:  Integumentary:  Neurological:  Psychiatric:  Endocrine:  Hematologic/Lymphatic:  Allergic/Immunologic:  [ ] All other systems negative  [ ] Unable to assess ROS because ________    OBJECTIVE:  ICU Vital Signs Last 24 Hrs  T(C): 37 (26 Jan 2019 05:27), Max: 37.5 (26 Jan 2019 01:30)  T(F): 98.6 (26 Jan 2019 05:27), Max: 99.5 (26 Jan 2019 01:30)  HR: 107 (26 Jan 2019 07:56) (87 - 107)  BP: 107/58 (26 Jan 2019 05:27) (95/62 - 114/71)  BP(mean): --  ABP: --  ABP(mean): --  RR: 20 (26 Jan 2019 05:27) (20 - 24)  SpO2: 97% (26 Jan 2019 07:56) (94% - 100%)    Mode: AC/ CMV (Assist Control/ Continuous Mandatory Ventilation), RR (machine): 14, TV (machine): 350, FiO2: 40, PEEP: 5, MAP: 8, PIP: 16    01-25 @ 07:01  -  01-26 @ 07:00  --------------------------------------------------------  IN: 1306 mL / OUT: 1000 mL / NET: 306 mL      CAPILLARY BLOOD GLUCOSE      POCT Blood Glucose.: 112 mg/dL (26 Jan 2019 05:29)        HOSPITAL MEDICATIONS:  MEDICATIONS  (STANDING):  cefTAZidime/avibactam IVPB 2.5 Gram(s) IV Intermittent every 8 hours  cefTAZidime/avibactam IVPB      chlorhexidine 4% Liquid 1 Application(s) Topical <User Schedule>  cyanocobalamin 1000 MICROGram(s) Oral daily  dextrose 5%. 1000 milliLiter(s) (50 mL/Hr) IV Continuous <Continuous>  dextrose 50% Injectable 12.5 Gram(s) IV Push once  dextrose 50% Injectable 25 Gram(s) IV Push once  dextrose 50% Injectable 25 Gram(s) IV Push once  folic acid 1 milliGRAM(s) Enteral Tube daily  heparin  Injectable 5000 Unit(s) SubCutaneous every 8 hours  hydrocortisone sodium succinate Injectable 10 milliGRAM(s) IV Push daily  insulin lispro (HumaLOG) corrective regimen sliding scale   SubCutaneous every 6 hours  lacosamide Solution 100 milliGRAM(s) Oral two times a day  lactobacillus acidophilus 1 Tablet(s) Oral three times a day with meals  metroNIDAZOLE  IVPB      metroNIDAZOLE  IVPB 500 milliGRAM(s) IV Intermittent every 8 hours  midodrine 5 milliGRAM(s) Oral three times a day  psyllium Powder 1 Packet(s) Oral daily  QUEtiapine 25 milliGRAM(s) Oral daily  risperiDONE   Solution 1 milliGRAM(s) Oral daily  vancomycin  IVPB      vancomycin  IVPB 1000 milliGRAM(s) IV Intermittent every 12 hours    MEDICATIONS  (PRN):  acetaminophen    Suspension .. 650 milliGRAM(s) Oral every 6 hours PRN Temp greater or equal to 38C (100.4F), Mild Pain (1 - 3), Moderate Pain (4 - 6)  dextrose 40% Gel 15 Gram(s) Oral once PRN Blood Glucose LESS THAN 70 milliGRAM(s)/deciliter  glucagon  Injectable 1 milliGRAM(s) IntraMuscular once PRN Glucose LESS THAN 70 milligrams/deciliter    Physical Exam:   · Constitutional	detailed exam	  · Constitutional Details	no distress	  · Eyes	EOMI; PERRL; no drainage or redness	  · ENMT	No oral lesions; no gross abnormalities	  · Neck	detailed exam 	  · Neck Details	tracheostomy present	  · Respiratory	detailed exam	  · Respiratory Details	airway patent; breath sounds equal; few rhonchi R>L Trach to trach collar	  · Cardiovascular	detailed exam	  · Cardiovascular Details	regular rate and rhythm  no rub  no murmur 	  · Gastrointestinal	detailed exam	  · GI Normal	soft; nontender	  · Gastrointestinal Comments	PEG present, rectal tube in place	  · Extremities	detailed exam 	  · Extremities Details	no clubbing; no cyanosis; bilateral pedal edema to thighs	  · Extremities Comments	contractures	  · Neurological	detailed exam	  · Neurological Details	strength decreased	  · Mental Status	alert, responds appropriately nods to questions	  · Musculoskeletal	detailed exam	  · Musculoskeletal Details	decreased ROM; diminished strength	    LABS:                        8.6    3.79  )-----------( 328      ( 26 Jan 2019 06:12 )             28.4     01-26    135  |  99  |  8   ----------------------------<  109<H>  3.6   |  28  |  < 0.20<L>    Ca    7.4<L>      26 Jan 2019 06:12                MICROBIOLOGY:     RADIOLOGY:  [ ] Reviewed and interpreted by me    PULMONARY FUNCTION TESTS:    EKG: CHIEF COMPLAINT: Patient is a 53y old  Male who presents with a chief complaint of ARDS?, sepsis (26 Jan 2019 08:05)  Follow up for ARDS, pneumonia, pulmonary abscess, necrotizing pancreatitis    Interval Events: stable overnight    REVIEW OF SYSTEMS:  CV: denies chest pain  Resp: denies SOB  GI: denies abd pain  [x] All other systems negative      OBJECTIVE:  ICU Vital Signs Last 24 Hrs  T(C): 37 (26 Jan 2019 05:27), Max: 37.5 (26 Jan 2019 01:30)  T(F): 98.6 (26 Jan 2019 05:27), Max: 99.5 (26 Jan 2019 01:30)  HR: 107 (26 Jan 2019 07:56) (87 - 107)  BP: 107/58 (26 Jan 2019 05:27) (95/62 - 114/71)  BP(mean): --  ABP: --  ABP(mean): --  RR: 20 (26 Jan 2019 05:27) (20 - 24)  SpO2: 97% (26 Jan 2019 07:56) (94% - 100%)    Mode: AC/ CMV (Assist Control/ Continuous Mandatory Ventilation), RR (machine): 14, TV (machine): 350, FiO2: 40, PEEP: 5, MAP: 8, PIP: 16    01-25 @ 07:01  -  01-26 @ 07:00  --------------------------------------------------------  IN: 1306 mL / OUT: 1000 mL / NET: 306 mL      CAPILLARY BLOOD GLUCOSE      POCT Blood Glucose.: 112 mg/dL (26 Jan 2019 05:29)        HOSPITAL MEDICATIONS:  MEDICATIONS  (STANDING):  cefTAZidime/avibactam IVPB 2.5 Gram(s) IV Intermittent every 8 hours  cefTAZidime/avibactam IVPB      chlorhexidine 4% Liquid 1 Application(s) Topical <User Schedule>  cyanocobalamin 1000 MICROGram(s) Oral daily  dextrose 5%. 1000 milliLiter(s) (50 mL/Hr) IV Continuous <Continuous>  dextrose 50% Injectable 12.5 Gram(s) IV Push once  dextrose 50% Injectable 25 Gram(s) IV Push once  dextrose 50% Injectable 25 Gram(s) IV Push once  folic acid 1 milliGRAM(s) Enteral Tube daily  heparin  Injectable 5000 Unit(s) SubCutaneous every 8 hours  hydrocortisone sodium succinate Injectable 10 milliGRAM(s) IV Push daily  insulin lispro (HumaLOG) corrective regimen sliding scale   SubCutaneous every 6 hours  lacosamide Solution 100 milliGRAM(s) Oral two times a day  lactobacillus acidophilus 1 Tablet(s) Oral three times a day with meals  metroNIDAZOLE  IVPB      metroNIDAZOLE  IVPB 500 milliGRAM(s) IV Intermittent every 8 hours  midodrine 5 milliGRAM(s) Oral three times a day  psyllium Powder 1 Packet(s) Oral daily  QUEtiapine 25 milliGRAM(s) Oral daily  risperiDONE   Solution 1 milliGRAM(s) Oral daily  vancomycin  IVPB      vancomycin  IVPB 1000 milliGRAM(s) IV Intermittent every 12 hours    MEDICATIONS  (PRN):  acetaminophen    Suspension .. 650 milliGRAM(s) Oral every 6 hours PRN Temp greater or equal to 38C (100.4F), Mild Pain (1 - 3), Moderate Pain (4 - 6)  dextrose 40% Gel 15 Gram(s) Oral once PRN Blood Glucose LESS THAN 70 milliGRAM(s)/deciliter  glucagon  Injectable 1 milliGRAM(s) IntraMuscular once PRN Glucose LESS THAN 70 milligrams/deciliter    Physical Exam:   · Constitutional	detailed exam	  · Constitutional Details	no distress	  · Eyes	EOMI; PERRL; no drainage or redness	  · ENMT	No oral lesions; no gross abnormalities	  · Neck	detailed exam 	  · Neck Details	tracheostomy present	  · Respiratory	detailed exam	  · Respiratory Details	airway patent; breath sounds equal; few rhonchi R>L Trach to trach collar	  · Cardiovascular	detailed exam	  · Cardiovascular Details	regular rate and rhythm  no rub  no murmur 	  · Gastrointestinal	detailed exam	  · GI Normal	soft; nontender	  · Gastrointestinal Comments	PEG present, rectal tube in place	  · Extremities	detailed exam 	  · Extremities Details	no clubbing; no cyanosis; bilateral pedal edema to thighs	  · Extremities Comments	contractures	  · Neurological	detailed exam	  · Neurological Details	strength decreased	  · Mental Status	alert, responds appropriately nods to questions	  · Musculoskeletal	detailed exam	  · Musculoskeletal Details	decreased ROM; diminished strength	    LABS:                        8.6    3.79  )-----------( 328      ( 26 Jan 2019 06:12 )             28.4     01-26    135  |  99  |  8   ----------------------------<  109<H>  3.6   |  28  |  < 0.20<L>    Ca    7.4<L>      26 Jan 2019 06:12                MICROBIOLOGY:     RADIOLOGY:  [ ] Reviewed and interpreted by me    PULMONARY FUNCTION TESTS:    EKG:

## 2019-01-27 LAB
-  COAGULASE NEGATIVE STAPHYLOCOCCUS: SIGNIFICANT CHANGE UP
ALBUMIN SERPL ELPH-MCNC: 1.6 G/DL — LOW (ref 3.3–5)
ALP SERPL-CCNC: 139 U/L — HIGH (ref 40–120)
ALT FLD-CCNC: 8 U/L — SIGNIFICANT CHANGE UP (ref 4–41)
ANION GAP SERPL CALC-SCNC: 9 MMO/L — SIGNIFICANT CHANGE UP (ref 7–14)
AST SERPL-CCNC: 24 U/L — SIGNIFICANT CHANGE UP (ref 4–40)
BACTERIA BLD CULT: SIGNIFICANT CHANGE UP
BILIRUB SERPL-MCNC: 0.3 MG/DL — SIGNIFICANT CHANGE UP (ref 0.2–1.2)
BUN SERPL-MCNC: 7 MG/DL — SIGNIFICANT CHANGE UP (ref 7–23)
CALCIUM SERPL-MCNC: 7.9 MG/DL — LOW (ref 8.4–10.5)
CHLORIDE SERPL-SCNC: 100 MMOL/L — SIGNIFICANT CHANGE UP (ref 98–107)
CO2 SERPL-SCNC: 27 MMOL/L — SIGNIFICANT CHANGE UP (ref 22–31)
CREAT SERPL-MCNC: < 0.2 MG/DL — LOW (ref 0.5–1.3)
GLUCOSE BLDC GLUCOMTR-MCNC: 102 MG/DL — HIGH (ref 70–99)
GLUCOSE BLDC GLUCOMTR-MCNC: 111 MG/DL — HIGH (ref 70–99)
GLUCOSE BLDC GLUCOMTR-MCNC: 119 MG/DL — HIGH (ref 70–99)
GLUCOSE BLDC GLUCOMTR-MCNC: 130 MG/DL — HIGH (ref 70–99)
GLUCOSE SERPL-MCNC: 108 MG/DL — HIGH (ref 70–99)
HCT VFR BLD CALC: 31.4 % — LOW (ref 39–50)
HGB BLD-MCNC: 9.4 G/DL — LOW (ref 13–17)
MAGNESIUM SERPL-MCNC: 1.8 MG/DL — SIGNIFICANT CHANGE UP (ref 1.6–2.6)
MCHC RBC-ENTMCNC: 29.9 % — LOW (ref 32–36)
MCHC RBC-ENTMCNC: 30.9 PG — SIGNIFICANT CHANGE UP (ref 27–34)
MCV RBC AUTO: 103.3 FL — HIGH (ref 80–100)
NRBC # FLD: 0.02 K/UL — LOW (ref 25–125)
ORGANISM # SPEC MICROSCOPIC CNT: SIGNIFICANT CHANGE UP
PHOSPHATE SERPL-MCNC: 2.6 MG/DL — SIGNIFICANT CHANGE UP (ref 2.5–4.5)
PLATELET # BLD AUTO: 379 K/UL — SIGNIFICANT CHANGE UP (ref 150–400)
PMV BLD: 10 FL — SIGNIFICANT CHANGE UP (ref 7–13)
POTASSIUM SERPL-MCNC: 4.2 MMOL/L — SIGNIFICANT CHANGE UP (ref 3.5–5.3)
POTASSIUM SERPL-SCNC: 4.2 MMOL/L — SIGNIFICANT CHANGE UP (ref 3.5–5.3)
PROT SERPL-MCNC: 6.3 G/DL — SIGNIFICANT CHANGE UP (ref 6–8.3)
RBC # BLD: 3.04 M/UL — LOW (ref 4.2–5.8)
RBC # FLD: 18.2 % — HIGH (ref 10.3–14.5)
SODIUM SERPL-SCNC: 136 MMOL/L — SIGNIFICANT CHANGE UP (ref 135–145)
SPECIMEN SOURCE: SIGNIFICANT CHANGE UP
WBC # BLD: 3.77 K/UL — LOW (ref 3.8–10.5)
WBC # FLD AUTO: 3.77 K/UL — LOW (ref 3.8–10.5)

## 2019-01-27 RX ADMIN — HEPARIN SODIUM 5000 UNIT(S): 5000 INJECTION INTRAVENOUS; SUBCUTANEOUS at 21:56

## 2019-01-27 RX ADMIN — CHLORHEXIDINE GLUCONATE 1 APPLICATION(S): 213 SOLUTION TOPICAL at 10:29

## 2019-01-27 RX ADMIN — PREGABALIN 1000 MICROGRAM(S): 225 CAPSULE ORAL at 13:00

## 2019-01-27 RX ADMIN — LACOSAMIDE 100 MILLIGRAM(S): 50 TABLET ORAL at 18:49

## 2019-01-27 RX ADMIN — HEPARIN SODIUM 5000 UNIT(S): 5000 INJECTION INTRAVENOUS; SUBCUTANEOUS at 13:05

## 2019-01-27 RX ADMIN — MIDODRINE HYDROCHLORIDE 5 MILLIGRAM(S): 2.5 TABLET ORAL at 13:05

## 2019-01-27 RX ADMIN — HEPARIN SODIUM 5000 UNIT(S): 5000 INJECTION INTRAVENOUS; SUBCUTANEOUS at 06:45

## 2019-01-27 RX ADMIN — Medication 1 PACKET(S): at 13:01

## 2019-01-27 RX ADMIN — Medication 100 MILLIGRAM(S): at 08:59

## 2019-01-27 RX ADMIN — Medication 1 TABLET(S): at 18:38

## 2019-01-27 RX ADMIN — MIDODRINE HYDROCHLORIDE 5 MILLIGRAM(S): 2.5 TABLET ORAL at 21:57

## 2019-01-27 RX ADMIN — Medication 10 MILLIGRAM(S): at 08:58

## 2019-01-27 RX ADMIN — RISPERIDONE 1 MILLIGRAM(S): 4 TABLET ORAL at 13:00

## 2019-01-27 RX ADMIN — Medication 100 MILLIGRAM(S): at 18:38

## 2019-01-27 RX ADMIN — LACOSAMIDE 100 MILLIGRAM(S): 50 TABLET ORAL at 06:45

## 2019-01-27 RX ADMIN — Medication 1 TABLET(S): at 09:00

## 2019-01-27 RX ADMIN — QUETIAPINE FUMARATE 25 MILLIGRAM(S): 200 TABLET, FILM COATED ORAL at 13:01

## 2019-01-27 RX ADMIN — Medication 1 MILLIGRAM(S): at 13:00

## 2019-01-27 RX ADMIN — Medication 1 TABLET(S): at 13:00

## 2019-01-27 NOTE — PROGRESS NOTE ADULT - SUBJECTIVE AND OBJECTIVE BOX
Patient is a 53y old  Male who presents with a chief complaint of ARDS?, sepsis (26 Jan 2019 08:05)        SUBJECTIVE / OVERNIGHT EVENTS:      MEDICATIONS  (STANDING):  cefTAZidime/avibactam IVPB 2.5 Gram(s) IV Intermittent every 8 hours  cefTAZidime/avibactam IVPB      chlorhexidine 4% Liquid 1 Application(s) Topical <User Schedule>  cyanocobalamin 1000 MICROGram(s) Oral daily  dextrose 5%. 1000 milliLiter(s) (50 mL/Hr) IV Continuous <Continuous>  dextrose 50% Injectable 12.5 Gram(s) IV Push once  dextrose 50% Injectable 25 Gram(s) IV Push once  dextrose 50% Injectable 25 Gram(s) IV Push once  folic acid 1 milliGRAM(s) Enteral Tube daily  heparin  Injectable 5000 Unit(s) SubCutaneous every 8 hours  insulin lispro (HumaLOG) corrective regimen sliding scale   SubCutaneous every 6 hours  lacosamide Solution 100 milliGRAM(s) Oral two times a day  lactobacillus acidophilus 1 Tablet(s) Oral three times a day with meals  metroNIDAZOLE  IVPB      metroNIDAZOLE  IVPB 500 milliGRAM(s) IV Intermittent every 8 hours  midodrine 5 milliGRAM(s) Oral three times a day  psyllium Powder 1 Packet(s) Oral daily  QUEtiapine 25 milliGRAM(s) Oral daily  risperiDONE   Solution 1 milliGRAM(s) Oral daily    MEDICATIONS  (PRN):  acetaminophen    Suspension .. 650 milliGRAM(s) Oral every 6 hours PRN Temp greater or equal to 38C (100.4F), Mild Pain (1 - 3), Moderate Pain (4 - 6)  dextrose 40% Gel 15 Gram(s) Oral once PRN Blood Glucose LESS THAN 70 milliGRAM(s)/deciliter  glucagon  Injectable 1 milliGRAM(s) IntraMuscular once PRN Glucose LESS THAN 70 milligrams/deciliter        CAPILLARY BLOOD GLUCOSE      POCT Blood Glucose.: 130 mg/dL (27 Jan 2019 06:28)  POCT Blood Glucose.: 102 mg/dL (27 Jan 2019 00:10)  POCT Blood Glucose.: 122 mg/dL (26 Jan 2019 17:52)  POCT Blood Glucose.: 166 mg/dL (26 Jan 2019 12:03)    I&O's Summary      PHYSICAL EXAM  GENERAL: NAD, well-developed  HEAD:  Atraumatic, Normocephalic  EYES: EOMI, PERRLA, conjunctiva and sclera clear  NECK: Supple, No JVD  CHEST/LUNG: Clear to auscultation bilaterally; No wheeze  HEART: Regular rate and rhythm; No murmurs, rubs, or gallops  ABDOMEN: Soft, Nontender, Nondistended; Bowel sounds present  EXTREMITIES:  2+ Peripheral Pulses, No clubbing, cyanosis, or edema  PSYCH: AAOx3  SKIN: No rashes or lesions    LABS:                        9.4    3.77  )-----------( 379      ( 27 Jan 2019 06:00 )             31.4     01-27    136  |  100  |  7   ----------------------------<  108<H>  4.2   |  27  |  < 0.20<L>    Ca    7.9<L>      27 Jan 2019 06:00  Phos  2.6     01-27  Mg     1.8     01-27    TPro  6.3  /  Alb  1.6<L>  /  TBili  0.3  /  DBili  x   /  AST  24  /  ALT  8   /  AlkPhos  139<H>  01-27              RADIOLOGY & ADDITIONAL TESTS:    Imaging Personally Reviewed:  Consultant(s) Notes Reviewed:    Care Discussed with Consultants/Other Providers: Patient is a 53y old  Male who presents with a chief complaint of ARDS?, sepsis (26 Jan 2019 08:05)        SUBJECTIVE / OVERNIGHT EVENTS: No overnight events.      MEDICATIONS  (STANDING):  cefTAZidime/avibactam IVPB 2.5 Gram(s) IV Intermittent every 8 hours  cefTAZidime/avibactam IVPB      chlorhexidine 4% Liquid 1 Application(s) Topical <User Schedule>  cyanocobalamin 1000 MICROGram(s) Oral daily  dextrose 5%. 1000 milliLiter(s) (50 mL/Hr) IV Continuous <Continuous>  dextrose 50% Injectable 12.5 Gram(s) IV Push once  dextrose 50% Injectable 25 Gram(s) IV Push once  dextrose 50% Injectable 25 Gram(s) IV Push once  folic acid 1 milliGRAM(s) Enteral Tube daily  heparin  Injectable 5000 Unit(s) SubCutaneous every 8 hours  insulin lispro (HumaLOG) corrective regimen sliding scale   SubCutaneous every 6 hours  lacosamide Solution 100 milliGRAM(s) Oral two times a day  lactobacillus acidophilus 1 Tablet(s) Oral three times a day with meals  metroNIDAZOLE  IVPB      metroNIDAZOLE  IVPB 500 milliGRAM(s) IV Intermittent every 8 hours  midodrine 5 milliGRAM(s) Oral three times a day  psyllium Powder 1 Packet(s) Oral daily  QUEtiapine 25 milliGRAM(s) Oral daily  risperiDONE   Solution 1 milliGRAM(s) Oral daily    MEDICATIONS  (PRN):  acetaminophen    Suspension .. 650 milliGRAM(s) Oral every 6 hours PRN Temp greater or equal to 38C (100.4F), Mild Pain (1 - 3), Moderate Pain (4 - 6)  dextrose 40% Gel 15 Gram(s) Oral once PRN Blood Glucose LESS THAN 70 milliGRAM(s)/deciliter  glucagon  Injectable 1 milliGRAM(s) IntraMuscular once PRN Glucose LESS THAN 70 milligrams/deciliter        CAPILLARY BLOOD GLUCOSE      POCT Blood Glucose.: 130 mg/dL (27 Jan 2019 06:28)  POCT Blood Glucose.: 102 mg/dL (27 Jan 2019 00:10)  POCT Blood Glucose.: 122 mg/dL (26 Jan 2019 17:52)  POCT Blood Glucose.: 166 mg/dL (26 Jan 2019 12:03)    I&O's Summary      PHYSICAL EXAM  GENERAL: NAD, well-developed  HEAD:  Atraumatic, Normocephalic  EYES: EOMI, PERRLA, conjunctiva and sclera clear  NECK: Supple, No JVD,tracheostomy present  CHEST/LUNG: Clear to auscultation bilaterally; No wheeze  HEART: Regular rate and rhythm; No murmurs, rubs, or gallops  ABDOMEN: Soft, Nontender, Nondistended; Bowel sounds present  EXTREMITIES:  2+ Peripheral Pulses, No clubbing, cyanosis, or edema  PSYCH: AAOx3  SKIN: No rashes or lesions    LABS:                        9.4    3.77  )-----------( 379      ( 27 Jan 2019 06:00 )             31.4     01-27    136  |  100  |  7   ----------------------------<  108<H>  4.2   |  27  |  < 0.20<L>    Ca    7.9<L>      27 Jan 2019 06:00  Phos  2.6     01-27  Mg     1.8     01-27    TPro  6.3  /  Alb  1.6<L>  /  TBili  0.3  /  DBili  x   /  AST  24  /  ALT  8   /  AlkPhos  139<H>  01-27              RADIOLOGY & ADDITIONAL TESTS:    Imaging Personally Reviewed:  Consultant(s) Notes Reviewed:    Care Discussed with Consultants/Other Providers:

## 2019-01-27 NOTE — PROGRESS NOTE ADULT - ASSESSMENT
53 Male w/ a PMHx of TBI (s/p PEG tube, contracture, and seizure d/o) presented as a transfer from [facility] on 12/9 for respiratory failure 2/2 ARDS likely 2/2 necrotizing pancreatitis s/p intubation. Course c/b Acinetobacter PNA s/p Avycaz and Flagyl 7 day course.  Course further complicated by acute blood loss anemia s/p colonoscopy with findings suggestive of ischemic colitis, without further bleeding and Hgb remaining stable. S/p extubation 1/3, and re-intubation 1/6 now s/p tracheostomy.      1/26 pulmonary stable on trach collar, following up LFTs and GI, continue abx 53 Male w/ a PMHx of TBI (s/p PEG tube, contracture, and seizure d/o) presented as a transfer from [facility] on 12/9 for respiratory failure 2/2 ARDS likely 2/2 necrotizing pancreatitis s/p intubation. Course c/b Acinetobacter PNA s/p Avycaz and Flagyl 7 day course.  Course further complicated by acute blood loss anemia s/p colonoscopy with findings suggestive of ischemic colitis, without further bleeding and Hgb remaining stable. S/p extubation 1/3, and re-intubation 1/6 now s/p tracheostomy.

## 2019-01-27 NOTE — PROGRESS NOTE ADULT - PROBLEM SELECTOR PLAN 1
- CT on 1/17 with persistent necrotic fluid accumulation but improved     - cont avycaz and flagyl per ID  - cont to monitor

## 2019-01-27 NOTE — PROGRESS NOTE ADULT - PROBLEM SELECTOR PLAN 3
- persistent fevers through abx, currently afebrile  - ID note appreciated  - s/p CT C/A/P with IV contrast to r/o infection  - cont abx for now

## 2019-01-28 LAB
ANION GAP SERPL CALC-SCNC: 8 MMO/L — SIGNIFICANT CHANGE UP (ref 7–14)
BASOPHILS # BLD AUTO: 0.01 K/UL — SIGNIFICANT CHANGE UP (ref 0–0.2)
BASOPHILS NFR BLD AUTO: 0.2 % — SIGNIFICANT CHANGE UP (ref 0–2)
BUN SERPL-MCNC: 6 MG/DL — LOW (ref 7–23)
CALCIUM SERPL-MCNC: 7.7 MG/DL — LOW (ref 8.4–10.5)
CHLORIDE SERPL-SCNC: 98 MMOL/L — SIGNIFICANT CHANGE UP (ref 98–107)
CO2 SERPL-SCNC: 25 MMOL/L — SIGNIFICANT CHANGE UP (ref 22–31)
CREAT SERPL-MCNC: < 0.2 MG/DL — LOW (ref 0.5–1.3)
EOSINOPHIL # BLD AUTO: 0.07 K/UL — SIGNIFICANT CHANGE UP (ref 0–0.5)
EOSINOPHIL NFR BLD AUTO: 1.6 % — SIGNIFICANT CHANGE UP (ref 0–6)
GLUCOSE BLDC GLUCOMTR-MCNC: 100 MG/DL — HIGH (ref 70–99)
GLUCOSE BLDC GLUCOMTR-MCNC: 117 MG/DL — HIGH (ref 70–99)
GLUCOSE BLDC GLUCOMTR-MCNC: 136 MG/DL — HIGH (ref 70–99)
GLUCOSE SERPL-MCNC: 112 MG/DL — HIGH (ref 70–99)
HCT VFR BLD CALC: 29.1 % — LOW (ref 39–50)
HGB BLD-MCNC: 8.9 G/DL — LOW (ref 13–17)
IMM GRANULOCYTES NFR BLD AUTO: 0.2 % — SIGNIFICANT CHANGE UP (ref 0–1.5)
LYMPHOCYTES # BLD AUTO: 1.92 K/UL — SIGNIFICANT CHANGE UP (ref 1–3.3)
LYMPHOCYTES # BLD AUTO: 43.3 % — SIGNIFICANT CHANGE UP (ref 13–44)
MAGNESIUM SERPL-MCNC: 1.8 MG/DL — SIGNIFICANT CHANGE UP (ref 1.6–2.6)
MCHC RBC-ENTMCNC: 30.6 % — LOW (ref 32–36)
MCHC RBC-ENTMCNC: 32.1 PG — SIGNIFICANT CHANGE UP (ref 27–34)
MCV RBC AUTO: 105.1 FL — HIGH (ref 80–100)
MONOCYTES # BLD AUTO: 0.43 K/UL — SIGNIFICANT CHANGE UP (ref 0–0.9)
MONOCYTES NFR BLD AUTO: 9.7 % — SIGNIFICANT CHANGE UP (ref 2–14)
NEUTROPHILS # BLD AUTO: 1.99 K/UL — SIGNIFICANT CHANGE UP (ref 1.8–7.4)
NEUTROPHILS NFR BLD AUTO: 45 % — SIGNIFICANT CHANGE UP (ref 43–77)
NRBC # FLD: 0 K/UL — LOW (ref 25–125)
PHOSPHATE SERPL-MCNC: 2.4 MG/DL — LOW (ref 2.5–4.5)
PLATELET # BLD AUTO: 369 K/UL — SIGNIFICANT CHANGE UP (ref 150–400)
PMV BLD: 9.8 FL — SIGNIFICANT CHANGE UP (ref 7–13)
POTASSIUM SERPL-MCNC: 3.9 MMOL/L — SIGNIFICANT CHANGE UP (ref 3.5–5.3)
POTASSIUM SERPL-SCNC: 3.9 MMOL/L — SIGNIFICANT CHANGE UP (ref 3.5–5.3)
RBC # BLD: 2.77 M/UL — LOW (ref 4.2–5.8)
RBC # FLD: 18.1 % — HIGH (ref 10.3–14.5)
SODIUM SERPL-SCNC: 131 MMOL/L — LOW (ref 135–145)
WBC # BLD: 4.43 K/UL — SIGNIFICANT CHANGE UP (ref 3.8–10.5)
WBC # FLD AUTO: 4.43 K/UL — SIGNIFICANT CHANGE UP (ref 3.8–10.5)

## 2019-01-28 PROCEDURE — 99233 SBSQ HOSP IP/OBS HIGH 50: CPT | Mod: GC

## 2019-01-28 PROCEDURE — 99232 SBSQ HOSP IP/OBS MODERATE 35: CPT | Mod: GC

## 2019-01-28 RX ADMIN — Medication 1 TABLET(S): at 13:45

## 2019-01-28 RX ADMIN — HEPARIN SODIUM 5000 UNIT(S): 5000 INJECTION INTRAVENOUS; SUBCUTANEOUS at 06:10

## 2019-01-28 RX ADMIN — Medication 1 MILLIGRAM(S): at 11:06

## 2019-01-28 RX ADMIN — Medication 1 TABLET(S): at 09:10

## 2019-01-28 RX ADMIN — HEPARIN SODIUM 5000 UNIT(S): 5000 INJECTION INTRAVENOUS; SUBCUTANEOUS at 21:24

## 2019-01-28 RX ADMIN — RISPERIDONE 1 MILLIGRAM(S): 4 TABLET ORAL at 11:06

## 2019-01-28 RX ADMIN — PREGABALIN 1000 MICROGRAM(S): 225 CAPSULE ORAL at 11:06

## 2019-01-28 RX ADMIN — Medication 100 MILLIGRAM(S): at 17:35

## 2019-01-28 RX ADMIN — MIDODRINE HYDROCHLORIDE 5 MILLIGRAM(S): 2.5 TABLET ORAL at 21:24

## 2019-01-28 RX ADMIN — Medication 100 MILLIGRAM(S): at 01:48

## 2019-01-28 RX ADMIN — Medication 1 TABLET(S): at 17:35

## 2019-01-28 RX ADMIN — QUETIAPINE FUMARATE 25 MILLIGRAM(S): 200 TABLET, FILM COATED ORAL at 11:06

## 2019-01-28 RX ADMIN — Medication 100 MILLIGRAM(S): at 09:10

## 2019-01-28 RX ADMIN — CHLORHEXIDINE GLUCONATE 1 APPLICATION(S): 213 SOLUTION TOPICAL at 09:10

## 2019-01-28 RX ADMIN — LACOSAMIDE 100 MILLIGRAM(S): 50 TABLET ORAL at 06:10

## 2019-01-28 RX ADMIN — LACOSAMIDE 100 MILLIGRAM(S): 50 TABLET ORAL at 17:39

## 2019-01-28 RX ADMIN — MIDODRINE HYDROCHLORIDE 5 MILLIGRAM(S): 2.5 TABLET ORAL at 06:10

## 2019-01-28 RX ADMIN — MIDODRINE HYDROCHLORIDE 5 MILLIGRAM(S): 2.5 TABLET ORAL at 13:45

## 2019-01-28 RX ADMIN — Medication 1 PACKET(S): at 11:06

## 2019-01-28 RX ADMIN — HEPARIN SODIUM 5000 UNIT(S): 5000 INJECTION INTRAVENOUS; SUBCUTANEOUS at 13:45

## 2019-01-28 NOTE — PROGRESS NOTE ADULT - ASSESSMENT
Impression  1) Walled off necrosis, doubt cause of sepsis , patient also has Lung Abscesses seen on CT scan.   2) Hematochezia, s/p colonoscopy on 12/28/2018 with severe segmental colitis localized to the transverse colon and ascending colon (possible ischemic colitis, possible colitis related to contiguous danay-pancreatic inflammatory process). Biopsies with granulation tissue but no infection/malignancy, Hb stable   3) Resp failure in the setting of pneumonia, requiring tracheostomy now with lung abscess and sepsis on Abx.     Recommendations  - Trend CBC, CMP and fever curve, Hb stable for now.   - Continue IV antibiotics as per ID recommendations.   - Rest of care per primary team  - No indication for urgent Endoscopic Drainage of the walled off necrosis at this point.   - Will consider Endoscopic Drainage of walled off necrosis after adequate treatment of Lung abscesses.   - Will need repeat CT in 2 weeks to evaluate the pancreatic collections.   - GI will follow up.

## 2019-01-28 NOTE — PROGRESS NOTE ADULT - SUBJECTIVE AND OBJECTIVE BOX
CHIEF COMPLAINT:    Interval Events:    REVIEW OF SYSTEMS:  Constitutional:   Eyes:  ENT:  CV:  Resp:  GI:  :  MSK:  Integumentary:  Neurological:  Psychiatric:  Endocrine:  Hematologic/Lymphatic:  Allergic/Immunologic:  [ ] All other systems negative  [ ] Unable to assess ROS because ________    OBJECTIVE:  ICU Vital Signs Last 24 Hrs  T(C): 37.3 (28 Jan 2019 04:59), Max: 37.8 (28 Jan 2019 02:25)  T(F): 99.2 (28 Jan 2019 04:59), Max: 100.1 (28 Jan 2019 02:25)  HR: 122 (28 Jan 2019 04:59) (111 - 124)  BP: 104/70 (28 Jan 2019 04:59) (102/60 - 120/78)  BP(mean): --  ABP: --  ABP(mean): --  RR: 24 (28 Jan 2019 04:59) (20 - 26)  SpO2: 96% (28 Jan 2019 04:59) (94% - 96%)    Mode: AC/ CMV (Assist Control/ Continuous Mandatory Ventilation), RR (machine): 14, TV (machine): 350, FiO2: 40, PEEP: 5, MAP: 9.3, PIP: 16    01-27 @ 07:01  -  01-28 @ 07:00  --------------------------------------------------------  IN: 1020 mL / OUT: 0 mL / NET: 1020 mL      CAPILLARY BLOOD GLUCOSE      POCT Blood Glucose.: 117 mg/dL (28 Jan 2019 06:10)      PHYSICAL EXAM:  General:   HEENT:   Lymph Nodes:  Neck:   Respiratory:   Cardiovascular:   Abdomen:   Extremities:   Skin:   Neurological:  Psychiatry:    HOSPITAL MEDICATIONS:  MEDICATIONS  (STANDING):  cefTAZidime/avibactam IVPB 2.5 Gram(s) IV Intermittent every 8 hours  cefTAZidime/avibactam IVPB      chlorhexidine 4% Liquid 1 Application(s) Topical <User Schedule>  cyanocobalamin 1000 MICROGram(s) Oral daily  dextrose 5%. 1000 milliLiter(s) (50 mL/Hr) IV Continuous <Continuous>  dextrose 50% Injectable 12.5 Gram(s) IV Push once  dextrose 50% Injectable 25 Gram(s) IV Push once  dextrose 50% Injectable 25 Gram(s) IV Push once  folic acid 1 milliGRAM(s) Enteral Tube daily  heparin  Injectable 5000 Unit(s) SubCutaneous every 8 hours  insulin lispro (HumaLOG) corrective regimen sliding scale   SubCutaneous every 6 hours  lacosamide Solution 100 milliGRAM(s) Oral two times a day  lactobacillus acidophilus 1 Tablet(s) Oral three times a day with meals  metroNIDAZOLE  IVPB      metroNIDAZOLE  IVPB 500 milliGRAM(s) IV Intermittent every 8 hours  midodrine 5 milliGRAM(s) Oral three times a day  psyllium Powder 1 Packet(s) Oral daily  QUEtiapine 25 milliGRAM(s) Oral daily  risperiDONE   Solution 1 milliGRAM(s) Oral daily    MEDICATIONS  (PRN):  acetaminophen    Suspension .. 650 milliGRAM(s) Oral every 6 hours PRN Temp greater or equal to 38C (100.4F), Mild Pain (1 - 3), Moderate Pain (4 - 6)  dextrose 40% Gel 15 Gram(s) Oral once PRN Blood Glucose LESS THAN 70 milliGRAM(s)/deciliter  glucagon  Injectable 1 milliGRAM(s) IntraMuscular once PRN Glucose LESS THAN 70 milligrams/deciliter      LABS:                        9.4    3.77  )-----------( 379      ( 27 Jan 2019 06:00 )             31.4     01-28    131<L>  |  98  |  6<L>  ----------------------------<  112<H>  3.9   |  25  |  < 0.20<L>    Ca    7.7<L>      28 Jan 2019 06:30  Phos  2.4     01-28  Mg     1.8     01-28    TPro  6.3  /  Alb  1.6<L>  /  TBili  0.3  /  DBili  x   /  AST  24  /  ALT  8   /  AlkPhos  139<H>  01-27              MICROBIOLOGY:     RADIOLOGY:  [ ] Reviewed and interpreted by me    PULMONARY FUNCTION TESTS:    EKG: CHIEF COMPLAINT: Patient is a 53y old  Male who presents with a chief complaint of ARDS?, sepsis (28 Jan 2019 08:14)      Interval Events: none     REVIEW OF SYSTEMS:  Constitutional: No fever /pain   Eyes:  ENT:  CV: Denies   Resp: Denies   GI: Denies   MSK: Julien   [x ] All other systems negative      OBJECTIVE:  ICU Vital Signs Last 24 Hrs  T(C): 37.3 (28 Jan 2019 04:59), Max: 37.8 (28 Jan 2019 02:25)  T(F): 99.2 (28 Jan 2019 04:59), Max: 100.1 (28 Jan 2019 02:25)  HR: 122 (28 Jan 2019 04:59) (111 - 124)  BP: 104/70 (28 Jan 2019 04:59) (102/60 - 120/78)  BP(mean): --  ABP: --  ABP(mean): --  RR: 24 (28 Jan 2019 04:59) (20 - 26)  SpO2: 96% (28 Jan 2019 04:59) (94% - 96%)    Mode: AC/ CMV (Assist Control/ Continuous Mandatory Ventilation), RR (machine): 14, TV (machine): 350, FiO2: 40, PEEP: 5, MAP: 9.3, PIP: 16    01-27 @ 07:01  -  01-28 @ 07:00  --------------------------------------------------------  IN: 1020 mL / OUT: 0 mL / NET: 1020 mL      CAPILLARY BLOOD GLUCOSE      POCT Blood Glucose.: 117 mg/dL (28 Jan 2019 06:10)        HOSPITAL MEDICATIONS:  MEDICATIONS  (STANDING):  cefTAZidime/avibactam IVPB 2.5 Gram(s) IV Intermittent every 8 hours  cefTAZidime/avibactam IVPB      chlorhexidine 4% Liquid 1 Application(s) Topical <User Schedule>  cyanocobalamin 1000 MICROGram(s) Oral daily  dextrose 5%. 1000 milliLiter(s) (50 mL/Hr) IV Continuous <Continuous>  dextrose 50% Injectable 12.5 Gram(s) IV Push once  dextrose 50% Injectable 25 Gram(s) IV Push once  dextrose 50% Injectable 25 Gram(s) IV Push once  folic acid 1 milliGRAM(s) Enteral Tube daily  heparin  Injectable 5000 Unit(s) SubCutaneous every 8 hours  insulin lispro (HumaLOG) corrective regimen sliding scale   SubCutaneous every 6 hours  lacosamide Solution 100 milliGRAM(s) Oral two times a day  lactobacillus acidophilus 1 Tablet(s) Oral three times a day with meals  metroNIDAZOLE  IVPB      metroNIDAZOLE  IVPB 500 milliGRAM(s) IV Intermittent every 8 hours  midodrine 5 milliGRAM(s) Oral three times a day  psyllium Powder 1 Packet(s) Oral daily  QUEtiapine 25 milliGRAM(s) Oral daily  risperiDONE   Solution 1 milliGRAM(s) Oral daily    MEDICATIONS  (PRN):  acetaminophen    Suspension .. 650 milliGRAM(s) Oral every 6 hours PRN Temp greater or equal to 38C (100.4F), Mild Pain (1 - 3), Moderate Pain (4 - 6)  dextrose 40% Gel 15 Gram(s) Oral once PRN Blood Glucose LESS THAN 70 milliGRAM(s)/deciliter  glucagon  Injectable 1 milliGRAM(s) IntraMuscular once PRN Glucose LESS THAN 70 milligrams/deciliter      LABS:                        9.4    3.77  )-----------( 379      ( 27 Jan 2019 06:00 )             31.4     01-28    131<L>  |  98  |  6<L>  ----------------------------<  112<H>  3.9   |  25  |  < 0.20<L>    Ca    7.7<L>      28 Jan 2019 06:30  Phos  2.4     01-28  Mg     1.8     01-28    TPro  6.3  /  Alb  1.6<L>  /  TBili  0.3  /  DBili  x   /  AST  24  /  ALT  8   /  AlkPhos  139<H>  01-27              MICROBIOLOGY:     RADIOLOGY:  [ ] Reviewed and interpreted by me    PULMONARY FUNCTION TESTS:    EKG:

## 2019-01-28 NOTE — PROGRESS NOTE ADULT - ASSESSMENT
53 Male w/ a PMHx of TBI (s/p PEG tube, contracture, and seizure d/o) presented as a transfer from [facility] on 12/9 for respiratory failure 2/2 ARDS likely 2/2 necrotizing pancreatitis s/p intubation. Course c/b Acinetobacter PNA s/p Avycaz and Flagyl 7 day course.  Course further complicated by acute blood loss anemia s/p colonoscopy with findings suggestive of ischemic colitis, without further bleeding and Hgb remaining stable. S/p extubation 1/3, and re-intubation 1/6 now s/p tracheostomy.

## 2019-01-28 NOTE — PROGRESS NOTE ADULT - PROBLEM SELECTOR PLAN 3
- persistent fevers through abx, currently afebrile  - ID note appreciated  - s/p CT C/A/P with IV contrast to r/o infection  - cont abx for now - persistent fevers through abx, currently afebrile  - ID note appreciated  - s/p CT C/A/P with IV contrast to r/o infection  - cont abx for now  - Bedside POCUS done - no thoracentesis at this time

## 2019-01-28 NOTE — CHART NOTE - NSCHARTNOTEFT_GEN_A_CORE
:  Laurie Epperson  INDICATION:  Pleural effusion    PROCEDURE:  [X] LIMITED CHEST    FINDINGS:  Bibasilar B-line aeration pattern  Small, simple appearing right pleural effusion, mobile and variable in size with respiration.      INTERPRETATION:  Small right pleural effusion not affecting respiration mechanics not amenable for drainage.     Laurie Epperson MD  Pulmonary/Critical Care Fellow  page: [phone]

## 2019-01-28 NOTE — PROGRESS NOTE ADULT - SUBJECTIVE AND OBJECTIVE BOX
GI following the patient for Necrotizing Pancreatitis with an evolving Walled Off Necrosis and new collection around the head of pancreas , patient was supposed to follow up with DR Jules for possible Endoscopic drainage of the walled of necrosis after wall maturation. but patient stayed in hospital for new fever, had repeat CT Chest and abdomen that revealed walled off necrosis of a 7.8 cm collection and a new collection and CT chest findings suggestive of lung abscesses      Interval Events: On tube feeds, tolerating it. back on Abx because of sepsis with improving fever curve, no elevation of WBC counts         Allergies:  Valproate Sodium (Other (Severe))      Hospital Medications:  acetaminophen    Suspension .. 650 milliGRAM(s) Oral every 6 hours PRN  cefTAZidime/avibactam IVPB 2.5 Gram(s) IV Intermittent every 8 hours  cefTAZidime/avibactam IVPB      chlorhexidine 4% Liquid 1 Application(s) Topical <User Schedule>  cyanocobalamin 1000 MICROGram(s) Oral daily  dextrose 40% Gel 15 Gram(s) Oral once PRN  dextrose 5%. 1000 milliLiter(s) IV Continuous <Continuous>  dextrose 50% Injectable 12.5 Gram(s) IV Push once  dextrose 50% Injectable 25 Gram(s) IV Push once  dextrose 50% Injectable 25 Gram(s) IV Push once  folic acid 1 milliGRAM(s) Enteral Tube daily  glucagon  Injectable 1 milliGRAM(s) IntraMuscular once PRN  heparin  Injectable 5000 Unit(s) SubCutaneous every 8 hours  insulin lispro (HumaLOG) corrective regimen sliding scale   SubCutaneous every 6 hours  lacosamide Solution 100 milliGRAM(s) Oral two times a day  lactobacillus acidophilus 1 Tablet(s) Oral three times a day with meals  metroNIDAZOLE  IVPB      metroNIDAZOLE  IVPB 500 milliGRAM(s) IV Intermittent every 8 hours  midodrine 5 milliGRAM(s) Oral three times a day  psyllium Powder 1 Packet(s) Oral daily  QUEtiapine 25 milliGRAM(s) Oral daily  risperiDONE   Solution 1 milliGRAM(s) Oral daily      PMHX/PSHX:  Seizure  TBI (traumatic brain injury)  S/P percutaneous endoscopic gastrostomy (PEG) tube placement  No significant past surgical history          ROS:   Looks comfortable.       PHYSICAL EXAM:     GENERAL:  No distress  HEENT:  NC/AT,  conjunctivae clear, sclera -anicteric  CHEST:  Decreased breath sounds at both bases.   HEART:  Regular rhythm, S1, S2, no added sounds  ABDOMEN:  Soft, non-tender, non-distended, normoactive bowel sounds.  NEURO:  Alert and awake and looks comfortable.     Vital Signs:  Vital Signs Last 24 Hrs  T(C): 37.3 (2019 04:59), Max: 37.8 (2019 02:25)  T(F): 99.2 (2019 04:59), Max: 100.1 (2019 02:25)  HR: 122 (2019 08:02) (111 - 124)  BP: 104/70 (2019 04:59) (102/60 - 120/78)  BP(mean): --  RR: 24 (2019 04:59) (20 - 26)  SpO2: 95% (2019 08:02) (94% - 96%)  Daily     Daily Weight in k (2019 02:25)    LABS:                        8.9    4.43  )-----------( 369      ( 2019 06:30 )             29.1         131<L>  |  98  |  6<L>  ----------------------------<  112<H>  3.9   |  25  |  < 0.20<L>    Ca    7.7<L>      2019 06:30  Phos  2.4       Mg     1.8         TPro  6.3  /  Alb  1.6<L>  /  TBili  0.3  /  DBili  x   /  AST  24  /  ALT  8   /  AlkPhos  139<H>      LIVER FUNCTIONS - ( 2019 06:00 )  Alb: 1.6 g/dL / Pro: 6.3 g/dL / ALK PHOS: 139 u/L / ALT: 8 u/L / AST: 24 u/L / GGT: x                   Imaging: < from: CT Chest w/ IV Cont (19 @ 15:07) >  LUNGS AND LARGE AIRWAYS: Endotracheal tube is noted in satisfactory   position. There is partial atelectasis of the right middle lobe and   lingula and complete atelectasis of both lower lobes with a heterogeneous   appearance the lung parenchyma which has improved since 2019.  PLEURA: Moderate bilateral pleural effusions not significant change since   2019.  VESSELS: Within normal limits.  HEART: Heart size is normal. Small pericardial fluid.  MEDIASTINUM AND FANNY: No lymphadenopathy.  CHEST WALL AND LOWER NECK: Within normal limits.    ABDOMEN AND PELVIS:    LIVER: Within normal limits.  BILE DUCTS: Normal caliber.  GALLBLADDER: Focal wall calcification.  SPLEEN: Peripheral calcification at spleen, unchanged.  PANCREAS: Necrotizing pancreatitis with acute necrotic collections again   noted.  ADRENALS: Within normal limits.  KIDNEYS/URETERS: Subcentimeter hypodense right renal lesion which is too   small to characterize.    BLADDER: Within normal limits.  REPRODUCTIVE ORGANS: The prostate gland is within normal limits.    BOWEL: Gastrostomy tube is noted. Mild mucosal hyperenhancement and bowel   wall thickening at the splenic flexure and descending colon withadjacent   fat stranding consistent with colitis which is also seen previously.   Rectal tube is again noted. Appendix is normal.  PERITONEUM: Fluid collections along the gastric wall are better formed   proximally with a new collection more caudallymeasuring 7.0 x 4.8 cm (2,   78). Small ascites has decreased.  VESSELS:  Unchanged splenic vein thrombosis. Scattered arterial   calcifications.  RETROPERITONEUM: No lymphadenopathy.    ABDOMINAL WALL: Foci of air nodules within the ventral subcutaneous   tissues are consistent with recent injections.  BONES: Within normal limits.    IMPRESSION: Necrotizing pancreatitis with acute necrotic collections   again noted. Fluid collections along the proximal gastric wall are better   formed with a newcollection more caudally measuring 7.0 x 4.8 cm. Small   ascites has decreased.    Moderate bilateral pleural effusions with complete atelectasis of both   lower lobes and partial atelectasis of the right middle lobe and lingula   as seen previously.Heterogeneous lung enhancement has improved but is   still present and suggestive of lung abscesses.

## 2019-01-29 LAB
ANION GAP SERPL CALC-SCNC: 7 MMO/L — SIGNIFICANT CHANGE UP (ref 7–14)
APPEARANCE UR: CLEAR — SIGNIFICANT CHANGE UP
BACTERIA BLD CULT: SIGNIFICANT CHANGE UP
BASOPHILS # BLD AUTO: 0.01 K/UL — SIGNIFICANT CHANGE UP (ref 0–0.2)
BASOPHILS NFR BLD AUTO: 0.2 % — SIGNIFICANT CHANGE UP (ref 0–2)
BILIRUB UR-MCNC: NEGATIVE — SIGNIFICANT CHANGE UP
BLOOD UR QL VISUAL: NEGATIVE — SIGNIFICANT CHANGE UP
BUN SERPL-MCNC: 5 MG/DL — LOW (ref 7–23)
CALCIUM SERPL-MCNC: 7.8 MG/DL — LOW (ref 8.4–10.5)
CHLORIDE SERPL-SCNC: 100 MMOL/L — SIGNIFICANT CHANGE UP (ref 98–107)
CO2 SERPL-SCNC: 26 MMOL/L — SIGNIFICANT CHANGE UP (ref 22–31)
COLOR SPEC: YELLOW — SIGNIFICANT CHANGE UP
CREAT SERPL-MCNC: < 0.2 MG/DL — LOW (ref 0.5–1.3)
EOSINOPHIL # BLD AUTO: 0.07 K/UL — SIGNIFICANT CHANGE UP (ref 0–0.5)
EOSINOPHIL NFR BLD AUTO: 1.7 % — SIGNIFICANT CHANGE UP (ref 0–6)
GLUCOSE BLDC GLUCOMTR-MCNC: 102 MG/DL — HIGH (ref 70–99)
GLUCOSE BLDC GLUCOMTR-MCNC: 105 MG/DL — HIGH (ref 70–99)
GLUCOSE BLDC GLUCOMTR-MCNC: 109 MG/DL — HIGH (ref 70–99)
GLUCOSE BLDC GLUCOMTR-MCNC: 125 MG/DL — HIGH (ref 70–99)
GLUCOSE SERPL-MCNC: 101 MG/DL — HIGH (ref 70–99)
GLUCOSE UR-MCNC: NEGATIVE — SIGNIFICANT CHANGE UP
HCT VFR BLD CALC: 28.7 % — LOW (ref 39–50)
HGB BLD-MCNC: 8.6 G/DL — LOW (ref 13–17)
IMM GRANULOCYTES NFR BLD AUTO: 0 % — SIGNIFICANT CHANGE UP (ref 0–1.5)
KETONES UR-MCNC: NEGATIVE — SIGNIFICANT CHANGE UP
LEUKOCYTE ESTERASE UR-ACNC: NEGATIVE — SIGNIFICANT CHANGE UP
LYMPHOCYTES # BLD AUTO: 2.1 K/UL — SIGNIFICANT CHANGE UP (ref 1–3.3)
LYMPHOCYTES # BLD AUTO: 50.2 % — HIGH (ref 13–44)
MAGNESIUM SERPL-MCNC: 1.9 MG/DL — SIGNIFICANT CHANGE UP (ref 1.6–2.6)
MCHC RBC-ENTMCNC: 30 % — LOW (ref 32–36)
MCHC RBC-ENTMCNC: 31.4 PG — SIGNIFICANT CHANGE UP (ref 27–34)
MCV RBC AUTO: 104.7 FL — HIGH (ref 80–100)
MONOCYTES # BLD AUTO: 0.42 K/UL — SIGNIFICANT CHANGE UP (ref 0–0.9)
MONOCYTES NFR BLD AUTO: 10 % — SIGNIFICANT CHANGE UP (ref 2–14)
NEUTROPHILS # BLD AUTO: 1.58 K/UL — LOW (ref 1.8–7.4)
NEUTROPHILS NFR BLD AUTO: 37.9 % — LOW (ref 43–77)
NITRITE UR-MCNC: NEGATIVE — SIGNIFICANT CHANGE UP
NRBC # FLD: 0 K/UL — LOW (ref 25–125)
PH UR: 7.5 — SIGNIFICANT CHANGE UP (ref 5–8)
PHOSPHATE SERPL-MCNC: 2.7 MG/DL — SIGNIFICANT CHANGE UP (ref 2.5–4.5)
PLATELET # BLD AUTO: 385 K/UL — SIGNIFICANT CHANGE UP (ref 150–400)
PMV BLD: 9.3 FL — SIGNIFICANT CHANGE UP (ref 7–13)
POTASSIUM SERPL-MCNC: 4.3 MMOL/L — SIGNIFICANT CHANGE UP (ref 3.5–5.3)
POTASSIUM SERPL-SCNC: 4.3 MMOL/L — SIGNIFICANT CHANGE UP (ref 3.5–5.3)
PROT UR-MCNC: 10 — SIGNIFICANT CHANGE UP
RBC # BLD: 2.74 M/UL — LOW (ref 4.2–5.8)
RBC # FLD: 18 % — HIGH (ref 10.3–14.5)
SODIUM SERPL-SCNC: 133 MMOL/L — LOW (ref 135–145)
SP GR SPEC: 1.01 — SIGNIFICANT CHANGE UP (ref 1–1.04)
UROBILINOGEN FLD QL: NORMAL — SIGNIFICANT CHANGE UP
WBC # BLD: 4.18 K/UL — SIGNIFICANT CHANGE UP (ref 3.8–10.5)
WBC # FLD AUTO: 4.18 K/UL — SIGNIFICANT CHANGE UP (ref 3.8–10.5)

## 2019-01-29 PROCEDURE — 99232 SBSQ HOSP IP/OBS MODERATE 35: CPT | Mod: GC

## 2019-01-29 PROCEDURE — 99292 CRITICAL CARE ADDL 30 MIN: CPT

## 2019-01-29 PROCEDURE — 99233 SBSQ HOSP IP/OBS HIGH 50: CPT | Mod: GC

## 2019-01-29 PROCEDURE — 99291 CRITICAL CARE FIRST HOUR: CPT

## 2019-01-29 RX ADMIN — CHLORHEXIDINE GLUCONATE 1 APPLICATION(S): 213 SOLUTION TOPICAL at 09:56

## 2019-01-29 RX ADMIN — Medication 1 TABLET(S): at 09:56

## 2019-01-29 RX ADMIN — Medication 1 PACKET(S): at 12:04

## 2019-01-29 RX ADMIN — Medication 100 MILLIGRAM(S): at 01:54

## 2019-01-29 RX ADMIN — HEPARIN SODIUM 5000 UNIT(S): 5000 INJECTION INTRAVENOUS; SUBCUTANEOUS at 06:23

## 2019-01-29 RX ADMIN — HEPARIN SODIUM 5000 UNIT(S): 5000 INJECTION INTRAVENOUS; SUBCUTANEOUS at 14:55

## 2019-01-29 RX ADMIN — Medication 1 MILLIGRAM(S): at 12:05

## 2019-01-29 RX ADMIN — Medication 100 MILLIGRAM(S): at 18:05

## 2019-01-29 RX ADMIN — Medication 650 MILLIGRAM(S): at 05:09

## 2019-01-29 RX ADMIN — QUETIAPINE FUMARATE 25 MILLIGRAM(S): 200 TABLET, FILM COATED ORAL at 12:04

## 2019-01-29 RX ADMIN — Medication 1 TABLET(S): at 18:06

## 2019-01-29 RX ADMIN — Medication 100 MILLIGRAM(S): at 09:56

## 2019-01-29 RX ADMIN — Medication 1 TABLET(S): at 12:04

## 2019-01-29 RX ADMIN — RISPERIDONE 1 MILLIGRAM(S): 4 TABLET ORAL at 12:04

## 2019-01-29 RX ADMIN — LACOSAMIDE 100 MILLIGRAM(S): 50 TABLET ORAL at 18:11

## 2019-01-29 RX ADMIN — HEPARIN SODIUM 5000 UNIT(S): 5000 INJECTION INTRAVENOUS; SUBCUTANEOUS at 21:15

## 2019-01-29 RX ADMIN — LACOSAMIDE 100 MILLIGRAM(S): 50 TABLET ORAL at 06:23

## 2019-01-29 RX ADMIN — PREGABALIN 1000 MICROGRAM(S): 225 CAPSULE ORAL at 12:04

## 2019-01-29 RX ADMIN — Medication 650 MILLIGRAM(S): at 02:58

## 2019-01-29 NOTE — PROGRESS NOTE ADULT - ASSESSMENT
3  M (resident of [facility]) with TBI, s/p PEG tube, contracture, and seizure d/o who presented as a transfer from [facility] on 12/9 for respiratory failure 2/2 ARDS likely 2/2 necrotizing pancreatitis s/p intubation.  Imipenem 12/28/18 - continued   bronc 12/11 with pseudomonas  pt with thick secretions and again febrile, sputum cx with  acinetobacter  repeat Ct with increased size of pancreatic collection but not walled off so no interventions were recommended  also had GIB and colitis due to ?contiguous necrotizing pancreatitis vs ischemic, s/p colonoscopy but not actively bleeding now  completed a 7 day course of avycaz and flagyl for the acinetobacter then switched back to imipenem, but pt again had hypothermia with the same acinetobacter in sputum now s/p trach and back on avycaz + flagyl    sepsis with fever, leukopenia resolved, extubated but reintubated 1/6/19 for hypoxia and poor cough along with profuse secretions and sputum cx again with acinetobacter    Necrotising Pancreatitis with multiple peripancreatitis collections better formed on CT 1/24 and a new 7 cm abscess  improved but still present lung abscesses and new sputum cx with pseudomonas, ?VAP   acinetobacter PNA  with lung abscesses  coag neg staph bacteremia likely contaminant, repeat negative  colitis, ?due to contiguous necrotizing pancreatitis and collection vs ischemic         * s/p 7 days of avcaz and flagyl for  acinetobacter in the sputum 12/22-12/28  then back on imipenem for necrotizing pancreatis  * restarted on  avycaz and flagyl to cover the acinetobacter again 1/9, now day 21, will continue to complete at least a 4 weeks for the lung abscesses  * s/p vanco 1/22-1/24  * no contraindications from ID point of view for necrosectomy  * f/u with GI  * frequent suctioning and pulmonary toileting

## 2019-01-29 NOTE — PROGRESS NOTE ADULT - ASSESSMENT
1) Walled off necrosis, doubt cause of sepsis , patient also has Lung Abscesses seen on CT scan.   2) Hematochezia, s/p colonoscopy on 12/28/2018 with severe segmental colitis localized to the transverse colon and ascending colon (possible ischemic colitis, possible colitis related to contiguous danay-pancreatic inflammatory process). Biopsies with granulation tissue but no infection/malignancy, Hb stable   3) Resp failure in the setting of pneumonia, requiring tracheostomy now with lung abscess and sepsis on Abx.     Recommendations  - Trend CBC, CMP and fever curve, Hb stable for now.   - Continue IV antibiotics as per ID recommendations.   - Rest of care per primary team  - Will consider Endoscopic Drainage of walled off necrosis after adequate treatment of Lung abscesses.   - Will need repeat CT in 7-10 days to evaluate the pancreatic collections.   - GI will follow up. 1) Walled off necrosis, doubt cause of sepsis , patient also has Lung Abscesses seen on CT scan.   2) Hematochezia, s/p colonoscopy on 12/28/2018 with severe segmental colitis localized to the transverse colon and ascending colon (possible ischemic colitis, possible colitis related to contiguous danay-pancreatic inflammatory process). Biopsies with granulation tissue but no infection/malignancy, Hb stable   3) Resp failure in the setting of pneumonia, requiring tracheostomy now with lung abscess and sepsis on Abx.     Recommendations  - Trend CBC, CMP and fever curve, Hb stable for now.   - Continue IV antibiotics as per ID recommendations.   - Rest of care per primary team  - Will perform Endoscopic Drainage of walled off necrosis on Thursday 1/31/2019.   - GI will follow up.

## 2019-01-29 NOTE — PROGRESS NOTE ADULT - ATTENDING COMMENTS
Patient seen and examined with GI fellow. I agree with the above A/P. Plan for cystgastrostomy drainage via EUS on Thursday.

## 2019-01-29 NOTE — PROGRESS NOTE ADULT - PROBLEM SELECTOR PLAN 3
- persistent fevers through abx, currently afebrile  - ID note appreciated  - s/p CT C/A/P with IV contrast to r/o infection  - cont abx for now  - Bedside POCUS done - no thoracentesis at this time - persistent fevers through abx, currently afebrile  - ID note appreciated  - s/p CT C/A/P with IV contrast to r/o infection  - cont abx for now  - Bedside POCUS done - no thoracentesis at this time  - Per ID consider CT AP to eval for fluid drainage earlier after consulting with GI re: earlier study

## 2019-01-29 NOTE — PROGRESS NOTE ADULT - ASSESSMENT
53 Male w/ a PMHx of TBI (s/p PEG tube, contracture, and seizure d/o) presented as a transfer from [facility] on 12/9 for respiratory failure 2/2 ARDS likely 2/2 necrotizing pancreatitis s/p intubation. Course c/b Acinetobacter PNA s/p Avycaz and Flagyl 7 day course.  Course further complicated by acute blood loss anemia s/p colonoscopy with findings suggestive of ischemic colitis, without further bleeding and Hgb remaining stable. S/p extubation 1/3, and re-intubation 1/6 now s/p tracheostomy.

## 2019-01-29 NOTE — PROGRESS NOTE ADULT - SUBJECTIVE AND OBJECTIVE BOX
CHIEF COMPLAINT:    Interval Events:    REVIEW OF SYSTEMS:  Constitutional:   Eyes:  ENT:  CV:  Resp:  GI:  :  MSK:  Integumentary:  Neurological:  Psychiatric:  Endocrine:  Hematologic/Lymphatic:  Allergic/Immunologic:  [ ] All other systems negative  [ ] Unable to assess ROS because ________    OBJECTIVE:  ICU Vital Signs Last 24 Hrs  T(C): 37.3 (29 Jan 2019 05:05), Max: 38.1 (29 Jan 2019 02:01)  T(F): 99.2 (29 Jan 2019 05:05), Max: 100.5 (29 Jan 2019 02:01)  HR: 107 (29 Jan 2019 05:05) (107 - 128)  BP: 108/65 (29 Jan 2019 05:05) (100/66 - 110/69)  BP(mean): --  ABP: --  ABP(mean): --  RR: 20 (29 Jan 2019 05:05) (18 - 28)  SpO2: 96% (29 Jan 2019 05:05) (94% - 98%)    Mode: AC/ CMV (Assist Control/ Continuous Mandatory Ventilation), RR (machine): 14, TV (machine): 350, FiO2: 40, PEEP: 5, MAP: 9, PIP: 18    01-28 @ 07:01  -  01-29 @ 07:00  --------------------------------------------------------  IN: 1160 mL / OUT: 400 mL / NET: 760 mL      CAPILLARY BLOOD GLUCOSE      POCT Blood Glucose.: 105 mg/dL (29 Jan 2019 06:02)      PHYSICAL EXAM:  General:   HEENT:   Lymph Nodes:  Neck:   Respiratory:   Cardiovascular:   Abdomen:   Extremities:   Skin:   Neurological:  Psychiatry:    HOSPITAL MEDICATIONS:  MEDICATIONS  (STANDING):  cefTAZidime/avibactam IVPB 2.5 Gram(s) IV Intermittent every 8 hours  cefTAZidime/avibactam IVPB      chlorhexidine 4% Liquid 1 Application(s) Topical <User Schedule>  cyanocobalamin 1000 MICROGram(s) Oral daily  dextrose 5%. 1000 milliLiter(s) (50 mL/Hr) IV Continuous <Continuous>  dextrose 50% Injectable 12.5 Gram(s) IV Push once  dextrose 50% Injectable 25 Gram(s) IV Push once  dextrose 50% Injectable 25 Gram(s) IV Push once  folic acid 1 milliGRAM(s) Enteral Tube daily  heparin  Injectable 5000 Unit(s) SubCutaneous every 8 hours  insulin lispro (HumaLOG) corrective regimen sliding scale   SubCutaneous every 6 hours  lacosamide Solution 100 milliGRAM(s) Oral two times a day  lactobacillus acidophilus 1 Tablet(s) Oral three times a day with meals  metroNIDAZOLE  IVPB      metroNIDAZOLE  IVPB 500 milliGRAM(s) IV Intermittent every 8 hours  midodrine 5 milliGRAM(s) Oral three times a day  psyllium Powder 1 Packet(s) Oral daily  QUEtiapine 25 milliGRAM(s) Oral daily  risperiDONE   Solution 1 milliGRAM(s) Oral daily    MEDICATIONS  (PRN):  acetaminophen    Suspension .. 650 milliGRAM(s) Oral every 6 hours PRN Temp greater or equal to 38C (100.4F), Mild Pain (1 - 3), Moderate Pain (4 - 6)  dextrose 40% Gel 15 Gram(s) Oral once PRN Blood Glucose LESS THAN 70 milliGRAM(s)/deciliter  glucagon  Injectable 1 milliGRAM(s) IntraMuscular once PRN Glucose LESS THAN 70 milligrams/deciliter      LABS:                        8.6    4.18  )-----------( 385      ( 29 Jan 2019 03:00 )             28.7     01-29    133<L>  |  100  |  5<L>  ----------------------------<  101<H>  4.3   |  26  |  < 0.20<L>    Ca    7.8<L>      29 Jan 2019 03:00  Phos  2.7     01-29  Mg     1.9     01-29                MICROBIOLOGY:     RADIOLOGY:  [ ] Reviewed and interpreted by me    PULMONARY FUNCTION TESTS:    EKG: CHIEF COMPLAINT: Patient is a 53y old  Male who presents with a chief complaint of ARDS?, sepsis (29 Jan 2019 07:50)      Interval Events: Fever last pm     REVIEW OF SYSTEMS:  Constitutional: No pain /chills   CV: Denies   Resp: Denies   GI: Denies   [x ] All other systems negative  [ ] Unable to assess ROS because ________    OBJECTIVE:  ICU Vital Signs Last 24 Hrs  T(C): 37.3 (29 Jan 2019 05:05), Max: 38.1 (29 Jan 2019 02:01)  T(F): 99.2 (29 Jan 2019 05:05), Max: 100.5 (29 Jan 2019 02:01)  HR: 107 (29 Jan 2019 05:05) (107 - 128)  BP: 108/65 (29 Jan 2019 05:05) (100/66 - 110/69)  BP(mean): --  ABP: --  ABP(mean): --  RR: 20 (29 Jan 2019 05:05) (18 - 28)  SpO2: 96% (29 Jan 2019 05:05) (94% - 98%)    Mode: AC/ CMV (Assist Control/ Continuous Mandatory Ventilation), RR (machine): 14, TV (machine): 350, FiO2: 40, PEEP: 5, MAP: 9, PIP: 18    01-28 @ 07:01  -  01-29 @ 07:00  --------------------------------------------------------  IN: 1160 mL / OUT: 400 mL / NET: 760 mL      CAPILLARY BLOOD GLUCOSE      POCT Blood Glucose.: 105 mg/dL (29 Jan 2019 06:02)        HOSPITAL MEDICATIONS:  MEDICATIONS  (STANDING):  cefTAZidime/avibactam IVPB 2.5 Gram(s) IV Intermittent every 8 hours  cefTAZidime/avibactam IVPB      chlorhexidine 4% Liquid 1 Application(s) Topical <User Schedule>  cyanocobalamin 1000 MICROGram(s) Oral daily  dextrose 5%. 1000 milliLiter(s) (50 mL/Hr) IV Continuous <Continuous>  dextrose 50% Injectable 12.5 Gram(s) IV Push once  dextrose 50% Injectable 25 Gram(s) IV Push once  dextrose 50% Injectable 25 Gram(s) IV Push once  folic acid 1 milliGRAM(s) Enteral Tube daily  heparin  Injectable 5000 Unit(s) SubCutaneous every 8 hours  insulin lispro (HumaLOG) corrective regimen sliding scale   SubCutaneous every 6 hours  lacosamide Solution 100 milliGRAM(s) Oral two times a day  lactobacillus acidophilus 1 Tablet(s) Oral three times a day with meals  metroNIDAZOLE  IVPB      metroNIDAZOLE  IVPB 500 milliGRAM(s) IV Intermittent every 8 hours  midodrine 5 milliGRAM(s) Oral three times a day  psyllium Powder 1 Packet(s) Oral daily  QUEtiapine 25 milliGRAM(s) Oral daily  risperiDONE   Solution 1 milliGRAM(s) Oral daily    MEDICATIONS  (PRN):  acetaminophen    Suspension .. 650 milliGRAM(s) Oral every 6 hours PRN Temp greater or equal to 38C (100.4F), Mild Pain (1 - 3), Moderate Pain (4 - 6)  dextrose 40% Gel 15 Gram(s) Oral once PRN Blood Glucose LESS THAN 70 milliGRAM(s)/deciliter  glucagon  Injectable 1 milliGRAM(s) IntraMuscular once PRN Glucose LESS THAN 70 milligrams/deciliter      LABS:                        8.6    4.18  )-----------( 385      ( 29 Jan 2019 03:00 )             28.7     01-29    133<L>  |  100  |  5<L>  ----------------------------<  101<H>  4.3   |  26  |  < 0.20<L>    Ca    7.8<L>      29 Jan 2019 03:00  Phos  2.7     01-29  Mg     1.9     01-29                MICROBIOLOGY:     RADIOLOGY:  [ ] Reviewed and interpreted by me    PULMONARY FUNCTION TESTS:    EKG:

## 2019-01-29 NOTE — PROGRESS NOTE ADULT - RS GEN PE MLT RESP DETAILS PC
airway patent/good air movement/breath sounds equal/few rhonchi breath sounds equal/good air movement/airway patent/+ rhonchi loose cough

## 2019-01-29 NOTE — PROGRESS NOTE ADULT - SUBJECTIVE AND OBJECTIVE BOX
GI following the patient for Necrotizing Pancreatitis with an evolving Walled Off Necrosis and new collection around the head of pancreas , patient was supposed to follow up with DR Jules for possible Endoscopic drainage of the walled of necrosis after wall maturation. but patient stayed in hospital for new fever, had repeat CT Chest and abdomen that revealed walled off necrosis of a 7.8 cm collection and a new collection and CT chest findings suggestive of lung abscesses      Interval Events: On tube feeds, tolerating it. back on Abx because of sepsis with improving fever curve, no elevation of WBC counts     Allergies:  Valproate Sodium (Other (Severe))      Hospital Medications:  acetaminophen    Suspension .. 650 milliGRAM(s) Oral every 6 hours PRN  cefTAZidime/avibactam IVPB 2.5 Gram(s) IV Intermittent every 8 hours  cefTAZidime/avibactam IVPB      chlorhexidine 4% Liquid 1 Application(s) Topical <User Schedule>  cyanocobalamin 1000 MICROGram(s) Oral daily  dextrose 40% Gel 15 Gram(s) Oral once PRN  dextrose 5%. 1000 milliLiter(s) IV Continuous <Continuous>  dextrose 50% Injectable 12.5 Gram(s) IV Push once  dextrose 50% Injectable 25 Gram(s) IV Push once  dextrose 50% Injectable 25 Gram(s) IV Push once  folic acid 1 milliGRAM(s) Enteral Tube daily  glucagon  Injectable 1 milliGRAM(s) IntraMuscular once PRN  heparin  Injectable 5000 Unit(s) SubCutaneous every 8 hours  insulin lispro (HumaLOG) corrective regimen sliding scale   SubCutaneous every 6 hours  lacosamide Solution 100 milliGRAM(s) Oral two times a day  lactobacillus acidophilus 1 Tablet(s) Oral three times a day with meals  metroNIDAZOLE  IVPB      metroNIDAZOLE  IVPB 500 milliGRAM(s) IV Intermittent every 8 hours  midodrine 5 milliGRAM(s) Oral three times a day  psyllium Powder 1 Packet(s) Oral daily  QUEtiapine 25 milliGRAM(s) Oral daily  risperiDONE   Solution 1 milliGRAM(s) Oral daily      PMHX/PSHX:  Seizure  TBI (traumatic brain injury)  S/P percutaneous endoscopic gastrostomy (PEG) tube placement  No significant past surgical history      Family history:      ROS:     can not assess because of overall mental status, but looks comfortable.     PHYSICAL EXAM:     GENERAL:  Appears stated age, no distress  HEENT:  NC/AT,  conjunctivae clear, sclera -anicteric  CHEST:  Breath sounds decreased at bases   HEART:  Regular rhythm, S1, S2, no added sounds  ABDOMEN:  Soft, non-tender, non-distended, normoactive bowel sounds  NEURO:  can not assess because of overall mental status, but looks comfortable.     Vital Signs:  Vital Signs Last 24 Hrs  T(C): 37.3 (2019 05:05), Max: 38.1 (2019 02:01)  T(F): 99.2 (2019 05:05), Max: 100.5 (2019 02:01)  HR: 107 (2019 05:05) (107 - 128)  BP: 108/65 (2019 05:05) (100/66 - 110/69)  BP(mean): --  RR: 20 (2019 05:05) (18 - 28)  SpO2: 96% (2019 05:05) (94% - 98%)  Daily     Daily Weight in k.8 (2019 05:05)    LABS:                        8.6    4.18  )-----------( 385      ( 2019 03:00 )             28.7         133<L>  |  100  |  5<L>  ----------------------------<  101<H>  4.3   |  26  |  < 0.20<L>    Ca    7.8<L>      2019 03:00  Phos  2.7       Mg     1.9

## 2019-01-29 NOTE — CHART NOTE - NSCHARTNOTEFT_GEN_A_CORE
Source:  nursing and EMR     Diet : NPO with tube feed - Jevity 1.2 @ 55 ml/hr 24 hrs     Patient alert, unable to speak, with edema , per nursing pt. tolerating EN support , wt. stable , will continue EN.      Enteral : EN provides 1584 kcal & 73 gm protein/d       Current Weight: - 99.8 kg on 1/29/19 Admit wt. - 99.7 kg ; IBW - 62 KG     Pertinent Medications: MEDICATIONS  (STANDING):  cefTAZidime/avibactam IVPB 2.5 Gram(s) IV Intermittent every 8 hours  cefTAZidime/avibactam IVPB      chlorhexidine 4% Liquid 1 Application(s) Topical <User Schedule>  cyanocobalamin 1000 MICROGram(s) Oral daily  dextrose 5%. 1000 milliLiter(s) (50 mL/Hr) IV Continuous <Continuous>  dextrose 50% Injectable 12.5 Gram(s) IV Push once  dextrose 50% Injectable 25 Gram(s) IV Push once  dextrose 50% Injectable 25 Gram(s) IV Push once  folic acid 1 milliGRAM(s) Enteral Tube daily  heparin  Injectable 5000 Unit(s) SubCutaneous every 8 hours  insulin lispro (HumaLOG) corrective regimen sliding scale   SubCutaneous every 6 hours  lacosamide Solution 100 milliGRAM(s) Oral two times a day  lactobacillus acidophilus 1 Tablet(s) Oral three times a day with meals  metroNIDAZOLE  IVPB      metroNIDAZOLE  IVPB 500 milliGRAM(s) IV Intermittent every 8 hours  midodrine 5 milliGRAM(s) Oral three times a day  psyllium Powder 1 Packet(s) Oral daily  QUEtiapine 25 milliGRAM(s) Oral daily  risperiDONE   Solution 1 milliGRAM(s) Oral daily    MEDICATIONS  (PRN):  acetaminophen    Suspension .. 650 milliGRAM(s) Oral every 6 hours PRN Temp greater or equal to 38C (100.4F), Mild Pain (1 - 3), Moderate Pain (4 - 6)  dextrose 40% Gel 15 Gram(s) Oral once PRN Blood Glucose LESS THAN 70 milliGRAM(s)/deciliter  glucagon  Injectable 1 milliGRAM(s) IntraMuscular once PRN Glucose LESS THAN 70 milligrams/deciliter    Pertinent Labs:  01-29 Na133 mmol/L<L> Glu 101 mg/dL<H> K+ 4.3 mmol/L Cr  < 0.20 mg/dL<L> BUN 5 mg/dL<L> 01-29 Phos 2.7 mg/dL 01-27 Alb 1.6 g/dL<L> 01-07 PAB 12 mg/dL<L> 01-08 Chol --    LDL --    HDL --    Trig 181 mg/dL<H>      Skin: intact    Estimated Needs:   no change since previous assessment    Recommend to continue current EN     Monitoring and Evaluation:  Tolerance to diet prescription , weights and  follow up per protocol

## 2019-01-29 NOTE — PROGRESS NOTE ADULT - SUBJECTIVE AND OBJECTIVE BOX
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 650 milliGRAM(s) Oral every 6 hours PRN  chlorhexidine 4% Liquid 1 Application(s) Topical <User Schedule>  cyanocobalamin 1000 MICROGram(s) Oral daily  dextrose 40% Gel 15 Gram(s) Oral once PRN  dextrose 5%. 1000 milliLiter(s) IV Continuous <Continuous>  dextrose 50% Injectable 12.5 Gram(s) IV Push once  dextrose 50% Injectable 25 Gram(s) IV Push once  dextrose 50% Injectable 25 Gram(s) IV Push once  folic acid 1 milliGRAM(s) Enteral Tube daily  glucagon  Injectable 1 milliGRAM(s) IntraMuscular once PRN  heparin  Injectable 5000 Unit(s) SubCutaneous every 8 hours  insulin lispro (HumaLOG) corrective regimen sliding scale   SubCutaneous every 6 hours  lacosamide Solution 100 milliGRAM(s) Oral two times a day  lactobacillus acidophilus 1 Tablet(s) Oral three times a day with meals  midodrine 5 milliGRAM(s) Oral three times a day  psyllium Powder 1 Packet(s) Oral daily  QUEtiapine 25 milliGRAM(s) Oral daily  risperiDONE   Solution 1 milliGRAM(s) Oral daily      Vital Signs Last 24 Hrs  T(C): 36.8 (2019 14:00), Max: 38.1 (2019 02:01)  T(F): 98.3 (2019 14:00), Max: 100.5 (2019 02:01)  HR: 109 (2019 15:32) (99 - 125)  BP: 111/73 (2019 14:00) (100/66 - 111/73)  BP(mean): --  RR: 19 (2019 14:00) (18 - 22)  SpO2: 100% (2019 15:32) (96% - 100%)    Physical Exam:  General: NAD, non toxic  Head: atraumatic normocephalic  eyes: normal sclera and conjunctivae   ENT:   trach , no LAD  Cardiovascular: regular S1,S2  Respiratory:  trach with some drainage around it and thicker, purulent secretions, now back on the vent  abd:  PEG tube, soft, bowel sounds present, nontender, rectal tube  :  no suprapubic tenderness, [place] cath    Musculoskeletal:   no joint swelling, contractures, RUE mostly, LUE edematous  Skin:    no rash  Neurologic: awake, answers some questions with nodding    Vascular: no phlebitis  Psych: unable to assess                           8.6    4.18  )-----------( 385      ( 2019 03:00 )             28.7           133<L>  |  100  |  5<L>  ----------------------------<  101<H>  4.3   |  26  |  < 0.20<L>    Ca    7.8<L>      2019 03:00  Phos  2.7       Mg     1.9             Urinalysis Basic - ( 2019 06:09 )    Color: YELLOW / Appearance: CLEAR / S.014 / pH: 7.5  Gluc: NEGATIVE / Ketone: NEGATIVE  / Bili: NEGATIVE / Urobili: NORMAL   Blood: NEGATIVE / Protein: 10 / Nitrite: NEGATIVE   Leuk Esterase: NEGATIVE / RBC: x / WBC x   Sq Epi: x / Non Sq Epi: x / Bacteria: x        MICROBIOLOGY:  v  BLOOD  19 --  --  --      BLOOD PERIPHERAL  19 --  --  --      BLOOD VENOUS  19 --  --  BLOOD CULTURE PCR  Staphylococcus sp.,coag neg      BLOOD PERIPHERAL  19 --  --  --      URINE MIDSTREAM  19 --  --  --      ENDOTRACHEAL SPECIMEN  19 --  --  Pseudomonas aeruginosa      BLOOD VENOUS  19 --  --  --      BLOOD  19 --  --  --      BLOOD PERIPHERAL  19 --  --  --      ENDOTRACHEAL SPECIMEN  19 --  --  Acin.baumannii/haemoly       BLOOD VENOUS  19 --  --  --      BLOOD PERIPHERAL  19 --  --  --                RADIOLOGY:  Images below reviewed personally  < from: CT Chest w/ IV Cont (19 @ 15:07) >  IMPRESSION: Necrotizing pancreatitis with acute necrotic collections   again noted. Fluid collections along the proximal gastric wall are better   formed with a newcollection more caudally measuring 7.0 x 4.8 cm. Small   ascites has decreased.    Moderate bilateral pleural effusions with complete atelectasis of both   lower lobes and partial atelectasis of the right middle lobe and lingula   as seen previously.Heterogeneous lung enhancement has improved but is   still present and suggestive of lung abscesses.

## 2019-01-30 LAB
ANION GAP SERPL CALC-SCNC: 9 MMO/L — SIGNIFICANT CHANGE UP (ref 7–14)
BACTERIA BLD CULT: SIGNIFICANT CHANGE UP
BACTERIA BLD CULT: SIGNIFICANT CHANGE UP
BASOPHILS # BLD AUTO: 0.01 K/UL — SIGNIFICANT CHANGE UP (ref 0–0.2)
BASOPHILS NFR BLD AUTO: 0.2 % — SIGNIFICANT CHANGE UP (ref 0–2)
BUN SERPL-MCNC: 3 MG/DL — LOW (ref 7–23)
CALCIUM SERPL-MCNC: 7.6 MG/DL — LOW (ref 8.4–10.5)
CHLORIDE SERPL-SCNC: 97 MMOL/L — LOW (ref 98–107)
CO2 SERPL-SCNC: 24 MMOL/L — SIGNIFICANT CHANGE UP (ref 22–31)
CREAT SERPL-MCNC: < 0.2 MG/DL — LOW (ref 0.5–1.3)
EOSINOPHIL # BLD AUTO: 0.12 K/UL — SIGNIFICANT CHANGE UP (ref 0–0.5)
EOSINOPHIL NFR BLD AUTO: 2.9 % — SIGNIFICANT CHANGE UP (ref 0–6)
GLUCOSE BLDC GLUCOMTR-MCNC: 115 MG/DL — HIGH (ref 70–99)
GLUCOSE BLDC GLUCOMTR-MCNC: 118 MG/DL — HIGH (ref 70–99)
GLUCOSE BLDC GLUCOMTR-MCNC: 124 MG/DL — HIGH (ref 70–99)
GLUCOSE SERPL-MCNC: 119 MG/DL — HIGH (ref 70–99)
HCT VFR BLD CALC: 29.5 % — LOW (ref 39–50)
HGB BLD-MCNC: 9 G/DL — LOW (ref 13–17)
IMM GRANULOCYTES NFR BLD AUTO: 0.5 % — SIGNIFICANT CHANGE UP (ref 0–1.5)
LYMPHOCYTES # BLD AUTO: 2.06 K/UL — SIGNIFICANT CHANGE UP (ref 1–3.3)
LYMPHOCYTES # BLD AUTO: 50 % — HIGH (ref 13–44)
MAGNESIUM SERPL-MCNC: 1.7 MG/DL — SIGNIFICANT CHANGE UP (ref 1.6–2.6)
MCHC RBC-ENTMCNC: 30.5 % — LOW (ref 32–36)
MCHC RBC-ENTMCNC: 32 PG — SIGNIFICANT CHANGE UP (ref 27–34)
MCV RBC AUTO: 105 FL — HIGH (ref 80–100)
MONOCYTES # BLD AUTO: 0.5 K/UL — SIGNIFICANT CHANGE UP (ref 0–0.9)
MONOCYTES NFR BLD AUTO: 12.1 % — SIGNIFICANT CHANGE UP (ref 2–14)
NEUTROPHILS # BLD AUTO: 1.41 K/UL — LOW (ref 1.8–7.4)
NEUTROPHILS NFR BLD AUTO: 34.3 % — LOW (ref 43–77)
NRBC # FLD: 0 K/UL — LOW (ref 25–125)
PHOSPHATE SERPL-MCNC: 2.6 MG/DL — SIGNIFICANT CHANGE UP (ref 2.5–4.5)
PLATELET # BLD AUTO: 424 K/UL — HIGH (ref 150–400)
PMV BLD: 9.5 FL — SIGNIFICANT CHANGE UP (ref 7–13)
POTASSIUM SERPL-MCNC: 4.3 MMOL/L — SIGNIFICANT CHANGE UP (ref 3.5–5.3)
POTASSIUM SERPL-SCNC: 4.3 MMOL/L — SIGNIFICANT CHANGE UP (ref 3.5–5.3)
RBC # BLD: 2.81 M/UL — LOW (ref 4.2–5.8)
RBC # FLD: 17.6 % — HIGH (ref 10.3–14.5)
SODIUM SERPL-SCNC: 130 MMOL/L — LOW (ref 135–145)
SPECIMEN SOURCE: SIGNIFICANT CHANGE UP
SPECIMEN SOURCE: SIGNIFICANT CHANGE UP
WBC # BLD: 4.12 K/UL — SIGNIFICANT CHANGE UP (ref 3.8–10.5)
WBC # FLD AUTO: 4.12 K/UL — SIGNIFICANT CHANGE UP (ref 3.8–10.5)

## 2019-01-30 PROCEDURE — 99232 SBSQ HOSP IP/OBS MODERATE 35: CPT

## 2019-01-30 PROCEDURE — 99233 SBSQ HOSP IP/OBS HIGH 50: CPT | Mod: GC

## 2019-01-30 RX ADMIN — Medication 1 TABLET(S): at 11:47

## 2019-01-30 RX ADMIN — PREGABALIN 1000 MICROGRAM(S): 225 CAPSULE ORAL at 11:48

## 2019-01-30 RX ADMIN — Medication 100 MILLIGRAM(S): at 11:47

## 2019-01-30 RX ADMIN — LACOSAMIDE 100 MILLIGRAM(S): 50 TABLET ORAL at 19:15

## 2019-01-30 RX ADMIN — Medication 1 TABLET(S): at 09:45

## 2019-01-30 RX ADMIN — Medication 1 PACKET(S): at 11:48

## 2019-01-30 RX ADMIN — QUETIAPINE FUMARATE 25 MILLIGRAM(S): 200 TABLET, FILM COATED ORAL at 11:49

## 2019-01-30 RX ADMIN — Medication 100 MILLIGRAM(S): at 01:42

## 2019-01-30 RX ADMIN — RISPERIDONE 1 MILLIGRAM(S): 4 TABLET ORAL at 12:40

## 2019-01-30 RX ADMIN — Medication 650 MILLIGRAM(S): at 19:24

## 2019-01-30 RX ADMIN — MIDODRINE HYDROCHLORIDE 5 MILLIGRAM(S): 2.5 TABLET ORAL at 14:58

## 2019-01-30 RX ADMIN — Medication 1 TABLET(S): at 19:15

## 2019-01-30 RX ADMIN — MIDODRINE HYDROCHLORIDE 5 MILLIGRAM(S): 2.5 TABLET ORAL at 21:23

## 2019-01-30 RX ADMIN — Medication 100 MILLIGRAM(S): at 17:58

## 2019-01-30 RX ADMIN — Medication 1 MILLIGRAM(S): at 11:48

## 2019-01-30 RX ADMIN — CHLORHEXIDINE GLUCONATE 1 APPLICATION(S): 213 SOLUTION TOPICAL at 11:48

## 2019-01-30 RX ADMIN — HEPARIN SODIUM 5000 UNIT(S): 5000 INJECTION INTRAVENOUS; SUBCUTANEOUS at 05:31

## 2019-01-30 RX ADMIN — LACOSAMIDE 100 MILLIGRAM(S): 50 TABLET ORAL at 05:43

## 2019-01-30 NOTE — PROGRESS NOTE ADULT - ASSESSMENT
53 Male w/ a PMHx of TBI (s/p PEG tube, contracture, and seizure d/o) presented as a transfer from [facility] on 12/9 for respiratory failure 2/2 ARDS likely 2/2 necrotizing pancreatitis s/p intubation. Course c/b Acinetobacter PNA s/p Avycaz and Flagyl 7 day course.  Course further complicated by acute blood loss anemia s/p colonoscopy with findings suggestive of ischemic colitis, without further bleeding and Hgb remaining stable. S/p extubation 1/3, and re-intubation 1/6 now s/p tracheostomy.

## 2019-01-30 NOTE — PROGRESS NOTE ADULT - ASSESSMENT
53  M (resident of [facility]) with TBI, s/p PEG tube, contracture, and seizure d/o who presented as a transfer from [facility] on 12/9 for respiratory failure 2/2 ARDS likely 2/2 necrotizing pancreatitis s/p intubation.  Imipenem 12/28/18 - continued   bronc 12/11 with pseudomonas  pt with thick secretions and again febrile, sputum cx with  acinetobacter  repeat Ct with increased size of pancreatic collection but not walled off so no interventions were recommended  also had GIB and colitis due to ?contiguous necrotizing pancreatitis vs ischemic, s/p colonoscopy but not actively bleeding now  completed a 7 day course of avycaz and flagyl for the acinetobacter then switched back to imipenem, but pt again had hypothermia with the same acinetobacter in sputum now s/p trach and back on avycaz + flagyl    sepsis with fever, leukopenia resolved, extubated but reintubated 1/6/19 for hypoxia and poor cough along with profuse secretions and sputum cx again with acinetobacter    Necrotising Pancreatitis with multiple peripancreatitis collections better formed on CT 1/24 and a new 7 cm abscess  improved but still present lung abscesses and new sputum cx with pseudomonas, ?VAP   acinetobacter PNA  with lung abscesses  coag neg staph bacteremia likely contaminant, repeat negative  colitis, ?due to contiguous necrotizing pancreatitis and collection vs ischemic         * s/p 7 days of avcaz and flagyl for  acinetobacter in the sputum 12/22-12/28  then back on imipenem for necrotizing pancreatis  * restarted on  avycaz and flagyl to cover the acinetobacter again 1/9, now day 22, will continue to complete at least a 4 weeks for the lung abscesses  * s/p vanco 1/22-1/24  * no contraindications from ID point of view for necrosectomy  * f/u with GI  * frequent suctioning and pulmonary toileting

## 2019-01-30 NOTE — PROGRESS NOTE ADULT - PROBLEM SELECTOR PLAN 3
- persistent fevers through abx, currently afebrile  - ID note appreciated  - s/p CT C/A/P with IV contrast to r/o infection  - cont abx for now  - Bedside POCUS done - no thoracentesis at this time  - Per ID consider CT AP to eval for fluid drainage earlier after consulting with GI re: earlier study

## 2019-01-30 NOTE — PROGRESS NOTE ADULT - PROBLEM SELECTOR PLAN 1
- CT on 1/17 with persistent necrotic fluid accumulation but improved     - cont avycaz and flagyl per ID  - cont to monitor - CT on 1/17 with persistent necrotic fluid accumulation but improved     - cont avycaz and flagyl per ID  - cont to monitor  - would repeat CT of abd on Monday and have GI eval for drain vs dc with OP fu

## 2019-01-30 NOTE — PROGRESS NOTE ADULT - SUBJECTIVE AND OBJECTIVE BOX
Follow Up:  necrotizing pancreatitis and CRE acinetobacter in the sputum    Interval History: now afebrile, repeat chest/abd CT showed improved but still present lung abscesses and a new pancreatic collection with better formed old abscesses      ROS:    Unobtainable because: pt trahced          Allergies  Valproate Sodium (Other (Severe))        ANTIMICROBIALS:  cefTAZidime/avibactam IVPB 2.5 every 8 hours  cefTAZidime/avibactam IVPB    metroNIDAZOLE  IVPB    metroNIDAZOLE  IVPB 500 every 8 hours      OTHER MEDS:  acetaminophen    Suspension .. 650 milliGRAM(s) Oral every 6 hours PRN  chlorhexidine 4% Liquid 1 Application(s) Topical <User Schedule>  cyanocobalamin 1000 MICROGram(s) Oral daily  dextrose 40% Gel 15 Gram(s) Oral once PRN  dextrose 5%. 1000 milliLiter(s) IV Continuous <Continuous>  dextrose 50% Injectable 12.5 Gram(s) IV Push once  dextrose 50% Injectable 25 Gram(s) IV Push once  dextrose 50% Injectable 25 Gram(s) IV Push once  folic acid 1 milliGRAM(s) Enteral Tube daily  glucagon  Injectable 1 milliGRAM(s) IntraMuscular once PRN  heparin  Injectable 5000 Unit(s) SubCutaneous every 8 hours  insulin lispro (HumaLOG) corrective regimen sliding scale   SubCutaneous every 6 hours  lacosamide Solution 100 milliGRAM(s) Oral two times a day  lactobacillus acidophilus 1 Tablet(s) Oral three times a day with meals  midodrine 5 milliGRAM(s) Oral three times a day  psyllium Powder 1 Packet(s) Oral daily  QUEtiapine 25 milliGRAM(s) Oral daily  risperiDONE   Solution 1 milliGRAM(s) Oral daily      Vital Signs Last 24 Hrs  T(C): 37.2 (2019 09:37), Max: 37.6 (2019 18:00)  T(F): 98.9 (2019 09:37), Max: 99.6 (2019 18:00)  HR: 108 (2019 11:52) (108 - 121)  BP: 101/77 (2019 09:37) (101/77 - 118/82)  BP(mean): --  RR: 20 (2019 09:37) (20 - 21)  SpO2: 98% (2019 11:52) (98% - 100%)    Physical Exam:  General: NAD, non toxic  Head: atraumatic normocephalic  eyes: normal sclera and conjunctivae   ENT:   trach , no LAD  Cardiovascular: regular S1,S2  Respiratory:  trach with some drainage around it and thicker, purulent secretions, now back on the vent  abd:  PEG tube, soft, bowel sounds present, nontender, rectal tube  :  no suprapubic tenderness, [place] cath    Musculoskeletal:   no joint swelling, contractures, RUE mostly, LUE edematous  Skin:    no rash  Neurologic: awake, answers some questions with nodding    Vascular: no phlebitis  Psych: unable to assess                         9.0    4.12  )-----------( 424      ( 2019 05:30 )             29.5           130<L>  |  97<L>  |  3<L>  ----------------------------<  119<H>  4.3   |  24  |  < 0.20<L>    Ca    7.6<L>      2019 05:30  Phos  2.6       Mg     1.7             Urinalysis Basic - ( 2019 06:09 )    Color: YELLOW / Appearance: CLEAR / S.014 / pH: 7.5  Gluc: NEGATIVE / Ketone: NEGATIVE  / Bili: NEGATIVE / Urobili: NORMAL   Blood: NEGATIVE / Protein: 10 / Nitrite: NEGATIVE   Leuk Esterase: NEGATIVE / RBC: x / WBC x   Sq Epi: x / Non Sq Epi: x / Bacteria: x        MICROBIOLOGY:  v  BLOOD PERIPHERAL  19 --  --  --      BLOOD  19 --  --  --      BLOOD PERIPHERAL  19 --  --  --      BLOOD VENOUS  19 --  --  BLOOD CULTURE PCR  Staphylococcus sp.,coag neg      BLOOD PERIPHERAL  19 --  --  --      URINE MIDSTREAM  19 --  --  --      ENDOTRACHEAL SPECIMEN  19 --  --  Pseudomonas aeruginosa      BLOOD VENOUS  19 --  --  --      BLOOD  19 --  --  --      BLOOD PERIPHERAL  19 --  --  --      ENDOTRACHEAL SPECIMEN  19 --  --  Acin.baumannii/haemoly       BLOOD VENOUS  19 --  --  --      BLOOD PERIPHERAL  19 --  --  --                RADIOLOGY:  Images below reviewed personally  < from: CT Chest w/ IV Cont (19 @ 15:07) >  IMPRESSION: Necrotizing pancreatitis with acute necrotic collections   again noted. Fluid collections along the proximal gastric wall are better   formed with a newcollection more caudally measuring 7.0 x 4.8 cm. Small   ascites has decreased.    Moderate bilateral pleural effusions with complete atelectasis of both   lower lobes and partial atelectasis of the right middle lobe and lingula   as seen previously.Heterogeneous lung enhancement has improved but is   still present and suggestive of lung abscesses.

## 2019-01-30 NOTE — PROGRESS NOTE ADULT - ATTENDING COMMENTS
afebrile, no leukocytosis  plan for 4 weeks abx, lung abscess -   would complete on monday  plan for repeat scan on monday, decide on any further intervention by gi at that time, vs. d/c plan with follow up

## 2019-01-31 LAB
ALBUMIN SERPL ELPH-MCNC: 1.7 G/DL — LOW (ref 3.3–5)
ALP SERPL-CCNC: 117 U/L — SIGNIFICANT CHANGE UP (ref 40–120)
ALT FLD-CCNC: 4 U/L — SIGNIFICANT CHANGE UP (ref 4–41)
ANION GAP SERPL CALC-SCNC: 8 MMO/L — SIGNIFICANT CHANGE UP (ref 7–14)
ANION GAP SERPL CALC-SCNC: 8 MMO/L — SIGNIFICANT CHANGE UP (ref 7–14)
AST SERPL-CCNC: 8 U/L — SIGNIFICANT CHANGE UP (ref 4–40)
BACTERIA BLD CULT: SIGNIFICANT CHANGE UP
BASOPHILS # BLD AUTO: 0.01 K/UL — SIGNIFICANT CHANGE UP (ref 0–0.2)
BASOPHILS NFR BLD AUTO: 0.2 % — SIGNIFICANT CHANGE UP (ref 0–2)
BILIRUB SERPL-MCNC: 0.3 MG/DL — SIGNIFICANT CHANGE UP (ref 0.2–1.2)
BLD GP AB SCN SERPL QL: NEGATIVE — SIGNIFICANT CHANGE UP
BUN SERPL-MCNC: 3 MG/DL — LOW (ref 7–23)
BUN SERPL-MCNC: 3 MG/DL — LOW (ref 7–23)
CALCIUM SERPL-MCNC: 8 MG/DL — LOW (ref 8.4–10.5)
CALCIUM SERPL-MCNC: 8 MG/DL — LOW (ref 8.4–10.5)
CHLORIDE SERPL-SCNC: 99 MMOL/L — SIGNIFICANT CHANGE UP (ref 98–107)
CHLORIDE SERPL-SCNC: 99 MMOL/L — SIGNIFICANT CHANGE UP (ref 98–107)
CO2 SERPL-SCNC: 26 MMOL/L — SIGNIFICANT CHANGE UP (ref 22–31)
CO2 SERPL-SCNC: 26 MMOL/L — SIGNIFICANT CHANGE UP (ref 22–31)
CREAT SERPL-MCNC: < 0.2 MG/DL — LOW (ref 0.5–1.3)
CREAT SERPL-MCNC: < 0.2 MG/DL — LOW (ref 0.5–1.3)
EOSINOPHIL # BLD AUTO: 0.1 K/UL — SIGNIFICANT CHANGE UP (ref 0–0.5)
EOSINOPHIL NFR BLD AUTO: 2.5 % — SIGNIFICANT CHANGE UP (ref 0–6)
GLUCOSE BLDC GLUCOMTR-MCNC: 101 MG/DL — HIGH (ref 70–99)
GLUCOSE BLDC GLUCOMTR-MCNC: 101 MG/DL — HIGH (ref 70–99)
GLUCOSE BLDC GLUCOMTR-MCNC: 116 MG/DL — HIGH (ref 70–99)
GLUCOSE BLDC GLUCOMTR-MCNC: 89 MG/DL — SIGNIFICANT CHANGE UP (ref 70–99)
GLUCOSE BLDC GLUCOMTR-MCNC: 91 MG/DL — SIGNIFICANT CHANGE UP (ref 70–99)
GLUCOSE SERPL-MCNC: 95 MG/DL — SIGNIFICANT CHANGE UP (ref 70–99)
GLUCOSE SERPL-MCNC: 95 MG/DL — SIGNIFICANT CHANGE UP (ref 70–99)
HCT VFR BLD CALC: 30 % — LOW (ref 39–50)
HCT VFR BLD CALC: 30 % — LOW (ref 39–50)
HGB BLD-MCNC: 9.2 G/DL — LOW (ref 13–17)
HGB BLD-MCNC: 9.2 G/DL — LOW (ref 13–17)
IMM GRANULOCYTES NFR BLD AUTO: 0.2 % — SIGNIFICANT CHANGE UP (ref 0–1.5)
INR BLD: 1.47 — HIGH (ref 0.88–1.17)
LYMPHOCYTES # BLD AUTO: 2 K/UL — SIGNIFICANT CHANGE UP (ref 1–3.3)
LYMPHOCYTES # BLD AUTO: 49.5 % — HIGH (ref 13–44)
MAGNESIUM SERPL-MCNC: 1.7 MG/DL — SIGNIFICANT CHANGE UP (ref 1.6–2.6)
MCHC RBC-ENTMCNC: 30.7 % — LOW (ref 32–36)
MCHC RBC-ENTMCNC: 30.7 % — LOW (ref 32–36)
MCHC RBC-ENTMCNC: 31.9 PG — SIGNIFICANT CHANGE UP (ref 27–34)
MCHC RBC-ENTMCNC: 31.9 PG — SIGNIFICANT CHANGE UP (ref 27–34)
MCV RBC AUTO: 104.2 FL — HIGH (ref 80–100)
MCV RBC AUTO: 104.2 FL — HIGH (ref 80–100)
MONOCYTES # BLD AUTO: 0.53 K/UL — SIGNIFICANT CHANGE UP (ref 0–0.9)
MONOCYTES NFR BLD AUTO: 13.1 % — SIGNIFICANT CHANGE UP (ref 2–14)
NEUTROPHILS # BLD AUTO: 1.39 K/UL — LOW (ref 1.8–7.4)
NEUTROPHILS NFR BLD AUTO: 34.5 % — LOW (ref 43–77)
NRBC # FLD: 0 K/UL — LOW (ref 25–125)
NRBC # FLD: 0 K/UL — LOW (ref 25–125)
PHOSPHATE SERPL-MCNC: 3.2 MG/DL — SIGNIFICANT CHANGE UP (ref 2.5–4.5)
PLATELET # BLD AUTO: 396 K/UL — SIGNIFICANT CHANGE UP (ref 150–400)
PLATELET # BLD AUTO: 396 K/UL — SIGNIFICANT CHANGE UP (ref 150–400)
PMV BLD: 8.9 FL — SIGNIFICANT CHANGE UP (ref 7–13)
PMV BLD: 8.9 FL — SIGNIFICANT CHANGE UP (ref 7–13)
POTASSIUM SERPL-MCNC: 4.1 MMOL/L — SIGNIFICANT CHANGE UP (ref 3.5–5.3)
POTASSIUM SERPL-MCNC: 4.1 MMOL/L — SIGNIFICANT CHANGE UP (ref 3.5–5.3)
POTASSIUM SERPL-SCNC: 4.1 MMOL/L — SIGNIFICANT CHANGE UP (ref 3.5–5.3)
POTASSIUM SERPL-SCNC: 4.1 MMOL/L — SIGNIFICANT CHANGE UP (ref 3.5–5.3)
PROT SERPL-MCNC: 6.2 G/DL — SIGNIFICANT CHANGE UP (ref 6–8.3)
PROTHROM AB SERPL-ACNC: 17 SEC — HIGH (ref 9.8–13.1)
RBC # BLD: 2.88 M/UL — LOW (ref 4.2–5.8)
RBC # BLD: 2.88 M/UL — LOW (ref 4.2–5.8)
RBC # FLD: 17.2 % — HIGH (ref 10.3–14.5)
RBC # FLD: 17.2 % — HIGH (ref 10.3–14.5)
RH IG SCN BLD-IMP: POSITIVE — SIGNIFICANT CHANGE UP
SODIUM SERPL-SCNC: 133 MMOL/L — LOW (ref 135–145)
SODIUM SERPL-SCNC: 133 MMOL/L — LOW (ref 135–145)
SPECIMEN SOURCE: SIGNIFICANT CHANGE UP
SPECIMEN SOURCE: SIGNIFICANT CHANGE UP
WBC # BLD: 4.04 K/UL — SIGNIFICANT CHANGE UP (ref 3.8–10.5)
WBC # BLD: 4.04 K/UL — SIGNIFICANT CHANGE UP (ref 3.8–10.5)
WBC # FLD AUTO: 4.04 K/UL — SIGNIFICANT CHANGE UP (ref 3.8–10.5)
WBC # FLD AUTO: 4.04 K/UL — SIGNIFICANT CHANGE UP (ref 3.8–10.5)

## 2019-01-31 PROCEDURE — 99232 SBSQ HOSP IP/OBS MODERATE 35: CPT

## 2019-01-31 PROCEDURE — 99233 SBSQ HOSP IP/OBS HIGH 50: CPT | Mod: GC

## 2019-01-31 PROCEDURE — 43240 EGD W/TRANSMURAL DRAIN CYST: CPT | Mod: GC

## 2019-01-31 RX ORDER — LACOSAMIDE 50 MG/1
100 TABLET ORAL
Qty: 0 | Refills: 0 | Status: DISCONTINUED | OUTPATIENT
Start: 2019-01-31 | End: 2019-02-02

## 2019-01-31 RX ADMIN — Medication 1 TABLET(S): at 10:19

## 2019-01-31 RX ADMIN — LACOSAMIDE 100 MILLIGRAM(S): 50 TABLET ORAL at 18:39

## 2019-01-31 RX ADMIN — QUETIAPINE FUMARATE 25 MILLIGRAM(S): 200 TABLET, FILM COATED ORAL at 11:28

## 2019-01-31 RX ADMIN — Medication 100 MILLIGRAM(S): at 18:40

## 2019-01-31 RX ADMIN — Medication 100 MILLIGRAM(S): at 03:10

## 2019-01-31 RX ADMIN — Medication 1 MILLIGRAM(S): at 11:28

## 2019-01-31 RX ADMIN — RISPERIDONE 1 MILLIGRAM(S): 4 TABLET ORAL at 11:28

## 2019-01-31 RX ADMIN — Medication 1 TABLET(S): at 15:51

## 2019-01-31 RX ADMIN — HEPARIN SODIUM 5000 UNIT(S): 5000 INJECTION INTRAVENOUS; SUBCUTANEOUS at 22:11

## 2019-01-31 RX ADMIN — CHLORHEXIDINE GLUCONATE 1 APPLICATION(S): 213 SOLUTION TOPICAL at 10:20

## 2019-01-31 RX ADMIN — Medication 1 PACKET(S): at 11:27

## 2019-01-31 RX ADMIN — Medication 100 MILLIGRAM(S): at 10:19

## 2019-01-31 RX ADMIN — HEPARIN SODIUM 5000 UNIT(S): 5000 INJECTION INTRAVENOUS; SUBCUTANEOUS at 15:51

## 2019-01-31 RX ADMIN — Medication 1 TABLET(S): at 18:40

## 2019-01-31 RX ADMIN — PREGABALIN 1000 MICROGRAM(S): 225 CAPSULE ORAL at 11:28

## 2019-01-31 NOTE — PROGRESS NOTE ADULT - PROBLEM SELECTOR PLAN 1
- CT on 1/17 with persistent necrotic fluid accumulation but improved   - cont avycaz and flagyl per ID  - repeat CT showing worsening and new abscesses, as well and lung abscess  - GI note appreciated, for EUS today and drainage

## 2019-01-31 NOTE — PROGRESS NOTE ADULT - ASSESSMENT
53 M (resident of [facility]) with TBI, s/p PEG tube, contracture, and seizure d/o who presented as a transfer from [facility] on 12/9 for respiratory failure 2/2 ARDS likely 2/2 necrotizing pancreatitis s/p intubation.  Imipenem 12/28/18 - continued   bronc 12/11 with pseudomonas  pt with thick secretions and again febrile, sputum cx with  acinetobacter  repeat Ct with increased size of pancreatic collection but not walled off so no interventions were recommended  also had GIB and colitis due to ?contiguous necrotizing pancreatitis vs ischemic, s/p colonoscopy but not actively bleeding now  completed a 7 day course of avycaz and flagyl for the acinetobacter then switched back to imipenem, but pt again had hypothermia with the same acinetobacter in sputum now s/p trach and back on avycaz + flagyl    sepsis with fever, leukopenia resolved, extubated but reintubated 1/6/19 for hypoxia and poor cough along with profuse secretions and sputum cx again with acinetobacter   Necrotising Pancreatitis with multiple peripancreatitis collections better formed on CT 1/24 and a new 7 cm abscess  improved but still present lung abscesses and new sputum cx with pseudomonas, ?VAP   acinetobacter PNA  with lung abscesses  coag neg staph bacteremia likely contaminant, repeat negative  colitis, ?due to contiguous necrotizing pancreatitis and collection vs ischemic     * s/p 7 days of avcaz and flagyl for  acinetobacter in the sputum 12/22-12/28  then back on imipenem for necrotizing pancreatis  * restarted on  avycaz and flagyl to cover the acinetobacter again 1/9, will continue to complete at least a 4 weeks for the lung abscesses  * s/p vanco 1/22-1/24  * f/u with GI  * frequent suctioning and pulmonary toileting     Neal Oleary MD  Pager [phone]  After 5pm and on weekends call [phone]

## 2019-01-31 NOTE — PROGRESS NOTE ADULT - SUBJECTIVE AND OBJECTIVE BOX
CC: F/U for ? MDR PNA    Saw/spoke to patient. Able to attempt nonverbal communication, but not able to provide specific details. Otherwise still with fever.    Allergies  Valproate Sodium (Other (Severe))    ANTIMICROBIALS:  cefTAZidime/avibactam IVPB 2.5 every 8 hours  cefTAZidime/avibactam IVPB    metroNIDAZOLE  IVPB    metroNIDAZOLE  IVPB 500 every 8 hours    PE:    Vital Signs Last 24 Hrs  T(C): 37.1 (31 Jan 2019 10:08), Max: 38.5 (30 Jan 2019 19:13)  T(F): 98.8 (31 Jan 2019 10:08), Max: 101.3 (30 Jan 2019 19:13)  HR: 103 (31 Jan 2019 10:08) (103 - 125)  BP: 122/71 (31 Jan 2019 10:08) (101/52 - 122/71)  RR: 20 (31 Jan 2019 10:08) (20 - 25)  SpO2: 100% (31 Jan 2019 10:08) (98% - 100%)    Gen: AOx3, NAD, chronically ill appearing  CV: S1+S2 normal, tachycardic  Resp: Clear bilat, no resp distress, no crackles/wheezes, tach  Abd: Soft, nontender, +BS, PEG  Ext: No LE edema, no wounds    LABS:                        9.2    4.04  )-----------( 396      ( 31 Jan 2019 02:55 )             30.0     01-31    133<L>  |  99  |  3<L>  ----------------------------<  95  4.1   |  26  |  < 0.20<L>    Ca    8.0<L>      31 Jan 2019 02:55  Phos  3.2     01-31  Mg     1.7     01-31    TPro  6.2  /  Alb  1.7<L>  /  TBili  0.3  /  DBili  x   /  AST  8   /  ALT  4   /  AlkPhos  117  01-31    MICROBIOLOGY:    BLOOD PERIPHERAL  01-29-19 NGTD    BLOOD  01-26-19 NGTD    BLOOD VENOUS  01-24-19 --  --  BLOOD CULTURE PCR  Staphylococcus sp.,coag neg    ENDOTRACHEAL SPECIMEN  01-22-19 --  --  Pseudomonas aeruginosa    ENDOTRACHEAL SPECIMEN  01-06-19 --  --  Acin.baumannii/haemoly     (otherwise reviewed)    RADIOLOGY:    1/24 CT:    IMPRESSION: Necrotizing pancreatitis with acute necrotic collections   again noted. Fluid collections along the proximal gastric wall are better   formed with a new collection more caudally measuring 7.0 x 4.8 cm. Small   ascites has decreased.    Moderate bilateral pleural effusions with complete atelectasis of both   lower lobes and partial atelectasis of the right middle lobe and lingula   as seen previously. Heterogeneous lung enhancement has improved but is   still present and suggestive of lung abscesses.

## 2019-01-31 NOTE — PROGRESS NOTE ADULT - PROBLEM SELECTOR PLAN 3
- persistent intermittent fevers, monitor  - ID note appreciated  - CT showing new lung abscess  - cont current abx for now

## 2019-01-31 NOTE — PROGRESS NOTE ADULT - ASSESSMENT
53 Male w/ a PMHx of TBI (s/p PEG tube, contracture, and seizure d/o) presented as a transfer from [facility] on 12/9 for respiratory failure 2/2 ARDS likely 2/2 necrotizing pancreatitis s/p intubation. Course c/b Acinetobacter PNA s/p Avycaz and Flagyl 7 day course.  Course further complicated by acute blood loss anemia s/p colonoscopy with findings suggestive of ischemic colitis, without further bleeding and Hgb remaining stable. S/p extubation 1/3, and re-intubation 1/6 now s/p tracheostomy.

## 2019-01-31 NOTE — PROGRESS NOTE ADULT - SUBJECTIVE AND OBJECTIVE BOX
CHIEF COMPLAINT: Patient is a 53y old  Male who presents with a chief complaint of ARDS?, sepsis (30 Jan 2019 14:18)    Interval Events:      REVIEW OF SYSTEMS:  Constitutional:   Eyes:  ENT:  CV:  Resp:  GI:  :  MSK:  Integumentary:  Neurological:  Psychiatric:  Endocrine:  Hematologic/Lymphatic:  Allergic/Immunologic:  [ ] All other systems negative  [ ] Unable to assess ROS because ________      OBJECTIVE:  ICU Vital Signs Last 24 Hrs  T(C): 36.7 (31 Jan 2019 05:25), Max: 38.5 (30 Jan 2019 19:13)  T(F): 98 (31 Jan 2019 05:25), Max: 101.3 (30 Jan 2019 19:13)  HR: 117 (31 Jan 2019 07:23) (108 - 125)  BP: 111/81 (31 Jan 2019 05:25) (101/52 - 118/60)  BP(mean): --  ABP: --  ABP(mean): --  RR: 20 (31 Jan 2019 05:25) (20 - 25)  SpO2: 100% (31 Jan 2019 07:23) (98% - 100%)    Mode: CPAP with PS, FiO2: 40, PEEP: 5, PS: 5, MAP: 7, PIP: 11    POCT Blood Glucose.: 89 mg/dL (31 Jan 2019 06:13)    HOSPITAL MEDICATIONS:  MEDICATIONS  (STANDING):  cefTAZidime/avibactam IVPB 2.5 Gram(s) IV Intermittent every 8 hours  cefTAZidime/avibactam IVPB      chlorhexidine 4% Liquid 1 Application(s) Topical <User Schedule>  cyanocobalamin 1000 MICROGram(s) Oral daily  dextrose 5%. 1000 milliLiter(s) (50 mL/Hr) IV Continuous <Continuous>  dextrose 50% Injectable 12.5 Gram(s) IV Push once  dextrose 50% Injectable 25 Gram(s) IV Push once  dextrose 50% Injectable 25 Gram(s) IV Push once  folic acid 1 milliGRAM(s) Enteral Tube daily  heparin  Injectable 5000 Unit(s) SubCutaneous every 8 hours  insulin lispro (HumaLOG) corrective regimen sliding scale   SubCutaneous every 6 hours  lacosamide Solution 100 milliGRAM(s) Oral two times a day  lactobacillus acidophilus 1 Tablet(s) Oral three times a day with meals  metroNIDAZOLE  IVPB      metroNIDAZOLE  IVPB 500 milliGRAM(s) IV Intermittent every 8 hours  midodrine 5 milliGRAM(s) Oral three times a day  psyllium Powder 1 Packet(s) Oral daily  QUEtiapine 25 milliGRAM(s) Oral daily  risperiDONE   Solution 1 milliGRAM(s) Oral daily    MEDICATIONS  (PRN):  acetaminophen    Suspension .. 650 milliGRAM(s) Oral every 6 hours PRN Temp greater or equal to 38C (100.4F), Mild Pain (1 - 3), Moderate Pain (4 - 6)  dextrose 40% Gel 15 Gram(s) Oral once PRN Blood Glucose LESS THAN 70 milliGRAM(s)/deciliter  glucagon  Injectable 1 milliGRAM(s) IntraMuscular once PRN Glucose LESS THAN 70 milligrams/deciliter      LABS:                        9.2    4.04  )-----------( 396      ( 31 Jan 2019 02:55 )             30.0     01-31    133<L>  |  99  |  3<L>  ----------------------------<  95  4.1   |  26  |  < 0.20<L>    Ca    8.0<L>      31 Jan 2019 02:55  Phos  3.2     01-31  Mg     1.7     01-31    TPro  6.2  /  Alb  1.7<L>  /  TBili  0.3  /  DBili  x   /  AST  8   /  ALT  4   /  AlkPhos  117  01-31    PT/INR - ( 31 Jan 2019 02:55 )   PT: 17.0 SEC;   INR: 1.47                    MICROBIOLOGY:     RADIOLOGY:  [ ] Reviewed and interpreted by me    PULMONARY FUNCTION TESTS:    EKG: CHIEF COMPLAINT: Patient is a 53y old  Male who presents with a chief complaint of ARDS?, sepsis (30 Jan 2019 14:18)    Interval Events: febrile last night - NPO today for EUS      REVIEW OF SYSTEMS:  Constitutional: no complaints overall  [ ] All other systems negative  [x] Unable to assess ROS because: nonverbal      OBJECTIVE:  ICU Vital Signs Last 24 Hrs  T(C): 36.7 (31 Jan 2019 05:25), Max: 38.5 (30 Jan 2019 19:13)  T(F): 98 (31 Jan 2019 05:25), Max: 101.3 (30 Jan 2019 19:13)  HR: 117 (31 Jan 2019 07:23) (108 - 125)  BP: 111/81 (31 Jan 2019 05:25) (101/52 - 118/60)  BP(mean): --  ABP: --  ABP(mean): --  RR: 20 (31 Jan 2019 05:25) (20 - 25)  SpO2: 100% (31 Jan 2019 07:23) (98% - 100%)    Mode: CPAP with PS, FiO2: 40, PEEP: 5, PS: 5, MAP: 7, PIP: 11    POCT Blood Glucose.: 89 mg/dL (31 Jan 2019 06:13)    HOSPITAL MEDICATIONS:  MEDICATIONS  (STANDING):  cefTAZidime/avibactam IVPB 2.5 Gram(s) IV Intermittent every 8 hours  cefTAZidime/avibactam IVPB      chlorhexidine 4% Liquid 1 Application(s) Topical <User Schedule>  cyanocobalamin 1000 MICROGram(s) Oral daily  dextrose 5%. 1000 milliLiter(s) (50 mL/Hr) IV Continuous <Continuous>  dextrose 50% Injectable 12.5 Gram(s) IV Push once  dextrose 50% Injectable 25 Gram(s) IV Push once  dextrose 50% Injectable 25 Gram(s) IV Push once  folic acid 1 milliGRAM(s) Enteral Tube daily  heparin  Injectable 5000 Unit(s) SubCutaneous every 8 hours  insulin lispro (HumaLOG) corrective regimen sliding scale   SubCutaneous every 6 hours  lacosamide Solution 100 milliGRAM(s) Oral two times a day  lactobacillus acidophilus 1 Tablet(s) Oral three times a day with meals  metroNIDAZOLE  IVPB      metroNIDAZOLE  IVPB 500 milliGRAM(s) IV Intermittent every 8 hours  midodrine 5 milliGRAM(s) Oral three times a day  psyllium Powder 1 Packet(s) Oral daily  QUEtiapine 25 milliGRAM(s) Oral daily  risperiDONE   Solution 1 milliGRAM(s) Oral daily    MEDICATIONS  (PRN):  acetaminophen    Suspension .. 650 milliGRAM(s) Oral every 6 hours PRN Temp greater or equal to 38C (100.4F), Mild Pain (1 - 3), Moderate Pain (4 - 6)  dextrose 40% Gel 15 Gram(s) Oral once PRN Blood Glucose LESS THAN 70 milliGRAM(s)/deciliter  glucagon  Injectable 1 milliGRAM(s) IntraMuscular once PRN Glucose LESS THAN 70 milligrams/deciliter      LABS:                        9.2    4.04  )-----------( 396      ( 31 Jan 2019 02:55 )             30.0     01-31    133<L>  |  99  |  3<L>  ----------------------------<  95  4.1   |  26  |  < 0.20<L>    Ca    8.0<L>      31 Jan 2019 02:55  Phos  3.2     01-31  Mg     1.7     01-31    TPro  6.2  /  Alb  1.7<L>  /  TBili  0.3  /  DBili  x   /  AST  8   /  ALT  4   /  AlkPhos  117  01-31    PT/INR - ( 31 Jan 2019 02:55 )   PT: 17.0 SEC;   INR: 1.47

## 2019-02-01 LAB
GLUCOSE BLDC GLUCOMTR-MCNC: 113 MG/DL — HIGH (ref 70–99)
GLUCOSE BLDC GLUCOMTR-MCNC: 127 MG/DL — HIGH (ref 70–99)
GLUCOSE BLDC GLUCOMTR-MCNC: 137 MG/DL — HIGH (ref 70–99)
GLUCOSE BLDC GLUCOMTR-MCNC: 137 MG/DL — HIGH (ref 70–99)
GLUCOSE BLDC GLUCOMTR-MCNC: 144 MG/DL — HIGH (ref 70–99)

## 2019-02-01 PROCEDURE — 99232 SBSQ HOSP IP/OBS MODERATE 35: CPT | Mod: GC

## 2019-02-01 PROCEDURE — 99233 SBSQ HOSP IP/OBS HIGH 50: CPT | Mod: GC

## 2019-02-01 PROCEDURE — 99232 SBSQ HOSP IP/OBS MODERATE 35: CPT

## 2019-02-01 RX ADMIN — Medication 1 PACKET(S): at 11:14

## 2019-02-01 RX ADMIN — Medication 1 TABLET(S): at 18:42

## 2019-02-01 RX ADMIN — RISPERIDONE 1 MILLIGRAM(S): 4 TABLET ORAL at 11:15

## 2019-02-01 RX ADMIN — Medication 100 MILLIGRAM(S): at 09:04

## 2019-02-01 RX ADMIN — CHLORHEXIDINE GLUCONATE 1 APPLICATION(S): 213 SOLUTION TOPICAL at 09:04

## 2019-02-01 RX ADMIN — Medication 650 MILLIGRAM(S): at 14:43

## 2019-02-01 RX ADMIN — QUETIAPINE FUMARATE 25 MILLIGRAM(S): 200 TABLET, FILM COATED ORAL at 11:14

## 2019-02-01 RX ADMIN — PREGABALIN 1000 MICROGRAM(S): 225 CAPSULE ORAL at 11:14

## 2019-02-01 RX ADMIN — LACOSAMIDE 100 MILLIGRAM(S): 50 TABLET ORAL at 18:42

## 2019-02-01 RX ADMIN — Medication 100 MILLIGRAM(S): at 18:42

## 2019-02-01 RX ADMIN — Medication 650 MILLIGRAM(S): at 15:13

## 2019-02-01 RX ADMIN — LACOSAMIDE 100 MILLIGRAM(S): 50 TABLET ORAL at 05:23

## 2019-02-01 RX ADMIN — Medication 1 MILLIGRAM(S): at 11:14

## 2019-02-01 RX ADMIN — Medication 100 MILLIGRAM(S): at 03:18

## 2019-02-01 RX ADMIN — HEPARIN SODIUM 5000 UNIT(S): 5000 INJECTION INTRAVENOUS; SUBCUTANEOUS at 05:23

## 2019-02-01 RX ADMIN — HEPARIN SODIUM 5000 UNIT(S): 5000 INJECTION INTRAVENOUS; SUBCUTANEOUS at 21:11

## 2019-02-01 RX ADMIN — HEPARIN SODIUM 5000 UNIT(S): 5000 INJECTION INTRAVENOUS; SUBCUTANEOUS at 13:50

## 2019-02-01 RX ADMIN — Medication 1 TABLET(S): at 09:04

## 2019-02-01 RX ADMIN — Medication 1 TABLET(S): at 13:50

## 2019-02-01 NOTE — PROGRESS NOTE ADULT - PROBLEM SELECTOR PLAN 1
- CT on 1/17 with persistent necrotic fluid accumulation but improved   - cont avycaz and flagyl per ID  - repeat CT showing worsening and new abscesses, as well and lung abscess  - s/p EUS stent and drainage  - GI note appreciated  - needs repeat CT next week

## 2019-02-01 NOTE — PROGRESS NOTE ADULT - SUBJECTIVE AND OBJECTIVE BOX
CHIEF COMPLAINT: Patient is a 53y old  Male who presents with a chief complaint of ARDS?, sepsis (31 Jan 2019 08:04)    Interval Events:      REVIEW OF SYSTEMS:  Constitutional:   Eyes:  ENT:  CV:  Resp:  GI:  :  MSK:  Integumentary:  Neurological:  Psychiatric:  Endocrine:  Hematologic/Lymphatic:  Allergic/Immunologic:  [ ] All other systems negative  [ ] Unable to assess ROS because ________      OBJECTIVE:  ICU Vital Signs Last 24 Hrs  T(C): 36.8 (01 Feb 2019 05:21), Max: 37.2 (31 Jan 2019 22:11)  T(F): 98.3 (01 Feb 2019 05:21), Max: 98.9 (31 Jan 2019 22:11)  HR: 122 (01 Feb 2019 06:37) (103 - 122)  BP: 125/74 (01 Feb 2019 05:21) (105/76 - 125/74)  BP(mean): --  ABP: --  ABP(mean): --  RR: 20 (01 Feb 2019 05:21) (20 - 20)  SpO2: 98% (01 Feb 2019 06:37) (96% - 100%)    Mode: CPAP with PS, FiO2: 40, PEEP: 5, PS: 5, MAP: 7.3, PIP: 12    01-31 @ 07:01  -  02-01 @ 07:00  --------------------------------------------------------  IN: 910 mL / OUT: 0 mL / NET: 910 mL    POCT Blood Glucose.: 127 mg/dL (01 Feb 2019 06:03)    HOSPITAL MEDICATIONS:  MEDICATIONS  (STANDING):  cefTAZidime/avibactam IVPB 2.5 Gram(s) IV Intermittent every 8 hours  cefTAZidime/avibactam IVPB      chlorhexidine 4% Liquid 1 Application(s) Topical <User Schedule>  cyanocobalamin 1000 MICROGram(s) Oral daily  dextrose 5%. 1000 milliLiter(s) (50 mL/Hr) IV Continuous <Continuous>  dextrose 50% Injectable 12.5 Gram(s) IV Push once  dextrose 50% Injectable 25 Gram(s) IV Push once  dextrose 50% Injectable 25 Gram(s) IV Push once  folic acid 1 milliGRAM(s) Enteral Tube daily  heparin  Injectable 5000 Unit(s) SubCutaneous every 8 hours  insulin lispro (HumaLOG) corrective regimen sliding scale   SubCutaneous every 6 hours  lacosamide Solution 100 milliGRAM(s) Oral two times a day  lactobacillus acidophilus 1 Tablet(s) Oral three times a day with meals  metroNIDAZOLE  IVPB      metroNIDAZOLE  IVPB 500 milliGRAM(s) IV Intermittent every 8 hours  midodrine 5 milliGRAM(s) Oral three times a day  psyllium Powder 1 Packet(s) Oral daily  QUEtiapine 25 milliGRAM(s) Oral daily  risperiDONE   Solution 1 milliGRAM(s) Oral daily    MEDICATIONS  (PRN):  acetaminophen    Suspension .. 650 milliGRAM(s) Oral every 6 hours PRN Temp greater or equal to 38C (100.4F), Mild Pain (1 - 3), Moderate Pain (4 - 6)  dextrose 40% Gel 15 Gram(s) Oral once PRN Blood Glucose LESS THAN 70 milliGRAM(s)/deciliter  glucagon  Injectable 1 milliGRAM(s) IntraMuscular once PRN Glucose LESS THAN 70 milligrams/deciliter      LABS:                        9.2    4.04  )-----------( 396      ( 31 Jan 2019 02:55 )             30.0     01-31    133<L>  |  99  |  3<L>  ----------------------------<  95  4.1   |  26  |  < 0.20<L>    Ca    8.0<L>      31 Jan 2019 02:55  Phos  3.2     01-31  Mg     1.7     01-31    TPro  6.2  /  Alb  1.7<L>  /  TBili  0.3  /  DBili  x   /  AST  8   /  ALT  4   /  AlkPhos  117  01-31    PT/INR - ( 31 Jan 2019 02:55 )   PT: 17.0 SEC;   INR: 1.47                    MICROBIOLOGY:     RADIOLOGY:  [ ] Reviewed and interpreted by me    PULMONARY FUNCTION TESTS:    EKG: CHIEF COMPLAINT: Patient is a 53y old  Male who presents with a chief complaint of ARDS?, sepsis (31 Jan 2019 08:04)    Interval Events: none overnight      REVIEW OF SYSTEMS:  Constitutional: nods to questions, no complaints overall  CV: denies  Resp: denies  GI: denies  [x] All other systems negative  [ ] Unable to assess ROS because ________      OBJECTIVE:  ICU Vital Signs Last 24 Hrs  T(C): 36.8 (01 Feb 2019 05:21), Max: 37.2 (31 Jan 2019 22:11)  T(F): 98.3 (01 Feb 2019 05:21), Max: 98.9 (31 Jan 2019 22:11)  HR: 122 (01 Feb 2019 06:37) (103 - 122)  BP: 125/74 (01 Feb 2019 05:21) (105/76 - 125/74)  BP(mean): --  ABP: --  ABP(mean): --  RR: 20 (01 Feb 2019 05:21) (20 - 20)  SpO2: 98% (01 Feb 2019 06:37) (96% - 100%)    Mode: CPAP with PS, FiO2: 40, PEEP: 5, PS: 5, MAP: 7.3, PIP: 12    01-31 @ 07:01  -  02-01 @ 07:00  --------------------------------------------------------  IN: 910 mL / OUT: 0 mL / NET: 910 mL    POCT Blood Glucose.: 127 mg/dL (01 Feb 2019 06:03)    HOSPITAL MEDICATIONS:  MEDICATIONS  (STANDING):  cefTAZidime/avibactam IVPB 2.5 Gram(s) IV Intermittent every 8 hours  cefTAZidime/avibactam IVPB      chlorhexidine 4% Liquid 1 Application(s) Topical <User Schedule>  cyanocobalamin 1000 MICROGram(s) Oral daily  dextrose 5%. 1000 milliLiter(s) (50 mL/Hr) IV Continuous <Continuous>  dextrose 50% Injectable 12.5 Gram(s) IV Push once  dextrose 50% Injectable 25 Gram(s) IV Push once  dextrose 50% Injectable 25 Gram(s) IV Push once  folic acid 1 milliGRAM(s) Enteral Tube daily  heparin  Injectable 5000 Unit(s) SubCutaneous every 8 hours  insulin lispro (HumaLOG) corrective regimen sliding scale   SubCutaneous every 6 hours  lacosamide Solution 100 milliGRAM(s) Oral two times a day  lactobacillus acidophilus 1 Tablet(s) Oral three times a day with meals  metroNIDAZOLE  IVPB      metroNIDAZOLE  IVPB 500 milliGRAM(s) IV Intermittent every 8 hours  midodrine 5 milliGRAM(s) Oral three times a day  psyllium Powder 1 Packet(s) Oral daily  QUEtiapine 25 milliGRAM(s) Oral daily  risperiDONE   Solution 1 milliGRAM(s) Oral daily    MEDICATIONS  (PRN):  acetaminophen    Suspension .. 650 milliGRAM(s) Oral every 6 hours PRN Temp greater or equal to 38C (100.4F), Mild Pain (1 - 3), Moderate Pain (4 - 6)  dextrose 40% Gel 15 Gram(s) Oral once PRN Blood Glucose LESS THAN 70 milliGRAM(s)/deciliter  glucagon  Injectable 1 milliGRAM(s) IntraMuscular once PRN Glucose LESS THAN 70 milligrams/deciliter      LABS:                        9.2    4.04  )-----------( 396      ( 31 Jan 2019 02:55 )             30.0     01-31    133<L>  |  99  |  3<L>  ----------------------------<  95  4.1   |  26  |  < 0.20<L>    Ca    8.0<L>      31 Jan 2019 02:55  Phos  3.2     01-31  Mg     1.7     01-31    TPro  6.2  /  Alb  1.7<L>  /  TBili  0.3  /  DBili  x   /  AST  8   /  ALT  4   /  AlkPhos  117  01-31    PT/INR - ( 31 Jan 2019 02:55 )   PT: 17.0 SEC;   INR: 1.47

## 2019-02-01 NOTE — PROGRESS NOTE ADULT - ASSESSMENT
1) Walled off necrosis, s/p EUS and AXIOS yesterday.   2) Hematochezia, s/p colonoscopy on 12/28/2018 with severe segmental colitis localized to the transverse colon and ascending colon (possible ischemic colitis, possible colitis related to contiguous danay-pancreatic inflammatory process). Biopsies with granulation tissue but no infection/malignancy, Hb stable   3) Resp failure in the setting of pneumonia, requiring tracheostomy now with lung abscess and sepsis on Abx.     Recommendations  - Trend CBC, CMP and fever curve, Hb stable for now.   - Continue IV antibiotics as per ID recommendations.   - Rest of care per primary team  - Will need necrosectomy later next week.   - GI will follow up.

## 2019-02-01 NOTE — PROGRESS NOTE ADULT - SUBJECTIVE AND OBJECTIVE BOX
GI following the patient for Necrotizing Pancreatitis with  Walled Off Necrosis and new collection around the head of pancreas , patient was supposed to follow up with DR Jules for possible Endoscopic drainage of the walled of necrosis after wall maturation. but patient stayed in hospital for new fever, had repeat CT Chest and abdomen that revealed walled off necrosis of a 7.8 cm collection and a new collection and CT chest findings suggestive of lung abscesses , s/p treatment with antibiotics for 3 weeks but with persistent low grade temps, now s/p EGD , EUS and AXIOS stent yesterday.     Interval Events: No bleeding, abdominal pain, remains on IV abx        Allergies:  Valproate Sodium (Other (Severe))      Hospital Medications:  acetaminophen    Suspension .. 650 milliGRAM(s) Oral every 6 hours PRN  cefTAZidime/avibactam IVPB 2.5 Gram(s) IV Intermittent every 8 hours  cefTAZidime/avibactam IVPB      chlorhexidine 4% Liquid 1 Application(s) Topical <User Schedule>  cyanocobalamin 1000 MICROGram(s) Oral daily  dextrose 40% Gel 15 Gram(s) Oral once PRN  dextrose 5%. 1000 milliLiter(s) IV Continuous <Continuous>  dextrose 50% Injectable 12.5 Gram(s) IV Push once  dextrose 50% Injectable 25 Gram(s) IV Push once  dextrose 50% Injectable 25 Gram(s) IV Push once  folic acid 1 milliGRAM(s) Enteral Tube daily  glucagon  Injectable 1 milliGRAM(s) IntraMuscular once PRN  heparin  Injectable 5000 Unit(s) SubCutaneous every 8 hours  insulin lispro (HumaLOG) corrective regimen sliding scale   SubCutaneous every 6 hours  lacosamide Solution 100 milliGRAM(s) Oral two times a day  lactobacillus acidophilus 1 Tablet(s) Oral three times a day with meals  metroNIDAZOLE  IVPB      metroNIDAZOLE  IVPB 500 milliGRAM(s) IV Intermittent every 8 hours  midodrine 5 milliGRAM(s) Oral three times a day  psyllium Powder 1 Packet(s) Oral daily  QUEtiapine 25 milliGRAM(s) Oral daily  risperiDONE   Solution 1 milliGRAM(s) Oral daily      PMHX/PSHX:  Seizure  TBI (traumatic brain injury)  S/P percutaneous endoscopic gastrostomy (PEG) tube placement  No significant past surgical history      Family history:      ROS:   can not assess because of overall mental status but looks comfortable.       PHYSICAL EXAM:     GENERAL:  Appears stated age, no distress  HEENT:  NC/AT,  conjunctivae clear, sclera -anicteric  CHEST:  Decreased breath sounds at bases.   HEART:  Regular rhythm, S1, S2, no murmur/rub/S3/S4,  no edema  ABDOMEN:  Soft, non-tender, non-distended, normoactive bowel sounds  NEURO:  can not assess because of overall mental status but looks comfortable.     Vital Signs:  Vital Signs Last 24 Hrs  T(C): 36.8 (2019 05:21), Max: 37.2 (2019 22:11)  T(F): 98.3 (2019 05:21), Max: 98.9 (2019 22:11)  HR: 122 (2019 06:37) (103 - 122)  BP: 125/74 (2019 05:21) (105/76 - 125/74)  BP(mean): --  RR: 20 (2019 05:21) (20 - 20)  SpO2: 98% (2019 06:37) (96% - 100%)  Daily     Daily Weight in k.7 (2019 01:00)    LABS:                        9.2    4.04  )-----------( 396      ( 2019 02:55 )             30.0         133<L>  |  99  |  3<L>  ----------------------------<  95  4.1   |  26  |  < 0.20<L>    Ca    8.0<L>      2019 02:55  Phos  3.2       Mg     1.7         TPro  6.2  /  Alb  1.7<L>  /  TBili  0.3  /  DBili  x   /  AST  8   /  ALT  4   /  AlkPhos  117      LIVER FUNCTIONS - ( 2019 02:55 )  Alb: 1.7 g/dL / Pro: 6.2 g/dL / ALK PHOS: 117 u/L / ALT: 4 u/L / AST: 8 u/L / GGT: x           PT/INR - ( 2019 02:55 )   PT: 17.0 SEC;   INR: 1.47

## 2019-02-01 NOTE — PROGRESS NOTE ADULT - ASSESSMENT
53 Male w/ a PMHx of TBI (s/p PEG tube, contracture, and seizure d/o) presented as a transfer from [facility] on 12/9 for respiratory failure 2/2 ARDS likely 2/2 necrotizing pancreatitis s/p intubation. Course c/b Acinetobacter PNA s/p Avycaz and Flagyl 7 day course.  Course further complicated by acute blood loss anemia s/p colonoscopy with findings suggestive of ischemic colitis, without further bleeding and Hgb remaining stable. S/p extubation 1/3, and re-intubation 1/6 now s/p tracheostomy.

## 2019-02-01 NOTE — PROGRESS NOTE ADULT - SUBJECTIVE AND OBJECTIVE BOX
CC: F/U for Lung Abscess    Saw/spoke to patient. No fevers, no chills. Overall unchanged.     Allergies  Valproate Sodium (Other (Severe))    ANTIMICROBIALS:  cefTAZidime/avibactam IVPB 2.5 every 8 hours  cefTAZidime/avibactam IVPB    metroNIDAZOLE  IVPB    metroNIDAZOLE  IVPB 500 every 8 hours    PE:    Vital Signs Last 24 Hrs  T(C): 36.8 (01 Feb 2019 05:21), Max: 37.2 (31 Jan 2019 22:11)  T(F): 98.3 (01 Feb 2019 05:21), Max: 98.9 (31 Jan 2019 22:11)  HR: 122 (01 Feb 2019 06:37) (108 - 122)  BP: 125/74 (01 Feb 2019 05:21) (105/76 - 125/74)  RR: 20 (01 Feb 2019 05:21) (20 - 20)  SpO2: 98% (01 Feb 2019 06:37) (96% - 100%)    Gen: AOx1-2, NAD, nonverbal, attempts to communicate nonverbally  CV: S1+S2 normal, nontachycardic  Resp: Trach, no crackles, no wheeze  Abd: Soft, nontender, +BS, PEG  Ext: No LE edema, no wounds    LABS:                        9.2    4.04  )-----------( 396      ( 31 Jan 2019 02:55 )             30.0     01-31    133<L>  |  99  |  3<L>  ----------------------------<  95  4.1   |  26  |  < 0.20<L>    Ca    8.0<L>      31 Jan 2019 02:55  Phos  3.2     01-31  Mg     1.7     01-31    TPro  6.2  /  Alb  1.7<L>  /  TBili  0.3  /  DBili  x   /  AST  8   /  ALT  4   /  AlkPhos  117  01-31    MICROBIOLOGY:    BLOOD PERIPHERAL  01-30-19 NGTD    BLOOD PERIPHERAL  01-29-19 NGTD    BLOOD  01-26-19 NGTD    BLOOD VENOUS  01-24-19 --  --  BLOOD CULTURE PCR  Staphylococcus sp.,coag neg    (otherwise reviewed)    RADIOLOGY:    1/24 CT:    IMPRESSION: Necrotizing pancreatitis with acute necrotic collections   again noted. Fluid collections along the proximal gastric wall are better   formed with a new collection more caudally measuring 7.0 x 4.8 cm. Small   ascites has decreased.    Moderate bilateral pleural effusions with complete atelectasis of both   lower lobes and partial atelectasis of the right middle lobe and lingula   as seen previously. Heterogeneous lung enhancement has improved but is   still present and suggestive of lung abscesses.

## 2019-02-02 LAB
ANION GAP SERPL CALC-SCNC: 12 MMO/L — SIGNIFICANT CHANGE UP (ref 7–14)
BASOPHILS # BLD AUTO: 0.02 K/UL — SIGNIFICANT CHANGE UP (ref 0–0.2)
BASOPHILS NFR BLD AUTO: 0.3 % — SIGNIFICANT CHANGE UP (ref 0–2)
BUN SERPL-MCNC: 4 MG/DL — LOW (ref 7–23)
CALCIUM SERPL-MCNC: 7.9 MG/DL — LOW (ref 8.4–10.5)
CHLORIDE SERPL-SCNC: 91 MMOL/L — LOW (ref 98–107)
CO2 SERPL-SCNC: 23 MMOL/L — SIGNIFICANT CHANGE UP (ref 22–31)
CREAT SERPL-MCNC: < 0.2 MG/DL — LOW (ref 0.5–1.3)
EOSINOPHIL # BLD AUTO: 0.01 K/UL — SIGNIFICANT CHANGE UP (ref 0–0.5)
EOSINOPHIL NFR BLD AUTO: 0.2 % — SIGNIFICANT CHANGE UP (ref 0–6)
GLUCOSE BLDC GLUCOMTR-MCNC: 107 MG/DL — HIGH (ref 70–99)
GLUCOSE BLDC GLUCOMTR-MCNC: 122 MG/DL — HIGH (ref 70–99)
GLUCOSE BLDC GLUCOMTR-MCNC: 130 MG/DL — HIGH (ref 70–99)
GLUCOSE BLDC GLUCOMTR-MCNC: 99 MG/DL — SIGNIFICANT CHANGE UP (ref 70–99)
GLUCOSE SERPL-MCNC: 136 MG/DL — HIGH (ref 70–99)
HCT VFR BLD CALC: 35.1 % — LOW (ref 39–50)
HGB BLD-MCNC: 10.9 G/DL — LOW (ref 13–17)
IMM GRANULOCYTES NFR BLD AUTO: 0.3 % — SIGNIFICANT CHANGE UP (ref 0–1.5)
LYMPHOCYTES # BLD AUTO: 2.23 K/UL — SIGNIFICANT CHANGE UP (ref 1–3.3)
LYMPHOCYTES # BLD AUTO: 33.6 % — SIGNIFICANT CHANGE UP (ref 13–44)
MAGNESIUM SERPL-MCNC: 1.5 MG/DL — LOW (ref 1.6–2.6)
MCHC RBC-ENTMCNC: 31.1 % — LOW (ref 32–36)
MCHC RBC-ENTMCNC: 31.9 PG — SIGNIFICANT CHANGE UP (ref 27–34)
MCV RBC AUTO: 102.6 FL — HIGH (ref 80–100)
MONOCYTES # BLD AUTO: 0.8 K/UL — SIGNIFICANT CHANGE UP (ref 0–0.9)
MONOCYTES NFR BLD AUTO: 12 % — SIGNIFICANT CHANGE UP (ref 2–14)
NEUTROPHILS # BLD AUTO: 3.56 K/UL — SIGNIFICANT CHANGE UP (ref 1.8–7.4)
NEUTROPHILS NFR BLD AUTO: 53.6 % — SIGNIFICANT CHANGE UP (ref 43–77)
NRBC # FLD: 0 K/UL — LOW (ref 25–125)
PHOSPHATE SERPL-MCNC: 2.1 MG/DL — LOW (ref 2.5–4.5)
PLATELET # BLD AUTO: 420 K/UL — HIGH (ref 150–400)
PMV BLD: 9.1 FL — SIGNIFICANT CHANGE UP (ref 7–13)
POTASSIUM SERPL-MCNC: 4.5 MMOL/L — SIGNIFICANT CHANGE UP (ref 3.5–5.3)
POTASSIUM SERPL-SCNC: 4.5 MMOL/L — SIGNIFICANT CHANGE UP (ref 3.5–5.3)
RBC # BLD: 3.42 M/UL — LOW (ref 4.2–5.8)
RBC # FLD: 16.3 % — HIGH (ref 10.3–14.5)
SODIUM SERPL-SCNC: 126 MMOL/L — LOW (ref 135–145)
WBC # BLD: 6.64 K/UL — SIGNIFICANT CHANGE UP (ref 3.8–10.5)
WBC # FLD AUTO: 6.64 K/UL — SIGNIFICANT CHANGE UP (ref 3.8–10.5)

## 2019-02-02 PROCEDURE — 74177 CT ABD & PELVIS W/CONTRAST: CPT | Mod: 26

## 2019-02-02 PROCEDURE — 99233 SBSQ HOSP IP/OBS HIGH 50: CPT | Mod: GC

## 2019-02-02 PROCEDURE — 93010 ELECTROCARDIOGRAM REPORT: CPT

## 2019-02-02 PROCEDURE — 99232 SBSQ HOSP IP/OBS MODERATE 35: CPT

## 2019-02-02 RX ORDER — LACOSAMIDE 50 MG/1
100 TABLET ORAL ONCE
Qty: 0 | Refills: 0 | Status: DISCONTINUED | OUTPATIENT
Start: 2019-02-02 | End: 2019-02-02

## 2019-02-02 RX ORDER — SODIUM CHLORIDE 9 MG/ML
1000 INJECTION INTRAMUSCULAR; INTRAVENOUS; SUBCUTANEOUS
Qty: 0 | Refills: 0 | Status: DISCONTINUED | OUTPATIENT
Start: 2019-02-02 | End: 2019-02-10

## 2019-02-02 RX ORDER — SODIUM CHLORIDE 9 MG/ML
500 INJECTION INTRAMUSCULAR; INTRAVENOUS; SUBCUTANEOUS ONCE
Qty: 0 | Refills: 0 | Status: COMPLETED | OUTPATIENT
Start: 2019-02-02 | End: 2019-02-02

## 2019-02-02 RX ORDER — VANCOMYCIN HCL 1 G
1000 VIAL (EA) INTRAVENOUS ONCE
Qty: 0 | Refills: 0 | Status: COMPLETED | OUTPATIENT
Start: 2019-02-02 | End: 2019-02-02

## 2019-02-02 RX ORDER — LACOSAMIDE 50 MG/1
TABLET ORAL
Qty: 0 | Refills: 0 | Status: DISCONTINUED | OUTPATIENT
Start: 2019-02-02 | End: 2019-02-04

## 2019-02-02 RX ORDER — LACOSAMIDE 50 MG/1
100 TABLET ORAL EVERY 12 HOURS
Qty: 0 | Refills: 0 | Status: DISCONTINUED | OUTPATIENT
Start: 2019-02-03 | End: 2019-02-04

## 2019-02-02 RX ADMIN — QUETIAPINE FUMARATE 25 MILLIGRAM(S): 200 TABLET, FILM COATED ORAL at 11:25

## 2019-02-02 RX ADMIN — PREGABALIN 1000 MICROGRAM(S): 225 CAPSULE ORAL at 11:25

## 2019-02-02 RX ADMIN — RISPERIDONE 1 MILLIGRAM(S): 4 TABLET ORAL at 12:11

## 2019-02-02 RX ADMIN — LACOSAMIDE 100 MILLIGRAM(S): 50 TABLET ORAL at 05:26

## 2019-02-02 RX ADMIN — Medication 100 MILLIGRAM(S): at 05:10

## 2019-02-02 RX ADMIN — HEPARIN SODIUM 5000 UNIT(S): 5000 INJECTION INTRAVENOUS; SUBCUTANEOUS at 05:15

## 2019-02-02 RX ADMIN — Medication 650 MILLIGRAM(S): at 12:00

## 2019-02-02 RX ADMIN — Medication 250 MILLIGRAM(S): at 19:28

## 2019-02-02 RX ADMIN — CHLORHEXIDINE GLUCONATE 1 APPLICATION(S): 213 SOLUTION TOPICAL at 09:44

## 2019-02-02 RX ADMIN — Medication 1 TABLET(S): at 11:34

## 2019-02-02 RX ADMIN — MIDODRINE HYDROCHLORIDE 5 MILLIGRAM(S): 2.5 TABLET ORAL at 05:15

## 2019-02-02 RX ADMIN — Medication 1 PACKET(S): at 11:24

## 2019-02-02 RX ADMIN — LACOSAMIDE 120 MILLIGRAM(S): 50 TABLET ORAL at 19:45

## 2019-02-02 RX ADMIN — Medication 650 MILLIGRAM(S): at 11:34

## 2019-02-02 RX ADMIN — Medication 1 MILLIGRAM(S): at 11:25

## 2019-02-02 RX ADMIN — HEPARIN SODIUM 5000 UNIT(S): 5000 INJECTION INTRAVENOUS; SUBCUTANEOUS at 18:01

## 2019-02-02 RX ADMIN — SODIUM CHLORIDE 500 MILLILITER(S): 9 INJECTION INTRAMUSCULAR; INTRAVENOUS; SUBCUTANEOUS at 03:50

## 2019-02-02 RX ADMIN — Medication 1 TABLET(S): at 09:45

## 2019-02-02 RX ADMIN — Medication 100 MILLIGRAM(S): at 09:44

## 2019-02-02 RX ADMIN — Medication 100 MILLIGRAM(S): at 18:02

## 2019-02-02 NOTE — PROGRESS NOTE ADULT - SUBJECTIVE AND OBJECTIVE BOX
CHIEF COMPLAINT:    Interval Events:    REVIEW OF SYSTEMS:  Constitutional:   Eyes:  ENT:  CV:  Resp:  GI:  :  MSK:  Integumentary:  Neurological:  Psychiatric:  Endocrine:  Hematologic/Lymphatic:  Allergic/Immunologic:  [ ] All other systems negative  [ ] Unable to assess ROS because ________    OBJECTIVE:  ICU Vital Signs Last 24 Hrs  T(C): 36.8 (02 Feb 2019 05:15), Max: 38.1 (01 Feb 2019 13:47)  T(F): 98.3 (02 Feb 2019 05:15), Max: 100.5 (01 Feb 2019 13:47)  HR: 131 (02 Feb 2019 07:26) (123 - 140)  BP: 110/69 (02 Feb 2019 05:15) (110/69 - 124/72)  BP(mean): --  ABP: --  ABP(mean): --  RR: 20 (02 Feb 2019 07:25) (20 - 20)  SpO2: 97% (02 Feb 2019 07:26) (93% - 99%)    Mode: standby, FiO2: 40    02-01 @ 07:01  -  02-02 @ 07:00  --------------------------------------------------------  IN: 1660 mL / OUT: 100 mL / NET: 1560 mL      CAPILLARY BLOOD GLUCOSE      POCT Blood Glucose.: 130 mg/dL (02 Feb 2019 06:08)      PHYSICAL EXAM:  General:   HEENT:   Lymph Nodes:  Neck:   Respiratory:   Cardiovascular:   Abdomen:   Extremities:   Skin:   Neurological:  Psychiatry:    HOSPITAL MEDICATIONS:  MEDICATIONS  (STANDING):  cefTAZidime/avibactam IVPB 2.5 Gram(s) IV Intermittent every 8 hours  cefTAZidime/avibactam IVPB      chlorhexidine 4% Liquid 1 Application(s) Topical <User Schedule>  cyanocobalamin 1000 MICROGram(s) Oral daily  dextrose 5%. 1000 milliLiter(s) (50 mL/Hr) IV Continuous <Continuous>  dextrose 50% Injectable 12.5 Gram(s) IV Push once  dextrose 50% Injectable 25 Gram(s) IV Push once  dextrose 50% Injectable 25 Gram(s) IV Push once  folic acid 1 milliGRAM(s) Enteral Tube daily  heparin  Injectable 5000 Unit(s) SubCutaneous every 8 hours  insulin lispro (HumaLOG) corrective regimen sliding scale   SubCutaneous every 6 hours  lacosamide Solution 100 milliGRAM(s) Oral two times a day  lactobacillus acidophilus 1 Tablet(s) Oral three times a day with meals  metroNIDAZOLE  IVPB      metroNIDAZOLE  IVPB 500 milliGRAM(s) IV Intermittent every 8 hours  midodrine 5 milliGRAM(s) Oral three times a day  psyllium Powder 1 Packet(s) Oral daily  QUEtiapine 25 milliGRAM(s) Oral daily  risperiDONE   Solution 1 milliGRAM(s) Oral daily    MEDICATIONS  (PRN):  acetaminophen    Suspension .. 650 milliGRAM(s) Oral every 6 hours PRN Temp greater or equal to 38C (100.4F), Mild Pain (1 - 3), Moderate Pain (4 - 6)  dextrose 40% Gel 15 Gram(s) Oral once PRN Blood Glucose LESS THAN 70 milliGRAM(s)/deciliter  glucagon  Injectable 1 milliGRAM(s) IntraMuscular once PRN Glucose LESS THAN 70 milligrams/deciliter      LABS:                    MICROBIOLOGY:     RADIOLOGY:  [ ] Reviewed and interpreted by me    PULMONARY FUNCTION TESTS:    EKG: CHIEF COMPLAINT: Patient is a 53y old  Male who presents with a chief complaint of ARDS?, sepsis (02 Feb 2019 10:46)      Interval Events: fever     REVIEW OF SYSTEMS:  Constitutional: No pain   CV: Denies   Resp: Denies   GI: Denies     [x ] All other systems negative  [ ] Unable to assess ROS because ________    OBJECTIVE:  ICU Vital Signs Last 24 Hrs  T(C): 36.8 (02 Feb 2019 05:15), Max: 38.1 (01 Feb 2019 13:47)  T(F): 98.3 (02 Feb 2019 05:15), Max: 100.5 (01 Feb 2019 13:47)  HR: 131 (02 Feb 2019 07:26) (123 - 140)  BP: 110/69 (02 Feb 2019 05:15) (110/69 - 124/72)  BP(mean): --  ABP: --  ABP(mean): --  RR: 20 (02 Feb 2019 07:25) (20 - 20)  SpO2: 97% (02 Feb 2019 07:26) (93% - 99%)    Mode: standby, FiO2: 40    02-01 @ 07:01  -  02-02 @ 07:00  --------------------------------------------------------  IN: 1660 mL / OUT: 100 mL / NET: 1560 mL      CAPILLARY BLOOD GLUCOSE      POCT Blood Glucose.: 130 mg/dL (02 Feb 2019 06:08)        HOSPITAL MEDICATIONS:  MEDICATIONS  (STANDING):  cefTAZidime/avibactam IVPB 2.5 Gram(s) IV Intermittent every 8 hours  cefTAZidime/avibactam IVPB      chlorhexidine 4% Liquid 1 Application(s) Topical <User Schedule>  cyanocobalamin 1000 MICROGram(s) Oral daily  dextrose 5%. 1000 milliLiter(s) (50 mL/Hr) IV Continuous <Continuous>  dextrose 50% Injectable 12.5 Gram(s) IV Push once  dextrose 50% Injectable 25 Gram(s) IV Push once  dextrose 50% Injectable 25 Gram(s) IV Push once  folic acid 1 milliGRAM(s) Enteral Tube daily  heparin  Injectable 5000 Unit(s) SubCutaneous every 8 hours  insulin lispro (HumaLOG) corrective regimen sliding scale   SubCutaneous every 6 hours  lacosamide Solution 100 milliGRAM(s) Oral two times a day  lactobacillus acidophilus 1 Tablet(s) Oral three times a day with meals  metroNIDAZOLE  IVPB      metroNIDAZOLE  IVPB 500 milliGRAM(s) IV Intermittent every 8 hours  midodrine 5 milliGRAM(s) Oral three times a day  psyllium Powder 1 Packet(s) Oral daily  QUEtiapine 25 milliGRAM(s) Oral daily  risperiDONE   Solution 1 milliGRAM(s) Oral daily    MEDICATIONS  (PRN):  acetaminophen    Suspension .. 650 milliGRAM(s) Oral every 6 hours PRN Temp greater or equal to 38C (100.4F), Mild Pain (1 - 3), Moderate Pain (4 - 6)  dextrose 40% Gel 15 Gram(s) Oral once PRN Blood Glucose LESS THAN 70 milliGRAM(s)/deciliter  glucagon  Injectable 1 milliGRAM(s) IntraMuscular once PRN Glucose LESS THAN 70 milligrams/deciliter      LABS:                    MICROBIOLOGY:     RADIOLOGY:  [ ] Reviewed and interpreted by me    PULMONARY FUNCTION TESTS:    EKG:

## 2019-02-02 NOTE — PROGRESS NOTE ADULT - PROBLEM SELECTOR PLAN 3
- persistent intermittent fevers, monitor  - ID note appreciated  - CT showing new lung abscess  - cont current abx for now - persistent intermittent fevers, monitor  - ID note appreciated  - CT showing new lung abscess  - cont current abx for now  - Fever 102 today cx sent and vanco x 1

## 2019-02-02 NOTE — PROGRESS NOTE ADULT - ASSESSMENT
53 M (resident of [facility]) with TBI, s/p PEG tube, contracture, and seizure d/o who presented as a transfer from [facility] on 12/9 for respiratory failure 2/2 ARDS likely 2/2 necrotizing pancreatitis s/p intubation.  bronc 12/11 with pseudomonas  Thick secretions and again febrile, sputum cx with  acinetobacter  GIB and colitis due to ?contiguous necrotizing pancreatitis vs ischemic, s/p colonoscopy but not actively bleeding now  completed a 7 day course of avycaz and flagyl for the acinetobacter then switched back to imipenem, but pt again had hypothermia with the same acinetobacter in sputum now s/p trach and back on avycaz + flagyl    sepsis with fever, leukopenia resolved, extubated but reintubated 1/6/19 for hypoxia and poor cough along with profuse secretions and sputum cx again with acinetobacter   Necrotising Pancreatitis with multiple peripancreatitis collections better formed on CT 1/24 and a new 7 cm abscess  improved but still present lung abscesses and new sputum cx with pseudomonas, ?VAP   acinetobacter PNA  with lung abscesses  coag neg staph bacteremia likely contaminant, repeat negative  colitis, ?due to contiguous necrotizing pancreatitis and collection vs ischemic  Overall, lung abscess, MDR organisms, necrotizing pancreatitis  - s/p 7 days of avcaz and flagyl for  acinetobacter in the sputum 12/22-12/28  then back on imipenem for necrotizing pancreatis  - restarted on  avycaz and flagyl to cover the acinetobacter again 1/9, will continue to complete at least a 4 weeks for the lung abscesses  - s/p vanco 1/22-1/24  - f/u with GI  - frequent suctioning and pulmonary toileting  - Monitor for any further signs infection; close monitoring for neurological symptoms on prolonged flagyl    Please call ID service with change in status this weekend  Jackelyn Hutchinson MD  [phone] (pager)  [phone] (office)

## 2019-02-02 NOTE — PROGRESS NOTE ADULT - PROBLEM SELECTOR PLAN 1
- CT on 1/17 with persistent necrotic fluid accumulation but improved   - cont avycaz and flagyl per ID  - repeat CT showing worsening and new abscesses, as well and lung abscess  - s/p EUS stent and drainage  - GI note appreciated  - needs repeat CT next week - CT on 1/17 with persistent necrotic fluid accumulation but improved   - cont avycaz and flagyl per ID  - repeat CT showing worsening and new abscesses, as well and lung abscess  - s/p EUS stent and drainage  - GI note appreciated  - needs repeat CT next week  - GI called re: drainage from from peg site with abd distention

## 2019-02-02 NOTE — PROGRESS NOTE ADULT - SUBJECTIVE AND OBJECTIVE BOX
53yPatient is a 53y old  Male who presents with a chief complaint of ARDS?, sepsis (01 Feb 2019 09:56)      Interval history:  CC: F/U for Lung Abscess    Saw/spoke to patient.   Low grade fever. 100.5  ROS limited        Antimicrobials:    cefTAZidime/avibactam IVPB 2.5 Gram(s) IV Intermittent every 8 hours  cefTAZidime/avibactam IVPB      metroNIDAZOLE  IVPB      metroNIDAZOLE  IVPB 500 milliGRAM(s) IV Intermittent every 8 hours      Vital Signs Last 24 Hrs  T(C): 36.8 (02-02-19 @ 05:15), Max: 38.1 (02-01-19 @ 13:47)  T(F): 98.3 (02-02-19 @ 05:15), Max: 100.5 (02-01-19 @ 13:47)  HR: 131 (02-02-19 @ 07:26) (123 - 140)  BP: 110/69 (02-02-19 @ 05:15) (110/69 - 124/72)  BP(mean): --  RR: 20 (02-02-19 @ 07:25) (20 - 20)  SpO2: 97% (02-02-19 @ 07:26) (93% - 99%)        Gen: AOx1-2, NAD, nonverbal  CV: S1+S2 normal, tachy  Resp: Trach, no crackles, no wheeze  Abd: Soft, nontender, +BS, PEG  Ext: No LE edema, no wounds  PIV left. No phlebitis      RECENT CULTURES:  Culture - Blood (01.30.19 @ 20:13)    Culture - Blood:   NO ORGANISMS ISOLATED  NO ORGANISMS ISOLATED AT 48 HRS.    Specimen Source: BLOOD VENOUS    Culture - Blood (01.30.19 @ 20:13)    Culture - Blood:   NO ORGANISMS ISOLATED  NO ORGANISMS ISOLATED AT 48 HRS.    Specimen Source: BLOOD PERIPHERAL    Culture - Blood (01.29.19 @ 03:20)    Culture - Blood:   NO ORGANISMS ISOLATED  NO ORGANISMS ISOLATED AT 96 HOURS    Specimen Source: BLOOD VENOUS      Culture - Blood (01.29.19 @ 03:20)    Culture - Blood:   NO ORGANISMS ISOLATED  NO ORGANISMS ISOLATED AT 96 HOURS    Specimen Source: BLOOD PERIPHERAL    Culture - Blood in AM (01.26.19 @ 06:25)    Culture - Blood:   NO ORGANISMS ISOLATED    Specimen Source: BLOOD    Culture - Blood in AM (01.25.19 @ 06:34)    Culture - Blood:   NO ORGANISMS ISOLATED    Specimen Source: BLOOD VENOUS    Radiology:  < from: CT Chest w/ IV Cont (01.24.19 @ 15:07) >    IMPRESSION: Necrotizing pancreatitis with acute necrotic collections   again noted. Fluid collections along the proximal gastric wall are better   formed with a newcollection more caudally measuring 7.0 x 4.8 cm. Small   ascites has decreased.    Moderate bilateral pleural effusions with complete atelectasis of both   lower lobes and partial atelectasis of the right middle lobe and lingula   as seen previously.Heterogeneous lung enhancement has improved but is   still present and suggestive of lung abscesses.    < end of copied text >

## 2019-02-02 NOTE — CHART NOTE - NSCHARTNOTEFT_GEN_A_CORE
Spoke with GI on call Dr Byers regarding new onset brown drainage from peg tube insertion site liquid and some soft brown lumpy drainage.  There is mild distention of the abdomen and more liquid is expelled when there is pressure on the abdomen.  Pt denies pain nor does he grimace when abdomen is pressed.   Urgent CT ordered of abd pelvis with contrast, fluid bolus given and vanco x 1   bp 145/79 p 150 r 22 pulse ox 91% on trach collar returned to vent for now   Previous EKG earlier Sinus tach

## 2019-02-02 NOTE — PROGRESS NOTE ADULT - ASSESSMENT
53 Male w/ a PMHx of TBI (s/p PEG tube, contracture, and seizure d/o) presented as a transfer from [facility] on 12/9 for respiratory failure 2/2 ARDS likely 2/2 necrotizing pancreatitis s/p intubation. Course c/b Acinetobacter PNA s/p Avycaz and Flagyl 7 day course.  Course further complicated by acute blood loss anemia s/p colonoscopy with findings suggestive of ischemic colitis, without further bleeding and Hgb remaining stable. S/p extubation 1/3, and re-intubation 1/6 now s/p tracheostomy.

## 2019-02-03 LAB
ANION GAP SERPL CALC-SCNC: 11 MMO/L — SIGNIFICANT CHANGE UP (ref 7–14)
APPEARANCE UR: CLEAR — SIGNIFICANT CHANGE UP
B PERT DNA SPEC QL NAA+PROBE: NOT DETECTED — SIGNIFICANT CHANGE UP
BACTERIA BLD CULT: SIGNIFICANT CHANGE UP
BACTERIA BLD CULT: SIGNIFICANT CHANGE UP
BASOPHILS # BLD AUTO: 0.02 K/UL — SIGNIFICANT CHANGE UP (ref 0–0.2)
BASOPHILS NFR BLD AUTO: 0.3 % — SIGNIFICANT CHANGE UP (ref 0–2)
BILIRUB UR-MCNC: NEGATIVE — SIGNIFICANT CHANGE UP
BLOOD UR QL VISUAL: NEGATIVE — SIGNIFICANT CHANGE UP
BUN SERPL-MCNC: 4 MG/DL — LOW (ref 7–23)
C PNEUM DNA SPEC QL NAA+PROBE: NOT DETECTED — SIGNIFICANT CHANGE UP
CALCIUM SERPL-MCNC: 7.9 MG/DL — LOW (ref 8.4–10.5)
CHLORIDE SERPL-SCNC: 96 MMOL/L — LOW (ref 98–107)
CO2 SERPL-SCNC: 23 MMOL/L — SIGNIFICANT CHANGE UP (ref 22–31)
COLOR SPEC: SIGNIFICANT CHANGE UP
CREAT SERPL-MCNC: < 0.2 MG/DL — LOW (ref 0.5–1.3)
EOSINOPHIL # BLD AUTO: 0.05 K/UL — SIGNIFICANT CHANGE UP (ref 0–0.5)
EOSINOPHIL NFR BLD AUTO: 0.7 % — SIGNIFICANT CHANGE UP (ref 0–6)
FLUAV H1 2009 PAND RNA SPEC QL NAA+PROBE: NOT DETECTED — SIGNIFICANT CHANGE UP
FLUAV H1 RNA SPEC QL NAA+PROBE: NOT DETECTED — SIGNIFICANT CHANGE UP
FLUAV H3 RNA SPEC QL NAA+PROBE: NOT DETECTED — SIGNIFICANT CHANGE UP
FLUAV SUBTYP SPEC NAA+PROBE: NOT DETECTED — SIGNIFICANT CHANGE UP
FLUBV RNA SPEC QL NAA+PROBE: NOT DETECTED — SIGNIFICANT CHANGE UP
GLUCOSE BLDC GLUCOMTR-MCNC: 73 MG/DL — SIGNIFICANT CHANGE UP (ref 70–99)
GLUCOSE BLDC GLUCOMTR-MCNC: 85 MG/DL — SIGNIFICANT CHANGE UP (ref 70–99)
GLUCOSE BLDC GLUCOMTR-MCNC: 90 MG/DL — SIGNIFICANT CHANGE UP (ref 70–99)
GLUCOSE BLDC GLUCOMTR-MCNC: 91 MG/DL — SIGNIFICANT CHANGE UP (ref 70–99)
GLUCOSE SERPL-MCNC: 76 MG/DL — SIGNIFICANT CHANGE UP (ref 70–99)
GLUCOSE UR-MCNC: NEGATIVE — SIGNIFICANT CHANGE UP
HADV DNA SPEC QL NAA+PROBE: NOT DETECTED — SIGNIFICANT CHANGE UP
HCOV PNL SPEC NAA+PROBE: SIGNIFICANT CHANGE UP
HCT VFR BLD CALC: 33.8 % — LOW (ref 39–50)
HGB BLD-MCNC: 10.4 G/DL — LOW (ref 13–17)
HMPV RNA SPEC QL NAA+PROBE: NOT DETECTED — SIGNIFICANT CHANGE UP
HPIV1 RNA SPEC QL NAA+PROBE: NOT DETECTED — SIGNIFICANT CHANGE UP
HPIV2 RNA SPEC QL NAA+PROBE: NOT DETECTED — SIGNIFICANT CHANGE UP
HPIV3 RNA SPEC QL NAA+PROBE: NOT DETECTED — SIGNIFICANT CHANGE UP
HPIV4 RNA SPEC QL NAA+PROBE: NOT DETECTED — SIGNIFICANT CHANGE UP
IMM GRANULOCYTES NFR BLD AUTO: 0.5 % — SIGNIFICANT CHANGE UP (ref 0–1.5)
KETONES UR-MCNC: NEGATIVE — SIGNIFICANT CHANGE UP
LEUKOCYTE ESTERASE UR-ACNC: NEGATIVE — SIGNIFICANT CHANGE UP
LYMPHOCYTES # BLD AUTO: 3.4 K/UL — HIGH (ref 1–3.3)
LYMPHOCYTES # BLD AUTO: 46.4 % — HIGH (ref 13–44)
MAGNESIUM SERPL-MCNC: 1.7 MG/DL — SIGNIFICANT CHANGE UP (ref 1.6–2.6)
MCHC RBC-ENTMCNC: 30.8 % — LOW (ref 32–36)
MCHC RBC-ENTMCNC: 31.9 PG — SIGNIFICANT CHANGE UP (ref 27–34)
MCV RBC AUTO: 103.7 FL — HIGH (ref 80–100)
MONOCYTES # BLD AUTO: 0.96 K/UL — HIGH (ref 0–0.9)
MONOCYTES NFR BLD AUTO: 13.1 % — SIGNIFICANT CHANGE UP (ref 2–14)
NEUTROPHILS # BLD AUTO: 2.86 K/UL — SIGNIFICANT CHANGE UP (ref 1.8–7.4)
NEUTROPHILS NFR BLD AUTO: 39 % — LOW (ref 43–77)
NITRITE UR-MCNC: NEGATIVE — SIGNIFICANT CHANGE UP
NRBC # FLD: 0 K/UL — LOW (ref 25–125)
PH UR: 7 — SIGNIFICANT CHANGE UP (ref 5–8)
PHOSPHATE SERPL-MCNC: 2.5 MG/DL — SIGNIFICANT CHANGE UP (ref 2.5–4.5)
PLATELET # BLD AUTO: 346 K/UL — SIGNIFICANT CHANGE UP (ref 150–400)
PMV BLD: 9 FL — SIGNIFICANT CHANGE UP (ref 7–13)
POTASSIUM SERPL-MCNC: 4.6 MMOL/L — SIGNIFICANT CHANGE UP (ref 3.5–5.3)
POTASSIUM SERPL-SCNC: 4.6 MMOL/L — SIGNIFICANT CHANGE UP (ref 3.5–5.3)
PROT UR-MCNC: 10 — SIGNIFICANT CHANGE UP
RBC # BLD: 3.26 M/UL — LOW (ref 4.2–5.8)
RBC # FLD: 16.1 % — HIGH (ref 10.3–14.5)
RBC CASTS # UR COMP ASSIST: SIGNIFICANT CHANGE UP (ref 0–?)
RSV RNA SPEC QL NAA+PROBE: NOT DETECTED — SIGNIFICANT CHANGE UP
RV+EV RNA SPEC QL NAA+PROBE: NOT DETECTED — SIGNIFICANT CHANGE UP
SODIUM SERPL-SCNC: 130 MMOL/L — LOW (ref 135–145)
SP GR SPEC: 1.03 — SIGNIFICANT CHANGE UP (ref 1–1.04)
SPECIMEN SOURCE: SIGNIFICANT CHANGE UP
UROBILINOGEN FLD QL: NORMAL — SIGNIFICANT CHANGE UP
WBC # BLD: 7.33 K/UL — SIGNIFICANT CHANGE UP (ref 3.8–10.5)
WBC # FLD AUTO: 7.33 K/UL — SIGNIFICANT CHANGE UP (ref 3.8–10.5)
WBC UR QL: SIGNIFICANT CHANGE UP (ref 0–?)

## 2019-02-03 PROCEDURE — 99233 SBSQ HOSP IP/OBS HIGH 50: CPT | Mod: GC

## 2019-02-03 PROCEDURE — 99232 SBSQ HOSP IP/OBS MODERATE 35: CPT | Mod: GC

## 2019-02-03 RX ORDER — ACETAMINOPHEN 500 MG
1000 TABLET ORAL ONCE
Qty: 0 | Refills: 0 | Status: COMPLETED | OUTPATIENT
Start: 2019-02-03 | End: 2019-02-03

## 2019-02-03 RX ADMIN — RISPERIDONE 1 MILLIGRAM(S): 4 TABLET ORAL at 12:27

## 2019-02-03 RX ADMIN — Medication 100 MILLIGRAM(S): at 01:46

## 2019-02-03 RX ADMIN — QUETIAPINE FUMARATE 25 MILLIGRAM(S): 200 TABLET, FILM COATED ORAL at 12:27

## 2019-02-03 RX ADMIN — Medication 100 MILLIGRAM(S): at 18:22

## 2019-02-03 RX ADMIN — LACOSAMIDE 120 MILLIGRAM(S): 50 TABLET ORAL at 05:38

## 2019-02-03 RX ADMIN — HEPARIN SODIUM 5000 UNIT(S): 5000 INJECTION INTRAVENOUS; SUBCUTANEOUS at 15:18

## 2019-02-03 RX ADMIN — Medication 650 MILLIGRAM(S): at 18:56

## 2019-02-03 RX ADMIN — Medication 400 MILLIGRAM(S): at 00:22

## 2019-02-03 RX ADMIN — Medication 1 TABLET(S): at 12:27

## 2019-02-03 RX ADMIN — Medication 1 PACKET(S): at 12:27

## 2019-02-03 RX ADMIN — Medication 1 MILLIGRAM(S): at 12:27

## 2019-02-03 RX ADMIN — Medication 650 MILLIGRAM(S): at 13:26

## 2019-02-03 RX ADMIN — Medication 650 MILLIGRAM(S): at 19:00

## 2019-02-03 RX ADMIN — Medication 1 TABLET(S): at 18:23

## 2019-02-03 RX ADMIN — LACOSAMIDE 120 MILLIGRAM(S): 50 TABLET ORAL at 18:22

## 2019-02-03 RX ADMIN — HEPARIN SODIUM 5000 UNIT(S): 5000 INJECTION INTRAVENOUS; SUBCUTANEOUS at 05:56

## 2019-02-03 RX ADMIN — CHLORHEXIDINE GLUCONATE 1 APPLICATION(S): 213 SOLUTION TOPICAL at 10:33

## 2019-02-03 RX ADMIN — HEPARIN SODIUM 5000 UNIT(S): 5000 INJECTION INTRAVENOUS; SUBCUTANEOUS at 22:45

## 2019-02-03 RX ADMIN — MIDODRINE HYDROCHLORIDE 5 MILLIGRAM(S): 2.5 TABLET ORAL at 15:29

## 2019-02-03 RX ADMIN — PREGABALIN 1000 MICROGRAM(S): 225 CAPSULE ORAL at 12:27

## 2019-02-03 RX ADMIN — Medication 1000 MILLIGRAM(S): at 01:15

## 2019-02-03 RX ADMIN — Medication 100 MILLIGRAM(S): at 10:33

## 2019-02-03 RX ADMIN — Medication 650 MILLIGRAM(S): at 12:37

## 2019-02-03 RX ADMIN — HEPARIN SODIUM 5000 UNIT(S): 5000 INJECTION INTRAVENOUS; SUBCUTANEOUS at 00:03

## 2019-02-03 NOTE — PROGRESS NOTE ADULT - SUBJECTIVE AND OBJECTIVE BOX
CHIEF COMPLAINT:  Patient is a 53y old  Male who presents with a chief complaint of Mode: AC/ CMV (Assist Control/ Continuous Mandatory Ventilation), RR (machine): 14, TV (machine): 350, FiO2: 40, PEEP: 5, MAP: 9.2, PIP: 17  Interval Events:    REVIEW OF SYSTEMS:  Constitutional:   Eyes:  ENT:  CV:  Resp:  GI:  :  MSK:  Integumentary:  Neurological:  Psychiatric:  Endocrine:  Hematologic/Lymphatic:  Allergic/Immunologic:  [ ] All other systems negative  [ ] Unable to assess ROS because ________    OBJECTIVE:  ICU Vital Signs Last 24 Hrs  T(C): 37.6 (2019 05:46), Max: 39.3 (2019 11:30)  T(F): 99.7 (2019 05:46), Max: 102.7 (2019 11:30)  HR: 126 (2019 07:21) (120 - 151)  BP: 108/68 (2019 05:46) (106/74 - 147/77)  BP(mean): --  ABP: --  ABP(mean): --  RR: 26 (2019 05:46) (21 - 34)  SpO2: 98% (2019 07:21) (93% - 99%)    Mode: AC/ CMV (Assist Control/ Continuous Mandatory Ventilation), RR (machine): 14, TV (machine): 350, FiO2: 40, PEEP: 5, MAP: 9.2, PIP: 17    CAPILLARY BLOOD GLUCOSE      POCT Blood Glucose.: 73 mg/dL (2019 05:24)      PHYSICAL EXAM:  General:   HEENT:   Lymph Nodes:  Neck:   Respiratory:   Cardiovascular:   Abdomen:   Extremities:   Skin:   Neurological:  Psychiatry:    HOSPITAL MEDICATIONS:  MEDICATIONS  (STANDING):  cefTAZidime/avibactam IVPB 2.5 Gram(s) IV Intermittent every 8 hours  cefTAZidime/avibactam IVPB      chlorhexidine 4% Liquid 1 Application(s) Topical <User Schedule>  cyanocobalamin 1000 MICROGram(s) Oral daily  dextrose 5%. 1000 milliLiter(s) (50 mL/Hr) IV Continuous <Continuous>  dextrose 50% Injectable 12.5 Gram(s) IV Push once  dextrose 50% Injectable 25 Gram(s) IV Push once  dextrose 50% Injectable 25 Gram(s) IV Push once  folic acid 1 milliGRAM(s) Enteral Tube daily  heparin  Injectable 5000 Unit(s) SubCutaneous every 8 hours  insulin lispro (HumaLOG) corrective regimen sliding scale   SubCutaneous every 6 hours  lacosamide IVPB      lacosamide IVPB 100 milliGRAM(s) IV Intermittent every 12 hours  lactobacillus acidophilus 1 Tablet(s) Oral three times a day with meals  metroNIDAZOLE  IVPB      metroNIDAZOLE  IVPB 500 milliGRAM(s) IV Intermittent every 8 hours  midodrine 5 milliGRAM(s) Oral three times a day  psyllium Powder 1 Packet(s) Oral daily  QUEtiapine 25 milliGRAM(s) Oral daily  risperiDONE   Solution 1 milliGRAM(s) Oral daily  sodium chloride 0.9%. 1000 milliLiter(s) (500 mL/Hr) IV Continuous <Continuous>    MEDICATIONS  (PRN):  acetaminophen    Suspension .. 650 milliGRAM(s) Oral every 6 hours PRN Temp greater or equal to 38C (100.4F), Mild Pain (1 - 3), Moderate Pain (4 - 6)  dextrose 40% Gel 15 Gram(s) Oral once PRN Blood Glucose LESS THAN 70 milliGRAM(s)/deciliter  glucagon  Injectable 1 milliGRAM(s) IntraMuscular once PRN Glucose LESS THAN 70 milligrams/deciliter      LABS:                        10.4   7.33  )-----------( 346      ( 2019 05:25 )             33.8     02-03    130<L>  |  96<L>  |  4<L>  ----------------------------<  76  4.6   |  23  |  < 0.20<L>    Ca    7.9<L>      2019 05:25  Phos  2.5     02-03  Mg     1.7     02-03        Urinalysis Basic - ( 2019 01:40 )    Color: LIGHT YELLOW / Appearance: CLEAR / S.033 / pH: 7.0  Gluc: NEGATIVE / Ketone: NEGATIVE  / Bili: NEGATIVE / Urobili: NORMAL   Blood: NEGATIVE / Protein: 10 / Nitrite: NEGATIVE   Leuk Esterase: NEGATIVE / RBC: 0-2 / WBC 0-2   Sq Epi: x / Non Sq Epi: x / Bacteria: x            MICROBIOLOGY:     RADIOLOGY:  [ ] Reviewed and interpreted by me    PULMONARY FUNCTION TESTS:    EKG:    I&O's Summary CHIEF COMPLAINT:  Patient is a 53y old  Male who presents respiratory failure and necrotizing pancreatitis.    Interval Events: Fever overnight.     REVIEW OF SYSTEMS:  Constitutional: No pain   CV: Denies   Resp: Denies   GI: Denies     OBJECTIVE:  ICU Vital Signs Last 24 Hrs  T(C): 37.6 (2019 05:46), Max: 39.3 (2019 11:30)  T(F): 99.7 (2019 05:46), Max: 102.7 (2019 11:30)  HR: 126 (2019 07:21) (120 - 151)  BP: 108/68 (2019 05:46) (106/74 - 147/77)  BP(mean): --  ABP: --  ABP(mean): --  RR: 26 (2019 05:46) (21 - 34)  SpO2: 98% (2019 07:21) (93% - 99%)    Mode: AC/ CMV (Assist Control/ Continuous Mandatory Ventilation), RR (machine): 14, TV (machine): 350, FiO2: 40, PEEP: 5, MAP: 9.2, PIP: 17    CAPILLARY BLOOD GLUCOSE    POCT Blood Glucose.: 73 mg/dL (2019 05:24)    Physical Exam:   · Constitutional	detailed exam	  · Constitutional Details	no distress	  · Eyes	detailed exam	  · Eyes Details	conjunctiva clear	  · ENMT	detailed exam	  · ENMT Details	mouth	  · Mouth	moist	  · Neck	detailed exam	  · Neck Details	trach with collar	  · Respiratory	detailed exam	  · Respiratory Details	airway patent; breath sounds equal; clear to auscultation bilaterally	  · Cardiovascular	detailed exam	  · Cardiovascular Details	regular rate and rhythm	  · Cardiovascular Details	tachycardia	  · Gastrointestinal	detailed exam	  · GI Normal	nontender	  · GI Abnormal	distended	  · Gastrointestinal Comments	brown liquid drainage from PEG site decreased, + PEG	  · Extremities	detailed exam	  · Extremities Details	+ contractures	  · Vascular	detailed exam	  · Radial Pulse	right normal; left normal	  · Neurological	detailed exam	  · Mental Status	awake alert answers yes no	  · Skin	detailed exam	  · Skin Details	warm and dry	  · Musculoskeletal	detailed exam	  · Musculoskeletal Details	diminished strength	    HOSPITAL MEDICATIONS:  MEDICATIONS  (STANDING):  cefTAZidime/avibactam IVPB 2.5 Gram(s) IV Intermittent every 8 hours  cefTAZidime/avibactam IVPB      chlorhexidine 4% Liquid 1 Application(s) Topical <User Schedule>  cyanocobalamin 1000 MICROGram(s) Oral daily  dextrose 5%. 1000 milliLiter(s) (50 mL/Hr) IV Continuous <Continuous>  dextrose 50% Injectable 12.5 Gram(s) IV Push once  dextrose 50% Injectable 25 Gram(s) IV Push once  dextrose 50% Injectable 25 Gram(s) IV Push once  folic acid 1 milliGRAM(s) Enteral Tube daily  heparin  Injectable 5000 Unit(s) SubCutaneous every 8 hours  insulin lispro (HumaLOG) corrective regimen sliding scale   SubCutaneous every 6 hours  lacosamide IVPB      lacosamide IVPB 100 milliGRAM(s) IV Intermittent every 12 hours  lactobacillus acidophilus 1 Tablet(s) Oral three times a day with meals  metroNIDAZOLE  IVPB      metroNIDAZOLE  IVPB 500 milliGRAM(s) IV Intermittent every 8 hours  midodrine 5 milliGRAM(s) Oral three times a day  psyllium Powder 1 Packet(s) Oral daily  QUEtiapine 25 milliGRAM(s) Oral daily  risperiDONE   Solution 1 milliGRAM(s) Oral daily  sodium chloride 0.9%. 1000 milliLiter(s) (500 mL/Hr) IV Continuous <Continuous>    MEDICATIONS  (PRN):  acetaminophen    Suspension .. 650 milliGRAM(s) Oral every 6 hours PRN Temp greater or equal to 38C (100.4F), Mild Pain (1 - 3), Moderate Pain (4 - 6)  dextrose 40% Gel 15 Gram(s) Oral once PRN Blood Glucose LESS THAN 70 milliGRAM(s)/deciliter  glucagon  Injectable 1 milliGRAM(s) IntraMuscular once PRN Glucose LESS THAN 70 milligrams/deciliter      LABS:                        10.4   7.33  )-----------( 346      ( 2019 05:25 )             33.8     02-03    130<L>  |  96<L>  |  4<L>  ----------------------------<  76  4.6   |  23  |  < 0.20<L>    Ca    7.9<L>      2019 05:25  Phos  2.5     02-03  Mg     1.7     02-03    Urinalysis Basic - ( 2019 01:40 )    Color: LIGHT YELLOW / Appearance: CLEAR / S.033 / pH: 7.0  Gluc: NEGATIVE / Ketone: NEGATIVE  / Bili: NEGATIVE / Urobili: NORMAL   Blood: NEGATIVE / Protein: 10 / Nitrite: NEGATIVE   Leuk Esterase: NEGATIVE / RBC: 0-2 / WBC 0-2   Sq Epi: x / Non Sq Epi: x / Bacteria: x    MICROBIOLOGY:     RADIOLOGY:  [ ] Reviewed and interpreted by me    PULMONARY FUNCTION TESTS:    EKG:    I&O's Summary

## 2019-02-03 NOTE — PROGRESS NOTE ADULT - ASSESSMENT
53 Male w/ a PMHx of TBI (s/p PEG tube, contracture, and seizure d/o) presented as a transfer from [facility] on 12/9 for respiratory failure 2/2 ARDS likely 2/2 necrotizing pancreatitis s/p intubation. Course c/b Acinetobacter PNA s/p Avycaz and Flagyl 7 day course.  Course further complicated by acute blood loss anemia s/p colonoscopy with findings suggestive of ischemic colitis, without further bleeding and Hgb remaining stable. S/p extubation 1/3, and re-intubation 1/6 now s/p tracheostomy.

## 2019-02-03 NOTE — PROGRESS NOTE ADULT - ASSESSMENT
Impression:    1) Walled off necrosis, s/p EUS and AXIOS on 1/31/19  2) Hematochezia, s/p colonoscopy on 12/28/2018 with severe segmental colitis localized to the transverse colon and ascending colon (possible ischemic colitis, possible colitis related to contiguous danay-pancreatic inflammatory process). Biopsies with granulation tissue but no infection/malignancy, Hb stable   3) Resp failure in the setting of pneumonia, requiring tracheostomy now with lung abscess and sepsis on Abx.     Recommendations:  - Trend CBC, CMP and fever curve, Hb stable for now.   - Continue IV antibiotics as per ID recommendations.   - Rest of care per primary team  - Will need necrosectomy later next week.   - GI will follow up. Impression:    1) Walled off necrosis, s/p EUS and AXIOS on 1/31/19: Improvement in size of pancreatic fluid collections on CT A/P with continued drainage through cystgastrostomy tube.  2) Hematochezia, s/p colonoscopy on 12/28/2018 with severe segmental colitis localized to the transverse colon and ascending colon (possible ischemic colitis, possible colitis related to contiguous danay-pancreatic inflammatory process). Biopsies with granulation tissue but no infection/malignancy, Hb stable   3) Resp failure in the setting of pneumonia, now with lung abscess and sepsis on Abx.     Recommendations:  - No indication for emergent endoscopic management; case discussed with on-call biliary attending Dr. Fermin  - Planned for endoscopic necrosectomy this week  - Trend CBC, CMP  - Continue broad spectrum IV antibiotics as per ID recommendations  - Supportive care per primary team    All Gupta MD  Gastroenterology Fellow  Pager number: [phone] / [phone] Impression:    1) Walled off necrosis, s/p EUS and AXIOS on 1/31/19: Improvement in size of pancreatic fluid collections on CT A/P.  2) Hematochezia, s/p colonoscopy on 12/28/2018 with severe segmental colitis localized to the transverse colon and ascending colon (possible ischemic colitis, possible colitis related to contiguous danay-pancreatic inflammatory process). Biopsies with granulation tissue but no infection/malignancy, Hb stable   3) Resp failure in the setting of pneumonia, now with lung abscess and sepsis on Abx.     Recommendations:  - No indication for emergent endoscopic management; case discussed with on-call biliary attending Dr. Fermin  - Planned for endoscopic necrosectomy this week  - Trend CBC, CMP  - Continue broad spectrum IV antibiotics as per ID recommendations  - Supportive care per primary team    All Gupta MD  Gastroenterology Fellow  Pager number: [phone] / [phone]

## 2019-02-03 NOTE — PROGRESS NOTE ADULT - SUBJECTIVE AND OBJECTIVE BOX
Chief Complaint: Sepsis    Interval Events:   - The patient was noted to be persistently febrile yesterday and tachycardic with HRs in the 150s. Report of increased PEG tube output and abdominal distention per primary team. I instructed the team to order repeat imaging.    Allergies:  Valproate Sodium (Other (Severe))    Hospital Medications:  acetaminophen    Suspension .. 650 milliGRAM(s) Oral every 6 hours PRN  cefTAZidime/avibactam IVPB 2.5 Gram(s) IV Intermittent every 8 hours  cefTAZidime/avibactam IVPB      chlorhexidine 4% Liquid 1 Application(s) Topical <User Schedule>  cyanocobalamin 1000 MICROGram(s) Oral daily  dextrose 40% Gel 15 Gram(s) Oral once PRN  dextrose 5%. 1000 milliLiter(s) IV Continuous <Continuous>  dextrose 50% Injectable 12.5 Gram(s) IV Push once  dextrose 50% Injectable 25 Gram(s) IV Push once  dextrose 50% Injectable 25 Gram(s) IV Push once  folic acid 1 milliGRAM(s) Enteral Tube daily  glucagon  Injectable 1 milliGRAM(s) IntraMuscular once PRN  heparin  Injectable 5000 Unit(s) SubCutaneous every 8 hours  insulin lispro (HumaLOG) corrective regimen sliding scale   SubCutaneous every 6 hours  lacosamide IVPB      lacosamide IVPB 100 milliGRAM(s) IV Intermittent every 12 hours  lactobacillus acidophilus 1 Tablet(s) Oral three times a day with meals  metroNIDAZOLE  IVPB      metroNIDAZOLE  IVPB 500 milliGRAM(s) IV Intermittent every 8 hours  midodrine 5 milliGRAM(s) Oral three times a day  psyllium Powder 1 Packet(s) Oral daily  QUEtiapine 25 milliGRAM(s) Oral daily  risperiDONE   Solution 1 milliGRAM(s) Oral daily  sodium chloride 0.9%. 1000 milliLiter(s) IV Continuous <Continuous>    PMHX/PSHX:  Seizure  TBI (traumatic brain injury)  S/P percutaneous endoscopic gastrostomy (PEG) tube placement    ROS: Unable to perform given baseline AMS    PHYSICAL EXAM:   Vital Signs:  Vital Signs Last 24 Hrs  T(C): 37.6 (2019 05:46), Max: 39.3 (2019 11:30)  T(F): 99.7 (2019 05:46), Max: 102.7 (2019 11:30)  HR: 126 (2019 07:21) (120 - 151)  BP: 108/68 (2019 05:46) (106/74 - 147/77)  BP(mean): --  RR: 26 (2019 05:46) (21 - 34)  SpO2: 98% (2019 07:21) (93% - 99%)    GENERAL:  No acute distress, non-verbal  HEENT:  Normocephalic/atraumatic, no scleral icterus  CHEST:  Clear to auscultation bilaterally, no wheezes/rales/ronchi, no accessory muscle use  HEART:  Regular rate and rhythm, no murmurs/rubs/gallops  ABDOMEN:  Soft, non-tender, non-distended, normoactive bowel sounds, no masses, no hepato-splenomegaly, no signs of chronic liver disease  EXTREMITIES:  No cyanosis, clubbing, or edema  SKIN:  No rash/erythema  NEURO:  Alert and oriented x 3, no asterixis, no tremor    LABS:                        10.4   7.33  )-----------( 346      ( 2019 05:25 )             33.8     Mean Cell Volume: 103.7 fL (- @ 05:25)        130<L>  |  96<L>  |  4<L>  ----------------------------<  76  4.6   |  23  |  < 0.20<L>    Ca    7.9<L>      2019 05:25  Phos  2.5       Mg     1.7         Urinalysis Basic - ( 2019 01:40 )    Color: LIGHT YELLOW / Appearance: CLEAR / S.033 / pH: 7.0  Gluc: NEGATIVE / Ketone: NEGATIVE  / Bili: NEGATIVE / Urobili: NORMAL   Blood: NEGATIVE / Protein: 10 / Nitrite: NEGATIVE   Leuk Esterase: NEGATIVE / RBC: 0-2 / WBC 0-2   Sq Epi: x / Non Sq Epi: x / Bacteria: x               10.4   7.33  )-----------( 346      ( 2019 05:25 )             33.8                         10.9   6.64  )-----------( 420      ( 2019 14:30 )             35.1     Imaging:    < from: CT Abdomen and Pelvis w/ IV Cont (19 @ 22:42) >    EXAM:  CT ABDOMEN AND PELVIS IC        PROCEDURE DATE:  2019       INTERPRETATION:  CLINICAL INFORMATION: Fever. History of necrotizing   pancreatitis with abdominal drainage catheters.         COMPARISON: CT abdomen pelvis from 2019     PROCEDURE:   CT of the Abdomen and Pelvis was performed with intravenous contrast.   Intravenous contrast: 90 ml Omnipaque 350. 10 ml discarded.  Oral contrast: None.  Sagittal and coronal reformats were performed.    FINDINGS:    LOWER CHEST: Redemonstration of moderate bilateral pleural effusions with   adjacent bilateral lower lobar compressive atelectasis. Left anterior   descending coronary artery calcification.    LIVER: Diffuse hepatic steatosis.    BILE DUCTS: Normal caliber.    GALLBLADDER: Focal wall calcification.    SPLEEN: Peripheral calcifications. Subcentimeter hypodensity which may   represent hemangioma or lymphangioma.    PANCREAS:   Necrotizing pancreatitis with overall interval decrease in size of   multiple peripancreatic collections.   Interval placement of AXIOS stent connecting a left peripancreatic fluid   collection with the second portion of the duodenum. Since placement of   the stent, there has been significant interval decrease in size of a   large peripancreatic fluid collection which now measures 7.3 x 1.9 cm,   previously measured up to 12.7 x 4.5 cm.  Interval decrease in size of a loculated fluid collection near the   pancreatic head/proximal duodenum, now measuring 2.6 x 3.3 cm, previously   measured up to 3.9 cm.    ADRENALS: Within normal limits.    KIDNEYS/URETERS: Symmetric enhancement. No hydronephrosis. Subcentimeter   right renal hypodensity, too small to characterize.    BLADDER: Within normal limits.    REPRODUCTIVE ORGANS: Prostate within normal limits.    BOWEL: Gastrostomy tube in place. Rectal tube in place. Moderate amount   of stool in the rectum. No bowel obstruction..     PERITONEUM: Small volume ascites. Redemonstration of multiple loculated   fluid collections along thegastric wall, the largest of which measures   4.9 x 0.5 cm (2, 43), which now contains a peripheral enhancing wall.   2 new small fluid collections noted anteriorly in the left upper quadrant   omentum also may reflect evolving abscesses.    VESSELS: Atherosclerotic calcifications.    RETROPERITONEUM: No lymphadenopathy.      ABDOMINAL WALL: Within normal limits.      BONES: Within normal limits.    IMPRESSION:   Partially visualized moderate bilateral pleural effusions with bilateral   lower lobar compressive atelectasis.    Interval placement of a AXIOS stent with decrease in size of previously   seen large left peripancreatic fluid collection which now measures up to   7.3 cm in length.    Interval formation of a enhancing wall around thepreviously seen   perigastric fluid collection, which measures up to 4.9 cm.  Small volume ascites. 2 new small fluid collections noted anteriorly in   the left upper quadrant omentum also may reflect evolving abscesses.    MECHE KLINE M.D.,RADIOLOGY RESIDENT  This document has been electronically signed.  KOMAL ANTONIO M.D., ATTENDING RADIOLOGIST  This document has been electronically signed. Feb  3 2019  9:29AM    < end of copied text > Chief Complaint: Sepsis    Interval Events:   - The patient was noted to be persistently febrile yesterday and tachycardic with HRs in the 150s. Report of increased output through cystgastrostomy tube and abdominal distention per primary team. I instructed the team to order repeat imaging.  - CT A/P showed interval decrease in the size of the necrotic pancreatic fluid collections.    Allergies:  Valproate Sodium (Other (Severe))    Hospital Medications:  acetaminophen    Suspension .. 650 milliGRAM(s) Oral every 6 hours PRN  cefTAZidime/avibactam IVPB 2.5 Gram(s) IV Intermittent every 8 hours  cefTAZidime/avibactam IVPB      chlorhexidine 4% Liquid 1 Application(s) Topical <User Schedule>  cyanocobalamin 1000 MICROGram(s) Oral daily  dextrose 40% Gel 15 Gram(s) Oral once PRN  dextrose 5%. 1000 milliLiter(s) IV Continuous <Continuous>  dextrose 50% Injectable 12.5 Gram(s) IV Push once  dextrose 50% Injectable 25 Gram(s) IV Push once  dextrose 50% Injectable 25 Gram(s) IV Push once  folic acid 1 milliGRAM(s) Enteral Tube daily  glucagon  Injectable 1 milliGRAM(s) IntraMuscular once PRN  heparin  Injectable 5000 Unit(s) SubCutaneous every 8 hours  insulin lispro (HumaLOG) corrective regimen sliding scale   SubCutaneous every 6 hours  lacosamide IVPB      lacosamide IVPB 100 milliGRAM(s) IV Intermittent every 12 hours  lactobacillus acidophilus 1 Tablet(s) Oral three times a day with meals  metroNIDAZOLE  IVPB      metroNIDAZOLE  IVPB 500 milliGRAM(s) IV Intermittent every 8 hours  midodrine 5 milliGRAM(s) Oral three times a day  psyllium Powder 1 Packet(s) Oral daily  QUEtiapine 25 milliGRAM(s) Oral daily  risperiDONE   Solution 1 milliGRAM(s) Oral daily  sodium chloride 0.9%. 1000 milliLiter(s) IV Continuous <Continuous>    PMHX/PSHX:  Seizure  TBI (traumatic brain injury)  S/P percutaneous endoscopic gastrostomy (PEG) tube placement    ROS: Unable to perform given baseline AMS    PHYSICAL EXAM:   Vital Signs:  Vital Signs Last 24 Hrs  T(C): 37.6 (2019 05:46), Max: 39.3 (2019 11:30)  T(F): 99.7 (2019 05:46), Max: 102.7 (2019 11:30)  HR: 126 (2019 07:21) (120 - 151)  BP: 108/68 (2019 05:46) (106/74 - 147/77)  BP(mean): --  RR: 26 (2019 05:46) (21 - 34)  SpO2: 98% (2019 07:21) (93% - 99%)    GENERAL:  No acute distress, non-verbal  HEENT:  No scleral icterus  CHEST:  Ventilator associated breath sounds  HEART:  Regular rate and rhythm, no murmurs/rubs/gallops  ABDOMEN:  Soft, non-tender, distended, normoactive bowel sounds, cystgastrostomy tube in place in upper abdomen with thick serous output  EXTREMITIES:  Trace edema in the lower extremities B/L  NEURO: Non-verbal, responds to verbal stimuli    LABS:                        10.4   7.33  )-----------( 346      ( 2019 05:25 )             33.8     Mean Cell Volume: 103.7 fL (- @ 05:25)        130<L>  |  96<L>  |  4<L>  ----------------------------<  76  4.6   |  23  |  < 0.20<L>    Ca    7.9<L>      2019 05:25  Phos  2.5       Mg     1.7         Urinalysis Basic - ( 2019 01:40 )    Color: LIGHT YELLOW / Appearance: CLEAR / S.033 / pH: 7.0  Gluc: NEGATIVE / Ketone: NEGATIVE  / Bili: NEGATIVE / Urobili: NORMAL   Blood: NEGATIVE / Protein: 10 / Nitrite: NEGATIVE   Leuk Esterase: NEGATIVE / RBC: 0-2 / WBC 0-2   Sq Epi: x / Non Sq Epi: x / Bacteria: x               10.4   7.33  )-----------( 346      ( 2019 05:25 )             33.8                         10.9   6.64  )-----------( 420      ( 2019 14:30 )             35.1     Imaging:    < from: CT Abdomen and Pelvis w/ IV Cont (19 @ 22:42) >    EXAM:  CT ABDOMEN AND PELVIS IC        PROCEDURE DATE:  2019       INTERPRETATION:  CLINICAL INFORMATION: Fever. History of necrotizing   pancreatitis with abdominal drainage catheters.         COMPARISON: CT abdomen pelvis from 2019     PROCEDURE:   CT of the Abdomen and Pelvis was performed with intravenous contrast.   Intravenous contrast: 90 ml Omnipaque 350. 10 ml discarded.  Oral contrast: None.  Sagittal and coronal reformats were performed.    FINDINGS:    LOWER CHEST: Redemonstration of moderate bilateral pleural effusions with   adjacent bilateral lower lobar compressive atelectasis. Left anterior   descending coronary artery calcification.    LIVER: Diffuse hepatic steatosis.    BILE DUCTS: Normal caliber.    GALLBLADDER: Focal wall calcification.    SPLEEN: Peripheral calcifications. Subcentimeter hypodensity which may   represent hemangioma or lymphangioma.    PANCREAS:   Necrotizing pancreatitis with overall interval decrease in size of   multiple peripancreatic collections.   Interval placement of AXIOS stent connecting a left peripancreatic fluid   collection with the second portion of the duodenum. Since placement of   the stent, there has been significant interval decrease in size of a   large peripancreatic fluid collection which now measures 7.3 x 1.9 cm,   previously measured up to 12.7 x 4.5 cm.  Interval decrease in size of a loculated fluid collection near the   pancreatic head/proximal duodenum, now measuring 2.6 x 3.3 cm, previously   measured up to 3.9 cm.    ADRENALS: Within normal limits.    KIDNEYS/URETERS: Symmetric enhancement. No hydronephrosis. Subcentimeter   right renal hypodensity, too small to characterize.    BLADDER: Within normal limits.    REPRODUCTIVE ORGANS: Prostate within normal limits.    BOWEL: Gastrostomy tube in place. Rectal tube in place. Moderate amount   of stool in the rectum. No bowel obstruction..     PERITONEUM: Small volume ascites. Redemonstration of multiple loculated   fluid collections along thegastric wall, the largest of which measures   4.9 x 0.5 cm (2, 43), which now contains a peripheral enhancing wall.   2 new small fluid collections noted anteriorly in the left upper quadrant   omentum also may reflect evolving abscesses.    VESSELS: Atherosclerotic calcifications.    RETROPERITONEUM: No lymphadenopathy.      ABDOMINAL WALL: Within normal limits.      BONES: Within normal limits.    IMPRESSION:   Partially visualized moderate bilateral pleural effusions with bilateral   lower lobar compressive atelectasis.    Interval placement of a AXIOS stent with decrease in size of previously   seen large left peripancreatic fluid collection which now measures up to   7.3 cm in length.    Interval formation of a enhancing wall around thepreviously seen   perigastric fluid collection, which measures up to 4.9 cm.  Small volume ascites. 2 new small fluid collections noted anteriorly in   the left upper quadrant omentum also may reflect evolving abscesses.    MECHE KLINE M.D.,RADIOLOGY RESIDENT  This document has been electronically signed.  KOMAL ANTONIO M.D., ATTENDING RADIOLOGIST  This document has been electronically signed. Feb  3 2019  9:29AM    < end of copied text > Chief Complaint: Sepsis    Interval Events:   - The patient was noted to be persistently febrile yesterday and tachycardic with HRs in the 150s. Report of increased output through cystgastrostomy tube and abdominal distention per primary team.   - CT A/P showed interval decrease in the size of the necrotic pancreatic fluid collections.    Allergies:  Valproate Sodium (Other (Severe))    Hospital Medications:  acetaminophen    Suspension .. 650 milliGRAM(s) Oral every 6 hours PRN  cefTAZidime/avibactam IVPB 2.5 Gram(s) IV Intermittent every 8 hours  cefTAZidime/avibactam IVPB      chlorhexidine 4% Liquid 1 Application(s) Topical <User Schedule>  cyanocobalamin 1000 MICROGram(s) Oral daily  dextrose 40% Gel 15 Gram(s) Oral once PRN  dextrose 5%. 1000 milliLiter(s) IV Continuous <Continuous>  dextrose 50% Injectable 12.5 Gram(s) IV Push once  dextrose 50% Injectable 25 Gram(s) IV Push once  dextrose 50% Injectable 25 Gram(s) IV Push once  folic acid 1 milliGRAM(s) Enteral Tube daily  glucagon  Injectable 1 milliGRAM(s) IntraMuscular once PRN  heparin  Injectable 5000 Unit(s) SubCutaneous every 8 hours  insulin lispro (HumaLOG) corrective regimen sliding scale   SubCutaneous every 6 hours  lacosamide IVPB      lacosamide IVPB 100 milliGRAM(s) IV Intermittent every 12 hours  lactobacillus acidophilus 1 Tablet(s) Oral three times a day with meals  metroNIDAZOLE  IVPB      metroNIDAZOLE  IVPB 500 milliGRAM(s) IV Intermittent every 8 hours  midodrine 5 milliGRAM(s) Oral three times a day  psyllium Powder 1 Packet(s) Oral daily  QUEtiapine 25 milliGRAM(s) Oral daily  risperiDONE   Solution 1 milliGRAM(s) Oral daily  sodium chloride 0.9%. 1000 milliLiter(s) IV Continuous <Continuous>    PMHX/PSHX:  Seizure  TBI (traumatic brain injury)  S/P percutaneous endoscopic gastrostomy (PEG) tube placement    ROS: Unable to perform given baseline AMS    PHYSICAL EXAM:   Vital Signs:  Vital Signs Last 24 Hrs  T(C): 37.6 (2019 05:46), Max: 39.3 (2019 11:30)  T(F): 99.7 (2019 05:46), Max: 102.7 (2019 11:30)  HR: 126 (2019 07:21) (120 - 151)  BP: 108/68 (2019 05:46) (106/74 - 147/77)  BP(mean): --  RR: 26 (2019 05:46) (21 - 34)  SpO2: 98% (2019 07:21) (93% - 99%)    GENERAL:  No acute distress, non-verbal  HEENT:  No scleral icterus  CHEST:  Ventilator associated breath sounds  HEART:  Regular rate and rhythm, no murmurs/rubs/gallops  ABDOMEN:  Soft, non-tender, distended, normoactive bowel sounds, cystgastrostomy tube in place in upper abdomen with thick serous output  EXTREMITIES:  Trace edema in the lower extremities B/L  NEURO: Non-verbal, responds to verbal stimuli    LABS:                        10.4   7.33  )-----------( 346      ( 2019 05:25 )             33.8     Mean Cell Volume: 103.7 fL (19 @ 05:25)        130<L>  |  96<L>  |  4<L>  ----------------------------<  76  4.6   |  23  |  < 0.20<L>    Ca    7.9<L>      2019 05:25  Phos  2.5       Mg     1.7         Urinalysis Basic - ( 2019 01:40 )    Color: LIGHT YELLOW / Appearance: CLEAR / S.033 / pH: 7.0  Gluc: NEGATIVE / Ketone: NEGATIVE  / Bili: NEGATIVE / Urobili: NORMAL   Blood: NEGATIVE / Protein: 10 / Nitrite: NEGATIVE   Leuk Esterase: NEGATIVE / RBC: 0-2 / WBC 0-2   Sq Epi: x / Non Sq Epi: x / Bacteria: x               10.4   7.33  )-----------( 346      ( 2019 05:25 )             33.8                         10.9   6.64  )-----------( 420      ( 2019 14:30 )             35.1     Imaging:    < from: CT Abdomen and Pelvis w/ IV Cont (19 @ 22:42) >    EXAM:  CT ABDOMEN AND PELVIS IC        PROCEDURE DATE:  2019       INTERPRETATION:  CLINICAL INFORMATION: Fever. History of necrotizing   pancreatitis with abdominal drainage catheters.         COMPARISON: CT abdomen pelvis from 2019     PROCEDURE:   CT of the Abdomen and Pelvis was performed with intravenous contrast.   Intravenous contrast: 90 ml Omnipaque 350. 10 ml discarded.  Oral contrast: None.  Sagittal and coronal reformats were performed.    FINDINGS:    LOWER CHEST: Redemonstration of moderate bilateral pleural effusions with   adjacent bilateral lower lobar compressive atelectasis. Left anterior   descending coronary artery calcification.    LIVER: Diffuse hepatic steatosis.    BILE DUCTS: Normal caliber.    GALLBLADDER: Focal wall calcification.    SPLEEN: Peripheral calcifications. Subcentimeter hypodensity which may   represent hemangioma or lymphangioma.    PANCREAS:   Necrotizing pancreatitis with overall interval decrease in size of   multiple peripancreatic collections.   Interval placement of AXIOS stent connecting a left peripancreatic fluid   collection with the second portion of the duodenum. Since placement of   the stent, there has been significant interval decrease in size of a   large peripancreatic fluid collection which now measures 7.3 x 1.9 cm,   previously measured up to 12.7 x 4.5 cm.  Interval decrease in size of a loculated fluid collection near the   pancreatic head/proximal duodenum, now measuring 2.6 x 3.3 cm, previously   measured up to 3.9 cm.    ADRENALS: Within normal limits.    KIDNEYS/URETERS: Symmetric enhancement. No hydronephrosis. Subcentimeter   right renal hypodensity, too small to characterize.    BLADDER: Within normal limits.    REPRODUCTIVE ORGANS: Prostate within normal limits.    BOWEL: Gastrostomy tube in place. Rectal tube in place. Moderate amount   of stool in the rectum. No bowel obstruction..     PERITONEUM: Small volume ascites. Redemonstration of multiple loculated   fluid collections along thegastric wall, the largest of which measures   4.9 x 0.5 cm (2, 43), which now contains a peripheral enhancing wall.   2 new small fluid collections noted anteriorly in the left upper quadrant   omentum also may reflect evolving abscesses.    VESSELS: Atherosclerotic calcifications.    RETROPERITONEUM: No lymphadenopathy.      ABDOMINAL WALL: Within normal limits.      BONES: Within normal limits.    IMPRESSION:   Partially visualized moderate bilateral pleural effusions with bilateral   lower lobar compressive atelectasis.    Interval placement of a AXIOS stent with decrease in size of previously   seen large left peripancreatic fluid collection which now measures up to   7.3 cm in length.    Interval formation of a enhancing wall around thepreviously seen   perigastric fluid collection, which measures up to 4.9 cm.  Small volume ascites. 2 new small fluid collections noted anteriorly in   the left upper quadrant omentum also may reflect evolving abscesses.    MECHE KLINE M.D.,RADIOLOGY RESIDENT  This document has been electronically signed.  KOMAL ANTONIO M.D., ATTENDING RADIOLOGIST  This document has been electronically signed. Feb  3 2019  9:29AM    < end of copied text > Chief Complaint: Sepsis    Interval Events:   - The patient was noted to be persistently febrile yesterday and tachycardic with HRs in the 150s. Report of increased output through PEG tube and abdominal distention per primary team.   - CT A/P showed interval decrease in the size of the necrotic pancreatic fluid collections.    Allergies:  Valproate Sodium (Other (Severe))    Hospital Medications:  acetaminophen    Suspension .. 650 milliGRAM(s) Oral every 6 hours PRN  cefTAZidime/avibactam IVPB 2.5 Gram(s) IV Intermittent every 8 hours  cefTAZidime/avibactam IVPB      chlorhexidine 4% Liquid 1 Application(s) Topical <User Schedule>  cyanocobalamin 1000 MICROGram(s) Oral daily  dextrose 40% Gel 15 Gram(s) Oral once PRN  dextrose 5%. 1000 milliLiter(s) IV Continuous <Continuous>  dextrose 50% Injectable 12.5 Gram(s) IV Push once  dextrose 50% Injectable 25 Gram(s) IV Push once  dextrose 50% Injectable 25 Gram(s) IV Push once  folic acid 1 milliGRAM(s) Enteral Tube daily  glucagon  Injectable 1 milliGRAM(s) IntraMuscular once PRN  heparin  Injectable 5000 Unit(s) SubCutaneous every 8 hours  insulin lispro (HumaLOG) corrective regimen sliding scale   SubCutaneous every 6 hours  lacosamide IVPB      lacosamide IVPB 100 milliGRAM(s) IV Intermittent every 12 hours  lactobacillus acidophilus 1 Tablet(s) Oral three times a day with meals  metroNIDAZOLE  IVPB      metroNIDAZOLE  IVPB 500 milliGRAM(s) IV Intermittent every 8 hours  midodrine 5 milliGRAM(s) Oral three times a day  psyllium Powder 1 Packet(s) Oral daily  QUEtiapine 25 milliGRAM(s) Oral daily  risperiDONE   Solution 1 milliGRAM(s) Oral daily  sodium chloride 0.9%. 1000 milliLiter(s) IV Continuous <Continuous>    PMHX/PSHX:  Seizure  TBI (traumatic brain injury)  S/P percutaneous endoscopic gastrostomy (PEG) tube placement    ROS: Unable to perform given baseline AMS    PHYSICAL EXAM:   Vital Signs:  Vital Signs Last 24 Hrs  T(C): 37.6 (2019 05:46), Max: 39.3 (2019 11:30)  T(F): 99.7 (2019 05:46), Max: 102.7 (2019 11:30)  HR: 126 (2019 07:21) (120 - 151)  BP: 108/68 (2019 05:46) (106/74 - 147/77)  BP(mean): --  RR: 26 (2019 05:46) (21 - 34)  SpO2: 98% (2019 07:21) (93% - 99%)    GENERAL:  No acute distress, non-verbal  HEENT:  No scleral icterus  CHEST:  Ventilator associated breath sounds  HEART:  Regular rate and rhythm, no murmurs/rubs/gallops  ABDOMEN:  Soft, non-tender, distended, normoactive bowel sounds, PEG tube in place in upper abdomen with thick serous output  EXTREMITIES:  Trace edema in the lower extremities B/L  NEURO: Non-verbal, responds to verbal stimuli    LABS:                        10.4   7.33  )-----------( 346      ( 2019 05:25 )             33.8     Mean Cell Volume: 103.7 fL (19 @ 05:25)        130<L>  |  96<L>  |  4<L>  ----------------------------<  76  4.6   |  23  |  < 0.20<L>    Ca    7.9<L>      2019 05:25  Phos  2.5       Mg     1.7         Urinalysis Basic - ( 2019 01:40 )    Color: LIGHT YELLOW / Appearance: CLEAR / S.033 / pH: 7.0  Gluc: NEGATIVE / Ketone: NEGATIVE  / Bili: NEGATIVE / Urobili: NORMAL   Blood: NEGATIVE / Protein: 10 / Nitrite: NEGATIVE   Leuk Esterase: NEGATIVE / RBC: 0-2 / WBC 0-2   Sq Epi: x / Non Sq Epi: x / Bacteria: x               10.4   7.33  )-----------( 346      ( 2019 05:25 )             33.8                         10.9   6.64  )-----------( 420      ( 2019 14:30 )             35.1     Imaging:    < from: CT Abdomen and Pelvis w/ IV Cont (19 @ 22:42) >    EXAM:  CT ABDOMEN AND PELVIS IC        PROCEDURE DATE:  2019       INTERPRETATION:  CLINICAL INFORMATION: Fever. History of necrotizing   pancreatitis with abdominal drainage catheters.         COMPARISON: CT abdomen pelvis from 2019     PROCEDURE:   CT of the Abdomen and Pelvis was performed with intravenous contrast.   Intravenous contrast: 90 ml Omnipaque 350. 10 ml discarded.  Oral contrast: None.  Sagittal and coronal reformats were performed.    FINDINGS:    LOWER CHEST: Redemonstration of moderate bilateral pleural effusions with   adjacent bilateral lower lobar compressive atelectasis. Left anterior   descending coronary artery calcification.    LIVER: Diffuse hepatic steatosis.    BILE DUCTS: Normal caliber.    GALLBLADDER: Focal wall calcification.    SPLEEN: Peripheral calcifications. Subcentimeter hypodensity which may   represent hemangioma or lymphangioma.    PANCREAS:   Necrotizing pancreatitis with overall interval decrease in size of   multiple peripancreatic collections.   Interval placement of AXIOS stent connecting a left peripancreatic fluid   collection with the second portion of the duodenum. Since placement of   the stent, there has been significant interval decrease in size of a   large peripancreatic fluid collection which now measures 7.3 x 1.9 cm,   previously measured up to 12.7 x 4.5 cm.  Interval decrease in size of a loculated fluid collection near the   pancreatic head/proximal duodenum, now measuring 2.6 x 3.3 cm, previously   measured up to 3.9 cm.    ADRENALS: Within normal limits.    KIDNEYS/URETERS: Symmetric enhancement. No hydronephrosis. Subcentimeter   right renal hypodensity, too small to characterize.    BLADDER: Within normal limits.    REPRODUCTIVE ORGANS: Prostate within normal limits.    BOWEL: Gastrostomy tube in place. Rectal tube in place. Moderate amount   of stool in the rectum. No bowel obstruction..     PERITONEUM: Small volume ascites. Redemonstration of multiple loculated   fluid collections along thegastric wall, the largest of which measures   4.9 x 0.5 cm (2, 43), which now contains a peripheral enhancing wall.   2 new small fluid collections noted anteriorly in the left upper quadrant   omentum also may reflect evolving abscesses.    VESSELS: Atherosclerotic calcifications.    RETROPERITONEUM: No lymphadenopathy.      ABDOMINAL WALL: Within normal limits.      BONES: Within normal limits.    IMPRESSION:   Partially visualized moderate bilateral pleural effusions with bilateral   lower lobar compressive atelectasis.    Interval placement of a AXIOS stent with decrease in size of previously   seen large left peripancreatic fluid collection which now measures up to   7.3 cm in length.    Interval formation of a enhancing wall around thepreviously seen   perigastric fluid collection, which measures up to 4.9 cm.  Small volume ascites. 2 new small fluid collections noted anteriorly in   the left upper quadrant omentum also may reflect evolving abscesses.    MECHE KLINE M.D.,RADIOLOGY RESIDENT  This document has been electronically signed.  KOMAL ANTONIO M.D., ATTENDING RADIOLOGIST  This document has been electronically signed. Feb  3 2019  9:29AM    < end of copied text >

## 2019-02-03 NOTE — PROGRESS NOTE ADULT - PROBLEM SELECTOR PLAN 3
- persistent intermittent fevers, monitor  - ID note appreciated  - CT showing new lung abscess  - cont current abx for now  - Fever 101.6 today, blood cx: pending

## 2019-02-03 NOTE — PROGRESS NOTE ADULT - PROBLEM SELECTOR PLAN 1
- CT 2/3/19: CT Abd with Interval formation of a enhancing wall around the    previously seen. 2 new small fluid collections noted anteriorly in the left upper    quadrant omentum also may reflect evolving abscesses.  - cont avycaz and flagyl per ID  - s/p EUS stent and drainage  - GI note following  - Tube feeds re-started 2/3/19

## 2019-02-03 NOTE — CHART NOTE - NSCHARTNOTEFT_GEN_A_CORE
CT abdomen and pelvis completed, called radiology for preliminary results- peg tube in place, pancreatic collection stable, slightly increased b/l pleural effusions. GI Fellow on call notified of results , who advised sepsis workup for etiology of tachycardia. No further recommendations at this time, will continue to monitor for drainage from peg tube insertion site. Pt recently w/ 101.1 Fever oral, Tylenol ordered. Will f/up blood cx, ua, RVP and continue to monitor.    Vital Signs Last 24 Hrs  T(C): 38.4 (02 Feb 2019 23:06), Max: 39.3 (02 Feb 2019 11:30)  T(F): 101.1 (02 Feb 2019 23:06), Max: 102.7 (02 Feb 2019 11:30)  HR: 132 (02 Feb 2019 23:57) (131 - 151)  BP: 109/79 (02 Feb 2019 23:06) (109/79 - 147/77)  BP(mean): --  RR: 21 (02 Feb 2019 23:06) (20 - 34)  SpO2: 98% (02 Feb 2019 23:57) (93% - 99%)    Yuridia Elizabeth PA-C  ADS

## 2019-02-04 LAB
ALBUMIN SERPL ELPH-MCNC: 1.5 G/DL — LOW (ref 3.3–5)
ALP SERPL-CCNC: 153 U/L — HIGH (ref 40–120)
ALT FLD-CCNC: 4 U/L — SIGNIFICANT CHANGE UP (ref 4–41)
ANION GAP SERPL CALC-SCNC: 9 MMO/L — SIGNIFICANT CHANGE UP (ref 7–14)
AST SERPL-CCNC: 10 U/L — SIGNIFICANT CHANGE UP (ref 4–40)
BACTERIA BLD CULT: SIGNIFICANT CHANGE UP
BACTERIA BLD CULT: SIGNIFICANT CHANGE UP
BILIRUB SERPL-MCNC: 0.2 MG/DL — SIGNIFICANT CHANGE UP (ref 0.2–1.2)
BUN SERPL-MCNC: 6 MG/DL — LOW (ref 7–23)
CALCIUM SERPL-MCNC: 7.5 MG/DL — LOW (ref 8.4–10.5)
CHLORIDE SERPL-SCNC: 94 MMOL/L — LOW (ref 98–107)
CO2 SERPL-SCNC: 26 MMOL/L — SIGNIFICANT CHANGE UP (ref 22–31)
CREAT SERPL-MCNC: < 0.2 MG/DL — LOW (ref 0.5–1.3)
GLUCOSE BLDC GLUCOMTR-MCNC: 100 MG/DL — HIGH (ref 70–99)
GLUCOSE BLDC GLUCOMTR-MCNC: 107 MG/DL — HIGH (ref 70–99)
GLUCOSE BLDC GLUCOMTR-MCNC: 111 MG/DL — HIGH (ref 70–99)
GLUCOSE BLDC GLUCOMTR-MCNC: 118 MG/DL — HIGH (ref 70–99)
GLUCOSE SERPL-MCNC: 104 MG/DL — HIGH (ref 70–99)
HCT VFR BLD CALC: 27.3 % — LOW (ref 39–50)
HGB BLD-MCNC: 8.5 G/DL — LOW (ref 13–17)
MAGNESIUM SERPL-MCNC: 1.6 MG/DL — SIGNIFICANT CHANGE UP (ref 1.6–2.6)
MCHC RBC-ENTMCNC: 31.1 % — LOW (ref 32–36)
MCHC RBC-ENTMCNC: 31.6 PG — SIGNIFICANT CHANGE UP (ref 27–34)
MCV RBC AUTO: 101.5 FL — HIGH (ref 80–100)
NRBC # FLD: 0 K/UL — LOW (ref 25–125)
PHOSPHATE SERPL-MCNC: 2.2 MG/DL — LOW (ref 2.5–4.5)
PLATELET # BLD AUTO: 366 K/UL — SIGNIFICANT CHANGE UP (ref 150–400)
PMV BLD: 9.2 FL — SIGNIFICANT CHANGE UP (ref 7–13)
POTASSIUM SERPL-MCNC: 3.8 MMOL/L — SIGNIFICANT CHANGE UP (ref 3.5–5.3)
POTASSIUM SERPL-SCNC: 3.8 MMOL/L — SIGNIFICANT CHANGE UP (ref 3.5–5.3)
PROT SERPL-MCNC: 6.1 G/DL — SIGNIFICANT CHANGE UP (ref 6–8.3)
RBC # BLD: 2.69 M/UL — LOW (ref 4.2–5.8)
RBC # FLD: 16 % — HIGH (ref 10.3–14.5)
SODIUM SERPL-SCNC: 129 MMOL/L — LOW (ref 135–145)
WBC # BLD: 5.33 K/UL — SIGNIFICANT CHANGE UP (ref 3.8–10.5)
WBC # FLD AUTO: 5.33 K/UL — SIGNIFICANT CHANGE UP (ref 3.8–10.5)

## 2019-02-04 PROCEDURE — 99233 SBSQ HOSP IP/OBS HIGH 50: CPT | Mod: GC

## 2019-02-04 PROCEDURE — 99232 SBSQ HOSP IP/OBS MODERATE 35: CPT

## 2019-02-04 RX ORDER — VANCOMYCIN HCL 1 G
1000 VIAL (EA) INTRAVENOUS ONCE
Qty: 0 | Refills: 0 | Status: COMPLETED | OUTPATIENT
Start: 2019-02-04 | End: 2019-02-04

## 2019-02-04 RX ORDER — LACOSAMIDE 50 MG/1
100 TABLET ORAL
Qty: 0 | Refills: 0 | Status: DISCONTINUED | OUTPATIENT
Start: 2019-02-04 | End: 2019-02-07

## 2019-02-04 RX ORDER — VANCOMYCIN HCL 1 G
1000 VIAL (EA) INTRAVENOUS EVERY 12 HOURS
Qty: 0 | Refills: 0 | Status: DISCONTINUED | OUTPATIENT
Start: 2019-02-05 | End: 2019-02-06

## 2019-02-04 RX ORDER — VANCOMYCIN HCL 1 G
VIAL (EA) INTRAVENOUS
Qty: 0 | Refills: 0 | Status: DISCONTINUED | OUTPATIENT
Start: 2019-02-04 | End: 2019-02-06

## 2019-02-04 RX ADMIN — QUETIAPINE FUMARATE 25 MILLIGRAM(S): 200 TABLET, FILM COATED ORAL at 11:46

## 2019-02-04 RX ADMIN — Medication 1 TABLET(S): at 11:45

## 2019-02-04 RX ADMIN — RISPERIDONE 1 MILLIGRAM(S): 4 TABLET ORAL at 11:45

## 2019-02-04 RX ADMIN — HEPARIN SODIUM 5000 UNIT(S): 5000 INJECTION INTRAVENOUS; SUBCUTANEOUS at 14:42

## 2019-02-04 RX ADMIN — MIDODRINE HYDROCHLORIDE 5 MILLIGRAM(S): 2.5 TABLET ORAL at 14:42

## 2019-02-04 RX ADMIN — Medication 1 MILLIGRAM(S): at 11:46

## 2019-02-04 RX ADMIN — HEPARIN SODIUM 5000 UNIT(S): 5000 INJECTION INTRAVENOUS; SUBCUTANEOUS at 05:11

## 2019-02-04 RX ADMIN — LACOSAMIDE 120 MILLIGRAM(S): 50 TABLET ORAL at 18:33

## 2019-02-04 RX ADMIN — Medication 100 MILLIGRAM(S): at 02:16

## 2019-02-04 RX ADMIN — CHLORHEXIDINE GLUCONATE 1 APPLICATION(S): 213 SOLUTION TOPICAL at 09:14

## 2019-02-04 RX ADMIN — Medication 100 MILLIGRAM(S): at 10:25

## 2019-02-04 RX ADMIN — Medication 1 PACKET(S): at 11:46

## 2019-02-04 RX ADMIN — Medication 1 TABLET(S): at 18:34

## 2019-02-04 RX ADMIN — HEPARIN SODIUM 5000 UNIT(S): 5000 INJECTION INTRAVENOUS; SUBCUTANEOUS at 21:40

## 2019-02-04 RX ADMIN — Medication 1 TABLET(S): at 09:14

## 2019-02-04 RX ADMIN — LACOSAMIDE 120 MILLIGRAM(S): 50 TABLET ORAL at 05:11

## 2019-02-04 RX ADMIN — Medication 250 MILLIGRAM(S): at 14:41

## 2019-02-04 RX ADMIN — Medication 650 MILLIGRAM(S): at 05:12

## 2019-02-04 RX ADMIN — Medication 100 MILLIGRAM(S): at 18:34

## 2019-02-04 RX ADMIN — PREGABALIN 1000 MICROGRAM(S): 225 CAPSULE ORAL at 11:46

## 2019-02-04 NOTE — PROGRESS NOTE ADULT - ASSESSMENT
1) Walled off necrosis, s/p EUS and AXIOS 4 days ago.   2) Hematochezia, s/p colonoscopy on 12/28/2018 with severe segmental colitis localized to the transverse colon and ascending colon (possible ischemic colitis, possible colitis related to contiguous danay-pancreatic inflammatory process). Biopsies with granulation tissue but no infection/malignancy, Hb stable   3) Resp failure in the setting of pneumonia, requiring tracheostomy now with lung abscess and sepsis on Abx.     Recommendations  - Trend CBC, CMP and fever curve, Hb stable for now.   - Continue IV antibiotics as per ID recommendations, consider starting on Fluconazole due to persistent sepsis    - Rest of care per primary team  - Will need necrosectomy later this week.   - GI will follow up.

## 2019-02-04 NOTE — PROGRESS NOTE ADULT - ASSESSMENT
53 M (resident of [facility]) with TBI, s/p PEG tube, contracture, and seizure d/o who presented as a transfer from [facility] on 12/9 for respiratory failure 2/2 ARDS likely 2/2 necrotizing pancreatitis s/p intubation.  bronc 12/11 with pseudomonas  Thick secretions and again febrile, sputum cx with  acinetobacter  GIB and colitis due to ?contiguous necrotizing pancreatitis vs ischemic, s/p colonoscopy but not actively bleeding now  completed a 7 day course of avycaz and flagyl for the acinetobacter then switched back to imipenem, but pt again had hypothermia with the same acinetobacter in sputum now s/p trach and back on avycaz + flagyl    sepsis with fever, leukopenia resolved, extubated but reintubated 1/6/19 for hypoxia and poor cough along with profuse secretions and sputum cx again with acinetobacter   Necrotising Pancreatitis with multiple peripancreatitis collections better formed on CT 1/24 and a new 7 cm abscess  improved but still present lung abscesses and new sputum cx with pseudomonas, ?VAP   acinetobacter PNA  with lung abscesses  coag neg staph bacteremia likely contaminant, repeat negative  colitis, ?due to contiguous necrotizing pancreatitis and collection vs ischemic    Overall, lung abscess, MDR organisms, necrotizing pancreatitis, intraabdominal collection    - s/p 7 days of avcaz and flagyl for  acinetobacter in the sputum 12/22-12/28  then back on imipenem for necrotizing pancreatis  - restarted on  avycaz and flagyl to cover the acinetobacter again 1/9, will continue to complete at least a 4 weeks for the lung abscesses  - s/p vanco 1/22-1/24.  Add back vanco for fevers. Vanco 1 gram IV q12. d/w team.  - f/u with GI  - frequent suctioning and pulmonary toileting  - Monitor for any further signs infection; close monitoring for neurological symptoms on prolonged flagyl  - ?role for aspiration of intraabdominal collection for cultures.      Jackelyn Hutchinson MD  [phone] (pager)  [phone] (office)

## 2019-02-04 NOTE — PROGRESS NOTE ADULT - SUBJECTIVE AND OBJECTIVE BOX
CHIEF COMPLAINT:    Interval Events:    REVIEW OF SYSTEMS:  Constitutional:   Eyes:  ENT:  CV:  Resp:  GI:  :  MSK:  Integumentary:  Neurological:  Psychiatric:  Endocrine:  Hematologic/Lymphatic:  Allergic/Immunologic:  [ ] All other systems negative  [ ] Unable to assess ROS because ________    OBJECTIVE:  ICU Vital Signs Last 24 Hrs  T(C): 38 (2019 04:53), Max: 38.2 (2019 10:00)  T(F): 100.4 (2019 04:53), Max: 100.8 (2019 10:00)  HR: 121 (2019 07:35) (114 - 131)  BP: 119/67 (2019 04:53) (90/55 - 124/70)  BP(mean): --  ABP: --  ABP(mean): --  RR: 25 (2019 04:53) (19 - 25)  SpO2: 99% (2019 07:35) (97% - 100%)    Mode: AC/ CMV (Assist Control/ Continuous Mandatory Ventilation), RR (machine): 14, TV (machine): 350, FiO2: 40, PEEP: 5, MAP: 8.1, PIP: 14     @ 07:01  -  02-04 @ 07:00  --------------------------------------------------------  IN: 2288 mL / OUT: 700 mL / NET: 1588 mL      CAPILLARY BLOOD GLUCOSE      POCT Blood Glucose.: 100 mg/dL (2019 05:09)      PHYSICAL EXAM:  General:   HEENT:   Lymph Nodes:  Neck:   Respiratory:   Cardiovascular:   Abdomen:   Extremities:   Skin:   Neurological:  Psychiatry:    HOSPITAL MEDICATIONS:  MEDICATIONS  (STANDING):  cefTAZidime/avibactam IVPB 2.5 Gram(s) IV Intermittent every 8 hours  cefTAZidime/avibactam IVPB      chlorhexidine 4% Liquid 1 Application(s) Topical <User Schedule>  cyanocobalamin 1000 MICROGram(s) Oral daily  dextrose 5%. 1000 milliLiter(s) (50 mL/Hr) IV Continuous <Continuous>  dextrose 50% Injectable 12.5 Gram(s) IV Push once  dextrose 50% Injectable 25 Gram(s) IV Push once  dextrose 50% Injectable 25 Gram(s) IV Push once  folic acid 1 milliGRAM(s) Enteral Tube daily  heparin  Injectable 5000 Unit(s) SubCutaneous every 8 hours  insulin lispro (HumaLOG) corrective regimen sliding scale   SubCutaneous every 6 hours  lacosamide IVPB      lacosamide IVPB 100 milliGRAM(s) IV Intermittent every 12 hours  lactobacillus acidophilus 1 Tablet(s) Oral three times a day with meals  metroNIDAZOLE  IVPB      metroNIDAZOLE  IVPB 500 milliGRAM(s) IV Intermittent every 8 hours  midodrine 5 milliGRAM(s) Oral three times a day  psyllium Powder 1 Packet(s) Oral daily  QUEtiapine 25 milliGRAM(s) Oral daily  risperiDONE   Solution 1 milliGRAM(s) Oral daily  sodium chloride 0.9%. 1000 milliLiter(s) (500 mL/Hr) IV Continuous <Continuous>    MEDICATIONS  (PRN):  acetaminophen    Suspension .. 650 milliGRAM(s) Oral every 6 hours PRN Temp greater or equal to 38C (100.4F), Mild Pain (1 - 3), Moderate Pain (4 - 6)  dextrose 40% Gel 15 Gram(s) Oral once PRN Blood Glucose LESS THAN 70 milliGRAM(s)/deciliter  glucagon  Injectable 1 milliGRAM(s) IntraMuscular once PRN Glucose LESS THAN 70 milligrams/deciliter      LABS:                        8.5    5.33  )-----------( 366      ( 2019 05:41 )             27.3     02-    129<L>  |  94<L>  |  6<L>  ----------------------------<  104<H>  3.8   |  26  |  < 0.20<L>    Ca    7.5<L>      2019 05:41  Phos  2.2     02-04  Mg     1.6     02-    TPro  6.1  /  Alb  1.5<L>  /  TBili  0.2  /  DBili  x   /  AST  10  /  ALT  4   /  AlkPhos  153<H>  02-04      Urinalysis Basic - ( 2019 01:40 )    Color: LIGHT YELLOW / Appearance: CLEAR / S.033 / pH: 7.0  Gluc: NEGATIVE / Ketone: NEGATIVE  / Bili: NEGATIVE / Urobili: NORMAL   Blood: NEGATIVE / Protein: 10 / Nitrite: NEGATIVE   Leuk Esterase: NEGATIVE / RBC: 0-2 / WBC 0-2   Sq Epi: x / Non Sq Epi: x / Bacteria: x            MICROBIOLOGY:     RADIOLOGY:  [ ] Reviewed and interpreted by me    PULMONARY FUNCTION TESTS:    EKG: CHIEF COMPLAINT: Patient is a 53y old  Male who presents with a chief complaint of ARDS?, sepsis (2019 12:49)      Interval Events: no overnight events     REVIEW OF SYSTEMS:  Constitutional: Denies pain   CV: denies   Resp: Denies   GI: Denies   [x ] All other systems negative  [ ] Unable to assess ROS because ________    OBJECTIVE:  ICU Vital Signs Last 24 Hrs  T(C): 38 (2019 04:53), Max: 38.2 (2019 10:00)  T(F): 100.4 (2019 04:53), Max: 100.8 (2019 10:00)  HR: 121 (2019 07:35) (114 - 131)  BP: 119/67 (2019 04:53) (90/55 - 124/70)  BP(mean): --  ABP: --  ABP(mean): --  RR: 25 (2019 04:53) (19 - 25)  SpO2: 99% (2019 07:35) (97% - 100%)    Mode: AC/ CMV (Assist Control/ Continuous Mandatory Ventilation), RR (machine): 14, TV (machine): 350, FiO2: 40, PEEP: 5, MAP: 8.1, PIP: 14    - @ 07:01  -  02-04 @ 07:00  --------------------------------------------------------  IN: 2288 mL / OUT: 700 mL / NET: 1588 mL      CAPILLARY BLOOD GLUCOSE      POCT Blood Glucose.: 100 mg/dL (2019 05:09)        HOSPITAL MEDICATIONS:  MEDICATIONS  (STANDING):  cefTAZidime/avibactam IVPB 2.5 Gram(s) IV Intermittent every 8 hours  cefTAZidime/avibactam IVPB      chlorhexidine 4% Liquid 1 Application(s) Topical <User Schedule>  cyanocobalamin 1000 MICROGram(s) Oral daily  dextrose 5%. 1000 milliLiter(s) (50 mL/Hr) IV Continuous <Continuous>  dextrose 50% Injectable 12.5 Gram(s) IV Push once  dextrose 50% Injectable 25 Gram(s) IV Push once  dextrose 50% Injectable 25 Gram(s) IV Push once  folic acid 1 milliGRAM(s) Enteral Tube daily  heparin  Injectable 5000 Unit(s) SubCutaneous every 8 hours  insulin lispro (HumaLOG) corrective regimen sliding scale   SubCutaneous every 6 hours  lacosamide IVPB      lacosamide IVPB 100 milliGRAM(s) IV Intermittent every 12 hours  lactobacillus acidophilus 1 Tablet(s) Oral three times a day with meals  metroNIDAZOLE  IVPB      metroNIDAZOLE  IVPB 500 milliGRAM(s) IV Intermittent every 8 hours  midodrine 5 milliGRAM(s) Oral three times a day  psyllium Powder 1 Packet(s) Oral daily  QUEtiapine 25 milliGRAM(s) Oral daily  risperiDONE   Solution 1 milliGRAM(s) Oral daily  sodium chloride 0.9%. 1000 milliLiter(s) (500 mL/Hr) IV Continuous <Continuous>    MEDICATIONS  (PRN):  acetaminophen    Suspension .. 650 milliGRAM(s) Oral every 6 hours PRN Temp greater or equal to 38C (100.4F), Mild Pain (1 - 3), Moderate Pain (4 - 6)  dextrose 40% Gel 15 Gram(s) Oral once PRN Blood Glucose LESS THAN 70 milliGRAM(s)/deciliter  glucagon  Injectable 1 milliGRAM(s) IntraMuscular once PRN Glucose LESS THAN 70 milligrams/deciliter      LABS:                        8.5    5.33  )-----------( 366      ( 2019 05:41 )             27.3     02-04    129<L>  |  94<L>  |  6<L>  ----------------------------<  104<H>  3.8   |  26  |  < 0.20<L>    Ca    7.5<L>      2019 05:41  Phos  2.2     02-  Mg     1.6     -    TPro  6.1  /  Alb  1.5<L>  /  TBili  0.2  /  DBili  x   /  AST  10  /  ALT  4   /  AlkPhos  153<H>  02-04      Urinalysis Basic - ( 2019 01:40 )    Color: LIGHT YELLOW / Appearance: CLEAR / S.033 / pH: 7.0  Gluc: NEGATIVE / Ketone: NEGATIVE  / Bili: NEGATIVE / Urobili: NORMAL   Blood: NEGATIVE / Protein: 10 / Nitrite: NEGATIVE   Leuk Esterase: NEGATIVE / RBC: 0-2 / WBC 0-2   Sq Epi: x / Non Sq Epi: x / Bacteria: x            MICROBIOLOGY:     RADIOLOGY:  [ ] Reviewed and interpreted by me    PULMONARY FUNCTION TESTS:    EKG:

## 2019-02-04 NOTE — PROGRESS NOTE ADULT - ASSESSMENT
53 Male w/ a PMHx of TBI (s/p PEG tube, contracture, and seizure d/o) presented as a transfer from [facility] on 12/9 for respiratory failure 2/2 ARDS likely 2/2 necrotizing pancreatitis s/p intubation. Course c/b Acinetobacter PNA s/p Avycaz and Flagyl 7 day course.  Course further complicated by acute blood loss anemia s/p colonoscopy with findings suggestive of ischemic colitis, without further bleeding and Hgb remaining stable. S/p extubation 1/3, and re-intubation 1/6 now s/p tracheostomy.

## 2019-02-04 NOTE — PROGRESS NOTE ADULT - SUBJECTIVE AND OBJECTIVE BOX
53yPatient is a 53y old  Male who presents with a chief complaint of ARDS?, sepsis (01 Feb 2019 09:56)      Interval history:  CC: F/U for Lung Abscess    Saw/spoke to patient.   Fevers persists. Higher fever over weekend.  Repeat CT done and reveals evolving abscess, decreased pancreatic collection    ROS limited        Antimicrobials:    cefTAZidime/avibactam IVPB 2.5 Gram(s) IV Intermittent every 8 hours  cefTAZidime/avibactam IVPB      metroNIDAZOLE  IVPB      metroNIDAZOLE  IVPB 500 milliGRAM(s) IV Intermittent every 8 hours  Vanco x 1 over weekend      Vital Signs Last 24 Hrs  T(C): 36.8 (02-02-19 @ 05:15), Max: 38.1 (02-01-19 @ 13:47)  T(F): 98.3 (02-02-19 @ 05:15), Max: 100.5 (02-01-19 @ 13:47)  HR: 131 (02-02-19 @ 07:26) (123 - 140)  BP: 110/69 (02-02-19 @ 05:15) (110/69 - 124/72)  BP(mean): --  RR: 20 (02-02-19 @ 07:25) (20 - 20)  SpO2: 97% (02-02-19 @ 07:26) (93% - 99%)        Gen: AOx1-2, NAD, nonverbal  CV: S1+S2 normal, tachy  Resp: Trach, no crackles, no wheeze  Abd: Soft, nontender, +BS, PEG  Ext: No LE edema, no wounds  PIV left. No phlebitis      RECENT CULTURES:    Culture - Blood (02.02.19 @ 14:52)    Culture - Blood:   NO ORGANISMS ISOLATED  NO ORGANISMS ISOLATED AT 24 HOURS    Specimen Source: BLOOD VENOUS      Culture - Blood (01.30.19 @ 20:13)    Culture - Blood:   NO ORGANISMS ISOLATED  NO ORGANISMS ISOLATED AT 48 HRS.    Specimen Source: BLOOD VENOUS    Culture - Blood (01.30.19 @ 20:13)    Culture - Blood:   NO ORGANISMS ISOLATED  NO ORGANISMS ISOLATED AT 48 HRS.    Specimen Source: BLOOD PERIPHERAL    Culture - Blood (01.29.19 @ 03:20)    Culture - Blood:   NO ORGANISMS ISOLATED  NO ORGANISMS ISOLATED AT 96 HOURS    Specimen Source: BLOOD VENOUS      Culture - Blood (01.29.19 @ 03:20)    Culture - Blood:   NO ORGANISMS ISOLATED  NO ORGANISMS ISOLATED AT 96 HOURS    Specimen Source: BLOOD PERIPHERAL    Culture - Blood in AM (01.26.19 @ 06:25)    Culture - Blood:   NO ORGANISMS ISOLATED    Specimen Source: BLOOD    Culture - Blood in AM (01.25.19 @ 06:34)    Culture - Blood:   NO ORGANISMS ISOLATED    Specimen Source: BLOOD VENOUS    Radiology:  < from: CT Abdomen and Pelvis w/ IV Cont (02.02.19 @ 22:42) >  Partially visualized moderate bilateral pleural effusions with bilateral   lower lobar compressive atelectasis.    Interval placement of a AXIOS stent with decrease in size of previously   seen large left peripancreatic fluid collection which now measures up to   7.3 cm in length.    Interval formation of a enhancing wall around thepreviously seen   perigastric fluid collection, which measures up to 4.9 cm.  Small volume ascites. 2 new small fluid collections noted anteriorly in   the left upper quadrant omentum also may reflect evolving abscesses.    < end of copied text >      < from: CT Chest w/ IV Cont (01.24.19 @ 15:07) >    IMPRESSION: Necrotizing pancreatitis with acute necrotic collections   again noted. Fluid collections along the proximal gastric wall are better   formed with a newcollection more caudally measuring 7.0 x 4.8 cm. Small   ascites has decreased.    Moderate bilateral pleural effusions with complete atelectasis of both   lower lobes and partial atelectasis of the right middle lobe and lingula   as seen previously.Heterogeneous lung enhancement has improved but is   still present and suggestive of lung abscesses.    < end of copied text > 53yPatient is a 53y old  Male who presents with a chief complaint of ARDS?, sepsis (01 Feb 2019 09:56)      Interval history:  CC: F/U for Lung Abscess    Saw/spoke to patient.   Fevers persists. Higher fever over weekend.  Repeat CT done and reveals evolving abscess, decreased pancreatic collection    ROS limited        Antimicrobials:    cefTAZidime/avibactam IVPB 2.5 Gram(s) IV Intermittent every 8 hours  cefTAZidime/avibactam IVPB      metroNIDAZOLE  IVPB      metroNIDAZOLE  IVPB 500 milliGRAM(s) IV Intermittent every 8 hours  Vanco x 1 over weekend        Vital Signs Last 24 Hrs  T(F): 99 (02-04-19 @ 10:00), Max: 100.4 (02-03-19 @ 18:30)  HR: 124 (02-04-19 @ 11:49)  BP: 117/60 (02-04-19 @ 10:00)  RR: 24 (02-04-19 @ 10:00)  SpO2: 98% (02-04-19 @ 11:49) (97% - 100%)  Wt(kg): --    Vital Signs   T(C): 36.8 (02-02-19 @ 05:15), Max: 38.1 (02-01-19 @ 13:47)  T(F): 98.3 (02-02-19 @ 05:15), Max: 100.5 (02-01-19 @ 13:47)  HR: 131 (02-02-19 @ 07:26) (123 - 140)  BP: 110/69 (02-02-19 @ 05:15) (110/69 - 124/72)  BP(mean): --  RR: 20 (02-02-19 @ 07:25) (20 - 20)  SpO2: 97% (02-02-19 @ 07:26) (93% - 99%)        Gen: AOx1-2, NAD, nonverbal  CV: S1+S2 normal, tachy  Resp: Trach, no crackles, no wheeze  Abd: Soft, nontender, +BS, PEG  Ext: No LE edema, no wounds  PIV left. No phlebitis      RECENT CULTURES:    Culture - Blood (02.02.19 @ 14:52)    Culture - Blood:   NO ORGANISMS ISOLATED  NO ORGANISMS ISOLATED AT 24 HOURS    Specimen Source: BLOOD VENOUS      Culture - Blood (01.30.19 @ 20:13)    Culture - Blood:   NO ORGANISMS ISOLATED  NO ORGANISMS ISOLATED AT 48 HRS.    Specimen Source: BLOOD VENOUS    Culture - Blood (01.30.19 @ 20:13)    Culture - Blood:   NO ORGANISMS ISOLATED  NO ORGANISMS ISOLATED AT 48 HRS.    Specimen Source: BLOOD PERIPHERAL    Culture - Blood (01.29.19 @ 03:20)    Culture - Blood:   NO ORGANISMS ISOLATED  NO ORGANISMS ISOLATED AT 96 HOURS    Specimen Source: BLOOD VENOUS      Culture - Blood (01.29.19 @ 03:20)    Culture - Blood:   NO ORGANISMS ISOLATED  NO ORGANISMS ISOLATED AT 96 HOURS    Specimen Source: BLOOD PERIPHERAL    Culture - Blood in AM (01.26.19 @ 06:25)    Culture - Blood:   NO ORGANISMS ISOLATED    Specimen Source: BLOOD    Culture - Blood in AM (01.25.19 @ 06:34)    Culture - Blood:   NO ORGANISMS ISOLATED    Specimen Source: BLOOD VENOUS    Radiology:  < from: CT Abdomen and Pelvis w/ IV Cont (02.02.19 @ 22:42) >  Partially visualized moderate bilateral pleural effusions with bilateral   lower lobar compressive atelectasis.    Interval placement of a AXIOS stent with decrease in size of previously   seen large left peripancreatic fluid collection which now measures up to   7.3 cm in length.    Interval formation of a enhancing wall around thepreviously seen   perigastric fluid collection, which measures up to 4.9 cm.  Small volume ascites. 2 new small fluid collections noted anteriorly in   the left upper quadrant omentum also may reflect evolving abscesses.    < end of copied text >      < from: CT Chest w/ IV Cont (01.24.19 @ 15:07) >    IMPRESSION: Necrotizing pancreatitis with acute necrotic collections   again noted. Fluid collections along the proximal gastric wall are better   formed with a newcollection more caudally measuring 7.0 x 4.8 cm. Small   ascites has decreased.    Moderate bilateral pleural effusions with complete atelectasis of both   lower lobes and partial atelectasis of the right middle lobe and lingula   as seen previously.Heterogeneous lung enhancement has improved but is   still present and suggestive of lung abscesses.    < end of copied text >

## 2019-02-04 NOTE — PROVIDER CONTACT NOTE (OTHER) - BACKGROUND
Admitted for acute respiratory failure. History of necrotizing pancreatitis, Traumatic brain injury, Seizures, etc.

## 2019-02-04 NOTE — PROGRESS NOTE ADULT - SUBJECTIVE AND OBJECTIVE BOX
GI following the patient for Necrotizing Pancreatitis with  Walled Off Necrosis and new collection around the head of pancreas , patient was supposed to follow up with DR Jules for possible Endoscopic drainage of the walled of necrosis after wall maturation. but patient stayed in hospital for new fever, had repeat CT Chest and abdomen that revealed walled off necrosis of a 7.8 cm collection and a new collection and CT chest findings suggestive of lung abscesses , s/p treatment with antibiotics for 3 weeks but with persistent low grade temps, now s/p EGD , EUS and AXIOS stent last week, 4 days ago , still having low grade temps and remains on IV antibiotics.     Interval Events: No bleeding, abdominal pain, remains on IV abx, low grade temps and tachycardiac during the weekend.     Allergies:  Valproate Sodium (Other (Severe))      Hospital Medications:  acetaminophen    Suspension .. 650 milliGRAM(s) Oral every 6 hours PRN  cefTAZidime/avibactam IVPB 2.5 Gram(s) IV Intermittent every 8 hours  cefTAZidime/avibactam IVPB      chlorhexidine 4% Liquid 1 Application(s) Topical <User Schedule>  cyanocobalamin 1000 MICROGram(s) Oral daily  dextrose 40% Gel 15 Gram(s) Oral once PRN  dextrose 5%. 1000 milliLiter(s) IV Continuous <Continuous>  dextrose 50% Injectable 12.5 Gram(s) IV Push once  dextrose 50% Injectable 25 Gram(s) IV Push once  dextrose 50% Injectable 25 Gram(s) IV Push once  folic acid 1 milliGRAM(s) Enteral Tube daily  glucagon  Injectable 1 milliGRAM(s) IntraMuscular once PRN  heparin  Injectable 5000 Unit(s) SubCutaneous every 8 hours  insulin lispro (HumaLOG) corrective regimen sliding scale   SubCutaneous every 6 hours  lacosamide IVPB      lacosamide IVPB 100 milliGRAM(s) IV Intermittent every 12 hours  lactobacillus acidophilus 1 Tablet(s) Oral three times a day with meals  metroNIDAZOLE  IVPB      metroNIDAZOLE  IVPB 500 milliGRAM(s) IV Intermittent every 8 hours  midodrine 5 milliGRAM(s) Oral three times a day  psyllium Powder 1 Packet(s) Oral daily  QUEtiapine 25 milliGRAM(s) Oral daily  risperiDONE   Solution 1 milliGRAM(s) Oral daily  sodium chloride 0.9%. 1000 milliLiter(s) IV Continuous <Continuous>      PMHX/PSHX:  Seizure  TBI (traumatic brain injury)  S/P percutaneous endoscopic gastrostomy (PEG) tube placement  No significant past surgical history      ROS:   Can not assess due to mental status, but looks comfortable, tolerating tube feeds, low grade temps     PHYSICAL EXAM:     GENERAL:  Appears stated age,  no distress  HEENT:  NC/AT,  conjunctivae clear, sclera -anicteric  CHEST:  Decreased breath sounds at b/l bases   HEART:  Regular rhythm, S1, S2, no added sounds  ABDOMEN:  Soft, non-tender, non-distended, normoactive bowel sounds.    Vital Signs:  Vital Signs Last 24 Hrs  T(C): 37.2 (2019 10:00), Max: 38 (2019 18:30)  T(F): 99 (2019 10:00), Max: 100.4 (2019 18:30)  HR: 123 (2019 10:00) (114 - 131)  BP: 117/60 (2019 10:00) (90/55 - 124/70)  BP(mean): --  RR: 24 (2019 10:00) (19 - 25)  SpO2: 98% (2019 10:00) (97% - 100%)  Daily     Daily Weight in k.1 (2019 04:53)    LABS:                        8.5    5.33  )-----------( 366      ( 2019 05:41 )             27.3     02-04    129<L>  |  94<L>  |  6<L>  ----------------------------<  104<H>  3.8   |  26  |  < 0.20<L>    Ca    7.5<L>      2019 05:41  Phos  2.2     02-04  Mg     1.6     02-04    TPro  6.1  /  Alb  1.5<L>  /  TBili  0.2  /  DBili  x   /  AST  10  /  ALT  4   /  AlkPhos  153<H>  02-04    LIVER FUNCTIONS - ( 2019 05:41 )  Alb: 1.5 g/dL / Pro: 6.1 g/dL / ALK PHOS: 153 u/L / ALT: 4 u/L / AST: 10 u/L / GGT: x             Urinalysis Basic - ( 2019 01:40 )    Color: LIGHT YELLOW / Appearance: CLEAR / S.033 / pH: 7.0  Gluc: NEGATIVE / Ketone: NEGATIVE  / Bili: NEGATIVE / Urobili: NORMAL   Blood: NEGATIVE / Protein: 10 / Nitrite: NEGATIVE   Leuk Esterase: NEGATIVE / RBC: 0-2 / WBC 0-2   Sq Epi: x / Non Sq Epi: x / Bacteria: x          Imaging: < from: CT Abdomen and Pelvis w/ IV Cont (19 @ 22:42) >  Partially visualized moderate bilateral pleural effusions with bilateral   lower lobar compressive atelectasis.    Interval placement of a AXIOS stent with decrease in size of previously   seen large left peripancreatic fluid collection which now measures up to   7.3 cm in length.    Interval formation of a enhancing wall around thepreviously seen   perigastric fluid collection, which measures up to 4.9 cm.  Small volume ascites. 2 new small fluid collections noted anteriorly in   the left upper quadrant omentum also may reflect evolving abscesses.

## 2019-02-04 NOTE — PROGRESS NOTE ADULT - PROBLEM SELECTOR PLAN 3
- persistent intermittent fevers, monitor  - ID note appreciated  - CT showing new lung abscess  - cont current abx for now  - Fever 101.6 today, blood cx: pending - persistent intermittent fevers, monitor  - ID note appreciated  - CT showing new lung abscess  - cont current abx for now  - Fever 101.6  blood cx: pending  - start vanco 1gm bid for now

## 2019-02-04 NOTE — PROGRESS NOTE ADULT - PROBLEM SELECTOR PLAN 1
- CT 2/3/19: CT Abd with Interval formation of a enhancing wall around the    previously seen. 2 new small fluid collections noted anteriorly in the left upper    quadrant omentum also may reflect evolving abscesses.  - cont avycaz and flagyl per ID  - s/p EUS stent and drainage  - GI note following  - Tube feeds re-started 2/3/19 - CT 2/3/19: CT Abd with Interval formation of a enhancing wall around the    previously seen. 2 new small fluid collections noted anteriorly in the left upper    quadrant omentum also may reflect evolving abscesses.  - cont avycaz and flagyl per ID  - s/p EUS stent and drainage  - GI note following will need necrosectomy ? this week   - Tube feeds re-started 2/3/19 and tolerating PEG site clean /dry

## 2019-02-05 LAB
ALBUMIN SERPL ELPH-MCNC: 1.5 G/DL — LOW (ref 3.3–5)
ALP SERPL-CCNC: 242 U/L — HIGH (ref 40–120)
ALT FLD-CCNC: < 4 U/L — LOW (ref 4–41)
ANION GAP SERPL CALC-SCNC: 10 MMO/L — SIGNIFICANT CHANGE UP (ref 7–14)
AST SERPL-CCNC: 16 U/L — SIGNIFICANT CHANGE UP (ref 4–40)
BILIRUB SERPL-MCNC: 0.3 MG/DL — SIGNIFICANT CHANGE UP (ref 0.2–1.2)
BUN SERPL-MCNC: 4 MG/DL — LOW (ref 7–23)
CALCIUM SERPL-MCNC: 8 MG/DL — LOW (ref 8.4–10.5)
CHLORIDE SERPL-SCNC: 98 MMOL/L — SIGNIFICANT CHANGE UP (ref 98–107)
CO2 SERPL-SCNC: 24 MMOL/L — SIGNIFICANT CHANGE UP (ref 22–31)
CREAT SERPL-MCNC: < 0.2 MG/DL — LOW (ref 0.5–1.3)
GLUCOSE BLDC GLUCOMTR-MCNC: 105 MG/DL — HIGH (ref 70–99)
GLUCOSE BLDC GLUCOMTR-MCNC: 112 MG/DL — HIGH (ref 70–99)
GLUCOSE BLDC GLUCOMTR-MCNC: 122 MG/DL — HIGH (ref 70–99)
GLUCOSE BLDC GLUCOMTR-MCNC: 125 MG/DL — HIGH (ref 70–99)
GLUCOSE SERPL-MCNC: 123 MG/DL — HIGH (ref 70–99)
HCT VFR BLD CALC: 31 % — LOW (ref 39–50)
HGB BLD-MCNC: 9.5 G/DL — LOW (ref 13–17)
MAGNESIUM SERPL-MCNC: 1.7 MG/DL — SIGNIFICANT CHANGE UP (ref 1.6–2.6)
MCHC RBC-ENTMCNC: 30.6 % — LOW (ref 32–36)
MCHC RBC-ENTMCNC: 31.1 PG — SIGNIFICANT CHANGE UP (ref 27–34)
MCV RBC AUTO: 101.6 FL — HIGH (ref 80–100)
NRBC # FLD: 0 K/UL — LOW (ref 25–125)
PHOSPHATE SERPL-MCNC: 2.5 MG/DL — SIGNIFICANT CHANGE UP (ref 2.5–4.5)
PLATELET # BLD AUTO: 351 K/UL — SIGNIFICANT CHANGE UP (ref 150–400)
PMV BLD: 9.3 FL — SIGNIFICANT CHANGE UP (ref 7–13)
POTASSIUM SERPL-MCNC: 4 MMOL/L — SIGNIFICANT CHANGE UP (ref 3.5–5.3)
POTASSIUM SERPL-SCNC: 4 MMOL/L — SIGNIFICANT CHANGE UP (ref 3.5–5.3)
PROT SERPL-MCNC: 6.5 G/DL — SIGNIFICANT CHANGE UP (ref 6–8.3)
RBC # BLD: 3.05 M/UL — LOW (ref 4.2–5.8)
RBC # FLD: 16.2 % — HIGH (ref 10.3–14.5)
SODIUM SERPL-SCNC: 132 MMOL/L — LOW (ref 135–145)
WBC # BLD: 5.28 K/UL — SIGNIFICANT CHANGE UP (ref 3.8–10.5)
WBC # FLD AUTO: 5.28 K/UL — SIGNIFICANT CHANGE UP (ref 3.8–10.5)

## 2019-02-05 PROCEDURE — 99233 SBSQ HOSP IP/OBS HIGH 50: CPT | Mod: GC

## 2019-02-05 PROCEDURE — 99232 SBSQ HOSP IP/OBS MODERATE 35: CPT

## 2019-02-05 RX ORDER — ACETAMINOPHEN 500 MG
650 TABLET ORAL ONCE
Qty: 0 | Refills: 0 | Status: COMPLETED | OUTPATIENT
Start: 2019-02-05 | End: 2019-02-05

## 2019-02-05 RX ADMIN — RISPERIDONE 1 MILLIGRAM(S): 4 TABLET ORAL at 13:25

## 2019-02-05 RX ADMIN — Medication 100 MILLIGRAM(S): at 01:06

## 2019-02-05 RX ADMIN — QUETIAPINE FUMARATE 25 MILLIGRAM(S): 200 TABLET, FILM COATED ORAL at 11:39

## 2019-02-05 RX ADMIN — Medication 650 MILLIGRAM(S): at 01:22

## 2019-02-05 RX ADMIN — LACOSAMIDE 100 MILLIGRAM(S): 50 TABLET ORAL at 05:04

## 2019-02-05 RX ADMIN — HEPARIN SODIUM 5000 UNIT(S): 5000 INJECTION INTRAVENOUS; SUBCUTANEOUS at 21:19

## 2019-02-05 RX ADMIN — Medication 100 MILLIGRAM(S): at 11:09

## 2019-02-05 RX ADMIN — Medication 1 TABLET(S): at 16:50

## 2019-02-05 RX ADMIN — Medication 250 MILLIGRAM(S): at 19:20

## 2019-02-05 RX ADMIN — HEPARIN SODIUM 5000 UNIT(S): 5000 INJECTION INTRAVENOUS; SUBCUTANEOUS at 05:03

## 2019-02-05 RX ADMIN — Medication 100 MILLIGRAM(S): at 17:30

## 2019-02-05 RX ADMIN — MIDODRINE HYDROCHLORIDE 5 MILLIGRAM(S): 2.5 TABLET ORAL at 13:24

## 2019-02-05 RX ADMIN — Medication 1 PACKET(S): at 11:38

## 2019-02-05 RX ADMIN — Medication 1 TABLET(S): at 11:39

## 2019-02-05 RX ADMIN — LACOSAMIDE 100 MILLIGRAM(S): 50 TABLET ORAL at 17:30

## 2019-02-05 RX ADMIN — Medication 250 MILLIGRAM(S): at 05:03

## 2019-02-05 RX ADMIN — HEPARIN SODIUM 5000 UNIT(S): 5000 INJECTION INTRAVENOUS; SUBCUTANEOUS at 13:23

## 2019-02-05 RX ADMIN — CHLORHEXIDINE GLUCONATE 1 APPLICATION(S): 213 SOLUTION TOPICAL at 11:09

## 2019-02-05 RX ADMIN — PREGABALIN 1000 MICROGRAM(S): 225 CAPSULE ORAL at 11:39

## 2019-02-05 RX ADMIN — Medication 650 MILLIGRAM(S): at 01:50

## 2019-02-05 RX ADMIN — Medication 1 TABLET(S): at 08:35

## 2019-02-05 RX ADMIN — Medication 1 MILLIGRAM(S): at 11:38

## 2019-02-05 NOTE — PROGRESS NOTE ADULT - SUBJECTIVE AND OBJECTIVE BOX
GI following the patient for Necrotizing Pancreatitis with  Walled Off Necrosis and new collection around the head of pancreas , patient was supposed to follow up with DR Jules for possible Endoscopic drainage of the walled of necrosis after wall maturation. but patient stayed in hospital for new fever, had repeat CT Chest and abdomen that revealed walled off necrosis of a 7.8 cm collection and a new collection and CT chest findings suggestive of lung abscesses , s/p treatment with antibiotics for 3 weeks but with persistent low grade temps, now s/p EGD , EUS and AXIOS stent last week, 5 days ago , still having low grade temps and remains on IV antibiotics with the addition of IV Vancomycin by ID.     Interval Events: No bleeding or abdominal pain, remains on IV abx, ID following low grade temps and tachycardiac.      Allergies:  Valproate Sodium (Other (Severe))      Hospital Medications:  acetaminophen    Suspension .. 650 milliGRAM(s) Oral every 6 hours PRN  cefTAZidime/avibactam IVPB 2.5 Gram(s) IV Intermittent every 8 hours  cefTAZidime/avibactam IVPB      chlorhexidine 4% Liquid 1 Application(s) Topical <User Schedule>  cyanocobalamin 1000 MICROGram(s) Oral daily  dextrose 40% Gel 15 Gram(s) Oral once PRN  dextrose 5%. 1000 milliLiter(s) IV Continuous <Continuous>  dextrose 50% Injectable 12.5 Gram(s) IV Push once  dextrose 50% Injectable 25 Gram(s) IV Push once  dextrose 50% Injectable 25 Gram(s) IV Push once  folic acid 1 milliGRAM(s) Enteral Tube daily  glucagon  Injectable 1 milliGRAM(s) IntraMuscular once PRN  heparin  Injectable 5000 Unit(s) SubCutaneous every 8 hours  insulin lispro (HumaLOG) corrective regimen sliding scale   SubCutaneous every 6 hours  lacosamide Solution 100 milliGRAM(s) Oral two times a day  lactobacillus acidophilus 1 Tablet(s) Oral three times a day with meals  metroNIDAZOLE  IVPB      metroNIDAZOLE  IVPB 500 milliGRAM(s) IV Intermittent every 8 hours  midodrine 5 milliGRAM(s) Oral three times a day  psyllium Powder 1 Packet(s) Oral daily  QUEtiapine 25 milliGRAM(s) Oral daily  risperiDONE   Solution 1 milliGRAM(s) Oral daily  sodium chloride 0.9%. 1000 milliLiter(s) IV Continuous <Continuous>  vancomycin  IVPB 1000 milliGRAM(s) IV Intermittent every 12 hours  vancomycin  IVPB          PMHX/PSHX:  Seizure  TBI (traumatic brain injury)  S/P percutaneous endoscopic gastrostomy (PEG) tube placement  No significant past surgical history      Family history:      ROS:   Can not assess due to overall mental status but looks comfortable.       PHYSICAL EXAM:     GENERAL:  No distress  HEENT:  NC/AT,  conjunctivae clear, sclera -anicteric  CHEST:  Breath sounds decreased at both bases   HEART:  Regular rhythm, S1, S2, no added sounds  ABDOMEN:  Soft, non-tender, non-distended, normoactive bowel sounds  NEURO:  Can not assess due to overall mental status but looks comfortable.     Vital Signs:  Vital Signs Last 24 Hrs  T(C): 37.1 (2019 04:59), Max: 37.8 (2019 00:55)  T(F): 98.7 (2019 04:59), Max: 100 (2019 00:55)  HR: 125 (2019 04:59) (122 - 135)  BP: 115/61 (2019 04:59) (109/60 - 131/67)  BP(mean): --  RR: 19 (2019 04:59) (16 - 29)  SpO2: 99% (2019 04:59) (97% - 100%)  Daily     Daily Weight in k (2019 04:59)    LABS:                        9.5    5.28  )-----------( 351      ( 2019 06:19 )             31.0     02-05    132<L>  |  98  |  4<L>  ----------------------------<  123<H>  4.0   |  24  |  < 0.20<L>    Ca    8.0<L>      2019 06:19  Phos  2.5     02-05  Mg     1.7     02-05    TPro  6.5  /  Alb  1.5<L>  /  TBili  0.3  /  DBili  x   /  AST  16  /  ALT  < 4<L>  /  AlkPhos  242<H>  02-05    LIVER FUNCTIONS - ( 2019 06:19 )  Alb: 1.5 g/dL / Pro: 6.5 g/dL / ALK PHOS: 242 u/L / ALT: < 4 u/L / AST: 16 u/L / GGT: x                   Imaging: < from: CT Abdomen and Pelvis w/ IV Cont (19 @ 22:42) >  Partially visualized moderate bilateral pleural effusions with bilateral   lower lobar compressive atelectasis.    Interval placement of a AXIOS stent with decrease in size of previously   seen large left peripancreatic fluid collection which now measures up to   7.3 cm in length.    Interval formation of a enhancing wall around thepreviously seen   perigastric fluid collection, which measures up to 4.9 cm.  Small volume ascites. 2 new small fluid collections noted anteriorly in   the left upper quadrant omentum also may reflect evolving abscesses.

## 2019-02-05 NOTE — PROGRESS NOTE ADULT - ASSESSMENT
53 Male w/ a PMHx of TBI (s/p PEG tube, contracture, and seizure d/o) presented as a transfer from [facility] on 12/9 for respiratory failure 2/2 ARDS likely 2/2 necrotizing pancreatitis s/p intubation. Course c/b Acinetobacter PNA s/p Avycaz and Flagyl 7 day course.  Course further complicated by acute blood loss anemia s/p colonoscopy with findings suggestive of ischemic colitis, without further bleeding and Hgb remaining stable. S/p extubation 1/3, and re-intubation 1/6 now s/p tracheostomy.

## 2019-02-05 NOTE — PROGRESS NOTE ADULT - PROBLEM SELECTOR PLAN 1
- CT 2/3/19: CT Abd with Interval formation of a enhancing wall around the    previously seen. 2 new small fluid collections noted anteriorly in the left upper    quadrant omentum also may reflect evolving abscesses.  - cont avycaz and flagyl per ID  - s/p EUS stent and drainage  - GI note following will need necrosectomy ? this week   - Tube feeds re-started 2/3/19 and tolerating PEG site clean /dry - CT 2/3/19: CT Abd with Interval formation of a enhancing wall around the    previously seen. 2 new small fluid collections noted anteriorly in the left upper    quadrant omentum also may reflect evolving abscesses.  - cont avycaz and flagyl / Vanco added per ID  - s/p EUS stent and drainage  - GI note following will need necrosectomy ? this week   - Tube feeds re-started 2/3/19 and tolerating PEG site clean /dry

## 2019-02-05 NOTE — PROGRESS NOTE ADULT - SUBJECTIVE AND OBJECTIVE BOX
CC: F/U for Fever    Saw/spoke to patient. Over weekend, patient with intermittent fevers. Vanco added. Still with low grade fevers over past 24 hours. Patient appears clinically unchanged at bedside.    Allergies  Valproate Sodium (Other (Severe))    ANTIMICROBIALS:  cefTAZidime/avibactam IVPB 2.5 every 8 hours  cefTAZidime/avibactam IVPB    metroNIDAZOLE  IVPB    metroNIDAZOLE  IVPB 500 every 8 hours  vancomycin  IVPB 1000 every 12 hours  vancomycin  IVPB      PE:    Vital Signs Last 24 Hrs  T(C): 38.1 (05 Feb 2019 08:45), Max: 38.1 (05 Feb 2019 08:45)  T(F): 100.5 (05 Feb 2019 08:45), Max: 100.5 (05 Feb 2019 08:45)  HR: 120 (05 Feb 2019 10:11) (120 - 135)  BP: 99/65 (05 Feb 2019 08:45) (99/65 - 131/67)  RR: 18 (05 Feb 2019 08:45) (16 - 29)  SpO2: 100% (05 Feb 2019 10:11) (97% - 100%)    Gen: AOx2-3, poorly verbal but able to attempt nonverbal communication  CV: S1+S2 normal, tachycardic  Resp: Clear bilat, no resp distress, no crackles/wheezes; trach  Abd: Soft, nontender, +BS, PEG  Ext: No LE edema, no wounds    LABS:                        9.5    5.28  )-----------( 351      ( 05 Feb 2019 06:19 )             31.0   02-05    132<L>  |  98  |  4<L>  ----------------------------<  123<H>  4.0   |  24  |  < 0.20<L>    Ca    8.0<L>      05 Feb 2019 06:19  Phos  2.5     02-05  Mg     1.7     02-05    TPro  6.5  /  Alb  1.5<L>  /  TBili  0.3  /  DBili  x   /  AST  16  /  ALT  < 4<L>  /  AlkPhos  242<H>  02-05    MICROBIOLOGY:    BLOOD VENOUS  02-02-19 NGTD    BLOOD PERIPHERAL  01-30-19 NGTD    BLOOD VENOUS  01-24-19 --  --  BLOOD CULTURE PCR  Staphylococcus sp.,coag neg    ENDOTRACHEAL SPECIMEN  01-22-19 --  --  Pseudomonas aeruginosa    ENDOTRACHEAL SPECIMEN  01-06-19 --  --  Acin.baumannii/haemoly     (otherwise reviewed)    RADIOLOGY:    2/2 CT A/P:    IMPRESSION:   Partially visualized moderate bilateral pleural effusions with bilateral   lower lobar compressive atelectasis.    Interval placement of a AXIOS stent with decrease in size of previously   seen large left peripancreatic fluid collection which now measures up to   7.3 cm in length.    Interval formation of a enhancing wall around the previously seen   perigastric fluid collection, which measures up to 4.9 cm.  Small volume ascites. 2 new small fluid collections noted anteriorly in   the left upper quadrant omentum also may reflect evolving abscesses.

## 2019-02-05 NOTE — PROGRESS NOTE ADULT - PROBLEM SELECTOR PLAN 3
- persistent intermittent fevers, monitor  - ID note appreciated  - CT showing new lung abscess  - cont current abx for now  - Fever 101.6  blood cx: pending  - start vanco 1gm bid for now

## 2019-02-05 NOTE — PROGRESS NOTE ADULT - ASSESSMENT
1) Walled off necrosis, s/p EUS and AXIOS 5 days ago.   2) Hematochezia, s/p colonoscopy on 12/28/2018 with severe segmental colitis localized to the transverse colon and ascending colon (possible ischemic colitis, possible colitis related to contiguous danay-pancreatic inflammatory process). Biopsies with granulation tissue but no infection/malignancy, Hb stable   3) Resp failure in the setting of pneumonia, requiring tracheostomy now with lung abscess and sepsis on IV  Abx, ID following     Recommendations  - Trend CBC, CMP and fever curve, Hb stable for now.   - Continue IV antibiotics as per ID recommendations, consider starting on Fluconazole due to persistent sepsis    - Rest of care per primary team  - Will need necrosectomy later this week.   - GI will follow up.

## 2019-02-05 NOTE — CHART NOTE - NSCHARTNOTEFT_GEN_A_CORE
Source:  nursing, EMR     Diet : NPO with tube feed Jevity 1.2 @ 55 ml/hr 24hrs     Patient alert, with tracheostomy, unable to speak, tolerating EN , noted edema , & intentional wt. loss .      Enteral : EN provides 1584 kcal & 73 gm protein/d ; i.e. 25.5 kcal/kg ideal wt & 1.17 gm protein/kg ideal wt.       Current Weight: - 87 kg on 2/5/19; 99.8 kg on 1/29/19; Admit wt. - 99.7 kg ; IBW - 62 kg     Pertinent Medications: MEDICATIONS  (STANDING):  cefTAZidime/avibactam IVPB 2.5 Gram(s) IV Intermittent every 8 hours  cefTAZidime/avibactam IVPB      chlorhexidine 4% Liquid 1 Application(s) Topical <User Schedule>  cyanocobalamin 1000 MICROGram(s) Oral daily  dextrose 5%. 1000 milliLiter(s) (50 mL/Hr) IV Continuous <Continuous>  dextrose 50% Injectable 12.5 Gram(s) IV Push once  dextrose 50% Injectable 25 Gram(s) IV Push once  dextrose 50% Injectable 25 Gram(s) IV Push once  folic acid 1 milliGRAM(s) Enteral Tube daily  heparin  Injectable 5000 Unit(s) SubCutaneous every 8 hours  insulin lispro (HumaLOG) corrective regimen sliding scale   SubCutaneous every 6 hours  lacosamide Solution 100 milliGRAM(s) Oral two times a day  lactobacillus acidophilus 1 Tablet(s) Oral three times a day with meals  metroNIDAZOLE  IVPB      metroNIDAZOLE  IVPB 500 milliGRAM(s) IV Intermittent every 8 hours  midodrine 5 milliGRAM(s) Oral three times a day  psyllium Powder 1 Packet(s) Oral daily  QUEtiapine 25 milliGRAM(s) Oral daily  risperiDONE   Solution 1 milliGRAM(s) Oral daily  sodium chloride 0.9%. 1000 milliLiter(s) (500 mL/Hr) IV Continuous <Continuous>  vancomycin  IVPB 1000 milliGRAM(s) IV Intermittent every 12 hours  vancomycin  IVPB        MEDICATIONS  (PRN):  acetaminophen    Suspension .. 650 milliGRAM(s) Oral every 6 hours PRN Temp greater or equal to 38C (100.4F), Mild Pain (1 - 3), Moderate Pain (4 - 6)  dextrose 40% Gel 15 Gram(s) Oral once PRN Blood Glucose LESS THAN 70 milliGRAM(s)/deciliter  glucagon  Injectable 1 milliGRAM(s) IntraMuscular once PRN Glucose LESS THAN 70 milligrams/deciliter    Pertinent Labs:  02-05 Na132 mmol/L<L> Glu 123 mg/dL<H> K+ 4.0 mmol/L Cr  < 0.20 mg/dL<L> BUN 4 mg/dL<L> 02-05 Phos 2.5 mg/dL 02-05 Alb 1.5 g/dL<L> 01-07 PAB 12 mg/dL<L> 01-08 Chol --    LDL --    HDL --    Trig 181 mg/dL<H>      Recommend to continue EN     Monitoring and Evaluation: Tolerance to diet prescription , weights and follow up per protocol

## 2019-02-05 NOTE — PROGRESS NOTE ADULT - ASSESSMENT
53 M (resident of [facility]) with TBI, s/p PEG tube, contracture, and seizure d/o who presented as a transfer from [facility] on 12/9 for respiratory failure 2/2 ARDS likely 2/2 necrotizing pancreatitis s/p intubation.  Bronc 12/11 with pseudomonas  Sputum cx with  acinetobacter  GIB and colitis due to ?contiguous necrotizing pancreatitis vs ischemic, s/p colonoscopy  Completed a 7 day course of avycaz and flagyl for the acinetobacter, but had subsequent worsening  Sepsis with fever, leukopenia resolved, extubated but reintubated 1/6/19 for hypoxia and poor cough along with profuse secretions and sputum cx again with acinetobacter (restarted Avycaz/Flagyl 1/9--unclear treatment endpoint, ? 4 weeks)  Necrotising Pancreatitis with multiple peripancreatitis collections better formed on CT 1/24 and a new 7 cm abscess  improved but still present lung abscesses ( acinetobacter PNA  with lung abscesses) and new sputum cx with pseudomonas, ? VAP  Present fever suspected related to intraabd collections/pancreatitis vs aspiration vs other occult  Overall, lung abscess, MDR organisms, necrotizing pancreatitis, intraabdominal collection  - Vanco 1g q 12 (monitor trough)  - Avycaz/Flagyl  - Frequent suctioning and pulmonary toileting  - Monitor for any further signs infection; close monitoring for neurological symptoms on prolonged flagyl  - ? role for aspiration of intraabdominal collection for cultures; although note small size of new "walled" colelction--if fevers worsening or non-improving, would pursue    Neal Oleary MD  Pager [phone]  After 5pm and on weekends call [phone]

## 2019-02-05 NOTE — PROGRESS NOTE ADULT - SUBJECTIVE AND OBJECTIVE BOX
CHIEF COMPLAINT:    Interval Events:    REVIEW OF SYSTEMS:  Constitutional:   Eyes:  ENT:  CV:  Resp:  GI:  :  MSK:  Integumentary:  Neurological:  Psychiatric:  Endocrine:  Hematologic/Lymphatic:  Allergic/Immunologic:  [ ] All other systems negative  [ ] Unable to assess ROS because ________    OBJECTIVE:  ICU Vital Signs Last 24 Hrs  T(C): 37.1 (05 Feb 2019 04:59), Max: 37.8 (05 Feb 2019 00:55)  T(F): 98.7 (05 Feb 2019 04:59), Max: 100 (05 Feb 2019 00:55)  HR: 123 (05 Feb 2019 07:56) (122 - 135)  BP: 115/61 (05 Feb 2019 04:59) (109/60 - 131/67)  BP(mean): --  ABP: --  ABP(mean): --  RR: 19 (05 Feb 2019 04:59) (16 - 29)  SpO2: 99% (05 Feb 2019 07:56) (97% - 100%)    Mode: AC/ CMV (Assist Control/ Continuous Mandatory Ventilation), RR (machine): 14, TV (machine): 350, FiO2: 40, PEEP: 5, ITime: 1, MAP: 9.1, PIP: 17    02-04 @ 07:01  -  02-05 @ 07:00  --------------------------------------------------------  IN: 2492 mL / OUT: 0 mL / NET: 2492 mL      CAPILLARY BLOOD GLUCOSE      POCT Blood Glucose.: 125 mg/dL (05 Feb 2019 06:04)      PHYSICAL EXAM:  General:   HEENT:   Lymph Nodes:  Neck:   Respiratory:   Cardiovascular:   Abdomen:   Extremities:   Skin:   Neurological:  Psychiatry:    HOSPITAL MEDICATIONS:  MEDICATIONS  (STANDING):  cefTAZidime/avibactam IVPB 2.5 Gram(s) IV Intermittent every 8 hours  cefTAZidime/avibactam IVPB      chlorhexidine 4% Liquid 1 Application(s) Topical <User Schedule>  cyanocobalamin 1000 MICROGram(s) Oral daily  dextrose 5%. 1000 milliLiter(s) (50 mL/Hr) IV Continuous <Continuous>  dextrose 50% Injectable 12.5 Gram(s) IV Push once  dextrose 50% Injectable 25 Gram(s) IV Push once  dextrose 50% Injectable 25 Gram(s) IV Push once  folic acid 1 milliGRAM(s) Enteral Tube daily  heparin  Injectable 5000 Unit(s) SubCutaneous every 8 hours  insulin lispro (HumaLOG) corrective regimen sliding scale   SubCutaneous every 6 hours  lacosamide Solution 100 milliGRAM(s) Oral two times a day  lactobacillus acidophilus 1 Tablet(s) Oral three times a day with meals  metroNIDAZOLE  IVPB      metroNIDAZOLE  IVPB 500 milliGRAM(s) IV Intermittent every 8 hours  midodrine 5 milliGRAM(s) Oral three times a day  psyllium Powder 1 Packet(s) Oral daily  QUEtiapine 25 milliGRAM(s) Oral daily  risperiDONE   Solution 1 milliGRAM(s) Oral daily  sodium chloride 0.9%. 1000 milliLiter(s) (500 mL/Hr) IV Continuous <Continuous>  vancomycin  IVPB 1000 milliGRAM(s) IV Intermittent every 12 hours  vancomycin  IVPB        MEDICATIONS  (PRN):  acetaminophen    Suspension .. 650 milliGRAM(s) Oral every 6 hours PRN Temp greater or equal to 38C (100.4F), Mild Pain (1 - 3), Moderate Pain (4 - 6)  dextrose 40% Gel 15 Gram(s) Oral once PRN Blood Glucose LESS THAN 70 milliGRAM(s)/deciliter  glucagon  Injectable 1 milliGRAM(s) IntraMuscular once PRN Glucose LESS THAN 70 milligrams/deciliter      LABS:                        9.5    5.28  )-----------( 351      ( 05 Feb 2019 06:19 )             31.0     02-05    132<L>  |  98  |  4<L>  ----------------------------<  123<H>  4.0   |  24  |  < 0.20<L>    Ca    8.0<L>      05 Feb 2019 06:19  Phos  2.5     02-05  Mg     1.7     02-05    TPro  6.5  /  Alb  1.5<L>  /  TBili  0.3  /  DBili  x   /  AST  16  /  ALT  < 4<L>  /  AlkPhos  242<H>  02-05              MICROBIOLOGY:     RADIOLOGY:  [ ] Reviewed and interpreted by me    PULMONARY FUNCTION TESTS:    EKG: CHIEF COMPLAINT: Patient is a 53y old  Male who presents with a chief complaint of ARDS?, sepsis (05 Feb 2019 08:40)      Interval Events: none     REVIEW OF SYSTEMS:  Constitutional: Denies pain   CV: denies   Resp: Denies   GI: Denies   [x ] All other systems negative  [ ] Unable to assess ROS because ________    OBJECTIVE:  ICU Vital Signs Last 24 Hrs  T(C): 37.1 (05 Feb 2019 04:59), Max: 37.8 (05 Feb 2019 00:55)  T(F): 98.7 (05 Feb 2019 04:59), Max: 100 (05 Feb 2019 00:55)  HR: 123 (05 Feb 2019 07:56) (122 - 135)  BP: 115/61 (05 Feb 2019 04:59) (109/60 - 131/67)  BP(mean): --  ABP: --  ABP(mean): --  RR: 19 (05 Feb 2019 04:59) (16 - 29)  SpO2: 99% (05 Feb 2019 07:56) (97% - 100%)    Mode: AC/ CMV (Assist Control/ Continuous Mandatory Ventilation), RR (machine): 14, TV (machine): 350, FiO2: 40, PEEP: 5, ITime: 1, MAP: 9.1, PIP: 17    02-04 @ 07:01  -  02-05 @ 07:00  --------------------------------------------------------  IN: 2492 mL / OUT: 0 mL / NET: 2492 mL      CAPILLARY BLOOD GLUCOSE      POCT Blood Glucose.: 125 mg/dL (05 Feb 2019 06:04)      PHYSICAL EXAM:  General:   HEENT:   Lymph Nodes:  Neck:   Respiratory:   Cardiovascular:   Abdomen:   Extremities:   Skin:   Neurological:  Psychiatry:    HOSPITAL MEDICATIONS:  MEDICATIONS  (STANDING):  cefTAZidime/avibactam IVPB 2.5 Gram(s) IV Intermittent every 8 hours  cefTAZidime/avibactam IVPB      chlorhexidine 4% Liquid 1 Application(s) Topical <User Schedule>  cyanocobalamin 1000 MICROGram(s) Oral daily  dextrose 5%. 1000 milliLiter(s) (50 mL/Hr) IV Continuous <Continuous>  dextrose 50% Injectable 12.5 Gram(s) IV Push once  dextrose 50% Injectable 25 Gram(s) IV Push once  dextrose 50% Injectable 25 Gram(s) IV Push once  folic acid 1 milliGRAM(s) Enteral Tube daily  heparin  Injectable 5000 Unit(s) SubCutaneous every 8 hours  insulin lispro (HumaLOG) corrective regimen sliding scale   SubCutaneous every 6 hours  lacosamide Solution 100 milliGRAM(s) Oral two times a day  lactobacillus acidophilus 1 Tablet(s) Oral three times a day with meals  metroNIDAZOLE  IVPB      metroNIDAZOLE  IVPB 500 milliGRAM(s) IV Intermittent every 8 hours  midodrine 5 milliGRAM(s) Oral three times a day  psyllium Powder 1 Packet(s) Oral daily  QUEtiapine 25 milliGRAM(s) Oral daily  risperiDONE   Solution 1 milliGRAM(s) Oral daily  sodium chloride 0.9%. 1000 milliLiter(s) (500 mL/Hr) IV Continuous <Continuous>  vancomycin  IVPB 1000 milliGRAM(s) IV Intermittent every 12 hours  vancomycin  IVPB        MEDICATIONS  (PRN):  acetaminophen    Suspension .. 650 milliGRAM(s) Oral every 6 hours PRN Temp greater or equal to 38C (100.4F), Mild Pain (1 - 3), Moderate Pain (4 - 6)  dextrose 40% Gel 15 Gram(s) Oral once PRN Blood Glucose LESS THAN 70 milliGRAM(s)/deciliter  glucagon  Injectable 1 milliGRAM(s) IntraMuscular once PRN Glucose LESS THAN 70 milligrams/deciliter      LABS:                        9.5    5.28  )-----------( 351      ( 05 Feb 2019 06:19 )             31.0     02-05    132<L>  |  98  |  4<L>  ----------------------------<  123<H>  4.0   |  24  |  < 0.20<L>    Ca    8.0<L>      05 Feb 2019 06:19  Phos  2.5     02-05  Mg     1.7     02-05    TPro  6.5  /  Alb  1.5<L>  /  TBili  0.3  /  DBili  x   /  AST  16  /  ALT  < 4<L>  /  AlkPhos  242<H>  02-05              MICROBIOLOGY:     RADIOLOGY:  [ ] Reviewed and interpreted by me    PULMONARY FUNCTION TESTS:    EKG:

## 2019-02-06 LAB
GLUCOSE BLDC GLUCOMTR-MCNC: 120 MG/DL — HIGH (ref 70–99)
GLUCOSE BLDC GLUCOMTR-MCNC: 124 MG/DL — HIGH (ref 70–99)
GLUCOSE BLDC GLUCOMTR-MCNC: 99 MG/DL — SIGNIFICANT CHANGE UP (ref 70–99)
VANCOMYCIN TROUGH SERPL-MCNC: 5.3 UG/ML — LOW (ref 10–20)

## 2019-02-06 PROCEDURE — 99233 SBSQ HOSP IP/OBS HIGH 50: CPT | Mod: GC

## 2019-02-06 PROCEDURE — 99232 SBSQ HOSP IP/OBS MODERATE 35: CPT

## 2019-02-06 RX ORDER — VANCOMYCIN HCL 1 G
1250 VIAL (EA) INTRAVENOUS EVERY 12 HOURS
Qty: 0 | Refills: 0 | Status: DISCONTINUED | OUTPATIENT
Start: 2019-02-06 | End: 2019-02-07

## 2019-02-06 RX ADMIN — QUETIAPINE FUMARATE 25 MILLIGRAM(S): 200 TABLET, FILM COATED ORAL at 11:39

## 2019-02-06 RX ADMIN — Medication 1 PACKET(S): at 11:38

## 2019-02-06 RX ADMIN — PREGABALIN 1000 MICROGRAM(S): 225 CAPSULE ORAL at 11:38

## 2019-02-06 RX ADMIN — Medication 100 MILLIGRAM(S): at 01:18

## 2019-02-06 RX ADMIN — Medication 100 MILLIGRAM(S): at 17:53

## 2019-02-06 RX ADMIN — CHLORHEXIDINE GLUCONATE 1 APPLICATION(S): 213 SOLUTION TOPICAL at 09:33

## 2019-02-06 RX ADMIN — HEPARIN SODIUM 5000 UNIT(S): 5000 INJECTION INTRAVENOUS; SUBCUTANEOUS at 05:18

## 2019-02-06 RX ADMIN — LACOSAMIDE 100 MILLIGRAM(S): 50 TABLET ORAL at 05:18

## 2019-02-06 RX ADMIN — Medication 166.67 MILLIGRAM(S): at 07:31

## 2019-02-06 RX ADMIN — Medication 1 MILLIGRAM(S): at 11:38

## 2019-02-06 RX ADMIN — RISPERIDONE 1 MILLIGRAM(S): 4 TABLET ORAL at 11:39

## 2019-02-06 RX ADMIN — Medication 1 TABLET(S): at 17:53

## 2019-02-06 RX ADMIN — HEPARIN SODIUM 5000 UNIT(S): 5000 INJECTION INTRAVENOUS; SUBCUTANEOUS at 13:56

## 2019-02-06 RX ADMIN — LACOSAMIDE 100 MILLIGRAM(S): 50 TABLET ORAL at 17:53

## 2019-02-06 RX ADMIN — Medication 1 TABLET(S): at 07:33

## 2019-02-06 RX ADMIN — Medication 166.67 MILLIGRAM(S): at 19:29

## 2019-02-06 RX ADMIN — Medication 100 MILLIGRAM(S): at 11:37

## 2019-02-06 RX ADMIN — Medication 1 TABLET(S): at 13:55

## 2019-02-06 NOTE — PROGRESS NOTE ADULT - PROBLEM SELECTOR PLAN 1
- cont avycaz and flagyl and vanco per ID  - s/p EUS stent and drainage  - CT 2/2 showing walled off necrosis  - GI following will need necrosectomy this week  - tolerating feeds

## 2019-02-06 NOTE — PROGRESS NOTE ADULT - SUBJECTIVE AND OBJECTIVE BOX
CC: F/U for Fever    Saw/spoke to patient. Clinically unchanged at bedside. Afeb x 24 hours. Otherwise unchanged.    Allergies  Valproate Sodium (Other (Severe))    ANTIMICROBIALS:  cefTAZidime/avibactam IVPB 2.5 every 8 hours  cefTAZidime/avibactam IVPB    metroNIDAZOLE  IVPB    metroNIDAZOLE  IVPB 500 every 8 hours  vancomycin  IVPB 1250 every 12 hours    PE:    Vital Signs Last 24 Hrs  T(C): 36.8 (06 Feb 2019 13:00), Max: 37.6 (05 Feb 2019 17:00)  T(F): 98.3 (06 Feb 2019 13:00), Max: 99.6 (05 Feb 2019 17:00)  HR: 111 (06 Feb 2019 13:00) (108 - 131)  BP: 110/77 (06 Feb 2019 13:00) (104/77 - 138/70)  RR: 18 (06 Feb 2019 13:00) (18 - 19)  SpO2: 97% (06 Feb 2019 13:00) (97% - 100%)    Gen: AOx1-2, NAD, non-verbal  CV: S1+S2 normal, tachycardic  Resp: Clear bilat, no resp distress, no crackles/wheezes, trach  Abd: Soft, nontender, +BS, PEG  Ext: No LE edema, no wounds    LABS:                        9.5    5.28  )-----------( 351      ( 05 Feb 2019 06:19 )             31.0     02-05    132<L>  |  98  |  4<L>  ----------------------------<  123<H>  4.0   |  24  |  < 0.20<L>    Ca    8.0<L>      05 Feb 2019 06:19  Phos  2.5     02-05  Mg     1.7     02-05    TPro  6.5  /  Alb  1.5<L>  /  TBili  0.3  /  DBili  x   /  AST  16  /  ALT  < 4<L>  /  AlkPhos  242<H>  02-05    MICROBIOLOGY:  Vancomycin Level, Trough: 5.3 ug/mL (02-06-19 @ 05:50)    BLOOD VENOUS  02-02-19 NGTD    BLOOD PERIPHERAL  01-30-19 NGTD    BLOOD VENOUS  01-24-19 --  --  BLOOD CULTURE PCR  Staphylococcus sp.,coag neg    ENDOTRACHEAL SPECIMEN  01-22-19 --  --  Pseudomonas aeruginosa    (otherwise reviewed)    RADIOLOGY:    2/2 CT:    IMPRESSION:   Partially visualized moderate bilateral pleural effusions with bilateral   lower lobar compressive atelectasis.    Interval placement of a AXIOS stent with decrease in size of previously   seen large left peripancreatic fluid collection which now measures up to   7.3 cm in length.    Interval formation of a enhancing wall around the previously seen   perigastric fluid collection, which measures up to 4.9 cm.  Small volume ascites. 2 new small fluid collections noted anteriorly in   the left upper quadrant omentum also may reflect evolving abscesses.

## 2019-02-06 NOTE — PROGRESS NOTE ADULT - ASSESSMENT
53 Male w/ a PMHx of TBI (s/p PEG tube, contracture, and seizure d/o) presented as a transfer from [facility] on 12/9 for respiratory failure 2/2 ARDS likely 2/2 necrotizing pancreatitis s/p intubation. Course c/b Acinetobacter PNA s/p Avycaz and Flagyl 7 day course.  Course further complicated by acute blood loss anemia s/p colonoscopy with findings suggestive of ischemic colitis, without further bleeding and Hgb remaining stable. S/p extubation 1/3, and re-intubation 1/6 now s/p tracheostomy.

## 2019-02-06 NOTE — PROGRESS NOTE ADULT - ASSESSMENT
53 M (resident of [facility]) with TBI, s/p PEG tube, contracture, and seizure d/o who presented as a transfer from [facility] on 12/9 for respiratory failure 2/2 ARDS likely 2/2 necrotizing pancreatitis s/p intubation.  Bronc 12/11 with pseudomonas; Sputum cx with  acinetobacter  Sepsis with fever, leukopenia resolved, extubated but reintubated 1/6/19 for hypoxia and poor cough along with profuse secretions and sputum cx again with acinetobacter (restarted Avycaz/Flagyl 1/9--unclear treatment endpoint, ? 4 weeks)  Necrotising Pancreatitis with multiple peripancreatitis collections better formed on CT 1/24 and a new 7 cm abscess  Present fever suspected related to intraabd collections/pancreatitis vs aspiration vs other occult  Overall, lung abscess, MDR organisms, necrotizing pancreatitis, intraabdominal collection  - Vanco 1250mg q 12 (increased, monitor trough)  - Avycaz/Flagyl  - Frequent suctioning and pulmonary toileting  - Monitor for any further signs infection; close monitoring for neurological symptoms on prolonged flagyl  - ? role for aspiration of intraabdominal collection for cultures; although note small size of new "walled" collection fevers worsening or non-improving, would pursue aspiration    Neal Oleary MD  Pager [phone]  After 5pm and on weekends call [phone]

## 2019-02-06 NOTE — PROGRESS NOTE ADULT - SUBJECTIVE AND OBJECTIVE BOX
CHIEF COMPLAINT: Patient is a 53y old  Male who presents with a chief complaint of ARDS?, sepsis (05 Feb 2019 08:40)    Interval Events:      REVIEW OF SYSTEMS:  Constitutional:   Eyes:  ENT:  CV:  Resp:  GI:  :  MSK:  Integumentary:  Neurological:  Psychiatric:  Endocrine:  Hematologic/Lymphatic:  Allergic/Immunologic:  [ ] All other systems negative  [ ] Unable to assess ROS because ________      OBJECTIVE:  ICU Vital Signs Last 24 Hrs  T(C): 37.4 (06 Feb 2019 05:06), Max: 38.1 (05 Feb 2019 08:45)  T(F): 99.3 (06 Feb 2019 05:06), Max: 100.5 (05 Feb 2019 08:45)  HR: 117 (06 Feb 2019 07:53) (115 - 131)  BP: 131/64 (06 Feb 2019 05:06) (95/65 - 138/70)  BP(mean): --  ABP: --  ABP(mean): --  RR: 19 (06 Feb 2019 05:06) (18 - 19)  SpO2: 98% (06 Feb 2019 07:53) (98% - 100%)    Mode: CPAP with PS, FiO2: 40, PEEP: 5, PS: 10, MAP: 8.7, PIP: 16    02-05 @ 07:01  -  02-06 @ 07:00  --------------------------------------------------------  IN: 1280 mL / OUT: 0 mL / NET: 1280 mL    POCT Blood Glucose.: 112 mg/dL (05 Feb 2019 23:47)    HOSPITAL MEDICATIONS:  MEDICATIONS  (STANDING):  cefTAZidime/avibactam IVPB 2.5 Gram(s) IV Intermittent every 8 hours  cefTAZidime/avibactam IVPB      chlorhexidine 4% Liquid 1 Application(s) Topical <User Schedule>  cyanocobalamin 1000 MICROGram(s) Oral daily  dextrose 5%. 1000 milliLiter(s) (50 mL/Hr) IV Continuous <Continuous>  dextrose 50% Injectable 12.5 Gram(s) IV Push once  dextrose 50% Injectable 25 Gram(s) IV Push once  dextrose 50% Injectable 25 Gram(s) IV Push once  folic acid 1 milliGRAM(s) Enteral Tube daily  heparin  Injectable 5000 Unit(s) SubCutaneous every 8 hours  insulin lispro (HumaLOG) corrective regimen sliding scale   SubCutaneous every 6 hours  lacosamide Solution 100 milliGRAM(s) Oral two times a day  lactobacillus acidophilus 1 Tablet(s) Oral three times a day with meals  metroNIDAZOLE  IVPB      metroNIDAZOLE  IVPB 500 milliGRAM(s) IV Intermittent every 8 hours  midodrine 5 milliGRAM(s) Oral three times a day  psyllium Powder 1 Packet(s) Oral daily  QUEtiapine 25 milliGRAM(s) Oral daily  risperiDONE   Solution 1 milliGRAM(s) Oral daily  sodium chloride 0.9%. 1000 milliLiter(s) (500 mL/Hr) IV Continuous <Continuous>  vancomycin  IVPB 1250 milliGRAM(s) IV Intermittent every 12 hours    MEDICATIONS  (PRN):  acetaminophen    Suspension .. 650 milliGRAM(s) Oral every 6 hours PRN Temp greater or equal to 38C (100.4F), Mild Pain (1 - 3), Moderate Pain (4 - 6)  dextrose 40% Gel 15 Gram(s) Oral once PRN Blood Glucose LESS THAN 70 milliGRAM(s)/deciliter  glucagon  Injectable 1 milliGRAM(s) IntraMuscular once PRN Glucose LESS THAN 70 milligrams/deciliter      LABS:                        9.5    5.28  )-----------( 351      ( 05 Feb 2019 06:19 )             31.0     02-05    132<L>  |  98  |  4<L>  ----------------------------<  123<H>  4.0   |  24  |  < 0.20<L>    Ca    8.0<L>      05 Feb 2019 06:19  Phos  2.5     02-05  Mg     1.7     02-05    TPro  6.5  /  Alb  1.5<L>  /  TBili  0.3  /  DBili  x   /  AST  16  /  ALT  < 4<L>  /  AlkPhos  242<H>  02-05              MICROBIOLOGY:     RADIOLOGY:  [ ] Reviewed and interpreted by me    PULMONARY FUNCTION TESTS:    EKG: CHIEF COMPLAINT: Patient is a 53y old  Male who presents with a chief complaint of ARDS?, sepsis (05 Feb 2019 08:40)    Interval Events: none overnight, afebrile      REVIEW OF SYSTEMS:  Constitutional: crying this am but denies any pain  CV: denies   Resp: denies  GI: denies  [x] All other systems negative  [ ] Unable to assess ROS because ________      OBJECTIVE:  ICU Vital Signs Last 24 Hrs  T(C): 37.4 (06 Feb 2019 05:06), Max: 38.1 (05 Feb 2019 08:45)  T(F): 99.3 (06 Feb 2019 05:06), Max: 100.5 (05 Feb 2019 08:45)  HR: 117 (06 Feb 2019 07:53) (115 - 131)  BP: 131/64 (06 Feb 2019 05:06) (95/65 - 138/70)  BP(mean): --  ABP: --  ABP(mean): --  RR: 19 (06 Feb 2019 05:06) (18 - 19)  SpO2: 98% (06 Feb 2019 07:53) (98% - 100%)    Mode: CPAP with PS, FiO2: 40, PEEP: 5, PS: 10, MAP: 8.7, PIP: 16    02-05 @ 07:01  -  02-06 @ 07:00  --------------------------------------------------------  IN: 1280 mL / OUT: 0 mL / NET: 1280 mL    POCT Blood Glucose.: 112 mg/dL (05 Feb 2019 23:47)    HOSPITAL MEDICATIONS:  MEDICATIONS  (STANDING):  cefTAZidime/avibactam IVPB 2.5 Gram(s) IV Intermittent every 8 hours  cefTAZidime/avibactam IVPB      chlorhexidine 4% Liquid 1 Application(s) Topical <User Schedule>  cyanocobalamin 1000 MICROGram(s) Oral daily  dextrose 5%. 1000 milliLiter(s) (50 mL/Hr) IV Continuous <Continuous>  dextrose 50% Injectable 12.5 Gram(s) IV Push once  dextrose 50% Injectable 25 Gram(s) IV Push once  dextrose 50% Injectable 25 Gram(s) IV Push once  folic acid 1 milliGRAM(s) Enteral Tube daily  heparin  Injectable 5000 Unit(s) SubCutaneous every 8 hours  insulin lispro (HumaLOG) corrective regimen sliding scale   SubCutaneous every 6 hours  lacosamide Solution 100 milliGRAM(s) Oral two times a day  lactobacillus acidophilus 1 Tablet(s) Oral three times a day with meals  metroNIDAZOLE  IVPB      metroNIDAZOLE  IVPB 500 milliGRAM(s) IV Intermittent every 8 hours  midodrine 5 milliGRAM(s) Oral three times a day  psyllium Powder 1 Packet(s) Oral daily  QUEtiapine 25 milliGRAM(s) Oral daily  risperiDONE   Solution 1 milliGRAM(s) Oral daily  sodium chloride 0.9%. 1000 milliLiter(s) (500 mL/Hr) IV Continuous <Continuous>  vancomycin  IVPB 1250 milliGRAM(s) IV Intermittent every 12 hours    MEDICATIONS  (PRN):  acetaminophen    Suspension .. 650 milliGRAM(s) Oral every 6 hours PRN Temp greater or equal to 38C (100.4F), Mild Pain (1 - 3), Moderate Pain (4 - 6)  dextrose 40% Gel 15 Gram(s) Oral once PRN Blood Glucose LESS THAN 70 milliGRAM(s)/deciliter  glucagon  Injectable 1 milliGRAM(s) IntraMuscular once PRN Glucose LESS THAN 70 milligrams/deciliter

## 2019-02-06 NOTE — PROGRESS NOTE ADULT - PROBLEM SELECTOR PLAN 3
- fevers resolved, afebrile now  - ID note appreciated  - CT showing new lung abscess  - cont current abx for now  - will need to f/u with ID re: duration of abx, possible PICC?

## 2019-02-07 LAB
ANION GAP SERPL CALC-SCNC: 10 MMO/L — SIGNIFICANT CHANGE UP (ref 7–14)
BACTERIA BLD CULT: SIGNIFICANT CHANGE UP
BLD GP AB SCN SERPL QL: NEGATIVE — SIGNIFICANT CHANGE UP
BUN SERPL-MCNC: 3 MG/DL — LOW (ref 7–23)
CALCIUM SERPL-MCNC: 7.8 MG/DL — LOW (ref 8.4–10.5)
CHLORIDE SERPL-SCNC: 98 MMOL/L — SIGNIFICANT CHANGE UP (ref 98–107)
CO2 SERPL-SCNC: 24 MMOL/L — SIGNIFICANT CHANGE UP (ref 22–31)
CREAT SERPL-MCNC: < 0.2 MG/DL — LOW (ref 0.5–1.3)
GLUCOSE BLDC GLUCOMTR-MCNC: 100 MG/DL — HIGH (ref 70–99)
GLUCOSE BLDC GLUCOMTR-MCNC: 103 MG/DL — HIGH (ref 70–99)
GLUCOSE BLDC GLUCOMTR-MCNC: 117 MG/DL — HIGH (ref 70–99)
GLUCOSE BLDC GLUCOMTR-MCNC: 99 MG/DL — SIGNIFICANT CHANGE UP (ref 70–99)
GLUCOSE SERPL-MCNC: 97 MG/DL — SIGNIFICANT CHANGE UP (ref 70–99)
HCT VFR BLD CALC: 30.7 % — LOW (ref 39–50)
HGB BLD-MCNC: 9.4 G/DL — LOW (ref 13–17)
INR BLD: 1.57 — HIGH (ref 0.88–1.17)
MCHC RBC-ENTMCNC: 30.6 % — LOW (ref 32–36)
MCHC RBC-ENTMCNC: 31 PG — SIGNIFICANT CHANGE UP (ref 27–34)
MCV RBC AUTO: 101.3 FL — HIGH (ref 80–100)
NRBC # FLD: 0.02 K/UL — LOW (ref 25–125)
PLATELET # BLD AUTO: 414 K/UL — HIGH (ref 150–400)
PMV BLD: 9.5 FL — SIGNIFICANT CHANGE UP (ref 7–13)
POTASSIUM SERPL-MCNC: 4.7 MMOL/L — SIGNIFICANT CHANGE UP (ref 3.5–5.3)
POTASSIUM SERPL-SCNC: 4.7 MMOL/L — SIGNIFICANT CHANGE UP (ref 3.5–5.3)
PROTHROM AB SERPL-ACNC: 18.1 SEC — HIGH (ref 9.8–13.1)
RBC # BLD: 3.03 M/UL — LOW (ref 4.2–5.8)
RBC # FLD: 16 % — HIGH (ref 10.3–14.5)
RH IG SCN BLD-IMP: POSITIVE — SIGNIFICANT CHANGE UP
SODIUM SERPL-SCNC: 132 MMOL/L — LOW (ref 135–145)
VANCOMYCIN TROUGH SERPL-MCNC: 7.5 UG/ML — LOW (ref 10–20)
WBC # BLD: 5.7 K/UL — SIGNIFICANT CHANGE UP (ref 3.8–10.5)
WBC # FLD AUTO: 5.7 K/UL — SIGNIFICANT CHANGE UP (ref 3.8–10.5)

## 2019-02-07 PROCEDURE — 99232 SBSQ HOSP IP/OBS MODERATE 35: CPT | Mod: GC

## 2019-02-07 PROCEDURE — 99232 SBSQ HOSP IP/OBS MODERATE 35: CPT

## 2019-02-07 RX ORDER — LACOSAMIDE 50 MG/1
100 TABLET ORAL
Qty: 0 | Refills: 0 | Status: DISCONTINUED | OUTPATIENT
Start: 2019-02-07 | End: 2019-02-10

## 2019-02-07 RX ORDER — VANCOMYCIN HCL 1 G
1500 VIAL (EA) INTRAVENOUS ONCE
Qty: 0 | Refills: 0 | Status: COMPLETED | OUTPATIENT
Start: 2019-02-07 | End: 2019-02-07

## 2019-02-07 RX ORDER — LACOSAMIDE 50 MG/1
100 TABLET ORAL ONCE
Qty: 0 | Refills: 0 | Status: DISCONTINUED | OUTPATIENT
Start: 2019-02-07 | End: 2019-02-07

## 2019-02-07 RX ORDER — VANCOMYCIN HCL 1 G
VIAL (EA) INTRAVENOUS
Qty: 0 | Refills: 0 | Status: DISCONTINUED | OUTPATIENT
Start: 2019-02-07 | End: 2019-02-11

## 2019-02-07 RX ORDER — VANCOMYCIN HCL 1 G
1500 VIAL (EA) INTRAVENOUS EVERY 12 HOURS
Qty: 0 | Refills: 0 | Status: DISCONTINUED | OUTPATIENT
Start: 2019-02-08 | End: 2019-02-11

## 2019-02-07 RX ADMIN — CHLORHEXIDINE GLUCONATE 1 APPLICATION(S): 213 SOLUTION TOPICAL at 09:11

## 2019-02-07 RX ADMIN — Medication 1 MILLIGRAM(S): at 11:32

## 2019-02-07 RX ADMIN — Medication 1 TABLET(S): at 17:01

## 2019-02-07 RX ADMIN — LACOSAMIDE 120 MILLIGRAM(S): 50 TABLET ORAL at 05:19

## 2019-02-07 RX ADMIN — HEPARIN SODIUM 5000 UNIT(S): 5000 INJECTION INTRAVENOUS; SUBCUTANEOUS at 22:35

## 2019-02-07 RX ADMIN — Medication 300 MILLIGRAM(S): at 18:47

## 2019-02-07 RX ADMIN — QUETIAPINE FUMARATE 25 MILLIGRAM(S): 200 TABLET, FILM COATED ORAL at 11:32

## 2019-02-07 RX ADMIN — Medication 100 MILLIGRAM(S): at 10:50

## 2019-02-07 RX ADMIN — Medication 166.67 MILLIGRAM(S): at 06:53

## 2019-02-07 RX ADMIN — Medication 1 TABLET(S): at 11:32

## 2019-02-07 RX ADMIN — PREGABALIN 1000 MICROGRAM(S): 225 CAPSULE ORAL at 11:32

## 2019-02-07 RX ADMIN — Medication 1 PACKET(S): at 11:32

## 2019-02-07 RX ADMIN — HEPARIN SODIUM 5000 UNIT(S): 5000 INJECTION INTRAVENOUS; SUBCUTANEOUS at 14:38

## 2019-02-07 RX ADMIN — Medication 100 MILLIGRAM(S): at 17:01

## 2019-02-07 RX ADMIN — RISPERIDONE 1 MILLIGRAM(S): 4 TABLET ORAL at 14:37

## 2019-02-07 RX ADMIN — Medication 100 MILLIGRAM(S): at 02:02

## 2019-02-07 RX ADMIN — LACOSAMIDE 100 MILLIGRAM(S): 50 TABLET ORAL at 17:01

## 2019-02-07 NOTE — PROGRESS NOTE ADULT - PROBLEM SELECTOR PLAN 1
- cont avycaz and flagyl and vanco per ID  - s/p EUS stent and drainage  - CT 2/2 showing walled off necrosis  - GI following will need necrosectomy next week, likely Monday  - tolerating feeds

## 2019-02-07 NOTE — PROGRESS NOTE ADULT - SUBJECTIVE AND OBJECTIVE BOX
CHIEF COMPLAINT: Patient is a 53y old  Male who presents with a chief complaint of ARDS?, sepsis (06 Feb 2019 08:09)    Interval Events:      REVIEW OF SYSTEMS:  Constitutional:   Eyes:  ENT:  CV:  Resp:  GI:  :  MSK:  Integumentary:  Neurological:  Psychiatric:  Endocrine:  Hematologic/Lymphatic:  Allergic/Immunologic:  [ ] All other systems negative  [ ] Unable to assess ROS because ________      OBJECTIVE:  ICU Vital Signs Last 24 Hrs  T(C): 36.8 (07 Feb 2019 05:28), Max: 37.7 (06 Feb 2019 18:00)  T(F): 98.3 (07 Feb 2019 05:28), Max: 99.8 (06 Feb 2019 18:00)  HR: 111 (07 Feb 2019 05:28) (106 - 126)  BP: 113/79 (07 Feb 2019 05:28) (104/77 - 124/82)  BP(mean): --  ABP: --  ABP(mean): --  RR: 18 (07 Feb 2019 05:28) (18 - 20)  SpO2: 96% (07 Feb 2019 05:28) (93% - 99%)    Mode: CPAP with PS, FiO2: 40, PEEP: 5, PS: 10, MAP: 8.2, PIP: 16    POCT Blood Glucose.: 100 mg/dL (07 Feb 2019 05:17)    HOSPITAL MEDICATIONS:  MEDICATIONS  (STANDING):  cefTAZidime/avibactam IVPB 2.5 Gram(s) IV Intermittent every 8 hours  cefTAZidime/avibactam IVPB      chlorhexidine 4% Liquid 1 Application(s) Topical <User Schedule>  cyanocobalamin 1000 MICROGram(s) Oral daily  dextrose 5%. 1000 milliLiter(s) (50 mL/Hr) IV Continuous <Continuous>  dextrose 50% Injectable 12.5 Gram(s) IV Push once  dextrose 50% Injectable 25 Gram(s) IV Push once  dextrose 50% Injectable 25 Gram(s) IV Push once  folic acid 1 milliGRAM(s) Enteral Tube daily  heparin  Injectable 5000 Unit(s) SubCutaneous every 8 hours  insulin lispro (HumaLOG) corrective regimen sliding scale   SubCutaneous every 6 hours  lacosamide Solution 100 milliGRAM(s) Oral two times a day  lactobacillus acidophilus 1 Tablet(s) Oral three times a day with meals  metroNIDAZOLE  IVPB      metroNIDAZOLE  IVPB 500 milliGRAM(s) IV Intermittent every 8 hours  midodrine 5 milliGRAM(s) Oral three times a day  psyllium Powder 1 Packet(s) Oral daily  QUEtiapine 25 milliGRAM(s) Oral daily  risperiDONE   Solution 1 milliGRAM(s) Oral daily  sodium chloride 0.9%. 1000 milliLiter(s) (500 mL/Hr) IV Continuous <Continuous>  vancomycin  IVPB 1250 milliGRAM(s) IV Intermittent every 12 hours    MEDICATIONS  (PRN):  acetaminophen    Suspension .. 650 milliGRAM(s) Oral every 6 hours PRN Temp greater or equal to 38C (100.4F), Mild Pain (1 - 3), Moderate Pain (4 - 6)  dextrose 40% Gel 15 Gram(s) Oral once PRN Blood Glucose LESS THAN 70 milliGRAM(s)/deciliter  glucagon  Injectable 1 milliGRAM(s) IntraMuscular once PRN Glucose LESS THAN 70 milligrams/deciliter      LABS:                        9.4    5.70  )-----------( 414      ( 07 Feb 2019 06:20 )             30.7     02-07    132<L>  |  98  |  3<L>  ----------------------------<  97  4.7   |  24  |  < 0.20<L>    Ca    7.8<L>      07 Feb 2019 06:20      PT/INR - ( 07 Feb 2019 06:20 )   PT: 18.1 SEC;   INR: 1.57                    MICROBIOLOGY:     RADIOLOGY:  [ ] Reviewed and interpreted by me    PULMONARY FUNCTION TESTS:    EKG: CHIEF COMPLAINT: Patient is a 53y old  Male who presents with a chief complaint of ARDS?, sepsis (06 Feb 2019 08:09)    Interval Events: none overnight      REVIEW OF SYSTEMS:  Constitutional: no complaints  CV: denies  Resp: denies  GI: denies  [x] All other systems negative  [ ] Unable to assess ROS because ________      OBJECTIVE:  ICU Vital Signs Last 24 Hrs  T(C): 36.8 (07 Feb 2019 05:28), Max: 37.7 (06 Feb 2019 18:00)  T(F): 98.3 (07 Feb 2019 05:28), Max: 99.8 (06 Feb 2019 18:00)  HR: 111 (07 Feb 2019 05:28) (106 - 126)  BP: 113/79 (07 Feb 2019 05:28) (104/77 - 124/82)  BP(mean): --  ABP: --  ABP(mean): --  RR: 18 (07 Feb 2019 05:28) (18 - 20)  SpO2: 96% (07 Feb 2019 05:28) (93% - 99%)    Mode: CPAP with PS, FiO2: 40, PEEP: 5, PS: 10, MAP: 8.2, PIP: 16    POCT Blood Glucose.: 100 mg/dL (07 Feb 2019 05:17)    HOSPITAL MEDICATIONS:  MEDICATIONS  (STANDING):  cefTAZidime/avibactam IVPB 2.5 Gram(s) IV Intermittent every 8 hours  cefTAZidime/avibactam IVPB      chlorhexidine 4% Liquid 1 Application(s) Topical <User Schedule>  cyanocobalamin 1000 MICROGram(s) Oral daily  dextrose 5%. 1000 milliLiter(s) (50 mL/Hr) IV Continuous <Continuous>  dextrose 50% Injectable 12.5 Gram(s) IV Push once  dextrose 50% Injectable 25 Gram(s) IV Push once  dextrose 50% Injectable 25 Gram(s) IV Push once  folic acid 1 milliGRAM(s) Enteral Tube daily  heparin  Injectable 5000 Unit(s) SubCutaneous every 8 hours  insulin lispro (HumaLOG) corrective regimen sliding scale   SubCutaneous every 6 hours  lacosamide Solution 100 milliGRAM(s) Oral two times a day  lactobacillus acidophilus 1 Tablet(s) Oral three times a day with meals  metroNIDAZOLE  IVPB      metroNIDAZOLE  IVPB 500 milliGRAM(s) IV Intermittent every 8 hours  midodrine 5 milliGRAM(s) Oral three times a day  psyllium Powder 1 Packet(s) Oral daily  QUEtiapine 25 milliGRAM(s) Oral daily  risperiDONE   Solution 1 milliGRAM(s) Oral daily  sodium chloride 0.9%. 1000 milliLiter(s) (500 mL/Hr) IV Continuous <Continuous>  vancomycin  IVPB 1250 milliGRAM(s) IV Intermittent every 12 hours    MEDICATIONS  (PRN):  acetaminophen    Suspension .. 650 milliGRAM(s) Oral every 6 hours PRN Temp greater or equal to 38C (100.4F), Mild Pain (1 - 3), Moderate Pain (4 - 6)  dextrose 40% Gel 15 Gram(s) Oral once PRN Blood Glucose LESS THAN 70 milliGRAM(s)/deciliter  glucagon  Injectable 1 milliGRAM(s) IntraMuscular once PRN Glucose LESS THAN 70 milligrams/deciliter      LABS:                        9.4    5.70  )-----------( 414      ( 07 Feb 2019 06:20 )             30.7     02-07    132<L>  |  98  |  3<L>  ----------------------------<  97  4.7   |  24  |  < 0.20<L>    Ca    7.8<L>      07 Feb 2019 06:20      PT/INR - ( 07 Feb 2019 06:20 )   PT: 18.1 SEC;   INR: 1.57

## 2019-02-07 NOTE — PROGRESS NOTE ADULT - ASSESSMENT
53 Male w/ a PMHx of TBI (s/p PEG tube, contracture, and seizure d/o) presented as a transfer from [facility] on 12/9 for respiratory failure 2/2 ARDS likely 2/2 necrotizing pancreatitis s/p intubation. Course c/b Acinetobacter PNA s/p Avycaz and Flagyl 7 day course.  Course further complicated by acute blood loss anemia s/p colonoscopy with findings suggestive of ischemic colitis, without further bleeding and Hgb remaining stable. S/p extubation 1/3, and re-intubation 1/6 now s/p tracheostomy.

## 2019-02-07 NOTE — PROGRESS NOTE ADULT - SUBJECTIVE AND OBJECTIVE BOX
CC: F/U for fever    Saw/spoke to patient. No fevers x 24, no chills. Overall unchanged. No new complaints.    Allergies  Valproate Sodium (Other (Severe))    ANTIMICROBIALS:  cefTAZidime/avibactam IVPB 2.5 every 8 hours  cefTAZidime/avibactam IVPB    metroNIDAZOLE  IVPB    metroNIDAZOLE  IVPB 500 every 8 hours  vancomycin  IVPB 1250 every 12 hours    PE:    Vital Signs Last 24 Hrs  T(C): 36.8 (07 Feb 2019 05:28), Max: 37.7 (06 Feb 2019 18:00)  T(F): 98.3 (07 Feb 2019 05:28), Max: 99.8 (06 Feb 2019 18:00)  HR: 104 (07 Feb 2019 10:28) (104 - 126)  BP: 113/79 (07 Feb 2019 05:28) (113/79 - 124/82)  RR: 18 (07 Feb 2019 05:28) (18 - 20)  SpO2: 98% (07 Feb 2019 10:28) (93% - 99%)    Gen: AOx3, NAD, non-toxic  CV: S1+S2 normal, tachycardic  Resp: Clear bilat, no resp distress, no crackles/wheezes, trach  Abd: Soft, nontender, +BS, PEG  Ext: No LE edema, no wounds    LABS:                        9.4    5.70  )-----------( 414      ( 07 Feb 2019 06:20 )             30.7     02-07    132<L>  |  98  |  3<L>  ----------------------------<  97  4.7   |  24  |  < 0.20<L>    Ca    7.8<L>      07 Feb 2019 06:20    MICROBIOLOGY:    BLOOD VENOUS  02-02-19 NGTD    BLOOD PERIPHERAL  01-30-19 NGTD    BLOOD VENOUS  01-24-19 --  --  BLOOD CULTURE PCR  Staphylococcus sp.,coag neg    ENDOTRACHEAL SPECIMEN  01-22-19 --  --  Pseudomonas aeruginosa    (otherwise reviewed)    RADIOLOGY:    2/2 CT:    IMPRESSION:   Partially visualized moderate bilateral pleural effusions with bilateral   lower lobar compressive atelectasis.    Interval placement of a AXIOS stent with decrease in size of previously   seen large left peripancreatic fluid collection which now measures up to   7.3 cm in length.    Interval formation of a enhancing wall around the previously seen   perigastric fluid collection, which measures up to 4.9 cm.  Small volume ascites. 2 new small fluid collections noted anteriorly in   the left upper quadrant omentum also may reflect evolving abscesses.

## 2019-02-07 NOTE — PROGRESS NOTE ADULT - ASSESSMENT
53 M (resident of [facility]) with TBI, s/p PEG tube, contracture, and seizure d/o who presented as a transfer from [facility] on 12/9 for respiratory failure 2/2 ARDS likely 2/2 necrotizing pancreatitis s/p intubation.  Bronc 12/11 with pseudomonas; Sputum cx with  acinetobacter  Sepsis with fever, leukopenia resolved, extubated but reintubated 1/6/19 for hypoxia and poor cough along with profuse secretions and sputum cx again with acinetobacter (restarted Avycaz/Flagyl 1/9--unclear treatment endpoint, s/p 4 weeks)  Necrotising Pancreatitis with multiple peripancreatitis collections better formed on CT 1/24 and a new 7 cm abscess  Afebrile, unclear etiology fever, no leukocytosis  Overall, lung abscess, MDR organisms, necrotizing pancreatitis, intraabdominal collection  - Vanco 1250mg q 12 (increased, monitor trough)  - Avycaz/Flagyl  - Frequent suctioning and pulmonary toileting  - Monitor for any further signs infection; close monitoring for neurological symptoms on prolonged flagyl  - S/p 4 week further course for ? lung abscesses, unclear treatment endpoint; improving on last imaging--continue abx fo lovely Oleary MD  Pager [phone]  After 5pm and on weekends call [phone]

## 2019-02-08 LAB
GLUCOSE BLDC GLUCOMTR-MCNC: 106 MG/DL — HIGH (ref 70–99)
GLUCOSE BLDC GLUCOMTR-MCNC: 111 MG/DL — HIGH (ref 70–99)
GLUCOSE BLDC GLUCOMTR-MCNC: 113 MG/DL — HIGH (ref 70–99)
GLUCOSE BLDC GLUCOMTR-MCNC: 125 MG/DL — HIGH (ref 70–99)

## 2019-02-08 PROCEDURE — 99232 SBSQ HOSP IP/OBS MODERATE 35: CPT | Mod: GC

## 2019-02-08 PROCEDURE — 99232 SBSQ HOSP IP/OBS MODERATE 35: CPT

## 2019-02-08 RX ADMIN — HEPARIN SODIUM 5000 UNIT(S): 5000 INJECTION INTRAVENOUS; SUBCUTANEOUS at 05:40

## 2019-02-08 RX ADMIN — CHLORHEXIDINE GLUCONATE 1 APPLICATION(S): 213 SOLUTION TOPICAL at 11:22

## 2019-02-08 RX ADMIN — Medication 1 TABLET(S): at 08:55

## 2019-02-08 RX ADMIN — PREGABALIN 1000 MICROGRAM(S): 225 CAPSULE ORAL at 11:24

## 2019-02-08 RX ADMIN — QUETIAPINE FUMARATE 25 MILLIGRAM(S): 200 TABLET, FILM COATED ORAL at 11:23

## 2019-02-08 RX ADMIN — HEPARIN SODIUM 5000 UNIT(S): 5000 INJECTION INTRAVENOUS; SUBCUTANEOUS at 15:14

## 2019-02-08 RX ADMIN — RISPERIDONE 1 MILLIGRAM(S): 4 TABLET ORAL at 11:26

## 2019-02-08 RX ADMIN — Medication 100 MILLIGRAM(S): at 11:25

## 2019-02-08 RX ADMIN — Medication 100 MILLIGRAM(S): at 02:08

## 2019-02-08 RX ADMIN — Medication 1 MILLIGRAM(S): at 11:24

## 2019-02-08 RX ADMIN — LACOSAMIDE 100 MILLIGRAM(S): 50 TABLET ORAL at 17:41

## 2019-02-08 RX ADMIN — LACOSAMIDE 100 MILLIGRAM(S): 50 TABLET ORAL at 05:44

## 2019-02-08 RX ADMIN — Medication 100 MILLIGRAM(S): at 17:41

## 2019-02-08 RX ADMIN — Medication 1 TABLET(S): at 17:41

## 2019-02-08 RX ADMIN — Medication 1 TABLET(S): at 15:14

## 2019-02-08 RX ADMIN — Medication 300 MILLIGRAM(S): at 19:01

## 2019-02-08 RX ADMIN — HEPARIN SODIUM 5000 UNIT(S): 5000 INJECTION INTRAVENOUS; SUBCUTANEOUS at 22:07

## 2019-02-08 RX ADMIN — Medication 1 PACKET(S): at 11:25

## 2019-02-08 RX ADMIN — Medication 300 MILLIGRAM(S): at 05:39

## 2019-02-08 NOTE — PROGRESS NOTE ADULT - SUBJECTIVE AND OBJECTIVE BOX
CHIEF COMPLAINT: Patient is a 53y old  Male who presents with a chief complaint of ARDS?, sepsis (08 Feb 2019 06:59)    Interval Events:      REVIEW OF SYSTEMS:  Constitutional:   Eyes:  ENT:  CV:  Resp:  GI:  :  MSK:  Integumentary:  Neurological:  Psychiatric:  Endocrine:  Hematologic/Lymphatic:  Allergic/Immunologic:  [ ] All other systems negative  [ ] Unable to assess ROS because ________      OBJECTIVE:  ICU Vital Signs Last 24 Hrs  T(C): 36.4 (08 Feb 2019 06:00), Max: 37.1 (07 Feb 2019 14:00)  T(F): 97.6 (08 Feb 2019 06:00), Max: 98.8 (07 Feb 2019 14:00)  HR: 111 (08 Feb 2019 07:14) (101 - 122)  BP: 103/78 (08 Feb 2019 06:00) (101/72 - 116/77)  BP(mean): --  ABP: --  ABP(mean): --  RR: 18 (08 Feb 2019 06:00) (17 - 19)  SpO2: 98% (08 Feb 2019 07:14) (93% - 100%)    Mode: CPAP with PS, FiO2: 40, PEEP: 5, PS: 10, MAP: 9, PIP: 16    02-07 @ 07:01  -  02-08 @ 07:00  --------------------------------------------------------  IN: 500 mL / OUT: 0 mL / NET: 500 mL    POCT Blood Glucose.: 111 mg/dL (08 Feb 2019 05:42)    HOSPITAL MEDICATIONS:  MEDICATIONS  (STANDING):  cefTAZidime/avibactam IVPB 2.5 Gram(s) IV Intermittent every 8 hours  cefTAZidime/avibactam IVPB      chlorhexidine 4% Liquid 1 Application(s) Topical <User Schedule>  cyanocobalamin 1000 MICROGram(s) Oral daily  dextrose 5%. 1000 milliLiter(s) (50 mL/Hr) IV Continuous <Continuous>  dextrose 50% Injectable 12.5 Gram(s) IV Push once  dextrose 50% Injectable 25 Gram(s) IV Push once  dextrose 50% Injectable 25 Gram(s) IV Push once  folic acid 1 milliGRAM(s) Enteral Tube daily  heparin  Injectable 5000 Unit(s) SubCutaneous every 8 hours  insulin lispro (HumaLOG) corrective regimen sliding scale   SubCutaneous every 6 hours  lacosamide Solution 100 milliGRAM(s) Oral two times a day  lactobacillus acidophilus 1 Tablet(s) Oral three times a day with meals  metroNIDAZOLE  IVPB      metroNIDAZOLE  IVPB 500 milliGRAM(s) IV Intermittent every 8 hours  psyllium Powder 1 Packet(s) Oral daily  QUEtiapine 25 milliGRAM(s) Oral daily  risperiDONE   Solution 1 milliGRAM(s) Oral daily  sodium chloride 0.9%. 1000 milliLiter(s) (500 mL/Hr) IV Continuous <Continuous>  vancomycin  IVPB 1500 milliGRAM(s) IV Intermittent every 12 hours  vancomycin  IVPB        MEDICATIONS  (PRN):  acetaminophen    Suspension .. 650 milliGRAM(s) Oral every 6 hours PRN Temp greater or equal to 38C (100.4F), Mild Pain (1 - 3), Moderate Pain (4 - 6)  dextrose 40% Gel 15 Gram(s) Oral once PRN Blood Glucose LESS THAN 70 milliGRAM(s)/deciliter  glucagon  Injectable 1 milliGRAM(s) IntraMuscular once PRN Glucose LESS THAN 70 milligrams/deciliter      LABS:                        9.4    5.70  )-----------( 414      ( 07 Feb 2019 06:20 )             30.7     02-07    132<L>  |  98  |  3<L>  ----------------------------<  97  4.7   |  24  |  < 0.20<L>    Ca    7.8<L>      07 Feb 2019 06:20      PT/INR - ( 07 Feb 2019 06:20 )   PT: 18.1 SEC;   INR: 1.57                    MICROBIOLOGY:     RADIOLOGY:  [ ] Reviewed and interpreted by me    PULMONARY FUNCTION TESTS:    EKG: CHIEF COMPLAINT: Patient is a 53y old  Male who presents with a chief complaint of ARDS?, sepsis (08 Feb 2019 06:59)    Interval Events: none overnight      REVIEW OF SYSTEMS:  Constitutional: no complaints overall, nods when asked questions  CV: denies  Resp: denies  GI: denies  [x] All other systems negative  [ ] Unable to assess ROS because ________      OBJECTIVE:  ICU Vital Signs Last 24 Hrs  T(C): 36.4 (08 Feb 2019 06:00), Max: 37.1 (07 Feb 2019 14:00)  T(F): 97.6 (08 Feb 2019 06:00), Max: 98.8 (07 Feb 2019 14:00)  HR: 111 (08 Feb 2019 07:14) (101 - 122)  BP: 103/78 (08 Feb 2019 06:00) (101/72 - 116/77)  BP(mean): --  ABP: --  ABP(mean): --  RR: 18 (08 Feb 2019 06:00) (17 - 19)  SpO2: 98% (08 Feb 2019 07:14) (93% - 100%)    Mode: CPAP with PS, FiO2: 40, PEEP: 5, PS: 10, MAP: 9, PIP: 16    02-07 @ 07:01  -  02-08 @ 07:00  --------------------------------------------------------  IN: 500 mL / OUT: 0 mL / NET: 500 mL    POCT Blood Glucose.: 111 mg/dL (08 Feb 2019 05:42)    HOSPITAL MEDICATIONS:  MEDICATIONS  (STANDING):  cefTAZidime/avibactam IVPB 2.5 Gram(s) IV Intermittent every 8 hours  cefTAZidime/avibactam IVPB      chlorhexidine 4% Liquid 1 Application(s) Topical <User Schedule>  cyanocobalamin 1000 MICROGram(s) Oral daily  dextrose 5%. 1000 milliLiter(s) (50 mL/Hr) IV Continuous <Continuous>  dextrose 50% Injectable 12.5 Gram(s) IV Push once  dextrose 50% Injectable 25 Gram(s) IV Push once  dextrose 50% Injectable 25 Gram(s) IV Push once  folic acid 1 milliGRAM(s) Enteral Tube daily  heparin  Injectable 5000 Unit(s) SubCutaneous every 8 hours  insulin lispro (HumaLOG) corrective regimen sliding scale   SubCutaneous every 6 hours  lacosamide Solution 100 milliGRAM(s) Oral two times a day  lactobacillus acidophilus 1 Tablet(s) Oral three times a day with meals  metroNIDAZOLE  IVPB      metroNIDAZOLE  IVPB 500 milliGRAM(s) IV Intermittent every 8 hours  psyllium Powder 1 Packet(s) Oral daily  QUEtiapine 25 milliGRAM(s) Oral daily  risperiDONE   Solution 1 milliGRAM(s) Oral daily  sodium chloride 0.9%. 1000 milliLiter(s) (500 mL/Hr) IV Continuous <Continuous>  vancomycin  IVPB 1500 milliGRAM(s) IV Intermittent every 12 hours  vancomycin  IVPB        MEDICATIONS  (PRN):  acetaminophen    Suspension .. 650 milliGRAM(s) Oral every 6 hours PRN Temp greater or equal to 38C (100.4F), Mild Pain (1 - 3), Moderate Pain (4 - 6)  dextrose 40% Gel 15 Gram(s) Oral once PRN Blood Glucose LESS THAN 70 milliGRAM(s)/deciliter  glucagon  Injectable 1 milliGRAM(s) IntraMuscular once PRN Glucose LESS THAN 70 milligrams/deciliter

## 2019-02-08 NOTE — PROGRESS NOTE ADULT - PROBLEM SELECTOR PLAN 1
44year old male here with nausea,vomiting and diarrhea starting at 0230 last night, pt noted to have elevated HR upon arrival to room 35 EKG obtained - cont avycaz and flagyl and vanco per ID  - s/p EUS stent and drainage  - CT 2/2 showing walled off necrosis  - GI following will need necrosectomy next week, likely Monday  - tolerating feeds

## 2019-02-08 NOTE — PROGRESS NOTE ADULT - ASSESSMENT
53 M (resident of [facility]) with TBI, s/p PEG tube, contracture, and seizure d/o who presented as a transfer from [facility] on 12/9 for respiratory failure 2/2 ARDS likely 2/2 necrotizing pancreatitis s/p intubation.  Bronc 12/11 with pseudomonas; Sputum cx with  acinetobacter  Sepsis with fever, leukopenia resolved, extubated but reintubated 1/6/19 for hypoxia and poor cough along with profuse secretions and sputum cx again with acinetobacter (restarted Avycaz/Flagyl 1/9--unclear treatment endpoint, s/p 4 weeks)  Necrotising Pancreatitis with multiple peripancreatitis collections better formed on CT 1/24 and a new 7 cm abscess  Afebrile, unclear etiology fever, no leukocytosis  Overall, lung abscess, MDR organisms, necrotizing pancreatitis, intraabdominal collection  - Vanco 1500mg q 12 (increased, monitor trough)  - Avycaz/Flagyl  - Frequent suctioning and pulmonary toileting  - Monitor for any further signs infection; close monitoring for neurological symptoms on prolonged flagyl  - Planned for sampling abd collection Monday; continue same abx while evaluating collection (note passed 4 weeks for ? lung abscesses)  - Discussed with RCU NP    Neal Oleary MD  Pager [phone]  After 5pm and on weekends call [phone]

## 2019-02-08 NOTE — PROGRESS NOTE ADULT - ASSESSMENT
1) Walled off necrosis, s/p EUS and AXIOS 1/31/19  2) Hematochezia, s/p colonoscopy on 12/28/2018 with severe segmental colitis localized to the transverse colon and ascending colon (possible ischemic colitis, possible colitis related to contiguous danay-pancreatic inflammatory process). Biopsies with granulation tissue but no infection/malignancy, Hb stable   3) Resp failure in the setting of pneumonia, requiring tracheostomy now with lung abscess and sepsis on IV  Abx, ID following     Recommendations  - Trend CBC, CMP and fever curve, Hb stable for now.   - Continue IV antibiotics as per ID recommendations,  - Rest of care per primary team  - may need necrosectomy later next week  - GI will follow up

## 2019-02-08 NOTE — PROGRESS NOTE ADULT - SUBJECTIVE AND OBJECTIVE BOX
Chief Complaint:  Patient is a 53y old  Male who presents with a chief complaint of ARDS?, sepsis (07 Feb 2019 07:32)      Interval Events: Pt afebrile. s/p EUS with axios drainage 7 days ago.    Allergies:  Valproate Sodium (Other (Severe))      Hospital Medications:  acetaminophen    Suspension .. 650 milliGRAM(s) Oral every 6 hours PRN  cefTAZidime/avibactam IVPB 2.5 Gram(s) IV Intermittent every 8 hours  cefTAZidime/avibactam IVPB      chlorhexidine 4% Liquid 1 Application(s) Topical <User Schedule>  cyanocobalamin 1000 MICROGram(s) Oral daily  dextrose 40% Gel 15 Gram(s) Oral once PRN  dextrose 5%. 1000 milliLiter(s) IV Continuous <Continuous>  dextrose 50% Injectable 12.5 Gram(s) IV Push once  dextrose 50% Injectable 25 Gram(s) IV Push once  dextrose 50% Injectable 25 Gram(s) IV Push once  folic acid 1 milliGRAM(s) Enteral Tube daily  glucagon  Injectable 1 milliGRAM(s) IntraMuscular once PRN  heparin  Injectable 5000 Unit(s) SubCutaneous every 8 hours  insulin lispro (HumaLOG) corrective regimen sliding scale   SubCutaneous every 6 hours  lacosamide Solution 100 milliGRAM(s) Oral two times a day  lactobacillus acidophilus 1 Tablet(s) Oral three times a day with meals  metroNIDAZOLE  IVPB      metroNIDAZOLE  IVPB 500 milliGRAM(s) IV Intermittent every 8 hours  psyllium Powder 1 Packet(s) Oral daily  QUEtiapine 25 milliGRAM(s) Oral daily  risperiDONE   Solution 1 milliGRAM(s) Oral daily  sodium chloride 0.9%. 1000 milliLiter(s) IV Continuous <Continuous>  vancomycin  IVPB 1500 milliGRAM(s) IV Intermittent every 12 hours  vancomycin  IVPB          PMHX/PSHX:  Seizure  TBI (traumatic brain injury)  S/P percutaneous endoscopic gastrostomy (PEG) tube placement  No significant past surgical history      Family history:  no pertinent history in first degree relative          PHYSICAL EXAM:     GENERAL:  No distress  HEENT: conjunctivae clear, trach  CHEST:  Full & symmetric excursion, no increased effort  HEART:  Regular rhythm  ABDOMEN:  Soft, non-tender, non-distended  EXTREMITIES:  no edema  SKIN:  Dry/warm  NEURO:  Alert    Vital Signs:  Vital Signs Last 24 Hrs  T(C): 36.4 (08 Feb 2019 06:00), Max: 37.1 (07 Feb 2019 14:00)  T(F): 97.6 (08 Feb 2019 06:00), Max: 98.8 (07 Feb 2019 14:00)  HR: 112 (08 Feb 2019 06:00) (101 - 122)  BP: 103/78 (08 Feb 2019 06:00) (101/72 - 116/77)  BP(mean): --  RR: 18 (08 Feb 2019 06:00) (17 - 19)  SpO2: 100% (08 Feb 2019 06:00) (93% - 100%)  Daily     Daily     LABS:                        9.4    5.70  )-----------( 414      ( 07 Feb 2019 06:20 )             30.7     02-07    132<L>  |  98  |  3<L>  ----------------------------<  97  4.7   |  24  |  < 0.20<L>    Ca    7.8<L>      07 Feb 2019 06:20        PT/INR - ( 07 Feb 2019 06:20 )   PT: 18.1 SEC;   INR: 1.57                  Imaging:

## 2019-02-08 NOTE — PROGRESS NOTE ADULT - PROBLEM SELECTOR PLAN 3
- fevers resolved, afebrile now  - ID note appreciated  - CT showing new lung abscess  - cont current abx for now  - will need to f/u with ID re: duration of abx

## 2019-02-08 NOTE — PROGRESS NOTE ADULT - SUBJECTIVE AND OBJECTIVE BOX
CC: F/U for lung abscess    Saw/spoke to patient. Sister at bedside. No new complaints/events. Overall well.    Allergies  Valproate Sodium (Other (Severe))    ANTIMICROBIALS:  cefTAZidime/avibactam IVPB 2.5 every 8 hours  cefTAZidime/avibactam IVPB    metroNIDAZOLE  IVPB    metroNIDAZOLE  IVPB 500 every 8 hours  vancomycin  IVPB 1500 every 12 hours  vancomycin  IVPB      PE:    Vital Signs Last 24 Hrs  T(C): 36.4 (08 Feb 2019 06:00), Max: 37.1 (07 Feb 2019 14:00)  T(F): 97.6 (08 Feb 2019 06:00), Max: 98.8 (07 Feb 2019 14:00)  HR: 104 (08 Feb 2019 11:11) (101 - 122)  BP: 103/78 (08 Feb 2019 06:00) (103/78 - 116/77)  RR: 17 (08 Feb 2019 09:39) (17 - 18)  SpO2: 98% (08 Feb 2019 11:11) (93% - 100%)    Gen: AOx3, NAD, non-toxic, pleasant  CV: S1+S2 normal, nontachycardic  Resp: Clear bilat, no resp distress, no crackles/wheezes  Abd: Soft, nontender, +BS  Ext: No LE edema, no wounds    LABS:                        9.4    5.70  )-----------( 414      ( 07 Feb 2019 06:20 )             30.7     02-07    132<L>  |  98  |  3<L>  ----------------------------<  97  4.7   |  24  |  < 0.20<L>    Ca    7.8<L>      07 Feb 2019 06:20    MICROBIOLOGY:  Vancomycin Level, Trough: 7.5 ug/mL (02-07-19 @ 17:17)    BLOOD VENOUS  02-02-19 NGTD    BLOOD PERIPHERAL  01-30-19 NGTD    BLOOD VENOUS  01-24-19 --  --  BLOOD CULTURE PCR  Staphylococcus sp.,coag neg    ENDOTRACHEAL SPECIMEN  01-22-19 --  --  Pseudomonas aeruginosa    (otherwise reviewed)    RADIOLOGY:    2/2 CT:    IMPRESSION:   Partially visualized moderate bilateral pleural effusions with bilateral   lower lobar compressive atelectasis.    Interval placement of a AXIOS stent with decrease in size of previously   seen large left peripancreatic fluid collection which now measures up to   7.3 cm in length.    Interval formation of a enhancing wall around the previously seen   perigastric fluid collection, which measures up to 4.9 cm.  Small volume ascites. 2 new small fluid collections noted anteriorly in   the left upper quadrant omentum also may reflect evolving abscesses.

## 2019-02-08 NOTE — PROGRESS NOTE ADULT - ASSESSMENT
53 Male w/ a PMHx of TBI (s/p PEG tube, contracture, and seizure d/o) presented as a transfer from [facility] on 12/9 for respiratory failure 2/2 ARDS likely 2/2 necrotizing pancreatitis s/p intubation. Course c/b Acinetobacter PNA s/p Avycaz and Flagyl 7 day course.  Course further complicated by acute blood loss anemia s/p colonoscopy with findings suggestive of ischemic colitis, without further bleeding and Hgb remaining stable. S/p extubation 1/3, and re-intubation 1/6 now s/p tracheostomy.

## 2019-02-09 LAB
GLUCOSE BLDC GLUCOMTR-MCNC: 100 MG/DL — HIGH (ref 70–99)
GLUCOSE BLDC GLUCOMTR-MCNC: 113 MG/DL — HIGH (ref 70–99)
GLUCOSE BLDC GLUCOMTR-MCNC: 121 MG/DL — HIGH (ref 70–99)
GLUCOSE BLDC GLUCOMTR-MCNC: 132 MG/DL — HIGH (ref 70–99)
VANCOMYCIN TROUGH SERPL-MCNC: 12.8 UG/ML — SIGNIFICANT CHANGE UP (ref 10–20)

## 2019-02-09 RX ADMIN — LACOSAMIDE 100 MILLIGRAM(S): 50 TABLET ORAL at 17:32

## 2019-02-09 RX ADMIN — PREGABALIN 1000 MICROGRAM(S): 225 CAPSULE ORAL at 12:00

## 2019-02-09 RX ADMIN — QUETIAPINE FUMARATE 25 MILLIGRAM(S): 200 TABLET, FILM COATED ORAL at 12:00

## 2019-02-09 RX ADMIN — LACOSAMIDE 100 MILLIGRAM(S): 50 TABLET ORAL at 05:03

## 2019-02-09 RX ADMIN — Medication 300 MILLIGRAM(S): at 19:09

## 2019-02-09 RX ADMIN — RISPERIDONE 1 MILLIGRAM(S): 4 TABLET ORAL at 12:01

## 2019-02-09 RX ADMIN — Medication 100 MILLIGRAM(S): at 10:37

## 2019-02-09 RX ADMIN — Medication 1 TABLET(S): at 12:01

## 2019-02-09 RX ADMIN — Medication 100 MILLIGRAM(S): at 02:41

## 2019-02-09 RX ADMIN — Medication 1 TABLET(S): at 17:32

## 2019-02-09 RX ADMIN — HEPARIN SODIUM 5000 UNIT(S): 5000 INJECTION INTRAVENOUS; SUBCUTANEOUS at 05:03

## 2019-02-09 RX ADMIN — Medication 100 MILLIGRAM(S): at 17:32

## 2019-02-09 RX ADMIN — Medication 300 MILLIGRAM(S): at 06:21

## 2019-02-09 RX ADMIN — Medication 1 MILLIGRAM(S): at 12:00

## 2019-02-09 RX ADMIN — HEPARIN SODIUM 5000 UNIT(S): 5000 INJECTION INTRAVENOUS; SUBCUTANEOUS at 13:59

## 2019-02-09 RX ADMIN — Medication 1 PACKET(S): at 12:00

## 2019-02-09 RX ADMIN — Medication 1 TABLET(S): at 09:16

## 2019-02-09 RX ADMIN — CHLORHEXIDINE GLUCONATE 1 APPLICATION(S): 213 SOLUTION TOPICAL at 09:17

## 2019-02-09 NOTE — PROGRESS NOTE ADULT - PROBLEM SELECTOR PLAN 6
- awake, alert, responds  - contractures  - supportive care - awake, alert  - contractures  - supportive care

## 2019-02-09 NOTE — PROGRESS NOTE ADULT - SUBJECTIVE AND OBJECTIVE BOX
CHIEF COMPLAINT:    Interval Events:      OBJECTIVE:  ICU Vital Signs Last 24 Hrs  T(C): 37.3 (09 Feb 2019 05:01), Max: 37.3 (08 Feb 2019 10:00)  T(F): 99.2 (09 Feb 2019 05:01), Max: 99.2 (08 Feb 2019 10:00)  HR: 96 (09 Feb 2019 07:50) (96 - 115)  BP: 107/67 (09 Feb 2019 05:01) (95/69 - 123/80)    RR: 20 (09 Feb 2019 05:01) (17 - 20)  SpO2: 98% (09 Feb 2019 07:48) (95% - 99%)    Mode: standby    02-08 @ 07:01  -  02-09 @ 07:00  --------------------------------------------------------  IN: 250 mL / OUT: 0 mL / NET: 250 mL      CAPILLARY BLOOD GLUCOSE      POCT Blood Glucose.: 113 mg/dL (09 Feb 2019 06:48)      HOSPITAL MEDICATIONS:  MEDICATIONS  (STANDING):  cefTAZidime/avibactam IVPB 2.5 Gram(s) IV Intermittent every 8 hours  cefTAZidime/avibactam IVPB      chlorhexidine 4% Liquid 1 Application(s) Topical <User Schedule>  cyanocobalamin 1000 MICROGram(s) Oral daily  dextrose 5%. 1000 milliLiter(s) (50 mL/Hr) IV Continuous <Continuous>  dextrose 50% Injectable 12.5 Gram(s) IV Push once  dextrose 50% Injectable 25 Gram(s) IV Push once  dextrose 50% Injectable 25 Gram(s) IV Push once  folic acid 1 milliGRAM(s) Enteral Tube daily  heparin  Injectable 5000 Unit(s) SubCutaneous every 8 hours  insulin lispro (HumaLOG) corrective regimen sliding scale   SubCutaneous every 6 hours  lacosamide Solution 100 milliGRAM(s) Oral two times a day  lactobacillus acidophilus 1 Tablet(s) Oral three times a day with meals  metroNIDAZOLE  IVPB      metroNIDAZOLE  IVPB 500 milliGRAM(s) IV Intermittent every 8 hours  psyllium Powder 1 Packet(s) Oral daily  QUEtiapine 25 milliGRAM(s) Oral daily  risperiDONE   Solution 1 milliGRAM(s) Oral daily  sodium chloride 0.9%. 1000 milliLiter(s) (500 mL/Hr) IV Continuous <Continuous>  vancomycin  IVPB 1500 milliGRAM(s) IV Intermittent every 12 hours  vancomycin  IVPB        MEDICATIONS  (PRN):  acetaminophen    Suspension .. 650 milliGRAM(s) Oral every 6 hours PRN Temp greater or equal to 38C (100.4F), Mild Pain (1 - 3), Moderate Pain (4 - 6)  dextrose 40% Gel 15 Gram(s) Oral once PRN Blood Glucose LESS THAN 70 milliGRAM(s)/deciliter  glucagon  Injectable 1 milliGRAM(s) IntraMuscular once PRN Glucose LESS THAN 70 milligrams/deciliter      LABS: CHIEF COMPLAINT: n    Interval Events: no events overnight       OBJECTIVE:  ICU Vital Signs Last 24 Hrs  T(C): 37.3 (09 Feb 2019 05:01), Max: 37.3 (08 Feb 2019 10:00)  T(F): 99.2 (09 Feb 2019 05:01), Max: 99.2 (08 Feb 2019 10:00)  HR: 96 (09 Feb 2019 07:50) (96 - 115)  BP: 107/67 (09 Feb 2019 05:01) (95/69 - 123/80)    RR: 20 (09 Feb 2019 05:01) (17 - 20)  SpO2: 98% (09 Feb 2019 07:48) (95% - 99%)    Mode: standby    02-08 @ 07:01  -  02-09 @ 07:00  --------------------------------------------------------  IN: 250 mL / OUT: 0 mL / NET: 250 mL      CAPILLARY BLOOD GLUCOSE      POCT Blood Glucose.: 113 mg/dL (09 Feb 2019 06:48)      HOSPITAL MEDICATIONS:  MEDICATIONS  (STANDING):  cefTAZidime/avibactam IVPB 2.5 Gram(s) IV Intermittent every 8 hours  cefTAZidime/avibactam IVPB      chlorhexidine 4% Liquid 1 Application(s) Topical <User Schedule>  cyanocobalamin 1000 MICROGram(s) Oral daily  dextrose 5%. 1000 milliLiter(s) (50 mL/Hr) IV Continuous <Continuous>  dextrose 50% Injectable 12.5 Gram(s) IV Push once  dextrose 50% Injectable 25 Gram(s) IV Push once  dextrose 50% Injectable 25 Gram(s) IV Push once  folic acid 1 milliGRAM(s) Enteral Tube daily  heparin  Injectable 5000 Unit(s) SubCutaneous every 8 hours  insulin lispro (HumaLOG) corrective regimen sliding scale   SubCutaneous every 6 hours  lacosamide Solution 100 milliGRAM(s) Oral two times a day  lactobacillus acidophilus 1 Tablet(s) Oral three times a day with meals  metroNIDAZOLE  IVPB      metroNIDAZOLE  IVPB 500 milliGRAM(s) IV Intermittent every 8 hours  psyllium Powder 1 Packet(s) Oral daily  QUEtiapine 25 milliGRAM(s) Oral daily  risperiDONE   Solution 1 milliGRAM(s) Oral daily  sodium chloride 0.9%. 1000 milliLiter(s) (500 mL/Hr) IV Continuous <Continuous>  vancomycin  IVPB 1500 milliGRAM(s) IV Intermittent every 12 hours  vancomycin  IVPB        MEDICATIONS  (PRN):  acetaminophen    Suspension .. 650 milliGRAM(s) Oral every 6 hours PRN Temp greater or equal to 38C (100.4F), Mild Pain (1 - 3), Moderate Pain (4 - 6)  dextrose 40% Gel 15 Gram(s) Oral once PRN Blood Glucose LESS THAN 70 milliGRAM(s)/deciliter  glucagon  Injectable 1 milliGRAM(s) IntraMuscular once PRN Glucose LESS THAN 70 milligrams/deciliter      LABS: CHIEF COMPLAINT: No new complaints/events. Overall well.    Interval Events: no events overnight       OBJECTIVE:  ICU Vital Signs Last 24 Hrs  T(C): 37.3 (09 Feb 2019 05:01), Max: 37.3 (08 Feb 2019 10:00)  T(F): 99.2 (09 Feb 2019 05:01), Max: 99.2 (08 Feb 2019 10:00)  HR: 96 (09 Feb 2019 07:50) (96 - 115)  BP: 107/67 (09 Feb 2019 05:01) (95/69 - 123/80)    RR: 20 (09 Feb 2019 05:01) (17 - 20)  SpO2: 98% (09 Feb 2019 07:48) (95% - 99%)    Mode: standby    02-08 @ 07:01  -  02-09 @ 07:00  --------------------------------------------------------  IN: 250 mL / OUT: 0 mL / NET: 250 mL      CAPILLARY BLOOD GLUCOSE      POCT Blood Glucose.: 113 mg/dL (09 Feb 2019 06:48)      HOSPITAL MEDICATIONS:  MEDICATIONS  (STANDING):  cefTAZidime/avibactam IVPB 2.5 Gram(s) IV Intermittent every 8 hours  cefTAZidime/avibactam IVPB      chlorhexidine 4% Liquid 1 Application(s) Topical <User Schedule>  cyanocobalamin 1000 MICROGram(s) Oral daily  dextrose 5%. 1000 milliLiter(s) (50 mL/Hr) IV Continuous <Continuous>  dextrose 50% Injectable 12.5 Gram(s) IV Push once  dextrose 50% Injectable 25 Gram(s) IV Push once  dextrose 50% Injectable 25 Gram(s) IV Push once  folic acid 1 milliGRAM(s) Enteral Tube daily  heparin  Injectable 5000 Unit(s) SubCutaneous every 8 hours  insulin lispro (HumaLOG) corrective regimen sliding scale   SubCutaneous every 6 hours  lacosamide Solution 100 milliGRAM(s) Oral two times a day  lactobacillus acidophilus 1 Tablet(s) Oral three times a day with meals  metroNIDAZOLE  IVPB      metroNIDAZOLE  IVPB 500 milliGRAM(s) IV Intermittent every 8 hours  psyllium Powder 1 Packet(s) Oral daily  QUEtiapine 25 milliGRAM(s) Oral daily  risperiDONE   Solution 1 milliGRAM(s) Oral daily  sodium chloride 0.9%. 1000 milliLiter(s) (500 mL/Hr) IV Continuous <Continuous>  vancomycin  IVPB 1500 milliGRAM(s) IV Intermittent every 12 hours  vancomycin  IVPB        MEDICATIONS  (PRN):  acetaminophen    Suspension .. 650 milliGRAM(s) Oral every 6 hours PRN Temp greater or equal to 38C (100.4F), Mild Pain (1 - 3), Moderate Pain (4 - 6)  dextrose 40% Gel 15 Gram(s) Oral once PRN Blood Glucose LESS THAN 70 milliGRAM(s)/deciliter  glucagon  Injectable 1 milliGRAM(s) IntraMuscular once PRN Glucose LESS THAN 70 milligrams/deciliter      LABS:

## 2019-02-09 NOTE — PROGRESS NOTE ADULT - PROBLEM SELECTOR PLAN 3
- fevers resolved, afebrile now  - ID following   - CT showing new lung abscess  - cont current abx for now  - will need to f/u with ID re: duration of abx

## 2019-02-09 NOTE — PROGRESS NOTE ADULT - ASSESSMENT
53 Male w/ a PMHx of TBI (s/p PEG tube, contracture, and seizure d/o) presented as a transfer from [facility] on 12/9 for respiratory failure 2/2 ARDS likely 2/2 necrotizing pancreatitis s/p intubation. Course c/b Acinetobacter PNA s/p Avycaz and Flagyl 7 day course.  Course further complicated by acute blood loss anemia s/p colonoscopy with findings suggestive of ischemic colitis, without further bleeding and Hgb remaining stable. S/p extubation 1/3, and re-intubation 1/6 now s/p tracheostomy.

## 2019-02-09 NOTE — PROGRESS NOTE ADULT - PROBLEM SELECTOR PLAN 1
- cont avycaz and flagyl and vanco per ID  - f/u vanco trough 2/9 pm dose  - s/p EUS stent and drainage  - CT 2/2 showing walled off necrosis  - GI following -Planned for sampling abd collection Monday 2/11, made NPO  - tolerating feeds

## 2019-02-10 LAB
GLUCOSE BLDC GLUCOMTR-MCNC: 128 MG/DL — HIGH (ref 70–99)
GLUCOSE BLDC GLUCOMTR-MCNC: 130 MG/DL — HIGH (ref 70–99)
GLUCOSE BLDC GLUCOMTR-MCNC: 143 MG/DL — HIGH (ref 70–99)

## 2019-02-10 PROCEDURE — 99233 SBSQ HOSP IP/OBS HIGH 50: CPT | Mod: GC

## 2019-02-10 RX ORDER — LACOSAMIDE 50 MG/1
100 TABLET ORAL
Qty: 0 | Refills: 0 | Status: DISCONTINUED | OUTPATIENT
Start: 2019-02-10 | End: 2019-02-14

## 2019-02-10 RX ADMIN — Medication 1 TABLET(S): at 08:52

## 2019-02-10 RX ADMIN — QUETIAPINE FUMARATE 25 MILLIGRAM(S): 200 TABLET, FILM COATED ORAL at 11:35

## 2019-02-10 RX ADMIN — LACOSAMIDE 100 MILLIGRAM(S): 50 TABLET ORAL at 19:02

## 2019-02-10 RX ADMIN — Medication 100 MILLIGRAM(S): at 11:33

## 2019-02-10 RX ADMIN — Medication 1 MILLIGRAM(S): at 11:35

## 2019-02-10 RX ADMIN — HEPARIN SODIUM 5000 UNIT(S): 5000 INJECTION INTRAVENOUS; SUBCUTANEOUS at 22:40

## 2019-02-10 RX ADMIN — Medication 100 MILLIGRAM(S): at 02:49

## 2019-02-10 RX ADMIN — HEPARIN SODIUM 5000 UNIT(S): 5000 INJECTION INTRAVENOUS; SUBCUTANEOUS at 05:13

## 2019-02-10 RX ADMIN — PREGABALIN 1000 MICROGRAM(S): 225 CAPSULE ORAL at 11:35

## 2019-02-10 RX ADMIN — Medication 300 MILLIGRAM(S): at 05:13

## 2019-02-10 RX ADMIN — Medication 1 PACKET(S): at 11:35

## 2019-02-10 RX ADMIN — RISPERIDONE 1 MILLIGRAM(S): 4 TABLET ORAL at 11:38

## 2019-02-10 RX ADMIN — LACOSAMIDE 100 MILLIGRAM(S): 50 TABLET ORAL at 05:13

## 2019-02-10 RX ADMIN — Medication 1 TABLET(S): at 11:37

## 2019-02-10 RX ADMIN — Medication 1 TABLET(S): at 18:17

## 2019-02-10 RX ADMIN — HEPARIN SODIUM 5000 UNIT(S): 5000 INJECTION INTRAVENOUS; SUBCUTANEOUS at 00:20

## 2019-02-10 RX ADMIN — Medication 100 MILLIGRAM(S): at 18:16

## 2019-02-10 RX ADMIN — HEPARIN SODIUM 5000 UNIT(S): 5000 INJECTION INTRAVENOUS; SUBCUTANEOUS at 14:37

## 2019-02-10 RX ADMIN — Medication 300 MILLIGRAM(S): at 18:16

## 2019-02-10 RX ADMIN — CHLORHEXIDINE GLUCONATE 1 APPLICATION(S): 213 SOLUTION TOPICAL at 11:34

## 2019-02-10 NOTE — PROGRESS NOTE ADULT - SUBJECTIVE AND OBJECTIVE BOX
CHIEF COMPLAINT: Patient is a 53y old  Male who presents with a chief complaint of ARDS?, sepsis (09 Feb 2019 08:57)    Interval Events:      REVIEW OF SYSTEMS:  Constitutional:   Eyes:  ENT:  CV:  Resp:  GI:  :  MSK:  Integumentary:  Neurological:  Psychiatric:  Endocrine:  Hematologic/Lymphatic:  Allergic/Immunologic:  [ ] All other systems negative  [ ] Unable to assess ROS because ________      OBJECTIVE:  ICU Vital Signs Last 24 Hrs  T(C): 36.7 (10 Feb 2019 05:10), Max: 37.3 (09 Feb 2019 17:28)  T(F): 98 (10 Feb 2019 05:10), Max: 99.2 (09 Feb 2019 17:28)  HR: 101 (10 Feb 2019 07:31) (96 - 120)  BP: 110/67 (10 Feb 2019 05:10) (109/74 - 122/72)  BP(mean): --  ABP: --  ABP(mean): --  RR: 78 (10 Feb 2019 07:31) (17 - 78)  SpO2: 98% (10 Feb 2019 07:31) (96% - 100%)    Mode: standby    02-09 @ 07:01  -  02-10 @ 07:00  --------------------------------------------------------  IN: 1910 mL / OUT: 0 mL / NET: 1910 mL    POCT Blood Glucose.: 128 mg/dL (10 Feb 2019 06:22)    HOSPITAL MEDICATIONS:  MEDICATIONS  (STANDING):  cefTAZidime/avibactam IVPB 2.5 Gram(s) IV Intermittent every 8 hours  cefTAZidime/avibactam IVPB      chlorhexidine 4% Liquid 1 Application(s) Topical <User Schedule>  cyanocobalamin 1000 MICROGram(s) Oral daily  dextrose 5%. 1000 milliLiter(s) (50 mL/Hr) IV Continuous <Continuous>  dextrose 50% Injectable 12.5 Gram(s) IV Push once  dextrose 50% Injectable 25 Gram(s) IV Push once  dextrose 50% Injectable 25 Gram(s) IV Push once  folic acid 1 milliGRAM(s) Enteral Tube daily  heparin  Injectable 5000 Unit(s) SubCutaneous every 8 hours  insulin lispro (HumaLOG) corrective regimen sliding scale   SubCutaneous every 6 hours  lacosamide Solution 100 milliGRAM(s) Oral two times a day  lactobacillus acidophilus 1 Tablet(s) Oral three times a day with meals  metroNIDAZOLE  IVPB      metroNIDAZOLE  IVPB 500 milliGRAM(s) IV Intermittent every 8 hours  psyllium Powder 1 Packet(s) Oral daily  QUEtiapine 25 milliGRAM(s) Oral daily  risperiDONE   Solution 1 milliGRAM(s) Oral daily  vancomycin  IVPB 1500 milliGRAM(s) IV Intermittent every 12 hours  vancomycin  IVPB        MEDICATIONS  (PRN):  acetaminophen    Suspension .. 650 milliGRAM(s) Oral every 6 hours PRN Temp greater or equal to 38C (100.4F), Mild Pain (1 - 3), Moderate Pain (4 - 6)  dextrose 40% Gel 15 Gram(s) Oral once PRN Blood Glucose LESS THAN 70 milliGRAM(s)/deciliter  glucagon  Injectable 1 milliGRAM(s) IntraMuscular once PRN Glucose LESS THAN 70 milligrams/deciliter      LABS:                    MICROBIOLOGY:     RADIOLOGY:  [ ] Reviewed and interpreted by me    PULMONARY FUNCTION TESTS:    EKG: CHIEF COMPLAINT: Patient is a 53y old  Male who presents with a chief complaint of ARDS?, sepsis (09 Feb 2019 08:57)    Interval Events: none overnight      REVIEW OF SYSTEMS:  Constitutional: no complaints  CV: denies  Resp: denies  GI: denies  [x] All other systems negative  [ ] Unable to assess ROS because ________      OBJECTIVE:  ICU Vital Signs Last 24 Hrs  T(C): 36.7 (10 Feb 2019 05:10), Max: 37.3 (09 Feb 2019 17:28)  T(F): 98 (10 Feb 2019 05:10), Max: 99.2 (09 Feb 2019 17:28)  HR: 101 (10 Feb 2019 07:31) (96 - 120)  BP: 110/67 (10 Feb 2019 05:10) (109/74 - 122/72)  BP(mean): --  ABP: --  ABP(mean): --  RR: 78 (10 Feb 2019 07:31) (17 - 78)  SpO2: 98% (10 Feb 2019 07:31) (96% - 100%)    Mode: standby    02-09 @ 07:01  -  02-10 @ 07:00  --------------------------------------------------------  IN: 1910 mL / OUT: 0 mL / NET: 1910 mL    POCT Blood Glucose.: 128 mg/dL (10 Feb 2019 06:22)    HOSPITAL MEDICATIONS:  MEDICATIONS  (STANDING):  cefTAZidime/avibactam IVPB 2.5 Gram(s) IV Intermittent every 8 hours  cefTAZidime/avibactam IVPB      chlorhexidine 4% Liquid 1 Application(s) Topical <User Schedule>  cyanocobalamin 1000 MICROGram(s) Oral daily  dextrose 5%. 1000 milliLiter(s) (50 mL/Hr) IV Continuous <Continuous>  dextrose 50% Injectable 12.5 Gram(s) IV Push once  dextrose 50% Injectable 25 Gram(s) IV Push once  dextrose 50% Injectable 25 Gram(s) IV Push once  folic acid 1 milliGRAM(s) Enteral Tube daily  heparin  Injectable 5000 Unit(s) SubCutaneous every 8 hours  insulin lispro (HumaLOG) corrective regimen sliding scale   SubCutaneous every 6 hours  lacosamide Solution 100 milliGRAM(s) Oral two times a day  lactobacillus acidophilus 1 Tablet(s) Oral three times a day with meals  metroNIDAZOLE  IVPB      metroNIDAZOLE  IVPB 500 milliGRAM(s) IV Intermittent every 8 hours  psyllium Powder 1 Packet(s) Oral daily  QUEtiapine 25 milliGRAM(s) Oral daily  risperiDONE   Solution 1 milliGRAM(s) Oral daily  vancomycin  IVPB 1500 milliGRAM(s) IV Intermittent every 12 hours  vancomycin  IVPB        MEDICATIONS  (PRN):  acetaminophen    Suspension .. 650 milliGRAM(s) Oral every 6 hours PRN Temp greater or equal to 38C (100.4F), Mild Pain (1 - 3), Moderate Pain (4 - 6)  dextrose 40% Gel 15 Gram(s) Oral once PRN Blood Glucose LESS THAN 70 milliGRAM(s)/deciliter  glucagon  Injectable 1 milliGRAM(s) IntraMuscular once PRN Glucose LESS THAN 70 milligrams/deciliter

## 2019-02-10 NOTE — PROGRESS NOTE ADULT - PROBLEM SELECTOR PLAN 1
- cont avycaz and flagyl and vanco per ID  - vanco trough ok  - s/p EUS stent and drainage  - CT 2/2 showing walled off necrosis  - NPO after 12 for potential EGD/necrostomy with GI in am  - tolerating feeds

## 2019-02-10 NOTE — PROGRESS NOTE ADULT - ASSESSMENT
53 Male w/ a PMHx of TBI (s/p PEG tube, contracture, and seizure d/o) presented as a transfer from [facility] on 12/9 for respiratory failure 2/2 ARDS likely 2/2 necrotizing pancreatitis s/p intubation. Course c/b Acinetobacter PNA s/p Avycaz and Flagyl 7 day course.  Course further complicated by acute blood loss anemia s/p colonoscopy with findings suggestive of ischemic colitis, without further bleeding and Hgb remaining stable. S/p extubation 1/3, and re-intubation 1/6 now s/p tracheostomy.

## 2019-02-11 LAB
ALBUMIN SERPL ELPH-MCNC: 1.9 G/DL — LOW (ref 3.3–5)
ALP SERPL-CCNC: 293 U/L — HIGH (ref 40–120)
ALT FLD-CCNC: 7 U/L — SIGNIFICANT CHANGE UP (ref 4–41)
ANION GAP SERPL CALC-SCNC: 10 MMO/L — SIGNIFICANT CHANGE UP (ref 7–14)
AST SERPL-CCNC: 21 U/L — SIGNIFICANT CHANGE UP (ref 4–40)
BILIRUB SERPL-MCNC: 0.3 MG/DL — SIGNIFICANT CHANGE UP (ref 0.2–1.2)
BLD GP AB SCN SERPL QL: NEGATIVE — SIGNIFICANT CHANGE UP
BUN SERPL-MCNC: 3 MG/DL — LOW (ref 7–23)
CALCIUM SERPL-MCNC: 8.4 MG/DL — SIGNIFICANT CHANGE UP (ref 8.4–10.5)
CHLORIDE SERPL-SCNC: 97 MMOL/L — LOW (ref 98–107)
CO2 SERPL-SCNC: 23 MMOL/L — SIGNIFICANT CHANGE UP (ref 22–31)
CREAT SERPL-MCNC: < 0.2 MG/DL — LOW (ref 0.5–1.3)
GLUCOSE BLDC GLUCOMTR-MCNC: 115 MG/DL — HIGH (ref 70–99)
GLUCOSE BLDC GLUCOMTR-MCNC: 122 MG/DL — HIGH (ref 70–99)
GLUCOSE BLDC GLUCOMTR-MCNC: 124 MG/DL — HIGH (ref 70–99)
GLUCOSE BLDC GLUCOMTR-MCNC: 124 MG/DL — HIGH (ref 70–99)
GLUCOSE SERPL-MCNC: 105 MG/DL — HIGH (ref 70–99)
HCT VFR BLD CALC: 29.2 % — LOW (ref 39–50)
HGB BLD-MCNC: 9.1 G/DL — LOW (ref 13–17)
MAGNESIUM SERPL-MCNC: 1.7 MG/DL — SIGNIFICANT CHANGE UP (ref 1.6–2.6)
MCHC RBC-ENTMCNC: 31.2 % — LOW (ref 32–36)
MCHC RBC-ENTMCNC: 31.3 PG — SIGNIFICANT CHANGE UP (ref 27–34)
MCV RBC AUTO: 100.3 FL — HIGH (ref 80–100)
NRBC # FLD: 0.04 K/UL — LOW (ref 25–125)
PHOSPHATE SERPL-MCNC: 3 MG/DL — SIGNIFICANT CHANGE UP (ref 2.5–4.5)
PLATELET # BLD AUTO: 468 K/UL — HIGH (ref 150–400)
PMV BLD: 9 FL — SIGNIFICANT CHANGE UP (ref 7–13)
POTASSIUM SERPL-MCNC: 4.5 MMOL/L — SIGNIFICANT CHANGE UP (ref 3.5–5.3)
POTASSIUM SERPL-SCNC: 4.5 MMOL/L — SIGNIFICANT CHANGE UP (ref 3.5–5.3)
PROT SERPL-MCNC: 7.4 G/DL — SIGNIFICANT CHANGE UP (ref 6–8.3)
RBC # BLD: 2.91 M/UL — LOW (ref 4.2–5.8)
RBC # FLD: 15.5 % — HIGH (ref 10.3–14.5)
RH IG SCN BLD-IMP: POSITIVE — SIGNIFICANT CHANGE UP
SODIUM SERPL-SCNC: 130 MMOL/L — LOW (ref 135–145)
VANCOMYCIN TROUGH SERPL-MCNC: 10.9 UG/ML — SIGNIFICANT CHANGE UP (ref 10–20)
WBC # BLD: 8.15 K/UL — SIGNIFICANT CHANGE UP (ref 3.8–10.5)
WBC # FLD AUTO: 8.15 K/UL — SIGNIFICANT CHANGE UP (ref 3.8–10.5)

## 2019-02-11 PROCEDURE — 99233 SBSQ HOSP IP/OBS HIGH 50: CPT

## 2019-02-11 PROCEDURE — 99232 SBSQ HOSP IP/OBS MODERATE 35: CPT

## 2019-02-11 PROCEDURE — 99233 SBSQ HOSP IP/OBS HIGH 50: CPT | Mod: GC

## 2019-02-11 RX ORDER — VANCOMYCIN HCL 1 G
1000 VIAL (EA) INTRAVENOUS EVERY 8 HOURS
Qty: 0 | Refills: 0 | Status: DISCONTINUED | OUTPATIENT
Start: 2019-02-11 | End: 2019-02-11

## 2019-02-11 RX ORDER — SODIUM CHLORIDE 9 MG/ML
1000 INJECTION INTRAMUSCULAR; INTRAVENOUS; SUBCUTANEOUS
Qty: 0 | Refills: 0 | Status: DISCONTINUED | OUTPATIENT
Start: 2019-02-11 | End: 2019-02-21

## 2019-02-11 RX ORDER — VANCOMYCIN HCL 1 G
1250 VIAL (EA) INTRAVENOUS EVERY 8 HOURS
Qty: 0 | Refills: 0 | Status: DISCONTINUED | OUTPATIENT
Start: 2019-02-11 | End: 2019-02-19

## 2019-02-11 RX ORDER — LACOSAMIDE 50 MG/1
100 TABLET ORAL ONCE
Qty: 0 | Refills: 0 | Status: DISCONTINUED | OUTPATIENT
Start: 2019-02-11 | End: 2019-02-11

## 2019-02-11 RX ADMIN — SODIUM CHLORIDE 100 MILLILITER(S): 9 INJECTION INTRAMUSCULAR; INTRAVENOUS; SUBCUTANEOUS at 18:43

## 2019-02-11 RX ADMIN — Medication 1 PACKET(S): at 11:25

## 2019-02-11 RX ADMIN — Medication 100 MILLIGRAM(S): at 11:22

## 2019-02-11 RX ADMIN — QUETIAPINE FUMARATE 25 MILLIGRAM(S): 200 TABLET, FILM COATED ORAL at 11:23

## 2019-02-11 RX ADMIN — RISPERIDONE 1 MILLIGRAM(S): 4 TABLET ORAL at 11:23

## 2019-02-11 RX ADMIN — CHLORHEXIDINE GLUCONATE 1 APPLICATION(S): 213 SOLUTION TOPICAL at 09:07

## 2019-02-11 RX ADMIN — Medication 300 MILLIGRAM(S): at 21:52

## 2019-02-11 RX ADMIN — HEPARIN SODIUM 5000 UNIT(S): 5000 INJECTION INTRAVENOUS; SUBCUTANEOUS at 21:51

## 2019-02-11 RX ADMIN — Medication 300 MILLIGRAM(S): at 09:06

## 2019-02-11 RX ADMIN — Medication 1 TABLET(S): at 08:12

## 2019-02-11 RX ADMIN — LACOSAMIDE 100 MILLIGRAM(S): 50 TABLET ORAL at 18:43

## 2019-02-11 RX ADMIN — Medication 100 MILLIGRAM(S): at 03:00

## 2019-02-11 RX ADMIN — PREGABALIN 1000 MICROGRAM(S): 225 CAPSULE ORAL at 11:22

## 2019-02-11 RX ADMIN — SODIUM CHLORIDE 100 MILLILITER(S): 9 INJECTION INTRAMUSCULAR; INTRAVENOUS; SUBCUTANEOUS at 15:32

## 2019-02-11 RX ADMIN — Medication 1 MILLIGRAM(S): at 11:22

## 2019-02-11 RX ADMIN — Medication 1 TABLET(S): at 11:23

## 2019-02-11 RX ADMIN — LACOSAMIDE 120 MILLIGRAM(S): 50 TABLET ORAL at 06:05

## 2019-02-11 NOTE — PROGRESS NOTE ADULT - PROBLEM SELECTOR PLAN 3
- fevers resolved, afebrile now  - ID following   - CT showing new lung abscess  - cont current abx for now  - will need to f/u with ID re: duration of abx - fevers resolved, afebrile now  - ID following   - CT showing new lung abscess  - cont current abx for now  - will need to f/u with ID re: duration of abx    2/11 as per ID  -unclear abx treatment endpoint still - fevers resolved, afebrile now  - ID following   - CT showing new lung abscess  - cont current abx for now  2/11 as per ID  -unclear abx treatment endpoint still - continue to fu duration of abx - fevers resolved, afebrile now  - ID following   - CT showing new lung abscess  -2/11- fu repeat CT chest to assess lung abscess  - cont current abx for now  2/11 as per ID  -unclear abx treatment endpoint still - continue to fu duration of abx

## 2019-02-11 NOTE — PROGRESS NOTE ADULT - ATTENDING COMMENTS
For CT abd/pelvis today.  Pending results, may go for necrosectomy in AM.  NPO after midnight.  Continue antibiotics. For CT abd/pelvis today.  Pending results, may go for necrosectomy in AM.  NPO after midnight.  Continue antibiotics.  Also chest CT for follow up of lung abscess

## 2019-02-11 NOTE — PROGRESS NOTE ADULT - ASSESSMENT
1) Walled off necrosis, s/p EUS and AXIOS 1/31/19  2) Hematochezia, s/p colonoscopy on 12/28/2018 with severe segmental colitis localized to the transverse colon and ascending colon (possible ischemic colitis, possible colitis related to contiguous danay-pancreatic inflammatory process). Biopsies with granulation tissue but no infection/malignancy, Hb stable   3) Resp failure in the setting of pneumonia, requiring tracheostomy now with lung abscess and sepsis on IV  Abx, ID following     Recommendations  - Trend CBC, CMP and fever curve, Hb stable for now.   - Continue IV antibiotics as per ID recommendations,  - Rest of care per primary team  - Repeat CT abd today  - pending results of CT abd may need necrosectomy tomorrow. 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 Biopsies with granulation tissue but no infection/malignancy, Hb stable   3) Resp failure in the setting of pneumonia, requiring tracheostomy now with lung abscess and sepsis on IV  Abx, ID following     Recommendations  - Trend CBC, CMP and fever curve, Hb stable for now.   - Continue IV antibiotics as per ID recommendations,  - Rest of care per primary team  - Repeat CT abd today  - pending results of CT abd may need necrosectomy this week

## 2019-02-11 NOTE — PROGRESS NOTE ADULT - SUBJECTIVE AND OBJECTIVE BOX
CC: F/U for Abscess    Saw/spoke to patient. No fevers, no chills. No new complaints. Overall unchanged.    Allergies  Valproate Sodium (Other (Severe))    ANTIMICROBIALS:  cefTAZidime/avibactam IVPB 2.5 every 8 hours  cefTAZidime/avibactam IVPB    metroNIDAZOLE  IVPB    metroNIDAZOLE  IVPB 500 every 8 hours  vancomycin  IVPB 1500 every 12 hours  vancomycin  IVPB      PE:    Vital Signs Last 24 Hrs  T(C): 37 (11 Feb 2019 08:55), Max: 37.2 (11 Feb 2019 02:07)  T(F): 98.6 (11 Feb 2019 08:55), Max: 98.9 (11 Feb 2019 02:07)  HR: 114 (11 Feb 2019 08:55) (99 - 132)  BP: 106/76 (11 Feb 2019 08:55) (102/60 - 147/87)  RR: 20 (11 Feb 2019 08:55) (20 - 26)  SpO2: 98% (11 Feb 2019 08:55) (94% - 99%)    Gen: AOx2-3, NAD, non-toxic  CV: S1+S2 normal, tachycardic  Resp: Clear bilat, no resp distress, no crackles/wheezes, trach  Abd: Soft, nontender, +BS, PEG  Ext: No LE edema, no wounds    LABS:                        9.1    8.15  )-----------( 468      ( 11 Feb 2019 05:10 )             29.2     02-11    130<L>  |  97<L>  |  3<L>  ----------------------------<  105<H>  4.5   |  23  |  < 0.20<L>    Ca    8.4      11 Feb 2019 05:10  Phos  3.0     02-11  Mg     1.7     02-11    TPro  7.4  /  Alb  1.9<L>  /  TBili  0.3  /  DBili  x   /  AST  21  /  ALT  7   /  AlkPhos  293<H>  02-11    MICROBIOLOGY:    BLOOD VENOUS  02-02-19 NGTD    BLOOD PERIPHERAL  01-30-19 NGTD    (otherwise reviewed)    RADIOLOGY:    2/2 CT:    IMPRESSION:   Partially visualized moderate bilateral pleural effusions with bilateral   lower lobar compressive atelectasis.    Interval placement of a AXIOS stent with decrease in size of previously   seen large left peripancreatic fluid collection which now measures up to   7.3 cm in length.    Interval formation of a enhancing wall around the previously seen   perigastric fluid collection, which measures up to 4.9 cm.  Small volume ascites. 2 new small fluid collections noted anteriorly in   the left upper quadrant omentum also may reflect evolving abscesses.

## 2019-02-11 NOTE — PROGRESS NOTE ADULT - ATTENDING COMMENTS
Agree with above  Plan for necrosectomy this week  This is an extended process, and may require up to 2 hrs of debridement which can make patient qworse. On the other hand it will expedite clearance of this cavity which has a high solid content

## 2019-02-11 NOTE — PROGRESS NOTE ADULT - PROBLEM SELECTOR PLAN 1
- cont avycaz and flagyl and vanco per ID  - vanco trough ok  - s/p EUS stent and drainage  - CT 2/2 showing walled off necrosis  - NPO after 12 for potential EGD/necrostomy with GI in am  - tolerating feeds - cont avycaz and flagyl and vanco per ID  - 2/11- fu vanco trough 1700_____  - s/p EUS stent and drainage  - CT 2/2 showing walled off necrosis  - 2/11 - NPO after 12 for potential EGD/necrostomy with GI in am 2/12  - tolerating feeds - cont avycaz and flagyl and vanco per ID  - 2/11- fu vanco trough 1700_____  - s/p EUS stent and drainage  - CT 2/2 showing walled off necrosis  - 2/11 - NPO after 12 for potential EGD/necrostomy with GI in am 2/12  - tolerating feeds  2/11- CT abd/pelvis to assess pancreatic collection _____

## 2019-02-11 NOTE — PROGRESS NOTE ADULT - SUBJECTIVE AND OBJECTIVE BOX
Chief Complaint:  Patient is a 53y old  Male who presents with a chief complaint of ARDS?, sepsis (10 Feb 2019 08:13)      Interval Events: No acute events over night.    Allergies:  Valproate Sodium (Other (Severe))      Hospital Medications:  acetaminophen    Suspension .. 650 milliGRAM(s) Oral every 6 hours PRN  cefTAZidime/avibactam IVPB 2.5 Gram(s) IV Intermittent every 8 hours  cefTAZidime/avibactam IVPB      chlorhexidine 4% Liquid 1 Application(s) Topical <User Schedule>  cyanocobalamin 1000 MICROGram(s) Oral daily  dextrose 40% Gel 15 Gram(s) Oral once PRN  dextrose 5%. 1000 milliLiter(s) IV Continuous <Continuous>  dextrose 50% Injectable 12.5 Gram(s) IV Push once  dextrose 50% Injectable 25 Gram(s) IV Push once  dextrose 50% Injectable 25 Gram(s) IV Push once  folic acid 1 milliGRAM(s) Enteral Tube daily  glucagon  Injectable 1 milliGRAM(s) IntraMuscular once PRN  heparin  Injectable 5000 Unit(s) SubCutaneous every 8 hours  insulin lispro (HumaLOG) corrective regimen sliding scale   SubCutaneous every 6 hours  lacosamide 100 milliGRAM(s) Oral two times a day  lactobacillus acidophilus 1 Tablet(s) Oral three times a day with meals  metroNIDAZOLE  IVPB      metroNIDAZOLE  IVPB 500 milliGRAM(s) IV Intermittent every 8 hours  psyllium Powder 1 Packet(s) Oral daily  QUEtiapine 25 milliGRAM(s) Oral daily  risperiDONE   Solution 1 milliGRAM(s) Oral daily  vancomycin  IVPB 1500 milliGRAM(s) IV Intermittent every 12 hours  vancomycin  IVPB          PMHX/PSHX:  Seizure  TBI (traumatic brain injury)  S/P percutaneous endoscopic gastrostomy (PEG) tube placement  No significant past surgical history      Family history:  no pertinent history in first degree relative        PHYSICAL EXAM:     GENERAL:  No distress  HEENT: conjunctivae clear, trach  CHEST:  Full & symmetric excursion, no increased effort  HEART:  Regular rhythm  ABDOMEN:  Soft, non-tender, non-distended  EXTREMITIES:  no edema  SKIN:  Dry/warm  NEURO:  Alert    Vital Signs:  Vital Signs Last 24 Hrs  T(C): 36.8 (2019 04:55), Max: 37.2 (2019 02:07)  T(F): 98.2 (2019 04:55), Max: 98.9 (2019 02:07)  HR: 132 (2019 04:55) (99 - 132)  BP: 105/55 (:55) (102/60 - 147/87)  BP(mean): --  RR: 23 (:55) (19 - 26)  SpO2: 94% (:55) (94% - 100%)  Daily     Daily Weight in k.7 (2019 02:07)    LABS:                        9.1    8.15  )-----------( 468      ( 2019 05:10 )             29.2         130<L>  |  97<L>  |  3<L>  ----------------------------<  105<H>  4.5   |  23  |  < 0.20<L>    Ca    8.4      2019 05:10  Phos  3.0       Mg     1.7         TPro  7.4  /  Alb  1.9<L>  /  TBili  0.3  /  DBili  x   /  AST  21  /  ALT  7   /  AlkPhos  293<H>      LIVER FUNCTIONS - ( 2019 05:10 )  Alb: 1.9 g/dL / Pro: 7.4 g/dL / ALK PHOS: 293 u/L / ALT: 7 u/L / AST: 21 u/L / GGT: x                   Imaging:

## 2019-02-11 NOTE — PROGRESS NOTE ADULT - SUBJECTIVE AND OBJECTIVE BOX
CHIEF COMPLAINT: Patient is a 53y old  Male who presents with a chief complaint of ARDS?, sepsis (09 Feb 2019 08:57)    Interval Events: none overnight      REVIEW OF SYSTEMS:  Constitutional: no complaints  CV: denies  Resp: denies  GI: denies  [x] All other systems negative  [ ] Unable to assess ROS because ________      Vital Signs Last 24 Hrs  T(C): 36.8 (11 Feb 2019 04:55), Max: 37.2 (11 Feb 2019 02:07)  T(F): 98.2 (11 Feb 2019 04:55), Max: 98.9 (11 Feb 2019 02:07)  HR: 121 (11 Feb 2019 07:23) (99 - 132)  BP: 105/55 (11 Feb 2019 04:55) (102/60 - 147/87)  BP(mean): --  RR: 23 (11 Feb 2019 04:55) (20 - 26)  SpO2: 94% (11 Feb 2019 07:23) (94% - 100%)    Mode: standby  CAPILLARY BLOOD GLUCOSE      POCT Blood Glucose.: 115 mg/dL (11 Feb 2019 05:55)  POCT Blood Glucose.: 124 mg/dL (11 Feb 2019 00:05)  POCT Blood Glucose.: 130 mg/dL (10 Feb 2019 16:55)  POCT Blood Glucose.: 143 mg/dL (10 Feb 2019 11:29)      POCT Blood Glucose.: 128 mg/dL (10 Feb 2019 06:22)    HOSPITAL MEDICATIONS:  MEDICATIONS  (STANDING):  cefTAZidime/avibactam IVPB 2.5 Gram(s) IV Intermittent every 8 hours  cefTAZidime/avibactam IVPB      chlorhexidine 4% Liquid 1 Application(s) Topical <User Schedule>  cyanocobalamin 1000 MICROGram(s) Oral daily  dextrose 5%. 1000 milliLiter(s) (50 mL/Hr) IV Continuous <Continuous>  dextrose 50% Injectable 12.5 Gram(s) IV Push once  dextrose 50% Injectable 25 Gram(s) IV Push once  dextrose 50% Injectable 25 Gram(s) IV Push once  folic acid 1 milliGRAM(s) Enteral Tube daily  heparin  Injectable 5000 Unit(s) SubCutaneous every 8 hours  insulin lispro (HumaLOG) corrective regimen sliding scale   SubCutaneous every 6 hours  lacosamide Solution 100 milliGRAM(s) Oral two times a day  lactobacillus acidophilus 1 Tablet(s) Oral three times a day with meals  metroNIDAZOLE  IVPB      metroNIDAZOLE  IVPB 500 milliGRAM(s) IV Intermittent every 8 hours  psyllium Powder 1 Packet(s) Oral daily  QUEtiapine 25 milliGRAM(s) Oral daily  risperiDONE   Solution 1 milliGRAM(s) Oral daily  vancomycin  IVPB 1500 milliGRAM(s) IV Intermittent every 12 hours  vancomycin  IVPB        MEDICATIONS  (PRN):  acetaminophen    Suspension .. 650 milliGRAM(s) Oral every 6 hours PRN Temp greater or equal to 38C (100.4F), Mild Pain (1 - 3), Moderate Pain (4 - 6)  dextrose 40% Gel 15 Gram(s) Oral once PRN Blood Glucose LESS THAN 70 milliGRAM(s)/deciliter  glucagon  Injectable 1 milliGRAM(s) IntraMuscular once PRN Glucose LESS THAN 70 milligrams/deciliter CHIEF COMPLAINT:    Interval Events:    REVIEW OF SYSTEMS:  Constitutional:   Eyes:  ENT:  CV:  Resp:  GI:  :  MSK:  Integumentary:  Neurological:  Psychiatric:  Endocrine:  Hematologic/Lymphatic:  Allergic/Immunologic:  [ ] All other systems negative  [ ] Unable to assess ROS because ________    OBJECTIVE:  ICU Vital Signs Last 24 Hrs  T(C): 36.8 (11 Feb 2019 04:55), Max: 37.2 (11 Feb 2019 02:07)  T(F): 98.2 (11 Feb 2019 04:55), Max: 98.9 (11 Feb 2019 02:07)  HR: 121 (11 Feb 2019 07:23) (99 - 132)  BP: 105/55 (11 Feb 2019 04:55) (102/60 - 147/87)  BP(mean): --  ABP: --  ABP(mean): --  RR: 23 (11 Feb 2019 04:55) (20 - 26)  SpO2: 94% (11 Feb 2019 07:23) (94% - 100%)    Mode: standby    02-10 @ 07:01  -  02-11 @ 07:00  --------------------------------------------------------  IN: 1060 mL / OUT: 0 mL / NET: 1060 mL      CAPILLARY BLOOD GLUCOSE      POCT Blood Glucose.: 115 mg/dL (11 Feb 2019 05:55)      PHYSICAL EXAM:  General:   HEENT:   Lymph Nodes:  Neck:   Respiratory:   Cardiovascular:   Abdomen:   Extremities:   Skin:   Neurological:  Psychiatry:    HOSPITAL MEDICATIONS:  MEDICATIONS  (STANDING):  cefTAZidime/avibactam IVPB 2.5 Gram(s) IV Intermittent every 8 hours  cefTAZidime/avibactam IVPB      chlorhexidine 4% Liquid 1 Application(s) Topical <User Schedule>  cyanocobalamin 1000 MICROGram(s) Oral daily  dextrose 5%. 1000 milliLiter(s) (50 mL/Hr) IV Continuous <Continuous>  dextrose 50% Injectable 12.5 Gram(s) IV Push once  dextrose 50% Injectable 25 Gram(s) IV Push once  dextrose 50% Injectable 25 Gram(s) IV Push once  folic acid 1 milliGRAM(s) Enteral Tube daily  heparin  Injectable 5000 Unit(s) SubCutaneous every 8 hours  insulin lispro (HumaLOG) corrective regimen sliding scale   SubCutaneous every 6 hours  lacosamide 100 milliGRAM(s) Oral two times a day  lactobacillus acidophilus 1 Tablet(s) Oral three times a day with meals  metroNIDAZOLE  IVPB      metroNIDAZOLE  IVPB 500 milliGRAM(s) IV Intermittent every 8 hours  psyllium Powder 1 Packet(s) Oral daily  QUEtiapine 25 milliGRAM(s) Oral daily  risperiDONE   Solution 1 milliGRAM(s) Oral daily  vancomycin  IVPB 1500 milliGRAM(s) IV Intermittent every 12 hours  vancomycin  IVPB        MEDICATIONS  (PRN):  acetaminophen    Suspension .. 650 milliGRAM(s) Oral every 6 hours PRN Temp greater or equal to 38C (100.4F), Mild Pain (1 - 3), Moderate Pain (4 - 6)  dextrose 40% Gel 15 Gram(s) Oral once PRN Blood Glucose LESS THAN 70 milliGRAM(s)/deciliter  glucagon  Injectable 1 milliGRAM(s) IntraMuscular once PRN Glucose LESS THAN 70 milligrams/deciliter      LABS:                        9.1    8.15  )-----------( 468      ( 11 Feb 2019 05:10 )             29.2     02-11    130<L>  |  97<L>  |  3<L>  ----------------------------<  105<H>  4.5   |  23  |  < 0.20<L>    Ca    8.4      11 Feb 2019 05:10  Phos  3.0     02-11  Mg     1.7     02-11    TPro  7.4  /  Alb  1.9<L>  /  TBili  0.3  /  DBili  x   /  AST  21  /  ALT  7   /  AlkPhos  293<H>  02-11              MICROBIOLOGY:     RADIOLOGY:  [ ] Reviewed and interpreted by me    PULMONARY FUNCTION TESTS:    EKG: CHIEF COMPLAINT: SOB, ARDS, Sepsis    Interval Events:  no acute events overnight     REVIEW OF SYSTEMS:    [x ] All other systems negative      OBJECTIVE:  ICU Vital Signs Last 24 Hrs  T(C): 36.8 (11 Feb 2019 04:55), Max: 37.2 (11 Feb 2019 02:07)  T(F): 98.2 (11 Feb 2019 04:55), Max: 98.9 (11 Feb 2019 02:07)  HR: 121 (11 Feb 2019 07:23) (99 - 132)  BP: 105/55 (11 Feb 2019 04:55) (102/60 - 147/87)  BP(mean): --  ABP: --  ABP(mean): --  RR: 23 (11 Feb 2019 04:55) (20 - 26)  SpO2: 94% (11 Feb 2019 07:23) (94% - 100%)    Mode: standby    02-10 @ 07:01  -  02-11 @ 07:00  --------------------------------------------------------  IN: 1060 mL / OUT: 0 mL / NET: 1060 mL      CAPILLARY BLOOD GLUCOSE      POCT Blood Glucose.: 115 mg/dL (11 Feb 2019 05:55)    HOSPITAL MEDICATIONS:  MEDICATIONS  (STANDING):  cefTAZidime/avibactam IVPB 2.5 Gram(s) IV Intermittent every 8 hours  cefTAZidime/avibactam IVPB      chlorhexidine 4% Liquid 1 Application(s) Topical <User Schedule>  cyanocobalamin 1000 MICROGram(s) Oral daily  dextrose 5%. 1000 milliLiter(s) (50 mL/Hr) IV Continuous <Continuous>  dextrose 50% Injectable 12.5 Gram(s) IV Push once  dextrose 50% Injectable 25 Gram(s) IV Push once  dextrose 50% Injectable 25 Gram(s) IV Push once  folic acid 1 milliGRAM(s) Enteral Tube daily  heparin  Injectable 5000 Unit(s) SubCutaneous every 8 hours  insulin lispro (HumaLOG) corrective regimen sliding scale   SubCutaneous every 6 hours  lacosamide 100 milliGRAM(s) Oral two times a day  lactobacillus acidophilus 1 Tablet(s) Oral three times a day with meals  metroNIDAZOLE  IVPB      metroNIDAZOLE  IVPB 500 milliGRAM(s) IV Intermittent every 8 hours  psyllium Powder 1 Packet(s) Oral daily  QUEtiapine 25 milliGRAM(s) Oral daily  risperiDONE   Solution 1 milliGRAM(s) Oral daily  vancomycin  IVPB 1500 milliGRAM(s) IV Intermittent every 12 hours  vancomycin  IVPB        MEDICATIONS  (PRN):  acetaminophen    Suspension .. 650 milliGRAM(s) Oral every 6 hours PRN Temp greater or equal to 38C (100.4F), Mild Pain (1 - 3), Moderate Pain (4 - 6)  dextrose 40% Gel 15 Gram(s) Oral once PRN Blood Glucose LESS THAN 70 milliGRAM(s)/deciliter  glucagon  Injectable 1 milliGRAM(s) IntraMuscular once PRN Glucose LESS THAN 70 milligrams/deciliter      LABS:                        9.1    8.15  )-----------( 468      ( 11 Feb 2019 05:10 )             29.2     02-11    130<L>  |  97<L>  |  3<L>  ----------------------------<  105<H>  4.5   |  23  |  < 0.20<L>    Ca    8.4      11 Feb 2019 05:10  Phos  3.0     02-11  Mg     1.7     02-11    TPro  7.4  /  Alb  1.9<L>  /  TBili  0.3  /  DBili  x   /  AST  21  /  ALT  7   /  AlkPhos  293<H>  02-11              MICROBIOLOGY:     RADIOLOGY:  [ ] Reviewed and interpreted by me    PULMONARY FUNCTION TESTS:    EKG: CHIEF COMPLAINT: SOB, ARDS, Sepsis    Interval Events:  no acute events overnight     REVIEW OF SYSTEMS:    [x ] All other systems negative      OBJECTIVE:  ICU Vital Signs Last 24 Hrs  T(C): 36.8 (11 Feb 2019 04:55), Max: 37.2 (11 Feb 2019 02:07)  T(F): 98.2 (11 Feb 2019 04:55), Max: 98.9 (11 Feb 2019 02:07)  HR: 121 (11 Feb 2019 07:23) (99 - 132)  BP: 105/55 (11 Feb 2019 04:55) (102/60 - 147/87)  BP(mean): --  ABP: --  ABP(mean): --  RR: 23 (11 Feb 2019 04:55) (20 - 26)  SpO2: 94% (11 Feb 2019 07:23) (94% - 100%)    Mode: standby    02-10 @ 07:01  -  02-11 @ 07:00  --------------------------------------------------------  IN: 1060 mL / OUT: 0 mL / NET: 1060 mL      CAPILLARY BLOOD GLUCOSE      POCT Blood Glucose.: 115 mg/dL (11 Feb 2019 05:55)    HOSPITAL MEDICATIONS:  MEDICATIONS  (STANDING):  cefTAZidime/avibactam IVPB 2.5 Gram(s) IV Intermittent every 8 hours  cefTAZidime/avibactam IVPB      chlorhexidine 4% Liquid 1 Application(s) Topical <User Schedule>  cyanocobalamin 1000 MICROGram(s) Oral daily  dextrose 5%. 1000 milliLiter(s) (50 mL/Hr) IV Continuous <Continuous>  dextrose 50% Injectable 12.5 Gram(s) IV Push once  dextrose 50% Injectable 25 Gram(s) IV Push once  dextrose 50% Injectable 25 Gram(s) IV Push once  folic acid 1 milliGRAM(s) Enteral Tube daily  heparin  Injectable 5000 Unit(s) SubCutaneous every 8 hours  insulin lispro (HumaLOG) corrective regimen sliding scale   SubCutaneous every 6 hours  lacosamide 100 milliGRAM(s) Oral two times a day  lactobacillus acidophilus 1 Tablet(s) Oral three times a day with meals  metroNIDAZOLE  IVPB      metroNIDAZOLE  IVPB 500 milliGRAM(s) IV Intermittent every 8 hours  psyllium Powder 1 Packet(s) Oral daily  QUEtiapine 25 milliGRAM(s) Oral daily  risperiDONE   Solution 1 milliGRAM(s) Oral daily  vancomycin  IVPB 1500 milliGRAM(s) IV Intermittent every 12 hours  vancomycin  IVPB        MEDICATIONS  (PRN):  acetaminophen    Suspension .. 650 milliGRAM(s) Oral every 6 hours PRN Temp greater or equal to 38C (100.4F), Mild Pain (1 - 3), Moderate Pain (4 - 6)  dextrose 40% Gel 15 Gram(s) Oral once PRN Blood Glucose LESS THAN 70 milliGRAM(s)/deciliter  glucagon  Injectable 1 milliGRAM(s) IntraMuscular once PRN Glucose LESS THAN 70 milligrams/deciliter      LABS:                        9.1    8.15  )-----------( 468      ( 11 Feb 2019 05:10 )             29.2     02-11    130<L>  |  97<L>  |  3<L>  ----------------------------<  105<H>  4.5   |  23  |  < 0.20<L>    Ca    8.4      11 Feb 2019 05:10  Phos  3.0     02-11  Mg     1.7     02-11    TPro  7.4  /  Alb  1.9<L>  /  TBili  0.3  /  DBili  x   /  AST  21  /  ALT  7   /  AlkPhos  293<H>  02-11

## 2019-02-11 NOTE — PROGRESS NOTE ADULT - ASSESSMENT
53 M (resident of [facility]) with TBI, s/p PEG tube, contracture, and seizure d/o who presented as a transfer from [facility] on 12/9 for respiratory failure 2/2 ARDS likely 2/2 necrotizing pancreatitis s/p intubation.  Bronc 12/11 with pseudomonas; Sputum cx with  acinetobacter  Sepsis with fever, leukopenia resolved, extubated but reintubated 1/6/19 for hypoxia and poor cough along with profuse secretions and sputum cx again with acinetobacter (restarted Avycaz/Flagyl 1/9--unclear treatment endpoint, s/p 4 weeks)  Necrotising Pancreatitis with multiple peripancreatitis collections better formed on CT 1/24 and a new 7 cm abscess  Afebrile, unclear etiology fever, no leukocytosis  Overall, lung abscess, MDR organisms, necrotizing pancreatitis, intraabdominal collection  - Vanco 1500mg q 12 (monitor trough)  - Avycaz/Flagyl  - Frequent suctioning and pulmonary toileting  - Monitor for any further signs infection; close monitoring for neurological symptoms on prolonged flagyl  - For sampling on intra-abd collection today with IR  - Discussed case with pulmonary/RCU attending    Neal Oleary MD  Pager [phone]  After 5pm and on weekends call [phone]

## 2019-02-11 NOTE — PROGRESS NOTE ADULT - PROBLEM SELECTOR PLAN 2
- s/p trach 1/9   - weaning from vent as tolerated, tolerating trach collar during the day  - trach change done secondary to leak from collar 1/22/19  - trach care, suction PRN  - chest PT - s/p trach 1/9   -  trach collar 24/7.   - trach care, suction PRN  - chest PT

## 2019-02-12 LAB
GLUCOSE BLDC GLUCOMTR-MCNC: 111 MG/DL — HIGH (ref 70–99)
GLUCOSE BLDC GLUCOMTR-MCNC: 120 MG/DL — HIGH (ref 70–99)
GLUCOSE BLDC GLUCOMTR-MCNC: 125 MG/DL — HIGH (ref 70–99)
GLUCOSE BLDC GLUCOMTR-MCNC: 148 MG/DL — HIGH (ref 70–99)

## 2019-02-12 PROCEDURE — 71260 CT THORAX DX C+: CPT | Mod: 26

## 2019-02-12 PROCEDURE — 99232 SBSQ HOSP IP/OBS MODERATE 35: CPT | Mod: GC

## 2019-02-12 PROCEDURE — 99232 SBSQ HOSP IP/OBS MODERATE 35: CPT

## 2019-02-12 PROCEDURE — 99233 SBSQ HOSP IP/OBS HIGH 50: CPT | Mod: GC

## 2019-02-12 PROCEDURE — 74177 CT ABD & PELVIS W/CONTRAST: CPT | Mod: 26

## 2019-02-12 RX ORDER — LACOSAMIDE 50 MG/1
100 TABLET ORAL ONCE
Qty: 0 | Refills: 0 | Status: DISCONTINUED | OUTPATIENT
Start: 2019-02-12 | End: 2019-02-12

## 2019-02-12 RX ADMIN — Medication 100 MILLIGRAM(S): at 18:29

## 2019-02-12 RX ADMIN — LACOSAMIDE 100 MILLIGRAM(S): 50 TABLET ORAL at 18:22

## 2019-02-12 RX ADMIN — Medication 100 MILLIGRAM(S): at 02:03

## 2019-02-12 RX ADMIN — HEPARIN SODIUM 5000 UNIT(S): 5000 INJECTION INTRAVENOUS; SUBCUTANEOUS at 05:29

## 2019-02-12 RX ADMIN — Medication 1 MILLIGRAM(S): at 12:47

## 2019-02-12 RX ADMIN — PREGABALIN 1000 MICROGRAM(S): 225 CAPSULE ORAL at 12:47

## 2019-02-12 RX ADMIN — CHLORHEXIDINE GLUCONATE 1 APPLICATION(S): 213 SOLUTION TOPICAL at 10:04

## 2019-02-12 RX ADMIN — QUETIAPINE FUMARATE 25 MILLIGRAM(S): 200 TABLET, FILM COATED ORAL at 12:47

## 2019-02-12 RX ADMIN — Medication 166.67 MILLIGRAM(S): at 14:02

## 2019-02-12 RX ADMIN — Medication 1 PACKET(S): at 12:47

## 2019-02-12 RX ADMIN — RISPERIDONE 1 MILLIGRAM(S): 4 TABLET ORAL at 12:51

## 2019-02-12 RX ADMIN — Medication 166.67 MILLIGRAM(S): at 07:32

## 2019-02-12 RX ADMIN — Medication 1 TABLET(S): at 17:55

## 2019-02-12 RX ADMIN — Medication 100 MILLIGRAM(S): at 10:04

## 2019-02-12 RX ADMIN — LACOSAMIDE 120 MILLIGRAM(S): 50 TABLET ORAL at 06:29

## 2019-02-12 RX ADMIN — Medication 1 TABLET(S): at 12:48

## 2019-02-12 RX ADMIN — HEPARIN SODIUM 5000 UNIT(S): 5000 INJECTION INTRAVENOUS; SUBCUTANEOUS at 14:02

## 2019-02-12 NOTE — PROGRESS NOTE ADULT - SUBJECTIVE AND OBJECTIVE BOX
CC: F/U for Abscess    Saw/spoke to patient. Unchanged. No new complaints. No fevers, no chills.    Allergies  Valproate Sodium (Other (Severe))    ANTIMICROBIALS:  cefTAZidime/avibactam IVPB 2.5 every 8 hours  cefTAZidime/avibactam IVPB    metroNIDAZOLE  IVPB    metroNIDAZOLE  IVPB 500 every 8 hours  vancomycin  IVPB 1250 every 8 hours    PE:    Vital Signs Last 24 Hrs  T(C): 37.1 (12 Feb 2019 09:17), Max: 37.2 (11 Feb 2019 17:00)  T(F): 98.7 (12 Feb 2019 09:17), Max: 98.9 (11 Feb 2019 17:00)  HR: 127 (12 Feb 2019 11:17) (108 - 133)  BP: 123/68 (12 Feb 2019 09:17) (101/55 - 133/68)  RR: 18 (12 Feb 2019 09:17) (18 - 22)  SpO2: 94% (12 Feb 2019 11:17) (93% - 100%)    Gen: AOx3, NAD, non-toxic, pleasant  CV: S1+S2 normal, nontachycardic  Resp: Clear bilat, no resp distress, no crackles/wheezes  Abd: Soft, nontender, +BS  Ext: No LE edema, no wounds    LABS:                        9.1    8.15  )-----------( 468      ( 11 Feb 2019 05:10 )             29.2     02-11    130<L>  |  97<L>  |  3<L>  ----------------------------<  105<H>  4.5   |  23  |  < 0.20<L>    Ca    8.4      11 Feb 2019 05:10  Phos  3.0     02-11  Mg     1.7     02-11    TPro  7.4  /  Alb  1.9<L>  /  TBili  0.3  /  DBili  x   /  AST  21  /  ALT  7   /  AlkPhos  293<H>  02-11    MICROBIOLOGY:  Vancomycin Level, Trough: 10.9 ug/mL (02-11-19 @ 20:05)    BLOOD VENOUS  02-02-19 NGTD    BLOOD PERIPHERAL  01-30-19 NGTD    BLOOD VENOUS  01-24-19 --  --  BLOOD CULTURE PCR  Staphylococcus sp.,coag neg    ENDOTRACHEAL SPECIMEN  01-22-19 --  --  Pseudomonas aeruginosa    (otherwise reviewed)    RADIOLOGY:    2/2 CT:    IMPRESSION:   Partially visualized moderate bilateral pleural effusions with bilateral   lower lobar compressive atelectasis.    Interval placement of a AXIOS stent with decrease in size of previously   seen large left peripancreatic fluid collection which now measures up to   7.3 cm in length.    Interval formation of a enhancing wall around the previously seen   perigastric fluid collection, which measures up to 4.9 cm.  Small volume ascites. 2 new small fluid collections noted anteriorly in   the left upper quadrant omentum also may reflect evolving abscesses.

## 2019-02-12 NOTE — PROGRESS NOTE ADULT - ATTENDING COMMENTS
Pt seen and examined with GI fellow. I agree with the above a/p. Plan for EGD and necrosectomy Friday given the tool needed for endoscopic necrosectomy will be available Friday.

## 2019-02-12 NOTE — PROGRESS NOTE ADULT - ASSESSMENT
Impression:  1) Walled off necrosis, s/p EUS and AXIOS 1/31/19  2) Hematochezia, s/p colonoscopy on 12/28/2018 with severe segmental colitis localized to the transverse colon and ascending colon (possible ischemic colitis, possible colitis related to contiguous danay-pancreatic inflammatory process). Biopsies with granulation tissue but no infection/malignancy, Hb stable   3) Resp failure in the setting of pneumonia, requiring tracheostomy now with lung abscess and sepsis on IV  Abx, ID following     Recommendations  - followup repeat CT Abdomen A/P    - will plan for EGD with necrosectomy on Friday, 11/15, given endoscopy schedule and attending preference  - Trend CBC, CMP and fever curve, Hb stable for now  - Continue IV antibiotics as per ID recommendations  - Rest of care per primary team

## 2019-02-12 NOTE — PROGRESS NOTE ADULT - ASSESSMENT
53 M (resident of [facility]) with TBI, s/p PEG tube, contracture, and seizure d/o who presented as a transfer from [facility] on 12/9 for respiratory failure 2/2 ARDS likely 2/2 necrotizing pancreatitis s/p intubation.  Bronc 12/11 with pseudomonas; Sputum cx with  acinetobacter  Sepsis with fever, leukopenia resolved, extubated but reintubated 1/6/19 for hypoxia and poor cough along with profuse secretions and sputum cx again with acinetobacter (restarted Avycaz/Flagyl 1/9--unclear treatment endpoint, s/p 4 weeks)  Necrotising Pancreatitis with multiple peripancreatitis collections better formed on CT 1/24 and a new 7 cm abscess  Afebrile, unclear etiology fever, no leukocytosis  Overall, lung abscess, MDR organisms, necrotizing pancreatitis, intraabdominal collection  - Vanco 1500mg q 12 (monitor trough)  - Avycaz/Flagyl  - Frequent suctioning and pulmonary toileting  - Monitor for any further signs infection; close monitoring for neurological symptoms on prolonged flagyl  - Necrosectomy per GI    Neal Oleary MD  Pager [phone]  After 5pm and on weekends call [phone]

## 2019-02-12 NOTE — PROGRESS NOTE ADULT - SUBJECTIVE AND OBJECTIVE BOX
CHIEF COMPLAINT: Patient is a 53y old  Male who presents with a chief complaint of ARDS?, sepsis (12 Feb 2019 06:12)    Interval Events:      REVIEW OF SYSTEMS:  Constitutional:   Eyes:  ENT:  CV:  Resp:  GI:  :  MSK:  Integumentary:  Neurological:  Psychiatric:  Endocrine:  Hematologic/Lymphatic:  Allergic/Immunologic:  [ ] All other systems negative  [ ] Unable to assess ROS because ________      OBJECTIVE:  ICU Vital Signs Last 24 Hrs  T(C): 37.1 (12 Feb 2019 05:19), Max: 37.2 (11 Feb 2019 17:00)  T(F): 98.8 (12 Feb 2019 05:19), Max: 98.9 (11 Feb 2019 17:00)  HR: 125 (12 Feb 2019 07:44) (108 - 133)  BP: 133/68 (12 Feb 2019 05:19) (101/55 - 133/68)  BP(mean): --  ABP: --  ABP(mean): --  RR: 22 (12 Feb 2019 07:42) (18 - 22)  SpO2: 94% (12 Feb 2019 07:44) (93% - 100%)    Mode: standby,Esprit    POCT Blood Glucose.: 111 mg/dL (12 Feb 2019 05:45)    HOSPITAL MEDICATIONS:  MEDICATIONS  (STANDING):  cefTAZidime/avibactam IVPB 2.5 Gram(s) IV Intermittent every 8 hours  cefTAZidime/avibactam IVPB      chlorhexidine 4% Liquid 1 Application(s) Topical <User Schedule>  cyanocobalamin 1000 MICROGram(s) Oral daily  dextrose 5%. 1000 milliLiter(s) (50 mL/Hr) IV Continuous <Continuous>  dextrose 50% Injectable 12.5 Gram(s) IV Push once  dextrose 50% Injectable 25 Gram(s) IV Push once  dextrose 50% Injectable 25 Gram(s) IV Push once  folic acid 1 milliGRAM(s) Enteral Tube daily  heparin  Injectable 5000 Unit(s) SubCutaneous every 8 hours  insulin lispro (HumaLOG) corrective regimen sliding scale   SubCutaneous every 6 hours  lacosamide 100 milliGRAM(s) Oral two times a day  lactobacillus acidophilus 1 Tablet(s) Oral three times a day with meals  metroNIDAZOLE  IVPB      metroNIDAZOLE  IVPB 500 milliGRAM(s) IV Intermittent every 8 hours  psyllium Powder 1 Packet(s) Oral daily  QUEtiapine 25 milliGRAM(s) Oral daily  risperiDONE   Solution 1 milliGRAM(s) Oral daily  sodium chloride 0.9%. 1000 milliLiter(s) (100 mL/Hr) IV Continuous <Continuous>  vancomycin  IVPB 1250 milliGRAM(s) IV Intermittent every 8 hours    MEDICATIONS  (PRN):  acetaminophen    Suspension .. 650 milliGRAM(s) Oral every 6 hours PRN Temp greater or equal to 38C (100.4F), Mild Pain (1 - 3), Moderate Pain (4 - 6)  dextrose 40% Gel 15 Gram(s) Oral once PRN Blood Glucose LESS THAN 70 milliGRAM(s)/deciliter  glucagon  Injectable 1 milliGRAM(s) IntraMuscular once PRN Glucose LESS THAN 70 milligrams/deciliter      LABS:                        9.1    8.15  )-----------( 468      ( 11 Feb 2019 05:10 )             29.2     02-11    130<L>  |  97<L>  |  3<L>  ----------------------------<  105<H>  4.5   |  23  |  < 0.20<L>    Ca    8.4      11 Feb 2019 05:10  Phos  3.0     02-11  Mg     1.7     02-11    TPro  7.4  /  Alb  1.9<L>  /  TBili  0.3  /  DBili  x   /  AST  21  /  ALT  7   /  AlkPhos  293<H>  02-11              MICROBIOLOGY:     RADIOLOGY:  [ ] Reviewed and interpreted by me    PULMONARY FUNCTION TESTS:    EKG: CHIEF COMPLAINT: Patient is a 53y old  Male who presents with a chief complaint of ARDS?, sepsis (12 Feb 2019 06:12)    Interval Events: none overnight      REVIEW OF SYSTEMS:  Constitutional: no complaints  CV: denies  Resp: denies  GI: denies  [x] All other systems negative  [ ] Unable to assess ROS because ________      OBJECTIVE:  ICU Vital Signs Last 24 Hrs  T(C): 37.1 (12 Feb 2019 05:19), Max: 37.2 (11 Feb 2019 17:00)  T(F): 98.8 (12 Feb 2019 05:19), Max: 98.9 (11 Feb 2019 17:00)  HR: 125 (12 Feb 2019 07:44) (108 - 133)  BP: 133/68 (12 Feb 2019 05:19) (101/55 - 133/68)  BP(mean): --  ABP: --  ABP(mean): --  RR: 22 (12 Feb 2019 07:42) (18 - 22)  SpO2: 94% (12 Feb 2019 07:44) (93% - 100%)    Mode: standby,Esprit    POCT Blood Glucose.: 111 mg/dL (12 Feb 2019 05:45)    HOSPITAL MEDICATIONS:  MEDICATIONS  (STANDING):  cefTAZidime/avibactam IVPB 2.5 Gram(s) IV Intermittent every 8 hours  cefTAZidime/avibactam IVPB      chlorhexidine 4% Liquid 1 Application(s) Topical <User Schedule>  cyanocobalamin 1000 MICROGram(s) Oral daily  dextrose 5%. 1000 milliLiter(s) (50 mL/Hr) IV Continuous <Continuous>  dextrose 50% Injectable 12.5 Gram(s) IV Push once  dextrose 50% Injectable 25 Gram(s) IV Push once  dextrose 50% Injectable 25 Gram(s) IV Push once  folic acid 1 milliGRAM(s) Enteral Tube daily  heparin  Injectable 5000 Unit(s) SubCutaneous every 8 hours  insulin lispro (HumaLOG) corrective regimen sliding scale   SubCutaneous every 6 hours  lacosamide 100 milliGRAM(s) Oral two times a day  lactobacillus acidophilus 1 Tablet(s) Oral three times a day with meals  metroNIDAZOLE  IVPB      metroNIDAZOLE  IVPB 500 milliGRAM(s) IV Intermittent every 8 hours  psyllium Powder 1 Packet(s) Oral daily  QUEtiapine 25 milliGRAM(s) Oral daily  risperiDONE   Solution 1 milliGRAM(s) Oral daily  sodium chloride 0.9%. 1000 milliLiter(s) (100 mL/Hr) IV Continuous <Continuous>  vancomycin  IVPB 1250 milliGRAM(s) IV Intermittent every 8 hours    MEDICATIONS  (PRN):  acetaminophen    Suspension .. 650 milliGRAM(s) Oral every 6 hours PRN Temp greater or equal to 38C (100.4F), Mild Pain (1 - 3), Moderate Pain (4 - 6)  dextrose 40% Gel 15 Gram(s) Oral once PRN Blood Glucose LESS THAN 70 milliGRAM(s)/deciliter  glucagon  Injectable 1 milliGRAM(s) IntraMuscular once PRN Glucose LESS THAN 70 milligrams/deciliter

## 2019-02-12 NOTE — PROGRESS NOTE ADULT - SUBJECTIVE AND OBJECTIVE BOX
Chief Complaint:  Patient is a 53y old  Male who presents with a chief complaint of ARDS?, sepsis (2019 07:30)      Interval Events: No adverse events overnight. Pt denies pain.  No fevers overnight, but remains tachycardic.     Allergies:  Valproate Sodium (Other (Severe))      Hospital Medications:  acetaminophen    Suspension .. 650 milliGRAM(s) Oral every 6 hours PRN  cefTAZidime/avibactam IVPB 2.5 Gram(s) IV Intermittent every 8 hours  cefTAZidime/avibactam IVPB      chlorhexidine 4% Liquid 1 Application(s) Topical <User Schedule>  cyanocobalamin 1000 MICROGram(s) Oral daily  dextrose 40% Gel 15 Gram(s) Oral once PRN  dextrose 5%. 1000 milliLiter(s) IV Continuous <Continuous>  dextrose 50% Injectable 12.5 Gram(s) IV Push once  dextrose 50% Injectable 25 Gram(s) IV Push once  dextrose 50% Injectable 25 Gram(s) IV Push once  folic acid 1 milliGRAM(s) Enteral Tube daily  glucagon  Injectable 1 milliGRAM(s) IntraMuscular once PRN  heparin  Injectable 5000 Unit(s) SubCutaneous every 8 hours  insulin lispro (HumaLOG) corrective regimen sliding scale   SubCutaneous every 6 hours  lacosamide 100 milliGRAM(s) Oral two times a day  lacosamide IVPB 100 milliGRAM(s) IV Intermittent once  lactobacillus acidophilus 1 Tablet(s) Oral three times a day with meals  metroNIDAZOLE  IVPB      metroNIDAZOLE  IVPB 500 milliGRAM(s) IV Intermittent every 8 hours  psyllium Powder 1 Packet(s) Oral daily  QUEtiapine 25 milliGRAM(s) Oral daily  risperiDONE   Solution 1 milliGRAM(s) Oral daily  sodium chloride 0.9%. 1000 milliLiter(s) IV Continuous <Continuous>  vancomycin  IVPB 1250 milliGRAM(s) IV Intermittent every 8 hours      PMHX/PSHX:  Seizure  TBI (traumatic brain injury)  S/P percutaneous endoscopic gastrostomy (PEG) tube placement  No significant past surgical history      Family history:      ROS:     General:  No wt loss, fevers, chills, night sweats, fatigue,   Eyes:  Good vision, no reported pain  ENT:  No sore throat, pain, runny nose, dysphagia  CV:  No pain, palpitations, hypo/hypertension  Resp:  No dyspnea, cough, tachypnea, wheezing  GI:  See HPI  :  No pain, bleeding, incontinence, nocturia  Muscle:  No pain, weakness  Neuro:  No weakness, tingling, memory problems  Psych:  No fatigue, insomnia, mood problems, depression  Endocrine:  No polyuria, polydipsia, cold/heat intolerance  Heme:  No petechiae, ecchymosis, easy bruisability  Skin:  No rash, edema      PHYSICAL EXAM:     GENERAL:  Ill appearing but NAD  HEENT:  NC/AT  CHEST:  on trach collar  ABDOMEN:  Soft, non-tender, non-distended  EXTREMITIES:  contracted upper extremities   SKIN:  No rash  NEURO:  Alert    Vital Signs:  Vital Signs Last 24 Hrs  T(C): 37.1 (2019 05:19), Max: 37.2 (2019 17:00)  T(F): 98.8 (:19), Max: 98.9 (2019 17:00)  HR: 130 (2019 05:19) (108 - 133)  BP: 133/68 (2019 05:19) (101/55 - 133/68)  BP(mean): --  RR: 22 (2019 05:19) (18 - 22)  SpO2: 96% (2019 05:19) (94% - 100%)  Daily     Daily Weight in k.8 (2019 01:29)    LABS:                        9.1    8.15  )-----------( 468      ( 2019 05:10 )             29.2     02-11    130<L>  |  97<L>  |  3<L>  ----------------------------<  105<H>  4.5   |  23  |  < 0.20<L>    Ca    8.4      2019 05:10  Phos  3.0     -  Mg     1.7     -    TPro  7.4  /  Alb  1.9<L>  /  TBili  0.3  /  DBili  x   /  AST  21  /  ALT  7   /  AlkPhos  293<H>  02-11    LIVER FUNCTIONS - ( 2019 05:10 )  Alb: 1.9 g/dL / Pro: 7.4 g/dL / ALK PHOS: 293 u/L / ALT: 7 u/L / AST: 21 u/L / GGT: x                   Imaging:  no new imaging

## 2019-02-12 NOTE — PROGRESS NOTE ADULT - PROBLEM SELECTOR PLAN 1
- cont avycaz and flagyl and vanco per ID  - s/p EUS stent and drainage  - CT 2/2 showing walled off necrosis  - GI note appreciated  - pending repeat CT A/P  - pending possible necrostomy this week

## 2019-02-12 NOTE — PROGRESS NOTE ADULT - PROBLEM SELECTOR PLAN 3
- fevers resolved, afebrile now  - ID following   - CT showing new lung abscess  - pending CT chest to reeval

## 2019-02-12 NOTE — PROGRESS NOTE ADULT - ASSESSMENT
53 Male w/ a PMHx of TBI (s/p PEG tube, contracture, and seizure d/o) presented as a transfer from [facility] on 12/9 for respiratory failure 2/2 ARDS likely 2/2 necrotizing pancreatitis s/p intubation. Course c/b Acinetobacter PNA s/p Avycaz and Flagyl 7 day course.  Course further complicated by acute blood loss anemia s/p colonoscopy with findings suggestive of ischemic colitis, without further bleeding and Hgb remaining stable. S/p extubation 1/3, and re-intubation 1/6 now s/p tracheostomy.

## 2019-02-13 LAB
GLUCOSE BLDC GLUCOMTR-MCNC: 132 MG/DL — HIGH (ref 70–99)
GLUCOSE BLDC GLUCOMTR-MCNC: 138 MG/DL — HIGH (ref 70–99)
GLUCOSE BLDC GLUCOMTR-MCNC: 149 MG/DL — HIGH (ref 70–99)
VANCOMYCIN TROUGH SERPL-MCNC: 14.4 UG/ML — SIGNIFICANT CHANGE UP (ref 10–20)

## 2019-02-13 PROCEDURE — 99232 SBSQ HOSP IP/OBS MODERATE 35: CPT

## 2019-02-13 PROCEDURE — 99232 SBSQ HOSP IP/OBS MODERATE 35: CPT | Mod: GC

## 2019-02-13 PROCEDURE — 99233 SBSQ HOSP IP/OBS HIGH 50: CPT | Mod: GC

## 2019-02-13 RX ADMIN — LACOSAMIDE 100 MILLIGRAM(S): 50 TABLET ORAL at 18:31

## 2019-02-13 RX ADMIN — Medication 166.67 MILLIGRAM(S): at 02:33

## 2019-02-13 RX ADMIN — Medication 100 MILLIGRAM(S): at 18:31

## 2019-02-13 RX ADMIN — HEPARIN SODIUM 5000 UNIT(S): 5000 INJECTION INTRAVENOUS; SUBCUTANEOUS at 15:11

## 2019-02-13 RX ADMIN — LACOSAMIDE 100 MILLIGRAM(S): 50 TABLET ORAL at 05:26

## 2019-02-13 RX ADMIN — Medication 166.67 MILLIGRAM(S): at 18:31

## 2019-02-13 RX ADMIN — QUETIAPINE FUMARATE 25 MILLIGRAM(S): 200 TABLET, FILM COATED ORAL at 11:21

## 2019-02-13 RX ADMIN — Medication 1 TABLET(S): at 07:55

## 2019-02-13 RX ADMIN — Medication 100 MILLIGRAM(S): at 11:20

## 2019-02-13 RX ADMIN — Medication 100 MILLIGRAM(S): at 02:33

## 2019-02-13 RX ADMIN — CHLORHEXIDINE GLUCONATE 1 APPLICATION(S): 213 SOLUTION TOPICAL at 10:24

## 2019-02-13 RX ADMIN — PREGABALIN 1000 MICROGRAM(S): 225 CAPSULE ORAL at 11:21

## 2019-02-13 RX ADMIN — Medication 1 MILLIGRAM(S): at 11:21

## 2019-02-13 RX ADMIN — HEPARIN SODIUM 5000 UNIT(S): 5000 INJECTION INTRAVENOUS; SUBCUTANEOUS at 05:26

## 2019-02-13 RX ADMIN — Medication 1 TABLET(S): at 18:32

## 2019-02-13 RX ADMIN — Medication 1 PACKET(S): at 11:21

## 2019-02-13 RX ADMIN — Medication 1 TABLET(S): at 11:57

## 2019-02-13 RX ADMIN — RISPERIDONE 1 MILLIGRAM(S): 4 TABLET ORAL at 11:21

## 2019-02-13 RX ADMIN — HEPARIN SODIUM 5000 UNIT(S): 5000 INJECTION INTRAVENOUS; SUBCUTANEOUS at 00:47

## 2019-02-13 RX ADMIN — Medication 166.67 MILLIGRAM(S): at 10:24

## 2019-02-13 RX ADMIN — HEPARIN SODIUM 5000 UNIT(S): 5000 INJECTION INTRAVENOUS; SUBCUTANEOUS at 22:47

## 2019-02-13 NOTE — PROGRESS NOTE ADULT - ASSESSMENT
53 M (resident of [facility]) with TBI, s/p PEG tube, contracture, and seizure d/o who presented as a transfer from [facility] on 12/9 for respiratory failure 2/2 ARDS likely 2/2 necrotizing pancreatitis s/p intubation.  Bronc 12/11 with pseudomonas; Sputum cx with  acinetobacter  Sepsis with fever, leukopenia resolved, extubated but reintubated 1/6/19 for hypoxia and poor cough along with profuse secretions and sputum cx again with acinetobacter (restarted Avycaz/Flagyl 1/9--unclear treatment endpoint, s/p 4 weeks)  Necrotising Pancreatitis with multiple peripancreatitis collections better formed on CT 1/24 and a new 7 cm abscess  Afebrile, unclear etiology prior fever, no leukocytosis  Overall, lung abscess, MDR organisms, necrotizing pancreatitis, intraabdominal collection  - Vanco 1500mg q 12 (monitor trough)  - Avycaz/Flagyl  - Frequent suctioning and pulmonary toileting  - Monitor for any further signs infection; close monitoring for neurological symptoms on prolonged flagyl  - Necrosectomy per GI  - For lung abscess, would plan to repeat CT chest to check on status lung abscesses to evaluate for further antibiotic duration (can wait until necrosectomy done, since abx would be continued through that procedure regardless)    Neal Oleary MD  Pager [phone]  After 5pm and on weekends call [phone]

## 2019-02-13 NOTE — PROGRESS NOTE ADULT - PROBLEM SELECTOR PLAN 1
- cont avycaz and flagyl and vanco per ID  - s/p EUS stent and drainage  - CT 2/2 showing walled off necrosis  - GI note appreciated  - pending repeat CT A/P  - likely for necrostomy on FRIDAY 2/15

## 2019-02-13 NOTE — PROGRESS NOTE ADULT - SUBJECTIVE AND OBJECTIVE BOX
CC: F/U for Abscess    Saw/spoke to patient. No fevers, no chills. Overall well. Nonverbal.    Allergies  Valproate Sodium (Other (Severe))    ANTIMICROBIALS:  cefTAZidime/avibactam IVPB 2.5 every 8 hours  cefTAZidime/avibactam IVPB    metroNIDAZOLE  IVPB    metroNIDAZOLE  IVPB 500 every 8 hours  vancomycin  IVPB 1250 every 8 hours    PE:    Vital Signs Last 24 Hrs  T(C): 37.1 (13 Feb 2019 10:00), Max: 37.7 (13 Feb 2019 02:31)  T(F): 98.8 (13 Feb 2019 10:00), Max: 99.8 (13 Feb 2019 02:31)  HR: 116 (13 Feb 2019 10:00) (104 - 127)  BP: 108/61 (13 Feb 2019 10:00) (102/72 - 122/76)  RR: 22 (13 Feb 2019 10:00) (18 - 22)  SpO2: 97% (13 Feb 2019 10:00) (94% - 100%)    Gen: AOx1-2, NAD, non-toxic  CV: S1+S2 normal, tachycardic  Resp: Clear bilat, no resp distress, no crackles/wheezes, trach  Abd: Soft, nontender, +BS, PEG  Ext: No LE edema, no wounds    LABS:    No new available    MICROBIOLOGY:  Vancomycin Level, Trough: 14.4 ug/mL (02-13-19 @ 01:40)    BLOOD VENOUS  02-02-19 NGTD    BLOOD PERIPHERAL  01-30-19 NGTD    ENDOTRACHEAL SPECIMEN  01-22-19 --  --  Pseudomonas aeruginosa    (otherwise reviewed)    RADIOLOGY:    2/2 CT:    IMPRESSION:   Partially visualized moderate bilateral pleural effusions with bilateral   lower lobar compressive atelectasis.    Interval placement of a AXIOS stent with decrease in size of previously   seen large left peripancreatic fluid collection which now measures up to   7.3 cm in length.    Interval formation of a enhancing wall around the previously seen   perigastric fluid collection, which measures up to 4.9 cm.  Small volume ascites. 2 new small fluid collections noted anteriorly in   the left upper quadrant omentum also may reflect evolving abscesses.

## 2019-02-13 NOTE — PROGRESS NOTE ADULT - SUBJECTIVE AND OBJECTIVE BOX
CHIEF COMPLAINT: Patient is a 53y old  Male who presents with a chief complaint of ARDS?, sepsis (13 Feb 2019 06:33)    Interval Events:      REVIEW OF SYSTEMS:  Constitutional:   Eyes:  ENT:  CV:  Resp:  GI:  :  MSK:  Integumentary:  Neurological:  Psychiatric:  Endocrine:  Hematologic/Lymphatic:  Allergic/Immunologic:  [ ] All other systems negative  [ ] Unable to assess ROS because ________      OBJECTIVE:  ICU Vital Signs Last 24 Hrs  T(C): 36.9 (13 Feb 2019 05:22), Max: 37.7 (13 Feb 2019 02:31)  T(F): 98.5 (13 Feb 2019 05:22), Max: 99.8 (13 Feb 2019 02:31)  HR: 124 (13 Feb 2019 08:02) (104 - 127)  BP: 122/76 (13 Feb 2019 05:22) (102/72 - 123/68)  BP(mean): --  ABP: --  ABP(mean): --  RR: 20 (13 Feb 2019 08:02) (18 - 20)  SpO2: 100% (13 Feb 2019 08:02) (94% - 100%)    Mode: anay royal    02-12 @ 07:01  -  02-13 @ 07:00  --------------------------------------------------------  IN: 1160 mL / OUT: 0 mL / NET: 1160 mL    POCT Blood Glucose.: 132 mg/dL (13 Feb 2019 05:46)    HOSPITAL MEDICATIONS:  MEDICATIONS  (STANDING):  cefTAZidime/avibactam IVPB 2.5 Gram(s) IV Intermittent every 8 hours  cefTAZidime/avibactam IVPB      chlorhexidine 4% Liquid 1 Application(s) Topical <User Schedule>  cyanocobalamin 1000 MICROGram(s) Oral daily  dextrose 5%. 1000 milliLiter(s) (50 mL/Hr) IV Continuous <Continuous>  dextrose 50% Injectable 12.5 Gram(s) IV Push once  dextrose 50% Injectable 25 Gram(s) IV Push once  dextrose 50% Injectable 25 Gram(s) IV Push once  folic acid 1 milliGRAM(s) Enteral Tube daily  heparin  Injectable 5000 Unit(s) SubCutaneous every 8 hours  insulin lispro (HumaLOG) corrective regimen sliding scale   SubCutaneous every 6 hours  lacosamide 100 milliGRAM(s) Oral two times a day  lactobacillus acidophilus 1 Tablet(s) Oral three times a day with meals  metroNIDAZOLE  IVPB      metroNIDAZOLE  IVPB 500 milliGRAM(s) IV Intermittent every 8 hours  psyllium Powder 1 Packet(s) Oral daily  QUEtiapine 25 milliGRAM(s) Oral daily  risperiDONE   Solution 1 milliGRAM(s) Oral daily  sodium chloride 0.9%. 1000 milliLiter(s) (100 mL/Hr) IV Continuous <Continuous>  vancomycin  IVPB 1250 milliGRAM(s) IV Intermittent every 8 hours    MEDICATIONS  (PRN):  acetaminophen    Suspension .. 650 milliGRAM(s) Oral every 6 hours PRN Temp greater or equal to 38C (100.4F), Mild Pain (1 - 3), Moderate Pain (4 - 6)  dextrose 40% Gel 15 Gram(s) Oral once PRN Blood Glucose LESS THAN 70 milliGRAM(s)/deciliter  glucagon  Injectable 1 milliGRAM(s) IntraMuscular once PRN Glucose LESS THAN 70 milligrams/deciliter      LABS:                    MICROBIOLOGY:     RADIOLOGY:  [ ] Reviewed and interpreted by me    PULMONARY FUNCTION TESTS:    EKG: CHIEF COMPLAINT: Patient is a 53y old  Male who presents with a chief complaint of ARDS?, sepsis (13 Feb 2019 06:33)    Interval Events: none overnight      REVIEW OF SYSTEMS:  Constitutional: no complaints overall  CV: denies  Resp: denies  GI: denies  [x] All other systems negative  [ ] Unable to assess ROS because ________      OBJECTIVE:  ICU Vital Signs Last 24 Hrs  T(C): 36.9 (13 Feb 2019 05:22), Max: 37.7 (13 Feb 2019 02:31)  T(F): 98.5 (13 Feb 2019 05:22), Max: 99.8 (13 Feb 2019 02:31)  HR: 124 (13 Feb 2019 08:02) (104 - 127)  BP: 122/76 (13 Feb 2019 05:22) (102/72 - 123/68)  BP(mean): --  ABP: --  ABP(mean): --  RR: 20 (13 Feb 2019 08:02) (18 - 20)  SpO2: 100% (13 Feb 2019 08:02) (94% - 100%)    Mode: anay royal    02-12 @ 07:01  -  02-13 @ 07:00  --------------------------------------------------------  IN: 1160 mL / OUT: 0 mL / NET: 1160 mL    POCT Blood Glucose.: 132 mg/dL (13 Feb 2019 05:46)    HOSPITAL MEDICATIONS:  MEDICATIONS  (STANDING):  cefTAZidime/avibactam IVPB 2.5 Gram(s) IV Intermittent every 8 hours  cefTAZidime/avibactam IVPB      chlorhexidine 4% Liquid 1 Application(s) Topical <User Schedule>  cyanocobalamin 1000 MICROGram(s) Oral daily  dextrose 5%. 1000 milliLiter(s) (50 mL/Hr) IV Continuous <Continuous>  dextrose 50% Injectable 12.5 Gram(s) IV Push once  dextrose 50% Injectable 25 Gram(s) IV Push once  dextrose 50% Injectable 25 Gram(s) IV Push once  folic acid 1 milliGRAM(s) Enteral Tube daily  heparin  Injectable 5000 Unit(s) SubCutaneous every 8 hours  insulin lispro (HumaLOG) corrective regimen sliding scale   SubCutaneous every 6 hours  lacosamide 100 milliGRAM(s) Oral two times a day  lactobacillus acidophilus 1 Tablet(s) Oral three times a day with meals  metroNIDAZOLE  IVPB      metroNIDAZOLE  IVPB 500 milliGRAM(s) IV Intermittent every 8 hours  psyllium Powder 1 Packet(s) Oral daily  QUEtiapine 25 milliGRAM(s) Oral daily  risperiDONE   Solution 1 milliGRAM(s) Oral daily  sodium chloride 0.9%. 1000 milliLiter(s) (100 mL/Hr) IV Continuous <Continuous>  vancomycin  IVPB 1250 milliGRAM(s) IV Intermittent every 8 hours    MEDICATIONS  (PRN):  acetaminophen    Suspension .. 650 milliGRAM(s) Oral every 6 hours PRN Temp greater or equal to 38C (100.4F), Mild Pain (1 - 3), Moderate Pain (4 - 6)  dextrose 40% Gel 15 Gram(s) Oral once PRN Blood Glucose LESS THAN 70 milliGRAM(s)/deciliter  glucagon  Injectable 1 milliGRAM(s) IntraMuscular once PRN Glucose LESS THAN 70 milligrams/deciliter

## 2019-02-13 NOTE — PROGRESS NOTE ADULT - ATTENDING COMMENTS
CT chest shows improvement in lung abscesses.  Follow up official report.  Planned for necrosectomy on 2/15.  Continue antibiotics.

## 2019-02-13 NOTE — PROGRESS NOTE ADULT - ASSESSMENT
53 Male w/ a PMHx of TBI (s/p PEG tube, contracture, and seizure d/o) presented as a transfer from [facility] on 12/9 for respiratory failure 2/2 ARDS likely 2/2 necrotizing pancreatitis s/p intubation. Course c/b Acinetobacter PNA s/p Avycaz and Flagyl 7 day course.  Course further complicated by acute blood loss anemia s/p colonoscopy with findings suggestive of ischemic colitis, without further bleeding and Hgb remaining stable. S/p extubation 1/3, and re-intubation 1/6 now s/p tracheostomy.

## 2019-02-13 NOTE — PROGRESS NOTE ADULT - SUBJECTIVE AND OBJECTIVE BOX
Chief Complaint:  Patient is a 53y old  Male who presents with a chief complaint of ARDS?, sepsis (2019 08:44)      Interval Events:  No events overnight.  No labs yesterday.     Allergies:  Valproate Sodium (Other (Severe))      Hospital Medications:  acetaminophen    Suspension .. 650 milliGRAM(s) Oral every 6 hours PRN  cefTAZidime/avibactam IVPB 2.5 Gram(s) IV Intermittent every 8 hours  cefTAZidime/avibactam IVPB      chlorhexidine 4% Liquid 1 Application(s) Topical <User Schedule>  cyanocobalamin 1000 MICROGram(s) Oral daily  dextrose 40% Gel 15 Gram(s) Oral once PRN  dextrose 5%. 1000 milliLiter(s) IV Continuous <Continuous>  dextrose 50% Injectable 12.5 Gram(s) IV Push once  dextrose 50% Injectable 25 Gram(s) IV Push once  dextrose 50% Injectable 25 Gram(s) IV Push once  folic acid 1 milliGRAM(s) Enteral Tube daily  glucagon  Injectable 1 milliGRAM(s) IntraMuscular once PRN  heparin  Injectable 5000 Unit(s) SubCutaneous every 8 hours  insulin lispro (HumaLOG) corrective regimen sliding scale   SubCutaneous every 6 hours  lacosamide 100 milliGRAM(s) Oral two times a day  lactobacillus acidophilus 1 Tablet(s) Oral three times a day with meals  metroNIDAZOLE  IVPB      metroNIDAZOLE  IVPB 500 milliGRAM(s) IV Intermittent every 8 hours  psyllium Powder 1 Packet(s) Oral daily  QUEtiapine 25 milliGRAM(s) Oral daily  risperiDONE   Solution 1 milliGRAM(s) Oral daily  sodium chloride 0.9%. 1000 milliLiter(s) IV Continuous <Continuous>  vancomycin  IVPB 1250 milliGRAM(s) IV Intermittent every 8 hours      PMHX/PSHX:  Seizure  TBI (traumatic brain injury)  S/P percutaneous endoscopic gastrostomy (PEG) tube placement  No significant past surgical history      Family history:      ROS: unable to obtain      PHYSICAL EXAM:     GENERAL:  Chronically ill appearing but NAD  HEENT:  NC/AT  CHEST:  Trach collar  ABDOMEN:  Soft, non-tender, non-distended, normoactive bowel sounds  EXTREMITIES:  contracted  SKIN:  No rash  NEURO:  Alert    Vital Signs:  Vital Signs Last 24 Hrs  T(C): 36.9 (2019 05:22), Max: 37.7 (2019 02:31)  T(F): 98.5 (2019 05:22), Max: 99.8 (2019 02:31)  HR: 121 (2019 05:22) (104 - 127)  BP: 122/76 (2019 05:22) (102/72 - 123/68)  BP(mean): --  RR: 20 (2019 05:22) (18 - 22)  SpO2: 97% (2019 05:22) (93% - 98%)  Daily     Daily Weight in k.4 (2019 05:22)    LABS:                    Imaging:      < from: CT Abdomen and Pelvis w/ IV Cont (19 @ 22:42) >    EXAM:  CT ABDOMEN AND PELVIS IC        PROCEDURE DATE:  2019         INTERPRETATION:  CLINICAL INFORMATION: Fever. History of necrotizing   pancreatitis with abdominal drainage catheters.         COMPARISON: CT abdomen pelvis from 2019     PROCEDURE:   CT of the Abdomen and Pelvis was performed with intravenous contrast.   Intravenous contrast: 90 ml Omnipaque 350. 10 ml discarded.  Oral contrast: None.  Sagittal and coronal reformats were performed.    FINDINGS:    LOWER CHEST: Redemonstration of moderate bilateral pleural effusions with   adjacent bilateral lower lobar compressive atelectasis. Left anterior   descending coronary artery calcification.    LIVER: Diffuse hepatic steatosis.    BILE DUCTS: Normal caliber.    GALLBLADDER: Focal wall calcification.    SPLEEN: Peripheral calcifications. Subcentimeter hypodensity which may   represent hemangioma or lymphangioma.    PANCREAS:   Necrotizing pancreatitis with overall interval decrease in size of   multiple peripancreatic collections.   Interval placement of AXIOS stent connecting a left peripancreatic fluid   collection with the second portion of the duodenum. Since placement of   the stent, there has been significant interval decrease in size of a   large peripancreatic fluid collection which now measures 7.3 x 1.9 cm,   previously measured up to 12.7 x 4.5 cm.  Interval decrease in size of a loculated fluid collection near the   pancreatic head/proximal duodenum, now measuring 2.6 x 3.3 cm, previously   measured up to 3.9 cm.    ADRENALS: Within normal limits.    KIDNEYS/URETERS: Symmetric enhancement. No hydronephrosis. Subcentimeter   right renal hypodensity, too small to characterize.    BLADDER: Within normal limits.    REPRODUCTIVE ORGANS: Prostate within normal limits.    BOWEL: Gastrostomy tube in place. Rectal tube in place. Moderate amount   of stool in the rectum. No bowel obstruction..     PERITONEUM: Small volume ascites. Redemonstration of multiple loculated   fluid collections along thegastric wall, the largest of which measures   4.9 x 0.5 cm (2, 43), which now contains a peripheral enhancing wall.   2 new small fluid collections noted anteriorly in the left upper quadrant   omentum also may reflect evolving abscesses.    VESSELS: Atherosclerotic calcifications.    RETROPERITONEUM: No lymphadenopathy.      ABDOMINAL WALL: Within normal limits.      BONES: Within normal limits.    IMPRESSION:   Partially visualized moderate bilateral pleural effusions with bilateral   lower lobar compressive atelectasis.    Interval placement of a AXIOS stent with decrease in size of previously   seen large left peripancreatic fluid collection which now measures up to   7.3 cm in length.    Interval formation of a enhancing wall around thepreviously seen   perigastric fluid collection, which measures up to 4.9 cm.  Small volume ascites. 2 new small fluid collections noted anteriorly in   the left upper quadrant omentum also may reflect evolving abscesses.              MECHE KLINE M.D.,RADIOLOGY RESIDENT  This document has been electronically signed.  KOMAL ANTONIO M.D., ATTENDING RADIOLOGIST  This document has been electronically signed. Feb  3 2019  9:29AM                  < end of copied text >

## 2019-02-13 NOTE — CHART NOTE - NSCHARTNOTEFT_GEN_A_CORE
Source:  nursing and EMR     Diet : NPO with Tube Feed via Nasogastric - Jevity 1.2@ 55 ml/hr 24hrs daily      Patient unable to speak, with TBI , tolerating EN per nursing, current EN provides adequate nutrition , no recent wt, available .      Enteral :  EN provides 1584 kcal & 73 gm protein/d ; i.e. 25.5 kcal/kg IBW & 1.17 gm protein/kg IBW        Current Weight: - n/a     Pertinent Medications: MEDICATIONS  (STANDING):  cefTAZidime/avibactam IVPB 2.5 Gram(s) IV Intermittent every 8 hours  cefTAZidime/avibactam IVPB      chlorhexidine 4% Liquid 1 Application(s) Topical <User Schedule>  cyanocobalamin 1000 MICROGram(s) Oral daily  dextrose 5%. 1000 milliLiter(s) (50 mL/Hr) IV Continuous <Continuous>  dextrose 50% Injectable 12.5 Gram(s) IV Push once  dextrose 50% Injectable 25 Gram(s) IV Push once  dextrose 50% Injectable 25 Gram(s) IV Push once  folic acid 1 milliGRAM(s) Enteral Tube daily  heparin  Injectable 5000 Unit(s) SubCutaneous every 8 hours  insulin lispro (HumaLOG) corrective regimen sliding scale   SubCutaneous every 6 hours  lacosamide 100 milliGRAM(s) Oral two times a day  lactobacillus acidophilus 1 Tablet(s) Oral three times a day with meals  metroNIDAZOLE  IVPB      metroNIDAZOLE  IVPB 500 milliGRAM(s) IV Intermittent every 8 hours  psyllium Powder 1 Packet(s) Oral daily  QUEtiapine 25 milliGRAM(s) Oral daily  risperiDONE   Solution 1 milliGRAM(s) Oral daily  sodium chloride 0.9%. 1000 milliLiter(s) (100 mL/Hr) IV Continuous <Continuous>  vancomycin  IVPB 1250 milliGRAM(s) IV Intermittent every 8 hours    MEDICATIONS  (PRN):  acetaminophen    Suspension .. 650 milliGRAM(s) Oral every 6 hours PRN Temp greater or equal to 38C (100.4F), Mild Pain (1 - 3), Moderate Pain (4 - 6)  dextrose 40% Gel 15 Gram(s) Oral once PRN Blood Glucose LESS THAN 70 milliGRAM(s)/deciliter  glucagon  Injectable 1 milliGRAM(s) IntraMuscular once PRN Glucose LESS THAN 70 milligrams/deciliter    Pertinent Labs:  02-11 Na130 mmol/L<L> Glu 105 mg/dL<H> K+ 4.5 mmol/L Cr  < 0.20 mg/dL<L> BUN 3 mg/dL<L> 02-11 Phos 3.0 mg/dL 02-11 Alb 1.9 g/dL<L>      Recommend to continue EN     Monitoring and Evaluation: Tolerance to diet prescription , weights and follow up per protocol

## 2019-02-13 NOTE — PROGRESS NOTE ADULT - PROBLEM SELECTOR PLAN 3
- fevers resolved, afebrile now  - ID following   - CT showing new lung abscess  - repeat CT on 2/12 now improved  - cont abx for now

## 2019-02-14 LAB
ANION GAP SERPL CALC-SCNC: 8 MMO/L — SIGNIFICANT CHANGE UP (ref 7–14)
BUN SERPL-MCNC: 4 MG/DL — LOW (ref 7–23)
CALCIUM SERPL-MCNC: 8.1 MG/DL — LOW (ref 8.4–10.5)
CHLORIDE SERPL-SCNC: 96 MMOL/L — LOW (ref 98–107)
CO2 SERPL-SCNC: 23 MMOL/L — SIGNIFICANT CHANGE UP (ref 22–31)
CREAT SERPL-MCNC: < 0.2 MG/DL — LOW (ref 0.5–1.3)
GLUCOSE BLDC GLUCOMTR-MCNC: 120 MG/DL — HIGH (ref 70–99)
GLUCOSE BLDC GLUCOMTR-MCNC: 127 MG/DL — HIGH (ref 70–99)
GLUCOSE BLDC GLUCOMTR-MCNC: 131 MG/DL — HIGH (ref 70–99)
GLUCOSE BLDC GLUCOMTR-MCNC: 136 MG/DL — HIGH (ref 70–99)
GLUCOSE BLDC GLUCOMTR-MCNC: 137 MG/DL — HIGH (ref 70–99)
GLUCOSE SERPL-MCNC: 132 MG/DL — HIGH (ref 70–99)
HCT VFR BLD CALC: 32.5 % — LOW (ref 39–50)
HGB BLD-MCNC: 10.1 G/DL — LOW (ref 13–17)
MCHC RBC-ENTMCNC: 31.1 % — LOW (ref 32–36)
MCHC RBC-ENTMCNC: 31.4 PG — SIGNIFICANT CHANGE UP (ref 27–34)
MCV RBC AUTO: 100.9 FL — HIGH (ref 80–100)
NRBC # FLD: 0.04 K/UL — LOW (ref 25–125)
PLATELET # BLD AUTO: 525 K/UL — HIGH (ref 150–400)
PMV BLD: 9.1 FL — SIGNIFICANT CHANGE UP (ref 7–13)
POTASSIUM SERPL-MCNC: 4.6 MMOL/L — SIGNIFICANT CHANGE UP (ref 3.5–5.3)
POTASSIUM SERPL-SCNC: 4.6 MMOL/L — SIGNIFICANT CHANGE UP (ref 3.5–5.3)
RBC # BLD: 3.22 M/UL — LOW (ref 4.2–5.8)
RBC # FLD: 15.7 % — HIGH (ref 10.3–14.5)
SODIUM SERPL-SCNC: 127 MMOL/L — LOW (ref 135–145)
VANCOMYCIN TROUGH SERPL-MCNC: 18.9 UG/ML — SIGNIFICANT CHANGE UP (ref 10–20)
WBC # BLD: 12.16 K/UL — HIGH (ref 3.8–10.5)
WBC # FLD AUTO: 12.16 K/UL — HIGH (ref 3.8–10.5)

## 2019-02-14 PROCEDURE — 99232 SBSQ HOSP IP/OBS MODERATE 35: CPT | Mod: GC

## 2019-02-14 PROCEDURE — 99233 SBSQ HOSP IP/OBS HIGH 50: CPT | Mod: GC

## 2019-02-14 PROCEDURE — 99232 SBSQ HOSP IP/OBS MODERATE 35: CPT

## 2019-02-14 RX ORDER — LACOSAMIDE 50 MG/1
100 TABLET ORAL
Qty: 0 | Refills: 0 | Status: DISCONTINUED | OUTPATIENT
Start: 2019-02-14 | End: 2019-02-15

## 2019-02-14 RX ADMIN — Medication 1 TABLET(S): at 12:00

## 2019-02-14 RX ADMIN — LACOSAMIDE 100 MILLIGRAM(S): 50 TABLET ORAL at 05:24

## 2019-02-14 RX ADMIN — RISPERIDONE 1 MILLIGRAM(S): 4 TABLET ORAL at 12:00

## 2019-02-14 RX ADMIN — Medication 100 MILLIGRAM(S): at 18:38

## 2019-02-14 RX ADMIN — PREGABALIN 1000 MICROGRAM(S): 225 CAPSULE ORAL at 12:01

## 2019-02-14 RX ADMIN — HEPARIN SODIUM 5000 UNIT(S): 5000 INJECTION INTRAVENOUS; SUBCUTANEOUS at 05:24

## 2019-02-14 RX ADMIN — HEPARIN SODIUM 5000 UNIT(S): 5000 INJECTION INTRAVENOUS; SUBCUTANEOUS at 15:13

## 2019-02-14 RX ADMIN — CHLORHEXIDINE GLUCONATE 1 APPLICATION(S): 213 SOLUTION TOPICAL at 10:25

## 2019-02-14 RX ADMIN — Medication 166.67 MILLIGRAM(S): at 10:24

## 2019-02-14 RX ADMIN — Medication 1 TABLET(S): at 18:38

## 2019-02-14 RX ADMIN — Medication 100 MILLIGRAM(S): at 10:25

## 2019-02-14 RX ADMIN — QUETIAPINE FUMARATE 25 MILLIGRAM(S): 200 TABLET, FILM COATED ORAL at 12:01

## 2019-02-14 RX ADMIN — Medication 1 PACKET(S): at 12:00

## 2019-02-14 RX ADMIN — Medication 100 MILLIGRAM(S): at 01:23

## 2019-02-14 RX ADMIN — Medication 166.67 MILLIGRAM(S): at 01:23

## 2019-02-14 RX ADMIN — Medication 1 TABLET(S): at 07:31

## 2019-02-14 RX ADMIN — Medication 1 MILLIGRAM(S): at 12:00

## 2019-02-14 RX ADMIN — Medication 166.67 MILLIGRAM(S): at 18:38

## 2019-02-14 RX ADMIN — LACOSAMIDE 100 MILLIGRAM(S): 50 TABLET ORAL at 18:46

## 2019-02-14 NOTE — PROGRESS NOTE ADULT - ASSESSMENT
53 Male w/ a PMHx of TBI (s/p PEG tube, contracture, and seizure d/o) presented as a transfer from [facility] on 12/9 for respiratory failure 2/2 ARDS likely 2/2 necrotizing pancreatitis s/p intubation. Course c/b Acinetobacter PNA s/p Avycaz and Flagyl 7 day course.  Course further complicated by acute blood loss anemia s/p colonoscopy with findings suggestive of ischemic colitis, without further bleeding and Hgb remaining stable. S/p extubation 1/3, and re-intubation 1/6 now s/p tracheostomy.

## 2019-02-14 NOTE — PROGRESS NOTE ADULT - ASSESSMENT
Impression:  1) Walled off necrosis, s/p EUS and AXIOS 1/31/19  2) Hematochezia, s/p colonoscopy on 12/28/2018 with severe segmental colitis localized to the transverse colon and ascending colon (possible ischemic colitis, possible colitis related to contiguous danay-pancreatic inflammatory process). Biopsies with granulation tissue but no infection/malignancy, Hb stable   3) Resp failure in the setting of pneumonia, requiring tracheostomy now with lung abscess and sepsis on IV  Abx, ID following     Recommendations  - Please obtain repeat CT abdomen prior to necrosectomy tomorrow   - patient is on the schedule for EGD and necrosectomy tomorrow, 12/15   - NPO after midnight tonight  - Trend CBC, CMP and fever curve, Hb stable for now  - Continue IV antibiotics as per ID recommendations  - Rest of care per primary team

## 2019-02-14 NOTE — PROGRESS NOTE ADULT - ATTENDING COMMENTS
CT chest shows improvement in lung abscesses.  Follow up official report- still pending.  Planned for necrosectomy on 2/15.  NPO after midnight.  Continue antibiotics per ID.

## 2019-02-14 NOTE — PROGRESS NOTE ADULT - SUBJECTIVE AND OBJECTIVE BOX
Chief Complaint:  Patient is a 53y old  Male who presents with a chief complaint of ARDS?, sepsis (2019 08:04)      Interval Events: No adverse events overnight.      Allergies:  Valproate Sodium (Other (Severe))      Hospital Medications:  acetaminophen    Suspension .. 650 milliGRAM(s) Oral every 6 hours PRN  cefTAZidime/avibactam IVPB 2.5 Gram(s) IV Intermittent every 8 hours  cefTAZidime/avibactam IVPB      chlorhexidine 4% Liquid 1 Application(s) Topical <User Schedule>  cyanocobalamin 1000 MICROGram(s) Oral daily  dextrose 40% Gel 15 Gram(s) Oral once PRN  dextrose 5%. 1000 milliLiter(s) IV Continuous <Continuous>  dextrose 50% Injectable 12.5 Gram(s) IV Push once  dextrose 50% Injectable 25 Gram(s) IV Push once  dextrose 50% Injectable 25 Gram(s) IV Push once  folic acid 1 milliGRAM(s) Enteral Tube daily  glucagon  Injectable 1 milliGRAM(s) IntraMuscular once PRN  heparin  Injectable 5000 Unit(s) SubCutaneous every 8 hours  insulin lispro (HumaLOG) corrective regimen sliding scale   SubCutaneous every 6 hours  lacosamide 100 milliGRAM(s) Oral two times a day  lactobacillus acidophilus 1 Tablet(s) Oral three times a day with meals  metroNIDAZOLE  IVPB      metroNIDAZOLE  IVPB 500 milliGRAM(s) IV Intermittent every 8 hours  psyllium Powder 1 Packet(s) Oral daily  QUEtiapine 25 milliGRAM(s) Oral daily  risperiDONE   Solution 1 milliGRAM(s) Oral daily  sodium chloride 0.9%. 1000 milliLiter(s) IV Continuous <Continuous>  vancomycin  IVPB 1250 milliGRAM(s) IV Intermittent every 8 hours      PMHX/PSHX:  Seizure  TBI (traumatic brain injury)  S/P percutaneous endoscopic gastrostomy (PEG) tube placement  No significant past surgical history      Family history:      ROS:     General:  No wt loss, fevers, chills, night sweats, fatigue,   Eyes:  Good vision, no reported pain  ENT:  No sore throat, pain, runny nose, dysphagia  CV:  No pain, palpitations, hypo/hypertension  Resp:  No dyspnea, cough, tachypnea, wheezing  GI:  See HPI  :  No pain, bleeding, incontinence, nocturia  Muscle:  No pain, weakness  Neuro:  No weakness, tingling, memory problems  Psych:  No fatigue, insomnia, mood problems, depression  Endocrine:  No polyuria, polydipsia, cold/heat intolerance  Heme:  No petechiae, ecchymosis, easy bruisability  Skin:  No rash, edema      PHYSICAL EXAM:     GENERAL:  NAD  HEENT:  NC/AT,  conjunctivae clear, sclera -anicteric  CHEST:  Full & symmetric excursion, no increased effort  ABDOMEN:  Soft, non-tender, non-distended  EXTREMITIES:  no cyanosis,clubbing or edema  SKIN:  No rash  NEURO:  Alert    Vital Signs:  Vital Signs Last 24 Hrs  T(C): 36.8 (2019 05:21), Max: 37.1 (2019 10:00)  T(F): 98.2 (2019 05:), Max: 98.8 (2019 10:00)  HR: 129 (2019 05:) (116 - 136)  BP: 117/66 (2019 05:21) (105/73 - 120/55)  BP(mean): --  RR: 18 (2019 05:21) (17 - 22)  SpO2: 97% (2019 05:21) (95% - 100%)  Daily     Daily Weight in k.5 (2019 05:21)    LABS:    -    127<L>  |  96<L>  |  4<L>  ----------------------------<  132<H>  4.6   |  23  |  < 0.20<L>    Ca    8.1<L>      2019 05:13                Imaging:    reviewed

## 2019-02-14 NOTE — PROGRESS NOTE ADULT - PROBLEM SELECTOR PLAN 1
- cont avycaz and flagyl and vanco per ID  - s/p EUS stent and drainage  - CT 2/2 showing walled off necrosis  - GI note appreciated  - pending repeat CT A/P  - likely for necrostomy on FRIDAY 2/15 - cont avycaz and flagyl and vanco per ID  - s/p EUS stent and drainage  - CT 2/2 showing walled off necrosis  - GI note appreciated  -  repeat CT A/P done call radiology for reading results   - for necrostomy on FRIDAY 2/15

## 2019-02-14 NOTE — PROGRESS NOTE ADULT - ATTENDING COMMENTS
Pt seen and examined with the GI fellow. I agree with the above assessment and plan.  NPO today for procedure tomorrow morning.

## 2019-02-14 NOTE — PROGRESS NOTE ADULT - SUBJECTIVE AND OBJECTIVE BOX
CHIEF COMPLAINT:    Interval Events:    REVIEW OF SYSTEMS:  Constitutional:   Eyes:  ENT:  CV:  Resp:  GI:  :  MSK:  Integumentary:  Neurological:  Psychiatric:  Endocrine:  Hematologic/Lymphatic:  Allergic/Immunologic:  [ ] All other systems negative  [ ] Unable to assess ROS because ________    OBJECTIVE:  ICU Vital Signs Last 24 Hrs  T(C): 36.8 (14 Feb 2019 05:21), Max: 37.1 (13 Feb 2019 10:00)  T(F): 98.2 (14 Feb 2019 05:21), Max: 98.8 (13 Feb 2019 10:00)  HR: 129 (14 Feb 2019 05:21) (116 - 136)  BP: 117/66 (14 Feb 2019 05:21) (105/73 - 120/55)  BP(mean): --  ABP: --  ABP(mean): --  RR: 18 (14 Feb 2019 05:21) (17 - 22)  SpO2: 97% (14 Feb 2019 05:21) (95% - 100%)        02-13 @ 07:01  -  02-14 @ 07:00  --------------------------------------------------------  IN: 2470 mL / OUT: 1650 mL / NET: 820 mL      CAPILLARY BLOOD GLUCOSE      POCT Blood Glucose.: 127 mg/dL (14 Feb 2019 05:16)      PHYSICAL EXAM:  General:   HEENT:   Lymph Nodes:  Neck:   Respiratory:   Cardiovascular:   Abdomen:   Extremities:   Skin:   Neurological:  Psychiatry:    HOSPITAL MEDICATIONS:  MEDICATIONS  (STANDING):  cefTAZidime/avibactam IVPB 2.5 Gram(s) IV Intermittent every 8 hours  cefTAZidime/avibactam IVPB      chlorhexidine 4% Liquid 1 Application(s) Topical <User Schedule>  cyanocobalamin 1000 MICROGram(s) Oral daily  dextrose 5%. 1000 milliLiter(s) (50 mL/Hr) IV Continuous <Continuous>  dextrose 50% Injectable 12.5 Gram(s) IV Push once  dextrose 50% Injectable 25 Gram(s) IV Push once  dextrose 50% Injectable 25 Gram(s) IV Push once  folic acid 1 milliGRAM(s) Enteral Tube daily  heparin  Injectable 5000 Unit(s) SubCutaneous every 8 hours  insulin lispro (HumaLOG) corrective regimen sliding scale   SubCutaneous every 6 hours  lacosamide 100 milliGRAM(s) Oral two times a day  lactobacillus acidophilus 1 Tablet(s) Oral three times a day with meals  metroNIDAZOLE  IVPB      metroNIDAZOLE  IVPB 500 milliGRAM(s) IV Intermittent every 8 hours  psyllium Powder 1 Packet(s) Oral daily  QUEtiapine 25 milliGRAM(s) Oral daily  risperiDONE   Solution 1 milliGRAM(s) Oral daily  sodium chloride 0.9%. 1000 milliLiter(s) (100 mL/Hr) IV Continuous <Continuous>  vancomycin  IVPB 1250 milliGRAM(s) IV Intermittent every 8 hours    MEDICATIONS  (PRN):  acetaminophen    Suspension .. 650 milliGRAM(s) Oral every 6 hours PRN Temp greater or equal to 38C (100.4F), Mild Pain (1 - 3), Moderate Pain (4 - 6)  dextrose 40% Gel 15 Gram(s) Oral once PRN Blood Glucose LESS THAN 70 milliGRAM(s)/deciliter  glucagon  Injectable 1 milliGRAM(s) IntraMuscular once PRN Glucose LESS THAN 70 milligrams/deciliter      LABS:                        10.1   12.16 )-----------( 525      ( 14 Feb 2019 05:13 )             32.5     02-14    127<L>  |  96<L>  |  4<L>  ----------------------------<  132<H>  4.6   |  23  |  < 0.20<L>    Ca    8.1<L>      14 Feb 2019 05:13                MICROBIOLOGY:     RADIOLOGY:  [ ] Reviewed and interpreted by me    PULMONARY FUNCTION TESTS:    EKG: CHIEF COMPLAINT: Patient is a 53y old  Male who presents with a chief complaint of ARDS?, sepsis (14 Feb 2019 08:59)      Interval Events:none    REVIEW OF SYSTEMS:  [x ] Unable to assess ROS because ___iAMS _____    OBJECTIVE:  ICU Vital Signs Last 24 Hrs  T(C): 36.8 (14 Feb 2019 05:21), Max: 37.1 (13 Feb 2019 10:00)  T(F): 98.2 (14 Feb 2019 05:21), Max: 98.8 (13 Feb 2019 10:00)  HR: 129 (14 Feb 2019 05:21) (116 - 136)  BP: 117/66 (14 Feb 2019 05:21) (105/73 - 120/55)  BP(mean): --  ABP: --  ABP(mean): --  RR: 18 (14 Feb 2019 05:21) (17 - 22)  SpO2: 97% (14 Feb 2019 05:21) (95% - 100%)        02-13 @ 07:01  -  02-14 @ 07:00  --------------------------------------------------------  IN: 2470 mL / OUT: 1650 mL / NET: 820 mL      CAPILLARY BLOOD GLUCOSE      POCT Blood Glucose.: 127 mg/dL (14 Feb 2019 05:16)      PHYSICAL EXAM:  General:   HEENT:   Lymph Nodes:  Neck:   Respiratory:   Cardiovascular:   Abdomen:   Extremities:   Skin:   Neurological:  Psychiatry:    HOSPITAL MEDICATIONS:  MEDICATIONS  (STANDING):  cefTAZidime/avibactam IVPB 2.5 Gram(s) IV Intermittent every 8 hours  cefTAZidime/avibactam IVPB      chlorhexidine 4% Liquid 1 Application(s) Topical <User Schedule>  cyanocobalamin 1000 MICROGram(s) Oral daily  dextrose 5%. 1000 milliLiter(s) (50 mL/Hr) IV Continuous <Continuous>  dextrose 50% Injectable 12.5 Gram(s) IV Push once  dextrose 50% Injectable 25 Gram(s) IV Push once  dextrose 50% Injectable 25 Gram(s) IV Push once  folic acid 1 milliGRAM(s) Enteral Tube daily  heparin  Injectable 5000 Unit(s) SubCutaneous every 8 hours  insulin lispro (HumaLOG) corrective regimen sliding scale   SubCutaneous every 6 hours  lacosamide 100 milliGRAM(s) Oral two times a day  lactobacillus acidophilus 1 Tablet(s) Oral three times a day with meals  metroNIDAZOLE  IVPB      metroNIDAZOLE  IVPB 500 milliGRAM(s) IV Intermittent every 8 hours  psyllium Powder 1 Packet(s) Oral daily  QUEtiapine 25 milliGRAM(s) Oral daily  risperiDONE   Solution 1 milliGRAM(s) Oral daily  sodium chloride 0.9%. 1000 milliLiter(s) (100 mL/Hr) IV Continuous <Continuous>  vancomycin  IVPB 1250 milliGRAM(s) IV Intermittent every 8 hours    MEDICATIONS  (PRN):  acetaminophen    Suspension .. 650 milliGRAM(s) Oral every 6 hours PRN Temp greater or equal to 38C (100.4F), Mild Pain (1 - 3), Moderate Pain (4 - 6)  dextrose 40% Gel 15 Gram(s) Oral once PRN Blood Glucose LESS THAN 70 milliGRAM(s)/deciliter  glucagon  Injectable 1 milliGRAM(s) IntraMuscular once PRN Glucose LESS THAN 70 milligrams/deciliter      LABS:                        10.1   12.16 )-----------( 525      ( 14 Feb 2019 05:13 )             32.5     02-14    127<L>  |  96<L>  |  4<L>  ----------------------------<  132<H>  4.6   |  23  |  < 0.20<L>    Ca    8.1<L>      14 Feb 2019 05:13                MICROBIOLOGY:     RADIOLOGY:  [ ] Reviewed and interpreted by me    PULMONARY FUNCTION TESTS:    EKG:

## 2019-02-14 NOTE — PROGRESS NOTE ADULT - SUBJECTIVE AND OBJECTIVE BOX
CC: F/U for abscess    Saw/spoke to patient. Patient appears slightly more lethargic today. No fevers, no chills. No diarrhea.    Allergies  Valproate Sodium (Other (Severe))    ANTIMICROBIALS:  cefTAZidime/avibactam IVPB 2.5 every 8 hours  cefTAZidime/avibactam IVPB    metroNIDAZOLE  IVPB    metroNIDAZOLE  IVPB 500 every 8 hours  vancomycin  IVPB 1250 every 8 hours    PE:    Vital Signs Last 24 Hrs  T(C): 36.4 (14 Feb 2019 10:00), Max: 36.8 (13 Feb 2019 14:13)  T(F): 97.6 (14 Feb 2019 10:00), Max: 98.3 (13 Feb 2019 14:13)  HR: 124 (14 Feb 2019 10:00) (124 - 136)  BP: 108/73 (14 Feb 2019 10:00) (105/73 - 120/55)  RR: 20 (14 Feb 2019 10:00) (17 - 22)  SpO2: 96% (14 Feb 2019 10:00) (95% - 100%)    Gen: AOx1-2, fatigued, NAD, slightly lethargic  CV: S1+S2 normal, tachycardic  Resp: Clear bilat, no resp distress, no crackles/wheezes, trach  Abd: Soft, nontender, +BS, PEG  Ext: No LE edema, no wounds    LABS:                        10.1   12.16 )-----------( 525      ( 14 Feb 2019 05:13 )             32.5     02-14    127<L>  |  96<L>  |  4<L>  ----------------------------<  132<H>  4.6   |  23  |  < 0.20<L>    Ca    8.1<L>      14 Feb 2019 05:13    MICROBIOLOGY:  Vancomycin Level, Trough: 18.9 ug/mL (02-14-19 @ 07:50)    BLOOD VENOUS  02-02-19 NGTD    BLOOD PERIPHERAL  01-30-19 NGTD    (otherwise reviewed)    RADIOLOGY:    2/2 CT:    IMPRESSION:   Partially visualized moderate bilateral pleural effusions with bilateral   lower lobar compressive atelectasis.    Interval placement of a AXIOS stent with decrease in size of previously   seen large left peripancreatic fluid collection which now measures up to   7.3 cm in length.    Interval formation of a enhancing wall around the previously seen   perigastric fluid collection, which measures up to 4.9 cm.  Small volume ascites. 2 new small fluid collections noted anteriorly in   the left upper quadrant omentum also may reflect evolving abscesses.

## 2019-02-14 NOTE — PROGRESS NOTE ADULT - ASSESSMENT
53 M (resident of [facility]) with TBI, s/p PEG tube, contracture, and seizure d/o who presented as a transfer from [facility] on 12/9 for respiratory failure 2/2 ARDS likely 2/2 necrotizing pancreatitis s/p intubation.  Bronc 12/11 with pseudomonas; Sputum cx with  acinetobacter  Sepsis with fever, leukopenia resolved, extubated but reintubated 1/6/19 for hypoxia and poor cough along with profuse secretions and sputum cx again with acinetobacter (restarted Avycaz/Flagyl 1/9--unclear treatment endpoint, s/p 4 weeks)  Necrotising Pancreatitis with multiple peripancreatitis collections better formed on CT 1/24 and a new 7 cm abscess  Afebrile  New leukocytosis with increasing lethargy?--closely monitor, unclear etiology presently  Overall, lung abscess, MDR organisms, necrotizing pancreatitis, intraabdominal collection  - Vanco 1250mg q 8 (monitor trough)  - Avycaz/Flagyl  - Frequent suctioning and pulmonary toileting  - Monitor for any further signs infection; close monitoring for neurological symptoms on prolonged flagyl  - Necrosectomy per GI  - For lung abscess, would plan to repeat CT chest to check on status lung abscesses to evaluate for further antibiotic duration (can wait until necrosectomy done, since abx would be continued through that procedure regardless)  - If worsening mental status/leukocytosis or new fever, plan to repeat CT scans    Neal Oleary MD  Pager [phone]  After 5pm and on weekends call [phone] 53 M (resident of [facility]) with TBI, s/p PEG tube, contracture, and seizure d/o who presented as a transfer from [facility] on 12/9 for respiratory failure 2/2 ARDS likely 2/2 necrotizing pancreatitis s/p intubation.  Bronc 12/11 with pseudomonas; Sputum cx with  acinetobacter  Sepsis with fever, leukopenia resolved, extubated but reintubated 1/6/19 for hypoxia and poor cough along with profuse secretions and sputum cx again with acinetobacter (restarted Avycaz/Flagyl 1/9--unclear treatment endpoint, s/p 4 weeks)  Necrotising Pancreatitis with multiple peripancreatitis collections better formed on CT 1/24 and a new 7 cm abscess  Afebrile  New leukocytosis with increasing lethargy?--closely monitor, unclear etiology presently  Overall, lung abscess, MDR organisms, necrotizing pancreatitis, intraabdominal collection  - Vanco 1250mg q 8 (monitor trough)  - Avycaz/Flagyl  - Frequent suctioning and pulmonary toileting  - Monitor for any further signs infection; close monitoring for neurological symptoms on prolonged flagyl  - Necrosectomy per GI  - For lung abscess, would plan to repeat CT chest to check on status lung abscesses to evaluate for further antibiotic duration (can wait until necrosectomy done, since abx would be continued through that procedure regardless)  - If worsening mental status/leukocytosis or new fever, plan to repeat CT scans  - Trend WBCs    Neal Oleary MD  Pager [phone]  After 5pm and on weekends call [phone]

## 2019-02-15 LAB
ANION GAP SERPL CALC-SCNC: 11 MMO/L — SIGNIFICANT CHANGE UP (ref 7–14)
APTT BLD: 32.3 SEC — SIGNIFICANT CHANGE UP (ref 27.5–36.3)
BLD GP AB SCN SERPL QL: NEGATIVE — SIGNIFICANT CHANGE UP
BUN SERPL-MCNC: 4 MG/DL — LOW (ref 7–23)
CALCIUM SERPL-MCNC: 8.2 MG/DL — LOW (ref 8.4–10.5)
CHLORIDE SERPL-SCNC: 95 MMOL/L — LOW (ref 98–107)
CO2 SERPL-SCNC: 23 MMOL/L — SIGNIFICANT CHANGE UP (ref 22–31)
CREAT SERPL-MCNC: < 0.2 MG/DL — LOW (ref 0.5–1.3)
GLUCOSE BLDC GLUCOMTR-MCNC: 106 MG/DL — HIGH (ref 70–99)
GLUCOSE BLDC GLUCOMTR-MCNC: 109 MG/DL — HIGH (ref 70–99)
GLUCOSE BLDC GLUCOMTR-MCNC: 110 MG/DL — HIGH (ref 70–99)
GLUCOSE BLDC GLUCOMTR-MCNC: 120 MG/DL — HIGH (ref 70–99)
GLUCOSE BLDC GLUCOMTR-MCNC: 129 MG/DL — HIGH (ref 70–99)
GLUCOSE SERPL-MCNC: 108 MG/DL — HIGH (ref 70–99)
HCT VFR BLD CALC: 30.5 % — LOW (ref 39–50)
HCT VFR BLD CALC: 33 % — LOW (ref 39–50)
HGB BLD-MCNC: 9.4 G/DL — LOW (ref 13–17)
HGB BLD-MCNC: 9.8 G/DL — LOW (ref 13–17)
INR BLD: 1.51 — HIGH (ref 0.88–1.17)
MAGNESIUM SERPL-MCNC: 1.6 MG/DL — SIGNIFICANT CHANGE UP (ref 1.6–2.6)
MCHC RBC-ENTMCNC: 29.7 % — LOW (ref 32–36)
MCHC RBC-ENTMCNC: 30.5 PG — SIGNIFICANT CHANGE UP (ref 27–34)
MCHC RBC-ENTMCNC: 30.7 PG — SIGNIFICANT CHANGE UP (ref 27–34)
MCHC RBC-ENTMCNC: 30.8 % — LOW (ref 32–36)
MCV RBC AUTO: 103.4 FL — HIGH (ref 80–100)
MCV RBC AUTO: 99 FL — SIGNIFICANT CHANGE UP (ref 80–100)
NRBC # FLD: 0.03 K/UL — LOW (ref 25–125)
NRBC # FLD: 0.04 K/UL — LOW (ref 25–125)
PHOSPHATE SERPL-MCNC: 2.8 MG/DL — SIGNIFICANT CHANGE UP (ref 2.5–4.5)
PLATELET # BLD AUTO: 504 K/UL — HIGH (ref 150–400)
PLATELET # BLD AUTO: 518 K/UL — HIGH (ref 150–400)
PMV BLD: 8.8 FL — SIGNIFICANT CHANGE UP (ref 7–13)
PMV BLD: 8.8 FL — SIGNIFICANT CHANGE UP (ref 7–13)
POTASSIUM SERPL-MCNC: 4.4 MMOL/L — SIGNIFICANT CHANGE UP (ref 3.5–5.3)
POTASSIUM SERPL-SCNC: 4.4 MMOL/L — SIGNIFICANT CHANGE UP (ref 3.5–5.3)
PROTHROM AB SERPL-ACNC: 17 SEC — HIGH (ref 9.8–13.1)
RBC # BLD: 3.08 M/UL — LOW (ref 4.2–5.8)
RBC # BLD: 3.19 M/UL — LOW (ref 4.2–5.8)
RBC # FLD: 15.9 % — HIGH (ref 10.3–14.5)
RBC # FLD: 15.9 % — HIGH (ref 10.3–14.5)
RH IG SCN BLD-IMP: POSITIVE — SIGNIFICANT CHANGE UP
SODIUM SERPL-SCNC: 129 MMOL/L — LOW (ref 135–145)
VANCOMYCIN TROUGH SERPL-MCNC: 14.2 UG/ML — SIGNIFICANT CHANGE UP (ref 10–20)
WBC # BLD: 10.4 K/UL — SIGNIFICANT CHANGE UP (ref 3.8–10.5)
WBC # BLD: 12.1 K/UL — HIGH (ref 3.8–10.5)
WBC # FLD AUTO: 10.4 K/UL — SIGNIFICANT CHANGE UP (ref 3.8–10.5)
WBC # FLD AUTO: 12.1 K/UL — HIGH (ref 3.8–10.5)

## 2019-02-15 PROCEDURE — 99233 SBSQ HOSP IP/OBS HIGH 50: CPT | Mod: GC

## 2019-02-15 PROCEDURE — 43240 EGD W/TRANSMURAL DRAIN CYST: CPT | Mod: GC

## 2019-02-15 PROCEDURE — 99232 SBSQ HOSP IP/OBS MODERATE 35: CPT

## 2019-02-15 RX ORDER — LACOSAMIDE 50 MG/1
100 TABLET ORAL
Qty: 0 | Refills: 0 | Status: DISCONTINUED | OUTPATIENT
Start: 2019-02-15 | End: 2019-02-15

## 2019-02-15 RX ORDER — LACOSAMIDE 50 MG/1
100 TABLET ORAL
Qty: 0 | Refills: 0 | Status: DISCONTINUED | OUTPATIENT
Start: 2019-02-15 | End: 2019-02-21

## 2019-02-15 RX ADMIN — Medication 166.67 MILLIGRAM(S): at 15:48

## 2019-02-15 RX ADMIN — QUETIAPINE FUMARATE 25 MILLIGRAM(S): 200 TABLET, FILM COATED ORAL at 13:57

## 2019-02-15 RX ADMIN — Medication 1 PACKET(S): at 13:57

## 2019-02-15 RX ADMIN — Medication 1 MILLIGRAM(S): at 13:57

## 2019-02-15 RX ADMIN — Medication 1 TABLET(S): at 18:48

## 2019-02-15 RX ADMIN — Medication 100 MILLIGRAM(S): at 14:24

## 2019-02-15 RX ADMIN — PREGABALIN 1000 MICROGRAM(S): 225 CAPSULE ORAL at 13:57

## 2019-02-15 RX ADMIN — LACOSAMIDE 100 MILLIGRAM(S): 50 TABLET ORAL at 18:48

## 2019-02-15 RX ADMIN — Medication 166.67 MILLIGRAM(S): at 22:55

## 2019-02-15 RX ADMIN — CHLORHEXIDINE GLUCONATE 1 APPLICATION(S): 213 SOLUTION TOPICAL at 11:38

## 2019-02-15 RX ADMIN — RISPERIDONE 1 MILLIGRAM(S): 4 TABLET ORAL at 13:58

## 2019-02-15 RX ADMIN — LACOSAMIDE 100 MILLIGRAM(S): 50 TABLET ORAL at 05:21

## 2019-02-15 RX ADMIN — Medication 100 MILLIGRAM(S): at 02:15

## 2019-02-15 RX ADMIN — Medication 166.67 MILLIGRAM(S): at 02:15

## 2019-02-15 RX ADMIN — Medication 1 TABLET(S): at 13:57

## 2019-02-15 NOTE — PROGRESS NOTE ADULT - PROBLEM SELECTOR PLAN 1
- cont avycaz and flagyl and vanco per ID  - s/p EUS stent and drainage  - CT 2/2 showing walled off necrosis  - GI note appreciated  -  repeat CT A/P done call radiology for reading results   - for necrostomy on FRIDAY 2/15 - cont avycaz and flagyl and vanco per ID  - s/p EUS stent and drainage  - CT 2/2 showing walled off necrosis  - GI note appreciated  -  repeat CT A/P done call radiology for reading results   -  necrostomy done  on Friday 2/15- pt will need fu CT in 7-10 for eval and if ok EGD to remove stenting - cont avycaz and flagyl and vanco per ID    - s/p EUS stent and drainage  - CT 2/2 showing walled off necrosis  - GI note appreciated  -  repeat CT A/P done call radiology for reading results   -  necrostomy done  on Friday 2/15- pt will need fu CT in 7-10 for eval and if ok EGD to remove stenting

## 2019-02-15 NOTE — PROGRESS NOTE ADULT - SUBJECTIVE AND OBJECTIVE BOX
CC: F/U abscess    Saw/spoke to patient. No fevers, no chills. S/p EGD/necrosectomy. Appears slightly anxious but is post procedural.    Allergies  Valproate Sodium (Other (Severe))    ANTIMICROBIALS:  cefTAZidime/avibactam IVPB 2.5 every 8 hours  cefTAZidime/avibactam IVPB    metroNIDAZOLE  IVPB    metroNIDAZOLE  IVPB 500 every 8 hours  vancomycin  IVPB 1250 every 8 hours    PE:    Vital Signs Last 24 Hrs  T(C): 36.6 (15 Feb 2019 07:37), Max: 37.6 (14 Feb 2019 18:19)  T(F): 97.8 (15 Feb 2019 07:37), Max: 99.6 (14 Feb 2019 18:19)  HR: 125 (15 Feb 2019 11:32) (125 - 135)  BP: 94/62 (15 Feb 2019 11:32) (94/62 - 121/70)  RR: 18 (15 Feb 2019 11:32) (18 - 20)  SpO2: 95% (15 Feb 2019 11:32) (92% - 100%)    Gen: AOx3, NAD, non-toxic, pleasant  CV: S1+S2 normal, tachycardic  Resp: Clear bilat, no resp distress, no crackles/wheezes, trach  Abd: Soft, nontender, +BS, peg  Ext: No LE edema, no wounds    LABS:                        9.4    12.10 )-----------( 518      ( 15 Feb 2019 05:20 )             30.5     02-15    129<L>  |  95<L>  |  4<L>  ----------------------------<  108<H>  4.4   |  23  |  < 0.20<L>    Ca    8.2<L>      15 Feb 2019 05:20  Phos  2.8     02-15  Mg     1.6     02-15    MICROBIOLOGY:    BLOOD VENOUS  02-02-19 NGTD    BLOOD PERIPHERAL  01-30-19 NGTD    BLOOD PERIPHERAL  01-29-19 NGTD    ENDOTRACHEAL SPECIMEN  01-22-19 --  --  Pseudomonas aeruginosa    (otherwise reviewed)    RADIOLOGY:    2/12 CT:    IMPRESSION: Chest: Moderate bilateral pleural effusions and bilateral   airspace opacities compatible with atelectasis and probable left lung   pneumonia.    Abdomen/pelvis: Numerous peripancreatic fluid collections with minimal   change, detailed above.

## 2019-02-15 NOTE — PROGRESS NOTE ADULT - ASSESSMENT
53 Male w/ a PMHx of TBI (s/p PEG tube, contracture, and seizure d/o) presented as a transfer from [facility] on 12/9 for respiratory failure 2/2 ARDS likely 2/2 necrotizing pancreatitis s/p intubation. Course c/b Acinetobacter PNA s/p Avycaz and Flagyl 7 day course.  Course further complicated by acute blood loss anemia s/p colonoscopy with findings suggestive of ischemic colitis, without further bleeding and Hgb remaining stable. S/p extubation 1/3, and re-intubation 1/6 now s/p tracheostomy.

## 2019-02-15 NOTE — PROGRESS NOTE ADULT - SUBJECTIVE AND OBJECTIVE BOX
CHIEF COMPLAINT:    Interval Events:    REVIEW OF SYSTEMS:  Constitutional:   Eyes:  ENT:  CV:  Resp:  GI:  :  MSK:  Integumentary:  Neurological:  Psychiatric:  Endocrine:  Hematologic/Lymphatic:  Allergic/Immunologic:  [ ] All other systems negative  [ ] Unable to assess ROS because ________    OBJECTIVE:  ICU Vital Signs Last 24 Hrs  T(C): 36.6 (15 Feb 2019 07:37), Max: 37.6 (14 Feb 2019 18:19)  T(F): 97.8 (15 Feb 2019 07:37), Max: 99.6 (14 Feb 2019 18:19)  HR: 135 (15 Feb 2019 07:37) (124 - 135)  BP: 109/70 (15 Feb 2019 07:37) (102/66 - 121/70)  BP(mean): --  ABP: --  ABP(mean): --  RR: 20 (15 Feb 2019 07:37) (20 - 20)  SpO2: 92% (15 Feb 2019 07:37) (92% - 100%)        02-14 @ 07:01  -  02-15 @ 07:00  --------------------------------------------------------  IN: 1360 mL / OUT: 1300 mL / NET: 60 mL      CAPILLARY BLOOD GLUCOSE      POCT Blood Glucose.: 106 mg/dL (15 Feb 2019 05:18)      PHYSICAL EXAM:  General:   HEENT:   Lymph Nodes:  Neck:   Respiratory:   Cardiovascular:   Abdomen:   Extremities:   Skin:   Neurological:  Psychiatry:    HOSPITAL MEDICATIONS:  MEDICATIONS  (STANDING):  cefTAZidime/avibactam IVPB 2.5 Gram(s) IV Intermittent every 8 hours  cefTAZidime/avibactam IVPB      chlorhexidine 4% Liquid 1 Application(s) Topical <User Schedule>  cyanocobalamin 1000 MICROGram(s) Oral daily  dextrose 5%. 1000 milliLiter(s) (50 mL/Hr) IV Continuous <Continuous>  dextrose 50% Injectable 12.5 Gram(s) IV Push once  dextrose 50% Injectable 25 Gram(s) IV Push once  dextrose 50% Injectable 25 Gram(s) IV Push once  folic acid 1 milliGRAM(s) Enteral Tube daily  insulin lispro (HumaLOG) corrective regimen sliding scale   SubCutaneous every 6 hours  lacosamide 100 milliGRAM(s) Oral two times a day  lactobacillus acidophilus 1 Tablet(s) Oral three times a day with meals  metroNIDAZOLE  IVPB      metroNIDAZOLE  IVPB 500 milliGRAM(s) IV Intermittent every 8 hours  psyllium Powder 1 Packet(s) Oral daily  QUEtiapine 25 milliGRAM(s) Oral daily  risperiDONE   Solution 1 milliGRAM(s) Oral daily  sodium chloride 0.9%. 1000 milliLiter(s) (100 mL/Hr) IV Continuous <Continuous>  vancomycin  IVPB 1250 milliGRAM(s) IV Intermittent every 8 hours    MEDICATIONS  (PRN):  acetaminophen    Suspension .. 650 milliGRAM(s) Oral every 6 hours PRN Temp greater or equal to 38C (100.4F), Mild Pain (1 - 3), Moderate Pain (4 - 6)  dextrose 40% Gel 15 Gram(s) Oral once PRN Blood Glucose LESS THAN 70 milliGRAM(s)/deciliter  glucagon  Injectable 1 milliGRAM(s) IntraMuscular once PRN Glucose LESS THAN 70 milligrams/deciliter      LABS:                        9.4    12.10 )-----------( 518      ( 15 Feb 2019 05:20 )             30.5     02-15    129<L>  |  95<L>  |  4<L>  ----------------------------<  108<H>  4.4   |  23  |  < 0.20<L>    Ca    8.2<L>      15 Feb 2019 05:20  Phos  2.8     02-15  Mg     1.6     02-15      PT/INR - ( 15 Feb 2019 05:20 )   PT: 17.0 SEC;   INR: 1.51          PTT - ( 15 Feb 2019 05:20 )  PTT:32.3 SEC          MICROBIOLOGY:     RADIOLOGY:  [ ] Reviewed and interpreted by me    PULMONARY FUNCTION TESTS:    EKG: CHIEF COMPLAINT: Patient is a 53y old  Male who presents with a chief complaint of ARDS?, sepsis (15 Feb 2019 07:54)      Interval Events: EGD/ necrostomy done     REVIEW OF SYSTEMS:  Constitutional: No fever or chills   Eyes:  ENT:  CV: Denies   Resp: Denies   GI: Denies       OBJECTIVE:  ICU Vital Signs Last 24 Hrs  T(C): 36.6 (15 Feb 2019 07:37), Max: 37.6 (14 Feb 2019 18:19)  T(F): 97.8 (15 Feb 2019 07:37), Max: 99.6 (14 Feb 2019 18:19)  HR: 135 (15 Feb 2019 07:37) (124 - 135)  BP: 109/70 (15 Feb 2019 07:37) (102/66 - 121/70)  BP(mean): --  ABP: --  ABP(mean): --  RR: 20 (15 Feb 2019 07:37) (20 - 20)  SpO2: 92% (15 Feb 2019 07:37) (92% - 100%)        02-14 @ 07:01  -  02-15 @ 07:00  --------------------------------------------------------  IN: 1360 mL / OUT: 1300 mL / NET: 60 mL      CAPILLARY BLOOD GLUCOSE      POCT Blood Glucose.: 106 mg/dL (15 Feb 2019 05:18)      PHYSICAL EXAM:  General:   HEENT:   Lymph Nodes:  Neck:   Respiratory:   Cardiovascular:   Abdomen:   Extremities:   Skin:   Neurological:  Psychiatry:    HOSPITAL MEDICATIONS:  MEDICATIONS  (STANDING):  cefTAZidime/avibactam IVPB 2.5 Gram(s) IV Intermittent every 8 hours  cefTAZidime/avibactam IVPB      chlorhexidine 4% Liquid 1 Application(s) Topical <User Schedule>  cyanocobalamin 1000 MICROGram(s) Oral daily  dextrose 5%. 1000 milliLiter(s) (50 mL/Hr) IV Continuous <Continuous>  dextrose 50% Injectable 12.5 Gram(s) IV Push once  dextrose 50% Injectable 25 Gram(s) IV Push once  dextrose 50% Injectable 25 Gram(s) IV Push once  folic acid 1 milliGRAM(s) Enteral Tube daily  insulin lispro (HumaLOG) corrective regimen sliding scale   SubCutaneous every 6 hours  lacosamide 100 milliGRAM(s) Oral two times a day  lactobacillus acidophilus 1 Tablet(s) Oral three times a day with meals  metroNIDAZOLE  IVPB      metroNIDAZOLE  IVPB 500 milliGRAM(s) IV Intermittent every 8 hours  psyllium Powder 1 Packet(s) Oral daily  QUEtiapine 25 milliGRAM(s) Oral daily  risperiDONE   Solution 1 milliGRAM(s) Oral daily  sodium chloride 0.9%. 1000 milliLiter(s) (100 mL/Hr) IV Continuous <Continuous>  vancomycin  IVPB 1250 milliGRAM(s) IV Intermittent every 8 hours    MEDICATIONS  (PRN):  acetaminophen    Suspension .. 650 milliGRAM(s) Oral every 6 hours PRN Temp greater or equal to 38C (100.4F), Mild Pain (1 - 3), Moderate Pain (4 - 6)  dextrose 40% Gel 15 Gram(s) Oral once PRN Blood Glucose LESS THAN 70 milliGRAM(s)/deciliter  glucagon  Injectable 1 milliGRAM(s) IntraMuscular once PRN Glucose LESS THAN 70 milligrams/deciliter      LABS:                        9.4    12.10 )-----------( 518      ( 15 Feb 2019 05:20 )             30.5     02-15    129<L>  |  95<L>  |  4<L>  ----------------------------<  108<H>  4.4   |  23  |  < 0.20<L>    Ca    8.2<L>      15 Feb 2019 05:20  Phos  2.8     02-15  Mg     1.6     02-15      PT/INR - ( 15 Feb 2019 05:20 )   PT: 17.0 SEC;   INR: 1.51          PTT - ( 15 Feb 2019 05:20 )  PTT:32.3 SEC          MICROBIOLOGY:     RADIOLOGY:  [ ] Reviewed and interpreted by me    PULMONARY FUNCTION TESTS:    EKG: CHIEF COMPLAINT: Patient is a 53y old  Male who presents with a chief complaint of ARDS?, sepsis (15 Feb 2019 07:54)      Interval Events: EGD/ necrostomy done     REVIEW OF SYSTEMS:  Constitutional: No fever or chills    CV: Denies   Resp: Denies   GI: Denies       OBJECTIVE:  ICU Vital Signs Last 24 Hrs  T(C): 36.6 (15 Feb 2019 07:37), Max: 37.6 (14 Feb 2019 18:19)  T(F): 97.8 (15 Feb 2019 07:37), Max: 99.6 (14 Feb 2019 18:19)  HR: 135 (15 Feb 2019 07:37) (124 - 135)  BP: 109/70 (15 Feb 2019 07:37) (102/66 - 121/70)  BP(mean): --  ABP: --  ABP(mean): --  RR: 20 (15 Feb 2019 07:37) (20 - 20)  SpO2: 92% (15 Feb 2019 07:37) (92% - 100%)        02-14 @ 07:01  -  02-15 @ 07:00  --------------------------------------------------------  IN: 1360 mL / OUT: 1300 mL / NET: 60 mL      CAPILLARY BLOOD GLUCOSE      POCT Blood Glucose.: 106 mg/dL (15 Feb 2019 05:18)      PHYSICAL EXAM:  General:   HEENT:   Lymph Nodes:  Neck:   Respiratory:   Cardiovascular:   Abdomen:   Extremities:   Skin:   Neurological:  Psychiatry:    HOSPITAL MEDICATIONS:  MEDICATIONS  (STANDING):  cefTAZidime/avibactam IVPB 2.5 Gram(s) IV Intermittent every 8 hours  cefTAZidime/avibactam IVPB      chlorhexidine 4% Liquid 1 Application(s) Topical <User Schedule>  cyanocobalamin 1000 MICROGram(s) Oral daily  dextrose 5%. 1000 milliLiter(s) (50 mL/Hr) IV Continuous <Continuous>  dextrose 50% Injectable 12.5 Gram(s) IV Push once  dextrose 50% Injectable 25 Gram(s) IV Push once  dextrose 50% Injectable 25 Gram(s) IV Push once  folic acid 1 milliGRAM(s) Enteral Tube daily  insulin lispro (HumaLOG) corrective regimen sliding scale   SubCutaneous every 6 hours  lacosamide 100 milliGRAM(s) Oral two times a day  lactobacillus acidophilus 1 Tablet(s) Oral three times a day with meals  metroNIDAZOLE  IVPB      metroNIDAZOLE  IVPB 500 milliGRAM(s) IV Intermittent every 8 hours  psyllium Powder 1 Packet(s) Oral daily  QUEtiapine 25 milliGRAM(s) Oral daily  risperiDONE   Solution 1 milliGRAM(s) Oral daily  sodium chloride 0.9%. 1000 milliLiter(s) (100 mL/Hr) IV Continuous <Continuous>  vancomycin  IVPB 1250 milliGRAM(s) IV Intermittent every 8 hours    MEDICATIONS  (PRN):  acetaminophen    Suspension .. 650 milliGRAM(s) Oral every 6 hours PRN Temp greater or equal to 38C (100.4F), Mild Pain (1 - 3), Moderate Pain (4 - 6)  dextrose 40% Gel 15 Gram(s) Oral once PRN Blood Glucose LESS THAN 70 milliGRAM(s)/deciliter  glucagon  Injectable 1 milliGRAM(s) IntraMuscular once PRN Glucose LESS THAN 70 milligrams/deciliter      LABS:                        9.4    12.10 )-----------( 518      ( 15 Feb 2019 05:20 )             30.5     02-15    129<L>  |  95<L>  |  4<L>  ----------------------------<  108<H>  4.4   |  23  |  < 0.20<L>    Ca    8.2<L>      15 Feb 2019 05:20  Phos  2.8     02-15  Mg     1.6     02-15      PT/INR - ( 15 Feb 2019 05:20 )   PT: 17.0 SEC;   INR: 1.51          PTT - ( 15 Feb 2019 05:20 )  PTT:32.3 SEC          MICROBIOLOGY:     RADIOLOGY:  [ ] Reviewed and interpreted by me    PULMONARY FUNCTION TESTS:    EKG:

## 2019-02-15 NOTE — PROGRESS NOTE ADULT - PROBLEM SELECTOR PLAN 3
- fevers resolved, afebrile now  - ID following   - CT showing new lung abscess  - repeat CT on 2/12 now improved  - cont abx for now - fevers resolved, afebrile now  - ID following   - CT showing new lung abscess  - repeat CT on 2/12 now improved  - cont abx for now   - Per ID will need FU CT of chest to eval for length of abx

## 2019-02-15 NOTE — PROGRESS NOTE ADULT - ASSESSMENT
53 M (resident of [facility]) with TBI, s/p PEG tube, contracture, and seizure d/o who presented as a transfer from [facility] on 12/9 for respiratory failure 2/2 ARDS likely 2/2 necrotizing pancreatitis s/p intubation.  Bronc 12/11 with pseudomonas; Sputum cx with  acinetobacter  Sepsis with fever, leukopenia resolved, extubated but reintubated 1/6/19 for hypoxia and poor cough along with profuse secretions and sputum cx again with acinetobacter (restarted Avycaz/Flagyl 1/9--unclear treatment endpoint, s/p 4 weeks)  Necrotising Pancreatitis with multiple peripancreatitis collections better formed on CT 1/24 and a new 7 cm abscess  Afebrile  Slight leukocytosis, mental status appears back to baseline today  Overall, lung abscess, MDR organisms, necrotizing pancreatitis, intraabdominal collection  - Vanco 1250mg q 8 (monitor trough)  - Avycaz/Flagyl  - Frequent suctioning and pulmonary toileting  - Monitor for any further signs infection; close monitoring for neurological symptoms on prolonged flagyl  - For lung abscess, would plan to repeat CT chest to check on status lung abscesses to evaluate for further antibiotic duration  - Appreciate GI procedure    Neal Oleary MD  Pager [phone]  After 5pm and on weekends call [phone]

## 2019-02-16 LAB
ANION GAP SERPL CALC-SCNC: 10 MMO/L — SIGNIFICANT CHANGE UP (ref 7–14)
BUN SERPL-MCNC: 4 MG/DL — LOW (ref 7–23)
CALCIUM SERPL-MCNC: 8.1 MG/DL — LOW (ref 8.4–10.5)
CHLORIDE SERPL-SCNC: 96 MMOL/L — LOW (ref 98–107)
CO2 SERPL-SCNC: 25 MMOL/L — SIGNIFICANT CHANGE UP (ref 22–31)
CREAT SERPL-MCNC: 0.23 MG/DL — LOW (ref 0.5–1.3)
GLUCOSE BLDC GLUCOMTR-MCNC: 147 MG/DL — HIGH (ref 70–99)
GLUCOSE BLDC GLUCOMTR-MCNC: 150 MG/DL — HIGH (ref 70–99)
GLUCOSE BLDC GLUCOMTR-MCNC: 151 MG/DL — HIGH (ref 70–99)
GLUCOSE BLDC GLUCOMTR-MCNC: 174 MG/DL — HIGH (ref 70–99)
GLUCOSE SERPL-MCNC: 162 MG/DL — HIGH (ref 70–99)
HCT VFR BLD CALC: 30.3 % — LOW (ref 39–50)
HGB BLD-MCNC: 9.2 G/DL — LOW (ref 13–17)
MAGNESIUM SERPL-MCNC: 1.9 MG/DL — SIGNIFICANT CHANGE UP (ref 1.6–2.6)
MCHC RBC-ENTMCNC: 30.4 % — LOW (ref 32–36)
MCHC RBC-ENTMCNC: 30.8 PG — SIGNIFICANT CHANGE UP (ref 27–34)
MCV RBC AUTO: 101.3 FL — HIGH (ref 80–100)
NRBC # FLD: 0.03 K/UL — LOW (ref 25–125)
PHOSPHATE SERPL-MCNC: 3.2 MG/DL — SIGNIFICANT CHANGE UP (ref 2.5–4.5)
PLATELET # BLD AUTO: 503 K/UL — HIGH (ref 150–400)
PMV BLD: 8.9 FL — SIGNIFICANT CHANGE UP (ref 7–13)
POTASSIUM SERPL-MCNC: 4 MMOL/L — SIGNIFICANT CHANGE UP (ref 3.5–5.3)
POTASSIUM SERPL-SCNC: 4 MMOL/L — SIGNIFICANT CHANGE UP (ref 3.5–5.3)
RBC # BLD: 2.99 M/UL — LOW (ref 4.2–5.8)
RBC # FLD: 15.9 % — HIGH (ref 10.3–14.5)
SODIUM SERPL-SCNC: 131 MMOL/L — LOW (ref 135–145)
WBC # BLD: 11.07 K/UL — HIGH (ref 3.8–10.5)
WBC # FLD AUTO: 11.07 K/UL — HIGH (ref 3.8–10.5)

## 2019-02-16 PROCEDURE — 99233 SBSQ HOSP IP/OBS HIGH 50: CPT

## 2019-02-16 RX ORDER — HEPARIN SODIUM 5000 [USP'U]/ML
5000 INJECTION INTRAVENOUS; SUBCUTANEOUS EVERY 8 HOURS
Qty: 0 | Refills: 0 | Status: DISCONTINUED | OUTPATIENT
Start: 2019-02-16 | End: 2019-03-07

## 2019-02-16 RX ADMIN — Medication 1 PACKET(S): at 13:20

## 2019-02-16 RX ADMIN — CHLORHEXIDINE GLUCONATE 1 APPLICATION(S): 213 SOLUTION TOPICAL at 13:20

## 2019-02-16 RX ADMIN — PREGABALIN 1000 MICROGRAM(S): 225 CAPSULE ORAL at 13:20

## 2019-02-16 RX ADMIN — Medication 1 TABLET(S): at 13:20

## 2019-02-16 RX ADMIN — Medication 166.67 MILLIGRAM(S): at 14:59

## 2019-02-16 RX ADMIN — Medication 100 MILLIGRAM(S): at 00:11

## 2019-02-16 RX ADMIN — Medication 166.67 MILLIGRAM(S): at 06:18

## 2019-02-16 RX ADMIN — Medication 100 MILLIGRAM(S): at 15:00

## 2019-02-16 RX ADMIN — Medication 2: at 06:25

## 2019-02-16 RX ADMIN — Medication 2: at 23:23

## 2019-02-16 RX ADMIN — RISPERIDONE 1 MILLIGRAM(S): 4 TABLET ORAL at 15:00

## 2019-02-16 RX ADMIN — Medication 100 MILLIGRAM(S): at 08:13

## 2019-02-16 RX ADMIN — Medication 166.67 MILLIGRAM(S): at 22:15

## 2019-02-16 RX ADMIN — LACOSAMIDE 100 MILLIGRAM(S): 50 TABLET ORAL at 07:13

## 2019-02-16 RX ADMIN — Medication 1 TABLET(S): at 18:15

## 2019-02-16 RX ADMIN — Medication 100 MILLIGRAM(S): at 22:14

## 2019-02-16 RX ADMIN — HEPARIN SODIUM 5000 UNIT(S): 5000 INJECTION INTRAVENOUS; SUBCUTANEOUS at 22:09

## 2019-02-16 RX ADMIN — HEPARIN SODIUM 5000 UNIT(S): 5000 INJECTION INTRAVENOUS; SUBCUTANEOUS at 13:50

## 2019-02-16 RX ADMIN — Medication 1 MILLIGRAM(S): at 13:20

## 2019-02-16 RX ADMIN — LACOSAMIDE 100 MILLIGRAM(S): 50 TABLET ORAL at 18:15

## 2019-02-16 RX ADMIN — QUETIAPINE FUMARATE 25 MILLIGRAM(S): 200 TABLET, FILM COATED ORAL at 13:20

## 2019-02-16 RX ADMIN — Medication 1 TABLET(S): at 08:13

## 2019-02-16 NOTE — PROGRESS NOTE ADULT - PROBLEM SELECTOR PLAN 1
- cont avycaz and flagyl and vanco per ID    - s/p EUS stent and drainage  - CT 2/2 showing walled off necrosis  - GI note appreciated  -  repeat CT A/P done call radiology for reading results   -  necrostomy done  on Friday 2/15- pt will need fu CT in 7-10 for eval and if ok EGD to remove stenting - cont avycaz and flagyl and vanco per ID  - s/p EUS stent and drainage  - CT 2/2 showing walled off necrosis  - GI note appreciated  -  necrostomy done  on Friday 2/15- pt will need fu CT in 7-10 for eval and if ok  - EGD to remove stenting

## 2019-02-16 NOTE — PROGRESS NOTE ADULT - PROBLEM SELECTOR PLAN 3
- fevers resolved, afebrile now  - ID following   - CT showing new lung abscess  - repeat CT on 2/12 now improved  - cont abx for now   - Per ID will need FU CT of chest to eval for length of abx

## 2019-02-16 NOTE — PROGRESS NOTE ADULT - ATTENDING COMMENTS
Agree with above. Pt is s/p endoscopic necrosectomy yesterday. Continue antibiotics. Repeat CT A/P next week to re-assess fluid collection.

## 2019-02-16 NOTE — PROGRESS NOTE ADULT - ATTENDING COMMENTS
Patient examined and case reviewed in detail on bedside rounds Patient examined and case reviewed in detail on bedside rounds.  had severe pancreatits, now still on abx, abscess  trach care as above  TBI stable and patient is communicative  Seizures are stable on AEDs  continue tube feeds  guarded but stabilizing

## 2019-02-16 NOTE — PROGRESS NOTE ADULT - ASSESSMENT
53 Male w/ a PMHx of TBI (s/p PEG tube, contracture, and seizure d/o) presented as a transfer from [facility] on 12/9 for respiratory failure 2/2 ARDS likely 2/2 necrotizing pancreatitis s/p intubation. Course c/b Acinetobacter PNA s/p Avycaz and Flagyl 7 day course.  Course further complicated by acute blood loss anemia s/p colonoscopy with findings suggestive of ischemic colitis, without further bleeding and Hgb remaining stable. S/p extubation 1/3, and re-intubation 1/6 now s/p tracheostomy.

## 2019-02-16 NOTE — CHART NOTE - NSCHARTNOTEFT_GEN_A_CORE
Called by RN for questionable seizure activity. Pt had been foaming via the trach and desaturated to 91% and for a few seconds was  unresponsive. Upon examination, pt was awake and alert. Answering questions. Able to follow commands. Pt appears to be in no apparent distress.     -will continue to monitor  -continue with vimpat

## 2019-02-16 NOTE — PROGRESS NOTE ADULT - ASSESSMENT
Impression:  1) Walled off necrosis, s/p EUS and AXIOS 1/31/19. S/P endoscopic necrosectomy on 2/15/19.  2) Hematochezia, s/p colonoscopy on 12/28/2018 with severe segmental colitis localized to the transverse colon and ascending colon (possible ischemic colitis, possible colitis related to contiguous danay-pancreatic inflammatory process). Biopsies with granulation tissue but no infection/malignancy, Hb stable   3) Resp failure in the setting of pneumonia, requiring tracheostomy now with lung abscess and sepsis on IV  Abx, ID following     Recommendations:  - Monitor CBC, CMP daily  - Repeat CT A/P in 7 to 10 days  - Continue IV antibiotics per ID recommendations  - Supportive care per primary team    All Gupta MD  Gastroenterology Fellow  Pager number: [phone] / [phone]

## 2019-02-16 NOTE — PROGRESS NOTE ADULT - SUBJECTIVE AND OBJECTIVE BOX
Chief Complaint: Sepsis    Interval Events:   - The patient underwent upper endoscopy with endoscopic necrosectomy on 2/15/19.  - There has been of report of fever, bloody emesis, or coffee ground emesis.    Allergies:  Valproate Sodium (Other (Severe))    Hospital Medications:  acetaminophen    Suspension .. 650 milliGRAM(s) Oral every 6 hours PRN  cefTAZidime/avibactam IVPB 2.5 Gram(s) IV Intermittent every 8 hours  cefTAZidime/avibactam IVPB      chlorhexidine 4% Liquid 1 Application(s) Topical <User Schedule>  cyanocobalamin 1000 MICROGram(s) Oral daily  dextrose 40% Gel 15 Gram(s) Oral once PRN  dextrose 5%. 1000 milliLiter(s) IV Continuous <Continuous>  dextrose 50% Injectable 12.5 Gram(s) IV Push once  dextrose 50% Injectable 25 Gram(s) IV Push once  dextrose 50% Injectable 25 Gram(s) IV Push once  folic acid 1 milliGRAM(s) Enteral Tube daily  glucagon  Injectable 1 milliGRAM(s) IntraMuscular once PRN  heparin  Injectable 5000 Unit(s) SubCutaneous every 8 hours  insulin lispro (HumaLOG) corrective regimen sliding scale   SubCutaneous every 6 hours  lacosamide 100 milliGRAM(s) Oral two times a day  lactobacillus acidophilus 1 Tablet(s) Oral three times a day with meals  metroNIDAZOLE  IVPB      metroNIDAZOLE  IVPB 500 milliGRAM(s) IV Intermittent every 8 hours  psyllium Powder 1 Packet(s) Oral daily  QUEtiapine 25 milliGRAM(s) Oral daily  risperiDONE   Solution 1 milliGRAM(s) Oral daily  sodium chloride 0.9%. 1000 milliLiter(s) IV Continuous <Continuous>  vancomycin  IVPB 1250 milliGRAM(s) IV Intermittent every 8 hours    PMHX/PSHX:  Seizure  TBI (traumatic brain injury)  S/P percutaneous endoscopic gastrostomy (PEG) tube placement    ROS: Unable to obtain given baseline AMS    PHYSICAL EXAM:   Vital Signs:  Vital Signs Last 24 Hrs  T(C): 36.8 (2019 11:02), Max: 37.6 (15 Feb 2019 23:04)  T(F): 98.3 (2019 11:02), Max: 99.7 (15 Feb 2019 23:04)  HR: 127 (2019 11:02) (125 - 135)  BP: 112/70 (2019 11:02) (94/56 - 115/67)  BP(mean): --  RR: 20 (2019 11:02) (18 - 20)  SpO2: 94% (2019 11:02) (94% - 96%)  Daily Weight in k.8 (2019 03:39)    GENERAL:  No acute distress  HEENT:  Normocephalic/atraumatic,  no scleral icterus, tracheostomy in place  CHEST: Normal effort, no accessory muscle use  HEART:  Regular rate and rhythm, no murmurs/rubs/gallops  ABDOMEN:  Soft, non-tender, non-distended, normoactive bowel sounds, PEG tube in place with site c/d/i  EXTREMITIES:  No cyanosis, clubbing, or edema  SKIN:  No rash/erythema  NEURO: Non-verbal, responds to painful stimuli    LABS:                        9.2    11.07 )-----------( 503      ( 2019 06:38 )             30.3     Mean Cell Volume: 101.3 fL (19 @ 06:38)    02-16    131<L>  |  96<L>  |  4<L>  ----------------------------<  162<H>  4.0   |  25  |  0.23<L>    Ca    8.1<L>      2019 06:05  Phos  3.2     02-16  Mg     1.9     02-16    PT/INR - ( 15 Feb 2019 05:20 )   PT: 17.0 SEC;   INR: 1.51        PTT - ( 15 Feb 2019 05:20 )  PTT:32.3 SEC                9.2    11.07 )-----------( 503      ( 2019 06:38 )             30.3                         9.8    10.40 )-----------( 504      ( 15 Feb 2019 14:22 )             33.0                         9.4    12.10 )-----------( 518      ( 15 Feb 2019 05:20 )             30.5                         10.1   12.16 )-----------( 525      ( 2019 05:13 )             32.5     Imaging:    No new imaging    Procedures:    < from: Upper Endoscopy (02.15.19 @ 07:33) >    [facility]  _______________________________________________________________________________  Patient Name: Brien Stewart       Procedure Date: 2/15/2019 7:33 AM  MRN: [id number]                     Account Number: [id number]  YOB: 1965              Admit Type: Inpatient  Room: ENDO                          Gender: Male  Attending MD: TROY GARCÍA MD      _______________________________________________________________________________     Procedure:           Upper GI endoscopy  Indications:         53 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 Patient scheduled for                        endoscopic necrosectomy.  Providers:           TROY GARCÍA MD, JARED WILKINS (Fellow)  Medicines:           Monitored Anesthesia Care  Complications:       No immediate complications.  Procedure:           After obtaining informed consent, the endoscope was                        passed under direct vision. Throughout the procedure,                        the patient's blood pressure, pulse, and oxygen                        saturations were monitored continuously. The Endoscope                        was introduced through the mouth, and advanced to the                        second part of duodenum. The upper GI endoscopy was                        accomplished with ease. The patient tolerated the                        procedure well.                                  Findings:       EGD:       -The esophagus was normal.       -The stomach contain the lumen-apposing metal stent. There was copious        necrotic debris coming from the stent. This was removed. The cavity was        entered and full of necrotic debris. This was removed using a snare and        EndoRotor device.       -The cavity was completely cleared.       -No bleeding observed.       -The abdomen was soft after the procedure.                                                Impression:          Successful endoscopic necrosectomy.  Recommendation:      - Return patient to [facility].                       -CT scan in 7-10 days.                       -Removal of AXIO after CT                                                                                   Attending Participation:       I personally performed the entire procedure.            ___________________  TROY GARCÍA MD  2/15/2019 9:57:21 AM  This report has been signed electronically.  Number of Addenda: 0    Note Initiated On: 2/15/2019 7:33 AM    < end of copied text > Chief Complaint: Sepsis    Interval Events:   - The patient underwent upper endoscopy with endoscopic necrosectomy on 2/15/19.  - There has been no reports of fever, bloody emesis, or coffee ground emesis.    Allergies:  Valproate Sodium (Other (Severe))    Hospital Medications:  acetaminophen    Suspension .. 650 milliGRAM(s) Oral every 6 hours PRN  cefTAZidime/avibactam IVPB 2.5 Gram(s) IV Intermittent every 8 hours  cefTAZidime/avibactam IVPB      chlorhexidine 4% Liquid 1 Application(s) Topical <User Schedule>  cyanocobalamin 1000 MICROGram(s) Oral daily  dextrose 40% Gel 15 Gram(s) Oral once PRN  dextrose 5%. 1000 milliLiter(s) IV Continuous <Continuous>  dextrose 50% Injectable 12.5 Gram(s) IV Push once  dextrose 50% Injectable 25 Gram(s) IV Push once  dextrose 50% Injectable 25 Gram(s) IV Push once  folic acid 1 milliGRAM(s) Enteral Tube daily  glucagon  Injectable 1 milliGRAM(s) IntraMuscular once PRN  heparin  Injectable 5000 Unit(s) SubCutaneous every 8 hours  insulin lispro (HumaLOG) corrective regimen sliding scale   SubCutaneous every 6 hours  lacosamide 100 milliGRAM(s) Oral two times a day  lactobacillus acidophilus 1 Tablet(s) Oral three times a day with meals  metroNIDAZOLE  IVPB      metroNIDAZOLE  IVPB 500 milliGRAM(s) IV Intermittent every 8 hours  psyllium Powder 1 Packet(s) Oral daily  QUEtiapine 25 milliGRAM(s) Oral daily  risperiDONE   Solution 1 milliGRAM(s) Oral daily  sodium chloride 0.9%. 1000 milliLiter(s) IV Continuous <Continuous>  vancomycin  IVPB 1250 milliGRAM(s) IV Intermittent every 8 hours    PMHX/PSHX:  Seizure  TBI (traumatic brain injury)  S/P percutaneous endoscopic gastrostomy (PEG) tube placement    ROS: Unable to obtain given baseline AMS    PHYSICAL EXAM:   Vital Signs:  Vital Signs Last 24 Hrs  T(C): 36.8 (2019 11:02), Max: 37.6 (15 Feb 2019 23:04)  T(F): 98.3 (2019 11:02), Max: 99.7 (15 Feb 2019 23:04)  HR: 127 (2019 11:02) (125 - 135)  BP: 112/70 (2019 11:02) (94/56 - 115/67)  BP(mean): --  RR: 20 (2019 11:02) (18 - 20)  SpO2: 94% (2019 11:02) (94% - 96%)  Daily Weight in k.8 (2019 03:39)    GENERAL:  No acute distress  HEENT:  Normocephalic/atraumatic,  no scleral icterus, tracheostomy in place  CHEST: Normal effort, no accessory muscle use  HEART:  Regular rate and rhythm, no murmurs/rubs/gallops  ABDOMEN:  Soft, non-tender, non-distended, normoactive bowel sounds, PEG tube in place with site c/d/i  EXTREMITIES:  No cyanosis, clubbing, or edema  SKIN:  No rash/erythema  NEURO: Non-verbal, responds to painful stimuli    LABS:                        9.2    11.07 )-----------( 503      ( 2019 06:38 )             30.3     Mean Cell Volume: 101.3 fL (19 @ 06:38)    02-16    131<L>  |  96<L>  |  4<L>  ----------------------------<  162<H>  4.0   |  25  |  0.23<L>    Ca    8.1<L>      2019 06:05  Phos  3.2     02-16  Mg     1.9     02-16    PT/INR - ( 15 Feb 2019 05:20 )   PT: 17.0 SEC;   INR: 1.51        PTT - ( 15 Feb 2019 05:20 )  PTT:32.3 SEC                9.2    11.07 )-----------( 503      ( 2019 06:38 )             30.3                         9.8    10.40 )-----------( 504      ( 15 Feb 2019 14:22 )             33.0                         9.4    12.10 )-----------( 518      ( 15 Feb 2019 05:20 )             30.5                         10.1   12.16 )-----------( 525      ( 2019 05:13 )             32.5     Imaging:    No new imaging    Procedures:    < from: Upper Endoscopy (02.15.19 @ 07:33) >    [facility]  _______________________________________________________________________________  Patient Name: Brien Stewart       Procedure Date: 2/15/2019 7:33 AM  MRN: [id number]                     Account Number: [id number]  YOB: 1965              Admit Type: Inpatient  Room: ENDO                          Gender: Male  Attending MD: TROY GARCÍA MD      _______________________________________________________________________________     Procedure:           Upper GI endoscopy  Indications:         53 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 Patient scheduled for                        endoscopic necrosectomy.  Providers:           TROY GARCÍA MD, JARED WILKINS (Fellow)  Medicines:           Monitored Anesthesia Care  Complications:       No immediate complications.  Procedure:           After obtaining informed consent, the endoscope was                        passed under direct vision. Throughout the procedure,                        the patient's blood pressure, pulse, and oxygen                        saturations were monitored continuously. The Endoscope                        was introduced through the mouth, and advanced to the                        second part of duodenum. The upper GI endoscopy was                        accomplished with ease. The patient tolerated the                        procedure well.                                  Findings:       EGD:       -The esophagus was normal.       -The stomach contain the lumen-apposing metal stent. There was copious        necrotic debris coming from the stent. This was removed. The cavity was        entered and full of necrotic debris. This was removed using a snare and        EndoRotor device.       -The cavity was completely cleared.       -No bleeding observed.       -The abdomen was soft after the procedure.                                                Impression:          Successful endoscopic necrosectomy.  Recommendation:      - Return patient to [facility] for [facility] care.                       -CT scan in 7-10 days.                       -Removal of AXIO after CT                                                                                   Attending Participation:       I personally performed the entire procedure.            ___________________  TROY GARCÍA MD  2/15/2019 9:57:21 AM  This report has been signed electronically.  Number of Addenda: 0    Note Initiated On: 2/15/2019 7:33 AM    < end of copied text >

## 2019-02-16 NOTE — PROGRESS NOTE ADULT - SUBJECTIVE AND OBJECTIVE BOX
CHIEF COMPLAINT:  Patient is a 53y old  Male who presents with a chief complaint of   Interval Events:    REVIEW OF SYSTEMS:  Constitutional:   Eyes:  ENT:  CV:  Resp:  GI:  :  MSK:  Integumentary:  Neurological:  Psychiatric:  Endocrine:  Hematologic/Lymphatic:  Allergic/Immunologic:  [ ] All other systems negative  [ ] Unable to assess ROS because ________    OBJECTIVE:  ICU Vital Signs Last 24 Hrs  T(C): 36.9 (16 Feb 2019 05:50), Max: 37.6 (15 Feb 2019 23:04)  T(F): 98.4 (16 Feb 2019 05:50), Max: 99.7 (15 Feb 2019 23:04)  HR: 135 (16 Feb 2019 05:50) (125 - 135)  BP: 115/67 (16 Feb 2019 05:50) (94/56 - 115/67)  BP(mean): --  ABP: --  ABP(mean): --  RR: 20 (16 Feb 2019 05:50) (18 - 20)  SpO2: 96% (16 Feb 2019 05:50) (94% - 96%)        02-15 @ 07:01  -  02-16 @ 07:00  --------------------------------------------------------  IN: 1920 mL / OUT: 1700 mL / NET: 220 mL      CAPILLARY BLOOD GLUCOSE      POCT Blood Glucose.: 151 mg/dL (16 Feb 2019 06:12)      PHYSICAL EXAM:  General:   HEENT:   Lymph Nodes:  Neck:   Respiratory:   Cardiovascular:   Abdomen:   Extremities:   Skin:   Neurological:  Psychiatry:    HOSPITAL MEDICATIONS:  MEDICATIONS  (STANDING):  cefTAZidime/avibactam IVPB 2.5 Gram(s) IV Intermittent every 8 hours  cefTAZidime/avibactam IVPB      chlorhexidine 4% Liquid 1 Application(s) Topical <User Schedule>  cyanocobalamin 1000 MICROGram(s) Oral daily  dextrose 5%. 1000 milliLiter(s) (50 mL/Hr) IV Continuous <Continuous>  dextrose 50% Injectable 12.5 Gram(s) IV Push once  dextrose 50% Injectable 25 Gram(s) IV Push once  dextrose 50% Injectable 25 Gram(s) IV Push once  folic acid 1 milliGRAM(s) Enteral Tube daily  insulin lispro (HumaLOG) corrective regimen sliding scale   SubCutaneous every 6 hours  lacosamide 100 milliGRAM(s) Oral two times a day  lactobacillus acidophilus 1 Tablet(s) Oral three times a day with meals  metroNIDAZOLE  IVPB      metroNIDAZOLE  IVPB 500 milliGRAM(s) IV Intermittent every 8 hours  psyllium Powder 1 Packet(s) Oral daily  QUEtiapine 25 milliGRAM(s) Oral daily  risperiDONE   Solution 1 milliGRAM(s) Oral daily  sodium chloride 0.9%. 1000 milliLiter(s) (100 mL/Hr) IV Continuous <Continuous>  vancomycin  IVPB 1250 milliGRAM(s) IV Intermittent every 8 hours    MEDICATIONS  (PRN):  acetaminophen    Suspension .. 650 milliGRAM(s) Oral every 6 hours PRN Temp greater or equal to 38C (100.4F), Mild Pain (1 - 3), Moderate Pain (4 - 6)  dextrose 40% Gel 15 Gram(s) Oral once PRN Blood Glucose LESS THAN 70 milliGRAM(s)/deciliter  glucagon  Injectable 1 milliGRAM(s) IntraMuscular once PRN Glucose LESS THAN 70 milligrams/deciliter      LABS:                        9.2    11.07 )-----------( 503      ( 16 Feb 2019 06:38 )             30.3     02-16    131<L>  |  96<L>  |  4<L>  ----------------------------<  162<H>  4.0   |  25  |  0.23<L>    Ca    8.1<L>      16 Feb 2019 06:05  Phos  3.2     02-16  Mg     1.9     02-16      PT/INR - ( 15 Feb 2019 05:20 )   PT: 17.0 SEC;   INR: 1.51          PTT - ( 15 Feb 2019 05:20 )  PTT:32.3 SEC          MICROBIOLOGY:     RADIOLOGY:  [ ] Reviewed and interpreted by me    PULMONARY FUNCTION TESTS:    EKG:    I&O's Summary    15 Feb 2019 07:01  -  16 Feb 2019 07:00  --------------------------------------------------------  IN: 1920 mL / OUT: 1700 mL / NET: 220 mL CHIEF COMPLAINT:  Patient is a 53y old  Male who presents with a chief complaint of fever and SOB  Interval Events:    REVIEW OF SYSTEMS:  Constitutional: No acute distress    CV: Denies  Resp: Denies  GI: Denies  [X ] All other systems negative    OBJECTIVE:  ICU Vital Signs Last 24 Hrs  T(C): 36.9 (16 Feb 2019 05:50), Max: 37.6 (15 Feb 2019 23:04)  T(F): 98.4 (16 Feb 2019 05:50), Max: 99.7 (15 Feb 2019 23:04)  HR: 135 (16 Feb 2019 05:50) (125 - 135)  BP: 115/67 (16 Feb 2019 05:50) (94/56 - 115/67)  BP(mean): --  ABP: --  ABP(mean): --  RR: 20 (16 Feb 2019 05:50) (18 - 20)  SpO2: 96% (16 Feb 2019 05:50) (94% - 96%)        02-15 @ 07:01  -  02-16 @ 07:00  --------------------------------------------------------  IN: 1920 mL / OUT: 1700 mL / NET: 220 mL      CAPILLARY BLOOD GLUCOSE      POCT Blood Glucose.: 151 mg/dL (16 Feb 2019 06:12)    Physical Exam:   · Constitutional	detailed exam	  · Constitutional Details	no distress	  · Eyes	detailed exam	  · Eyes Details	conjunctiva clear	  · ENMT	detailed exam	  · ENMT Details	mouth	  · Mouth	moist	  · Neck	detailed exam	  · Neck Details	trach	  · Respiratory	detailed exam	  · Respiratory Details	airway patent; breath sounds equal; clear to auscultation bilaterally	  · Cardiovascular	detailed exam	  · Cardiovascular Details	regular rate and rhythm	  · Gastrointestinal	detailed exam	  · GI Normal	soft; nontender; + peg, some redness to PEG site	  · Extremities	detailed exam	  · Extremities Details	no clubbing; no cyanosis; pedal edema	  · Pedal Edema Severity	trace	  · Vascular	detailed exam	  · Radial Pulse	right normal; left normal	  · Neurological	detailed exam	  · Mental Status	Awake alert answers questions	  · Skin	detailed exam	  · Skin Details	warm and dry	  · Musculoskeletal	detailed exam	  · Musculoskeletal Details	decreased ROM; diminished strength	    HOSPITAL MEDICATIONS:  MEDICATIONS  (STANDING):  cefTAZidime/avibactam IVPB 2.5 Gram(s) IV Intermittent every 8 hours  cefTAZidime/avibactam IVPB      chlorhexidine 4% Liquid 1 Application(s) Topical <User Schedule>  cyanocobalamin 1000 MICROGram(s) Oral daily  dextrose 5%. 1000 milliLiter(s) (50 mL/Hr) IV Continuous <Continuous>  dextrose 50% Injectable 12.5 Gram(s) IV Push once  dextrose 50% Injectable 25 Gram(s) IV Push once  dextrose 50% Injectable 25 Gram(s) IV Push once  folic acid 1 milliGRAM(s) Enteral Tube daily  insulin lispro (HumaLOG) corrective regimen sliding scale   SubCutaneous every 6 hours  lacosamide 100 milliGRAM(s) Oral two times a day  lactobacillus acidophilus 1 Tablet(s) Oral three times a day with meals  metroNIDAZOLE  IVPB      metroNIDAZOLE  IVPB 500 milliGRAM(s) IV Intermittent every 8 hours  psyllium Powder 1 Packet(s) Oral daily  QUEtiapine 25 milliGRAM(s) Oral daily  risperiDONE   Solution 1 milliGRAM(s) Oral daily  sodium chloride 0.9%. 1000 milliLiter(s) (100 mL/Hr) IV Continuous <Continuous>  vancomycin  IVPB 1250 milliGRAM(s) IV Intermittent every 8 hours    MEDICATIONS  (PRN):  acetaminophen    Suspension .. 650 milliGRAM(s) Oral every 6 hours PRN Temp greater or equal to 38C (100.4F), Mild Pain (1 - 3), Moderate Pain (4 - 6)  dextrose 40% Gel 15 Gram(s) Oral once PRN Blood Glucose LESS THAN 70 milliGRAM(s)/deciliter  glucagon  Injectable 1 milliGRAM(s) IntraMuscular once PRN Glucose LESS THAN 70 milligrams/deciliter      LABS:                        9.2    11.07 )-----------( 503      ( 16 Feb 2019 06:38 )             30.3     02-16    131<L>  |  96<L>  |  4<L>  ----------------------------<  162<H>  4.0   |  25  |  0.23<L>    Ca    8.1<L>      16 Feb 2019 06:05  Phos  3.2     02-16  Mg     1.9     02-16      PT/INR - ( 15 Feb 2019 05:20 )   PT: 17.0 SEC;   INR: 1.51          PTT - ( 15 Feb 2019 05:20 )  PTT:32.3 SEC          MICROBIOLOGY:     RADIOLOGY:  [ ] Reviewed and interpreted by me    PULMONARY FUNCTION TESTS:    EKG:    I&O's Summary    15 Feb 2019 07:01  -  16 Feb 2019 07:00  --------------------------------------------------------  IN: 1920 mL / OUT: 1700 mL / NET: 220 mL

## 2019-02-17 LAB
ALBUMIN SERPL ELPH-MCNC: 1.7 G/DL — LOW (ref 3.3–5)
ALP SERPL-CCNC: 216 U/L — HIGH (ref 40–120)
ALT FLD-CCNC: < 5 U/L — SIGNIFICANT CHANGE UP (ref 4–41)
ANION GAP SERPL CALC-SCNC: 8 MMO/L — SIGNIFICANT CHANGE UP (ref 7–14)
AST SERPL-CCNC: 9 U/L — SIGNIFICANT CHANGE UP (ref 4–40)
BASE EXCESS BLDA CALC-SCNC: 5.1 MMOL/L — SIGNIFICANT CHANGE UP
BILIRUB SERPL-MCNC: 0.2 MG/DL — SIGNIFICANT CHANGE UP (ref 0.2–1.2)
BUN SERPL-MCNC: 5 MG/DL — LOW (ref 7–23)
CALCIUM SERPL-MCNC: 8.1 MG/DL — LOW (ref 8.4–10.5)
CHLORIDE BLDA-SCNC: 98 MMOL/L — SIGNIFICANT CHANGE UP (ref 96–108)
CHLORIDE SERPL-SCNC: 97 MMOL/L — LOW (ref 98–107)
CO2 SERPL-SCNC: 25 MMOL/L — SIGNIFICANT CHANGE UP (ref 22–31)
CREAT SERPL-MCNC: 0.26 MG/DL — LOW (ref 0.5–1.3)
GLUCOSE BLDA-MCNC: 157 MG/DL — HIGH (ref 70–99)
GLUCOSE BLDC GLUCOMTR-MCNC: 122 MG/DL — HIGH (ref 70–99)
GLUCOSE BLDC GLUCOMTR-MCNC: 125 MG/DL — HIGH (ref 70–99)
GLUCOSE BLDC GLUCOMTR-MCNC: 132 MG/DL — HIGH (ref 70–99)
GLUCOSE BLDC GLUCOMTR-MCNC: 149 MG/DL — HIGH (ref 70–99)
GLUCOSE SERPL-MCNC: 137 MG/DL — HIGH (ref 70–99)
HCO3 BLDA-SCNC: 29 MMOL/L — HIGH (ref 22–26)
HCT VFR BLD CALC: 29 % — LOW (ref 39–50)
HCT VFR BLDA CALC: 27.1 % — LOW (ref 39–51)
HGB BLD-MCNC: 8.7 G/DL — LOW (ref 13–17)
HGB BLDA-MCNC: 8.7 G/DL — LOW (ref 13–17)
LACTATE BLDA-SCNC: 1.7 MMOL/L — SIGNIFICANT CHANGE UP (ref 0.5–2)
MCHC RBC-ENTMCNC: 30 % — LOW (ref 32–36)
MCHC RBC-ENTMCNC: 30.3 PG — SIGNIFICANT CHANGE UP (ref 27–34)
MCV RBC AUTO: 101 FL — HIGH (ref 80–100)
NRBC # FLD: 0.03 K/UL — LOW (ref 25–125)
PCO2 BLDA: 57 MMHG — HIGH (ref 35–48)
PH BLDA: 7.35 PH — SIGNIFICANT CHANGE UP (ref 7.35–7.45)
PLATELET # BLD AUTO: 472 K/UL — HIGH (ref 150–400)
PMV BLD: 8.8 FL — SIGNIFICANT CHANGE UP (ref 7–13)
PO2 BLDA: 108 MMHG — SIGNIFICANT CHANGE UP (ref 83–108)
POTASSIUM BLDA-SCNC: 3.8 MMOL/L — SIGNIFICANT CHANGE UP (ref 3.4–4.5)
POTASSIUM SERPL-MCNC: 3.8 MMOL/L — SIGNIFICANT CHANGE UP (ref 3.5–5.3)
POTASSIUM SERPL-SCNC: 3.8 MMOL/L — SIGNIFICANT CHANGE UP (ref 3.5–5.3)
PROT SERPL-MCNC: 7 G/DL — SIGNIFICANT CHANGE UP (ref 6–8.3)
RBC # BLD: 2.87 M/UL — LOW (ref 4.2–5.8)
RBC # FLD: 15.7 % — HIGH (ref 10.3–14.5)
SAO2 % BLDA: 98.7 % — SIGNIFICANT CHANGE UP (ref 95–99)
SODIUM BLDA-SCNC: 133 MMOL/L — LOW (ref 136–146)
SODIUM SERPL-SCNC: 130 MMOL/L — LOW (ref 135–145)
WBC # BLD: 11.97 K/UL — HIGH (ref 3.8–10.5)
WBC # FLD AUTO: 11.97 K/UL — HIGH (ref 3.8–10.5)

## 2019-02-17 PROCEDURE — 99233 SBSQ HOSP IP/OBS HIGH 50: CPT | Mod: GC

## 2019-02-17 RX ADMIN — Medication 1 TABLET(S): at 12:18

## 2019-02-17 RX ADMIN — Medication 650 MILLIGRAM(S): at 01:30

## 2019-02-17 RX ADMIN — QUETIAPINE FUMARATE 25 MILLIGRAM(S): 200 TABLET, FILM COATED ORAL at 12:18

## 2019-02-17 RX ADMIN — Medication 100 MILLIGRAM(S): at 06:07

## 2019-02-17 RX ADMIN — Medication 1 TABLET(S): at 18:31

## 2019-02-17 RX ADMIN — PREGABALIN 1000 MICROGRAM(S): 225 CAPSULE ORAL at 12:18

## 2019-02-17 RX ADMIN — LACOSAMIDE 100 MILLIGRAM(S): 50 TABLET ORAL at 06:10

## 2019-02-17 RX ADMIN — Medication 1 MILLIGRAM(S): at 12:18

## 2019-02-17 RX ADMIN — CHLORHEXIDINE GLUCONATE 1 APPLICATION(S): 213 SOLUTION TOPICAL at 09:11

## 2019-02-17 RX ADMIN — HEPARIN SODIUM 5000 UNIT(S): 5000 INJECTION INTRAVENOUS; SUBCUTANEOUS at 06:09

## 2019-02-17 RX ADMIN — Medication 166.67 MILLIGRAM(S): at 14:09

## 2019-02-17 RX ADMIN — Medication 166.67 MILLIGRAM(S): at 06:08

## 2019-02-17 RX ADMIN — RISPERIDONE 1 MILLIGRAM(S): 4 TABLET ORAL at 12:18

## 2019-02-17 RX ADMIN — LACOSAMIDE 100 MILLIGRAM(S): 50 TABLET ORAL at 18:31

## 2019-02-17 RX ADMIN — Medication 1 TABLET(S): at 09:11

## 2019-02-17 RX ADMIN — Medication 650 MILLIGRAM(S): at 00:29

## 2019-02-17 RX ADMIN — Medication 166.67 MILLIGRAM(S): at 21:32

## 2019-02-17 RX ADMIN — HEPARIN SODIUM 5000 UNIT(S): 5000 INJECTION INTRAVENOUS; SUBCUTANEOUS at 21:32

## 2019-02-17 RX ADMIN — HEPARIN SODIUM 5000 UNIT(S): 5000 INJECTION INTRAVENOUS; SUBCUTANEOUS at 14:09

## 2019-02-17 RX ADMIN — Medication 1 PACKET(S): at 12:18

## 2019-02-17 RX ADMIN — Medication 100 MILLIGRAM(S): at 21:32

## 2019-02-17 RX ADMIN — Medication 100 MILLIGRAM(S): at 14:09

## 2019-02-17 NOTE — CHART NOTE - NSCHARTNOTEFT_GEN_A_CORE
ADS NIGHT COVERAGE:    Notified by RN patient "does not look good".  Patient seen at bedtime.  Due to hypoxemia (O2 <92%) patient FiO2 was increased to 100% and suctioned and lavaged multiple times.  Multiple thick secretions noted.  Patient appears at baseline.    Vitals demonstrated patient has PO temperature of 100.3.   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 Pseudocysts currently matured and undergoing EGD intervention with GI, currently EGD 1 of 2 now with fever  Fever: Currently on Vanco/Avycaz/Flagyl  -Due to recurrent hypoxemia overnight likely secondary to mucus plug, will due ABG.  -Tylenol 650mg PO given  -Blood cultures, CBC and CMP performed, will follow up.      Angela BARNES  [id number]

## 2019-02-17 NOTE — PROGRESS NOTE ADULT - PROBLEM SELECTOR PLAN 1
- cont avycaz and flagyl and vanco per ID  - s/p EUS stent and drainage  - CT 2/2 showing walled off necrosis  - GI note appreciated  -  necrostomy done  on Friday 2/15- pt will need fu CT in 7-10 for eval and if ok  - EGD to remove stenting

## 2019-02-17 NOTE — PROVIDER CONTACT NOTE (OTHER) - SITUATION
pt diaphoretic, desat to 80's, tachypnic in  the 30s,  rectal temp 100.3 after an hour after tylenol given.

## 2019-02-17 NOTE — CHART NOTE - NSCHARTNOTEFT_GEN_A_CORE
Called to see patient for spontaneous hypoxia, dropped pulse ox to 70's RN suctioned pt for thick secretions, initially difficult to ambu then improved after suctioning . Pt pulse ox returned to 100%, with no further hypoxia.  Pt seen with pulmonology fellow and will follow .  Chest bilat bs coarse no wheezing. Continue to monitor

## 2019-02-17 NOTE — PROGRESS NOTE ADULT - SUBJECTIVE AND OBJECTIVE BOX
CHIEF COMPLAINT:    Interval Events:    REVIEW OF SYSTEMS:  Constitutional:   Eyes:  ENT:  CV:  Resp:  GI:  :  MSK:  Integumentary:  Neurological:  Psychiatric:  Endocrine:  Hematologic/Lymphatic:  Allergic/Immunologic:  [ ] All other systems negative  [ ] Unable to assess ROS because ________    OBJECTIVE:  ICU Vital Signs Last 24 Hrs  T(C): 36.7 (17 Feb 2019 06:16), Max: 37.9 (17 Feb 2019 00:32)  T(F): 98 (17 Feb 2019 06:16), Max: 100.3 (17 Feb 2019 00:32)  HR: 120 (17 Feb 2019 06:16) (119 - 136)  BP: 111/74 (17 Feb 2019 06:16) (100/66 - 124/77)  BP(mean): --  ABP: --  ABP(mean): --  RR: 18 (17 Feb 2019 06:16) (18 - 20)  SpO2: 100% (17 Feb 2019 06:16) (92% - 100%)        02-16 @ 07:01  -  02-17 @ 07:00  --------------------------------------------------------  IN: 500 mL / OUT: 700 mL / NET: -200 mL      CAPILLARY BLOOD GLUCOSE      POCT Blood Glucose.: 122 mg/dL (17 Feb 2019 05:44)      PHYSICAL EXAM:  General:   HEENT:   Lymph Nodes:  Neck:   Respiratory:   Cardiovascular:   Abdomen:   Extremities:   Skin:   Neurological:  Psychiatry:    HOSPITAL MEDICATIONS:  MEDICATIONS  (STANDING):  cefTAZidime/avibactam IVPB 2.5 Gram(s) IV Intermittent every 8 hours  cefTAZidime/avibactam IVPB      chlorhexidine 4% Liquid 1 Application(s) Topical <User Schedule>  cyanocobalamin 1000 MICROGram(s) Oral daily  dextrose 5%. 1000 milliLiter(s) (50 mL/Hr) IV Continuous <Continuous>  dextrose 50% Injectable 12.5 Gram(s) IV Push once  dextrose 50% Injectable 25 Gram(s) IV Push once  dextrose 50% Injectable 25 Gram(s) IV Push once  folic acid 1 milliGRAM(s) Enteral Tube daily  heparin  Injectable 5000 Unit(s) SubCutaneous every 8 hours  insulin lispro (HumaLOG) corrective regimen sliding scale   SubCutaneous every 6 hours  lacosamide 100 milliGRAM(s) Oral two times a day  lactobacillus acidophilus 1 Tablet(s) Oral three times a day with meals  metroNIDAZOLE  IVPB      metroNIDAZOLE  IVPB 500 milliGRAM(s) IV Intermittent every 8 hours  psyllium Powder 1 Packet(s) Oral daily  QUEtiapine 25 milliGRAM(s) Oral daily  risperiDONE   Solution 1 milliGRAM(s) Oral daily  sodium chloride 0.9%. 1000 milliLiter(s) (100 mL/Hr) IV Continuous <Continuous>  vancomycin  IVPB 1250 milliGRAM(s) IV Intermittent every 8 hours    MEDICATIONS  (PRN):  acetaminophen    Suspension .. 650 milliGRAM(s) Oral every 6 hours PRN Temp greater or equal to 38C (100.4F), Mild Pain (1 - 3), Moderate Pain (4 - 6)  dextrose 40% Gel 15 Gram(s) Oral once PRN Blood Glucose LESS THAN 70 milliGRAM(s)/deciliter  glucagon  Injectable 1 milliGRAM(s) IntraMuscular once PRN Glucose LESS THAN 70 milligrams/deciliter      LABS:                        8.7    11.97 )-----------( 472      ( 17 Feb 2019 02:10 )             29.0     02-17    130<L>  |  97<L>  |  5<L>  ----------------------------<  137<H>  3.8   |  25  |  0.26<L>    Ca    8.1<L>      17 Feb 2019 02:10  Phos  3.2     02-16  Mg     1.9     02-16    TPro  7.0  /  Alb  1.7<L>  /  TBili  0.2  /  DBili  x   /  AST  9   /  ALT  < 5  /  AlkPhos  216<H>  02-17              MICROBIOLOGY:     RADIOLOGY:  [ ] Reviewed and interpreted by me    PULMONARY FUNCTION TESTS:    EKG: CHIEF COMPLAINT: Patient is a 53y old  Male who presents with a chief complaint of ARDS?, sepsis (17 Feb 2019 07:07)      Interval Events: Hypoxic during night    REVIEW OF SYSTEMS:  Constitutional: no pain/fever or chills   CV: Denies   Resp: Denies   [x ] All other systems negative  [ ] Unable to assess ROS because ________    OBJECTIVE:  ICU Vital Signs Last 24 Hrs  T(C): 36.7 (17 Feb 2019 06:16), Max: 37.9 (17 Feb 2019 00:32)  T(F): 98 (17 Feb 2019 06:16), Max: 100.3 (17 Feb 2019 00:32)  HR: 120 (17 Feb 2019 06:16) (119 - 136)  BP: 111/74 (17 Feb 2019 06:16) (100/66 - 124/77)  BP(mean): --  ABP: --  ABP(mean): --  RR: 18 (17 Feb 2019 06:16) (18 - 20)  SpO2: 100% (17 Feb 2019 06:16) (92% - 100%)        02-16 @ 07:01  -  02-17 @ 07:00  --------------------------------------------------------  IN: 500 mL / OUT: 700 mL / NET: -200 mL      CAPILLARY BLOOD GLUCOSE      POCT Blood Glucose.: 122 mg/dL (17 Feb 2019 05:44)      PHYSICAL EXAM:  General:   HEENT:   Lymph Nodes:  Neck:   Respiratory:   Cardiovascular:   Abdomen:   Extremities:   Skin:   Neurological:  Psychiatry:    HOSPITAL MEDICATIONS:  MEDICATIONS  (STANDING):  cefTAZidime/avibactam IVPB 2.5 Gram(s) IV Intermittent every 8 hours  cefTAZidime/avibactam IVPB      chlorhexidine 4% Liquid 1 Application(s) Topical <User Schedule>  cyanocobalamin 1000 MICROGram(s) Oral daily  dextrose 5%. 1000 milliLiter(s) (50 mL/Hr) IV Continuous <Continuous>  dextrose 50% Injectable 12.5 Gram(s) IV Push once  dextrose 50% Injectable 25 Gram(s) IV Push once  dextrose 50% Injectable 25 Gram(s) IV Push once  folic acid 1 milliGRAM(s) Enteral Tube daily  heparin  Injectable 5000 Unit(s) SubCutaneous every 8 hours  insulin lispro (HumaLOG) corrective regimen sliding scale   SubCutaneous every 6 hours  lacosamide 100 milliGRAM(s) Oral two times a day  lactobacillus acidophilus 1 Tablet(s) Oral three times a day with meals  metroNIDAZOLE  IVPB      metroNIDAZOLE  IVPB 500 milliGRAM(s) IV Intermittent every 8 hours  psyllium Powder 1 Packet(s) Oral daily  QUEtiapine 25 milliGRAM(s) Oral daily  risperiDONE   Solution 1 milliGRAM(s) Oral daily  sodium chloride 0.9%. 1000 milliLiter(s) (100 mL/Hr) IV Continuous <Continuous>  vancomycin  IVPB 1250 milliGRAM(s) IV Intermittent every 8 hours    MEDICATIONS  (PRN):  acetaminophen    Suspension .. 650 milliGRAM(s) Oral every 6 hours PRN Temp greater or equal to 38C (100.4F), Mild Pain (1 - 3), Moderate Pain (4 - 6)  dextrose 40% Gel 15 Gram(s) Oral once PRN Blood Glucose LESS THAN 70 milliGRAM(s)/deciliter  glucagon  Injectable 1 milliGRAM(s) IntraMuscular once PRN Glucose LESS THAN 70 milligrams/deciliter      LABS:                        8.7    11.97 )-----------( 472      ( 17 Feb 2019 02:10 )             29.0     02-17    130<L>  |  97<L>  |  5<L>  ----------------------------<  137<H>  3.8   |  25  |  0.26<L>    Ca    8.1<L>      17 Feb 2019 02:10  Phos  3.2     02-16  Mg     1.9     02-16    TPro  7.0  /  Alb  1.7<L>  /  TBili  0.2  /  DBili  x   /  AST  9   /  ALT  < 5  /  AlkPhos  216<H>  02-17              MICROBIOLOGY:     RADIOLOGY:  [ ] Reviewed and interpreted by me    PULMONARY FUNCTION TESTS:    EKG:

## 2019-02-17 NOTE — PROGRESS NOTE ADULT - ASSESSMENT
53 Male w/ a PMHx of TBI (s/p PEG tube, contracture, and seizure d/o) presented as a transfer from [facility] on 12/9 for respiratory failure 2/2 ARDS likely 2/2 necrotizing pancreatitis s/p intubation. Course c/b Acinetobacter PNA s/p Avycaz and Flagyl 7 day course.  Course further complicated by acute blood loss anemia s/p colonoscopy with findings suggestive of ischemic colitis, without further bleeding and Hgb remaining stable. S/p extubation 1/3, and re-intubation 1/6 now s/p tracheostomy.

## 2019-02-18 LAB
ALBUMIN SERPL ELPH-MCNC: 1.7 G/DL — LOW (ref 3.3–5)
ALP SERPL-CCNC: 194 U/L — HIGH (ref 40–120)
ALT FLD-CCNC: < 5 U/L — SIGNIFICANT CHANGE UP (ref 4–41)
ANION GAP SERPL CALC-SCNC: 9 MMO/L — SIGNIFICANT CHANGE UP (ref 7–14)
AST SERPL-CCNC: 10 U/L — SIGNIFICANT CHANGE UP (ref 4–40)
BILIRUB SERPL-MCNC: < 0.2 MG/DL — LOW (ref 0.2–1.2)
BUN SERPL-MCNC: 7 MG/DL — SIGNIFICANT CHANGE UP (ref 7–23)
CALCIUM SERPL-MCNC: 8.1 MG/DL — LOW (ref 8.4–10.5)
CHLORIDE SERPL-SCNC: 96 MMOL/L — LOW (ref 98–107)
CO2 SERPL-SCNC: 29 MMOL/L — SIGNIFICANT CHANGE UP (ref 22–31)
CREAT SERPL-MCNC: 0.27 MG/DL — LOW (ref 0.5–1.3)
GLUCOSE BLDC GLUCOMTR-MCNC: 109 MG/DL — HIGH (ref 70–99)
GLUCOSE BLDC GLUCOMTR-MCNC: 123 MG/DL — HIGH (ref 70–99)
GLUCOSE BLDC GLUCOMTR-MCNC: 99 MG/DL — SIGNIFICANT CHANGE UP (ref 70–99)
GLUCOSE SERPL-MCNC: 128 MG/DL — HIGH (ref 70–99)
HCT VFR BLD CALC: 27.1 % — LOW (ref 39–50)
HGB BLD-MCNC: 8.3 G/DL — LOW (ref 13–17)
MCHC RBC-ENTMCNC: 30.6 % — LOW (ref 32–36)
MCHC RBC-ENTMCNC: 31 PG — SIGNIFICANT CHANGE UP (ref 27–34)
MCV RBC AUTO: 101.1 FL — HIGH (ref 80–100)
NRBC # FLD: 0.04 K/UL — LOW (ref 25–125)
PLATELET # BLD AUTO: 415 K/UL — HIGH (ref 150–400)
PMV BLD: 8.8 FL — SIGNIFICANT CHANGE UP (ref 7–13)
POTASSIUM SERPL-MCNC: 4.2 MMOL/L — SIGNIFICANT CHANGE UP (ref 3.5–5.3)
POTASSIUM SERPL-SCNC: 4.2 MMOL/L — SIGNIFICANT CHANGE UP (ref 3.5–5.3)
PROT SERPL-MCNC: 6.7 G/DL — SIGNIFICANT CHANGE UP (ref 6–8.3)
RBC # BLD: 2.68 M/UL — LOW (ref 4.2–5.8)
RBC # FLD: 15.7 % — HIGH (ref 10.3–14.5)
SODIUM SERPL-SCNC: 134 MMOL/L — LOW (ref 135–145)
SPECIMEN SOURCE: SIGNIFICANT CHANGE UP
WBC # BLD: 10.02 K/UL — SIGNIFICANT CHANGE UP (ref 3.8–10.5)
WBC # FLD AUTO: 10.02 K/UL — SIGNIFICANT CHANGE UP (ref 3.8–10.5)

## 2019-02-18 PROCEDURE — 99233 SBSQ HOSP IP/OBS HIGH 50: CPT

## 2019-02-18 PROCEDURE — 71045 X-RAY EXAM CHEST 1 VIEW: CPT | Mod: 26

## 2019-02-18 RX ORDER — IPRATROPIUM/ALBUTEROL SULFATE 18-103MCG
3 AEROSOL WITH ADAPTER (GRAM) INHALATION EVERY 6 HOURS
Qty: 0 | Refills: 0 | Status: DISCONTINUED | OUTPATIENT
Start: 2019-02-18 | End: 2019-02-28

## 2019-02-18 RX ADMIN — LACOSAMIDE 100 MILLIGRAM(S): 50 TABLET ORAL at 06:14

## 2019-02-18 RX ADMIN — LACOSAMIDE 100 MILLIGRAM(S): 50 TABLET ORAL at 18:52

## 2019-02-18 RX ADMIN — Medication 3 MILLILITER(S): at 22:57

## 2019-02-18 RX ADMIN — QUETIAPINE FUMARATE 25 MILLIGRAM(S): 200 TABLET, FILM COATED ORAL at 13:40

## 2019-02-18 RX ADMIN — PREGABALIN 1000 MICROGRAM(S): 225 CAPSULE ORAL at 13:40

## 2019-02-18 RX ADMIN — Medication 1 PACKET(S): at 13:39

## 2019-02-18 RX ADMIN — Medication 1 MILLIGRAM(S): at 13:40

## 2019-02-18 RX ADMIN — HEPARIN SODIUM 5000 UNIT(S): 5000 INJECTION INTRAVENOUS; SUBCUTANEOUS at 13:40

## 2019-02-18 RX ADMIN — Medication 1 TABLET(S): at 13:39

## 2019-02-18 RX ADMIN — Medication 166.67 MILLIGRAM(S): at 13:41

## 2019-02-18 RX ADMIN — Medication 1 TABLET(S): at 18:52

## 2019-02-18 RX ADMIN — HEPARIN SODIUM 5000 UNIT(S): 5000 INJECTION INTRAVENOUS; SUBCUTANEOUS at 06:14

## 2019-02-18 RX ADMIN — Medication 166.67 MILLIGRAM(S): at 06:14

## 2019-02-18 RX ADMIN — RISPERIDONE 1 MILLIGRAM(S): 4 TABLET ORAL at 13:42

## 2019-02-18 RX ADMIN — HEPARIN SODIUM 5000 UNIT(S): 5000 INJECTION INTRAVENOUS; SUBCUTANEOUS at 21:59

## 2019-02-18 RX ADMIN — CHLORHEXIDINE GLUCONATE 1 APPLICATION(S): 213 SOLUTION TOPICAL at 13:41

## 2019-02-18 RX ADMIN — Medication 100 MILLIGRAM(S): at 13:45

## 2019-02-18 RX ADMIN — Medication 166.67 MILLIGRAM(S): at 21:59

## 2019-02-18 RX ADMIN — Medication 100 MILLIGRAM(S): at 06:14

## 2019-02-18 RX ADMIN — Medication 100 MILLIGRAM(S): at 21:59

## 2019-02-18 NOTE — PROGRESS NOTE ADULT - SUBJECTIVE AND OBJECTIVE BOX
CHIEF COMPLAINT:    Interval Events:    REVIEW OF SYSTEMS:  Constitutional:   Eyes:  ENT:  CV:  Resp:  GI:  :  MSK:  Integumentary:  Neurological:  Psychiatric:  Endocrine:  Hematologic/Lymphatic:  Allergic/Immunologic:  [ ] All other systems negative  [ ] Unable to assess ROS because ________    OBJECTIVE:  ICU Vital Signs Last 24 Hrs  T(C): 37.4 (18 Feb 2019 05:56), Max: 37.9 (17 Feb 2019 21:22)  T(F): 99.3 (18 Feb 2019 05:56), Max: 100.3 (17 Feb 2019 21:22)  HR: 120 (18 Feb 2019 06:51) (117 - 135)  BP: 111/67 (18 Feb 2019 05:56) (101/66 - 116/81)  BP(mean): --  ABP: --  ABP(mean): --  RR: 21 (18 Feb 2019 05:56) (19 - 24)  SpO2: 100% (18 Feb 2019 06:51) (94% - 100%)    Mode: CPAP with PS, FiO2: 60, PEEP: 5, PS: 10, MAP: 8.9, PIP: 16    CAPILLARY BLOOD GLUCOSE      POCT Blood Glucose.: 123 mg/dL (18 Feb 2019 05:51)      PHYSICAL EXAM:  General:   HEENT:   Lymph Nodes:  Neck:   Respiratory:   Cardiovascular:   Abdomen:   Extremities:   Skin:   Neurological:  Psychiatry:    HOSPITAL MEDICATIONS:  MEDICATIONS  (STANDING):  cefTAZidime/avibactam IVPB 2.5 Gram(s) IV Intermittent every 8 hours  cefTAZidime/avibactam IVPB      chlorhexidine 4% Liquid 1 Application(s) Topical <User Schedule>  cyanocobalamin 1000 MICROGram(s) Oral daily  dextrose 5%. 1000 milliLiter(s) (50 mL/Hr) IV Continuous <Continuous>  dextrose 50% Injectable 12.5 Gram(s) IV Push once  dextrose 50% Injectable 25 Gram(s) IV Push once  dextrose 50% Injectable 25 Gram(s) IV Push once  folic acid 1 milliGRAM(s) Enteral Tube daily  heparin  Injectable 5000 Unit(s) SubCutaneous every 8 hours  insulin lispro (HumaLOG) corrective regimen sliding scale   SubCutaneous every 6 hours  lacosamide 100 milliGRAM(s) Oral two times a day  lactobacillus acidophilus 1 Tablet(s) Oral three times a day with meals  metroNIDAZOLE  IVPB      metroNIDAZOLE  IVPB 500 milliGRAM(s) IV Intermittent every 8 hours  psyllium Powder 1 Packet(s) Oral daily  QUEtiapine 25 milliGRAM(s) Oral daily  risperiDONE   Solution 1 milliGRAM(s) Oral daily  sodium chloride 0.9%. 1000 milliLiter(s) (100 mL/Hr) IV Continuous <Continuous>  vancomycin  IVPB 1250 milliGRAM(s) IV Intermittent every 8 hours    MEDICATIONS  (PRN):  acetaminophen    Suspension .. 650 milliGRAM(s) Oral every 6 hours PRN Temp greater or equal to 38C (100.4F), Mild Pain (1 - 3), Moderate Pain (4 - 6)  dextrose 40% Gel 15 Gram(s) Oral once PRN Blood Glucose LESS THAN 70 milliGRAM(s)/deciliter  glucagon  Injectable 1 milliGRAM(s) IntraMuscular once PRN Glucose LESS THAN 70 milligrams/deciliter      LABS:                        8.3    10.02 )-----------( 415      ( 18 Feb 2019 05:53 )             27.1     02-18    134<L>  |  96<L>  |  7   ----------------------------<  128<H>  4.2   |  29  |  0.27<L>    Ca    8.1<L>      18 Feb 2019 05:53    TPro  6.7  /  Alb  1.7<L>  /  TBili  < 0.2<L>  /  DBili  x   /  AST  10  /  ALT  < 5  /  AlkPhos  194<H>  02-18        Arterial Blood Gas:  02-17 @ 09:45  7.35/57/108/29/98.7/5.1  ABG lactate: 1.7        MICROBIOLOGY:     RADIOLOGY:  [ ] Reviewed and interpreted by me    PULMONARY FUNCTION TESTS:    EKG: CHIEF COMPLAINT:    Interval Events:    REVIEW OF SYSTEMS:  Constitutional:   Eyes:  ENT:  CV:  Resp:  GI:  :  MSK:  Integumentary:  Neurological:  Psychiatric:  Endocrine:  Hematologic/Lymphatic:  Allergic/Immunologic:  [ ] All other systems negative  [X ] Unable to assess ROS because ________    OBJECTIVE:  ICU Vital Signs Last 24 Hrs  T(C): 37.4 (18 Feb 2019 05:56), Max: 37.9 (17 Feb 2019 21:22)  T(F): 99.3 (18 Feb 2019 05:56), Max: 100.3 (17 Feb 2019 21:22)  HR: 120 (18 Feb 2019 06:51) (117 - 135)  BP: 111/67 (18 Feb 2019 05:56) (101/66 - 116/81)  BP(mean): --  ABP: --  ABP(mean): --  RR: 21 (18 Feb 2019 05:56) (19 - 24)  SpO2: 100% (18 Feb 2019 06:51) (94% - 100%)    Mode: CPAP with PS, FiO2: 60, PEEP: 5, PS: 10, MAP: 8.9, PIP: 16    CAPILLARY BLOOD GLUCOSE      POCT Blood Glucose.: 123 mg/dL (18 Feb 2019 05:51)      PHYSICAL EXAM:  General: NCAT PERRLA  HEENT: neg  Lymph Nodes:neg  Neck:   Respiratory: course BS, trach  Cardiovascular: reg S1 S2  Abdomen: PEG, not distended  Extremities: edema   Skin: normal  Neurological: traumatic brain injury   Psychiatry: A * 2 usually, now sleepy    HOSPITAL MEDICATIONS:  MEDICATIONS  (STANDING):  cefTAZidime/avibactam IVPB 2.5 Gram(s) IV Intermittent every 8 hours  cefTAZidime/avibactam IVPB      chlorhexidine 4% Liquid 1 Application(s) Topical <User Schedule>  cyanocobalamin 1000 MICROGram(s) Oral daily  dextrose 5%. 1000 milliLiter(s) (50 mL/Hr) IV Continuous <Continuous>  dextrose 50% Injectable 12.5 Gram(s) IV Push once  dextrose 50% Injectable 25 Gram(s) IV Push once  dextrose 50% Injectable 25 Gram(s) IV Push once  folic acid 1 milliGRAM(s) Enteral Tube daily  heparin  Injectable 5000 Unit(s) SubCutaneous every 8 hours  insulin lispro (HumaLOG) corrective regimen sliding scale   SubCutaneous every 6 hours  lacosamide 100 milliGRAM(s) Oral two times a day  lactobacillus acidophilus 1 Tablet(s) Oral three times a day with meals  metroNIDAZOLE  IVPB      metroNIDAZOLE  IVPB 500 milliGRAM(s) IV Intermittent every 8 hours  psyllium Powder 1 Packet(s) Oral daily  QUEtiapine 25 milliGRAM(s) Oral daily  risperiDONE   Solution 1 milliGRAM(s) Oral daily  sodium chloride 0.9%. 1000 milliLiter(s) (100 mL/Hr) IV Continuous <Continuous>  vancomycin  IVPB 1250 milliGRAM(s) IV Intermittent every 8 hours    MEDICATIONS  (PRN):  acetaminophen    Suspension .. 650 milliGRAM(s) Oral every 6 hours PRN Temp greater or equal to 38C (100.4F), Mild Pain (1 - 3), Moderate Pain (4 - 6)  dextrose 40% Gel 15 Gram(s) Oral once PRN Blood Glucose LESS THAN 70 milliGRAM(s)/deciliter  glucagon  Injectable 1 milliGRAM(s) IntraMuscular once PRN Glucose LESS THAN 70 milligrams/deciliter      LABS:                        8.3    10.02 )-----------( 415      ( 18 Feb 2019 05:53 )             27.1     02-18    134<L>  |  96<L>  |  7   ----------------------------<  128<H>  4.2   |  29  |  0.27<L>    Ca    8.1<L>      18 Feb 2019 05:53    TPro  6.7  /  Alb  1.7<L>  /  TBili  < 0.2<L>  /  DBili  x   /  AST  10  /  ALT  < 5  /  AlkPhos  194<H>  02-18        Arterial Blood Gas:  02-17 @ 09:45  7.35/57/108/29/98.7/5.1  ABG lactate: 1.7        MICROBIOLOGY:     RADIOLOGY:  [ ] Reviewed and interpreted by me    PULMONARY FUNCTION TESTS:    EKG:

## 2019-02-18 NOTE — PROGRESS NOTE ADULT - ASSESSMENT
53 Male w/ a PMHx of TBI (s/p PEG tube, contracture, and seizure d/o) presented as a transfer from [facility] on 12/9 for respiratory failure 2/2 ARDS likely 2/2 necrotizing pancreatitis s/p intubation. Course c/b Acinetobacter PNA s/p Avycaz and Flagyl 7 day course.  Course further complicated by acute blood loss anemia s/p colonoscopy with findings suggestive of ischemic colitis, without further bleeding and Hgb remaining stable. S/p extubation 1/3, and re-intubation 1/6 now s/p tracheostomy.

## 2019-02-18 NOTE — PROGRESS NOTE ADULT - ATTENDING COMMENTS
as above, severe ARDS, resolving after trach  Pancreatits resolving, stent in place  Lung abscess is getting better on Abx  Seizures are stable, on AEDs  getting nutrition via enteral feeds  guarded but improving

## 2019-02-19 LAB
APPEARANCE UR: CLEAR — SIGNIFICANT CHANGE UP
BACTERIA # UR AUTO: SIGNIFICANT CHANGE UP
BILIRUB UR-MCNC: NEGATIVE — SIGNIFICANT CHANGE UP
BLOOD UR QL VISUAL: NEGATIVE — SIGNIFICANT CHANGE UP
COLOR SPEC: YELLOW — SIGNIFICANT CHANGE UP
GLUCOSE BLDC GLUCOMTR-MCNC: 122 MG/DL — HIGH (ref 70–99)
GLUCOSE BLDC GLUCOMTR-MCNC: 126 MG/DL — HIGH (ref 70–99)
GLUCOSE BLDC GLUCOMTR-MCNC: 133 MG/DL — HIGH (ref 70–99)
GLUCOSE BLDC GLUCOMTR-MCNC: 133 MG/DL — HIGH (ref 70–99)
GLUCOSE BLDC GLUCOMTR-MCNC: 138 MG/DL — HIGH (ref 70–99)
GLUCOSE UR-MCNC: NEGATIVE — SIGNIFICANT CHANGE UP
HCT VFR BLD CALC: 27.3 % — LOW (ref 39–50)
HGB BLD-MCNC: 8.4 G/DL — LOW (ref 13–17)
HYALINE CASTS # UR AUTO: NEGATIVE — SIGNIFICANT CHANGE UP
KETONES UR-MCNC: NEGATIVE — SIGNIFICANT CHANGE UP
LEUKOCYTE ESTERASE UR-ACNC: SIGNIFICANT CHANGE UP
MCHC RBC-ENTMCNC: 30.8 % — LOW (ref 32–36)
MCHC RBC-ENTMCNC: 31.6 PG — SIGNIFICANT CHANGE UP (ref 27–34)
MCV RBC AUTO: 102.6 FL — HIGH (ref 80–100)
NITRITE UR-MCNC: NEGATIVE — SIGNIFICANT CHANGE UP
NRBC # FLD: 0.02 K/UL — LOW (ref 25–125)
PH UR: 6.5 — SIGNIFICANT CHANGE UP (ref 5–8)
PLATELET # BLD AUTO: 278 K/UL — SIGNIFICANT CHANGE UP (ref 150–400)
PMV BLD: 9.9 FL — SIGNIFICANT CHANGE UP (ref 7–13)
PROT UR-MCNC: 10 — SIGNIFICANT CHANGE UP
RBC # BLD: 2.66 M/UL — LOW (ref 4.2–5.8)
RBC # FLD: 15.9 % — HIGH (ref 10.3–14.5)
RBC CASTS # UR COMP ASSIST: SIGNIFICANT CHANGE UP (ref 0–?)
SP GR SPEC: 1.01 — SIGNIFICANT CHANGE UP (ref 1–1.04)
SQUAMOUS # UR AUTO: SIGNIFICANT CHANGE UP
UROBILINOGEN FLD QL: NORMAL — SIGNIFICANT CHANGE UP
VANCOMYCIN FLD-MCNC: 27 UG/ML — CRITICAL HIGH
VANCOMYCIN TROUGH SERPL-MCNC: 40.9 UG/ML — CRITICAL HIGH (ref 10–20)
WBC # BLD: 10.07 K/UL — SIGNIFICANT CHANGE UP (ref 3.8–10.5)
WBC # FLD AUTO: 10.07 K/UL — SIGNIFICANT CHANGE UP (ref 3.8–10.5)
WBC UR QL: HIGH (ref 0–?)

## 2019-02-19 PROCEDURE — 99233 SBSQ HOSP IP/OBS HIGH 50: CPT | Mod: GC

## 2019-02-19 PROCEDURE — 99232 SBSQ HOSP IP/OBS MODERATE 35: CPT | Mod: GC

## 2019-02-19 PROCEDURE — 99232 SBSQ HOSP IP/OBS MODERATE 35: CPT

## 2019-02-19 RX ORDER — FUROSEMIDE 40 MG
40 TABLET ORAL ONCE
Qty: 0 | Refills: 0 | Status: COMPLETED | OUTPATIENT
Start: 2019-02-19 | End: 2019-02-19

## 2019-02-19 RX ADMIN — Medication 1 TABLET(S): at 18:07

## 2019-02-19 RX ADMIN — LACOSAMIDE 100 MILLIGRAM(S): 50 TABLET ORAL at 18:06

## 2019-02-19 RX ADMIN — Medication 3 MILLILITER(S): at 03:30

## 2019-02-19 RX ADMIN — Medication 100 MILLIGRAM(S): at 22:10

## 2019-02-19 RX ADMIN — Medication 1 TABLET(S): at 12:23

## 2019-02-19 RX ADMIN — PREGABALIN 1000 MICROGRAM(S): 225 CAPSULE ORAL at 12:23

## 2019-02-19 RX ADMIN — Medication 1 PACKET(S): at 12:23

## 2019-02-19 RX ADMIN — HEPARIN SODIUM 5000 UNIT(S): 5000 INJECTION INTRAVENOUS; SUBCUTANEOUS at 06:18

## 2019-02-19 RX ADMIN — Medication 100 MILLIGRAM(S): at 06:19

## 2019-02-19 RX ADMIN — RISPERIDONE 1 MILLIGRAM(S): 4 TABLET ORAL at 12:23

## 2019-02-19 RX ADMIN — Medication 1 TABLET(S): at 08:38

## 2019-02-19 RX ADMIN — HEPARIN SODIUM 5000 UNIT(S): 5000 INJECTION INTRAVENOUS; SUBCUTANEOUS at 15:05

## 2019-02-19 RX ADMIN — CHLORHEXIDINE GLUCONATE 1 APPLICATION(S): 213 SOLUTION TOPICAL at 10:13

## 2019-02-19 RX ADMIN — HEPARIN SODIUM 5000 UNIT(S): 5000 INJECTION INTRAVENOUS; SUBCUTANEOUS at 22:10

## 2019-02-19 RX ADMIN — Medication 166.67 MILLIGRAM(S): at 06:18

## 2019-02-19 RX ADMIN — Medication 3 MILLILITER(S): at 16:14

## 2019-02-19 RX ADMIN — Medication 650 MILLIGRAM(S): at 22:10

## 2019-02-19 RX ADMIN — Medication 3 MILLILITER(S): at 21:32

## 2019-02-19 RX ADMIN — Medication 100 MILLIGRAM(S): at 15:05

## 2019-02-19 RX ADMIN — Medication 1 MILLIGRAM(S): at 12:23

## 2019-02-19 RX ADMIN — QUETIAPINE FUMARATE 25 MILLIGRAM(S): 200 TABLET, FILM COATED ORAL at 12:23

## 2019-02-19 RX ADMIN — Medication 3 MILLILITER(S): at 09:18

## 2019-02-19 RX ADMIN — LACOSAMIDE 100 MILLIGRAM(S): 50 TABLET ORAL at 06:20

## 2019-02-19 RX ADMIN — Medication 40 MILLIGRAM(S): at 10:12

## 2019-02-19 NOTE — PROGRESS NOTE ADULT - SUBJECTIVE AND OBJECTIVE BOX
CC: F/U abscess    Saw/spoke to patient. No fevers, no chills. Overall well. Appears unchanged.    Allergies  Valproate Sodium (Other (Severe))    ANTIMICROBIALS:  cefTAZidime/avibactam IVPB 2.5 every 8 hours  cefTAZidime/avibactam IVPB    metroNIDAZOLE  IVPB    metroNIDAZOLE  IVPB 500 every 8 hours  vancomycin  IVPB 1250 every 8 hours    PE:    Vital Signs Last 24 Hrs  T(C): 37.2 (2019 10:00), Max: 37.8 (2019 06:09)  T(F): 98.9 (2019 10:00), Max: 100 (2019 06:09)  HR: 110 (2019 11:04) (109 - 122)  BP: 127/70 (2019 10:00) (95/58 - 127/70)  RR: 20 (2019 10:00) (19 - 24)  SpO2: 100% (2019 11:04) (97% - 100%)    Gen: AOx3, NAD, non-toxic, pleasant  CV: S1+S2 normal, tachycardic  Resp: Clear bilat, no resp distress, no crackles/wheezes  Abd: Soft, nontender, +BS  Ext: No LE edema, no wounds    LABS:                        8.4    10.07 )-----------( 278      ( 2019 06:10 )             27.3     02-18    134<L>  |  96<L>  |  7   ----------------------------<  128<H>  4.2   |  29  |  0.27<L>    Ca    8.1<L>      2019 05:53    TPro  6.7  /  Alb  1.7<L>  /  TBili  < 0.2<L>  /  DBili  x   /  AST  10  /  ALT  < 5  /  AlkPhos  194<H>  0218    Urinalysis Basic - ( 2019 02:31 )    Color: YELLOW / Appearance: CLEAR / S.010 / pH: 6.5  Gluc: NEGATIVE / Ketone: NEGATIVE  / Bili: NEGATIVE / Urobili: NORMAL   Blood: NEGATIVE / Protein: 10 / Nitrite: NEGATIVE   Leuk Esterase: TRACE / RBC: 3-5 / WBC 6-10   Sq Epi: OCC / Non Sq Epi: x / Bacteria: FEW    MICROBIOLOGY:  Vancomycin Level, Trough: 40.9 ug/mL (19 @ 09:23)    BLOOD PERIPHERAL  19 NGTD    BLOOD VENOUS  19 NGTD    BLOOD VENOUS  19 --  --  BLOOD CULTURE PCR  Staphylococcus sp.,coag neg    (otherwise reviewed)    RADIOLOGY:     CXR:    FINDINGS:   Tracheostomy tube in appropriate position.  Heart is not enlarged  Pulmonary vascular congestive changes present.  There are bilateral pleural effusions, left more than right - with   associated bibasilar atelectatic changes. No pneumothorax.  No acute osseous finding..     CT:    IMPRESSION: Chest: Moderate bilateral pleural effusions and bilateral   airspace opacities compatible with atelectasis and probable left lung   pneumonia.    Abdomen/pelvis: Numerous peripancreatic fluid collections with minimal   change, detailed above.

## 2019-02-19 NOTE — PROGRESS NOTE ADULT - ASSESSMENT
Impression:  1) Walled off necrosis, s/p EUS and AXIOS 1/31/19. S/P endoscopic necrosectomy on 2/15/19.  2) Hematochezia, s/p colonoscopy on 12/28/2018 with severe segmental colitis localized to the transverse colon and ascending colon (possible ischemic colitis, possible colitis related to contiguous danay-pancreatic inflammatory process). Biopsies with granulation tissue but no infection/malignancy, Hb stable   3) Resp failure in the setting of pneumonia, requiring tracheostomy now with lung abscess and sepsis on IV  Abx, ID following     Recommendations:  - Monitor CBC, CMP daily  - Repeat CT A/P this weekend (2/22-2/24)  - pending CT read will plan for repeat EGD with stent removal or further necrosectomy if needed   - Continue IV antibiotics per ID recommendations  - Supportive care per primary team

## 2019-02-19 NOTE — CHART NOTE - NSCHARTNOTEFT_GEN_A_CORE
Pt had vanco trough level done 3hours after dose given and 40.9  Repeat vanco random level 10-11 hours after am dose 27.  Will hold vanco and repeat dose in am

## 2019-02-19 NOTE — PROGRESS NOTE ADULT - SUBJECTIVE AND OBJECTIVE BOX
Chief Complaint:  Patient is a 53y old  Male who presents with a chief complaint of ARDS?, sepsis (2019 08:32)      Interval Events: Patient had hypoxia over weekend that resolved with suctioning.  Otherwise no adverse events.     Allergies:  Valproate Sodium (Other (Severe))      Hospital Medications:  acetaminophen    Suspension .. 650 milliGRAM(s) Oral every 6 hours PRN  ALBUTerol/ipratropium for Nebulization 3 milliLiter(s) Nebulizer every 6 hours  cefTAZidime/avibactam IVPB 2.5 Gram(s) IV Intermittent every 8 hours  cefTAZidime/avibactam IVPB      chlorhexidine 4% Liquid 1 Application(s) Topical <User Schedule>  cyanocobalamin 1000 MICROGram(s) Oral daily  dextrose 40% Gel 15 Gram(s) Oral once PRN  dextrose 5%. 1000 milliLiter(s) IV Continuous <Continuous>  dextrose 50% Injectable 12.5 Gram(s) IV Push once  dextrose 50% Injectable 25 Gram(s) IV Push once  dextrose 50% Injectable 25 Gram(s) IV Push once  folic acid 1 milliGRAM(s) Enteral Tube daily  glucagon  Injectable 1 milliGRAM(s) IntraMuscular once PRN  heparin  Injectable 5000 Unit(s) SubCutaneous every 8 hours  insulin lispro (HumaLOG) corrective regimen sliding scale   SubCutaneous every 6 hours  lacosamide 100 milliGRAM(s) Oral two times a day  lactobacillus acidophilus 1 Tablet(s) Oral three times a day with meals  metroNIDAZOLE  IVPB      metroNIDAZOLE  IVPB 500 milliGRAM(s) IV Intermittent every 8 hours  psyllium Powder 1 Packet(s) Oral daily  QUEtiapine 25 milliGRAM(s) Oral daily  risperiDONE   Solution 1 milliGRAM(s) Oral daily  sodium chloride 0.9%. 1000 milliLiter(s) IV Continuous <Continuous>  vancomycin  IVPB 1250 milliGRAM(s) IV Intermittent every 8 hours      PMHX/PSHX:  Seizure  TBI (traumatic brain injury)  S/P percutaneous endoscopic gastrostomy (PEG) tube placement  No significant past surgical history      ROS: unable to obtain      PHYSICAL EXAM:     GENERAL: NAD  HEENT:  NC/AT  CHEST:  Full & symmetric excursion, no increased effort  HEART:  Regular rhythm  ABDOMEN:  Soft, non-tender, non-distended  EXTREMITIES:  no cyanosis,clubbing or edema  SKIN:  No rash  NEURO:  alert, tremors    Vital Signs:  Vital Signs Last 24 Hrs  T(C): 37.8 (2019 06:09), Max: 37.8 (2019 06:09)  T(F): 100 (2019 06:09), Max: 100 (2019 06:09)  HR: 120 (2019 06:09) (112 - 122)  BP: 100/75 (2019 06:09) (95/58 - 100/75)  BP(mean): --  RR: 19 (2019 06:09) (19 - 24)  SpO2: 100% (2019 06:09) (97% - 100%)  Daily     Daily     LABS:                        8.3    10.02 )-----------( 415      ( 2019 05:53 )             27.1     02-    134<L>  |  96<L>  |  7   ----------------------------<  128<H>  4.2   |  29  |  0.27<L>    Ca    8.1<L>      2019 05:53    TPro  6.7  /  Alb  1.7<L>  /  TBili  < 0.2<L>  /  DBili  x   /  AST  10  /  ALT  < 5  /  AlkPhos  194<H>  02-18    LIVER FUNCTIONS - ( 2019 05:53 )  Alb: 1.7 g/dL / Pro: 6.7 g/dL / ALK PHOS: 194 u/L / ALT: < 5 u/L / AST: 10 u/L / GGT: x             Urinalysis Basic - ( 2019 02:31 )    Color: YELLOW / Appearance: CLEAR / S.010 / pH: 6.5  Gluc: NEGATIVE / Ketone: NEGATIVE  / Bili: NEGATIVE / Urobili: NORMAL   Blood: NEGATIVE / Protein: 10 / Nitrite: NEGATIVE   Leuk Esterase: TRACE / RBC: 3-5 / WBC 6-10   Sq Epi: OCC / Non Sq Epi: x / Bacteria: FEW          Imaging:  < from: Upper Endoscopy (02.15.19 @ 07:33) >    [facility]  _______________________________________________________________________________  Patient Name: Brien Stewart       Procedure Date: 2/15/2019 7:33 AM  MRN: [id number]                     Account Number: [id number]  YOB: 1965              Admit Type: Inpatient  Room: [place] [address]                         Gender: Male  Attending MD: TROY GARCÍA MD      _______________________________________________________________________________     Procedure:           Upper GI endoscopy  Indications:         53 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 Patient scheduled for                        endoscopic necrosectomy.  Providers:           TROY GARCÍA MD, JARED WILKINS (Fellow)  Medicines:           Monitored Anesthesia Care  Complications:       No immediate complications.  Procedure:           After obtaining informed consent, the endoscope was                        passed under direct vision. Throughout the procedure,                        the patient's blood pressure, pulse, and oxygen                        saturations were monitored continuously. The Endoscope                        was introduced through the mouth, and advanced to the                        second part of duodenum. The upper GI endoscopy was                        accomplished with ease. The patient tolerated the                        procedure well.                                  Findings:       EGD:       -The esophagus was normal.       -The stomach contain the lumen-apposing metal stent. There was copious        necrotic debris coming from the stent. This was removed. The cavity was        entered and full of necrotic debris. This was removed using a snare and        EndoRotor device.       -The cavity was completely cleared.       -No bleeding observed.       -The abdomen was soft after the procedure.                                                Impression:          Successful endoscopic necrosectomy.  Recommendation:      - Return patient to [facility] for [facility] care.                       -CT scan in 7-10 days.                       -Removal of AXIO after CT                                                                                   Attending Participation:       I personally performed the entire procedure.            ___________________  TROY GARCÍA MD  2/15/2019 9:57:21 AM  This report has been signed electronically.  Number of Addenda: 0    Note Initiated On: 2/15/2019 7:33 AM    < end of copied text >

## 2019-02-19 NOTE — PROGRESS NOTE ADULT - ASSESSMENT
53 Male w/ a PMHx of TBI (s/p PEG tube, contracture, and seizure d/o) presented as a transfer from [facility] on 12/9 for respiratory failure 2/2 ARDS likely 2/2 necrotizing pancreatitis s/p intubation. Course c/b Acinetobacter PNA s/p Avycaz and Flagyl 7 day course.  Course further complicated by acute blood loss anemia s/p colonoscopy with findings suggestive of ischemic colitis, without further bleeding and Hgb remaining stable. S/p extubation 1/3, and re-intubation 1/6 now s/p tracheostomy.

## 2019-02-19 NOTE — PROGRESS NOTE ADULT - PROBLEM SELECTOR PLAN 2
- s/p trach 1/9   -  trach collar 24/7  - trach care, suction PRN  - chest PT - s/p trach 1/9   - pt on PS with nebs for hypoxia episodes , suctioned for secretions now improved and tolerating trach collar again   - trach care, suction PRN  - chest PT

## 2019-02-19 NOTE — PROGRESS NOTE ADULT - PROBLEM SELECTOR PLAN 3
- fevers resolved, afebrile now  - ID following   - CT showing new lung abscess  - repeat CT on 2/12 now improved  - cont abx for now   - Per ID will need FU CT of chest to eval for length of abx - fevers resolved, afebrile now  - ID following   - CT showing new lung abscess  - repeat CT on 2/12 now improved  -cxr 2/18 no changes -lasix x 1 given   - cont abx for now   - Per ID will need FU CT of chest to eval for length of abx

## 2019-02-19 NOTE — PROGRESS NOTE ADULT - SUBJECTIVE AND OBJECTIVE BOX
CHIEF COMPLAINT:    Interval Events:    REVIEW OF SYSTEMS:  Constitutional:   Eyes:  ENT:  CV:  Resp:  GI:  :  MSK:  Integumentary:  Neurological:  Psychiatric:  Endocrine:  Hematologic/Lymphatic:  Allergic/Immunologic:  [ ] All other systems negative  [ ] Unable to assess ROS because ________    OBJECTIVE:  ICU Vital Signs Last 24 Hrs  T(C): 37.8 (2019 06:09), Max: 37.8 (2019 06:09)  T(F): 100 (2019 06:09), Max: 100 (2019 06:09)  HR: 109 (2019 06:51) (109 - 122)  BP: 100/75 (2019 06:09) (95/58 - 100/75)  BP(mean): --  ABP: --  ABP(mean): --  RR: 19 (2019 06:09) (19 - 24)  SpO2: 100% (2019 06:51) (97% - 100%)    Mode: CPAP with PS, FiO2: 60, PEEP: 5, PS: 10, MAP: 9.3, PIP: 15    CAPILLARY BLOOD GLUCOSE      POCT Blood Glucose.: 138 mg/dL (2019 06:30)      PHYSICAL EXAM:  General:   HEENT:   Lymph Nodes:  Neck:   Respiratory:   Cardiovascular:   Abdomen:   Extremities:   Skin:   Neurological:  Psychiatry:    HOSPITAL MEDICATIONS:  MEDICATIONS  (STANDING):  ALBUTerol/ipratropium for Nebulization 3 milliLiter(s) Nebulizer every 6 hours  cefTAZidime/avibactam IVPB 2.5 Gram(s) IV Intermittent every 8 hours  cefTAZidime/avibactam IVPB      chlorhexidine 4% Liquid 1 Application(s) Topical <User Schedule>  cyanocobalamin 1000 MICROGram(s) Oral daily  dextrose 5%. 1000 milliLiter(s) (50 mL/Hr) IV Continuous <Continuous>  dextrose 50% Injectable 12.5 Gram(s) IV Push once  dextrose 50% Injectable 25 Gram(s) IV Push once  dextrose 50% Injectable 25 Gram(s) IV Push once  folic acid 1 milliGRAM(s) Enteral Tube daily  heparin  Injectable 5000 Unit(s) SubCutaneous every 8 hours  insulin lispro (HumaLOG) corrective regimen sliding scale   SubCutaneous every 6 hours  lacosamide 100 milliGRAM(s) Oral two times a day  lactobacillus acidophilus 1 Tablet(s) Oral three times a day with meals  metroNIDAZOLE  IVPB      metroNIDAZOLE  IVPB 500 milliGRAM(s) IV Intermittent every 8 hours  psyllium Powder 1 Packet(s) Oral daily  QUEtiapine 25 milliGRAM(s) Oral daily  risperiDONE   Solution 1 milliGRAM(s) Oral daily  sodium chloride 0.9%. 1000 milliLiter(s) (100 mL/Hr) IV Continuous <Continuous>  vancomycin  IVPB 1250 milliGRAM(s) IV Intermittent every 8 hours    MEDICATIONS  (PRN):  acetaminophen    Suspension .. 650 milliGRAM(s) Oral every 6 hours PRN Temp greater or equal to 38C (100.4F), Mild Pain (1 - 3), Moderate Pain (4 - 6)  dextrose 40% Gel 15 Gram(s) Oral once PRN Blood Glucose LESS THAN 70 milliGRAM(s)/deciliter  glucagon  Injectable 1 milliGRAM(s) IntraMuscular once PRN Glucose LESS THAN 70 milligrams/deciliter      LABS:                        8.4    10.07 )-----------( 278      ( 2019 06:10 )             27.3         134<L>  |  96<L>  |  7   ----------------------------<  128<H>  4.2   |  29  |  0.27<L>    Ca    8.1<L>      2019 05:53    TPro  6.7  /  Alb  1.7<L>  /  TBili  < 0.2<L>  /  DBili  x   /  AST  10  /  ALT  < 5  /  AlkPhos  194<H>        Urinalysis Basic - ( 2019 02:31 )    Color: YELLOW / Appearance: CLEAR / S.010 / pH: 6.5  Gluc: NEGATIVE / Ketone: NEGATIVE  / Bili: NEGATIVE / Urobili: NORMAL   Blood: NEGATIVE / Protein: 10 / Nitrite: NEGATIVE   Leuk Esterase: TRACE / RBC: 3-5 / WBC 6-10   Sq Epi: OCC / Non Sq Epi: x / Bacteria: FEW      Arterial Blood Gas:   @ 09:45  7.35/57/108/29/98.7/5.1  ABG lactate: 1.7        MICROBIOLOGY:     RADIOLOGY:  [ ] Reviewed and interpreted by me    PULMONARY FUNCTION TESTS:    EKG: CHIEF COMPLAINT: Patient is a 53y old  Male who presents with a chief complaint of ARDS?, sepsis (2019 08:51)      Interval Events: none     REVIEW OF SYSTEMS:  Constitutional: no fever or pain   CV: Denies   Resp: denies   GI: denies   [x ] All other systems negative  [ ] Unable to assess ROS because ________    OBJECTIVE:  ICU Vital Signs Last 24 Hrs  T(C): 37.8 (2019 06:09), Max: 37.8 (2019 06:09)  T(F): 100 (2019 06:09), Max: 100 (2019 06:09)  HR: 109 (2019 06:51) (109 - 122)  BP: 100/75 (2019 06:09) (95/58 - 100/75)  BP(mean): --  ABP: --  ABP(mean): --  RR: 19 (2019 06:09) (19 - 24)  SpO2: 100% (2019 06:51) (97% - 100%)    Mode: CPAP with PS, FiO2: 60, PEEP: 5, PS: 10, MAP: 9.3, PIP: 15    CAPILLARY BLOOD GLUCOSE      POCT Blood Glucose.: 138 mg/dL (2019 06:30)      PHYSICAL EXAM:  General:   HEENT:   Lymph Nodes:  Neck:   Respiratory:   Cardiovascular:   Abdomen:   Extremities:   Skin:   Neurological:  Psychiatry:    HOSPITAL MEDICATIONS:  MEDICATIONS  (STANDING):  ALBUTerol/ipratropium for Nebulization 3 milliLiter(s) Nebulizer every 6 hours  cefTAZidime/avibactam IVPB 2.5 Gram(s) IV Intermittent every 8 hours  cefTAZidime/avibactam IVPB      chlorhexidine 4% Liquid 1 Application(s) Topical <User Schedule>  cyanocobalamin 1000 MICROGram(s) Oral daily  dextrose 5%. 1000 milliLiter(s) (50 mL/Hr) IV Continuous <Continuous>  dextrose 50% Injectable 12.5 Gram(s) IV Push once  dextrose 50% Injectable 25 Gram(s) IV Push once  dextrose 50% Injectable 25 Gram(s) IV Push once  folic acid 1 milliGRAM(s) Enteral Tube daily  heparin  Injectable 5000 Unit(s) SubCutaneous every 8 hours  insulin lispro (HumaLOG) corrective regimen sliding scale   SubCutaneous every 6 hours  lacosamide 100 milliGRAM(s) Oral two times a day  lactobacillus acidophilus 1 Tablet(s) Oral three times a day with meals  metroNIDAZOLE  IVPB      metroNIDAZOLE  IVPB 500 milliGRAM(s) IV Intermittent every 8 hours  psyllium Powder 1 Packet(s) Oral daily  QUEtiapine 25 milliGRAM(s) Oral daily  risperiDONE   Solution 1 milliGRAM(s) Oral daily  sodium chloride 0.9%. 1000 milliLiter(s) (100 mL/Hr) IV Continuous <Continuous>  vancomycin  IVPB 1250 milliGRAM(s) IV Intermittent every 8 hours    MEDICATIONS  (PRN):  acetaminophen    Suspension .. 650 milliGRAM(s) Oral every 6 hours PRN Temp greater or equal to 38C (100.4F), Mild Pain (1 - 3), Moderate Pain (4 - 6)  dextrose 40% Gel 15 Gram(s) Oral once PRN Blood Glucose LESS THAN 70 milliGRAM(s)/deciliter  glucagon  Injectable 1 milliGRAM(s) IntraMuscular once PRN Glucose LESS THAN 70 milligrams/deciliter      LABS:                        8.4    10.07 )-----------( 278      ( 2019 06:10 )             27.3     02-18    134<L>  |  96<L>  |  7   ----------------------------<  128<H>  4.2   |  29  |  0.27<L>    Ca    8.1<L>      2019 05:53    TPro  6.7  /  Alb  1.7<L>  /  TBili  < 0.2<L>  /  DBili  x   /  AST  10  /  ALT  < 5  /  AlkPhos  194<H>        Urinalysis Basic - ( 2019 02:31 )    Color: YELLOW / Appearance: CLEAR / S.010 / pH: 6.5  Gluc: NEGATIVE / Ketone: NEGATIVE  / Bili: NEGATIVE / Urobili: NORMAL   Blood: NEGATIVE / Protein: 10 / Nitrite: NEGATIVE   Leuk Esterase: TRACE / RBC: 3-5 / WBC 6-10   Sq Epi: OCC / Non Sq Epi: x / Bacteria: FEW      Arterial Blood Gas:   @ 09:45  7.35/57/108/29/98.7/5.1  ABG lactate: 1.7        MICROBIOLOGY:     RADIOLOGY:  [ ] Reviewed and interpreted by me    PULMONARY FUNCTION TESTS:    EKG:

## 2019-02-19 NOTE — PROGRESS NOTE ADULT - ASSESSMENT
53 M (resident of [facility]) with TBI, s/p PEG tube, contracture, and seizure d/o who presented as a transfer from [facility] on 12/9 for respiratory failure 2/2 ARDS likely 2/2 necrotizing pancreatitis s/p intubation.  Bronc 12/11 with pseudomonas; Sputum cx with  acinetobacter  Sepsis with fever, leukopenia resolved, extubated but reintubated 1/6/19 for hypoxia and poor cough along with profuse secretions and sputum cx again with acinetobacter (restarted Avycaz/Flagyl 1/9--unclear treatment endpoint, s/p 4 weeks)  Necrotising Pancreatitis with multiple peripancreatitis collections better formed on CT 1/24 and a new 7 cm abscess  Last CT with ? superimposed pneumonia on collapsed lung  Afebrile  Leukocytosis resolved, mental status appears back to baseline today  Overall, lung abscess, MDR organisms, necrotizing pancreatitis, intraabdominal collection  - Vanco 1250mg q 8 (monitor trough)--note that trough 40 drawn 3 hours after dose given, would repeat at 8 hour interval to ensure appropriate level  - Avycaz/Flagyl  - Frequent suctioning and pulmonary toileting  - Monitor for any further signs infection; close monitoring for neurological symptoms on prolonged flagyl  - GI plans to repeat CT A/P 7-10 days following necrosectomy, continue abx for now    Neal Oleary MD  Pager [phone]  After 5pm and on weekends call [phone]

## 2019-02-20 LAB
ANION GAP SERPL CALC-SCNC: 10 MMO/L — SIGNIFICANT CHANGE UP (ref 7–14)
BUN SERPL-MCNC: 7 MG/DL — SIGNIFICANT CHANGE UP (ref 7–23)
CALCIUM SERPL-MCNC: 8.1 MG/DL — LOW (ref 8.4–10.5)
CHLORIDE SERPL-SCNC: 97 MMOL/L — LOW (ref 98–107)
CO2 SERPL-SCNC: 29 MMOL/L — SIGNIFICANT CHANGE UP (ref 22–31)
CREAT SERPL-MCNC: 0.27 MG/DL — LOW (ref 0.5–1.3)
GLUCOSE BLDC GLUCOMTR-MCNC: 115 MG/DL — HIGH (ref 70–99)
GLUCOSE BLDC GLUCOMTR-MCNC: 128 MG/DL — HIGH (ref 70–99)
GLUCOSE BLDC GLUCOMTR-MCNC: 135 MG/DL — HIGH (ref 70–99)
GLUCOSE BLDC GLUCOMTR-MCNC: 138 MG/DL — HIGH (ref 70–99)
GLUCOSE SERPL-MCNC: 134 MG/DL — HIGH (ref 70–99)
HCT VFR BLD CALC: 30.5 % — LOW (ref 39–50)
HGB BLD-MCNC: 9.2 G/DL — LOW (ref 13–17)
MCHC RBC-ENTMCNC: 30.1 PG — SIGNIFICANT CHANGE UP (ref 27–34)
MCHC RBC-ENTMCNC: 30.2 % — LOW (ref 32–36)
MCV RBC AUTO: 99.7 FL — SIGNIFICANT CHANGE UP (ref 80–100)
NRBC # FLD: 0 K/UL — LOW (ref 25–125)
PLATELET # BLD AUTO: 385 K/UL — SIGNIFICANT CHANGE UP (ref 150–400)
PMV BLD: 8.6 FL — SIGNIFICANT CHANGE UP (ref 7–13)
POTASSIUM SERPL-MCNC: 3.7 MMOL/L — SIGNIFICANT CHANGE UP (ref 3.5–5.3)
POTASSIUM SERPL-SCNC: 3.7 MMOL/L — SIGNIFICANT CHANGE UP (ref 3.5–5.3)
RBC # BLD: 3.06 M/UL — LOW (ref 4.2–5.8)
RBC # FLD: 15.9 % — HIGH (ref 10.3–14.5)
SODIUM SERPL-SCNC: 136 MMOL/L — SIGNIFICANT CHANGE UP (ref 135–145)
VANCOMYCIN FLD-MCNC: 16.8 UG/ML — SIGNIFICANT CHANGE UP
WBC # BLD: 8.5 K/UL — SIGNIFICANT CHANGE UP (ref 3.8–10.5)
WBC # FLD AUTO: 8.5 K/UL — SIGNIFICANT CHANGE UP (ref 3.8–10.5)

## 2019-02-20 PROCEDURE — 99233 SBSQ HOSP IP/OBS HIGH 50: CPT | Mod: GC

## 2019-02-20 PROCEDURE — 99232 SBSQ HOSP IP/OBS MODERATE 35: CPT

## 2019-02-20 RX ORDER — VANCOMYCIN HCL 1 G
1000 VIAL (EA) INTRAVENOUS EVERY 8 HOURS
Qty: 0 | Refills: 0 | Status: DISCONTINUED | OUTPATIENT
Start: 2019-02-20 | End: 2019-02-22

## 2019-02-20 RX ORDER — VANCOMYCIN HCL 1 G
1000 VIAL (EA) INTRAVENOUS ONCE
Qty: 0 | Refills: 0 | Status: COMPLETED | OUTPATIENT
Start: 2019-02-20 | End: 2019-02-20

## 2019-02-20 RX ORDER — VANCOMYCIN HCL 1 G
VIAL (EA) INTRAVENOUS
Qty: 0 | Refills: 0 | Status: DISCONTINUED | OUTPATIENT
Start: 2019-02-20 | End: 2019-02-22

## 2019-02-20 RX ADMIN — Medication 250 MILLIGRAM(S): at 08:53

## 2019-02-20 RX ADMIN — LACOSAMIDE 100 MILLIGRAM(S): 50 TABLET ORAL at 17:59

## 2019-02-20 RX ADMIN — Medication 100 MILLIGRAM(S): at 22:33

## 2019-02-20 RX ADMIN — Medication 1 MILLIGRAM(S): at 12:50

## 2019-02-20 RX ADMIN — Medication 1 TABLET(S): at 08:53

## 2019-02-20 RX ADMIN — LACOSAMIDE 100 MILLIGRAM(S): 50 TABLET ORAL at 06:47

## 2019-02-20 RX ADMIN — Medication 3 MILLILITER(S): at 17:02

## 2019-02-20 RX ADMIN — Medication 250 MILLIGRAM(S): at 17:59

## 2019-02-20 RX ADMIN — Medication 1 TABLET(S): at 12:50

## 2019-02-20 RX ADMIN — HEPARIN SODIUM 5000 UNIT(S): 5000 INJECTION INTRAVENOUS; SUBCUTANEOUS at 06:47

## 2019-02-20 RX ADMIN — CHLORHEXIDINE GLUCONATE 1 APPLICATION(S): 213 SOLUTION TOPICAL at 12:49

## 2019-02-20 RX ADMIN — PREGABALIN 1000 MICROGRAM(S): 225 CAPSULE ORAL at 12:50

## 2019-02-20 RX ADMIN — Medication 3 MILLILITER(S): at 04:41

## 2019-02-20 RX ADMIN — Medication 3 MILLILITER(S): at 22:45

## 2019-02-20 RX ADMIN — Medication 100 MILLIGRAM(S): at 14:50

## 2019-02-20 RX ADMIN — RISPERIDONE 1 MILLIGRAM(S): 4 TABLET ORAL at 14:50

## 2019-02-20 RX ADMIN — HEPARIN SODIUM 5000 UNIT(S): 5000 INJECTION INTRAVENOUS; SUBCUTANEOUS at 14:50

## 2019-02-20 RX ADMIN — QUETIAPINE FUMARATE 25 MILLIGRAM(S): 200 TABLET, FILM COATED ORAL at 12:50

## 2019-02-20 RX ADMIN — Medication 1 TABLET(S): at 17:59

## 2019-02-20 RX ADMIN — Medication 100 MILLIGRAM(S): at 06:09

## 2019-02-20 RX ADMIN — Medication 3 MILLILITER(S): at 09:22

## 2019-02-20 RX ADMIN — HEPARIN SODIUM 5000 UNIT(S): 5000 INJECTION INTRAVENOUS; SUBCUTANEOUS at 22:34

## 2019-02-20 RX ADMIN — Medication 1 PACKET(S): at 12:50

## 2019-02-20 NOTE — PROGRESS NOTE ADULT - SUBJECTIVE AND OBJECTIVE BOX
CHIEF COMPLAINT:    Interval Events:    REVIEW OF SYSTEMS:  Constitutional:   Eyes:  ENT:  CV:  Resp:  GI:  :  MSK:  Integumentary:  Neurological:  Psychiatric:  Endocrine:  Hematologic/Lymphatic:  Allergic/Immunologic:  [ ] All other systems negative  [ ] Unable to assess ROS because ________    OBJECTIVE:  ICU Vital Signs Last 24 Hrs  T(C): 37 (2019 05:51), Max: 38 (2019 21:08)  T(F): 98.6 (2019 05:51), Max: 100.4 (2019 21:08)  HR: 117 (2019 06:52) (110 - 130)  BP: 132/87 (2019 05:51) (106/64 - 132/87)  BP(mean): --  ABP: --  ABP(mean): --  RR: 18 (2019 06:52) (16 - 20)  SpO2: 94% (2019 06:52) (92% - 100%)    Mode: standby     @ 07:01  -  - @ 07:00  --------------------------------------------------------  IN: 1010 mL / OUT: 0 mL / NET: 1010 mL      CAPILLARY BLOOD GLUCOSE      POCT Blood Glucose.: 138 mg/dL (2019 06:18)      PHYSICAL EXAM:  General:   HEENT:   Lymph Nodes:  Neck:   Respiratory:   Cardiovascular:   Abdomen:   Extremities:   Skin:   Neurological:  Psychiatry:    HOSPITAL MEDICATIONS:  MEDICATIONS  (STANDING):  ALBUTerol/ipratropium for Nebulization 3 milliLiter(s) Nebulizer every 6 hours  cefTAZidime/avibactam IVPB 2.5 Gram(s) IV Intermittent every 8 hours  cefTAZidime/avibactam IVPB      chlorhexidine 4% Liquid 1 Application(s) Topical <User Schedule>  cyanocobalamin 1000 MICROGram(s) Oral daily  dextrose 5%. 1000 milliLiter(s) (50 mL/Hr) IV Continuous <Continuous>  dextrose 50% Injectable 12.5 Gram(s) IV Push once  dextrose 50% Injectable 25 Gram(s) IV Push once  dextrose 50% Injectable 25 Gram(s) IV Push once  folic acid 1 milliGRAM(s) Enteral Tube daily  heparin  Injectable 5000 Unit(s) SubCutaneous every 8 hours  insulin lispro (HumaLOG) corrective regimen sliding scale   SubCutaneous every 6 hours  lacosamide 100 milliGRAM(s) Oral two times a day  lactobacillus acidophilus 1 Tablet(s) Oral three times a day with meals  metroNIDAZOLE  IVPB      metroNIDAZOLE  IVPB 500 milliGRAM(s) IV Intermittent every 8 hours  psyllium Powder 1 Packet(s) Oral daily  QUEtiapine 25 milliGRAM(s) Oral daily  risperiDONE   Solution 1 milliGRAM(s) Oral daily  sodium chloride 0.9%. 1000 milliLiter(s) (100 mL/Hr) IV Continuous <Continuous>  vancomycin  IVPB        MEDICATIONS  (PRN):  acetaminophen    Suspension .. 650 milliGRAM(s) Oral every 6 hours PRN Temp greater or equal to 38C (100.4F), Mild Pain (1 - 3), Moderate Pain (4 - 6)  dextrose 40% Gel 15 Gram(s) Oral once PRN Blood Glucose LESS THAN 70 milliGRAM(s)/deciliter  glucagon  Injectable 1 milliGRAM(s) IntraMuscular once PRN Glucose LESS THAN 70 milligrams/deciliter      LABS:                        9.2    8.50  )-----------( 385      ( 2019 04:49 )             30.5     02-20    136  |  97<L>  |  7   ----------------------------<  134<H>  3.7   |  29  |  0.27<L>    Ca    8.1<L>      2019 04:49        Urinalysis Basic - ( 2019 02:31 )    Color: YELLOW / Appearance: CLEAR / S.010 / pH: 6.5  Gluc: NEGATIVE / Ketone: NEGATIVE  / Bili: NEGATIVE / Urobili: NORMAL   Blood: NEGATIVE / Protein: 10 / Nitrite: NEGATIVE   Leuk Esterase: TRACE / RBC: 3-5 / WBC 6-10   Sq Epi: OCC / Non Sq Epi: x / Bacteria: FEW            MICROBIOLOGY:     RADIOLOGY:  [ ] Reviewed and interpreted by me    PULMONARY FUNCTION TESTS:    EKG: CHIEF COMPLAINT:Patient is a 53y old  Male who presents with a chief complaint of ARDS?, sepsis (2019 08:06)      Interval Events: none     REVIEW OF SYSTEMS:  Constitutional: no fevers  CV: denies   Resp: Denies   GI: Denies   : Denies   [x ] All other systems negative  [ ] Unable to assess ROS because ________    OBJECTIVE:  ICU Vital Signs Last 24 Hrs  T(C): 37 (2019 05:51), Max: 38 (2019 21:08)  T(F): 98.6 (2019 05:51), Max: 100.4 (2019 21:08)  HR: 117 (2019 06:52) (110 - 130)  BP: 132/87 (2019 05:51) (106/64 - 132/87)  BP(mean): --  ABP: --  ABP(mean): --  RR: 18 (2019 06:52) (16 - 20)  SpO2: 94% (2019 06:52) (92% - 100%)    Mode: standby     @ 07:  -   @ 07:00  --------------------------------------------------------  IN: 1010 mL / OUT: 0 mL / NET: 1010 mL      CAPILLARY BLOOD GLUCOSE      POCT Blood Glucose.: 138 mg/dL (2019 06:18)      PHYSICAL EXAM:  General:   HEENT:   Lymph Nodes:  Neck:   Respiratory:   Cardiovascular:   Abdomen:   Extremities:   Skin:   Neurological:  Psychiatry:    HOSPITAL MEDICATIONS:  MEDICATIONS  (STANDING):  ALBUTerol/ipratropium for Nebulization 3 milliLiter(s) Nebulizer every 6 hours  cefTAZidime/avibactam IVPB 2.5 Gram(s) IV Intermittent every 8 hours  cefTAZidime/avibactam IVPB      chlorhexidine 4% Liquid 1 Application(s) Topical <User Schedule>  cyanocobalamin 1000 MICROGram(s) Oral daily  dextrose 5%. 1000 milliLiter(s) (50 mL/Hr) IV Continuous <Continuous>  dextrose 50% Injectable 12.5 Gram(s) IV Push once  dextrose 50% Injectable 25 Gram(s) IV Push once  dextrose 50% Injectable 25 Gram(s) IV Push once  folic acid 1 milliGRAM(s) Enteral Tube daily  heparin  Injectable 5000 Unit(s) SubCutaneous every 8 hours  insulin lispro (HumaLOG) corrective regimen sliding scale   SubCutaneous every 6 hours  lacosamide 100 milliGRAM(s) Oral two times a day  lactobacillus acidophilus 1 Tablet(s) Oral three times a day with meals  metroNIDAZOLE  IVPB      metroNIDAZOLE  IVPB 500 milliGRAM(s) IV Intermittent every 8 hours  psyllium Powder 1 Packet(s) Oral daily  QUEtiapine 25 milliGRAM(s) Oral daily  risperiDONE   Solution 1 milliGRAM(s) Oral daily  sodium chloride 0.9%. 1000 milliLiter(s) (100 mL/Hr) IV Continuous <Continuous>  vancomycin  IVPB        MEDICATIONS  (PRN):  acetaminophen    Suspension .. 650 milliGRAM(s) Oral every 6 hours PRN Temp greater or equal to 38C (100.4F), Mild Pain (1 - 3), Moderate Pain (4 - 6)  dextrose 40% Gel 15 Gram(s) Oral once PRN Blood Glucose LESS THAN 70 milliGRAM(s)/deciliter  glucagon  Injectable 1 milliGRAM(s) IntraMuscular once PRN Glucose LESS THAN 70 milligrams/deciliter      LABS:                        9.2    8.50  )-----------( 385      ( 2019 04:49 )             30.5     02-20    136  |  97<L>  |  7   ----------------------------<  134<H>  3.7   |  29  |  0.27<L>    Ca    8.1<L>      2019 04:49        Urinalysis Basic - ( 2019 02:31 )    Color: YELLOW / Appearance: CLEAR / S.010 / pH: 6.5  Gluc: NEGATIVE / Ketone: NEGATIVE  / Bili: NEGATIVE / Urobili: NORMAL   Blood: NEGATIVE / Protein: 10 / Nitrite: NEGATIVE   Leuk Esterase: TRACE / RBC: 3-5 / WBC 6-10   Sq Epi: OCC / Non Sq Epi: x / Bacteria: FEW            MICROBIOLOGY:     RADIOLOGY:  [ ] Reviewed and interpreted by me    PULMONARY FUNCTION TESTS:    EKG:

## 2019-02-20 NOTE — PROGRESS NOTE ADULT - SUBJECTIVE AND OBJECTIVE BOX
CC: F/U for abscesses    Saw/spoke to patient. Low grade fever, no chills. Overall well. No new complaints.    Allergies  Valproate Sodium (Other (Severe))    ANTIMICROBIALS:  cefTAZidime/avibactam IVPB 2.5 every 8 hours  cefTAZidime/avibactam IVPB    metroNIDAZOLE  IVPB    metroNIDAZOLE  IVPB 500 every 8 hours  vancomycin  IVPB    vancomycin  IVPB 1000 every 8 hours    PE:    Vital Signs Last 24 Hrs  T(C): 36.9 (2019 09:04), Max: 38 (2019 21:08)  T(F): 98.5 (2019 09:04), Max: 100.4 (2019 21:08)  HR: 116 (2019 11:59) (110 - 130)  BP: 121/77 (2019 09:04) (106/64 - 132/87)  RR: 19 (2019 09:04) (16 - 20)  SpO2: 97% (2019 11:59) (92% - 100%)    Gen: AOx2-3, nonverbal communicates, mental status appears at baseline  CV: S1+S2 normal, tachycardic  Resp: Clear bilat, no resp distress, no crackles/wheezes, trach  Abd: Soft, nontender, +BS, PEG  Ext: No LE edema, no wounds    LABS:                        9.2    8.50  )-----------( 385      ( 2019 04:49 )             30.5     02-20    136  |  97<L>  |  7   ----------------------------<  134<H>  3.7   |  29  |  0.27<L>    Ca    8.1<L>      2019 04:49    Urinalysis Basic - ( 2019 02:31 )    Color: YELLOW / Appearance: CLEAR / S.010 / pH: 6.5  Gluc: NEGATIVE / Ketone: NEGATIVE  / Bili: NEGATIVE / Urobili: NORMAL   Blood: NEGATIVE / Protein: 10 / Nitrite: NEGATIVE   Leuk Esterase: TRACE / RBC: 3-5 / WBC 6-10   Sq Epi: OCC / Non Sq Epi: x / Bacteria: FEW    MICROBIOLOGY:  Vancomycin Level, Random: 16.8 ug/mL (02-20-19 @ 04:49)  Vancomycin Level, Random: 27.0 ug/mL (19 @ 16:55)    BLOOD PERIPHERAL  19 NGTD    BLOOD VENOUS  19 NGTD    BLOOD VENOUS  19 --  --  BLOOD CULTURE PCR  Staphylococcus sp.,coag neg    (otherwise reviewed)    RADIOLOGY:     CXR:    FINDINGS:   Tracheostomy tube in appropriate position.  Heart is not enlarged  Pulmonary vascular congestive changes present.  There are bilateral pleural effusions, left more than right - with   associated bibasilar atelectatic changes. No pneumothorax.  No acute osseous finding..    IMPRESSION:   As above.

## 2019-02-20 NOTE — PROGRESS NOTE ADULT - PROBLEM SELECTOR PLAN 3
- fevers resolved, afebrile now  - ID following   - CT showing new lung abscess  - repeat CT on 2/12 now improved  -cxr 2/18 no changes -lasix x 1 given   - cont abx for now   - Per ID will need FU CT of chest to eval for length of abx  - vanco lowered to 1gm sec to trough

## 2019-02-20 NOTE — CHART NOTE - NSCHARTNOTEFT_GEN_A_CORE
Source:  nursing and EMR     Diet : NPO with tube feed - Jevity 1.2 @ 55 ml/hr 24hrs daily and free water flush 250 ml x4/d     Patient with tracheostomy , unable to speak, per nursing tolerating EN @ goal rate , noted intentional wt. loss .      Enteral : EN provides 1584 kcal, 73 gm protein and 2063 ml free fluid /d .       Current Weight: 78.9 kg on 2/17/19; was 78.9 kg on 2/13/19; Admit wt. - 99.7 kg ; IBW - 61.6 kg     Pertinent Medications: MEDICATIONS  (STANDING):  ALBUTerol/ipratropium for Nebulization 3 milliLiter(s) Nebulizer every 6 hours  cefTAZidime/avibactam IVPB 2.5 Gram(s) IV Intermittent every 8 hours  cefTAZidime/avibactam IVPB      folic acid 1 milliGRAM(s) Enteral Tube daily  heparin  Injectable 5000 Unit(s) SubCutaneous every 8 hours  insulin lispro (HumaLOG) corrective regimen sliding scale   SubCutaneous every 6 hours  lacosamide 100 milliGRAM(s) Oral two times a day  lactobacillus acidophilus 1 Tablet(s) Oral three times a day with meals  metroNIDAZOLE  IVPB      metroNIDAZOLE  IVPB 500 milliGRAM(s) IV Intermittent every 8 hours  psyllium Powder 1 Packet(s) Oral daily  QUEtiapine 25 milliGRAM(s) Oral daily  risperiDONE   Solution 1 milliGRAM(s) Oral daily  sodium chloride 0.9%. 1000 milliLiter(s) (100 mL/Hr) IV Continuous <Continuous>  vancomycin  IVPB      vancomycin  IVPB 1000 milliGRAM(s) IV Intermittent every 8 hours    Pertinent Labs:  02-20 Na136 mmol/L Glu 134 mg/dL<H> K+ 3.7 mmol/L Cr  0.27 mg/dL<L> BUN 7 mg/dL 02-16 Phos 3.2 mg/dL 02-18 Alb 1.7 g/dL<L>      Recommend to continue current EN     Monitoring and Evaluation:  Tolerance to diet prescription , weights and follow up per protocol

## 2019-02-20 NOTE — PROGRESS NOTE ADULT - PROBLEM SELECTOR PLAN 1
- cont avycaz and flagyl and vanco per ID  - s/p EUS stent and drainage  - GI note appreciated  -  necrostomy done  on Friday 2/15- pt will need fu CT in 7-days for eval and if ok   EGD to remove stenting

## 2019-02-20 NOTE — PROGRESS NOTE ADULT - ASSESSMENT
53 M (resident of [facility]) with TBI, s/p PEG tube, contracture, and seizure d/o who presented as a transfer from [facility] on 12/9 for respiratory failure 2/2 ARDS likely 2/2 necrotizing pancreatitis s/p intubation.  Bronc 12/11 with pseudomonas; Sputum cx with  acinetobacter  Sepsis with fever, leukopenia resolved, extubated but reintubated 1/6/19 for hypoxia and poor cough along with profuse secretions and sputum cx again with acinetobacter (restarted Avycaz/Flagyl 1/9--unclear treatment endpoint, s/p 4 weeks)  Necrotising Pancreatitis with multiple peripancreatitis collections better formed on CT 1/24 and a new 7 cm abscess  Last CT with ? superimposed pneumonia on collapsed lung  Leukocytosis resolved, mental status appears back to baseline  Low grade fever--monitor for now  Overall, lung abscess, MDR organisms, necrotizing pancreatitis, intraabdominal collection  - Vanco 1000mg q 8 (monitor trough)  - Avycaz/Flagyl  - Frequent suctioning and pulmonary toileting  - Monitor for any further signs infection; close monitoring for neurological symptoms on prolonged flagyl  - GI plans to repeat CT A/P 7-10 days following necrosectomy, continue abx for now    Neal Oleary MD  Pager [phone]  After 5pm and on weekends call [phone]

## 2019-02-20 NOTE — PROGRESS NOTE ADULT - PROBLEM SELECTOR PLAN 2
- s/p trach 1/9   - pt on PS with nebs for hypoxia episodes , suctioned for secretions now improved and tolerating trach collar again Requiring suctioning prn   - trach care, suction PRN  - chest PT

## 2019-02-21 LAB
ANION GAP SERPL CALC-SCNC: 11 MMO/L — SIGNIFICANT CHANGE UP (ref 7–14)
BUN SERPL-MCNC: 7 MG/DL — SIGNIFICANT CHANGE UP (ref 7–23)
CALCIUM SERPL-MCNC: 8.3 MG/DL — LOW (ref 8.4–10.5)
CHLORIDE SERPL-SCNC: 100 MMOL/L — SIGNIFICANT CHANGE UP (ref 98–107)
CO2 SERPL-SCNC: 28 MMOL/L — SIGNIFICANT CHANGE UP (ref 22–31)
CREAT SERPL-MCNC: 0.27 MG/DL — LOW (ref 0.5–1.3)
GLUCOSE BLDC GLUCOMTR-MCNC: 117 MG/DL — HIGH (ref 70–99)
GLUCOSE BLDC GLUCOMTR-MCNC: 119 MG/DL — HIGH (ref 70–99)
GLUCOSE BLDC GLUCOMTR-MCNC: 125 MG/DL — HIGH (ref 70–99)
GLUCOSE BLDC GLUCOMTR-MCNC: 139 MG/DL — HIGH (ref 70–99)
GLUCOSE SERPL-MCNC: 116 MG/DL — HIGH (ref 70–99)
HCT VFR BLD CALC: 30.6 % — LOW (ref 39–50)
HGB BLD-MCNC: 9.2 G/DL — LOW (ref 13–17)
MCHC RBC-ENTMCNC: 30 PG — SIGNIFICANT CHANGE UP (ref 27–34)
MCHC RBC-ENTMCNC: 30.1 % — LOW (ref 32–36)
MCV RBC AUTO: 99.7 FL — SIGNIFICANT CHANGE UP (ref 80–100)
NRBC # FLD: 0.04 K/UL — LOW (ref 25–125)
PLATELET # BLD AUTO: 408 K/UL — HIGH (ref 150–400)
PMV BLD: 9.3 FL — SIGNIFICANT CHANGE UP (ref 7–13)
POTASSIUM SERPL-MCNC: 4.9 MMOL/L — SIGNIFICANT CHANGE UP (ref 3.5–5.3)
POTASSIUM SERPL-SCNC: 4.9 MMOL/L — SIGNIFICANT CHANGE UP (ref 3.5–5.3)
RBC # BLD: 3.07 M/UL — LOW (ref 4.2–5.8)
RBC # FLD: 15.9 % — HIGH (ref 10.3–14.5)
SODIUM SERPL-SCNC: 139 MMOL/L — SIGNIFICANT CHANGE UP (ref 135–145)
VANCOMYCIN TROUGH SERPL-MCNC: 24.6 UG/ML — HIGH (ref 10–20)
WBC # BLD: 8.87 K/UL — SIGNIFICANT CHANGE UP (ref 3.8–10.5)
WBC # FLD AUTO: 8.87 K/UL — SIGNIFICANT CHANGE UP (ref 3.8–10.5)

## 2019-02-21 PROCEDURE — 99233 SBSQ HOSP IP/OBS HIGH 50: CPT | Mod: GC

## 2019-02-21 PROCEDURE — 99232 SBSQ HOSP IP/OBS MODERATE 35: CPT

## 2019-02-21 RX ORDER — LACOSAMIDE 50 MG/1
100 TABLET ORAL
Qty: 0 | Refills: 0 | Status: DISCONTINUED | OUTPATIENT
Start: 2019-02-21 | End: 2019-02-21

## 2019-02-21 RX ORDER — LACOSAMIDE 50 MG/1
100 TABLET ORAL
Qty: 0 | Refills: 0 | Status: DISCONTINUED | OUTPATIENT
Start: 2019-02-21 | End: 2019-02-28

## 2019-02-21 RX ORDER — FUROSEMIDE 40 MG
40 TABLET ORAL ONCE
Qty: 0 | Refills: 0 | Status: COMPLETED | OUTPATIENT
Start: 2019-02-21 | End: 2019-02-21

## 2019-02-21 RX ADMIN — Medication 100 MILLIGRAM(S): at 06:27

## 2019-02-21 RX ADMIN — Medication 40 MILLIGRAM(S): at 11:37

## 2019-02-21 RX ADMIN — HEPARIN SODIUM 5000 UNIT(S): 5000 INJECTION INTRAVENOUS; SUBCUTANEOUS at 23:02

## 2019-02-21 RX ADMIN — Medication 3 MILLILITER(S): at 09:33

## 2019-02-21 RX ADMIN — LACOSAMIDE 100 MILLIGRAM(S): 50 TABLET ORAL at 17:45

## 2019-02-21 RX ADMIN — Medication 1 TABLET(S): at 17:46

## 2019-02-21 RX ADMIN — Medication 1 PACKET(S): at 11:38

## 2019-02-21 RX ADMIN — CHLORHEXIDINE GLUCONATE 1 APPLICATION(S): 213 SOLUTION TOPICAL at 11:40

## 2019-02-21 RX ADMIN — RISPERIDONE 1 MILLIGRAM(S): 4 TABLET ORAL at 11:40

## 2019-02-21 RX ADMIN — Medication 1 MILLIGRAM(S): at 11:37

## 2019-02-21 RX ADMIN — Medication 1 TABLET(S): at 11:38

## 2019-02-21 RX ADMIN — Medication 3 MILLILITER(S): at 15:21

## 2019-02-21 RX ADMIN — QUETIAPINE FUMARATE 25 MILLIGRAM(S): 200 TABLET, FILM COATED ORAL at 11:38

## 2019-02-21 RX ADMIN — Medication 100 MILLIGRAM(S): at 23:01

## 2019-02-21 RX ADMIN — PREGABALIN 1000 MICROGRAM(S): 225 CAPSULE ORAL at 11:37

## 2019-02-21 RX ADMIN — Medication 1 TABLET(S): at 08:20

## 2019-02-21 RX ADMIN — HEPARIN SODIUM 5000 UNIT(S): 5000 INJECTION INTRAVENOUS; SUBCUTANEOUS at 06:41

## 2019-02-21 RX ADMIN — LACOSAMIDE 100 MILLIGRAM(S): 50 TABLET ORAL at 06:41

## 2019-02-21 RX ADMIN — Medication 100 MILLIGRAM(S): at 13:07

## 2019-02-21 RX ADMIN — Medication 250 MILLIGRAM(S): at 00:11

## 2019-02-21 RX ADMIN — HEPARIN SODIUM 5000 UNIT(S): 5000 INJECTION INTRAVENOUS; SUBCUTANEOUS at 13:07

## 2019-02-21 RX ADMIN — Medication 3 MILLILITER(S): at 03:51

## 2019-02-21 RX ADMIN — Medication 3 MILLILITER(S): at 21:29

## 2019-02-21 NOTE — PHYSICAL THERAPY INITIAL EVALUATION ADULT - PRECAUTIONS/LIMITATIONS, REHAB EVAL
oxygen therapy device and L/min/fall precautions/contact precautions
trach collar/aspiration precautions/fall precautions/isolation precautions/oxygen therapy device and L/min

## 2019-02-21 NOTE — PROGRESS NOTE ADULT - SUBJECTIVE AND OBJECTIVE BOX
CC: F/U for Abscesses    Saw/spoke to patient. No fevers, no chills. Overall well. No new complaints.    Allergies  Valproate Sodium (Other (Severe))    ANTIMICROBIALS:  cefTAZidime/avibactam IVPB 2.5 every 8 hours  cefTAZidime/avibactam IVPB    metroNIDAZOLE  IVPB    metroNIDAZOLE  IVPB 500 every 8 hours  vancomycin  IVPB    vancomycin  IVPB 1000 every 8 hours    PE:    Vital Signs Last 24 Hrs  T(C): 36.8 (21 Feb 2019 13:02), Max: 37.3 (20 Feb 2019 21:23)  T(F): 98.2 (21 Feb 2019 13:02), Max: 99.1 (20 Feb 2019 21:23)  HR: 139 (21 Feb 2019 13:02) (112 - 139)  BP: 118/80 (21 Feb 2019 13:02) (116/76 - 124/77)  RR: 20 (21 Feb 2019 13:02) (20 - 22)  SpO2: 93% (21 Feb 2019 13:02) (92% - 99%)    Gen: AOx2-3, NAD, non-toxic  CV: S1+S2 normal, tachycardic  Resp: Clear bilat, no resp distress, no crackles/wheezes, trach  Abd: Soft, nontender, +BS, PEG  Ext: No LE edema, no wounds    LABS:                        9.2    8.87  )-----------( 408      ( 21 Feb 2019 06:20 )             30.6     02-21    139  |  100  |  7   ----------------------------<  116<H>  4.9   |  28  |  0.27<L>    Ca    8.3<L>      21 Feb 2019 06:20    MICROBIOLOGY:  Vancomycin Level, Trough: 24.6 ug/mL (02-21-19 @ 08:10)    BLOOD PERIPHERAL  02-17-19 NGTD    BLOOD VENOUS  02-02-19 NGTD    BLOOD VENOUS  01-24-19 --  --  BLOOD CULTURE PCR  Staphylococcus sp.,coag neg    ENDOTRACHEAL SPECIMEN  01-22-19 --  --  Pseudomonas aeruginosa    (otherwise reviewed)    RADIOLOGY:    2/18 CXR:    FINDINGS:   Tracheostomy tube in appropriate position.  Heart is not enlarged  Pulmonary vascular congestive changes present.  There are bilateral pleural effusions, left more than right - with   associated bibasilar atelectatic changes. No pneumothorax.  No acute osseous finding.

## 2019-02-21 NOTE — PHYSICAL THERAPY INITIAL EVALUATION ADULT - PATIENT/FAMILY/SIGNIFICANT OTHER GOALS STATEMENT, PT EVAL
no goal stated, pt. unable to speak.
Patient is unable to state any goals , as per sister she wants patient to be able to wheel his wheelchair again.

## 2019-02-21 NOTE — PROGRESS NOTE ADULT - ASSESSMENT
53 Male w/ a PMHx of TBI (s/p PEG tube, contracture, and seizure d/o) presented as a transfer from [facility] on 12/9 for respiratory failure 2/2 ARDS likely 2/2 necrotizing pancreatitis s/p intubation. Course c/b Acinetobacter PNA s/p Avycaz and Flagyl 7 day course.  Course further complicated by acute blood loss anemia s/p colonoscopy with findings suggestive of ischemic colitis, without further bleeding and Hgb remaining stable. S/p extubation 1/3, and re-intubation 1/6 now s/p tracheostomy.

## 2019-02-21 NOTE — PHYSICAL THERAPY INITIAL EVALUATION ADULT - MANUAL MUSCLE TESTING RESULTS, REHAB EVAL
right UE grossly 0/5, left UE grossly 3-/5. bilateral LE grossly 0/5
grossly assessed due to/left UE 3-/5 throughtout , 1/5 on left LE , 0/5 on right UE/LE

## 2019-02-21 NOTE — PROGRESS NOTE ADULT - ASSESSMENT
53 M (resident of [facility]) with TBI, s/p PEG tube, contracture, and seizure d/o who presented as a transfer from [facility] on 12/9 for respiratory failure 2/2 ARDS likely 2/2 necrotizing pancreatitis s/p intubation.  Bronc 12/11 with pseudomonas; Sputum cx with  acinetobacter  Sepsis with fever, leukopenia resolved, extubated but reintubated 1/6/19 for hypoxia and poor cough along with profuse secretions and sputum cx again with acinetobacter (restarted Avycaz/Flagyl 1/9--unclear treatment endpoint, s/p 4 weeks)  Necrotising Pancreatitis with multiple peripancreatitis collections better formed on CT 1/24 and a new 7 cm abscess  Last CT with ? superimposed pneumonia on collapsed lung  Leukocytosis resolved, mental status appears back to baseline  No further fever presently, remains unchanged  Overall, lung abscess, MDR organisms, necrotizing pancreatitis, intraabdominal collection  - Vanco--Hold dose, decrease to 1250mg q 12 when trough <15  - Avycaz/Flagyl  - Frequent suctioning and pulmonary toileting  - Monitor for any further signs infection; close monitoring for neurological symptoms on prolonged flagyl  - GI plans to repeat CT A/P 7-10 days following necrosectomy, continue abx for now    Neal Oleary MD  Pager [phone]  After 5pm and on weekends call [phone]

## 2019-02-21 NOTE — PHYSICAL THERAPY INITIAL EVALUATION ADULT - FOLLOWS COMMANDS/ANSWERS QUESTIONS, REHAB EVAL
able to follow single-step instructions/75% of the time/unable to answer questions
able to follow single-step instructions/inconsistantly

## 2019-02-21 NOTE — PROGRESS NOTE ADULT - PROBLEM SELECTOR PLAN 3
- fevers resolved, afebrile now  - ID following   - repeat CT on 2/12 now improved  - cont current abx for now until after GI procedures completed

## 2019-02-21 NOTE — PHYSICAL THERAPY INITIAL EVALUATION ADULT - GENERAL OBSERVATIONS, REHAB EVAL
Consult received, chart reviewed. Patient received seated in chair, NAD, +trach collar, +pulse ox, +tavares, +PEG. Patient agreed to Re-Evaluation from Physical Therapist.
Patient received semi supine in bed , (+) tele monitor, (+) fecal , (+) G tube , flexion contractures of right UE, bilateral plantar flexion contractures, (+) edema on bilateral feet ,(+) trach collar ,patient's mom and sister at bedside giving information about patient's prior functional abilities.

## 2019-02-21 NOTE — PHYSICAL THERAPY INITIAL EVALUATION ADULT - LEVEL OF INDEPENDENCE, REHAB EVAL
Pt. received seated in chair. Pt. received seated in chair. Per VANDANA Wick, pt. transferred to chair from bed with use of main lift.

## 2019-02-21 NOTE — PROGRESS NOTE ADULT - SUBJECTIVE AND OBJECTIVE BOX
CHIEF COMPLAINT: Patient is a 53y old  Male who presents with a chief complaint of ARDS?, sepsis (20 Feb 2019 08:06)    Interval Events:      REVIEW OF SYSTEMS:  Constitutional:   Eyes:  ENT:  CV:  Resp:  GI:  :  MSK:  Integumentary:  Neurological:  Psychiatric:  Endocrine:  Hematologic/Lymphatic:  Allergic/Immunologic:  [ ] All other systems negative  [ ] Unable to assess ROS because ________      OBJECTIVE:  ICU Vital Signs Last 24 Hrs  T(C): 36.4 (21 Feb 2019 02:00), Max: 37.4 (20 Feb 2019 14:00)  T(F): 97.6 (21 Feb 2019 02:00), Max: 99.4 (20 Feb 2019 14:00)  HR: 132 (21 Feb 2019 06:41) (112 - 132)  BP: 121/77 (21 Feb 2019 02:00) (108/70 - 124/77)  BP(mean): --  ABP: --  ABP(mean): --  RR: 20 (21 Feb 2019 02:00) (19 - 22)  SpO2: 96% (21 Feb 2019 06:41) (92% - 99%)    Mode: standby    02-21 @ 07:01  -  02-21 @ 07:53  --------------------------------------------------------  IN: 0 mL / OUT: 300 mL / NET: -300 mL    POCT Blood Glucose.: 119 mg/dL (21 Feb 2019 06:03)    HOSPITAL MEDICATIONS:  MEDICATIONS  (STANDING):  ALBUTerol/ipratropium for Nebulization 3 milliLiter(s) Nebulizer every 6 hours  cefTAZidime/avibactam IVPB 2.5 Gram(s) IV Intermittent every 8 hours  cefTAZidime/avibactam IVPB      chlorhexidine 4% Liquid 1 Application(s) Topical <User Schedule>  cyanocobalamin 1000 MICROGram(s) Oral daily  dextrose 5%. 1000 milliLiter(s) (50 mL/Hr) IV Continuous <Continuous>  dextrose 50% Injectable 12.5 Gram(s) IV Push once  dextrose 50% Injectable 25 Gram(s) IV Push once  dextrose 50% Injectable 25 Gram(s) IV Push once  folic acid 1 milliGRAM(s) Enteral Tube daily  heparin  Injectable 5000 Unit(s) SubCutaneous every 8 hours  insulin lispro (HumaLOG) corrective regimen sliding scale   SubCutaneous every 6 hours  lacosamide 100 milliGRAM(s) Oral two times a day  lactobacillus acidophilus 1 Tablet(s) Oral three times a day with meals  metroNIDAZOLE  IVPB      metroNIDAZOLE  IVPB 500 milliGRAM(s) IV Intermittent every 8 hours  psyllium Powder 1 Packet(s) Oral daily  QUEtiapine 25 milliGRAM(s) Oral daily  risperiDONE   Solution 1 milliGRAM(s) Oral daily  vancomycin  IVPB      vancomycin  IVPB 1000 milliGRAM(s) IV Intermittent every 8 hours    MEDICATIONS  (PRN):  acetaminophen    Suspension .. 650 milliGRAM(s) Oral every 6 hours PRN Temp greater or equal to 38C (100.4F), Mild Pain (1 - 3), Moderate Pain (4 - 6)  dextrose 40% Gel 15 Gram(s) Oral once PRN Blood Glucose LESS THAN 70 milliGRAM(s)/deciliter  glucagon  Injectable 1 milliGRAM(s) IntraMuscular once PRN Glucose LESS THAN 70 milligrams/deciliter      LABS:                        9.2    8.87  )-----------( 408      ( 21 Feb 2019 06:20 )             30.6     02-21    139  |  100  |  7   ----------------------------<  116<H>  4.9   |  28  |  0.27<L>    Ca    8.3<L>      21 Feb 2019 06:20                MICROBIOLOGY:     RADIOLOGY:  [ ] Reviewed and interpreted by me    PULMONARY FUNCTION TESTS:    EKG: CHIEF COMPLAINT: Patient is a 53y old  Male who presents with a chief complaint of ARDS?, sepsis (20 Feb 2019 08:06)    Interval Events: none overnight      REVIEW OF SYSTEMS:  Constitutional: no complaints   CV: denies  Resp: denies  GI: denies  [x] All other systems negative  [ ] Unable to assess ROS because ________      OBJECTIVE:  ICU Vital Signs Last 24 Hrs  T(C): 36.4 (21 Feb 2019 02:00), Max: 37.4 (20 Feb 2019 14:00)  T(F): 97.6 (21 Feb 2019 02:00), Max: 99.4 (20 Feb 2019 14:00)  HR: 132 (21 Feb 2019 06:41) (112 - 132)  BP: 121/77 (21 Feb 2019 02:00) (108/70 - 124/77)  BP(mean): --  ABP: --  ABP(mean): --  RR: 20 (21 Feb 2019 02:00) (19 - 22)  SpO2: 96% (21 Feb 2019 06:41) (92% - 99%)    Mode: standby    02-21 @ 07:01  -  02-21 @ 07:53  --------------------------------------------------------  IN: 0 mL / OUT: 300 mL / NET: -300 mL    POCT Blood Glucose.: 119 mg/dL (21 Feb 2019 06:03)    HOSPITAL MEDICATIONS:  MEDICATIONS  (STANDING):  ALBUTerol/ipratropium for Nebulization 3 milliLiter(s) Nebulizer every 6 hours  cefTAZidime/avibactam IVPB 2.5 Gram(s) IV Intermittent every 8 hours  cefTAZidime/avibactam IVPB      chlorhexidine 4% Liquid 1 Application(s) Topical <User Schedule>  cyanocobalamin 1000 MICROGram(s) Oral daily  dextrose 5%. 1000 milliLiter(s) (50 mL/Hr) IV Continuous <Continuous>  dextrose 50% Injectable 12.5 Gram(s) IV Push once  dextrose 50% Injectable 25 Gram(s) IV Push once  dextrose 50% Injectable 25 Gram(s) IV Push once  folic acid 1 milliGRAM(s) Enteral Tube daily  heparin  Injectable 5000 Unit(s) SubCutaneous every 8 hours  insulin lispro (HumaLOG) corrective regimen sliding scale   SubCutaneous every 6 hours  lacosamide 100 milliGRAM(s) Oral two times a day  lactobacillus acidophilus 1 Tablet(s) Oral three times a day with meals  metroNIDAZOLE  IVPB      metroNIDAZOLE  IVPB 500 milliGRAM(s) IV Intermittent every 8 hours  psyllium Powder 1 Packet(s) Oral daily  QUEtiapine 25 milliGRAM(s) Oral daily  risperiDONE   Solution 1 milliGRAM(s) Oral daily  vancomycin  IVPB      vancomycin  IVPB 1000 milliGRAM(s) IV Intermittent every 8 hours    MEDICATIONS  (PRN):  acetaminophen    Suspension .. 650 milliGRAM(s) Oral every 6 hours PRN Temp greater or equal to 38C (100.4F), Mild Pain (1 - 3), Moderate Pain (4 - 6)  dextrose 40% Gel 15 Gram(s) Oral once PRN Blood Glucose LESS THAN 70 milliGRAM(s)/deciliter  glucagon  Injectable 1 milliGRAM(s) IntraMuscular once PRN Glucose LESS THAN 70 milligrams/deciliter      LABS:                        9.2    8.87  )-----------( 408      ( 21 Feb 2019 06:20 )             30.6     02-21    139  |  100  |  7   ----------------------------<  116<H>  4.9   |  28  |  0.27<L>    Ca    8.3<L>      21 Feb 2019 06:20

## 2019-02-21 NOTE — PROGRESS NOTE ADULT - PROBLEM SELECTOR PLAN 2
- s/p trach 1/9   - tolerating trach collar  - trach care, suction PRN  - chest PT  - will give 1 dose lasix 40mg IVP today  - consider change to cuffless in am

## 2019-02-21 NOTE — PHYSICAL THERAPY INITIAL EVALUATION ADULT - ACTIVE RANGE OF MOTION EXAMINATION, REHAB EVAL
left UE active assist ROM WFL. Pt. able to move left toes.
Left UE Active ROM was WFL (within functional limits)

## 2019-02-21 NOTE — PROGRESS NOTE ADULT - PROBLEM SELECTOR PLAN 1
- cont avycaz and flagyl and vanco per ID  - s/p EUS stent and drainage  - GI note appreciated  - s/p necrostomy on Friday 2/15  - plan for repeat CT tomorrow, if improved then will need repeat EGD to remove stent

## 2019-02-21 NOTE — PHYSICAL THERAPY INITIAL EVALUATION ADULT - PERTINENT HX OF CURRENT PROBLEM, REHAB EVAL
Pt. transferred from [facility] on 12/9 for respiratory failure secondary to ARDS. Per documentation, likely secondary to necrotizing pancreatitis s/p intubation. S/p extubation 1/3, and re-intubation 1/6, now s/p tracheostomy. PMH of TBI s/p PEG tube, contracture, and seizure disorder.
This is a 52yo M hx of TBI, G tube, contracture, seizure, resident of [facility] presented as a transfer from [facility] on 12/9 for resp failure concerning for ARDS s/p intubation, likely 2/2 pancreatitis.

## 2019-02-21 NOTE — PHYSICAL THERAPY INITIAL EVALUATION ADULT - ADDITIONAL COMMENTS
Pt. trached/unable to speak, pt. unable to provide social history and previous level of function. Per previous PT Evaluation on 1/15/19, Pt. is Resident of [facility]. Pt. is non-ambulatory and required use of main lift for transfers. Pt. requires assistance for ADLs.     Pt. was left seated in chair, NAD, all lines/devices intact. VANDANA Wick made aware of pt. status. Pt. trached/unable to speak, pt. unable to provide social history and previous level of function. Per previous PT Evaluation on 1/15/19, Pt. is Resident of [facility]. Pt. is non-ambulatory and required use of main lift for transfers. Pt. requires assistance for ADLs.     Pt. was left seated in chair post PT Re-Evaluation, NAD, all lines/devices intact. VANDANA Wick made aware of pt. status.

## 2019-02-22 LAB
BACTERIA BLD CULT: SIGNIFICANT CHANGE UP
GLUCOSE BLDC GLUCOMTR-MCNC: 117 MG/DL — HIGH (ref 70–99)
GLUCOSE BLDC GLUCOMTR-MCNC: 126 MG/DL — HIGH (ref 70–99)
GLUCOSE BLDC GLUCOMTR-MCNC: 151 MG/DL — HIGH (ref 70–99)
GLUCOSE BLDC GLUCOMTR-MCNC: 97 MG/DL — SIGNIFICANT CHANGE UP (ref 70–99)
VANCOMYCIN FLD-MCNC: 10.1 UG/ML — SIGNIFICANT CHANGE UP

## 2019-02-22 PROCEDURE — 74177 CT ABD & PELVIS W/CONTRAST: CPT | Mod: 26

## 2019-02-22 PROCEDURE — 99233 SBSQ HOSP IP/OBS HIGH 50: CPT

## 2019-02-22 PROCEDURE — 99232 SBSQ HOSP IP/OBS MODERATE 35: CPT

## 2019-02-22 RX ORDER — VANCOMYCIN HCL 1 G
1250 VIAL (EA) INTRAVENOUS ONCE
Qty: 0 | Refills: 0 | Status: COMPLETED | OUTPATIENT
Start: 2019-02-22 | End: 2019-02-22

## 2019-02-22 RX ORDER — VANCOMYCIN HCL 1 G
VIAL (EA) INTRAVENOUS
Qty: 0 | Refills: 0 | Status: DISCONTINUED | OUTPATIENT
Start: 2019-02-22 | End: 2019-02-24

## 2019-02-22 RX ORDER — VANCOMYCIN HCL 1 G
1250 VIAL (EA) INTRAVENOUS EVERY 12 HOURS
Qty: 0 | Refills: 0 | Status: DISCONTINUED | OUTPATIENT
Start: 2019-02-22 | End: 2019-02-24

## 2019-02-22 RX ADMIN — HEPARIN SODIUM 5000 UNIT(S): 5000 INJECTION INTRAVENOUS; SUBCUTANEOUS at 22:27

## 2019-02-22 RX ADMIN — Medication 100 MILLIGRAM(S): at 23:20

## 2019-02-22 RX ADMIN — Medication 1 TABLET(S): at 12:32

## 2019-02-22 RX ADMIN — Medication 2: at 23:45

## 2019-02-22 RX ADMIN — RISPERIDONE 1 MILLIGRAM(S): 4 TABLET ORAL at 13:01

## 2019-02-22 RX ADMIN — CHLORHEXIDINE GLUCONATE 1 APPLICATION(S): 213 SOLUTION TOPICAL at 12:33

## 2019-02-22 RX ADMIN — LACOSAMIDE 100 MILLIGRAM(S): 50 TABLET ORAL at 05:20

## 2019-02-22 RX ADMIN — Medication 3 MILLILITER(S): at 09:24

## 2019-02-22 RX ADMIN — Medication 100 MILLIGRAM(S): at 13:01

## 2019-02-22 RX ADMIN — Medication 100 MILLIGRAM(S): at 05:16

## 2019-02-22 RX ADMIN — QUETIAPINE FUMARATE 25 MILLIGRAM(S): 200 TABLET, FILM COATED ORAL at 12:32

## 2019-02-22 RX ADMIN — LACOSAMIDE 100 MILLIGRAM(S): 50 TABLET ORAL at 18:03

## 2019-02-22 RX ADMIN — HEPARIN SODIUM 5000 UNIT(S): 5000 INJECTION INTRAVENOUS; SUBCUTANEOUS at 05:16

## 2019-02-22 RX ADMIN — Medication 166.67 MILLIGRAM(S): at 23:20

## 2019-02-22 RX ADMIN — HEPARIN SODIUM 5000 UNIT(S): 5000 INJECTION INTRAVENOUS; SUBCUTANEOUS at 13:02

## 2019-02-22 RX ADMIN — Medication 1 PACKET(S): at 12:32

## 2019-02-22 RX ADMIN — Medication 3 MILLILITER(S): at 03:49

## 2019-02-22 RX ADMIN — Medication 1 TABLET(S): at 18:03

## 2019-02-22 RX ADMIN — Medication 166.67 MILLIGRAM(S): at 12:32

## 2019-02-22 RX ADMIN — Medication 3 MILLILITER(S): at 16:05

## 2019-02-22 RX ADMIN — Medication 1 MILLIGRAM(S): at 12:32

## 2019-02-22 RX ADMIN — Medication 3 MILLILITER(S): at 21:34

## 2019-02-22 RX ADMIN — PREGABALIN 1000 MICROGRAM(S): 225 CAPSULE ORAL at 13:02

## 2019-02-22 NOTE — PROGRESS NOTE ADULT - ASSESSMENT
Impression:  1) Walled off necrosis, s/p EUS and AXIOS 1/31/19. S/P endoscopic necrosectomy on 2/15/19.  2) Hematochezia, s/p colonoscopy on 12/28/2018 with severe segmental colitis localized to the transverse colon and ascending colon (possible ischemic colitis, possible colitis related to contiguous danay-pancreatic inflammatory process). Biopsies with granulation tissue but no infection/malignancy, Hb stable   3) Resp failure in the setting of pneumonia, requiring tracheostomy now with lung abscess and sepsis on IV  Abx, ID following     Recommendations:  - Monitor CBC, CMP daily  - Repeat CT A/P today  - pending CT read will plan for repeat EGD with stent removal or further necrosectomy if needed   - Continue IV antibiotics per ID recommendations  - Supportive care per primary team

## 2019-02-22 NOTE — PROGRESS NOTE ADULT - PROBLEM SELECTOR PLAN 1
- cont avycaz and flagyl and vanco per ID  - s/p EUS stent and drainage  - s/p necrostomy on Friday 2/15  - plan for repeat CT today, if improved then will need repeat EGD to remove stent  - GI note appreciated

## 2019-02-22 NOTE — PROGRESS NOTE ADULT - SUBJECTIVE AND OBJECTIVE BOX
CHIEF COMPLAINT: Patient is a 53y old  Male who presents with a chief complaint of ARDS?, sepsis (22 Feb 2019 07:21)    Interval Events:      REVIEW OF SYSTEMS:  Constitutional:   Eyes:  ENT:  CV:  Resp:  GI:  :  MSK:  Integumentary:  Neurological:  Psychiatric:  Endocrine:  Hematologic/Lymphatic:  Allergic/Immunologic:  [ ] All other systems negative  [ ] Unable to assess ROS because ________      OBJECTIVE:  ICU Vital Signs Last 24 Hrs  T(C): 36.8 (22 Feb 2019 05:09), Max: 37 (21 Feb 2019 22:50)  T(F): 98.2 (22 Feb 2019 05:09), Max: 98.6 (21 Feb 2019 22:50)  HR: 120 (22 Feb 2019 05:09) (119 - 139)  BP: 124/84 (22 Feb 2019 05:09) (118/80 - 130/88)  BP(mean): --  ABP: --  ABP(mean): --  RR: 20 (22 Feb 2019 05:09) (20 - 22)  SpO2: 97% (22 Feb 2019 05:09) (91% - 97%)    Mode: standby    02-21 @ 07:01  -  02-22 @ 07:00  --------------------------------------------------------  IN: 0 mL / OUT: 1150 mL / NET: -1150 mL    POCT Blood Glucose.: 117 mg/dL (22 Feb 2019 05:30)    HOSPITAL MEDICATIONS:  MEDICATIONS  (STANDING):  ALBUTerol/ipratropium for Nebulization 3 milliLiter(s) Nebulizer every 6 hours  cefTAZidime/avibactam IVPB 2.5 Gram(s) IV Intermittent every 8 hours  cefTAZidime/avibactam IVPB      chlorhexidine 4% Liquid 1 Application(s) Topical <User Schedule>  cyanocobalamin 1000 MICROGram(s) Oral daily  dextrose 5%. 1000 milliLiter(s) (50 mL/Hr) IV Continuous <Continuous>  dextrose 50% Injectable 12.5 Gram(s) IV Push once  dextrose 50% Injectable 25 Gram(s) IV Push once  dextrose 50% Injectable 25 Gram(s) IV Push once  folic acid 1 milliGRAM(s) Enteral Tube daily  heparin  Injectable 5000 Unit(s) SubCutaneous every 8 hours  insulin lispro (HumaLOG) corrective regimen sliding scale   SubCutaneous every 6 hours  lacosamide Solution 100 milliGRAM(s) Oral two times a day  lactobacillus acidophilus 1 Tablet(s) Oral three times a day with meals  metroNIDAZOLE  IVPB      metroNIDAZOLE  IVPB 500 milliGRAM(s) IV Intermittent every 8 hours  psyllium Powder 1 Packet(s) Oral daily  QUEtiapine 25 milliGRAM(s) Oral daily  risperiDONE   Solution 1 milliGRAM(s) Oral daily  vancomycin  IVPB      vancomycin  IVPB 1000 milliGRAM(s) IV Intermittent every 8 hours    MEDICATIONS  (PRN):  acetaminophen    Suspension .. 650 milliGRAM(s) Oral every 6 hours PRN Temp greater or equal to 38C (100.4F), Mild Pain (1 - 3), Moderate Pain (4 - 6)  dextrose 40% Gel 15 Gram(s) Oral once PRN Blood Glucose LESS THAN 70 milliGRAM(s)/deciliter  glucagon  Injectable 1 milliGRAM(s) IntraMuscular once PRN Glucose LESS THAN 70 milligrams/deciliter      LABS:                        9.2    8.87  )-----------( 408      ( 21 Feb 2019 06:20 )             30.6     02-21    139  |  100  |  7   ----------------------------<  116<H>  4.9   |  28  |  0.27<L>    Ca    8.3<L>      21 Feb 2019 06:20                MICROBIOLOGY:     RADIOLOGY:  [ ] Reviewed and interpreted by me    PULMONARY FUNCTION TESTS:    EKG: CHIEF COMPLAINT: Patient is a 53y old  Male who presents with a chief complaint of ARDS?, sepsis (22 Feb 2019 07:21)    Interval Events: none overnight - afebrile, tolerating trach collar 24/7      REVIEW OF SYSTEMS:  Constitutional: denies complaints overall  CV: denies  Resp: denies  GI: denies  [x] All other systems negative  [ ] Unable to assess ROS because ________      OBJECTIVE:  ICU Vital Signs Last 24 Hrs  T(C): 36.8 (22 Feb 2019 05:09), Max: 37 (21 Feb 2019 22:50)  T(F): 98.2 (22 Feb 2019 05:09), Max: 98.6 (21 Feb 2019 22:50)  HR: 120 (22 Feb 2019 05:09) (119 - 139)  BP: 124/84 (22 Feb 2019 05:09) (118/80 - 130/88)  BP(mean): --  ABP: --  ABP(mean): --  RR: 20 (22 Feb 2019 05:09) (20 - 22)  SpO2: 97% (22 Feb 2019 05:09) (91% - 97%)    Mode: standby    02-21 @ 07:01  -  02-22 @ 07:00  --------------------------------------------------------  IN: 0 mL / OUT: 1150 mL / NET: -1150 mL    POCT Blood Glucose.: 117 mg/dL (22 Feb 2019 05:30)    HOSPITAL MEDICATIONS:  MEDICATIONS  (STANDING):  ALBUTerol/ipratropium for Nebulization 3 milliLiter(s) Nebulizer every 6 hours  cefTAZidime/avibactam IVPB 2.5 Gram(s) IV Intermittent every 8 hours  cefTAZidime/avibactam IVPB      chlorhexidine 4% Liquid 1 Application(s) Topical <User Schedule>  cyanocobalamin 1000 MICROGram(s) Oral daily  dextrose 5%. 1000 milliLiter(s) (50 mL/Hr) IV Continuous <Continuous>  dextrose 50% Injectable 12.5 Gram(s) IV Push once  dextrose 50% Injectable 25 Gram(s) IV Push once  dextrose 50% Injectable 25 Gram(s) IV Push once  folic acid 1 milliGRAM(s) Enteral Tube daily  heparin  Injectable 5000 Unit(s) SubCutaneous every 8 hours  insulin lispro (HumaLOG) corrective regimen sliding scale   SubCutaneous every 6 hours  lacosamide Solution 100 milliGRAM(s) Oral two times a day  lactobacillus acidophilus 1 Tablet(s) Oral three times a day with meals  metroNIDAZOLE  IVPB      metroNIDAZOLE  IVPB 500 milliGRAM(s) IV Intermittent every 8 hours  psyllium Powder 1 Packet(s) Oral daily  QUEtiapine 25 milliGRAM(s) Oral daily  risperiDONE   Solution 1 milliGRAM(s) Oral daily  vancomycin  IVPB      vancomycin  IVPB 1000 milliGRAM(s) IV Intermittent every 8 hours    MEDICATIONS  (PRN):  acetaminophen    Suspension .. 650 milliGRAM(s) Oral every 6 hours PRN Temp greater or equal to 38C (100.4F), Mild Pain (1 - 3), Moderate Pain (4 - 6)  dextrose 40% Gel 15 Gram(s) Oral once PRN Blood Glucose LESS THAN 70 milliGRAM(s)/deciliter  glucagon  Injectable 1 milliGRAM(s) IntraMuscular once PRN Glucose LESS THAN 70 milligrams/deciliter

## 2019-02-22 NOTE — PROGRESS NOTE ADULT - PROBLEM SELECTOR PLAN 2
- s/p trach 1/9   - tolerating trach collar 24/7  - trach care, suction PRN  - chest PT  - consider change to cuffless in am

## 2019-02-22 NOTE — PROGRESS NOTE ADULT - SUBJECTIVE AND OBJECTIVE BOX
CC: F/U for Abscess    Saw/spoke to patient. No fevers, no chills. Unchanged. Nonverbal.    Allergies  Valproate Sodium (Other (Severe))    ANTIMICROBIALS:  cefTAZidime/avibactam IVPB 2.5 every 8 hours  cefTAZidime/avibactam IVPB    metroNIDAZOLE  IVPB    metroNIDAZOLE  IVPB 500 every 8 hours  vancomycin  IVPB    vancomycin  IVPB 1250 once  vancomycin  IVPB 1250 every 12 hours    PE:    Vital Signs Last 24 Hrs  T(C): 36.8 (22 Feb 2019 05:09), Max: 37 (21 Feb 2019 22:50)  T(F): 98.2 (22 Feb 2019 05:09), Max: 98.6 (21 Feb 2019 22:50)  HR: 112 (22 Feb 2019 09:25) (112 - 139)  BP: 124/84 (22 Feb 2019 05:09) (118/80 - 130/88)  RR: 20 (22 Feb 2019 05:09) (20 - 22)  SpO2: 97% (22 Feb 2019 05:09) (91% - 97%)    Gen: AOx3, NAD, non-toxic, pleasant  CV: S1+S2 normal, tachycardic  Resp: Clear bilat, no resp distress, no crackles/wheezes  Abd: Soft, nontender, +BS  Ext: No LE edema, no wounds    LABS:                        9.2    8.87  )-----------( 408      ( 21 Feb 2019 06:20 )             30.6     02-21    139  |  100  |  7   ----------------------------<  116<H>  4.9   |  28  |  0.27<L>    Ca    8.3<L>      21 Feb 2019 06:20    MICROBIOLOGY:  Vancomycin Level, Random: 10.1 ug/mL (02-22-19 @ 05:30)    BLOOD PERIPHERAL  02-17-19 NGTD    BLOOD VENOUS  01-24-19 --  --  BLOOD CULTURE PCR  Staphylococcus sp.,coag neg    (otherwise reviewed)    RADIOLOGY:    2/18 CXR:    FINDINGS:   Tracheostomy tube in appropriate position.  Heart is not enlarged  Pulmonary vascular congestive changes present.  There are bilateral pleural effusions, left more than right - with   associated bibasilar atelectatic changes. No pneumothorax.  No acute osseous finding..

## 2019-02-22 NOTE — PROGRESS NOTE ADULT - SUBJECTIVE AND OBJECTIVE BOX
Chief Complaint:  Patient is a 53y old  Male who presents with a chief complaint of ARDS?, sepsis (21 Feb 2019 07:53)      Interval Events: No adverse events overnight.    Allergies:  Valproate Sodium (Other (Severe))      Hospital Medications:  acetaminophen    Suspension .. 650 milliGRAM(s) Oral every 6 hours PRN  ALBUTerol/ipratropium for Nebulization 3 milliLiter(s) Nebulizer every 6 hours  cefTAZidime/avibactam IVPB 2.5 Gram(s) IV Intermittent every 8 hours  cefTAZidime/avibactam IVPB      chlorhexidine 4% Liquid 1 Application(s) Topical <User Schedule>  cyanocobalamin 1000 MICROGram(s) Oral daily  dextrose 40% Gel 15 Gram(s) Oral once PRN  dextrose 5%. 1000 milliLiter(s) IV Continuous <Continuous>  dextrose 50% Injectable 12.5 Gram(s) IV Push once  dextrose 50% Injectable 25 Gram(s) IV Push once  dextrose 50% Injectable 25 Gram(s) IV Push once  folic acid 1 milliGRAM(s) Enteral Tube daily  glucagon  Injectable 1 milliGRAM(s) IntraMuscular once PRN  heparin  Injectable 5000 Unit(s) SubCutaneous every 8 hours  insulin lispro (HumaLOG) corrective regimen sliding scale   SubCutaneous every 6 hours  lacosamide Solution 100 milliGRAM(s) Oral two times a day  lactobacillus acidophilus 1 Tablet(s) Oral three times a day with meals  metroNIDAZOLE  IVPB      metroNIDAZOLE  IVPB 500 milliGRAM(s) IV Intermittent every 8 hours  psyllium Powder 1 Packet(s) Oral daily  QUEtiapine 25 milliGRAM(s) Oral daily  risperiDONE   Solution 1 milliGRAM(s) Oral daily  vancomycin  IVPB      vancomycin  IVPB 1000 milliGRAM(s) IV Intermittent every 8 hours      PMHX/PSHX:  Seizure  TBI (traumatic brain injury)  S/P percutaneous endoscopic gastrostomy (PEG) tube placement  No significant past surgical history      Family history:      ROS:     General:  No wt loss, fevers, chills, night sweats, fatigue,   Eyes:  Good vision, no reported pain  ENT:  No sore throat, pain, runny nose, dysphagia  CV:  No pain, palpitations, hypo/hypertension  Resp:  No dyspnea, cough, tachypnea, wheezing  GI:  See HPI  :  No pain, bleeding, incontinence, nocturia  Muscle:  No pain, weakness  Neuro:  No weakness, tingling, memory problems  Psych:  No fatigue, insomnia, mood problems, depression  Endocrine:  No polyuria, polydipsia, cold/heat intolerance  Heme:  No petechiae, ecchymosis, easy bruisability  Skin:  No rash, edema      PHYSICAL EXAM:     GENERAL:  Appears stated age, well-groomed, well-nourished, no distress  HEENT:  NC/AT,  conjunctivae clear, sclera -anicteric  CHEST:  Full & symmetric excursion, no increased effort, breath sounds clear  HEART:  Regular rhythm, S1, S2, no murmur/rub/S3/S4,  no edema  ABDOMEN:  Soft, non-tender, non-distended, normoactive bowel sounds,  no masses ,no hepato-splenomegaly,   EXTREMITIES:  no cyanosis,clubbing or edema  SKIN:  No rash/erythema/ecchymoses/petechiae/wounds/abscess/warm/dry  NEURO:  Alert, oriented    Vital Signs:  Vital Signs Last 24 Hrs  T(C): 36.8 (22 Feb 2019 05:09), Max: 37 (21 Feb 2019 22:50)  T(F): 98.2 (22 Feb 2019 05:09), Max: 98.6 (21 Feb 2019 22:50)  HR: 120 (22 Feb 2019 05:09) (119 - 139)  BP: 124/84 (22 Feb 2019 05:09) (118/80 - 130/88)  BP(mean): --  RR: 20 (22 Feb 2019 05:09) (20 - 22)  SpO2: 97% (22 Feb 2019 05:09) (91% - 97%)  Daily     Daily     LABS:                        9.2    8.87  )-----------( 408      ( 21 Feb 2019 06:20 )             30.6     02-21    139  |  100  |  7   ----------------------------<  116<H>  4.9   |  28  |  0.27<L>    Ca    8.3<L>      21 Feb 2019 06:20                Imaging:

## 2019-02-22 NOTE — PROGRESS NOTE ADULT - ASSESSMENT
53 M (resident of [facility]) with TBI, s/p PEG tube, contracture, and seizure d/o who presented as a transfer from [facility] on 12/9 for respiratory failure 2/2 ARDS likely 2/2 necrotizing pancreatitis s/p intubation.  Bronc 12/11 with pseudomonas; Sputum cx with  acinetobacter  Sepsis with fever, leukopenia resolved, extubated but reintubated 1/6/19 for hypoxia and poor cough along with profuse secretions and sputum cx again with acinetobacter (restarted Avycaz/Flagyl 1/9--unclear treatment endpoint, s/p 4 weeks)  Necrotising Pancreatitis with multiple peripancreatitis collections better formed on CT 1/24 and a new 7 cm abscess  Last CT with ? superimposed pneumonia on collapsed lung  Leukocytosis resolved, mental status appears back to baseline  No further fever presently, remains unchanged, no signs underlying sepsis at this time  Overall, lung abscess, MDR organisms, necrotizing pancreatitis, intraabdominal collection  - Vanco 1250mg q 12 (monitor levels)  - Avycaz/Flagyl  - Frequent suctioning and pulmonary toileting  - Monitor for any further signs infection; close monitoring for neurological symptoms on prolonged flagyl  - GI plans to repeat CT A/P 7-10 days following necrosectomy, continue abx for now    Neal Oleary MD  Pager [phone]  After 5pm and on weekends call [phone]

## 2019-02-22 NOTE — PROGRESS NOTE ADULT - ATTENDING COMMENTS
severe ARDS  severe necrotizing PNA  s/p pancreatic necrosis resection  on abx  GI to remove their device  seizure meds continue

## 2019-02-23 LAB
ALBUMIN SERPL ELPH-MCNC: 1.7 G/DL — LOW (ref 3.3–5)
ALP SERPL-CCNC: 228 U/L — HIGH (ref 40–120)
ALT FLD-CCNC: < 5 U/L — SIGNIFICANT CHANGE UP (ref 4–41)
ANION GAP SERPL CALC-SCNC: 7 MMO/L — SIGNIFICANT CHANGE UP (ref 7–14)
AST SERPL-CCNC: 11 U/L — SIGNIFICANT CHANGE UP (ref 4–40)
BILIRUB SERPL-MCNC: < 0.2 MG/DL — LOW (ref 0.2–1.2)
BUN SERPL-MCNC: 10 MG/DL — SIGNIFICANT CHANGE UP (ref 7–23)
CALCIUM SERPL-MCNC: 8.2 MG/DL — LOW (ref 8.4–10.5)
CHLORIDE SERPL-SCNC: 100 MMOL/L — SIGNIFICANT CHANGE UP (ref 98–107)
CO2 SERPL-SCNC: 33 MMOL/L — HIGH (ref 22–31)
CREAT SERPL-MCNC: 0.28 MG/DL — LOW (ref 0.5–1.3)
GLUCOSE BLDC GLUCOMTR-MCNC: 111 MG/DL — HIGH (ref 70–99)
GLUCOSE BLDC GLUCOMTR-MCNC: 120 MG/DL — HIGH (ref 70–99)
GLUCOSE BLDC GLUCOMTR-MCNC: 120 MG/DL — HIGH (ref 70–99)
GLUCOSE BLDC GLUCOMTR-MCNC: 130 MG/DL — HIGH (ref 70–99)
GLUCOSE SERPL-MCNC: 136 MG/DL — HIGH (ref 70–99)
HCT VFR BLD CALC: 28.6 % — LOW (ref 39–50)
HGB BLD-MCNC: 8.5 G/DL — LOW (ref 13–17)
MCHC RBC-ENTMCNC: 29.7 % — LOW (ref 32–36)
MCHC RBC-ENTMCNC: 30.6 PG — SIGNIFICANT CHANGE UP (ref 27–34)
MCV RBC AUTO: 102.9 FL — HIGH (ref 80–100)
NRBC # FLD: 0.02 K/UL — LOW (ref 25–125)
PLATELET # BLD AUTO: 384 K/UL — SIGNIFICANT CHANGE UP (ref 150–400)
PMV BLD: 9.5 FL — SIGNIFICANT CHANGE UP (ref 7–13)
POTASSIUM SERPL-MCNC: 3.8 MMOL/L — SIGNIFICANT CHANGE UP (ref 3.5–5.3)
POTASSIUM SERPL-SCNC: 3.8 MMOL/L — SIGNIFICANT CHANGE UP (ref 3.5–5.3)
PROT SERPL-MCNC: 7.6 G/DL — SIGNIFICANT CHANGE UP (ref 6–8.3)
RBC # BLD: 2.78 M/UL — LOW (ref 4.2–5.8)
RBC # FLD: 16.1 % — HIGH (ref 10.3–14.5)
SODIUM SERPL-SCNC: 140 MMOL/L — SIGNIFICANT CHANGE UP (ref 135–145)
WBC # BLD: 7.54 K/UL — SIGNIFICANT CHANGE UP (ref 3.8–10.5)
WBC # FLD AUTO: 7.54 K/UL — SIGNIFICANT CHANGE UP (ref 3.8–10.5)

## 2019-02-23 PROCEDURE — 99233 SBSQ HOSP IP/OBS HIGH 50: CPT

## 2019-02-23 RX ADMIN — HEPARIN SODIUM 5000 UNIT(S): 5000 INJECTION INTRAVENOUS; SUBCUTANEOUS at 05:49

## 2019-02-23 RX ADMIN — Medication 3 MILLILITER(S): at 09:32

## 2019-02-23 RX ADMIN — Medication 100 MILLIGRAM(S): at 14:43

## 2019-02-23 RX ADMIN — Medication 3 MILLILITER(S): at 03:24

## 2019-02-23 RX ADMIN — Medication 166.67 MILLIGRAM(S): at 11:37

## 2019-02-23 RX ADMIN — Medication 3 MILLILITER(S): at 15:13

## 2019-02-23 RX ADMIN — Medication 1 TABLET(S): at 17:51

## 2019-02-23 RX ADMIN — HEPARIN SODIUM 5000 UNIT(S): 5000 INJECTION INTRAVENOUS; SUBCUTANEOUS at 22:27

## 2019-02-23 RX ADMIN — RISPERIDONE 1 MILLIGRAM(S): 4 TABLET ORAL at 11:38

## 2019-02-23 RX ADMIN — CHLORHEXIDINE GLUCONATE 1 APPLICATION(S): 213 SOLUTION TOPICAL at 09:16

## 2019-02-23 RX ADMIN — Medication 1 PACKET(S): at 11:38

## 2019-02-23 RX ADMIN — Medication 1 TABLET(S): at 09:16

## 2019-02-23 RX ADMIN — Medication 100 MILLIGRAM(S): at 22:27

## 2019-02-23 RX ADMIN — QUETIAPINE FUMARATE 25 MILLIGRAM(S): 200 TABLET, FILM COATED ORAL at 11:38

## 2019-02-23 RX ADMIN — LACOSAMIDE 100 MILLIGRAM(S): 50 TABLET ORAL at 17:51

## 2019-02-23 RX ADMIN — PREGABALIN 1000 MICROGRAM(S): 225 CAPSULE ORAL at 11:38

## 2019-02-23 RX ADMIN — Medication 1 MILLIGRAM(S): at 11:38

## 2019-02-23 RX ADMIN — Medication 3 MILLILITER(S): at 21:24

## 2019-02-23 RX ADMIN — LACOSAMIDE 100 MILLIGRAM(S): 50 TABLET ORAL at 05:48

## 2019-02-23 RX ADMIN — Medication 1 TABLET(S): at 11:38

## 2019-02-23 RX ADMIN — Medication 100 MILLIGRAM(S): at 05:48

## 2019-02-23 RX ADMIN — HEPARIN SODIUM 5000 UNIT(S): 5000 INJECTION INTRAVENOUS; SUBCUTANEOUS at 14:43

## 2019-02-23 NOTE — PROGRESS NOTE ADULT - SUBJECTIVE AND OBJECTIVE BOX
CHIEF COMPLAINT:  Patient is a 53y old  Male who presents with a chief complaint of Mode: standby  Interval Events:    REVIEW OF SYSTEMS:  Constitutional:   Eyes:  ENT:  CV:  Resp:  GI:  :  MSK:  Integumentary:  Neurological:  Psychiatric:  Endocrine:  Hematologic/Lymphatic:  Allergic/Immunologic:  [ ] All other systems negative  [ ] Unable to assess ROS because ________    OBJECTIVE:  ICU Vital Signs Last 24 Hrs  T(C): 36.3 (23 Feb 2019 06:35), Max: 36.9 (22 Feb 2019 13:00)  T(F): 97.4 (23 Feb 2019 06:35), Max: 98.5 (22 Feb 2019 13:00)  HR: 115 (23 Feb 2019 06:50) (110 - 126)  BP: 119/76 (23 Feb 2019 06:35) (119/76 - 121/82)  BP(mean): --  ABP: --  ABP(mean): --  RR: 19 (23 Feb 2019 06:50) (18 - 20)  SpO2: 93% (23 Feb 2019 06:50) (90% - 100%)    Mode: standby    CAPILLARY BLOOD GLUCOSE      POCT Blood Glucose.: 130 mg/dL (23 Feb 2019 05:48)      PHYSICAL EXAM:  General:   HEENT:   Lymph Nodes:  Neck:   Respiratory:   Cardiovascular:   Abdomen:   Extremities:   Skin:   Neurological:  Psychiatry:    HOSPITAL MEDICATIONS:  MEDICATIONS  (STANDING):  ALBUTerol/ipratropium for Nebulization 3 milliLiter(s) Nebulizer every 6 hours  cefTAZidime/avibactam IVPB 2.5 Gram(s) IV Intermittent every 8 hours  cefTAZidime/avibactam IVPB      chlorhexidine 4% Liquid 1 Application(s) Topical <User Schedule>  cyanocobalamin 1000 MICROGram(s) Oral daily  dextrose 5%. 1000 milliLiter(s) (50 mL/Hr) IV Continuous <Continuous>  dextrose 50% Injectable 12.5 Gram(s) IV Push once  dextrose 50% Injectable 25 Gram(s) IV Push once  dextrose 50% Injectable 25 Gram(s) IV Push once  folic acid 1 milliGRAM(s) Enteral Tube daily  heparin  Injectable 5000 Unit(s) SubCutaneous every 8 hours  insulin lispro (HumaLOG) corrective regimen sliding scale   SubCutaneous every 6 hours  lacosamide Solution 100 milliGRAM(s) Oral two times a day  lactobacillus acidophilus 1 Tablet(s) Oral three times a day with meals  metroNIDAZOLE  IVPB      metroNIDAZOLE  IVPB 500 milliGRAM(s) IV Intermittent every 8 hours  psyllium Powder 1 Packet(s) Oral daily  QUEtiapine 25 milliGRAM(s) Oral daily  risperiDONE   Solution 1 milliGRAM(s) Oral daily  vancomycin  IVPB      vancomycin  IVPB 1250 milliGRAM(s) IV Intermittent every 12 hours    MEDICATIONS  (PRN):  acetaminophen    Suspension .. 650 milliGRAM(s) Oral every 6 hours PRN Temp greater or equal to 38C (100.4F), Mild Pain (1 - 3), Moderate Pain (4 - 6)  dextrose 40% Gel 15 Gram(s) Oral once PRN Blood Glucose LESS THAN 70 milliGRAM(s)/deciliter  glucagon  Injectable 1 milliGRAM(s) IntraMuscular once PRN Glucose LESS THAN 70 milligrams/deciliter      LABS:                    MICROBIOLOGY:     RADIOLOGY:  [ ] Reviewed and interpreted by me    PULMONARY FUNCTION TESTS:    EKG:    I&O's Summary CHIEF COMPLAINT:  Patient is a 53y old  Male who presents ARDS / Sepsis  Interval Events: No new events overnight    REVIEW OF SYSTEMS:  Constitutional: No acute distress  CV: Denies  Resp: Denies  GI: Denies  [ X ] All other systems negative    OBJECTIVE:  ICU Vital Signs Last 24 Hrs  T(C): 36.3 (23 Feb 2019 06:35), Max: 36.9 (22 Feb 2019 13:00)  T(F): 97.4 (23 Feb 2019 06:35), Max: 98.5 (22 Feb 2019 13:00)  HR: 115 (23 Feb 2019 06:50) (110 - 126)  BP: 119/76 (23 Feb 2019 06:35) (119/76 - 121/82)  BP(mean): --  ABP: --  ABP(mean): --  RR: 19 (23 Feb 2019 06:50) (18 - 20)  SpO2: 93% (23 Feb 2019 06:50) (90% - 100%)    Mode: standby    CAPILLARY BLOOD GLUCOSE    POCT Blood Glucose.: 130 mg/dL (23 Feb 2019 05:48)    · Constitutional	detailed exam	  · Constitutional Details	no distress	  · Eyes	EOMI; PERRL; no drainage or redness	  · ENMT	No oral lesions; no gross abnormalities	  · Neck	detailed exam	  · Neck Details	tracheostomy present	  · Respiratory	detailed exam	  · Respiratory Details	airway patent; breath sounds equal	  · Cardiovascular	detailed exam	  · Cardiovascular Details	regular rate and rhythm  no rub  no murmur	  · Cardiovascular Details	tachycardia	  · Gastrointestinal	detailed exam	  · GI Normal	soft; nontender	  · Gastrointestinal Comments	PEG present	  · Extremities	detailed exam	  · Extremities Details	no clubbing; no cyanosis; pedal edema	  · Pedal Edema Severity	2+	  · Pedal Edema Type	non-pitting	  · Neurological	detailed exam	  · Neurological Details	disoriented; strength decreased	  · Mental Status	alert follows commands responds appropriately, nods and mouths words	  · Musculoskeletal	detailed exam	  · Musculoskeletal Details	decreased ROM; diminished strength	    HOSPITAL MEDICATIONS:  MEDICATIONS  (STANDING):  ALBUTerol/ipratropium for Nebulization 3 milliLiter(s) Nebulizer every 6 hours  cefTAZidime/avibactam IVPB 2.5 Gram(s) IV Intermittent every 8 hours  cefTAZidime/avibactam IVPB      chlorhexidine 4% Liquid 1 Application(s) Topical <User Schedule>  cyanocobalamin 1000 MICROGram(s) Oral daily  dextrose 5%. 1000 milliLiter(s) (50 mL/Hr) IV Continuous <Continuous>  dextrose 50% Injectable 12.5 Gram(s) IV Push once  dextrose 50% Injectable 25 Gram(s) IV Push once  dextrose 50% Injectable 25 Gram(s) IV Push once  folic acid 1 milliGRAM(s) Enteral Tube daily  heparin  Injectable 5000 Unit(s) SubCutaneous every 8 hours  insulin lispro (HumaLOG) corrective regimen sliding scale   SubCutaneous every 6 hours  lacosamide Solution 100 milliGRAM(s) Oral two times a day  lactobacillus acidophilus 1 Tablet(s) Oral three times a day with meals  metroNIDAZOLE  IVPB      metroNIDAZOLE  IVPB 500 milliGRAM(s) IV Intermittent every 8 hours  psyllium Powder 1 Packet(s) Oral daily  QUEtiapine 25 milliGRAM(s) Oral daily  risperiDONE   Solution 1 milliGRAM(s) Oral daily  vancomycin  IVPB      vancomycin  IVPB 1250 milliGRAM(s) IV Intermittent every 12 hours    MEDICATIONS  (PRN):  acetaminophen    Suspension .. 650 milliGRAM(s) Oral every 6 hours PRN Temp greater or equal to 38C (100.4F), Mild Pain (1 - 3), Moderate Pain (4 - 6)  dextrose 40% Gel 15 Gram(s) Oral once PRN Blood Glucose LESS THAN 70 milliGRAM(s)/deciliter  glucagon  Injectable 1 milliGRAM(s) IntraMuscular once PRN Glucose LESS THAN 70 milligrams/deciliter

## 2019-02-23 NOTE — PROGRESS NOTE ADULT - ATTENDING COMMENTS
amazing recovery from severe ARDS and necrotizing pancreatitis  on abx for pancreatitis necrosis  on the trach collar doing well  DIANA resolved  Seizures are well controlled on AEDs

## 2019-02-23 NOTE — PROGRESS NOTE ADULT - PROBLEM SELECTOR PLAN 1
- cont avycaz and flagyl and vanco per ID  - s/p EUS stent and drainage  - s/p necrostomy on Friday 2/15  - Repeat CT 2/23/19, follow up with GI for possible EGD with stent removal

## 2019-02-24 LAB
ALBUMIN SERPL ELPH-MCNC: 1.5 G/DL — LOW (ref 3.3–5)
ALBUMIN SERPL ELPH-MCNC: 1.9 G/DL — LOW (ref 3.3–5)
ALP SERPL-CCNC: 230 U/L — HIGH (ref 40–120)
ALP SERPL-CCNC: 243 U/L — HIGH (ref 40–120)
ALT FLD-CCNC: 5 U/L — SIGNIFICANT CHANGE UP (ref 4–41)
ALT FLD-CCNC: < 4 U/L — LOW (ref 4–41)
ANION GAP SERPL CALC-SCNC: 9 MMO/L — SIGNIFICANT CHANGE UP (ref 7–14)
ANION GAP SERPL CALC-SCNC: 9 MMO/L — SIGNIFICANT CHANGE UP (ref 7–14)
AST SERPL-CCNC: 16 U/L — SIGNIFICANT CHANGE UP (ref 4–40)
AST SERPL-CCNC: 17 U/L — SIGNIFICANT CHANGE UP (ref 4–40)
BASE EXCESS BLDA CALC-SCNC: 7.7 MMOL/L — SIGNIFICANT CHANGE UP
BASOPHILS # BLD AUTO: 0.02 K/UL — SIGNIFICANT CHANGE UP (ref 0–0.2)
BASOPHILS NFR BLD AUTO: 0.3 % — SIGNIFICANT CHANGE UP (ref 0–2)
BILIRUB SERPL-MCNC: 0.3 MG/DL — SIGNIFICANT CHANGE UP (ref 0.2–1.2)
BILIRUB SERPL-MCNC: < 0.2 MG/DL — LOW (ref 0.2–1.2)
BLD GP AB SCN SERPL QL: NEGATIVE — SIGNIFICANT CHANGE UP
BUN SERPL-MCNC: 9 MG/DL — SIGNIFICANT CHANGE UP (ref 7–23)
BUN SERPL-MCNC: 9 MG/DL — SIGNIFICANT CHANGE UP (ref 7–23)
CALCIUM SERPL-MCNC: 8.1 MG/DL — LOW (ref 8.4–10.5)
CALCIUM SERPL-MCNC: 8.5 MG/DL — SIGNIFICANT CHANGE UP (ref 8.4–10.5)
CHLORIDE SERPL-SCNC: 96 MMOL/L — LOW (ref 98–107)
CHLORIDE SERPL-SCNC: 99 MMOL/L — SIGNIFICANT CHANGE UP (ref 98–107)
CO2 SERPL-SCNC: 28 MMOL/L — SIGNIFICANT CHANGE UP (ref 22–31)
CO2 SERPL-SCNC: 29 MMOL/L — SIGNIFICANT CHANGE UP (ref 22–31)
CREAT SERPL-MCNC: 0.24 MG/DL — LOW (ref 0.5–1.3)
CREAT SERPL-MCNC: 0.27 MG/DL — LOW (ref 0.5–1.3)
EOSINOPHIL # BLD AUTO: 0.13 K/UL — SIGNIFICANT CHANGE UP (ref 0–0.5)
EOSINOPHIL NFR BLD AUTO: 1.7 % — SIGNIFICANT CHANGE UP (ref 0–6)
GLUCOSE BLDC GLUCOMTR-MCNC: 120 MG/DL — HIGH (ref 70–99)
GLUCOSE BLDC GLUCOMTR-MCNC: 134 MG/DL — HIGH (ref 70–99)
GLUCOSE BLDC GLUCOMTR-MCNC: 134 MG/DL — HIGH (ref 70–99)
GLUCOSE BLDC GLUCOMTR-MCNC: 148 MG/DL — HIGH (ref 70–99)
GLUCOSE SERPL-MCNC: 128 MG/DL — HIGH (ref 70–99)
GLUCOSE SERPL-MCNC: 153 MG/DL — HIGH (ref 70–99)
HCO3 BLDA-SCNC: 30 MMOL/L — HIGH (ref 22–26)
HCT VFR BLD CALC: 29.8 % — LOW (ref 39–50)
HCT VFR BLD CALC: 31.5 % — LOW (ref 39–50)
HGB BLD-MCNC: 8.7 G/DL — LOW (ref 13–17)
HGB BLD-MCNC: 9.4 G/DL — LOW (ref 13–17)
IMM GRANULOCYTES NFR BLD AUTO: 0.8 % — SIGNIFICANT CHANGE UP (ref 0–1.5)
LACTATE BLDA-SCNC: 1.1 MMOL/L — SIGNIFICANT CHANGE UP (ref 0.5–2)
LYMPHOCYTES # BLD AUTO: 2.08 K/UL — SIGNIFICANT CHANGE UP (ref 1–3.3)
LYMPHOCYTES # BLD AUTO: 27.5 % — SIGNIFICANT CHANGE UP (ref 13–44)
MAGNESIUM SERPL-MCNC: 1.8 MG/DL — SIGNIFICANT CHANGE UP (ref 1.6–2.6)
MCHC RBC-ENTMCNC: 29.2 % — LOW (ref 32–36)
MCHC RBC-ENTMCNC: 29.8 % — LOW (ref 32–36)
MCHC RBC-ENTMCNC: 30.4 PG — SIGNIFICANT CHANGE UP (ref 27–34)
MCHC RBC-ENTMCNC: 30.5 PG — SIGNIFICANT CHANGE UP (ref 27–34)
MCV RBC AUTO: 102.3 FL — HIGH (ref 80–100)
MCV RBC AUTO: 104.2 FL — HIGH (ref 80–100)
MONOCYTES # BLD AUTO: 0.64 K/UL — SIGNIFICANT CHANGE UP (ref 0–0.9)
MONOCYTES NFR BLD AUTO: 8.5 % — SIGNIFICANT CHANGE UP (ref 2–14)
NEUTROPHILS # BLD AUTO: 4.62 K/UL — SIGNIFICANT CHANGE UP (ref 1.8–7.4)
NEUTROPHILS NFR BLD AUTO: 61.2 % — SIGNIFICANT CHANGE UP (ref 43–77)
NRBC # FLD: 0 K/UL — LOW (ref 25–125)
NRBC # FLD: 0 K/UL — LOW (ref 25–125)
PCO2 BLDA: 68 MMHG — HIGH (ref 35–48)
PH BLDA: 7.32 PH — LOW (ref 7.35–7.45)
PHOSPHATE SERPL-MCNC: 3.6 MG/DL — SIGNIFICANT CHANGE UP (ref 2.5–4.5)
PLATELET # BLD AUTO: 310 K/UL — SIGNIFICANT CHANGE UP (ref 150–400)
PLATELET # BLD AUTO: 412 K/UL — HIGH (ref 150–400)
PMV BLD: 9.3 FL — SIGNIFICANT CHANGE UP (ref 7–13)
PMV BLD: 9.9 FL — SIGNIFICANT CHANGE UP (ref 7–13)
PO2 BLDA: 165 MMHG — HIGH (ref 83–108)
POTASSIUM SERPL-MCNC: 4.4 MMOL/L — SIGNIFICANT CHANGE UP (ref 3.5–5.3)
POTASSIUM SERPL-MCNC: 4.5 MMOL/L — SIGNIFICANT CHANGE UP (ref 3.5–5.3)
POTASSIUM SERPL-SCNC: 4.4 MMOL/L — SIGNIFICANT CHANGE UP (ref 3.5–5.3)
POTASSIUM SERPL-SCNC: 4.5 MMOL/L — SIGNIFICANT CHANGE UP (ref 3.5–5.3)
PROT SERPL-MCNC: 7.2 G/DL — SIGNIFICANT CHANGE UP (ref 6–8.3)
PROT SERPL-MCNC: 7.8 G/DL — SIGNIFICANT CHANGE UP (ref 6–8.3)
RBC # BLD: 2.86 M/UL — LOW (ref 4.2–5.8)
RBC # BLD: 3.08 M/UL — LOW (ref 4.2–5.8)
RBC # FLD: 15.9 % — HIGH (ref 10.3–14.5)
RBC # FLD: 15.9 % — HIGH (ref 10.3–14.5)
RH IG SCN BLD-IMP: POSITIVE — SIGNIFICANT CHANGE UP
SAO2 % BLDA: 99.2 % — HIGH (ref 95–99)
SODIUM SERPL-SCNC: 133 MMOL/L — LOW (ref 135–145)
SODIUM SERPL-SCNC: 137 MMOL/L — SIGNIFICANT CHANGE UP (ref 135–145)
VANCOMYCIN TROUGH SERPL-MCNC: 27.3 UG/ML — CRITICAL HIGH (ref 10–20)
WBC # BLD: 7.38 K/UL — SIGNIFICANT CHANGE UP (ref 3.8–10.5)
WBC # BLD: 7.55 K/UL — SIGNIFICANT CHANGE UP (ref 3.8–10.5)
WBC # FLD AUTO: 7.38 K/UL — SIGNIFICANT CHANGE UP (ref 3.8–10.5)
WBC # FLD AUTO: 7.55 K/UL — SIGNIFICANT CHANGE UP (ref 3.8–10.5)

## 2019-02-24 PROCEDURE — 71045 X-RAY EXAM CHEST 1 VIEW: CPT | Mod: 26

## 2019-02-24 PROCEDURE — 99233 SBSQ HOSP IP/OBS HIGH 50: CPT | Mod: GC

## 2019-02-24 PROCEDURE — 93010 ELECTROCARDIOGRAM REPORT: CPT

## 2019-02-24 RX ORDER — CHLORHEXIDINE GLUCONATE 213 G/1000ML
15 SOLUTION TOPICAL
Qty: 0 | Refills: 0 | Status: DISCONTINUED | OUTPATIENT
Start: 2019-02-24 | End: 2019-03-07

## 2019-02-24 RX ADMIN — CHLORHEXIDINE GLUCONATE 1 APPLICATION(S): 213 SOLUTION TOPICAL at 13:01

## 2019-02-24 RX ADMIN — Medication 3 MILLILITER(S): at 10:56

## 2019-02-24 RX ADMIN — Medication 1 PACKET(S): at 12:59

## 2019-02-24 RX ADMIN — Medication 1 TABLET(S): at 12:59

## 2019-02-24 RX ADMIN — CHLORHEXIDINE GLUCONATE 15 MILLILITER(S): 213 SOLUTION TOPICAL at 17:48

## 2019-02-24 RX ADMIN — LACOSAMIDE 100 MILLIGRAM(S): 50 TABLET ORAL at 07:24

## 2019-02-24 RX ADMIN — Medication 166.67 MILLIGRAM(S): at 00:05

## 2019-02-24 RX ADMIN — QUETIAPINE FUMARATE 25 MILLIGRAM(S): 200 TABLET, FILM COATED ORAL at 13:00

## 2019-02-24 RX ADMIN — Medication 3 MILLILITER(S): at 16:35

## 2019-02-24 RX ADMIN — HEPARIN SODIUM 5000 UNIT(S): 5000 INJECTION INTRAVENOUS; SUBCUTANEOUS at 21:44

## 2019-02-24 RX ADMIN — Medication 100 MILLIGRAM(S): at 07:03

## 2019-02-24 RX ADMIN — Medication 1 MILLIGRAM(S): at 12:59

## 2019-02-24 RX ADMIN — HEPARIN SODIUM 5000 UNIT(S): 5000 INJECTION INTRAVENOUS; SUBCUTANEOUS at 07:03

## 2019-02-24 RX ADMIN — RISPERIDONE 1 MILLIGRAM(S): 4 TABLET ORAL at 12:59

## 2019-02-24 RX ADMIN — Medication 100 MILLIGRAM(S): at 13:06

## 2019-02-24 RX ADMIN — Medication 3 MILLILITER(S): at 04:35

## 2019-02-24 RX ADMIN — Medication 1 TABLET(S): at 17:40

## 2019-02-24 RX ADMIN — PREGABALIN 1000 MICROGRAM(S): 225 CAPSULE ORAL at 13:00

## 2019-02-24 RX ADMIN — Medication 1 TABLET(S): at 07:27

## 2019-02-24 RX ADMIN — HEPARIN SODIUM 5000 UNIT(S): 5000 INJECTION INTRAVENOUS; SUBCUTANEOUS at 13:07

## 2019-02-24 RX ADMIN — LACOSAMIDE 100 MILLIGRAM(S): 50 TABLET ORAL at 17:40

## 2019-02-24 NOTE — CHART NOTE - NSCHARTNOTEFT_GEN_A_CORE
RRT called for hypoxic respiratory failure. 53 Male w/ a PMHx of TBI (s/p PEG tube, contracture, and seizure d/o) presented as a transfer from [facility] on 12/9 for respiratory failure 2/2 ARDS likely 2/2 necrotizing pancreatitis s/p intubation. Course c/b Acinetobacter PNA s/p Avycaz and Flagyl 7 day course.  Course further complicated by acute blood loss anemia s/p colonoscopy with findings suggestive of ischemic colitis, without further bleeding and Hgb remaining stable. S/p extubation 1/3, and re-intubation 1/6 now s/p tracheostomy. Pt also with necrotizing pancreatitis s/p EUS stent and drainage, remains on abx. Upon my arrival, pt was being bagged with improvement of oxygen saturation to 99%. As per nursing staff, pt was noted to desat earlier in the day and again ~7 pm. Nurse tried in line suction and aggressive chest PT without avail. V: afebrile, HR 130s RR 40s /70s. FS 140s. EKG sinus tachycardia without ST changes. Pt was noted to be more lethargic than baseline with pupils equal and reactive to light. Pt was placed back on the vent at previous settings with improvement in mental status and oxygenation. MICU was notified, pt to be transferred to their service for resumption of care.      Asmita Muñoz, MAR   [id number]

## 2019-02-24 NOTE — PROGRESS NOTE ADULT - PROBLEM SELECTOR PLAN 1
- cont avycaz and flagyl and vanco per ID  - s/p EUS stent and drainage  - s/p necrostomy on Friday 2/15  - repeat CT performed 2/23  - will follow up with GI re: further EGD/stent removal/procedures needed

## 2019-02-24 NOTE — PROGRESS NOTE ADULT - SUBJECTIVE AND OBJECTIVE BOX
CHIEF COMPLAINT: Patient is a 53y old  Male who presents with a chief complaint of ARDS?, sepsis (23 Feb 2019 08:08)    Interval Events:      REVIEW OF SYSTEMS:  Constitutional:   Eyes:  ENT:  CV:  Resp:  GI:  :  MSK:  Integumentary:  Neurological:  Psychiatric:  Endocrine:  Hematologic/Lymphatic:  Allergic/Immunologic:  [ ] All other systems negative  [ ] Unable to assess ROS because ________      OBJECTIVE:  ICU Vital Signs Last 24 Hrs  T(C): 36.6 (23 Feb 2019 22:31), Max: 36.6 (23 Feb 2019 22:31)  T(F): 97.9 (23 Feb 2019 22:31), Max: 97.9 (23 Feb 2019 22:31)  HR: 114 (24 Feb 2019 04:35) (100 - 125)  BP: 120/70 (23 Feb 2019 22:31) (120/70 - 124/81)  BP(mean): --  ABP: --  ABP(mean): --  RR: 20 (23 Feb 2019 22:31) (20 - 20)  SpO2: 94% (24 Feb 2019 04:35) (94% - 100%)    02-23 @ 07:01  -  02-24 @ 07:00  --------------------------------------------------------  IN: 0 mL / OUT: 400 mL / NET: -400 mL    POCT Blood Glucose.: 134 mg/dL (24 Feb 2019 06:26)    HOSPITAL MEDICATIONS:  MEDICATIONS  (STANDING):  ALBUTerol/ipratropium for Nebulization 3 milliLiter(s) Nebulizer every 6 hours  cefTAZidime/avibactam IVPB 2.5 Gram(s) IV Intermittent every 8 hours  cefTAZidime/avibactam IVPB      chlorhexidine 4% Liquid 1 Application(s) Topical <User Schedule>  cyanocobalamin 1000 MICROGram(s) Oral daily  dextrose 5%. 1000 milliLiter(s) (50 mL/Hr) IV Continuous <Continuous>  dextrose 50% Injectable 12.5 Gram(s) IV Push once  dextrose 50% Injectable 25 Gram(s) IV Push once  dextrose 50% Injectable 25 Gram(s) IV Push once  folic acid 1 milliGRAM(s) Enteral Tube daily  heparin  Injectable 5000 Unit(s) SubCutaneous every 8 hours  insulin lispro (HumaLOG) corrective regimen sliding scale   SubCutaneous every 6 hours  lacosamide Solution 100 milliGRAM(s) Oral two times a day  lactobacillus acidophilus 1 Tablet(s) Oral three times a day with meals  metroNIDAZOLE  IVPB      metroNIDAZOLE  IVPB 500 milliGRAM(s) IV Intermittent every 8 hours  psyllium Powder 1 Packet(s) Oral daily  QUEtiapine 25 milliGRAM(s) Oral daily  risperiDONE   Solution 1 milliGRAM(s) Oral daily    MEDICATIONS  (PRN):  acetaminophen    Suspension .. 650 milliGRAM(s) Oral every 6 hours PRN Temp greater or equal to 38C (100.4F), Mild Pain (1 - 3), Moderate Pain (4 - 6)  dextrose 40% Gel 15 Gram(s) Oral once PRN Blood Glucose LESS THAN 70 milliGRAM(s)/deciliter  glucagon  Injectable 1 milliGRAM(s) IntraMuscular once PRN Glucose LESS THAN 70 milligrams/deciliter      LABS:                        8.7    7.38  )-----------( 310      ( 24 Feb 2019 04:37 )             29.8     02-24    133<L>  |  96<L>  |  9   ----------------------------<  128<H>  4.5   |  28  |  0.24<L>    Ca    8.1<L>      24 Feb 2019 04:37    TPro  7.2  /  Alb  1.5<L>  /  TBili  < 0.2<L>  /  DBili  x   /  AST  17  /  ALT  5   /  AlkPhos  230<H>  02-24              MICROBIOLOGY:     RADIOLOGY:  [ ] Reviewed and interpreted by me    PULMONARY FUNCTION TESTS:    EKG: CHIEF COMPLAINT: Patient is a 53y old  Male who presents with a chief complaint of ARDS?, sepsis (23 Feb 2019 08:08)    Interval Events: none overnight, afebrile      REVIEW OF SYSTEMS:  Constitutional: no complaints  CV: denies  Resp: denies  GI: denies  [x] All other systems negative  [ ] Unable to assess ROS because ________      OBJECTIVE:  ICU Vital Signs Last 24 Hrs  T(C): 36.6 (23 Feb 2019 22:31), Max: 36.6 (23 Feb 2019 22:31)  T(F): 97.9 (23 Feb 2019 22:31), Max: 97.9 (23 Feb 2019 22:31)  HR: 114 (24 Feb 2019 04:35) (100 - 125)  BP: 120/70 (23 Feb 2019 22:31) (120/70 - 124/81)  BP(mean): --  ABP: --  ABP(mean): --  RR: 20 (23 Feb 2019 22:31) (20 - 20)  SpO2: 94% (24 Feb 2019 04:35) (94% - 100%)    02-23 @ 07:01  -  02-24 @ 07:00  --------------------------------------------------------  IN: 0 mL / OUT: 400 mL / NET: -400 mL    POCT Blood Glucose.: 134 mg/dL (24 Feb 2019 06:26)    HOSPITAL MEDICATIONS:  MEDICATIONS  (STANDING):  ALBUTerol/ipratropium for Nebulization 3 milliLiter(s) Nebulizer every 6 hours  cefTAZidime/avibactam IVPB 2.5 Gram(s) IV Intermittent every 8 hours  cefTAZidime/avibactam IVPB      chlorhexidine 4% Liquid 1 Application(s) Topical <User Schedule>  cyanocobalamin 1000 MICROGram(s) Oral daily  dextrose 5%. 1000 milliLiter(s) (50 mL/Hr) IV Continuous <Continuous>  dextrose 50% Injectable 12.5 Gram(s) IV Push once  dextrose 50% Injectable 25 Gram(s) IV Push once  dextrose 50% Injectable 25 Gram(s) IV Push once  folic acid 1 milliGRAM(s) Enteral Tube daily  heparin  Injectable 5000 Unit(s) SubCutaneous every 8 hours  insulin lispro (HumaLOG) corrective regimen sliding scale   SubCutaneous every 6 hours  lacosamide Solution 100 milliGRAM(s) Oral two times a day  lactobacillus acidophilus 1 Tablet(s) Oral three times a day with meals  metroNIDAZOLE  IVPB      metroNIDAZOLE  IVPB 500 milliGRAM(s) IV Intermittent every 8 hours  psyllium Powder 1 Packet(s) Oral daily  QUEtiapine 25 milliGRAM(s) Oral daily  risperiDONE   Solution 1 milliGRAM(s) Oral daily    MEDICATIONS  (PRN):  acetaminophen    Suspension .. 650 milliGRAM(s) Oral every 6 hours PRN Temp greater or equal to 38C (100.4F), Mild Pain (1 - 3), Moderate Pain (4 - 6)  dextrose 40% Gel 15 Gram(s) Oral once PRN Blood Glucose LESS THAN 70 milliGRAM(s)/deciliter  glucagon  Injectable 1 milliGRAM(s) IntraMuscular once PRN Glucose LESS THAN 70 milligrams/deciliter      LABS:                        8.7    7.38  )-----------( 310      ( 24 Feb 2019 04:37 )             29.8     02-24    133<L>  |  96<L>  |  9   ----------------------------<  128<H>  4.5   |  28  |  0.24<L>    Ca    8.1<L>      24 Feb 2019 04:37    TPro  7.2  /  Alb  1.5<L>  /  TBili  < 0.2<L>  /  DBili  x   /  AST  17  /  ALT  5   /  AlkPhos  230<H>  02-24

## 2019-02-25 LAB
ANION GAP SERPL CALC-SCNC: 12 MMO/L — SIGNIFICANT CHANGE UP (ref 7–14)
BUN SERPL-MCNC: 8 MG/DL — SIGNIFICANT CHANGE UP (ref 7–23)
CALCIUM SERPL-MCNC: 8.4 MG/DL — SIGNIFICANT CHANGE UP (ref 8.4–10.5)
CHLORIDE SERPL-SCNC: 98 MMOL/L — SIGNIFICANT CHANGE UP (ref 98–107)
CK MB BLD-MCNC: 2.66 NG/ML — SIGNIFICANT CHANGE UP (ref 1–6.6)
CK MB BLD-MCNC: 2.86 NG/ML — SIGNIFICANT CHANGE UP (ref 1–6.6)
CK MB BLD-MCNC: SIGNIFICANT CHANGE UP (ref 0–2.5)
CK MB BLD-MCNC: SIGNIFICANT CHANGE UP (ref 0–2.5)
CK SERPL-CCNC: 22 U/L — LOW (ref 30–200)
CK SERPL-CCNC: 23 U/L — LOW (ref 30–200)
CO2 SERPL-SCNC: 28 MMOL/L — SIGNIFICANT CHANGE UP (ref 22–31)
CREAT SERPL-MCNC: 0.3 MG/DL — LOW (ref 0.5–1.3)
GLUCOSE BLDC GLUCOMTR-MCNC: 106 MG/DL — HIGH (ref 70–99)
GLUCOSE BLDC GLUCOMTR-MCNC: 106 MG/DL — HIGH (ref 70–99)
GLUCOSE BLDC GLUCOMTR-MCNC: 144 MG/DL — HIGH (ref 70–99)
GLUCOSE BLDC GLUCOMTR-MCNC: 99 MG/DL — SIGNIFICANT CHANGE UP (ref 70–99)
GLUCOSE SERPL-MCNC: 100 MG/DL — HIGH (ref 70–99)
HCT VFR BLD CALC: 28.2 % — LOW (ref 39–50)
HGB BLD-MCNC: 8.6 G/DL — LOW (ref 13–17)
MCHC RBC-ENTMCNC: 30.5 % — LOW (ref 32–36)
MCHC RBC-ENTMCNC: 30.8 PG — SIGNIFICANT CHANGE UP (ref 27–34)
MCV RBC AUTO: 101.1 FL — HIGH (ref 80–100)
NRBC # FLD: 0 K/UL — LOW (ref 25–125)
PLATELET # BLD AUTO: 387 K/UL — SIGNIFICANT CHANGE UP (ref 150–400)
PMV BLD: 9.4 FL — SIGNIFICANT CHANGE UP (ref 7–13)
POTASSIUM SERPL-MCNC: 3.6 MMOL/L — SIGNIFICANT CHANGE UP (ref 3.5–5.3)
POTASSIUM SERPL-SCNC: 3.6 MMOL/L — SIGNIFICANT CHANGE UP (ref 3.5–5.3)
PROLACTIN SERPL-MCNC: 65.4 NG/ML — HIGH (ref 4.1–18.4)
RBC # BLD: 2.79 M/UL — LOW (ref 4.2–5.8)
RBC # FLD: 15.9 % — HIGH (ref 10.3–14.5)
SODIUM SERPL-SCNC: 138 MMOL/L — SIGNIFICANT CHANGE UP (ref 135–145)
TROPONIN T, HIGH SENSITIVITY: 60 NG/L — CRITICAL HIGH (ref ?–14)
TROPONIN T, HIGH SENSITIVITY: 79 NG/L — CRITICAL HIGH (ref ?–14)
VANCOMYCIN FLD-MCNC: 8.5 UG/ML — SIGNIFICANT CHANGE UP
WBC # BLD: 8.14 K/UL — SIGNIFICANT CHANGE UP (ref 3.8–10.5)
WBC # FLD AUTO: 8.14 K/UL — SIGNIFICANT CHANGE UP (ref 3.8–10.5)

## 2019-02-25 PROCEDURE — 99233 SBSQ HOSP IP/OBS HIGH 50: CPT | Mod: GC

## 2019-02-25 PROCEDURE — 99232 SBSQ HOSP IP/OBS MODERATE 35: CPT | Mod: GC

## 2019-02-25 PROCEDURE — 99232 SBSQ HOSP IP/OBS MODERATE 35: CPT

## 2019-02-25 RX ORDER — VANCOMYCIN HCL 1 G
750 VIAL (EA) INTRAVENOUS EVERY 12 HOURS
Qty: 0 | Refills: 0 | Status: DISCONTINUED | OUTPATIENT
Start: 2019-02-25 | End: 2019-03-04

## 2019-02-25 RX ADMIN — LACOSAMIDE 100 MILLIGRAM(S): 50 TABLET ORAL at 18:01

## 2019-02-25 RX ADMIN — Medication 100 MILLIGRAM(S): at 08:37

## 2019-02-25 RX ADMIN — Medication 1 MILLIGRAM(S): at 11:40

## 2019-02-25 RX ADMIN — Medication 1 TABLET(S): at 11:40

## 2019-02-25 RX ADMIN — HEPARIN SODIUM 5000 UNIT(S): 5000 INJECTION INTRAVENOUS; SUBCUTANEOUS at 14:05

## 2019-02-25 RX ADMIN — QUETIAPINE FUMARATE 25 MILLIGRAM(S): 200 TABLET, FILM COATED ORAL at 11:42

## 2019-02-25 RX ADMIN — Medication 100 MILLIGRAM(S): at 16:05

## 2019-02-25 RX ADMIN — CHLORHEXIDINE GLUCONATE 15 MILLILITER(S): 213 SOLUTION TOPICAL at 05:46

## 2019-02-25 RX ADMIN — Medication 250 MILLIGRAM(S): at 18:01

## 2019-02-25 RX ADMIN — Medication 1 PACKET(S): at 11:40

## 2019-02-25 RX ADMIN — Medication 3 MILLILITER(S): at 22:38

## 2019-02-25 RX ADMIN — RISPERIDONE 1 MILLIGRAM(S): 4 TABLET ORAL at 11:49

## 2019-02-25 RX ADMIN — HEPARIN SODIUM 5000 UNIT(S): 5000 INJECTION INTRAVENOUS; SUBCUTANEOUS at 23:52

## 2019-02-25 RX ADMIN — Medication 100 MILLIGRAM(S): at 00:22

## 2019-02-25 RX ADMIN — CHLORHEXIDINE GLUCONATE 15 MILLILITER(S): 213 SOLUTION TOPICAL at 18:03

## 2019-02-25 RX ADMIN — Medication 100 MILLIGRAM(S): at 23:52

## 2019-02-25 RX ADMIN — CHLORHEXIDINE GLUCONATE 1 APPLICATION(S): 213 SOLUTION TOPICAL at 11:41

## 2019-02-25 RX ADMIN — Medication 1 TABLET(S): at 18:01

## 2019-02-25 RX ADMIN — PREGABALIN 1000 MICROGRAM(S): 225 CAPSULE ORAL at 11:40

## 2019-02-25 RX ADMIN — HEPARIN SODIUM 5000 UNIT(S): 5000 INJECTION INTRAVENOUS; SUBCUTANEOUS at 05:46

## 2019-02-25 RX ADMIN — Medication 3 MILLILITER(S): at 16:01

## 2019-02-25 NOTE — CHART NOTE - NSCHARTNOTEFT_GEN_A_CORE
53 Male w/ a PMHx of TBI (s/p PEG tube, contracture, and seizure d/o) presented as a transfer from [facility] on 12/9 for respiratory failure 2/2 ARDS likely 2/2 necrotizing pancreatitis s/p intubation. Course c/b Acinetobacter PNA s/p Avycaz and Flagyl 7 day course.  Course further complicated by acute blood loss anemia s/p colonoscopy with findings suggestive of ischemic colitis, without further bleeding and Hgb remaining stable. S/p extubation 1/3, and re-intubation 1/6 now s/p tracheostomy. Patient transported back to MICU given need for vent support in the context of increasing secretions.    REVIEW OF SYSTEMS:  CONSTITUTIONAL: No weakness, fevers or chills  EYES/ENT: No visual changes;  No vertigo or throat pain   NECK: No pain or stiffness  RESPIRATORY: No cough, wheezing, hemoptysis; No shortness of breath  CARDIOVASCULAR: No chest pain or palpitations  GASTROINTESTINAL: No abdominal or epigastric pain. No nausea, vomiting, or hematemesis; No diarrhea or constipation. No melena or hematochezia.  GENITOURINARY: No dysuria, frequency or hematuria  NEUROLOGICAL: No numbness or weakness  SKIN: No itching, burning, rashes, or lesions   All other review of systems is negative unless indicated above.    T(C): 36.8 (02-24-19 @ 22:53), Max: 37.5 (02-24-19 @ 07:30)  HR: 118 (02-24-19 @ 23:45) (110 - 128)  BP: 105/69 (02-24-19 @ 23:03) (93/58 - 135/78)  RR: 22 (02-24-19 @ 23:03) (16 - 44)  SpO2: 97% (02-24-19 @ 23:45) (90% - 100%)  Wt(kg): --  Gen: AOx3, NAD, non-toxic, pleasant  CV: S1+S2 normal, tachycardic  Resp: Clear bilat, no resp distress, no crackles/wheezes  Abd: Soft, nontender, +BS  Ext: No LE edema, no wounds    (02-24 @ 21:18)                      9.4  7.55 )-----------( 412                 31.5    Neutrophils = 4.62 (61.2%)  Lymphocytes = 2.08 (27.5%)  Eosinophils = 0.13 (1.7%)  Basophils = 0.02 (0.3%)  Monocytes = 0.64 (8.5%)  Bands = --%    02-24    137  |  99  |  9   ----------------------------<  153<H>  4.4   |  29  |  0.27<L>    Ca    8.5      24 Feb 2019 21:18  Phos  3.6     02-24  Mg     1.8     02-24    TPro  7.8  /  Alb  1.9<L>  /  TBili  0.3  /  DBili  x   /  AST  16  /  ALT  < 4<L>  /  AlkPhos  243<H>  02-24    Arterial Blood Gas:  02-24 @ 21:14  7.32/68/165/30/99.2/7.7  ABG lactate: 1.1    Assessment and plan: 53 Male w/ a PMHx of TBI (s/p PEG tube, contracture, and seizure d/o) presented as a transfer from [facility] on 12/9 for respiratory failure 2/2 ARDS likely 2/2 necrotizing pancreatitis s/p intubation and trach/peg. Patient transferred back to MICU for ventilator supported trach.     Problem/Plan - 1:  ·  Problem: Necrotizing pancreatitis.    Plan: - cont avycaz and flagyl and vanco per ID  - s/p EUS stent and drainage  - s/p necrostomy on Friday 2/15  - repeat CT performed 2/23  - will follow up with GI re: further EGD/stent removal/procedures needed.      Problem/Plan - 2:  ·  Problem: ARDS (adult respiratory distress syndrome).    Plan: - s/p trach 1/9   - tolerating trach collar 24/7  - trach care, suction PRN  - chest PT.      Problem/Plan - 3:  ·  Problem: Fever.  Plan: - fevers resolved, afebrile now  - cont current abx for now until after GI procedures completed.      Problem/Plan - 4:  ·  Problem: Seizure.  Plan: - cont meds  - no seizure activity noted  - seizure precautions.      Problem/Plan - 5:  ·  Problem: PEG (percutaneous endoscopic gastrostomy) status.  Plan: - tolerating feeds  - aspiration precautions.      Problem/Plan - 6:  Problem: TBI (traumatic brain injury). Plan: - awake, alert  - contractures  - supportive care.     Problem/Plan - 7:  ·  Problem: Prophylactic measure.  Plan: - heparin. 53 Male w/ a PMHx of TBI (s/p PEG tube, contracture, and seizure d/o) presented as a transfer from [facility] on 12/9 for respiratory failure 2/2 ARDS likely 2/2 necrotizing pancreatitis s/p intubation. Course c/b Acinetobacter PNA s/p Avycaz and Flagyl 7 day course.  Course further complicated by acute blood loss anemia s/p colonoscopy with findings suggestive of ischemic colitis, without further bleeding and Hgb remaining stable. S/p extubation 1/3, and re-intubation 1/6 now s/p tracheostomy. Patient transported back to MICU given need for vent support in the context of increasing secretions.    REVIEW OF SYSTEMS:  CONSTITUTIONAL: No weakness, fevers or chills  EYES/ENT: No visual changes;  No vertigo or throat pain   NECK: No pain or stiffness  RESPIRATORY: No cough, wheezing, hemoptysis; No shortness of breath  CARDIOVASCULAR: No chest pain or palpitations  GASTROINTESTINAL: No abdominal or epigastric pain. No nausea, vomiting, or hematemesis; No diarrhea or constipation. No melena or hematochezia.  GENITOURINARY: No dysuria, frequency or hematuria  NEUROLOGICAL: No numbness or weakness  SKIN: No itching, burning, rashes, or lesions   All other review of systems is negative unless indicated above.    T(C): 36.8 (02-24-19 @ 22:53), Max: 37.5 (02-24-19 @ 07:30)  HR: 118 (02-24-19 @ 23:45) (110 - 128)  BP: 105/69 (02-24-19 @ 23:03) (93/58 - 135/78)  RR: 22 (02-24-19 @ 23:03) (16 - 44)  SpO2: 97% (02-24-19 @ 23:45) (90% - 100%)  Wt(kg): --  Gen: AOx3, NAD, non-toxic, pleasant  CV: S1+S2 normal, tachycardic  Resp: Clear bilat, no resp distress, no crackles/wheezes  Abd: Soft, nontender, +BS  Ext: No LE edema, no wounds    (02-24 @ 21:18)                      9.4  7.55 )-----------( 412                 31.5    Neutrophils = 4.62 (61.2%)  Lymphocytes = 2.08 (27.5%)  Eosinophils = 0.13 (1.7%)  Basophils = 0.02 (0.3%)  Monocytes = 0.64 (8.5%)  Bands = --%    02-24    137  |  99  |  9   ----------------------------<  153<H>  4.4   |  29  |  0.27<L>    Ca    8.5      24 Feb 2019 21:18  Phos  3.6     02-24  Mg     1.8     02-24    TPro  7.8  /  Alb  1.9<L>  /  TBili  0.3  /  DBili  x   /  AST  16  /  ALT  < 4<L>  /  AlkPhos  243<H>  02-24    Arterial Blood Gas:  02-24 @ 21:14  7.32/68/165/30/99.2/7.7  ABG lactate: 1.1    Assessment and plan: 53 Male w/ a PMHx of TBI (s/p PEG tube, contracture, and seizure d/o) presented as a transfer from [facility] on 12/9 for respiratory failure 2/2 ARDS likely 2/2 necrotizing pancreatitis s/p intubation and trach/peg. Patient transferred back to MICU for ventilator supported trach.     Problem/Plan - 1:  ·  Problem: Necrotizing pancreatitis.    Plan: - cont avycaz and flagyl and vanco per ID  - s/p EUS stent and drainage  - s/p necrostomy on Friday 2/15  - repeat CT performed 2/23  - will follow up with GI re: further EGD/stent removal/procedures needed.      Problem/Plan - 2:  ·  Problem: ARDS (adult respiratory distress syndrome).    Plan: - s/p trach 1/9   - placed on vent support 2/2 increased secretions  - trach care, suction PRN  - chest PT.      Problem/Plan - 3:  ·  Problem: Fever.  Plan: - fevers resolved, afebrile now  - cont current abx for now until after GI procedures completed.      Problem/Plan - 4:  ·  Problem: Seizure.  Plan: - cont meds  - no seizure activity noted  - seizure precautions.      Problem/Plan - 5:  ·  Problem: PEG (percutaneous endoscopic gastrostomy) status.  Plan: - tolerating feeds  - aspiration precautions.      Problem/Plan - 6:  Problem: TBI (traumatic brain injury). Plan: - awake, alert  - contractures  - supportive care.     Problem/Plan - 7:  ·  Problem: Prophylactic measure.  Plan: - heparin.      Attending Attestation:  54 yo with prolonged hospitalization and GI complications of necrotizing pancreatitis, s/p ARDS and trach, had been tolerating 24 hr TC but overnight required vent support for increased secretions.  Stable now on /14/+5/40%; transferred to unit as no vent beds available on floor

## 2019-02-25 NOTE — PROGRESS NOTE ADULT - SUBJECTIVE AND OBJECTIVE BOX
Chief Complaint:  Patient is a 53y old  Male who presents with a chief complaint of ARDS?, sepsis (2019 07:31)      Interval Events: Patient had desaturation events yesterday with RR, now accepted for transfer to ICU for respiratory support.    Allergies:  Valproate Sodium (Other (Severe))      Hospital Medications:  acetaminophen    Suspension .. 650 milliGRAM(s) Oral every 6 hours PRN  ALBUTerol/ipratropium for Nebulization 3 milliLiter(s) Nebulizer every 6 hours  cefTAZidime/avibactam IVPB 2.5 Gram(s) IV Intermittent every 8 hours  cefTAZidime/avibactam IVPB      chlorhexidine 0.12% Liquid 15 milliLiter(s) Oral Mucosa two times a day  chlorhexidine 4% Liquid 1 Application(s) Topical <User Schedule>  cyanocobalamin 1000 MICROGram(s) Oral daily  dextrose 40% Gel 15 Gram(s) Oral once PRN  dextrose 5%. 1000 milliLiter(s) IV Continuous <Continuous>  dextrose 50% Injectable 12.5 Gram(s) IV Push once  dextrose 50% Injectable 25 Gram(s) IV Push once  dextrose 50% Injectable 25 Gram(s) IV Push once  folic acid 1 milliGRAM(s) Enteral Tube daily  glucagon  Injectable 1 milliGRAM(s) IntraMuscular once PRN  heparin  Injectable 5000 Unit(s) SubCutaneous every 8 hours  insulin lispro (HumaLOG) corrective regimen sliding scale   SubCutaneous every 6 hours  lacosamide Solution 100 milliGRAM(s) Oral two times a day  lactobacillus acidophilus 1 Tablet(s) Oral three times a day with meals  metroNIDAZOLE  IVPB      metroNIDAZOLE  IVPB 500 milliGRAM(s) IV Intermittent every 8 hours  psyllium Powder 1 Packet(s) Oral daily  QUEtiapine 25 milliGRAM(s) Oral daily  risperiDONE   Solution 1 milliGRAM(s) Oral daily      PMHX/PSHX:  Seizure  TBI (traumatic brain injury)  S/P percutaneous endoscopic gastrostomy (PEG) tube placement  No significant past surgical history      Family history:      ROS: unable to obtain      PHYSICAL EXAM:     GENERAL: NAD  HEENT:  NC/AT  CHEST: Ventilated via trach collar  ABDOMEN:  Soft, non-tender, non-distended  EXTREMITIES:  no cyanosis,clubbing or edema  SKIN:  No rash  NEURO:  Alert, oriented    Vital Signs:  Vital Signs Last 24 Hrs  T(C): 37.2 (2019 04:00), Max: 37.5 (2019 07:30)  T(F): 99 (2019 04:00), Max: 99.5 (2019 07:30)  HR: 128 (2019 05:00) (110 - 129)  BP: 107/65 (2019 05:00) (89/54 - 135/78)  BP(mean): 72 (:00) (61 - 78)  RR: 27 (:00) (13 - 44)  SpO2: 95% (:00) (90% - 100%)  Daily     Daily Weight in k.7 (:00)    LABS:                        8.6    8.14  )-----------( 387      ( 2019 05:00 )             28.2         138  |  98  |  8   ----------------------------<  100<H>  3.6   |  28  |  0.30<L>    Ca    8.4      2019 05:00  Phos  3.6     02-24  Mg     1.8     02-24    TPro  7.8  /  Alb  1.9<L>  /  TBili  0.3  /  DBili  x   /  AST  16  /  ALT  < 4<L>  /  AlkPhos  243<H>  -24    LIVER FUNCTIONS - ( 2019 21:18 )  Alb: 1.9 g/dL / Pro: 7.8 g/dL / ALK PHOS: 243 u/L / ALT: < 4 u/L / AST: 16 u/L / GGT: x                   Imaging:    < from: CT Abdomen and Pelvis w/ IV Cont (19 @ 12:05) >    EXAM:  CT ABDOMEN AND PELVIS IC        PROCEDURE DATE:  2019         INTERPRETATION:  CLINICAL INFORMATION: Necrotizing pancreatitis to assess   pancreatic collections.      COMPARISON:  Prior CTs, the most recent dated 2019.    PROCEDURE:   CT of the Abdomen and Pelvis was performed with intravenous contrast.   Intravenous contrast: 90 ml Omnipaque 350. 10 ml discarded.  Oral contrast: None.  Sagittal and coronal reformats were performed.    FINDINGS:    LOWER CHEST: Moderate bilateral pleural effusions, with associated   compressive atelectasis. Coronary arterial calcification.    LIVER: Within normal limits.  BILE DUCTS: Normal caliber.  GALLBLADDER: Small calcification in the gallbladder wall, unchanged.  SPLEEN: Peripheral capsular calcification, unchanged.  PANCREAS: Status post cyst gastrostomy with Axios stent in place.   Multiple peripancreatic and perigastric fluid collections are again   noted, decreased in size. The largest collection adjacent to the greater   curvature of the stomach currently measures 3.8 x 2.9 cm (3:43),   previously 6.3 x 5.3 cm. A collection in the pancreatic head measures 2.8   x 2.1 cm (3:53), previously 3 x 2.5 cm.  ADRENALS: Within normal limits.  KIDNEYS/URETERS: No hydronephrosis. A subcentimeter hypodense focus in   the right kidney is too small to characterize, but unchanged.    BLADDER: Within normal limits.  REPRODUCTIVE ORGANS: Prostate within normal limits.    BOWEL: No bowel obstruction.  Gastrostomy tube. Rectum and sigmoid colon   are moderately distended with fecal material.  PERITONEUM: No ascites.  VESSELS:  Atherosclerotic calcification. Splenic vein is likely at least   partially thrombosed, with multiple collateral vessels.  RETROPERITONEUM: No lymphadenopathy.    ABDOMINAL WALL: Small fat-containing umbilical hernia. Diffuse muscular   atrophy.   BONES: Within normal limits.    IMPRESSION: Moderate bilateral pleural effusions, with associated   compressive atelectasis.    Multiple peripancreatic fluid collections, with interval decrease in   size, particularly of the largest collection adjacent to the greater   curvature of the stomach.                      ROSAURA MAKI M.D., ATTENDING RADIOLOGIST  This document has been electronically signed. 2019  2:22PM                  < end of copied text >

## 2019-02-25 NOTE — PROGRESS NOTE ADULT - ASSESSMENT
53 Male w/ a PMHx of TBI (s/p PEG tube, contracture, and seizure d/o) presented as a transfer from [facility] on 12/9 for respiratory failure 2/2 ARDS likely 2/2 necrotizing pancreatitis s/p intubation. Course c/b Acinetobacter PNA s/p Avycaz and Flagyl 7 day course.  Course further complicated by acute blood loss anemia s/p colonoscopy with findings suggestive of ischemic colitis, without further bleeding and Hgb remaining stable. S/p extubation 1/3, and re-intubation 1/6 now s/p tracheostomy.

## 2019-02-25 NOTE — PROGRESS NOTE ADULT - SUBJECTIVE AND OBJECTIVE BOX
CC: F/U for AMS    Saw/spoke to patient. No fevers x 24, no chills. Now transferred to CCU unit for higher monitoring. Had RRT with tachycardia, tacphynea. Today appears to have worsening mental status, poorly arousable.    Allergies  Valproate Sodium (Other (Severe))    ANTIMICROBIALS:  cefTAZidime/avibactam IVPB 2.5 every 8 hours  cefTAZidime/avibactam IVPB    metroNIDAZOLE  IVPB    metroNIDAZOLE  IVPB 500 every 8 hours    PE:    Vital Signs Last 24 Hrs  T(C): 37.9 (25 Feb 2019 12:00), Max: 37.9 (25 Feb 2019 10:48)  T(F): 100.2 (25 Feb 2019 12:00), Max: 100.2 (25 Feb 2019 10:48)  HR: 128 (25 Feb 2019 13:00) (110 - 132)  BP: 107/78 (25 Feb 2019 13:00) (89/54 - 135/78)  BP(mean): 85 (25 Feb 2019 13:00) (61 - 85)  RR: 24 (25 Feb 2019 13:00) (13 - 44)  SpO2: 97% (25 Feb 2019 13:00) (90% - 100%)    Gen: AOx0, NAD, lethargic, difficult to arouse  CV: S1+S2 normal, tachycardic  Resp: Clear bilat, no resp distress, no crackles/wheezes, trach  Abd: Soft, nontender, +BS, PEG  Ext: No LE edema, no wounds    LABS:                        8.6    8.14  )-----------( 387      ( 25 Feb 2019 05:00 )             28.2     02-25    138  |  98  |  8   ----------------------------<  100<H>  3.6   |  28  |  0.30<L>    Ca    8.4      25 Feb 2019 05:00  Phos  3.6     02-24  Mg     1.8     02-24    TPro  7.8  /  Alb  1.9<L>  /  TBili  0.3  /  DBili  x   /  AST  16  /  ALT  < 4<L>  /  AlkPhos  243<H>  02-24    MICROBIOLOGY:  Vancomycin Level, Random: 8.5 ug/mL (02-25-19 @ 05:00)    BLOOD PERIPHERAL  02-17-19 NGTD    BLOOD VENOUS  02-02-19 NGTD    (otherwise reviewed)    RADIOLOGY:    2/22 CT:    IMPRESSION: Moderate bilateral pleural effusions, with associated   compressive atelectasis.    Multiple peripancreatic fluid collections, with interval decrease in   size, particularly of the largest collection adjacent to the greater   curvature of the stomach.

## 2019-02-25 NOTE — PROVIDER CONTACT NOTE (OTHER) - SITUATION
Up on arrival from RCU patients peg tube noticed to be not in position. Same obsevation from RCU nurse Flora.

## 2019-02-25 NOTE — PROGRESS NOTE ADULT - ASSESSMENT
53 M (resident of [facility]) with TBI, s/p PEG tube, contracture, and seizure d/o who presented as a transfer from [facility] on 12/9 for respiratory failure 2/2 ARDS likely 2/2 necrotizing pancreatitis s/p intubation.  Bronc 12/11 with pseudomonas; Sputum cx with  acinetobacter  Sepsis with fever, leukopenia resolved, extubated but reintubated 1/6/19 for hypoxia and poor cough along with profuse secretions and sputum cx again with acinetobacter (restarted Avycaz/Flagyl 1/9--unclear treatment endpoint, s/p 4 weeks)  Necrotising Pancreatitis with multiple peripancreatitis collections better formed on CT 1/24 and a new 7 cm abscess  Last CT with ? superimposed pneumonia on collapsed lung  Leukocytosis resolved, no fever, but had worsened mental status over weekend with episode tachycardia/tachypnea  Unclear cause resp decompensation, being monitored in CCU unit  Repeat CT with decreased abd collections but moderate pleural effusions  Overall, lung abscess, MDR organisms, necrotizing pancreatitis, intraabdominal collection  - Resume Vanco at 750mg q 12 (start at more conservative dose considering high troughs found)  - Avycaz/Flagyl  - Frequent suctioning and pulmonary toileting  - F/U pulm--any role for drainage pleural effusions?    Neal Oleary MD  Pager [phone]  After 5pm and on weekends call [phone]

## 2019-02-25 NOTE — PROGRESS NOTE ADULT - PROBLEM SELECTOR PLAN 2
- s/p trach 1/9   -back on vent for Respiratory distress doing better tolerating CPAP trials today   - trach care, suction PRN  - chest PT

## 2019-02-25 NOTE — PROVIDER CONTACT NOTE (OTHER) - BACKGROUND
[place] History & Physical    Gender: male BW: 8 lb 0.1 oz (3632 g)   Age: 22 hours OB:    Gestational Age at Birth: Gestational Age: 39w5d Pediatrician: [facility] Post Discharge Provider: Ros[facility]     Maternal Information:     Mother's Name: Isaac Benz    Age: 35 y.o.         Maternal Prenatal Labs -- transcribed from office records:   ABO Type   Date Value Ref Range Status   2017 A  Final     RH type   Date Value Ref Range Status   2017 Positive  Final     Antibody Screen   Date Value Ref Range Status   2017 Negative  Final     External RPR   Date Value Ref Range Status   2017 Non-Reactive  Final     External Rubella Qual   Date Value Ref Range Status   2017 Immune  Final     External Hepatitis B Surface Ag   Date Value Ref Range Status   2017 Negative  Final     External HIV-1 Antibody   Date Value Ref Range Status   2017 Non-Reactive  Final     External Strep Group B Ag   Date Value Ref Range Status   2017 Positive  Final     No results found for: AMPHETSCREEN, BARBITSCNUR, LABBENZSCN, LABMETHSCN, PCPUR, LABOPIASCN, THCURSCR, COCSCRUR, PROPOXSCN, BUPRENORSCNU, OXYCODONESCN, UDS       Information for the patient's mother:  Isaac Benz [[id number]]     Patient Active Problem List   Diagnosis   • Term pregnancy        Mother's Past Medical and Social History:      Maternal /Para:    Maternal PMH:  History reviewed. No pertinent past medical history.   Maternal Social History:    Social History     Social History   • Marital status:      Spouse name: N/A   • Number of children: N/A   • Years of education: N/A     Occupational History   • Not on file.     Social History Main Topics   • Smoking status: Never Smoker   • Smokeless tobacco: Never Used   • Alcohol use No   • Drug use: No   • Sexual activity: Yes     Partners: Male     Other Topics Concern   • Not on file     Social History Narrative   • No narrative on file       Mother's Current  "Medications     Information for the patient's mother:  Isaac Benz [[id number]]   docusate sodium 100 mg Oral BID   famotidine 20 mg Intravenous Q12H   oxymetazoline 2 spray Each Nare BID   prenatal (CLASSIC) vitamin 1 tablet Oral Daily       Labor Information:      Labor Events      labor: No Induction:  Oxytocin    Steroids?  None Reason for Induction:  Elective   Rupture date:  2017 Complications:    Labor complications:  None  Additional complications:     Rupture time:  2:30 AM    Rupture type:  spontaneous rupture of membranes    Fluid Color:  Clear    Antibiotics during Labor?  Yes           Anesthesia     Method: Epidural     Analgesics:          Delivery Information for Iona Benz     YOB: 2019 Delivery Clinician:     Time of birth:  2:21 PM Delivery type:  Vaginal, Spontaneous Delivery   Forceps:     Vacuum:     Breech:      Presentation/position:          Observed Anomalies:  scale 4 Delivery Complications:          APGAR SCORES             APGARS  One minute Five minutes Ten minutes Fifteen minutes Twenty minutes   Skin color: 0   1             Heart rate: 2   2             Grimace: 2   2              Muscle tone: 2   2              Breathin   2              Totals: 8   9                Resuscitation     Suction: bulb syringe   Catheter size:     Suction below cords:     Intensive:       Objective     [place] Information     Vital Signs Temp:  [97.5 °F (36.4 °C)-98.7 °F (37.1 °C)] 98.4 °F (36.9 °C)  Heart Rate:  [115-154] 115  Resp:  [34-56] 34  BP: (56-64)/(33-40) 64/40   Admission Vital Signs: Vitals  Temp: 98.7 °F (37.1 °C)  Temp src: Axillary  Heart Rate: 152  Heart Rate Source: Apical  Resp: 50  Resp Rate Source: Stethoscope  BP: 56/33  Noninvasive MAP (mmHg): 41   Birth Weight: 8 lb 0.1 oz (3632 g)   Birth Length: 20.5   Birth Head circumference: Head Cir: 13.78\" (35 cm)   Current Weight: Weight: 7 lb 15.7 oz (3620 g)   Change in weight since " birth: 0%         Physical Exam     General appearance Normal Term male   Skin  No rashes.  Jaundice.  [place] spot on buttocks   Head AFSF.  Mild molding and caput. No cephalohematoma. No nuchal folds   Eyes  + RR bilaterally   Ears, Nose, Throat  Normal ears.  No ear pits. No ear tags.  Palate intact.   Thorax  Normal   Lungs BSBE - CTA. No distress.   Heart  Normal rate and rhythm.  No murmur, gallops. Peripheral pulses strong and equal in all 4 extremities.   Abdomen + BS.  Soft. NT. ND.  No mass/HSM   Genitalia  normal male, testes descended bilaterally, no inguinal hernia, no hydrocele   Anus Anus patent   Trunk and Spine Spine intact.  No sacral dimples.   Extremities  Clavicles intact.  No hip clicks/clunks.   Neuro + Cinthia, grasp, suck.  Normal Tone       Intake and Output     Feeding: breastfeed    Urine: x2  Stool: x2      Labs and Radiology     Prenatal labs:  reviewed    Baby's Blood type: No results found for: ABO, LABABO, RH, LABRH     Labs:   Recent Results (from the past 96 hour(s))   Blood Bank Cord Hold Tube    Collection Time: 10/20/17  2:26 PM   Result Value Ref Range    Extra Tube Hold for add-ons.        TCI: Risk assessment of Hyperbilirubinemia  TcB Point of Care testin.5  Calculation Age in Hours: 22  Risk Assessment of Patient is: (!) High intermediate risk zone     Xrays:  No orders to display         Assessment/Plan     Discharge planning     Congenital Heart Disease Screen:  Blood Pressure/O2 Saturation/Weights   Vitals (last 7 days)     Date/Time   BP   BP Location   SpO2   Weight    10/20/17 1940  --  --  --  7 lb 15.7 oz (3620 g)    10/20/17 1616  64/40  --  --  --    10/20/17 1615  56/33  --  --  --    10/20/17 1421  --  --  --  8 lb 0.1 oz (3632 g)    Weight: Filed from Delivery Summary at 10/20/17 1421               [place] Testing  CCHD     Car Seat Challenge Test     Hearing Screen       Screen         Immunization History   Administered Date(s) Administered   • Hep  B, Adolescent or Pediatric 2017       Assessment and Plan     Principal Problem:    Term  delivered vaginally, current hospitalization  Assessment: 39 week gestation - AGA.  MBT A+.  TCI 7.5 at 22 hours.  Prenatal labs negative except for GBS+.  Breastfeeding with adequate output.  Plan:   1. Continue  care  2. Serum Bili    Active Problems:    Asymptomatic  w/confirmed group B Strep maternal carriage  Assessment: GBS+.  ROM 12 hours PTD. PCN x2 PTD.  Clinically well  Plan:   1. Monitor in the hospital for 36-48 hours for signs of sepsis.    Tessa Portillo MD  2017  12:30 PM   Admitted to CCU from RCU as a MICU border

## 2019-02-25 NOTE — PROGRESS NOTE ADULT - ASSESSMENT
Impression:  1) Walled off necrosis, s/p EUS and AXIOS 1/31/19. S/P endoscopic necrosectomy on 2/15/19.  2) Hematochezia, s/p colonoscopy on 12/28/2018 with severe segmental colitis localized to the transverse colon and ascending colon (possible ischemic colitis, possible colitis related to contiguous danay-pancreatic inflammatory process). Biopsies with granulation tissue but no infection/malignancy, Hb stable   3) Resp failure in the setting of pneumonia, requiring tracheostomy now with lung abscess and sepsis on IV  Abx, ID following     Recommendations:  - repeat CT scan from Friday with significant improvement in collections; will plan when more stable from respiratory standpoint to repeat EGD to remove stent and place pigtail catheters  - Monitor CBC, CMP daily  - Continue IV antibiotics per ID recommendations  - Supportive care per primary

## 2019-02-25 NOTE — PROGRESS NOTE ADULT - PROBLEM SELECTOR PLAN 1
- cont avycaz and flagyl and vanco per ID  - s/p EUS stent and drainage  - s/p necrostomy on Friday 2/15  - repeat CT performed 2/23  - will follow up with GI re: further EGD/stent removal/ Per GI pt will go to endoscopy  suite for removal of stent

## 2019-02-25 NOTE — PROGRESS NOTE ADULT - SUBJECTIVE AND OBJECTIVE BOX
CHIEF COMPLAINT: Patient is a 53y old  Male who presents with a chief complaint of ARDS?, sepsis (25 Feb 2019 06:24)    Interval Events:      REVIEW OF SYSTEMS:  Constitutional:   Eyes:  ENT:  CV:  Resp:  GI:  :  MSK:  Integumentary:  Neurological:  Psychiatric:  Endocrine:  Hematologic/Lymphatic:  Allergic/Immunologic:  [ ] All other systems negative  [ ] Unable to assess ROS because ________      OBJECTIVE:  ICU Vital Signs Last 24 Hrs  T(C): 37.9 (25 Feb 2019 10:48), Max: 37.9 (25 Feb 2019 10:48)  T(F): 100.2 (25 Feb 2019 10:48), Max: 100.2 (25 Feb 2019 10:48)  HR: 124 (25 Feb 2019 10:00) (110 - 132)  BP: 98/58 (25 Feb 2019 10:00) (89/54 - 135/78)  BP(mean): 77 (25 Feb 2019 09:00) (61 - 79)  ABP: --  ABP(mean): --  RR: 19 (25 Feb 2019 10:00) (13 - 44)  SpO2: 96% (25 Feb 2019 10:00) (90% - 100%)    Mode: AC/ CMV (Assist Control/ Continuous Mandatory Ventilation), RR (machine): 14, TV (machine): 400, FiO2: 40, PEEP: 5, MAP: 8, PIP: 21    02-24 @ 07:01  -  02-25 @ 07:00  --------------------------------------------------------  IN: 700 mL / OUT: 400 mL / NET: 300 mL    POCT Blood Glucose.: 106 mg/dL (25 Feb 2019 05:51)    HOSPITAL MEDICATIONS:  MEDICATIONS  (STANDING):  ALBUTerol/ipratropium for Nebulization 3 milliLiter(s) Nebulizer every 6 hours  cefTAZidime/avibactam IVPB 2.5 Gram(s) IV Intermittent every 8 hours  cefTAZidime/avibactam IVPB      chlorhexidine 0.12% Liquid 15 milliLiter(s) Oral Mucosa two times a day  chlorhexidine 4% Liquid 1 Application(s) Topical <User Schedule>  cyanocobalamin 1000 MICROGram(s) Oral daily  dextrose 5%. 1000 milliLiter(s) (50 mL/Hr) IV Continuous <Continuous>  dextrose 50% Injectable 12.5 Gram(s) IV Push once  dextrose 50% Injectable 25 Gram(s) IV Push once  dextrose 50% Injectable 25 Gram(s) IV Push once  folic acid 1 milliGRAM(s) Enteral Tube daily  heparin  Injectable 5000 Unit(s) SubCutaneous every 8 hours  insulin lispro (HumaLOG) corrective regimen sliding scale   SubCutaneous every 6 hours  lacosamide Solution 100 milliGRAM(s) Oral two times a day  lactobacillus acidophilus 1 Tablet(s) Oral three times a day with meals  metroNIDAZOLE  IVPB      metroNIDAZOLE  IVPB 500 milliGRAM(s) IV Intermittent every 8 hours  psyllium Powder 1 Packet(s) Oral daily  QUEtiapine 25 milliGRAM(s) Oral daily  risperiDONE   Solution 1 milliGRAM(s) Oral daily    MEDICATIONS  (PRN):  acetaminophen    Suspension .. 650 milliGRAM(s) Oral every 6 hours PRN Temp greater or equal to 38C (100.4F), Mild Pain (1 - 3), Moderate Pain (4 - 6)  dextrose 40% Gel 15 Gram(s) Oral once PRN Blood Glucose LESS THAN 70 milliGRAM(s)/deciliter  glucagon  Injectable 1 milliGRAM(s) IntraMuscular once PRN Glucose LESS THAN 70 milligrams/deciliter      LABS:                        8.6    8.14  )-----------( 387      ( 25 Feb 2019 05:00 )             28.2     02-25    138  |  98  |  8   ----------------------------<  100<H>  3.6   |  28  |  0.30<L>    Ca    8.4      25 Feb 2019 05:00  Phos  3.6     02-24  Mg     1.8     02-24    TPro  7.8  /  Alb  1.9<L>  /  TBili  0.3  /  DBili  x   /  AST  16  /  ALT  < 4<L>  /  AlkPhos  243<H>  02-24        Arterial Blood Gas:  02-24 @ 21:14  7.32/68/165/30/99.2/7.7  ABG lactate: 1.1        MICROBIOLOGY:     RADIOLOGY:  [ ] Reviewed and interpreted by me    PULMONARY FUNCTION TESTS:    EKG: CHIEF COMPLAINT: Patient is a 53y old  Male who presents with a chief complaint of ARDS?, sepsis (25 Feb 2019 06:24)    Interval Events: Patient is a 53y old  Male who presents with a chief complaint of ARDS?, sepsis (25 Feb 2019 11:00)        REVIEW OF SYSTEMS:  Constitutional: Denies pain /fever   CV: denies   Resp: denies   GI: denies   [x ] All other systems negative  [ ] Unable to assess ROS because ________      OBJECTIVE:  ICU Vital Signs Last 24 Hrs  T(C): 37.9 (25 Feb 2019 10:48), Max: 37.9 (25 Feb 2019 10:48)  T(F): 100.2 (25 Feb 2019 10:48), Max: 100.2 (25 Feb 2019 10:48)  HR: 124 (25 Feb 2019 10:00) (110 - 132)  BP: 98/58 (25 Feb 2019 10:00) (89/54 - 135/78)  BP(mean): 77 (25 Feb 2019 09:00) (61 - 79)  ABP: --  ABP(mean): --  RR: 19 (25 Feb 2019 10:00) (13 - 44)  SpO2: 96% (25 Feb 2019 10:00) (90% - 100%)    Mode: AC/ CMV (Assist Control/ Continuous Mandatory Ventilation), RR (machine): 14, TV (machine): 400, FiO2: 40, PEEP: 5, MAP: 8, PIP: 21    02-24 @ 07:01  -  02-25 @ 07:00  --------------------------------------------------------  IN: 700 mL / OUT: 400 mL / NET: 300 mL    POCT Blood Glucose.: 106 mg/dL (25 Feb 2019 05:51)    HOSPITAL MEDICATIONS:  MEDICATIONS  (STANDING):  ALBUTerol/ipratropium for Nebulization 3 milliLiter(s) Nebulizer every 6 hours  cefTAZidime/avibactam IVPB 2.5 Gram(s) IV Intermittent every 8 hours  cefTAZidime/avibactam IVPB      chlorhexidine 0.12% Liquid 15 milliLiter(s) Oral Mucosa two times a day  chlorhexidine 4% Liquid 1 Application(s) Topical <User Schedule>  cyanocobalamin 1000 MICROGram(s) Oral daily  dextrose 5%. 1000 milliLiter(s) (50 mL/Hr) IV Continuous <Continuous>  dextrose 50% Injectable 12.5 Gram(s) IV Push once  dextrose 50% Injectable 25 Gram(s) IV Push once  dextrose 50% Injectable 25 Gram(s) IV Push once  folic acid 1 milliGRAM(s) Enteral Tube daily  heparin  Injectable 5000 Unit(s) SubCutaneous every 8 hours  insulin lispro (HumaLOG) corrective regimen sliding scale   SubCutaneous every 6 hours  lacosamide Solution 100 milliGRAM(s) Oral two times a day  lactobacillus acidophilus 1 Tablet(s) Oral three times a day with meals  metroNIDAZOLE  IVPB      metroNIDAZOLE  IVPB 500 milliGRAM(s) IV Intermittent every 8 hours  psyllium Powder 1 Packet(s) Oral daily  QUEtiapine 25 milliGRAM(s) Oral daily  risperiDONE   Solution 1 milliGRAM(s) Oral daily    MEDICATIONS  (PRN):  acetaminophen    Suspension .. 650 milliGRAM(s) Oral every 6 hours PRN Temp greater or equal to 38C (100.4F), Mild Pain (1 - 3), Moderate Pain (4 - 6)  dextrose 40% Gel 15 Gram(s) Oral once PRN Blood Glucose LESS THAN 70 milliGRAM(s)/deciliter  glucagon  Injectable 1 milliGRAM(s) IntraMuscular once PRN Glucose LESS THAN 70 milligrams/deciliter      LABS:                        8.6    8.14  )-----------( 387      ( 25 Feb 2019 05:00 )             28.2     02-25    138  |  98  |  8   ----------------------------<  100<H>  3.6   |  28  |  0.30<L>    Ca    8.4      25 Feb 2019 05:00  Phos  3.6     02-24  Mg     1.8     02-24    TPro  7.8  /  Alb  1.9<L>  /  TBili  0.3  /  DBili  x   /  AST  16  /  ALT  < 4<L>  /  AlkPhos  243<H>  02-24        Arterial Blood Gas:  02-24 @ 21:14  7.32/68/165/30/99.2/7.7  ABG lactate: 1.1        MICROBIOLOGY:     RADIOLOGY:  [ ] Reviewed and interpreted by me    PULMONARY FUNCTION TESTS:    EKG:

## 2019-02-25 NOTE — PROGRESS NOTE ADULT - ATTENDING COMMENTS
wean as tolerated  improved tracheal secretions today  GI follow up - now is ideal time for EGD as pt is vented

## 2019-02-26 LAB
ALBUMIN SERPL ELPH-MCNC: 1.9 G/DL — LOW (ref 3.3–5)
ALP SERPL-CCNC: 194 U/L — HIGH (ref 40–120)
ALT FLD-CCNC: 6 U/L — SIGNIFICANT CHANGE UP (ref 4–41)
ANION GAP SERPL CALC-SCNC: 8 MMO/L — SIGNIFICANT CHANGE UP (ref 7–14)
AST SERPL-CCNC: 11 U/L — SIGNIFICANT CHANGE UP (ref 4–40)
BASOPHILS # BLD AUTO: 0.03 K/UL — SIGNIFICANT CHANGE UP (ref 0–0.2)
BASOPHILS NFR BLD AUTO: 0.4 % — SIGNIFICANT CHANGE UP (ref 0–2)
BILIRUB SERPL-MCNC: 0.2 MG/DL — SIGNIFICANT CHANGE UP (ref 0.2–1.2)
BUN SERPL-MCNC: 9 MG/DL — SIGNIFICANT CHANGE UP (ref 7–23)
CALCIUM SERPL-MCNC: 8.2 MG/DL — LOW (ref 8.4–10.5)
CHLORIDE SERPL-SCNC: 101 MMOL/L — SIGNIFICANT CHANGE UP (ref 98–107)
CK MB BLD-MCNC: 2.12 NG/ML — SIGNIFICANT CHANGE UP (ref 1–6.6)
CK MB BLD-MCNC: SIGNIFICANT CHANGE UP (ref 0–2.5)
CK SERPL-CCNC: 17 U/L — LOW (ref 30–200)
CO2 SERPL-SCNC: 31 MMOL/L — SIGNIFICANT CHANGE UP (ref 22–31)
CREAT SERPL-MCNC: 0.29 MG/DL — LOW (ref 0.5–1.3)
EOSINOPHIL # BLD AUTO: 0.17 K/UL — SIGNIFICANT CHANGE UP (ref 0–0.5)
EOSINOPHIL NFR BLD AUTO: 2.1 % — SIGNIFICANT CHANGE UP (ref 0–6)
GLUCOSE BLDC GLUCOMTR-MCNC: 116 MG/DL — HIGH (ref 70–99)
GLUCOSE BLDC GLUCOMTR-MCNC: 133 MG/DL — HIGH (ref 70–99)
GLUCOSE BLDC GLUCOMTR-MCNC: 139 MG/DL — HIGH (ref 70–99)
GLUCOSE BLDC GLUCOMTR-MCNC: 151 MG/DL — HIGH (ref 70–99)
GLUCOSE SERPL-MCNC: 138 MG/DL — HIGH (ref 70–99)
HCT VFR BLD CALC: 27.7 % — LOW (ref 39–50)
HGB BLD-MCNC: 8.1 G/DL — LOW (ref 13–17)
IMM GRANULOCYTES NFR BLD AUTO: 0.5 % — SIGNIFICANT CHANGE UP (ref 0–1.5)
LYMPHOCYTES # BLD AUTO: 1.98 K/UL — SIGNIFICANT CHANGE UP (ref 1–3.3)
LYMPHOCYTES # BLD AUTO: 24.4 % — SIGNIFICANT CHANGE UP (ref 13–44)
MAGNESIUM SERPL-MCNC: 1.7 MG/DL — SIGNIFICANT CHANGE UP (ref 1.6–2.6)
MCHC RBC-ENTMCNC: 29.2 % — LOW (ref 32–36)
MCHC RBC-ENTMCNC: 29.9 PG — SIGNIFICANT CHANGE UP (ref 27–34)
MCV RBC AUTO: 102.2 FL — HIGH (ref 80–100)
MONOCYTES # BLD AUTO: 0.82 K/UL — SIGNIFICANT CHANGE UP (ref 0–0.9)
MONOCYTES NFR BLD AUTO: 10.1 % — SIGNIFICANT CHANGE UP (ref 2–14)
NEUTROPHILS # BLD AUTO: 5.06 K/UL — SIGNIFICANT CHANGE UP (ref 1.8–7.4)
NEUTROPHILS NFR BLD AUTO: 62.5 % — SIGNIFICANT CHANGE UP (ref 43–77)
NRBC # FLD: 0.02 K/UL — LOW (ref 25–125)
PHOSPHATE SERPL-MCNC: 2.6 MG/DL — SIGNIFICANT CHANGE UP (ref 2.5–4.5)
PLATELET # BLD AUTO: 422 K/UL — HIGH (ref 150–400)
PMV BLD: 9.4 FL — SIGNIFICANT CHANGE UP (ref 7–13)
POTASSIUM SERPL-MCNC: 3.6 MMOL/L — SIGNIFICANT CHANGE UP (ref 3.5–5.3)
POTASSIUM SERPL-SCNC: 3.6 MMOL/L — SIGNIFICANT CHANGE UP (ref 3.5–5.3)
PROT SERPL-MCNC: 7.3 G/DL — SIGNIFICANT CHANGE UP (ref 6–8.3)
RBC # BLD: 2.71 M/UL — LOW (ref 4.2–5.8)
RBC # FLD: 16.1 % — HIGH (ref 10.3–14.5)
SODIUM SERPL-SCNC: 140 MMOL/L — SIGNIFICANT CHANGE UP (ref 135–145)
TROPONIN T, HIGH SENSITIVITY: 50 NG/L — SIGNIFICANT CHANGE UP (ref ?–14)
WBC # BLD: 8.1 K/UL — SIGNIFICANT CHANGE UP (ref 3.8–10.5)
WBC # FLD AUTO: 8.1 K/UL — SIGNIFICANT CHANGE UP (ref 3.8–10.5)

## 2019-02-26 PROCEDURE — 99232 SBSQ HOSP IP/OBS MODERATE 35: CPT

## 2019-02-26 PROCEDURE — 99232 SBSQ HOSP IP/OBS MODERATE 35: CPT | Mod: GC

## 2019-02-26 PROCEDURE — 99233 SBSQ HOSP IP/OBS HIGH 50: CPT | Mod: GC

## 2019-02-26 RX ADMIN — Medication 1 TABLET(S): at 09:02

## 2019-02-26 RX ADMIN — HEPARIN SODIUM 5000 UNIT(S): 5000 INJECTION INTRAVENOUS; SUBCUTANEOUS at 21:58

## 2019-02-26 RX ADMIN — Medication 1 TABLET(S): at 11:37

## 2019-02-26 RX ADMIN — LACOSAMIDE 100 MILLIGRAM(S): 50 TABLET ORAL at 06:31

## 2019-02-26 RX ADMIN — Medication 2: at 00:00

## 2019-02-26 RX ADMIN — HEPARIN SODIUM 5000 UNIT(S): 5000 INJECTION INTRAVENOUS; SUBCUTANEOUS at 13:13

## 2019-02-26 RX ADMIN — RISPERIDONE 1 MILLIGRAM(S): 4 TABLET ORAL at 11:37

## 2019-02-26 RX ADMIN — QUETIAPINE FUMARATE 25 MILLIGRAM(S): 200 TABLET, FILM COATED ORAL at 11:37

## 2019-02-26 RX ADMIN — Medication 250 MILLIGRAM(S): at 17:43

## 2019-02-26 RX ADMIN — LACOSAMIDE 100 MILLIGRAM(S): 50 TABLET ORAL at 17:29

## 2019-02-26 RX ADMIN — Medication 100 MILLIGRAM(S): at 06:55

## 2019-02-26 RX ADMIN — Medication 100 MILLIGRAM(S): at 21:57

## 2019-02-26 RX ADMIN — PREGABALIN 1000 MICROGRAM(S): 225 CAPSULE ORAL at 11:37

## 2019-02-26 RX ADMIN — CHLORHEXIDINE GLUCONATE 15 MILLILITER(S): 213 SOLUTION TOPICAL at 17:29

## 2019-02-26 RX ADMIN — CHLORHEXIDINE GLUCONATE 15 MILLILITER(S): 213 SOLUTION TOPICAL at 06:31

## 2019-02-26 RX ADMIN — Medication 3 MILLILITER(S): at 09:46

## 2019-02-26 RX ADMIN — CHLORHEXIDINE GLUCONATE 1 APPLICATION(S): 213 SOLUTION TOPICAL at 10:15

## 2019-02-26 RX ADMIN — Medication 1 MILLIGRAM(S): at 11:37

## 2019-02-26 RX ADMIN — Medication 250 MILLIGRAM(S): at 06:31

## 2019-02-26 RX ADMIN — Medication 100 MILLIGRAM(S): at 13:13

## 2019-02-26 RX ADMIN — Medication 3 MILLILITER(S): at 21:50

## 2019-02-26 RX ADMIN — Medication 1 TABLET(S): at 17:29

## 2019-02-26 RX ADMIN — HEPARIN SODIUM 5000 UNIT(S): 5000 INJECTION INTRAVENOUS; SUBCUTANEOUS at 06:31

## 2019-02-26 RX ADMIN — Medication 1 PACKET(S): at 11:37

## 2019-02-26 RX ADMIN — Medication 3 MILLILITER(S): at 16:43

## 2019-02-26 RX ADMIN — Medication 3 MILLILITER(S): at 04:35

## 2019-02-26 NOTE — PROGRESS NOTE ADULT - ASSESSMENT
53 Male w/ a PMHx of TBI (s/p PEG tube, contracture, and seizure d/o) presented as a transfer from [facility] on 12/9 for respiratory failure 2/2 ARDS likely 2/2 necrotizing pancreatitis s/p intubation. Course c/b Acinetobacter PNA s/p Avycaz and Flagyl 7 day course.  Course further complicated by acute blood loss anemia s/p colonoscopy with findings suggestive of ischemic colitis, without further bleeding and Hgb remaining stable. S/p extubation 1/3, and re-intubation 1/6 now s/p tracheostomy.

## 2019-02-26 NOTE — PROGRESS NOTE ADULT - SUBJECTIVE AND OBJECTIVE BOX
Zackary Hector, PGY1  Pager: [phone]       INTERVAL HPI/OVERNIGHT EVENTS:    SUBJECTIVE: Patient seen and examined at bedside.     OBJECTIVE:    VITAL SIGNS:  ICU Vital Signs Last 24 Hrs  T(C): 36.6 (26 Feb 2019 08:44), Max: 37.9 (25 Feb 2019 10:48)  T(F): 97.9 (26 Feb 2019 08:44), Max: 100.2 (25 Feb 2019 10:48)  HR: 104 (26 Feb 2019 09:00) (104 - 131)  BP: 126/79 (26 Feb 2019 09:00) (98/58 - 129/70)  BP(mean): 88 (26 Feb 2019 09:00) (71 - 92)  ABP: --  ABP(mean): --  RR: 39 (26 Feb 2019 09:00) (18 - 39)  SpO2: 89% (26 Feb 2019 09:00) (89% - 99%)    Mode: standby    02-25 @ 07:01  -  02-26 @ 07:00  --------------------------------------------------------  IN: 1390 mL / OUT: 800 mL / NET: 590 mL      CAPILLARY BLOOD GLUCOSE      POCT Blood Glucose.: 151 mg/dL (25 Feb 2019 23:49)      PHYSICAL EXAM:    General: NAD  HEENT: NC/AT; PERRL, clear conjunctiva  Neck: supple  Respiratory: CTA b/l  Cardiovascular: +S1/S2; RRR  Abdomen: soft, NT/ND; +BS x4  Extremities: WWP, 2+ peripheral pulses b/l; no LE edema  Skin: normal color and turgor; no rash  Neurological:    MEDICATIONS:  MEDICATIONS  (STANDING):  ALBUTerol/ipratropium for Nebulization 3 milliLiter(s) Nebulizer every 6 hours  cefTAZidime/avibactam IVPB 2.5 Gram(s) IV Intermittent every 8 hours  cefTAZidime/avibactam IVPB      chlorhexidine 0.12% Liquid 15 milliLiter(s) Oral Mucosa two times a day  chlorhexidine 4% Liquid 1 Application(s) Topical <User Schedule>  cyanocobalamin 1000 MICROGram(s) Oral daily  dextrose 5%. 1000 milliLiter(s) (50 mL/Hr) IV Continuous <Continuous>  dextrose 50% Injectable 12.5 Gram(s) IV Push once  dextrose 50% Injectable 25 Gram(s) IV Push once  dextrose 50% Injectable 25 Gram(s) IV Push once  folic acid 1 milliGRAM(s) Enteral Tube daily  heparin  Injectable 5000 Unit(s) SubCutaneous every 8 hours  insulin lispro (HumaLOG) corrective regimen sliding scale   SubCutaneous every 6 hours  lacosamide Solution 100 milliGRAM(s) Oral two times a day  lactobacillus acidophilus 1 Tablet(s) Oral three times a day with meals  metroNIDAZOLE  IVPB      metroNIDAZOLE  IVPB 500 milliGRAM(s) IV Intermittent every 8 hours  psyllium Powder 1 Packet(s) Oral daily  QUEtiapine 25 milliGRAM(s) Oral daily  risperiDONE   Solution 1 milliGRAM(s) Oral daily  vancomycin  IVPB 750 milliGRAM(s) IV Intermittent every 12 hours    MEDICATIONS  (PRN):  acetaminophen    Suspension .. 650 milliGRAM(s) Oral every 6 hours PRN Temp greater or equal to 38C (100.4F), Mild Pain (1 - 3), Moderate Pain (4 - 6)  dextrose 40% Gel 15 Gram(s) Oral once PRN Blood Glucose LESS THAN 70 milliGRAM(s)/deciliter  glucagon  Injectable 1 milliGRAM(s) IntraMuscular once PRN Glucose LESS THAN 70 milligrams/deciliter      ALLERGIES:  Allergies    Valproate Sodium (Other (Severe))    Intolerances        LABS:                        8.1    8.10  )-----------( 422      ( 26 Feb 2019 04:43 )             27.7     Hemoglobin: 8.1 g/dL (02-26 @ 04:43)  Hemoglobin: 8.6 g/dL (02-25 @ 05:00)  Hemoglobin: 9.4 g/dL (02-24 @ 21:18)  Hemoglobin: 8.7 g/dL (02-24 @ 04:37)  Hemoglobin: 8.5 g/dL (02-23 @ 09:07)    CBC Full  -  ( 26 Feb 2019 04:43 )  WBC Count : 8.10 K/uL  Hemoglobin : 8.1 g/dL  Hematocrit : 27.7 %  Platelet Count - Automated : 422 K/uL  Mean Cell Volume : 102.2 fL  Mean Cell Hemoglobin : 29.9 pg  Mean Cell Hemoglobin Concentration : 29.2 %  Auto Neutrophil # : 5.06 K/uL  Auto Lymphocyte # : 1.98 K/uL  Auto Monocyte # : 0.82 K/uL  Auto Eosinophil # : 0.17 K/uL  Auto Basophil # : 0.03 K/uL  Auto Neutrophil % : 62.5 %  Auto Lymphocyte % : 24.4 %  Auto Monocyte % : 10.1 %  Auto Eosinophil % : 2.1 %  Auto Basophil % : 0.4 %    02-26    140  |  101  |  9   ----------------------------<  138<H>  3.6   |  31  |  0.29<L>    Ca    8.2<L>      26 Feb 2019 04:43  Phos  2.6     02-26  Mg     1.7     02-26    TPro  7.3  /  Alb  1.9<L>  /  TBili  0.2  /  DBili  x   /  AST  11  /  ALT  6   /  AlkPhos  194<H>  02-26    Creatinine Trend: 0.29<--, 0.30<--, 0.27<--, 0.24<--, 0.28<--, 0.27<--  LIVER FUNCTIONS - ( 26 Feb 2019 04:43 )  Alb: 1.9 g/dL / Pro: 7.3 g/dL / ALK PHOS: 194 u/L / ALT: 6 u/L / AST: 11 u/L / GGT: x             CARDIAC MARKERS ( 26 Feb 2019 04:43 )  x     / x     / 17 u/L / 2.12 ng/mL / x      CARDIAC MARKERS ( 25 Feb 2019 05:00 )  x     / x     / 23 u/L / 2.66 ng/mL / x      CARDIAC MARKERS ( 24 Feb 2019 21:18 )  x     / x     / 22 u/L / 2.86 ng/mL / x          ABG - ( 24 Feb 2019 21:14 )  pH, Arterial: 7.32  pH, Blood: x     /  pCO2: 68    /  pO2: 165   / HCO3: 30    / Base Excess: 7.7   /  SaO2: 99.2                        EKG:   MICROBIOLOGY:    IMAGING:    RADIOLOGY & ADDITIONAL TESTS: Reviewed. Zackary Hector, PGY1  Pager: [phone]     INTERVAL HPI/OVERNIGHT EVENTS: No acute event overnight, Afebrile     SUBJECTIVE: Patient seen and examined at bedside.     OBJECTIVE:    VITAL SIGNS:  ICU Vital Signs Last 24 Hrs  T(C): 36.6 (26 Feb 2019 08:44), Max: 37.9 (25 Feb 2019 10:48)  T(F): 97.9 (26 Feb 2019 08:44), Max: 100.2 (25 Feb 2019 10:48)  HR: 104 (26 Feb 2019 09:00) (104 - 131)  BP: 126/79 (26 Feb 2019 09:00) (98/58 - 129/70)  BP(mean): 88 (26 Feb 2019 09:00) (71 - 92)  ABP: --  ABP(mean): --  RR: 39 (26 Feb 2019 09:00) (18 - 39)  SpO2: 89% (26 Feb 2019 09:00) (89% - 99%)    Mode: standby    02-25 @ 07:01  -  02-26 @ 07:00  --------------------------------------------------------  IN: 1390 mL / OUT: 800 mL / NET: 590 mL    CAPILLARY BLOOD GLUCOSE  POCT Blood Glucose.: 151 mg/dL (25 Feb 2019 23:49)      PHYSICAL EXAM:  General: NAD  HEENT: NC/AT; PERRL, clear conjunctiva  Neck:  trach site clean   Respiratory: CTA b/l  Cardiovascular: +S1/S2; RRR  Abdomen: soft, NT/ND; +BS x4  Extremities: WWP, 2+ peripheral pulses b/l; no LE edema  Skin: normal color and turgor; no rash  Neurological: A&Ox4, mentating well, follow commands,     MEDICATIONS:  MEDICATIONS  (STANDING):  ALBUTerol/ipratropium for Nebulization 3 milliLiter(s) Nebulizer every 6 hours  cefTAZidime/avibactam IVPB 2.5 Gram(s) IV Intermittent every 8 hours  cefTAZidime/avibactam IVPB      chlorhexidine 0.12% Liquid 15 milliLiter(s) Oral Mucosa two times a day  chlorhexidine 4% Liquid 1 Application(s) Topical <User Schedule>  cyanocobalamin 1000 MICROGram(s) Oral daily  dextrose 5%. 1000 milliLiter(s) (50 mL/Hr) IV Continuous <Continuous>  dextrose 50% Injectable 12.5 Gram(s) IV Push once  dextrose 50% Injectable 25 Gram(s) IV Push once  dextrose 50% Injectable 25 Gram(s) IV Push once  folic acid 1 milliGRAM(s) Enteral Tube daily  heparin  Injectable 5000 Unit(s) SubCutaneous every 8 hours  insulin lispro (HumaLOG) corrective regimen sliding scale   SubCutaneous every 6 hours  lacosamide Solution 100 milliGRAM(s) Oral two times a day  lactobacillus acidophilus 1 Tablet(s) Oral three times a day with meals  metroNIDAZOLE  IVPB      metroNIDAZOLE  IVPB 500 milliGRAM(s) IV Intermittent every 8 hours  psyllium Powder 1 Packet(s) Oral daily  QUEtiapine 25 milliGRAM(s) Oral daily  risperiDONE   Solution 1 milliGRAM(s) Oral daily  vancomycin  IVPB 750 milliGRAM(s) IV Intermittent every 12 hours    MEDICATIONS  (PRN):  acetaminophen    Suspension .. 650 milliGRAM(s) Oral every 6 hours PRN Temp greater or equal to 38C (100.4F), Mild Pain (1 - 3), Moderate Pain (4 - 6)  dextrose 40% Gel 15 Gram(s) Oral once PRN Blood Glucose LESS THAN 70 milliGRAM(s)/deciliter  glucagon  Injectable 1 milliGRAM(s) IntraMuscular once PRN Glucose LESS THAN 70 milligrams/deciliter      ALLERGIES:  Allergies  Valproate Sodium (Other (Severe))  Intolerances    LABS:                        8.1    8.10  )-----------( 422      ( 26 Feb 2019 04:43 )             27.7     Hemoglobin: 8.1 g/dL (02-26 @ 04:43)  Hemoglobin: 8.6 g/dL (02-25 @ 05:00)  Hemoglobin: 9.4 g/dL (02-24 @ 21:18)  Hemoglobin: 8.7 g/dL (02-24 @ 04:37)  Hemoglobin: 8.5 g/dL (02-23 @ 09:07)    CBC Full  -  ( 26 Feb 2019 04:43 )  WBC Count : 8.10 K/uL  Hemoglobin : 8.1 g/dL  Hematocrit : 27.7 %  Platelet Count - Automated : 422 K/uL  Mean Cell Volume : 102.2 fL  Mean Cell Hemoglobin : 29.9 pg  Mean Cell Hemoglobin Concentration : 29.2 %  Auto Neutrophil # : 5.06 K/uL  Auto Lymphocyte # : 1.98 K/uL  Auto Monocyte # : 0.82 K/uL  Auto Eosinophil # : 0.17 K/uL  Auto Basophil # : 0.03 K/uL  Auto Neutrophil % : 62.5 %  Auto Lymphocyte % : 24.4 %  Auto Monocyte % : 10.1 %  Auto Eosinophil % : 2.1 %  Auto Basophil % : 0.4 %    02-26    140  |  101  |  9   ----------------------------<  138<H>  3.6   |  31  |  0.29<L>    Ca    8.2<L>      26 Feb 2019 04:43  Phos  2.6     02-26  Mg     1.7     02-26    TPro  7.3  /  Alb  1.9<L>  /  TBili  0.2  /  DBili  x   /  AST  11  /  ALT  6   /  AlkPhos  194<H>  02-26    Creatinine Trend: 0.29<--, 0.30<--, 0.27<--, 0.24<--, 0.28<--, 0.27<--  LIVER FUNCTIONS - ( 26 Feb 2019 04:43 )  Alb: 1.9 g/dL / Pro: 7.3 g/dL / ALK PHOS: 194 u/L / ALT: 6 u/L / AST: 11 u/L / GGT: x             CARDIAC MARKERS ( 26 Feb 2019 04:43 )  x     / x     / 17 u/L / 2.12 ng/mL / x      CARDIAC MARKERS ( 25 Feb 2019 05:00 )  x     / x     / 23 u/L / 2.66 ng/mL / x      CARDIAC MARKERS ( 24 Feb 2019 21:18 )  x     / x     / 22 u/L / 2.86 ng/mL / x          ABG - ( 24 Feb 2019 21:14 )  pH, Arterial: 7.32  pH, Blood: x     /  pCO2: 68    /  pO2: 165   / HCO3: 30    / Base Excess: 7.7   /  SaO2: 99.2        EKG:   MICROBIOLOGY:    IMAGING:  < from: Xray Chest 1 View- PORTABLE-Urgent (02.24.19 @ 22:58) >  FINDINGS:     Cardiac silhouette is not accurately evaluated in this projection.  Bilateral pleural effusions.  No pneumothorax.  Generalized osteopenia and chronic, healed right humeral head fracture.    < end of copied text >    RADIOLOGY & ADDITIONAL TESTS: Reviewed.

## 2019-02-26 NOTE — PROGRESS NOTE ADULT - SUBJECTIVE AND OBJECTIVE BOX
CHIEF COMPLAINT:    Interval Events:    REVIEW OF SYSTEMS:  Constitutional:   Eyes:  ENT:  CV:  Resp:  GI:  :  MSK:  Integumentary:  Neurological:  Psychiatric:  Endocrine:  Hematologic/Lymphatic:  Allergic/Immunologic:  [ ] All other systems negative  [ ] Unable to assess ROS because ________    OBJECTIVE:  ICU Vital Signs Last 24 Hrs  T(C): 36.6 (26 Feb 2019 08:44), Max: 37.9 (25 Feb 2019 10:48)  T(F): 97.9 (26 Feb 2019 08:44), Max: 100.2 (25 Feb 2019 10:48)  HR: 105 (26 Feb 2019 08:00) (105 - 131)  BP: 127/81 (26 Feb 2019 08:00) (98/58 - 129/70)  BP(mean): 92 (26 Feb 2019 08:00) (71 - 92)  ABP: --  ABP(mean): --  RR: 34 (26 Feb 2019 08:00) (18 - 38)  SpO2: 93% (26 Feb 2019 08:00) (90% - 99%)    Mode: standby    02-25 @ 07:01  -  02-26 @ 07:00  --------------------------------------------------------  IN: 1390 mL / OUT: 800 mL / NET: 590 mL      CAPILLARY BLOOD GLUCOSE      POCT Blood Glucose.: 151 mg/dL (25 Feb 2019 23:49)      PHYSICAL EXAM:  General:   HEENT:   Lymph Nodes:  Neck:   Respiratory:   Cardiovascular:   Abdomen:   Extremities:   Skin:   Neurological:  Psychiatry:    HOSPITAL MEDICATIONS:  MEDICATIONS  (STANDING):  ALBUTerol/ipratropium for Nebulization 3 milliLiter(s) Nebulizer every 6 hours  cefTAZidime/avibactam IVPB 2.5 Gram(s) IV Intermittent every 8 hours  cefTAZidime/avibactam IVPB      chlorhexidine 0.12% Liquid 15 milliLiter(s) Oral Mucosa two times a day  chlorhexidine 4% Liquid 1 Application(s) Topical <User Schedule>  cyanocobalamin 1000 MICROGram(s) Oral daily  dextrose 5%. 1000 milliLiter(s) (50 mL/Hr) IV Continuous <Continuous>  dextrose 50% Injectable 12.5 Gram(s) IV Push once  dextrose 50% Injectable 25 Gram(s) IV Push once  dextrose 50% Injectable 25 Gram(s) IV Push once  folic acid 1 milliGRAM(s) Enteral Tube daily  heparin  Injectable 5000 Unit(s) SubCutaneous every 8 hours  insulin lispro (HumaLOG) corrective regimen sliding scale   SubCutaneous every 6 hours  lacosamide Solution 100 milliGRAM(s) Oral two times a day  lactobacillus acidophilus 1 Tablet(s) Oral three times a day with meals  metroNIDAZOLE  IVPB      metroNIDAZOLE  IVPB 500 milliGRAM(s) IV Intermittent every 8 hours  psyllium Powder 1 Packet(s) Oral daily  QUEtiapine 25 milliGRAM(s) Oral daily  risperiDONE   Solution 1 milliGRAM(s) Oral daily  vancomycin  IVPB 750 milliGRAM(s) IV Intermittent every 12 hours    MEDICATIONS  (PRN):  acetaminophen    Suspension .. 650 milliGRAM(s) Oral every 6 hours PRN Temp greater or equal to 38C (100.4F), Mild Pain (1 - 3), Moderate Pain (4 - 6)  dextrose 40% Gel 15 Gram(s) Oral once PRN Blood Glucose LESS THAN 70 milliGRAM(s)/deciliter  glucagon  Injectable 1 milliGRAM(s) IntraMuscular once PRN Glucose LESS THAN 70 milligrams/deciliter      LABS:                        8.1    8.10  )-----------( 422      ( 26 Feb 2019 04:43 )             27.7     02-26    140  |  101  |  9   ----------------------------<  138<H>  3.6   |  31  |  0.29<L>    Ca    8.2<L>      26 Feb 2019 04:43  Phos  2.6     02-26  Mg     1.7     02-26    TPro  7.3  /  Alb  1.9<L>  /  TBili  0.2  /  DBili  x   /  AST  11  /  ALT  6   /  AlkPhos  194<H>  02-26        Arterial Blood Gas:  02-24 @ 21:14  7.32/68/165/30/99.2/7.7  ABG lactate: 1.1        MICROBIOLOGY:     RADIOLOGY:  [ ] Reviewed and interpreted by me    PULMONARY FUNCTION TESTS:    EKG: CHIEF COMPLAINT: Patient is a 53y old  Male who presents with a chief complaint of ARDS?, sepsis (26 Feb 2019 09:22)      Interval Events: tolerating trach collar >24h     REVIEW OF SYSTEMS:  Constitutional: No chills /pain   CV: Denies   Resp: Denies   GI: Denies   [x ] All other systems negative      OBJECTIVE:  ICU Vital Signs Last 24 Hrs  T(C): 36.6 (26 Feb 2019 08:44), Max: 37.9 (25 Feb 2019 10:48)  T(F): 97.9 (26 Feb 2019 08:44), Max: 100.2 (25 Feb 2019 10:48)  HR: 105 (26 Feb 2019 08:00) (105 - 131)  BP: 127/81 (26 Feb 2019 08:00) (98/58 - 129/70)  BP(mean): 92 (26 Feb 2019 08:00) (71 - 92)  ABP: --  ABP(mean): --  RR: 34 (26 Feb 2019 08:00) (18 - 38)  SpO2: 93% (26 Feb 2019 08:00) (90% - 99%)    Mode: standby    02-25 @ 07:01  -  02-26 @ 07:00  --------------------------------------------------------  IN: 1390 mL / OUT: 800 mL / NET: 590 mL      CAPILLARY BLOOD GLUCOSE      POCT Blood Glucose.: 151 mg/dL (25 Feb 2019 23:49)        HOSPITAL MEDICATIONS:  MEDICATIONS  (STANDING):  ALBUTerol/ipratropium for Nebulization 3 milliLiter(s) Nebulizer every 6 hours  cefTAZidime/avibactam IVPB 2.5 Gram(s) IV Intermittent every 8 hours  cefTAZidime/avibactam IVPB      chlorhexidine 0.12% Liquid 15 milliLiter(s) Oral Mucosa two times a day  chlorhexidine 4% Liquid 1 Application(s) Topical <User Schedule>  cyanocobalamin 1000 MICROGram(s) Oral daily  dextrose 5%. 1000 milliLiter(s) (50 mL/Hr) IV Continuous <Continuous>  dextrose 50% Injectable 12.5 Gram(s) IV Push once  dextrose 50% Injectable 25 Gram(s) IV Push once  dextrose 50% Injectable 25 Gram(s) IV Push once  folic acid 1 milliGRAM(s) Enteral Tube daily  heparin  Injectable 5000 Unit(s) SubCutaneous every 8 hours  insulin lispro (HumaLOG) corrective regimen sliding scale   SubCutaneous every 6 hours  lacosamide Solution 100 milliGRAM(s) Oral two times a day  lactobacillus acidophilus 1 Tablet(s) Oral three times a day with meals  metroNIDAZOLE  IVPB      metroNIDAZOLE  IVPB 500 milliGRAM(s) IV Intermittent every 8 hours  psyllium Powder 1 Packet(s) Oral daily  QUEtiapine 25 milliGRAM(s) Oral daily  risperiDONE   Solution 1 milliGRAM(s) Oral daily  vancomycin  IVPB 750 milliGRAM(s) IV Intermittent every 12 hours    MEDICATIONS  (PRN):  acetaminophen    Suspension .. 650 milliGRAM(s) Oral every 6 hours PRN Temp greater or equal to 38C (100.4F), Mild Pain (1 - 3), Moderate Pain (4 - 6)  dextrose 40% Gel 15 Gram(s) Oral once PRN Blood Glucose LESS THAN 70 milliGRAM(s)/deciliter  glucagon  Injectable 1 milliGRAM(s) IntraMuscular once PRN Glucose LESS THAN 70 milligrams/deciliter      LABS:                        8.1    8.10  )-----------( 422      ( 26 Feb 2019 04:43 )             27.7     02-26    140  |  101  |  9   ----------------------------<  138<H>  3.6   |  31  |  0.29<L>    Ca    8.2<L>      26 Feb 2019 04:43  Phos  2.6     02-26  Mg     1.7     02-26    TPro  7.3  /  Alb  1.9<L>  /  TBili  0.2  /  DBili  x   /  AST  11  /  ALT  6   /  AlkPhos  194<H>  02-26        Arterial Blood Gas:  02-24 @ 21:14  7.32/68/165/30/99.2/7.7  ABG lactate: 1.1        MICROBIOLOGY:     RADIOLOGY:  [ ] Reviewed and interpreted by me    PULMONARY FUNCTION TESTS:    EKG:

## 2019-02-26 NOTE — CHART NOTE - NSCHARTNOTEFT_GEN_A_CORE
Source:  nursing and EMR     Diet : NPO with tube feed - Jevity 1.2 @ 55 ml/hr 24hrs and free water bolus 250 four times a day     Patient awake , disoriented, , with tracheostomy , unable to speak, tolerating EN , noted 4+ L arm edema and L heel DTI .      Enteral:  EM provides 1584 kcal , 73 gm protein and 2063 ml free fluid /d        Current Weight: 75.1 kg on 2/26/19 - wt. decreased from 78.9 kg on 2/17/19 - intentional; Admit wt. - 99.7 kg ; IBW - 61.6 KG ; Current BMI - 27.5    Pertinent Medications: MEDICATIONS  (STANDING):  ALBUTerol/ipratropium for Nebulization 3 milliLiter(s) Nebulizer every 6 hours  cefTAZidime/avibactam IVPB 2.5 Gram(s) IV Intermittent every 8 hours  cefTAZidime/avibactam IVPB      chlorhexidine 0.12% Liquid 15 milliLiter(s) Oral Mucosa two times a day  chlorhexidine 4% Liquid 1 Application(s) Topical <User Schedule>  cyanocobalamin 1000 MICROGram(s) Oral daily  dextrose 5%. 1000 milliLiter(s) (50 mL/Hr) IV Continuous <Continuous>  dextrose 50% Injectable 12.5 Gram(s) IV Push once  dextrose 50% Injectable 25 Gram(s) IV Push once  dextrose 50% Injectable 25 Gram(s) IV Push once  folic acid 1 milliGRAM(s) Enteral Tube daily  heparin  Injectable 5000 Unit(s) SubCutaneous every 8 hours  insulin lispro (HumaLOG) corrective regimen sliding scale   SubCutaneous every 6 hours  lacosamide Solution 100 milliGRAM(s) Oral two times a day  lactobacillus acidophilus 1 Tablet(s) Oral three times a day with meals  metroNIDAZOLE  IVPB      metroNIDAZOLE  IVPB 500 milliGRAM(s) IV Intermittent every 8 hours  psyllium Powder 1 Packet(s) Oral daily  QUEtiapine 25 milliGRAM(s) Oral daily  risperiDONE   Solution 1 milliGRAM(s) Oral daily  vancomycin  IVPB 750 milliGRAM(s) IV Intermittent every 12 hours    MEDICATIONS  (PRN):  acetaminophen    Suspension .. 650 milliGRAM(s) Oral every 6 hours PRN Temp greater or equal to 38C (100.4F), Mild Pain (1 - 3), Moderate Pain (4 - 6)  dextrose 40% Gel 15 Gram(s) Oral once PRN Blood Glucose LESS THAN 70 milliGRAM(s)/deciliter  glucagon  Injectable 1 milliGRAM(s) IntraMuscular once PRN Glucose LESS THAN 70 milligrams/deciliter    Pertinent Labs:  02-26 Na140 mmol/L Glu 138 mg/dL<H> K+ 3.6 mmol/L Cr  0.29 mg/dL<L> BUN 9 mg/dL 02-26 Phos 2.6 mg/dL 02-26 Alb 1.9 g/dL<L>      Recommend to continue EN      Monitoring and Evaluation:  Tolerance to diet prescription , weights and follow up per protocol

## 2019-02-26 NOTE — PROGRESS NOTE ADULT - ASSESSMENT
53 M (resident of [facility]) with TBI, s/p PEG tube, contracture, and seizure d/o who presented as a transfer from [facility] on 12/9 for respiratory failure 2/2 ARDS likely 2/2 necrotizing pancreatitis s/p intubation.  Bronc 12/11 with pseudomonas; Sputum cx with  acinetobacter  Sepsis with fever, leukopenia resolved, extubated but reintubated 1/6/19 for hypoxia and poor cough along with profuse secretions and sputum cx again with acinetobacter (restarted Avycaz/Flagyl 1/9--unclear treatment endpoint, s/p 4 weeks)  Necrotising Pancreatitis with multiple peripancreatitis collections better formed on CT 1/24 and a new 7 cm abscess  Last CT with ? superimposed pneumonia on collapsed lung  Leukocytosis resolved, no fever, but had worsened mental status over weekend with episode tachycardia/tachypnea  Repeat CT with decreased abd collections but moderate pleural effusions  Unclear cause resp decompensation--? mucus plug; now improved  Overall, lung abscess, MDR organisms, necrotizing pancreatitis, intraabdominal collection  - Vanco at 750mg q 12 (close monitoring trough)  - Avycaz/Flagyl  - Frequent suctioning and pulmonary toileting  - F/U GI  - Likely plan to stop antibiotics when acute interventions to abd collection/necrosis completed    Neal Oleary MD  Pager [phone]  After 5pm and on weekends call [phone]

## 2019-02-26 NOTE — PROGRESS NOTE ADULT - PROBLEM SELECTOR PLAN 1
- cont avycaz and flagyl and vanco per ID  - s/p EUS stent and drainage  - s/p necrostomy on Friday 2/15  - repeat CT performed 2/23  - will follow up with GI re: further EGD/stent removal/ Per GI pt will go to endoscopy  suite for removal of stent - cont avycaz and flagyl and vanco per ID  - s/p EUS stent and drainage  - s/p necrostomy on Friday 2/15  - repeat CT performed 2/23  - will follow up with GI re: further EGD/stent removal/ Per GI pt will go to endoscopy  suite for removal of stent ? date

## 2019-02-26 NOTE — PROGRESS NOTE ADULT - ASSESSMENT
Impression:  1) Walled off necrosis, s/p EUS and AXIOS 1/31/19. S/P endoscopic necrosectomy on 2/15/19.  2) Hematochezia, s/p colonoscopy on 12/28/2018 with severe segmental colitis localized to the transverse colon and ascending colon (possible ischemic colitis, possible colitis related to contiguous danay-pancreatic inflammatory process). Biopsies with granulation tissue but no infection/malignancy, Hb stable   3) Resp failure in the setting of pneumonia, requiring tracheostomy now with lung abscess and sepsis on IV  Abx, ID following     Recommendations:  - patient can be easily vented in endoscopy unit as he has trach so this is not an issue; will discuss optimal timing of repeat endoscopy for stent removal  - Monitor CBC, CMP daily  - Continue IV antibiotics per ID recommendations  - Supportive care per primary
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
## 2019-02-26 NOTE — PROGRESS NOTE ADULT - SUBJECTIVE AND OBJECTIVE BOX
CC: F/U for Abscesses    Saw/spoke to patient. Patient back to baseline mental status. Improving. No fevers, no chills. Overall unchanged. No new events.    Allergies  Valproate Sodium (Other (Severe))    ANTIMICROBIALS:  cefTAZidime/avibactam IVPB 2.5 every 8 hours  cefTAZidime/avibactam IVPB    metroNIDAZOLE  IVPB    metroNIDAZOLE  IVPB 500 every 8 hours  vancomycin  IVPB 750 every 12 hours    PE:    Vital Signs Last 24 Hrs  T(C): 36.6 (26 Feb 2019 08:44), Max: 37.4 (25 Feb 2019 16:00)  T(F): 97.9 (26 Feb 2019 08:44), Max: 99.4 (25 Feb 2019 16:00)  HR: 107 (26 Feb 2019 12:00) (99 - 131)  BP: 130/84 (26 Feb 2019 12:00) (105/62 - 133/85)  BP(mean): 94 (26 Feb 2019 12:00) (71 - 94)  RR: 33 (26 Feb 2019 12:00) (18 - 39)  SpO2: 93% (26 Feb 2019 12:00) (89% - 100%)    Gen: AOx2-3, nonverbal  CV: S1+S2 normal, nontachycardic  Resp: Clear bilat, no resp distress, no crackles/wheezes, trach  Abd: Soft, nontender, +BS, PEG  Ext: No LE edema, no wounds    LABS:                        8.1    8.10  )-----------( 422      ( 26 Feb 2019 04:43 )             27.7     02-26    140  |  101  |  9   ----------------------------<  138<H>  3.6   |  31  |  0.29<L>    Ca    8.2<L>      26 Feb 2019 04:43  Phos  2.6     02-26  Mg     1.7     02-26    TPro  7.3  /  Alb  1.9<L>  /  TBili  0.2  /  DBili  x   /  AST  11  /  ALT  6   /  AlkPhos  194<H>  02-26    MICROBIOLOGY:    BLOOD PERIPHERAL  02-17-19 NGTD    BLOOD VENOUS  02-02-19 NGTD    RADIOLOGY:    2/22 CT:    IMPRESSION: Moderate bilateral pleural effusions, with associated   compressive atelectasis.    Multiple peripancreatic fluid collections, with interval decrease in   size, particularly of the largest collection adjacent to the greater   curvature of the stomach.

## 2019-02-27 LAB
GLUCOSE BLDC GLUCOMTR-MCNC: 103 MG/DL — HIGH (ref 70–99)
GLUCOSE BLDC GLUCOMTR-MCNC: 110 MG/DL — HIGH (ref 70–99)
GLUCOSE BLDC GLUCOMTR-MCNC: 135 MG/DL — HIGH (ref 70–99)
GLUCOSE BLDC GLUCOMTR-MCNC: 143 MG/DL — HIGH (ref 70–99)
VANCOMYCIN TROUGH SERPL-MCNC: 10.2 UG/ML — SIGNIFICANT CHANGE UP (ref 10–20)

## 2019-02-27 PROCEDURE — 76604 US EXAM CHEST: CPT | Mod: 26,GC

## 2019-02-27 PROCEDURE — 99233 SBSQ HOSP IP/OBS HIGH 50: CPT | Mod: GC,25

## 2019-02-27 PROCEDURE — 99232 SBSQ HOSP IP/OBS MODERATE 35: CPT | Mod: GC

## 2019-02-27 PROCEDURE — 99232 SBSQ HOSP IP/OBS MODERATE 35: CPT

## 2019-02-27 RX ORDER — FUROSEMIDE 40 MG
40 TABLET ORAL ONCE
Qty: 0 | Refills: 0 | Status: COMPLETED | OUTPATIENT
Start: 2019-02-27 | End: 2019-02-27

## 2019-02-27 RX ADMIN — Medication 250 MILLIGRAM(S): at 07:50

## 2019-02-27 RX ADMIN — CHLORHEXIDINE GLUCONATE 15 MILLILITER(S): 213 SOLUTION TOPICAL at 17:03

## 2019-02-27 RX ADMIN — LACOSAMIDE 100 MILLIGRAM(S): 50 TABLET ORAL at 17:03

## 2019-02-27 RX ADMIN — Medication 1 TABLET(S): at 11:50

## 2019-02-27 RX ADMIN — HEPARIN SODIUM 5000 UNIT(S): 5000 INJECTION INTRAVENOUS; SUBCUTANEOUS at 21:41

## 2019-02-27 RX ADMIN — HEPARIN SODIUM 5000 UNIT(S): 5000 INJECTION INTRAVENOUS; SUBCUTANEOUS at 06:40

## 2019-02-27 RX ADMIN — Medication 100 MILLIGRAM(S): at 21:41

## 2019-02-27 RX ADMIN — LACOSAMIDE 100 MILLIGRAM(S): 50 TABLET ORAL at 05:29

## 2019-02-27 RX ADMIN — Medication 100 MILLIGRAM(S): at 13:06

## 2019-02-27 RX ADMIN — Medication 3 MILLILITER(S): at 16:16

## 2019-02-27 RX ADMIN — Medication 40 MILLIGRAM(S): at 13:04

## 2019-02-27 RX ADMIN — Medication 3 MILLILITER(S): at 04:54

## 2019-02-27 RX ADMIN — RISPERIDONE 1 MILLIGRAM(S): 4 TABLET ORAL at 11:50

## 2019-02-27 RX ADMIN — Medication 3 MILLILITER(S): at 21:08

## 2019-02-27 RX ADMIN — Medication 250 MILLIGRAM(S): at 21:40

## 2019-02-27 RX ADMIN — Medication 1 TABLET(S): at 17:03

## 2019-02-27 RX ADMIN — Medication 1 PACKET(S): at 11:50

## 2019-02-27 RX ADMIN — CHLORHEXIDINE GLUCONATE 15 MILLILITER(S): 213 SOLUTION TOPICAL at 05:28

## 2019-02-27 RX ADMIN — HEPARIN SODIUM 5000 UNIT(S): 5000 INJECTION INTRAVENOUS; SUBCUTANEOUS at 13:06

## 2019-02-27 RX ADMIN — PREGABALIN 1000 MICROGRAM(S): 225 CAPSULE ORAL at 11:50

## 2019-02-27 RX ADMIN — Medication 3 MILLILITER(S): at 09:58

## 2019-02-27 RX ADMIN — QUETIAPINE FUMARATE 25 MILLIGRAM(S): 200 TABLET, FILM COATED ORAL at 11:49

## 2019-02-27 RX ADMIN — Medication 1 TABLET(S): at 09:19

## 2019-02-27 RX ADMIN — Medication 100 MILLIGRAM(S): at 05:29

## 2019-02-27 RX ADMIN — Medication 1 MILLIGRAM(S): at 11:49

## 2019-02-27 RX ADMIN — CHLORHEXIDINE GLUCONATE 1 APPLICATION(S): 213 SOLUTION TOPICAL at 11:49

## 2019-02-27 NOTE — PROGRESS NOTE ADULT - ASSESSMENT
53 M (resident of [facility]) with TBI, s/p PEG tube, contracture, and seizure d/o who presented as a transfer from [facility] on 12/9 for respiratory failure 2/2 ARDS likely 2/2 necrotizing pancreatitis s/p intubation.  Bronc 12/11 with pseudomonas; Sputum cx with  acinetobacter  Sepsis with fever, leukopenia resolved, extubated but reintubated 1/6/19 for hypoxia and poor cough along with profuse secretions and sputum cx again with acinetobacter (restarted Avycaz/Flagyl 1/9--unclear treatment endpoint, s/p 4 weeks)  Necrotising Pancreatitis with multiple peripancreatitis collections better formed on CT 1/24 and a new 7 cm abscess  Last CT with ? superimposed pneumonia on collapsed lung  Leukocytosis resolved, no fever, but had worsened mental status over weekend with episode tachycardia/tachypnea  Repeat CT with decreased abd collections but moderate pleural effusions  Unclear cause resp decompensation--? mucus plug; now improved; appears clinically at baseline  Overall, lung abscess, MDR organisms, necrotizing pancreatitis, intraabdominal collection  - Vanco at 750mg q 12 (continue for now, monitor trough)  - Avycaz/Flagyl  - Frequent suctioning and pulmonary toileting  - F/U GI  - Likely plan to stop antibiotics when acute interventions to abd collection/necrosis completed    Neal Oleary MD  Pager [phone]  After 5pm and on weekends call [phone]

## 2019-02-27 NOTE — PROGRESS NOTE ADULT - ASSESSMENT
Impression:  1) Walled off necrosis, s/p EUS and AXIOS 1/31/19. S/P endoscopic necrosectomy on 2/15/19.  2) Hematochezia, s/p colonoscopy on 12/28/2018 with severe segmental colitis localized to the transverse colon and ascending colon (possible ischemic colitis, possible colitis related to contiguous danay-pancreatic inflammatory process). Biopsies with granulation tissue but no infection/malignancy, Hb stable   3) Resp failure in the setting of pneumonia, requiring tracheostomy now with lung abscess and sepsis on IV  Abx, ID following     Recommendations:  -will try to add on schedule for tomorrow (worst case Friday) - for stent removal  - please keep NPO after midnight   - trend CBC, LFT  - supportive care per primary team Impression:  1) Walled off necrosis, s/p EUS and AXIOS 1/31/19. S/P endoscopic necrosectomy on 2/15/19.  2) Hematochezia, s/p colonoscopy on 12/28/2018 with severe segmental colitis localized to the transverse colon and ascending colon (possible ischemic colitis, possible colitis related to contiguous danay-pancreatic inflammatory process). Biopsies with granulation tissue but no infection/malignancy, Hb stable   3) Resp failure in the setting of pneumonia, requiring tracheostomy now with lung abscess and sepsis on IV  Abx, ID following     Recommendations:  - plan for stent pull tomorrow at 830AM  - please keep NPO after midnight   - trend CBC, LFT  - supportive care per primary team

## 2019-02-27 NOTE — CHART NOTE - NSCHARTNOTEFT_GEN_A_CORE
: Attila Amin    INDICATION: Hypoxemic respiratory failure, pleural effusions    PROCEDURE:  [ ] LIMITED ECHO  [x] LIMITED CHEST  [ ] LIMITED RETROPERITONEAL  [ ] LIMITED ABDOMINAL  [ ] LIMITED DVT  [ ] NEEDLE GUIDANCE VASCULAR  [ ] NEEDLE GUIDANCE THORACENTESIS  [ ] NEEDLE GUIDANCE PARACENTESIS  [ ] NEEDLE GUIDANCE PERICARDIOCENTESIS  [ ] OTHER    FINDINGS:  Limited Chest: Anterior focal B-lines bilaterally, bilateral small-moderate pleural effusions which appear simple and associated consolidation    INTERPRETATION:  Bilateral pleural effusions and bilateral B lines    Attila Amin MD, PGY4  Pulmonary and Critical Care Fellow  [phone]

## 2019-02-27 NOTE — PROGRESS NOTE ADULT - PROBLEM SELECTOR PLAN 2
- s/p trach 1/9   -back on vent for Respiratory distress doing better tolerating CPAP trials today   - trach care, suction PRN  - chest PT  - Lasix ivp x 1 for pleural effusions on POCUS

## 2019-02-27 NOTE — PROGRESS NOTE ADULT - SUBJECTIVE AND OBJECTIVE BOX
CC: F/U for abscess    Saw/spoke to patient. No fevers, no chills. No new complaints.    Allergies  Valproate Sodium (Other (Severe))    ANTIMICROBIALS:  cefTAZidime/avibactam IVPB 2.5 every 8 hours  cefTAZidime/avibactam IVPB    metroNIDAZOLE  IVPB    metroNIDAZOLE  IVPB 500 every 8 hours  vancomycin  IVPB 750 every 12 hours    PE:    Vital Signs Last 24 Hrs  T(C): 37.2 (27 Feb 2019 08:00), Max: 37.6 (26 Feb 2019 19:30)  T(F): 99 (27 Feb 2019 08:00), Max: 99.6 (26 Feb 2019 19:30)  HR: 112 (27 Feb 2019 11:00) (107 - 132)  BP: 127/72 (27 Feb 2019 11:00) (110/57 - 144/74)  BP(mean): 84 (27 Feb 2019 11:00) (70 - 95)  RR: 43 (27 Feb 2019 11:00) (14 - 48)  SpO2: 96% (27 Feb 2019 11:00) (90% - 100%)    Gen: AOx2-3, baseline mental status, nonverbal, attempts to communicate nonverbally  CV: S1+S2 normal, tachycardic  Resp: Clear bilat, no resp distress, no crackles/wheezes, trach  Abd: Soft, nontender, +BS, PEG  Ext: No LE edema, no wounds    LABS:                        8.1    8.10  )-----------( 422      ( 26 Feb 2019 04:43 )             27.7     02-26    140  |  101  |  9   ----------------------------<  138<H>  3.6   |  31  |  0.29<L>    Ca    8.2<L>      26 Feb 2019 04:43  Phos  2.6     02-26  Mg     1.7     02-26    TPro  7.3  /  Alb  1.9<L>  /  TBili  0.2  /  DBili  x   /  AST  11  /  ALT  6   /  AlkPhos  194<H>  02-26    MICROBIOLOGY:  Vancomycin Level, Trough: 10.2 ug/mL (02-27-19 @ 05:50)    BLOOD PERIPHERAL  02-17-19 NGTD    RADIOLOGY:    2/24 CXR:    FINDINGS:     Cardiac silhouette is not accurately evaluated in this projection.  Bilateral pleural effusions.  No pneumothorax.  Generalized osteopenia and chronic, healed right humeral head fracture.

## 2019-02-27 NOTE — PROGRESS NOTE ADULT - SUBJECTIVE AND OBJECTIVE BOX
Chief Complaint:  Patient is a 53y old  Male who presents with a chief complaint of ARDS?, sepsis (26 Feb 2019 09:22)      Interval Events: No events overnight.  Pt back to baseline.    Allergies:  Valproate Sodium (Other (Severe))      Hospital Medications:  acetaminophen    Suspension .. 650 milliGRAM(s) Oral every 6 hours PRN  ALBUTerol/ipratropium for Nebulization 3 milliLiter(s) Nebulizer every 6 hours  cefTAZidime/avibactam IVPB 2.5 Gram(s) IV Intermittent every 8 hours  cefTAZidime/avibactam IVPB      chlorhexidine 0.12% Liquid 15 milliLiter(s) Oral Mucosa two times a day  chlorhexidine 4% Liquid 1 Application(s) Topical <User Schedule>  cyanocobalamin 1000 MICROGram(s) Oral daily  dextrose 40% Gel 15 Gram(s) Oral once PRN  dextrose 5%. 1000 milliLiter(s) IV Continuous <Continuous>  dextrose 50% Injectable 12.5 Gram(s) IV Push once  dextrose 50% Injectable 25 Gram(s) IV Push once  dextrose 50% Injectable 25 Gram(s) IV Push once  folic acid 1 milliGRAM(s) Enteral Tube daily  glucagon  Injectable 1 milliGRAM(s) IntraMuscular once PRN  heparin  Injectable 5000 Unit(s) SubCutaneous every 8 hours  insulin lispro (HumaLOG) corrective regimen sliding scale   SubCutaneous every 6 hours  lacosamide Solution 100 milliGRAM(s) Oral two times a day  lactobacillus acidophilus 1 Tablet(s) Oral three times a day with meals  metroNIDAZOLE  IVPB      metroNIDAZOLE  IVPB 500 milliGRAM(s) IV Intermittent every 8 hours  psyllium Powder 1 Packet(s) Oral daily  QUEtiapine 25 milliGRAM(s) Oral daily  risperiDONE   Solution 1 milliGRAM(s) Oral daily  vancomycin  IVPB 750 milliGRAM(s) IV Intermittent every 12 hours      PMHX/PSHX:  Seizure  TBI (traumatic brain injury)  S/P percutaneous endoscopic gastrostomy (PEG) tube placement  No significant past surgical history      Family history:      ROS: unable to obtain    PHYSICAL EXAM:     GENERAL:  Appears stated age, well-groomed, well-nourished, no distress  HEENT:  NC/AT,  conjunctivae clear, sclera -anicteric  CHEST:  Full & symmetric excursion, no increased effort  ABDOMEN:  Soft, non-tender, non-distended, normoactive bowel sounds  EXTREMITIES:  no cyanosis,clubbing or edema  SKIN:  No rash  NEURO:  Alert, oriented    Vital Signs:  Vital Signs Last 24 Hrs  T(C): 37.6 (27 Feb 2019 04:00), Max: 37.6 (26 Feb 2019 19:30)  T(F): 99.6 (27 Feb 2019 04:00), Max: 99.6 (26 Feb 2019 19:30)  HR: 117 (27 Feb 2019 04:52) (99 - 132)  BP: 127/68 (27 Feb 2019 04:00) (110/57 - 144/74)  BP(mean): 82 (27 Feb 2019 04:00) (70 - 95)  RR: 14 (27 Feb 2019 04:00) (14 - 48)  SpO2: 98% (27 Feb 2019 04:52) (89% - 100%)  Daily     Daily     LABS:                        8.1    8.10  )-----------( 422      ( 26 Feb 2019 04:43 )             27.7     02-26    140  |  101  |  9   ----------------------------<  138<H>  3.6   |  31  |  0.29<L>    Ca    8.2<L>      26 Feb 2019 04:43  Phos  2.6     02-26  Mg     1.7     02-26    TPro  7.3  /  Alb  1.9<L>  /  TBili  0.2  /  DBili  x   /  AST  11  /  ALT  6   /  AlkPhos  194<H>  02-26    LIVER FUNCTIONS - ( 26 Feb 2019 04:43 )  Alb: 1.9 g/dL / Pro: 7.3 g/dL / ALK PHOS: 194 u/L / ALT: 6 u/L / AST: 11 u/L / GGT: x                   Imaging:  reviewed

## 2019-02-27 NOTE — PROGRESS NOTE ADULT - ASSESSMENT
53 Male w/ a PMHx of TBI (s/p PEG tube, contracture, and seizure d/o) presented as a transfer from [facility] on 12/9 for respiratory failure 2/2 ARDS likely 2/2 necrotizing pancreatitis s/p intubation. Course c/b Acinetobacter PNA s/p Avycaz and Flagyl 7 day course.  Course further complicated by acute blood loss anemia s/p colonoscopy with findings suggestive of ischemic colitis, without further bleeding and Hgb remaining stable. S/p extubation 1/3, and re-intubation 1/6 now s/p tracheostomy.

## 2019-02-27 NOTE — PROGRESS NOTE ADULT - SUBJECTIVE AND OBJECTIVE BOX
CHIEF COMPLAINT:    Interval Events:    REVIEW OF SYSTEMS:  Constitutional:   Eyes:  ENT:  CV:  Resp:  GI:  :  MSK:  Integumentary:  Neurological:  Psychiatric:  Endocrine:  Hematologic/Lymphatic:  Allergic/Immunologic:  [ ] All other systems negative  [ ] Unable to assess ROS because ________    OBJECTIVE:  ICU Vital Signs Last 24 Hrs  T(C): 37.6 (27 Feb 2019 04:00), Max: 37.6 (26 Feb 2019 19:30)  T(F): 99.6 (27 Feb 2019 04:00), Max: 99.6 (26 Feb 2019 19:30)  HR: 117 (27 Feb 2019 08:00) (99 - 132)  BP: 132/68 (27 Feb 2019 08:00) (110/57 - 144/74)  BP(mean): 84 (27 Feb 2019 08:00) (70 - 95)  ABP: --  ABP(mean): --  RR: 31 (27 Feb 2019 08:00) (14 - 48)  SpO2: 96% (27 Feb 2019 08:00) (89% - 100%)    Mode: AC/ CMV (Assist Control/ Continuous Mandatory Ventilation), RR (machine): 14, TV (machine): 400, FiO2: 40, PEEP: 5, MAP: 9.2, PIP: 24    02-26 @ 07:01  -  02-27 @ 07:00  --------------------------------------------------------  IN: 1460 mL / OUT: 650 mL / NET: 810 mL      CAPILLARY BLOOD GLUCOSE      POCT Blood Glucose.: 103 mg/dL (27 Feb 2019 05:23)      PHYSICAL EXAM:  General:   HEENT:   Lymph Nodes:  Neck:   Respiratory:   Cardiovascular:   Abdomen:   Extremities:   Skin:   Neurological:  Psychiatry:    HOSPITAL MEDICATIONS:  MEDICATIONS  (STANDING):  ALBUTerol/ipratropium for Nebulization 3 milliLiter(s) Nebulizer every 6 hours  cefTAZidime/avibactam IVPB 2.5 Gram(s) IV Intermittent every 8 hours  cefTAZidime/avibactam IVPB      chlorhexidine 0.12% Liquid 15 milliLiter(s) Oral Mucosa two times a day  chlorhexidine 4% Liquid 1 Application(s) Topical <User Schedule>  cyanocobalamin 1000 MICROGram(s) Oral daily  dextrose 5%. 1000 milliLiter(s) (50 mL/Hr) IV Continuous <Continuous>  dextrose 50% Injectable 12.5 Gram(s) IV Push once  dextrose 50% Injectable 25 Gram(s) IV Push once  dextrose 50% Injectable 25 Gram(s) IV Push once  folic acid 1 milliGRAM(s) Enteral Tube daily  heparin  Injectable 5000 Unit(s) SubCutaneous every 8 hours  insulin lispro (HumaLOG) corrective regimen sliding scale   SubCutaneous every 6 hours  lacosamide Solution 100 milliGRAM(s) Oral two times a day  lactobacillus acidophilus 1 Tablet(s) Oral three times a day with meals  metroNIDAZOLE  IVPB      metroNIDAZOLE  IVPB 500 milliGRAM(s) IV Intermittent every 8 hours  psyllium Powder 1 Packet(s) Oral daily  QUEtiapine 25 milliGRAM(s) Oral daily  risperiDONE   Solution 1 milliGRAM(s) Oral daily  vancomycin  IVPB 750 milliGRAM(s) IV Intermittent every 12 hours    MEDICATIONS  (PRN):  acetaminophen    Suspension .. 650 milliGRAM(s) Oral every 6 hours PRN Temp greater or equal to 38C (100.4F), Mild Pain (1 - 3), Moderate Pain (4 - 6)  dextrose 40% Gel 15 Gram(s) Oral once PRN Blood Glucose LESS THAN 70 milliGRAM(s)/deciliter  glucagon  Injectable 1 milliGRAM(s) IntraMuscular once PRN Glucose LESS THAN 70 milligrams/deciliter      LABS:                        8.1    8.10  )-----------( 422      ( 26 Feb 2019 04:43 )             27.7     02-26    140  |  101  |  9   ----------------------------<  138<H>  3.6   |  31  |  0.29<L>    Ca    8.2<L>      26 Feb 2019 04:43  Phos  2.6     02-26  Mg     1.7     02-26    TPro  7.3  /  Alb  1.9<L>  /  TBili  0.2  /  DBili  x   /  AST  11  /  ALT  6   /  AlkPhos  194<H>  02-26              MICROBIOLOGY:     RADIOLOGY:  [ ] Reviewed and interpreted by me    PULMONARY FUNCTION TESTS:    EKG: CHIEF COMPLAINT: Patient is a 53y old  Male who presents with a chief complaint of ARDS?, sepsis (27 Feb 2019 08:23)      Interval Events: none overnight     REVIEW OF SYSTEMS:  Constitutional: no pain /fevers   CV: denies   Resp: denies   GI: denies   [ x] All other systems negative      OBJECTIVE:  ICU Vital Signs Last 24 Hrs  T(C): 37.6 (27 Feb 2019 04:00), Max: 37.6 (26 Feb 2019 19:30)  T(F): 99.6 (27 Feb 2019 04:00), Max: 99.6 (26 Feb 2019 19:30)  HR: 117 (27 Feb 2019 08:00) (99 - 132)  BP: 132/68 (27 Feb 2019 08:00) (110/57 - 144/74)  BP(mean): 84 (27 Feb 2019 08:00) (70 - 95)  ABP: --  ABP(mean): --  RR: 31 (27 Feb 2019 08:00) (14 - 48)  SpO2: 96% (27 Feb 2019 08:00) (89% - 100%)    Mode: AC/ CMV (Assist Control/ Continuous Mandatory Ventilation), RR (machine): 14, TV (machine): 400, FiO2: 40, PEEP: 5, MAP: 9.2, PIP: 24    02-26 @ 07:01  -  02-27 @ 07:00  --------------------------------------------------------  IN: 1460 mL / OUT: 650 mL / NET: 810 mL      CAPILLARY BLOOD GLUCOSE      POCT Blood Glucose.: 103 mg/dL (27 Feb 2019 05:23)        HOSPITAL MEDICATIONS:  MEDICATIONS  (STANDING):  ALBUTerol/ipratropium for Nebulization 3 milliLiter(s) Nebulizer every 6 hours  cefTAZidime/avibactam IVPB 2.5 Gram(s) IV Intermittent every 8 hours  cefTAZidime/avibactam IVPB      chlorhexidine 0.12% Liquid 15 milliLiter(s) Oral Mucosa two times a day  chlorhexidine 4% Liquid 1 Application(s) Topical <User Schedule>  cyanocobalamin 1000 MICROGram(s) Oral daily  dextrose 5%. 1000 milliLiter(s) (50 mL/Hr) IV Continuous <Continuous>  dextrose 50% Injectable 12.5 Gram(s) IV Push once  dextrose 50% Injectable 25 Gram(s) IV Push once  dextrose 50% Injectable 25 Gram(s) IV Push once  folic acid 1 milliGRAM(s) Enteral Tube daily  heparin  Injectable 5000 Unit(s) SubCutaneous every 8 hours  insulin lispro (HumaLOG) corrective regimen sliding scale   SubCutaneous every 6 hours  lacosamide Solution 100 milliGRAM(s) Oral two times a day  lactobacillus acidophilus 1 Tablet(s) Oral three times a day with meals  metroNIDAZOLE  IVPB      metroNIDAZOLE  IVPB 500 milliGRAM(s) IV Intermittent every 8 hours  psyllium Powder 1 Packet(s) Oral daily  QUEtiapine 25 milliGRAM(s) Oral daily  risperiDONE   Solution 1 milliGRAM(s) Oral daily  vancomycin  IVPB 750 milliGRAM(s) IV Intermittent every 12 hours    MEDICATIONS  (PRN):  acetaminophen    Suspension .. 650 milliGRAM(s) Oral every 6 hours PRN Temp greater or equal to 38C (100.4F), Mild Pain (1 - 3), Moderate Pain (4 - 6)  dextrose 40% Gel 15 Gram(s) Oral once PRN Blood Glucose LESS THAN 70 milliGRAM(s)/deciliter  glucagon  Injectable 1 milliGRAM(s) IntraMuscular once PRN Glucose LESS THAN 70 milligrams/deciliter      LABS:                        8.1    8.10  )-----------( 422      ( 26 Feb 2019 04:43 )             27.7     02-26    140  |  101  |  9   ----------------------------<  138<H>  3.6   |  31  |  0.29<L>    Ca    8.2<L>      26 Feb 2019 04:43  Phos  2.6     02-26  Mg     1.7     02-26    TPro  7.3  /  Alb  1.9<L>  /  TBili  0.2  /  DBili  x   /  AST  11  /  ALT  6   /  AlkPhos  194<H>  02-26              MICROBIOLOGY:     RADIOLOGY:  [ ] Reviewed and interpreted by me    PULMONARY FUNCTION TESTS:    EKG:

## 2019-02-27 NOTE — PROGRESS NOTE ADULT - PROBLEM SELECTOR PLAN 1
- cont avycaz and flagyl and vanco per ID  - s/p EUS stent and drainage  - s/p necrostomy on Friday 2/15  - repeat CT performed 2/23  -NPO after midnight for stent removal at 8:30am

## 2019-02-28 LAB
ALBUMIN SERPL ELPH-MCNC: 1.8 G/DL — LOW (ref 3.3–5)
ALP SERPL-CCNC: 162 U/L — HIGH (ref 40–120)
ALT FLD-CCNC: < 4 U/L — LOW (ref 4–41)
ANION GAP SERPL CALC-SCNC: 10 MMO/L — SIGNIFICANT CHANGE UP (ref 7–14)
AST SERPL-CCNC: 12 U/L — SIGNIFICANT CHANGE UP (ref 4–40)
BILIRUB SERPL-MCNC: 0.2 MG/DL — SIGNIFICANT CHANGE UP (ref 0.2–1.2)
BLD GP AB SCN SERPL QL: NEGATIVE — SIGNIFICANT CHANGE UP
BUN SERPL-MCNC: 6 MG/DL — LOW (ref 7–23)
CALCIUM SERPL-MCNC: 8.5 MG/DL — SIGNIFICANT CHANGE UP (ref 8.4–10.5)
CHLORIDE SERPL-SCNC: 98 MMOL/L — SIGNIFICANT CHANGE UP (ref 98–107)
CO2 SERPL-SCNC: 31 MMOL/L — SIGNIFICANT CHANGE UP (ref 22–31)
CREAT SERPL-MCNC: 0.27 MG/DL — LOW (ref 0.5–1.3)
GLUCOSE BLDC GLUCOMTR-MCNC: 123 MG/DL — HIGH (ref 70–99)
GLUCOSE BLDC GLUCOMTR-MCNC: 124 MG/DL — HIGH (ref 70–99)
GLUCOSE BLDC GLUCOMTR-MCNC: 126 MG/DL — HIGH (ref 70–99)
GLUCOSE BLDC GLUCOMTR-MCNC: 179 MG/DL — HIGH (ref 70–99)
GLUCOSE SERPL-MCNC: 115 MG/DL — HIGH (ref 70–99)
HCT VFR BLD CALC: 28.1 % — LOW (ref 39–50)
HCT VFR BLD CALC: 28.2 % — LOW (ref 39–50)
HCT VFR BLD CALC: 29.4 % — LOW (ref 39–50)
HGB BLD-MCNC: 8.5 G/DL — LOW (ref 13–17)
HGB BLD-MCNC: 8.5 G/DL — LOW (ref 13–17)
HGB BLD-MCNC: 8.9 G/DL — LOW (ref 13–17)
MCHC RBC-ENTMCNC: 30.1 % — LOW (ref 32–36)
MCHC RBC-ENTMCNC: 30.2 % — LOW (ref 32–36)
MCHC RBC-ENTMCNC: 30.2 PG — SIGNIFICANT CHANGE UP (ref 27–34)
MCHC RBC-ENTMCNC: 30.3 % — LOW (ref 32–36)
MCHC RBC-ENTMCNC: 30.4 PG — SIGNIFICANT CHANGE UP (ref 27–34)
MCHC RBC-ENTMCNC: 30.5 PG — SIGNIFICANT CHANGE UP (ref 27–34)
MCV RBC AUTO: 100.3 FL — HIGH (ref 80–100)
MCV RBC AUTO: 100.4 FL — HIGH (ref 80–100)
MCV RBC AUTO: 100.7 FL — HIGH (ref 80–100)
NRBC # FLD: 0.03 K/UL — LOW (ref 25–125)
NRBC # FLD: 0.04 K/UL — LOW (ref 25–125)
NRBC # FLD: 0.04 K/UL — LOW (ref 25–125)
PLATELET # BLD AUTO: 438 K/UL — HIGH (ref 150–400)
PLATELET # BLD AUTO: 460 K/UL — HIGH (ref 150–400)
PLATELET # BLD AUTO: 480 K/UL — HIGH (ref 150–400)
PMV BLD: 9.3 FL — SIGNIFICANT CHANGE UP (ref 7–13)
PMV BLD: 9.6 FL — SIGNIFICANT CHANGE UP (ref 7–13)
PMV BLD: 9.6 FL — SIGNIFICANT CHANGE UP (ref 7–13)
POTASSIUM SERPL-MCNC: 3.6 MMOL/L — SIGNIFICANT CHANGE UP (ref 3.5–5.3)
POTASSIUM SERPL-SCNC: 3.6 MMOL/L — SIGNIFICANT CHANGE UP (ref 3.5–5.3)
PROT SERPL-MCNC: 7.4 G/DL — SIGNIFICANT CHANGE UP (ref 6–8.3)
RBC # BLD: 2.79 M/UL — LOW (ref 4.2–5.8)
RBC # BLD: 2.81 M/UL — LOW (ref 4.2–5.8)
RBC # BLD: 2.93 M/UL — LOW (ref 4.2–5.8)
RBC # FLD: 16.2 % — HIGH (ref 10.3–14.5)
RBC # FLD: 16.4 % — HIGH (ref 10.3–14.5)
RBC # FLD: 16.6 % — HIGH (ref 10.3–14.5)
RH IG SCN BLD-IMP: POSITIVE — SIGNIFICANT CHANGE UP
SODIUM SERPL-SCNC: 139 MMOL/L — SIGNIFICANT CHANGE UP (ref 135–145)
WBC # BLD: 7.2 K/UL — SIGNIFICANT CHANGE UP (ref 3.8–10.5)
WBC # BLD: 8.75 K/UL — SIGNIFICANT CHANGE UP (ref 3.8–10.5)
WBC # BLD: 9.26 K/UL — SIGNIFICANT CHANGE UP (ref 3.8–10.5)
WBC # FLD AUTO: 7.2 K/UL — SIGNIFICANT CHANGE UP (ref 3.8–10.5)
WBC # FLD AUTO: 8.75 K/UL — SIGNIFICANT CHANGE UP (ref 3.8–10.5)
WBC # FLD AUTO: 9.26 K/UL — SIGNIFICANT CHANGE UP (ref 3.8–10.5)

## 2019-02-28 PROCEDURE — 99233 SBSQ HOSP IP/OBS HIGH 50: CPT | Mod: GC

## 2019-02-28 PROCEDURE — 99232 SBSQ HOSP IP/OBS MODERATE 35: CPT

## 2019-02-28 RX ORDER — ALBUTEROL 90 UG/1
2 AEROSOL, METERED ORAL EVERY 6 HOURS
Qty: 0 | Refills: 0 | Status: DISCONTINUED | OUTPATIENT
Start: 2019-02-28 | End: 2019-03-07

## 2019-02-28 RX ORDER — PANTOPRAZOLE SODIUM 20 MG/1
40 TABLET, DELAYED RELEASE ORAL
Qty: 0 | Refills: 0 | Status: DISCONTINUED | OUTPATIENT
Start: 2019-02-28 | End: 2019-03-07

## 2019-02-28 RX ORDER — LACOSAMIDE 50 MG/1
100 TABLET ORAL
Qty: 0 | Refills: 0 | Status: DISCONTINUED | OUTPATIENT
Start: 2019-02-28 | End: 2019-02-28

## 2019-02-28 RX ORDER — ONDANSETRON 8 MG/1
4 TABLET, FILM COATED ORAL ONCE
Qty: 0 | Refills: 0 | Status: COMPLETED | OUTPATIENT
Start: 2019-02-28 | End: 2019-02-28

## 2019-02-28 RX ORDER — LACOSAMIDE 50 MG/1
100 TABLET ORAL EVERY 12 HOURS
Qty: 0 | Refills: 0 | Status: DISCONTINUED | OUTPATIENT
Start: 2019-02-28 | End: 2019-03-04

## 2019-02-28 RX ADMIN — ONDANSETRON 4 MILLIGRAM(S): 8 TABLET, FILM COATED ORAL at 21:51

## 2019-02-28 RX ADMIN — ALBUTEROL 2 PUFF(S): 90 AEROSOL, METERED ORAL at 22:05

## 2019-02-28 RX ADMIN — Medication 100 MILLIGRAM(S): at 21:46

## 2019-02-28 RX ADMIN — Medication 100 MILLIGRAM(S): at 13:18

## 2019-02-28 RX ADMIN — PANTOPRAZOLE SODIUM 40 MILLIGRAM(S): 20 TABLET, DELAYED RELEASE ORAL at 14:39

## 2019-02-28 RX ADMIN — CHLORHEXIDINE GLUCONATE 1 APPLICATION(S): 213 SOLUTION TOPICAL at 09:51

## 2019-02-28 RX ADMIN — ALBUTEROL 2 PUFF(S): 90 AEROSOL, METERED ORAL at 15:27

## 2019-02-28 RX ADMIN — Medication 100 MILLIGRAM(S): at 05:39

## 2019-02-28 RX ADMIN — ALBUTEROL 2 PUFF(S): 90 AEROSOL, METERED ORAL at 09:37

## 2019-02-28 RX ADMIN — CHLORHEXIDINE GLUCONATE 15 MILLILITER(S): 213 SOLUTION TOPICAL at 05:39

## 2019-02-28 RX ADMIN — CHLORHEXIDINE GLUCONATE 15 MILLILITER(S): 213 SOLUTION TOPICAL at 17:32

## 2019-02-28 RX ADMIN — Medication 3 MILLILITER(S): at 04:49

## 2019-02-28 RX ADMIN — LACOSAMIDE 120 MILLIGRAM(S): 50 TABLET ORAL at 17:32

## 2019-02-28 RX ADMIN — Medication 250 MILLIGRAM(S): at 09:51

## 2019-02-28 RX ADMIN — Medication 250 MILLIGRAM(S): at 21:46

## 2019-02-28 RX ADMIN — LACOSAMIDE 100 MILLIGRAM(S): 50 TABLET ORAL at 05:46

## 2019-02-28 NOTE — PROGRESS NOTE ADULT - SUBJECTIVE AND OBJECTIVE BOX
CHIEF COMPLAINT: Patient is a 53y old  Male who presents with a chief complaint of ARDS?, sepsis (27 Feb 2019 08:23)    Interval Events:      REVIEW OF SYSTEMS:  Constitutional:   Eyes:  ENT:  CV:  Resp:  GI:  :  MSK:  Integumentary:  Neurological:  Psychiatric:  Endocrine:  Hematologic/Lymphatic:  Allergic/Immunologic:  [ ] All other systems negative  [ ] Unable to assess ROS because ________      OBJECTIVE:  ICU Vital Signs Last 24 Hrs  T(C): 36.7 (28 Feb 2019 05:46), Max: 37.9 (27 Feb 2019 14:00)  T(F): 98 (28 Feb 2019 05:46), Max: 100.2 (27 Feb 2019 14:00)  HR: 122 (28 Feb 2019 07:17) (106 - 124)  BP: 111/71 (28 Feb 2019 05:46) (111/71 - 142/80)  BP(mean): 80 (27 Feb 2019 18:00) (74 - 93)  ABP: --  ABP(mean): --  RR: 17 (28 Feb 2019 05:46) (17 - 43)  SpO2: 95% (28 Feb 2019 07:17) (89% - 100%)    Mode: standby    02-27 @ 07:01  -  02-28 @ 07:00  --------------------------------------------------------  IN: 1555 mL / OUT: 0 mL / NET: 1555 mL    POCT Blood Glucose.: 123 mg/dL (28 Feb 2019 05:34)    HOSPITAL MEDICATIONS:  MEDICATIONS  (STANDING):  ALBUTerol/ipratropium for Nebulization 3 milliLiter(s) Nebulizer every 6 hours  cefTAZidime/avibactam IVPB 2.5 Gram(s) IV Intermittent every 8 hours  cefTAZidime/avibactam IVPB      chlorhexidine 0.12% Liquid 15 milliLiter(s) Oral Mucosa two times a day  chlorhexidine 4% Liquid 1 Application(s) Topical <User Schedule>  cyanocobalamin 1000 MICROGram(s) Oral daily  dextrose 5%. 1000 milliLiter(s) (50 mL/Hr) IV Continuous <Continuous>  dextrose 50% Injectable 12.5 Gram(s) IV Push once  dextrose 50% Injectable 25 Gram(s) IV Push once  dextrose 50% Injectable 25 Gram(s) IV Push once  folic acid 1 milliGRAM(s) Enteral Tube daily  heparin  Injectable 5000 Unit(s) SubCutaneous every 8 hours  insulin lispro (HumaLOG) corrective regimen sliding scale   SubCutaneous every 6 hours  lacosamide Solution 100 milliGRAM(s) Oral two times a day  lactobacillus acidophilus 1 Tablet(s) Oral three times a day with meals  metroNIDAZOLE  IVPB      metroNIDAZOLE  IVPB 500 milliGRAM(s) IV Intermittent every 8 hours  psyllium Powder 1 Packet(s) Oral daily  QUEtiapine 25 milliGRAM(s) Oral daily  risperiDONE   Solution 1 milliGRAM(s) Oral daily  vancomycin  IVPB 750 milliGRAM(s) IV Intermittent every 12 hours    MEDICATIONS  (PRN):  acetaminophen    Suspension .. 650 milliGRAM(s) Oral every 6 hours PRN Temp greater or equal to 38C (100.4F), Mild Pain (1 - 3), Moderate Pain (4 - 6)  dextrose 40% Gel 15 Gram(s) Oral once PRN Blood Glucose LESS THAN 70 milliGRAM(s)/deciliter  glucagon  Injectable 1 milliGRAM(s) IntraMuscular once PRN Glucose LESS THAN 70 milligrams/deciliter      LABS:                        8.5    9.26  )-----------( 438      ( 28 Feb 2019 05:45 )             28.1     02-28    139  |  98  |  6<L>  ----------------------------<  115<H>  3.6   |  31  |  0.27<L>    Ca    8.5      28 Feb 2019 05:45    TPro  7.4  /  Alb  1.8<L>  /  TBili  0.2  /  DBili  x   /  AST  12  /  ALT  < 4<L>  /  AlkPhos  162<H>  02-28              MICROBIOLOGY:     RADIOLOGY:  [ ] Reviewed and interpreted by me    PULMONARY FUNCTION TESTS:    EKG: CHIEF COMPLAINT: Patient is a 53y old  Male who presents with a chief complaint of ARDS?, sepsis (27 Feb 2019 08:23)    Interval Events: requiring vent support. vomited dark brown x2 this am, smells like blood      REVIEW OF SYSTEMS:  Constitutional: feels ok, no complaints  CV: denies  Resp: denies  GI: denies  [x] All other systems negative  [ ] Unable to assess ROS because ________      OBJECTIVE:  ICU Vital Signs Last 24 Hrs  T(C): 36.7 (28 Feb 2019 05:46), Max: 37.9 (27 Feb 2019 14:00)  T(F): 98 (28 Feb 2019 05:46), Max: 100.2 (27 Feb 2019 14:00)  HR: 122 (28 Feb 2019 07:17) (106 - 124)  BP: 111/71 (28 Feb 2019 05:46) (111/71 - 142/80)  BP(mean): 80 (27 Feb 2019 18:00) (74 - 93)  ABP: --  ABP(mean): --  RR: 17 (28 Feb 2019 05:46) (17 - 43)  SpO2: 95% (28 Feb 2019 07:17) (89% - 100%)    Mode: standby    02-27 @ 07:01  -  02-28 @ 07:00  --------------------------------------------------------  IN: 1555 mL / OUT: 0 mL / NET: 1555 mL    POCT Blood Glucose.: 123 mg/dL (28 Feb 2019 05:34)    HOSPITAL MEDICATIONS:  MEDICATIONS  (STANDING):  ALBUTerol/ipratropium for Nebulization 3 milliLiter(s) Nebulizer every 6 hours  cefTAZidime/avibactam IVPB 2.5 Gram(s) IV Intermittent every 8 hours  cefTAZidime/avibactam IVPB      chlorhexidine 0.12% Liquid 15 milliLiter(s) Oral Mucosa two times a day  chlorhexidine 4% Liquid 1 Application(s) Topical <User Schedule>  cyanocobalamin 1000 MICROGram(s) Oral daily  dextrose 5%. 1000 milliLiter(s) (50 mL/Hr) IV Continuous <Continuous>  dextrose 50% Injectable 12.5 Gram(s) IV Push once  dextrose 50% Injectable 25 Gram(s) IV Push once  dextrose 50% Injectable 25 Gram(s) IV Push once  folic acid 1 milliGRAM(s) Enteral Tube daily  heparin  Injectable 5000 Unit(s) SubCutaneous every 8 hours  insulin lispro (HumaLOG) corrective regimen sliding scale   SubCutaneous every 6 hours  lacosamide Solution 100 milliGRAM(s) Oral two times a day  lactobacillus acidophilus 1 Tablet(s) Oral three times a day with meals  metroNIDAZOLE  IVPB      metroNIDAZOLE  IVPB 500 milliGRAM(s) IV Intermittent every 8 hours  psyllium Powder 1 Packet(s) Oral daily  QUEtiapine 25 milliGRAM(s) Oral daily  risperiDONE   Solution 1 milliGRAM(s) Oral daily  vancomycin  IVPB 750 milliGRAM(s) IV Intermittent every 12 hours    MEDICATIONS  (PRN):  acetaminophen    Suspension .. 650 milliGRAM(s) Oral every 6 hours PRN Temp greater or equal to 38C (100.4F), Mild Pain (1 - 3), Moderate Pain (4 - 6)  dextrose 40% Gel 15 Gram(s) Oral once PRN Blood Glucose LESS THAN 70 milliGRAM(s)/deciliter  glucagon  Injectable 1 milliGRAM(s) IntraMuscular once PRN Glucose LESS THAN 70 milligrams/deciliter      LABS:                        8.5    9.26  )-----------( 438      ( 28 Feb 2019 05:45 )             28.1     02-28    139  |  98  |  6<L>  ----------------------------<  115<H>  3.6   |  31  |  0.27<L>    Ca    8.5      28 Feb 2019 05:45    TPro  7.4  /  Alb  1.8<L>  /  TBili  0.2  /  DBili  x   /  AST  12  /  ALT  < 4<L>  /  AlkPhos  162<H>  02-28

## 2019-02-28 NOTE — PROGRESS NOTE ADULT - ATTENDING COMMENTS
Pt back on vent, much more comfortable. Stable settings.  Had an episode of bloody vomitus this am.  Hb stable. Hemodyn stable  Pt is scheduled for EGD anyway today for removal of stent.  Once that is done, plan is to d/c abx and can then work on d/c plan to a vent facility  d/w GI

## 2019-02-28 NOTE — PROGRESS NOTE ADULT - SUBJECTIVE AND OBJECTIVE BOX
CC: F/U for Abscesses    Saw/spoke to patient. No fevers, no chills. Overall well. No new complaints. Appears at baseline mental status.    Allergies  Valproate Sodium (Other (Severe))    ANTIMICROBIALS:  cefTAZidime/avibactam IVPB 2.5 every 8 hours  cefTAZidime/avibactam IVPB    metroNIDAZOLE  IVPB    metroNIDAZOLE  IVPB 500 every 8 hours  vancomycin  IVPB 750 every 12 hours    PE:    Vital Signs Last 24 Hrs  T(C): 37.7 (28 Feb 2019 13:19), Max: 37.9 (27 Feb 2019 14:00)  T(F): 99.8 (28 Feb 2019 13:19), Max: 100.2 (27 Feb 2019 14:00)  HR: 111 (28 Feb 2019 13:19) (98 - 124)  BP: 111/66 (28 Feb 2019 13:19) (108/78 - 123/69)  BP(mean): 80 (27 Feb 2019 18:00) (74 - 89)  RR: 14 (28 Feb 2019 13:19) (14 - 42)  SpO2: 100% (28 Feb 2019 13:19) (91% - 100%)    Gen: AOx3, NAD, non-toxic  CV: S1+S2 normal, tachycardic  Resp: Clear bilat, no resp distress, no crackles/wheezes, trach  Abd: Soft, nontender, +BS, PEG  Ext: No LE edema, no wounds    LABS:                        8.9    8.75  )-----------( 480      ( 28 Feb 2019 11:50 )             29.4     02-28    139  |  98  |  6<L>  ----------------------------<  115<H>  3.6   |  31  |  0.27<L>    Ca    8.5      28 Feb 2019 05:45    TPro  7.4  /  Alb  1.8<L>  /  TBili  0.2  /  DBili  x   /  AST  12  /  ALT  < 4<L>  /  AlkPhos  162<H>  02-28    MICROBIOLOGY:    BLOOD PERIPHERAL  02-17-19 NGTD    BLOOD VENOUS  02-02-19 NGTD    RADIOLOGY:    2/24 CXR:    FINDINGS:     Cardiac silhouette is not accurately evaluated in this projection.  Bilateral pleural effusions.  No pneumothorax.  Generalized osteopenia and chronic, healed right humeral head fracture.

## 2019-02-28 NOTE — PROGRESS NOTE ADULT - PROBLEM SELECTOR PLAN 1
- cont avycaz and flagyl and vanco per ID  - s/p EUS stent and drainage  - s/p necrostomy on Friday 2/15  - repeat CT performed 2/23  -NPO after midnight for stent removal at 8:30am - cont avycaz and flagyl and vanco per ID  - s/p EUS stent and drainage  - s/p necrostomy on Friday 2/15  - repeat CT performed 2/23  - NPO for EGD today - pt also with what appears to be GIB, GI will scope today for stent removal as well as GIB

## 2019-02-28 NOTE — PROGRESS NOTE ADULT - ASSESSMENT
53 M (resident of [facility]) with TBI, s/p PEG tube, contracture, and seizure d/o who presented as a transfer from [facility] on 12/9 for respiratory failure 2/2 ARDS likely 2/2 necrotizing pancreatitis s/p intubation.  Bronc 12/11 with pseudomonas; Sputum cx with  acinetobacter  Sepsis with fever, leukopenia resolved, extubated but reintubated 1/6/19 for hypoxia and poor cough along with profuse secretions and sputum cx again with acinetobacter (restarted Avycaz/Flagyl 1/9--unclear treatment endpoint, s/p 4 weeks)  Necrotising Pancreatitis with multiple peripancreatitis collections better formed on CT 1/24 and a new 7 cm abscess  Last CT with ? superimposed pneumonia on collapsed lung  Leukocytosis resolved, no fever, but had worsened mental status over weekend with episode tachycardia/tachypnea  Repeat CT with decreased abd collections but moderate pleural effusions  Unclear cause resp decompensation--? mucus plug; now improved; appears clinically at baseline  Overall, lung abscess, MDR organisms, necrotizing pancreatitis, intraabdominal collection  - Vanco at 750mg q 12 (continue for now, monitor trough)  - Avycaz/Flagyl  - Frequent suctioning and pulmonary toileting  - F/U GI  - Agree with plan to stop antibiotics after GI procedure (EGD/stent removal)--has been on prolonged course for lung/intra-abd abscesses    Neal Oleary MD  Pager [phone]  After 5pm and on weekends call [phone]

## 2019-02-28 NOTE — PROVIDER CONTACT NOTE (OTHER) - SITUATION
Up on arrival from [facility] patients peg tube noticed to be not in position. bile like drainage from peg site

## 2019-02-28 NOTE — PROGRESS NOTE ADULT - PROBLEM SELECTOR PLAN 2
- s/p trach 1/9   -back on vent for Respiratory distress doing better tolerating CPAP trials today   - trach care, suction PRN  - chest PT  - Lasix ivp x 1 for pleural effusions on POCUS - s/p trach 1/9   - tolerating vent - trach collar as tolerated  - trach care, suction PRN  - chest PT

## 2019-02-28 NOTE — PROGRESS NOTE ADULT - ASSESSMENT
53 Male w/ a PMHx of TBI (s/p PEG tube, contracture, and seizure d/o) presented as a transfer from [facility] on 12/9 for respiratory failure 2/2 ARDS likely 2/2 necrotizing pancreatitis s/p intubation. Course c/b Acinetobacter PNA s/p Avycaz and Flagyl 7 day course.  Course further complicated by acute blood loss anemia s/p colonoscopy with findings suggestive of ischemic colitis, without further bleeding and Hgb remaining stable. S/p extubation 1/3, and re-intubation 1/6 now s/p tracheostomy.

## 2019-03-01 LAB
ANION GAP SERPL CALC-SCNC: 9 MMO/L — SIGNIFICANT CHANGE UP (ref 7–14)
APTT BLD: 30.5 SEC — SIGNIFICANT CHANGE UP (ref 27.5–36.3)
BUN SERPL-MCNC: 8 MG/DL — SIGNIFICANT CHANGE UP (ref 7–23)
CALCIUM SERPL-MCNC: 8.3 MG/DL — LOW (ref 8.4–10.5)
CHLORIDE SERPL-SCNC: 96 MMOL/L — LOW (ref 98–107)
CO2 SERPL-SCNC: 33 MMOL/L — HIGH (ref 22–31)
CREAT SERPL-MCNC: 0.31 MG/DL — LOW (ref 0.5–1.3)
GLUCOSE BLDC GLUCOMTR-MCNC: 107 MG/DL — HIGH (ref 70–99)
GLUCOSE BLDC GLUCOMTR-MCNC: 121 MG/DL — HIGH (ref 70–99)
GLUCOSE BLDC GLUCOMTR-MCNC: 126 MG/DL — HIGH (ref 70–99)
GLUCOSE BLDC GLUCOMTR-MCNC: 92 MG/DL — SIGNIFICANT CHANGE UP (ref 70–99)
GLUCOSE BLDC GLUCOMTR-MCNC: 98 MG/DL — SIGNIFICANT CHANGE UP (ref 70–99)
GLUCOSE SERPL-MCNC: 117 MG/DL — HIGH (ref 70–99)
HCT VFR BLD CALC: 26.5 % — LOW (ref 39–50)
HGB BLD-MCNC: 8 G/DL — LOW (ref 13–17)
INR BLD: 1.55 — HIGH (ref 0.88–1.17)
LACTATE SERPL-SCNC: 0.7 MMOL/L — SIGNIFICANT CHANGE UP (ref 0.5–2)
MCHC RBC-ENTMCNC: 30.2 % — LOW (ref 32–36)
MCHC RBC-ENTMCNC: 30.3 PG — SIGNIFICANT CHANGE UP (ref 27–34)
MCV RBC AUTO: 100.4 FL — HIGH (ref 80–100)
NRBC # FLD: 0.02 K/UL — LOW (ref 25–125)
PLATELET # BLD AUTO: 446 K/UL — HIGH (ref 150–400)
PMV BLD: 9.7 FL — SIGNIFICANT CHANGE UP (ref 7–13)
POTASSIUM SERPL-MCNC: 2.9 MMOL/L — CRITICAL LOW (ref 3.5–5.3)
POTASSIUM SERPL-SCNC: 2.9 MMOL/L — CRITICAL LOW (ref 3.5–5.3)
PROTHROM AB SERPL-ACNC: 17.4 SEC — HIGH (ref 9.8–13.1)
RBC # BLD: 2.64 M/UL — LOW (ref 4.2–5.8)
RBC # FLD: 16.7 % — HIGH (ref 10.3–14.5)
SODIUM SERPL-SCNC: 138 MMOL/L — SIGNIFICANT CHANGE UP (ref 135–145)
VANCOMYCIN TROUGH SERPL-MCNC: 14.4 UG/ML — SIGNIFICANT CHANGE UP (ref 10–20)
WBC # BLD: 9.27 K/UL — SIGNIFICANT CHANGE UP (ref 3.8–10.5)
WBC # FLD AUTO: 9.27 K/UL — SIGNIFICANT CHANGE UP (ref 3.8–10.5)

## 2019-03-01 PROCEDURE — 74177 CT ABD & PELVIS W/CONTRAST: CPT | Mod: 26

## 2019-03-01 PROCEDURE — 99232 SBSQ HOSP IP/OBS MODERATE 35: CPT | Mod: 25,GC

## 2019-03-01 PROCEDURE — 99232 SBSQ HOSP IP/OBS MODERATE 35: CPT

## 2019-03-01 PROCEDURE — 43247 EGD REMOVE FOREIGN BODY: CPT | Mod: GC

## 2019-03-01 PROCEDURE — 99233 SBSQ HOSP IP/OBS HIGH 50: CPT | Mod: GC

## 2019-03-01 RX ORDER — POTASSIUM CHLORIDE 20 MEQ
10 PACKET (EA) ORAL
Qty: 0 | Refills: 0 | Status: COMPLETED | OUTPATIENT
Start: 2019-03-01 | End: 2019-03-01

## 2019-03-01 RX ADMIN — Medication 250 MILLIGRAM(S): at 21:58

## 2019-03-01 RX ADMIN — ALBUTEROL 2 PUFF(S): 90 AEROSOL, METERED ORAL at 04:45

## 2019-03-01 RX ADMIN — HEPARIN SODIUM 5000 UNIT(S): 5000 INJECTION INTRAVENOUS; SUBCUTANEOUS at 22:23

## 2019-03-01 RX ADMIN — CHLORHEXIDINE GLUCONATE 15 MILLILITER(S): 213 SOLUTION TOPICAL at 05:01

## 2019-03-01 RX ADMIN — ALBUTEROL 2 PUFF(S): 90 AEROSOL, METERED ORAL at 16:29

## 2019-03-01 RX ADMIN — CHLORHEXIDINE GLUCONATE 1 APPLICATION(S): 213 SOLUTION TOPICAL at 09:37

## 2019-03-01 RX ADMIN — Medication 250 MILLIGRAM(S): at 09:37

## 2019-03-01 RX ADMIN — Medication 100 MILLIEQUIVALENT(S): at 08:18

## 2019-03-01 RX ADMIN — LACOSAMIDE 120 MILLIGRAM(S): 50 TABLET ORAL at 18:12

## 2019-03-01 RX ADMIN — HEPARIN SODIUM 5000 UNIT(S): 5000 INJECTION INTRAVENOUS; SUBCUTANEOUS at 13:04

## 2019-03-01 RX ADMIN — Medication 100 MILLIGRAM(S): at 13:03

## 2019-03-01 RX ADMIN — Medication 100 MILLIEQUIVALENT(S): at 10:42

## 2019-03-01 RX ADMIN — ALBUTEROL 2 PUFF(S): 90 AEROSOL, METERED ORAL at 21:57

## 2019-03-01 RX ADMIN — LACOSAMIDE 120 MILLIGRAM(S): 50 TABLET ORAL at 06:40

## 2019-03-01 RX ADMIN — Medication 100 MILLIEQUIVALENT(S): at 09:36

## 2019-03-01 RX ADMIN — Medication 100 MILLIGRAM(S): at 22:23

## 2019-03-01 RX ADMIN — PANTOPRAZOLE SODIUM 40 MILLIGRAM(S): 20 TABLET, DELAYED RELEASE ORAL at 02:55

## 2019-03-01 RX ADMIN — Medication 100 MILLIGRAM(S): at 05:00

## 2019-03-01 RX ADMIN — PANTOPRAZOLE SODIUM 40 MILLIGRAM(S): 20 TABLET, DELAYED RELEASE ORAL at 13:14

## 2019-03-01 RX ADMIN — CHLORHEXIDINE GLUCONATE 15 MILLILITER(S): 213 SOLUTION TOPICAL at 18:11

## 2019-03-01 NOTE — PROGRESS NOTE ADULT - SUBJECTIVE AND OBJECTIVE BOX
CC: F/U abscesses    Saw/spoke to patient. No fevers, no chills. No new complaints. For EGD today.    Allergies  Valproate Sodium (Other (Severe))    ANTIMICROBIALS:  cefTAZidime/avibactam IVPB 2.5 every 8 hours  cefTAZidime/avibactam IVPB    metroNIDAZOLE  IVPB    metroNIDAZOLE  IVPB 500 every 8 hours  vancomycin  IVPB 750 every 12 hours    PE:    Vital Signs Last 24 Hrs  T(C): 37 (01 Mar 2019 13:00), Max: 37.4 (28 Feb 2019 17:27)  T(F): 98.6 (01 Mar 2019 13:00), Max: 99.4 (28 Feb 2019 17:27)  HR: 76 (01 Mar 2019 13:00) (76 - 109)  BP: 95/66 (01 Mar 2019 13:00) (95/66 - 144/83)  BP(mean): 71 (01 Mar 2019 13:00) (71 - 71)  RR: 14 (01 Mar 2019 13:00) (14 - 22)  SpO2: 99% (01 Mar 2019 13:00) (97% - 100%)    Gen: AOx3, NAD, non-toxic, pleasant  CV: S1+S2 normal, nontachycardic  Resp: Clear bilat, no resp distress, no crackles/wheezes  Abd: Soft, nontender, +BS  Ext: No LE edema, no wounds    LABS:                        8.0    9.27  )-----------( 446      ( 01 Mar 2019 06:00 )             26.5     03-01    138  |  96<L>  |  8   ----------------------------<  117<H>  2.9<LL>   |  33<H>  |  0.31<L>    Ca    8.3<L>      01 Mar 2019 06:00    TPro  7.4  /  Alb  1.8<L>  /  TBili  0.2  /  DBili  x   /  AST  12  /  ALT  < 4<L>  /  AlkPhos  162<H>  02-28    MICROBIOLOGY:    BLOOD PERIPHERAL  02-17-19 NGTD    BLOOD VENOUS  02-02-19 NGTD    BLOOD PERIPHERAL  01-30-19 NGTD    RADIOLOGY:    3/1 CT:    IMPRESSION:     Gastrostomy tube balloon has migrated to the level of the   pylorus/duodenal bulb.  Slight interval decrease in multiple pancreatic collections and a   perigastric collection.  Moderate bilateral pleural effusions, decreased.

## 2019-03-01 NOTE — PROGRESS NOTE ADULT - PROBLEM SELECTOR PLAN 1
- cont avycaz and flagyl and vanco per ID  - s/p EUS stent and drainage  - s/p necrostomy on Friday 2/15  - repeat CT performed 2/23  - repeat CT overnight with decreasing pancreatic fluid collections  - for EGD and stent removal today  - GI note appreciated

## 2019-03-01 NOTE — PROGRESS NOTE ADULT - SUBJECTIVE AND OBJECTIVE BOX
CHIEF COMPLAINT: Patient is a 53y old  Male who presents with a chief complaint of ARDS?, sepsis (28 Feb 2019 07:24)    Interval Events:      REVIEW OF SYSTEMS:  Constitutional:   Eyes:  ENT:  CV:  Resp:  GI:  :  MSK:  Integumentary:  Neurological:  Psychiatric:  Endocrine:  Hematologic/Lymphatic:  Allergic/Immunologic:  [ ] All other systems negative  [ ] Unable to assess ROS because ________      OBJECTIVE:  ICU Vital Signs Last 24 Hrs  T(C): 36.8 (01 Mar 2019 05:12), Max: 37.7 (28 Feb 2019 13:19)  T(F): 98.3 (01 Mar 2019 05:12), Max: 99.8 (28 Feb 2019 13:19)  HR: 98 (01 Mar 2019 07:43) (88 - 111)  BP: 122/70 (01 Mar 2019 05:12) (108/78 - 144/83)  BP(mean): --  ABP: --  ABP(mean): --  RR: 20 (01 Mar 2019 05:12) (14 - 22)  SpO2: 98% (01 Mar 2019 07:43) (97% - 100%)    Mode: AC/ CMV (Assist Control/ Continuous Mandatory Ventilation), RR (machine): 14, TV (machine): 400, FiO2: 40, PEEP: 5, ITime: 1, MAP: 8.1, PIP: 19    02-28 @ 07:01  -  03-01 @ 07:00  --------------------------------------------------------  IN: 500 mL / OUT: 305 mL / NET: 195 mL    POCT Blood Glucose.: 121 mg/dL (01 Mar 2019 05:47)    HOSPITAL MEDICATIONS:  MEDICATIONS  (STANDING):  ALBUTerol    90 MICROgram(s) HFA Inhaler 2 Puff(s) Inhalation every 6 hours  cefTAZidime/avibactam IVPB 2.5 Gram(s) IV Intermittent every 8 hours  cefTAZidime/avibactam IVPB      chlorhexidine 0.12% Liquid 15 milliLiter(s) Oral Mucosa two times a day  chlorhexidine 4% Liquid 1 Application(s) Topical <User Schedule>  cyanocobalamin 1000 MICROGram(s) Oral daily  dextrose 5%. 1000 milliLiter(s) (50 mL/Hr) IV Continuous <Continuous>  dextrose 50% Injectable 12.5 Gram(s) IV Push once  dextrose 50% Injectable 25 Gram(s) IV Push once  dextrose 50% Injectable 25 Gram(s) IV Push once  folic acid 1 milliGRAM(s) Enteral Tube daily  heparin  Injectable 5000 Unit(s) SubCutaneous every 8 hours  insulin lispro (HumaLOG) corrective regimen sliding scale   SubCutaneous every 6 hours  lacosamide IVPB 100 milliGRAM(s) IV Intermittent every 12 hours  lactobacillus acidophilus 1 Tablet(s) Oral three times a day with meals  metroNIDAZOLE  IVPB      metroNIDAZOLE  IVPB 500 milliGRAM(s) IV Intermittent every 8 hours  pantoprazole  Injectable 40 milliGRAM(s) IV Push two times a day  potassium chloride  10 mEq/100 mL IVPB 10 milliEquivalent(s) IV Intermittent every 1 hour  psyllium Powder 1 Packet(s) Oral daily  QUEtiapine 25 milliGRAM(s) Oral daily  risperiDONE   Solution 1 milliGRAM(s) Oral daily  vancomycin  IVPB 750 milliGRAM(s) IV Intermittent every 12 hours    MEDICATIONS  (PRN):  acetaminophen    Suspension .. 650 milliGRAM(s) Oral every 6 hours PRN Temp greater or equal to 38C (100.4F), Mild Pain (1 - 3), Moderate Pain (4 - 6)  dextrose 40% Gel 15 Gram(s) Oral once PRN Blood Glucose LESS THAN 70 milliGRAM(s)/deciliter  glucagon  Injectable 1 milliGRAM(s) IntraMuscular once PRN Glucose LESS THAN 70 milligrams/deciliter      LABS:                        8.0    9.27  )-----------( 446      ( 01 Mar 2019 06:00 )             26.5     03-01    138  |  96<L>  |  8   ----------------------------<  117<H>  2.9<LL>   |  33<H>  |  0.31<L>    Ca    8.3<L>      01 Mar 2019 06:00    TPro  7.4  /  Alb  1.8<L>  /  TBili  0.2  /  DBili  x   /  AST  12  /  ALT  < 4<L>  /  AlkPhos  162<H>  02-28    PT/INR - ( 01 Mar 2019 06:00 )   PT: 17.4 SEC;   INR: 1.55          PTT - ( 01 Mar 2019 06:00 )  PTT:30.5 SEC          MICROBIOLOGY:     RADIOLOGY:  [ ] Reviewed and interpreted by me    PULMONARY FUNCTION TESTS:    EKG: CHIEF COMPLAINT: Patient is a 53y old  Male who presents with a chief complaint of ARDS?, sepsis (28 Feb 2019 07:24)    Interval Events: PEG leaking overnight, s/p CT A/P, pending EGD today      REVIEW OF SYSTEMS:  Constitutional: no complaints  CV: denies  Resp: denies  GI: denies  [x] All other systems negative  [ ] Unable to assess ROS because ________      OBJECTIVE:  ICU Vital Signs Last 24 Hrs  T(C): 36.8 (01 Mar 2019 05:12), Max: 37.7 (28 Feb 2019 13:19)  T(F): 98.3 (01 Mar 2019 05:12), Max: 99.8 (28 Feb 2019 13:19)  HR: 98 (01 Mar 2019 07:43) (88 - 111)  BP: 122/70 (01 Mar 2019 05:12) (108/78 - 144/83)  BP(mean): --  ABP: --  ABP(mean): --  RR: 20 (01 Mar 2019 05:12) (14 - 22)  SpO2: 98% (01 Mar 2019 07:43) (97% - 100%)    Mode: AC/ CMV (Assist Control/ Continuous Mandatory Ventilation), RR (machine): 14, TV (machine): 400, FiO2: 40, PEEP: 5, ITime: 1, MAP: 8.1, PIP: 19    02-28 @ 07:01  -  03-01 @ 07:00  --------------------------------------------------------  IN: 500 mL / OUT: 305 mL / NET: 195 mL    POCT Blood Glucose.: 121 mg/dL (01 Mar 2019 05:47)    HOSPITAL MEDICATIONS:  MEDICATIONS  (STANDING):  ALBUTerol    90 MICROgram(s) HFA Inhaler 2 Puff(s) Inhalation every 6 hours  cefTAZidime/avibactam IVPB 2.5 Gram(s) IV Intermittent every 8 hours  cefTAZidime/avibactam IVPB      chlorhexidine 0.12% Liquid 15 milliLiter(s) Oral Mucosa two times a day  chlorhexidine 4% Liquid 1 Application(s) Topical <User Schedule>  cyanocobalamin 1000 MICROGram(s) Oral daily  dextrose 5%. 1000 milliLiter(s) (50 mL/Hr) IV Continuous <Continuous>  dextrose 50% Injectable 12.5 Gram(s) IV Push once  dextrose 50% Injectable 25 Gram(s) IV Push once  dextrose 50% Injectable 25 Gram(s) IV Push once  folic acid 1 milliGRAM(s) Enteral Tube daily  heparin  Injectable 5000 Unit(s) SubCutaneous every 8 hours  insulin lispro (HumaLOG) corrective regimen sliding scale   SubCutaneous every 6 hours  lacosamide IVPB 100 milliGRAM(s) IV Intermittent every 12 hours  lactobacillus acidophilus 1 Tablet(s) Oral three times a day with meals  metroNIDAZOLE  IVPB      metroNIDAZOLE  IVPB 500 milliGRAM(s) IV Intermittent every 8 hours  pantoprazole  Injectable 40 milliGRAM(s) IV Push two times a day  potassium chloride  10 mEq/100 mL IVPB 10 milliEquivalent(s) IV Intermittent every 1 hour  psyllium Powder 1 Packet(s) Oral daily  QUEtiapine 25 milliGRAM(s) Oral daily  risperiDONE   Solution 1 milliGRAM(s) Oral daily  vancomycin  IVPB 750 milliGRAM(s) IV Intermittent every 12 hours    MEDICATIONS  (PRN):  acetaminophen    Suspension .. 650 milliGRAM(s) Oral every 6 hours PRN Temp greater or equal to 38C (100.4F), Mild Pain (1 - 3), Moderate Pain (4 - 6)  dextrose 40% Gel 15 Gram(s) Oral once PRN Blood Glucose LESS THAN 70 milliGRAM(s)/deciliter  glucagon  Injectable 1 milliGRAM(s) IntraMuscular once PRN Glucose LESS THAN 70 milligrams/deciliter      LABS:                        8.0    9.27  )-----------( 446      ( 01 Mar 2019 06:00 )             26.5     03-01    138  |  96<L>  |  8   ----------------------------<  117<H>  2.9<LL>   |  33<H>  |  0.31<L>    Ca    8.3<L>      01 Mar 2019 06:00    PT/INR - ( 01 Mar 2019 06:00 )   PT: 17.4 SEC;   INR: 1.55     PTT - ( 01 Mar 2019 06:00 )  PTT:30.5 SEC

## 2019-03-01 NOTE — PROGRESS NOTE ADULT - ATTENDING COMMENTS
Plan for EGD today  Hopefully all goes well, then can d/c abx and plan for d/c to vent facility early next week

## 2019-03-01 NOTE — PROGRESS NOTE ADULT - ASSESSMENT
53 Male w/ a PMHx of TBI (s/p PEG tube, contracture, and seizure d/o) presented as a transfer from [facility] on 12/9 for respiratory failure 2/2 ARDS likely 2/2 necrotizing pancreatitis s/p intubation. Course c/b Acinetobacter PNA s/p Avycaz and Flagyl 7 day course.  Course further complicated by acute blood loss anemia s/p colonoscopy with findings suggestive of ischemic colitis, without further bleeding and Hgb remaining stable. S/p extubation 1/3, and re-intubation 1/6 now s/p tracheostomy.

## 2019-03-01 NOTE — PROGRESS NOTE ADULT - PROBLEM SELECTOR PLAN 2
- s/p trach 1/9   - tolerating vent - trach collar as tolerated  - trach care, suction PRN  - chest PT

## 2019-03-01 NOTE — PROVIDER CONTACT NOTE (OTHER) - SITUATION
patients peg tube very loose. fecal matter  drainage from peg site, bile drainage from peg tube    pt only produced 100cc urine since 7pm

## 2019-03-01 NOTE — PROGRESS NOTE ADULT - ASSESSMENT
53 M (resident of [facility]) with TBI, s/p PEG tube, contracture, and seizure d/o who presented as a transfer from [facility] on 12/9 for respiratory failure 2/2 ARDS likely 2/2 necrotizing pancreatitis s/p intubation.  Bronc 12/11 with pseudomonas; Sputum cx with  acinetobacter  Sepsis with fever, leukopenia resolved, extubated but reintubated 1/6/19 for hypoxia and poor cough along with profuse secretions and sputum cx again with acinetobacter (restarted Avycaz/Flagyl 1/9--unclear treatment endpoint, s/p 4 weeks)  Necrotising Pancreatitis with multiple peripancreatitis collections better formed on CT 1/24 and a new 7 cm abscess  Last CT with ? superimposed pneumonia on collapsed lung  Repeat CT 3/1 with decreased abd collections and decreased effusions  Remains clinically well, unclear antibiotic endpoint but has been on prolong course (>7 weeks); suspect collections sterile  No fevers, no leukocytosis, appears at baseline  Overall, lung abscess, MDR organisms, necrotizing pancreatitis, intraabdominal collection  - Vanco at 750mg q 12 (continue for now, monitor trough)  - Avycaz/Flagyl  - Frequent suctioning and pulmonary toileting  - F/U GI  - Agree with plan to stop antibiotics after GI procedure (EGD/stent removal)--has been on prolonged course for lung/intra-abd abscesses; unclear benefit to further course    Neal Oleary MD  Pager [phone]  After 5pm and on weekends call [phone]

## 2019-03-01 NOTE — PROGRESS NOTE ADULT - PROBLEM SELECTOR PLAN 5
- tolerating feeds  - aspiration precautions  - PEG bumper at 3.5cm - keep there to prevent dislodging or migration of balloon

## 2019-03-02 LAB
ANION GAP SERPL CALC-SCNC: 10 MMO/L — SIGNIFICANT CHANGE UP (ref 7–14)
ANION GAP SERPL CALC-SCNC: 10 MMO/L — SIGNIFICANT CHANGE UP (ref 7–14)
BUN SERPL-MCNC: 5 MG/DL — LOW (ref 7–23)
BUN SERPL-MCNC: 5 MG/DL — LOW (ref 7–23)
CALCIUM SERPL-MCNC: 7.7 MG/DL — LOW (ref 8.4–10.5)
CALCIUM SERPL-MCNC: 7.9 MG/DL — LOW (ref 8.4–10.5)
CHLORIDE SERPL-SCNC: 95 MMOL/L — LOW (ref 98–107)
CHLORIDE SERPL-SCNC: 95 MMOL/L — LOW (ref 98–107)
CO2 SERPL-SCNC: 30 MMOL/L — SIGNIFICANT CHANGE UP (ref 22–31)
CO2 SERPL-SCNC: 30 MMOL/L — SIGNIFICANT CHANGE UP (ref 22–31)
CREAT SERPL-MCNC: 0.3 MG/DL — LOW (ref 0.5–1.3)
CREAT SERPL-MCNC: 0.33 MG/DL — LOW (ref 0.5–1.3)
GLUCOSE BLDC GLUCOMTR-MCNC: 86 MG/DL — SIGNIFICANT CHANGE UP (ref 70–99)
GLUCOSE BLDC GLUCOMTR-MCNC: 89 MG/DL — SIGNIFICANT CHANGE UP (ref 70–99)
GLUCOSE BLDC GLUCOMTR-MCNC: 90 MG/DL — SIGNIFICANT CHANGE UP (ref 70–99)
GLUCOSE BLDC GLUCOMTR-MCNC: 97 MG/DL — SIGNIFICANT CHANGE UP (ref 70–99)
GLUCOSE SERPL-MCNC: 86 MG/DL — SIGNIFICANT CHANGE UP (ref 70–99)
GLUCOSE SERPL-MCNC: 92 MG/DL — SIGNIFICANT CHANGE UP (ref 70–99)
HCT VFR BLD CALC: 27.6 % — LOW (ref 39–50)
HGB BLD-MCNC: 8.3 G/DL — LOW (ref 13–17)
MCHC RBC-ENTMCNC: 30.1 % — LOW (ref 32–36)
MCHC RBC-ENTMCNC: 30.1 PG — SIGNIFICANT CHANGE UP (ref 27–34)
MCV RBC AUTO: 100 FL — SIGNIFICANT CHANGE UP (ref 80–100)
NRBC # FLD: 0.02 K/UL — LOW (ref 25–125)
PLATELET # BLD AUTO: 433 K/UL — HIGH (ref 150–400)
PMV BLD: 9.7 FL — SIGNIFICANT CHANGE UP (ref 7–13)
POTASSIUM SERPL-MCNC: 2.8 MMOL/L — CRITICAL LOW (ref 3.5–5.3)
POTASSIUM SERPL-MCNC: 3.1 MMOL/L — LOW (ref 3.5–5.3)
POTASSIUM SERPL-SCNC: 2.8 MMOL/L — CRITICAL LOW (ref 3.5–5.3)
POTASSIUM SERPL-SCNC: 3.1 MMOL/L — LOW (ref 3.5–5.3)
RBC # BLD: 2.76 M/UL — LOW (ref 4.2–5.8)
RBC # FLD: 16.9 % — HIGH (ref 10.3–14.5)
SODIUM SERPL-SCNC: 135 MMOL/L — SIGNIFICANT CHANGE UP (ref 135–145)
SODIUM SERPL-SCNC: 135 MMOL/L — SIGNIFICANT CHANGE UP (ref 135–145)
WBC # BLD: 9 K/UL — SIGNIFICANT CHANGE UP (ref 3.8–10.5)
WBC # FLD AUTO: 9 K/UL — SIGNIFICANT CHANGE UP (ref 3.8–10.5)

## 2019-03-02 PROCEDURE — 99233 SBSQ HOSP IP/OBS HIGH 50: CPT | Mod: GC

## 2019-03-02 PROCEDURE — 99232 SBSQ HOSP IP/OBS MODERATE 35: CPT | Mod: GC

## 2019-03-02 RX ORDER — POTASSIUM CHLORIDE 20 MEQ
10 PACKET (EA) ORAL
Qty: 0 | Refills: 0 | Status: COMPLETED | OUTPATIENT
Start: 2019-03-02 | End: 2019-03-02

## 2019-03-02 RX ORDER — POTASSIUM CHLORIDE 20 MEQ
40 PACKET (EA) ORAL ONCE
Qty: 0 | Refills: 0 | Status: COMPLETED | OUTPATIENT
Start: 2019-03-02 | End: 2019-03-02

## 2019-03-02 RX ADMIN — PREGABALIN 1000 MICROGRAM(S): 225 CAPSULE ORAL at 13:46

## 2019-03-02 RX ADMIN — LACOSAMIDE 120 MILLIGRAM(S): 50 TABLET ORAL at 06:18

## 2019-03-02 RX ADMIN — Medication 250 MILLIGRAM(S): at 20:10

## 2019-03-02 RX ADMIN — Medication 100 MILLIEQUIVALENT(S): at 08:38

## 2019-03-02 RX ADMIN — HEPARIN SODIUM 5000 UNIT(S): 5000 INJECTION INTRAVENOUS; SUBCUTANEOUS at 22:14

## 2019-03-02 RX ADMIN — ALBUTEROL 2 PUFF(S): 90 AEROSOL, METERED ORAL at 22:02

## 2019-03-02 RX ADMIN — Medication 100 MILLIEQUIVALENT(S): at 09:49

## 2019-03-02 RX ADMIN — Medication 100 MILLIEQUIVALENT(S): at 11:05

## 2019-03-02 RX ADMIN — QUETIAPINE FUMARATE 25 MILLIGRAM(S): 200 TABLET, FILM COATED ORAL at 13:47

## 2019-03-02 RX ADMIN — LACOSAMIDE 120 MILLIGRAM(S): 50 TABLET ORAL at 18:17

## 2019-03-02 RX ADMIN — Medication 1 TABLET(S): at 18:18

## 2019-03-02 RX ADMIN — Medication 1 TABLET(S): at 13:46

## 2019-03-02 RX ADMIN — Medication 250 MILLIGRAM(S): at 09:51

## 2019-03-02 RX ADMIN — CHLORHEXIDINE GLUCONATE 1 APPLICATION(S): 213 SOLUTION TOPICAL at 09:50

## 2019-03-02 RX ADMIN — PANTOPRAZOLE SODIUM 40 MILLIGRAM(S): 20 TABLET, DELAYED RELEASE ORAL at 06:18

## 2019-03-02 RX ADMIN — Medication 1 PACKET(S): at 13:45

## 2019-03-02 RX ADMIN — Medication 100 MILLIGRAM(S): at 22:14

## 2019-03-02 RX ADMIN — HEPARIN SODIUM 5000 UNIT(S): 5000 INJECTION INTRAVENOUS; SUBCUTANEOUS at 06:19

## 2019-03-02 RX ADMIN — CHLORHEXIDINE GLUCONATE 15 MILLILITER(S): 213 SOLUTION TOPICAL at 18:18

## 2019-03-02 RX ADMIN — Medication 1 MILLIGRAM(S): at 13:46

## 2019-03-02 RX ADMIN — Medication 40 MILLIEQUIVALENT(S): at 15:16

## 2019-03-02 RX ADMIN — RISPERIDONE 1 MILLIGRAM(S): 4 TABLET ORAL at 13:45

## 2019-03-02 RX ADMIN — ALBUTEROL 2 PUFF(S): 90 AEROSOL, METERED ORAL at 04:23

## 2019-03-02 RX ADMIN — PANTOPRAZOLE SODIUM 40 MILLIGRAM(S): 20 TABLET, DELAYED RELEASE ORAL at 18:17

## 2019-03-02 RX ADMIN — CHLORHEXIDINE GLUCONATE 15 MILLILITER(S): 213 SOLUTION TOPICAL at 06:19

## 2019-03-02 RX ADMIN — Medication 100 MILLIGRAM(S): at 13:44

## 2019-03-02 RX ADMIN — Medication 100 MILLIGRAM(S): at 06:18

## 2019-03-02 RX ADMIN — ALBUTEROL 2 PUFF(S): 90 AEROSOL, METERED ORAL at 15:31

## 2019-03-02 RX ADMIN — HEPARIN SODIUM 5000 UNIT(S): 5000 INJECTION INTRAVENOUS; SUBCUTANEOUS at 13:44

## 2019-03-02 NOTE — PROGRESS NOTE ADULT - SUBJECTIVE AND OBJECTIVE BOX
Patient is a 53y old  Male who presents with a chief complaint of ARDS?, sepsis (02 Mar 2019 08:01)      SUBJECTIVE / OVERNIGHT EVENTS:  no events  MEDICATIONS  (STANDING):  ALBUTerol    90 MICROgram(s) HFA Inhaler 2 Puff(s) Inhalation every 6 hours  cefTAZidime/avibactam IVPB 2.5 Gram(s) IV Intermittent every 8 hours  cefTAZidime/avibactam IVPB      chlorhexidine 0.12% Liquid 15 milliLiter(s) Oral Mucosa two times a day  chlorhexidine 4% Liquid 1 Application(s) Topical <User Schedule>  cyanocobalamin 1000 MICROGram(s) Oral daily  dextrose 5%. 1000 milliLiter(s) (50 mL/Hr) IV Continuous <Continuous>  dextrose 50% Injectable 12.5 Gram(s) IV Push once  dextrose 50% Injectable 25 Gram(s) IV Push once  dextrose 50% Injectable 25 Gram(s) IV Push once  folic acid 1 milliGRAM(s) Enteral Tube daily  heparin  Injectable 5000 Unit(s) SubCutaneous every 8 hours  insulin lispro (HumaLOG) corrective regimen sliding scale   SubCutaneous every 6 hours  lacosamide IVPB 100 milliGRAM(s) IV Intermittent every 12 hours  lactobacillus acidophilus 1 Tablet(s) Oral three times a day with meals  metroNIDAZOLE  IVPB      metroNIDAZOLE  IVPB 500 milliGRAM(s) IV Intermittent every 8 hours  pantoprazole  Injectable 40 milliGRAM(s) IV Push two times a day  potassium chloride  10 mEq/100 mL IVPB 10 milliEquivalent(s) IV Intermittent every 1 hour  psyllium Powder 1 Packet(s) Oral daily  QUEtiapine 25 milliGRAM(s) Oral daily  risperiDONE   Solution 1 milliGRAM(s) Oral daily  vancomycin  IVPB 750 milliGRAM(s) IV Intermittent every 12 hours    MEDICATIONS  (PRN):  acetaminophen    Suspension .. 650 milliGRAM(s) Oral every 6 hours PRN Temp greater or equal to 38C (100.4F), Mild Pain (1 - 3), Moderate Pain (4 - 6)  dextrose 40% Gel 15 Gram(s) Oral once PRN Blood Glucose LESS THAN 70 milliGRAM(s)/deciliter  glucagon  Injectable 1 milliGRAM(s) IntraMuscular once PRN Glucose LESS THAN 70 milligrams/deciliter              PHYSICAL EXAM:  GENERAL: NAD, well-developed  HEAD:  Atraumatic, Normocephalic  EYES: EOMI, PERRLA, conjunctiva and sclera anicteric  NECK: Supple, No JVD  CHEST/LUNG: Clear to auscultation bilaterally; No wheeze  HEART: Regular rate and rhythm; No murmurs, rubs, or gallops  ABDOMEN: Soft, Nontender, Nondistended; Bowel sounds present, no hepatosplenomegaly, no rebound or guarding  EXTREMITIES:  2+ Peripheral Pulses, No clubbing, cyanosis, or edema  PSYCH: AAOx3  NEUROLOGY: non-focal, no asterixis  SKIN: No rashes or lesion    LABS:                        8.3    9.00  )-----------( 433      ( 02 Mar 2019 05:30 )             27.6     03-02    135  |  95<L>  |  5<L>  ----------------------------<  92  2.8<LL>   |  30  |  0.33<L>    Ca    7.9<L>      02 Mar 2019 05:30        PT/INR - ( 01 Mar 2019 06:00 )   PT: 17.4 SEC;   INR: 1.55          PTT - ( 01 Mar 2019 06:00 )  PTT:30.5 SEC          RADIOLOGY & ADDITIONAL TESTS:

## 2019-03-02 NOTE — PROVIDER CONTACT NOTE (CRITICAL VALUE NOTIFICATION) - SITUATION
Pt has potassium of 2.9
gram positive cocci in clusters from blood culture drawn on 1/24
Intubated off vasopressors.
Pt has potassium of 2.9
Vanco Level 27
Vanco level 40.9

## 2019-03-02 NOTE — PROVIDER CONTACT NOTE (CRITICAL VALUE NOTIFICATION) - BACKGROUND
Pt admitted for Acute Respiratory Distress
53yr M, acute respiratory distress
Admitted for ARF
Discontinued IVF's.
Pt admitted for Acute Respiratory Distress
admitted for ARF

## 2019-03-02 NOTE — PROVIDER CONTACT NOTE (CRITICAL VALUE NOTIFICATION) - TEST AND RESULT REPORTED:
No urine output and low BP.
Potassium-2.9
Vanco  level 40.9
Vanco Level 27
gram positive cocci in clusters from blood culture drawn on 1/24
Potassium - 2.8

## 2019-03-02 NOTE — PROGRESS NOTE ADULT - ASSESSMENT
53 Male w/ a PMHx of TBI (s/p PEG tube, contracture, and seizure d/o) presented as a transfer from [facility] on 12/9 for respiratory failure 2/2 ARDS likely 2/2 necrotizing pancreatitis s/p intubation. Course c/b Acinetobacter PNA s/p Avycaz and Flagyl 7 day course.  Course further complicated by acute blood loss anemia s/p colonoscopy with findings suggestive of ischemic colitis, without further bleeding and Hgb remaining stable. S/p extubation 1/3, and re-intubation 1/6 now s/p tracheostomy.

## 2019-03-02 NOTE — PROVIDER CONTACT NOTE (CRITICAL VALUE NOTIFICATION) - ACTION/TREATMENT ORDERED:
iv potassium ordered; continue to monitor.
Pt starting vanco
Hold next dose of Vanco
Hold next dose of Vanco
NP Mia Contreras notified; iv potassium ordered; continue to monitor.
Will reassess the BP.

## 2019-03-02 NOTE — PROVIDER CONTACT NOTE (CRITICAL VALUE NOTIFICATION) - PERSON GIVING RESULT:
Bernie Salmon/Hematology
Lab
Payton Montemayor
Rajendra Hernandez
Wei Olmos RN
Steve Johnston/Chemistry

## 2019-03-02 NOTE — PROGRESS NOTE ADULT - ATTENDING COMMENTS
s/p EGD with removal of axios drain. Doing well. No longer vomiting. Will follow up with ID re:stopping antibiotiics  Otherwise remains on vent, c/w  weaning trials, trach care  DVT ppx  Dispo planning for this week

## 2019-03-02 NOTE — PROGRESS NOTE ADULT - PROBLEM SELECTOR PLAN 5
- tolerating feeds  - aspiration precautions  - PEG bumper at 3.5cm - keep there to prevent dislodging or migration of balloon - Feeds resumed post EGD  - aspiration precautions  - PEG bumper at 3.5cm - keep there to prevent dislodging or migration of balloon

## 2019-03-02 NOTE — PROGRESS NOTE ADULT - SUBJECTIVE AND OBJECTIVE BOX
CHIEF COMPLAINT:    Interval Events:    REVIEW OF SYSTEMS:  Constitutional:   Eyes:  ENT:  CV:  Resp:  GI:  :  MSK:  Integumentary:  Neurological:  Psychiatric:  Endocrine:  Hematologic/Lymphatic:  Allergic/Immunologic:  [ ] All other systems negative  [ ] Unable to assess ROS because ________    OBJECTIVE:  ICU Vital Signs Last 24 Hrs  T(C): 36.9 (02 Mar 2019 06:04), Max: 37.1 (01 Mar 2019 22:21)  T(F): 98.5 (02 Mar 2019 06:04), Max: 98.7 (01 Mar 2019 22:21)  HR: 92 (02 Mar 2019 07:06) (76 - 100)  BP: 138/71 (02 Mar 2019 06:04) (95/66 - 138/71)  BP(mean): 67 (01 Mar 2019 16:00) (67 - 71)  ABP: --  ABP(mean): --  RR: 14 (02 Mar 2019 06:04) (14 - 16)  SpO2: 100% (02 Mar 2019 07:06) (96% - 100%)    Mode: AC/ CMV (Assist Control/ Continuous Mandatory Ventilation), RR (machine): 14, TV (machine): 400, FiO2: 40, PEEP: 5, ITime: 1, MAP: 8, PIP: 17    03-01 @ 07:01  -  03-02 @ 07:00  --------------------------------------------------------  IN: 250 mL / OUT: 150 mL / NET: 100 mL      CAPILLARY BLOOD GLUCOSE      POCT Blood Glucose.: 90 mg/dL (02 Mar 2019 05:25)        HOSPITAL MEDICATIONS:  MEDICATIONS  (STANDING):  ALBUTerol    90 MICROgram(s) HFA Inhaler 2 Puff(s) Inhalation every 6 hours  cefTAZidime/avibactam IVPB 2.5 Gram(s) IV Intermittent every 8 hours  cefTAZidime/avibactam IVPB      chlorhexidine 0.12% Liquid 15 milliLiter(s) Oral Mucosa two times a day  chlorhexidine 4% Liquid 1 Application(s) Topical <User Schedule>  cyanocobalamin 1000 MICROGram(s) Oral daily  dextrose 5%. 1000 milliLiter(s) (50 mL/Hr) IV Continuous <Continuous>  dextrose 50% Injectable 12.5 Gram(s) IV Push once  dextrose 50% Injectable 25 Gram(s) IV Push once  dextrose 50% Injectable 25 Gram(s) IV Push once  folic acid 1 milliGRAM(s) Enteral Tube daily  heparin  Injectable 5000 Unit(s) SubCutaneous every 8 hours  insulin lispro (HumaLOG) corrective regimen sliding scale   SubCutaneous every 6 hours  lacosamide IVPB 100 milliGRAM(s) IV Intermittent every 12 hours  lactobacillus acidophilus 1 Tablet(s) Oral three times a day with meals  metroNIDAZOLE  IVPB      metroNIDAZOLE  IVPB 500 milliGRAM(s) IV Intermittent every 8 hours  pantoprazole  Injectable 40 milliGRAM(s) IV Push two times a day  potassium chloride  10 mEq/100 mL IVPB 10 milliEquivalent(s) IV Intermittent every 1 hour  psyllium Powder 1 Packet(s) Oral daily  QUEtiapine 25 milliGRAM(s) Oral daily  risperiDONE   Solution 1 milliGRAM(s) Oral daily  vancomycin  IVPB 750 milliGRAM(s) IV Intermittent every 12 hours    MEDICATIONS  (PRN):  acetaminophen    Suspension .. 650 milliGRAM(s) Oral every 6 hours PRN Temp greater or equal to 38C (100.4F), Mild Pain (1 - 3), Moderate Pain (4 - 6)  dextrose 40% Gel 15 Gram(s) Oral once PRN Blood Glucose LESS THAN 70 milliGRAM(s)/deciliter  glucagon  Injectable 1 milliGRAM(s) IntraMuscular once PRN Glucose LESS THAN 70 milligrams/deciliter      LABS:                        8.3    9.00  )-----------( 433      ( 02 Mar 2019 05:30 )             27.6     03-02    135  |  95<L>  |  5<L>  ----------------------------<  92  2.8<LL>   |  30  |  0.33<L>    Ca    7.9<L>      02 Mar 2019 05:30      PT/INR - ( 01 Mar 2019 06:00 )   PT: 17.4 SEC;   INR: 1.55          PTT - ( 01 Mar 2019 06:00 )  PTT:30.5 SEC          MICROBIOLOGY:     RADIOLOGY:  [ ] Reviewed and interpreted by me    PULMONARY FUNCTION TESTS:    EKG: CHIEF COMPLAINT: Patient is a 53y old  Male who presents with a chief complaint of ARDS?, sepsis (02 Mar 2019 10:58)      Interval Events: no overnight events    REVIEW OF SYSTEMS:  CV: denies chest pain   Resp: denies SOB  GI: denies abd pain, nausea  [x] All other systems negative      OBJECTIVE:  ICU Vital Signs Last 24 Hrs  T(C): 36.9 (02 Mar 2019 06:04), Max: 37.1 (01 Mar 2019 22:21)  T(F): 98.5 (02 Mar 2019 06:04), Max: 98.7 (01 Mar 2019 22:21)  HR: 92 (02 Mar 2019 07:06) (76 - 100)  BP: 138/71 (02 Mar 2019 06:04) (95/66 - 138/71)  BP(mean): 67 (01 Mar 2019 16:00) (67 - 71)  RR: 14 (02 Mar 2019 06:04) (14 - 16)  SpO2: 100% (02 Mar 2019 07:06) (96% - 100%)    Mode: AC/ CMV (Assist Control/ Continuous Mandatory Ventilation), RR (machine): 14, TV (machine): 400, FiO2: 40, PEEP: 5, ITime: 1, MAP: 8, PIP: 17    CAPILLARY BLOOD GLUCOSE      POCT Blood Glucose.: 90 mg/dL (02 Mar 2019 05:25)        HOSPITAL MEDICATIONS:  MEDICATIONS  (STANDING):  ALBUTerol    90 MICROgram(s) HFA Inhaler 2 Puff(s) Inhalation every 6 hours  cefTAZidime/avibactam IVPB 2.5 Gram(s) IV Intermittent every 8 hours  cefTAZidime/avibactam IVPB      chlorhexidine 0.12% Liquid 15 milliLiter(s) Oral Mucosa two times a day  chlorhexidine 4% Liquid 1 Application(s) Topical <User Schedule>  cyanocobalamin 1000 MICROGram(s) Oral daily  dextrose 5%. 1000 milliLiter(s) (50 mL/Hr) IV Continuous <Continuous>  dextrose 50% Injectable 12.5 Gram(s) IV Push once  dextrose 50% Injectable 25 Gram(s) IV Push once  dextrose 50% Injectable 25 Gram(s) IV Push once  folic acid 1 milliGRAM(s) Enteral Tube daily  heparin  Injectable 5000 Unit(s) SubCutaneous every 8 hours  insulin lispro (HumaLOG) corrective regimen sliding scale   SubCutaneous every 6 hours  lacosamide IVPB 100 milliGRAM(s) IV Intermittent every 12 hours  lactobacillus acidophilus 1 Tablet(s) Oral three times a day with meals  metroNIDAZOLE  IVPB      metroNIDAZOLE  IVPB 500 milliGRAM(s) IV Intermittent every 8 hours  pantoprazole  Injectable 40 milliGRAM(s) IV Push two times a day  potassium chloride  10 mEq/100 mL IVPB 10 milliEquivalent(s) IV Intermittent every 1 hour  psyllium Powder 1 Packet(s) Oral daily  QUEtiapine 25 milliGRAM(s) Oral daily  risperiDONE   Solution 1 milliGRAM(s) Oral daily  vancomycin  IVPB 750 milliGRAM(s) IV Intermittent every 12 hours    MEDICATIONS  (PRN):  acetaminophen    Suspension .. 650 milliGRAM(s) Oral every 6 hours PRN Temp greater or equal to 38C (100.4F), Mild Pain (1 - 3), Moderate Pain (4 - 6)  dextrose 40% Gel 15 Gram(s) Oral once PRN Blood Glucose LESS THAN 70 milliGRAM(s)/deciliter  glucagon  Injectable 1 milliGRAM(s) IntraMuscular once PRN Glucose LESS THAN 70 milligrams/deciliter      LABS:                        8.3    9.00  )-----------( 433      ( 02 Mar 2019 05:30 )             27.6     03-02    135  |  95<L>  |  5<L>  ----------------------------<  92  2.8<LL>   |  30  |  0.33<L>    Ca    7.9<L>      02 Mar 2019 05:30      PT/INR - ( 01 Mar 2019 06:00 )   PT: 17.4 SEC;   INR: 1.55          PTT - ( 01 Mar 2019 06:00 )  PTT:30.5 SEC

## 2019-03-02 NOTE — PROGRESS NOTE ADULT - PROBLEM SELECTOR PLAN 3
- fevers resolved, afebrile now  - cont current abx for now until after GI procedures completed - fevers resolved, afebrile now  - cont current abx for now pending ID input

## 2019-03-02 NOTE — PROVIDER CONTACT NOTE (CRITICAL VALUE NOTIFICATION) - RECOMMENDATIONS
Administer potassium
Administer potassium
Hold next dose of Vanco
Hold next dose of Vanco
To add midodrine and give Lasix after improved BP.

## 2019-03-02 NOTE — PROGRESS NOTE ADULT - ASSESSMENT
Impression:  1) Walled off necrosis, s/p EUS and AXIOS 1/31/19. S/P endoscopic necrosectomy on 2/15/19. S/p axios removal 3/1. Pt appears to be doing well.  2) Hematochezia, s/p colonoscopy on 12/28/2018 with severe segmental colitis localized to the transverse colon and ascending colon (possible ischemic colitis, possible colitis related to contiguous danay-pancreatic inflammatory process). Biopsies with granulation tissue but no infection/malignancy, Hb stable   3) Resp failure in the setting of pneumonia, requiring tracheostomy now with lung abscess and sepsis on IV  Abx, ID following     Recommendations:  - will notify regarding resumption of tube feeds Impression:  1) Walled off necrosis, s/p EUS and AXIOS 1/31/19. S/P endoscopic necrosectomy on 2/15/19. S/p axios removal 3/1. Pt appears to be doing well.  2) Hematochezia, s/p colonoscopy on 12/28/2018 with severe segmental colitis localized to the transverse colon and ascending colon (possible ischemic colitis, possible colitis related to contiguous danay-pancreatic inflammatory process). Biopsies with granulation tissue but no infection/malignancy, Hb stable   3) Resp failure in the setting of pneumonia, requiring tracheostomy now with lung abscess and sepsis on IV  Abx, ID following     Recommendations:  - resume tube feeds slowly

## 2019-03-02 NOTE — PROGRESS NOTE ADULT - PROBLEM SELECTOR PLAN 1
- cont avycaz and flagyl and vanco per ID  - s/p EUS stent and drainage  - s/p necrostomy on Friday 2/15  - repeat CT performed 2/23  - repeat CT overnight with decreasing pancreatic fluid collections  - for EGD and stent removal today  - GI note appreciated - cont avycaz and flagyl and vanco per ID  - s/p EUS stent and drainage  - s/p necrostomy on Friday 2/15  - repeat CT performed 2/23  - repeat CT overnight with decreasing pancreatic fluid collections  -  GI note appreciated- s/p stent removal, fluid collection resolved

## 2019-03-02 NOTE — PROVIDER CONTACT NOTE (CRITICAL VALUE NOTIFICATION) - ASSESSMENT
Pt off unit in CT scan; last VSS
[place], no sign of distress noted
Fluid overloaded as per POCUS.
No S/S of distress, V/S stable
No S/S of distress, V/S stable
Pt off unit in CT scan; last VSS

## 2019-03-03 LAB
ANION GAP SERPL CALC-SCNC: 12 MMO/L — SIGNIFICANT CHANGE UP (ref 7–14)
BUN SERPL-MCNC: 4 MG/DL — LOW (ref 7–23)
CALCIUM SERPL-MCNC: 7.7 MG/DL — LOW (ref 8.4–10.5)
CHLORIDE SERPL-SCNC: 98 MMOL/L — SIGNIFICANT CHANGE UP (ref 98–107)
CO2 SERPL-SCNC: 27 MMOL/L — SIGNIFICANT CHANGE UP (ref 22–31)
CREAT SERPL-MCNC: 0.31 MG/DL — LOW (ref 0.5–1.3)
GLUCOSE BLDC GLUCOMTR-MCNC: 112 MG/DL — HIGH (ref 70–99)
GLUCOSE BLDC GLUCOMTR-MCNC: 117 MG/DL — HIGH (ref 70–99)
GLUCOSE BLDC GLUCOMTR-MCNC: 132 MG/DL — HIGH (ref 70–99)
GLUCOSE SERPL-MCNC: 116 MG/DL — HIGH (ref 70–99)
HCT VFR BLD CALC: 27.5 % — LOW (ref 39–50)
HGB BLD-MCNC: 8.1 G/DL — LOW (ref 13–17)
MAGNESIUM SERPL-MCNC: 1.4 MG/DL — LOW (ref 1.6–2.6)
MCHC RBC-ENTMCNC: 29.5 % — LOW (ref 32–36)
MCHC RBC-ENTMCNC: 30.2 PG — SIGNIFICANT CHANGE UP (ref 27–34)
MCV RBC AUTO: 102.6 FL — HIGH (ref 80–100)
NRBC # FLD: 0 K/UL — LOW (ref 25–125)
PHOSPHATE SERPL-MCNC: 2.6 MG/DL — SIGNIFICANT CHANGE UP (ref 2.5–4.5)
PLATELET # BLD AUTO: 382 K/UL — SIGNIFICANT CHANGE UP (ref 150–400)
PMV BLD: 9.7 FL — SIGNIFICANT CHANGE UP (ref 7–13)
POTASSIUM SERPL-MCNC: 3.1 MMOL/L — LOW (ref 3.5–5.3)
POTASSIUM SERPL-SCNC: 3.1 MMOL/L — LOW (ref 3.5–5.3)
RBC # BLD: 2.68 M/UL — LOW (ref 4.2–5.8)
RBC # FLD: 17 % — HIGH (ref 10.3–14.5)
SODIUM SERPL-SCNC: 137 MMOL/L — SIGNIFICANT CHANGE UP (ref 135–145)
VANCOMYCIN TROUGH SERPL-MCNC: 12.1 UG/ML — SIGNIFICANT CHANGE UP (ref 10–20)
WBC # BLD: 8.07 K/UL — SIGNIFICANT CHANGE UP (ref 3.8–10.5)
WBC # FLD AUTO: 8.07 K/UL — SIGNIFICANT CHANGE UP (ref 3.8–10.5)

## 2019-03-03 PROCEDURE — 99233 SBSQ HOSP IP/OBS HIGH 50: CPT

## 2019-03-03 RX ORDER — POTASSIUM CHLORIDE 20 MEQ
40 PACKET (EA) ORAL EVERY 4 HOURS
Qty: 0 | Refills: 0 | Status: COMPLETED | OUTPATIENT
Start: 2019-03-03 | End: 2019-03-03

## 2019-03-03 RX ORDER — MAGNESIUM SULFATE 500 MG/ML
1 VIAL (ML) INJECTION ONCE
Qty: 0 | Refills: 0 | Status: COMPLETED | OUTPATIENT
Start: 2019-03-03 | End: 2019-03-03

## 2019-03-03 RX ADMIN — Medication 100 MILLIGRAM(S): at 05:49

## 2019-03-03 RX ADMIN — HEPARIN SODIUM 5000 UNIT(S): 5000 INJECTION INTRAVENOUS; SUBCUTANEOUS at 05:11

## 2019-03-03 RX ADMIN — QUETIAPINE FUMARATE 25 MILLIGRAM(S): 200 TABLET, FILM COATED ORAL at 11:11

## 2019-03-03 RX ADMIN — HEPARIN SODIUM 5000 UNIT(S): 5000 INJECTION INTRAVENOUS; SUBCUTANEOUS at 21:47

## 2019-03-03 RX ADMIN — Medication 100 MILLIGRAM(S): at 21:46

## 2019-03-03 RX ADMIN — Medication 250 MILLIGRAM(S): at 20:23

## 2019-03-03 RX ADMIN — CHLORHEXIDINE GLUCONATE 15 MILLILITER(S): 213 SOLUTION TOPICAL at 19:11

## 2019-03-03 RX ADMIN — LACOSAMIDE 120 MILLIGRAM(S): 50 TABLET ORAL at 05:10

## 2019-03-03 RX ADMIN — Medication 1 PACKET(S): at 11:10

## 2019-03-03 RX ADMIN — PREGABALIN 1000 MICROGRAM(S): 225 CAPSULE ORAL at 11:10

## 2019-03-03 RX ADMIN — Medication 40 MILLIEQUIVALENT(S): at 15:10

## 2019-03-03 RX ADMIN — RISPERIDONE 1 MILLIGRAM(S): 4 TABLET ORAL at 11:10

## 2019-03-03 RX ADMIN — Medication 1 TABLET(S): at 08:36

## 2019-03-03 RX ADMIN — CHLORHEXIDINE GLUCONATE 15 MILLILITER(S): 213 SOLUTION TOPICAL at 05:11

## 2019-03-03 RX ADMIN — HEPARIN SODIUM 5000 UNIT(S): 5000 INJECTION INTRAVENOUS; SUBCUTANEOUS at 13:59

## 2019-03-03 RX ADMIN — Medication 40 MILLIEQUIVALENT(S): at 19:12

## 2019-03-03 RX ADMIN — Medication 1 TABLET(S): at 11:53

## 2019-03-03 RX ADMIN — Medication 1 MILLIGRAM(S): at 11:10

## 2019-03-03 RX ADMIN — PANTOPRAZOLE SODIUM 40 MILLIGRAM(S): 20 TABLET, DELAYED RELEASE ORAL at 05:50

## 2019-03-03 RX ADMIN — LACOSAMIDE 120 MILLIGRAM(S): 50 TABLET ORAL at 19:11

## 2019-03-03 RX ADMIN — ALBUTEROL 2 PUFF(S): 90 AEROSOL, METERED ORAL at 09:10

## 2019-03-03 RX ADMIN — Medication 1 TABLET(S): at 19:11

## 2019-03-03 RX ADMIN — Medication 250 MILLIGRAM(S): at 10:00

## 2019-03-03 RX ADMIN — ALBUTEROL 2 PUFF(S): 90 AEROSOL, METERED ORAL at 03:59

## 2019-03-03 RX ADMIN — Medication 100 GRAM(S): at 11:53

## 2019-03-03 RX ADMIN — ALBUTEROL 2 PUFF(S): 90 AEROSOL, METERED ORAL at 22:15

## 2019-03-03 RX ADMIN — CHLORHEXIDINE GLUCONATE 1 APPLICATION(S): 213 SOLUTION TOPICAL at 10:00

## 2019-03-03 RX ADMIN — ALBUTEROL 2 PUFF(S): 90 AEROSOL, METERED ORAL at 15:13

## 2019-03-03 RX ADMIN — PANTOPRAZOLE SODIUM 40 MILLIGRAM(S): 20 TABLET, DELAYED RELEASE ORAL at 19:12

## 2019-03-03 RX ADMIN — Medication 100 MILLIGRAM(S): at 14:00

## 2019-03-03 NOTE — PROGRESS NOTE ADULT - ATTENDING COMMENTS
severe ARDS and necrotizing pancreatitis/resolving, on abx for the abscess pancreas, seizures well ocontrolled on the current regime, resp status better tolerating trach collar as needed/all things considerdd he is improving

## 2019-03-03 NOTE — PROGRESS NOTE ADULT - ASSESSMENT
53 Male w/ a PMHx of TBI (s/p PEG tube, contracture, and seizure d/o) presented as a transfer from [facility] on 12/9 for respiratory failure 2/2 ARDS likely 2/2 necrotizing pancreatitis s/p intubation. Course c/b Acinetobacter PNA s/p Avycaz and Flagyl 7 day course.  Course further complicated by acute blood loss anemia s/p colonoscopy with findings suggestive of ischemic colitis, without further bleeding and Hgb remaining stable. S/p extubation 1/3, and re-intubation 1/6 now s/p tracheostomy.

## 2019-03-03 NOTE — PROGRESS NOTE ADULT - PROBLEM SELECTOR PLAN 5
- Feeds resumed post EGD  - aspiration precautions  - PEG bumper at 3.5cm - keep there to prevent dislodging or migration of balloon

## 2019-03-03 NOTE — PROGRESS NOTE ADULT - SUBJECTIVE AND OBJECTIVE BOX
CHIEF COMPLAINT:  Patient is a 53y old  Male who presents with a chief complaint of Mode: AC/ CMV (Assist Control/ Continuous Mandatory Ventilation), RR (machine): 14, TV (machine): 400, FiO2: 40, PEEP: 5, MAP: 7.8, PIP: 19  Interval Events:    REVIEW OF SYSTEMS:  Constitutional:   Eyes:  ENT:  CV:  Resp:  GI:  :  MSK:  Integumentary:  Neurological:  Psychiatric:  Endocrine:  Hematologic/Lymphatic:  Allergic/Immunologic:  [ ] All other systems negative  [ ] Unable to assess ROS because ________    OBJECTIVE:  ICU Vital Signs Last 24 Hrs  T(C): 37.4 (03 Mar 2019 05:08), Max: 37.5 (02 Mar 2019 12:27)  T(F): 99.3 (03 Mar 2019 05:08), Max: 99.5 (02 Mar 2019 12:27)  HR: 111 (03 Mar 2019 07:06) (100 - 111)  BP: 141/81 (03 Mar 2019 05:08) (102/69 - 141/81)  BP(mean): --  ABP: --  ABP(mean): --  RR: 14 (03 Mar 2019 05:08) (14 - 14)  SpO2: 99% (03 Mar 2019 07:06) (99% - 100%)    Mode: AC/ CMV (Assist Control/ Continuous Mandatory Ventilation), RR (machine): 14, TV (machine): 400, FiO2: 40, PEEP: 5, MAP: 7.8, PIP: 19    03-02 @ 07:01  -  03-03 @ 07:00  --------------------------------------------------------  IN: 860 mL / OUT: 1300 mL / NET: -440 mL      CAPILLARY BLOOD GLUCOSE      POCT Blood Glucose.: 112 mg/dL (03 Mar 2019 05:42)      PHYSICAL EXAM:  General:   HEENT:   Lymph Nodes:  Neck:   Respiratory:   Cardiovascular:   Abdomen:   Extremities:   Skin:   Neurological:  Psychiatry:    HOSPITAL MEDICATIONS:  MEDICATIONS  (STANDING):  ALBUTerol    90 MICROgram(s) HFA Inhaler 2 Puff(s) Inhalation every 6 hours  cefTAZidime/avibactam IVPB 2.5 Gram(s) IV Intermittent every 8 hours  cefTAZidime/avibactam IVPB      chlorhexidine 0.12% Liquid 15 milliLiter(s) Oral Mucosa two times a day  chlorhexidine 4% Liquid 1 Application(s) Topical <User Schedule>  cyanocobalamin 1000 MICROGram(s) Oral daily  dextrose 5%. 1000 milliLiter(s) (50 mL/Hr) IV Continuous <Continuous>  dextrose 50% Injectable 12.5 Gram(s) IV Push once  dextrose 50% Injectable 25 Gram(s) IV Push once  dextrose 50% Injectable 25 Gram(s) IV Push once  folic acid 1 milliGRAM(s) Enteral Tube daily  heparin  Injectable 5000 Unit(s) SubCutaneous every 8 hours  insulin lispro (HumaLOG) corrective regimen sliding scale   SubCutaneous every 6 hours  lacosamide IVPB 100 milliGRAM(s) IV Intermittent every 12 hours  lactobacillus acidophilus 1 Tablet(s) Oral three times a day with meals  metroNIDAZOLE  IVPB      metroNIDAZOLE  IVPB 500 milliGRAM(s) IV Intermittent every 8 hours  pantoprazole  Injectable 40 milliGRAM(s) IV Push two times a day  psyllium Powder 1 Packet(s) Oral daily  QUEtiapine 25 milliGRAM(s) Oral daily  risperiDONE   Solution 1 milliGRAM(s) Oral daily  vancomycin  IVPB 750 milliGRAM(s) IV Intermittent every 12 hours    MEDICATIONS  (PRN):  acetaminophen    Suspension .. 650 milliGRAM(s) Oral every 6 hours PRN Temp greater or equal to 38C (100.4F), Mild Pain (1 - 3), Moderate Pain (4 - 6)  dextrose 40% Gel 15 Gram(s) Oral once PRN Blood Glucose LESS THAN 70 milliGRAM(s)/deciliter  glucagon  Injectable 1 milliGRAM(s) IntraMuscular once PRN Glucose LESS THAN 70 milligrams/deciliter      LABS:                        8.1    8.07  )-----------( 382      ( 03 Mar 2019 04:10 )             27.5     03-03    137  |  98  |  4<L>  ----------------------------<  116<H>  3.1<L>   |  27  |  0.31<L>    Ca    7.7<L>      03 Mar 2019 04:10  Phos  2.6     03-03  Mg     1.4     03-03                MICROBIOLOGY:     RADIOLOGY:  [ ] Reviewed and interpreted by me    PULMONARY FUNCTION TESTS:    EKG:    I&O's Summary    02 Mar 2019 07:01  -  03 Mar 2019 07:00  --------------------------------------------------------  IN: 860 mL / OUT: 1300 mL / NET: -440 mL CHIEF COMPLAINT:  Patient is a 53y old  Male who presents with ARDS    Interval Events: No new events overnight    REVIEW OF SYSTEMS:  Constitutional: No acute distress  CV: Denies  Resp: Denies  GI: Denies  [ X ] All other systems negative    OBJECTIVE:  ICU Vital Signs Last 24 Hrs  T(C): 37.4 (03 Mar 2019 05:08), Max: 37.5 (02 Mar 2019 12:27)  T(F): 99.3 (03 Mar 2019 05:08), Max: 99.5 (02 Mar 2019 12:27)  HR: 111 (03 Mar 2019 07:06) (100 - 111)  BP: 141/81 (03 Mar 2019 05:08) (102/69 - 141/81)  BP(mean): --  ABP: --  ABP(mean): --  RR: 14 (03 Mar 2019 05:08) (14 - 14)  SpO2: 99% (03 Mar 2019 07:06) (99% - 100%)    Mode: AC/ CMV (Assist Control/ Continuous Mandatory Ventilation), RR (machine): 14, TV (machine): 400, FiO2: 40, PEEP: 5, MAP: 7.8, PIP: 19    03-02 @ 07:01  -  03-03 @ 07:00  --------------------------------------------------------  IN: 860 mL / OUT: 1300 mL / NET: -440 mL    CAPILLARY BLOOD GLUCOSE    POCT Blood Glucose.: 112 mg/dL (03 Mar 2019 05:42)    Physical exam:   · Constitutional	detailed exam	  · Constitutional Details	no distress	  · Eyes	detailed exam	  · Eyes Details	conjunctiva clear	  · ENMT	No oral lesions; no gross abnormalities	  · Neck	detailed exam 	  · Neck Details	no JVD; tracheostomy	  · Respiratory	detailed exam	  · Respiratory Details	breath sounds equal; clear to auscultation bilaterally; trach to vent	  · Cardiovascular	detailed exam	  · Cardiovascular Details	regular rate and rhythm 	  · Gastrointestinal	detailed exam	  · GI Normal	PEG.  Erythema around PEG site	  · Extremities	detailed exam 	  · Extremities Details	no clubbing; no cyanosis; no pedal edema	  · Extremities Comments	contractures	  · Vascular	detailed exam 	  · Radial Pulse	right normal; left normal	  · DP Pulse	right normal; left normal	  · Neurological	detailed exam	  · Mental Status	awake and alert	  · Musculoskeletal	detailed exam	  · Musculoskeletal Details	no joint erythema	    HOSPITAL MEDICATIONS:  MEDICATIONS  (STANDING):  ALBUTerol    90 MICROgram(s) HFA Inhaler 2 Puff(s) Inhalation every 6 hours  cefTAZidime/avibactam IVPB 2.5 Gram(s) IV Intermittent every 8 hours  cefTAZidime/avibactam IVPB      chlorhexidine 0.12% Liquid 15 milliLiter(s) Oral Mucosa two times a day  chlorhexidine 4% Liquid 1 Application(s) Topical <User Schedule>  cyanocobalamin 1000 MICROGram(s) Oral daily  dextrose 5%. 1000 milliLiter(s) (50 mL/Hr) IV Continuous <Continuous>  dextrose 50% Injectable 12.5 Gram(s) IV Push once  dextrose 50% Injectable 25 Gram(s) IV Push once  dextrose 50% Injectable 25 Gram(s) IV Push once  folic acid 1 milliGRAM(s) Enteral Tube daily  heparin  Injectable 5000 Unit(s) SubCutaneous every 8 hours  insulin lispro (HumaLOG) corrective regimen sliding scale   SubCutaneous every 6 hours  lacosamide IVPB 100 milliGRAM(s) IV Intermittent every 12 hours  lactobacillus acidophilus 1 Tablet(s) Oral three times a day with meals  metroNIDAZOLE  IVPB      metroNIDAZOLE  IVPB 500 milliGRAM(s) IV Intermittent every 8 hours  pantoprazole  Injectable 40 milliGRAM(s) IV Push two times a day  psyllium Powder 1 Packet(s) Oral daily  QUEtiapine 25 milliGRAM(s) Oral daily  risperiDONE   Solution 1 milliGRAM(s) Oral daily  vancomycin  IVPB 750 milliGRAM(s) IV Intermittent every 12 hours    MEDICATIONS  (PRN):  acetaminophen    Suspension .. 650 milliGRAM(s) Oral every 6 hours PRN Temp greater or equal to 38C (100.4F), Mild Pain (1 - 3), Moderate Pain (4 - 6)  dextrose 40% Gel 15 Gram(s) Oral once PRN Blood Glucose LESS THAN 70 milliGRAM(s)/deciliter  glucagon  Injectable 1 milliGRAM(s) IntraMuscular once PRN Glucose LESS THAN 70 milligrams/deciliter      LABS:                        8.1    8.07  )-----------( 382      ( 03 Mar 2019 04:10 )             27.5     03-03    137  |  98  |  4<L>  ----------------------------<  116<H>  3.1<L>   |  27  |  0.31<L>    Ca    7.7<L>      03 Mar 2019 04:10  Phos  2.6     03-03  Mg     1.4     03-03    02 Mar 2019 07:01  -  03 Mar 2019 07:00  --------------------------------------------------------  IN: 860 mL / OUT: 1300 mL / NET: -440 mL

## 2019-03-03 NOTE — PROGRESS NOTE ADULT - PROBLEM SELECTOR PLAN 1
- cont avycaz and flagyl and vanco thru 3/4/19 per ID  - s/p EUS stent and drainage  - s/p necrostomy on Friday 2/15  - repeat CT performed 2/23  - repeat CT overnight with decreasing pancreatic fluid collections  -  GI note appreciated- s/p stent removal, fluid collection resolved

## 2019-03-04 LAB
ANION GAP SERPL CALC-SCNC: 9 MMO/L — SIGNIFICANT CHANGE UP (ref 7–14)
BUN SERPL-MCNC: 5 MG/DL — LOW (ref 7–23)
CALCIUM SERPL-MCNC: 7.8 MG/DL — LOW (ref 8.4–10.5)
CHLORIDE SERPL-SCNC: 99 MMOL/L — SIGNIFICANT CHANGE UP (ref 98–107)
CO2 SERPL-SCNC: 25 MMOL/L — SIGNIFICANT CHANGE UP (ref 22–31)
CREAT SERPL-MCNC: 0.27 MG/DL — LOW (ref 0.5–1.3)
GLUCOSE BLDC GLUCOMTR-MCNC: 102 MG/DL — HIGH (ref 70–99)
GLUCOSE BLDC GLUCOMTR-MCNC: 106 MG/DL — HIGH (ref 70–99)
GLUCOSE BLDC GLUCOMTR-MCNC: 134 MG/DL — HIGH (ref 70–99)
GLUCOSE BLDC GLUCOMTR-MCNC: 137 MG/DL — HIGH (ref 70–99)
GLUCOSE SERPL-MCNC: 130 MG/DL — HIGH (ref 70–99)
HCT VFR BLD CALC: 24.2 % — LOW (ref 39–50)
HGB BLD-MCNC: 7.2 G/DL — LOW (ref 13–17)
MAGNESIUM SERPL-MCNC: 1.6 MG/DL — SIGNIFICANT CHANGE UP (ref 1.6–2.6)
MCHC RBC-ENTMCNC: 29.8 % — LOW (ref 32–36)
MCHC RBC-ENTMCNC: 30.3 PG — SIGNIFICANT CHANGE UP (ref 27–34)
MCV RBC AUTO: 101.7 FL — HIGH (ref 80–100)
NRBC # FLD: 0 K/UL — LOW (ref 25–125)
PHOSPHATE SERPL-MCNC: 1.8 MG/DL — LOW (ref 2.5–4.5)
PLATELET # BLD AUTO: 325 K/UL — SIGNIFICANT CHANGE UP (ref 150–400)
PMV BLD: 9.7 FL — SIGNIFICANT CHANGE UP (ref 7–13)
POTASSIUM SERPL-MCNC: 4.7 MMOL/L — SIGNIFICANT CHANGE UP (ref 3.5–5.3)
POTASSIUM SERPL-SCNC: 4.7 MMOL/L — SIGNIFICANT CHANGE UP (ref 3.5–5.3)
RBC # BLD: 2.38 M/UL — LOW (ref 4.2–5.8)
RBC # FLD: 17 % — HIGH (ref 10.3–14.5)
SODIUM SERPL-SCNC: 133 MMOL/L — LOW (ref 135–145)
WBC # BLD: 9.28 K/UL — SIGNIFICANT CHANGE UP (ref 3.8–10.5)
WBC # FLD AUTO: 9.28 K/UL — SIGNIFICANT CHANGE UP (ref 3.8–10.5)

## 2019-03-04 PROCEDURE — 99233 SBSQ HOSP IP/OBS HIGH 50: CPT | Mod: GC

## 2019-03-04 PROCEDURE — 99232 SBSQ HOSP IP/OBS MODERATE 35: CPT

## 2019-03-04 RX ORDER — LACOSAMIDE 50 MG/1
100 TABLET ORAL
Qty: 0 | Refills: 0 | Status: DISCONTINUED | OUTPATIENT
Start: 2019-03-04 | End: 2019-03-07

## 2019-03-04 RX ORDER — SODIUM CHLORIDE 9 MG/ML
3 INJECTION INTRAMUSCULAR; INTRAVENOUS; SUBCUTANEOUS
Qty: 0 | Refills: 0 | Status: DISCONTINUED | OUTPATIENT
Start: 2019-03-04 | End: 2019-03-07

## 2019-03-04 RX ADMIN — LACOSAMIDE 100 MILLIGRAM(S): 50 TABLET ORAL at 19:39

## 2019-03-04 RX ADMIN — PREGABALIN 1000 MICROGRAM(S): 225 CAPSULE ORAL at 11:29

## 2019-03-04 RX ADMIN — HEPARIN SODIUM 5000 UNIT(S): 5000 INJECTION INTRAVENOUS; SUBCUTANEOUS at 13:08

## 2019-03-04 RX ADMIN — Medication 1 TABLET(S): at 17:32

## 2019-03-04 RX ADMIN — Medication 250 MILLIGRAM(S): at 10:23

## 2019-03-04 RX ADMIN — HEPARIN SODIUM 5000 UNIT(S): 5000 INJECTION INTRAVENOUS; SUBCUTANEOUS at 05:06

## 2019-03-04 RX ADMIN — ALBUTEROL 2 PUFF(S): 90 AEROSOL, METERED ORAL at 09:06

## 2019-03-04 RX ADMIN — HEPARIN SODIUM 5000 UNIT(S): 5000 INJECTION INTRAVENOUS; SUBCUTANEOUS at 23:01

## 2019-03-04 RX ADMIN — SODIUM CHLORIDE 3 MILLILITER(S): 9 INJECTION INTRAMUSCULAR; INTRAVENOUS; SUBCUTANEOUS at 23:31

## 2019-03-04 RX ADMIN — Medication 650 MILLIGRAM(S): at 20:54

## 2019-03-04 RX ADMIN — CHLORHEXIDINE GLUCONATE 1 APPLICATION(S): 213 SOLUTION TOPICAL at 09:03

## 2019-03-04 RX ADMIN — Medication 650 MILLIGRAM(S): at 22:41

## 2019-03-04 RX ADMIN — Medication 1 PACKET(S): at 11:29

## 2019-03-04 RX ADMIN — ALBUTEROL 2 PUFF(S): 90 AEROSOL, METERED ORAL at 15:58

## 2019-03-04 RX ADMIN — PANTOPRAZOLE SODIUM 40 MILLIGRAM(S): 20 TABLET, DELAYED RELEASE ORAL at 17:32

## 2019-03-04 RX ADMIN — Medication 1 TABLET(S): at 09:03

## 2019-03-04 RX ADMIN — Medication 1 MILLIGRAM(S): at 11:29

## 2019-03-04 RX ADMIN — Medication 100 MILLIGRAM(S): at 06:11

## 2019-03-04 RX ADMIN — RISPERIDONE 1 MILLIGRAM(S): 4 TABLET ORAL at 11:29

## 2019-03-04 RX ADMIN — QUETIAPINE FUMARATE 25 MILLIGRAM(S): 200 TABLET, FILM COATED ORAL at 11:29

## 2019-03-04 RX ADMIN — Medication 0: at 13:06

## 2019-03-04 RX ADMIN — LACOSAMIDE 120 MILLIGRAM(S): 50 TABLET ORAL at 05:06

## 2019-03-04 RX ADMIN — ALBUTEROL 2 PUFF(S): 90 AEROSOL, METERED ORAL at 23:25

## 2019-03-04 RX ADMIN — PANTOPRAZOLE SODIUM 40 MILLIGRAM(S): 20 TABLET, DELAYED RELEASE ORAL at 05:06

## 2019-03-04 RX ADMIN — Medication 1 TABLET(S): at 13:07

## 2019-03-04 RX ADMIN — CHLORHEXIDINE GLUCONATE 15 MILLILITER(S): 213 SOLUTION TOPICAL at 17:21

## 2019-03-04 RX ADMIN — CHLORHEXIDINE GLUCONATE 15 MILLILITER(S): 213 SOLUTION TOPICAL at 05:06

## 2019-03-04 RX ADMIN — ALBUTEROL 2 PUFF(S): 90 AEROSOL, METERED ORAL at 04:18

## 2019-03-04 NOTE — PROVIDER CONTACT NOTE (OTHER) - ASSESSMENT
denies pain in the abdomen , feeds held per order
pt alert, lethargic. vss
Alert no sign of distress noted
Alert. tachycardic. No other signs of distress
No S/S of respiratory distress.
No pain in the abdomen , Held feeds Free water and meds through the peg. MICU vovering MD aware
Patient alert and ALIRIO orientation completely. Patient is warm to touch.
Patient alert, non verbal and on vent
Patient asymptomatic
Patient is warm but otherwise asymptomatic
Patient not symptomatic
Pt alert, no sign of distress noted
Pt had coffee ground emesis; no blood noted in stool. Pt stable
Pt shows no s/s of acute distress
V/S  temp. 98.1,  , B/P 90/55, RR 19, Spo 98
V/S temp 100.4, hr 129, B/P 91/65, RR19,
asymptomatic
patient perspiring
patient seems uncomfortable but unable to verbalize. Vital signs stable.
patient warm upon palpation, pale
pt alert. pt tachypneic, increased WOB. pt had episodes of desaturation. on 100% TC.  room not vent capable
pt alert. pt tachypneic, increased WOB. pt had episodes of desaturation/mucus plugging. pt assessed by Petra COLE
pt diaphoretic, desat to 80's, tachypnic in  the 30s,  rectal temp 100.3 after an hour after tylenol given.
temp of 100.3, no other symptoms noted, pt resting comfortably.
vss, c/o pain to abd/peg site
warm on palpation , patient c/o feeling cold.

## 2019-03-04 NOTE — PROGRESS NOTE ADULT - SUBJECTIVE AND OBJECTIVE BOX
CC: F/U for abscesses    Saw/spoke to patient. No fevers, no chills. Mental status unchanged. No new complaints.    Allergies  Valproate Sodium (Other (Severe))    ANTIMICROBIALS:  cefTAZidime/avibactam IVPB 2.5 every 8 hours  cefTAZidime/avibactam IVPB    metroNIDAZOLE  IVPB    metroNIDAZOLE  IVPB 500 every 8 hours  vancomycin  IVPB 750 every 12 hours    PE:    Vital Signs Last 24 Hrs  T(C): 36.8 (04 Mar 2019 11:24), Max: 36.9 (03 Mar 2019 21:48)  T(F): 98.3 (04 Mar 2019 11:24), Max: 98.4 (03 Mar 2019 21:48)  HR: 115 (04 Mar 2019 11:24) (109 - 117)  BP: 123/82 (04 Mar 2019 11:24) (123/77 - 151/80)  BP(mean): 91 (04 Mar 2019 11:24) (86 - 91)  RR: 18 (04 Mar 2019 11:24) (18 - 18)  SpO2: 98% (04 Mar 2019 11:24) (97% - 99%)    Gen: AOx2-3, unchanged  CV: S1+S2 normal, tachycardic  Resp: Clear bilat, no resp distress, no crackles/wheezes, trach  Abd: Soft, nontender, +BS, PEG  Ext: No LE edema, no wounds    LABS:                        7.2    9.28  )-----------( 325      ( 04 Mar 2019 05:30 )             24.2     03-04    133<L>  |  99  |  5<L>  ----------------------------<  130<H>  4.7   |  25  |  0.27<L>    Ca    7.8<L>      04 Mar 2019 05:30  Phos  1.8     03-04  Mg     1.6     03-04    MICROBIOLOGY:    BLOOD PERIPHERAL  02-17-19 NGTD    BLOOD VENOUS  02-02-19 NGTD    RADIOLOGY:    3/1 CT:    IMPRESSION:     Gastrostomy tube balloon has migrated to the level of the   pylorus/duodenal bulb.  Slight interval decrease in multiple pancreatic collections and a   perigastric collection.  Moderate bilateral pleural effusions, decreased.

## 2019-03-04 NOTE — PROGRESS NOTE ADULT - ASSESSMENT
53 Male w/ a PMHx of TBI (s/p PEG tube, contracture, and seizure d/o) presented as a transfer from [facility] on 12/9 for respiratory failure 2/2 ARDS likely 2/2 necrotizing pancreatitis s/p intubation. Course c/b Acinetobacter PNA s/p Avycaz and Flagyl 7 day course.  Course further complicated by acute blood loss anemia s/p colonoscopy with findings suggestive of ischemic colitis, without further bleeding and Hgb remaining stable. S/p extubation 1/3, and re-intubation 1/6 now s/p tracheostomy.

## 2019-03-04 NOTE — PROGRESS NOTE ADULT - SUBJECTIVE AND OBJECTIVE BOX
CHIEF COMPLAINT:    Interval Events:    REVIEW OF SYSTEMS:  Constitutional:   Eyes:  ENT:  CV:  Resp:  GI:  :  MSK:  Integumentary:  Neurological:  Psychiatric:  Endocrine:  Hematologic/Lymphatic:  Allergic/Immunologic:  [ ] All other systems negative  [ ] Unable to assess ROS because ________    OBJECTIVE:  ICU Vital Signs Last 24 Hrs  T(C): 36.9 (04 Mar 2019 05:00), Max: 37.6 (03 Mar 2019 14:00)  T(F): 98.4 (04 Mar 2019 05:00), Max: 99.7 (03 Mar 2019 14:00)  HR: 114 (04 Mar 2019 07:31) (107 - 117)  BP: 151/80 (04 Mar 2019 05:00) (105/64 - 151/80)  BP(mean): --  ABP: --  ABP(mean): --  RR: 18 (04 Mar 2019 05:00) (16 - 18)  SpO2: 98% (04 Mar 2019 07:31) (97% - 100%)    Mode: standby    03-03 @ 07:01  -  03-04 @ 07:00  --------------------------------------------------------  IN: 2470 mL / OUT: 1600 mL / NET: 870 mL      CAPILLARY BLOOD GLUCOSE      POCT Blood Glucose.: 137 mg/dL (04 Mar 2019 05:26)      PHYSICAL EXAM:  General:   HEENT:   Lymph Nodes:  Neck:   Respiratory:   Cardiovascular:   Abdomen:   Extremities:   Skin:   Neurological:  Psychiatry:    HOSPITAL MEDICATIONS:  MEDICATIONS  (STANDING):  ALBUTerol    90 MICROgram(s) HFA Inhaler 2 Puff(s) Inhalation every 6 hours  cefTAZidime/avibactam IVPB 2.5 Gram(s) IV Intermittent every 8 hours  cefTAZidime/avibactam IVPB      chlorhexidine 0.12% Liquid 15 milliLiter(s) Oral Mucosa two times a day  chlorhexidine 4% Liquid 1 Application(s) Topical <User Schedule>  cyanocobalamin 1000 MICROGram(s) Oral daily  dextrose 5%. 1000 milliLiter(s) (50 mL/Hr) IV Continuous <Continuous>  dextrose 50% Injectable 12.5 Gram(s) IV Push once  dextrose 50% Injectable 25 Gram(s) IV Push once  dextrose 50% Injectable 25 Gram(s) IV Push once  folic acid 1 milliGRAM(s) Enteral Tube daily  heparin  Injectable 5000 Unit(s) SubCutaneous every 8 hours  insulin lispro (HumaLOG) corrective regimen sliding scale   SubCutaneous every 6 hours  lacosamide IVPB 100 milliGRAM(s) IV Intermittent every 12 hours  lactobacillus acidophilus 1 Tablet(s) Oral three times a day with meals  metroNIDAZOLE  IVPB      metroNIDAZOLE  IVPB 500 milliGRAM(s) IV Intermittent every 8 hours  pantoprazole  Injectable 40 milliGRAM(s) IV Push two times a day  psyllium Powder 1 Packet(s) Oral daily  QUEtiapine 25 milliGRAM(s) Oral daily  risperiDONE   Solution 1 milliGRAM(s) Oral daily  vancomycin  IVPB 750 milliGRAM(s) IV Intermittent every 12 hours    MEDICATIONS  (PRN):  acetaminophen    Suspension .. 650 milliGRAM(s) Oral every 6 hours PRN Temp greater or equal to 38C (100.4F), Mild Pain (1 - 3), Moderate Pain (4 - 6)  dextrose 40% Gel 15 Gram(s) Oral once PRN Blood Glucose LESS THAN 70 milliGRAM(s)/deciliter  glucagon  Injectable 1 milliGRAM(s) IntraMuscular once PRN Glucose LESS THAN 70 milligrams/deciliter      LABS:                        7.2    9.28  )-----------( 325      ( 04 Mar 2019 05:30 )             24.2     03-04    133<L>  |  99  |  5<L>  ----------------------------<  130<H>  4.7   |  25  |  0.27<L>    Ca    7.8<L>      04 Mar 2019 05:30  Phos  1.8     03-04  Mg     1.6     03-04                MICROBIOLOGY:     RADIOLOGY:  [ ] Reviewed and interpreted by me    PULMONARY FUNCTION TESTS:    EKG: CHIEF COMPLAINT: Patient is a 53y old  Male who presents with a chief complaint of ARDS?, sepsis (04 Mar 2019 08:47)      Interval Events: none     REVIEW OF SYSTEMS:  Constitutional: Denies pain /sob   CV: Denies   Resp: Denies   GI: Denies   [x ] All other systems negative  [ ] Unable to assess ROS because ________    OBJECTIVE:  ICU Vital Signs Last 24 Hrs  T(C): 36.9 (04 Mar 2019 05:00), Max: 37.6 (03 Mar 2019 14:00)  T(F): 98.4 (04 Mar 2019 05:00), Max: 99.7 (03 Mar 2019 14:00)  HR: 114 (04 Mar 2019 07:31) (107 - 117)  BP: 151/80 (04 Mar 2019 05:00) (105/64 - 151/80)  BP(mean): --  ABP: --  ABP(mean): --  RR: 18 (04 Mar 2019 05:00) (16 - 18)  SpO2: 98% (04 Mar 2019 07:31) (97% - 100%)    Mode: standby    03-03 @ 07:01  -  03-04 @ 07:00  --------------------------------------------------------  IN: 2470 mL / OUT: 1600 mL / NET: 870 mL      CAPILLARY BLOOD GLUCOSE      POCT Blood Glucose.: 137 mg/dL (04 Mar 2019 05:26)      PHYSICAL EXAM:  General:   HEENT:   Lymph Nodes:  Neck:   Respiratory:   Cardiovascular:   Abdomen:   Extremities:   Skin:   Neurological:  Psychiatry:    HOSPITAL MEDICATIONS:  MEDICATIONS  (STANDING):  ALBUTerol    90 MICROgram(s) HFA Inhaler 2 Puff(s) Inhalation every 6 hours  cefTAZidime/avibactam IVPB 2.5 Gram(s) IV Intermittent every 8 hours  cefTAZidime/avibactam IVPB      chlorhexidine 0.12% Liquid 15 milliLiter(s) Oral Mucosa two times a day  chlorhexidine 4% Liquid 1 Application(s) Topical <User Schedule>  cyanocobalamin 1000 MICROGram(s) Oral daily  dextrose 5%. 1000 milliLiter(s) (50 mL/Hr) IV Continuous <Continuous>  dextrose 50% Injectable 12.5 Gram(s) IV Push once  dextrose 50% Injectable 25 Gram(s) IV Push once  dextrose 50% Injectable 25 Gram(s) IV Push once  folic acid 1 milliGRAM(s) Enteral Tube daily  heparin  Injectable 5000 Unit(s) SubCutaneous every 8 hours  insulin lispro (HumaLOG) corrective regimen sliding scale   SubCutaneous every 6 hours  lacosamide IVPB 100 milliGRAM(s) IV Intermittent every 12 hours  lactobacillus acidophilus 1 Tablet(s) Oral three times a day with meals  metroNIDAZOLE  IVPB      metroNIDAZOLE  IVPB 500 milliGRAM(s) IV Intermittent every 8 hours  pantoprazole  Injectable 40 milliGRAM(s) IV Push two times a day  psyllium Powder 1 Packet(s) Oral daily  QUEtiapine 25 milliGRAM(s) Oral daily  risperiDONE   Solution 1 milliGRAM(s) Oral daily  vancomycin  IVPB 750 milliGRAM(s) IV Intermittent every 12 hours    MEDICATIONS  (PRN):  acetaminophen    Suspension .. 650 milliGRAM(s) Oral every 6 hours PRN Temp greater or equal to 38C (100.4F), Mild Pain (1 - 3), Moderate Pain (4 - 6)  dextrose 40% Gel 15 Gram(s) Oral once PRN Blood Glucose LESS THAN 70 milliGRAM(s)/deciliter  glucagon  Injectable 1 milliGRAM(s) IntraMuscular once PRN Glucose LESS THAN 70 milligrams/deciliter      LABS:                        7.2    9.28  )-----------( 325      ( 04 Mar 2019 05:30 )             24.2     03-04    133<L>  |  99  |  5<L>  ----------------------------<  130<H>  4.7   |  25  |  0.27<L>    Ca    7.8<L>      04 Mar 2019 05:30  Phos  1.8     03-04  Mg     1.6     03-04                MICROBIOLOGY:     RADIOLOGY:  [ ] Reviewed and interpreted by me    PULMONARY FUNCTION TESTS:    EKG:

## 2019-03-04 NOTE — PROVIDER CONTACT NOTE (OTHER) - RECOMMENDATIONS
NP assess
np assess
Awaiting placement verification from MD.
Give Midodrine
Give PO Tylenol, continue to monitor
Keep patient NPO
NP assess
Notify provider. Should Tylenol given for temp?
PA assess
Provider alerted
Provider to make aware
Tylenol
Tylenol given, blood work ordered
ekg performed. np to follow up.
pa assess.
place patient on pressure support for the night.
provider alerted
provider notified
provider notified
tylenol

## 2019-03-04 NOTE — PROGRESS NOTE ADULT - ASSESSMENT
53 M (resident of [facility]) with TBI, s/p PEG tube, contracture, and seizure d/o who presented as a transfer from [facility] on 12/9 for respiratory failure 2/2 ARDS likely 2/2 necrotizing pancreatitis s/p intubation.  Bronc 12/11 with pseudomonas; Sputum cx with  acinetobacter  Sepsis with fever, leukopenia resolved, extubated but reintubated 1/6/19 for hypoxia and poor cough along with profuse secretions and sputum cx again with acinetobacter (restarted Avycaz/Flagyl 1/9--unclear treatment endpoint, s/p 4 weeks)  Necrotising Pancreatitis with multiple peripancreatitis collections better formed on CT 1/24 and a new 7 cm abscess  Last CT with ? superimposed pneumonia on collapsed lung  Repeat CT 3/1 with decreased abd collections and decreased effusions  Remains clinically well, unclear antibiotic endpoint but has been on prolong course (>7 weeks); suspect collections sterile  No fevers, no leukocytosis, appears at baseline; okay to monitor off abx  Overall, lung abscess, MDR organisms, necrotizing pancreatitis, intraabdominal collection  - DC Vanco, Avycaz, Flagyl--monitor off abx; low threshold to restart if clinical worsening  - Frequent suctioning and pulmonary toileting  - F/U GI  - Discussed with RCU team    Neal Oleary MD  Pager [phone]  After 5pm and on weekends call [phone]

## 2019-03-04 NOTE — PROVIDER CONTACT NOTE (OTHER) - ACTION/TREATMENT ORDERED:
Admin tylenol, 2 sets of blood culture
admin tylenol as ordered. Pt already had blood cx drawn 1/24
ekg performed, tylenol admisnitered, blood cultures, sputum cultures obtained.
no action ordered at this time
pt to go on vent, cont to monitor
torodol admistered
tylenol admisntered imrta continue to monitor
will come assess, pt
Admin tylenol IV, UA, RVP
DOUGLAS Bellamy Au notified; keep pt NPO; endoscopy consulted; will cont to monitor.
Give Midodrine as ordered
Give PO Tylenol, continue to monitor
Give Tylenol and continue to monitor.
PRN tylenol to be administered
Tylenol was given
Will continue to monitor.
Will discuss with the team and give order as necessary
blood cultures ordered and tylenol administered
cold packs and IV tylenol
pt placed on CPAP. will continue to monitor
urine cultures to be ordered
will assess pt, will order labs. cont to monitor
will order ct and assess pt
will write provider to RN order to give Tylenol now.  continue to monitor.

## 2019-03-04 NOTE — PROGRESS NOTE ADULT - ATTENDING COMMENTS
Tolerating trach collar. Has copious secretions.  Start metanebs.   Overall improved clinically.  Discharge planning.

## 2019-03-05 LAB
ANION GAP SERPL CALC-SCNC: 10 MMO/L — SIGNIFICANT CHANGE UP (ref 7–14)
BUN SERPL-MCNC: 6 MG/DL — LOW (ref 7–23)
CALCIUM SERPL-MCNC: 8.2 MG/DL — LOW (ref 8.4–10.5)
CHLORIDE SERPL-SCNC: 101 MMOL/L — SIGNIFICANT CHANGE UP (ref 98–107)
CO2 SERPL-SCNC: 24 MMOL/L — SIGNIFICANT CHANGE UP (ref 22–31)
CREAT SERPL-MCNC: 0.28 MG/DL — LOW (ref 0.5–1.3)
GLUCOSE BLDC GLUCOMTR-MCNC: 101 MG/DL — HIGH (ref 70–99)
GLUCOSE BLDC GLUCOMTR-MCNC: 106 MG/DL — HIGH (ref 70–99)
GLUCOSE BLDC GLUCOMTR-MCNC: 115 MG/DL — HIGH (ref 70–99)
GLUCOSE BLDC GLUCOMTR-MCNC: 121 MG/DL — HIGH (ref 70–99)
GLUCOSE SERPL-MCNC: 114 MG/DL — HIGH (ref 70–99)
HCT VFR BLD CALC: 28.6 % — LOW (ref 39–50)
HGB BLD-MCNC: 8.4 G/DL — LOW (ref 13–17)
MAGNESIUM SERPL-MCNC: 1.5 MG/DL — LOW (ref 1.6–2.6)
MCHC RBC-ENTMCNC: 29.4 % — LOW (ref 32–36)
MCHC RBC-ENTMCNC: 30.7 PG — SIGNIFICANT CHANGE UP (ref 27–34)
MCV RBC AUTO: 104.4 FL — HIGH (ref 80–100)
NRBC # FLD: 0 K/UL — LOW (ref 25–125)
PHOSPHATE SERPL-MCNC: 2.1 MG/DL — LOW (ref 2.5–4.5)
PLATELET # BLD AUTO: 350 K/UL — SIGNIFICANT CHANGE UP (ref 150–400)
PMV BLD: 9.3 FL — SIGNIFICANT CHANGE UP (ref 7–13)
POTASSIUM SERPL-MCNC: 4.3 MMOL/L — SIGNIFICANT CHANGE UP (ref 3.5–5.3)
POTASSIUM SERPL-SCNC: 4.3 MMOL/L — SIGNIFICANT CHANGE UP (ref 3.5–5.3)
RBC # BLD: 2.74 M/UL — LOW (ref 4.2–5.8)
RBC # FLD: 17.1 % — HIGH (ref 10.3–14.5)
SODIUM SERPL-SCNC: 135 MMOL/L — SIGNIFICANT CHANGE UP (ref 135–145)
WBC # BLD: 8.56 K/UL — SIGNIFICANT CHANGE UP (ref 3.8–10.5)
WBC # FLD AUTO: 8.56 K/UL — SIGNIFICANT CHANGE UP (ref 3.8–10.5)

## 2019-03-05 PROCEDURE — 99232 SBSQ HOSP IP/OBS MODERATE 35: CPT

## 2019-03-05 PROCEDURE — 99233 SBSQ HOSP IP/OBS HIGH 50: CPT | Mod: GC

## 2019-03-05 RX ORDER — BACITRACIN ZINC 500 UNIT/G
1 OINTMENT IN PACKET (EA) TOPICAL DAILY
Qty: 0 | Refills: 0 | Status: DISCONTINUED | OUTPATIENT
Start: 2019-03-05 | End: 2019-03-07

## 2019-03-05 RX ORDER — MAGNESIUM SULFATE 500 MG/ML
1 VIAL (ML) INJECTION ONCE
Qty: 0 | Refills: 0 | Status: COMPLETED | OUTPATIENT
Start: 2019-03-05 | End: 2019-03-05

## 2019-03-05 RX ORDER — ZINC OXIDE 200 MG/G
1 OINTMENT TOPICAL
Qty: 0 | Refills: 0 | Status: DISCONTINUED | OUTPATIENT
Start: 2019-03-05 | End: 2019-03-07

## 2019-03-05 RX ADMIN — Medication 100 GRAM(S): at 10:10

## 2019-03-05 RX ADMIN — Medication 1 PACKET(S): at 12:15

## 2019-03-05 RX ADMIN — CHLORHEXIDINE GLUCONATE 15 MILLILITER(S): 213 SOLUTION TOPICAL at 07:03

## 2019-03-05 RX ADMIN — CHLORHEXIDINE GLUCONATE 15 MILLILITER(S): 213 SOLUTION TOPICAL at 18:03

## 2019-03-05 RX ADMIN — RISPERIDONE 1 MILLIGRAM(S): 4 TABLET ORAL at 13:05

## 2019-03-05 RX ADMIN — ALBUTEROL 2 PUFF(S): 90 AEROSOL, METERED ORAL at 23:16

## 2019-03-05 RX ADMIN — ALBUTEROL 2 PUFF(S): 90 AEROSOL, METERED ORAL at 15:32

## 2019-03-05 RX ADMIN — HEPARIN SODIUM 5000 UNIT(S): 5000 INJECTION INTRAVENOUS; SUBCUTANEOUS at 23:55

## 2019-03-05 RX ADMIN — LACOSAMIDE 100 MILLIGRAM(S): 50 TABLET ORAL at 07:02

## 2019-03-05 RX ADMIN — PANTOPRAZOLE SODIUM 40 MILLIGRAM(S): 20 TABLET, DELAYED RELEASE ORAL at 18:03

## 2019-03-05 RX ADMIN — Medication 1 TABLET(S): at 08:22

## 2019-03-05 RX ADMIN — Medication 1 TABLET(S): at 18:03

## 2019-03-05 RX ADMIN — ALBUTEROL 2 PUFF(S): 90 AEROSOL, METERED ORAL at 10:22

## 2019-03-05 RX ADMIN — HEPARIN SODIUM 5000 UNIT(S): 5000 INJECTION INTRAVENOUS; SUBCUTANEOUS at 07:02

## 2019-03-05 RX ADMIN — ALBUTEROL 2 PUFF(S): 90 AEROSOL, METERED ORAL at 03:12

## 2019-03-05 RX ADMIN — PREGABALIN 1000 MICROGRAM(S): 225 CAPSULE ORAL at 12:14

## 2019-03-05 RX ADMIN — SODIUM CHLORIDE 3 MILLILITER(S): 9 INJECTION INTRAMUSCULAR; INTRAVENOUS; SUBCUTANEOUS at 23:19

## 2019-03-05 RX ADMIN — HEPARIN SODIUM 5000 UNIT(S): 5000 INJECTION INTRAVENOUS; SUBCUTANEOUS at 13:05

## 2019-03-05 RX ADMIN — QUETIAPINE FUMARATE 25 MILLIGRAM(S): 200 TABLET, FILM COATED ORAL at 12:14

## 2019-03-05 RX ADMIN — PANTOPRAZOLE SODIUM 40 MILLIGRAM(S): 20 TABLET, DELAYED RELEASE ORAL at 07:02

## 2019-03-05 RX ADMIN — CHLORHEXIDINE GLUCONATE 1 APPLICATION(S): 213 SOLUTION TOPICAL at 12:14

## 2019-03-05 RX ADMIN — SODIUM CHLORIDE 3 MILLILITER(S): 9 INJECTION INTRAMUSCULAR; INTRAVENOUS; SUBCUTANEOUS at 03:15

## 2019-03-05 RX ADMIN — SODIUM CHLORIDE 3 MILLILITER(S): 9 INJECTION INTRAMUSCULAR; INTRAVENOUS; SUBCUTANEOUS at 10:20

## 2019-03-05 RX ADMIN — Medication 1 TABLET(S): at 12:14

## 2019-03-05 RX ADMIN — SODIUM CHLORIDE 3 MILLILITER(S): 9 INJECTION INTRAMUSCULAR; INTRAVENOUS; SUBCUTANEOUS at 16:26

## 2019-03-05 RX ADMIN — Medication 1 MILLIGRAM(S): at 12:14

## 2019-03-05 RX ADMIN — LACOSAMIDE 100 MILLIGRAM(S): 50 TABLET ORAL at 18:11

## 2019-03-05 NOTE — CHART NOTE - NSCHARTNOTEFT_GEN_A_CORE
Source:  nursing and EMR     Diet : NPO with tube feed- Jevity 1.2 @ 55 ml/hr 24hrs and free water bolus 250 ml Q 6 hrs     Patient with PEG & TRACH , receiving EN @ goal tolerating , noted wt. increase from last follow up .      Enteral:  EN providing 1584 kcal, 73 gm protein and free fluid 2063 ml /d       Current Weight: 78.2 kg on 3/4/19; 75.1 kg on 2/26/19; Admit wt. - 99.7 kg ; IBW - 61.6 kg     Pertinent Medications: MEDICATIONS  (STANDING):  ALBUTerol    90 MICROgram(s) HFA Inhaler 2 Puff(s) Inhalation every 6 hours  chlorhexidine 0.12% Liquid 15 milliLiter(s) Oral Mucosa two times a day  chlorhexidine 4% Liquid 1 Application(s) Topical <User Schedule>  cyanocobalamin 1000 MICROGram(s) Oral daily  dextrose 5%. 1000 milliLiter(s) (50 mL/Hr) IV Continuous <Continuous>  dextrose 50% Injectable 12.5 Gram(s) IV Push once  dextrose 50% Injectable 25 Gram(s) IV Push once  dextrose 50% Injectable 25 Gram(s) IV Push once  folic acid 1 milliGRAM(s) Enteral Tube daily  heparin  Injectable 5000 Unit(s) SubCutaneous every 8 hours  insulin lispro (HumaLOG) corrective regimen sliding scale   SubCutaneous every 6 hours  lacosamide Solution 100 milliGRAM(s) Oral two times a day  lactobacillus acidophilus 1 Tablet(s) Oral three times a day with meals  magnesium sulfate  IVPB 1 Gram(s) IV Intermittent once  pantoprazole  Injectable 40 milliGRAM(s) IV Push two times a day  psyllium Powder 1 Packet(s) Oral daily  QUEtiapine 25 milliGRAM(s) Oral daily  risperiDONE   Solution 1 milliGRAM(s) Oral daily  sodium chloride 3%  Inhalation 3 milliLiter(s) Inhalation four times a day    MEDICATIONS  (PRN):  acetaminophen    Suspension .. 650 milliGRAM(s) Oral every 6 hours PRN Temp greater or equal to 38C (100.4F), Mild Pain (1 - 3), Moderate Pain (4 - 6)  dextrose 40% Gel 15 Gram(s) Oral once PRN Blood Glucose LESS THAN 70 milliGRAM(s)/deciliter  glucagon  Injectable 1 milliGRAM(s) IntraMuscular once PRN Glucose LESS THAN 70 milligrams/deciliter    Pertinent Labs:  03-05 Na135 mmol/L Glu 114 mg/dL<H> K+ 4.3 mmol/L Cr  0.28 mg/dL<L> BUN 6 mg/dL<L> 03-05 Phos 2.1 mg/dL<L> 02-28 Alb 1.8 g/dL<L>      Recommend to continue EN     Monitoring and Evaluation:  Tolerance to diet prescription , weights and follow up per protocol

## 2019-03-05 NOTE — PROGRESS NOTE ADULT - SUBJECTIVE AND OBJECTIVE BOX
CHIEF COMPLAINT:    Interval Events:    REVIEW OF SYSTEMS:  Constitutional:   Eyes:  ENT:  CV:  Resp:  GI:  :  MSK:  Integumentary:  Neurological:  Psychiatric:  Endocrine:  Hematologic/Lymphatic:  Allergic/Immunologic:  [ ] All other systems negative  [ ] Unable to assess ROS because ________    OBJECTIVE:  ICU Vital Signs Last 24 Hrs  T(C): 37.2 (05 Mar 2019 07:03), Max: 37.2 (04 Mar 2019 22:50)  T(F): 98.9 (05 Mar 2019 07:03), Max: 98.9 (04 Mar 2019 22:50)  HR: 110 (05 Mar 2019 07:12) (103 - 125)  BP: 104/70 (05 Mar 2019 07:03) (90/58 - 134/110)  BP(mean): 94 (04 Mar 2019 15:00) (86 - 94)  ABP: --  ABP(mean): --  RR: 20 (05 Mar 2019 07:03) (18 - 20)  SpO2: 99% (05 Mar 2019 07:12) (97% - 100%)    Mode: AC/ CMV (Assist Control/ Continuous Mandatory Ventilation), RR (machine): 14, TV (machine): 400, FiO2: 40, PEEP: 5, MAP: 8, PIP: 17    03-04 @ 07:01  -  03-05 @ 07:00  --------------------------------------------------------  IN: 1450 mL / OUT: 3300 mL / NET: -1850 mL      CAPILLARY BLOOD GLUCOSE      POCT Blood Glucose.: 115 mg/dL (05 Mar 2019 05:43)      PHYSICAL EXAM:  General:   HEENT:   Lymph Nodes:  Neck:   Respiratory:   Cardiovascular:   Abdomen:   Extremities:   Skin:   Neurological:  Psychiatry:    HOSPITAL MEDICATIONS:  MEDICATIONS  (STANDING):  ALBUTerol    90 MICROgram(s) HFA Inhaler 2 Puff(s) Inhalation every 6 hours  chlorhexidine 0.12% Liquid 15 milliLiter(s) Oral Mucosa two times a day  chlorhexidine 4% Liquid 1 Application(s) Topical <User Schedule>  cyanocobalamin 1000 MICROGram(s) Oral daily  dextrose 5%. 1000 milliLiter(s) (50 mL/Hr) IV Continuous <Continuous>  dextrose 50% Injectable 12.5 Gram(s) IV Push once  dextrose 50% Injectable 25 Gram(s) IV Push once  dextrose 50% Injectable 25 Gram(s) IV Push once  folic acid 1 milliGRAM(s) Enteral Tube daily  heparin  Injectable 5000 Unit(s) SubCutaneous every 8 hours  insulin lispro (HumaLOG) corrective regimen sliding scale   SubCutaneous every 6 hours  lacosamide Solution 100 milliGRAM(s) Oral two times a day  lactobacillus acidophilus 1 Tablet(s) Oral three times a day with meals  magnesium sulfate  IVPB 1 Gram(s) IV Intermittent once  pantoprazole  Injectable 40 milliGRAM(s) IV Push two times a day  psyllium Powder 1 Packet(s) Oral daily  QUEtiapine 25 milliGRAM(s) Oral daily  risperiDONE   Solution 1 milliGRAM(s) Oral daily  sodium chloride 3%  Inhalation 3 milliLiter(s) Inhalation four times a day    MEDICATIONS  (PRN):  acetaminophen    Suspension .. 650 milliGRAM(s) Oral every 6 hours PRN Temp greater or equal to 38C (100.4F), Mild Pain (1 - 3), Moderate Pain (4 - 6)  dextrose 40% Gel 15 Gram(s) Oral once PRN Blood Glucose LESS THAN 70 milliGRAM(s)/deciliter  glucagon  Injectable 1 milliGRAM(s) IntraMuscular once PRN Glucose LESS THAN 70 milligrams/deciliter      LABS:                        8.4    8.56  )-----------( 350      ( 05 Mar 2019 05:44 )             28.6     03-05    135  |  101  |  6<L>  ----------------------------<  114<H>  4.3   |  24  |  0.28<L>    Ca    8.2<L>      05 Mar 2019 05:44  Phos  2.1     03-05  Mg     1.5     03-05                MICROBIOLOGY:     RADIOLOGY:  [ ] Reviewed and interpreted by me    PULMONARY FUNCTION TESTS:    EKG: CHIEF COMPLAINT: Patient is a 53y old  Male who presents with a chief complaint of ARDS?, sepsis (05 Mar 2019 08:43)      Interval Events: Leaking PEG tube     REVIEW OF SYSTEMS:  Constitutional: No pain/fevers   Eyes:  ENT:  CV: Denies   Resp: Denies   GI: Denies   [x ] All other systems negative  [ ] Unable to assess ROS because ________    OBJECTIVE:  ICU Vital Signs Last 24 Hrs  T(C): 37.2 (05 Mar 2019 07:03), Max: 37.2 (04 Mar 2019 22:50)  T(F): 98.9 (05 Mar 2019 07:03), Max: 98.9 (04 Mar 2019 22:50)  HR: 110 (05 Mar 2019 07:12) (103 - 125)  BP: 104/70 (05 Mar 2019 07:03) (90/58 - 134/110)  BP(mean): 94 (04 Mar 2019 15:00) (86 - 94)  ABP: --  ABP(mean): --  RR: 20 (05 Mar 2019 07:03) (18 - 20)  SpO2: 99% (05 Mar 2019 07:12) (97% - 100%)    Mode: AC/ CMV (Assist Control/ Continuous Mandatory Ventilation), RR (machine): 14, TV (machine): 400, FiO2: 40, PEEP: 5, MAP: 8, PIP: 17    03-04 @ 07:01  -  03-05 @ 07:00  --------------------------------------------------------  IN: 1450 mL / OUT: 3300 mL / NET: -1850 mL      CAPILLARY BLOOD GLUCOSE      POCT Blood Glucose.: 115 mg/dL (05 Mar 2019 05:43)      PHYSICAL EXAM:  General:   HEENT:   Lymph Nodes:  Neck:   Respiratory:   Cardiovascular:   Abdomen:   Extremities:   Skin:   Neurological:  Psychiatry:    HOSPITAL MEDICATIONS:  MEDICATIONS  (STANDING):  ALBUTerol    90 MICROgram(s) HFA Inhaler 2 Puff(s) Inhalation every 6 hours  chlorhexidine 0.12% Liquid 15 milliLiter(s) Oral Mucosa two times a day  chlorhexidine 4% Liquid 1 Application(s) Topical <User Schedule>  cyanocobalamin 1000 MICROGram(s) Oral daily  dextrose 5%. 1000 milliLiter(s) (50 mL/Hr) IV Continuous <Continuous>  dextrose 50% Injectable 12.5 Gram(s) IV Push once  dextrose 50% Injectable 25 Gram(s) IV Push once  dextrose 50% Injectable 25 Gram(s) IV Push once  folic acid 1 milliGRAM(s) Enteral Tube daily  heparin  Injectable 5000 Unit(s) SubCutaneous every 8 hours  insulin lispro (HumaLOG) corrective regimen sliding scale   SubCutaneous every 6 hours  lacosamide Solution 100 milliGRAM(s) Oral two times a day  lactobacillus acidophilus 1 Tablet(s) Oral three times a day with meals  magnesium sulfate  IVPB 1 Gram(s) IV Intermittent once  pantoprazole  Injectable 40 milliGRAM(s) IV Push two times a day  psyllium Powder 1 Packet(s) Oral daily  QUEtiapine 25 milliGRAM(s) Oral daily  risperiDONE   Solution 1 milliGRAM(s) Oral daily  sodium chloride 3%  Inhalation 3 milliLiter(s) Inhalation four times a day    MEDICATIONS  (PRN):  acetaminophen    Suspension .. 650 milliGRAM(s) Oral every 6 hours PRN Temp greater or equal to 38C (100.4F), Mild Pain (1 - 3), Moderate Pain (4 - 6)  dextrose 40% Gel 15 Gram(s) Oral once PRN Blood Glucose LESS THAN 70 milliGRAM(s)/deciliter  glucagon  Injectable 1 milliGRAM(s) IntraMuscular once PRN Glucose LESS THAN 70 milligrams/deciliter      LABS:                        8.4    8.56  )-----------( 350      ( 05 Mar 2019 05:44 )             28.6     03-05    135  |  101  |  6<L>  ----------------------------<  114<H>  4.3   |  24  |  0.28<L>    Ca    8.2<L>      05 Mar 2019 05:44  Phos  2.1     03-05  Mg     1.5     03-05                MICROBIOLOGY:     RADIOLOGY:  [ ] Reviewed and interpreted by me    PULMONARY FUNCTION TESTS:    EKG:

## 2019-03-05 NOTE — PROGRESS NOTE ADULT - ASSESSMENT
53 Male w/ a PMHx of TBI (s/p PEG tube, contracture, and seizure d/o) presented as a transfer from [facility] on 12/9 for respiratory failure 2/2 ARDS likely 2/2 necrotizing pancreatitis s/p intubation. Course c/b Acinetobacter PNA s/p Avycaz and Flagyl 7 day course.  Course further complicated by acute blood loss anemia s/p colonoscopy with findings suggestive of ischemic colitis, without further bleeding and Hgb remaining stable. S/p extubation 1/3, and re-intubation 1/6 now s/p tracheostomy.

## 2019-03-05 NOTE — PROGRESS NOTE ADULT - ASSESSMENT
53 M (resident of [facility]) with TBI, s/p PEG tube, contracture, and seizure d/o who presented as a transfer from [facility] on 12/9 for respiratory failure 2/2 ARDS likely 2/2 necrotizing pancreatitis s/p intubation.  Bronc 12/11 with pseudomonas; Sputum cx with  acinetobacter  Sepsis with fever, leukopenia resolved, extubated but reintubated 1/6/19 for hypoxia and poor cough along with profuse secretions and sputum cx again with acinetobacter (restarted Avycaz/Flagyl 1/9)  Necrotising Pancreatitis with multiple peripancreatitis collections better formed on CT 1/24 and a new 7 cm abscess  Last CT with ? superimposed pneumonia on collapsed lung  Repeat CT 3/1 with decreased abd collections and decreased effusions  Remains clinically well, antibiotic stopped 3/4--after >7 week course avycaz, flagyl, vanco  Continue off antibiotics  Overall, lung abscess, MDR organisms, necrotizing pancreatitis, intraabdominal collection  - Continue off abx, monitor for any signs recurrent infection  - Frequent suctioning and pulmonary toileting  - F/U GI    Neal Oleary MD  Pager [phone]  After 5pm and on weekends call [phone]

## 2019-03-05 NOTE — PROGRESS NOTE ADULT - PROBLEM SELECTOR PLAN 5
- Feeds resumed post EGD  - aspiration precautions  - PEG bumper at 3.5cm - keep there to prevent dislodging or migration of balloon - Feeds resumed post EGD  - aspiration precautions  - PEG bumper at 3.5cm - keep there to prevent dislodging or migration of balloon  - Leakage at PEG site D/W [facility] Dr. Fair - check bumper is at correct place / skin prep/barrier cream and 4x4 fenestrated. She will fu with patient to check site

## 2019-03-05 NOTE — PROGRESS NOTE ADULT - SUBJECTIVE AND OBJECTIVE BOX
CC: F/U for abscesses    Saw/spoke to patient. Unchanged. Nonverbal, but appears unchanged. No new complaints.    Allergies  Valproate Sodium (Other (Severe))    ANTIMICROBIALS:  Off  PE:    Vital Signs Last 24 Hrs  T(C): 37.7 (05 Mar 2019 12:11), Max: 37.7 (05 Mar 2019 12:11)  T(F): 99.8 (05 Mar 2019 12:11), Max: 99.8 (05 Mar 2019 12:11)  HR: 111 (05 Mar 2019 12:11) (103 - 125)  BP: 103/64 (05 Mar 2019 12:11) (90/58 - 134/110)  BP(mean): 94 (04 Mar 2019 15:00) (94 - 94)  RR: 19 (05 Mar 2019 12:11) (19 - 20)  SpO2: 97% (05 Mar 2019 12:11) (97% - 100%)    Gen: AOx3, NAD, non-toxic, pleasant  CV: S1+S2 normal, nontachycardic  Resp: Clear bilat, no resp distress, no crackles/wheezes  Abd: Soft, nontender, +BS  Ext: No LE edema, no wounds    LABS:                        8.4    8.56  )-----------( 350      ( 05 Mar 2019 05:44 )             28.6     03-05    135  |  101  |  6<L>  ----------------------------<  114<H>  4.3   |  24  |  0.28<L>    Ca    8.2<L>      05 Mar 2019 05:44  Phos  2.1     03-05  Mg     1.5     03-05    MICROBIOLOGY:    BLOOD PERIPHERAL  02-17-19 NGTD    RADIOLOGY:    3/1 CT:    IMPRESSION:     Gastrostomy tube balloon has migrated to the level of the   pylorus/duodenal bulb.  Slight interval decrease in multiple pancreatic collections and a   perigastric collection.  Moderate bilateral pleural effusions, decreased.

## 2019-03-05 NOTE — PROGRESS NOTE ADULT - ASSESSMENT
53 Male w/ a PMHx of TBI (s/p PEG tube, contracture, and seizure d/o) presented as a transfer from [facility] on 12/9 for respiratory failure 2/2 ARDS likely 2/2 necrotizing pancreatitis s/p intubation. Course c/b Acinetobacter PNA s/p Avycaz and Flagyl 7 day course.  Course further complicated by acute blood loss anemia s/p colonoscopy with findings suggestive of ischemic colitis, without further bleeding and Hgb remaining stable. S/p extubation 1/3, and re-intubation 1/6 now s/p tracheostomy.

## 2019-03-05 NOTE — PROGRESS NOTE ADULT - SUBJECTIVE AND OBJECTIVE BOX
CHIEF COMPLAINT:    Interval Events:    REVIEW OF SYSTEMS:  Constitutional:   Eyes:  ENT:  CV:  Resp:  GI:  :  MSK:  Integumentary:  Neurological:  Psychiatric:  Endocrine:  Hematologic/Lymphatic:  Allergic/Immunologic:  [ ] All other systems negative  [ ] Unable to assess ROS because ________    OBJECTIVE:  ICU Vital Signs Last 24 Hrs  T(C): 37.2 (05 Mar 2019 07:03), Max: 37.2 (04 Mar 2019 22:50)  T(F): 98.9 (05 Mar 2019 07:03), Max: 98.9 (04 Mar 2019 22:50)  HR: 110 (05 Mar 2019 07:12) (103 - 125)  BP: 104/70 (05 Mar 2019 07:03) (90/58 - 134/110)  BP(mean): 94 (04 Mar 2019 15:00) (86 - 94)  ABP: --  ABP(mean): --  RR: 20 (05 Mar 2019 07:03) (18 - 20)  SpO2: 99% (05 Mar 2019 07:12) (97% - 100%)    Mode: AC/ CMV (Assist Control/ Continuous Mandatory Ventilation), RR (machine): 14, TV (machine): 400, FiO2: 40, PEEP: 5, MAP: 8, PIP: 17    03-04 @ 07:01  -  03-05 @ 07:00  --------------------------------------------------------  IN: 1450 mL / OUT: 3300 mL / NET: -1850 mL      CAPILLARY BLOOD GLUCOSE      POCT Blood Glucose.: 115 mg/dL (05 Mar 2019 05:43)      PHYSICAL EXAM:  General:   HEENT:   Lymph Nodes:  Neck:   Respiratory:   Cardiovascular:   Abdomen:   Extremities:   Skin:   Neurological:  Psychiatry:    HOSPITAL MEDICATIONS:  MEDICATIONS  (STANDING):  ALBUTerol    90 MICROgram(s) HFA Inhaler 2 Puff(s) Inhalation every 6 hours  chlorhexidine 0.12% Liquid 15 milliLiter(s) Oral Mucosa two times a day  chlorhexidine 4% Liquid 1 Application(s) Topical <User Schedule>  cyanocobalamin 1000 MICROGram(s) Oral daily  dextrose 5%. 1000 milliLiter(s) (50 mL/Hr) IV Continuous <Continuous>  dextrose 50% Injectable 12.5 Gram(s) IV Push once  dextrose 50% Injectable 25 Gram(s) IV Push once  dextrose 50% Injectable 25 Gram(s) IV Push once  folic acid 1 milliGRAM(s) Enteral Tube daily  heparin  Injectable 5000 Unit(s) SubCutaneous every 8 hours  insulin lispro (HumaLOG) corrective regimen sliding scale   SubCutaneous every 6 hours  lacosamide Solution 100 milliGRAM(s) Oral two times a day  lactobacillus acidophilus 1 Tablet(s) Oral three times a day with meals  magnesium sulfate  IVPB 1 Gram(s) IV Intermittent once  magnesium sulfate  IVPB 1 Gram(s) IV Intermittent once  pantoprazole  Injectable 40 milliGRAM(s) IV Push two times a day  psyllium Powder 1 Packet(s) Oral daily  QUEtiapine 25 milliGRAM(s) Oral daily  risperiDONE   Solution 1 milliGRAM(s) Oral daily  sodium chloride 3%  Inhalation 3 milliLiter(s) Inhalation four times a day    MEDICATIONS  (PRN):  acetaminophen    Suspension .. 650 milliGRAM(s) Oral every 6 hours PRN Temp greater or equal to 38C (100.4F), Mild Pain (1 - 3), Moderate Pain (4 - 6)  dextrose 40% Gel 15 Gram(s) Oral once PRN Blood Glucose LESS THAN 70 milliGRAM(s)/deciliter  glucagon  Injectable 1 milliGRAM(s) IntraMuscular once PRN Glucose LESS THAN 70 milligrams/deciliter      LABS:                        8.4    8.56  )-----------( 350      ( 05 Mar 2019 05:44 )             28.6     03-05    135  |  101  |  6<L>  ----------------------------<  114<H>  4.3   |  24  |  0.28<L>    Ca    8.2<L>      05 Mar 2019 05:44  Phos  2.1     03-05  Mg     1.5     03-05                MICROBIOLOGY:     RADIOLOGY:  [ ] Reviewed and interpreted by me    PULMONARY FUNCTION TESTS:    EKG:

## 2019-03-05 NOTE — PROGRESS NOTE ADULT - ATTENDING COMMENTS
Placed back on vent overnight.  Will attempt trach collar again during the day.  GI eval for leakage around PEG site.

## 2019-03-06 LAB
ANION GAP SERPL CALC-SCNC: 10 MMO/L — SIGNIFICANT CHANGE UP (ref 7–14)
BUN SERPL-MCNC: 9 MG/DL — SIGNIFICANT CHANGE UP (ref 7–23)
CALCIUM SERPL-MCNC: 8.3 MG/DL — LOW (ref 8.4–10.5)
CHLORIDE SERPL-SCNC: 100 MMOL/L — SIGNIFICANT CHANGE UP (ref 98–107)
CO2 SERPL-SCNC: 24 MMOL/L — SIGNIFICANT CHANGE UP (ref 22–31)
CREAT SERPL-MCNC: 0.29 MG/DL — LOW (ref 0.5–1.3)
GLUCOSE BLDC GLUCOMTR-MCNC: 103 MG/DL — HIGH (ref 70–99)
GLUCOSE BLDC GLUCOMTR-MCNC: 107 MG/DL — HIGH (ref 70–99)
GLUCOSE BLDC GLUCOMTR-MCNC: 107 MG/DL — HIGH (ref 70–99)
GLUCOSE BLDC GLUCOMTR-MCNC: 130 MG/DL — HIGH (ref 70–99)
GLUCOSE SERPL-MCNC: 104 MG/DL — HIGH (ref 70–99)
HCT VFR BLD CALC: 30.3 % — LOW (ref 39–50)
HGB BLD-MCNC: 9.1 G/DL — LOW (ref 13–17)
MAGNESIUM SERPL-MCNC: 1.7 MG/DL — SIGNIFICANT CHANGE UP (ref 1.6–2.6)
MCHC RBC-ENTMCNC: 30 % — LOW (ref 32–36)
MCHC RBC-ENTMCNC: 30.7 PG — SIGNIFICANT CHANGE UP (ref 27–34)
MCV RBC AUTO: 102.4 FL — HIGH (ref 80–100)
NRBC # FLD: 0 K/UL — LOW (ref 25–125)
PHOSPHATE SERPL-MCNC: 2.1 MG/DL — LOW (ref 2.5–4.5)
PLATELET # BLD AUTO: 395 K/UL — SIGNIFICANT CHANGE UP (ref 150–400)
PMV BLD: 9.8 FL — SIGNIFICANT CHANGE UP (ref 7–13)
POTASSIUM SERPL-MCNC: 4.6 MMOL/L — SIGNIFICANT CHANGE UP (ref 3.5–5.3)
POTASSIUM SERPL-SCNC: 4.6 MMOL/L — SIGNIFICANT CHANGE UP (ref 3.5–5.3)
RBC # BLD: 2.96 M/UL — LOW (ref 4.2–5.8)
RBC # FLD: 16.9 % — HIGH (ref 10.3–14.5)
SODIUM SERPL-SCNC: 134 MMOL/L — LOW (ref 135–145)
WBC # BLD: 10.23 K/UL — SIGNIFICANT CHANGE UP (ref 3.8–10.5)
WBC # FLD AUTO: 10.23 K/UL — SIGNIFICANT CHANGE UP (ref 3.8–10.5)

## 2019-03-06 PROCEDURE — 99233 SBSQ HOSP IP/OBS HIGH 50: CPT | Mod: GC

## 2019-03-06 RX ADMIN — ALBUTEROL 2 PUFF(S): 90 AEROSOL, METERED ORAL at 17:32

## 2019-03-06 RX ADMIN — ZINC OXIDE 1 APPLICATION(S): 200 OINTMENT TOPICAL at 05:38

## 2019-03-06 RX ADMIN — Medication 1 TABLET(S): at 17:50

## 2019-03-06 RX ADMIN — ALBUTEROL 2 PUFF(S): 90 AEROSOL, METERED ORAL at 04:52

## 2019-03-06 RX ADMIN — Medication 1 APPLICATION(S): at 13:22

## 2019-03-06 RX ADMIN — Medication 1 TABLET(S): at 13:24

## 2019-03-06 RX ADMIN — ALBUTEROL 2 PUFF(S): 90 AEROSOL, METERED ORAL at 23:01

## 2019-03-06 RX ADMIN — Medication 1 TABLET(S): at 08:03

## 2019-03-06 RX ADMIN — PREGABALIN 1000 MICROGRAM(S): 225 CAPSULE ORAL at 13:23

## 2019-03-06 RX ADMIN — QUETIAPINE FUMARATE 25 MILLIGRAM(S): 200 TABLET, FILM COATED ORAL at 13:23

## 2019-03-06 RX ADMIN — ALBUTEROL 2 PUFF(S): 90 AEROSOL, METERED ORAL at 10:15

## 2019-03-06 RX ADMIN — HEPARIN SODIUM 5000 UNIT(S): 5000 INJECTION INTRAVENOUS; SUBCUTANEOUS at 05:37

## 2019-03-06 RX ADMIN — CHLORHEXIDINE GLUCONATE 15 MILLILITER(S): 213 SOLUTION TOPICAL at 05:37

## 2019-03-06 RX ADMIN — SODIUM CHLORIDE 3 MILLILITER(S): 9 INJECTION INTRAMUSCULAR; INTRAVENOUS; SUBCUTANEOUS at 04:55

## 2019-03-06 RX ADMIN — SODIUM CHLORIDE 3 MILLILITER(S): 9 INJECTION INTRAMUSCULAR; INTRAVENOUS; SUBCUTANEOUS at 17:32

## 2019-03-06 RX ADMIN — CHLORHEXIDINE GLUCONATE 15 MILLILITER(S): 213 SOLUTION TOPICAL at 17:50

## 2019-03-06 RX ADMIN — PANTOPRAZOLE SODIUM 40 MILLIGRAM(S): 20 TABLET, DELAYED RELEASE ORAL at 05:38

## 2019-03-06 RX ADMIN — Medication 1 MILLIGRAM(S): at 13:23

## 2019-03-06 RX ADMIN — LACOSAMIDE 100 MILLIGRAM(S): 50 TABLET ORAL at 17:58

## 2019-03-06 RX ADMIN — RISPERIDONE 1 MILLIGRAM(S): 4 TABLET ORAL at 13:24

## 2019-03-06 RX ADMIN — ZINC OXIDE 1 APPLICATION(S): 200 OINTMENT TOPICAL at 23:05

## 2019-03-06 RX ADMIN — HEPARIN SODIUM 5000 UNIT(S): 5000 INJECTION INTRAVENOUS; SUBCUTANEOUS at 13:24

## 2019-03-06 RX ADMIN — SODIUM CHLORIDE 3 MILLILITER(S): 9 INJECTION INTRAMUSCULAR; INTRAVENOUS; SUBCUTANEOUS at 23:03

## 2019-03-06 RX ADMIN — HEPARIN SODIUM 5000 UNIT(S): 5000 INJECTION INTRAVENOUS; SUBCUTANEOUS at 23:06

## 2019-03-06 RX ADMIN — CHLORHEXIDINE GLUCONATE 1 APPLICATION(S): 213 SOLUTION TOPICAL at 13:22

## 2019-03-06 RX ADMIN — Medication 1 PACKET(S): at 13:23

## 2019-03-06 RX ADMIN — PANTOPRAZOLE SODIUM 40 MILLIGRAM(S): 20 TABLET, DELAYED RELEASE ORAL at 17:49

## 2019-03-06 RX ADMIN — SODIUM CHLORIDE 3 MILLILITER(S): 9 INJECTION INTRAMUSCULAR; INTRAVENOUS; SUBCUTANEOUS at 10:15

## 2019-03-06 RX ADMIN — LACOSAMIDE 100 MILLIGRAM(S): 50 TABLET ORAL at 06:19

## 2019-03-06 NOTE — PROGRESS NOTE ADULT - NS MD HP PULSE RADIAL
left normal/right normal
left normal/right normal
right normal/left normal
left normal/right normal
right normal/left normal
left normal/right normal
left normal/right normal
right normal/left normal
right normal/left normal
left normal/right normal
right normal/left normal
right normal/left normal
left normal/right normal
right normal/left normal
left normal/right normal

## 2019-03-06 NOTE — PROGRESS NOTE ADULT - GUM GEN PE MLT EXAM PC
detailed exam
Normal genitalia; no lesions; no discharge
detailed exam
not examined
detailed exam

## 2019-03-06 NOTE — CHART NOTE - NSCHARTNOTEFT_GEN_A_CORE
PEG site evaluated last evening.  Site appeared clean and dry with new gauze without any drainage; bumper at 3.5cm and loosely touching skin.  Surrounding skin erythematous.  Recommend application of bacitracin to red skin to prevent cellulitis and barrier cream to the entire area around the PEG opening.  Apply dry gauze lightly over bumper.  Keep dry.

## 2019-03-06 NOTE — PROGRESS NOTE ADULT - NS MD HP PULSE DORSALIS
left normal/right normal
right normal/left normal
left normal/right normal
right normal/left normal
left normal/right normal

## 2019-03-06 NOTE — PROGRESS NOTE ADULT - ASSESSMENT
53 Male w/ a PMHx of TBI (s/p PEG tube, contracture, and seizure d/o) presented as a transfer from [facility] on 12/9 for respiratory failure 2/2 ARDS likely 2/2 necrotizing pancreatitis s/p intubation. Course c/b Acinetobacter PNA s/p Avycaz and Flagyl 7 day course.  Course further complicated by acute blood loss anemia s/p colonoscopy with findings suggestive of ischemic colitis, without further bleeding and Hgb remaining stable. S/p extubation 1/3, and re-intubation 1/6 now s/p tracheostomy.

## 2019-03-06 NOTE — PROGRESS NOTE ADULT - SUBJECTIVE AND OBJECTIVE BOX
CHIEF COMPLAINT:    Interval Events:    REVIEW OF SYSTEMS:  Constitutional:   Eyes:  ENT:  CV:  Resp:  GI:  :  MSK:  Integumentary:  Neurological:  Psychiatric:  Endocrine:  Hematologic/Lymphatic:  Allergic/Immunologic:  [ ] All other systems negative  [ ] Unable to assess ROS because ________    OBJECTIVE:  ICU Vital Signs Last 24 Hrs  T(C): 37 (06 Mar 2019 05:00), Max: 37.7 (05 Mar 2019 12:11)  T(F): 98.6 (06 Mar 2019 05:00), Max: 99.8 (05 Mar 2019 12:11)  HR: 112 (06 Mar 2019 07:31) (11 - 117)  BP: 133/83 (06 Mar 2019 05:00) (103/64 - 133/83)  BP(mean): --  ABP: --  ABP(mean): --  RR: 18 (06 Mar 2019 05:00) (18 - 19)  SpO2: 98% (06 Mar 2019 07:31) (97% - 100%)    Mode: AC/ CMV (Assist Control/ Continuous Mandatory Ventilation), RR (machine): 14, TV (machine): 400, FiO2: 30, PEEP: 5, MAP: 10.1, PIP: 18    03-05 @ 07:01  -  03-06 @ 07:00  --------------------------------------------------------  IN: 0 mL / OUT: 800 mL / NET: -800 mL      CAPILLARY BLOOD GLUCOSE      POCT Blood Glucose.: 103 mg/dL (06 Mar 2019 05:10)      PHYSICAL EXAM:  General:   HEENT:   Lymph Nodes:  Neck:   Respiratory:   Cardiovascular:   Abdomen:   Extremities:   Skin:   Neurological:  Psychiatry:    HOSPITAL MEDICATIONS:  MEDICATIONS  (STANDING):  ALBUTerol    90 MICROgram(s) HFA Inhaler 2 Puff(s) Inhalation every 6 hours  BACItracin   Ointment 1 Application(s) Topical daily  chlorhexidine 0.12% Liquid 15 milliLiter(s) Oral Mucosa two times a day  chlorhexidine 4% Liquid 1 Application(s) Topical <User Schedule>  cyanocobalamin 1000 MICROGram(s) Oral daily  dextrose 5%. 1000 milliLiter(s) (50 mL/Hr) IV Continuous <Continuous>  dextrose 50% Injectable 12.5 Gram(s) IV Push once  dextrose 50% Injectable 25 Gram(s) IV Push once  dextrose 50% Injectable 25 Gram(s) IV Push once  folic acid 1 milliGRAM(s) Enteral Tube daily  heparin  Injectable 5000 Unit(s) SubCutaneous every 8 hours  insulin lispro (HumaLOG) corrective regimen sliding scale   SubCutaneous every 6 hours  lacosamide Solution 100 milliGRAM(s) Oral two times a day  lactobacillus acidophilus 1 Tablet(s) Oral three times a day with meals  pantoprazole  Injectable 40 milliGRAM(s) IV Push two times a day  psyllium Powder 1 Packet(s) Oral daily  QUEtiapine 25 milliGRAM(s) Oral daily  risperiDONE   Solution 1 milliGRAM(s) Oral daily  sodium chloride 3%  Inhalation 3 milliLiter(s) Inhalation four times a day  zinc oxide 20% Ointment 1 Application(s) Topical two times a day    MEDICATIONS  (PRN):  acetaminophen    Suspension .. 650 milliGRAM(s) Oral every 6 hours PRN Temp greater or equal to 38C (100.4F), Mild Pain (1 - 3), Moderate Pain (4 - 6)  dextrose 40% Gel 15 Gram(s) Oral once PRN Blood Glucose LESS THAN 70 milliGRAM(s)/deciliter  glucagon  Injectable 1 milliGRAM(s) IntraMuscular once PRN Glucose LESS THAN 70 milligrams/deciliter      LABS:                        9.1    10.23 )-----------( 395      ( 06 Mar 2019 05:14 )             30.3     03-06    134<L>  |  100  |  9   ----------------------------<  104<H>  4.6   |  24  |  0.29<L>    Ca    8.3<L>      06 Mar 2019 05:14  Phos  2.1     03-06  Mg     1.7     03-06                MICROBIOLOGY:     RADIOLOGY:  [ ] Reviewed and interpreted by me    PULMONARY FUNCTION TESTS:    EKG: CHIEF COMPLAINT: Patient is a 53y old  Male who presents with a chief complaint of ARDS?, sepsis (06 Mar 2019 08:43)      Interval Events: Cuff leaking     REVIEW OF SYSTEMS:  Constitutional: Denies fever /pain   CV: Denies   Resp: Denies   GI: Denies   [x ] All other systems negative  [ ] Unable to assess ROS because ________    OBJECTIVE:  ICU Vital Signs Last 24 Hrs  T(C): 37 (06 Mar 2019 05:00), Max: 37.7 (05 Mar 2019 12:11)  T(F): 98.6 (06 Mar 2019 05:00), Max: 99.8 (05 Mar 2019 12:11)  HR: 112 (06 Mar 2019 07:31) (11 - 117)  BP: 133/83 (06 Mar 2019 05:00) (103/64 - 133/83)  BP(mean): --  ABP: --  ABP(mean): --  RR: 18 (06 Mar 2019 05:00) (18 - 19)  SpO2: 98% (06 Mar 2019 07:31) (97% - 100%)    Mode: AC/ CMV (Assist Control/ Continuous Mandatory Ventilation), RR (machine): 14, TV (machine): 400, FiO2: 30, PEEP: 5, MAP: 10.1, PIP: 18    03-05 @ 07:01  -  03-06 @ 07:00  --------------------------------------------------------  IN: 0 mL / OUT: 800 mL / NET: -800 mL      CAPILLARY BLOOD GLUCOSE      POCT Blood Glucose.: 103 mg/dL (06 Mar 2019 05:10)      HOSPITAL MEDICATIONS:  MEDICATIONS  (STANDING):  ALBUTerol    90 MICROgram(s) HFA Inhaler 2 Puff(s) Inhalation every 6 hours  BACItracin   Ointment 1 Application(s) Topical daily  chlorhexidine 0.12% Liquid 15 milliLiter(s) Oral Mucosa two times a day  chlorhexidine 4% Liquid 1 Application(s) Topical <User Schedule>  cyanocobalamin 1000 MICROGram(s) Oral daily  dextrose 5%. 1000 milliLiter(s) (50 mL/Hr) IV Continuous <Continuous>  dextrose 50% Injectable 12.5 Gram(s) IV Push once  dextrose 50% Injectable 25 Gram(s) IV Push once  dextrose 50% Injectable 25 Gram(s) IV Push once  folic acid 1 milliGRAM(s) Enteral Tube daily  heparin  Injectable 5000 Unit(s) SubCutaneous every 8 hours  insulin lispro (HumaLOG) corrective regimen sliding scale   SubCutaneous every 6 hours  lacosamide Solution 100 milliGRAM(s) Oral two times a day  lactobacillus acidophilus 1 Tablet(s) Oral three times a day with meals  pantoprazole  Injectable 40 milliGRAM(s) IV Push two times a day  psyllium Powder 1 Packet(s) Oral daily  QUEtiapine 25 milliGRAM(s) Oral daily  risperiDONE   Solution 1 milliGRAM(s) Oral daily  sodium chloride 3%  Inhalation 3 milliLiter(s) Inhalation four times a day  zinc oxide 20% Ointment 1 Application(s) Topical two times a day    MEDICATIONS  (PRN):  acetaminophen    Suspension .. 650 milliGRAM(s) Oral every 6 hours PRN Temp greater or equal to 38C (100.4F), Mild Pain (1 - 3), Moderate Pain (4 - 6)  dextrose 40% Gel 15 Gram(s) Oral once PRN Blood Glucose LESS THAN 70 milliGRAM(s)/deciliter  glucagon  Injectable 1 milliGRAM(s) IntraMuscular once PRN Glucose LESS THAN 70 milligrams/deciliter      LABS:                        9.1    10.23 )-----------( 395      ( 06 Mar 2019 05:14 )             30.3     03-06    134<L>  |  100  |  9   ----------------------------<  104<H>  4.6   |  24  |  0.29<L>    Ca    8.3<L>      06 Mar 2019 05:14  Phos  2.1     03-06  Mg     1.7     03-06                MICROBIOLOGY:     RADIOLOGY:  [ ] Reviewed and interpreted by me    PULMONARY FUNCTION TESTS:    EKG:

## 2019-03-06 NOTE — PROGRESS NOTE ADULT - ATTENDING COMMENTS
Trach changed today (size 8, cuffed, disposable) due to leak.  On trach collar during day. Going back on vent at night.  Copious secretions. Continue metaneb and suction.  Finished antibiotic course.   Remains stable for discharge to vent facility.

## 2019-03-06 NOTE — PROGRESS NOTE ADULT - SKIN COMMENTS
AAI sheets for monitoring
excoriation on peg tube area /tx in progress GI aware
AAI sheets for monitoring
excoriation on abdomen sec to gastric juices

## 2019-03-06 NOTE — PROGRESS NOTE ADULT - MOUTH
dry
moist
dry
moist
moist
dry
moist

## 2019-03-06 NOTE — PROGRESS NOTE ADULT - VASCULAR
detailed exam

## 2019-03-06 NOTE — PROGRESS NOTE ADULT - PROBLEM SELECTOR PLAN 5
- Feeds resumed post EGD  - aspiration precautions  - PEG bumper at 3.5cm - keep there to prevent dislodging or migration of balloon  - Leakage at PEG site D/W [facility] Dr. Fair - check bumper is at correct place / skin prep/barrier cream and 4x4 fenestrated. She will fu with patient to check site

## 2019-03-06 NOTE — PROGRESS NOTE ADULT - PROBLEM SELECTOR PLAN 1
- cont avycaz and flagyl and vanco thru 3/4/19 per ID  - s/p EUS stent and drainage  - s/p necrostomy on Friday 2/15  - repeat CT performed 2/23  - repeat CT overnight with decreasing pancreatic fluid collections  -  GI note appreciated- s/p stent removal, fluid collection resolved - cont avycaz and flagyl and vanco thru 3/4/19 per ID  - s/p EUS stent and drainage  - s/p necrostomy on Friday 2/15  - repeat CT performed 2/23  - repeat CT overnight with decreasing pancreatic fluid collections  -  GI note appreciated- s/p stent removal, fluid collection resolved  - some leakage at PEG site GI aware and will see patient   stable for dispo plansarahin g

## 2019-03-07 VITALS
OXYGEN SATURATION: 98 % | HEART RATE: 130 BPM | DIASTOLIC BLOOD PRESSURE: 76 MMHG | SYSTOLIC BLOOD PRESSURE: 116 MMHG | RESPIRATION RATE: 20 BRPM | TEMPERATURE: 99 F

## 2019-03-07 LAB
ANION GAP SERPL CALC-SCNC: 10 MMO/L — SIGNIFICANT CHANGE UP (ref 7–14)
BUN SERPL-MCNC: 9 MG/DL — SIGNIFICANT CHANGE UP (ref 7–23)
CALCIUM SERPL-MCNC: 8.5 MG/DL — SIGNIFICANT CHANGE UP (ref 8.4–10.5)
CHLORIDE SERPL-SCNC: 101 MMOL/L — SIGNIFICANT CHANGE UP (ref 98–107)
CO2 SERPL-SCNC: 22 MMOL/L — SIGNIFICANT CHANGE UP (ref 22–31)
CREAT SERPL-MCNC: 0.3 MG/DL — LOW (ref 0.5–1.3)
GLUCOSE BLDC GLUCOMTR-MCNC: 109 MG/DL — HIGH (ref 70–99)
GLUCOSE BLDC GLUCOMTR-MCNC: 140 MG/DL — HIGH (ref 70–99)
GLUCOSE SERPL-MCNC: 137 MG/DL — HIGH (ref 70–99)
HCT VFR BLD CALC: 29.9 % — LOW (ref 39–50)
HGB BLD-MCNC: 8.9 G/DL — LOW (ref 13–17)
MAGNESIUM SERPL-MCNC: 1.5 MG/DL — LOW (ref 1.6–2.6)
MCHC RBC-ENTMCNC: 29.8 % — LOW (ref 32–36)
MCHC RBC-ENTMCNC: 31.3 PG — SIGNIFICANT CHANGE UP (ref 27–34)
MCV RBC AUTO: 105.3 FL — HIGH (ref 80–100)
PHOSPHATE SERPL-MCNC: 2.8 MG/DL — SIGNIFICANT CHANGE UP (ref 2.5–4.5)
POTASSIUM SERPL-MCNC: 4.6 MMOL/L — SIGNIFICANT CHANGE UP (ref 3.5–5.3)
POTASSIUM SERPL-SCNC: 4.6 MMOL/L — SIGNIFICANT CHANGE UP (ref 3.5–5.3)
RBC # BLD: 2.84 M/UL — LOW (ref 4.2–5.8)
RBC # FLD: 16.6 % — HIGH (ref 10.3–14.5)
SODIUM SERPL-SCNC: 133 MMOL/L — LOW (ref 135–145)
WBC # BLD: 7.13 K/UL — SIGNIFICANT CHANGE UP (ref 3.8–10.5)
WBC # FLD AUTO: 7.13 K/UL — SIGNIFICANT CHANGE UP (ref 3.8–10.5)

## 2019-03-07 PROCEDURE — 99232 SBSQ HOSP IP/OBS MODERATE 35: CPT | Mod: GC

## 2019-03-07 PROCEDURE — 99232 SBSQ HOSP IP/OBS MODERATE 35: CPT

## 2019-03-07 RX ORDER — LACOSAMIDE 50 MG/1
100 TABLET ORAL
Qty: 0 | Refills: 0 | Status: DISCONTINUED | OUTPATIENT
Start: 2019-03-07 | End: 2019-03-07

## 2019-03-07 RX ORDER — SODIUM CHLORIDE 9 MG/ML
3 INJECTION INTRAMUSCULAR; INTRAVENOUS; SUBCUTANEOUS
Qty: 0 | Refills: 0 | DISCHARGE
Start: 2019-03-07

## 2019-03-07 RX ORDER — RISPERIDONE 4 MG/1
1 TABLET ORAL
Qty: 0 | Refills: 0 | DISCHARGE
Start: 2019-03-07

## 2019-03-07 RX ORDER — PREGABALIN 225 MG/1
1 CAPSULE ORAL
Qty: 0 | Refills: 0 | DISCHARGE
Start: 2019-03-07

## 2019-03-07 RX ORDER — ALBUTEROL 90 UG/1
2 AEROSOL, METERED ORAL
Qty: 0 | Refills: 0 | DISCHARGE
Start: 2019-03-07

## 2019-03-07 RX ORDER — FOLIC ACID 0.8 MG
1 TABLET ORAL
Qty: 0 | Refills: 0 | DISCHARGE
Start: 2019-03-07

## 2019-03-07 RX ORDER — ACETAMINOPHEN 500 MG
20.31 TABLET ORAL
Qty: 0 | Refills: 0 | DISCHARGE
Start: 2019-03-07

## 2019-03-07 RX ORDER — QUETIAPINE FUMARATE 200 MG/1
1 TABLET, FILM COATED ORAL
Qty: 0 | Refills: 0 | DISCHARGE
Start: 2019-03-07

## 2019-03-07 RX ORDER — MAGNESIUM OXIDE 400 MG ORAL TABLET 241.3 MG
400 TABLET ORAL
Qty: 0 | Refills: 0 | Status: DISCONTINUED | OUTPATIENT
Start: 2019-03-07 | End: 2019-03-07

## 2019-03-07 RX ORDER — BACITRACIN ZINC 500 UNIT/G
1 OINTMENT IN PACKET (EA) TOPICAL
Qty: 0 | Refills: 0 | DISCHARGE
Start: 2019-03-07

## 2019-03-07 RX ORDER — PSYLLIUM SEED (WITH DEXTROSE)
1 POWDER (GRAM) ORAL
Qty: 0 | Refills: 0 | DISCHARGE
Start: 2019-03-07

## 2019-03-07 RX ORDER — LACTOBACILLUS ACIDOPHILUS 100MM CELL
1 CAPSULE ORAL
Qty: 0 | Refills: 0 | DISCHARGE
Start: 2019-03-07

## 2019-03-07 RX ORDER — LACOSAMIDE 50 MG/1
10 TABLET ORAL
Qty: 0 | Refills: 0 | DISCHARGE
Start: 2019-03-07

## 2019-03-07 RX ORDER — ZINC OXIDE 200 MG/G
1 OINTMENT TOPICAL
Qty: 0 | Refills: 0 | DISCHARGE
Start: 2019-03-07

## 2019-03-07 RX ADMIN — CHLORHEXIDINE GLUCONATE 1 APPLICATION(S): 213 SOLUTION TOPICAL at 12:51

## 2019-03-07 RX ADMIN — MAGNESIUM OXIDE 400 MG ORAL TABLET 400 MILLIGRAM(S): 241.3 TABLET ORAL at 08:49

## 2019-03-07 RX ADMIN — SODIUM CHLORIDE 3 MILLILITER(S): 9 INJECTION INTRAMUSCULAR; INTRAVENOUS; SUBCUTANEOUS at 04:51

## 2019-03-07 RX ADMIN — RISPERIDONE 1 MILLIGRAM(S): 4 TABLET ORAL at 12:52

## 2019-03-07 RX ADMIN — QUETIAPINE FUMARATE 25 MILLIGRAM(S): 200 TABLET, FILM COATED ORAL at 12:51

## 2019-03-07 RX ADMIN — ZINC OXIDE 1 APPLICATION(S): 200 OINTMENT TOPICAL at 05:30

## 2019-03-07 RX ADMIN — CHLORHEXIDINE GLUCONATE 15 MILLILITER(S): 213 SOLUTION TOPICAL at 05:29

## 2019-03-07 RX ADMIN — SODIUM CHLORIDE 3 MILLILITER(S): 9 INJECTION INTRAMUSCULAR; INTRAVENOUS; SUBCUTANEOUS at 10:21

## 2019-03-07 RX ADMIN — HEPARIN SODIUM 5000 UNIT(S): 5000 INJECTION INTRAVENOUS; SUBCUTANEOUS at 13:03

## 2019-03-07 RX ADMIN — Medication 1 TABLET(S): at 08:50

## 2019-03-07 RX ADMIN — LACOSAMIDE 100 MILLIGRAM(S): 50 TABLET ORAL at 05:28

## 2019-03-07 RX ADMIN — Medication 1 MILLIGRAM(S): at 12:52

## 2019-03-07 RX ADMIN — Medication 1 PACKET(S): at 12:52

## 2019-03-07 RX ADMIN — HEPARIN SODIUM 5000 UNIT(S): 5000 INJECTION INTRAVENOUS; SUBCUTANEOUS at 05:29

## 2019-03-07 RX ADMIN — Medication 1 TABLET(S): at 12:52

## 2019-03-07 RX ADMIN — PANTOPRAZOLE SODIUM 40 MILLIGRAM(S): 20 TABLET, DELAYED RELEASE ORAL at 05:30

## 2019-03-07 RX ADMIN — MAGNESIUM OXIDE 400 MG ORAL TABLET 400 MILLIGRAM(S): 241.3 TABLET ORAL at 12:52

## 2019-03-07 RX ADMIN — ALBUTEROL 2 PUFF(S): 90 AEROSOL, METERED ORAL at 04:48

## 2019-03-07 RX ADMIN — Medication 1 APPLICATION(S): at 12:53

## 2019-03-07 RX ADMIN — PREGABALIN 1000 MICROGRAM(S): 225 CAPSULE ORAL at 12:52

## 2019-03-07 RX ADMIN — ALBUTEROL 2 PUFF(S): 90 AEROSOL, METERED ORAL at 09:19

## 2019-03-07 NOTE — PROGRESS NOTE ADULT - ASSESSMENT
53 Male w/ a PMHx of TBI (s/p PEG tube, contracture, and seizure d/o) presented as a transfer from [facility] on 12/9 for respiratory failure 2/2 ARDS likely 2/2 necrotizing pancreatitis s/p intubation. Course c/b Acinetobacter PNA s/p Avycaz and Flagyl 7 day course.  Course further complicated by acute blood loss anemia s/p colonoscopy with findings suggestive of ischemic colitis, without further bleeding and Hgb remaining stable. S/p extubation 1/3, and re-intubation 1/6 now s/p tracheostomy.

## 2019-03-07 NOTE — PROGRESS NOTE ADULT - NEUROLOGICAL DETAILS
strength decreased/disoriented
disoriented/strength decreased
strength decreased/responds to pain/responds to verbal commands
disoriented/strength decreased
disoriented/strength decreased
responds to pain/responds to verbal commands
disoriented/strength decreased
responds to verbal commands/strength decreased/responds to pain
strength decreased/disoriented
responds to verbal commands/responds to pain/strength decreased
responds to verbal commands/strength decreased/responds to pain/disoriented
strength decreased
alert and oriented x 3
strength decreased
sensation intact/hx TBI/responds to pain
alert and oriented x 3

## 2019-03-07 NOTE — PROGRESS NOTE ADULT - PROBLEM SELECTOR PROBLEM 7
Prophylactic measure

## 2019-03-07 NOTE — PROGRESS NOTE ADULT - PROBLEM SELECTOR PROBLEM 4
Seizure
PEG (percutaneous endoscopic gastrostomy) status
Seizure
PEG (percutaneous endoscopic gastrostomy) status
Seizure
PEG (percutaneous endoscopic gastrostomy) status
Seizure
TBI (traumatic brain injury)
Seizure
PEG (percutaneous endoscopic gastrostomy) status
Seizure
PEG (percutaneous endoscopic gastrostomy) status
Seizure
TBI (traumatic brain injury)

## 2019-03-07 NOTE — PROGRESS NOTE ADULT - MINUTES
25
35
40
35
40
45
35

## 2019-03-07 NOTE — PROGRESS NOTE ADULT - NS NEC GEN PE MLT EXAM PC
detailed exam
No bruits; no thyromegaly or nodules
detailed exam

## 2019-03-07 NOTE — PROGRESS NOTE ADULT - PROBLEM SELECTOR PLAN 5
- cont feeds  - aspiration precautions  - PEG bumper at 3.5cm - keep there to prevent dislodging or migration of balloon

## 2019-03-07 NOTE — PROGRESS NOTE ADULT - SUBJECTIVE AND OBJECTIVE BOX
CC: F/U for abscesses    Saw/spoke to patient. No fevers, no chills. Nonverbal, but does not appears to indicate any new complaints. Unchanged.    Allergies  Valproate Sodium (Other (Severe))    ANTIMICROBIALS:  Off    PE:    Vital Signs Last 24 Hrs  T(C): 37.2 (07 Mar 2019 12:54), Max: 37.2 (07 Mar 2019 12:54)  T(F): 99 (07 Mar 2019 12:54), Max: 99 (07 Mar 2019 12:54)  HR: 130 (07 Mar 2019 12:54) (110 - 132)  BP: 116/76 (07 Mar 2019 12:54) (116/76 - 142/82)  RR: 20 (07 Mar 2019 12:54) (18 - 20)  SpO2: 98% (07 Mar 2019 12:54) (96% - 100%)    Gen: AOx3, NAD, non-toxic, pleasant  CV: S1+S2 normal, tachycardic  Resp: Clear bilat, no resp distress, no crackles/wheezes  Abd: Soft, nontender, +BS  Ext: No LE edema, no wounds    LABS:                        8.9    7.13  )-----------( x        ( 07 Mar 2019 02:57 )             29.9     03-07    133<L>  |  101  |  9   ----------------------------<  137<H>  4.6   |  22  |  0.30<L>    Ca    8.5      07 Mar 2019 02:40  Phos  2.8     03-07  Mg     1.5     03-07    MICROBIOLOGY:    BLOOD PERIPHERAL  02-17-19 NGTD    RADIOLOGY:    3/1 CT:    IMPRESSION:     Gastrostomy tube balloon has migrated to the level of the   pylorus/duodenal bulb.  Slight interval decrease in multiple pancreatic collections and a   perigastric collection.  Moderate bilateral pleural effusions, decreased.

## 2019-03-07 NOTE — PROGRESS NOTE ADULT - GASTROINTESTINAL DETAILS
soft/nontender
no distention/soft/nontender
nontender/soft
soft/nontender
nontender/soft
soft/nontender
+ PEG for feeding - brown drainage at PEG site/nontender/no distention/soft
soft/+ PEG/no distention/nontender
soft/nontender
nontender/no distention/soft/+ PEG
soft/Peg in situ/bowel sounds normal/nontender
nontender/+ PEG/soft
no distention/+ PEG/nontender/soft
soft/no distention/nontender/PEG for feeding
soft/no distention/nontender
nontender/PEG with enteral feeds/soft
nontender/soft
soft/nontender/no distention/+ PEG
+PEG/nontender/soft
nontender/no distention/soft
peg dressing in place/soft/nontender/no distention
soft/nontender
soft/nontender
PEG drained small amt bilious fluid. Erythema around PEG site
no distention/nontender/+ PEG for feeding/soft
no distention/soft/+ PEG/nontender
no distention/soft/nontender
nontender
+ PEG/no masses palpable/no distention/nontender
nontender/+ PEG/no distention/soft
nontender/no distention/soft
+ PEG no drainage/soft/nontender/no distention
+ PEG/soft/nontender
PEG/soft/nontender/no distention
nontender/+ PEG/no distention
soft/nontender
nontender/soft
soft/+ peg/nontender

## 2019-03-07 NOTE — PROGRESS NOTE ADULT - MENTAL STATUS
opens eyes to verbal /tactile stimuli
alert, oriented  tracks, follows commands
Awake alert, answers questions, seems to sleep more during day
alert, nods and responds appropriately to questions
alert, responds appropriately  follows commands
alert  follows commands  can nod and make needs known
alert  follows commands  responds and nods appropriately to questions
alert  oriented  follows commands, nods appropriately
alert  follows commands  responds appropriately, nods and mouths words
alert, does not speak  follows commands  nods and responds appropriately
alert, responds appropriately  follows commands
alert  follows commands  nods appropriately to questions
alert, nods appropriately to questions
alert, responds appropriately  nods to questions
Awake alert follows questions
alert  follows commands  responds appropriately
Eyes open tracks movement
Awake alert answers questions
alert, nods appropriately to questions
awake alert
Eyes open follows simple commands
Awake alert follows commands
Alert tracks movement , follows simple commands
alert  nods appropriately to questions  follows commands
answers questions, sleeping more often but arousable then alert
Awake alert follows commands
Awake alert, answers questions
Follows simple commands
asleep but arousable to verbal stimuli, tracks movement, follows simple commands
Awake alert answers questions ,
Awake alert follows simple commands
Awake alert follows simple commands
Eyes open, answers questions, follows simple commands
eyes open follows simple commands
awake alert answers yes no
awake and alert
awake and alert
Awake alert follows simple commands
awake alert answers questions

## 2019-03-07 NOTE — PROGRESS NOTE ADULT - ATTENDING COMMENTS
Doing well.   Continue trach collar during day - will try to keep on TC during nighttime as well.  Continue metaneb and suction.  Awaiting dispo.

## 2019-03-07 NOTE — PROGRESS NOTE ADULT - PROBLEM SELECTOR PROBLEM 1
Necrotizing pancreatitis

## 2019-03-07 NOTE — PROGRESS NOTE ADULT - MS EXT PE MLT D E PC
no clubbing/no cyanosis/no pedal edema
no clubbing/no pedal edema/no cyanosis
no clubbing/no cyanosis/no pedal edema
no clubbing/no pedal edema/no cyanosis
no clubbing/no pedal edema/no cyanosis
no cyanosis/no clubbing/no pedal edema
pedal edema/no clubbing/no cyanosis
no cyanosis/no pedal edema/no clubbing
no cyanosis/no clubbing/no pedal edema
no cyanosis/pedal edema/no clubbing
no pedal edema/no cyanosis/no clubbing
no cyanosis/no clubbing/no pedal edema
no cyanosis/no clubbing
pedal edema/no cyanosis/no clubbing
contractures/no clubbing/no cyanosis
no pedal edema/no clubbing/no cyanosis
no clubbing/no cyanosis
no clubbing/no cyanosis
no cyanosis/no clubbing/bilat edema
no clubbing/pedal edema/no cyanosis
no clubbing/no cyanosis/no pedal edema
no clubbing/no cyanosis
no clubbing/no cyanosis
no cyanosis/no clubbing
no cyanosis/no pedal edema/no clubbing
no cyanosis/no clubbing/no pedal edema
no cyanosis/no pedal edema/no clubbing
no cyanosis/no pedal edema/no clubbing
no pedal edema/no clubbing/no cyanosis
no clubbing/no pedal edema/no cyanosis
no cyanosis/no clubbing
no cyanosis/no pedal edema/no clubbing
+ contractures
no clubbing/no cyanosis
no clubbing/no pedal edema/no cyanosis
no cyanosis/no pedal edema/no clubbing
no cyanosis/pedal edema/no clubbing
pedal edema/no cyanosis
no clubbing/no cyanosis
pedal edema/no clubbing/no cyanosis

## 2019-03-07 NOTE — PROGRESS NOTE ADULT - PROBLEM SELECTOR PROBLEM 2
ARDS (adult respiratory distress syndrome)

## 2019-03-07 NOTE — PROGRESS NOTE ADULT - I WAS PHYSICALLY PRESENT FOR THE KEY PORTIONS OF THE EVALUATION AND MANAGEMENT (E/M) SERVICE PROVIDED.  I AGREE WITH THE ABOVE HISTORY, PHYSICAL, AND PLAN WHICH I HAVE REVIEWED AND EDITED WHERE APPROPRIATE

## 2019-03-07 NOTE — PROGRESS NOTE ADULT - PROBLEM SELECTOR PLAN 1
- s/p EUS stent and drainage  - s/p necrostomy on Friday 2/15  - repeat CT performed 2/23  - repeat CT overnight with decreasing pancreatic fluid collections  - GI note appreciated- s/p stent removal, fluid collection resolved  - completed IV abx 3/4 (received about 7 weeks total)  - ID note appreciated

## 2019-03-07 NOTE — PROGRESS NOTE ADULT - CARDIOVASCULAR DETAILS
tachycardia
positive S1/positive S2
tachycardia

## 2019-03-07 NOTE — PROGRESS NOTE ADULT - PROBLEM SELECTOR PROBLEM 5
TBI (traumatic brain injury)
PEG (percutaneous endoscopic gastrostomy) status
TBI (traumatic brain injury)
PEG (percutaneous endoscopic gastrostomy) status
TBI (traumatic brain injury)
PEG (percutaneous endoscopic gastrostomy) status
TBI (traumatic brain injury)
PEG (percutaneous endoscopic gastrostomy) status
PEG (percutaneous endoscopic gastrostomy) status
Pneumonia
PEG (percutaneous endoscopic gastrostomy) status
TBI (traumatic brain injury)
PEG (percutaneous endoscopic gastrostomy) status
Pneumonia

## 2019-03-07 NOTE — PROGRESS NOTE ADULT - PROBLEM SELECTOR PLAN 4
- cont meds  - no seizure activity noted  - seizure precautions
- tolerating feeds  - aspiration precautions
- cont meds  - no seizure activity noted  - seizure precautions
- tolerating feeds  - aspiration precautions
- cont meds  - no seizure activity noted  - seizure precautions
- tolerating feeds  - aspiration precautions
- cont meds  - no seizure activity noted  - seizure precautions
- cont meds  - no seizure activity noted  - seizure precautions
pt from [facility] facility for supportive care  - Continue supportive care
- cont meds  - no seizure activity noted  - seizure precautions
- tolerating feeds  - aspiration precautions
- cont meds  - no seizure activity noted  - seizure precautions
- tolerating feeds  - aspiration precautions
- cont meds  - no seizure activity noted  - seizure precautions
pt from LTC facility for supportive care  - Continue supportive care

## 2019-03-07 NOTE — PROGRESS NOTE ADULT - NECK DETAILS
tracheostomy present
trach with trach collar
supple/trach
tracheostomy present
trach with trach collar
trach to vent
trach to vent
trach with collar
+ TRach
supple/no JVD
tracheostomy present
tracheostomy present
+ trach with collar
trach
trach with collar
+ trach
+ trach
+ trach to vent
no JVD/tracheostomy
trach with collar
supple
trach to vent
+ trach/supple
supple
trach
trach to vent
trach to vent
trach with collar
trach

## 2019-03-07 NOTE — PROGRESS NOTE ADULT - PROBLEM SELECTOR PROBLEM 6
TBI (traumatic brain injury)
Prophylactic measure
TBI (traumatic brain injury)
Prophylactic measure
TBI (traumatic brain injury)
Prophylactic measure
TBI (traumatic brain injury)
TBI (traumatic brain injury)
Prophylactic measure
TBI (traumatic brain injury)
Prophylactic measure
TBI (traumatic brain injury)
Prophylactic measure
TBI (traumatic brain injury)

## 2019-03-07 NOTE — PROGRESS NOTE ADULT - SUBJECTIVE AND OBJECTIVE BOX
CHIEF COMPLAINT: Patient is a 53y old  Male who presents with a chief complaint of ARDS?, sepsis (06 Mar 2019 08:43)    Interval Events:      REVIEW OF SYSTEMS:  Constitutional:   Eyes:  ENT:  CV:  Resp:  GI:  :  MSK:  Integumentary:  Neurological:  Psychiatric:  Endocrine:  Hematologic/Lymphatic:  Allergic/Immunologic:  [ ] All other systems negative  [ ] Unable to assess ROS because ________      OBJECTIVE:  ICU Vital Signs Last 24 Hrs  T(C): 36.7 (07 Mar 2019 05:27), Max: 37.3 (06 Mar 2019 13:00)  T(F): 98.1 (07 Mar 2019 05:27), Max: 99.1 (06 Mar 2019 13:00)  HR: 132 (07 Mar 2019 06:58) (102 - 132)  BP: 127/81 (07 Mar 2019 05:27) (103/66 - 142/82)  BP(mean): --  ABP: --  ABP(mean): --  RR: 19 (07 Mar 2019 05:27) (18 - 20)  SpO2: 98% (07 Mar 2019 06:58) (96% - 100%)    Mode: AC/ CMV (Assist Control/ Continuous Mandatory Ventilation), RR (machine): 14, TV (machine): 400, FiO2: 30, PEEP: 5, MAP: 8.5, PIP: 15    03-06 @ 07:01  -  03-07 @ 07:00  --------------------------------------------------------  IN: 0 mL / OUT: 650 mL / NET: -650 mL    POCT Blood Glucose.: 109 mg/dL (07 Mar 2019 05:31)    HOSPITAL MEDICATIONS:  MEDICATIONS  (STANDING):  ALBUTerol    90 MICROgram(s) HFA Inhaler 2 Puff(s) Inhalation every 6 hours  BACItracin   Ointment 1 Application(s) Topical daily  chlorhexidine 0.12% Liquid 15 milliLiter(s) Oral Mucosa two times a day  chlorhexidine 4% Liquid 1 Application(s) Topical <User Schedule>  cyanocobalamin 1000 MICROGram(s) Oral daily  dextrose 5%. 1000 milliLiter(s) (50 mL/Hr) IV Continuous <Continuous>  dextrose 50% Injectable 12.5 Gram(s) IV Push once  dextrose 50% Injectable 25 Gram(s) IV Push once  dextrose 50% Injectable 25 Gram(s) IV Push once  folic acid 1 milliGRAM(s) Enteral Tube daily  heparin  Injectable 5000 Unit(s) SubCutaneous every 8 hours  insulin lispro (HumaLOG) corrective regimen sliding scale   SubCutaneous every 6 hours  lacosamide Solution 100 milliGRAM(s) Oral two times a day  lactobacillus acidophilus 1 Tablet(s) Oral three times a day with meals  magnesium oxide 400 milliGRAM(s) Oral three times a day with meals  pantoprazole  Injectable 40 milliGRAM(s) IV Push two times a day  psyllium Powder 1 Packet(s) Oral daily  QUEtiapine 25 milliGRAM(s) Oral daily  risperiDONE   Solution 1 milliGRAM(s) Oral daily  sodium chloride 3%  Inhalation 3 milliLiter(s) Inhalation four times a day  zinc oxide 20% Ointment 1 Application(s) Topical two times a day    MEDICATIONS  (PRN):  acetaminophen    Suspension .. 650 milliGRAM(s) Oral every 6 hours PRN Temp greater or equal to 38C (100.4F), Mild Pain (1 - 3), Moderate Pain (4 - 6)  dextrose 40% Gel 15 Gram(s) Oral once PRN Blood Glucose LESS THAN 70 milliGRAM(s)/deciliter  glucagon  Injectable 1 milliGRAM(s) IntraMuscular once PRN Glucose LESS THAN 70 milligrams/deciliter      LABS:                        8.9    7.13  )-----------( x        ( 07 Mar 2019 02:57 )             29.9     03-07    133<L>  |  101  |  9   ----------------------------<  137<H>  4.6   |  22  |  0.30<L>    Ca    8.5      07 Mar 2019 02:40  Phos  2.8     03-07  Mg     1.5     03-07                MICROBIOLOGY:     RADIOLOGY:  [ ] Reviewed and interpreted by me    PULMONARY FUNCTION TESTS:    EKG: CHIEF COMPLAINT: Patient is a 53y old  Male who presents with a chief complaint of ARDS?, sepsis (06 Mar 2019 08:43)    Interval Events: none overnight - afebrile      REVIEW OF SYSTEMS:  Constitutional: no complaints  CV: denies  Resp: denies  GI: denies  [x] All other systems negative  [ ] Unable to assess ROS because ________      OBJECTIVE:  ICU Vital Signs Last 24 Hrs  T(C): 36.7 (07 Mar 2019 05:27), Max: 37.3 (06 Mar 2019 13:00)  T(F): 98.1 (07 Mar 2019 05:27), Max: 99.1 (06 Mar 2019 13:00)  HR: 132 (07 Mar 2019 06:58) (102 - 132)  BP: 127/81 (07 Mar 2019 05:27) (103/66 - 142/82)  BP(mean): --  ABP: --  ABP(mean): --  RR: 19 (07 Mar 2019 05:27) (18 - 20)  SpO2: 98% (07 Mar 2019 06:58) (96% - 100%)    Mode: AC/ CMV (Assist Control/ Continuous Mandatory Ventilation), RR (machine): 14, TV (machine): 400, FiO2: 30, PEEP: 5, MAP: 8.5, PIP: 15    03-06 @ 07:01  -  03-07 @ 07:00  --------------------------------------------------------  IN: 0 mL / OUT: 650 mL / NET: -650 mL    POCT Blood Glucose.: 109 mg/dL (07 Mar 2019 05:31)    HOSPITAL MEDICATIONS:  MEDICATIONS  (STANDING):  ALBUTerol    90 MICROgram(s) HFA Inhaler 2 Puff(s) Inhalation every 6 hours  BACItracin   Ointment 1 Application(s) Topical daily  chlorhexidine 0.12% Liquid 15 milliLiter(s) Oral Mucosa two times a day  chlorhexidine 4% Liquid 1 Application(s) Topical <User Schedule>  cyanocobalamin 1000 MICROGram(s) Oral daily  dextrose 5%. 1000 milliLiter(s) (50 mL/Hr) IV Continuous <Continuous>  dextrose 50% Injectable 12.5 Gram(s) IV Push once  dextrose 50% Injectable 25 Gram(s) IV Push once  dextrose 50% Injectable 25 Gram(s) IV Push once  folic acid 1 milliGRAM(s) Enteral Tube daily  heparin  Injectable 5000 Unit(s) SubCutaneous every 8 hours  insulin lispro (HumaLOG) corrective regimen sliding scale   SubCutaneous every 6 hours  lacosamide Solution 100 milliGRAM(s) Oral two times a day  lactobacillus acidophilus 1 Tablet(s) Oral three times a day with meals  magnesium oxide 400 milliGRAM(s) Oral three times a day with meals  pantoprazole  Injectable 40 milliGRAM(s) IV Push two times a day  psyllium Powder 1 Packet(s) Oral daily  QUEtiapine 25 milliGRAM(s) Oral daily  risperiDONE   Solution 1 milliGRAM(s) Oral daily  sodium chloride 3%  Inhalation 3 milliLiter(s) Inhalation four times a day  zinc oxide 20% Ointment 1 Application(s) Topical two times a day    MEDICATIONS  (PRN):  acetaminophen    Suspension .. 650 milliGRAM(s) Oral every 6 hours PRN Temp greater or equal to 38C (100.4F), Mild Pain (1 - 3), Moderate Pain (4 - 6)  dextrose 40% Gel 15 Gram(s) Oral once PRN Blood Glucose LESS THAN 70 milliGRAM(s)/deciliter  glucagon  Injectable 1 milliGRAM(s) IntraMuscular once PRN Glucose LESS THAN 70 milligrams/deciliter      LABS:                        8.9    7.13  )-----------( x        ( 07 Mar 2019 02:57 )             29.9     03-07    133<L>  |  101  |  9   ----------------------------<  137<H>  4.6   |  22  |  0.30<L>    Ca    8.5      07 Mar 2019 02:40  Phos  2.8     03-07  Mg     1.5     03-07

## 2019-03-07 NOTE — PROGRESS NOTE ADULT - NEUROLOGICAL
detailed exam

## 2019-03-07 NOTE — PROGRESS NOTE ADULT - GASTROINTESTINAL COMMENTS
PEG present
brown liquid drainage from PEG site with
Bilious fluid coming from peg site excoriating skin

## 2019-03-07 NOTE — PROGRESS NOTE ADULT - PROBLEM SELECTOR PROBLEM 3
Fever
Fever
Seizure
Fever
Seizure
Fever
Seizure
Fever
Fever
Seizure
Fever
Fever
Seizure
Fever
Seizure
Fever
Seizure

## 2019-03-07 NOTE — PROGRESS NOTE ADULT - CONSTITUTIONAL DETAILS
no distress
responds no complaints overall, nods when asked questions/no distress
no distress
well-developed
no distress

## 2019-03-07 NOTE — PROGRESS NOTE ADULT - ASSESSMENT
53 M (resident of [facility]) with TBI, s/p PEG tube, contracture, and seizure d/o who presented as a transfer from [facility] on 12/9 for respiratory failure 2/2 ARDS likely 2/2 necrotizing pancreatitis s/p intubation.  Bronc 12/11 with pseudomonas; Sputum cx with  acinetobacter  Sepsis with fever, leukopenia resolved, extubated but reintubated 1/6/19 for hypoxia and poor cough along with profuse secretions and sputum cx again with acinetobacter (restarted Avycaz/Flagyl 1/9)  Necrotising Pancreatitis with multiple peripancreatitis collections better formed on CT 1/24 and a new 7 cm abscess  Last CT with ? superimposed pneumonia on collapsed lung  Repeat CT 3/1 with decreased abd collections and decreased effusions  Antibiotic stopped 3/4--after >7 week course avycaz, flagyl, vanco  Continue off antibiotics, appears unchanged, although tachycardic, no other signs developing sepsis presently  Overall, lung abscess, MDR organisms, necrotizing pancreatitis, intraabdominal collection  - Continue off abx, monitor for any signs recurrent infection  - Frequent suctioning and pulmonary toileting  - F/U GI  - Will sign off. Please call with further questions or change in status.    Neal Oleary MD  Pager [phone]  After 5pm and on weekends call [phone]

## 2019-03-07 NOTE — PROGRESS NOTE ADULT - PROVIDER SPECIALTY LIST ADULT
Critical Care
Critical Care
Gastroenterology
Infectious Disease
Internal Medicine
Internal Medicine
MICU
Pulmonology
Surgery
Gastroenterology
Gastroenterology
Infectious Disease
Infectious Disease
Internal Medicine
MICU
Pulmonology
Surgery
Intervent Radiology
MICU
MICU
Pulmonology
Infectious Disease
Pulmonology

## 2019-03-12 RX ADMIN — Medication 166.67 MILLIGRAM(S): at 22:46

## 2019-05-06 ENCOUNTER — EMERGENCY (EMERGENCY)
Facility: HOSPITAL | Age: 54
LOS: 1 days | Discharge: HOME | End: 2019-05-06
Attending: EMERGENCY MEDICINE
Payer: MEDICAID

## 2019-05-06 VITALS
SYSTOLIC BLOOD PRESSURE: 110 MMHG | TEMPERATURE: 98 F | DIASTOLIC BLOOD PRESSURE: 74 MMHG | OXYGEN SATURATION: 99 % | RESPIRATION RATE: 16 BRPM | HEART RATE: 74 BPM

## 2019-05-06 VITALS
OXYGEN SATURATION: 93 % | SYSTOLIC BLOOD PRESSURE: 115 MMHG | RESPIRATION RATE: 24 BRPM | TEMPERATURE: 98 F | DIASTOLIC BLOOD PRESSURE: 74 MMHG | HEART RATE: 90 BPM

## 2019-05-06 DIAGNOSIS — Z93.1 GASTROSTOMY STATUS: Chronic | ICD-10-CM

## 2019-05-06 LAB
ALBUMIN SERPL ELPH-MCNC: 3.2 G/DL — LOW (ref 3.3–5.2)
ALP SERPL-CCNC: 123 U/L — HIGH (ref 40–120)
ALT FLD-CCNC: 20 U/L — SIGNIFICANT CHANGE UP
ANION GAP SERPL CALC-SCNC: 12 MMOL/L — SIGNIFICANT CHANGE UP (ref 5–17)
AST SERPL-CCNC: 26 U/L — SIGNIFICANT CHANGE UP
BASE EXCESS BLDA CALC-SCNC: 1.7 MMOL/L — SIGNIFICANT CHANGE UP (ref -2–2)
BASOPHILS # BLD AUTO: 0 K/UL — SIGNIFICANT CHANGE UP (ref 0–0.2)
BASOPHILS NFR BLD AUTO: 0.2 % — SIGNIFICANT CHANGE UP (ref 0–2)
BILIRUB SERPL-MCNC: <0.2 MG/DL — LOW (ref 0.4–2)
BLOOD GAS COMMENTS ARTERIAL: SIGNIFICANT CHANGE UP
BUN SERPL-MCNC: 14 MG/DL — SIGNIFICANT CHANGE UP (ref 8–20)
CALCIUM SERPL-MCNC: 9.4 MG/DL — SIGNIFICANT CHANGE UP (ref 8.6–10.2)
CHLORIDE SERPL-SCNC: 99 MMOL/L — SIGNIFICANT CHANGE UP (ref 98–107)
CO2 SERPL-SCNC: 23 MMOL/L — SIGNIFICANT CHANGE UP (ref 22–29)
CREAT SERPL-MCNC: 0.23 MG/DL — LOW (ref 0.5–1.3)
EOSINOPHIL # BLD AUTO: 0.3 K/UL — SIGNIFICANT CHANGE UP (ref 0–0.5)
EOSINOPHIL NFR BLD AUTO: 4.6 % — SIGNIFICANT CHANGE UP (ref 0–5)
GAS PNL BLDA: SIGNIFICANT CHANGE UP
GLUCOSE SERPL-MCNC: 111 MG/DL — SIGNIFICANT CHANGE UP (ref 70–115)
HCO3 BLDA-SCNC: 26 MMOL/L — SIGNIFICANT CHANGE UP (ref 20–26)
HCT VFR BLD CALC: 34.5 % — LOW (ref 42–52)
HGB BLD-MCNC: 10.3 G/DL — LOW (ref 14–18)
HOROWITZ INDEX BLDA+IHG-RTO: 21 — SIGNIFICANT CHANGE UP
LYMPHOCYTES # BLD AUTO: 3.1 K/UL — SIGNIFICANT CHANGE UP (ref 1–4.8)
LYMPHOCYTES # BLD AUTO: 47 % — SIGNIFICANT CHANGE UP (ref 20–55)
MCHC RBC-ENTMCNC: 27.1 PG — SIGNIFICANT CHANGE UP (ref 27–31)
MCHC RBC-ENTMCNC: 29.9 G/DL — LOW (ref 32–36)
MCV RBC AUTO: 90.8 FL — SIGNIFICANT CHANGE UP (ref 80–94)
MONOCYTES # BLD AUTO: 0.5 K/UL — SIGNIFICANT CHANGE UP (ref 0–0.8)
MONOCYTES NFR BLD AUTO: 7.8 % — SIGNIFICANT CHANGE UP (ref 3–10)
NEUTROPHILS # BLD AUTO: 2.6 K/UL — SIGNIFICANT CHANGE UP (ref 1.8–8)
NEUTROPHILS NFR BLD AUTO: 40.2 % — SIGNIFICANT CHANGE UP (ref 37–73)
PCO2 BLDA: 43 MMHG — SIGNIFICANT CHANGE UP (ref 35–45)
PH BLDA: 7.4 — SIGNIFICANT CHANGE UP (ref 7.35–7.45)
PLATELET # BLD AUTO: 464 K/UL — HIGH (ref 150–400)
PO2 BLDA: 68 MMHG — LOW (ref 83–108)
POTASSIUM SERPL-MCNC: 4.6 MMOL/L — SIGNIFICANT CHANGE UP (ref 3.5–5.3)
POTASSIUM SERPL-SCNC: 4.6 MMOL/L — SIGNIFICANT CHANGE UP (ref 3.5–5.3)
PROT SERPL-MCNC: 8.1 G/DL — SIGNIFICANT CHANGE UP (ref 6.6–8.7)
RBC # BLD: 3.8 M/UL — LOW (ref 4.6–6.2)
RBC # FLD: 15.7 % — HIGH (ref 11–15.6)
SAO2 % BLDA: 94 % — LOW (ref 95–99)
SODIUM SERPL-SCNC: 134 MMOL/L — LOW (ref 135–145)
WBC # BLD: 6.5 K/UL — SIGNIFICANT CHANGE UP (ref 4.8–10.8)
WBC # FLD AUTO: 6.5 K/UL — SIGNIFICANT CHANGE UP (ref 4.8–10.8)

## 2019-05-06 PROCEDURE — 93010 ELECTROCARDIOGRAM REPORT: CPT

## 2019-05-06 PROCEDURE — 71045 X-RAY EXAM CHEST 1 VIEW: CPT

## 2019-05-06 PROCEDURE — 96365 THER/PROPH/DIAG IV INF INIT: CPT

## 2019-05-06 PROCEDURE — 99284 EMERGENCY DEPT VISIT MOD MDM: CPT | Mod: 25

## 2019-05-06 PROCEDURE — 82803 BLOOD GASES ANY COMBINATION: CPT

## 2019-05-06 PROCEDURE — 85027 COMPLETE CBC AUTOMATED: CPT

## 2019-05-06 PROCEDURE — 83605 ASSAY OF LACTIC ACID: CPT

## 2019-05-06 PROCEDURE — 80053 COMPREHEN METABOLIC PANEL: CPT

## 2019-05-06 PROCEDURE — 87040 BLOOD CULTURE FOR BACTERIA: CPT

## 2019-05-06 PROCEDURE — 96361 HYDRATE IV INFUSION ADD-ON: CPT

## 2019-05-06 PROCEDURE — 71045 X-RAY EXAM CHEST 1 VIEW: CPT | Mod: 26

## 2019-05-06 PROCEDURE — 36415 COLL VENOUS BLD VENIPUNCTURE: CPT

## 2019-05-06 PROCEDURE — 93005 ELECTROCARDIOGRAM TRACING: CPT

## 2019-05-06 PROCEDURE — 99284 EMERGENCY DEPT VISIT MOD MDM: CPT

## 2019-05-06 RX ORDER — PIPERACILLIN AND TAZOBACTAM 4; .5 G/20ML; G/20ML
3.38 INJECTION, POWDER, LYOPHILIZED, FOR SOLUTION INTRAVENOUS ONCE
Qty: 0 | Refills: 0 | Status: COMPLETED | OUTPATIENT
Start: 2019-05-06 | End: 2019-05-06

## 2019-05-06 RX ORDER — SODIUM CHLORIDE 9 MG/ML
500 INJECTION INTRAMUSCULAR; INTRAVENOUS; SUBCUTANEOUS ONCE
Qty: 0 | Refills: 0 | Status: COMPLETED | OUTPATIENT
Start: 2019-05-06 | End: 2019-05-06

## 2019-05-06 RX ADMIN — PIPERACILLIN AND TAZOBACTAM 200 GRAM(S): 4; .5 INJECTION, POWDER, LYOPHILIZED, FOR SOLUTION INTRAVENOUS at 05:47

## 2019-05-06 RX ADMIN — SODIUM CHLORIDE 500 MILLILITER(S): 9 INJECTION INTRAMUSCULAR; INTRAVENOUS; SUBCUTANEOUS at 06:44

## 2019-05-06 RX ADMIN — SODIUM CHLORIDE 500 MILLILITER(S): 9 INJECTION INTRAMUSCULAR; INTRAVENOUS; SUBCUTANEOUS at 05:30

## 2019-05-06 RX ADMIN — PIPERACILLIN AND TAZOBACTAM 3.38 GRAM(S): 4; .5 INJECTION, POWDER, LYOPHILIZED, FOR SOLUTION INTRAVENOUS at 06:17

## 2019-05-06 NOTE — ED ADULT NURSE NOTE - OBJECTIVE STATEMENT
pt sent from [facility] for increase cough and low oxygen saturations, pt recently had tracheostomy removed and is on nasal cannula around the clock as per facility pt non verbal shakes head yes and no to questions. ctm

## 2019-05-06 NOTE — ED ADULT NURSE NOTE - NSIMPLEMENTINTERV_GEN_ALL_ED
Implemented All Fall with Harm Risk Interventions:  [place] to call system. Call bell, personal items and telephone within reach. Instruct patient to call for assistance. Room bathroom lighting operational. Non-slip footwear when patient is off stretcher. Physically safe environment: no spills, clutter or unnecessary equipment. Stretcher in lowest position, wheels locked, appropriate side rails in place. Provide visual cue, wrist band, yellow gown, etc. Monitor gait and stability. Monitor for mental status changes and reorient to person, place, and time. Review medications for side effects contributing to fall risk. Reinforce activity limits and safety measures with patient and family. Provide visual clues: red socks.

## 2019-05-06 NOTE — ED PROVIDER NOTE - OBJECTIVE STATEMENT
52 y/o male hx MR, with respiratory failure, recently on trach with decannulation last week, gtube, seizure disorder, sent from [facility] for hypoxia to low 70's with episode of tachycardia to 115. Pt on 3L NC by time ems arrived and was satting low 90's. Pt non verbal but follows comands and shakes heads, denies complaints, shakes head "no" to sob, cp, fever or cough. Does shake head yes appropriately to other questions.     limited ros by mental status

## 2019-05-06 NOTE — ED PROVIDER NOTE - PROGRESS NOTE DETAILS
multiple calls to pt's doctor Dr. Jimenes, no answer, spoke to [facility] multiple times, supervisor and vent unit, no other way to contact doctor Dr. Jimenes. pt has been observed for >3 hours, satting >93% on his normal home O2 ([facility] confirmed pt always on 2-3LNC). no tachycardia at any point. pt denies complaints, comfortably breathing. Pt recently decannulated as thought to be able to tolerate secretions. likely with some some secretions causing desat episode, mild consolidation/atelactasis on left, likely secretion related. neg lactate, rectal temp and wbc, no abx. will d/c back to [facility] for further management in their vent unit. [facility] aware.

## 2019-05-06 NOTE — ED PROVIDER NOTE - CLINICAL SUMMARY MEDICAL DECISION MAKING FREE TEXT BOX
possible aspiration pna, likely recurrent aspiration and recently with trach that was decannulated, attempted to call [facility] for information regarding this, no answer. pt comfortable, satting 93% on 3LNC, patchy infiltrates on xr. pt with labwork sent with pt showing sputum culture 5/1 with pseudomonas sensitive to zosyn. labs, ekg, rectal temp, zosyn, likely admission.

## 2019-05-06 NOTE — ED PROVIDER NOTE - PHYSICAL EXAMINATION
Gen: awake, non toxic. follows commands  HEENT: slightly dry mucus membranes. trach site in neck.   CV: RRR, nl s1/s2.  Resp: breathing ~22, rhonchi/coarse sounds, more on left.   GI: Abdomen soft, NT, ND. No rebound, no guarding  Neuro: hypertonic extremities, follows commands.   MSK:atrophic extremties, hypertonic, b/l LE with pillowed cushions.   Skin: perfused.

## 2019-05-06 NOTE — ED ADULT TRIAGE NOTE - CHIEF COMPLAINT QUOTE
pt sent from [facility] for desaturations to low 74-72% with periods of tachycardia pt full code alert and oriented and follows instructions at baseline. pt alert and oriented at this time with no complaints. pt appears in no distress.

## 2019-05-07 PROBLEM — S06.9X9A UNSPECIFIED INTRACRANIAL INJURY WITH LOSS OF CONSCIOUSNESS OF UNSPECIFIED DURATION, INITIAL ENCOUNTER: Chronic | Status: ACTIVE | Noted: 2018-12-10

## 2019-05-07 PROBLEM — R56.9 UNSPECIFIED CONVULSIONS: Chronic | Status: ACTIVE | Noted: 2018-12-10

## 2019-05-07 NOTE — ED POST DISCHARGE NOTE - RESULT SUMMARY
L lower lobe infiltrate on xray. Pt was made aware it is most likely secretions and was to be further managed at [facility].

## 2019-05-07 NOTE — ED POST DISCHARGE NOTE - DETAILS
I called [facility] and they will leave message for nurse taking care of pt to call back regarding results.

## 2019-05-11 LAB
CULTURE RESULTS: SIGNIFICANT CHANGE UP
CULTURE RESULTS: SIGNIFICANT CHANGE UP
SPECIMEN SOURCE: SIGNIFICANT CHANGE UP
SPECIMEN SOURCE: SIGNIFICANT CHANGE UP

## 2019-05-28 ENCOUNTER — EMERGENCY (EMERGENCY)
Facility: HOSPITAL | Age: 54
LOS: 1 days | Discharge: HOME | End: 2019-05-28
Attending: STUDENT IN AN ORGANIZED HEALTH CARE EDUCATION/TRAINING PROGRAM
Payer: MEDICAID

## 2019-05-28 VITALS
RESPIRATION RATE: 17 BRPM | DIASTOLIC BLOOD PRESSURE: 62 MMHG | OXYGEN SATURATION: 94 % | HEART RATE: 78 BPM | SYSTOLIC BLOOD PRESSURE: 115 MMHG | TEMPERATURE: 99 F

## 2019-05-28 VITALS
RESPIRATION RATE: 18 BRPM | SYSTOLIC BLOOD PRESSURE: 96 MMHG | DIASTOLIC BLOOD PRESSURE: 65 MMHG | HEART RATE: 96 BPM | OXYGEN SATURATION: 95 % | TEMPERATURE: 99 F

## 2019-05-28 DIAGNOSIS — Z93.1 GASTROSTOMY STATUS: Chronic | ICD-10-CM

## 2019-05-28 LAB
ALBUMIN SERPL ELPH-MCNC: 3.5 G/DL — SIGNIFICANT CHANGE UP (ref 3.3–5.2)
ALP SERPL-CCNC: 113 U/L — SIGNIFICANT CHANGE UP (ref 40–120)
ALT FLD-CCNC: 26 U/L — SIGNIFICANT CHANGE UP
ANION GAP SERPL CALC-SCNC: 10 MMOL/L — SIGNIFICANT CHANGE UP (ref 5–17)
APTT BLD: 29 SEC — SIGNIFICANT CHANGE UP (ref 27.5–36.3)
AST SERPL-CCNC: 26 U/L — SIGNIFICANT CHANGE UP
BASOPHILS # BLD AUTO: 0 K/UL — SIGNIFICANT CHANGE UP (ref 0–0.2)
BASOPHILS NFR BLD AUTO: 0.2 % — SIGNIFICANT CHANGE UP (ref 0–2)
BILIRUB SERPL-MCNC: 0.3 MG/DL — LOW (ref 0.4–2)
BUN SERPL-MCNC: 13 MG/DL — SIGNIFICANT CHANGE UP (ref 8–20)
CALCIUM SERPL-MCNC: 9.6 MG/DL — SIGNIFICANT CHANGE UP (ref 8.6–10.2)
CHLORIDE SERPL-SCNC: 100 MMOL/L — SIGNIFICANT CHANGE UP (ref 98–107)
CO2 SERPL-SCNC: 26 MMOL/L — SIGNIFICANT CHANGE UP (ref 22–29)
CREAT SERPL-MCNC: 0.28 MG/DL — LOW (ref 0.5–1.3)
EOSINOPHIL # BLD AUTO: 0.2 K/UL — SIGNIFICANT CHANGE UP (ref 0–0.5)
EOSINOPHIL NFR BLD AUTO: 2.7 % — SIGNIFICANT CHANGE UP (ref 0–5)
GLUCOSE SERPL-MCNC: 108 MG/DL — SIGNIFICANT CHANGE UP (ref 70–115)
HCT VFR BLD CALC: 35.2 % — LOW (ref 42–52)
HGB BLD-MCNC: 10.9 G/DL — LOW (ref 14–18)
INR BLD: 1.13 RATIO — SIGNIFICANT CHANGE UP (ref 0.88–1.16)
LACTATE BLDV-MCNC: 1 MMOL/L — SIGNIFICANT CHANGE UP (ref 0.5–2)
LYMPHOCYTES # BLD AUTO: 2.7 K/UL — SIGNIFICANT CHANGE UP (ref 1–4.8)
LYMPHOCYTES # BLD AUTO: 29.5 % — SIGNIFICANT CHANGE UP (ref 20–55)
MCHC RBC-ENTMCNC: 26.8 PG — LOW (ref 27–31)
MCHC RBC-ENTMCNC: 31 G/DL — LOW (ref 32–36)
MCV RBC AUTO: 86.7 FL — SIGNIFICANT CHANGE UP (ref 80–94)
MONOCYTES # BLD AUTO: 0.6 K/UL — SIGNIFICANT CHANGE UP (ref 0–0.8)
MONOCYTES NFR BLD AUTO: 6.8 % — SIGNIFICANT CHANGE UP (ref 3–10)
NEUTROPHILS # BLD AUTO: 5.6 K/UL — SIGNIFICANT CHANGE UP (ref 1.8–8)
NEUTROPHILS NFR BLD AUTO: 60.6 % — SIGNIFICANT CHANGE UP (ref 37–73)
PLATELET # BLD AUTO: 364 K/UL — SIGNIFICANT CHANGE UP (ref 150–400)
POTASSIUM SERPL-MCNC: 4.4 MMOL/L — SIGNIFICANT CHANGE UP (ref 3.5–5.3)
POTASSIUM SERPL-SCNC: 4.4 MMOL/L — SIGNIFICANT CHANGE UP (ref 3.5–5.3)
PROT SERPL-MCNC: 8 G/DL — SIGNIFICANT CHANGE UP (ref 6.6–8.7)
PROTHROM AB SERPL-ACNC: 13.1 SEC — HIGH (ref 10–12.9)
RBC # BLD: 4.06 M/UL — LOW (ref 4.6–6.2)
RBC # FLD: 14.8 % — SIGNIFICANT CHANGE UP (ref 11–15.6)
SODIUM SERPL-SCNC: 136 MMOL/L — SIGNIFICANT CHANGE UP (ref 135–145)
WBC # BLD: 9.2 K/UL — SIGNIFICANT CHANGE UP (ref 4.8–10.8)
WBC # FLD AUTO: 9.2 K/UL — SIGNIFICANT CHANGE UP (ref 4.8–10.8)

## 2019-05-28 PROCEDURE — 93010 ELECTROCARDIOGRAM REPORT: CPT

## 2019-05-28 PROCEDURE — 99284 EMERGENCY DEPT VISIT MOD MDM: CPT

## 2019-05-28 PROCEDURE — 71045 X-RAY EXAM CHEST 1 VIEW: CPT | Mod: 26

## 2019-05-28 NOTE — ED ADULT NURSE NOTE - GASTROINTESTINAL WDL
Abdomen soft, nontender, nondistended, bowel sounds present in all 4 quadrants. Gastric tube noted with redness and clear drainage

## 2019-05-28 NOTE — ED ADULT TRIAGE NOTE - CHIEF COMPLAINT QUOTE
patient anai from [facility] as per ems patient has been having vomiting episodes and altered mental status. patient does not appear to be in any respiratory distress at this time. as per ems it is unknown patients baseline mental status

## 2019-05-28 NOTE — ED ADULT NURSE NOTE - CHIEF COMPLAINT QUOTE
patient anai from [facility] as per ems patient has been having vomiting episodes and altered mental status. patient does not appear to be in any respiratory distress at this time. as per ems it is unknown patients baseline mental status

## 2019-05-28 NOTE — ED ADULT NURSE REASSESSMENT NOTE - NS ED NURSE REASSESS COMMENT FT1
Patient sleeping comfortably and calm on stretcher, PT AxO4, VSS, NSR on cardiac monitor. Pt appears in no distress at this time.  Safety measures taken, bed in low position, call bell within reach, side rails up x2. Plan of care explained. Pt verbalized understanding. Will continue to monitor.
Patient received this morning. Resting comfortably on stretcher. PT A&Ox3, VSS on monitor, pt in NAD at this time. Plan of care and wait time explained. Safety maintained.

## 2019-05-28 NOTE — ED ADULT NURSE NOTE - RESPIRATORY WDL
Breathing spontaneous and unlabored. Breath sounds clear and equal bilaterally with regular rhythm. Currently on 2 liter NC
low salt, low fat, low cholesterol

## 2019-05-28 NOTE — ED PROVIDER NOTE - GASTROINTESTINAL, MLM
G-Tube in place. Abdomen soft, non-tender, bowel sounds present. G-Tube in place, mild excoriation, site well appearing. Abdomen soft, non-tender, non-distended, bowel sounds present.

## 2019-05-28 NOTE — ED ADULT NURSE NOTE - OBJECTIVE STATEMENT
Patient presents to ED BIBA from [facility] according to  EMS patient has been having episodes of  vomiting  and altered mental status. Pt poor historian. PMH  TBI and seizures Unknown patients baseline mental status,  VSS, denies chest pain or sob. On 2 liter NC, Respirations are even and unlabored, lungs cta, +bowel x4 quads, Gastric tube with redness and clear drainage noted around  abdomen.

## 2019-05-28 NOTE — ED PROVIDER NOTE - CLINICAL SUMMARY MEDICAL DECISION MAKING FREE TEXT BOX
Test for acute pneumonia infection, observe and if stable will discuss return back to nursing home for continued management.

## 2019-05-28 NOTE — ED PROVIDER NOTE - PROGRESS NOTE DETAILS
Patient has remained stable during ED evaluation. Spoke with Nurse supervisor at [facility]. Patient was noted to listless and less interactive then normal today. However, he was also noted to not use his supplemental oxygenation. In the ED patient has been stable and interactive. Patient should be stable to for continued management at [facility].

## 2019-05-28 NOTE — ED PROVIDER NOTE - OBJECTIVE STATEMENT
52 y/o M pt with PMHx presents to the ED c/o fatigue.  Denies vomiting.  No further acute complaints at this time.   nursing home     gtube in place 54 y/o M pt with PMHx of TBI and PEG tube presents to the ED from nursing home due to O2Sat dropping to 88% on room air. Associated confusion. Pt refused to use NC. Pt has a diagnosed history of recurrent chronic respiratory failure, and requires oxygen treatment intermittently. Denies vomiting or any complaints. No further acute complaints at this time. 52 y/o M pt with PMHx of TBI and PEG tube presents to the ED from nursing home due to O2Sat dropping to 88% on room air. Pt's paperwork reviewed, pt's O2 sat dropped to 88% on room air, pt was refusing to wear NC, and appeared to be confused.  Pt has a diagnosed history of recurrent on chronic respiratory failure, and requires oxygen supplementation intermittently. Denies vomiting or any complaints. No further acute complaints at this time.

## 2019-05-29 ENCOUNTER — INPATIENT (INPATIENT)
Facility: HOSPITAL | Age: 54
LOS: 3 days | Discharge: NURSING, REHAB OR LONG-TERM CARE | DRG: 178 | End: 2019-06-02
Attending: INTERNAL MEDICINE | Admitting: INTERNAL MEDICINE
Payer: MEDICARE

## 2019-05-29 VITALS
OXYGEN SATURATION: 95 % | HEART RATE: 82 BPM | WEIGHT: 139.99 LBS | DIASTOLIC BLOOD PRESSURE: 70 MMHG | HEIGHT: 65 IN | TEMPERATURE: 99 F | RESPIRATION RATE: 17 BRPM | SYSTOLIC BLOOD PRESSURE: 122 MMHG

## 2019-05-29 DIAGNOSIS — J18.9 PNEUMONIA, UNSPECIFIED ORGANISM: ICD-10-CM

## 2019-05-29 DIAGNOSIS — Z93.1 GASTROSTOMY STATUS: Chronic | ICD-10-CM

## 2019-05-29 LAB
ALBUMIN SERPL ELPH-MCNC: 3.6 G/DL — SIGNIFICANT CHANGE UP (ref 3.3–5.2)
ALP SERPL-CCNC: 116 U/L — SIGNIFICANT CHANGE UP (ref 40–120)
ALT FLD-CCNC: 28 U/L — SIGNIFICANT CHANGE UP
ANION GAP SERPL CALC-SCNC: 11 MMOL/L — SIGNIFICANT CHANGE UP (ref 5–17)
APTT BLD: 26.9 SEC — LOW (ref 27.5–36.3)
AST SERPL-CCNC: 30 U/L — SIGNIFICANT CHANGE UP
BASE EXCESS BLDV CALC-SCNC: 2.7 MMOL/L — HIGH (ref -2–2)
BASOPHILS # BLD AUTO: 0 K/UL — SIGNIFICANT CHANGE UP (ref 0–0.2)
BASOPHILS NFR BLD AUTO: 0.1 % — SIGNIFICANT CHANGE UP (ref 0–2)
BILIRUB SERPL-MCNC: <0.2 MG/DL — LOW (ref 0.4–2)
BUN SERPL-MCNC: 11 MG/DL — SIGNIFICANT CHANGE UP (ref 8–20)
CA-I SERPL-SCNC: 1.18 MMOL/L — SIGNIFICANT CHANGE UP (ref 1.15–1.33)
CALCIUM SERPL-MCNC: 9.4 MG/DL — SIGNIFICANT CHANGE UP (ref 8.6–10.2)
CHLORIDE BLDV-SCNC: 102 MMOL/L — SIGNIFICANT CHANGE UP (ref 98–107)
CHLORIDE SERPL-SCNC: 100 MMOL/L — SIGNIFICANT CHANGE UP (ref 98–107)
CO2 SERPL-SCNC: 24 MMOL/L — SIGNIFICANT CHANGE UP (ref 22–29)
CREAT SERPL-MCNC: 0.23 MG/DL — LOW (ref 0.5–1.3)
EOSINOPHIL # BLD AUTO: 0.3 K/UL — SIGNIFICANT CHANGE UP (ref 0–0.5)
EOSINOPHIL NFR BLD AUTO: 3.5 % — SIGNIFICANT CHANGE UP (ref 0–5)
GAS PNL BLDV: 138 MMOL/L — SIGNIFICANT CHANGE UP (ref 135–145)
GAS PNL BLDV: SIGNIFICANT CHANGE UP
GAS PNL BLDV: SIGNIFICANT CHANGE UP
GLUCOSE BLDV-MCNC: 118 MG/DL — HIGH (ref 70–99)
GLUCOSE SERPL-MCNC: 117 MG/DL — HIGH (ref 70–115)
HCO3 BLDV-SCNC: 27 MMOL/L — SIGNIFICANT CHANGE UP (ref 21–29)
HCT VFR BLD CALC: 34.9 % — LOW (ref 42–52)
HCT VFR BLDA CALC: 34 — LOW (ref 39–50)
HGB BLD CALC-MCNC: 11 G/DL — LOW (ref 13–17)
HGB BLD-MCNC: 10.7 G/DL — LOW (ref 14–18)
INR BLD: 1.08 RATIO — SIGNIFICANT CHANGE UP (ref 0.88–1.16)
LACTATE BLDV-MCNC: 0.9 MMOL/L — SIGNIFICANT CHANGE UP (ref 0.5–2)
LYMPHOCYTES # BLD AUTO: 2.8 K/UL — SIGNIFICANT CHANGE UP (ref 1–4.8)
LYMPHOCYTES # BLD AUTO: 37.2 % — SIGNIFICANT CHANGE UP (ref 20–55)
MCHC RBC-ENTMCNC: 26.8 PG — LOW (ref 27–31)
MCHC RBC-ENTMCNC: 30.7 G/DL — LOW (ref 32–36)
MCV RBC AUTO: 87.5 FL — SIGNIFICANT CHANGE UP (ref 80–94)
MONOCYTES # BLD AUTO: 0.5 K/UL — SIGNIFICANT CHANGE UP (ref 0–0.8)
MONOCYTES NFR BLD AUTO: 6.3 % — SIGNIFICANT CHANGE UP (ref 3–10)
NEUTROPHILS # BLD AUTO: 4 K/UL — SIGNIFICANT CHANGE UP (ref 1.8–8)
NEUTROPHILS NFR BLD AUTO: 52.8 % — SIGNIFICANT CHANGE UP (ref 37–73)
NT-PROBNP SERPL-SCNC: 26 PG/ML — SIGNIFICANT CHANGE UP (ref 0–300)
OTHER CELLS CSF MANUAL: 13 ML/DL — LOW (ref 18–22)
PCO2 BLDV: 51 MMHG — HIGH (ref 35–50)
PH BLDV: 7.36 — SIGNIFICANT CHANGE UP (ref 7.32–7.43)
PLATELET # BLD AUTO: 408 K/UL — HIGH (ref 150–400)
PO2 BLDV: 54 MMHG — HIGH (ref 25–45)
POTASSIUM BLDV-SCNC: 4.2 MMOL/L — SIGNIFICANT CHANGE UP (ref 3.4–4.5)
POTASSIUM SERPL-MCNC: 4.2 MMOL/L — SIGNIFICANT CHANGE UP (ref 3.5–5.3)
POTASSIUM SERPL-SCNC: 4.2 MMOL/L — SIGNIFICANT CHANGE UP (ref 3.5–5.3)
PROT SERPL-MCNC: 8.1 G/DL — SIGNIFICANT CHANGE UP (ref 6.6–8.7)
PROTHROM AB SERPL-ACNC: 12.4 SEC — SIGNIFICANT CHANGE UP (ref 10–12.9)
RBC # BLD: 3.99 M/UL — LOW (ref 4.6–6.2)
RBC # FLD: 14.7 % — SIGNIFICANT CHANGE UP (ref 11–15.6)
SAO2 % BLDV: 86 % — SIGNIFICANT CHANGE UP
SODIUM SERPL-SCNC: 135 MMOL/L — SIGNIFICANT CHANGE UP (ref 135–145)
TROPONIN T SERPL-MCNC: 0.01 NG/ML — SIGNIFICANT CHANGE UP (ref 0–0.06)
TROPONIN T SERPL-MCNC: 0.02 NG/ML — SIGNIFICANT CHANGE UP (ref 0–0.06)
WBC # BLD: 7.6 K/UL — SIGNIFICANT CHANGE UP (ref 4.8–10.8)
WBC # FLD AUTO: 7.6 K/UL — SIGNIFICANT CHANGE UP (ref 4.8–10.8)

## 2019-05-29 PROCEDURE — 99223 1ST HOSP IP/OBS HIGH 75: CPT | Mod: GC

## 2019-05-29 PROCEDURE — 93010 ELECTROCARDIOGRAM REPORT: CPT

## 2019-05-29 PROCEDURE — 99285 EMERGENCY DEPT VISIT HI MDM: CPT

## 2019-05-29 PROCEDURE — 71045 X-RAY EXAM CHEST 1 VIEW: CPT | Mod: 26

## 2019-05-29 PROCEDURE — 71250 CT THORAX DX C-: CPT | Mod: 26

## 2019-05-29 RX ORDER — ALBUTEROL 90 UG/1
2.5 AEROSOL, METERED ORAL EVERY 6 HOURS
Refills: 0 | Status: DISCONTINUED | OUTPATIENT
Start: 2019-05-29 | End: 2019-06-02

## 2019-05-29 RX ORDER — CEFTRIAXONE 500 MG/1
1 INJECTION, POWDER, FOR SOLUTION INTRAMUSCULAR; INTRAVENOUS EVERY 24 HOURS
Refills: 0 | Status: DISCONTINUED | OUTPATIENT
Start: 2019-05-29 | End: 2019-05-30

## 2019-05-29 RX ORDER — CEFTRIAXONE 500 MG/1
1 INJECTION, POWDER, FOR SOLUTION INTRAMUSCULAR; INTRAVENOUS ONCE
Refills: 0 | Status: COMPLETED | OUTPATIENT
Start: 2019-05-29 | End: 2019-05-29

## 2019-05-29 RX ORDER — ENOXAPARIN SODIUM 100 MG/ML
40 INJECTION SUBCUTANEOUS EVERY 24 HOURS
Refills: 0 | Status: DISCONTINUED | OUTPATIENT
Start: 2019-05-29 | End: 2019-06-02

## 2019-05-29 RX ORDER — AZITHROMYCIN 500 MG/1
500 TABLET, FILM COATED ORAL ONCE
Refills: 0 | Status: COMPLETED | OUTPATIENT
Start: 2019-05-29 | End: 2019-05-29

## 2019-05-29 RX ORDER — AZITHROMYCIN 500 MG/1
500 TABLET, FILM COATED ORAL DAILY
Refills: 0 | Status: DISCONTINUED | OUTPATIENT
Start: 2019-05-29 | End: 2019-05-30

## 2019-05-29 RX ADMIN — CEFTRIAXONE 100 GRAM(S): 500 INJECTION, POWDER, FOR SOLUTION INTRAMUSCULAR; INTRAVENOUS at 18:40

## 2019-05-29 RX ADMIN — AZITHROMYCIN 255 MILLIGRAM(S): 500 TABLET, FILM COATED ORAL at 19:55

## 2019-05-29 RX ADMIN — CEFTRIAXONE 1 GRAM(S): 500 INJECTION, POWDER, FOR SOLUTION INTRAMUSCULAR; INTRAVENOUS at 19:55

## 2019-05-29 NOTE — ED ADULT NURSE NOTE - OBJECTIVE STATEMENT
Patient is a 53 year old male c/o chest pain. Pain is a 5/10. Patient states the pain is in the middle of his chest. patient states the pain started today. Patient denies N/V, and SOB. Pain does not radiate down arms or belly. Patient is a 53 year old male c/o chest pain. Pain is a 5/10. Patient states the pain is in the middle of his chest. patient states the pain started today. Patient denies N/V, and SOB. Pain does not radiate down arms or belly. Patient from [facility]. Poor historian. Patient is a 53 year old male c/o chest pain. Pain is a 5/10. Patient states the pain is in the middle of his chest. patient states the pain started today. Patient denies N/V, and SOB. Pain does not radiate down arms or belly. Patient from [facility]. Poor historian. pt has hx of TBI able to answer questions appropriately.

## 2019-05-29 NOTE — ED PROVIDER NOTE - PROGRESS NOTE DETAILS
spoke to dr. bedolla radiology notes new consolidation not present on prior ct in february given recent visit for hypoxia and sat 90% ra will tx for pna admit

## 2019-05-29 NOTE — ED ADULT NURSE NOTE - NSIMPLEMENTINTERV_GEN_ALL_ED
Implemented All Fall with Harm Risk Interventions:  [place] to call system. Call bell, personal items and telephone within reach. Instruct patient to call for assistance. Room bathroom lighting operational. Non-slip footwear when patient is off stretcher. Physically safe environment: no spills, clutter or unnecessary equipment. Stretcher in lowest position, wheels locked, appropriate side rails in place. Provide visual cue, wrist band, yellow gown, etc. Monitor gait and stability. Monitor for mental status changes and reorient to person, place, and time. Review medications for side effects contributing to fall risk. Reinforce activity limits and safety measures with patient and family. Provide visual clues: red socks.

## 2019-05-29 NOTE — ED PROVIDER NOTE - PRINCIPAL DIAGNOSIS
Chest pain, unspecified type Pneumonia due to infectious organism, unspecified laterality, unspecified part of lung

## 2019-05-29 NOTE — ED ADULT NURSE NOTE - CHIEF COMPLAINT QUOTE
pt c/o chest pain according to staff from [facility]. Pt received 324mg of aspiring from [facility].

## 2019-05-29 NOTE — ED PROVIDER NOTE - OBJECTIVE STATEMENT
52yo M hx of TBI, parapalegic, htn acute on chronic resp failure on intermittent o2, pvd, dm gerd, PEG tube pw midsternal cp x1 day. pt is a poor historian able to say yes or no to questions only.  intermittent non radiating pain, Denies f/c/n/v//sob/palpitations/ cough/rash/headache/dizziness/abd.pain/d/c/dysuria/hematuria. lives in [facility] on intermittent 02 was seen yesterday for low sat of 88% on RA attributed to pt refusal to wear o2. was dced after neg work up with labs and cxr and o2 sat improvement. today returns with cp. currently asymptomatic

## 2019-05-29 NOTE — H&P ADULT - ASSESSMENT
52 y/o M PMHx of recurrent aspiration PNA, PEG tube placement, Chronic Respiratory Failure, TBI with resultant pareplegia (28 yrs ago), MR, HTN, DM, GERD, Polio as a child. Admitted due PNA on CT scan.    Pneumonia, c/o by Chronic Respiratory Failure  -Admit to resident service  -Activity: Bed rest  -Vitals per routine  -Fall Precautions, Bed Alarm, seizure precautions  -c/w Rocephin, Azithromycin  -Albuterol PRN   -No lactemia.   -  -NC: 2 lts  -VTE: Lovenox    Diabetes  -Hold diabetic meds during hospitalization  -Hypoglycemic precautions  -Hyperglycemic corrective scale    HTN  -Continue with Lopressor    PEG Tube  -Diet: NPO, PEG tube feedings.     Seizures   -c/w Seroquel   -c/w Lacosamide     Full Code  -Molts in chart 52 y/o M PMHx of recurrent aspiration PNA, PEG tube placement, Chronic Respiratory Failure, TBI with resultant pareplegia (28 yrs ago), MR, HTN, DM, GERD, Polio as a child. Admitted due PNA on CT scan.    Pneumonia, c/o by Chronic Respiratory Failure  -Admit to resident service  -Activity: Bed rest  -Vitals per routine  -Fall Precautions, Bed Alarm, seizure precautions  -c/w Rocephin, Azithromycin  -Albuterol PRN   -No lactemia.   -  -NC: 2 lts  -VTE: Lovenox  -Duoneb q 8hrs    Diabetes  -Hold diabetic meds during hospitalization  -Hypoglycemic precautions  -Hyperglycemic corrective scale    HTN  -Continue with Lopressor    PEG Tube  -Diet: NPO, PEG tube feedings.     Seizures   -c/w Seroquel   -c/w Lacosamide     Full Code  -Molts in chart 54 y/o M PMHx of recurrent aspiration PNA, PEG tube placement, Chronic Respiratory Failure, TBI with resultant pareplegia (28 yrs ago), MR, HTN, DM, GERD, Polio as a child. Admitted due PNA on CT scan.    Pneumonia, c/o by Chronic Respiratory Failure  -Admit to resident service  -Activity: Bed rest  -Vitals per routine  -Fall Precautions, Bed Alarm, seizure precautions  -c/w Rocephin, Azithromycin  -Albuterol PRN   -No lactemia.   -  -NC: 2 lts  -VTE: Lovenox  -Duoneb q 8hrs  -CT chest: With peripheral lung fibrosis and subsegmental atelectasis, Bilateral upper lobe a focal posterior segment and airspace consolidations     HTN  -Continue with Lopressor    PEG Tube  -Diet: NPO, PEG tube feedings.     Seizures   -c/w Seroquel   -c/w Lacosamide     Full Code  -Molts in chart 52 y/o M PMHx of recurrent aspiration PNA, PEG tube placement, Chronic Respiratory Failure, TBI with resultant pareplegia (28 yrs ago), MR, HTN, DM, GERD, Polio as a child. Admitted due PNA on CT scan.    Pneumonia, c/o by Chronic Respiratory Failure  -Admit to resident service  -Activity: Bed rest  -Vitals per routine  -Fall Precautions, Bed Alarm, seizure precautions  -c/w Rocephin, Azithromycin  -Albuterol PRN   -No lactemia.   -  -NC: 2 lts  -VTE: Lovenox  -Duoneb q 8hrs  -CT chest: With peripheral lung fibrosis and subsegmental atelectasis, Bilateral upper lobe a focal posterior segment and airspace consolidations       Diabetes, Type 1 by hx in [facility]  -Hypoglycemic precautions  -Hyperglycemic corrective scale  -No insulin regiment found in [facility] paperwork  -HgbA1c    HTN  -Continue with Lopressor    PEG Tube  -Diet: NPO, PEG tube feedings.     Seizures   -c/w Seroquel   -c/w Lacosamide     Full Code  -Molts in chart 52 y/o M PMHx of recurrent aspiration PNA, PEG tube placement, Chronic Respiratory Failure, TBI with resultant pareplegia (28 yrs ago), MR, HTN, DM, GERD, Polio as a child. Admitted due PNA on CT scan.    Pneumonia, c/o by Chronic Respiratory Failure  -Admit to resident service  -Activity: Bed rest  -Vitals per routine  -Fall Precautions, Bed Alarm, seizure precautions  -c/w Rocephin, Azithromycin  -Albuterol PRN   -No lactemia.   -  -desats yesterday on RA  -NC: 2 lts  -VTE: Lovenox  -Duoneb q 8hrs  -CT chest: With peripheral lung fibrosis and subsegmental atelectasis, Bilateral upper lobe a focal posterior segment and airspace consolidations; not present in previous    Diabetes, Type 1 by hx in [facility]  -Hypoglycemic precautions  -Hyperglycemic corrective scale  -No insulin regiment found in [employer] paperwork  -HgbA1c    HTN  -Continue with Lopressor    PEG Tube  -Diet: NPO, PEG tube feedings.  -PO meds to be done in tube     Seizures   -seizure precautions  -c/w Seroquel   -c/w Lacosamide     Constipation  -c/w colace, miralax    Vertigo  -c/w scopolamine patch to prevent N/V    Insomnia  -c/w melatonin    Full Code  -Molts in chart

## 2019-05-29 NOTE — ED ADULT TRIAGE NOTE - CHIEF COMPLAINT QUOTE
pt c/o chest pain according to staff from [facility]. Pt received 324mg of aspiring from [facility].

## 2019-05-29 NOTE — CONSULT NOTE ADULT - ASSESSMENT
1. 54 yo male with profound neuro impairment (TBI/paraplegia/hx of polio) sent from [facility] with "chest pain". EKG is wnl. Troponins are pending. No ekg evidence of ACS. No PNA on cxr. If the troponin x 2 is wnl he can be returned to NH.

## 2019-05-29 NOTE — H&P ADULT - NSHPREVIEWOFSYSTEMS_GEN_ALL_CORE
Const: Denies fever, chills, malaise, fatigue, night sweats  HEENT: Denies changes in vision, sore throat  Neck: Denies neck pain/stiffness  Resp: Denies coughing, sneezing, SOB  Cardiovascular: Pt has CP. Denies palpitations, LE edema  GI: Denies nausea, vomiting, abdominal pain, diarrhea, constipation, blood in stool  : Denies urinary frequency/urgency/dysuria, hematuria  MSK: Denies back pain  Neuro: Denies HA, dizziness, numbness, focal weaknesses, LOC  Skin: Denies rashes unable to speak, unable to obtain

## 2019-05-29 NOTE — H&P ADULT - NSICDXPASTMEDICALHX_GEN_ALL_CORE_FT
PAST MEDICAL HISTORY:  Diabetes mellitus     H/O chronic respiratory failure     H/O paraplegia     Hypertension     Pneumonia     Polio     Seizure     TBI (traumatic brain injury)

## 2019-05-29 NOTE — ED ADULT NURSE REASSESSMENT NOTE - NS ED NURSE REASSESS COMMENT FT1
Pt received from day staff awake in bed. Laying comfortably on cardiac monitor. ABX finished, zithromax started. Pt denies any pain at this time. Vitals taken. to be transferred to .
Patient in no apparent distress. Denies any pain. Plan of care explained. Awaiting radiology results.

## 2019-05-29 NOTE — H&P ADULT - ATTENDING COMMENTS
Pt was seen and evaluated/examined at bedside with resident at time of the admission, I agree with the above history, physical and plan which I have reviewed and edited where appropriate.

## 2019-05-29 NOTE — ED ADULT NURSE NOTE - GASTROINTESTINAL WDL
Abdomen soft, nontender, nondistended, bowel sounds present in all 4 quadrants. Abdomen soft, nontender, nondistended, bowel sounds present in all 4 quadrants. pt has G-TUBE

## 2019-05-29 NOTE — ED PROVIDER NOTE - CARE PLAN
Principal Discharge DX:	Chest pain, unspecified type Principal Discharge DX:	Pneumonia due to infectious organism, unspecified laterality, unspecified part of lung

## 2019-05-29 NOTE — CONSULT NOTE ADULT - SUBJECTIVE AND OBJECTIVE BOX
Chief Complaint: 52 yo male chronically institutionalized-transferred for "chest pain".    HPI: Hx unobtainable from pt. Brief transfer note from [facility] reviewed and I called his sister for PMH. 52 yo male s/p TBI from [facility] 28 yrs ago. Chronically at NH's. Recently had prolonged hosp at [facility] for sepsis and respiratory failure requing mechanical vent and a tracheostomy (removed 1 month ago). 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 Frequent aspiration PNA's led to a Peg 5 yrs ago. There's no cardiac hx ie MI arrhythmia murmur. No hx of dm hpt and no other signif surgery. No allergic hx. current meds zantac/fe/lasix/lopressor 25 bid/inhalers prn.    PAST MEDICAL & SURGICAL HISTORY:  Seizure  TBI (traumatic brain injury)  S/P percutaneous endoscopic gastrostomy (PEG) tube placement      PREVIOUS DIAGNOSTIC TESTING:      ECHO  FINDINGS:    STRESS  FINDINGS:    CATHETERIZATION  FINDINGS:    MEDICATIONS  (STANDING):    MEDICATIONS  (PRN):      FAMILY HISTORY:      SOCIAL HISTORY: born in [place]    CIGARETTES: 0    ALCOHOL: 0    ROS: Negative other than as mentioned in HPI.    Vital Signs Last 24 Hrs  T(C): 37.1 (29 May 2019 11:04), Max: 37.2 (29 May 2019 09:13)  T(F): 98.8 (29 May 2019 11:04), Max: 99 (29 May 2019 09:13)  HR: 80 reg (29 May 2019 11:04) (80 - 82)  BP: 120/74 (29 May 2019 11:04) (120/74 - 122/70)  BP(mean): --  RR: 14 flat ora (29 May 2019 11:04) (17 - 18)  SpO2: 98% (29 May 2019 11:04) (95% - 98%)    PHYSICAL EXAM:  General: chronically ill appearing ma male with obvious and profound neurologic impairment  HEENT: Head; normocephalic, atraumatic. Tracheostomy scar.  Eyes;   Pupils reactive, cornea wnl.  Neck; Supple, no nodes adenopathy, no NVD or carotid bruit or thyromegaly.  CARDIOVASCULAR; No murmur, rub, gallop or lift. Normal S1 and S2.  LUNGS; good bs bilat  ABDOMEN ; Soft, nontender without mass or organomegaly. bowel sounds normoactive. PEG in situ  EXTREMITIES; right arm and hand plegic and contracted. feet exhibit post polio changes. Unable to significantly move or  lift either leg. Right arm is mobile  SKIN; warm and dry with normal turgor.  NEURO; Largely non-verbal with defects listed above    PSYCH; non-contrib.            INTERPRETATION OF TELEMETRY:    ECG: SR wnl  cxr clear nl cardiac silhouette  I&O's Detail      LABS:                        10.9   9.2   )-----------( 364      ( 28 May 2019 05:14 )             35.2     05-28    136  |  100  |  13.0  ----------------------------<  108  4.4   |  26.0  |  0.28<L>    Ca    9.6      28 May 2019 05:14    TPro  8.0  /  Alb  3.5  /  TBili  0.3<L>  /  DBili  x   /  AST  26  /  ALT  26  /  AlkPhos  113  05-28        PT/INR - ( 29 May 2019 11:02 )   PT: 12.4 sec;   INR: 1.08 ratio         PTT - ( 29 May 2019 11:02 )  PTT:26.9 sec    I&O's Summary      RADIOLOGY & ADDITIONAL STUDIES: Chief Complaint: 54 yo male chronically institutionalized-transferred for "chest pain".    HPI: Hx unobtainable from pt. Brief transfer note from [facility] reviewed and I called his sister for PMH. 54 yo male s/p TBI from [facility] 28 yrs ago. Chronically at NH's. Recently had prolonged hosp at [facility] for sepsis and respiratory failure requing mechanical vent and a tracheostomy (removed 1 month ago). 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 Frequent aspiration PNA's led to a Peg 5 yrs ago. There's no cardiac hx ie MI arrhythmia murmur. No hx of dm hpt and no other signif surgery. No allergic hx. current meds zantac/fe/lasix/lopressor 25 bid/inhalers prn.    PAST MEDICAL & SURGICAL HISTORY:  Seizure  TBI (traumatic brain injury)  S/P percutaneous endoscopic gastrostomy (PEG) tube placement      PREVIOUS DIAGNOSTIC TESTING:      ECHO  FINDINGS:    STRESS  FINDINGS:    CATHETERIZATION  FINDINGS:    MEDICATIONS  (STANDING):    MEDICATIONS  (PRN):      FAMILY HISTORY:      SOCIAL HISTORY: born in [place]    CIGARETTES: 0    ALCOHOL: 0    ROS: Negative other than as mentioned in HPI.    Vital Signs Last 24 Hrs  T(C): 37.1 (29 May 2019 11:04), Max: 37.2 (29 May 2019 09:13)  T(F): 98.8 (29 May 2019 11:04), Max: 99 (29 May 2019 09:13)  HR: 80 reg (29 May 2019 11:04) (80 - 82)  BP: 120/74 (29 May 2019 11:04) (120/74 - 122/70)  BP(mean): --  RR: 14 flat ora (29 May 2019 11:04) (17 - 18)  SpO2: 98% (29 May 2019 11:04) (95% - 98%)    PHYSICAL EXAM:  General: chronically ill appearing ma male with obvious and profound neurologic impairment  HEENT: Head; normocephalic, atraumatic. Tracheostomy scar.  Eyes;   Pupils reactive, cornea wnl.  Neck; Supple, no nodes adenopathy, no NVD or carotid bruit or thyromegaly.  CARDIOVASCULAR; No murmur, rub, gallop or lift. Normal S1 and S2.  LUNGS; good bs bilat  ABDOMEN ; Soft, nontender without mass or organomegaly. bowel sounds normoactive. PEG in situ  EXTREMITIES; right arm and hand plegic and contracted. feet exhibit post polio changes. Unable to significantly move or  lift either leg. Right arm is mobile  SKIN; warm and dry with normal turgor.  NEURO; Largely non-verbal with defects listed above    PSYCH; non-contrib.            INTERPRETATION OF TELEMETRY:    ECG: SR wnl  cxr clear nl cardiac silhouette  I&O's Detail      LABS: troponin pending                        10.9   9.2   )-----------( 364      ( 28 May 2019 05:14 )             35.2     05-28    136  |  100  |  13.0  ----------------------------<  108  4.4   |  26.0  |  0.28<L>    Ca    9.6      28 May 2019 05:14    TPro  8.0  /  Alb  3.5  /  TBili  0.3<L>  /  DBili  x   /  AST  26  /  ALT  26  /  AlkPhos  113  05-28        PT/INR - ( 29 May 2019 11:02 )   PT: 12.4 sec;   INR: 1.08 ratio         PTT - ( 29 May 2019 11:02 )  PTT:26.9 sec    I&O's Summary      RADIOLOGY & ADDITIONAL STUDIES:

## 2019-05-29 NOTE — ED PROVIDER NOTE - CLINICAL SUMMARY MEDICAL DECISION MAKING FREE TEXT BOX
acs vs pna vs msk ekg no st seg elevations but poor r wave progression--will fu labs ct chest to eval for pna; cardiology consult reassess

## 2019-05-29 NOTE — H&P ADULT - NSHPPHYSICALEXAM_GEN_ALL_CORE
Vital Signs Last 24 Hrs  T(C): 37.7 (29 May 2019 19:53), Max: 37.7 (29 May 2019 19:53)  T(F): 99.9 (29 May 2019 19:53), Max: 99.9 (29 May 2019 19:53)  HR: 92 (29 May 2019 19:53) (80 - 92)  BP: 136/74 (29 May 2019 19:53) (115/71 - 136/74)  BP(mean): 106 (29 May 2019 19:53) (106 - 106)  RR: 18 (29 May 2019 19:53) (17 - 20)  SpO2: 95% (29 May 2019 19:53) (90% - 100%)    Const: Unable to Assess orientation, Pt is non verbal. In No Acute Distress.  HEENT: NC/AT, PERRLA, EOMI, clear nares, Moist mucous membranes, normal oropharynx. Poor dentition. No erythema, lesions, secretions.   Neck:. Soft and supple. Full ROM without pain.  Cardiovascular: Regular rate and regular rhythm. +S1/S2. No murmurs. Peripheral pulses 2+ and symmetric. No LE edema.  Respiratory: Poor inspiratory effort. Mild ronchi in middle lung fields. No wheezes, crackles, rales.  Abd: Flat Abdomen, Normal bowel sounds in all 4 quadrants, Soft, non-distended. non-tender. No guarding or rebound. Negative Christopher, McBurney, Rovsing.   Skin: No rashes, discolorations, abrasions, lacerations, lesions, ulcers noted.   MSK: Contracted Left arm and hand, equine foot bilaterally, muscle wasting prominent in all extremities.

## 2019-05-29 NOTE — ED ADULT NURSE NOTE - TEMPLATE
ANTICOAGULATION FOLLOW-UP CLINIC VISIT    Patient Name:  Evangelista Gipson  Date:  2/3/2017  Contact Type:  Telephone    SUBJECTIVE:        OBJECTIVE    INR   Date Value Ref Range Status   02/03/2017 2.50* 0.86 - 1.14 Final     Comment:     This test is intended for monitoring Coumadin therapy.  Results are not   accurate   in patients with prolonged INR due to factor deficiency.       CHROMOGENIC FACTOR 10   Date Value Ref Range Status   02/12/2016 24* 70 - 130 % Final     Comment:     Therapeutic Range:  A Chromogenic Factor 10 level of approximately 20-40%   inversely correlates with an INR of 2-3 for patients receiving Warfarin.   Chromogenic Factor 10 levels below 20% indicate an INR greater than 3 and   levels above 40% indicate an INR less than 2.         ASSESSMENT / PLAN  INR assessment THER    Recheck INR In: 2 WEEKS    INR Location Clinic      Anticoagulation Summary as of 2/3/2017     INR goal 2.0-3.0   Selected INR 2.50 (2/3/2017)   Maintenance plan 1.5 mg (1 mg x 1.5) on Sun, Tue, Thu; 2 mg (1 mg x 2) all other days   Full instructions 1.5 mg on Sun, Tue, Thu; 2 mg all other days   Weekly total 12.5 mg   No change documented Марина Taylor, LALY   Plan last modified Karla Caputo RN (1/17/2017)   Next INR check 2/17/2017   Priority INR   Target end date Indefinite    Indications   LVAD (left ventricular assist device) present (H) [Z95.811]  Long-term (current) use of anticoagulants [Z79.01] [Z79.01]         Anticoagulation Episode Summary     INR check location     Preferred lab     Send INR reminders to [facility]    Comments Spouse Marialuisa  Contact Ph [phone]      Anticoagulation Care Providers     Provider Role Specialty Phone number    Dawit Pastor MD Responsible Cardiology [phone]            See the Encounter Report to view Anticoagulation Flowsheet and Dosing Calendar (Go to Encounters tab in chart review, and find the Anticoagulation Therapy Visit)    Left  message with results and dosing recommendations. Asked patient to call back to report any missed doses, falls, signs and symptoms of bleeding or clotting, or any changes to health or diet.     Марина Taylor RN                  Cardiac

## 2019-05-29 NOTE — H&P ADULT - HISTORY OF PRESENT ILLNESS
54 y/o M PMHx of PNA, Chronic Respiratory Failure...    Admitted due to PNA 52 y/o M PMHx of recurrent aspiration PNA, PEG tube placement, Chronic Respiratory Failure, TBI with resultant pareplegia (28 yrs ago), MR, HTN, DM, GERD, Polio as a child. Pt was seen by ED yesterday in which pt was found to have desaturations down to 88% on RA, discharged due to clear CXR. Today pt presented with Chest pain in [facility]. Pt was brought to [facility] for evaluation. Asymptomatic in ED. CXR was clear but CT chest showed chronic lung fibrosis, subsegmental atelectasis, bilateral upper lobe focal consolidation of 1 cm. Given Zithromax and Rocephin in ED.   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 Pt has been in [facility] since.    Pt is non verbal, most of the hx gathered from [facility] paperwork and from sister.   Admitted due to PNA

## 2019-05-30 LAB
ANION GAP SERPL CALC-SCNC: 12 MMOL/L — SIGNIFICANT CHANGE UP (ref 5–17)
BASOPHILS # BLD AUTO: 0 K/UL — SIGNIFICANT CHANGE UP (ref 0–0.2)
BASOPHILS NFR BLD AUTO: 0.4 % — SIGNIFICANT CHANGE UP (ref 0–2)
BUN SERPL-MCNC: 10 MG/DL — SIGNIFICANT CHANGE UP (ref 8–20)
CALCIUM SERPL-MCNC: 8.8 MG/DL — SIGNIFICANT CHANGE UP (ref 8.6–10.2)
CHLORIDE SERPL-SCNC: 99 MMOL/L — SIGNIFICANT CHANGE UP (ref 98–107)
CO2 SERPL-SCNC: 23 MMOL/L — SIGNIFICANT CHANGE UP (ref 22–29)
CREAT SERPL-MCNC: 0.25 MG/DL — LOW (ref 0.5–1.3)
EOSINOPHIL # BLD AUTO: 0.3 K/UL — SIGNIFICANT CHANGE UP (ref 0–0.5)
EOSINOPHIL NFR BLD AUTO: 3.6 % — SIGNIFICANT CHANGE UP (ref 0–5)
GLUCOSE BLDC GLUCOMTR-MCNC: 105 MG/DL — HIGH (ref 70–99)
GLUCOSE BLDC GLUCOMTR-MCNC: 121 MG/DL — HIGH (ref 70–99)
GLUCOSE BLDC GLUCOMTR-MCNC: 125 MG/DL — HIGH (ref 70–99)
GLUCOSE BLDC GLUCOMTR-MCNC: 126 MG/DL — HIGH (ref 70–99)
GLUCOSE SERPL-MCNC: 103 MG/DL — SIGNIFICANT CHANGE UP (ref 70–115)
HBA1C BLD-MCNC: 4.6 % — SIGNIFICANT CHANGE UP (ref 4–5.6)
HCT VFR BLD CALC: 33.7 % — LOW (ref 42–52)
HCV AB S/CO SERPL IA: 0.64 S/CO — SIGNIFICANT CHANGE UP (ref 0–0.99)
HCV AB SERPL-IMP: SIGNIFICANT CHANGE UP
HGB BLD-MCNC: 10.2 G/DL — LOW (ref 14–18)
LYMPHOCYTES # BLD AUTO: 3.2 K/UL — SIGNIFICANT CHANGE UP (ref 1–4.8)
LYMPHOCYTES # BLD AUTO: 44 % — SIGNIFICANT CHANGE UP (ref 20–55)
MAGNESIUM SERPL-MCNC: 2.1 MG/DL — SIGNIFICANT CHANGE UP (ref 1.6–2.6)
MCHC RBC-ENTMCNC: 26.2 PG — LOW (ref 27–31)
MCHC RBC-ENTMCNC: 30.3 G/DL — LOW (ref 32–36)
MCV RBC AUTO: 86.6 FL — SIGNIFICANT CHANGE UP (ref 80–94)
MONOCYTES # BLD AUTO: 0.5 K/UL — SIGNIFICANT CHANGE UP (ref 0–0.8)
MONOCYTES NFR BLD AUTO: 7.2 % — SIGNIFICANT CHANGE UP (ref 3–10)
NEUTROPHILS # BLD AUTO: 3.2 K/UL — SIGNIFICANT CHANGE UP (ref 1.8–8)
NEUTROPHILS NFR BLD AUTO: 44.7 % — SIGNIFICANT CHANGE UP (ref 37–73)
PHOSPHATE SERPL-MCNC: 3.4 MG/DL — SIGNIFICANT CHANGE UP (ref 2.4–4.7)
PLATELET # BLD AUTO: 358 K/UL — SIGNIFICANT CHANGE UP (ref 150–400)
POTASSIUM SERPL-MCNC: 4.3 MMOL/L — SIGNIFICANT CHANGE UP (ref 3.5–5.3)
POTASSIUM SERPL-SCNC: 4.3 MMOL/L — SIGNIFICANT CHANGE UP (ref 3.5–5.3)
RBC # BLD: 3.89 M/UL — LOW (ref 4.6–6.2)
RBC # FLD: 14.8 % — SIGNIFICANT CHANGE UP (ref 11–15.6)
SODIUM SERPL-SCNC: 134 MMOL/L — LOW (ref 135–145)
WBC # BLD: 7.2 K/UL — SIGNIFICANT CHANGE UP (ref 4.8–10.8)
WBC # FLD AUTO: 7.2 K/UL — SIGNIFICANT CHANGE UP (ref 4.8–10.8)

## 2019-05-30 PROCEDURE — 99233 SBSQ HOSP IP/OBS HIGH 50: CPT | Mod: GC

## 2019-05-30 RX ORDER — FAMOTIDINE 10 MG/ML
20 INJECTION INTRAVENOUS DAILY
Refills: 0 | Status: DISCONTINUED | OUTPATIENT
Start: 2019-05-30 | End: 2019-06-02

## 2019-05-30 RX ORDER — INSULIN LISPRO 100/ML
VIAL (ML) SUBCUTANEOUS
Refills: 0 | Status: DISCONTINUED | OUTPATIENT
Start: 2019-05-30 | End: 2019-06-02

## 2019-05-30 RX ORDER — GLUCAGON INJECTION, SOLUTION 0.5 MG/.1ML
1 INJECTION, SOLUTION SUBCUTANEOUS ONCE
Refills: 0 | Status: DISCONTINUED | OUTPATIENT
Start: 2019-05-30 | End: 2019-06-02

## 2019-05-30 RX ORDER — METOPROLOL TARTRATE 50 MG
25 TABLET ORAL
Refills: 0 | Status: DISCONTINUED | OUTPATIENT
Start: 2019-05-30 | End: 2019-06-02

## 2019-05-30 RX ORDER — DOCUSATE SODIUM 100 MG
100 CAPSULE ORAL
Refills: 0 | Status: DISCONTINUED | OUTPATIENT
Start: 2019-05-30 | End: 2019-06-01

## 2019-05-30 RX ORDER — DEXTROSE 50 % IN WATER 50 %
15 SYRINGE (ML) INTRAVENOUS ONCE
Refills: 0 | Status: DISCONTINUED | OUTPATIENT
Start: 2019-05-30 | End: 2019-06-02

## 2019-05-30 RX ORDER — LANOLIN ALCOHOL/MO/W.PET/CERES
3 CREAM (GRAM) TOPICAL AT BEDTIME
Refills: 0 | Status: DISCONTINUED | OUTPATIENT
Start: 2019-05-30 | End: 2019-06-02

## 2019-05-30 RX ORDER — DEXTROSE 50 % IN WATER 50 %
25 SYRINGE (ML) INTRAVENOUS ONCE
Refills: 0 | Status: DISCONTINUED | OUTPATIENT
Start: 2019-05-30 | End: 2019-06-02

## 2019-05-30 RX ORDER — SCOPALAMINE 1 MG/3D
1 PATCH, EXTENDED RELEASE TRANSDERMAL
Refills: 0 | Status: DISCONTINUED | OUTPATIENT
Start: 2019-05-30 | End: 2019-06-02

## 2019-05-30 RX ORDER — INSULIN LISPRO 100/ML
0 VIAL (ML) SUBCUTANEOUS
Qty: 0 | Refills: 0 | DISCHARGE

## 2019-05-30 RX ORDER — LACOSAMIDE 50 MG/1
100 TABLET ORAL
Refills: 0 | Status: DISCONTINUED | OUTPATIENT
Start: 2019-05-30 | End: 2019-06-02

## 2019-05-30 RX ORDER — IPRATROPIUM/ALBUTEROL SULFATE 18-103MCG
3 AEROSOL WITH ADAPTER (GRAM) INHALATION EVERY 8 HOURS
Refills: 0 | Status: DISCONTINUED | OUTPATIENT
Start: 2019-05-30 | End: 2019-06-02

## 2019-05-30 RX ORDER — QUETIAPINE FUMARATE 200 MG/1
100 TABLET, FILM COATED ORAL AT BEDTIME
Refills: 0 | Status: DISCONTINUED | OUTPATIENT
Start: 2019-05-30 | End: 2019-06-02

## 2019-05-30 RX ORDER — DEXTROSE 50 % IN WATER 50 %
12.5 SYRINGE (ML) INTRAVENOUS ONCE
Refills: 0 | Status: DISCONTINUED | OUTPATIENT
Start: 2019-05-30 | End: 2019-06-02

## 2019-05-30 RX ORDER — FOLIC ACID 0.8 MG
1 TABLET ORAL DAILY
Refills: 0 | Status: DISCONTINUED | OUTPATIENT
Start: 2019-05-30 | End: 2019-06-02

## 2019-05-30 RX ORDER — PIPERACILLIN AND TAZOBACTAM 4; .5 G/20ML; G/20ML
3.38 INJECTION, POWDER, LYOPHILIZED, FOR SOLUTION INTRAVENOUS EVERY 8 HOURS
Refills: 0 | Status: DISCONTINUED | OUTPATIENT
Start: 2019-05-30 | End: 2019-06-02

## 2019-05-30 RX ORDER — MAGNESIUM HYDROXIDE 400 MG/1
15 TABLET, CHEWABLE ORAL AT BEDTIME
Refills: 0 | Status: DISCONTINUED | OUTPATIENT
Start: 2019-05-30 | End: 2019-06-02

## 2019-05-30 RX ORDER — PANTOPRAZOLE SODIUM 20 MG/1
1 TABLET, DELAYED RELEASE ORAL
Qty: 0 | Refills: 0 | DISCHARGE

## 2019-05-30 RX ORDER — SODIUM CHLORIDE 9 MG/ML
1000 INJECTION, SOLUTION INTRAVENOUS
Refills: 0 | Status: DISCONTINUED | OUTPATIENT
Start: 2019-05-30 | End: 2019-06-02

## 2019-05-30 RX ADMIN — Medication 25 MILLIGRAM(S): at 17:45

## 2019-05-30 RX ADMIN — Medication 3 MILLILITER(S): at 09:18

## 2019-05-30 RX ADMIN — SCOPALAMINE 1 PATCH: 1 PATCH, EXTENDED RELEASE TRANSDERMAL at 08:02

## 2019-05-30 RX ADMIN — Medication 3 MILLIGRAM(S): at 21:42

## 2019-05-30 RX ADMIN — Medication 1 MILLIGRAM(S): at 12:11

## 2019-05-30 RX ADMIN — LACOSAMIDE 100 MILLIGRAM(S): 50 TABLET ORAL at 17:45

## 2019-05-30 RX ADMIN — LACOSAMIDE 100 MILLIGRAM(S): 50 TABLET ORAL at 06:46

## 2019-05-30 RX ADMIN — QUETIAPINE FUMARATE 100 MILLIGRAM(S): 200 TABLET, FILM COATED ORAL at 21:42

## 2019-05-30 RX ADMIN — AZITHROMYCIN 255 MILLIGRAM(S): 500 TABLET, FILM COATED ORAL at 12:11

## 2019-05-30 RX ADMIN — Medication 3 MILLILITER(S): at 15:46

## 2019-05-30 RX ADMIN — FAMOTIDINE 20 MILLIGRAM(S): 10 INJECTION INTRAVENOUS at 12:14

## 2019-05-30 RX ADMIN — PIPERACILLIN AND TAZOBACTAM 25 GRAM(S): 4; .5 INJECTION, POWDER, LYOPHILIZED, FOR SOLUTION INTRAVENOUS at 21:40

## 2019-05-30 RX ADMIN — ENOXAPARIN SODIUM 40 MILLIGRAM(S): 100 INJECTION SUBCUTANEOUS at 17:44

## 2019-05-30 RX ADMIN — Medication 100 MILLIGRAM(S): at 17:44

## 2019-05-30 RX ADMIN — SCOPALAMINE 1 PATCH: 1 PATCH, EXTENDED RELEASE TRANSDERMAL at 06:45

## 2019-05-30 RX ADMIN — MAGNESIUM HYDROXIDE 15 MILLILITER(S): 400 TABLET, CHEWABLE ORAL at 21:41

## 2019-05-30 RX ADMIN — Medication 25 MILLIGRAM(S): at 06:45

## 2019-05-30 NOTE — PROGRESS NOTE ADULT - SUBJECTIVE AND OBJECTIVE BOX
Patient is a 53y old  Male who presents with a chief complaint of PNA (29 May 2019 23:56)      INTERVAL HPI/OVERNIGHT EVENTS:  53y  Male admitted due to PNA   No acute events overnight  Pt is tolerating PO, voiding and stooling spontaneously. Ambulates freely. Pain well tolerated under current regiment.    ROS Negative except as above.    Allergies    Valproate Sodium (Other (Severe))  Valproate Sodium (Unknown)    Vital Signs Last 24 Hrs  T(C): 37.2 (30 May 2019 04:43), Max: 37.7 (29 May 2019 19:53)  T(F): 99 (30 May 2019 04:43), Max: 99.9 (29 May 2019 19:53)  HR: 86 (30 May 2019 04:43) (80 - 92)  BP: 120/62 (30 May 2019 04:43) (115/71 - 136/74)  BP(mean): 106 (29 May 2019 19:53) (106 - 106)  RR: 20 (30 May 2019 04:43) (17 - 20)  SpO2: 97% (30 May 2019 04:43) (90% - 100%)      PHYSICAL EXAM:  Const: Unable to Assess orientation, Pt is non verbal. In No Acute Distress.  HEENT: NC/AT, PERRLA, EOMI, clear nares, Moist mucous membranes, normal oropharynx. Poor dentition. No erythema, lesions, secretions.   Neck:. Soft and supple. Full ROM without pain.  Cardiovascular: Regular rate and regular rhythm. +S1/S2. No murmurs. Peripheral pulses 2+ and symmetric. No LE edema.  Respiratory: Poor inspiratory effort. Mild ronchi in middle lung fields. No wheezes, crackles, rales.  Abd: Flat Abdomen, Normal bowel sounds in all 4 quadrants, Soft, non-distended. non-tender. No guarding or rebound. Negative Christopher, McBurney, Rovsing.   Skin: No rashes, discolorations, abrasions, lacerations, lesions, ulcers noted.   MSK: Contracted Left arm and hand, equine foot bilaterally, muscle wasting prominent in all extremities.    LABS:                        10.7   7.6   )-----------( 408      ( 29 May 2019 11:02 )             34.9     CBC Full  -  ( 29 May 2019 11:02 )  WBC Count : 7.6 K/uL  RBC Count : 3.99 M/uL  Hemoglobin : 10.7 g/dL  Hematocrit : 34.9 %  Platelet Count - Automated : 408 K/uL  Mean Cell Volume : 87.5 fl  Mean Cell Hemoglobin : 26.8 pg  Mean Cell Hemoglobin Concentration : 30.7 g/dL  Auto Neutrophil # : 4.0 K/uL  Auto Lymphocyte # : 2.8 K/uL  Auto Monocyte # : 0.5 K/uL  Auto Eosinophil # : 0.3 K/uL  Auto Basophil # : 0.0 K/uL  Auto Neutrophil % : 52.8 %  Auto Lymphocyte % : 37.2 %  Auto Monocyte % : 6.3 %  Auto Eosinophil % : 3.5 %  Auto Basophil % : 0.1 %    05-30    134<L>  |  99  |  10.0  ----------------------------<  103  4.3   |  23.0  |  0.25<L>    Ca    8.8      30 May 2019 06:07  Phos  3.4     05-30  Mg     2.1     05-30    TPro  8.1  /  Alb  3.6  /  TBili  <0.2<L>  /  DBili  x   /  AST  30  /  ALT  28  /  AlkPhos  116  05-29    PT/INR - ( 29 May 2019 11:02 )   PT: 12.4 sec;   INR: 1.08 ratio         PTT - ( 29 May 2019 11:02 )  PTT:26.9 sec    Hemoglobin A1C, Whole Blood: 4.8 % (12-29 @ 09:00)  Hemoglobin A1C, Whole Blood: 4.6 % (12-28 @ 22:00)    Culture Results:   No growth at 48 hours (05-28 @ 05:16)  Culture Results:   No growth at 48 hours (05-28 @ 05:15)    Serum Pro-Brain Natriuretic Peptide: 26 pg/mL (05-29 @ 11:02)      Albumin, Serum: 3.6 g/dL (05-29 @ 11:02)  D-Dimer Assay, Quantitative: 221 ng/mL DDU (05-29 @ 11:02)    LIVER FUNCTIONS - ( 29 May 2019 11:02 )  Alb: 3.6 g/dL / Pro: 8.1 g/dL / ALK PHOS: 116 U/L / ALT: 28 U/L / AST: 30 U/L / GGT: x             RADIOLOGY & ADDITIONAL TESTS:    ALBUTerol    0.083% 2.5 milliGRAM(s) Nebulizer every 6 hours PRN  ALBUTerol/ipratropium for Nebulization 3 milliLiter(s) Nebulizer every 8 hours  azithromycin  IVPB 500 milliGRAM(s) IV Intermittent daily  cefTRIAXone   IVPB 1 Gram(s) IV Intermittent every 24 hours  dextrose 40% Gel 15 Gram(s) Oral once PRN  dextrose 5%. 1000 milliLiter(s) IV Continuous <Continuous>  dextrose 50% Injectable 12.5 Gram(s) IV Push once  dextrose 50% Injectable 25 Gram(s) IV Push once  dextrose 50% Injectable 25 Gram(s) IV Push once  docusate sodium 100 milliGRAM(s) Oral two times a day  enoxaparin Injectable 40 milliGRAM(s) SubCutaneous every 24 hours  famotidine    Tablet 20 milliGRAM(s) Oral daily  folic acid 1 milliGRAM(s) Oral daily  glucagon  Injectable 1 milliGRAM(s) IntraMuscular once PRN  insulin lispro (HumaLOG) corrective regimen sliding scale   SubCutaneous three times a day before meals  lacosamide Solution 100 milliGRAM(s) Oral two times a day  magnesium hydroxide Suspension 15 milliLiter(s) Oral at bedtime  melatonin 3 milliGRAM(s) Oral at bedtime  metoprolol tartrate 25 milliGRAM(s) Oral two times a day  QUEtiapine 100 milliGRAM(s) Oral at bedtime  scopolamine   Patch 1 Patch Transdermal every 72 hours Patient is a 53y old  Male who presents with a chief complaint of PNA (29 May 2019 23:56)      INTERVAL HPI/OVERNIGHT EVENTS:  53y  Male admitted due to PNA   No acute events overnight  Pt is tolerating PO through PEG tube. Pain well tolerated under current regiment.    Allergies    Valproate Sodium (Other (Severe))  Valproate Sodium (Unknown)    Vital Signs Last 24 Hrs  T(C): 37.2 (30 May 2019 04:43), Max: 37.7 (29 May 2019 19:53)  T(F): 99 (30 May 2019 04:43), Max: 99.9 (29 May 2019 19:53)  HR: 86 (30 May 2019 04:43) (80 - 92)  BP: 120/62 (30 May 2019 04:43) (115/71 - 136/74)  BP(mean): 106 (29 May 2019 19:53) (106 - 106)  RR: 20 (30 May 2019 04:43) (17 - 20)  SpO2: 97% (30 May 2019 04:43) (90% - 100%)      PHYSICAL EXAM:  Const: Unable to Assess orientation, Pt is non verbal. In No Acute Distress.  HEENT: NC/AT, PERRLA, EOMI, clear nares, Moist mucous membranes, normal oropharynx. Poor dentition. No erythema, lesions, secretions.   Neck:. Soft and supple. Full ROM without pain.  Cardiovascular: Regular rate and regular rhythm. +S1/S2. No murmurs. Peripheral pulses 2+ and symmetric. No LE edema.  Respiratory: Poor inspiratory effort. Mild ronchi in middle lung fields. No wheezes, crackles, rales.  Abd: Flat Abdomen, Normal bowel sounds in all 4 quadrants, Soft, non-distended. non-tender. No guarding or rebound. Negative Christopher, McBurney, Rovsing.   Skin: No rashes, discolorations, abrasions, lacerations, lesions, ulcers noted.   MSK: Contracted Left arm and hand, equine foot bilaterally, muscle wasting prominent in all extremities.    LABS:                        10.7   7.6   )-----------( 408      ( 29 May 2019 11:02 )             34.9     CBC Full  -  ( 29 May 2019 11:02 )  WBC Count : 7.6 K/uL  RBC Count : 3.99 M/uL  Hemoglobin : 10.7 g/dL  Hematocrit : 34.9 %  Platelet Count - Automated : 408 K/uL  Mean Cell Volume : 87.5 fl  Mean Cell Hemoglobin : 26.8 pg  Mean Cell Hemoglobin Concentration : 30.7 g/dL  Auto Neutrophil # : 4.0 K/uL  Auto Lymphocyte # : 2.8 K/uL  Auto Monocyte # : 0.5 K/uL  Auto Eosinophil # : 0.3 K/uL  Auto Basophil # : 0.0 K/uL  Auto Neutrophil % : 52.8 %  Auto Lymphocyte % : 37.2 %  Auto Monocyte % : 6.3 %  Auto Eosinophil % : 3.5 %  Auto Basophil % : 0.1 %    05-30    134<L>  |  99  |  10.0  ----------------------------<  103  4.3   |  23.0  |  0.25<L>    Ca    8.8      30 May 2019 06:07  Phos  3.4     05-30  Mg     2.1     05-30    TPro  8.1  /  Alb  3.6  /  TBili  <0.2<L>  /  DBili  x   /  AST  30  /  ALT  28  /  AlkPhos  116  05-29    PT/INR - ( 29 May 2019 11:02 )   PT: 12.4 sec;   INR: 1.08 ratio         PTT - ( 29 May 2019 11:02 )  PTT:26.9 sec    Hemoglobin A1C, Whole Blood: 4.8 % (12-29 @ 09:00)  Hemoglobin A1C, Whole Blood: 4.6 % (12-28 @ 22:00)    Culture Results:   No growth at 48 hours (05-28 @ 05:16)  Culture Results:   No growth at 48 hours (05-28 @ 05:15)    Serum Pro-Brain Natriuretic Peptide: 26 pg/mL (05-29 @ 11:02)      Albumin, Serum: 3.6 g/dL (05-29 @ 11:02)  D-Dimer Assay, Quantitative: 221 ng/mL DDU (05-29 @ 11:02)    LIVER FUNCTIONS - ( 29 May 2019 11:02 )  Alb: 3.6 g/dL / Pro: 8.1 g/dL / ALK PHOS: 116 U/L / ALT: 28 U/L / AST: 30 U/L / GGT: x             RADIOLOGY & ADDITIONAL TESTS:    ALBUTerol    0.083% 2.5 milliGRAM(s) Nebulizer every 6 hours PRN  ALBUTerol/ipratropium for Nebulization 3 milliLiter(s) Nebulizer every 8 hours  azithromycin  IVPB 500 milliGRAM(s) IV Intermittent daily  cefTRIAXone   IVPB 1 Gram(s) IV Intermittent every 24 hours  dextrose 40% Gel 15 Gram(s) Oral once PRN  dextrose 5%. 1000 milliLiter(s) IV Continuous <Continuous>  dextrose 50% Injectable 12.5 Gram(s) IV Push once  dextrose 50% Injectable 25 Gram(s) IV Push once  dextrose 50% Injectable 25 Gram(s) IV Push once  docusate sodium 100 milliGRAM(s) Oral two times a day  enoxaparin Injectable 40 milliGRAM(s) SubCutaneous every 24 hours  famotidine    Tablet 20 milliGRAM(s) Oral daily  folic acid 1 milliGRAM(s) Oral daily  glucagon  Injectable 1 milliGRAM(s) IntraMuscular once PRN  insulin lispro (HumaLOG) corrective regimen sliding scale   SubCutaneous three times a day before meals  lacosamide Solution 100 milliGRAM(s) Oral two times a day  magnesium hydroxide Suspension 15 milliLiter(s) Oral at bedtime  melatonin 3 milliGRAM(s) Oral at bedtime  metoprolol tartrate 25 milliGRAM(s) Oral two times a day  QUEtiapine 100 milliGRAM(s) Oral at bedtime  scopolamine   Patch 1 Patch Transdermal every 72 hours Patient is a 53y old  Male who presents with a chief complaint of PNA (29 May 2019 23:56)      INTERVAL HPI/OVERNIGHT EVENTS:  53y  Male admitted due to PNA  . No acute events overnight. no fever or chills noted.   Pt is tolerating PO through PEG tube. Pain well tolerated under current regiment.    Allergies    Valproate Sodium (Other (Severe))  Valproate Sodium (Unknown)    Vital Signs Last 24 Hrs  T(C): 37.2 (30 May 2019 04:43), Max: 37.7 (29 May 2019 19:53)  T(F): 99 (30 May 2019 04:43), Max: 99.9 (29 May 2019 19:53)  HR: 86 (30 May 2019 04:43) (80 - 92)  BP: 120/62 (30 May 2019 04:43) (115/71 - 136/74)  BP(mean): 106 (29 May 2019 19:53) (106 - 106)  RR: 20 (30 May 2019 04:43) (17 - 20)  SpO2: 97% (30 May 2019 04:43) (90% - 100%)      PHYSICAL EXAM:  Const: Unable to Assess orientation, Pt is non verbal. In No Acute Distress.  HEENT: NC/AT, PERRLA, EOMI, clear nares, Moist mucous membranes, normal oropharynx. Poor dentition. No erythema, lesions, secretions.   Neck:. Soft and supple. Full ROM without pain.  Cardiovascular: Regular rate and regular rhythm. +S1/S2. No murmurs. Peripheral pulses 2+ and symmetric. No LE edema.  Respiratory: Poor inspiratory effort. Mild ronchi in middle lung fields. No wheezes, crackles, rales.  Abd: Flat Abdomen, Normal bowel sounds in all 4 quadrants, Soft, non-distended. non-tender. No guarding or rebound. Negative Christopher, McBurney, Rovsing.   Skin: No rashes, discolorations, abrasions, lacerations, lesions, ulcers noted.   MSK: Contracted Left arm and hand, equine foot bilaterally, muscle wasting prominent in all extremities.    LABS:                        10.7   7.6   )-----------( 408      ( 29 May 2019 11:02 )             34.9     CBC Full  -  ( 29 May 2019 11:02 )  WBC Count : 7.6 K/uL  RBC Count : 3.99 M/uL  Hemoglobin : 10.7 g/dL  Hematocrit : 34.9 %  Platelet Count - Automated : 408 K/uL  Mean Cell Volume : 87.5 fl  Mean Cell Hemoglobin : 26.8 pg  Mean Cell Hemoglobin Concentration : 30.7 g/dL  Auto Neutrophil # : 4.0 K/uL  Auto Lymphocyte # : 2.8 K/uL  Auto Monocyte # : 0.5 K/uL  Auto Eosinophil # : 0.3 K/uL  Auto Basophil # : 0.0 K/uL  Auto Neutrophil % : 52.8 %  Auto Lymphocyte % : 37.2 %  Auto Monocyte % : 6.3 %  Auto Eosinophil % : 3.5 %  Auto Basophil % : 0.1 %    05-30    134<L>  |  99  |  10.0  ----------------------------<  103  4.3   |  23.0  |  0.25<L>    Ca    8.8      30 May 2019 06:07  Phos  3.4     05-30  Mg     2.1     05-30    TPro  8.1  /  Alb  3.6  /  TBili  <0.2<L>  /  DBili  x   /  AST  30  /  ALT  28  /  AlkPhos  116  05-29    PT/INR - ( 29 May 2019 11:02 )   PT: 12.4 sec;   INR: 1.08 ratio         PTT - ( 29 May 2019 11:02 )  PTT:26.9 sec    Hemoglobin A1C, Whole Blood: 4.8 % (12-29 @ 09:00)  Hemoglobin A1C, Whole Blood: 4.6 % (12-28 @ 22:00)    Culture Results:   No growth at 48 hours (05-28 @ 05:16)  Culture Results:   No growth at 48 hours (05-28 @ 05:15)    Serum Pro-Brain Natriuretic Peptide: 26 pg/mL (05-29 @ 11:02)      Albumin, Serum: 3.6 g/dL (05-29 @ 11:02)  D-Dimer Assay, Quantitative: 221 ng/mL DDU (05-29 @ 11:02)    LIVER FUNCTIONS - ( 29 May 2019 11:02 )  Alb: 3.6 g/dL / Pro: 8.1 g/dL / ALK PHOS: 116 U/L / ALT: 28 U/L / AST: 30 U/L / GGT: x             RADIOLOGY & ADDITIONAL TESTS:    ALBUTerol    0.083% 2.5 milliGRAM(s) Nebulizer every 6 hours PRN  ALBUTerol/ipratropium for Nebulization 3 milliLiter(s) Nebulizer every 8 hours  azithromycin  IVPB 500 milliGRAM(s) IV Intermittent daily  cefTRIAXone   IVPB 1 Gram(s) IV Intermittent every 24 hours  dextrose 40% Gel 15 Gram(s) Oral once PRN  dextrose 5%. 1000 milliLiter(s) IV Continuous <Continuous>  dextrose 50% Injectable 12.5 Gram(s) IV Push once  dextrose 50% Injectable 25 Gram(s) IV Push once  dextrose 50% Injectable 25 Gram(s) IV Push once  docusate sodium 100 milliGRAM(s) Oral two times a day  enoxaparin Injectable 40 milliGRAM(s) SubCutaneous every 24 hours  famotidine    Tablet 20 milliGRAM(s) Oral daily  folic acid 1 milliGRAM(s) Oral daily  glucagon  Injectable 1 milliGRAM(s) IntraMuscular once PRN  insulin lispro (HumaLOG) corrective regimen sliding scale   SubCutaneous three times a day before meals  lacosamide Solution 100 milliGRAM(s) Oral two times a day  magnesium hydroxide Suspension 15 milliLiter(s) Oral at bedtime  melatonin 3 milliGRAM(s) Oral at bedtime  metoprolol tartrate 25 milliGRAM(s) Oral two times a day  QUEtiapine 100 milliGRAM(s) Oral at bedtime  scopolamine   Patch 1 Patch Transdermal every 72 hours

## 2019-05-30 NOTE — PROGRESS NOTE ADULT - ASSESSMENT
52 y/o M PMHx of recurrent aspiration PNA, PEG tube placement, Chronic Respiratory Failure, TBI with resultant pareplegia (28 yrs ago), MR, HTN, DM, GERD, Polio as a child. Admitted due PNA on CT scan.    Pneumonia, c/o by Chronic Respiratory Failure  -Vitals per routine  -Fall Precautions, Bed Alarm, seizure precautions  -c/w Rocephin, Azithromycin  -Albuterol PRN   -No lactemia.   -  -desats yesterday on RA  -NC: 2 lts  -VTE: Lovenox  -Duoneb q 8hrs  -CT chest: With peripheral lung fibrosis and subsegmental atelectasis, Bilateral upper lobe a focal posterior segment and airspace consolidations; not present in previous    Diabetes, Type 1 by hx in [facility]  -Hypoglycemic precautions  -Hyperglycemic corrective scale  -No insulin regiment found in [facility] paperwork  -f/u HgbA1c    HTN  -Continue with Lopressor    PEG Tube  -Diet: NPO, PEG tube feedings.  -PO meds to be done in tube     Seizures   -seizure precautions  -c/w Seroquel   -c/w Lacosamide     Constipation  -c/w colace, miralax    Vertigo  -c/w scopolamine patch to prevent N/V    Insomnia  -c/w melatonin    Full Code  -Molts in chart 52 y/o M PMHx of recurrent aspiration PNA, PEG tube placement, Chronic Respiratory Failure, TBI with resultant pareplegia (28 yrs ago), MR, HTN, DM, GERD, Polio as a child. Admitted due PNA on CT scan.    Pneumonia, c/o by Chronic Respiratory Failure  -Vitals per routine  -Fall Precautions, Bed Alarm, seizure precautions  -c/w Rocephin, Azithromycin  -duoneb q 8 hrs, Albuterol PRN   -No lactemia.   -  -desats yesterday on RA  -NC: 2 lts  -VTE: Lovenox  -Duoneb q 8hrs  -CT chest: With peripheral lung fibrosis and subsegmental atelectasis, Bilateral upper lobe a focal posterior segment and airspace consolidations; not present in previous    Diabetes, Type 1 by hx in [facility]  -Hypoglycemic precautions  -Hyperglycemic corrective scale  -No insulin regiment found in [facility] paperwork  -f/u HgbA1c    HTN  -Continue with Lopressor    PEG Tube  -Diet: NPO, PEG tube feedings.  -PO meds to be done in tube     Seizures   -seizure precautions  -c/w Seroquel   -c/w Lacosamide     Constipation  -c/w colace, miralax    Vertigo  -c/w scopolamine patch to prevent N/V    Insomnia  -c/w melatonin    Full Code  -Molts in chart 54 y/o M PMHx of recurrent aspiration PNA, PEG tube placement, Chronic Respiratory Failure, TBI with resultant pareplegia (28 yrs ago), MR, HTN, DM, GERD, Polio as a child. Admitted due PNA on CT scan.    b/l Pneumonia, c/o by Chronic Respiratory Failure  -Vitals per routine  -Fall Precautions, Bed Alarm, seizure precautions  received Rocephin, Azithromycin / will switch to zosyn   -duoneb q 8 hrs, Albuterol PRN   -No lactemia.   -  -desats yesterday on RA  -NC: 2 lts  -VTE: Lovenox  -Duoneb q 8hrs  -CT chest: With peripheral lung fibrosis and subsegmental atelectasis, Bilateral upper lobe a focal posterior segment and airspace consolidations; not present in previous    Diabetes, Type 1 by hx in [facility]  -Hypoglycemic precautions  -Hyperglycemic corrective scale  -No insulin regiment found in [facility] paperwork  -f/u HgbA1c    HTN  -Continue with Lopressor    PEG Tube  -Diet: NPO, PEG tube feedings.  -PO meds to be done in tube     Seizures / stable   -seizure precautions  -c/w Seroquel   -c/w Lacosamide     Constipation  -c/w colace, miralax    chronic Vertigo  -c/w scopolamine patch to prevent N/V    Insomnia  -c/w melatonin    Full Code  -Molts in chart

## 2019-05-31 LAB
ANION GAP SERPL CALC-SCNC: 12 MMOL/L — SIGNIFICANT CHANGE UP (ref 5–17)
BUN SERPL-MCNC: 7 MG/DL — LOW (ref 8–20)
CALCIUM SERPL-MCNC: 9 MG/DL — SIGNIFICANT CHANGE UP (ref 8.6–10.2)
CHLORIDE SERPL-SCNC: 101 MMOL/L — SIGNIFICANT CHANGE UP (ref 98–107)
CO2 SERPL-SCNC: 22 MMOL/L — SIGNIFICANT CHANGE UP (ref 22–29)
CREAT SERPL-MCNC: 0.29 MG/DL — LOW (ref 0.5–1.3)
GLUCOSE BLDC GLUCOMTR-MCNC: 112 MG/DL — HIGH (ref 70–99)
GLUCOSE BLDC GLUCOMTR-MCNC: 113 MG/DL — HIGH (ref 70–99)
GLUCOSE BLDC GLUCOMTR-MCNC: 123 MG/DL — HIGH (ref 70–99)
GLUCOSE BLDC GLUCOMTR-MCNC: 136 MG/DL — HIGH (ref 70–99)
GLUCOSE BLDC GLUCOMTR-MCNC: 156 MG/DL — HIGH (ref 70–99)
GLUCOSE SERPL-MCNC: 131 MG/DL — HIGH (ref 70–115)
GRAM STN FLD: SIGNIFICANT CHANGE UP
HBA1C BLD-MCNC: 4.7 % — SIGNIFICANT CHANGE UP (ref 4–5.6)
HCT VFR BLD CALC: 34.5 % — LOW (ref 42–52)
HGB BLD-MCNC: 10.7 G/DL — LOW (ref 14–18)
MAGNESIUM SERPL-MCNC: 2.1 MG/DL — SIGNIFICANT CHANGE UP (ref 1.8–2.6)
MCHC RBC-ENTMCNC: 26.5 PG — LOW (ref 27–31)
MCHC RBC-ENTMCNC: 31 G/DL — LOW (ref 32–36)
MCV RBC AUTO: 85.4 FL — SIGNIFICANT CHANGE UP (ref 80–94)
MRSA PCR RESULT.: DETECTED
PHOSPHATE SERPL-MCNC: 3.9 MG/DL — SIGNIFICANT CHANGE UP (ref 2.4–4.7)
PLATELET # BLD AUTO: 406 K/UL — HIGH (ref 150–400)
POTASSIUM SERPL-MCNC: 4 MMOL/L — SIGNIFICANT CHANGE UP (ref 3.5–5.3)
POTASSIUM SERPL-SCNC: 4 MMOL/L — SIGNIFICANT CHANGE UP (ref 3.5–5.3)
RBC # BLD: 4.04 M/UL — LOW (ref 4.6–6.2)
RBC # FLD: 14.5 % — SIGNIFICANT CHANGE UP (ref 11–15.6)
S AUREUS DNA NOSE QL NAA+PROBE: DETECTED
SODIUM SERPL-SCNC: 135 MMOL/L — SIGNIFICANT CHANGE UP (ref 135–145)
SPECIMEN SOURCE: SIGNIFICANT CHANGE UP
WBC # BLD: 7.1 K/UL — SIGNIFICANT CHANGE UP (ref 4.8–10.8)
WBC # FLD AUTO: 7.1 K/UL — SIGNIFICANT CHANGE UP (ref 4.8–10.8)

## 2019-05-31 PROCEDURE — 99233 SBSQ HOSP IP/OBS HIGH 50: CPT | Mod: GC

## 2019-05-31 RX ORDER — VANCOMYCIN HCL 1 G
1000 VIAL (EA) INTRAVENOUS EVERY 12 HOURS
Refills: 0 | Status: DISCONTINUED | OUTPATIENT
Start: 2019-05-31 | End: 2019-05-31

## 2019-05-31 RX ORDER — VANCOMYCIN HCL 1 G
1000 VIAL (EA) INTRAVENOUS EVERY 8 HOURS
Refills: 0 | Status: DISCONTINUED | OUTPATIENT
Start: 2019-05-31 | End: 2019-05-31

## 2019-05-31 RX ORDER — VANCOMYCIN HCL 1 G
1000 VIAL (EA) INTRAVENOUS EVERY 12 HOURS
Refills: 0 | Status: DISCONTINUED | OUTPATIENT
Start: 2019-05-31 | End: 2019-06-01

## 2019-05-31 RX ORDER — CHLORHEXIDINE GLUCONATE 213 G/1000ML
1 SOLUTION TOPICAL
Refills: 0 | Status: DISCONTINUED | OUTPATIENT
Start: 2019-05-31 | End: 2019-06-02

## 2019-05-31 RX ORDER — MUPIROCIN 20 MG/G
1 OINTMENT TOPICAL
Refills: 0 | Status: DISCONTINUED | OUTPATIENT
Start: 2019-05-31 | End: 2019-06-02

## 2019-05-31 RX ORDER — HALOPERIDOL DECANOATE 100 MG/ML
0.25 INJECTION INTRAMUSCULAR
Refills: 0 | Status: DISCONTINUED | OUTPATIENT
Start: 2019-05-31 | End: 2019-06-02

## 2019-05-31 RX ADMIN — Medication 25 MILLIGRAM(S): at 06:07

## 2019-05-31 RX ADMIN — FAMOTIDINE 20 MILLIGRAM(S): 10 INJECTION INTRAVENOUS at 12:43

## 2019-05-31 RX ADMIN — PIPERACILLIN AND TAZOBACTAM 25 GRAM(S): 4; .5 INJECTION, POWDER, LYOPHILIZED, FOR SOLUTION INTRAVENOUS at 22:21

## 2019-05-31 RX ADMIN — MAGNESIUM HYDROXIDE 15 MILLILITER(S): 400 TABLET, CHEWABLE ORAL at 22:20

## 2019-05-31 RX ADMIN — QUETIAPINE FUMARATE 100 MILLIGRAM(S): 200 TABLET, FILM COATED ORAL at 22:21

## 2019-05-31 RX ADMIN — PIPERACILLIN AND TAZOBACTAM 25 GRAM(S): 4; .5 INJECTION, POWDER, LYOPHILIZED, FOR SOLUTION INTRAVENOUS at 13:01

## 2019-05-31 RX ADMIN — Medication 250 MILLIGRAM(S): at 06:07

## 2019-05-31 RX ADMIN — HALOPERIDOL DECANOATE 0.25 MILLIGRAM(S): 100 INJECTION INTRAMUSCULAR at 18:03

## 2019-05-31 RX ADMIN — CHLORHEXIDINE GLUCONATE 1 APPLICATION(S): 213 SOLUTION TOPICAL at 12:42

## 2019-05-31 RX ADMIN — Medication 3 MILLILITER(S): at 10:19

## 2019-05-31 RX ADMIN — Medication 3 MILLILITER(S): at 14:15

## 2019-05-31 RX ADMIN — Medication 3 MILLIGRAM(S): at 22:20

## 2019-05-31 RX ADMIN — SCOPALAMINE 1 PATCH: 1 PATCH, EXTENDED RELEASE TRANSDERMAL at 07:28

## 2019-05-31 RX ADMIN — Medication 25 MILLIGRAM(S): at 18:04

## 2019-05-31 RX ADMIN — Medication 100 MILLIGRAM(S): at 18:04

## 2019-05-31 RX ADMIN — ENOXAPARIN SODIUM 40 MILLIGRAM(S): 100 INJECTION SUBCUTANEOUS at 18:05

## 2019-05-31 RX ADMIN — PIPERACILLIN AND TAZOBACTAM 25 GRAM(S): 4; .5 INJECTION, POWDER, LYOPHILIZED, FOR SOLUTION INTRAVENOUS at 06:07

## 2019-05-31 RX ADMIN — Medication 250 MILLIGRAM(S): at 18:03

## 2019-05-31 RX ADMIN — LACOSAMIDE 100 MILLIGRAM(S): 50 TABLET ORAL at 18:09

## 2019-05-31 RX ADMIN — MUPIROCIN 1 APPLICATION(S): 20 OINTMENT TOPICAL at 18:04

## 2019-05-31 RX ADMIN — Medication 3 MILLILITER(S): at 00:59

## 2019-05-31 RX ADMIN — Medication 1: at 12:42

## 2019-05-31 RX ADMIN — SCOPALAMINE 1 PATCH: 1 PATCH, EXTENDED RELEASE TRANSDERMAL at 19:00

## 2019-05-31 RX ADMIN — Medication 1 MILLIGRAM(S): at 12:43

## 2019-05-31 RX ADMIN — LACOSAMIDE 100 MILLIGRAM(S): 50 TABLET ORAL at 06:24

## 2019-05-31 NOTE — CHART NOTE - NSCHARTNOTEFT_GEN_A_CORE
Called by nursing due to PEG Tube site being red and having maldorous greyish material in gauze.  Area appears red compared to yesterday with mild yellow wish material when expressed, mild tender to touch.  2 skin swabs obtained, will send gram stain, culture.  Avoid PEG tube feedings, meds ok     Case discussed with SROC  -Sergey Moore MD;  PGY-1

## 2019-05-31 NOTE — PROGRESS NOTE ADULT - SUBJECTIVE AND OBJECTIVE BOX
Patient is a 53y old  Male who presents with a chief complaint of PNA (29 May 2019 23:56)      INTERVAL HPI/OVERNIGHT EVENTS:  Pt seen and examined at bedside. Overnight team states peg site had a malodorous smell with greenish discharge. Cultures sent and peg feedings held until this am. Currently without discharge and surrounding dryness and redness. Nare swab + for MRSA colonization. Pt otherwise without complaints.   ROS: Otherwise negative.     Allergies  Valproate Sodium (Other (Severe))    Vital Signs Last 24 Hrs  T(C): 36.7 (31 May 2019 07:53), Max: 37.3 (30 May 2019 20:37)  T(F): 98 (31 May 2019 07:53), Max: 99.2 (30 May 2019 20:37)  HR: 78 (31 May 2019 14:15) (75 - 89)  BP: 114/71 (31 May 2019 07:53) (114/71 - 124/74)  RR: 18 (31 May 2019 07:53) (18 - 20)  SpO2: 98% (31 May 2019 14:15) (92% - 98%)      PHYSICAL EXAM:  Const: Unable to Assess orientation, Pt is non verbal. In No Acute Distress.  HEENT: NC/AT, PERRLA, EOMI, clear nares, Moist mucous membranes, normal oropharynx. Poor dentition. No erythema, lesions, secretions.   Neck:. Soft and supple. Full ROM without pain.  Cardiovascular: Regular rate and regular rhythm. +S1/S2. No murmurs. Peripheral pulses 2+ and symmetric. No LE edema.  Respiratory: Poor inspiratory effort. Mild ronchi in middle lung fields. No wheezes, crackles, rales.  Abd: Flat Abdomen, Normal bowel sounds in all 4 quadrants, Soft, non-distended. non-tender. No guarding or rebound. Negative Christopher, McBurney, Rovsing.   Skin: No rashes, discolorations, abrasions, lacerations, lesions, ulcers noted.   MSK: Contracted Left arm and hand, equine foot bilaterally, muscle wasting prominent in all extremities.    LABS:                        10.7   7.6   )-----------( 408      ( 29 May 2019 11:02 )             34.9     CBC Full  -  ( 29 May 2019 11:02 )  WBC Count : 7.6 K/uL  RBC Count : 3.99 M/uL  Hemoglobin : 10.7 g/dL  Hematocrit : 34.9 %  Platelet Count - Automated : 408 K/uL  Mean Cell Volume : 87.5 fl  Mean Cell Hemoglobin : 26.8 pg  Mean Cell Hemoglobin Concentration : 30.7 g/dL  Auto Neutrophil # : 4.0 K/uL  Auto Lymphocyte # : 2.8 K/uL  Auto Monocyte # : 0.5 K/uL  Auto Eosinophil # : 0.3 K/uL  Auto Basophil # : 0.0 K/uL  Auto Neutrophil % : 52.8 %  Auto Lymphocyte % : 37.2 %  Auto Monocyte % : 6.3 %  Auto Eosinophil % : 3.5 %  Auto Basophil % : 0.1 %    05-30    134<L>  |  99  |  10.0  ----------------------------<  103  4.3   |  23.0  |  0.25<L>    Ca    8.8      30 May 2019 06:07  Phos  3.4     05-30  Mg     2.1     05-30    TPro  8.1  /  Alb  3.6  /  TBili  <0.2<L>  /  DBili  x   /  AST  30  /  ALT  28  /  AlkPhos  116  05-29    PT/INR - ( 29 May 2019 11:02 )   PT: 12.4 sec;   INR: 1.08 ratio         PTT - ( 29 May 2019 11:02 )  PTT:26.9 sec    Hemoglobin A1C, Whole Blood: 4.8 % (12-29 @ 09:00)  Hemoglobin A1C, Whole Blood: 4.6 % (12-28 @ 22:00)    Culture Results:   No growth at 48 hours (05-28 @ 05:16)  Culture Results:   No growth at 48 hours (05-28 @ 05:15)    Serum Pro-Brain Natriuretic Peptide: 26 pg/mL (05-29 @ 11:02)      Albumin, Serum: 3.6 g/dL (05-29 @ 11:02)  D-Dimer Assay, Quantitative: 221 ng/mL DDU (05-29 @ 11:02)    LIVER FUNCTIONS - ( 29 May 2019 11:02 )  Alb: 3.6 g/dL / Pro: 8.1 g/dL / ALK PHOS: 116 U/L / ALT: 28 U/L / AST: 30 U/L / GGT: x             RADIOLOGY & ADDITIONAL TESTS:    ALBUTerol    0.083% 2.5 milliGRAM(s) Nebulizer every 6 hours PRN  ALBUTerol/ipratropium for Nebulization 3 milliLiter(s) Nebulizer every 8 hours  azithromycin  IVPB 500 milliGRAM(s) IV Intermittent daily  cefTRIAXone   IVPB 1 Gram(s) IV Intermittent every 24 hours  dextrose 40% Gel 15 Gram(s) Oral once PRN  dextrose 5%. 1000 milliLiter(s) IV Continuous <Continuous>  dextrose 50% Injectable 12.5 Gram(s) IV Push once  dextrose 50% Injectable 25 Gram(s) IV Push once  dextrose 50% Injectable 25 Gram(s) IV Push once  docusate sodium 100 milliGRAM(s) Oral two times a day  enoxaparin Injectable 40 milliGRAM(s) SubCutaneous every 24 hours  famotidine    Tablet 20 milliGRAM(s) Oral daily  folic acid 1 milliGRAM(s) Oral daily  glucagon  Injectable 1 milliGRAM(s) IntraMuscular once PRN  insulin lispro (HumaLOG) corrective regimen sliding scale   SubCutaneous three times a day before meals  lacosamide Solution 100 milliGRAM(s) Oral two times a day  magnesium hydroxide Suspension 15 milliLiter(s) Oral at bedtime  melatonin 3 milliGRAM(s) Oral at bedtime  metoprolol tartrate 25 milliGRAM(s) Oral two times a day  QUEtiapine 100 milliGRAM(s) Oral at bedtime  scopolamine   Patch 1 Patch Transdermal every 72 hours

## 2019-05-31 NOTE — CDI QUERY NOTE - NSCDIOTHERTXTBX_GEN_ALL_CORE_HH
This patient is documented with pneumonia.  Please specify type of Pneumonia known or suspected/probable/likely:    Supporting Documentation and/or Clinical Evidence:  patient from [facility] rehab presents w/ chest pain  found to have pna  zithro and rocephin initially  changed to Vanco and Zosyn      Antibiotics:  azithromycin  IVPB   255 mL/Hr IV Intermittent (05-29-19)    azithromycin  IVPB   255 mL/Hr IV Intermittent (05-30-19)    cefTRIAXone   IVPB   100 mL/Hr IV Intermittent (05-29-19)    piperacillin/tazobactam IVPB.   25 mL/Hr IV Intermittent (05-30-19)   25 mL/Hr IV Intermittent (05-31-19)   25 mL/Hr IV Intermittent (05-31-19)    vancomycin  IVPB   250 mL/Hr IV Intermittent (05-31-19)      CT scan impression:   < from: CT Chest No Cont (05.29.19 @ 17:24) >  IMPRESSION:    With peripheral lung fibrosis and subsegmental atelectasis as described.  Bilateral upper lobe a focal posterior segment and airspace   consolidations as described..       Please clarify, in progress notes and dc summary, type of pna being treated:     - gram neg and gram positive pna   - gram neg pneumonia   - other   -unknown/not clinically significant

## 2019-05-31 NOTE — PROGRESS NOTE ADULT - ASSESSMENT
54 y/o M PMHx of recurrent aspiration PNA, PEG tube placement, Chronic Respiratory Failure, TBI with resultant pareplegia (28 yrs ago), MR, HTN, DM, GERD, Polio as a child. Admitted due PNA on CT scan. PEG tube with some discoloration and malodour on 5/30 , cultures collected and sent. Nares + for MRSA colonization today.     MRSA colonization on nares  - + swab for MRSA nares  - bactroban and chrolohexadine daily     b/l Pneumonia, c/o by Chronic Respiratory Failure  -Vitals per routine  -Fall Precautions, Bed Alarm, seizure precautions  received Rocephin, Azithromycin, currently on Vanc #1 and Zosyn #2  -duoneb q 8 hrs, Albuterol PRN   -No lactemia.   -  -desats yesterday on RA  -NC: 2 lts  -Duoneb q 8hrs  -CT chest: With peripheral lung fibrosis and subsegmental atelectasis, Bilateral upper lobe a focal posterior segment and airspace consolidations; not present in previous    Diabetes, Type 1 by hx in [facility]  -Hypoglycemic precautions  -Hyperglycemic corrective scale  -No insulin regiment found in [facility] paperwork  -f/u HgbA1c    HTN  -Continue with Lopressor    PEG Tube  -Diet: NPO, PEG tube feedings.  -PO meds to be done in tube     Seizures / stable   -seizure precautions  -c/w Seroquel   -c/w Lacosamide     Constipation  -c/w colace, Miralax    chronic Vertigo  -c/w scopolamine patch to prevent N/V    Insomnia  -c/w melatonin    Full Code  -Molts in chart 52 y/o M PMHx of recurrent aspiration PNA, PEG tube placement, Chronic Respiratory Failure, TBI with resultant pareplegia (28 yrs ago), MR, HTN, DM, GERD, Polio as a child. Admitted due PNA on CT scan. PEG tube with some discoloration and malodour on 5/30 , cultures collected and sent. Nares + for MRSA colonization today.       b/l Pneumonia/ possible aspiration pna/ has hx of recurrent aspirations in past/  c/o by Chronic Respiratory Failure  -Vitals per routine  -Fall Precautions, Bed Alarm, seizure precautions  received Rocephin, Azithromycin, currently on Vanc #1 and Zosyn #2  -duoneb q 8 hrs, Albuterol PRN   -No lactemia.   -  -desats yesterday on RA  -NC: 2 lts  -Duoneb q 8hrs  -CT chest: With peripheral lung fibrosis and subsegmental atelectasis, Bilateral upper lobe a focal posterior segment and airspace consolidations; not present in previous    MRSA colonization on nares  - + swab for MRSA nares  - bactroban and chrolohexadine daily     Diabetes, Type 1 by hx in [facility]  -Hypoglycemic precautions  -Hyperglycemic corrective scale  -No insulin regiment found in [facility] paperwork  -f/u HgbA1c    HTN  -Continue with Lopressor    PEG Tube  -Diet: NPO, PEG tube feedings.  -PO meds to be done in tube     Seizures / stable   -seizure precautions  -c/w Seroquel   -c/w Lacosamide     Constipation  -c/w colace, Miralax    chronic Vertigo  -c/w scopolamine patch to prevent N/V    Insomnia  -c/w melatonin    Full Code  -Molts in chart

## 2019-06-01 LAB
ANION GAP SERPL CALC-SCNC: 12 MMOL/L — SIGNIFICANT CHANGE UP (ref 5–17)
BASOPHILS # BLD AUTO: 0 K/UL — SIGNIFICANT CHANGE UP (ref 0–0.2)
BASOPHILS NFR BLD AUTO: 0.3 % — SIGNIFICANT CHANGE UP (ref 0–2)
BUN SERPL-MCNC: 12 MG/DL — SIGNIFICANT CHANGE UP (ref 8–20)
CALCIUM SERPL-MCNC: 8.7 MG/DL — SIGNIFICANT CHANGE UP (ref 8.6–10.2)
CHLORIDE SERPL-SCNC: 103 MMOL/L — SIGNIFICANT CHANGE UP (ref 98–107)
CO2 SERPL-SCNC: 23 MMOL/L — SIGNIFICANT CHANGE UP (ref 22–29)
CREAT SERPL-MCNC: 0.44 MG/DL — LOW (ref 0.5–1.3)
EOSINOPHIL # BLD AUTO: 0.3 K/UL — SIGNIFICANT CHANGE UP (ref 0–0.5)
EOSINOPHIL NFR BLD AUTO: 4 % — SIGNIFICANT CHANGE UP (ref 0–5)
GLUCOSE BLDC GLUCOMTR-MCNC: 128 MG/DL — HIGH (ref 70–99)
GLUCOSE BLDC GLUCOMTR-MCNC: 140 MG/DL — HIGH (ref 70–99)
GLUCOSE BLDC GLUCOMTR-MCNC: 165 MG/DL — HIGH (ref 70–99)
GLUCOSE SERPL-MCNC: 166 MG/DL — HIGH (ref 70–115)
HCT VFR BLD CALC: 33.9 % — LOW (ref 42–52)
HGB BLD-MCNC: 10.5 G/DL — LOW (ref 14–18)
LYMPHOCYTES # BLD AUTO: 1.6 K/UL — SIGNIFICANT CHANGE UP (ref 1–4.8)
LYMPHOCYTES # BLD AUTO: 22.9 % — SIGNIFICANT CHANGE UP (ref 20–55)
MAGNESIUM SERPL-MCNC: 2.4 MG/DL — SIGNIFICANT CHANGE UP (ref 1.8–2.6)
MCHC RBC-ENTMCNC: 26.7 PG — LOW (ref 27–31)
MCHC RBC-ENTMCNC: 31 G/DL — LOW (ref 32–36)
MCV RBC AUTO: 86.3 FL — SIGNIFICANT CHANGE UP (ref 80–94)
MONOCYTES # BLD AUTO: 0.8 K/UL — SIGNIFICANT CHANGE UP (ref 0–0.8)
MONOCYTES NFR BLD AUTO: 10.7 % — HIGH (ref 3–10)
NEUTROPHILS # BLD AUTO: 4.3 K/UL — SIGNIFICANT CHANGE UP (ref 1.8–8)
NEUTROPHILS NFR BLD AUTO: 62 % — SIGNIFICANT CHANGE UP (ref 37–73)
PHOSPHATE SERPL-MCNC: 3.9 MG/DL — SIGNIFICANT CHANGE UP (ref 2.4–4.7)
PLATELET # BLD AUTO: 327 K/UL — SIGNIFICANT CHANGE UP (ref 150–400)
POTASSIUM SERPL-MCNC: 3.8 MMOL/L — SIGNIFICANT CHANGE UP (ref 3.5–5.3)
POTASSIUM SERPL-SCNC: 3.8 MMOL/L — SIGNIFICANT CHANGE UP (ref 3.5–5.3)
RBC # BLD: 3.93 M/UL — LOW (ref 4.6–6.2)
RBC # FLD: 14.6 % — SIGNIFICANT CHANGE UP (ref 11–15.6)
SODIUM SERPL-SCNC: 138 MMOL/L — SIGNIFICANT CHANGE UP (ref 135–145)
SPECIMEN SOURCE: SIGNIFICANT CHANGE UP
VANCOMYCIN TROUGH SERPL-MCNC: 34.5 UG/ML — CRITICAL HIGH (ref 10–20)
WBC # BLD: 7 K/UL — SIGNIFICANT CHANGE UP (ref 4.8–10.8)
WBC # FLD AUTO: 7 K/UL — SIGNIFICANT CHANGE UP (ref 4.8–10.8)

## 2019-06-01 PROCEDURE — 99232 SBSQ HOSP IP/OBS MODERATE 35: CPT | Mod: GC

## 2019-06-01 RX ORDER — SACCHAROMYCES BOULARDII 250 MG
250 POWDER IN PACKET (EA) ORAL
Refills: 0 | Status: DISCONTINUED | OUTPATIENT
Start: 2019-06-01 | End: 2019-06-02

## 2019-06-01 RX ORDER — DOCUSATE SODIUM 100 MG
100 CAPSULE ORAL
Refills: 0 | Status: DISCONTINUED | OUTPATIENT
Start: 2019-06-01 | End: 2019-06-02

## 2019-06-01 RX ADMIN — PIPERACILLIN AND TAZOBACTAM 25 GRAM(S): 4; .5 INJECTION, POWDER, LYOPHILIZED, FOR SOLUTION INTRAVENOUS at 06:27

## 2019-06-01 RX ADMIN — Medication 3 MILLILITER(S): at 09:08

## 2019-06-01 RX ADMIN — Medication 3 MILLILITER(S): at 16:26

## 2019-06-01 RX ADMIN — Medication 250 MILLIGRAM(S): at 17:14

## 2019-06-01 RX ADMIN — Medication 1 MILLIGRAM(S): at 12:10

## 2019-06-01 RX ADMIN — FAMOTIDINE 20 MILLIGRAM(S): 10 INJECTION INTRAVENOUS at 12:10

## 2019-06-01 RX ADMIN — LACOSAMIDE 100 MILLIGRAM(S): 50 TABLET ORAL at 07:03

## 2019-06-01 RX ADMIN — Medication 25 MILLIGRAM(S): at 06:31

## 2019-06-01 RX ADMIN — MUPIROCIN 1 APPLICATION(S): 20 OINTMENT TOPICAL at 06:29

## 2019-06-01 RX ADMIN — CHLORHEXIDINE GLUCONATE 1 APPLICATION(S): 213 SOLUTION TOPICAL at 06:28

## 2019-06-01 RX ADMIN — Medication 3 MILLIGRAM(S): at 22:12

## 2019-06-01 RX ADMIN — Medication 1: at 17:13

## 2019-06-01 RX ADMIN — ENOXAPARIN SODIUM 40 MILLIGRAM(S): 100 INJECTION SUBCUTANEOUS at 17:12

## 2019-06-01 RX ADMIN — Medication 25 MILLIGRAM(S): at 17:13

## 2019-06-01 RX ADMIN — PIPERACILLIN AND TAZOBACTAM 25 GRAM(S): 4; .5 INJECTION, POWDER, LYOPHILIZED, FOR SOLUTION INTRAVENOUS at 22:12

## 2019-06-01 RX ADMIN — MUPIROCIN 1 APPLICATION(S): 20 OINTMENT TOPICAL at 17:14

## 2019-06-01 RX ADMIN — MAGNESIUM HYDROXIDE 15 MILLILITER(S): 400 TABLET, CHEWABLE ORAL at 22:12

## 2019-06-01 RX ADMIN — PIPERACILLIN AND TAZOBACTAM 25 GRAM(S): 4; .5 INJECTION, POWDER, LYOPHILIZED, FOR SOLUTION INTRAVENOUS at 14:25

## 2019-06-01 RX ADMIN — Medication 250 MILLIGRAM(S): at 06:23

## 2019-06-01 RX ADMIN — SCOPALAMINE 1 PATCH: 1 PATCH, EXTENDED RELEASE TRANSDERMAL at 19:15

## 2019-06-01 RX ADMIN — Medication 100 MILLIGRAM(S): at 19:28

## 2019-06-01 RX ADMIN — LACOSAMIDE 100 MILLIGRAM(S): 50 TABLET ORAL at 19:28

## 2019-06-01 RX ADMIN — QUETIAPINE FUMARATE 100 MILLIGRAM(S): 200 TABLET, FILM COATED ORAL at 22:12

## 2019-06-01 NOTE — PROGRESS NOTE ADULT - ASSESSMENT
54 y/o M PMHx of recurrent aspiration PNA, PEG tube placement, Chronic Respiratory Failure, TBI with resultant pareplegia (28 yrs ago), MR, HTN, DM, GERD, Polio as a child. Admitted due PNA on CT scan. PEG tube with some discoloration and malodour on 5/30 , cultures collected and sent, negative. Nares + for MRSA colonization.    b/l Pneumonia/ possible aspiration pna/ has hx of recurrent aspirations in past/  c/o by Chronic Respiratory Failure  -Vitals per routine  -Fall Precautions, Bed Alarm, seizure precautions  received Rocephin, Azithromycin, currently on Vanc #1 and Zosyn #2  -duoneb q 8 hrs, Albuterol PRN   -No lactemia.   -  -desats yesterday on RA  -NC: 2 lts  -Duoneb q 8hrs  -CT chest: With peripheral lung fibrosis and subsegmental atelectasis, Bilateral upper lobe a focal posterior segment and airspace consolidations; not present in previous    MRSA colonization on nares  - + swab for MRSA nares  - bactroban and chrolohexadine daily     Diabetes, Type 1 by hx in [facility]  -Hypoglycemic precautions  -Hyperglycemic corrective scale  -No insulin regiment found in [facility] paperwork  -HgbA1c: 4.7    HTN  -Continue with Lopressor    PEG Tube, site irritation, cellulitis unlikely  -Diet: NPO, PEG tube feedings.  -PO meds to be done in tube   -Mild irritation of PEG tube site, now dry, non tender  -Culture swab negative    Seizures / stable   -seizure precautions  -c/w Seroquel   -c/w Lacosamide     Constipation  -c/w colace, Miralax    chronic Vertigo  -c/w scopolamine patch to prevent N/V    Insomnia  -c/w melatonin    Full Code  -Molts in chart 54 y/o M Hx of TBI with resultant pareplegia (28 yrs ago), chronic respiratory railure s/p trach with reversal, s/p peg tube,  HTN, DM2 , GERD, and  recurrent aspiration PNA who presented with worsening hypoxia from the NH, pt noted with hypoxia desatuation to 88%, ct chest with noted bilateral upper lobe a focal posterior segment and airspace consolidations and pt admitted with aspiration PNA. Hospital course complicated by presumed peg site infection, but more likely irritation around the tube site. Currently pt remains afebrile, no leukocytosis and today able to come off supplemental o2. Slow clinical improvement.     B/l Pneumonia likely 2/2 aspiration pna  -2/2 GNR  -has a hx of chronic respiratory failure with recurrent asp pna   -pt afebrile   -no leuocytosis   - mild cough no phlegm     - B cx negative   - able to come off supplemental o2 today on RA saturating well   -c/w duoneb q 8 hrs, Albuterol PRN   -Cc/w zosyn D# 3/7  - c/w florastor 250mg po bid   - repeat imaging in 4-6 weeks to ensure resolution     PEG Tube site irritation  - initial concern for infection, no evidence of current infection. Site was cleansed and now has mild irritation  -cx swab was taken but this could have been peg feeding and contamination  - will monitor closely and place pt on nystatin powder or desitin to improved site irriation and keep area dry     MRSA colonization on nares  - + swab for MRSA nares  - bactroban and chrolohexadine daily     Diabetes, Type 1 by hx in [facility]  -HgbA1c: 4.7  - fs stable   -Hypoglycemic precautions  -Hyperglycemic corrective scale  -No insulin regiment found in [facility] paperwork    HTN  -BP stable  - c/w lopressor po bid     Seizures / stable   -seizure precautions  -c/w Seroquel   -c/w Lacosamide     Constipation  -c/w colace, Miralax    chronic Vertigo  -c/w scopolamine patch to prevent N/V    Insomnia  -c/w melatonin    DVT ppx  - c/w lovenox 40mg sq qd     Advanced Directives: Full code- MOLST in chart   Next of kin: Mother - Katie     Dispo: Pt is from [facility] to return in 1-2 days pending acceptance back. Will d/w CM and SW.

## 2019-06-01 NOTE — PROGRESS NOTE ADULT - SUBJECTIVE AND OBJECTIVE BOX
Patient is a 53y old  Male who presents with a chief complaint of PNA (29 May 2019 23:56)    INTERVAL HPI/OVERNIGHT EVENTS:  Pt seen and examined at bedside.   Pt became agitated yesterday afternoon requiring Haldol. Became pleasant overnight.  Nare swab + for MRSA colonization. Pt otherwise without complaints.   ROS: Otherwise negative.     Allergies  Valproate Sodium (Other (Severe))    Vital Signs Last 24 Hrs  T(C): 37.2 (01 Jun 2019 08:00), Max: 37.2 (01 Jun 2019 08:00)  T(F): 99 (01 Jun 2019 08:00), Max: 99 (01 Jun 2019 08:00)  HR: 90 (01 Jun 2019 09:10) (78 - 96)  BP: 122/80 (01 Jun 2019 08:00) (112/66 - 122/80)  RR: 18 (01 Jun 2019 08:00) (18 - 19)  SpO2: 95% (01 Jun 2019 09:10) (93% - 99%)      I&O's Summary    31 May 2019 07:01  -  01 Jun 2019 07:00  --------------------------------------------------------  IN: 790 mL / OUT: 0 mL / NET: 790 mL    PHYSICAL EXAM:  Const: Unable to Assess orientation, Pt is non verbal. In No Acute Distress.  HEENT: NC/AT, PERRLA, EOMI, clear nares, Moist mucous membranes, normal oropharynx. Poor dentition. No erythema, lesions, secretions.   Neck:. Soft and supple. Full ROM without pain.  Cardiovascular: Regular rate and regular rhythm. +S1/S2. No murmurs. Peripheral pulses 2+ and symmetric. No LE edema.  Respiratory: Poor inspiratory effort. Mild ronchi in middle lung fields. No wheezes, crackles, rales.  Abd: Flat Abdomen, PEG site shows decrease surrounding redness without secretions. Normal bowel sounds in all 4 quadrants, Soft, non-distended. non-tender. No guarding or rebound. Negative Christopher, McBurney, Rovsing.   Skin: No rashes, discolorations, abrasions, lacerations, lesions, ulcers noted.   MSK: Contracted Left arm and hand, equine foot bilaterally, muscle wasting prominent in all extremities.    LABS:                                 10.5   7.0   )-----------( 327      ( 01 Jun 2019 06:48 )             33.9     06-01    138  |  103  |  12.0  ----------------------------<  166<H>  3.8   |  23.0  |  0.44<L>    Ca    8.7      01 Jun 2019 06:48  Phos  3.9     06-01  Mg     2.4     06-01      Culture - Surgical Swab (collected 31 May 2019 00:41)  Source: .Surgical Swab  Gram Stain (31 May 2019 14:39):    No White blood cells    No organisms seen    Culture - Blood (collected 29 May 2019 11:06)  Source: .Blood  Preliminary Report (31 May 2019 13:01):    No growth at 48 hours    Culture - Blood (collected 29 May 2019 11:05)  Source: .Blood  Preliminary Report (31 May 2019 13:01):    No growth at 48 hours    MEDICATIONS  (STANDING):  ALBUTerol/ipratropium for Nebulization 3 milliLiter(s) Nebulizer every 8 hours  chlorhexidine 2% Cloths 1 Application(s) Topical <User Schedule>  dextrose 5%. 1000 milliLiter(s) (50 mL/Hr) IV Continuous <Continuous>  dextrose 50% Injectable 12.5 Gram(s) IV Push once  dextrose 50% Injectable 25 Gram(s) IV Push once  dextrose 50% Injectable 25 Gram(s) IV Push once  docusate sodium 100 milliGRAM(s) Oral two times a day  enoxaparin Injectable 40 milliGRAM(s) SubCutaneous every 24 hours  famotidine    Tablet 20 milliGRAM(s) Oral daily  folic acid 1 milliGRAM(s) Oral daily  insulin lispro (HumaLOG) corrective regimen sliding scale   SubCutaneous three times a day before meals  lacosamide Solution 100 milliGRAM(s) Oral two times a day  magnesium hydroxide Suspension 15 milliLiter(s) Oral at bedtime  melatonin 3 milliGRAM(s) Oral at bedtime  metoprolol tartrate 25 milliGRAM(s) Oral two times a day  mupirocin 2% Nasal 1 Application(s) Nasal two times a day  piperacillin/tazobactam IVPB. 3.375 Gram(s) IV Intermittent every 8 hours  QUEtiapine 100 milliGRAM(s) Oral at bedtime  scopolamine   Patch 1 Patch Transdermal every 72 hours  vancomycin  IVPB 1000 milliGRAM(s) IV Intermittent every 12 hours    MEDICATIONS  (PRN):  ALBUTerol    0.083% 2.5 milliGRAM(s) Nebulizer every 6 hours PRN Shortness of Breath and/or Wheezing  dextrose 40% Gel 15 Gram(s) Oral once PRN Blood Glucose LESS THAN 70 milliGRAM(s)/deciliter  glucagon  Injectable 1 milliGRAM(s) IntraMuscular once PRN Glucose LESS THAN 70 milligrams/deciliter  haloperidol    Injectable 0.25 milliGRAM(s) IntraMuscular two times a day PRN agitation or aggression Patient is a 53y old  Male who presents with a chief complaint of PNA (29 May 2019 23:56) improving pna, peg site improved     INTERVAL HPI/OVERNIGHT EVENTS:  Pt seen and examined at bedside.   Pt became agitated yesterday afternoon requiring Haldol. Became pleasant overnight. Also pleasant this am and no longer with need for constant observation. Pt responds with nodding his head to question side to side or up and down. As per nursing staff able to come off of supplemental o2, minimal cough. Patient denies chest pain, SOB, abd pain, N/V, fever, chills, dysuria or any other complaints. All remainder ROS negative.     Allergies  Valproate Sodium (Other (Severe))    Vital Signs Last 24 Hrs  T(C): 37.2 (01 Jun 2019 08:00), Max: 37.2 (01 Jun 2019 08:00)  T(F): 99 (01 Jun 2019 08:00), Max: 99 (01 Jun 2019 08:00)  HR: 90 (01 Jun 2019 09:10) (78 - 96)  BP: 122/80 (01 Jun 2019 08:00) (112/66 - 122/80)  RR: 18 (01 Jun 2019 08:00) (18 - 19)  SpO2: 95% (01 Jun 2019 09:10) (93% - 99%)      I&O's Summary    31 May 2019 07:01  -  01 Jun 2019 07:00  --------------------------------------------------------  IN: 790 mL / OUT: 0 mL / NET: 790 mL    PHYSICAL EXAM:  Const: WD WN  NAD  non verbal   HEENT: NC/AT, PERRLA, EOMI, clear nares, Moist mucous membranes, normal oropharynx. Poor dentition. No erythema, lesions, secretions.   Neck:. Soft and supple. Full ROM without pain.  Cardiovascular: Regular rate and regular rhythm. +S1/S2. No murmurs. Peripheral pulses 2+ and symmetric. No LE edema.  Respiratory: Poor inspiratory effort. Mild ronchi b/l, no wheezes or rhales   Abd: Soft NT ND BS+ normoactive, peg site clean/ dry/ Intact, with slight irritation and erythema around site no evidence of infection   Skin: No rashes, discolorations, abrasions, lacerations, lesions, ulcers noted.   MSK: Contracted Left arm and hand, equine foot bilaterally, muscle wasting prominent in all extremities.    LABS:                                 10.5   7.0   )-----------( 327      ( 01 Jun 2019 06:48 )             33.9     06-01    138  |  103  |  12.0  ----------------------------<  166<H>  3.8   |  23.0  |  0.44<L>    Ca    8.7      01 Jun 2019 06:48  Phos  3.9     06-01  Mg     2.4     06-01      Culture - Surgical Swab (collected 31 May 2019 00:41)  Source: .Surgical Swab  Gram Stain (31 May 2019 14:39):    No White blood cells    No organisms seen    Culture - Blood (collected 29 May 2019 11:06)  Source: .Blood  Preliminary Report (31 May 2019 13:01):    No growth at 48 hours    Culture - Blood (collected 29 May 2019 11:05)  Source: .Blood  Preliminary Report (31 May 2019 13:01):    No growth at 48 hours    MEDICATIONS  (STANDING):  ALBUTerol/ipratropium for Nebulization 3 milliLiter(s) Nebulizer every 8 hours  chlorhexidine 2% Cloths 1 Application(s) Topical <User Schedule>  dextrose 5%. 1000 milliLiter(s) (50 mL/Hr) IV Continuous <Continuous>  dextrose 50% Injectable 12.5 Gram(s) IV Push once  dextrose 50% Injectable 25 Gram(s) IV Push once  dextrose 50% Injectable 25 Gram(s) IV Push once  docusate sodium 100 milliGRAM(s) Oral two times a day  enoxaparin Injectable 40 milliGRAM(s) SubCutaneous every 24 hours  famotidine    Tablet 20 milliGRAM(s) Oral daily  folic acid 1 milliGRAM(s) Oral daily  insulin lispro (HumaLOG) corrective regimen sliding scale   SubCutaneous three times a day before meals  lacosamide Solution 100 milliGRAM(s) Oral two times a day  magnesium hydroxide Suspension 15 milliLiter(s) Oral at bedtime  melatonin 3 milliGRAM(s) Oral at bedtime  metoprolol tartrate 25 milliGRAM(s) Oral two times a day  mupirocin 2% Nasal 1 Application(s) Nasal two times a day  piperacillin/tazobactam IVPB. 3.375 Gram(s) IV Intermittent every 8 hours  QUEtiapine 100 milliGRAM(s) Oral at bedtime  scopolamine   Patch 1 Patch Transdermal every 72 hours  vancomycin  IVPB 1000 milliGRAM(s) IV Intermittent every 12 hours    MEDICATIONS  (PRN):  ALBUTerol    0.083% 2.5 milliGRAM(s) Nebulizer every 6 hours PRN Shortness of Breath and/or Wheezing  dextrose 40% Gel 15 Gram(s) Oral once PRN Blood Glucose LESS THAN 70 milliGRAM(s)/deciliter  glucagon  Injectable 1 milliGRAM(s) IntraMuscular once PRN Glucose LESS THAN 70 milligrams/deciliter  haloperidol    Injectable 0.25 milliGRAM(s) IntraMuscular two times a day PRN agitation or aggression

## 2019-06-02 ENCOUNTER — TRANSCRIPTION ENCOUNTER (OUTPATIENT)
Age: 54
End: 2019-06-02

## 2019-06-02 VITALS
HEART RATE: 101 BPM | OXYGEN SATURATION: 93 % | RESPIRATION RATE: 18 BRPM | TEMPERATURE: 98 F | DIASTOLIC BLOOD PRESSURE: 64 MMHG | SYSTOLIC BLOOD PRESSURE: 107 MMHG

## 2019-06-02 DIAGNOSIS — G40.409 OTHER GENERALIZED EPILEPSY AND EPILEPTIC SYNDROMES, NOT INTRACTABLE, WITHOUT STATUS EPILEPTICUS: ICD-10-CM

## 2019-06-02 LAB
-  AMIKACIN: SIGNIFICANT CHANGE UP
-  AMPICILLIN/SULBACTAM: SIGNIFICANT CHANGE UP
-  AMPICILLIN/SULBACTAM: SIGNIFICANT CHANGE UP
-  AMPICILLIN: SIGNIFICANT CHANGE UP
-  AZTREONAM: SIGNIFICANT CHANGE UP
-  CEFAZOLIN: SIGNIFICANT CHANGE UP
-  CEFAZOLIN: SIGNIFICANT CHANGE UP
-  CEFEPIME: SIGNIFICANT CHANGE UP
-  CEFOXITIN: SIGNIFICANT CHANGE UP
-  CEFTRIAXONE: SIGNIFICANT CHANGE UP
-  CIPROFLOXACIN: SIGNIFICANT CHANGE UP
-  CLINDAMYCIN: SIGNIFICANT CHANGE UP
-  ERTAPENEM: SIGNIFICANT CHANGE UP
-  ERYTHROMYCIN: SIGNIFICANT CHANGE UP
-  GENTAMICIN: SIGNIFICANT CHANGE UP
-  GENTAMICIN: SIGNIFICANT CHANGE UP
-  IMIPENEM: SIGNIFICANT CHANGE UP
-  LEVOFLOXACIN: SIGNIFICANT CHANGE UP
-  MEROPENEM: SIGNIFICANT CHANGE UP
-  OXACILLIN: SIGNIFICANT CHANGE UP
-  PENICILLIN: SIGNIFICANT CHANGE UP
-  PIPERACILLIN/TAZOBACTAM: SIGNIFICANT CHANGE UP
-  RIFAMPIN: SIGNIFICANT CHANGE UP
-  TETRACYCLINE: SIGNIFICANT CHANGE UP
-  TOBRAMYCIN: SIGNIFICANT CHANGE UP
-  TRIMETHOPRIM/SULFAMETHOXAZOLE: SIGNIFICANT CHANGE UP
-  TRIMETHOPRIM/SULFAMETHOXAZOLE: SIGNIFICANT CHANGE UP
-  VANCOMYCIN: SIGNIFICANT CHANGE UP
CULTURE RESULTS: SIGNIFICANT CHANGE UP
CULTURE RESULTS: SIGNIFICANT CHANGE UP
GLUCOSE BLDC GLUCOMTR-MCNC: 118 MG/DL — HIGH (ref 70–99)
GLUCOSE BLDC GLUCOMTR-MCNC: 125 MG/DL — HIGH (ref 70–99)
GLUCOSE BLDC GLUCOMTR-MCNC: 155 MG/DL — HIGH (ref 70–99)
METHOD TYPE: SIGNIFICANT CHANGE UP
METHOD TYPE: SIGNIFICANT CHANGE UP
SPECIMEN SOURCE: SIGNIFICANT CHANGE UP
SPECIMEN SOURCE: SIGNIFICANT CHANGE UP
VANCOMYCIN TROUGH SERPL-MCNC: 9 UG/ML — LOW (ref 10–20)

## 2019-06-02 PROCEDURE — 84132 ASSAY OF SERUM POTASSIUM: CPT

## 2019-06-02 PROCEDURE — 87070 CULTURE OTHR SPECIMN AEROBIC: CPT

## 2019-06-02 PROCEDURE — 84100 ASSAY OF PHOSPHORUS: CPT

## 2019-06-02 PROCEDURE — 80048 BASIC METABOLIC PNL TOTAL CA: CPT

## 2019-06-02 PROCEDURE — 82803 BLOOD GASES ANY COMBINATION: CPT

## 2019-06-02 PROCEDURE — 85379 FIBRIN DEGRADATION QUANT: CPT

## 2019-06-02 PROCEDURE — 82435 ASSAY OF BLOOD CHLORIDE: CPT

## 2019-06-02 PROCEDURE — 83880 ASSAY OF NATRIURETIC PEPTIDE: CPT

## 2019-06-02 PROCEDURE — 99239 HOSP IP/OBS DSCHRG MGMT >30: CPT | Mod: GC

## 2019-06-02 PROCEDURE — 87205 SMEAR GRAM STAIN: CPT

## 2019-06-02 PROCEDURE — 99285 EMERGENCY DEPT VISIT HI MDM: CPT | Mod: 25

## 2019-06-02 PROCEDURE — 85014 HEMATOCRIT: CPT

## 2019-06-02 PROCEDURE — 85027 COMPLETE CBC AUTOMATED: CPT

## 2019-06-02 PROCEDURE — 83036 HEMOGLOBIN GLYCOSYLATED A1C: CPT

## 2019-06-02 PROCEDURE — 71250 CT THORAX DX C-: CPT

## 2019-06-02 PROCEDURE — 82947 ASSAY GLUCOSE BLOOD QUANT: CPT

## 2019-06-02 PROCEDURE — 87640 STAPH A DNA AMP PROBE: CPT

## 2019-06-02 PROCEDURE — 87186 SC STD MICRODIL/AGAR DIL: CPT

## 2019-06-02 PROCEDURE — 93005 ELECTROCARDIOGRAM TRACING: CPT

## 2019-06-02 PROCEDURE — 82962 GLUCOSE BLOOD TEST: CPT

## 2019-06-02 PROCEDURE — 86803 HEPATITIS C AB TEST: CPT

## 2019-06-02 PROCEDURE — 87641 MR-STAPH DNA AMP PROBE: CPT

## 2019-06-02 PROCEDURE — 80202 ASSAY OF VANCOMYCIN: CPT

## 2019-06-02 PROCEDURE — 84484 ASSAY OF TROPONIN QUANT: CPT

## 2019-06-02 PROCEDURE — 84295 ASSAY OF SERUM SODIUM: CPT

## 2019-06-02 PROCEDURE — 85610 PROTHROMBIN TIME: CPT

## 2019-06-02 PROCEDURE — 82330 ASSAY OF CALCIUM: CPT

## 2019-06-02 PROCEDURE — 83735 ASSAY OF MAGNESIUM: CPT

## 2019-06-02 PROCEDURE — 71045 X-RAY EXAM CHEST 1 VIEW: CPT

## 2019-06-02 PROCEDURE — 83605 ASSAY OF LACTIC ACID: CPT

## 2019-06-02 PROCEDURE — 85730 THROMBOPLASTIN TIME PARTIAL: CPT

## 2019-06-02 PROCEDURE — 36415 COLL VENOUS BLD VENIPUNCTURE: CPT

## 2019-06-02 PROCEDURE — 94640 AIRWAY INHALATION TREATMENT: CPT

## 2019-06-02 PROCEDURE — 80053 COMPREHEN METABOLIC PANEL: CPT

## 2019-06-02 PROCEDURE — 99284 EMERGENCY DEPT VISIT MOD MDM: CPT | Mod: 25

## 2019-06-02 PROCEDURE — 96374 THER/PROPH/DIAG INJ IV PUSH: CPT

## 2019-06-02 PROCEDURE — 87040 BLOOD CULTURE FOR BACTERIA: CPT

## 2019-06-02 RX ORDER — LANOLIN ALCOHOL/MO/W.PET/CERES
1 CREAM (GRAM) TOPICAL
Qty: 0 | Refills: 0 | DISCHARGE
Start: 2019-06-02

## 2019-06-02 RX ORDER — FOLIC ACID 0.8 MG
1 TABLET ORAL
Qty: 0 | Refills: 0 | DISCHARGE

## 2019-06-02 RX ORDER — LANOLIN ALCOHOL/MO/W.PET/CERES
1 CREAM (GRAM) TOPICAL
Qty: 0 | Refills: 0 | DISCHARGE

## 2019-06-02 RX ORDER — SACCHAROMYCES BOULARDII 250 MG
1 POWDER IN PACKET (EA) ORAL
Qty: 0 | Refills: 0 | DISCHARGE
Start: 2019-06-02

## 2019-06-02 RX ORDER — METOPROLOL TARTRATE 50 MG
1 TABLET ORAL
Qty: 0 | Refills: 0 | DISCHARGE

## 2019-06-02 RX ORDER — FAMOTIDINE 10 MG/ML
1 INJECTION INTRAVENOUS
Qty: 0 | Refills: 0 | DISCHARGE

## 2019-06-02 RX ORDER — FUROSEMIDE 40 MG
1 TABLET ORAL
Qty: 0 | Refills: 0 | DISCHARGE

## 2019-06-02 RX ORDER — FERROUS SULFATE 325(65) MG
7.5 TABLET ORAL
Qty: 0 | Refills: 0 | DISCHARGE

## 2019-06-02 RX ORDER — CHLORHEXIDINE GLUCONATE 213 G/1000ML
15 SOLUTION TOPICAL
Qty: 0 | Refills: 0 | DISCHARGE

## 2019-06-02 RX ORDER — LACOSAMIDE 50 MG/1
1 TABLET ORAL
Qty: 0 | Refills: 0 | DISCHARGE

## 2019-06-02 RX ADMIN — SCOPALAMINE 1 PATCH: 1 PATCH, EXTENDED RELEASE TRANSDERMAL at 19:00

## 2019-06-02 RX ADMIN — Medication 250 MILLIGRAM(S): at 05:01

## 2019-06-02 RX ADMIN — MUPIROCIN 1 APPLICATION(S): 20 OINTMENT TOPICAL at 18:26

## 2019-06-02 RX ADMIN — Medication 250 MILLIGRAM(S): at 18:26

## 2019-06-02 RX ADMIN — LACOSAMIDE 100 MILLIGRAM(S): 50 TABLET ORAL at 06:43

## 2019-06-02 RX ADMIN — LACOSAMIDE 100 MILLIGRAM(S): 50 TABLET ORAL at 18:39

## 2019-06-02 RX ADMIN — FAMOTIDINE 20 MILLIGRAM(S): 10 INJECTION INTRAVENOUS at 11:31

## 2019-06-02 RX ADMIN — Medication 100 MILLIGRAM(S): at 18:26

## 2019-06-02 RX ADMIN — Medication 3 MILLIGRAM(S): at 21:10

## 2019-06-02 RX ADMIN — MUPIROCIN 1 APPLICATION(S): 20 OINTMENT TOPICAL at 06:40

## 2019-06-02 RX ADMIN — MAGNESIUM HYDROXIDE 15 MILLILITER(S): 400 TABLET, CHEWABLE ORAL at 21:10

## 2019-06-02 RX ADMIN — SCOPALAMINE 1 PATCH: 1 PATCH, EXTENDED RELEASE TRANSDERMAL at 05:00

## 2019-06-02 RX ADMIN — CHLORHEXIDINE GLUCONATE 1 APPLICATION(S): 213 SOLUTION TOPICAL at 05:02

## 2019-06-02 RX ADMIN — Medication 100 MILLIGRAM(S): at 05:01

## 2019-06-02 RX ADMIN — Medication 25 MILLIGRAM(S): at 18:28

## 2019-06-02 RX ADMIN — Medication 1 MILLIGRAM(S): at 11:31

## 2019-06-02 RX ADMIN — Medication 1: at 08:16

## 2019-06-02 RX ADMIN — Medication 3 MILLILITER(S): at 08:11

## 2019-06-02 RX ADMIN — Medication 3 MILLILITER(S): at 00:18

## 2019-06-02 RX ADMIN — Medication 25 MILLIGRAM(S): at 05:01

## 2019-06-02 RX ADMIN — SCOPALAMINE 1 PATCH: 1 PATCH, EXTENDED RELEASE TRANSDERMAL at 05:06

## 2019-06-02 RX ADMIN — QUETIAPINE FUMARATE 100 MILLIGRAM(S): 200 TABLET, FILM COATED ORAL at 21:10

## 2019-06-02 RX ADMIN — Medication 875 MILLIGRAM(S): at 18:26

## 2019-06-02 RX ADMIN — PIPERACILLIN AND TAZOBACTAM 25 GRAM(S): 4; .5 INJECTION, POWDER, LYOPHILIZED, FOR SOLUTION INTRAVENOUS at 05:03

## 2019-06-02 RX ADMIN — ENOXAPARIN SODIUM 40 MILLIGRAM(S): 100 INJECTION SUBCUTANEOUS at 18:27

## 2019-06-02 NOTE — PROVIDER CONTACT NOTE (OTHER) - SITUATION
Pt was discharged to [facility] nursing home, sent back to [facility]. RN Spoke to nurse Nuñez from [facility] , as per Joaquin SAHNI pt was sent back to hospital due to hypoxia and tachycardia.

## 2019-06-02 NOTE — DISCHARGE NOTE PROVIDER - NSDCCPCAREPLAN_GEN_ALL_CORE_FT
PRINCIPAL DISCHARGE DIAGNOSIS  Diagnosis: Pneumonia due to infectious organism, unspecified laterality, unspecified part of lung  Assessment and Plan of Treatment: During this hospitalization you have aspiration pneumonia, you were treated with intravenous antibiotics during 4 days , you will conitnue treatment with Augmentin during 6 more day to complete a total of 10 days course .   Recommend aspiration precautions and elevation of the bed head to avoid other episodes of aspiration pneumonia PRINCIPAL DISCHARGE DIAGNOSIS  Diagnosis: Pneumonia due to infectious organism, unspecified laterality, unspecified part of lung  Assessment and Plan of Treatment: During this hospitalization you were treated for aspiration pneumonia, you were treated with intravenous antibiotics during 4 days , you will conitnue treatment with Augmentin for 6 more days to complete a total of 10 days course.    Recommend aspiration precautions and elevation of the bed head to avoid other episodes of aspiration pneumonia.      SECONDARY DISCHARGE DIAGNOSES  Diagnosis: Atonic seizure  Assessment and Plan of Treatment: Continue with seizure medications. Seizure precautions.   DM2 - continue with medications as prescribed.   HTN - Continue with medications as prescribed. PRINCIPAL DISCHARGE DIAGNOSIS  Diagnosis: Pneumonia due to infectious organism, unspecified laterality, unspecified part of lung  Assessment and Plan of Treatment: During this hospitalization you were treated for aspiration pneumonia, you were treated with intravenous antibiotics during 4 days , you will conitnue treatment with Augmentin for 6 more days to complete a total of 10 days course.    Recommend aspiration precautions and elevation of the bed head to avoid other episodes of aspiration pneumonia.      SECONDARY DISCHARGE DIAGNOSES  Diagnosis: Atonic seizure  Assessment and Plan of Treatment: Continue with seizure medications. Seizure precautions.   PEG tube site irritation, not infected. Continue with keeping area clean and dry. Patient tends to pull on the tube please maintain abdominal binder or dressing so patient does not remove the tube. Also for irritation can use zinc oxide as needed.   DM2 - continue with medications as prescribed.   HTN - Continue with medications as prescribed.

## 2019-06-02 NOTE — DISCHARGE NOTE PROVIDER - NSDCQMCOGNITION_NEU_ALL_CORE
Difficulty making decisions Difficulty making decisions/Difficulty remembering/Difficulty concentrating

## 2019-06-02 NOTE — PROVIDER CONTACT NOTE (CRITICAL VALUE NOTIFICATION) - ACTION/TREATMENT ORDERED:
will order CHG, bactroban as per policy
Adv will get back to us as whether or not to hold the next vanco dose
Continue PEG site care

## 2019-06-02 NOTE — PROVIDER CONTACT NOTE (OTHER) - ASSESSMENT
vital signs on arrival from [facility] as follows ;/84, temp 98.5, , resp 18, Spo2 93% room air.

## 2019-06-02 NOTE — DISCHARGE NOTE PROVIDER - CARE PROVIDER_API CALL
NH attending,   Pt is from [facility]  Phone: (   )    -  Fax: (   )    -  Follow Up Time:

## 2019-06-02 NOTE — DISCHARGE NOTE NURSING/CASE MANAGEMENT/SOCIAL WORK - NSDCDPATPORTLINK_GEN_ALL_CORE
You can access the [facility], offered by Health system, by registering with the following website: [email or web address]

## 2019-06-02 NOTE — DISCHARGE NOTE PROVIDER - HOSPITAL COURSE
52 y/o M Hx of TBI with resultant pareplegia (28 yrs ago), chronic respiratory railure s/p trach with reversal, s/p peg tube,  HTN, DM2 , GERD, and  recurrent aspiration PNA who presented with worsening hypoxia from the NH, pt noted with hypoxia desatuation to 88%, ct chest with noted bilateral upper lobe a focal posterior segment and airspace consolidations and pt admitted with aspiration PNA. Hospital course complicated by presumed peg site infection, but more likely irritation around the tube site,  Bcx were neg and culture from PEG tube did not shows any significant infection , recommend to use Zinc oxide to avoid irritation,  Currently pt remains afebrile, no leukocytosis and today able to come off supplemental o2. Pt has been tx w/ zosyn for 4 days and will c/w augmentin 875 mg BID x 6 days to complete a total of 10 days course, recommend elevation of bed head and aspiration precautions . All electrolytes imbalance were replaced , at this time pt is medically stable to be discharge back to [facility].             Vital Signs Last 24 Hrs    T(C): 36.8 (02 Jun 2019 08:10), Max: 36.9 (01 Jun 2019 16:13)    T(F): 98.3 (02 Jun 2019 08:10), Max: 98.5 (01 Jun 2019 16:13)    HR: 93 (02 Jun 2019 08:31) (88 - 94)    BP: 111/60 (02 Jun 2019 08:10) (111/60 - 141/84)    RR: 18 (02 Jun 2019 08:10) (18 - 19)    SpO2: 93% (02 Jun 2019 08:31) (93% - 99%)        PHYSICAL EXAM:    Const: WD WN  NAD  non verbal     HEENT: NC/AT, PERRLA, EOMI, clear nares, Moist mucous membranes    Neck:. Soft and supple    Cardiovascular: Regular rate and regular rhythm. +S1/S2. No murmurs. Peripheral pulses 2+ and symmetric. No LE edema.    Respiratory:  Mild ronchi b/l, no wheezes or rhales     Abd: Soft NT ND BS+ normoactive, peg site clean/ dry/ Intact, with slight irritation and erythema around site no evidence of infection     Skin: No rashes, discolorations, abrasions, lacerations, lesions, ulcers noted.     MSK: Contracted Left arm and hand, equine foot bilaterally, muscle wasting prominent in all extremities.                                10.5     7.0   )-----------( 327      ( 01 Jun 2019 06:48 )               33.9             06-01        138  |  103  |  12.0    ----------------------------<  166<H>    3.8   |  23.0  |  0.44<L>        Ca    8.7      01 Jun 2019 06:48    Phos  3.9     06-01    Mg     2.4     06-01        < from: CT Chest No Cont (05.29.19 @ 17:24) >        IMPRESSION:        With peripheral lung fibrosis and subsegmental atelectasis as described.    Bilateral upper lobe a focal posterior segment and airspace     consolidations as described..             Culture - Blood (05.29.19 @ 11:06)      Specimen Source: .Blood      Culture Results:     No growth at 48 hours 52 y/o M Hx of TBI with resultant pareplegia (28 yrs ago), chronic respiratory railure s/p trach with reversal, s/p peg tube,  HTN, DM2 , GERD, and  recurrent aspiration PNA who presented with worsening hypoxia from the NH, pt noted with hypoxia desatuation to 88%, ct chest with noted bilateral upper lobe a focal posterior segment and airspace consolidations and pt admitted with aspiration PNA. Hospital course complicated by presumed peg site infection, but more likely irritation around the tube site,  Bcx were neg and culture from PEG tube did not shows any significant infection , recommend to use Zinc oxide to avoid irritation,  Currently pt remains afebrile, no leukocytosis and  able to come off supplemental o2. Pt has been tx w/ zosyn for 4 days and will c/w augmentin 875 mg BID x 6 days to complete a total of 10 days course, recommend elevation of bed head and aspiration precautions . All electrolytes imbalance were replaced , at this time pt is medically stable to be discharge back to [facility].             Vital Signs Last 24 Hrs    T(C): 36.8 (02 Jun 2019 08:10), Max: 36.9 (01 Jun 2019 16:13)    T(F): 98.3 (02 Jun 2019 08:10), Max: 98.5 (01 Jun 2019 16:13)    HR: 93 (02 Jun 2019 08:31) (88 - 94)    BP: 111/60 (02 Jun 2019 08:10) (111/60 - 141/84)    RR: 18 (02 Jun 2019 08:10) (18 - 19)    SpO2: 93% (02 Jun 2019 08:31) (93% - 99%)        PHYSICAL EXAM:    Const: WD WN  NAD  non verbal     HEENT: NC/AT, PERRLA, EOMI, clear nares, Moist mucous membranes    Neck:. Soft and supple    Cardiovascular: Regular rate and regular rhythm. +S1/S2. No murmurs. Peripheral pulses 2+ and symmetric. No LE edema.    Respiratory:  Mild ronchi b/l, no wheezes or rhales     Abd: Soft NT ND BS+ normoactive, peg site clean/ dry/ Intact, with slight irritation and erythema around site no evidence of infection     Skin: No rashes, discolorations, abrasions, lacerations, lesions, ulcers noted.     MSK: Contracted Left arm and hand, equine foot bilaterally, muscle wasting prominent in all extremities.                                10.5     7.0   )-----------( 327      ( 01 Jun 2019 06:48 )               33.9             06-01        138  |  103  |  12.0    ----------------------------<  166<H>    3.8   |  23.0  |  0.44<L>        Ca    8.7      01 Jun 2019 06:48    Phos  3.9     06-01    Mg     2.4     06-01        < from: CT Chest No Cont (05.29.19 @ 17:24) >        IMPRESSION:        With peripheral lung fibrosis and subsegmental atelectasis as described.    Bilateral upper lobe a focal posterior segment and airspace     consolidations as described..             Culture - Blood (05.29.19 @ 11:06)      Specimen Source: .Blood      Culture Results:     No growth at 48 hours

## 2019-06-02 NOTE — DISCHARGE NOTE PROVIDER - INSTRUCTIONS
Pt NPO , getting all medications by PEG tube   Aspiration precautions   Continue feeding through PEG tube Pt NPO , getting all medications by PEG tube   Aspiration precautions   Continue feeding through PEG tube  Jevity 1.5 @55

## 2019-06-03 PROBLEM — I10 ESSENTIAL (PRIMARY) HYPERTENSION: Chronic | Status: ACTIVE | Noted: 2019-05-30

## 2019-06-03 PROBLEM — E11.9 TYPE 2 DIABETES MELLITUS WITHOUT COMPLICATIONS: Chronic | Status: ACTIVE | Noted: 2019-05-30

## 2019-06-03 PROBLEM — A80.9 ACUTE POLIOMYELITIS, UNSPECIFIED: Chronic | Status: ACTIVE | Noted: 2019-05-30

## 2019-06-03 PROBLEM — J18.9 PNEUMONIA, UNSPECIFIED ORGANISM: Chronic | Status: ACTIVE | Noted: 2019-05-30

## 2019-06-03 PROBLEM — Z86.69 PERSONAL HISTORY OF OTHER DISEASES OF THE NERVOUS SYSTEM AND SENSE ORGANS: Chronic | Status: ACTIVE | Noted: 2019-05-30

## 2019-06-03 PROBLEM — Z87.09 PERSONAL HISTORY OF OTHER DISEASES OF THE RESPIRATORY SYSTEM: Chronic | Status: ACTIVE | Noted: 2019-05-30

## 2019-06-03 LAB
-  AMIKACIN: SIGNIFICANT CHANGE UP
-  AMPICILLIN/SULBACTAM: SIGNIFICANT CHANGE UP
-  AMPICILLIN/SULBACTAM: SIGNIFICANT CHANGE UP
-  AMPICILLIN: SIGNIFICANT CHANGE UP
-  AZTREONAM: SIGNIFICANT CHANGE UP
-  CEFAZOLIN: SIGNIFICANT CHANGE UP
-  CEFAZOLIN: SIGNIFICANT CHANGE UP
-  CEFEPIME: SIGNIFICANT CHANGE UP
-  CEFOXITIN: SIGNIFICANT CHANGE UP
-  CEFTRIAXONE: SIGNIFICANT CHANGE UP
-  CIPROFLOXACIN: SIGNIFICANT CHANGE UP
-  CLINDAMYCIN: SIGNIFICANT CHANGE UP
-  ERTAPENEM: SIGNIFICANT CHANGE UP
-  ERYTHROMYCIN: SIGNIFICANT CHANGE UP
-  GENTAMICIN: SIGNIFICANT CHANGE UP
-  GENTAMICIN: SIGNIFICANT CHANGE UP
-  IMIPENEM: SIGNIFICANT CHANGE UP
-  LEVOFLOXACIN: SIGNIFICANT CHANGE UP
-  MEROPENEM: SIGNIFICANT CHANGE UP
-  OXACILLIN: SIGNIFICANT CHANGE UP
-  PENICILLIN: SIGNIFICANT CHANGE UP
-  PIPERACILLIN/TAZOBACTAM: SIGNIFICANT CHANGE UP
-  RIFAMPIN: SIGNIFICANT CHANGE UP
-  TETRACYCLINE: SIGNIFICANT CHANGE UP
-  TOBRAMYCIN: SIGNIFICANT CHANGE UP
-  TRIMETHOPRIM/SULFAMETHOXAZOLE: SIGNIFICANT CHANGE UP
-  TRIMETHOPRIM/SULFAMETHOXAZOLE: SIGNIFICANT CHANGE UP
-  VANCOMYCIN: SIGNIFICANT CHANGE UP
CULTURE RESULTS: SIGNIFICANT CHANGE UP
CULTURE RESULTS: SIGNIFICANT CHANGE UP
METHOD TYPE: SIGNIFICANT CHANGE UP
METHOD TYPE: SIGNIFICANT CHANGE UP
SPECIMEN SOURCE: SIGNIFICANT CHANGE UP
SPECIMEN SOURCE: SIGNIFICANT CHANGE UP

## 2019-06-05 LAB
CULTURE RESULTS: SIGNIFICANT CHANGE UP
ORGANISM # SPEC MICROSCOPIC CNT: SIGNIFICANT CHANGE UP
SPECIMEN SOURCE: SIGNIFICANT CHANGE UP

## 2020-03-01 NOTE — PHYSICAL THERAPY INITIAL EVALUATION ADULT - PASSIVE RANGE OF MOTION EXAMINATION, REHAB EVAL
7
right UE contracted, right shoulder to approximately 80 degrees flexion. bilateral PF contractures noted./bilateral lower extremity Passive ROM was WFL (within functional limits)
except bilateral plantar flexion contractures and right UE flexion contracture/bilateral lower extremity Passive ROM was WFL (within functional limits)

## 2020-07-23 NOTE — CONSULT NOTE ADULT - PROVIDER SPECIALTY LIST ADULT
Birth Control Pills   WHAT YOU NEED TO KNOW:   What are birth control pills? Birth control pills are also called oral contraceptives, or the pill  It is medicine that helps prevent pregnancy  Birth control pills work by preventing ovulation  Ovulation is when the ovaries make and release an egg cell each month  If this egg gets fertilized by sperm, pregnancy occurs  Birth control pills may also help to prevent pregnancy by keeping sperm from fertilizing an egg  What may be done before I can start taking birth control pills? You need to see your healthcare provider to get a prescription  Any of the following may be done before your healthcare provider gives you a prescription:  · Your healthcare provider will ask you about diseases and illnesses you have had in the past  He will check your risk for blood clots, heart conditions, or stroke  He will also check your blood pressure, and may do a breast and pelvic exam  A Pap smear may also be done during the pelvic exam  This is a test to make sure you do not have abnormal changes on your cervix  You may need other tests, such as a urine test, to make sure you are not pregnant  · Your healthcare provider will ask if you take any medicines and if you smoke  Smoking increases your risk for stroke, heart attack, or a blood clot in your lungs  If you smoke, you should not take certain kinds of birth control pills  What are the advantages of birth control pills? When birth control pills are used correctly, the chances of getting pregnant are very low  Birth control pills may help decrease bleeding and pain during your monthly period  They may also help prevent cancer of the uterus and ovaries  What are the disadvantages of birth control pills? You may have sudden changes in your mood or feelings while you take birth control pills  You may have nausea and decreased sex drive  You may have an increased appetite and rapid weight gain   You may also have bleeding in Infectious Disease between periods, less frequent periods, vaginal dryness, and breast pain  Birth control pills will not protect you from sexually transmitted infections  Rarely, some birth control pills can increase your risk for a blood clot  This may become life-threatening  What should I do if I decide I want to get pregnant? If you are planning to have a baby, ask your healthcare provider when you may stop taking your birth control pills  It may take some time for you to start ovulating again  Ask your healthcare provider for more information about pregnancy after birth control pills  When should I start taking birth control pills after I have a baby? If you are not breastfeeding, you may start taking birth control pills 3 weeks after you give birth  You may be able to take certain types of birth control pills if you are breastfeeding  These pills can be started from 6 weeks to 6 months after you give birth  Ask your healthcare provider for more information about when to start taking birth control pills after you give birth  When should I contact my healthcare provider? · You have forgotten to take a birth control pill  · You have mood changes, such as depression, since starting birth control pills  · You have nausea or you are vomiting  · You have severe abdominal pain  · You missed a period and have questions or concerns about being pregnant  · You still have bleeding 4 months after taking birth control pills correctly  · You have questions or concerns about your condition or care  When should I seek immediate care or call 911? · Your arm or leg feels warm, tender, and painful  It may look swollen and red  · You feel lightheaded, short of breath, and have chest pain  · You cough up blood      · You have any of the following signs of a stroke:      ¨ Numbness or drooping on one side of your face     ¨ Weakness in an arm or leg    ¨ Confusion or difficulty speaking    ¨ Dizziness, a severe headache, or vision loss    · You have severe pain, numbness, or swelling in your arms or legs  CARE AGREEMENT:   You have the right to help plan your care  Learn about your health condition and how it may be treated  Discuss treatment options with your caregivers to decide what care you want to receive  You always have the right to refuse treatment  The above information is an  only  It is not intended as medical advice for individual conditions or treatments  Talk to your doctor, nurse or pharmacist before following any medical regimen to see if it is safe and effective for you  © 2017 [facility] [facility] Information is for End User's use only and may not be sold, redistributed or otherwise used for commercial purposes  All illustrations and images included in CareNotes® are the copyrighted property of A D A M [employer]  or Lee Mandujano

## 2020-08-26 NOTE — PROGRESS NOTE ADULT - ASSESSMENT
53 M (resident of [facility]) with TBI, s/p PEG tube, contracture, and seizure d/o who presented as a transfer from [facility] on 12/9 for respiratory failure 2/2 ARDS likely 2/2 necrotizing pancreatitis s/p intubation.  Imipenem 12/28/18 - continued   bronc 12/11 with pseudomonas  Thick secretions and again febrile, sputum cx with  acinetobacter  GIB and colitis due to ?contiguous necrotizing pancreatitis vs ischemic, s/p colonoscopy but not actively bleeding now  completed a 7 day course of avycaz and flagyl for the acinetobacter then switched back to imipenem, but pt again had hypothermia with the same acinetobacter in sputum now s/p trach and back on avycaz + flagyl    sepsis with fever, leukopenia resolved, extubated but reintubated 1/6/19 for hypoxia and poor cough along with profuse secretions and sputum cx again with acinetobacter   Necrotising Pancreatitis with multiple peripancreatitis collections better formed on CT 1/24 and a new 7 cm abscess  improved but still present lung abscesses and new sputum cx with pseudomonas, ?VAP   acinetobacter PNA  with lung abscesses  coag neg staph bacteremia likely contaminant, repeat negative  colitis, ?due to contiguous necrotizing pancreatitis and collection vs ischemic  Overall, lung abscess, MDR organisms, necrotizing pancreatitis  - s/p 7 days of avcaz and flagyl for  acinetobacter in the sputum 12/22-12/28  then back on imipenem for necrotizing pancreatis  - restarted on  avycaz and flagyl to cover the acinetobacter again 1/9, will continue to complete at least a 4 weeks for the lung abscesses  - s/p vanco 1/22-1/24  - f/u with GI  - frequent suctioning and pulmonary toileting  - Monitor for any further signs infection; close monitoring for neurological symptoms on prolonged flagyl    Neal Oleary MD  Pager [phone]  After 5pm and on weekends call [phone] Other

## 2020-10-08 NOTE — DISCHARGE NOTE PROVIDER - NSDCDCMDCOMP_GEN_ALL_CORE
Subjective  feeling well; remains npo   Objective    Vital signs  T(F): , Max: 98.5 (10-07-20 @ 14:23)  HR: 76 (10-08-20 @ 05:12)  BP: 147/68 (10-08-20 @ 05:12)  SpO2: 94% (10-08-20 @ 05:12)  Wt(kg): --    Output     10-07 @ 07:01  -  10-08 @ 06:42  --------------------------------------------------------  IN: 1320 mL / OUT: 1350 mL / NET: -30 mL        Gen awake alert nad axox3  Abd obese soft ntnd   Back no cvat bl    scrotal edema, minimal erythema, bl testis desc nontender, pump in place mobile; no fluctuance      Labs      10-07 @ 17:16    WBC 6.75  / Hct 30.9  / SCr 1.77       Urine Cx: sent and rec     Imaging  < from: CT Abdomen and Pelvis No Cont (10.07.20 @ 17:50) >  FINDINGS:  LOWER CHEST:  Lingular opacity measures 2.1 x 1.7 cm (4, 19). Mild emphysema. Right basilar dependent atelectasis. Small pericardial effusion. Coronary artery and aortic valve calcifications. Redemonstrated ectatic ascending aorta measures up to 4.4 cm.    LIVER: Within normal limits.  BILE DUCTS: Normal caliber.  GALLBLADDER: Cholelithiasis.  SPLEEN: Within normal limits.  PANCREAS: Within normal limits.  ADRENALS: Within normal limits.  KIDNEYS/URETERS: Nonspecific bilateral renal stranding. 1.4 cm right renal lesion measures simple fluid density, likely cyst. 1.2 cm left renal cyst (10HU).    BLADDER: Within normal limits.  REPRODUCTIVE ORGANS: Penile implant with surrounding soft tissue and scrotal cellulitis. No drainable abscess. Balloon within the left lower hemipelvis. Prostatic seeds.    BOWEL: No bowel obstruction. Colonic diverticulosis without acute diverticulitis.  PERITONEUM: No ascites.  VESSELS: Within normal limits.  RETROPERITONEUM/LYMPH NODES: No lymphadenopathy.  ABDOMINAL WALL: Inflammatory change in the lower pelvis/abdominal wall. No drainable abscess.  BONES: Within normal limits.    IMPRESSION:    Edema and cellulitis along the scrotum and base of the penis without drainable abscess collection.    Trace pericardial fluid.    New focal lingular consolidation when compared with prior exam of May 26, 2016 which is indeterminate. Consider 3 month follow-up chest CT for this finding.      < end of copied text >   This document is complete and the patient is ready for discharge.

## 2021-02-25 NOTE — PROGRESS NOTE ADULT - SUBJECTIVE AND OBJECTIVE BOX
CHIEF COMPLAINT:    Interval Events:    REVIEW OF SYSTEMS:  Constitutional:   Eyes:  ENT:  CV:  Resp:  GI:  :  MSK:  Integumentary:  Neurological:  Psychiatric:  Endocrine:  Hematologic/Lymphatic:  Allergic/Immunologic:  [ ] All other systems negative  [ ] Unable to assess ROS because ________    OBJECTIVE:  ICU Vital Signs Last 24 Hrs  T(C): 36.8 (2019 05:26), Max: 37.6 (2019 18:00)  T(F): 98.2 (2019 05:26), Max: 99.6 (2019 18:00)  HR: 117 (2019 07:17) (99 - 121)  BP: 116/77 (2019 05:26) (106/67 - 118/82)  BP(mean): --  ABP: --  ABP(mean): --  RR: 20 (2019 05:26) (18 - 21)  SpO2: 99% (2019 07:17) (99% - 100%)    Mode: AC/ CMV (Assist Control/ Continuous Mandatory Ventilation), RR (machine): 14, TV (machine): 350, FiO2: 40, PEEP: 5, MAP: 9, PIP: 18     @ 07:01  -  30 @ 07:00  --------------------------------------------------------  IN: 2260 mL / OUT: 400 mL / NET: 1860 mL      CAPILLARY BLOOD GLUCOSE      POCT Blood Glucose.: 124 mg/dL (2019 05:29)      PHYSICAL EXAM:  General:   HEENT:   Lymph Nodes:  Neck:   Respiratory:   Cardiovascular:   Abdomen:   Extremities:   Skin:   Neurological:  Psychiatry:    HOSPITAL MEDICATIONS:  MEDICATIONS  (STANDING):  cefTAZidime/avibactam IVPB 2.5 Gram(s) IV Intermittent every 8 hours  cefTAZidime/avibactam IVPB      chlorhexidine 4% Liquid 1 Application(s) Topical <User Schedule>  cyanocobalamin 1000 MICROGram(s) Oral daily  dextrose 5%. 1000 milliLiter(s) (50 mL/Hr) IV Continuous <Continuous>  dextrose 50% Injectable 12.5 Gram(s) IV Push once  dextrose 50% Injectable 25 Gram(s) IV Push once  dextrose 50% Injectable 25 Gram(s) IV Push once  folic acid 1 milliGRAM(s) Enteral Tube daily  heparin  Injectable 5000 Unit(s) SubCutaneous every 8 hours  insulin lispro (HumaLOG) corrective regimen sliding scale   SubCutaneous every 6 hours  lacosamide Solution 100 milliGRAM(s) Oral two times a day  lactobacillus acidophilus 1 Tablet(s) Oral three times a day with meals  metroNIDAZOLE  IVPB      metroNIDAZOLE  IVPB 500 milliGRAM(s) IV Intermittent every 8 hours  midodrine 5 milliGRAM(s) Oral three times a day  psyllium Powder 1 Packet(s) Oral daily  QUEtiapine 25 milliGRAM(s) Oral daily  risperiDONE   Solution 1 milliGRAM(s) Oral daily    MEDICATIONS  (PRN):  acetaminophen    Suspension .. 650 milliGRAM(s) Oral every 6 hours PRN Temp greater or equal to 38C (100.4F), Mild Pain (1 - 3), Moderate Pain (4 - 6)  dextrose 40% Gel 15 Gram(s) Oral once PRN Blood Glucose LESS THAN 70 milliGRAM(s)/deciliter  glucagon  Injectable 1 milliGRAM(s) IntraMuscular once PRN Glucose LESS THAN 70 milligrams/deciliter      LABS:                        9.0    4.12  )-----------( 424      ( 2019 05:30 )             29.5     01-30    130<L>  |  97<L>  |  3<L>  ----------------------------<  119<H>  4.3   |  24  |  < 0.20<L>    Ca    7.6<L>      2019 05:30  Phos  2.6       Mg     1.7             Urinalysis Basic - ( 2019 06:09 )    Color: YELLOW / Appearance: CLEAR / S.014 / pH: 7.5  Gluc: NEGATIVE / Ketone: NEGATIVE  / Bili: NEGATIVE / Urobili: NORMAL   Blood: NEGATIVE / Protein: 10 / Nitrite: NEGATIVE   Leuk Esterase: NEGATIVE / RBC: x / WBC x   Sq Epi: x / Non Sq Epi: x / Bacteria: x            MICROBIOLOGY:     RADIOLOGY:  [ ] Reviewed and interpreted by me    PULMONARY FUNCTION TESTS:    EKG: 20 CHIEF COMPLAINT: Patient is a 53y old  Male who presents with a chief complaint of ARDS?, sepsis (2019 14:18)      Interval Events: none     REVIEW OF SYSTEMS:  Constitutional: no pain /fevers  Eyes:  ENT:  CV: denies   Resp: denies   GI: Denies   [x ] All other systems negative      OBJECTIVE:  ICU Vital Signs Last 24 Hrs  T(C): 36.8 (2019 05:26), Max: 37.6 (2019 18:00)  T(F): 98.2 (2019 05:26), Max: 99.6 (2019 18:00)  HR: 117 (2019 07:17) (99 - 121)  BP: 116/77 (2019 05:26) (106/67 - 118/82)  BP(mean): --  ABP: --  ABP(mean): --  RR: 20 (2019 05:26) (18 - 21)  SpO2: 99% (2019 07:17) (99% - 100%)    Mode: AC/ CMV (Assist Control/ Continuous Mandatory Ventilation), RR (machine): 14, TV (machine): 350, FiO2: 40, PEEP: 5, MAP: 9, PIP: 18     @ 07:01  -  30 @ 07:00  --------------------------------------------------------  IN: 2260 mL / OUT: 400 mL / NET: 1860 mL      CAPILLARY BLOOD GLUCOSE      POCT Blood Glucose.: 124 mg/dL (2019 05:29)          HOSPITAL MEDICATIONS:  MEDICATIONS  (STANDING):  cefTAZidime/avibactam IVPB 2.5 Gram(s) IV Intermittent every 8 hours  cefTAZidime/avibactam IVPB      chlorhexidine 4% Liquid 1 Application(s) Topical <User Schedule>  cyanocobalamin 1000 MICROGram(s) Oral daily  dextrose 5%. 1000 milliLiter(s) (50 mL/Hr) IV Continuous <Continuous>  dextrose 50% Injectable 12.5 Gram(s) IV Push once  dextrose 50% Injectable 25 Gram(s) IV Push once  dextrose 50% Injectable 25 Gram(s) IV Push once  folic acid 1 milliGRAM(s) Enteral Tube daily  heparin  Injectable 5000 Unit(s) SubCutaneous every 8 hours  insulin lispro (HumaLOG) corrective regimen sliding scale   SubCutaneous every 6 hours  lacosamide Solution 100 milliGRAM(s) Oral two times a day  lactobacillus acidophilus 1 Tablet(s) Oral three times a day with meals  metroNIDAZOLE  IVPB      metroNIDAZOLE  IVPB 500 milliGRAM(s) IV Intermittent every 8 hours  midodrine 5 milliGRAM(s) Oral three times a day  psyllium Powder 1 Packet(s) Oral daily  QUEtiapine 25 milliGRAM(s) Oral daily  risperiDONE   Solution 1 milliGRAM(s) Oral daily    MEDICATIONS  (PRN):  acetaminophen    Suspension .. 650 milliGRAM(s) Oral every 6 hours PRN Temp greater or equal to 38C (100.4F), Mild Pain (1 - 3), Moderate Pain (4 - 6)  dextrose 40% Gel 15 Gram(s) Oral once PRN Blood Glucose LESS THAN 70 milliGRAM(s)/deciliter  glucagon  Injectable 1 milliGRAM(s) IntraMuscular once PRN Glucose LESS THAN 70 milligrams/deciliter      LABS:                        9.0    4.12  )-----------( 424      ( 2019 05:30 )             29.5     01-30    130<L>  |  97<L>  |  3<L>  ----------------------------<  119<H>  4.3   |  24  |  < 0.20<L>    Ca    7.6<L>      2019 05:30  Phos  2.6       Mg     1.7             Urinalysis Basic - ( 2019 06:09 )    Color: YELLOW / Appearance: CLEAR / S.014 / pH: 7.5  Gluc: NEGATIVE / Ketone: NEGATIVE  / Bili: NEGATIVE / Urobili: NORMAL   Blood: NEGATIVE / Protein: 10 / Nitrite: NEGATIVE   Leuk Esterase: NEGATIVE / RBC: x / WBC x   Sq Epi: x / Non Sq Epi: x / Bacteria: x            MICROBIOLOGY:     RADIOLOGY:  [ ] Reviewed and interpreted by me    PULMONARY FUNCTION TESTS:    EKG:

## 2021-04-20 NOTE — PROCEDURE NOTE - NSPROCDETAILS_GEN_ALL_CORE
connected to ventilator/patient pre-oxygenated, tube inserted, placement confirmed
lumen(s) aspirated and flushed/guidewire recovered/sterile dressing applied/sterile technique, catheter placed/ultrasound guidance
sutured in place/positive blood return obtained via catheter/Seldinger technique/all materials/supplies accounted for at end of procedure/ultrasound guidance/hemostasis with direct pressure, dressing applied/location identified, draped/prepped, sterile technique used, needle inserted/introduced/connected to a pressurized flush line
[As Noted in HPI] : as noted in HPI
[Polyuria] : polyuria
[Nocturia] : nocturia
[Joint Pain] : joint pain
[Back Pain] : back pain
[Polydipsia] : polydipsia
[Blurred Vision] : no blurred vision
[Chest Pain] : no chest pain
[Shortness Of Breath] : no shortness of breath
[SOB on Exertion] : no shortness of breath on exertion
[Nausea] : no nausea
[Abdominal Pain] : no abdominal pain
[Pain/Numbness of Digits] : no pain/numbness of digits
[Depression] : no depression
[Anxiety] : no anxiety
[FreeTextEntry2] : weight fluctuates btw 210-214
[FreeTextEntry9] : knee and back pains due to osteoarthritis, improved with weight loss

## 2021-07-21 NOTE — PROGRESS NOTE ADULT - ASSESSMENT
54 y/o M (resident of [facility]) w/ a PMHx of TBI, s/p PEG tube, contracture, and seizure d/o who presented as a transfer from [facility] on 12/9 for respiratory failure 2/2 ARDS likely 2/2 necrotizing pancreatitis s/p intubation. Course c/b Acinetobacter PNA s/p Avycaz and Flagyl 7 day course.  Course complicated by acute blood loss anemia s/p coloscopy with findings suggestive of ischemic colitis.     #Neurology/Psych  - Off sedation. Awake. Follows simple commands  - Seizure Disorder- Continue Lacosamide.   - Continue Risperidone and Quetiapine     # Cardiovascular  - Currently hemodynamically stable. Continue to monitor BP closely.    # Respiratory  - ARDS 2/2 Necrotizing Pancreatitis.   - Acinetobacter PNA  - s/p intubation. Continue daily CPAP trials.     #GI  - Necrotizing Pancreatitis with enlarging danay-pancreatitic fluid collections  - Acute Blood Loss Anemia 2/2 Ischemic Colitis   - Per GI currently not a candidate for endoscopic intervention. Consider r/p CT Scan in 5 days.   - s/p Avycaz and flagyl for necrotizing pancreatitis. Currently on Imipenem. Follow up ID recommendations about duration.   - Colorectral Sx: no surgical intervention at this time  - IR: no role for embolization at this time as patient with ischemic colitis     - CBC q12h for now as Hgb has been stable above 9.     #  - Ahuaj placement.    # Renal/Electrolytes  - No active issues. Replete electrolytes as needed.     #ID   - Pseudomonas PNA: BAL on 12/12 was positive for pseudomonas aeruginosa. Repeat sputum cx w/ Acinetobacter resistant to carbapenems. s/p Avycaz and flagyl for 7 day course.   - Imipenem for pancreatitis as above     #Hematology/Oncology   - Acute Blood Loss Anemia as described above. CBC q12h. Transfusion goal Hgb >7.     #Endocrine  - FSG's on 70's overnight, started on D5 1/2 NS at 75 cc/hr   - Continue to monitor ISS and FS.     #DVT PPx:   - SCD's due to anemia Statement Selected 54 y/o M (resident of [facility]) w/ a PMHx of TBI, s/p PEG tube, contracture, and seizure d/o who presented as a transfer from [facility] on 12/9 for respiratory failure 2/2 ARDS likely 2/2 necrotizing pancreatitis s/p intubation. Course c/b Acinetobacter PNA s/p Avycaz and Flagyl 7 day course.  Course complicated by acute blood loss anemia s/p coloscopy with findings suggestive of ischemic colitis.     #Neurology/Psych  - Off sedation. Awake. Follows simple commands  - Seizure Disorder- Continue Lacosamide.   - Continue Risperidone and Quetiapine     # Cardiovascular  - Currently hemodynamically stable. Continue to monitor BP closely.    # Respiratory  - ARDS 2/2 Necrotizing Pancreatitis.   - Acinetobacter PNA  - s/p intubation. Continue daily CPAP trials.     #GI  - Necrotizing Pancreatitis with enlarging danay-pancreatitic fluid collections  - Acute Blood Loss Anemia 2/2 Ischemic Colitis   - Per GI currently not a candidate for endoscopic intervention. Consider r/p CT Scan in 5 days.   - s/p Avycaz and flagyl for necrotizing pancreatitis. Currently on Imipenem. Follow up ID recommendations about duration.   - Colorectral Sx: no surgical intervention at this time  - IR: no role for embolization at this time as patient with ischemic colitis     - CBC q12h for now as Hgb has been stable above 9.     #  - Ahuja placement.    # Renal/Electrolytes  - No active issues. Replete electrolytes as needed.     #ID   - Pseudomonas PNA: BAL on 12/12 was positive for pseudomonas aeruginosa. Repeat sputum cx w/ Acinetobacter resistant to carbapenems. s/p Avycaz and flagyl for 7 day course.   - Imipenem for pancreatitis as above     #Hematology/Oncology   - Acute Blood Loss Anemia as described above. CBC q12h. Transfusion goal Hgb >7.     #Endocrine  - FSG's improved  - Continue to monitor ISS and FS.     #DVT PPx:   - SCD's due to anemia 52 y/o M (resident of [facility]) w/ a PMHx of TBI, s/p PEG tube, contracture, and seizure d/o who presented as a transfer from [facility] on 12/9 for respiratory failure 2/2 ARDS likely 2/2 necrotizing pancreatitis s/p intubation. Course c/b Acinetobacter PNA s/p Avycaz and Flagyl 7 day course.  Course complicated by acute blood loss anemia s/p coloscopy with findings suggestive of ischemic colitis.     #Neurology/Psych  - Off sedation. Awake. Follows simple commands  - Seizure Disorder- Continue Lacosamide.   - Continue Risperidone and Quetiapine     # Cardiovascular  - Currently hemodynamically stable. Continue to monitor BP closely.    # Respiratory  - ARDS 2/2 Necrotizing Pancreatitis.   - Acinetobacter PNA  - s/p intubation. Continue daily CPAP trials.     #GI  - Necrotizing Pancreatitis with enlarging danay-pancreatitic fluid collections  - Acute Blood Loss Anemia 2/2 Ischemic Colitis   - Per GI currently not a candidate for endoscopic intervention. Consider r/p CT Scan in 5 days.   - s/p Avycaz and flagyl for necrotizing pancreatitis. Currently on Imipenem. Follow up ID recommendations about duration.   - Colorectral Sx: no surgical intervention at this time, can restart feeds  - IR: no role for embolization at this time as patient with ischemic colitis     - CBC q12h for now as Hgb has been stable above 9.     #  - Ahuja placement.    # Renal/Electrolytes  - No active issues. Replete electrolytes as needed.     #ID   - Pseudomonas PNA: BAL on 12/12 was positive for pseudomonas aeruginosa. Repeat sputum cx w/ Acinetobacter resistant to carbapenems. s/p Avycaz and flagyl for 7 day course.   - Imipenem for pancreatitis as above     #Hematology/Oncology   - Acute Blood Loss Anemia as described above. CBC q12h. Transfusion goal Hgb >7.     #Endocrine  - FSG's improved  - Continue to monitor ISS and FS.     #DVT PPx:   - SCD's due to anemia 52 y/o M (resident of [facility]) w/ a PMHx of TBI, s/p PEG tube, contracture, and seizure d/o who presented as a transfer from [facility] on 12/9 for respiratory failure 2/2 ARDS likely 2/2 necrotizing pancreatitis s/p intubation. Course c/b Acinetobacter PNA s/p Avycaz and Flagyl 7 day course.  Course complicated by acute blood loss anemia s/p coloscopy with findings suggestive of ischemic colitis.     #Neurology/Psych  - Off sedation. Awake. Follows simple commands  - Seizure Disorder- Continue Lacosamide.   - Continue Risperidone and Quetiapine     # Cardiovascular  - Currently hemodynamically stable. Continue to monitor BP closely.    # Respiratory  - ARDS 2/2 Necrotizing Pancreatitis.   - Acinetobacter PNA  - s/p intubation. Continue daily CPAP trials.     #GI  - Necrotizing Pancreatitis with enlarging danay-pancreatitic fluid collections  - Acute Blood Loss Anemia 2/2 Ischemic Colitis   - Per GI currently not a candidate for endoscopic intervention. Consider r/p CT Scan in 5 days.   - s/p Avycaz and flagyl for necrotizing pancreatitis. Currently on Imipenem. Follow up ID recommendations about duration.   - Colorectral Sx: no surgical intervention at this time, can restart feeds  - IR: no role for embolization at this time as patient with ischemic colitis     - GI checked PEG, confirmed proper placement, will continue with feeds  - CBC q12h for now as Hgb has been stable above 9.     #  - Ahuja placement.    # Renal/Electrolytes  - No active issues. Replete electrolytes as needed.     #ID   - Pseudomonas PNA: BAL on 12/12 was positive for pseudomonas aeruginosa. Repeat sputum cx w/ Acinetobacter resistant to carbapenems. s/p Avycaz and flagyl for 7 day course.   - Imipenem for pancreatitis as above     #Hematology/Oncology   - Acute Blood Loss Anemia as described above. CBC q12h. Transfusion goal Hgb >7.     #Endocrine  - FSG's improved  - Continue to monitor ISS and FS.     #DVT PPx:   - SCD's due to anemia [id number] Detailed

## 2022-01-28 RX ORDER — BACITRACIN ZINC 500 UNIT/G
1 OINTMENT IN PACKET (EA) TOPICAL
Qty: 0 | Refills: 0 | DISCHARGE
Start: 2022-01-28

## 2022-01-28 NOTE — PROGRESS NOTE ADULT - SUBJECTIVE AND OBJECTIVE BOX
Provider wrapped pts leg with quickclot, coban, and ace bandage. Pt instructed to stay laying down for 1 hour to ensure his leg does not begin bleeding again. SUBJECTIVE / OVERNIGHT EVENTS: Patient extubated yesterday to high flow. Patient with mucous plugging overnight. Patient started on chest PT and metanebs. Patient seen and examined at bedside this morning.     ROS: unable to obtain       OBJECTIVE:  ICU Vital Signs Last 24 Hrs  T(C): 35.7 (04 Jan 2019 04:00), Max: 37.3 (03 Jan 2019 12:00)  T(F): 96.2 (04 Jan 2019 04:00), Max: 99.2 (03 Jan 2019 12:00)  HR: 87 (04 Jan 2019 06:00) (85 - 127)  BP: 103/64 (04 Jan 2019 06:00) (90/52 - 119/70)  BP(mean): 73 (04 Jan 2019 06:00) (55 - 84)  ABP: --  ABP(mean): --  RR: 33 (04 Jan 2019 06:00) (11 - 39)  SpO2: 90% (04 Jan 2019 06:00) (80% - 100%)    Mode: standby    01-03 @ 07:01  -  01-04 @ 07:00  --------------------------------------------------------  IN: 1660 mL / OUT: 745 mL / NET: 915 mL      CAPILLARY BLOOD GLUCOSE      POCT Blood Glucose.: 139 mg/dL (04 Jan 2019 06:09)      PHYSICAL EXAM:    GENERAL: NAD, on high flow   HEAD:  Atraumatic, Normocephalic  EYES: EOMI, PERRLA, conjunctiva and sclera clear  NECK: Supple, No JVD  CHEST/LUNG: CTABL   HEART: Regular rate and rhythm; No murmurs, rubs, or gallops  ABDOMEN: Soft, Nontender, Nondistended; Bowel sounds present  EXTREMITIES:  2+ LE swelling  NEURO: contracted.  Able to track. Follow limited commands    HOSPITAL MEDICATIONS:  Standing Meds:  chlorhexidine 4% Liquid 1 Application(s) Topical <User Schedule>  cyanocobalamin 1000 MICROGram(s) Oral daily  dextrose 5% + sodium chloride 0.45%. 1000 milliLiter(s) IV Continuous <Continuous>  dextrose 5%. 1000 milliLiter(s) IV Continuous <Continuous>  dextrose 50% Injectable 12.5 Gram(s) IV Push once  dextrose 50% Injectable 25 Gram(s) IV Push once  dextrose 50% Injectable 25 Gram(s) IV Push once  folic acid 1 milliGRAM(s) Enteral Tube daily  hydrocortisone sodium succinate Injectable 100 milliGRAM(s) IV Push every 8 hours  imipenem/cilastatin  IVPB 500 milliGRAM(s) IV Intermittent every 6 hours  insulin lispro (HumaLOG) corrective regimen sliding scale   SubCutaneous every 6 hours  lacosamide Solution 100 milliGRAM(s) Oral two times a day  midodrine 20 milliGRAM(s) Oral three times a day  QUEtiapine 25 milliGRAM(s) Oral daily  risperiDONE   Solution 1 milliGRAM(s) Oral daily  sodium chloride 3%  Inhalation 4 milliLiter(s) Inhalation every 6 hours      PRN Meds:  acetaminophen    Suspension .. 650 milliGRAM(s) Oral every 6 hours PRN  dextrose 40% Gel 15 Gram(s) Oral once PRN  glucagon  Injectable 1 milliGRAM(s) IntraMuscular once PRN      LABS:                        9.8    6.20  )-----------( 371      ( 03 Jan 2019 03:40 )             32.1     Hgb Trend: 9.8<--, 9.9<--, 9.0<--, 9.2<--, 9.6<--  01-04    134<L>  |  105  |  7   ----------------------------<  151<H>  Test not performed SPECIMEN GROSSLY HEMOLYZED   |  25  |  < 0.20<L>    Ca    7.8<L>      04 Jan 2019 04:00  Phos  3.7     01-04  Mg     1.8     01-04      Creatinine Trend: < 0.20<--, 0.22<--, < 0.20<--, 0.24<--, 0.25<--, 0.28<--  PT/INR - ( 03 Jan 2019 03:40 )   PT: 18.0 SEC;   INR: 1.56          PTT - ( 03 Jan 2019 03:40 )  PTT:35.3 SEC    Arterial Blood Gas:  01-04 @ 04:00  7.33/58/140/28/99.7/4.3  ABG lactate: 1.0  Arterial Blood Gas:  01-03 @ 15:48  7.37/48/88/26/97.5/2.5  ABG lactate: 1.7 SUBJECTIVE / OVERNIGHT EVENTS: Patient extubated yesterday to high flow. Patient with mucous plugging overnight. Patient started on chest PT and metanebs. Patient seen and examined at bedside this morning.     ROS: unable to obtain       OBJECTIVE:  ICU Vital Signs Last 24 Hrs  T(C): 35.7 (04 Jan 2019 04:00), Max: 37.3 (03 Jan 2019 12:00)  T(F): 96.2 (04 Jan 2019 04:00), Max: 99.2 (03 Jan 2019 12:00)  HR: 87 (04 Jan 2019 06:00) (85 - 127)  BP: 103/64 (04 Jan 2019 06:00) (90/52 - 119/70)  BP(mean): 73 (04 Jan 2019 06:00) (55 - 84)  ABP: --  ABP(mean): --  RR: 33 (04 Jan 2019 06:00) (11 - 39)  SpO2: 90% (04 Jan 2019 06:00) (80% - 100%)    Mode: standby    01-03 @ 07:01  -  01-04 @ 07:00  --------------------------------------------------------  IN: 1660 mL / OUT: 745 mL / NET: 915 mL      CAPILLARY BLOOD GLUCOSE      POCT Blood Glucose.: 139 mg/dL (04 Jan 2019 06:09)      PHYSICAL EXAM:    GENERAL: NAD, on high flow   HEAD:  Atraumatic, Normocephalic  EYES: EOMI, PERRLA, conjunctiva and sclera clear  NECK: Supple, No JVD  CHEST/LUNG: chest congestion   HEART: Regular rate and rhythm; No murmurs, rubs, or gallops  ABDOMEN: Soft, Nontender, Nondistended; Bowel sounds present  EXTREMITIES:  2+ LE swelling  NEURO: contracted. Lethargic this morning.     HOSPITAL MEDICATIONS:  Standing Meds:  chlorhexidine 4% Liquid 1 Application(s) Topical <User Schedule>  cyanocobalamin 1000 MICROGram(s) Oral daily  dextrose 5% + sodium chloride 0.45%. 1000 milliLiter(s) IV Continuous <Continuous>  dextrose 5%. 1000 milliLiter(s) IV Continuous <Continuous>  dextrose 50% Injectable 12.5 Gram(s) IV Push once  dextrose 50% Injectable 25 Gram(s) IV Push once  dextrose 50% Injectable 25 Gram(s) IV Push once  folic acid 1 milliGRAM(s) Enteral Tube daily  hydrocortisone sodium succinate Injectable 100 milliGRAM(s) IV Push every 8 hours  imipenem/cilastatin  IVPB 500 milliGRAM(s) IV Intermittent every 6 hours  insulin lispro (HumaLOG) corrective regimen sliding scale   SubCutaneous every 6 hours  lacosamide Solution 100 milliGRAM(s) Oral two times a day  midodrine 20 milliGRAM(s) Oral three times a day  QUEtiapine 25 milliGRAM(s) Oral daily  risperiDONE   Solution 1 milliGRAM(s) Oral daily  sodium chloride 3%  Inhalation 4 milliLiter(s) Inhalation every 6 hours      PRN Meds:  acetaminophen    Suspension .. 650 milliGRAM(s) Oral every 6 hours PRN  dextrose 40% Gel 15 Gram(s) Oral once PRN  glucagon  Injectable 1 milliGRAM(s) IntraMuscular once PRN      LABS:                        9.8    6.20  )-----------( 371      ( 03 Jan 2019 03:40 )             32.1     Hgb Trend: 9.8<--, 9.9<--, 9.0<--, 9.2<--, 9.6<--  01-04    134<L>  |  105  |  7   ----------------------------<  151<H>  Test not performed SPECIMEN GROSSLY HEMOLYZED   |  25  |  < 0.20<L>    Ca    7.8<L>      04 Jan 2019 04:00  Phos  3.7     01-04  Mg     1.8     01-04      Creatinine Trend: < 0.20<--, 0.22<--, < 0.20<--, 0.24<--, 0.25<--, 0.28<--  PT/INR - ( 03 Jan 2019 03:40 )   PT: 18.0 SEC;   INR: 1.56          PTT - ( 03 Jan 2019 03:40 )  PTT:35.3 SEC    Arterial Blood Gas:  01-04 @ 04:00  7.33/58/140/28/99.7/4.3  ABG lactate: 1.0  Arterial Blood Gas:  01-03 @ 15:48  7.37/48/88/26/97.5/2.5  ABG lactate: 1.7 SUBJECTIVE / OVERNIGHT EVENTS: Patient extubated yesterday to high flow. Patient with mucous plugging overnight. Patient started on chest PT and metanebs. Patient seen and examined at bedside this morning.     ROS: unable to obtain       OBJECTIVE:  ICU Vital Signs Last 24 Hrs  T(C): 35.7 (04 Jan 2019 04:00), Max: 37.3 (03 Jan 2019 12:00)  T(F): 96.2 (04 Jan 2019 04:00), Max: 99.2 (03 Jan 2019 12:00)  HR: 87 (04 Jan 2019 06:00) (85 - 127)  BP: 103/64 (04 Jan 2019 06:00) (90/52 - 119/70)  BP(mean): 73 (04 Jan 2019 06:00) (55 - 84)  ABP: --  ABP(mean): --  RR: 33 (04 Jan 2019 06:00) (11 - 39)  SpO2: 90% (04 Jan 2019 06:00) (80% - 100%)    Mode: standby    01-03 @ 07:01  -  01-04 @ 07:00  --------------------------------------------------------  IN: 1660 mL / OUT: 745 mL / NET: 915 mL      CAPILLARY BLOOD GLUCOSE      POCT Blood Glucose.: 139 mg/dL (04 Jan 2019 06:09)      PHYSICAL EXAM:    GENERAL: NAD, on high flow   HEAD:  Atraumatic, Normocephalic  EYES: EOMI, PERRLA, conjunctiva and sclera clear  NECK: Supple, No JVD  CHEST/LUNG: chest congestion   HEART: Regular rate and rhythm; No murmurs, rubs, or gallops  ABDOMEN: Soft, Nontender, Nondistended; Bowel sounds present  EXTREMITIES:  2+ LE swelling  NEURO: contracted. Lethargic this morning.     HOSPITAL MEDICATIONS:  Standing Meds:  chlorhexidine 4% Liquid 1 Application(s) Topical <User Schedule>  cyanocobalamin 1000 MICROGram(s) Oral daily  dextrose 5% + sodium chloride 0.45%. 1000 milliLiter(s) IV Continuous <Continuous>  dextrose 5%. 1000 milliLiter(s) IV Continuous <Continuous>  dextrose 50% Injectable 12.5 Gram(s) IV Push once  dextrose 50% Injectable 25 Gram(s) IV Push once  dextrose 50% Injectable 25 Gram(s) IV Push once  folic acid 1 milliGRAM(s) Enteral Tube daily  hydrocortisone sodium succinate Injectable 100 milliGRAM(s) IV Push every 8 hours  imipenem/cilastatin  IVPB 500 milliGRAM(s) IV Intermittent every 6 hours  insulin lispro (HumaLOG) corrective regimen sliding scale   SubCutaneous every 6 hours  lacosamide Solution 100 milliGRAM(s) Oral two times a day  midodrine 20 milliGRAM(s) Oral three times a day  QUEtiapine 25 milliGRAM(s) Oral daily  risperiDONE   Solution 1 milliGRAM(s) Oral daily  sodium chloride 3%  Inhalation 4 milliLiter(s) Inhalation every 6 hours      PRN Meds:  acetaminophen    Suspension .. 650 milliGRAM(s) Oral every 6 hours PRN  dextrose 40% Gel 15 Gram(s) Oral once PRN  glucagon  Injectable 1 milliGRAM(s) IntraMuscular once PRN      LABS:                        9.8    6.20  )-----------( 371      ( 03 Jan 2019 03:40 )             32.1     Hgb Trend: 9.8<--, 9.9<--, 9.0<--, 9.2<--, 9.6<--  01-04          PT/INR - ( 03 Jan 2019 03:40 )   PT: 18.0 SEC;   INR: 1.56          PTT - ( 03 Jan 2019 03:40 )  PTT:35.3 SEC    Arterial Blood Gas:  01-04 @ 04:00  7.33/58/140/28/99.7/4.3  ABG lactate: 1.0  Arterial Blood Gas:  01-03 @ 15:48  7.37/48/88/26/97.5/2.5  ABG lactate: 1.7

## 2022-02-02 RX ORDER — CHLORHEXIDINE GLUCONATE 213 G/1000ML
30 SOLUTION TOPICAL
Qty: 0 | Refills: 0 | DISCHARGE
Start: 2022-02-02

## 2022-02-02 RX ORDER — NYSTATIN 500MM UNIT
10 POWDER (EA) MISCELLANEOUS
Qty: 0 | Refills: 0 | DISCHARGE
Start: 2022-02-02

## 2022-02-03 RX ORDER — FLUCONAZOLE 150 MG/1
1 TABLET ORAL
Qty: 0 | Refills: 0 | DISCHARGE
Start: 2022-02-03

## 2022-02-04 ENCOUNTER — INPATIENT (INPATIENT)
Facility: HOSPITAL | Age: 57
LOS: 17 days | Discharge: NURSING, REHAB OR LONG-TERM CARE | DRG: 177 | End: 2022-02-22
Attending: HOSPITALIST | Admitting: FAMILY MEDICINE
Payer: MEDICARE

## 2022-02-04 VITALS
HEIGHT: 66 IN | HEART RATE: 99 BPM | SYSTOLIC BLOOD PRESSURE: 90 MMHG | DIASTOLIC BLOOD PRESSURE: 63 MMHG | OXYGEN SATURATION: 97 % | RESPIRATION RATE: 20 BRPM | TEMPERATURE: 100 F

## 2022-02-04 DIAGNOSIS — J69.0 PNEUMONITIS DUE TO INHALATION OF FOOD AND VOMIT: ICD-10-CM

## 2022-02-04 DIAGNOSIS — Z93.1 GASTROSTOMY STATUS: Chronic | ICD-10-CM

## 2022-02-04 LAB
ALBUMIN SERPL ELPH-MCNC: 2.5 G/DL — LOW (ref 3.3–5)
ALP SERPL-CCNC: 110 U/L — SIGNIFICANT CHANGE UP (ref 40–120)
ALT FLD-CCNC: 30 U/L — SIGNIFICANT CHANGE UP (ref 12–78)
ANION GAP SERPL CALC-SCNC: 1 MMOL/L — LOW (ref 5–17)
APPEARANCE UR: CLEAR — SIGNIFICANT CHANGE UP
APTT BLD: 35 SEC — SIGNIFICANT CHANGE UP (ref 27.5–35.5)
AST SERPL-CCNC: 12 U/L — LOW (ref 15–37)
BASE EXCESS BLDA CALC-SCNC: -1.9 MMOL/L — SIGNIFICANT CHANGE UP (ref -2–3)
BASE EXCESS BLDA CALC-SCNC: 3.7 MMOL/L — HIGH (ref -2–3)
BASOPHILS # BLD AUTO: 0 K/UL — SIGNIFICANT CHANGE UP (ref 0–0.2)
BASOPHILS NFR BLD AUTO: 0 % — SIGNIFICANT CHANGE UP (ref 0–2)
BILIRUB SERPL-MCNC: 0.2 MG/DL — SIGNIFICANT CHANGE UP (ref 0.2–1.2)
BILIRUB UR-MCNC: NEGATIVE — SIGNIFICANT CHANGE UP
BLOOD GAS COMMENTS ARTERIAL: SIGNIFICANT CHANGE UP
BUN SERPL-MCNC: 33 MG/DL — HIGH (ref 7–23)
CALCIUM SERPL-MCNC: 8.8 MG/DL — SIGNIFICANT CHANGE UP (ref 8.5–10.1)
CHLORIDE SERPL-SCNC: 131 MMOL/L — HIGH (ref 96–108)
CO2 BLDA-SCNC: 27 MMOL/L — HIGH (ref 19–24)
CO2 BLDA-SCNC: 30 MMOL/L — HIGH (ref 19–24)
CO2 SERPL-SCNC: 29 MMOL/L — SIGNIFICANT CHANGE UP (ref 22–31)
COLOR SPEC: YELLOW — SIGNIFICANT CHANGE UP
CREAT SERPL-MCNC: 0.63 MG/DL — SIGNIFICANT CHANGE UP (ref 0.5–1.3)
DIFF PNL FLD: ABNORMAL
EOSINOPHIL # BLD AUTO: 0.2 K/UL — SIGNIFICANT CHANGE UP (ref 0–0.5)
EOSINOPHIL NFR BLD AUTO: 2 % — SIGNIFICANT CHANGE UP (ref 0–6)
GLUCOSE SERPL-MCNC: 144 MG/DL — HIGH (ref 70–99)
GLUCOSE UR QL: NEGATIVE — SIGNIFICANT CHANGE UP
HCO3 BLDA-SCNC: 26 MMOL/L — SIGNIFICANT CHANGE UP (ref 21–28)
HCO3 BLDA-SCNC: 28 MMOL/L — SIGNIFICANT CHANGE UP (ref 21–28)
HCT VFR BLD CALC: 41.4 % — SIGNIFICANT CHANGE UP (ref 39–50)
HGB BLD-MCNC: 12.3 G/DL — LOW (ref 13–17)
INR BLD: 1.23 RATIO — HIGH (ref 0.88–1.16)
KETONES UR-MCNC: NEGATIVE — SIGNIFICANT CHANGE UP
LACTATE SERPL-SCNC: 1.3 MMOL/L — SIGNIFICANT CHANGE UP (ref 0.7–2)
LEUKOCYTE ESTERASE UR-ACNC: ABNORMAL
LYMPHOCYTES # BLD AUTO: 0.99 K/UL — LOW (ref 1–3.3)
LYMPHOCYTES # BLD AUTO: 10 % — LOW (ref 13–44)
MCHC RBC-ENTMCNC: 29.2 PG — SIGNIFICANT CHANGE UP (ref 27–34)
MCHC RBC-ENTMCNC: 29.7 GM/DL — LOW (ref 32–36)
MCV RBC AUTO: 98.3 FL — SIGNIFICANT CHANGE UP (ref 80–100)
MONOCYTES # BLD AUTO: 0.4 K/UL — SIGNIFICANT CHANGE UP (ref 0–0.9)
MONOCYTES NFR BLD AUTO: 4 % — SIGNIFICANT CHANGE UP (ref 2–14)
NEUTROPHILS # BLD AUTO: 8.31 K/UL — HIGH (ref 1.8–7.4)
NEUTROPHILS NFR BLD AUTO: 71 % — SIGNIFICANT CHANGE UP (ref 43–77)
NITRITE UR-MCNC: NEGATIVE — SIGNIFICANT CHANGE UP
NRBC # BLD: SIGNIFICANT CHANGE UP /100 WBCS (ref 0–0)
NT-PROBNP SERPL-SCNC: 438 PG/ML — HIGH (ref 0–125)
PCO2 BLDA: 43 MMHG — SIGNIFICANT CHANGE UP (ref 35–48)
PCO2 BLDA: 53 MMHG — HIGH (ref 35–48)
PH BLDA: 7.29 — LOW (ref 7.35–7.45)
PH BLDA: 7.43 — SIGNIFICANT CHANGE UP (ref 7.35–7.45)
PH UR: 5 — SIGNIFICANT CHANGE UP (ref 5–8)
PLATELET # BLD AUTO: 211 K/UL — SIGNIFICANT CHANGE UP (ref 150–400)
PO2 BLDA: 117 MMHG — HIGH (ref 83–108)
PO2 BLDA: 53 MMHG — LOW (ref 83–108)
POTASSIUM SERPL-MCNC: 3.7 MMOL/L — SIGNIFICANT CHANGE UP (ref 3.5–5.3)
POTASSIUM SERPL-SCNC: 3.7 MMOL/L — SIGNIFICANT CHANGE UP (ref 3.5–5.3)
PROT SERPL-MCNC: 8.2 GM/DL — SIGNIFICANT CHANGE UP (ref 6–8.3)
PROT UR-MCNC: SIGNIFICANT CHANGE UP
PROTHROM AB SERPL-ACNC: 14.2 SEC — HIGH (ref 10.6–13.6)
RAPID RVP RESULT: SIGNIFICANT CHANGE UP
RBC # BLD: 4.21 M/UL — SIGNIFICANT CHANGE UP (ref 4.2–5.8)
RBC # FLD: 15.6 % — HIGH (ref 10.3–14.5)
SAO2 % BLDA: 100 % — SIGNIFICANT CHANGE UP
SAO2 % BLDA: 86 % — SIGNIFICANT CHANGE UP
SARS-COV-2 RNA SPEC QL NAA+PROBE: SIGNIFICANT CHANGE UP
SODIUM SERPL-SCNC: 161 MMOL/L — CRITICAL HIGH (ref 135–145)
SP GR SPEC: 1.02 — SIGNIFICANT CHANGE UP (ref 1.01–1.02)
TROPONIN I, HIGH SENSITIVITY RESULT: 14.46 NG/L — SIGNIFICANT CHANGE UP
UROBILINOGEN FLD QL: NEGATIVE — SIGNIFICANT CHANGE UP
WBC # BLD: 9.89 K/UL — SIGNIFICANT CHANGE UP (ref 3.8–10.5)
WBC # FLD AUTO: 9.89 K/UL — SIGNIFICANT CHANGE UP (ref 3.8–10.5)

## 2022-02-04 PROCEDURE — 85610 PROTHROMBIN TIME: CPT

## 2022-02-04 PROCEDURE — 85730 THROMBOPLASTIN TIME PARTIAL: CPT

## 2022-02-04 PROCEDURE — 83036 HEMOGLOBIN GLYCOSYLATED A1C: CPT

## 2022-02-04 PROCEDURE — 99223 1ST HOSP IP/OBS HIGH 75: CPT

## 2022-02-04 PROCEDURE — 83690 ASSAY OF LIPASE: CPT

## 2022-02-04 PROCEDURE — C1889: CPT

## 2022-02-04 PROCEDURE — 85027 COMPLETE CBC AUTOMATED: CPT

## 2022-02-04 PROCEDURE — 36415 COLL VENOUS BLD VENIPUNCTURE: CPT

## 2022-02-04 PROCEDURE — 71045 X-RAY EXAM CHEST 1 VIEW: CPT | Mod: 26,76

## 2022-02-04 PROCEDURE — U0003: CPT

## 2022-02-04 PROCEDURE — 83935 ASSAY OF URINE OSMOLALITY: CPT

## 2022-02-04 PROCEDURE — L8699: CPT

## 2022-02-04 PROCEDURE — 49465 FLUORO EXAM OF G/COLON TUBE: CPT

## 2022-02-04 PROCEDURE — 74176 CT ABD & PELVIS W/O CONTRAST: CPT

## 2022-02-04 PROCEDURE — 83930 ASSAY OF BLOOD OSMOLALITY: CPT

## 2022-02-04 PROCEDURE — 84133 ASSAY OF URINE POTASSIUM: CPT

## 2022-02-04 PROCEDURE — 81001 URINALYSIS AUTO W/SCOPE: CPT

## 2022-02-04 PROCEDURE — 83605 ASSAY OF LACTIC ACID: CPT

## 2022-02-04 PROCEDURE — C9113: CPT

## 2022-02-04 PROCEDURE — 82803 BLOOD GASES ANY COMBINATION: CPT

## 2022-02-04 PROCEDURE — 84300 ASSAY OF URINE SODIUM: CPT

## 2022-02-04 PROCEDURE — 86901 BLOOD TYPING SEROLOGIC RH(D): CPT

## 2022-02-04 PROCEDURE — 83735 ASSAY OF MAGNESIUM: CPT

## 2022-02-04 PROCEDURE — 36600 WITHDRAWAL OF ARTERIAL BLOOD: CPT

## 2022-02-04 PROCEDURE — 71045 X-RAY EXAM CHEST 1 VIEW: CPT

## 2022-02-04 PROCEDURE — 99285 EMERGENCY DEPT VISIT HI MDM: CPT

## 2022-02-04 PROCEDURE — C9254: CPT

## 2022-02-04 PROCEDURE — 80053 COMPREHEN METABOLIC PANEL: CPT

## 2022-02-04 PROCEDURE — 82962 GLUCOSE BLOOD TEST: CPT

## 2022-02-04 PROCEDURE — 84100 ASSAY OF PHOSPHORUS: CPT

## 2022-02-04 PROCEDURE — 74018 RADEX ABDOMEN 1 VIEW: CPT

## 2022-02-04 PROCEDURE — 93010 ELECTROCARDIOGRAM REPORT: CPT

## 2022-02-04 PROCEDURE — 86850 RBC ANTIBODY SCREEN: CPT

## 2022-02-04 PROCEDURE — 86900 BLOOD TYPING SEROLOGIC ABO: CPT

## 2022-02-04 PROCEDURE — 80048 BASIC METABOLIC PNL TOTAL CA: CPT

## 2022-02-04 PROCEDURE — 84478 ASSAY OF TRIGLYCERIDES: CPT

## 2022-02-04 PROCEDURE — U0005: CPT

## 2022-02-04 PROCEDURE — 94640 AIRWAY INHALATION TREATMENT: CPT

## 2022-02-04 PROCEDURE — 74177 CT ABD & PELVIS W/CONTRAST: CPT

## 2022-02-04 PROCEDURE — 84443 ASSAY THYROID STIM HORMONE: CPT

## 2022-02-04 RX ORDER — ACETAMINOPHEN 500 MG
650 TABLET ORAL EVERY 6 HOURS
Refills: 0 | Status: DISCONTINUED | OUTPATIENT
Start: 2022-02-04 | End: 2022-02-22

## 2022-02-04 RX ORDER — VENLAFAXINE HCL 75 MG
37.5 CAPSULE, EXT RELEASE 24 HR ORAL DAILY
Refills: 0 | Status: DISCONTINUED | OUTPATIENT
Start: 2022-02-04 | End: 2022-02-22

## 2022-02-04 RX ORDER — SODIUM BICARBONATE 1 MEQ/ML
650 SYRINGE (ML) INTRAVENOUS DAILY
Refills: 0 | Status: DISCONTINUED | OUTPATIENT
Start: 2022-02-04 | End: 2022-02-06

## 2022-02-04 RX ORDER — BACLOFEN 100 %
10 POWDER (GRAM) MISCELLANEOUS
Refills: 0 | Status: DISCONTINUED | OUTPATIENT
Start: 2022-02-04 | End: 2022-02-22

## 2022-02-04 RX ORDER — VANCOMYCIN HCL 1 G
750 VIAL (EA) INTRAVENOUS ONCE
Refills: 0 | Status: COMPLETED | OUTPATIENT
Start: 2022-02-04 | End: 2022-02-04

## 2022-02-04 RX ORDER — PIPERACILLIN AND TAZOBACTAM 4; .5 G/20ML; G/20ML
3.38 INJECTION, POWDER, LYOPHILIZED, FOR SOLUTION INTRAVENOUS ONCE
Refills: 0 | Status: COMPLETED | OUTPATIENT
Start: 2022-02-04 | End: 2022-02-04

## 2022-02-04 RX ORDER — CHOLECALCIFEROL (VITAMIN D3) 125 MCG
1000 CAPSULE ORAL DAILY
Refills: 0 | Status: DISCONTINUED | OUTPATIENT
Start: 2022-02-04 | End: 2022-02-22

## 2022-02-04 RX ORDER — LANOLIN ALCOHOL/MO/W.PET/CERES
3 CREAM (GRAM) TOPICAL AT BEDTIME
Refills: 0 | Status: DISCONTINUED | OUTPATIENT
Start: 2022-02-04 | End: 2022-02-22

## 2022-02-04 RX ORDER — SCOPALAMINE 1 MG/3D
1 PATCH, EXTENDED RELEASE TRANSDERMAL
Qty: 0 | Refills: 0 | DISCHARGE

## 2022-02-04 RX ORDER — PIPERACILLIN AND TAZOBACTAM 4; .5 G/20ML; G/20ML
3.38 INJECTION, POWDER, LYOPHILIZED, FOR SOLUTION INTRAVENOUS EVERY 8 HOURS
Refills: 0 | Status: DISCONTINUED | OUTPATIENT
Start: 2022-02-04 | End: 2022-02-09

## 2022-02-04 RX ORDER — VANCOMYCIN HCL 1 G
750 VIAL (EA) INTRAVENOUS EVERY 24 HOURS
Refills: 0 | Status: DISCONTINUED | OUTPATIENT
Start: 2022-02-04 | End: 2022-02-05

## 2022-02-04 RX ORDER — NYSTATIN 500MM UNIT
1 POWDER (EA) MISCELLANEOUS
Qty: 0 | Refills: 0 | DISCHARGE

## 2022-02-04 RX ORDER — FERROUS SULFATE 325(65) MG
7.5 TABLET ORAL
Qty: 0 | Refills: 0 | DISCHARGE

## 2022-02-04 RX ORDER — FAMOTIDINE 10 MG/ML
1 INJECTION INTRAVENOUS
Qty: 0 | Refills: 0 | DISCHARGE

## 2022-02-04 RX ORDER — ONDANSETRON 8 MG/1
4 TABLET, FILM COATED ORAL EVERY 8 HOURS
Refills: 0 | Status: DISCONTINUED | OUTPATIENT
Start: 2022-02-04 | End: 2022-02-04

## 2022-02-04 RX ORDER — LACOSAMIDE 50 MG/1
50 TABLET ORAL
Refills: 0 | Status: DISCONTINUED | OUTPATIENT
Start: 2022-02-04 | End: 2022-02-05

## 2022-02-04 RX ORDER — SODIUM CHLORIDE 9 MG/ML
500 INJECTION INTRAMUSCULAR; INTRAVENOUS; SUBCUTANEOUS ONCE
Refills: 0 | Status: COMPLETED | OUTPATIENT
Start: 2022-02-04 | End: 2022-02-04

## 2022-02-04 RX ORDER — FLUCONAZOLE 150 MG/1
100 TABLET ORAL DAILY
Refills: 0 | Status: COMPLETED | OUTPATIENT
Start: 2022-02-04 | End: 2022-02-14

## 2022-02-04 RX ORDER — IPRATROPIUM/ALBUTEROL SULFATE 18-103MCG
3 AEROSOL WITH ADAPTER (GRAM) INHALATION
Qty: 0 | Refills: 0 | DISCHARGE

## 2022-02-04 RX ORDER — LACTULOSE 10 G/15ML
20 SOLUTION ORAL DAILY
Refills: 0 | Status: DISCONTINUED | OUTPATIENT
Start: 2022-02-04 | End: 2022-02-06

## 2022-02-04 RX ORDER — SODIUM CHLORIDE 9 MG/ML
1000 INJECTION INTRAMUSCULAR; INTRAVENOUS; SUBCUTANEOUS ONCE
Refills: 0 | Status: COMPLETED | OUTPATIENT
Start: 2022-02-04 | End: 2022-02-04

## 2022-02-04 RX ORDER — TIOTROPIUM BROMIDE 18 UG/1
1 CAPSULE ORAL; RESPIRATORY (INHALATION) DAILY
Refills: 0 | Status: DISCONTINUED | OUTPATIENT
Start: 2022-02-04 | End: 2022-02-22

## 2022-02-04 RX ORDER — BACITRACIN ZINC 500 UNIT/G
1 OINTMENT IN PACKET (EA) TOPICAL
Refills: 0 | Status: DISCONTINUED | OUTPATIENT
Start: 2022-02-04 | End: 2022-02-22

## 2022-02-04 RX ORDER — DOCUSATE SODIUM 100 MG
1 CAPSULE ORAL
Qty: 0 | Refills: 0 | DISCHARGE

## 2022-02-04 RX ORDER — LEVETIRACETAM 250 MG/1
500 TABLET, FILM COATED ORAL
Refills: 0 | Status: DISCONTINUED | OUTPATIENT
Start: 2022-02-04 | End: 2022-02-05

## 2022-02-04 RX ORDER — METOPROLOL TARTRATE 50 MG
1 TABLET ORAL
Qty: 0 | Refills: 0 | DISCHARGE

## 2022-02-04 RX ORDER — FUROSEMIDE 40 MG
1 TABLET ORAL
Qty: 0 | Refills: 0 | DISCHARGE

## 2022-02-04 RX ORDER — MAGNESIUM HYDROXIDE 400 MG/1
15 TABLET, CHEWABLE ORAL
Qty: 0 | Refills: 0 | DISCHARGE

## 2022-02-04 RX ORDER — ALBUTEROL 90 UG/1
2 AEROSOL, METERED ORAL EVERY 6 HOURS
Refills: 0 | Status: DISCONTINUED | OUTPATIENT
Start: 2022-02-04 | End: 2022-02-22

## 2022-02-04 RX ORDER — SODIUM CHLORIDE 9 MG/ML
1000 INJECTION, SOLUTION INTRAVENOUS
Refills: 0 | Status: DISCONTINUED | OUTPATIENT
Start: 2022-02-04 | End: 2022-02-05

## 2022-02-04 RX ORDER — ENOXAPARIN SODIUM 100 MG/ML
40 INJECTION SUBCUTANEOUS DAILY
Refills: 0 | Status: DISCONTINUED | OUTPATIENT
Start: 2022-02-04 | End: 2022-02-22

## 2022-02-04 RX ORDER — LACOSAMIDE 50 MG/1
1 TABLET ORAL
Qty: 0 | Refills: 0 | DISCHARGE

## 2022-02-04 RX ORDER — ZINC OXIDE 200 MG/G
1 OINTMENT TOPICAL THREE TIMES A DAY
Refills: 0 | Status: DISCONTINUED | OUTPATIENT
Start: 2022-02-04 | End: 2022-02-22

## 2022-02-04 RX ORDER — FAMOTIDINE 10 MG/ML
20 INJECTION INTRAVENOUS DAILY
Refills: 0 | Status: DISCONTINUED | OUTPATIENT
Start: 2022-02-04 | End: 2022-02-07

## 2022-02-04 RX ORDER — DOXAZOSIN MESYLATE 4 MG
2 TABLET ORAL AT BEDTIME
Refills: 0 | Status: DISCONTINUED | OUTPATIENT
Start: 2022-02-04 | End: 2022-02-22

## 2022-02-04 RX ORDER — ONDANSETRON 8 MG/1
4 TABLET, FILM COATED ORAL EVERY 8 HOURS
Refills: 0 | Status: DISCONTINUED | OUTPATIENT
Start: 2022-02-04 | End: 2022-02-22

## 2022-02-04 RX ORDER — CHLORHEXIDINE GLUCONATE 213 G/1000ML
15 SOLUTION TOPICAL
Refills: 0 | Status: DISCONTINUED | OUTPATIENT
Start: 2022-02-04 | End: 2022-02-22

## 2022-02-04 RX ORDER — LANOLIN/MINERAL OIL
1 LOTION (ML) TOPICAL THREE TIMES A DAY
Refills: 0 | Status: DISCONTINUED | OUTPATIENT
Start: 2022-02-04 | End: 2022-02-04

## 2022-02-04 RX ADMIN — SODIUM CHLORIDE 1000 MILLILITER(S): 9 INJECTION INTRAMUSCULAR; INTRAVENOUS; SUBCUTANEOUS at 20:10

## 2022-02-04 RX ADMIN — SODIUM CHLORIDE 500 MILLILITER(S): 9 INJECTION INTRAMUSCULAR; INTRAVENOUS; SUBCUTANEOUS at 19:20

## 2022-02-04 RX ADMIN — Medication 250 MILLIGRAM(S): at 18:19

## 2022-02-04 RX ADMIN — SODIUM CHLORIDE 1000 MILLILITER(S): 9 INJECTION INTRAMUSCULAR; INTRAVENOUS; SUBCUTANEOUS at 18:20

## 2022-02-04 RX ADMIN — Medication 750 MILLIGRAM(S): at 19:20

## 2022-02-04 RX ADMIN — PIPERACILLIN AND TAZOBACTAM 3.38 GRAM(S): 4; .5 INJECTION, POWDER, LYOPHILIZED, FOR SOLUTION INTRAVENOUS at 18:00

## 2022-02-04 RX ADMIN — SODIUM CHLORIDE 1000 MILLILITER(S): 9 INJECTION INTRAMUSCULAR; INTRAVENOUS; SUBCUTANEOUS at 18:19

## 2022-02-04 RX ADMIN — SODIUM CHLORIDE 1000 MILLILITER(S): 9 INJECTION INTRAMUSCULAR; INTRAVENOUS; SUBCUTANEOUS at 17:13

## 2022-02-04 RX ADMIN — PIPERACILLIN AND TAZOBACTAM 200 GRAM(S): 4; .5 INJECTION, POWDER, LYOPHILIZED, FOR SOLUTION INTRAVENOUS at 17:14

## 2022-02-04 NOTE — ED ADULT TRIAGE NOTE - CHIEF COMPLAINT QUOTE
Pt BIBEMS from [place] nursing facility. EMS called by family for labored breathing. pt is awake, alert, following commands, contracted and non-verbal. pt is at baseline mental status as per family. pt also noted to be hypotensive to SBP 90 by EMS. as per facility pt's breathing appears to be at baseline. family states this is not baseline for pt. pt SpO2 97% RA.

## 2022-02-04 NOTE — ED PROVIDER NOTE - CONSTITUTIONAL, MLM
normal... White male adult, awake, non-verbal, appears to be in acute respiratory distress with positive tachyapones, labored repirations, anxious. RA sat in ED 88-89%. White male adult, awake, non-verbal, appears to be in acute respiratory distress with + tachypnea, labored respirations, anxious. RA sat in ED 88-89%.

## 2022-02-04 NOTE — ED ADULT NURSE NOTE - CHIEF COMPLAINT QUOTE
Pt BIBEMS from [facility] [facility] facility. EMS called by family for labored breathing. pt is awake, alert, following commands, contracted and non-verbal. pt is at baseline mental status as per family. pt also noted to be hypotensive to SBP 90 by EMS. as per facility pt's breathing appears to be at baseline. family states this is not baseline for pt. pt SpO2 97% RA.

## 2022-02-04 NOTE — ED PROVIDER NOTE - GASTROINTESTINAL, MLM
peg tube in place with surrounding irritation and erythema, bilious discharge around the tube, BS hypoactive, normal pitch.

## 2022-02-04 NOTE — ED PROVIDER NOTE - OBJECTIVE STATEMENT
55 y/o M Hx of seizure disorder, TBI with resultant paraplegia (28 yrs ago), chronic respiratory failure s/p trach with reversal, s/p peg tube,  HTN, DM2 , GERD, pulmonary fibrosis, and recurrent aspiration PNA including 2019 admission at [facility] (treated with IV Zosyn then PO Augmentin), presents to the ED BIBA from [facility] regarding family noted labored breathing today. Baseline mental status, awake, follows commands, non-verbal and contracted. EMS noted hypotensive at scene 90 systolic BP. Pulse ox 88-89% on RA. MOLST form completed, pt is full code. Complete hx not obtainable, pt non-verbal at baseline. Nursing home staff states pt breathing status is at baseline but daughter disagreed and felt he was in respiratory distress.

## 2022-02-04 NOTE — ED PROVIDER NOTE - NEUROLOGICAL, MLM
Alert and awake, follows some simple commands, non-verbal/baseline. Awake, follows some simple commands, non-verbal/baseline.

## 2022-02-04 NOTE — ED PROVIDER NOTE - PROGRESS NOTE DETAILS
MDM inputted. IDavid attest that this documentation has been prepared under the direction and in the presence of Doctor Penaloza. LEIA Penaloza MD:  Dr. Jason aware of admission.

## 2022-02-04 NOTE — PROGRESS NOTE ADULT - SUBJECTIVE AND OBJECTIVE BOX
Rapid response note     56M hx TBI w resultant paraplegia 35 years ago  seizures, chronic resp failure s/p trach decannulated  pulmonary fibrosis, recurrent asp PNA, PEG tube,  DM II, GERD, HTN admit from SNF with resp distress AMS hypernatremia     Rx for aspiration PNA     As soon as he hit the floor from the ED rapid response called for hypoxemia and AMS from baseline    ABG with acute type 1 and 2 resp failure      Rapid response note     56M hx TBI w resultant paraplegia 35 years ago  seizures, chronic resp failure s/p trach decannulated  pulmonary fibrosis, recurrent asp PNA, PEG tube,  DM II, GERD, HTN admit from SNF with resp distress AMS hypernatremia     Rx for aspiration PNA     As soon as he hit the floor from the ED rapid response called for hypoxemia and AMS from baseline    ABG with acute type 1 and 2 resp failure.  Now on 50% fi02      Moved to ICU for the above and the potentila need for tracheal cannulation and MV which we will try to avoid fro not and use ET c02 and ABG reassessment    His CXR was initially clear, but  is now  starting to show some L sided airspace disease.    He is on appropriate ABX.    DVT prophylaxis     Hypotonic fluids started for hypernatremia which should be correct slowly  with 6-8 libertad/l per 24 hrs.    He has a 4 liter water deficit with prerenal DIANA  (Bun/Creat) ratio > 20/1.                Rapid response note     56M hx TBI w resultant paraplegia 35 years ago  seizures, chronic resp failure s/p trach decannulated  pulmonary fibrosis, recurrent asp PNA, PEG tube,  DM II, GERD, HTN admit from SNF with resp distress AMS hypernatremia     Rx for aspiration PNA     As soon as he hit the floor from the ED rapid response called for hypoxemia and AMS from baseline    ABG with acute type 1 and 2 resp failure.  Now on 50% fi02      Moved to ICU for the above and the potential  need for tracheal cannulation and MV which we will try to avoid fro not and use ET c02 and ABG reassessment    His CXR was initially clear, but  is now  starting to show some L sided airspace disease.    He is on appropriate ABX.    DVT prophylaxis     Hypotonic fluids started for hypernatremia which should be correct slowly  with 6-8 libertad/l per 24 hrs.    He has a 4 liter water deficit with prerenal DIANA  (Bun/Creat) ratio > 20/1.

## 2022-02-04 NOTE — INPATIENT CERTIFICATION FOR MEDICARE PATIENTS - RISKS OF ADVERSE EVENTS
Concern for cardiopulmonary deterioration/Concern for neurologic deterioration/Concern for worsening infectious process/Concern for renal deterioration

## 2022-02-04 NOTE — ED ADULT NURSE REASSESSMENT NOTE - NS ED NURSE REASSESS COMMENT FT1
receive pt at the beginning of shift, NV responsive to tactile stimuli, remains trach to humidified oxygen. no acute distress note. sister bed side. all safety maintain.  close monitoring continue.

## 2022-02-04 NOTE — PHARMACOTHERAPY INTERVENTION NOTE - COMMENTS
Medication reconciliation completed.  Reviewed Medication list and confirmed med allergies with list from Care Facility; confirmed with Dr. First Medamrita.

## 2022-02-04 NOTE — H&P ADULT - NSHPPHYSICALEXAM_GEN_ALL_CORE
Vital Signs Last 24 Hrs  T(C): 38.6 (04 Feb 2022 15:55), Max: 38.6 (04 Feb 2022 15:55)  T(F): 101.4 (04 Feb 2022 15:55), Max: 101.4 (04 Feb 2022 15:55)  HR: 88 (04 Feb 2022 19:58) (88 - 99)  BP: 98/67 (04 Feb 2022 19:58) (90/63 - 109/62)  BP(mean): 76 (04 Feb 2022 19:58) (76 - 76)  RR: 22 (04 Feb 2022 19:58) (20 - 23)  SpO2: 98% (04 Feb 2022 19:58) (92% - 100%)
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
## 2022-02-04 NOTE — ED PROVIDER NOTE - CLINICAL SUMMARY MEDICAL DECISION MAKING FREE TEXT BOX
55 y/o M Hx of seizure disorder, TBI with resultant paraplegia (28 yrs ago), chronic respiratory failure s/p trach with reversal, s/p peg tube,  HTN, DM2 , GERD, pulmonary fibrosis, and recurrent aspiration PNA including 2019 admission at [facility] (treated with IV Zosyn then PO Augmentin), presents to the ED BIBA from [facility] regarding family noted labored breathing today. Baseline mental status, awake, follows commands, non-verbal and contracted. EMS noted hypotensive at scene 90 systolic BP. Pulse ox 88-89% on RA. MOLST form completed, pt is full code. Pt with labored respirations acute upon chronically ill, hypoxic 88% RA, decreased breath sound R lower lung field, febrile, slightly tachycardic, appears dehydrated. Plan: EKG, CXR, labs including ABG, blood culture, lactate, RVP/CVOID, BNP/troponin, O2 via trach collar, IVF, IV Vanco, IV Zosyn. expect admission, suspect aspiration pneumonia. 57 y/o M Hx of seizure disorder, TBI with resultant paraplegia (28 yrs ago), chronic respiratory failure s/p trach with reversal, s/p peg tube,  HTN, DM2 , GERD, pulmonary fibrosis, and recurrent aspiration PNA including 2019 admission at [facility] (treated with IV Zosyn then PO Augmentin), presents to the ED BIBA from [facility] regarding family noted labored breathing today. Baseline mental status, awake, follows commands, non-verbal and contracted. EMS noted hypotensive at scene 90 systolic BP. Pulse ox 88-89% on RA. MOLST form completed, pt is full code. Pt with labored respirations acute upon chronically ill, hypoxic 88% RA, decreased breath sound R lower lung field, febrile, slightly tachycardic, appears dehydrated.   Plan: EKG, CXR, labs including ABG, blood culture, lactate, RVP/CVOID, BNP/troponin, O2 via trach collar, IVF, IV Vanco, IV Zosyn. expect admission, suspect aspiration pneumonia.

## 2022-02-04 NOTE — H&P ADULT - ASSESSMENT
56 year old male w hx TBI w resultant paraplegia (age 21), seizures, chronic resp failure s/p trach w reversal, pulmonary fibrosis, recurrent asp PNA, PEG tube,  DM II, GERD, HTN who presents to ED by EMS from [place] for evaluation of labored breathing       #Acute on chronic hypoxic resp failure  #Trach collar w supplemental O2  1. admit to med  2. asp precations  3. albuterol 2 puffs q 6 hrs  4. spiriva   5. pulse ox      #Possible recurrent aspiration PNA  no infiltrate seen on CXR but pt is severely dehydrated  1. continue  zosyn  vanco  2. ID      #Hypernatremia  brief review of medical records, no prior hx  #Dehydration  1. s/p NS 1500 cc  2. 0.45 NS @ 100 cc/hr  3. OSM serum and urine  4. urine lytes      #Altered mental status likely metabolic encephalopathy  treating for potential underlying infection w AB  also rehydration      #Chronic leaking of PEG tube  1. GI consult      #Oral candidiasis  1. continue diflucan  2. needs oral care w tongue brushing      #Hx GERD  1. famotidine 20 mg      #Severe cachexia  1. nutrition consult  2. TSH  3. holding current feeds      #TBI  #paraplegia  #SZ  1. continue AED  2. baclofen 10 mg BID        #VTE  continue enoxaparin        #GOALS OF CARE    Pt is a full code  MOLST updated

## 2022-02-04 NOTE — ED ADULT NURSE NOTE - OBJECTIVE STATEMENT
pt is 57 yo male bibems from [facility] for possible respiratory problem.  as per EMS, his current state is baseline, the family verbalized to NH that pt had labored breathing today and asked to have pt brought to ED.  +tachypnea noted oxygen 89%RA, pt placed on humidified air via trach collar, and also 3L on NC.  +trach stoma noted.  +peg noted, +skin irritation and erythema noted with bilious discharge.  +contracted noted on the right arm.  80s NSR noted on monitor.  unable to initiated IV, pt uncooperative, 2 tries attempted by 2 different nurse, md conde made aware, no iv team available, md Mcclure initiated 20G IV over the left arm, blood obtained, unable to get 2nd set of blood culture.  md conde made aware, md ordered to give antibiotics after the 1st set and cancelled the 2nd set.  unable to obtain urine via straight cath, +resistance noted, md conde made aware, will con't to monitor

## 2022-02-04 NOTE — ED PROVIDER NOTE - CARDIAC, MLM
Mildly tachycardic, regular rhythm.  Heart sounds S1, S2.  No murmurs, rubs or gallops. Radial pulses okay.

## 2022-02-04 NOTE — ED PROVIDER NOTE - CARE PLAN
Principal Discharge DX:	Recurrent aspiration pneumonia  Secondary Diagnosis:	Acute on chronic respiratory failure with hypoxia  Secondary Diagnosis:	Dehydration with hypernatremia   1

## 2022-02-04 NOTE — ED ADULT NURSE REASSESSMENT NOTE - NS ED NURSE REASSESS COMMENT FT1
pt awake and responsive to all, remains on humidified oxygen 28% trach collar, no acute respiratory distress noted. all safety maintain. close monitoring continue.

## 2022-02-05 LAB
A1C WITH ESTIMATED AVERAGE GLUCOSE RESULT: 5.9 % — HIGH (ref 4–5.6)
ANION GAP SERPL CALC-SCNC: 1 MMOL/L — LOW (ref 5–17)
ANION GAP SERPL CALC-SCNC: 2 MMOL/L — LOW (ref 5–17)
BASE EXCESS BLDA CALC-SCNC: -0.6 MMOL/L — SIGNIFICANT CHANGE UP (ref -2–3)
BLOOD GAS COMMENTS ARTERIAL: SIGNIFICANT CHANGE UP
BUN SERPL-MCNC: 13 MG/DL — SIGNIFICANT CHANGE UP (ref 7–23)
BUN SERPL-MCNC: 19 MG/DL — SIGNIFICANT CHANGE UP (ref 7–23)
CALCIUM SERPL-MCNC: 7.8 MG/DL — LOW (ref 8.5–10.1)
CALCIUM SERPL-MCNC: 8.3 MG/DL — LOW (ref 8.5–10.1)
CHLORIDE SERPL-SCNC: 117 MMOL/L — HIGH (ref 96–108)
CHLORIDE SERPL-SCNC: 130 MMOL/L — HIGH (ref 96–108)
CO2 BLDA-SCNC: 24 MMOL/L — SIGNIFICANT CHANGE UP (ref 19–24)
CO2 SERPL-SCNC: 27 MMOL/L — SIGNIFICANT CHANGE UP (ref 22–31)
CO2 SERPL-SCNC: 27 MMOL/L — SIGNIFICANT CHANGE UP (ref 22–31)
CREAT SERPL-MCNC: 0.32 MG/DL — LOW (ref 0.5–1.3)
CREAT SERPL-MCNC: 0.39 MG/DL — LOW (ref 0.5–1.3)
ESTIMATED AVERAGE GLUCOSE: 123 MG/DL — HIGH (ref 68–114)
GLUCOSE SERPL-MCNC: 146 MG/DL — HIGH (ref 70–99)
GLUCOSE SERPL-MCNC: 94 MG/DL — SIGNIFICANT CHANGE UP (ref 70–99)
HCO3 BLDA-SCNC: 23 MMOL/L — SIGNIFICANT CHANGE UP (ref 21–28)
HCT VFR BLD CALC: 34.7 % — LOW (ref 39–50)
HGB BLD-MCNC: 10.2 G/DL — LOW (ref 13–17)
MCHC RBC-ENTMCNC: 29.2 PG — SIGNIFICANT CHANGE UP (ref 27–34)
MCHC RBC-ENTMCNC: 29.4 GM/DL — LOW (ref 32–36)
MCV RBC AUTO: 99.4 FL — SIGNIFICANT CHANGE UP (ref 80–100)
OSMOLALITY SERPL: 331 MOSMOL/KG — HIGH (ref 275–300)
OSMOLALITY UR: 834 MOSM/KG — SIGNIFICANT CHANGE UP (ref 50–1200)
PCO2 BLDA: 35 MMHG — SIGNIFICANT CHANGE UP (ref 35–48)
PH BLDA: 7.43 — SIGNIFICANT CHANGE UP (ref 7.35–7.45)
PLATELET # BLD AUTO: 156 K/UL — SIGNIFICANT CHANGE UP (ref 150–400)
PO2 BLDA: 70 MMHG — LOW (ref 83–108)
POTASSIUM SERPL-MCNC: 3.1 MMOL/L — LOW (ref 3.5–5.3)
POTASSIUM SERPL-MCNC: 3.5 MMOL/L — SIGNIFICANT CHANGE UP (ref 3.5–5.3)
POTASSIUM SERPL-SCNC: 3.1 MMOL/L — LOW (ref 3.5–5.3)
POTASSIUM SERPL-SCNC: 3.5 MMOL/L — SIGNIFICANT CHANGE UP (ref 3.5–5.3)
POTASSIUM UR-SCNC: 70.9 MMOL/L — SIGNIFICANT CHANGE UP
RBC # BLD: 3.49 M/UL — LOW (ref 4.2–5.8)
RBC # FLD: 15.3 % — HIGH (ref 10.3–14.5)
SAO2 % BLDA: 97 % — SIGNIFICANT CHANGE UP
SODIUM SERPL-SCNC: 146 MMOL/L — HIGH (ref 135–145)
SODIUM SERPL-SCNC: 158 MMOL/L — HIGH (ref 135–145)
SODIUM UR-SCNC: 83 MMOL/L — SIGNIFICANT CHANGE UP
TSH SERPL-MCNC: 0.62 UU/ML — SIGNIFICANT CHANGE UP (ref 0.34–4.82)
WBC # BLD: 8.01 K/UL — SIGNIFICANT CHANGE UP (ref 3.8–10.5)
WBC # FLD AUTO: 8.01 K/UL — SIGNIFICANT CHANGE UP (ref 3.8–10.5)

## 2022-02-05 PROCEDURE — 74177 CT ABD & PELVIS W/CONTRAST: CPT | Mod: 26

## 2022-02-05 PROCEDURE — 99291 CRITICAL CARE FIRST HOUR: CPT

## 2022-02-05 PROCEDURE — 99223 1ST HOSP IP/OBS HIGH 75: CPT

## 2022-02-05 RX ORDER — LEVETIRACETAM 250 MG/1
500 TABLET, FILM COATED ORAL EVERY 12 HOURS
Refills: 0 | Status: DISCONTINUED | OUTPATIENT
Start: 2022-02-05 | End: 2022-02-09

## 2022-02-05 RX ORDER — SODIUM CHLORIDE 9 MG/ML
1000 INJECTION, SOLUTION INTRAVENOUS
Refills: 0 | Status: DISCONTINUED | OUTPATIENT
Start: 2022-02-05 | End: 2022-02-06

## 2022-02-05 RX ORDER — POTASSIUM CHLORIDE 20 MEQ
10 PACKET (EA) ORAL
Refills: 0 | Status: COMPLETED | OUTPATIENT
Start: 2022-02-05 | End: 2022-02-05

## 2022-02-05 RX ORDER — SODIUM CHLORIDE 9 MG/ML
1000 INJECTION, SOLUTION INTRAVENOUS
Refills: 0 | Status: DISCONTINUED | OUTPATIENT
Start: 2022-02-05 | End: 2022-02-05

## 2022-02-05 RX ORDER — SODIUM CHLORIDE 9 MG/ML
500 INJECTION INTRAMUSCULAR; INTRAVENOUS; SUBCUTANEOUS ONCE
Refills: 0 | Status: COMPLETED | OUTPATIENT
Start: 2022-02-05 | End: 2022-02-05

## 2022-02-05 RX ORDER — LACOSAMIDE 50 MG/1
50 TABLET ORAL EVERY 12 HOURS
Refills: 0 | Status: DISCONTINUED | OUTPATIENT
Start: 2022-02-05 | End: 2022-02-09

## 2022-02-05 RX ADMIN — ALBUTEROL 2 PUFF(S): 90 AEROSOL, METERED ORAL at 15:35

## 2022-02-05 RX ADMIN — CHLORHEXIDINE GLUCONATE 15 MILLILITER(S): 213 SOLUTION TOPICAL at 21:37

## 2022-02-05 RX ADMIN — SODIUM CHLORIDE 500 MILLILITER(S): 9 INJECTION INTRAMUSCULAR; INTRAVENOUS; SUBCUTANEOUS at 16:37

## 2022-02-05 RX ADMIN — Medication 1 APPLICATION(S): at 11:13

## 2022-02-05 RX ADMIN — Medication 1 APPLICATION(S): at 23:52

## 2022-02-05 RX ADMIN — SODIUM CHLORIDE 100 MILLILITER(S): 9 INJECTION, SOLUTION INTRAVENOUS at 01:15

## 2022-02-05 RX ADMIN — SODIUM CHLORIDE 150 MILLILITER(S): 9 INJECTION, SOLUTION INTRAVENOUS at 18:26

## 2022-02-05 RX ADMIN — Medication 250 MILLIGRAM(S): at 01:15

## 2022-02-05 RX ADMIN — PIPERACILLIN AND TAZOBACTAM 25 GRAM(S): 4; .5 INJECTION, POWDER, LYOPHILIZED, FOR SOLUTION INTRAVENOUS at 14:38

## 2022-02-05 RX ADMIN — ZINC OXIDE 1 APPLICATION(S): 200 OINTMENT TOPICAL at 14:00

## 2022-02-05 RX ADMIN — Medication 1 APPLICATION(S): at 06:17

## 2022-02-05 RX ADMIN — CHLORHEXIDINE GLUCONATE 15 MILLILITER(S): 213 SOLUTION TOPICAL at 01:16

## 2022-02-05 RX ADMIN — PIPERACILLIN AND TAZOBACTAM 25 GRAM(S): 4; .5 INJECTION, POWDER, LYOPHILIZED, FOR SOLUTION INTRAVENOUS at 01:16

## 2022-02-05 RX ADMIN — Medication 1 APPLICATION(S): at 18:28

## 2022-02-05 RX ADMIN — SODIUM CHLORIDE 150 MILLILITER(S): 9 INJECTION, SOLUTION INTRAVENOUS at 11:16

## 2022-02-05 RX ADMIN — LEVETIRACETAM 400 MILLIGRAM(S): 250 TABLET, FILM COATED ORAL at 21:58

## 2022-02-05 RX ADMIN — LACOSAMIDE 110 MILLIGRAM(S): 50 TABLET ORAL at 23:51

## 2022-02-05 RX ADMIN — PIPERACILLIN AND TAZOBACTAM 25 GRAM(S): 4; .5 INJECTION, POWDER, LYOPHILIZED, FOR SOLUTION INTRAVENOUS at 21:37

## 2022-02-05 RX ADMIN — Medication 100 MILLIEQUIVALENT(S): at 21:37

## 2022-02-05 RX ADMIN — PIPERACILLIN AND TAZOBACTAM 25 GRAM(S): 4; .5 INJECTION, POWDER, LYOPHILIZED, FOR SOLUTION INTRAVENOUS at 06:18

## 2022-02-05 RX ADMIN — ZINC OXIDE 1 APPLICATION(S): 200 OINTMENT TOPICAL at 21:38

## 2022-02-05 RX ADMIN — SODIUM CHLORIDE 50 MILLILITER(S): 9 INJECTION, SOLUTION INTRAVENOUS at 21:50

## 2022-02-05 RX ADMIN — ENOXAPARIN SODIUM 40 MILLIGRAM(S): 100 INJECTION SUBCUTANEOUS at 14:42

## 2022-02-05 RX ADMIN — Medication 1 APPLICATION(S): at 21:38

## 2022-02-05 RX ADMIN — Medication 1 APPLICATION(S): at 15:00

## 2022-02-05 RX ADMIN — ZINC OXIDE 1 APPLICATION(S): 200 OINTMENT TOPICAL at 06:38

## 2022-02-05 RX ADMIN — CHLORHEXIDINE GLUCONATE 15 MILLILITER(S): 213 SOLUTION TOPICAL at 14:42

## 2022-02-05 RX ADMIN — ALBUTEROL 2 PUFF(S): 90 AEROSOL, METERED ORAL at 20:35

## 2022-02-05 NOTE — DIETITIAN NUTRITION RISK NOTIFICATION - ADDITIONAL COMMENTS/DIETITIAN RECOMMENDATIONS
· Additional Recommendations  1) initiate enteral feeds as recommended. Monitor lytes high risk for refeeding (replete prn) 2) monitor daily weights track trends 3) Suggest add MVI w/minerals, Vit C 500 mg BID, add Zinc Sulfate 220 mg x 10 days to promote wound healing. 4) Maintain aspiration precautions, back of bed >35 degrees. 5) continue with clear liquid diet for QOL feeds

## 2022-02-05 NOTE — DIETITIAN INITIAL EVALUATION ADULT. - PERTINENT LABORATORY DATA
02-05    158<H>  |  130<H>  |  19  ----------------------------<  94  3.5   |  27  |  0.39<L>    Ca    8.3<L>      05 Feb 2022 05:19    TPro  8.2  /  Alb  2.5<L>  /  TBili  0.2  /  DBili  x   /  AST  12<L>  /  ALT  30  /  AlkPhos  110  02-04  BMI: BMI (kg/m2): 16.2 (02-04-22 @ 17:29)  HbA1c: A1C with Estimated Average Glucose Result: 5.9 % (02-05-22 @ 05:19)    Glucose: POCT Blood Glucose.: 104 mg/dL (02-04-22 @ 22:49)    BP: 87/47 (02-05-22 @ 11:32) (74/35 - 109/62)  Lipid Panel:

## 2022-02-05 NOTE — DIETITIAN INITIAL EVALUATION ADULT. - ENTERAL
1) initiate enteral feeds when PEG functioning: Jevity 1.5 @ 10cc/hr increase 10cc Q 8 hrs until goal rate of 50cc/hr (total volume 1000ml) + 3 packs of Prosource TF daily (total provided 1620 calories/ 97gm protein/ 760ml free water in formula) 2) continuous water flush @ 35cc/hr (total volume 700ml) Total provided including free water in formula= 1460ml.

## 2022-02-05 NOTE — DIETITIAN INITIAL EVALUATION ADULT. - PERTINENT MEDS FT
MEDICATIONS  (STANDING):  ALBUTerol    90 MICROgram(s) HFA Inhaler 2 Puff(s) Inhalation every 6 hours  BACItracin   Ointment 1 Application(s) Topical four times a day  baclofen 10 milliGRAM(s) Oral two times a day  chlorhexidine 0.12% Liquid 15 milliLiter(s) Swish and Spit two times a day  cholecalciferol 1000 Unit(s) Oral daily  dextrose 5%. 1000 milliLiter(s) (150 mL/Hr) IV Continuous <Continuous>  doxazosin 2 milliGRAM(s) Oral at bedtime  enoxaparin Injectable 40 milliGRAM(s) SubCutaneous daily  famotidine    Tablet 20 milliGRAM(s) Oral daily  fluconAZOLE   Tablet 100 milliGRAM(s) Oral daily  lacosamide Solution 50 milliGRAM(s) Oral two times a day  lactulose Syrup 20 Gram(s) Oral daily  levETIRAcetam  Solution 500 milliGRAM(s) Oral two times a day  piperacillin/tazobactam IVPB.. 3.375 Gram(s) IV Intermittent every 8 hours  silver sulfADIAZINE 1% Cream 1 Application(s) Topical two times a day  sodium bicarbonate 650 milliGRAM(s) Oral daily  sodium chloride 0.9% Bolus 500 milliLiter(s) IV Bolus once  tiotropium 18 MICROgram(s) Capsule 1 Capsule(s) Inhalation daily  venlafaxine 37.5 milliGRAM(s) Oral daily  zinc oxide 40% Ointment 1 Application(s) Topical three times a day    MEDICATIONS  (PRN):  acetaminophen     Tablet .. 650 milliGRAM(s) Oral every 6 hours PRN Temp greater or equal to 38C (100.4F), Mild Pain (1 - 3)  aluminum hydroxide/magnesium hydroxide/simethicone Suspension 30 milliLiter(s) Oral every 4 hours PRN Dyspepsia  melatonin 3 milliGRAM(s) Oral at bedtime PRN Insomnia  ondansetron   Disintegrating Tablet 4 milliGRAM(s) Oral every 8 hours PRN Vomiting

## 2022-02-05 NOTE — PATIENT PROFILE ADULT - FALL HARM RISK - HARM RISK INTERVENTIONS

## 2022-02-05 NOTE — DIETITIAN INITIAL EVALUATION ADULT. - PHYSCIAL ASSESSMENT
Sreedhar scale- 12  Skin: coccyx stage 1, Right buttock SDTI  Last BM documented- none since admission x 1 day (bowel meds- lactulose) underweight/emaciated/debilitated

## 2022-02-05 NOTE — DIETITIAN INITIAL EVALUATION ADULT. - MALNUTRITION
Severe protein-calorie malnutrition in context of acute on chronic illness r/t suboptimal nutrition to meet ENN 2/2 aspiration PNA and non functioning PEG AEB severe muscle/fat wasting, significant weight loss, and <50% of ENN x > 5 days.

## 2022-02-05 NOTE — PROGRESS NOTE ADULT - SUBJECTIVE AND OBJECTIVE BOX
patient on arrival to floor was hypotensive, and dec mental status and transferred to ICU    HPI:     6M hx TBI w resultant paraplegia 35 years ago  seizures, chronic resp failure s/p trach decannulated  pulmonary fibrosis, recurrent asp PNA, PEG tube,  DM II, GERD, HTN admit from SNF with resp distress AMS hypernatremia     As soon as he hit the floor from the ED rapid response called for hypoxemia and AMS from baseline  no clear infiltrate, ? on nasal cannula, on zosyn day 2   Patient with severe hypernatremia  has been having issues with leaking of biliary contents around peg. study shows it was in stomach    ROS cant obtain secondary to ams from TBI    PMH:        TBI    paraplegia    s/p trach    aspiration pneumonia    Type II diabetes    HTN     MEDICATIONS  (STANDING):  ALBUTerol    90 MICROgram(s) HFA Inhaler 2 Puff(s) Inhalation every 6 hours  BACItracin   Ointment 1 Application(s) Topical four times a day  baclofen 10 milliGRAM(s) Oral two times a day  chlorhexidine 0.12% Liquid 15 milliLiter(s) Swish and Spit two times a day  cholecalciferol 1000 Unit(s) Oral daily  dextrose 5%. 1000 milliLiter(s) (150 mL/Hr) IV Continuous <Continuous>  doxazosin 2 milliGRAM(s) Oral at bedtime  enoxaparin Injectable 40 milliGRAM(s) SubCutaneous daily  famotidine    Tablet 20 milliGRAM(s) Oral daily  fluconAZOLE   Tablet 100 milliGRAM(s) Oral daily  lacosamide Solution 50 milliGRAM(s) Oral two times a day  lactulose Syrup 20 Gram(s) Oral daily  levETIRAcetam  Solution 500 milliGRAM(s) Oral two times a day  piperacillin/tazobactam IVPB.. 3.375 Gram(s) IV Intermittent every 8 hours  silver sulfADIAZINE 1% Cream 1 Application(s) Topical two times a day  sodium bicarbonate 650 milliGRAM(s) Oral daily  tiotropium 18 MICROgram(s) Capsule 1 Capsule(s) Inhalation daily  venlafaxine 37.5 milliGRAM(s) Oral daily  zinc oxide 40% Ointment 1 Application(s) Topical three times a day    MEDICATIONS  (PRN):  acetaminophen     Tablet .. 650 milliGRAM(s) Oral every 6 hours PRN Temp greater or equal to 38C (100.4F), Mild Pain (1 - 3)  aluminum hydroxide/magnesium hydroxide/simethicone Suspension 30 milliLiter(s) Oral every 4 hours PRN Dyspepsia  melatonin 3 milliGRAM(s) Oral at bedtime PRN Insomnia  ondansetron   Disintegrating Tablet 4 milliGRAM(s) Oral every 8 hours PRN Vomiting      Height (cm): 167.6 ( @ 15:23)  Weight (kg): 45.4 ( @ 17:29)  BMI (kg/m2): 16.2 ( @ 17:29)    ICU Vital Signs Last 24 Hrs  T(C): 36.9 (2022 04:00), Max: 38.6 (2022 15:55)  T(F): 98.4 (2022 04:00), Max: 101.4 (2022 15:55)  HR: 77 (2022 07:00) (77 - 99)  BP: 88/49 (2022 07:00) (86/48 - 109/62)  BP(mean): 57 (2022 07:00) (56 - 76)  ABP: --  ABP(mean): --  RR: 25 (2022 07:00) (20 - 35)  SpO2: 97% (2022 07:00) (92% - 100%)    Physical Exam    General - weak, alert  HEENT nc/at  neck s/p old trach, still partially open  lungs scattered rhonchi  cv rrr  abdomen soft, peg side with slight drainge around  ext contracted  neuro opens eyes, responds to voice simple commands, contracted lower extremities, with paraplegia      I&O's Summary    2022 07:01  -  2022 07:00  --------------------------------------------------------  IN: 2093 mL / OUT: 350 mL / NET: 1743 mL                        10.2   8.01  )-----------( 156      ( 2022 05:19 )             34.7       02-05    158<H>  |  130<H>  |  19  ----------------------------<  94  3.5   |  27  |  0.39<L>    Ca    8.3<L>      2022 05:19    TPro  8.2  /  Alb  2.5<L>  /  TBili  0.2  /  DBili  x   /  AST  12<L>  /  ALT  30  /  AlkPhos  110  02-04    ABG - ( 2022 05:51 )  pH, Arterial: 7.43  pH, Blood: x     /  pCO2: 35    /  pO2: 70    / HCO3: 23    / Base Excess: -0.6  /  SaO2: 97        Urinalysis Basic - ( 2022 22:06 )    Color: Yellow / Appearance: Clear / S.020 / pH: x  Gluc: x / Ketone: Negative  / Bili: Negative / Urobili: Negative   Blood: x / Protein: See Note / Nitrite: Negative   Leuk Esterase: Trace / RBC: 11-25 /HPF / WBC 0-2   Sq Epi: x / Non Sq Epi: Negative / Bacteria: Negative    DVT Prophylaxis: Lovenox                                                                 Advanced Directives: Full Code

## 2022-02-05 NOTE — PROGRESS NOTE ADULT - ASSESSMENT
Patient with previous TBI  has peg , now severely dehydratied likely from leaking peg  Hypernatremia with sodium of 159, will continue hydration  will consult GI to access PEG prior to initiation of feeds, study oftube shows no extravastion in stomack  ? aspiration pneumonia, on clear on zosyn for now

## 2022-02-05 NOTE — PROGRESS NOTE ADULT - SUBJECTIVE AND OBJECTIVE BOX
Patient is a 56y old  Male who presents with a chief complaint of aspiration pneumonia  hypernatremia (2022 13:16)    PAST MEDICAL & SURGICAL HISTORY:  TBI (traumatic brain injury)    Seizure    Polio    H/O paraplegia    Pneumonia    H/O chronic respiratory failure    Diabetes mellitus    Hypertension    S/P percutaneous endoscopic gastrostomy (PEG) tube placement      NADYA BRASHER   56y    Male    BRIEF HOSPITAL COURSE:    Review of Systems:                       All other ROS are negative.    Allergies    Ativan (Unknown)  phenytoin (Unknown)  Valproate Sodium (Other (Severe))  Valproate Sodium (Unknown)    Intolerances          ICU Vital Signs Last 24 Hrs  T(C): 36.2 (2022 22:00), Max: 36.9 (2022 04:00)  T(F): 97.1 (2022 22:00), Max: 98.4 (2022 04:00)  HR: 67 (2022 23:00) (64 - 94)  BP: 90/36 (2022 23:00) (74/30 - 108/44)  BP(mean): 51 (2022 23:00) (40 - 67)  ABP: --  ABP(mean): --  RR: 29 (2022 23:00) (15 - 35)  SpO2: 97% (2022 23:00) (61% - 100%)    Physical Examination:    General:     HEENT:     PULM:     CVS:  s1 s2 reg    ABD:     EXT:     SKIN:     Neuro:    ABG - ( 2022 05:51 )  pH, Arterial: 7.43  pH, Blood: x     /  pCO2: 35    /  pO2: 70    / HCO3: 23    / Base Excess: -0.6  /  SaO2: 97                    LABS:                        10.2   8.01  )-----------( 156      ( 2022 05:19 )             34.7     02-05    146<H>  |  117<H>  |  13  ----------------------------<  146<H>  3.1<L>   |  27  |  0.32<L>    Ca    7.8<L>      2022 17:07    TPro  8.2  /  Alb  2.5<L>  /  TBili  0.2  /  DBili  x   /  AST  12<L>  /  ALT  30  /  AlkPhos  110            CAPILLARY BLOOD GLUCOSE        PT/INR - ( 2022 17:07 )   PT: 14.2 sec;   INR: 1.23 ratio         PTT - ( 2022 17:07 )  PTT:35.0 sec  Urinalysis Basic - ( 2022 22:06 )    Color: Yellow / Appearance: Clear / S.020 / pH: x  Gluc: x / Ketone: Negative  / Bili: Negative / Urobili: Negative   Blood: x / Protein: See Note / Nitrite: Negative   Leuk Esterase: Trace / RBC: 11-25 /HPF / WBC 0-2   Sq Epi: x / Non Sq Epi: Negative / Bacteria: Negative      CULTURES:  Culture Results:   No growth to date. ( @ 17:07)  Rapid RVP Result: NotDetec ( @ 16:25)      Medications:  MEDICATIONS  (STANDING):  ALBUTerol    90 MICROgram(s) HFA Inhaler 2 Puff(s) Inhalation every 6 hours  BACItracin   Ointment 1 Application(s) Topical four times a day  baclofen 10 milliGRAM(s) Oral two times a day  chlorhexidine 0.12% Liquid 15 milliLiter(s) Swish and Spit two times a day  cholecalciferol 1000 Unit(s) Oral daily  doxazosin 2 milliGRAM(s) Oral at bedtime  enoxaparin Injectable 40 milliGRAM(s) SubCutaneous daily  famotidine    Tablet 20 milliGRAM(s) Oral daily  fluconAZOLE   Tablet 100 milliGRAM(s) Oral daily  lacosamide IVPB 50 milliGRAM(s) IV Intermittent every 12 hours  lactated ringers. 1000 milliLiter(s) (50 mL/Hr) IV Continuous <Continuous>  lactulose Syrup 20 Gram(s) Oral daily  levETIRAcetam  IVPB 500 milliGRAM(s) IV Intermittent every 12 hours  piperacillin/tazobactam IVPB.. 3.375 Gram(s) IV Intermittent every 8 hours  potassium chloride  10 mEq/100 mL IVPB 10 milliEquivalent(s) IV Intermittent every 1 hour  silver sulfADIAZINE 1% Cream 1 Application(s) Topical two times a day  sodium bicarbonate 650 milliGRAM(s) Oral daily  tiotropium 18 MICROgram(s) Capsule 1 Capsule(s) Inhalation daily  venlafaxine 37.5 milliGRAM(s) Oral daily  zinc oxide 40% Ointment 1 Application(s) Topical three times a day    MEDICATIONS  (PRN):  acetaminophen     Tablet .. 650 milliGRAM(s) Oral every 6 hours PRN Temp greater or equal to 38C (100.4F), Mild Pain (1 - 3)  aluminum hydroxide/magnesium hydroxide/simethicone Suspension 30 milliLiter(s) Oral every 4 hours PRN Dyspepsia  melatonin 3 milliGRAM(s) Oral at bedtime PRN Insomnia  ondansetron   Disintegrating Tablet 4 milliGRAM(s) Oral every 8 hours PRN Vomiting         @ 07:  -   @ 07:00  --------------------------------------------------------  IN: 2093 mL / OUT: 350 mL / NET: 1743 mL     @ 07:  -  05 @ 23:51  --------------------------------------------------------  IN: 2911 mL / OUT: 400 mL / NET: 2511 mL        RADIOLOGY/IMAGING/ECHO    Critical care point of care ultrasound:    Assessment/Plan:    56M hx TBI w resultant paraplegia 35 years ago  seizures, chronic resp failure s/p trach decannulated  pulmonary fibrosis, recurrent asp PNA, PEG tube,  DM II, GERD, HTN admit from SNF 2/  with resp distress AMS hypernatremia       Rx for aspiration PNA     Developed AMS hypotension   ABG> acutew type 2 resp failure      Hypernatremia improved but correction too brisk.  IVF changed to LR rate reduced.    Type 2 resp failure resolved.      Remains hypoxemic      Significant fecal impaction    RX enema    Peg with leakage and in intussusception inside of SB  GI following.       Will discuss CT GB finding with Dr Siddiqui.  Clinically he is HD stable afebrile and without leukocytosis     On pip/tazo      DVT P    AED changed to IV route           Patient is a 56y old  Male who presents with a chief complaint of aspiration pneumonia  hypernatremia (2022 13:16)    PAST MEDICAL & SURGICAL HISTORY:  TBI (traumatic brain injury)    Seizure    Polio    H/O paraplegia    Pneumonia    H/O chronic respiratory failure    Diabetes mellitus    Hypertension    S/P percutaneous endoscopic gastrostomy (PEG) tube placement      NADYA BRASHER   56y    Male    BRIEF HOSPITAL COURSE:    Review of Systems:  UATO                     All other ROS are negative.    Allergies    Ativan (Unknown)  phenytoin (Unknown)  Valproate Sodium (Other (Severe))  Valproate Sodium (Unknown)    Intolerances          ICU Vital Signs Last 24 Hrs  T(C): 36.2 (2022 22:00), Max: 36.9 (2022 04:00)  T(F): 97.1 (2022 22:00), Max: 98.4 (2022 04:00)  HR: 67 (2022 23:00) (64 - 94)  BP: 90/36 (2022 23:00) (74/30 - 108/44)  BP(mean): 51 (2022 23:00) (40 - 67)  ABP: --  ABP(mean): --  RR: 29 (2022 23:00) (15 - 35)  SpO2: 97% (2022 23:00) (61% - 100%)    Physical Examination:    General: NAD  contracted     HEENT:  trach patent     PULM: bilateral bs     CVS:  s1 s2 reg    ABD: + Peg scaphoid abd     EXT: contracted     SKIN: warm    Neuro:  TBI      ABG - ( 2022 05:51 )  pH, Arterial: 7.43  pH, Blood: x     /  pCO2: 35    /  pO2: 70    / HCO3: 23    / Base Excess: -0.6  /  SaO2: 97                    LABS:                        10.2   8.01  )-----------( 156      ( 2022 05:19 )             34.7     02-05    146<H>  |  117<H>  |  13  ----------------------------<  146<H>  3.1<L>   |  27  |  0.32<L>    Ca    7.8<L>      2022 17:07    TPro  8.2  /  Alb  2.5<L>  /  TBili  0.2  /  DBili  x   /  AST  12<L>  /  ALT  30  /  AlkPhos  110            CAPILLARY BLOOD GLUCOSE        PT/INR - ( 2022 17:07 )   PT: 14.2 sec;   INR: 1.23 ratio         PTT - ( 2022 17:07 )  PTT:35.0 sec  Urinalysis Basic - ( 2022 22:06 )    Color: Yellow / Appearance: Clear / S.020 / pH: x  Gluc: x / Ketone: Negative  / Bili: Negative / Urobili: Negative   Blood: x / Protein: See Note / Nitrite: Negative   Leuk Esterase: Trace / RBC: 11-25 /HPF / WBC 0-2   Sq Epi: x / Non Sq Epi: Negative / Bacteria: Negative      CULTURES:  Culture Results:   No growth to date. ( @ 17:07)  Rapid RVP Result: NotDetec ( @ 16:25)      Medications:  MEDICATIONS  (STANDING):  ALBUTerol    90 MICROgram(s) HFA Inhaler 2 Puff(s) Inhalation every 6 hours  BACItracin   Ointment 1 Application(s) Topical four times a day  baclofen 10 milliGRAM(s) Oral two times a day  chlorhexidine 0.12% Liquid 15 milliLiter(s) Swish and Spit two times a day  cholecalciferol 1000 Unit(s) Oral daily  doxazosin 2 milliGRAM(s) Oral at bedtime  enoxaparin Injectable 40 milliGRAM(s) SubCutaneous daily  famotidine    Tablet 20 milliGRAM(s) Oral daily  fluconAZOLE   Tablet 100 milliGRAM(s) Oral daily  lacosamide IVPB 50 milliGRAM(s) IV Intermittent every 12 hours  lactated ringers. 1000 milliLiter(s) (50 mL/Hr) IV Continuous <Continuous>  lactulose Syrup 20 Gram(s) Oral daily  levETIRAcetam  IVPB 500 milliGRAM(s) IV Intermittent every 12 hours  piperacillin/tazobactam IVPB.. 3.375 Gram(s) IV Intermittent every 8 hours  potassium chloride  10 mEq/100 mL IVPB 10 milliEquivalent(s) IV Intermittent every 1 hour  silver sulfADIAZINE 1% Cream 1 Application(s) Topical two times a day  sodium bicarbonate 650 milliGRAM(s) Oral daily  tiotropium 18 MICROgram(s) Capsule 1 Capsule(s) Inhalation daily  venlafaxine 37.5 milliGRAM(s) Oral daily  zinc oxide 40% Ointment 1 Application(s) Topical three times a day    MEDICATIONS  (PRN):  acetaminophen     Tablet .. 650 milliGRAM(s) Oral every 6 hours PRN Temp greater or equal to 38C (100.4F), Mild Pain (1 - 3)  aluminum hydroxide/magnesium hydroxide/simethicone Suspension 30 milliLiter(s) Oral every 4 hours PRN Dyspepsia  melatonin 3 milliGRAM(s) Oral at bedtime PRN Insomnia  ondansetron   Disintegrating Tablet 4 milliGRAM(s) Oral every 8 hours PRN Vomiting         @ 07:01  -   @ 07:00  --------------------------------------------------------  IN: 2093 mL / OUT: 350 mL / NET: 1743 mL    02 @ 07:01  -  05 @ 23:51  --------------------------------------------------------  IN: 2911 mL / OUT: 400 mL / NET: 2511 mL        RADIOLOGY/IMAGING/ECHO    Critical care point of care ultrasound:    Assessment/Plan:    56M hx TBI w resultant paraplegia 35 years ago  seizures, chronic resp failure s/p trach decannulated  pulmonary fibrosis, recurrent asp PNA, PEG tube,  DM II, GERD, HTN admit from SNF 2/  with resp distress AMS hypernatremia       Rx for aspiration PNA     Developed AMS hypotension   ABG> acute  type 2 resp failure      Hypernatremia improved but correction too brisk.  IVF changed to LR rate reduced.    Type 2 resp failure resolved.      Remains hypoxemic      Significant fecal impaction    RX enema    Peg with leakage and in intussusception inside of SB  GI following.       Will discuss CT GB finding with Dr Siddiqui.  Clinically he is HD stable afebrile and without leukocytosis     On pip/tazo      DVT P    AED changed to IV route

## 2022-02-05 NOTE — DIETITIAN INITIAL EVALUATION ADULT. - OTHER INFO
56 year old male w hx TBI w resultant paraplegia (age 21), seizures, chronic resp failure s/p trach w reversal, pulmonary fibrosis, recurrent asp PNA, PEG tube,  DM II, GERD, HTN who presents to ED by EMS from [place] for evaluation of labored breathing. Labored breathing was note by family  they thought he looked very dehydrated. PEG tube has been leaking bilious fluids x 3 weeks.  It was placed at [facility] just prior to pt being placed at [place] and has leaked all the time he has been there. As per GI possible PEG replacement by bedside if needed. Clear liquid diet ordered however patient not consuming at this time. He was recently quarantined for being COVID + and was asymptomatic. Had hx of COVID infection in 2020 when he lost 50 lbs. On intermittent tube feeds he had reportedly continued to lose weight. Pt does understand speech and follow verbal commands. He is able to answer yes and no questions. Pt appears extremely emaciated, thin, bony, and malnourished. Last hospital weight x 3 years ago 219.8# indicating a 128# loss. As per H & P weights have continually been declining. Will continue to monitor and follow up prn. See below for recommendations. Criteria met for severe malnutrition.

## 2022-02-05 NOTE — DIETITIAN NUTRITION RISK NOTIFICATION - TREATMENT: THE FOLLOWING DIET HAS BEEN RECOMMENDED
Diet, Clear Liquid:   Tube Feeding Modality: Gastrostomy  Jevity 1.5 Edison (JEVITY1.5)  Total Volume for 24 Hours (mL): 1200  Continuous  Starting Tube Feed Rate {mL per Hour}: 10  Increase Tube Feed Rate by (mL): 10     Every 8 hours  Until Goal Tube Feed Rate (mL per Hour): 50  Tube Feed Duration (in Hours): 24  Tube Feed Start Time: 00:00  Free Water Flush Instructions:  @ 35cc/hr (total volume x 24hr-700ml)  No Carb Prosource TF     Qty per Day:  3 (02-05-22 @ 14:10) [Pending Verification By Attending]

## 2022-02-05 NOTE — DIETITIAN INITIAL EVALUATION ADULT. - ADD RECOMMEND
1) initiate enteral feeds as recommended. Monitor lytes high risk for refeeding (replete prn) 2) monitor daily weights track trends 3) Suggest add MVI w/minerals, Vit C 500 mg BID, add Zinc Sulfate 220 mg x 10 days to promote wound healing. 4) Maintain aspiration precautions, back of bed >35 degrees. 5) continue with clear liquid diet for QOL feeds

## 2022-02-06 LAB
ANION GAP SERPL CALC-SCNC: 5 MMOL/L — SIGNIFICANT CHANGE UP (ref 5–17)
BUN SERPL-MCNC: 6 MG/DL — LOW (ref 7–23)
CALCIUM SERPL-MCNC: 8 MG/DL — LOW (ref 8.5–10.1)
CHLORIDE SERPL-SCNC: 116 MMOL/L — HIGH (ref 96–108)
CO2 SERPL-SCNC: 25 MMOL/L — SIGNIFICANT CHANGE UP (ref 22–31)
CREAT SERPL-MCNC: 0.22 MG/DL — LOW (ref 0.5–1.3)
GLUCOSE SERPL-MCNC: 81 MG/DL — SIGNIFICANT CHANGE UP (ref 70–99)
HCT VFR BLD CALC: 32.1 % — LOW (ref 39–50)
HGB BLD-MCNC: 9.7 G/DL — LOW (ref 13–17)
MCHC RBC-ENTMCNC: 29 PG — SIGNIFICANT CHANGE UP (ref 27–34)
MCHC RBC-ENTMCNC: 30.2 GM/DL — LOW (ref 32–36)
MCV RBC AUTO: 95.8 FL — SIGNIFICANT CHANGE UP (ref 80–100)
PLATELET # BLD AUTO: 165 K/UL — SIGNIFICANT CHANGE UP (ref 150–400)
POTASSIUM SERPL-MCNC: 3.3 MMOL/L — LOW (ref 3.5–5.3)
POTASSIUM SERPL-SCNC: 3.3 MMOL/L — LOW (ref 3.5–5.3)
RBC # BLD: 3.35 M/UL — LOW (ref 4.2–5.8)
RBC # FLD: 14.6 % — HIGH (ref 10.3–14.5)
SODIUM SERPL-SCNC: 146 MMOL/L — HIGH (ref 135–145)
WBC # BLD: 5.63 K/UL — SIGNIFICANT CHANGE UP (ref 3.8–10.5)
WBC # FLD AUTO: 5.63 K/UL — SIGNIFICANT CHANGE UP (ref 3.8–10.5)

## 2022-02-06 PROCEDURE — 99233 SBSQ HOSP IP/OBS HIGH 50: CPT

## 2022-02-06 PROCEDURE — 74176 CT ABD & PELVIS W/O CONTRAST: CPT | Mod: 26

## 2022-02-06 RX ORDER — DEXTROSE MONOHYDRATE, SODIUM CHLORIDE, AND POTASSIUM CHLORIDE 50; .745; 4.5 G/1000ML; G/1000ML; G/1000ML
1000 INJECTION, SOLUTION INTRAVENOUS
Refills: 0 | Status: DISCONTINUED | OUTPATIENT
Start: 2022-02-06 | End: 2022-02-07

## 2022-02-06 RX ORDER — POTASSIUM CHLORIDE 20 MEQ
10 PACKET (EA) ORAL
Refills: 0 | Status: COMPLETED | OUTPATIENT
Start: 2022-02-06 | End: 2022-02-06

## 2022-02-06 RX ORDER — POTASSIUM CHLORIDE 20 MEQ
10 PACKET (EA) ORAL
Refills: 0 | Status: DISCONTINUED | OUTPATIENT
Start: 2022-02-06 | End: 2022-02-06

## 2022-02-06 RX ADMIN — Medication 1 APPLICATION(S): at 10:42

## 2022-02-06 RX ADMIN — Medication 1 APPLICATION(S): at 12:15

## 2022-02-06 RX ADMIN — LACOSAMIDE 110 MILLIGRAM(S): 50 TABLET ORAL at 22:50

## 2022-02-06 RX ADMIN — Medication 100 MILLIEQUIVALENT(S): at 17:42

## 2022-02-06 RX ADMIN — Medication 100 MILLIEQUIVALENT(S): at 21:14

## 2022-02-06 RX ADMIN — LEVETIRACETAM 400 MILLIGRAM(S): 250 TABLET, FILM COATED ORAL at 10:42

## 2022-02-06 RX ADMIN — Medication 1 APPLICATION(S): at 21:16

## 2022-02-06 RX ADMIN — CHLORHEXIDINE GLUCONATE 15 MILLILITER(S): 213 SOLUTION TOPICAL at 21:47

## 2022-02-06 RX ADMIN — ZINC OXIDE 1 APPLICATION(S): 200 OINTMENT TOPICAL at 14:19

## 2022-02-06 RX ADMIN — LEVETIRACETAM 400 MILLIGRAM(S): 250 TABLET, FILM COATED ORAL at 21:15

## 2022-02-06 RX ADMIN — ALBUTEROL 2 PUFF(S): 90 AEROSOL, METERED ORAL at 08:38

## 2022-02-06 RX ADMIN — ZINC OXIDE 1 APPLICATION(S): 200 OINTMENT TOPICAL at 06:06

## 2022-02-06 RX ADMIN — CHLORHEXIDINE GLUCONATE 15 MILLILITER(S): 213 SOLUTION TOPICAL at 10:41

## 2022-02-06 RX ADMIN — PIPERACILLIN AND TAZOBACTAM 25 GRAM(S): 4; .5 INJECTION, POWDER, LYOPHILIZED, FOR SOLUTION INTRAVENOUS at 06:05

## 2022-02-06 RX ADMIN — ZINC OXIDE 1 APPLICATION(S): 200 OINTMENT TOPICAL at 21:47

## 2022-02-06 RX ADMIN — LACOSAMIDE 110 MILLIGRAM(S): 50 TABLET ORAL at 09:38

## 2022-02-06 RX ADMIN — Medication 1 APPLICATION(S): at 17:43

## 2022-02-06 RX ADMIN — ALBUTEROL 2 PUFF(S): 90 AEROSOL, METERED ORAL at 21:59

## 2022-02-06 RX ADMIN — DEXTROSE MONOHYDRATE, SODIUM CHLORIDE, AND POTASSIUM CHLORIDE 75 MILLILITER(S): 50; .745; 4.5 INJECTION, SOLUTION INTRAVENOUS at 09:38

## 2022-02-06 RX ADMIN — Medication 1 APPLICATION(S): at 06:05

## 2022-02-06 RX ADMIN — ENOXAPARIN SODIUM 40 MILLIGRAM(S): 100 INJECTION SUBCUTANEOUS at 11:41

## 2022-02-06 RX ADMIN — Medication 100 MILLIEQUIVALENT(S): at 00:37

## 2022-02-06 RX ADMIN — PIPERACILLIN AND TAZOBACTAM 25 GRAM(S): 4; .5 INJECTION, POWDER, LYOPHILIZED, FOR SOLUTION INTRAVENOUS at 14:19

## 2022-02-06 NOTE — PROGRESS NOTE ADULT - ASSESSMENT
56 year old male w hx TBI w resultant paraplegia (age 21), seizures, chronic resp failure s/p trach w reversal, pulmonary fibrosis, recurrent asp PNA, PEG tube,  DM II, GERD, HTN who presents to ED by EMS from [place] for evaluation of labored breathing  Labored breathing was noted by family today and they thought he looked very dehydrated  EMS noted hypotension at scene w BP 90/ Pulse ox 88-89% PEG tube has been leaking bilious fluids x 3 weeks. Family reports that it was placed at [facility] just prior to pt being placed at [place] and has leaked all the time he has been there. He was recently quarantined for being COVID + and was asymptomatic Had hx of COVID infection in 2020 when he lost 50 lbs On intermittent tube feeds he had reportedly continued to lose weight In ED BP 90/63   HR 99   RR 20   T 99.6   97% sat RA peg tube in place with surrounding irritation and erythema, bilious discharge around the tube gastrograffin for tube check did not detect leakage, was given vancomycin/zosyn d/t concern for aspiration pna.     1. hypoxia. hypotension. ? aspiration pneumonitis/pna. bronchiectasis. pulmonary fibrosis  - imaging reviewed, gi eval noted, peg tube repositioned, gb changes noted ? gb wall defect   - peg tube leaking, may need to be replaced  - on IV zosyn [id number] #3  - blood cultures no growth  - on diflucan for thrush #3  - aspiration precautions  - gi follow up noted  - continue with abx coverage   - fu cbc  - monitor temps  - tolerating abx well so far; no side effects noted  - reason for abx use and side effects reviewed with patient  - supportive care     2. other issues - care per medicine

## 2022-02-06 NOTE — PROGRESS NOTE ADULT - ASSESSMENT
Patient with previous TBI  has peg , now severely dehydratied likely from leaking peg, ct shows intersusseption  d/w GI will need to be fixed  also question of perforated GB, ? GB distended , no tenderness, on zosyn  Hypernatremia with sodium of 159, will continue hydration improved now 146  ? aspiration pneumonia, on clear on zosyn  cheap infiltrate on cxr Patient with previous TBI  has peg , now severely dehydratied likely from leaking peg, ct shows intersusseption  d/w GI will need to be fixed  also question of defect wall of GB , no tenderness, on zosyn, will d/w GI  Hypernatremia with sodium of 159, will continue hydration improved now 146  ? aspiration pneumonia, on clear on zosyn  cheap infiltrate on cxr  alll meds IV for now  npo  lovenox dvt prophylaxis Patient with previous TBI  has peg , now severely dehydratied likely from leaking peg, ct shows intersusseption  d/w GI will need to be fixed  also question of defect wall of GB , no tenderness, on zosyn, will d/w GI  Hypernatremia with sodium of 159, will continue hydration improved now 146  ? aspiration pneumonia, on clear on zosyn  cheap infiltrate on cxr  alll meds IV for now  npo  lovenox dvt prophylaxis    addendum:    Patient with intersusseption of peg tube, pulled back by GI, f/u ct showed  ? small amount of air, with fluid around liver and inflammatory changeds.  no clear evidence of gb perforation.  Patient now with slight ruq discomfort  d/w GI, surgery,  on abx. will follow wxam, if deteriorates may need surgical intervention  will like need PICC for TPN Patient with previous TBI  has peg , now severely dehydratied likely from leaking peg, ct shows intersusseption  d/w GI will need to be fixed  also question of defect wall of GB , no tenderness, on zosyn, will d/w GI  Hypernatremia with sodium of 159, will continue hydration improved now 146  ? aspiration pneumonia, on clear on zosyn  cheap infiltrate on cxr  alll meds IV for now  npo  lovenox dvt prophylaxis    addendum:    Patient with intersusseption of peg tube, pulled back by GI, f/u ct showed  ? small amount of air, with fluid around liver and inflammatory changeds.  no clear evidence of gb perforation.  Patient now with slight ruq discomfort  d/w GI, surgery,  Patient sister Lisa updated on situation [phone]  on abx. will follow wxam, if deteriorates may need surgical intervention  will like need PICC for TPN

## 2022-02-06 NOTE — CONSULT NOTE ADULT - REASON FOR ADMISSION
aspiration pneumonia  hypernatremia

## 2022-02-06 NOTE — PROGRESS NOTE ADULT - SUBJECTIVE AND OBJECTIVE BOX
CT prelim reviewed  Extraluminal air outside duodenum  Balloon is still in duodenum  I deflated balloon and removed the PEG entirely -- large amount of bilious fluid draining out tract - stoma bag applied by nurse  Will await surgical opinion regarding management

## 2022-02-06 NOTE — PROGRESS NOTE ADULT - ASSESSMENT
Imp:  Appears that PEG balloon was stuck in small bowel    Rec:  Balloon deflated and pulled back and reinflated presumably in the stomach -- filled with 20 cc water  However, tract is leaking badly and PEG may have to be replaced regardless  Will start with CT to assure that PEG is properly positioned, GB OK etc.  I don't think the current tract will heal without surgery

## 2022-02-06 NOTE — CONSULT NOTE ADULT - ASSESSMENT
57yo male wit TBI with g tube  admitted with hypotension and hypernatremia    g tube inflamed with persistent leakage  will obtain ct scan with contrast via g tube  consider bedside replacement pending results of above  d/w dr luis m ocampo to assume care in am
56M pmhx TBI presents with PEG tube malposition and evidence of extraluminal air on CT.  PEG tube has since been removed.  Extraluminal air likely 2/2 manipulation of PEG.  Patient VS wnl, nontender to palpation without leukocytosis, peritonitis unlikely at this time     Plan:   - No acute surgical intervention indicated at this time  - Management of PEG as per GI   - serial abdominal exams   - If suspicion of duodenal perforation remains high, obtain UGIS via NGT  - Rest of management as per ICU    Case discussed with Dr. Story, surgery attending 
56 year old male w hx TBI w resultant paraplegia (age 21), seizures, chronic resp failure s/p trach w reversal, pulmonary fibrosis, recurrent asp PNA, PEG tube,  DM II, GERD, HTN who presents to ED by EMS from [place] for evaluation of labored breathing  Labored breathing was noted by family today and they thought he looked very dehydrated  EMS noted hypotension at scene w BP 90/ Pulse ox 88-89% PEG tube has been leaking bilious fluids x 3 weeks. Family reports that it was placed at [facility] just prior to pt being placed at [facility] and has leaked all the time he has been there. He was recently quarantined for being COVID + and was asymptomatic Had hx of COVID infection in 2020 when he lost 50 lbs On intermittent tube feeds he had reportedly continued to lose weight In ED BP 90/63   HR 99   RR 20   T 99.6   97% sat RA peg tube in place with surrounding irritation and erythema, bilious discharge around the tube gastrograffin for tube check did not detect leakage, was given vancomycin/zosyn d/t concern for aspiration pna.     1. hypoxia. hypotension. ? aspiration pneumonitis/pna. bronchiectasis. pulmonary fibrosis  - imaging reviewed  - agree with IV zosyn [id number]  - f/u blood cultures  - on diflucan for thrush  - aspiration precautions  - gi follow up noted f/u ct w/ contrast may need tube replaced  - wound care for tube site  - fu cbc  - monitor temps  - tolerating abx well so far; no side effects noted  - reason for abx use and side effects reviewed with patient  - supportive care     2. other issues - care per medicine

## 2022-02-06 NOTE — PROGRESS NOTE ADULT - SUBJECTIVE AND OBJECTIVE BOX
Events Overnight:  Patient ct yesterday shows intersuception of small bowel around Peg, ? abnormalit with gb results as questionalble perforated    HPI:   56M hx TBI w resultant paraplegia 35 years ago  seizures, chronic resp failure s/p trach decannulated  pulmonary fibrosis, recurrent asp PNA, PEG tube,  DM II, GERD, HTN admit from SNF with resp distress AMS hypernatremia     As soon as he hit the floor from the ED rapid response called for hypoxemia and AMS from baseline  no clear infiltrate,  on nasal cannula, on zosyn day 3   Patient with severe hypernatremia on admission improved sodium now 146  has been having issues with leaking of biliary contents around peg. ct as above    ROS cant obtain secondary to ams from TBI    PMH:        TBI    paraplegia    s/p trach    aspiration pneumonia    Type II diabetes    HTN       MEDICATIONS  (STANDING):  ALBUTerol    90 MICROgram(s) HFA Inhaler 2 Puff(s) Inhalation every 6 hours  BACItracin   Ointment 1 Application(s) Topical four times a day  baclofen 10 milliGRAM(s) Oral two times a day  chlorhexidine 0.12% Liquid 15 milliLiter(s) Swish and Spit two times a day  cholecalciferol 1000 Unit(s) Oral daily  dextrose 5% + sodium chloride 0.45% with potassium chloride 20 mEq/L 1000 milliLiter(s) (75 mL/Hr) IV Continuous <Continuous>  doxazosin 2 milliGRAM(s) Oral at bedtime  enoxaparin Injectable 40 milliGRAM(s) SubCutaneous daily  famotidine    Tablet 20 milliGRAM(s) Oral daily  fluconAZOLE   Tablet 100 milliGRAM(s) Oral daily  lacosamide IVPB 50 milliGRAM(s) IV Intermittent every 12 hours  levETIRAcetam  IVPB 500 milliGRAM(s) IV Intermittent every 12 hours  piperacillin/tazobactam IVPB.. 3.375 Gram(s) IV Intermittent every 8 hours  silver sulfADIAZINE 1% Cream 1 Application(s) Topical two times a day  tiotropium 18 MICROgram(s) Capsule 1 Capsule(s) Inhalation daily  venlafaxine 37.5 milliGRAM(s) Oral daily  zinc oxide 40% Ointment 1 Application(s) Topical three times a day    MEDICATIONS  (PRN):  acetaminophen     Tablet .. 650 milliGRAM(s) Oral every 6 hours PRN Temp greater or equal to 38C (100.4F), Mild Pain (1 - 3)  aluminum hydroxide/magnesium hydroxide/simethicone Suspension 30 milliLiter(s) Oral every 4 hours PRN Dyspepsia  melatonin 3 milliGRAM(s) Oral at bedtime PRN Insomnia  ondansetron   Disintegrating Tablet 4 milliGRAM(s) Oral every 8 hours PRN Vomiting      ICU Vital Signs Last 24 Hrs  T(C): 36.4 (2022 08:00), Max: 36.8 (2022 18:30)  T(F): 97.6 (2022 08:00), Max: 98.3 (2022 18:30)  HR: 58 (2022 09:00) (58 - 84)  BP: 128/94 (2022 09:00) (74/30 - 128/94)  BP(mean): 101 (2022 09:00) (40 - 101)  ABP: --  ABP(mean): --  RR: 29 (2022 09:00) (15 - 35)  SpO2: 95% (2022 09:00) (61% - 100%)    Physical Exam    General - weak, alert  HEENT nc/at  neck s/p old trach, still partially open  lungs scattered rhonchi  cv rrr  abdomen soft, peg side with slight drainge around, no tenderness, ruq  ext contracted  neuro opens eyes, responds to voice simple commands, contracted lower extremities, with paraplegia    I&O's Summary    2022 07:01  -  2022 07:00  --------------------------------------------------------  IN: 4163.2 mL / OUT: 1125 mL / NET: 3038.2 mL                          9.7    5.63  )-----------( 165      ( 2022 06:52 )             32.1       02-06    146<H>  |  116<H>  |  6<L>  ----------------------------<  81  3.3<L>   |  25  |  0.22<L>    Ca    8.0<L>      2022 06:52    TPro  8.2  /  Alb  2.5<L>  /  TBili  0.2  /  DBili  x   /  AST  12<L>  /  ALT  30  /  AlkPhos  110  02-04    ABG - ( 2022 05:51 )  pH, Arterial: 7.43  pH, Blood: x     /  pCO2: 35    /  pO2: 70    / HCO3: 23    / Base Excess: -0.6  /  SaO2: 97        Urinalysis Basic - ( 2022 22:06 )    Color: Yellow / Appearance: Clear / S.020 / pH: x  Gluc: x / Ketone: Negative  / Bili: Negative / Urobili: Negative   Blood: x / Protein: See Note / Nitrite: Negative   Leuk Esterase: Trace / RBC: 11-25 /HPF / WBC 0-2   Sq Epi: x / Non Sq Epi: Negative / Bacteria: Negative      DVT Prophylaxis:  Lovenox                                                             Advanced Directives: Full Code

## 2022-02-06 NOTE — PROGRESS NOTE ADULT - SUBJECTIVE AND OBJECTIVE BOX
Date of service: 22 @ 11:57    pt seen and examined  sitting up   nonverbal  no distress  afebrile     ROS: unable to obtain d/t medical condition     MEDICATIONS  (STANDING):  ALBUTerol    90 MICROgram(s) HFA Inhaler 2 Puff(s) Inhalation every 6 hours  BACItracin   Ointment 1 Application(s) Topical four times a day  baclofen 10 milliGRAM(s) Oral two times a day  chlorhexidine 0.12% Liquid 15 milliLiter(s) Swish and Spit two times a day  cholecalciferol 1000 Unit(s) Oral daily  dextrose 5% + sodium chloride 0.45% with potassium chloride 20 mEq/L 1000 milliLiter(s) (75 mL/Hr) IV Continuous <Continuous>  doxazosin 2 milliGRAM(s) Oral at bedtime  enoxaparin Injectable 40 milliGRAM(s) SubCutaneous daily  famotidine    Tablet 20 milliGRAM(s) Oral daily  fluconAZOLE   Tablet 100 milliGRAM(s) Oral daily  lacosamide IVPB 50 milliGRAM(s) IV Intermittent every 12 hours  levETIRAcetam  IVPB 500 milliGRAM(s) IV Intermittent every 12 hours  piperacillin/tazobactam IVPB.. 3.375 Gram(s) IV Intermittent every 8 hours  silver sulfADIAZINE 1% Cream 1 Application(s) Topical two times a day  tiotropium 18 MICROgram(s) Capsule 1 Capsule(s) Inhalation daily  venlafaxine 37.5 milliGRAM(s) Oral daily  zinc oxide 40% Ointment 1 Application(s) Topical three times a day    Vital Signs Last 24 Hrs  T(C): 36.4 (2022 08:00), Max: 36.8 (2022 18:30)  T(F): 97.6 (2022 08:00), Max: 98.3 (2022 18:30)  HR: 71 (2022 11:00) (58 - 84)  BP: 142/107 (2022 11:00) (74/30 - 142/107)  BP(mean): 116 (2022 11:00) (40 - 116)  RR: 30 (2022 11:00) (15 - 35)  SpO2: 98% (2022 11:00) (61% - 100%)    PE:  Constitutional: NAD   HEENT: NC/AT, EOMI, PERRLA, conjunctivae clear; ears and nose atraumatic; pharynx benign  Neck: supple; thyroid not palpable  Back: no tenderness  Respiratory: decreased breath sounds   Cardiovascular: S1S2 regular, no murmurs  Abdomen: soft, not tender, not distended, positive BS; liver and spleen WNL G tube with irritated skin/leakage   Genitourinary: no suprapubic tenderness  Lymphatic: no LN palpable  Musculoskeletal: no muscle tenderness, no joint swelling or tenderness  Extremities: no pedal edema  Neurological/ Psychiatric:  moving all extremities  Skin: no rashes; no palpable lesions    Labs: all available labs reviewed                                   9.7    5.63  )-----------( 165      ( 2022 06:52 )             32.1     02-    146<H>  |  116<H>  |  6<L>  ----------------------------<  81  3.3<L>   |  25  |  0.22<L>    Ca    8.0<L>      2022 06:52    TPro  8.2  /  Alb  2.5<L>  /  TBili  0.2  /  DBili  x   /  AST  12<L>  /  ALT  30  /  AlkPhos  110  02-04           Urinalysis Basic - ( 2022 22:06 )    Color: Yellow / Appearance: Clear / S.020 / pH: x  Gluc: x / Ketone: Negative  / Bili: Negative / Urobili: Negative   Blood: x / Protein: See Note / Nitrite: Negative   Leuk Esterase: Trace / RBC: 11-25 /HPF / WBC 0-2   Sq Epi: x / Non Sq Epi: Negative / Bacteria: Negative        Radiology: all available radiological tests reviewed  < from: CT Abdomen and Pelvis w/ Oral Cont and w/ IV Cont (22 @ 13:39) >    ACC: [id number] EXAM:  CT ABDOMEN AND PELVIS OC IC                          PROCEDURE DATE:  2022          INTERPRETATION:  CLINICAL INFORMATION: Leaking PEG tube.    COMPARISON: 3.1.19    CONTRAST/COMPLICATIONS:  IV Contrast: Omnipaque 350  90cc administered   10 cc discarded  Oral Contrast: Omnipaque 300  Complications: None reported at time of study completion    PROCEDURE:  CT of the Abdomen and Pelvis was performed.  Sagittal and coronal reformats were performed.    FINDINGS:  LOWER CHEST: Trace bilateral pleural effusions and dependent atelectasis.   Coronary artery calcifications.    LIVER: Within normal limits.  BILE DUCTS: Normal caliber.  GALLBLADDER: Mild gallbladder wall thickening and focal defect in the   gallbladder wall.  SPLEEN: Within normal limits.  PANCREAS: Within normal limits.  ADRENALS: Within normal limits.  KIDNEYS/URETERS: 3 mm nonobstructing right kidney stone.    BLADDER: Small layering bladder calculi.  REPRODUCTIVE ORGANS: Mildly enlarged prostate.    BOWEL: Percutaneous gastrostomy tube with balloon tip in the proximal   small bowel with long intussusception along the tubing. No bowel   obstruction. Appendix not identified. Large amount of stool in the colon,   including marked stool-filled distention of the rectum measuring 9.4 cm   in diameter.  PERITONEUM: No ascites.  VESSELS: Atherosclerotic changes. Peripancreatic varices are noted.   Splenic and portal veins are patent.  RETROPERITONEUM/LYMPH NODES: No lymphadenopathy.  ABDOMINAL WALL:Within normal limits.  BONES: Chronic bilateral hip fractures.    IMPRESSION:  PEG tube with tip in the proximal small bowel with multiple   intussusception bowel loops along the course of the tubing.  Mildly thick-walled gallbladder with defect in the wall suggestive of   perforated acute cholecystitis.    Findings were discussed with Dr. MARTINEZ 2022 3:29 PM by Dr. MORENO   with read back confirmation.    Marked stool-filled distention of the rectum.    < end of copied text >     EXAM:  CT CHEST                          PROCEDURE DATE:  2019          INTERPRETATION:  Chest CT without contrast .  COMPARISON: None.  CLINICAL INFORMATION: Chest pain. Hypoxia. Shortness of breath. Assess   for pneumonia. TECHNIQUE: Contiguous axial 2.5 mm slice thickness images   of the chest were obtained without intravenous contrast administration.   Maximum intensity projection,(MIP), imaging was created and interpreted.  FINDINGS:  There is diffuse bilateral lower lobe a basilar and posterior dependent   bronchiectasis with chronic with with a RIGHT lower lobe segmental   atelectasis, RIGHT upper lobe posterior segment segmental atelectasis and   LEFT lower lobe a linear fibrosis and subsegmental atelectasis in the   LEFT lung base. Small area of focal consolidation measuring no greater   than 1 cm seen within the posterior segment of the LEFT upper lobe.   Remaining lung segments clear. No obstructing endobronchial lesions seen.  There is no pleural effusion or pneumothorax.    There are no mediastinal lymphadenopathy or masses.    The mediastinum great vessels are normal.     Cardiac chambers normal size. Coronary artery calcifications noted..   There is no pericardial effusion.    Visualized upper abdominal visceraunremarkable.    The bones are normal.     IMPRESSION:    With peripheral lung fibrosis and subsegmental atelectasis as described.  Bilateral upper lobe a focal posterior segment and airspace   consolidations as described..       ACC: [id number] EXAM:  XR CHEST PORTABLE URGENT 1V                          PROCEDURE DATE:  2022          INTERPRETATION:  Portable chest radiograph    CLINICAL INFORMATION: Respiratory distress    TECHNIQUE:  Portable  AP chest radiograph.    COMPARISON: 2019 chest .    FINDINGS:  CATHETERS AND TUBES: None  Patient's hand obscures RIGHT mid zone.    PULMONARY: The visualized lungs are clear of airspace consolidations or   effusions.   No pneumothorax.    HEART/VASCULAR: The heart andmediastinum size and configuration are   within normal limits.    BONES: Visualized osseous structures are intact.    IMPRESSION:   No radiographic evidence of active chest disease.          < end of copied text >    Advanced directives addressed: full resuscitation
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
## 2022-02-06 NOTE — PROGRESS NOTE ADULT - SUBJECTIVE AND OBJECTIVE BOX
Patient is a 56y old  Male who presents with a chief complaint of aspiration pneumonia  hypernatremia (06 Feb 2022 09:22)      Subective:  Non-verbal but awake.    PAST MEDICAL & SURGICAL HISTORY:  TBI (traumatic brain injury)    Seizure    Polio    H/O paraplegia    Pneumonia    H/O chronic respiratory failure    Diabetes mellitus    Hypertension    S/P percutaneous endoscopic gastrostomy (PEG) tube placement        MEDICATIONS  (STANDING):  ALBUTerol    90 MICROgram(s) HFA Inhaler 2 Puff(s) Inhalation every 6 hours  BACItracin   Ointment 1 Application(s) Topical four times a day  baclofen 10 milliGRAM(s) Oral two times a day  chlorhexidine 0.12% Liquid 15 milliLiter(s) Swish and Spit two times a day  cholecalciferol 1000 Unit(s) Oral daily  dextrose 5% + sodium chloride 0.45% with potassium chloride 20 mEq/L 1000 milliLiter(s) (75 mL/Hr) IV Continuous <Continuous>  doxazosin 2 milliGRAM(s) Oral at bedtime  enoxaparin Injectable 40 milliGRAM(s) SubCutaneous daily  famotidine    Tablet 20 milliGRAM(s) Oral daily  fluconAZOLE   Tablet 100 milliGRAM(s) Oral daily  lacosamide IVPB 50 milliGRAM(s) IV Intermittent every 12 hours  levETIRAcetam  IVPB 500 milliGRAM(s) IV Intermittent every 12 hours  piperacillin/tazobactam IVPB.. 3.375 Gram(s) IV Intermittent every 8 hours  silver sulfADIAZINE 1% Cream 1 Application(s) Topical two times a day  tiotropium 18 MICROgram(s) Capsule 1 Capsule(s) Inhalation daily  venlafaxine 37.5 milliGRAM(s) Oral daily  zinc oxide 40% Ointment 1 Application(s) Topical three times a day    MEDICATIONS  (PRN):  acetaminophen     Tablet .. 650 milliGRAM(s) Oral every 6 hours PRN Temp greater or equal to 38C (100.4F), Mild Pain (1 - 3)  aluminum hydroxide/magnesium hydroxide/simethicone Suspension 30 milliLiter(s) Oral every 4 hours PRN Dyspepsia  melatonin 3 milliGRAM(s) Oral at bedtime PRN Insomnia  ondansetron   Disintegrating Tablet 4 milliGRAM(s) Oral every 8 hours PRN Vomiting      REVIEW OF SYSTEMS:  NA    Vital Signs Last 24 Hrs  T(C): 36.4 (06 Feb 2022 08:00), Max: 36.8 (05 Feb 2022 18:30)  T(F): 97.6 (06 Feb 2022 08:00), Max: 98.3 (05 Feb 2022 18:30)  HR: 77 (06 Feb 2022 10:00) (58 - 84)  BP: 108/69 (06 Feb 2022 10:00) (74/30 - 128/94)  BP(mean): 78 (06 Feb 2022 10:00) (40 - 101)  RR: 24 (06 Feb 2022 10:00) (15 - 35)  SpO2: 95% (06 Feb 2022 10:00) (61% - 100%)    PHYSICAL EXAM:    Constitutional: NAD  Respiratory: CTAB  Cardiovascular: S1 and S2, RRR  Gastrointestinal: BS+, soft  Extremities: No peripheral edema  Psychiatric: Normal mood, normal affect    LABS:                        9.7    5.63  )-----------( 165      ( 06 Feb 2022 06:52 )             32.1     02-06    146<H>  |  116<H>  |  6<L>  ----------------------------<  81  3.3<L>   |  25  |  0.22<L>    Ca    8.0<L>      06 Feb 2022 06:52    TPro  8.2  /  Alb  2.5<L>  /  TBili  0.2  /  DBili  x   /  AST  12<L>  /  ALT  30  /  AlkPhos  110  02-04    PT/INR - ( 04 Feb 2022 17:07 )   PT: 14.2 sec;   INR: 1.23 ratio         PTT - ( 04 Feb 2022 17:07 )  PTT:35.0 sec  LIVER FUNCTIONS - ( 04 Feb 2022 17:07 )  Alb: 2.5 g/dL / Pro: 8.2 gm/dL / ALK PHOS: 110 U/L / ALT: 30 U/L / AST: 12 U/L / GGT: x             RADIOLOGY & ADDITIONAL STUDIES:

## 2022-02-07 LAB
ANION GAP SERPL CALC-SCNC: 5 MMOL/L — SIGNIFICANT CHANGE UP (ref 5–17)
BLD GP AB SCN SERPL QL: SIGNIFICANT CHANGE UP
BUN SERPL-MCNC: 4 MG/DL — LOW (ref 7–23)
CALCIUM SERPL-MCNC: 7.8 MG/DL — LOW (ref 8.5–10.1)
CHLORIDE SERPL-SCNC: 114 MMOL/L — HIGH (ref 96–108)
CO2 SERPL-SCNC: 22 MMOL/L — SIGNIFICANT CHANGE UP (ref 22–31)
CREAT SERPL-MCNC: 0.27 MG/DL — LOW (ref 0.5–1.3)
GLUCOSE SERPL-MCNC: 100 MG/DL — HIGH (ref 70–99)
LACTATE SERPL-SCNC: 1.2 MMOL/L — SIGNIFICANT CHANGE UP (ref 0.7–2)
MAGNESIUM SERPL-MCNC: 2.2 MG/DL — SIGNIFICANT CHANGE UP (ref 1.6–2.6)
PHOSPHATE SERPL-MCNC: 2.6 MG/DL — SIGNIFICANT CHANGE UP (ref 2.5–4.5)
POTASSIUM SERPL-MCNC: 3.3 MMOL/L — LOW (ref 3.5–5.3)
POTASSIUM SERPL-SCNC: 3.3 MMOL/L — LOW (ref 3.5–5.3)
SODIUM SERPL-SCNC: 141 MMOL/L — SIGNIFICANT CHANGE UP (ref 135–145)

## 2022-02-07 PROCEDURE — 99233 SBSQ HOSP IP/OBS HIGH 50: CPT

## 2022-02-07 PROCEDURE — 74176 CT ABD & PELVIS W/O CONTRAST: CPT | Mod: 26

## 2022-02-07 PROCEDURE — 71045 X-RAY EXAM CHEST 1 VIEW: CPT | Mod: 26

## 2022-02-07 PROCEDURE — 74018 RADEX ABDOMEN 1 VIEW: CPT | Mod: 26

## 2022-02-07 RX ORDER — ACETAMINOPHEN 500 MG
1000 TABLET ORAL ONCE
Refills: 0 | Status: COMPLETED | OUTPATIENT
Start: 2022-02-07 | End: 2022-02-07

## 2022-02-07 RX ORDER — POTASSIUM CHLORIDE 20 MEQ
10 PACKET (EA) ORAL
Refills: 0 | Status: COMPLETED | OUTPATIENT
Start: 2022-02-07 | End: 2022-02-07

## 2022-02-07 RX ORDER — MORPHINE SULFATE 50 MG/1
2 CAPSULE, EXTENDED RELEASE ORAL ONCE
Refills: 0 | Status: DISCONTINUED | OUTPATIENT
Start: 2022-02-07 | End: 2022-02-07

## 2022-02-07 RX ORDER — PANTOPRAZOLE SODIUM 20 MG/1
40 TABLET, DELAYED RELEASE ORAL
Refills: 0 | Status: DISCONTINUED | OUTPATIENT
Start: 2022-02-07 | End: 2022-02-10

## 2022-02-07 RX ORDER — ELECTROLYTE SOLUTION,INJ
1 VIAL (ML) INTRAVENOUS
Refills: 0 | Status: DISCONTINUED | OUTPATIENT
Start: 2022-02-07 | End: 2022-02-07

## 2022-02-07 RX ORDER — PHENYLEPHRINE HYDROCHLORIDE 10 MG/ML
0.5 INJECTION INTRAVENOUS
Qty: 40 | Refills: 0 | Status: DISCONTINUED | OUTPATIENT
Start: 2022-02-07 | End: 2022-02-07

## 2022-02-07 RX ORDER — ONDANSETRON 8 MG/1
4 TABLET, FILM COATED ORAL ONCE
Refills: 0 | Status: COMPLETED | OUTPATIENT
Start: 2022-02-07 | End: 2022-02-07

## 2022-02-07 RX ORDER — SODIUM CHLORIDE 9 MG/ML
1000 INJECTION, SOLUTION INTRAVENOUS ONCE
Refills: 0 | Status: COMPLETED | OUTPATIENT
Start: 2022-02-07 | End: 2022-02-07

## 2022-02-07 RX ADMIN — ENOXAPARIN SODIUM 40 MILLIGRAM(S): 100 INJECTION SUBCUTANEOUS at 10:34

## 2022-02-07 RX ADMIN — ZINC OXIDE 1 APPLICATION(S): 200 OINTMENT TOPICAL at 05:35

## 2022-02-07 RX ADMIN — Medication 100 MILLIEQUIVALENT(S): at 05:34

## 2022-02-07 RX ADMIN — CHLORHEXIDINE GLUCONATE 15 MILLILITER(S): 213 SOLUTION TOPICAL at 10:19

## 2022-02-07 RX ADMIN — Medication 100 MILLIEQUIVALENT(S): at 06:51

## 2022-02-07 RX ADMIN — PANTOPRAZOLE SODIUM 40 MILLIGRAM(S): 20 TABLET, DELAYED RELEASE ORAL at 21:46

## 2022-02-07 RX ADMIN — CHLORHEXIDINE GLUCONATE 15 MILLILITER(S): 213 SOLUTION TOPICAL at 21:40

## 2022-02-07 RX ADMIN — Medication 1 APPLICATION(S): at 14:58

## 2022-02-07 RX ADMIN — PIPERACILLIN AND TAZOBACTAM 25 GRAM(S): 4; .5 INJECTION, POWDER, LYOPHILIZED, FOR SOLUTION INTRAVENOUS at 00:31

## 2022-02-07 RX ADMIN — PIPERACILLIN AND TAZOBACTAM 25 GRAM(S): 4; .5 INJECTION, POWDER, LYOPHILIZED, FOR SOLUTION INTRAVENOUS at 21:39

## 2022-02-07 RX ADMIN — Medication 1 APPLICATION(S): at 05:33

## 2022-02-07 RX ADMIN — ALBUTEROL 2 PUFF(S): 90 AEROSOL, METERED ORAL at 21:31

## 2022-02-07 RX ADMIN — SODIUM CHLORIDE 1000 MILLILITER(S): 9 INJECTION, SOLUTION INTRAVENOUS at 02:51

## 2022-02-07 RX ADMIN — ZINC OXIDE 1 APPLICATION(S): 200 OINTMENT TOPICAL at 21:40

## 2022-02-07 RX ADMIN — ALBUTEROL 2 PUFF(S): 90 AEROSOL, METERED ORAL at 08:20

## 2022-02-07 RX ADMIN — PIPERACILLIN AND TAZOBACTAM 25 GRAM(S): 4; .5 INJECTION, POWDER, LYOPHILIZED, FOR SOLUTION INTRAVENOUS at 14:12

## 2022-02-07 RX ADMIN — ALBUTEROL 2 PUFF(S): 90 AEROSOL, METERED ORAL at 13:58

## 2022-02-07 RX ADMIN — ALBUTEROL 2 PUFF(S): 90 AEROSOL, METERED ORAL at 03:29

## 2022-02-07 RX ADMIN — Medication 100 MILLIEQUIVALENT(S): at 03:51

## 2022-02-07 RX ADMIN — LACOSAMIDE 110 MILLIGRAM(S): 50 TABLET ORAL at 10:19

## 2022-02-07 RX ADMIN — LACOSAMIDE 110 MILLIGRAM(S): 50 TABLET ORAL at 21:46

## 2022-02-07 RX ADMIN — PIPERACILLIN AND TAZOBACTAM 25 GRAM(S): 4; .5 INJECTION, POWDER, LYOPHILIZED, FOR SOLUTION INTRAVENOUS at 07:03

## 2022-02-07 RX ADMIN — DEXTROSE MONOHYDRATE, SODIUM CHLORIDE, AND POTASSIUM CHLORIDE 75 MILLILITER(S): 50; .745; 4.5 INJECTION, SOLUTION INTRAVENOUS at 00:31

## 2022-02-07 RX ADMIN — Medication 1 EACH: at 22:01

## 2022-02-07 RX ADMIN — Medication 1 APPLICATION(S): at 10:21

## 2022-02-07 RX ADMIN — Medication 1000 MILLIGRAM(S): at 15:00

## 2022-02-07 RX ADMIN — DEXTROSE MONOHYDRATE, SODIUM CHLORIDE, AND POTASSIUM CHLORIDE 75 MILLILITER(S): 50; .745; 4.5 INJECTION, SOLUTION INTRAVENOUS at 17:01

## 2022-02-07 RX ADMIN — ZINC OXIDE 1 APPLICATION(S): 200 OINTMENT TOPICAL at 14:11

## 2022-02-07 RX ADMIN — ONDANSETRON 4 MILLIGRAM(S): 8 TABLET, FILM COATED ORAL at 14:58

## 2022-02-07 RX ADMIN — Medication 400 MILLIGRAM(S): at 14:35

## 2022-02-07 RX ADMIN — LEVETIRACETAM 400 MILLIGRAM(S): 250 TABLET, FILM COATED ORAL at 21:39

## 2022-02-07 RX ADMIN — Medication 1 APPLICATION(S): at 00:36

## 2022-02-07 RX ADMIN — LEVETIRACETAM 400 MILLIGRAM(S): 250 TABLET, FILM COATED ORAL at 10:21

## 2022-02-07 NOTE — CHART NOTE - NSCHARTNOTEFT_GEN_A_CORE
Called about patient rpt imaging, which was done to verify PEG reinsertion not a change in status. D/W ICU-PA patient is currently stable and not peritoneal. Rpt imaging reveals no extravasation of contrast.       Plan  - C/W Abx & antifungal  - Protonix 40 BID  - PEG to gravity  - NPO/IVFs & TPN  - Bowel regimen and evaluate for possible disimpaction   - If duodenal perforation appears  contained and just monitor for now with supportive management   - Will d/w Dr. Haley tomorrow

## 2022-02-07 NOTE — CHART NOTE - NSCHARTNOTEFT_GEN_A_CORE
Spoke with Dr. Duron who discussed with Dr. Jolly.  Plan for OR Wednesday 2/9/22 for endoscopic closure of PEG site. Spoke with Dr. Duron who discussed with Dr. Jolly.  Plan for OR Wednesday 2/9/22 for endoscopic closure of PEG site.    -Addendum: Will plan on attempt at endoscopic closure of the PEG to help with healing/wound on skin, although Dr. Jolly first replacing another tube at different site to assist with CT with contrast study to r/o leak from GI tract. If this all negative and patient clinically stable, can plan for wednesday.

## 2022-02-07 NOTE — PROVIDER CONTACT NOTE (OTHER) - ACTION/TREATMENT ORDERED:
STAT CXR & KUB, STAT Lactate, CBC, BMP, Mg & Phos, Type & Screen, 1L LR bolus ordered, phenylephrine ordered for pt PRN, surgical team made aware of pt clinical status, will con't to closely monitor

## 2022-02-07 NOTE — PROGRESS NOTE ADULT - SUBJECTIVE AND OBJECTIVE BOX
Patient is a 56y old  Male who presents with a chief complaint of aspiration pneumonia  hypernatremia (07 Feb 2022 02:19)      Subective:  Had hypoxia and hypotension overnight but resolved, back to baseline    PAST MEDICAL & SURGICAL HISTORY:  TBI (traumatic brain injury)    Seizure    Polio    H/O paraplegia    Pneumonia    H/O chronic respiratory failure    Diabetes mellitus    Hypertension    S/P percutaneous endoscopic gastrostomy (PEG) tube placement        MEDICATIONS  (STANDING):  ALBUTerol    90 MICROgram(s) HFA Inhaler 2 Puff(s) Inhalation every 6 hours  BACItracin   Ointment 1 Application(s) Topical four times a day  baclofen 10 milliGRAM(s) Oral two times a day  chlorhexidine 0.12% Liquid 15 milliLiter(s) Swish and Spit two times a day  cholecalciferol 1000 Unit(s) Oral daily  dextrose 5% + sodium chloride 0.45% with potassium chloride 20 mEq/L 1000 milliLiter(s) (75 mL/Hr) IV Continuous <Continuous>  doxazosin 2 milliGRAM(s) Oral at bedtime  enoxaparin Injectable 40 milliGRAM(s) SubCutaneous daily  famotidine    Tablet 20 milliGRAM(s) Oral daily  fluconAZOLE   Tablet 100 milliGRAM(s) Oral daily  lacosamide IVPB 50 milliGRAM(s) IV Intermittent every 12 hours  levETIRAcetam  IVPB 500 milliGRAM(s) IV Intermittent every 12 hours  phenylephrine    Infusion 0.5 MICROgram(s)/kG/Min (8.51 mL/Hr) IV Continuous <Continuous>  piperacillin/tazobactam IVPB.. 3.375 Gram(s) IV Intermittent every 8 hours  potassium chloride  10 mEq/100 mL IVPB 10 milliEquivalent(s) IV Intermittent every 1 hour  silver sulfADIAZINE 1% Cream 1 Application(s) Topical two times a day  tiotropium 18 MICROgram(s) Capsule 1 Capsule(s) Inhalation daily  venlafaxine 37.5 milliGRAM(s) Oral daily  zinc oxide 40% Ointment 1 Application(s) Topical three times a day    MEDICATIONS  (PRN):  acetaminophen     Tablet .. 650 milliGRAM(s) Oral every 6 hours PRN Temp greater or equal to 38C (100.4F), Mild Pain (1 - 3)  aluminum hydroxide/magnesium hydroxide/simethicone Suspension 30 milliLiter(s) Oral every 4 hours PRN Dyspepsia  melatonin 3 milliGRAM(s) Oral at bedtime PRN Insomnia  ondansetron   Disintegrating Tablet 4 milliGRAM(s) Oral every 8 hours PRN Vomiting      REVIEW OF SYSTEMS:    NA    Vital Signs Last 24 Hrs  T(C): 35.9 (07 Feb 2022 06:00), Max: 37.2 (07 Feb 2022 00:00)  T(F): 96.7 (07 Feb 2022 06:00), Max: 98.9 (07 Feb 2022 00:00)  HR: 69 (07 Feb 2022 06:00) (58 - 97)  BP: 95/55 (07 Feb 2022 06:00) (87/44 - 148/62)  BP(mean): 66 (07 Feb 2022 06:00) (53 - 116)  RR: 19 (07 Feb 2022 06:00) (19 - 30)  SpO2: 100% (07 Feb 2022 06:00) (95% - 100%)    PHYSICAL EXAM:    Constitutional: NAD  Respiratory: CTAB  Cardiovascular: S1 and S2, RRR  Gastrointestinal: BS+, soft, NT, PEG site open, bag empty  Extremities: No peripheral edema      LABS:                        10.8   4.81  )-----------( 193      ( 07 Feb 2022 04:41 )             34.4     02-07    141  |  114<H>  |  4<L>  ----------------------------<  100<H>  3.3<L>   |  22  |  0.27<L>    Ca    7.8<L>      07 Feb 2022 02:49  Phos  2.6     02-07  Mg     2.2     02-07            RADIOLOGY & ADDITIONAL STUDIES:

## 2022-02-07 NOTE — CHART NOTE - NSCHARTNOTEFT_GEN_A_CORE
At bedside, I placed a 24 Fr Balloon G tube replacement. Tube was at 1.5 cm. Balloon inflated with 4 cc water.  Will check CT to rule out contrast extravasation

## 2022-02-07 NOTE — PROGRESS NOTE ADULT - ASSESSMENT
Patient with previous TBI  has peg , now severely dehydratied likely from leaking peg, ct shows intersusseption  d/w GI will need to be fixed  also question of defect wall of GB , no tenderness, on zosyn, will d/w GI  Hypernatremia improved now 146  alll meds IV for now  npo  lovenox dvt prophylaxis    addendum:    Patient with intersusseption of peg tube, pulled back by GI, f/u ct showed  ? small amount of air, with fluid around liver and inflammatory changeds.  no clear evidence of gb perforation.  Patient now with slight ruq discomfort  d/w GI, surgery,  Patient sister Lisa [phone]  will like need PICC for TPN    PEG to be replaced today and repeat SCAN with PO contrast    D/W GI

## 2022-02-07 NOTE — CHART NOTE - NSCHARTNOTEFT_GEN_A_CORE
Clinical Nutrition PPN Note    *57yo male admitted with severe dehydration 2/2 leaking PEG, CT showed intersusseption with possible defect of wall of GB.  Hypernatremia (improving).  aspiration PNA.  s/p removal of PEG due to balloon stuck in small bowel on 2/6 with large amount of bilious fluid draining out of tract with stoma bag applied.  pending possible replacement of PEG by surgery vs GI (depending on if old site needs to be closed first).    *PPN to be started until PEG can be replaced.  Pt has been NPO x 4 days.    *labs reviewed: 02-07: hypokalemia noted, has received 20mEq of KCl in past 24hours with 30mEq additional this morning.  Will be starting PPN with 40mEq in bag and monitor closely.  other lytes WNL.  last bilirubin total WNL (2/4).  obtain triglyceride level.  POCT q6hrs while PN infusing.    141  |  114<H>  |  4<L>  ----------------------------<  100<H>  3.3<L>   |  22  |  0.27<L>    Ca    7.8<L>      07 Feb 2022 02:49  Phos  2.6     02-07  Mg     2.2     02-07    BMI: BMI (kg/m2): 16.2 (02-04-22 @ 17:29)  HbA1c: A1C with Estimated Average Glucose Result: 5.9 % (02-05-22 @ 05:19)    Glucose: POCT Blood Glucose.: 104 mg/dL (02-04-22 @ 22:49)    *pertinent meds: phenylephrine, dextrose 5% + sodium chloride 0.45% with KCl 20mEq (IVF, rec'd stop when PN starts), D3, famotidine, potassium chloride, zofran, maalox    *I&O's Detail    06 Feb 2022 07:01  -  07 Feb 2022 07:00  --------------------------------------------------------  IN:    dextrose 5% + sodium chloride 0.45% w/ Additives: 1495.1 mL    IV PiggyBack: 1000 mL    Lactated Ringers: 151 mL  Total IN: 2646.1 mL    OUT:    PEG (Percutaneous Endoscopic Gastrostomy) Tube (mL): 30 mL    Phenylephrine: 0 mL    Voided (mL): 2325 mL  Total OUT: 2355 mL    Total NET: 291.1 mL    *positive fluid balance; monitor and adjust volume prn    *oscar score of 10: stage 1 PU on coccyx; SDTI on Rt buttocks.  no edema documented.  BM (+) 2/7.    *evere protein-calorie malnutrition in context of acute on chronic illness r/t suboptimal nutrition to meet ENN 2/2 aspiration PNA and non functioning PEG AEB severe muscle/fat wasting, significant weight loss, and <50% of ENN x > 5 days.    ESTIMATED NUTR NEEDS: based on admit wt: 41Kg  1440-1650Kcal (35-40Kcal/Kg)  74-100g protein (1.8-2.5g/Kg)  1240-1650mL (30-40mL/Kg)    Wt Hx;  41.3Kg (2/5)  Height (cm): 167.6 (02-04)  Weight (kg): 45.4 (02-04)  BMI (kg/m2): 16.2 (02-04)  Ideal Body Weight: 64Kg  % Ideal Body Weight: 71%    PPN RECOMMENDATIONS:   1800mL via peripheral line    70g Amino Acids  100g Dextrose  0g Lipids 20% (obtain triglyceride level)  112 mEq NaCl  0 mEq NaAcetate  20 mMol NaPhos  17 mEq KCl  23 mEq KAcetate  0 mMol KPhos  0 mEq Calcium Gluconate  6 mEq Magnesium Sulfate  100 mg thiamine  1 mL trace elements  10 mL MVI    total calories: 620Kcal (meets ~43% of estimated calorie needs and ~95% of estimated protein needs) omsolarity of 888    ADDITIONAL RECOMMENDATIONS:  1) strict I/O's  2) POCT q6hrs (maintain 140-180mg/dL)  3) daily lyte checks with mag and phos levels  4) daily wt checks  5) obtain triglyceride and LFT levels  6) if to be on PN > 6 days, consider central line placement for TPN    *will monitor and adjust treatement plan prn*  Stefanie Mcnair MA, RDN, CDN, CNSC [phone]

## 2022-02-07 NOTE — PROGRESS NOTE ADULT - SUBJECTIVE AND OBJECTIVE BOX
Date of service: 22 @ 11:43    pt seen and examined  sitting up; nonverbal  peg tube out  hypoxic, low BP o/n - improved this am  afebrile     ROS: unable to obtain d/t medical condition     MEDICATIONS  (STANDING):  ALBUTerol    90 MICROgram(s) HFA Inhaler 2 Puff(s) Inhalation every 6 hours  BACItracin   Ointment 1 Application(s) Topical four times a day  baclofen 10 milliGRAM(s) Oral two times a day  chlorhexidine 0.12% Liquid 15 milliLiter(s) Swish and Spit two times a day  cholecalciferol 1000 Unit(s) Oral daily  dextrose 5% + sodium chloride 0.45% with potassium chloride 20 mEq/L 1000 milliLiter(s) (75 mL/Hr) IV Continuous <Continuous>  doxazosin 2 milliGRAM(s) Oral at bedtime  enoxaparin Injectable 40 milliGRAM(s) SubCutaneous daily  famotidine    Tablet 20 milliGRAM(s) Oral daily  fluconAZOLE   Tablet 100 milliGRAM(s) Oral daily  lacosamide IVPB 50 milliGRAM(s) IV Intermittent every 12 hours  levETIRAcetam  IVPB 500 milliGRAM(s) IV Intermittent every 12 hours  Parenteral Nutrition - Adult 1 Each (75 mL/Hr) TPN Continuous <Continuous>  phenylephrine    Infusion 0.5 MICROgram(s)/kG/Min (8.51 mL/Hr) IV Continuous <Continuous>  piperacillin/tazobactam IVPB.. 3.375 Gram(s) IV Intermittent every 8 hours  silver sulfADIAZINE 1% Cream 1 Application(s) Topical two times a day  tiotropium 18 MICROgram(s) Capsule 1 Capsule(s) Inhalation daily  venlafaxine 37.5 milliGRAM(s) Oral daily  zinc oxide 40% Ointment 1 Application(s) Topical three times a day    Vital Signs Last 24 Hrs  T(C): 36 (2022 08:00), Max: 37.2 (2022 00:00)  T(F): 96.8 (2022 08:00), Max: 98.9 (2022 00:00)  HR: 57 (2022 11:00) (55 - 97)  BP: 80/41 (2022 10:00) (80/41 - 148/62)  BP(mean): 51 (2022 10:00) (51 - 100)  RR: 22 (2022 11:00) (19 - 28)  SpO2: 100% (2022 11:00) (89% - 100%)      PE:  Constitutional: NAD   HEENT: NC/AT, EOMI, PERRLA, conjunctivae clear; ears and nose atraumatic; pharynx benign  Neck: supple; thyroid not palpable  Back: no tenderness  Respiratory: decreased breath sounds   Cardiovascular: S1S2 regular, no murmurs  Abdomen: soft, not tender, not distended, positive BS; liver and spleen WNL   Genitourinary: no suprapubic tenderness  Lymphatic: no LN palpable  Musculoskeletal: no muscle tenderness, no joint swelling or tenderness  Extremities: no pedal edema  Neurological/ Psychiatric:  moving all extremities  Skin: no rashes; no palpable lesions    Labs: all available labs reviewed                                   10.8   4.81  )-----------( 193      ( 2022 04:41 )             34.4     02-07    141  |  114<H>  |  4<L>  ----------------------------<  100<H>  3.3<L>   |  22  |  0.27<L>    Ca    7.8<L>      2022 02:49  Phos  2.6     02-  Mg     2.2     02-07          Urinalysis Basic - ( 2022 22:06 )    Color: Yellow / Appearance: Clear / S.020 / pH: x  Gluc: x / Ketone: Negative  / Bili: Negative / Urobili: Negative   Blood: x / Protein: See Note / Nitrite: Negative   Leuk Esterase: Trace / RBC: 11-25 /HPF / WBC 0-2   Sq Epi: x / Non Sq Epi: Negative / Bacteria: Negative    Culture - Blood (22 @ 17:07)   Specimen Source: .Blood None   Culture Results:   No growth to date.     Radiology: all available radiological tests reviewed      ACC: [id number] EXAM:  CT ABDOMEN AND PELVIS OC IC                          PROCEDURE DATE:  2022          INTERPRETATION:  CLINICAL INFORMATION: Leaking PEG tube.    COMPARISON: 3.1.19    CONTRAST/COMPLICATIONS:  IV Contrast: Omnipaque 350  90cc administered   10 cc discarded  Oral Contrast: Omnipaque 300  Complications: None reported at time of study completion    PROCEDURE:  CT of the Abdomen and Pelvis was performed.  Sagittal and coronal reformats were performed.    FINDINGS:  LOWER CHEST: Trace bilateral pleural effusions and dependent atelectasis.   Coronary artery calcifications.    LIVER: Within normal limits.  BILE DUCTS: Normal caliber.  GALLBLADDER: Mild gallbladder wall thickening and focal defect in the   gallbladder wall.  SPLEEN: Within normal limits.  PANCREAS: Within normal limits.  ADRENALS: Within normal limits.  KIDNEYS/URETERS: 3 mm nonobstructing right kidney stone.    BLADDER: Small layering bladder calculi.  REPRODUCTIVE ORGANS: Mildly enlarged prostate.    BOWEL: Percutaneous gastrostomy tube with balloon tip in the proximal   small bowel with long intussusception along the tubing. No bowel   obstruction. Appendix not identified. Large amount of stool in the colon,   including marked stool-filled distention of the rectum measuring 9.4 cm   in diameter.  PERITONEUM: No ascites.  VESSELS: Atherosclerotic changes. Peripancreatic varices are noted.   Splenic and portal veins are patent.  RETROPERITONEUM/LYMPH NODES: No lymphadenopathy.  ABDOMINAL WALL:Within normal limits.  BONES: Chronic bilateral hip fractures.    IMPRESSION:  PEG tube with tip in the proximal small bowel with multiple   intussusception bowel loops along the course of the tubing.  Mildly thick-walled gallbladder with defect in the wall suggestive of   perforated acute cholecystitis.    Findings were discussed with Dr. MARTINEZ 2022 3:29 PM by Dr. MORENO   with read back confirmation.    Marked stool-filled distention of the rectum.    < end of copied text >     EXAM:  CT CHEST                          PROCEDURE DATE:  2019          INTERPRETATION:  Chest CT without contrast .  COMPARISON: None.  CLINICAL INFORMATION: Chest pain. Hypoxia. Shortness of breath. Assess   for pneumonia. TECHNIQUE: Contiguous axial 2.5 mm slice thickness images   of the chest were obtained without intravenous contrast administration.   Maximum intensity projection,(MIP), imaging was created and interpreted.  FINDINGS:  There is diffuse bilateral lower lobe a basilar and posterior dependent   bronchiectasis with chronic with with a RIGHT lower lobe segmental   atelectasis, RIGHT upper lobe posterior segment segmental atelectasis and   LEFT lower lobe a linear fibrosis and subsegmental atelectasis in the   LEFT lung base. Small area of focal consolidation measuring no greater   than 1 cm seen within the posterior segment of the LEFT upper lobe.   Remaining lung segments clear. No obstructing endobronchial lesions seen.  There is no pleural effusion or pneumothorax.    There are no mediastinal lymphadenopathy or masses.    The mediastinum great vessels are normal.     Cardiac chambers normal size. Coronary artery calcifications noted..   There is no pericardial effusion.    Visualized upper abdominal visceraunremarkable.    The bones are normal.     IMPRESSION:    With peripheral lung fibrosis and subsegmental atelectasis as described.  Bilateral upper lobe a focal posterior segment and airspace   consolidations as described..       ACC: [id number] EXAM:  XR CHEST PORTABLE URGENT 1V                          PROCEDURE DATE:  2022          INTERPRETATION:  Portable chest radiograph    CLINICAL INFORMATION: Respiratory distress    TECHNIQUE:  Portable  AP chest radiograph.    COMPARISON: 2019 chest .    FINDINGS:  CATHETERS AND TUBES: None  Patient's hand obscures RIGHT mid zone.    PULMONARY: The visualized lungs are clear of airspace consolidations or   effusions.   No pneumothorax.    HEART/VASCULAR: The heart andmediastinum size and configuration are   within normal limits.    BONES: Visualized osseous structures are intact.    IMPRESSION:   No radiographic evidence of active chest disease.          < end of copied text >    Advanced directives addressed: full resuscitation

## 2022-02-07 NOTE — PROGRESS NOTE ADULT - ASSESSMENT
56 year old male w hx TBI w resultant paraplegia (age 21), seizures, chronic resp failure s/p trach w reversal, pulmonary fibrosis, recurrent asp PNA, PEG tube,  DM II, GERD, HTN who presents to ED by EMS from [place] for evaluation of labored breathing  Labored breathing was noted by family today and they thought he looked very dehydrated  EMS noted hypotension at scene w BP 90/ Pulse ox 88-89% PEG tube has been leaking bilious fluids x 3 weeks. Family reports that it was placed at [facility] just prior to pt being placed at [facility] and has leaked all the time he has been there. He was recently quarantined for being COVID + and was asymptomatic Had hx of COVID infection in 2020 when he lost 50 lbs On intermittent tube feeds he had reportedly continued to lose weight In ED BP 90/63   HR 99   RR 20   T 99.6   97% sat RA peg tube in place with surrounding irritation and erythema, bilious discharge around the tube gastrograffin for tube check did not detect leakage, was given vancomycin/zosyn d/t concern for aspiration pna.     1. hypoxia. hypotension. ? aspiration pneumonitis/pna. bronchiectasis. pulmonary fibrosis  - imaging reviewed  - gi follow up noted, peg tube site to close endoscopically closed then new tube placed  - on IV zosyn [id number] #4/5   - blood cultures no growth  - on diflucan for thrush #4  - aspiration precautions  - gi follow up noted  - continue with abx coverage   - fu cbc  - monitor temps  - tolerating abx well so far; no side effects noted  - reason for abx use and side effects reviewed with patient  - supportive care     2. other issues - care per medicine

## 2022-02-07 NOTE — PROGRESS NOTE ADULT - SUBJECTIVE AND OBJECTIVE BOX
Pt seen and examined at bedside, no acute events overnight. Denies abdominal pain. Denied fever, chills, nausea, vomiting or SOB overnight.     Vital Signs Last 24 Hrs  T(C): 35.9 (07 Feb 2022 06:00), Max: 37.2 (07 Feb 2022 00:00)  T(F): 96.7 (07 Feb 2022 06:00), Max: 98.9 (07 Feb 2022 00:00)  HR: 72 (07 Feb 2022 07:00) (58 - 97)  BP: 112/60 (07 Feb 2022 07:00) (87/44 - 148/62)  BP(mean): 71 (07 Feb 2022 07:00) (53 - 116)  RR: 22 (07 Feb 2022 07:00) (19 - 30)  SpO2: 98% (07 Feb 2022 07:00) (95% - 100%)  Labs:                                10.8   4.81  )-----------( 193      ( 07 Feb 2022 04:41 )             34.4     CBC Full  -  ( 07 Feb 2022 04:41 )  WBC Count : 4.81 K/uL  RBC Count : 3.75 M/uL  Hemoglobin : 10.8 g/dL  Hematocrit : 34.4 %  Platelet Count - Automated : 193 K/uL  Mean Cell Volume : 91.7 fl  Mean Cell Hemoglobin : 28.8 pg  Mean Cell Hemoglobin Concentration : 31.4 gm/dL  Auto Neutrophil # : x  Auto Lymphocyte # : x  Auto Monocyte # : x  Auto Eosinophil # : x  Auto Basophil # : x  Auto Neutrophil % : x  Auto Lymphocyte % : x  Auto Monocyte % : x  Auto Eosinophil % : x  Auto Basophil % : x    02-07    141  |  114<H>  |  4<L>  ----------------------------<  100<H>  3.3<L>   |  22  |  0.27<L>    Ca    7.8<L>      07 Feb 2022 02:49  Phos  2.6     02-07  Mg     2.2     02-07      Physical Exam    General - weak, alert  HEENT nc/at  neck s/p old trach, still partially open  lungs scattered rhonchi  cv rrr  abdomen soft, peg side with slight drainage around, nontender to palpation  ext contracted  neuro opens eyes, responds to voice simple commands, contracted lower extremities, with paraplegia

## 2022-02-07 NOTE — PROGRESS NOTE ADULT - ATTENDING COMMENTS
Patient seen & examined at bedside resting comfortably. Concerned about possible bowel perforation b/c of small amount of pneumoperitoneum on imaging. Most likely secondary to manipulation of PEG given hemodynamically stable and normal physical exam. Case D/W Dr. Jolly and plan is to reinsert PEG trough current track and rpt tube contrast study to r/o possible perforation. Case also d/w Dr. Nichols

## 2022-02-07 NOTE — PROVIDER CONTACT NOTE (OTHER) - RECOMMENDATIONS
Provider to come to bedside to assess pt, make surgery team made aware, appropriate labs for re-evaluation of pt clinical status, potential starting of pressor support.

## 2022-02-07 NOTE — PROGRESS NOTE ADULT - ASSESSMENT
56M pmhx TBI presents with PEG tube malposition and evidence of extraluminal air on CT.  PEG tube has since been removed.  Extraluminal air likely 2/2 manipulation of PEG.  Patient VS wnl, nontender to palpation without leukocytosis, peritonitis unlikely at this time     Plan:   - Will discuss with team if current peg site can be used for new feeding tube vs surgical closure of fistula tract and placement of PEG tube at new site   - Continue medical optimization  - serial abdominal exams   - If suspicion of duodenal perforation remains high, obtain UGIS via NGT  - Rest of management as per ICU    Case discussed with Dr. Story, surgery attending

## 2022-02-07 NOTE — PROGRESS NOTE ADULT - ASSESSMENT
Imp:  Displaced PEG, now removed  No clinical evidence of peritonitis, despite the CT findings    Rec:  Favor TPN pending a decision about the plan  I think replacing a new G tube with minimal fluid in the balloon followed by a contrast CT is a good option but will review with surgery first

## 2022-02-07 NOTE — PROVIDER CONTACT NOTE (OTHER) - SITUATION
Pt is p/w persistent hypotension, & lethargy, pt having episodes of intermittent desaturations to 70's.

## 2022-02-07 NOTE — PROVIDER CONTACT NOTE (OTHER) - ASSESSMENT
Pt less responsive to noxious stimuli, is difficult to arouse, has V/S of HR 87, B/p 87/44, & sa02 99% on tracheostomy collar at 50%, abdomen remains soft, pt not showing clinical signs of pain upon palpation.

## 2022-02-07 NOTE — CHART NOTE - NSCHARTNOTEFT_GEN_A_CORE
Patient w/complaints of abdominal pain. BP now on the soft side, not tachycardic. Patient seems like he is clinically deteriorating. Patient requiring more FiO2. Will get KUB and STAT labs including lactate. Surgery called. Will f/u w/results and communicate w/surgical team Patient w/complaints of abdominal pain. BP now hypotensive. not tachycardic. Patient seems like he is clinically deteriorating. Patient requiring more FiO2. Will get KUB/Chest xray and STAT labs including lactate. Surgery called. Will f/u w/results and communicate w/surgical team. Bolus 1L LR and ordered Phenylephrine gtt     Dx:  Distributive shock   SIRS  Peritonitis   Perforation of Duodenum?     CRITICAL CARE TIME SPENT:  35 minutes of critical care time spent providing medical care for patient's acute illness/conditions that impairs at least one vital organ system and/or poses a high risk of imminent or life threatening deterioration in the patient's condition. It includes time spent evaluating and treating the patient's acute illness as well as time spent reviewing labs, radiology, discussing goals of care with patient and/or patient's family, and discussing the case with a multidisciplinary team, including the eICU, in an effort to prevent further life threatening deterioration or end organ damage. This time is independent of any procedures performed.

## 2022-02-07 NOTE — PROGRESS NOTE ADULT - SUBJECTIVE AND OBJECTIVE BOX
HPI:   56M hx TBI w resultant paraplegia 35 years ago  seizures, chronic resp failure s/p trach decannulated  pulmonary fibrosis, recurrent asp PNA, PEG tube,  DM II, GERD, HTN admit from SNF with resp distress AMS hypernatremia     As soon as he hit the floor from the ED rapid response called for hypoxemia and AMS from baseline      2/7: No events over night      PAST MEDICAL & SURGICAL HISTORY:  TBI (traumatic brain injury)    Seizure    Polio    H/O paraplegia    Pneumonia    H/O chronic respiratory failure    Diabetes mellitus    Hypertension    S/P percutaneous endoscopic gastrostomy (PEG) tube placement        FAMILY HISTORY:  No pertinent family history in first degree relatives        Social Hx:    Allergies    Ativan (Unknown)  phenytoin (Unknown)  Valproate Sodium (Other (Severe))  Valproate Sodium (Unknown)    Intolerances            ICU Vital Signs Last 24 Hrs  T(C): 36 (07 Feb 2022 08:00), Max: 37.2 (07 Feb 2022 00:00)  T(F): 96.8 (07 Feb 2022 08:00), Max: 98.9 (07 Feb 2022 00:00)  HR: 57 (07 Feb 2022 11:00) (55 - 97)  BP: 80/41 (07 Feb 2022 10:00) (80/41 - 129/93)  BP(mean): 51 (07 Feb 2022 10:00) (51 - 100)  ABP: --  ABP(mean): --  RR: 22 (07 Feb 2022 11:00) (19 - 28)  SpO2: 100% (07 Feb 2022 11:00) (89% - 100%)          I&O's Summary    06 Feb 2022 07:01  -  07 Feb 2022 07:00  --------------------------------------------------------  IN: 2646.1 mL / OUT: 2355 mL / NET: 291.1 mL    07 Feb 2022 07:01  -  07 Feb 2022 12:06  --------------------------------------------------------  IN: 150 mL / OUT: 450 mL / NET: -300 mL                              10.8   4.81  )-----------( 193      ( 07 Feb 2022 04:41 )             34.4       02-07    141  |  114<H>  |  4<L>  ----------------------------<  100<H>  3.3<L>   |  22  |  0.27<L>    Ca    7.8<L>      07 Feb 2022 02:49  Phos  2.6     02-07  Mg     2.2     02-07                      MEDICATIONS  (STANDING):  ALBUTerol    90 MICROgram(s) HFA Inhaler 2 Puff(s) Inhalation every 6 hours  BACItracin   Ointment 1 Application(s) Topical four times a day  baclofen 10 milliGRAM(s) Oral two times a day  chlorhexidine 0.12% Liquid 15 milliLiter(s) Swish and Spit two times a day  cholecalciferol 1000 Unit(s) Oral daily  dextrose 5% + sodium chloride 0.45% with potassium chloride 20 mEq/L 1000 milliLiter(s) (75 mL/Hr) IV Continuous <Continuous>  doxazosin 2 milliGRAM(s) Oral at bedtime  enoxaparin Injectable 40 milliGRAM(s) SubCutaneous daily  famotidine    Tablet 20 milliGRAM(s) Oral daily  fluconAZOLE   Tablet 100 milliGRAM(s) Oral daily  lacosamide IVPB 50 milliGRAM(s) IV Intermittent every 12 hours  levETIRAcetam  IVPB 500 milliGRAM(s) IV Intermittent every 12 hours  Parenteral Nutrition - Adult 1 Each (75 mL/Hr) TPN Continuous <Continuous>  phenylephrine    Infusion 0.5 MICROgram(s)/kG/Min (8.51 mL/Hr) IV Continuous <Continuous>  piperacillin/tazobactam IVPB.. 3.375 Gram(s) IV Intermittent every 8 hours  silver sulfADIAZINE 1% Cream 1 Application(s) Topical two times a day  tiotropium 18 MICROgram(s) Capsule 1 Capsule(s) Inhalation daily  venlafaxine 37.5 milliGRAM(s) Oral daily  zinc oxide 40% Ointment 1 Application(s) Topical three times a day    MEDICATIONS  (PRN):  acetaminophen     Tablet .. 650 milliGRAM(s) Oral every 6 hours PRN Temp greater or equal to 38C (100.4F), Mild Pain (1 - 3)  aluminum hydroxide/magnesium hydroxide/simethicone Suspension 30 milliLiter(s) Oral every 4 hours PRN Dyspepsia  melatonin 3 milliGRAM(s) Oral at bedtime PRN Insomnia  ondansetron   Disintegrating Tablet 4 milliGRAM(s) Oral every 8 hours PRN Vomiting      DVT Prophylaxis: Lovenox    Advanced Directives:  Discussed with:    Visit Information:    ** Time is exclusive of billed procedures and/or teaching and/or routine family updates.

## 2022-02-07 NOTE — PROGRESS NOTE ADULT - SUBJECTIVE AND OBJECTIVE BOX
REASON FOR CONSULT: SIRS, peritonitis    Chief Complaint    HPI: 56 year old male w hx TBI w resultant paraplegia (age 21), seizures, chronic resp failure s/p trach w reversal, pulmonary fibrosis, recurrent asp PNA, PEG tube,  DM II, GERD, HTN who presents to ED by EMS from [place] for evaluation of labored breathing.  Also, PEG tube has been leaking bilious fluids x 3 weeks.  It was placed at [facility] just prior to pt being placed at [place] and has leaked all the time he has been there. He was recently quarantined for being COVID + and was asymptomatic.  Had hx of COVID infection in 2020 when he lost 50 lbs. On intermittent tube feeds he had reportedly continued to lose weight.  Pt does understand speech and follow verbal commands and is able to answer yes and no questions.    Patient underwent CT scan which showed PEG balloon within duodenum.  PEG adjusted by GI and repeat CT performed.  CT showed retraction of PEG balloon within bulb of duodenum, as well as new extraluminal gas with stranding and fluid in RUQ.  Case discussed with the ICU PA.  The patient has clinically deteriorated over the last hour with complaints of increased abdominal discomfort.    PAST MEDICAL & SURGICAL HISTORY:  TBI (traumatic brain injury)  Seizure  Polio  H/O paraplegia  Pneumonia  H/O chronic respiratory failure  Diabetes mellitus  Hypertension  S/P percutaneous endoscopic gastrostomy (PEG) tube placement      Allergies  Ativan (Unknown)  phenytoin (Unknown)  Valproate Sodium (Other (Severe))  Valproate Sodium (Unknown)      FAMILY HISTORY:  No pertinent family history in first degree relatives        Medications:  fluconAZOLE   Tablet 100 milliGRAM(s) Oral daily  piperacillin/tazobactam IVPB.. 3.375 Gram(s) IV Intermittent every 8 hours  doxazosin 2 milliGRAM(s) Oral at bedtime  ALBUTerol    90 MICROgram(s) HFA Inhaler 2 Puff(s) Inhalation every 6 hours  tiotropium 18 MICROgram(s) Capsule 1 Capsule(s) Inhalation daily  acetaminophen     Tablet .. 650 milliGRAM(s) Oral every 6 hours PRN  baclofen 10 milliGRAM(s) Oral two times a day  lacosamide IVPB 50 milliGRAM(s) IV Intermittent every 12 hours  levETIRAcetam  IVPB 500 milliGRAM(s) IV Intermittent every 12 hours  melatonin 3 milliGRAM(s) Oral at bedtime PRN  morphine  - Injectable 2 milliGRAM(s) IV Push once  ondansetron   Disintegrating Tablet 4 milliGRAM(s) Oral every 8 hours PRN  venlafaxine 37.5 milliGRAM(s) Oral daily  enoxaparin Injectable 40 milliGRAM(s) SubCutaneous daily  aluminum hydroxide/magnesium hydroxide/simethicone Suspension 30 milliLiter(s) Oral every 4 hours PRN  famotidine    Tablet 20 milliGRAM(s) Oral daily  cholecalciferol 1000 Unit(s) Oral daily  dextrose 5% + sodium chloride 0.45% with potassium chloride 20 mEq/L 1000 milliLiter(s) IV Continuous <Continuous>  BACItracin   Ointment 1 Application(s) Topical four times a day  chlorhexidine 0.12% Liquid 15 milliLiter(s) Swish and Spit two times a day  silver sulfADIAZINE 1% Cream 1 Application(s) Topical two times a day  zinc oxide 40% Ointment 1 Application(s) Topical three times a day          Vital Signs Last 24 Hrs  T(C): 37.2 (07 Feb 2022 00:00), Max: 37.2 (07 Feb 2022 00:00)  T(F): 98.9 (07 Feb 2022 00:00), Max: 98.9 (07 Feb 2022 00:00)  HR: 64 (07 Feb 2022 02:00) (58 - 97)  BP: 87/44 (07 Feb 2022 02:00) (84/46 - 148/62)  BP(mean): 54 (07 Feb 2022 02:00) (53 - 116)  RR: 26 (07 Feb 2022 02:00) (19 - 30)  SpO2: 99% (07 Feb 2022 02:00) (95% - 100%)    ABG - ( 05 Feb 2022 05:51 )  pH, Arterial: 7.43  pH, Blood: x     /  pCO2: 35    /  pO2: 70    / HCO3: 23    / Base Excess: -0.6  /  SaO2: 97            05 Feb 2022 07:01  -  06 Feb 2022 07:00  --------------------------------------------------------  IN:    dextrose 5%: 1411 mL    IV PiggyBack: 693.8 mL    Lactated Ringers: 1158.4 mL    Oral Fluid: 900 mL  Total IN: 4163.2 mL    OUT:    Voided (mL): 1125 mL  Total OUT: 1125 mL    Total NET: 3038.2 mL      06 Feb 2022 07:01  -  07 Feb 2022 02:21  --------------------------------------------------------  IN:    dextrose 5% + sodium chloride 0.45% w/ Additives: 652 mL    IV PiggyBack: 550 mL    Lactated Ringers: 151 mL  Total IN: 1353 mL    OUT:    PEG (Percutaneous Endoscopic Gastrostomy) Tube (mL): 20 mL    Voided (mL): 1175 mL  Total OUT: 1195 mL    Total NET: 158 mL      LABS:                        9.7    5.63  )-----------( 165      ( 06 Feb 2022 06:52 )             32.1     02-06    146<H>  |  116<H>  |  6<L>  ----------------------------<  81  3.3<L>   |  25  |  0.22<L>    Ca    8.0<L>      06 Feb 2022 06:52        CULTURES:  Culture Results:   No growth to date. (02-04 @ 17:07)      Physical Examination:  Physical exam as per bedside MD (direct physical examination could not be performed because the visit was provided via Telemedicine):       RADIOLOGY: < from: CT Abdomen and Pelvis w/ Oral Cont (02.06.22 @ 14:01) >  IMPRESSION:  1. Retraction of the PEG tube balloon to the level of the duodenal bulb   with new extraluminal gas, stranding and fluid in the right upper   quadrant, and adjacent small bowel wall thickening, worrisome for bowel   perforation and peritonitis. Results discussed with Dr. Andersen at   time of interpretation.  2. Stercoral colitis.    < end of copied text >      IMP-  Probable perforation of the duodenum at feeding tube site.  Peritonitis  SIRS  hypotension      Plan-  repeat labs  optimize for probable operative intervention  Notified the surgical service of changes of the patients condition    CRITICAL CARE TIME SPENT: 45    This visit was provided via telemedicine. Patient was located at     [facility]  [facility] [place] for the visit.

## 2022-02-08 LAB
ALBUMIN SERPL ELPH-MCNC: 1.6 G/DL — LOW (ref 3.3–5)
ALP SERPL-CCNC: 86 U/L — SIGNIFICANT CHANGE UP (ref 40–120)
ALT FLD-CCNC: 17 U/L — SIGNIFICANT CHANGE UP (ref 12–78)
ANION GAP SERPL CALC-SCNC: 3 MMOL/L — LOW (ref 5–17)
AST SERPL-CCNC: 14 U/L — LOW (ref 15–37)
BILIRUB SERPL-MCNC: 0.2 MG/DL — SIGNIFICANT CHANGE UP (ref 0.2–1.2)
BUN SERPL-MCNC: 4 MG/DL — LOW (ref 7–23)
CALCIUM SERPL-MCNC: 7.9 MG/DL — LOW (ref 8.5–10.1)
CHLORIDE SERPL-SCNC: 109 MMOL/L — HIGH (ref 96–108)
CO2 SERPL-SCNC: 29 MMOL/L — SIGNIFICANT CHANGE UP (ref 22–31)
CREAT SERPL-MCNC: 0.16 MG/DL — LOW (ref 0.5–1.3)
GLUCOSE SERPL-MCNC: 119 MG/DL — HIGH (ref 70–99)
HCT VFR BLD CALC: 30.2 % — LOW (ref 39–50)
HGB BLD-MCNC: 9.5 G/DL — LOW (ref 13–17)
MAGNESIUM SERPL-MCNC: 1.9 MG/DL — SIGNIFICANT CHANGE UP (ref 1.6–2.6)
MCHC RBC-ENTMCNC: 29.3 PG — SIGNIFICANT CHANGE UP (ref 27–34)
MCHC RBC-ENTMCNC: 31.5 GM/DL — LOW (ref 32–36)
MCV RBC AUTO: 93.2 FL — SIGNIFICANT CHANGE UP (ref 80–100)
PHOSPHATE SERPL-MCNC: 3.1 MG/DL — SIGNIFICANT CHANGE UP (ref 2.5–4.5)
PLATELET # BLD AUTO: 209 K/UL — SIGNIFICANT CHANGE UP (ref 150–400)
POTASSIUM SERPL-MCNC: 3.9 MMOL/L — SIGNIFICANT CHANGE UP (ref 3.5–5.3)
POTASSIUM SERPL-SCNC: 3.9 MMOL/L — SIGNIFICANT CHANGE UP (ref 3.5–5.3)
PROT SERPL-MCNC: 5.6 GM/DL — LOW (ref 6–8.3)
RBC # BLD: 3.24 M/UL — LOW (ref 4.2–5.8)
RBC # FLD: 14.5 % — SIGNIFICANT CHANGE UP (ref 10.3–14.5)
SARS-COV-2 RNA SPEC QL NAA+PROBE: DETECTED
SODIUM SERPL-SCNC: 141 MMOL/L — SIGNIFICANT CHANGE UP (ref 135–145)
TRIGL SERPL-MCNC: 122 MG/DL — SIGNIFICANT CHANGE UP
WBC # BLD: 4.87 K/UL — SIGNIFICANT CHANGE UP (ref 3.8–10.5)
WBC # FLD AUTO: 4.87 K/UL — SIGNIFICANT CHANGE UP (ref 3.8–10.5)

## 2022-02-08 PROCEDURE — 99233 SBSQ HOSP IP/OBS HIGH 50: CPT

## 2022-02-08 RX ADMIN — LEVETIRACETAM 400 MILLIGRAM(S): 250 TABLET, FILM COATED ORAL at 10:32

## 2022-02-08 RX ADMIN — ZINC OXIDE 1 APPLICATION(S): 200 OINTMENT TOPICAL at 14:30

## 2022-02-08 RX ADMIN — ZINC OXIDE 1 APPLICATION(S): 200 OINTMENT TOPICAL at 05:16

## 2022-02-08 RX ADMIN — ALBUTEROL 2 PUFF(S): 90 AEROSOL, METERED ORAL at 08:09

## 2022-02-08 RX ADMIN — Medication 3 MILLIGRAM(S): at 22:15

## 2022-02-08 RX ADMIN — Medication 1 APPLICATION(S): at 17:32

## 2022-02-08 RX ADMIN — PIPERACILLIN AND TAZOBACTAM 25 GRAM(S): 4; .5 INJECTION, POWDER, LYOPHILIZED, FOR SOLUTION INTRAVENOUS at 22:13

## 2022-02-08 RX ADMIN — PANTOPRAZOLE SODIUM 40 MILLIGRAM(S): 20 TABLET, DELAYED RELEASE ORAL at 22:16

## 2022-02-08 RX ADMIN — ZINC OXIDE 1 APPLICATION(S): 200 OINTMENT TOPICAL at 22:14

## 2022-02-08 RX ADMIN — Medication 1 APPLICATION(S): at 05:16

## 2022-02-08 RX ADMIN — Medication 37.5 MILLIGRAM(S): at 10:31

## 2022-02-08 RX ADMIN — Medication 10 MILLIGRAM(S): at 10:31

## 2022-02-08 RX ADMIN — Medication 1 APPLICATION(S): at 12:55

## 2022-02-08 RX ADMIN — CHLORHEXIDINE GLUCONATE 15 MILLILITER(S): 213 SOLUTION TOPICAL at 22:15

## 2022-02-08 RX ADMIN — ALBUTEROL 2 PUFF(S): 90 AEROSOL, METERED ORAL at 01:08

## 2022-02-08 RX ADMIN — PIPERACILLIN AND TAZOBACTAM 25 GRAM(S): 4; .5 INJECTION, POWDER, LYOPHILIZED, FOR SOLUTION INTRAVENOUS at 14:49

## 2022-02-08 RX ADMIN — LACOSAMIDE 110 MILLIGRAM(S): 50 TABLET ORAL at 09:14

## 2022-02-08 RX ADMIN — Medication 10 MILLIGRAM(S): at 22:15

## 2022-02-08 RX ADMIN — FLUCONAZOLE 100 MILLIGRAM(S): 150 TABLET ORAL at 10:32

## 2022-02-08 RX ADMIN — Medication 1000 UNIT(S): at 10:31

## 2022-02-08 RX ADMIN — LEVETIRACETAM 400 MILLIGRAM(S): 250 TABLET, FILM COATED ORAL at 22:15

## 2022-02-08 RX ADMIN — Medication 1 APPLICATION(S): at 23:39

## 2022-02-08 RX ADMIN — PANTOPRAZOLE SODIUM 40 MILLIGRAM(S): 20 TABLET, DELAYED RELEASE ORAL at 10:33

## 2022-02-08 RX ADMIN — PIPERACILLIN AND TAZOBACTAM 25 GRAM(S): 4; .5 INJECTION, POWDER, LYOPHILIZED, FOR SOLUTION INTRAVENOUS at 05:17

## 2022-02-08 RX ADMIN — CHLORHEXIDINE GLUCONATE 15 MILLILITER(S): 213 SOLUTION TOPICAL at 10:32

## 2022-02-08 RX ADMIN — ENOXAPARIN SODIUM 40 MILLIGRAM(S): 100 INJECTION SUBCUTANEOUS at 10:32

## 2022-02-08 RX ADMIN — LACOSAMIDE 110 MILLIGRAM(S): 50 TABLET ORAL at 22:15

## 2022-02-08 RX ADMIN — Medication 2 MILLIGRAM(S): at 22:15

## 2022-02-08 RX ADMIN — ALBUTEROL 2 PUFF(S): 90 AEROSOL, METERED ORAL at 13:40

## 2022-02-08 RX ADMIN — Medication 1 APPLICATION(S): at 00:00

## 2022-02-08 NOTE — PROGRESS NOTE ADULT - ASSESSMENT
Patient with previous TBI  has peg , now severely dehydratied likely from leaking peg, ct shows intersusseption  d/w GI will need to be fixed.  Plan for GI to repair the old PEG site and place a new PEG  Can start feeds and NPO after MN  Hypernatremia improved now 146  lovenox dvt prophylaxis      Patient sister Lisa [phone]  will like need PICC for TPN    PEG to be replaced today and repeat SCAN with PO contrast    D/W GI

## 2022-02-08 NOTE — PROGRESS NOTE ADULT - SUBJECTIVE AND OBJECTIVE BOX
Pt seen and examined at bedside, no acute events overnight. Denies abdominal pain.  PEG tube was upsized yesterday.  Repeat CT again showed duodenal hematoma with small perforation and small volume extraluminal gas without evidence of contrast extravasation. Denied fever, chills, nausea, vomiting or SOB overnight.     Vital Signs (24 Hrs):  T(C): 36.8 (02-08-22 @ 12:00), Max: 37.6 (02-08-22 @ 00:04)  HR: 70 (02-08-22 @ 16:00) (69 - 91)  BP: 102/53 (02-08-22 @ 16:00) (84/61 - 110/51)  RR: 24 (02-08-22 @ 16:00) (16 - 31)  SpO2: 96% (02-08-22 @ 16:00) (89% - 100%)  Wt(kg): --  Daily     Daily     I&O's Summary    07 Feb 2022 07:01  -  08 Feb 2022 07:00  --------------------------------------------------------  IN: 1904.2 mL / OUT: 450 mL / NET: 1454.2 mL    08 Feb 2022 07:01  -  08 Feb 2022 16:55  --------------------------------------------------------  IN: 250 mL / OUT: 0 mL / NET: 250 mL        Physical Exam  General - weak, alert  HEENT nc/at  neck s/p old trach, still partially open  lungs scattered rhonchi  cv rrr  abdomen soft, peg side with slight drainage around, nontender to palpation  ext contracted  neuro opens eyes, responds to voice simple commands, contracted lower extremities, with paraplegia    .  LABS:                         9.5    4.87  )-----------( 209      ( 08 Feb 2022 04:57 )             30.2     02-08    141  |  109<H>  |  4<L>  ----------------------------<  119<H>  3.9   |  29  |  0.16<L>    Ca    7.9<L>      08 Feb 2022 04:57  Phos  3.1     02-08  Mg     1.9     02-08    TPro  5.6<L>  /  Alb  1.6<L>  /  TBili  0.2  /  DBili  x   /  AST  14<L>  /  ALT  17  /  AlkPhos  86  02-08              RADIOLOGY, EKG & ADDITIONAL TESTS: Reviewed.   < from: CT Abdomen and Pelvis w/ Oral Cont (02.07.22 @ 18:24) >  IMPRESSION:    Small volume upper abdominal pneumoperitoneum may be mildly increased   versus redistributed.    Second portion duodenal findings most in keeping with intramural hematoma   and duodenal perforation. No extraluminal oral contrast. Inflammation   surrounding the pancreatic head is probably secondary to duodenal   inflammation rather than focal acute pancreatitis however consider   correlation with lipase.    Stercoral proctitis. Mild pancolonic wall thickening suggests nonspecific   acute colitis.    Small pleural effusions. Patchy left basilar airspace disease could   represent aspiration versus other etiologies for pneumonia.    < end of copied text >

## 2022-02-08 NOTE — PROGRESS NOTE ADULT - ASSESSMENT
56M pmhx TBI presents with PEG tube malposition and evidence of extraluminal air on CT.  PEG tube has since been removed.  Extraluminal air likely 2/2 manipulation of PEG.  Patient VS wnl, nontender to palpation without leukocytosis, peritonitis unlikely at this time     Plan:   - Continue medical optimization  - serial abdominal exams   - Rest of management as per ICU    Case discussed with Dr. Story, surgery attending

## 2022-02-08 NOTE — CHART NOTE - NSCHARTNOTEFT_GEN_A_CORE
Clinical Nutrition PPN Note    *55yo male admitted with severe dehydration 2/2 leaking PEG, CT showed intersusseption with possible defect of wall of GB.  Hypernatremia (improving).  aspiration PNA.  s/p removal of PEG due to balloon stuck in small bowel on 2/6 with large amount of bilious fluid draining out of tract with stoma bag applied.  pending possible replacement of PEG by surgery vs GI (depending on if old site needs to be closed first).    *PPN to be started until PEG can be replaced.  Pt has been NPO x 5 days.  MD Howard, placed new PEG to provide contrast and ensure no perforation in small bowel.  AS no perforation, ok to use PEG till midnight tonight.  Pt pending OR tomorrow (2/9) for this PEG site to be sutured closed and new G-J tube to be placed.  Will stop PPN after today's dose and start low dose TF for the next 12hours.    *labs reviewed: 02-08; lytes WNL.  corrected Ca WNL.  bilirubin total WNL.    141  |  109<H>  |  4<L>  ----------------------------<  119<H>  3.9   |  29  |  0.16<L>    Ca    7.9<L>      08 Feb 2022 04:57  Phos  3.1     02-08  Mg     1.9     02-08    TPro  5.6<L>  /  Alb  1.6<L>  /  TBili  0.2  /  DBili  x   /  AST  14<L>  /  ALT  17  /  AlkPhos  86  02-08    BMI: BMI (kg/m2): 16.2 (02-04-22 @ 17:29)  HbA1c: A1C with Estimated Average Glucose Result: 5.9 % (02-05-22 @ 05:19)      *pertinent meds: D3, pantoprazole, melatonin, zofran, maalox    *I&O's Detail    07 Feb 2022 07:01  -  08 Feb 2022 07:00  --------------------------------------------------------  IN:    dextrose 5% + sodium chloride 0.45% w/ Additives: 1053.2 mL    IV PiggyBack: 350 mL    PPN (Peripheral Parenteral Nutrition): 501 mL  Total IN: 1904.2 mL    OUT:    Voided (mL): 450 mL  Total OUT: 450 mL    Total NET: 1454.2 mL    *positive fluid balance; monitor and adjust volume prn.  last BM on 2/7.    *oscar score of 9: stage 1 PU on coccyx; SDTI on Rt buttocks.  no edema documented.      *rec'd start TF with Jevity 1.5 @ 10mL/hr and titrate by 10mL/hr q6hrs to goal rate of 20mL/hr.  Which will provide ~360Kcal, 15g protein, 182mL free water, and total TF volume of 240mL.  This is initial goal rate, will f/u with final goal rate when pt is s/p G-J tube placement.    *severe protein-calorie malnutrition in context of acute on chronic illness r/t suboptimal nutrition to meet ENN 2/2 aspiration PNA and non functioning PEG AEB severe muscle/fat wasting, significant weight loss, and <50% of ENN x > 5 days.    ESTIMATED NUTR NEEDS: based on admit wt: 41Kg  1440-1650Kcal (35-40Kcal/Kg)  74-100g protein (1.8-2.5g/Kg)  1240-1650mL (30-40mL/Kg)    Wt Hx;  41.3Kg (2/5)  Height (cm): 167.6 (02-04)  Weight (kg): 45.4 (02-04)  BMI (kg/m2): 16.2 (02-04)  Ideal Body Weight: 64Kg  % Ideal Body Weight: 71%    RECOMMENDATIONS:  1) start TF with Jevity 1.5 @ 10mL/hr and titrate by 10mL/hr q6hrs to goal rate of 20mL/hr.  2) monitor TF tolerance, keep back of bed >35 degrees  3) monitor hydration status; adjust free water flushes prn  4) daily wt checks  5) monitor lytes/mins; replete/correct prn     *will monitor and adjust treatement plan prn*  Stefanie Mcnair MA, RDN, CDN, CNSC [phone] Clinical Nutrition PPN Note    *57yo male admitted with severe dehydration 2/2 leaking PEG, CT showed intersusseption with possible defect of wall of GB.  Hypernatremia (improving).  aspiration PNA.  s/p removal of PEG due to balloon stuck in small bowel on 2/6 with large amount of bilious fluid draining out of tract with stoma bag applied.  pending possible replacement of PEG by surgery vs GI (depending on if old site needs to be closed first).    *PPN to be started until PEG can be replaced.  Pt has been NPO x 5 days.  MD Howard, placed new PEG to provide contrast and ensure no perforation in small bowel.  AS no perforation, ok to use PEG till midnight tonight.  Pt pending OR tomorrow (2/9) for this PEG site to be sutured closed and new G-J tube to be placed.  Will stop PPN after today's dose and start low dose TF for the next 12hours.    *labs reviewed: 02-08; lytes WNL.  corrected Ca WNL.  bilirubin total WNL.    141  |  109<H>  |  4<L>  ----------------------------<  119<H>  3.9   |  29  |  0.16<L>    Ca    7.9<L>      08 Feb 2022 04:57  Phos  3.1     02-08  Mg     1.9     02-08    TPro  5.6<L>  /  Alb  1.6<L>  /  TBili  0.2  /  DBili  x   /  AST  14<L>  /  ALT  17  /  AlkPhos  86  02-08    BMI: BMI (kg/m2): 16.2 (02-04-22 @ 17:29)  HbA1c: A1C with Estimated Average Glucose Result: 5.9 % (02-05-22 @ 05:19)      *pertinent meds: D3, pantoprazole, melatonin, zofran, maalox    *I&O's Detail    07 Feb 2022 07:01  -  08 Feb 2022 07:00  --------------------------------------------------------  IN:    dextrose 5% + sodium chloride 0.45% w/ Additives: 1053.2 mL    IV PiggyBack: 350 mL    PPN (Peripheral Parenteral Nutrition): 501 mL  Total IN: 1904.2 mL    OUT:    Voided (mL): 450 mL  Total OUT: 450 mL    Total NET: 1454.2 mL    *positive fluid balance; monitor and adjust volume prn.  last BM on 2/7.    *oscar score of 9: stage 1 PU on coccyx; SDTI on Rt buttocks.  no edema documented.      *rec'd start TF with Jevity 1.5 @ 10mL/hr and titrate by 10mL/hr q6hrs to goal rate of 20mL/hr.  Which will provide ~360Kcal, 15g protein, 182mL free water, and total TF volume of 240mL.  This is initial goal rate, will f/u with final goal rate when pt is s/p G-J tube placement.    *severe protein-calorie malnutrition in context of acute on chronic illness r/t suboptimal nutrition to meet ENN 2/2 aspiration PNA and non functioning PEG AEB severe muscle/fat wasting, significant weight loss, and <50% of ENN x > 5 days.    ESTIMATED NUTR NEEDS: based on admit wt: 41Kg  1440-1650Kcal (35-40Kcal/Kg)  74-100g protein (1.8-2.5g/Kg)  1240-1650mL (30-40mL/Kg)    Wt Hx;  41.3Kg (2/5)  Height (cm): 167.6 (02-04)  Weight (kg): 45.4 (02-04)  BMI (kg/m2): 16.2 (02-04)  Ideal Body Weight: 64Kg  % Ideal Body Weight: 71%    RECOMMENDATIONS:  1) start TF with Jevity 1.5 @ 10mL/hr and titrate by 10mL/hr q6hrs to goal rate of 20mL/hr.  2) monitor TF tolerance, keep back of bed >35 degrees  3) monitor hydration status; adjust free water flushes prn  4) daily wt checks  5) monitor lytes/mins; replete/correct prn   6) stop PPN after today's dose    *will monitor and adjust treatement plan prn*  Stefanie Mcnair MA, RDN, CDN, CNSC [phone]

## 2022-02-08 NOTE — PROGRESS NOTE ADULT - SUBJECTIVE AND OBJECTIVE BOX
Patient is a 56y old  Male who presents with a chief complaint of aspiration pneumonia  hypernatremia (07 Feb 2022 12:03)      Subective:  No events overnight    PAST MEDICAL & SURGICAL HISTORY:  TBI (traumatic brain injury)    Seizure    Polio    H/O paraplegia    Pneumonia    H/O chronic respiratory failure    Diabetes mellitus    Hypertension    S/P percutaneous endoscopic gastrostomy (PEG) tube placement        MEDICATIONS  (STANDING):  ALBUTerol    90 MICROgram(s) HFA Inhaler 2 Puff(s) Inhalation every 6 hours  BACItracin   Ointment 1 Application(s) Topical four times a day  baclofen 10 milliGRAM(s) Oral two times a day  chlorhexidine 0.12% Liquid 15 milliLiter(s) Swish and Spit two times a day  cholecalciferol 1000 Unit(s) Oral daily  doxazosin 2 milliGRAM(s) Oral at bedtime  enoxaparin Injectable 40 milliGRAM(s) SubCutaneous daily  fluconAZOLE   Tablet 100 milliGRAM(s) Oral daily  lacosamide IVPB 50 milliGRAM(s) IV Intermittent every 12 hours  levETIRAcetam  IVPB 500 milliGRAM(s) IV Intermittent every 12 hours  pantoprazole  Injectable 40 milliGRAM(s) IV Push two times a day  Parenteral Nutrition - Adult 1 Each (75 mL/Hr) TPN Continuous <Continuous>  piperacillin/tazobactam IVPB.. 3.375 Gram(s) IV Intermittent every 8 hours  silver sulfADIAZINE 1% Cream 1 Application(s) Topical two times a day  tiotropium 18 MICROgram(s) Capsule 1 Capsule(s) Inhalation daily  venlafaxine 37.5 milliGRAM(s) Oral daily  zinc oxide 40% Ointment 1 Application(s) Topical three times a day    MEDICATIONS  (PRN):  acetaminophen     Tablet .. 650 milliGRAM(s) Oral every 6 hours PRN Temp greater or equal to 38C (100.4F), Mild Pain (1 - 3)  aluminum hydroxide/magnesium hydroxide/simethicone Suspension 30 milliLiter(s) Oral every 4 hours PRN Dyspepsia  melatonin 3 milliGRAM(s) Oral at bedtime PRN Insomnia  ondansetron   Disintegrating Tablet 4 milliGRAM(s) Oral every 8 hours PRN Vomiting      REVIEW OF SYSTEMS:  NA    Vital Signs Last 24 Hrs  T(C): 37.2 (08 Feb 2022 08:00), Max: 37.6 (08 Feb 2022 00:04)  T(F): 99 (08 Feb 2022 08:00), Max: 99.7 (08 Feb 2022 04:00)  HR: 76 (08 Feb 2022 09:00) (0 - 91)  BP: 104/56 (08 Feb 2022 09:00) (80/41 - 120/62)  BP(mean): 68 (08 Feb 2022 09:00) (50 - 75)  RR: 24 (08 Feb 2022 09:00) (16 - 28)  SpO2: 97% (08 Feb 2022 09:00) (53% - 100%)    PHYSICAL EXAM:    Constitutional: NAD, chronically ill appearing  Respiratory: CTAB  Cardiovascular: S1 and S2, RRR  Gastrointestinal: BS+, soft, NT/ND, PEG site still draining but bumper remains at 1.5 cm  Extremities: No peripheral edema  Psychiatric: Normal mood, normal affect    LABS:                        9.5    4.87  )-----------( 209      ( 08 Feb 2022 04:57 )             30.2     02-08    141  |  109<H>  |  4<L>  ----------------------------<  119<H>  3.9   |  29  |  0.16<L>    Ca    7.9<L>      08 Feb 2022 04:57  Phos  3.1     02-08  Mg     1.9     02-08    TPro  5.6<L>  /  Alb  1.6<L>  /  TBili  0.2  /  DBili  x   /  AST  14<L>  /  ALT  17  /  AlkPhos  86  02-08      LIVER FUNCTIONS - ( 08 Feb 2022 04:57 )  Alb: 1.6 g/dL / Pro: 5.6 gm/dL / ALK PHOS: 86 U/L / ALT: 17 U/L / AST: 14 U/L / GGT: x             RADIOLOGY & ADDITIONAL STUDIES:

## 2022-02-08 NOTE — PROGRESS NOTE ADULT - ASSESSMENT
Imp:  Dysfunctional G tube  traumatic brain injury  CT reviewed and shows some free air -- I feel that some may be from PEG tract and some from contained duodenal perforation    Rec:  No easy options. However, I recommend resuming tube feeds. Despite the recent possible duodeanl perf, the exam is benign, normal WBC and no contrast extrav. Tube feeds will provide much better nutrtion than TPN  Will cont PEG feeds as tolerated and consider closing G tube tract endoscopically next week (Dr. Duron) after we've allowed more time for duodenal healing  DW sister at [facility] and today with Dr. Nichols and Dr. Story
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
## 2022-02-09 LAB
ANION GAP SERPL CALC-SCNC: 4 MMOL/L — LOW (ref 5–17)
BUN SERPL-MCNC: 5 MG/DL — LOW (ref 7–23)
CALCIUM SERPL-MCNC: 8 MG/DL — LOW (ref 8.5–10.1)
CHLORIDE SERPL-SCNC: 105 MMOL/L — SIGNIFICANT CHANGE UP (ref 96–108)
CO2 SERPL-SCNC: 28 MMOL/L — SIGNIFICANT CHANGE UP (ref 22–31)
CREAT SERPL-MCNC: 0.24 MG/DL — LOW (ref 0.5–1.3)
CULTURE RESULTS: SIGNIFICANT CHANGE UP
GLUCOSE SERPL-MCNC: 121 MG/DL — HIGH (ref 70–99)
HCT VFR BLD CALC: 30.4 % — LOW (ref 39–50)
HGB BLD-MCNC: 9.7 G/DL — LOW (ref 13–17)
MAGNESIUM SERPL-MCNC: 2.2 MG/DL — SIGNIFICANT CHANGE UP (ref 1.6–2.6)
MCHC RBC-ENTMCNC: 29.1 PG — SIGNIFICANT CHANGE UP (ref 27–34)
MCHC RBC-ENTMCNC: 31.9 GM/DL — LOW (ref 32–36)
MCV RBC AUTO: 91.3 FL — SIGNIFICANT CHANGE UP (ref 80–100)
PLATELET # BLD AUTO: 179 K/UL — SIGNIFICANT CHANGE UP (ref 150–400)
POTASSIUM SERPL-MCNC: 3.6 MMOL/L — SIGNIFICANT CHANGE UP (ref 3.5–5.3)
POTASSIUM SERPL-SCNC: 3.6 MMOL/L — SIGNIFICANT CHANGE UP (ref 3.5–5.3)
RBC # BLD: 3.33 M/UL — LOW (ref 4.2–5.8)
RBC # FLD: 14.3 % — SIGNIFICANT CHANGE UP (ref 10.3–14.5)
SODIUM SERPL-SCNC: 137 MMOL/L — SIGNIFICANT CHANGE UP (ref 135–145)
SPECIMEN SOURCE: SIGNIFICANT CHANGE UP
WBC # BLD: 4.54 K/UL — SIGNIFICANT CHANGE UP (ref 3.8–10.5)
WBC # FLD AUTO: 4.54 K/UL — SIGNIFICANT CHANGE UP (ref 3.8–10.5)

## 2022-02-09 PROCEDURE — 99233 SBSQ HOSP IP/OBS HIGH 50: CPT

## 2022-02-09 RX ORDER — LACOSAMIDE 50 MG/1
100 TABLET ORAL
Refills: 0 | Status: DISCONTINUED | OUTPATIENT
Start: 2022-02-09 | End: 2022-02-22

## 2022-02-09 RX ORDER — LEVETIRACETAM 250 MG/1
500 TABLET, FILM COATED ORAL
Refills: 0 | Status: DISCONTINUED | OUTPATIENT
Start: 2022-02-09 | End: 2022-02-10

## 2022-02-09 RX ADMIN — ENOXAPARIN SODIUM 40 MILLIGRAM(S): 100 INJECTION SUBCUTANEOUS at 12:22

## 2022-02-09 RX ADMIN — ALBUTEROL 2 PUFF(S): 90 AEROSOL, METERED ORAL at 01:49

## 2022-02-09 RX ADMIN — FLUCONAZOLE 100 MILLIGRAM(S): 150 TABLET ORAL at 11:23

## 2022-02-09 RX ADMIN — Medication 2 MILLIGRAM(S): at 23:00

## 2022-02-09 RX ADMIN — PIPERACILLIN AND TAZOBACTAM 25 GRAM(S): 4; .5 INJECTION, POWDER, LYOPHILIZED, FOR SOLUTION INTRAVENOUS at 05:12

## 2022-02-09 RX ADMIN — ZINC OXIDE 1 APPLICATION(S): 200 OINTMENT TOPICAL at 05:12

## 2022-02-09 RX ADMIN — Medication 10 MILLIGRAM(S): at 23:00

## 2022-02-09 RX ADMIN — CHLORHEXIDINE GLUCONATE 15 MILLILITER(S): 213 SOLUTION TOPICAL at 12:22

## 2022-02-09 RX ADMIN — LACOSAMIDE 50 MILLIGRAM(S): 50 TABLET ORAL at 12:23

## 2022-02-09 RX ADMIN — Medication 1 APPLICATION(S): at 11:24

## 2022-02-09 RX ADMIN — Medication 10 MILLIGRAM(S): at 11:24

## 2022-02-09 RX ADMIN — CHLORHEXIDINE GLUCONATE 15 MILLILITER(S): 213 SOLUTION TOPICAL at 23:00

## 2022-02-09 RX ADMIN — ZINC OXIDE 1 APPLICATION(S): 200 OINTMENT TOPICAL at 14:03

## 2022-02-09 RX ADMIN — ZINC OXIDE 1 APPLICATION(S): 200 OINTMENT TOPICAL at 23:01

## 2022-02-09 RX ADMIN — ALBUTEROL 2 PUFF(S): 90 AEROSOL, METERED ORAL at 09:29

## 2022-02-09 RX ADMIN — LEVETIRACETAM 500 MILLIGRAM(S): 250 TABLET, FILM COATED ORAL at 11:23

## 2022-02-09 RX ADMIN — LEVETIRACETAM 500 MILLIGRAM(S): 250 TABLET, FILM COATED ORAL at 23:00

## 2022-02-09 RX ADMIN — Medication 1000 UNIT(S): at 11:24

## 2022-02-09 RX ADMIN — Medication 1 APPLICATION(S): at 23:54

## 2022-02-09 RX ADMIN — LACOSAMIDE 50 MILLIGRAM(S): 50 TABLET ORAL at 23:03

## 2022-02-09 RX ADMIN — Medication 37.5 MILLIGRAM(S): at 11:23

## 2022-02-09 RX ADMIN — Medication 1 APPLICATION(S): at 17:20

## 2022-02-09 RX ADMIN — Medication 1 APPLICATION(S): at 23:04

## 2022-02-09 RX ADMIN — PANTOPRAZOLE SODIUM 40 MILLIGRAM(S): 20 TABLET, DELAYED RELEASE ORAL at 11:22

## 2022-02-09 RX ADMIN — Medication 1 APPLICATION(S): at 05:12

## 2022-02-09 RX ADMIN — ALBUTEROL 2 PUFF(S): 90 AEROSOL, METERED ORAL at 13:53

## 2022-02-09 NOTE — PROGRESS NOTE ADULT - ASSESSMENT
Patient with previous TBI  has peg , now severely dehydratied likely from leaking peg  d/w GI will need to be fixed.  Plan for GI to repair the old PEG site and place a new PEG next week  Can start feeds   Hypernatremia improved  lovenox dvt prophylaxis      Patient sister Lisa [phone]    D/W GI    Transfer to Reg Floor  Called into the the Hospitalist at 11:20AM

## 2022-02-09 NOTE — PROGRESS NOTE ADULT - SUBJECTIVE AND OBJECTIVE BOX
Pt seen and examined at bedside, no acute events overnight. Tested positive for COVID yesterday. Denied fever, chills, nausea, vomiting or SOB overnight.       Vital Signs Last 24 Hrs  T(C): 37.1 (09 Feb 2022 00:00), Max: 37.1 (08 Feb 2022 20:00)  T(F): 98.8 (09 Feb 2022 00:00), Max: 98.8 (09 Feb 2022 00:00)  HR: 75 (09 Feb 2022 07:00) (60 - 84)  BP: 103/54 (09 Feb 2022 07:00) (91/54 - 115/77)  BP(mean): 66 (09 Feb 2022 07:00) (61 - 85)  RR: 24 (09 Feb 2022 07:00) (18 - 31)  SpO2: 96% (09 Feb 2022 07:00) (94% - 100%)  Labs:                                9.7    4.54  )-----------( 179      ( 09 Feb 2022 06:38 )             30.4     CBC Full  -  ( 09 Feb 2022 06:38 )  WBC Count : 4.54 K/uL  RBC Count : 3.33 M/uL  Hemoglobin : 9.7 g/dL  Hematocrit : 30.4 %  Platelet Count - Automated : 179 K/uL  Mean Cell Volume : 91.3 fl  Mean Cell Hemoglobin : 29.1 pg  Mean Cell Hemoglobin Concentration : 31.9 gm/dL  Auto Neutrophil # : x  Auto Lymphocyte # : x  Auto Monocyte # : x  Auto Eosinophil # : x  Auto Basophil # : x  Auto Neutrophil % : x  Auto Lymphocyte % : x  Auto Monocyte % : x  Auto Eosinophil % : x  Auto Basophil % : x    02-09    137  |  105  |  5<L>  ----------------------------<  121<H>  3.6   |  28  |  0.24<L>    Ca    8.0<L>      09 Feb 2022 06:38  Phos  3.1     02-08  Mg     2.2     02-09    TPro  5.6<L>  /  Alb  1.6<L>  /  TBili  0.2  /  DBili  x   /  AST  14<L>  /  ALT  17  /  AlkPhos  86  02-08    LIVER FUNCTIONS - ( 08 Feb 2022 04:57 )  Alb: 1.6 g/dL / Pro: 5.6 gm/dL / ALK PHOS: 86 U/L / ALT: 17 U/L / AST: 14 U/L / GGT: x                   Physical Exam  General - weak, alert  HEENT nc/at  neck s/p old trach, still partially open  lungs scattered rhonchi  cv rrr  abdomen soft, peg side with slight drainage around, nontender to palpation  ext contracted  neuro opens eyes, responds to voice simple commands, contracted lower extremities, with paraplegia

## 2022-02-09 NOTE — CHART NOTE - NSCHARTNOTEFT_GEN_A_CORE
Clinical Nutrition BRIEF NOTE    *57yo male admitted with severe dehydration 2/2 leaking PEG, CT showed intersusseption with possible defect of wall of GB.  Hypernatremia (improving).  aspiration PNA.  s/p removal of PEG due to balloon stuck in small bowel on 2/6 with large amount of bilious fluid draining out of tract with stoma bag applied.  pending possible replacement of PEG by surgery vs GI (depending on if old site needs to be closed first).    *per GI, pt will get new G-J tube next week.  To c/w TF via current PEG for now.  per RN, pt tolerating current TF at 20mL/hr. Clinical Nutrition BRIEF NOTE    *57yo male admitted with severe dehydration 2/2 leaking PEG, CT showed intersusseption with possible defect of wall of GB.  Hypernatremia (improving).  aspiration PNA.  s/p removal of PEG due to balloon stuck in small bowel on 2/6 with large amount of bilious fluid draining out of tract with stoma bag applied.  pending possible replacement of PEG by surgery vs GI (depending on if old site needs to be closed first).    *per GI, pt will get new G-J tube next week.  To c/w TF via current PEG for now.  per RN, pt tolerating current TF at 20mL/hr.  rec'd titrate to new goal rate of 50mL/hr with 1pkt Prosource TF.  which will provide ~1540Kcal, 75g protein, 760mL free water, and total TF volume of 1000mL.    ESTIMATED NUTR NEEDS: based on admit wt: 41Kg  1440-1650Kcal (35-40Kcal/Kg)  74-100g protein (1.8-2.5g/Kg)  1240-1650mL (30-40mL/Kg)    Wt Hx;  41.3Kg (2/5)  Height (cm): 167.6 (02-04)  Weight (kg): 45.4 (02-04)  BMI (kg/m2): 16.2 (02-04)  Ideal Body Weight: 64Kg  % Ideal Body Weight: 71%    RECOMMENDATIONS:  1) titrate to new goal rate of 50mL/hr with 1pkt Prosource TF  2) monitor TF tolerance, keep back of bed >35 degrees  3) monitor hydration status; adjust free water flushes prn; consider free water flushes of 20mL q1hr (=400mL additional free water daily)  4) daily wt checks  5) monitor lytes/mins; replete/correct prn     *will monitor and adjust treatement plan prn*  Stefanie Mcnair MA, RDN, CDN, [facility] [phone]

## 2022-02-09 NOTE — PROGRESS NOTE ADULT - SUBJECTIVE AND OBJECTIVE BOX
Patient is a 56y old  Male who presents with a chief complaint of aspiration pneumonia  hypernatremia (09 Feb 2022 11:12)      Subective:  Patient seen this morning, tolerating tube feeds    PAST MEDICAL & SURGICAL HISTORY:  TBI (traumatic brain injury)    Seizure    Polio    H/O paraplegia    Pneumonia    H/O chronic respiratory failure    Diabetes mellitus    Hypertension    S/P percutaneous endoscopic gastrostomy (PEG) tube placement        MEDICATIONS  (STANDING):  ALBUTerol    90 MICROgram(s) HFA Inhaler 2 Puff(s) Inhalation every 6 hours  BACItracin   Ointment 1 Application(s) Topical four times a day  baclofen 10 milliGRAM(s) Oral two times a day  chlorhexidine 0.12% Liquid 15 milliLiter(s) Swish and Spit two times a day  cholecalciferol 1000 Unit(s) Oral daily  doxazosin 2 milliGRAM(s) Oral at bedtime  enoxaparin Injectable 40 milliGRAM(s) SubCutaneous daily  fluconAZOLE   Tablet 100 milliGRAM(s) Oral daily  lacosamide Solution 50 milliGRAM(s) Oral two times a day  levETIRAcetam 500 milliGRAM(s) Oral two times a day  pantoprazole  Injectable 40 milliGRAM(s) IV Push two times a day  silver sulfADIAZINE 1% Cream 1 Application(s) Topical two times a day  tiotropium 18 MICROgram(s) Capsule 1 Capsule(s) Inhalation daily  venlafaxine 37.5 milliGRAM(s) Oral daily  zinc oxide 40% Ointment 1 Application(s) Topical three times a day    MEDICATIONS  (PRN):  acetaminophen     Tablet .. 650 milliGRAM(s) Oral every 6 hours PRN Temp greater or equal to 38C (100.4F), Mild Pain (1 - 3)  aluminum hydroxide/magnesium hydroxide/simethicone Suspension 30 milliLiter(s) Oral every 4 hours PRN Dyspepsia  melatonin 3 milliGRAM(s) Oral at bedtime PRN Insomnia  ondansetron   Disintegrating Tablet 4 milliGRAM(s) Oral every 8 hours PRN Vomiting      REVIEW OF SYSTEMS:  NA    Vital Signs Last 24 Hrs  T(C): 36.7 (09 Feb 2022 17:00), Max: 37.1 (08 Feb 2022 20:00)  T(F): 98 (09 Feb 2022 17:00), Max: 98.8 (09 Feb 2022 00:00)  HR: 77 (09 Feb 2022 18:00) (60 - 84)  BP: 112/60 (09 Feb 2022 18:00) (91/62 - 119/61)  BP(mean): 71 (09 Feb 2022 18:00) (63 - 85)  RR: 16 (09 Feb 2022 18:00) (15 - 29)  SpO2: 95% (09 Feb 2022 18:00) (93% - 98%)    PHYSICAL EXAM:    Constitutional: NAD, chronically ill  Respiratory: CTAB  Cardiovascular: S1 and S2, RRR  Gastrointestinal: BS+, soft, NT/ND, PEG site unchanged  Extremities: No peripheral edema  Psychiatric: Normal mood, normal affect    LABS:                        9.7    4.54  )-----------( 179      ( 09 Feb 2022 06:38 )             30.4     02-09    137  |  105  |  5<L>  ----------------------------<  121<H>  3.6   |  28  |  0.24<L>    Ca    8.0<L>      09 Feb 2022 06:38  Phos  3.1     02-08  Mg     2.2     02-09    TPro  5.6<L>  /  Alb  1.6<L>  /  TBili  0.2  /  DBili  x   /  AST  14<L>  /  ALT  17  /  AlkPhos  86  02-08      LIVER FUNCTIONS - ( 08 Feb 2022 04:57 )  Alb: 1.6 g/dL / Pro: 5.6 gm/dL / ALK PHOS: 86 U/L / ALT: 17 U/L / AST: 14 U/L / GGT: x             RADIOLOGY & ADDITIONAL STUDIES:

## 2022-02-09 NOTE — PROGRESS NOTE ADULT - ASSESSMENT
56M pmhx TBI presents with PEG tube malposition and evidence of extraluminal air on CT.  PEG tube has since been removed.  Extraluminal air likely 2/2 manipulation of PEG.  Patient VS wnl, nontender to palpation without leukocytosis, peritonitis unlikely at this time     Plan:   - Continue medical optimization  - f/u with GI for PEG plan  - serial abdominal exams   - Rest of management as per ICU    Case discussed with Dr. Story, surgery attending

## 2022-02-09 NOTE — PROGRESS NOTE ADULT - ASSESSMENT
Dysfunctional G tube  traumatic brain injury    Rec:  Will cont PEG feeds as tolerated and consider closing G tube tract endoscopically next week (Dr. Duron) after we've allowed more time for duodenal healing  Cont protonix

## 2022-02-09 NOTE — PROGRESS NOTE ADULT - SUBJECTIVE AND OBJECTIVE BOX
HPI:   56M hx TBI w resultant paraplegia 35 years ago  seizures, chronic resp failure s/p trach decannulated  pulmonary fibrosis, recurrent asp PNA, PEG tube,  DM II, GERD, HTN admit from SNF with resp distress AMS hypernatremia     As soon as he hit the floor from the ED rapid response called for hypoxemia and AMS from baseline      2/7: No events over night  2/8: No events over night  2/9: Tolerating feeds, no other events        PAST MEDICAL & SURGICAL HISTORY:  TBI (traumatic brain injury)    Seizure    Polio    H/O paraplegia    Pneumonia    H/O chronic respiratory failure    Diabetes mellitus    Hypertension    S/P percutaneous endoscopic gastrostomy (PEG) tube placement        FAMILY HISTORY:  No pertinent family history in first degree relatives        Social Hx:    Allergies    Ativan (Unknown)  phenytoin (Unknown)  Valproate Sodium (Other (Severe))  Valproate Sodium (Unknown)    Intolerances            ICU Vital Signs Last 24 Hrs  T(C): 36.7 (09 Feb 2022 09:00), Max: 37.1 (08 Feb 2022 20:00)  T(F): 98 (09 Feb 2022 09:00), Max: 98.8 (09 Feb 2022 00:00)  HR: 79 (09 Feb 2022 10:00) (60 - 82)  BP: 116/58 (09 Feb 2022 10:00) (91/62 - 116/58)  BP(mean): 71 (09 Feb 2022 10:00) (62 - 85)  ABP: --  ABP(mean): --  RR: 21 (09 Feb 2022 10:00) (18 - 29)  SpO2: 93% (09 Feb 2022 10:00) (93% - 97%)          I&O's Summary    08 Feb 2022 07:01  -  09 Feb 2022 07:00  --------------------------------------------------------  IN: 2184 mL / OUT: 1850 mL / NET: 334 mL                              9.7    4.54  )-----------( 179      ( 09 Feb 2022 06:38 )             30.4       02-09    137  |  105  |  5<L>  ----------------------------<  121<H>  3.6   |  28  |  0.24<L>    Ca    8.0<L>      09 Feb 2022 06:38  Phos  3.1     02-08  Mg     2.2     02-09    TPro  5.6<L>  /  Alb  1.6<L>  /  TBili  0.2  /  DBili  x   /  AST  14<L>  /  ALT  17  /  AlkPhos  86  02-08                    MEDICATIONS  (STANDING):  ALBUTerol    90 MICROgram(s) HFA Inhaler 2 Puff(s) Inhalation every 6 hours  BACItracin   Ointment 1 Application(s) Topical four times a day  baclofen 10 milliGRAM(s) Oral two times a day  chlorhexidine 0.12% Liquid 15 milliLiter(s) Swish and Spit two times a day  cholecalciferol 1000 Unit(s) Oral daily  doxazosin 2 milliGRAM(s) Oral at bedtime  enoxaparin Injectable 40 milliGRAM(s) SubCutaneous daily  fluconAZOLE   Tablet 100 milliGRAM(s) Oral daily  lacosamide Solution 50 milliGRAM(s) Oral two times a day  levETIRAcetam 500 milliGRAM(s) Oral two times a day  pantoprazole  Injectable 40 milliGRAM(s) IV Push two times a day  silver sulfADIAZINE 1% Cream 1 Application(s) Topical two times a day  tiotropium 18 MICROgram(s) Capsule 1 Capsule(s) Inhalation daily  venlafaxine 37.5 milliGRAM(s) Oral daily  zinc oxide 40% Ointment 1 Application(s) Topical three times a day    MEDICATIONS  (PRN):  acetaminophen     Tablet .. 650 milliGRAM(s) Oral every 6 hours PRN Temp greater or equal to 38C (100.4F), Mild Pain (1 - 3)  aluminum hydroxide/magnesium hydroxide/simethicone Suspension 30 milliLiter(s) Oral every 4 hours PRN Dyspepsia  melatonin 3 milliGRAM(s) Oral at bedtime PRN Insomnia  ondansetron   Disintegrating Tablet 4 milliGRAM(s) Oral every 8 hours PRN Vomiting      DVT Prophylaxis: Lovenox    Advanced Directives:  Discussed with:    Visit Information:    ** Time is exclusive of billed procedures and/or teaching and/or routine family updates.

## 2022-02-10 LAB
ANION GAP SERPL CALC-SCNC: 3 MMOL/L — LOW (ref 5–17)
BUN SERPL-MCNC: 4 MG/DL — LOW (ref 7–23)
CALCIUM SERPL-MCNC: 8.4 MG/DL — LOW (ref 8.5–10.1)
CHLORIDE SERPL-SCNC: 108 MMOL/L — SIGNIFICANT CHANGE UP (ref 96–108)
CO2 SERPL-SCNC: 25 MMOL/L — SIGNIFICANT CHANGE UP (ref 22–31)
CREAT SERPL-MCNC: 0.24 MG/DL — LOW (ref 0.5–1.3)
GLUCOSE SERPL-MCNC: 104 MG/DL — HIGH (ref 70–99)
HCT VFR BLD CALC: 28.2 % — LOW (ref 39–50)
HGB BLD-MCNC: 9.5 G/DL — LOW (ref 13–17)
MAGNESIUM SERPL-MCNC: 2 MG/DL — SIGNIFICANT CHANGE UP (ref 1.6–2.6)
MCHC RBC-ENTMCNC: 30 PG — SIGNIFICANT CHANGE UP (ref 27–34)
MCHC RBC-ENTMCNC: 33.7 GM/DL — SIGNIFICANT CHANGE UP (ref 32–36)
MCV RBC AUTO: 89 FL — SIGNIFICANT CHANGE UP (ref 80–100)
PHOSPHATE SERPL-MCNC: 2.5 MG/DL — SIGNIFICANT CHANGE UP (ref 2.5–4.5)
PLATELET # BLD AUTO: 206 K/UL — SIGNIFICANT CHANGE UP (ref 150–400)
POTASSIUM SERPL-MCNC: 4.1 MMOL/L — SIGNIFICANT CHANGE UP (ref 3.5–5.3)
POTASSIUM SERPL-SCNC: 4.1 MMOL/L — SIGNIFICANT CHANGE UP (ref 3.5–5.3)
RBC # BLD: 3.17 M/UL — LOW (ref 4.2–5.8)
RBC # FLD: 14.6 % — HIGH (ref 10.3–14.5)
SODIUM SERPL-SCNC: 136 MMOL/L — SIGNIFICANT CHANGE UP (ref 135–145)
WBC # BLD: 3.48 K/UL — LOW (ref 3.8–10.5)
WBC # FLD AUTO: 3.48 K/UL — LOW (ref 3.8–10.5)

## 2022-02-10 PROCEDURE — 99233 SBSQ HOSP IP/OBS HIGH 50: CPT

## 2022-02-10 RX ORDER — LEVETIRACETAM 250 MG/1
500 TABLET, FILM COATED ORAL
Refills: 0 | Status: DISCONTINUED | OUTPATIENT
Start: 2022-02-10 | End: 2022-02-22

## 2022-02-10 RX ORDER — PANTOPRAZOLE SODIUM 20 MG/1
40 TABLET, DELAYED RELEASE ORAL EVERY 12 HOURS
Refills: 0 | Status: DISCONTINUED | OUTPATIENT
Start: 2022-02-10 | End: 2022-02-22

## 2022-02-10 RX ADMIN — Medication 37.5 MILLIGRAM(S): at 09:46

## 2022-02-10 RX ADMIN — ENOXAPARIN SODIUM 40 MILLIGRAM(S): 100 INJECTION SUBCUTANEOUS at 09:45

## 2022-02-10 RX ADMIN — PANTOPRAZOLE SODIUM 40 MILLIGRAM(S): 20 TABLET, DELAYED RELEASE ORAL at 10:23

## 2022-02-10 RX ADMIN — PANTOPRAZOLE SODIUM 40 MILLIGRAM(S): 20 TABLET, DELAYED RELEASE ORAL at 23:27

## 2022-02-10 RX ADMIN — ZINC OXIDE 1 APPLICATION(S): 200 OINTMENT TOPICAL at 05:07

## 2022-02-10 RX ADMIN — LACOSAMIDE 50 MILLIGRAM(S): 50 TABLET ORAL at 09:44

## 2022-02-10 RX ADMIN — LEVETIRACETAM 500 MILLIGRAM(S): 250 TABLET, FILM COATED ORAL at 09:43

## 2022-02-10 RX ADMIN — Medication 1 APPLICATION(S): at 23:40

## 2022-02-10 RX ADMIN — Medication 2 MILLIGRAM(S): at 23:24

## 2022-02-10 RX ADMIN — Medication 1000 UNIT(S): at 09:44

## 2022-02-10 RX ADMIN — FLUCONAZOLE 100 MILLIGRAM(S): 150 TABLET ORAL at 09:44

## 2022-02-10 RX ADMIN — Medication 1 APPLICATION(S): at 17:12

## 2022-02-10 RX ADMIN — ZINC OXIDE 1 APPLICATION(S): 200 OINTMENT TOPICAL at 23:25

## 2022-02-10 RX ADMIN — ALBUTEROL 2 PUFF(S): 90 AEROSOL, METERED ORAL at 13:05

## 2022-02-10 RX ADMIN — Medication 1 APPLICATION(S): at 09:46

## 2022-02-10 RX ADMIN — LACOSAMIDE 100 MILLIGRAM(S): 50 TABLET ORAL at 23:27

## 2022-02-10 RX ADMIN — ZINC OXIDE 1 APPLICATION(S): 200 OINTMENT TOPICAL at 14:02

## 2022-02-10 RX ADMIN — Medication 10 MILLIGRAM(S): at 09:45

## 2022-02-10 RX ADMIN — Medication 10 MILLIGRAM(S): at 23:24

## 2022-02-10 RX ADMIN — ALBUTEROL 2 PUFF(S): 90 AEROSOL, METERED ORAL at 08:40

## 2022-02-10 RX ADMIN — Medication 1 APPLICATION(S): at 05:04

## 2022-02-10 RX ADMIN — LEVETIRACETAM 500 MILLIGRAM(S): 250 TABLET, FILM COATED ORAL at 23:55

## 2022-02-10 NOTE — PROGRESS NOTE ADULT - ASSESSMENT
56M pmhx TBI presents with PEG tube malposition and evidence of extraluminal air on CT.  PEG tube has since been removed.  Extraluminal air likely 2/2 manipulation of PEG.  Patient VS wnl, nontender to palpation without leukocytosis, peritonitis unlikely at this time     Plan:   - Continue medical optimization  - f/u with GI for PEG plan - possible fistula closure endoscopically  - serial abdominal exams   - no acute surgical intervention is warranted at this time    Plan discussed with Dr. Story

## 2022-02-10 NOTE — PROGRESS NOTE ADULT - ASSESSMENT
#Acute on chronic hypoxic resp failure #Trach collar w supplemental O2  1. asp precations- NPO  2. albuterol 2 puffs q 6 hrs  3. spiriva   4. pulse ox monitoring    #Possible recurrent aspiration PNA  no infiltrate seen on CXR but pt is severely dehydrated  1. continue zosyn vanco  2. ID:     #COVID +  1. pule ox monitoring    #Hypernatremia  1. resolved     #Dehydration  1. s/p NS 1500 cc  2. 0.45 NS @ 100 cc/hr  3. OSM serum and urine- WNL  4. urine lytes- WNL    #Altered mental status likely metabolic encephalopathy  1. treating for potential underlying infection w AB- no infection detected --> abx d/c  2. rehydration    #Chronic leaking of PEG tube  1. GI consult appreciated- PEG removed and relaced  2. silver sulfADIAZINE 1% Cream  3. possible G tube tract repair next week    #Oral candidiasis  1. continue diflucan  2. needs oral care w tongue brushing  3. chlorhexidine     #Hx GERD  1. famotidine 20 mg    #Severe cachexia  1. nutrition consult  2. TSH  3. PEG feeds as tolerated per GI    #TBI #paraplegia #SZ  1. continue AED  2. baclofen 10 mg BID    #VTE  continue enoxaparin    #GOALS OF CARE  Pt is a full code  MOLST updated      Patient sister Lisa [phone] #Acute on chronic hypoxic resp failure #Trach collar w supplemental O2  - asp precations- NPO  - albuterol 2 puffs q 6 hrs  - spiriva       #Possible recurrent aspiration PNA  no infiltrate seen on CXR but pt is severely dehydrated  - compelted zosyn vanco  - ID was following     #COVID +  - pule ox monitoring    #Hypernatremia  -  resolved     #Dehydration  -s/p NS 1500 cc  - 0.45 NS @ 100 cc/hr  - OSM serum and urine- WNL  - urine lytes- WNL    #Altered mental status likely metabolic encephalopathy  -. treating for potential underlying infection w AB- no infection detected --> abx d/c  - rehydration    #Chronic leaking of PEG tube  - GI consult appreciated- PEG removed and replaced,.  as per GI Will cont PEG feeds as tolerated and consider closing G tube tract endoscopically next week (Dr. Duron) after we've allowed more time for duodenal healing  Cont protonix  - silver sulfADIAZINE 1% Cream    #Oral candidiasis  -  continue diflucan til 2/14   - needs oral care w tongue brushing  - chlorhexidine     #Hx GERD  - famotidine 20 mg    #Severe cachexia  - nutrition consult  - TSH  -  PEG feeds as tolerated per GI    #TBI #paraplegia #SZ  - continue AED  - baclofen 10 mg BID    #VTE  continue enoxaparin    #GOALS OF CARE  Pt is a full code  MOLST updated      Patient sister Lisa [phone]

## 2022-02-10 NOTE — PROGRESS NOTE ADULT - SUBJECTIVE AND OBJECTIVE BOX
HPI:   56M hx TBI w resultant paraplegia 35 years ago  seizures, chronic resp failure s/p trach decannulated  pulmonary fibrosis, recurrent asp PNA, PEG tube,  DM II, GERD, HTN admit from SNF with resp distress AMS hypernatremia. As soon as he hit the floor from the ED rapid response called for hypoxemia and AMS from baseline. Since admission patient was admitted to the ICU for critical care. His PEG was found to be leaking bilious fluid, and was removed and replaced on 2/7/22. PEG feeds may be resumed. Patient was also placed on a protonix drip. Patient is also being treated with antifungals for oral candidiasis.     2/7: No events over night  2/8: No events over night  2/9: Tolerating feeds, no other events  2/10: no overnight events. tolerating feeds.    ROS: unable to obtain d/t difficulties speaking.     Vital Signs Last 24 Hrs  T(C): 37 (10 Feb 2022 08:37), Max: 37.2 (09 Feb 2022 21:05)  T(F): 98.6 (10 Feb 2022 08:37), Max: 99 (09 Feb 2022 21:05)  HR: 86 (10 Feb 2022 11:19) (67 - 91)  BP: 96/52 (10 Feb 2022 11:19) (96/50 - 122/81)  BP(mean): 91 (09 Feb 2022 20:00) (68 - 91)  RR: 19 (10 Feb 2022 08:37) (15 - 29)  SpO2: 93% (10 Feb 2022 08:40) (93% - 98%)    I&O's Summary    09 Feb 2022 07:01  -  10 Feb 2022 07:00  --------------------------------------------------------  IN: 507 mL / OUT: 1300 mL / NET: -793 mL      PHYSICAL EXAM:  Constitutional: lying in bed, cachectic. NAD. wearing trach collar.  Eyes: conjunctiva clear, no discharge  ENMT: brownish coating on tongue  Neck: trach stoma clean  Respiratory: CTA b/l; diminished breath sounds, R  Cardiovascular: regular rate and rhythm, no MRG  Gastrointestinal: abd soft, NT/ND. +BS. +PEG site  Extremities: no pedal edema  Neurological: awake. Paraplegic w RUE contracted  Skin: warm and dry. L ear w sl breakdown. leakage around PEG  MSK; markedly reduced muscle mass throughout      LABS:                          9.5    3.48  )-----------( 206      ( 10 Feb 2022 07:28 )             28.2     02-10    136  |  108  |  4<L>  ----------------------------<  104<H>  4.1   |  25  |  0.24<L>    Ca    8.4<L>      10 Feb 2022 07:28  Phos  2.5     02-10  Mg     2.0     02-10      IMAGING/DIAGNOSTICS: all imaging and diagnostic tests reviewed

## 2022-02-10 NOTE — PROGRESS NOTE ADULT - SUBJECTIVE AND OBJECTIVE BOX
Pt seen and examined at bedside, no acute events overnight. Denied fever, chills, nausea, vomiting or SOB overnight.     Vital Signs Last 24 Hrs  T(C): 37 (10 Feb 2022 08:37), Max: 37.2 (09 Feb 2022 21:05)  T(F): 98.6 (10 Feb 2022 08:37), Max: 99 (09 Feb 2022 21:05)  HR: 91 (10 Feb 2022 08:37) (67 - 91)  BP: 96/50 (10 Feb 2022 08:37) (96/50 - 122/81)  BP(mean): 91 (09 Feb 2022 20:00) (68 - 91)  RR: 19 (10 Feb 2022 08:37) (15 - 29)  SpO2: 94% (10 Feb 2022 08:37) (94% - 98%)    Physical Exam  General - weak, alert  HEENT nc/at  neck s/p old trach, still partially open  lungs scattered rhonchi  cv rrr  abdomen soft, peg side with slight drainage around, nontender to palpation  ext contracted  neuro opens eyes, responds to voice simple commands, contracted lower extremities, with paraplegia    Labs:                        9.5    3.48  )-----------( 206      ( 10 Feb 2022 07:28 )             28.2   02-10    136  |  108  |  4<L>  ----------------------------<  104<H>  4.1   |  25  |  0.24<L>    Ca    8.4<L>      10 Feb 2022 07:28  Phos  2.5     02-10  Mg     2.0     02-10

## 2022-02-10 NOTE — PROGRESS NOTE ADULT - ATTENDING COMMENTS
patient seen and examined with PA student Tejal Gibson,  I  was physically present for the key portions of the evaluation and management (E/M) service provided.  I agree with the above history, physical, and plan which I have reviewed and edited where appropriate.  - dr ott to consider closing G tube tract endoscopically next week (Dr. Ott) after we've allowed more time for duodenal healing  - Cont protonix  - continue fluconazel til 2/14 jessee d/w dr villalta. abx course completed

## 2022-02-11 LAB
ANION GAP SERPL CALC-SCNC: 4 MMOL/L — LOW (ref 5–17)
BUN SERPL-MCNC: 6 MG/DL — LOW (ref 7–23)
CALCIUM SERPL-MCNC: 8.5 MG/DL — SIGNIFICANT CHANGE UP (ref 8.5–10.1)
CHLORIDE SERPL-SCNC: 109 MMOL/L — HIGH (ref 96–108)
CO2 SERPL-SCNC: 25 MMOL/L — SIGNIFICANT CHANGE UP (ref 22–31)
CREAT SERPL-MCNC: 0.2 MG/DL — LOW (ref 0.5–1.3)
GLUCOSE SERPL-MCNC: 110 MG/DL — HIGH (ref 70–99)
HCT VFR BLD CALC: 30.7 % — LOW (ref 39–50)
HGB BLD-MCNC: 9.7 G/DL — LOW (ref 13–17)
MCHC RBC-ENTMCNC: 28.8 PG — SIGNIFICANT CHANGE UP (ref 27–34)
MCHC RBC-ENTMCNC: 31.6 GM/DL — LOW (ref 32–36)
MCV RBC AUTO: 91.1 FL — SIGNIFICANT CHANGE UP (ref 80–100)
PLATELET # BLD AUTO: 251 K/UL — SIGNIFICANT CHANGE UP (ref 150–400)
POTASSIUM SERPL-MCNC: 4.1 MMOL/L — SIGNIFICANT CHANGE UP (ref 3.5–5.3)
POTASSIUM SERPL-SCNC: 4.1 MMOL/L — SIGNIFICANT CHANGE UP (ref 3.5–5.3)
RBC # BLD: 3.37 M/UL — LOW (ref 4.2–5.8)
RBC # FLD: 15.3 % — HIGH (ref 10.3–14.5)
SODIUM SERPL-SCNC: 138 MMOL/L — SIGNIFICANT CHANGE UP (ref 135–145)
WBC # BLD: 3.05 K/UL — LOW (ref 3.8–10.5)
WBC # FLD AUTO: 3.05 K/UL — LOW (ref 3.8–10.5)

## 2022-02-11 PROCEDURE — 99233 SBSQ HOSP IP/OBS HIGH 50: CPT

## 2022-02-11 RX ORDER — METOCLOPRAMIDE HCL 10 MG
5 TABLET ORAL
Refills: 0 | Status: DISCONTINUED | OUTPATIENT
Start: 2022-02-11 | End: 2022-02-22

## 2022-02-11 RX ADMIN — ALBUTEROL 2 PUFF(S): 90 AEROSOL, METERED ORAL at 13:52

## 2022-02-11 RX ADMIN — Medication 2 MILLIGRAM(S): at 22:24

## 2022-02-11 RX ADMIN — Medication 5 MILLIGRAM(S): at 17:02

## 2022-02-11 RX ADMIN — Medication 1 APPLICATION(S): at 16:58

## 2022-02-11 RX ADMIN — ALBUTEROL 2 PUFF(S): 90 AEROSOL, METERED ORAL at 08:34

## 2022-02-11 RX ADMIN — LACOSAMIDE 100 MILLIGRAM(S): 50 TABLET ORAL at 22:25

## 2022-02-11 RX ADMIN — Medication 1000 UNIT(S): at 09:57

## 2022-02-11 RX ADMIN — Medication 1 APPLICATION(S): at 05:43

## 2022-02-11 RX ADMIN — LACOSAMIDE 100 MILLIGRAM(S): 50 TABLET ORAL at 09:55

## 2022-02-11 RX ADMIN — Medication 5 MILLIGRAM(S): at 09:56

## 2022-02-11 RX ADMIN — Medication 10 MILLIGRAM(S): at 22:25

## 2022-02-11 RX ADMIN — FLUCONAZOLE 100 MILLIGRAM(S): 150 TABLET ORAL at 09:55

## 2022-02-11 RX ADMIN — Medication 1 APPLICATION(S): at 00:20

## 2022-02-11 RX ADMIN — Medication 1 APPLICATION(S): at 11:18

## 2022-02-11 RX ADMIN — Medication 37.5 MILLIGRAM(S): at 09:54

## 2022-02-11 RX ADMIN — PANTOPRAZOLE SODIUM 40 MILLIGRAM(S): 20 TABLET, DELAYED RELEASE ORAL at 22:25

## 2022-02-11 RX ADMIN — LEVETIRACETAM 500 MILLIGRAM(S): 250 TABLET, FILM COATED ORAL at 09:54

## 2022-02-11 RX ADMIN — PANTOPRAZOLE SODIUM 40 MILLIGRAM(S): 20 TABLET, DELAYED RELEASE ORAL at 09:54

## 2022-02-11 RX ADMIN — ZINC OXIDE 1 APPLICATION(S): 200 OINTMENT TOPICAL at 14:37

## 2022-02-11 RX ADMIN — Medication 10 MILLIGRAM(S): at 09:55

## 2022-02-11 RX ADMIN — ENOXAPARIN SODIUM 40 MILLIGRAM(S): 100 INJECTION SUBCUTANEOUS at 09:55

## 2022-02-11 RX ADMIN — ZINC OXIDE 1 APPLICATION(S): 200 OINTMENT TOPICAL at 05:44

## 2022-02-11 RX ADMIN — ZINC OXIDE 1 APPLICATION(S): 200 OINTMENT TOPICAL at 22:24

## 2022-02-11 RX ADMIN — ALBUTEROL 2 PUFF(S): 90 AEROSOL, METERED ORAL at 21:01

## 2022-02-11 RX ADMIN — CHLORHEXIDINE GLUCONATE 15 MILLILITER(S): 213 SOLUTION TOPICAL at 22:26

## 2022-02-11 RX ADMIN — Medication 1 APPLICATION(S): at 09:57

## 2022-02-11 RX ADMIN — LEVETIRACETAM 500 MILLIGRAM(S): 250 TABLET, FILM COATED ORAL at 22:56

## 2022-02-11 NOTE — PROGRESS NOTE ADULT - ASSESSMENT
#Acute on chronic hypoxic resp failure #Trach collar w supplemental O2  - asp precations- NPO  - albuterol 2 puffs q 6 hrs  - spiriva     #Possible recurrent aspiration PNA  - no infiltrate seen on CXR but pt is severely dehydrated-- resolved  - compelted zosyn vanco  - ID was following     #COVID +  - pule ox monitoring    #Urine Retention  -straight cath     #Hypernatremia  -  resolved     #Dehydration  -s/p NS 1500 cc  - 0.45 NS @ 100 cc/hr  - OSM serum and urine- WNL  - urine lytes- WNL    #Altered mental status likely metabolic encephalopathy  -. treating for potential underlying infection w AB- no infection detected --> abx d/c  - rehydration    #Chronic leaking of PEG tube  - GI consult appreciated- PEG removed and replaced,.  as per GI Will cont PEG feeds as tolerated and consider closing G tube tract endoscopically next week (Dr. Duron) after we've allowed more time for duodenal healing  - Cont protonix  - silver sulfADIAZINE 1% Cream  - per GI advance feeds to 30mL and start reglan    #Oral candidiasis  -  continue diflucan til 2/14   - needs oral care w tongue brushing  - chlorhexidine     #Hx GERD  - famotidine 20 mg    #Severe cachexia  - nutrition consult  - TSH  -  PEG feeds as tolerated per GI    #TBI #paraplegia #SZ  - continue AED  - baclofen 10 mg BID    #VTE  continue enoxaparin    #GOALS OF CARE  Pt is a full code  MOLST updated      Patient sister Lisa [phone]

## 2022-02-11 NOTE — PROGRESS NOTE ADULT - SUBJECTIVE AND OBJECTIVE BOX
HPI:  56M hx TBI w resultant paraplegia 35 years ago  seizures, chronic resp failure s/p trach decannulated  pulmonary fibrosis, recurrent asp PNA, PEG tube,  DM II, GERD, HTN admit from SNF with resp distress AMS hypernatremia. As soon as he hit the floor from the ED rapid response called for hypoxemia and AMS from baseline. Since admission patient was admitted to the ICU for critical care. His PEG was found to be leaking bilious fluid, and was removed and replaced on 2/7/22. PEG feeds may be resumed. Patient was also placed on a protonix drip. Patient is also being treated with antifungals for oral candidiasis.     2/7: No events over night  2/8: No events over night  2/9: Tolerating feeds, no other events  2/10: no overnight events. tolerating feeds.  2/11: straight cath overnight for retention. non-verbal. tolerating feeds.     ROS: unable to obtain d/t difficulties speaking.     Vital Signs Last 24 Hrs  T(C): 35.7 (11 Feb 2022 08:27), Max: 36.5 (10 Feb 2022 17:19)  T(F): 96.3 (11 Feb 2022 08:27), Max: 97.7 (10 Feb 2022 17:19)  HR: 85 (11 Feb 2022 08:27) (77 - 92)  BP: 104/61 (11 Feb 2022 08:27) (103/53 - 110/60)  BP(mean): --  RR: 17 (11 Feb 2022 08:27) (17 - 22)  SpO2: 94% (11 Feb 2022 08:27) (94% - 96%)    I&O's Summary    10 Feb 2022 07:01  -  11 Feb 2022 07:00  --------------------------------------------------------  IN: 440 mL / OUT: 355 mL / NET: 85 mL    PHYSICAL EXAM:  Constitutional: lying in bed, cachectic. NAD. wearing trach collar.  Eyes: conjunctiva clear, no discharge  Neck: trach stoma clean  Respiratory: CTA b/l. no wheezes, rales, rhonchi   Cardiovascular: regular rate and rhythm, no MRG  Gastrointestinal: abd soft, NT/ND. +BS. +PEG site with leakage  Extremities: no pedal edema  Neurological: awake. Paraplegic w RUE contracted  Skin: warm and dry. L ear w sl breakdown.  MSK; markedly reduced muscle mass throughout      LABS:                            9.7    3.05  )-----------( 251      ( 11 Feb 2022 07:16 )             30.7     02-11    138  |  109<H>  |  6<L>  ----------------------------<  110<H>  4.1   |  25  |  0.20<L>    Ca    8.5      11 Feb 2022 07:16  Phos  2.5     02-10  Mg     2.0     02-10      IMAGING/DIAGNOSTICS: all imaging and diagnostic tests reviewed

## 2022-02-11 NOTE — PROGRESS NOTE ADULT - SUBJECTIVE AND OBJECTIVE BOX
Patient is a 56y old  Male who presents with a chief complaint of aspiration pneumonia  hypernatremia (10 Feb 2022 11:56)      Subective:  Tolerating tube feeds at 20  Still leaking    PAST MEDICAL & SURGICAL HISTORY:  TBI (traumatic brain injury)    Seizure    [place]    H/O paraplegia    Pneumonia    H/O chronic respiratory failure    Diabetes mellitus    Hypertension    S/P percutaneous endoscopic gastrostomy (PEG) tube placement        MEDICATIONS  (STANDING):  ALBUTerol    90 MICROgram(s) HFA Inhaler 2 Puff(s) Inhalation every 6 hours  BACItracin   Ointment 1 Application(s) Topical four times a day  baclofen 10 milliGRAM(s) Oral two times a day  chlorhexidine 0.12% Liquid 15 milliLiter(s) Swish and Spit two times a day  cholecalciferol 1000 Unit(s) Oral daily  doxazosin 2 milliGRAM(s) Oral at bedtime  enoxaparin Injectable 40 milliGRAM(s) SubCutaneous daily  fluconAZOLE   Tablet 100 milliGRAM(s) Oral daily  lacosamide Solution 100 milliGRAM(s) Oral two times a day  levETIRAcetam  Solution 500 milliGRAM(s) Oral two times a day  metoclopramide 5 milliGRAM(s) Oral four times a day  pantoprazole   Suspension 40 milliGRAM(s) Oral every 12 hours  silver sulfADIAZINE 1% Cream 1 Application(s) Topical two times a day  tiotropium 18 MICROgram(s) Capsule 1 Capsule(s) Inhalation daily  venlafaxine 37.5 milliGRAM(s) Oral daily  zinc oxide 40% Ointment 1 Application(s) Topical three times a day    MEDICATIONS  (PRN):  acetaminophen     Tablet .. 650 milliGRAM(s) Oral every 6 hours PRN Temp greater or equal to 38C (100.4F), Mild Pain (1 - 3)  aluminum hydroxide/magnesium hydroxide/simethicone Suspension 30 milliLiter(s) Oral every 4 hours PRN Dyspepsia  melatonin 3 milliGRAM(s) Oral at bedtime PRN Insomnia  ondansetron   Disintegrating Tablet 4 milliGRAM(s) Oral every 8 hours PRN Vomiting      REVIEW OF SYSTEMS:  NA    Vital Signs Last 24 Hrs  T(C): 36.2 (10 Feb 2022 20:14), Max: 37 (10 Feb 2022 08:37)  T(F): 97.1 (10 Feb 2022 20:14), Max: 98.6 (10 Feb 2022 08:37)  HR: 77 (10 Feb 2022 20:14) (77 - 92)  BP: 110/60 (10 Feb 2022 20:14) (96/50 - 110/60)  BP(mean): --  RR: 22 (10 Feb 2022 20:14) (19 - 22)  SpO2: 96% (10 Feb 2022 20:14) (93% - 96%)    PHYSICAL EXAM:    Constitutional: NAD,  Respiratory: CTAB  Cardiovascular: S1 and S2, RRR  Gastrointestinal: BS+, soft, NT/ND, PEG remains in good posiiton  Extremities: No peripheral edema      LABS:                        9.5    3.48  )-----------( 206      ( 10 Feb 2022 07:28 )             28.2     02-10    136  |  108  |  4<L>  ----------------------------<  104<H>  4.1   |  25  |  0.24<L>    Ca    8.4<L>      10 Feb 2022 07:28  Phos  2.5     02-10  Mg     2.0     02-10            RADIOLOGY & ADDITIONAL STUDIES:

## 2022-02-11 NOTE — PROGRESS NOTE ADULT - ATTENDING COMMENTS
patient seen and examined with PA student Tejal Gibson,  I  was physically present for the key portions of the evaluation and management (E/M) service provided.  I agree with the above history, physical, and plan which I have reviewed and edited where appropriate.  - dr ott to consider closing G tube tract endoscopically next week (Dr. Ott) after we've allowed more time for duodenal healing  - Cont protonix  - continue fluconazel til 2/14 jessee d/w dr villalta. abx course completed patient seen and examined with PA student Tejal Gibson,  I  was physically present for the key portions of the evaluation and management (E/M) service provided.  I agree with the above history, physical, and plan which I have reviewed and edited where appropriate.  - dr ott to consider closing G tube tract endoscopically next week (Dr. Ott) after we've allowed more time for duodenal healing  - advanced feeds today   - start reglan as per GI   - Cont protonix  - continue fluconazole til 2/14 case] d/w dr villalta. abx course completed

## 2022-02-11 NOTE — PROGRESS NOTE ADULT - ASSESSMENT
Dysfunctional G tube  traumatic brain injury    Rec:  Advance tube feeds  Try reglan to reduce leaking  Cont protonix  Potential G tube closure next week

## 2022-02-12 LAB
ALBUMIN SERPL ELPH-MCNC: 2.1 G/DL — LOW (ref 3.3–5)
ALP SERPL-CCNC: 99 U/L — SIGNIFICANT CHANGE UP (ref 40–120)
ALT FLD-CCNC: 17 U/L — SIGNIFICANT CHANGE UP (ref 12–78)
ANION GAP SERPL CALC-SCNC: 4 MMOL/L — LOW (ref 5–17)
AST SERPL-CCNC: 14 U/L — LOW (ref 15–37)
BILIRUB SERPL-MCNC: 0.1 MG/DL — LOW (ref 0.2–1.2)
BUN SERPL-MCNC: 12 MG/DL — SIGNIFICANT CHANGE UP (ref 7–23)
CALCIUM SERPL-MCNC: 8.5 MG/DL — SIGNIFICANT CHANGE UP (ref 8.5–10.1)
CHLORIDE SERPL-SCNC: 107 MMOL/L — SIGNIFICANT CHANGE UP (ref 96–108)
CO2 SERPL-SCNC: 27 MMOL/L — SIGNIFICANT CHANGE UP (ref 22–31)
CREAT SERPL-MCNC: 0.26 MG/DL — LOW (ref 0.5–1.3)
GLUCOSE SERPL-MCNC: 103 MG/DL — HIGH (ref 70–99)
HCT VFR BLD CALC: 31.7 % — LOW (ref 39–50)
HGB BLD-MCNC: 10 G/DL — LOW (ref 13–17)
MCHC RBC-ENTMCNC: 29 PG — SIGNIFICANT CHANGE UP (ref 27–34)
MCHC RBC-ENTMCNC: 31.5 GM/DL — LOW (ref 32–36)
MCV RBC AUTO: 91.9 FL — SIGNIFICANT CHANGE UP (ref 80–100)
PLATELET # BLD AUTO: 297 K/UL — SIGNIFICANT CHANGE UP (ref 150–400)
POTASSIUM SERPL-MCNC: 4.6 MMOL/L — SIGNIFICANT CHANGE UP (ref 3.5–5.3)
POTASSIUM SERPL-SCNC: 4.6 MMOL/L — SIGNIFICANT CHANGE UP (ref 3.5–5.3)
PROT SERPL-MCNC: 7.3 GM/DL — SIGNIFICANT CHANGE UP (ref 6–8.3)
RBC # BLD: 3.45 M/UL — LOW (ref 4.2–5.8)
RBC # FLD: 15.5 % — HIGH (ref 10.3–14.5)
SODIUM SERPL-SCNC: 138 MMOL/L — SIGNIFICANT CHANGE UP (ref 135–145)
WBC # BLD: 3.6 K/UL — LOW (ref 3.8–10.5)
WBC # FLD AUTO: 3.6 K/UL — LOW (ref 3.8–10.5)

## 2022-02-12 PROCEDURE — 99232 SBSQ HOSP IP/OBS MODERATE 35: CPT

## 2022-02-12 RX ADMIN — PANTOPRAZOLE SODIUM 40 MILLIGRAM(S): 20 TABLET, DELAYED RELEASE ORAL at 21:31

## 2022-02-12 RX ADMIN — CHLORHEXIDINE GLUCONATE 15 MILLILITER(S): 213 SOLUTION TOPICAL at 11:04

## 2022-02-12 RX ADMIN — Medication 1 APPLICATION(S): at 05:28

## 2022-02-12 RX ADMIN — ALBUTEROL 2 PUFF(S): 90 AEROSOL, METERED ORAL at 08:52

## 2022-02-12 RX ADMIN — ALBUTEROL 2 PUFF(S): 90 AEROSOL, METERED ORAL at 20:57

## 2022-02-12 RX ADMIN — Medication 10 MILLIGRAM(S): at 21:31

## 2022-02-12 RX ADMIN — Medication 1 APPLICATION(S): at 12:57

## 2022-02-12 RX ADMIN — ZINC OXIDE 1 APPLICATION(S): 200 OINTMENT TOPICAL at 12:56

## 2022-02-12 RX ADMIN — Medication 1 APPLICATION(S): at 00:45

## 2022-02-12 RX ADMIN — Medication 5 MILLIGRAM(S): at 23:56

## 2022-02-12 RX ADMIN — LACOSAMIDE 100 MILLIGRAM(S): 50 TABLET ORAL at 11:02

## 2022-02-12 RX ADMIN — Medication 37.5 MILLIGRAM(S): at 11:04

## 2022-02-12 RX ADMIN — Medication 1000 UNIT(S): at 11:03

## 2022-02-12 RX ADMIN — ENOXAPARIN SODIUM 40 MILLIGRAM(S): 100 INJECTION SUBCUTANEOUS at 11:02

## 2022-02-12 RX ADMIN — Medication 5 MILLIGRAM(S): at 11:03

## 2022-02-12 RX ADMIN — Medication 5 MILLIGRAM(S): at 00:40

## 2022-02-12 RX ADMIN — Medication 1 APPLICATION(S): at 23:56

## 2022-02-12 RX ADMIN — Medication 2 MILLIGRAM(S): at 21:31

## 2022-02-12 RX ADMIN — Medication 5 MILLIGRAM(S): at 05:26

## 2022-02-12 RX ADMIN — ZINC OXIDE 1 APPLICATION(S): 200 OINTMENT TOPICAL at 05:26

## 2022-02-12 RX ADMIN — CHLORHEXIDINE GLUCONATE 15 MILLILITER(S): 213 SOLUTION TOPICAL at 21:31

## 2022-02-12 RX ADMIN — FLUCONAZOLE 100 MILLIGRAM(S): 150 TABLET ORAL at 11:04

## 2022-02-12 RX ADMIN — LACOSAMIDE 100 MILLIGRAM(S): 50 TABLET ORAL at 21:39

## 2022-02-12 RX ADMIN — LEVETIRACETAM 500 MILLIGRAM(S): 250 TABLET, FILM COATED ORAL at 21:31

## 2022-02-12 RX ADMIN — Medication 1 APPLICATION(S): at 17:37

## 2022-02-12 RX ADMIN — ZINC OXIDE 1 APPLICATION(S): 200 OINTMENT TOPICAL at 21:32

## 2022-02-12 RX ADMIN — Medication 3 MILLIGRAM(S): at 21:31

## 2022-02-12 RX ADMIN — LEVETIRACETAM 500 MILLIGRAM(S): 250 TABLET, FILM COATED ORAL at 11:03

## 2022-02-12 RX ADMIN — Medication 1 APPLICATION(S): at 05:27

## 2022-02-12 RX ADMIN — Medication 10 MILLIGRAM(S): at 11:03

## 2022-02-12 RX ADMIN — PANTOPRAZOLE SODIUM 40 MILLIGRAM(S): 20 TABLET, DELAYED RELEASE ORAL at 11:03

## 2022-02-12 RX ADMIN — ALBUTEROL 2 PUFF(S): 90 AEROSOL, METERED ORAL at 13:54

## 2022-02-12 RX ADMIN — Medication 5 MILLIGRAM(S): at 17:37

## 2022-02-12 RX ADMIN — Medication 1 APPLICATION(S): at 21:32

## 2022-02-12 NOTE — PROGRESS NOTE ADULT - SUBJECTIVE AND OBJECTIVE BOX
HPI:  56M hx TBI w resultant paraplegia 35 years ago  seizures, chronic resp failure s/p trach decannulated  pulmonary fibrosis, recurrent asp PNA, PEG tube,  DM II, GERD, HTN admit from SNF with resp distress AMS hypernatremia. As soon as he hit the floor from the ED rapid response called for hypoxemia and AMS from baseline. Since admission patient was admitted to the ICU for critical care. His PEG was found to be leaking bilious fluid, and was removed and replaced on 2/7/22. PEG feeds may be resumed. Patient was also placed on a protonix drip. Patient is also being treated with antifungals for oral candidiasis.     2/7: No events over night  2/8: No events over night  2/9: Tolerating feeds, no other events  2/10: no overnight events. tolerating feeds.  2/11: straight cath overnight for retention. non-verbal. tolerating feeds.     ROS: unable to obtain d/t difficulties speaking.     Vital Signs Last 24 Hrs  T(C): 35.7 (11 Feb 2022 08:27), Max: 36.5 (10 Feb 2022 17:19)  T(F): 96.3 (11 Feb 2022 08:27), Max: 97.7 (10 Feb 2022 17:19)  HR: 85 (11 Feb 2022 08:27) (77 - 92)  BP: 104/61 (11 Feb 2022 08:27) (103/53 - 110/60)  BP(mean): --  RR: 17 (11 Feb 2022 08:27) (17 - 22)  SpO2: 94% (11 Feb 2022 08:27) (94% - 96%)    I&O's Summary    10 Feb 2022 07:01  -  11 Feb 2022 07:00  --------------------------------------------------------  IN: 440 mL / OUT: 355 mL / NET: 85 mL    PHYSICAL EXAM:  Constitutional: lying in bed, cachectic. NAD. wearing trach collar.  Eyes: conjunctiva clear, no discharge  Neck: trach stoma clean  Respiratory: CTA b/l. no wheezes, rales, rhonchi   Cardiovascular: regular rate and rhythm, no MRG  Gastrointestinal: abd soft, NT/ND. +BS. +PEG site with leakage  Extremities: no pedal edema  Neurological: awake. Paraplegic w RUE contracted  Skin: warm and dry. L ear w sl breakdown.  MSK; markedly reduced muscle mass throughout      LABS:                            9.7    3.05  )-----------( 251      ( 11 Feb 2022 07:16 )             30.7     02-11    138  |  109<H>  |  6<L>  ----------------------------<  110<H>  4.1   |  25  |  0.20<L>    Ca    8.5      11 Feb 2022 07:16  Phos  2.5     02-10  Mg     2.0     02-10      IMAGING/DIAGNOSTICS: all imaging and diagnostic tests reviewed               HPI:  56M hx TBI w resultant paraplegia 35 years ago  seizures, chronic resp failure s/p trach decannulated  pulmonary fibrosis, recurrent asp PNA, PEG tube,  DM II, GERD, HTN admit from SNF with resp distress AMS hypernatremia. As soon as he hit the floor from the ED rapid response called for hypoxemia and AMS from baseline. Since admission patient was admitted to the ICU for critical care. His PEG was found to be leaking bilious fluid, and was removed and replaced on 2/7/22. PEG feeds may be resumed. Patient was also placed on a protonix drip. Patient is also being treated with antifungals for oral candidiasis.     2/7: No events over night  2/8: No events over night  2/9: Tolerating feeds, no other events  2/10: no overnight events. tolerating feeds.  2/11: straight cath overnight for retention. non-verbal. tolerating feeds.   2/12 - appears comfortable on O2 via trach, tolerating PEG feeds   ROS: unable to obtain d/t difficulties speaking.       PHYSICAL EXAM:  Vital Signs Last 24 Hrs  T(F): 97 (12 Feb 2022 16:18), Max: 97.5 (11 Feb 2022 23:47)  HR: 81 (12 Feb 2022 16:18) (74 - 84)  BP: 105/62 (12 Feb 2022 16:18) (100/51 - 109/56)  RR: 18 (12 Feb 2022 16:18) (18 - 20)  SpO2: 95% (12 Feb 2022 16:18) (94% - 95%)  Constitutional: lying in bed, cachectic. NAD. wearing trach collar.  Eyes: conjunctiva clear, no discharge  Neck: trach stoma clean  Respiratory: CTA b/l. no wheezes, rales, rhonchi   Cardiovascular: regular rate and rhythm, no MRG  Gastrointestinal: abd soft, NT/ND. +BS. +PEG site with leakage  Extremities: no pedal edema  Neurological: awake. Paraplegic w RUE contracted  Skin: warm and dry. L ear w sl breakdown.  MSK; markedly reduced muscle mass throughout      LABS:                            9.7    3.05  )-----------( 251      ( 11 Feb 2022 07:16 )             30.7     02-11    138  |  109<H>  |  6<L>  ----------------------------<  110<H>  4.1   |  25  |  0.20<L>    Ca    8.5      11 Feb 2022 07:16  Phos  2.5     02-10  Mg     2.0     02-10      IMAGING/DIAGNOSTICS: all imaging and diagnostic tests reviewed               56M hx TBI w resultant paraplegia 35 years ago  seizures, chronic resp failure s/p trach decannulated  pulmonary fibrosis, recurrent asp PNA, PEG tube,  DM II, GERD, HTN admit from SNF with resp distress AMS hypernatremia. As soon as he hit the floor from the ED rapid response called for hypoxemia and AMS from baseline. Since admission patient was admitted to the ICU for critical care. His PEG was found to be leaking bilious fluid, and was removed and replaced on 2/7/22. PEG feeds may be resumed. Patient was also placed on a protonix drip. Patient is also being treated with antifungals for oral candidiasis.     2/7: No events over night  2/8: No events over night  2/9: Tolerating feeds, no other events  2/10: no overnight events. tolerating feeds.  2/11: straight cath overnight for retention. non-verbal. tolerating feeds.   2/12 - appears comfortable on O2 via trach, tolerating PEG feeds   ROS: unable to obtain d/t difficulties speaking.       PHYSICAL EXAM:  Vital Signs Last 24 Hrs  T(F): 97 (12 Feb 2022 16:18), Max: 97.5 (11 Feb 2022 23:47)  HR: 81 (12 Feb 2022 16:18) (74 - 84)  BP: 105/62 (12 Feb 2022 16:18) (100/51 - 109/56)  RR: 18 (12 Feb 2022 16:18) (18 - 20)  SpO2: 95% (12 Feb 2022 16:18) (94% - 95%)  Constitutional: lying in bed, cachectic. NAD. wearing trach collar.  Eyes: conjunctiva clear, no discharge  Neck: trach stoma clean  Respiratory: CTA b/l. no wheezes, rales, rhonchi   Cardiovascular: regular rate and rhythm, no MRG  Gastrointestinal: abd soft, NT/ND. +BS. +PEG site with improvement in leakage  Extremities: no pedal edema  Neurological: awake. Paraplegic w RUE contracted  Skin: warm and dry. L ear w sl breakdown.  MSK; markedly reduced muscle mass throughout    IMAGING/DIAGNOSTICS: all imaging and diagnostic tests reviewed    LABS: All Labs Reviewed:                     10.0   3.60  )-----------( 297      ( 12 Feb 2022 06:23 )             31.7   138  |  107  |  12  ----------------------------<  103<H>  4.6   |  27  |  0.26<L>  Ca    8.5      12 Feb 2022 06:23  TPro  7.3  /  Alb  2.1<L>  /  TBili  0.1<L>  /  DBili  x   /  AST  14<L>  /  ALT  17  /  AlkPhos  99  02-12    < from: CT Abdomen and Pelvis w/ Oral Cont (02.07.22 @ 18:24) >  Small volume upper abdominal pneumoperitoneum may be mildly increased   versus redistributed.  Second portion duodenal findings most in keeping with intramural hematoma   and duodenal perforation. No extraluminal oral contrast. Inflammation   surrounding the pancreatic head is probably secondary to duodenal   inflammation rather than focal acute pancreatitis however consider   correlation with lipase.  Stercoral proctitis. Mild pancolonic wall thickening suggests nonspecific   acute colitis.  Small pleural effusions. Patchy left basilar airspace disease could   represent aspiration versus other etiologies for pneumonia.      MEDS:   acetaminophen     Tablet .. 650 milliGRAM(s) Oral every 6 hours PRN  ALBUTerol    90 MICROgram(s) HFA Inhaler 2 Puff(s) Inhalation every 6 hours  aluminum hydroxide/magnesium hydroxide/simethicone Suspension 30 milliLiter(s) Oral every 4 hours PRN  BACItracin   Ointment 1 Application(s) Topical four times a day  baclofen 10 milliGRAM(s) Oral two times a day  chlorhexidine 0.12% Liquid 15 milliLiter(s) Swish and Spit two times a day  cholecalciferol 1000 Unit(s) Oral daily  doxazosin 2 milliGRAM(s) Oral at bedtime  enoxaparin Injectable 40 milliGRAM(s) SubCutaneous daily  fluconAZOLE   Tablet 100 milliGRAM(s) Oral daily  lacosamide Solution 100 milliGRAM(s) Oral two times a day  levETIRAcetam  Solution 500 milliGRAM(s) Oral two times a day  melatonin 3 milliGRAM(s) Oral at bedtime PRN  metoclopramide 5 milliGRAM(s) Oral four times a day  ondansetron   Disintegrating Tablet 4 milliGRAM(s) Oral every 8 hours PRN  pantoprazole   Suspension 40 milliGRAM(s) Oral every 12 hours  silver sulfADIAZINE 1% Cream 1 Application(s) Topical two times a day  tiotropium 18 MICROgram(s) Capsule 1 Capsule(s) Inhalation daily  venlafaxine 37.5 milliGRAM(s) Oral daily  zinc oxide 40% Ointment 1 Application(s) Topical three times a day

## 2022-02-12 NOTE — PROGRESS NOTE ADULT - ASSESSMENT
#Acute on chronic hypoxic resp failure #Trach collar w supplemental O2  - asp precations- NPO  - albuterol 2 puffs q 6 hrs  - spiriva     #Possible recurrent aspiration PNA  - no infiltrate seen on CXR but pt is severely dehydrated-- resolved  - compelted zosyn vanco  - ID was following     #COVID +  - pule ox monitoring    #Urine Retention  -straight cath     #Hypernatremia  -  resolved     #Dehydration  -s/p NS 1500 cc  - 0.45 NS @ 100 cc/hr  - OSM serum and urine- WNL  - urine lytes- WNL    #Altered mental status likely metabolic encephalopathy  -. treating for potential underlying infection w AB- no infection detected --> abx d/c  - rehydration    #Chronic leaking of PEG tube  - GI consult appreciated- PEG removed and replaced,.  as per GI Will cont PEG feeds as tolerated and consider closing G tube tract endoscopically next week (Dr. Duron) after we've allowed more time for duodenal healing  - Cont protonix  - silver sulfADIAZINE 1% Cream  - per GI advance feeds to 30mL and start reglan    #Oral candidiasis  -  continue diflucan til 2/14   - needs oral care w tongue brushing  - chlorhexidine     #Hx GERD  - famotidine 20 mg    #Severe cachexia  - nutrition consult  - TSH  -  PEG feeds as tolerated per GI    #TBI #paraplegia #SZ  - continue AED  - baclofen 10 mg BID    #VTE  continue enoxaparin    #GOALS OF CARE  Pt is a full code  MOLST updated      Patient sister Lisa [phone] #Acute on chronic hypoxic resp failure #Trach collar w supplemental O2  - asp precautions- NPO  - albuterol 2 puffs q 6 hrs  - spiriva     #Possible recurrent aspiration PNA  - no infiltrate seen on CXR but pt is severely dehydrated-- resolved  - completed zosyn vanco    #COVID +  - pulse ox monitoring    #Urine Retention  -straight cath prn     #Hypernatremia  -  resolved     #Dehydration  -s/p NS 1500 cc  - 0.45 NS @ 100 cc/hr  - OSM serum and urine- WNL  - urine lytes- WNL    #Altered mental status likely metabolic encephalopathy  -. treating for potential underlying infection w AB- no infection detected --> abx d/c  - rehydration    #Chronic leaking of PEG tube  - GI consult appreciated- PEG removed and replaced,.  As per GI Will cont PEG feeds as tolerated and consider closing G tube tract endoscopically next week (Dr. Duron) after we've allowed more time for duodenal healing  - advanced feeds today   - start reglan as per GI   - Cont protonix  - continue fluconazole until 2/14 per dr villalta. abx course completed.   - silver sulfADIAZINE 1% Cream    #Oral candidiasis  -  continue diflucan til 2/14   - needs oral care w tongue brushing  - chlorhexidine     #Hx GERD  - famotidine 20 mg    #Severe cachexia  - nutrition consult  - TSH  -  PEG feeds as tolerated per GI    #TBI #paraplegia #SZ  - continue AED  - baclofen 10 mg BID    #VTE  continue enoxaparin    #GOALS OF CARE  Pt is a full code  MOLST updated      Patient sister Lisa [phone] #Acute on chronic hypoxic resp failure #Trach collar w supplemental O2  - asp precautions- NPO  - albuterol 2 puffs q 6 hrs  - spiriva     #Possible recurrent aspiration PNA  - no infiltrate seen on CXR but pt is severely dehydrated-- resolved  - completed zosyn vanco    #COVID +  - pulse ox monitoring    #Urine Retention  -straight cath prn     #Hypernatremia  -  resolved     #Dehydration  -s/p NS 1500 cc  - 0.45 NS @ 100 cc/hr  - OSM serum and urine- WNL  - urine lytes- WNL    #Altered mental status likely metabolic encephalopathy  -. treating for potential underlying infection w AB- no infection detected --> abx d/c  - rehydration    #Chronic leaking of PEG tube  - GI consult appreciated- PEG removed and replaced,.  As per GI Will cont PEG feeds as tolerated and consider closing G tube tract endoscopically next week (Dr. Duron) after we've allowed more time for duodenal healing  - advanced feeds today   - start reglan as per GI   - Cont protonix  - continue fluconazole until 2/14 per dr villalta. abx course completed.   - silver sulfADIAZINE 1% Cream    #Oral candidiasis  -  continue diflucan til 2/14   - needs oral care w tongue brushing  - chlorhexidine     #Hx GERD  - famotidine 20 mg    #Severe cachexia  - nutrition consult  - TSH  -  PEG feeds as tolerated per GI    #TBI #paraplegia #SZ  - continue AED  - baclofen 10 mg BID  # Urinary retention - got straight-cathed - may need tavares if he has further episodes of urinary retention     #VTE  continue enoxaparin    #GOALS OF CARE  Pt is a full code  MOLST updated      Patient sister Lisa [phone], will call her tmr   discussed with RN at bedside

## 2022-02-13 LAB
ANION GAP SERPL CALC-SCNC: 5 MMOL/L — SIGNIFICANT CHANGE UP (ref 5–17)
BUN SERPL-MCNC: 13 MG/DL — SIGNIFICANT CHANGE UP (ref 7–23)
CALCIUM SERPL-MCNC: 8.6 MG/DL — SIGNIFICANT CHANGE UP (ref 8.5–10.1)
CHLORIDE SERPL-SCNC: 103 MMOL/L — SIGNIFICANT CHANGE UP (ref 96–108)
CO2 SERPL-SCNC: 28 MMOL/L — SIGNIFICANT CHANGE UP (ref 22–31)
CREAT SERPL-MCNC: 0.27 MG/DL — LOW (ref 0.5–1.3)
GLUCOSE SERPL-MCNC: 129 MG/DL — HIGH (ref 70–99)
HCT VFR BLD CALC: 30 % — LOW (ref 39–50)
HGB BLD-MCNC: 9.5 G/DL — LOW (ref 13–17)
MAGNESIUM SERPL-MCNC: 2.1 MG/DL — SIGNIFICANT CHANGE UP (ref 1.6–2.6)
MCHC RBC-ENTMCNC: 29.1 PG — SIGNIFICANT CHANGE UP (ref 27–34)
MCHC RBC-ENTMCNC: 31.7 GM/DL — LOW (ref 32–36)
MCV RBC AUTO: 92 FL — SIGNIFICANT CHANGE UP (ref 80–100)
PLATELET # BLD AUTO: 283 K/UL — SIGNIFICANT CHANGE UP (ref 150–400)
POTASSIUM SERPL-MCNC: 4.2 MMOL/L — SIGNIFICANT CHANGE UP (ref 3.5–5.3)
POTASSIUM SERPL-SCNC: 4.2 MMOL/L — SIGNIFICANT CHANGE UP (ref 3.5–5.3)
RBC # BLD: 3.26 M/UL — LOW (ref 4.2–5.8)
RBC # FLD: 15.6 % — HIGH (ref 10.3–14.5)
SODIUM SERPL-SCNC: 136 MMOL/L — SIGNIFICANT CHANGE UP (ref 135–145)
WBC # BLD: 4.02 K/UL — SIGNIFICANT CHANGE UP (ref 3.8–10.5)
WBC # FLD AUTO: 4.02 K/UL — SIGNIFICANT CHANGE UP (ref 3.8–10.5)

## 2022-02-13 PROCEDURE — 71045 X-RAY EXAM CHEST 1 VIEW: CPT | Mod: 26

## 2022-02-13 PROCEDURE — 99233 SBSQ HOSP IP/OBS HIGH 50: CPT

## 2022-02-13 RX ADMIN — Medication 1000 UNIT(S): at 10:26

## 2022-02-13 RX ADMIN — CHLORHEXIDINE GLUCONATE 15 MILLILITER(S): 213 SOLUTION TOPICAL at 10:24

## 2022-02-13 RX ADMIN — FLUCONAZOLE 100 MILLIGRAM(S): 150 TABLET ORAL at 10:26

## 2022-02-13 RX ADMIN — Medication 1 APPLICATION(S): at 10:27

## 2022-02-13 RX ADMIN — LACOSAMIDE 100 MILLIGRAM(S): 50 TABLET ORAL at 21:57

## 2022-02-13 RX ADMIN — Medication 1 APPLICATION(S): at 23:58

## 2022-02-13 RX ADMIN — Medication 5 MILLIGRAM(S): at 23:58

## 2022-02-13 RX ADMIN — Medication 2 MILLIGRAM(S): at 21:58

## 2022-02-13 RX ADMIN — LEVETIRACETAM 500 MILLIGRAM(S): 250 TABLET, FILM COATED ORAL at 21:56

## 2022-02-13 RX ADMIN — Medication 10 MILLIGRAM(S): at 10:26

## 2022-02-13 RX ADMIN — Medication 5 MILLIGRAM(S): at 05:45

## 2022-02-13 RX ADMIN — LACOSAMIDE 100 MILLIGRAM(S): 50 TABLET ORAL at 10:24

## 2022-02-13 RX ADMIN — Medication 1 APPLICATION(S): at 21:58

## 2022-02-13 RX ADMIN — ENOXAPARIN SODIUM 40 MILLIGRAM(S): 100 INJECTION SUBCUTANEOUS at 10:24

## 2022-02-13 RX ADMIN — Medication 37.5 MILLIGRAM(S): at 10:26

## 2022-02-13 RX ADMIN — ALBUTEROL 2 PUFF(S): 90 AEROSOL, METERED ORAL at 08:12

## 2022-02-13 RX ADMIN — CHLORHEXIDINE GLUCONATE 15 MILLILITER(S): 213 SOLUTION TOPICAL at 21:57

## 2022-02-13 RX ADMIN — Medication 1 APPLICATION(S): at 05:44

## 2022-02-13 RX ADMIN — PANTOPRAZOLE SODIUM 40 MILLIGRAM(S): 20 TABLET, DELAYED RELEASE ORAL at 21:57

## 2022-02-13 RX ADMIN — Medication 1 APPLICATION(S): at 12:55

## 2022-02-13 RX ADMIN — LEVETIRACETAM 500 MILLIGRAM(S): 250 TABLET, FILM COATED ORAL at 10:25

## 2022-02-13 RX ADMIN — Medication 5 MILLIGRAM(S): at 12:56

## 2022-02-13 RX ADMIN — ALBUTEROL 2 PUFF(S): 90 AEROSOL, METERED ORAL at 14:07

## 2022-02-13 RX ADMIN — ZINC OXIDE 1 APPLICATION(S): 200 OINTMENT TOPICAL at 05:44

## 2022-02-13 RX ADMIN — Medication 5 MILLIGRAM(S): at 17:42

## 2022-02-13 RX ADMIN — Medication 1 APPLICATION(S): at 17:42

## 2022-02-13 RX ADMIN — ZINC OXIDE 1 APPLICATION(S): 200 OINTMENT TOPICAL at 12:54

## 2022-02-13 RX ADMIN — Medication 10 MILLIGRAM(S): at 21:58

## 2022-02-13 RX ADMIN — Medication 3 MILLIGRAM(S): at 21:57

## 2022-02-13 RX ADMIN — ZINC OXIDE 1 APPLICATION(S): 200 OINTMENT TOPICAL at 21:58

## 2022-02-13 RX ADMIN — PANTOPRAZOLE SODIUM 40 MILLIGRAM(S): 20 TABLET, DELAYED RELEASE ORAL at 10:25

## 2022-02-13 NOTE — PROGRESS NOTE ADULT - SUBJECTIVE AND OBJECTIVE BOX
56M hx TBI w resultant paraplegia 35 years ago  seizures, chronic resp failure s/p trach decannulated  pulmonary fibrosis, recurrent asp PNA, PEG tube,  DM II, GERD, HTN admit from SNF with resp distress AMS hypernatremia. As soon as he hit the floor from the ED rapid response called for hypoxemia and AMS from baseline. Since admission patient was admitted to the ICU for critical care. His PEG was found to be leaking bilious fluid, and was removed and replaced on 2/7/22. PEG feeds may be resumed. Patient was also placed on a protonix drip. Patient is also being treated with antifungals for oral candidiasis.     2/7: No events over night  2/8: No events over night  2/9: Tolerating feeds, no other events  2/10: no overnight events. tolerating feeds.  2/11: straight cath overnight for retention. non-verbal. tolerating feeds.   2/12 - appears comfortable on O2 via trach, tolerating PEG feeds   2/13:  Pt seen.  Tolerating TC.  Tolerating TF.  AS per nursing, PEG leaking.      ROS: unable to obtain d/t difficulties speaking.     PHYSICAL EXAM:    Vital Signs Last 24 Hrs  T(C): 35.9 (13 Feb 2022 08:34), Max: 36.6 (12 Feb 2022 21:14)  T(F): 96.6 (13 Feb 2022 08:34), Max: 97.8 (12 Feb 2022 21:14)  HR: 74 (13 Feb 2022 08:34) (64 - 81)  BP: 113/52 (13 Feb 2022 08:34) (105/62 - 113/52)  BP(mean): --  RR: 18 (13 Feb 2022 08:34) (18 - 18)  SpO2: 94% (13 Feb 2022 08:34) (94% - 97%)    Constitutional: lying in bed, cachectic. NAD. wearing trach collar.  Eyes: conjunctiva clear, no discharge  Neck: trach stoma clean  Respiratory: CTA b/l. no wheezes, rales, rhonchi   Cardiovascular: regular rate and rhythm, no MRG  Gastrointestinal: abd soft, NT/ND. +BS. +PEG site with improvement in leakage  Extremities: no pedal edema  Neurological: awake. Paraplegic w RUE contracted  Skin: warm and dry. L ear w sl breakdown.  MSK; markedly reduced muscle mass throughout    IMAGING/DIAGNOSTICS: all imaging and diagnostic tests reviewed    02-13    136  |  103  |  13  ----------------------------<  129<H>  4.2   |  28  |  0.27<L>    Ca    8.6      13 Feb 2022 06:45  Mg     2.1     02-13    TPro  7.3  /  Alb  2.1<L>  /  TBili  0.1<L>  /  DBili  x   /  AST  14<L>  /  ALT  17  /  AlkPhos  99  02-12                            9.5    4.02  )-----------( 283      ( 13 Feb 2022 06:45 )             30.0             LIVER FUNCTIONS - ( 12 Feb 2022 06:23 )  Alb: 2.1 g/dL / Pro: 7.3 gm/dL / ALK PHOS: 99 U/L / ALT: 17 U/L / AST: 14 U/L / GGT: x               < from: CT Abdomen and Pelvis w/ Oral Cont (02.07.22 @ 18:24) >  Small volume upper abdominal pneumoperitoneum may be mildly increased   versus redistributed.  Second portion duodenal findings most in keeping with intramural hematoma   and duodenal perforation. No extraluminal oral contrast. Inflammation   surrounding the pancreatic head is probably secondary to duodenal   inflammation rather than focal acute pancreatitis however consider   correlation with lipase.  Stercoral proctitis. Mild pancolonic wall thickening suggests nonspecific   acute colitis.  Small pleural effusions. Patchy left basilar airspace disease could   represent aspiration versus other etiologies for pneumonia.    MEDICATIONS  (STANDING):  ALBUTerol    90 MICROgram(s) HFA Inhaler 2 Puff(s) Inhalation every 6 hours  BACItracin   Ointment 1 Application(s) Topical four times a day  baclofen 10 milliGRAM(s) Oral two times a day  chlorhexidine 0.12% Liquid 15 milliLiter(s) Swish and Spit two times a day  cholecalciferol 1000 Unit(s) Oral daily  doxazosin 2 milliGRAM(s) Oral at bedtime  enoxaparin Injectable 40 milliGRAM(s) SubCutaneous daily  fluconAZOLE   Tablet 100 milliGRAM(s) Oral daily  lacosamide Solution 100 milliGRAM(s) Oral two times a day  levETIRAcetam  Solution 500 milliGRAM(s) Oral two times a day  metoclopramide 5 milliGRAM(s) Oral four times a day  pantoprazole   Suspension 40 milliGRAM(s) Oral every 12 hours  silver sulfADIAZINE 1% Cream 1 Application(s) Topical two times a day  tiotropium 18 MICROgram(s) Capsule 1 Capsule(s) Inhalation daily  venlafaxine 37.5 milliGRAM(s) Oral daily  zinc oxide 40% Ointment 1 Application(s) Topical three times a day    MEDICATIONS  (PRN):  acetaminophen     Tablet .. 650 milliGRAM(s) Oral every 6 hours PRN Temp greater or equal to 38C (100.4F), Mild Pain (1 - 3)  aluminum hydroxide/magnesium hydroxide/simethicone Suspension 30 milliLiter(s) Oral every 4 hours PRN Dyspepsia  melatonin 3 milliGRAM(s) Oral at bedtime PRN Insomnia  ondansetron   Disintegrating Tablet 4 milliGRAM(s) Oral every 8 hours PRN Vomiting

## 2022-02-13 NOTE — PROGRESS NOTE ADULT - ASSESSMENT
56M hx TBI w resultant paraplegia 35 years ago seizures, chronic resp failure s/p trach, pulmonary fibrosis, recurrent asp PNA, PEG tube,  DM II, GERD, HTN admit from SNF with resp distress AMS hypernatremia.  Initially admitted to ICU.  His PEG was found to be leaking bilious fluid, and was removed and replaced on 2/7/22. PEG feeds may be resumed. Patient was also placed on a protonix drip. Patient is also being treated with antifungals for oral candidiasis.     #Acute on chronic hypoxic resp failure #Trach collar w supplemental O2:    Patient remains on 10L via TC as per nursing.    Check CXR to f/u.    Cont inhalers.      #Aspiration PNA  Completed Zosyn/ vanco.      #Chronic leaking of PEG tube:    GI consult appreciated- PEG removed and replaced 2/7; however, as per RN-- still leaking.    NPO after midnight tonight for closure of new site/ placement of new PEG placeemnt 2/14.    Cont reglan/ protonix.    Silver sulfADIAZINE 1% Cream    #COVID +:    Positive 2/8-- after admission.    F/u repeat CXR.    Cont TC with 10L O2.    No significant infilatrates on last imaging.      #Urine Retention:  straight cath prn     #Hypernatremia:  resolved     #Altered mental status likely metabolic encephalopathy:    Reorient.      #Oral candidiasis:    Continue diflucan til 2/14     #Hx GERD  - famotidine 20 mg    #Severe cachexia  PEG feeds as tolerated per GI.      #TBI/ Paraplegia/ Seizures:    Cont home meds.      #VTE:  enoxaparin    #GOALS OF CARE  Pt is a full code  MOLST updated    Patient sister Lisa [phone]

## 2022-02-14 PROCEDURE — 99232 SBSQ HOSP IP/OBS MODERATE 35: CPT

## 2022-02-14 RX ADMIN — ENOXAPARIN SODIUM 40 MILLIGRAM(S): 100 INJECTION SUBCUTANEOUS at 10:22

## 2022-02-14 RX ADMIN — Medication 1000 UNIT(S): at 10:22

## 2022-02-14 RX ADMIN — Medication 10 MILLIGRAM(S): at 23:02

## 2022-02-14 RX ADMIN — Medication 37.5 MILLIGRAM(S): at 10:22

## 2022-02-14 RX ADMIN — Medication 2 MILLIGRAM(S): at 23:01

## 2022-02-14 RX ADMIN — LACOSAMIDE 100 MILLIGRAM(S): 50 TABLET ORAL at 23:02

## 2022-02-14 RX ADMIN — ALBUTEROL 2 PUFF(S): 90 AEROSOL, METERED ORAL at 20:57

## 2022-02-14 RX ADMIN — PANTOPRAZOLE SODIUM 40 MILLIGRAM(S): 20 TABLET, DELAYED RELEASE ORAL at 23:01

## 2022-02-14 RX ADMIN — ALBUTEROL 2 PUFF(S): 90 AEROSOL, METERED ORAL at 14:44

## 2022-02-14 RX ADMIN — CHLORHEXIDINE GLUCONATE 15 MILLILITER(S): 213 SOLUTION TOPICAL at 10:22

## 2022-02-14 RX ADMIN — FLUCONAZOLE 100 MILLIGRAM(S): 150 TABLET ORAL at 10:23

## 2022-02-14 RX ADMIN — CHLORHEXIDINE GLUCONATE 15 MILLILITER(S): 213 SOLUTION TOPICAL at 23:01

## 2022-02-14 RX ADMIN — ZINC OXIDE 1 APPLICATION(S): 200 OINTMENT TOPICAL at 05:16

## 2022-02-14 RX ADMIN — Medication 1 APPLICATION(S): at 05:16

## 2022-02-14 RX ADMIN — Medication 1 APPLICATION(S): at 22:59

## 2022-02-14 RX ADMIN — Medication 1 APPLICATION(S): at 18:42

## 2022-02-14 RX ADMIN — ZINC OXIDE 1 APPLICATION(S): 200 OINTMENT TOPICAL at 14:12

## 2022-02-14 RX ADMIN — PANTOPRAZOLE SODIUM 40 MILLIGRAM(S): 20 TABLET, DELAYED RELEASE ORAL at 10:22

## 2022-02-14 RX ADMIN — ALBUTEROL 2 PUFF(S): 90 AEROSOL, METERED ORAL at 07:51

## 2022-02-14 RX ADMIN — Medication 5 MILLIGRAM(S): at 18:42

## 2022-02-14 RX ADMIN — ZINC OXIDE 1 APPLICATION(S): 200 OINTMENT TOPICAL at 23:00

## 2022-02-14 RX ADMIN — LEVETIRACETAM 500 MILLIGRAM(S): 250 TABLET, FILM COATED ORAL at 23:01

## 2022-02-14 RX ADMIN — Medication 5 MILLIGRAM(S): at 12:50

## 2022-02-14 RX ADMIN — Medication 1 APPLICATION(S): at 12:50

## 2022-02-14 RX ADMIN — LACOSAMIDE 100 MILLIGRAM(S): 50 TABLET ORAL at 10:23

## 2022-02-14 RX ADMIN — LEVETIRACETAM 500 MILLIGRAM(S): 250 TABLET, FILM COATED ORAL at 10:23

## 2022-02-14 RX ADMIN — Medication 10 MILLIGRAM(S): at 10:23

## 2022-02-14 NOTE — PROGRESS NOTE ADULT - ASSESSMENT
56M hx TBI w resultant paraplegia 35 years ago seizures, chronic resp failure s/p trach, pulmonary fibrosis, recurrent asp PNA, PEG tube,  DM II, GERD, HTN admit from SNF with resp distress AMS hypernatremia.  Initially admitted to ICU.  His PEG was found to be leaking bilious fluid, and was removed and replaced on 2/7/22. PEG feeds may be resumed. Patient was also placed on a protonix drip. Patient is also being treated with antifungals for oral candidiasis.     #Acute on chronic hypoxic resp failure #Trach collar w supplemental O2:    Patient remains on 10L via TC as per nursing.  --> Pt on 8L  Check CXR to f/u- no active pulmonary disease  Cont inhalers.      #Aspiration PNA  Completed Zosyn/ vanco.      #Chronic leaking of PEG tube:    GI consult appreciated- PEG removed and replaced 2/7; however, as per RN-- still leaking.    NPO after midnight tonight for closure of new site/ placement of new PEG placeemnt 2/14. -- resume feeds and plan for new PEG placement on 2/16.  Cont reglan/ protonix.    Silver sulfADIAZINE 1% Cream    #COVID +:    Positive 2/8-- after admission.    F/u repeat CXR-- no evidence of active pulmonary disease    Cont TC with 10L O2.-- 8L  No significant infilatrates on last imaging.      #Urine Retention:  straight cath prn     #Hypernatremia: resolved     #Altered mental status likely metabolic encephalopathy:    Reorient.      #Oral candidiasis:    Continue diflucan til 2/14-- completed     #Hx GERD  - famotidine 20 mg    #Severe cachexia  PEG feeds as tolerated per GI.      #TBI/ Paraplegia/ Seizures:    Cont home meds.      #VTE: enoxaparin    #GOALS OF CARE  Pt is a full code  MOLST updated    Patient sister Lisa [phone]

## 2022-02-14 NOTE — PROGRESS NOTE ADULT - ASSESSMENT
Imp:  Leaking PEG site  OK now but not sustainable long term    Plan:  Dr. Duron to review and hopefully try to close PEG site -- has been > 1 week now since the perforation was seen on CT

## 2022-02-14 NOTE — PROGRESS NOTE ADULT - ATTENDING COMMENTS
patient seen and examined with PA student Tejal Gibson,  I  was physically present for the key portions of the evaluation and management (E/M) service provided.  I agree with the above history, physical, and plan which I have reviewed and edited where appropriate.  - remains stable. GI input noted and feeds resumed.  - case d/w dr ott and plan for procedure on wednesday

## 2022-02-14 NOTE — PROGRESS NOTE ADULT - SUBJECTIVE AND OBJECTIVE BOX
Patient is a 56y old  Male who presents with a chief complaint of aspiration pneumonia  hypernatremia (13 Feb 2022 14:23)      Subective:  Tolerating tube feeds yesterday but was still leaking  No leaking overnight after tube feeds held    PAST MEDICAL & SURGICAL HISTORY:  TBI (traumatic brain injury)    Seizure    Polio    H/O paraplegia    Pneumonia    H/O chronic respiratory failure    Diabetes mellitus    Hypertension    S/P percutaneous endoscopic gastrostomy (PEG) tube placement        MEDICATIONS  (STANDING):  ALBUTerol    90 MICROgram(s) HFA Inhaler 2 Puff(s) Inhalation every 6 hours  BACItracin   Ointment 1 Application(s) Topical four times a day  baclofen 10 milliGRAM(s) Oral two times a day  chlorhexidine 0.12% Liquid 15 milliLiter(s) Swish and Spit two times a day  cholecalciferol 1000 Unit(s) Oral daily  doxazosin 2 milliGRAM(s) Oral at bedtime  enoxaparin Injectable 40 milliGRAM(s) SubCutaneous daily  fluconAZOLE   Tablet 100 milliGRAM(s) Oral daily  lacosamide Solution 100 milliGRAM(s) Oral two times a day  levETIRAcetam  Solution 500 milliGRAM(s) Oral two times a day  metoclopramide 5 milliGRAM(s) Oral four times a day  pantoprazole   Suspension 40 milliGRAM(s) Oral every 12 hours  silver sulfADIAZINE 1% Cream 1 Application(s) Topical two times a day  tiotropium 18 MICROgram(s) Capsule 1 Capsule(s) Inhalation daily  venlafaxine 37.5 milliGRAM(s) Oral daily  zinc oxide 40% Ointment 1 Application(s) Topical three times a day    MEDICATIONS  (PRN):  acetaminophen     Tablet .. 650 milliGRAM(s) Oral every 6 hours PRN Temp greater or equal to 38C (100.4F), Mild Pain (1 - 3)  aluminum hydroxide/magnesium hydroxide/simethicone Suspension 30 milliLiter(s) Oral every 4 hours PRN Dyspepsia  melatonin 3 milliGRAM(s) Oral at bedtime PRN Insomnia  ondansetron   Disintegrating Tablet 4 milliGRAM(s) Oral every 8 hours PRN Vomiting      REVIEW OF SYSTEMS:  NA    Vital Signs Last 24 Hrs  T(C): 36.1 (13 Feb 2022 19:56), Max: 36.3 (13 Feb 2022 17:08)  T(F): 96.9 (13 Feb 2022 19:56), Max: 97.4 (13 Feb 2022 17:08)  HR: 71 (13 Feb 2022 19:56) (71 - 78)  BP: 103/50 (13 Feb 2022 19:56) (103/50 - 113/52)  BP(mean): --  RR: 18 (13 Feb 2022 19:56) (18 - 18)  SpO2: 96% (13 Feb 2022 19:56) (94% - 96%)    PHYSICAL EXAM:    Constitutional: NAD  Respiratory: CTAB  Cardiovascular: S1 and S2, RRR  Gastrointestinal: BS+, soft, NT/ND, PEG site clean  Extremities: No peripheral edema    LABS:                        9.5    4.02  )-----------( 283      ( 13 Feb 2022 06:45 )             30.0     02-13    136  |  103  |  13  ----------------------------<  129<H>  4.2   |  28  |  0.27<L>    Ca    8.6      13 Feb 2022 06:45  Mg     2.1     02-13            RADIOLOGY & ADDITIONAL STUDIES:

## 2022-02-14 NOTE — PROGRESS NOTE ADULT - SUBJECTIVE AND OBJECTIVE BOX
56M hx TBI w resultant paraplegia 35 years ago  seizures, chronic resp failure s/p trach decannulated  pulmonary fibrosis, recurrent asp PNA, PEG tube,  DM II, GERD, HTN admit from SNF with resp distress AMS hypernatremia. As soon as he hit the floor from the ED rapid response called for hypoxemia and AMS from baseline. Since admission patient was admitted to the ICU for critical care. His PEG was found to be leaking bilious fluid, and was removed and replaced on 2/7/22. PEG feeds may be resumed. Patient was also placed on a protonix drip. Patient is also being treated with antifungals for oral candidiasis.     2/7: No events over night  2/8: No events over night  2/9: Tolerating feeds, no other events  2/10: no overnight events. tolerating feeds.  2/11: straight cath overnight for retention. non-verbal. tolerating feeds.   2/12 - appears comfortable on O2 via trach, tolerating PEG feeds   2/13:  Pt seen.  Tolerating TC.  Tolerating TF.  AS per nursing, PEG leaking.  2/14: Pt seems to be comfortable on TC. tube feeds held overnight for possible PEG repair today, but PEG repair hope for weds. feeds resumed    ROS: unable to obtain d/t difficulties speaking.     PHYSICAL EXAM:    Vital Signs Last 24 Hrs  T(C): 35.6 (14 Feb 2022 08:18), Max: 36.3 (13 Feb 2022 17:08)  T(F): 96 (14 Feb 2022 08:18), Max: 97.4 (13 Feb 2022 17:08)  HR: 65 (14 Feb 2022 08:18) (65 - 78)  BP: 115/60 (14 Feb 2022 08:18) (103/50 - 115/60)  BP(mean): 72 (14 Feb 2022 08:18) (72 - 72)  RR: 19 (14 Feb 2022 08:18) (18 - 19)  SpO2: 99% (14 Feb 2022 08:18) (95% - 99%)    PHYSICAL EXAM:  Constitutional: awake, lying in bed. cachectic. NAD. wearing trach collar.  Eyes: conjunctiva clear, no discharge  Neck: trach stoma clean  Respiratory: CTA b/l. no wheezes, rales, rhonchi   Cardiovascular: regular rate and rhythm, no MRG  Gastrointestinal: abd soft, NT/ND. +BS. +PEG site w/o current leakage  Extremities: no pedal edema  Neurological: awake. Paraplegic w RUE contracted  Skin: warm and dry. L ear w sl breakdown.  MSK; markedly reduced muscle mass throughout    LABS:                        9.5    4.02  )-----------( 283      ( 13 Feb 2022 06:45 )             30.0     02-13    136  |  103  |  13  ----------------------------<  129<H>  4.2   |  28  |  0.27<L>    Ca    8.6      13 Feb 2022 06:45  Mg     2.1     02-13      IMAGING/DIAGNOSTICS:  < from: CT Abdomen and Pelvis w/ Oral Cont (02.07.22 @ 18:24) >  Small volume upper abdominal pneumoperitoneum may be mildly increased   versus redistributed.  Second portion duodenal findings most in keeping with intramural hematoma   and duodenal perforation. No extraluminal oral contrast. Inflammation   surrounding the pancreatic head is probably secondary to duodenal   inflammation rather than focal acute pancreatitis however consider   correlation with lipase.  Stercoral proctitis. Mild pancolonic wall thickening suggests nonspecific   acute colitis.  Small pleural effusions. Patchy left basilar airspace disease could   represent aspiration versus other etiologies for pneumonia.      < from: Xray Chest 1 View- PORTABLE-Routine (Xray Chest 1 View- PORTABLE-Routine .) (02.13.22 @ 17:30) >  IMPRESSION:  No active pulmonary disease.    < end of copied text >

## 2022-02-15 LAB
ANION GAP SERPL CALC-SCNC: 3 MMOL/L — LOW (ref 5–17)
APTT BLD: 37.4 SEC — HIGH (ref 27.5–35.5)
BUN SERPL-MCNC: 11 MG/DL — SIGNIFICANT CHANGE UP (ref 7–23)
CALCIUM SERPL-MCNC: 9.2 MG/DL — SIGNIFICANT CHANGE UP (ref 8.5–10.1)
CHLORIDE SERPL-SCNC: 101 MMOL/L — SIGNIFICANT CHANGE UP (ref 96–108)
CO2 SERPL-SCNC: 30 MMOL/L — SIGNIFICANT CHANGE UP (ref 22–31)
CREAT SERPL-MCNC: 0.32 MG/DL — LOW (ref 0.5–1.3)
GLUCOSE SERPL-MCNC: 92 MG/DL — SIGNIFICANT CHANGE UP (ref 70–99)
HCT VFR BLD CALC: 35.8 % — LOW (ref 39–50)
HGB BLD-MCNC: 11.4 G/DL — LOW (ref 13–17)
INR BLD: 1.13 RATIO — SIGNIFICANT CHANGE UP (ref 0.88–1.16)
MCHC RBC-ENTMCNC: 29.8 PG — SIGNIFICANT CHANGE UP (ref 27–34)
MCHC RBC-ENTMCNC: 31.8 GM/DL — LOW (ref 32–36)
MCV RBC AUTO: 93.5 FL — SIGNIFICANT CHANGE UP (ref 80–100)
PLATELET # BLD AUTO: 329 K/UL — SIGNIFICANT CHANGE UP (ref 150–400)
POTASSIUM SERPL-MCNC: 4.1 MMOL/L — SIGNIFICANT CHANGE UP (ref 3.5–5.3)
POTASSIUM SERPL-SCNC: 4.1 MMOL/L — SIGNIFICANT CHANGE UP (ref 3.5–5.3)
PROTHROM AB SERPL-ACNC: 13 SEC — SIGNIFICANT CHANGE UP (ref 10.6–13.6)
RBC # BLD: 3.83 M/UL — LOW (ref 4.2–5.8)
RBC # FLD: 15.9 % — HIGH (ref 10.3–14.5)
SARS-COV-2 RNA SPEC QL NAA+PROBE: DETECTED
SODIUM SERPL-SCNC: 134 MMOL/L — LOW (ref 135–145)
WBC # BLD: 6.08 K/UL — SIGNIFICANT CHANGE UP (ref 3.8–10.5)
WBC # FLD AUTO: 6.08 K/UL — SIGNIFICANT CHANGE UP (ref 3.8–10.5)

## 2022-02-15 PROCEDURE — 99232 SBSQ HOSP IP/OBS MODERATE 35: CPT

## 2022-02-15 RX ADMIN — Medication 10 MILLIGRAM(S): at 09:53

## 2022-02-15 RX ADMIN — Medication 5 MILLIGRAM(S): at 00:06

## 2022-02-15 RX ADMIN — LACOSAMIDE 100 MILLIGRAM(S): 50 TABLET ORAL at 22:05

## 2022-02-15 RX ADMIN — Medication 650 MILLIGRAM(S): at 22:59

## 2022-02-15 RX ADMIN — Medication 5 MILLIGRAM(S): at 11:26

## 2022-02-15 RX ADMIN — LEVETIRACETAM 500 MILLIGRAM(S): 250 TABLET, FILM COATED ORAL at 09:53

## 2022-02-15 RX ADMIN — ZINC OXIDE 1 APPLICATION(S): 200 OINTMENT TOPICAL at 22:06

## 2022-02-15 RX ADMIN — Medication 650 MILLIGRAM(S): at 23:44

## 2022-02-15 RX ADMIN — LEVETIRACETAM 500 MILLIGRAM(S): 250 TABLET, FILM COATED ORAL at 22:05

## 2022-02-15 RX ADMIN — Medication 1000 UNIT(S): at 09:54

## 2022-02-15 RX ADMIN — Medication 1 APPLICATION(S): at 22:06

## 2022-02-15 RX ADMIN — CHLORHEXIDINE GLUCONATE 15 MILLILITER(S): 213 SOLUTION TOPICAL at 09:53

## 2022-02-15 RX ADMIN — PANTOPRAZOLE SODIUM 40 MILLIGRAM(S): 20 TABLET, DELAYED RELEASE ORAL at 22:05

## 2022-02-15 RX ADMIN — Medication 2 MILLIGRAM(S): at 22:04

## 2022-02-15 RX ADMIN — ZINC OXIDE 1 APPLICATION(S): 200 OINTMENT TOPICAL at 06:23

## 2022-02-15 RX ADMIN — ALBUTEROL 2 PUFF(S): 90 AEROSOL, METERED ORAL at 20:38

## 2022-02-15 RX ADMIN — Medication 1 APPLICATION(S): at 06:24

## 2022-02-15 RX ADMIN — Medication 5 MILLIGRAM(S): at 17:22

## 2022-02-15 RX ADMIN — Medication 5 MILLIGRAM(S): at 06:24

## 2022-02-15 RX ADMIN — CHLORHEXIDINE GLUCONATE 15 MILLILITER(S): 213 SOLUTION TOPICAL at 22:05

## 2022-02-15 RX ADMIN — Medication 1 APPLICATION(S): at 11:26

## 2022-02-15 RX ADMIN — LACOSAMIDE 100 MILLIGRAM(S): 50 TABLET ORAL at 09:51

## 2022-02-15 RX ADMIN — Medication 1 APPLICATION(S): at 00:04

## 2022-02-15 RX ADMIN — ENOXAPARIN SODIUM 40 MILLIGRAM(S): 100 INJECTION SUBCUTANEOUS at 09:53

## 2022-02-15 RX ADMIN — Medication 10 MILLIGRAM(S): at 22:04

## 2022-02-15 RX ADMIN — ZINC OXIDE 1 APPLICATION(S): 200 OINTMENT TOPICAL at 12:29

## 2022-02-15 RX ADMIN — Medication 1 APPLICATION(S): at 17:22

## 2022-02-15 RX ADMIN — Medication 37.5 MILLIGRAM(S): at 09:54

## 2022-02-15 RX ADMIN — ALBUTEROL 2 PUFF(S): 90 AEROSOL, METERED ORAL at 11:50

## 2022-02-15 RX ADMIN — PANTOPRAZOLE SODIUM 40 MILLIGRAM(S): 20 TABLET, DELAYED RELEASE ORAL at 09:53

## 2022-02-15 RX ADMIN — ALBUTEROL 2 PUFF(S): 90 AEROSOL, METERED ORAL at 01:12

## 2022-02-15 NOTE — PROGRESS NOTE ADULT - ASSESSMENT
56M hx TBI w resultant paraplegia 35 years ago seizures, chronic resp failure s/p trach, pulmonary fibrosis, recurrent asp PNA, PEG tube,  DM II, GERD, HTN admit from SNF with resp distress AMS hypernatremia.  Initially admitted to ICU.  His PEG was found to be leaking bilious fluid, and was removed and replaced on 2/7/22. PEG feeds may be resumed. Patient was also placed on a protonix drip. Patient is also being treated with antifungals for oral candidiasis.     #Acute on chronic hypoxic resp failure #Trach collar w supplemental O2:    Patient remains on 10L via TC as per nursing.  --> Pt on 8L  Check CXR to f/u- no active pulmonary disease  Cont inhalers.      #Aspiration PNA  Completed Zosyn/ vanco.      #Chronic leaking of PEG tube:    GI consult appreciated- PEG removed and replaced 2/7; however, as per RN-- still leaking.    NPO after midnight tonight for closure of new site/ placement of new PEG placeemnt 2/14. -- resume feeds and plan for new PEG placement on 2/16.  Cont reglan/ protonix.    Silver sulfADIAZINE 1% Cream  NPO after midnight tonight for closure of site and placement of new PEG 2/16.    #COVID +:    Positive 2/8-- after admission.    F/u repeat CXR-- no evidence of active pulmonary disease    Cont TC with 10L O2.--> 8L  No significant infilatrates on last imaging.      #Urine Retention: straight cath prn     #Hypernatremia: resolved     #Altered mental status likely metabolic encephalopathy:    Reorient.      #Oral candidiasis:    Continue diflucan til 2/14-- completed       #Hx GERD  - famotidine 20 mg    #Severe cachexia  PEG feeds as tolerated per GI.      #TBI/ Paraplegia/ Seizures:    Cont home meds.      #VTE: enoxaparin    #GOALS OF CARE  Pt is a full code  MOLST updated    Patient sister Lisa [phone]

## 2022-02-15 NOTE — PROGRESS NOTE ADULT - ASSESSMENT
Imp:  Leaking PEG    Plan:  Attempt at site closure endoscopically by Dr. Duron -- tentatively for tomorrow

## 2022-02-15 NOTE — PROGRESS NOTE ADULT - SUBJECTIVE AND OBJECTIVE BOX
Patient is a 56y old  Male who presents with a chief complaint of aspiration pneumonia  hypernatremia (14 Feb 2022 10:51)      Subective:  No changes  Tube currently not leaking    PAST MEDICAL & SURGICAL HISTORY:  TBI (traumatic brain injury)    Seizure    Polio    H/O paraplegia    Pneumonia    H/O chronic respiratory failure    Diabetes mellitus    Hypertension    S/P percutaneous endoscopic gastrostomy (PEG) tube placement        MEDICATIONS  (STANDING):  ALBUTerol    90 MICROgram(s) HFA Inhaler 2 Puff(s) Inhalation every 6 hours  BACItracin   Ointment 1 Application(s) Topical four times a day  baclofen 10 milliGRAM(s) Oral two times a day  chlorhexidine 0.12% Liquid 15 milliLiter(s) Swish and Spit two times a day  cholecalciferol 1000 Unit(s) Oral daily  doxazosin 2 milliGRAM(s) Oral at bedtime  enoxaparin Injectable 40 milliGRAM(s) SubCutaneous daily  lacosamide Solution 100 milliGRAM(s) Oral two times a day  levETIRAcetam  Solution 500 milliGRAM(s) Oral two times a day  metoclopramide 5 milliGRAM(s) Oral four times a day  pantoprazole   Suspension 40 milliGRAM(s) Oral every 12 hours  silver sulfADIAZINE 1% Cream 1 Application(s) Topical two times a day  tiotropium 18 MICROgram(s) Capsule 1 Capsule(s) Inhalation daily  venlafaxine 37.5 milliGRAM(s) Oral daily  zinc oxide 40% Ointment 1 Application(s) Topical three times a day    MEDICATIONS  (PRN):  acetaminophen     Tablet .. 650 milliGRAM(s) Oral every 6 hours PRN Temp greater or equal to 38C (100.4F), Mild Pain (1 - 3)  aluminum hydroxide/magnesium hydroxide/simethicone Suspension 30 milliLiter(s) Oral every 4 hours PRN Dyspepsia  melatonin 3 milliGRAM(s) Oral at bedtime PRN Insomnia  ondansetron   Disintegrating Tablet 4 milliGRAM(s) Oral every 8 hours PRN Vomiting      REVIEW OF SYSTEMS:    RESPIRATORY: No shortness of breath  CARDIOVASCULAR: No chest pain  All other review of systems is negative unless indicated above.    Vital Signs Last 24 Hrs  T(C): 36.2 (15 Feb 2022 06:39), Max: 36.2 (15 Feb 2022 06:39)  T(F): 97.2 (15 Feb 2022 06:39), Max: 97.2 (15 Feb 2022 06:39)  HR: 74 (15 Feb 2022 06:39) (65 - 78)  BP: 106/52 (15 Feb 2022 06:39) (106/52 - 124/64)  BP(mean): 72 (14 Feb 2022 08:18) (72 - 72)  RR: 19 (15 Feb 2022 06:39) (19 - 19)  SpO2: 100% (15 Feb 2022 06:39) (98% - 100%)    PHYSICAL EXAM:    Constitutional: NAD  Respiratory: CTAB  Cardiovascular: S1 and S2, RRR  Gastrointestinal: BS+, soft, NT/ND, PEG site clean  Extremities: No peripheral edema    LABS:                RADIOLOGY & ADDITIONAL STUDIES:

## 2022-02-15 NOTE — PROGRESS NOTE ADULT - SUBJECTIVE AND OBJECTIVE BOX
56M hx TBI w resultant paraplegia 35 years ago  seizures, chronic resp failure s/p trach decannulated  pulmonary fibrosis, recurrent asp PNA, PEG tube,  DM II, GERD, HTN admit from SNF with resp distress AMS hypernatremia. As soon as he hit the floor from the ED rapid response called for hypoxemia and AMS from baseline. Since admission patient was admitted to the ICU for critical care. His PEG was found to be leaking bilious fluid, and was removed and replaced on 2/7/22. PEG feeds may be resumed. Patient was also placed on a protonix drip. Patient is also being treated with antifungals for oral candidiasis.     2/7: No events over night  2/8: No events over night  2/9: Tolerating feeds, no other events  2/10: no overnight events. tolerating feeds.  2/11: straight cath overnight for retention. non-verbal. tolerating feeds.   2/12 - appears comfortable on O2 via trach, tolerating PEG feeds   2/13:  Pt seen.  Tolerating TC.  Tolerating TF.  AS per nursing, PEG leaking.  2/14: Pt seems to be comfortable on TC. tube feeds held overnight for possible PEG repair today, but PEG repair hope for weds. feeds resumed  2/15: Pt appears to be comfortable on TC. plan for PEG repair tomorrow.    ROS: unable to obtain d/t difficulties speaking.     Vital Signs Last 24 Hrs  T(C): 36.4 (15 Feb 2022 08:28), Max: 36.4 (15 Feb 2022 08:28)  T(F): 97.5 (15 Feb 2022 08:28), Max: 97.5 (15 Feb 2022 08:28)  HR: 81 (15 Feb 2022 08:28) (68 - 81)  BP: 104/66 (15 Feb 2022 08:28) (104/66 - 124/64)  BP(mean): --  RR: 18 (15 Feb 2022 08:28) (18 - 19)  SpO2: 98% (15 Feb 2022 08:28) (98% - 100%)    PHYSICAL EXAM:  Constitutional: awake, lying in bed. cachectic. NAD. wearing trach collar.  Eyes: conjunctiva clear, no discharge  Neck: trach stoma clean  Respiratory: CTA b/l. no wheezes, rales, rhonchi   Cardiovascular: regular rate and rhythm, no MRG  Gastrointestinal: abd soft, NT/ND. +BS x4. +PEG site w/o current leakage   Extremities: no pedal edema  Neurological: awake. Paraplegic w RUE contracted  Skin: warm and dry. L ear w sl breakdown.  MSK; markedly reduced muscle mass throughout    LABS: all labs reviewed.      IMAGING/DIAGNOSTICS:  < from: CT Abdomen and Pelvis w/ Oral Cont (02.07.22 @ 18:24) >  Small volume upper abdominal pneumoperitoneum may be mildly increased   versus redistributed.  Second portion duodenal findings most in keeping with intramural hematoma   and duodenal perforation. No extraluminal oral contrast. Inflammation   surrounding the pancreatic head is probably secondary to duodenal   inflammation rather than focal acute pancreatitis however consider   correlation with lipase.  Stercoral proctitis. Mild pancolonic wall thickening suggests nonspecific   acute colitis.  Small pleural effusions. Patchy left basilar airspace disease could   represent aspiration versus other etiologies for pneumonia.      < from: Xray Chest 1 View- PORTABLE-Routine (Xray Chest 1 View- PORTABLE-Routine .) (02.13.22 @ 17:30) >  IMPRESSION:  No active pulmonary disease.    < end of copied text >

## 2022-02-15 NOTE — PROGRESS NOTE ADULT - ATTENDING COMMENTS
patient seen and examined with PA student Tejal Gibson,  I  was physically present for the key portions of the evaluation and management (E/M) service provided.  I agree with the above history, physical, and plan which I have reviewed and edited where appropriate.  - plan for endoscopy in AM with dr ott. pt medically optimized for planned procedure   - continue current mgmt

## 2022-02-16 PROCEDURE — 99232 SBSQ HOSP IP/OBS MODERATE 35: CPT

## 2022-02-16 RX ADMIN — PANTOPRAZOLE SODIUM 40 MILLIGRAM(S): 20 TABLET, DELAYED RELEASE ORAL at 22:10

## 2022-02-16 RX ADMIN — LACOSAMIDE 100 MILLIGRAM(S): 50 TABLET ORAL at 22:11

## 2022-02-16 RX ADMIN — ZINC OXIDE 1 APPLICATION(S): 200 OINTMENT TOPICAL at 06:25

## 2022-02-16 RX ADMIN — Medication 5 MILLIGRAM(S): at 06:24

## 2022-02-16 RX ADMIN — ALBUTEROL 2 PUFF(S): 90 AEROSOL, METERED ORAL at 20:09

## 2022-02-16 RX ADMIN — CHLORHEXIDINE GLUCONATE 15 MILLILITER(S): 213 SOLUTION TOPICAL at 11:41

## 2022-02-16 RX ADMIN — Medication 5 MILLIGRAM(S): at 17:15

## 2022-02-16 RX ADMIN — CHLORHEXIDINE GLUCONATE 15 MILLILITER(S): 213 SOLUTION TOPICAL at 22:07

## 2022-02-16 RX ADMIN — Medication 5 MILLIGRAM(S): at 11:44

## 2022-02-16 RX ADMIN — ZINC OXIDE 1 APPLICATION(S): 200 OINTMENT TOPICAL at 22:06

## 2022-02-16 RX ADMIN — Medication 1 APPLICATION(S): at 06:25

## 2022-02-16 RX ADMIN — Medication 3 MILLIGRAM(S): at 22:08

## 2022-02-16 RX ADMIN — Medication 2 MILLIGRAM(S): at 22:08

## 2022-02-16 RX ADMIN — Medication 1 APPLICATION(S): at 17:15

## 2022-02-16 RX ADMIN — LACOSAMIDE 100 MILLIGRAM(S): 50 TABLET ORAL at 11:43

## 2022-02-16 RX ADMIN — Medication 5 MILLIGRAM(S): at 00:03

## 2022-02-16 RX ADMIN — ALBUTEROL 2 PUFF(S): 90 AEROSOL, METERED ORAL at 13:57

## 2022-02-16 RX ADMIN — Medication 10 MILLIGRAM(S): at 11:41

## 2022-02-16 RX ADMIN — Medication 1000 UNIT(S): at 11:41

## 2022-02-16 RX ADMIN — Medication 37.5 MILLIGRAM(S): at 11:41

## 2022-02-16 RX ADMIN — ALBUTEROL 2 PUFF(S): 90 AEROSOL, METERED ORAL at 07:53

## 2022-02-16 RX ADMIN — Medication 1 APPLICATION(S): at 00:02

## 2022-02-16 RX ADMIN — Medication 1 APPLICATION(S): at 22:11

## 2022-02-16 RX ADMIN — Medication 1 APPLICATION(S): at 22:07

## 2022-02-16 RX ADMIN — Medication 1 APPLICATION(S): at 11:43

## 2022-02-16 RX ADMIN — ZINC OXIDE 1 APPLICATION(S): 200 OINTMENT TOPICAL at 14:02

## 2022-02-16 RX ADMIN — Medication 5 MILLIGRAM(S): at 22:11

## 2022-02-16 RX ADMIN — LEVETIRACETAM 500 MILLIGRAM(S): 250 TABLET, FILM COATED ORAL at 22:07

## 2022-02-16 RX ADMIN — Medication 1 APPLICATION(S): at 11:46

## 2022-02-16 RX ADMIN — Medication 10 MILLIGRAM(S): at 22:07

## 2022-02-16 RX ADMIN — LEVETIRACETAM 500 MILLIGRAM(S): 250 TABLET, FILM COATED ORAL at 11:43

## 2022-02-16 NOTE — PROGRESS NOTE ADULT - SUBJECTIVE AND OBJECTIVE BOX
56M hx TBI w resultant paraplegia 35 years ago  seizures, chronic resp failure s/p trach decannulated  pulmonary fibrosis, recurrent asp PNA, PEG tube,  DM II, GERD, HTN admit from SNF with resp distress AMS hypernatremia. As soon as he hit the floor from the ED rapid response called for hypoxemia and AMS from baseline. Since admission patient was admitted to the ICU for critical care. His PEG was found to be leaking bilious fluid, and was removed and replaced on 2/7/22. PEG feeds may be resumed. Patient was also placed on a protonix drip. Patient is also being treated with antifungals for oral candidiasis.     2/7: No events over night  2/8: No events over night  2/9: Tolerating feeds, no other events  2/10: no overnight events. tolerating feeds.  2/11: straight cath overnight for retention. non-verbal. tolerating feeds.   2/12 - appears comfortable on O2 via trach, tolerating PEG feeds   2/13:  Pt seen.  Tolerating TC.  Tolerating TF.  AS per nursing, PEG leaking.  2/14: Pt seems to be comfortable on TC. tube feeds held overnight for possible PEG repair today, but PEG repair hope for weds. feeds resumed  2/15: Pt appears to be comfortable on TC. plan for PEG repair tomorrow.  2/16 - plan for procedure this afternoon.  no events overnight      ROS: unable to obtain d/t difficulties speaking.     Vital Signs Last 24 Hrs  T(C): 35.8 (16 Feb 2022 08:22), Max: 36.5 (15 Feb 2022 16:13)  T(F): 96.5 (16 Feb 2022 08:22), Max: 97.7 (15 Feb 2022 16:13)  HR: 70 (16 Feb 2022 08:22) (67 - 92)  BP: 100/43 (16 Feb 2022 08:22) (99/66 - 106/59)  BP(mean): --  RR: 18 (16 Feb 2022 08:22) (17 - 18)  SpO2: 97% (16 Feb 2022 08:22) (94% - 100%)    PHYSICAL EXAM:  Constitutional: awake, lying in bed. cachectic. NAD. wearing trach collar.  Eyes: conjunctiva clear, no discharge  Neck: trach stoma clean  Respiratory: CTA b/l. no wheezes, rales, rhonchi   Cardiovascular: regular rate and rhythm, no MRG  Gastrointestinal: abd soft, NT/ND. +BS x4. +PEG site w/o current leakage   Extremities: no pedal edema  Neurological: awake. Paraplegic w RUE contracted  Skin: warm and dry. L ear w sl breakdown.  MSK; markedly reduced muscle mass throughout    LABS: all labs reviewed.      IMAGING/DIAGNOSTICS:  < from: CT Abdomen and Pelvis w/ Oral Cont (02.07.22 @ 18:24) >  Small volume upper abdominal pneumoperitoneum may be mildly increased   versus redistributed.  Second portion duodenal findings most in keeping with intramural hematoma   and duodenal perforation. No extraluminal oral contrast. Inflammation   surrounding the pancreatic head is probably secondary to duodenal   inflammation rather than focal acute pancreatitis however consider   correlation with lipase.  Stercoral proctitis. Mild pancolonic wall thickening suggests nonspecific   acute colitis.  Small pleural effusions. Patchy left basilar airspace disease could   represent aspiration versus other etiologies for pneumonia.      < from: Xray Chest 1 View- PORTABLE-Routine (Xray Chest 1 View- PORTABLE-Routine .) (02.13.22 @ 17:30) >  IMPRESSION:  No active pulmonary disease.    < end of copied text >

## 2022-02-16 NOTE — PROGRESS NOTE ADULT - ASSESSMENT
56M hx TBI w resultant paraplegia 35 years ago seizures, chronic resp failure s/p trach, pulmonary fibrosis, recurrent asp PNA, PEG tube,  DM II, GERD, HTN admit from SNF with resp distress AMS hypernatremia.  Initially admitted to ICU.  His PEG was found to be leaking bilious fluid, and was removed and replaced on 2/7/22. PEG feeds may be resumed. Patient was also placed on a protonix drip. Patient is also being treated with antifungals for oral candidiasis.     #Acute on chronic hypoxic resp failure #Trach collar w supplemental O2:    Patient remains on 10L via TC as per nursing.  --> Pt on 8L  Check CXR to f/u- no active pulmonary disease  Cont inhalers.      #Aspiration PNA  Completed Zosyn/ vanco.      #Chronic leaking of PEG tube:    GI consult appreciated- PEG removed and replaced 2/7; however, as per RN-- still leaking.    NPO - resume feeds and plan for new PEG placement on 2/16.   Cont reglan/ protonix.    Silver sulfADIAZINE 1% Cream    #COVID +:    Positive 2/8-- after admission.    F/u repeat CXR-- no evidence of active pulmonary disease    Cont TC with 10L O2.--> 8L  No significant infilatrates on last imaging.      #Urine Retention: straight cath prn     #Hypernatremia: resolved     #Altered mental status likely metabolic encephalopathy:    Reorient.      #Oral candidiasis:    Continue diflucan til 2/14-- completed       #Hx GERD  - famotidine 20 mg    #Severe cachexia  PEG feeds as tolerated per GI.      #TBI/ Paraplegia/ Seizures:    Cont home meds.      #VTE: enoxaparin    #GOALS OF CARE  Pt is a full code  MOLST updated    Patient sister Lias [phone]

## 2022-02-17 LAB
ANION GAP SERPL CALC-SCNC: 6 MMOL/L — SIGNIFICANT CHANGE UP (ref 5–17)
BUN SERPL-MCNC: 12 MG/DL — SIGNIFICANT CHANGE UP (ref 7–23)
CALCIUM SERPL-MCNC: 8.8 MG/DL — SIGNIFICANT CHANGE UP (ref 8.5–10.1)
CHLORIDE SERPL-SCNC: 102 MMOL/L — SIGNIFICANT CHANGE UP (ref 96–108)
CO2 SERPL-SCNC: 27 MMOL/L — SIGNIFICANT CHANGE UP (ref 22–31)
CREAT SERPL-MCNC: 0.2 MG/DL — LOW (ref 0.5–1.3)
GLUCOSE SERPL-MCNC: 81 MG/DL — SIGNIFICANT CHANGE UP (ref 70–99)
HCT VFR BLD CALC: 30.6 % — LOW (ref 39–50)
HGB BLD-MCNC: 9.6 G/DL — LOW (ref 13–17)
MCHC RBC-ENTMCNC: 29 PG — SIGNIFICANT CHANGE UP (ref 27–34)
MCHC RBC-ENTMCNC: 31.4 GM/DL — LOW (ref 32–36)
MCV RBC AUTO: 92.4 FL — SIGNIFICANT CHANGE UP (ref 80–100)
PLATELET # BLD AUTO: 276 K/UL — SIGNIFICANT CHANGE UP (ref 150–400)
POTASSIUM SERPL-MCNC: 4.2 MMOL/L — SIGNIFICANT CHANGE UP (ref 3.5–5.3)
POTASSIUM SERPL-SCNC: 4.2 MMOL/L — SIGNIFICANT CHANGE UP (ref 3.5–5.3)
RBC # BLD: 3.31 M/UL — LOW (ref 4.2–5.8)
RBC # FLD: 15.7 % — HIGH (ref 10.3–14.5)
SODIUM SERPL-SCNC: 135 MMOL/L — SIGNIFICANT CHANGE UP (ref 135–145)
WBC # BLD: 5.22 K/UL — SIGNIFICANT CHANGE UP (ref 3.8–10.5)
WBC # FLD AUTO: 5.22 K/UL — SIGNIFICANT CHANGE UP (ref 3.8–10.5)

## 2022-02-17 PROCEDURE — 99232 SBSQ HOSP IP/OBS MODERATE 35: CPT

## 2022-02-17 RX ADMIN — ALBUTEROL 2 PUFF(S): 90 AEROSOL, METERED ORAL at 13:44

## 2022-02-17 RX ADMIN — Medication 5 MILLIGRAM(S): at 12:54

## 2022-02-17 RX ADMIN — ZINC OXIDE 1 APPLICATION(S): 200 OINTMENT TOPICAL at 13:34

## 2022-02-17 RX ADMIN — ZINC OXIDE 1 APPLICATION(S): 200 OINTMENT TOPICAL at 21:44

## 2022-02-17 RX ADMIN — Medication 5 MILLIGRAM(S): at 06:07

## 2022-02-17 RX ADMIN — ALBUTEROL 2 PUFF(S): 90 AEROSOL, METERED ORAL at 07:47

## 2022-02-17 RX ADMIN — LACOSAMIDE 100 MILLIGRAM(S): 50 TABLET ORAL at 21:44

## 2022-02-17 RX ADMIN — LACOSAMIDE 100 MILLIGRAM(S): 50 TABLET ORAL at 09:25

## 2022-02-17 RX ADMIN — ZINC OXIDE 1 APPLICATION(S): 200 OINTMENT TOPICAL at 06:06

## 2022-02-17 RX ADMIN — Medication 2 MILLIGRAM(S): at 21:45

## 2022-02-17 RX ADMIN — Medication 1 APPLICATION(S): at 21:44

## 2022-02-17 RX ADMIN — Medication 3 MILLIGRAM(S): at 21:44

## 2022-02-17 RX ADMIN — CHLORHEXIDINE GLUCONATE 15 MILLILITER(S): 213 SOLUTION TOPICAL at 21:43

## 2022-02-17 RX ADMIN — Medication 1 APPLICATION(S): at 06:07

## 2022-02-17 RX ADMIN — Medication 1 APPLICATION(S): at 09:26

## 2022-02-17 RX ADMIN — LEVETIRACETAM 500 MILLIGRAM(S): 250 TABLET, FILM COATED ORAL at 09:25

## 2022-02-17 RX ADMIN — Medication 37.5 MILLIGRAM(S): at 09:25

## 2022-02-17 RX ADMIN — Medication 1 APPLICATION(S): at 12:54

## 2022-02-17 RX ADMIN — PANTOPRAZOLE SODIUM 40 MILLIGRAM(S): 20 TABLET, DELAYED RELEASE ORAL at 21:44

## 2022-02-17 RX ADMIN — LEVETIRACETAM 500 MILLIGRAM(S): 250 TABLET, FILM COATED ORAL at 21:43

## 2022-02-17 RX ADMIN — Medication 10 MILLIGRAM(S): at 09:25

## 2022-02-17 RX ADMIN — Medication 1000 UNIT(S): at 09:24

## 2022-02-17 RX ADMIN — Medication 10 MILLIGRAM(S): at 21:44

## 2022-02-17 RX ADMIN — PANTOPRAZOLE SODIUM 40 MILLIGRAM(S): 20 TABLET, DELAYED RELEASE ORAL at 09:25

## 2022-02-17 NOTE — PROGRESS NOTE ADULT - ASSESSMENT
56M hx TBI w resultant paraplegia 35 years ago seizures, chronic resp failure s/p trach, pulmonary fibrosis, recurrent asp PNA, PEG tube,  DM II, GERD, HTN admit from SNF with resp distress AMS hypernatremia.  Initially admitted to ICU.  His PEG was found to be leaking bilious fluid, and was removed and replaced on 2/7/22. PEG feeds may be resumed. Patient was also placed on a protonix drip. Patient is also being treated with antifungals for oral candidiasis.     #Acute on chronic hypoxic resp failure #Trach collar w supplemental O2:    Patient remains on 10L via TC as per nursing.  --> Pt on 8L ?can he go back to SNF on this will d/w CM  Check CXR to f/u- no active pulmonary disease  Cont inhalers.      #Aspiration PNA  Completed Zosyn/ vanco.      #Chronic leaking of PEG tube:    GI consult appreciated- PEG removed and replaced 2/7; however, as per RN-- still leaking.    NPO - resume feeds and plan for new PEG placement on 2/16.   Cont reglan/ protonix.    Silver sulfADIAZINE 1% Cream    #COVID +:    Positive 2/8-- after admission.    F/u repeat CXR-- no evidence of active pulmonary disease    Cont TC with 10L O2.--> 8L  No significant infiltrates on last imaging.      #Urine Retention: straight cath prn     #Hypernatremia: resolved     #Altered mental status likely metabolic encephalopathy:    Reorient.      #Oral candidiasis:    Continue diflucan til 2/14-- completed     #Hx GERD  - famotidine 20 mg    #Severe cachexia  PEG feeds as tolerated per GI.      #TBI/ Paraplegia/ Seizures:    Cont home meds.      #VTE: enoxaparin    #GOALS OF CARE  Pt is a full code  MOLST updated    Patient sister Lisa [phone]

## 2022-02-17 NOTE — CHART NOTE - NSCHARTNOTEFT_GEN_A_CORE
Pt without working IV.   Anesthesia tried multiple times, no access.   As not medical emergency, central line not placed.     Ok to give feeds today. NPO at midnight.   Pt to get midline tomorrow for better access and case tomorrow.     Called family and left VM. Nurse on floor aware. Will call Dr. Keys.

## 2022-02-17 NOTE — PROGRESS NOTE ADULT - SUBJECTIVE AND OBJECTIVE BOX
56M hx TBI w resultant paraplegia 35 years ago  seizures, chronic resp failure s/p trach decannulated  pulmonary fibrosis, recurrent asp PNA, PEG tube,  DM II, GERD, HTN admit from SNF with resp distress AMS hypernatremia. As soon as he hit the floor from the ED rapid response called for hypoxemia and AMS from baseline. Since admission patient was admitted to the ICU for critical care. His PEG was found to be leaking bilious fluid, and was removed and replaced on 2/7/22. PEG feeds may be resumed. Patient was also placed on a protonix drip. Patient is also being treated with antifungals for oral candidiasis.     2/7: No events over night  2/8: No events over night  2/9: Tolerating feeds, no other events  2/10: no overnight events. tolerating feeds.  2/11: straight cath overnight for retention. non-verbal. tolerating feeds.   2/12 - appears comfortable on O2 via trach, tolerating PEG feeds   2/13:  Pt seen.  Tolerating TC.  Tolerating TF.  AS per nursing, PEG leaking.  2/14: Pt seems to be comfortable on TC. tube feeds held overnight for possible PEG repair today, but PEG repair hope for weds. feeds resumed  2/15: Pt appears to be comfortable on TC. plan for PEG repair tomorrow.  2/16 - plan for procedure this afternoon.  no events overnight  2/17 - unable to do procedure yesterday and planned for today      ROS: unable to obtain d/t difficulties speaking.     Vital Signs Last 24 Hrs  T(C): 36.1 (17 Feb 2022 08:15), Max: 36.8 (16 Feb 2022 21:05)  T(F): 96.9 (17 Feb 2022 08:15), Max: 98.2 (16 Feb 2022 21:05)  HR: 80 (17 Feb 2022 08:15) (74 - 83)  BP: 105/51 (17 Feb 2022 08:15) (100/50 - 105/53)  BP(mean): --  RR: 18 (17 Feb 2022 08:15) (18 - 18)  SpO2: 95% (17 Feb 2022 08:15) (91% - 95%)    PHYSICAL EXAM:  Constitutional: awake, lying in bed. cachectic. NAD. wearing trach collar.  Eyes: conjunctiva clear, no discharge  Neck: trach stoma clean  Respiratory: CTA b/l. no wheezes, rales, rhonchi   Cardiovascular: regular rate and rhythm, no MRG  Gastrointestinal: abd soft, NT/ND. +BS x4. +PEG site w/o current leakage   Extremities: no pedal edema  Neurological: awake. Paraplegic w RUE contracted  Skin: warm and dry. L ear w sl breakdown.  MSK; markedly reduced muscle mass throughout    LABS: all labs reviewed.      IMAGING/DIAGNOSTICS:  < from: CT Abdomen and Pelvis w/ Oral Cont (02.07.22 @ 18:24) >  Small volume upper abdominal pneumoperitoneum may be mildly increased   versus redistributed.  Second portion duodenal findings most in keeping with intramural hematoma   and duodenal perforation. No extraluminal oral contrast. Inflammation   surrounding the pancreatic head is probably secondary to duodenal   inflammation rather than focal acute pancreatitis however consider   correlation with lipase.  Stercoral proctitis. Mild pancolonic wall thickening suggests nonspecific   acute colitis.  Small pleural effusions. Patchy left basilar airspace disease could   represent aspiration versus other etiologies for pneumonia.      < from: Xray Chest 1 View- PORTABLE-Routine (Xray Chest 1 View- PORTABLE-Routine .) (02.13.22 @ 17:30) >  IMPRESSION:  No active pulmonary disease.    < end of copied text >

## 2022-02-18 LAB — SARS-COV-2 RNA SPEC QL NAA+PROBE: SIGNIFICANT CHANGE UP

## 2022-02-18 PROCEDURE — 43246 EGD PLACE GASTROSTOMY TUBE: CPT | Mod: 22

## 2022-02-18 PROCEDURE — 99232 SBSQ HOSP IP/OBS MODERATE 35: CPT

## 2022-02-18 RX ORDER — SODIUM CHLORIDE 9 MG/ML
1000 INJECTION, SOLUTION INTRAVENOUS
Refills: 0 | Status: DISCONTINUED | OUTPATIENT
Start: 2022-02-18 | End: 2022-02-18

## 2022-02-18 RX ADMIN — Medication 37.5 MILLIGRAM(S): at 09:45

## 2022-02-18 RX ADMIN — Medication 1 APPLICATION(S): at 22:15

## 2022-02-18 RX ADMIN — Medication 5 MILLIGRAM(S): at 05:56

## 2022-02-18 RX ADMIN — LEVETIRACETAM 500 MILLIGRAM(S): 250 TABLET, FILM COATED ORAL at 09:46

## 2022-02-18 RX ADMIN — ZINC OXIDE 1 APPLICATION(S): 200 OINTMENT TOPICAL at 22:15

## 2022-02-18 RX ADMIN — Medication 1 APPLICATION(S): at 15:11

## 2022-02-18 RX ADMIN — Medication 10 MILLIGRAM(S): at 22:14

## 2022-02-18 RX ADMIN — PANTOPRAZOLE SODIUM 40 MILLIGRAM(S): 20 TABLET, DELAYED RELEASE ORAL at 22:14

## 2022-02-18 RX ADMIN — ZINC OXIDE 1 APPLICATION(S): 200 OINTMENT TOPICAL at 16:00

## 2022-02-18 RX ADMIN — LEVETIRACETAM 500 MILLIGRAM(S): 250 TABLET, FILM COATED ORAL at 22:14

## 2022-02-18 RX ADMIN — LACOSAMIDE 100 MILLIGRAM(S): 50 TABLET ORAL at 22:14

## 2022-02-18 RX ADMIN — Medication 3 MILLIGRAM(S): at 22:15

## 2022-02-18 RX ADMIN — CHLORHEXIDINE GLUCONATE 15 MILLILITER(S): 213 SOLUTION TOPICAL at 22:14

## 2022-02-18 RX ADMIN — ALBUTEROL 2 PUFF(S): 90 AEROSOL, METERED ORAL at 08:03

## 2022-02-18 RX ADMIN — Medication 1000 UNIT(S): at 09:45

## 2022-02-18 RX ADMIN — CHLORHEXIDINE GLUCONATE 15 MILLILITER(S): 213 SOLUTION TOPICAL at 15:11

## 2022-02-18 RX ADMIN — Medication 10 MILLIGRAM(S): at 09:44

## 2022-02-18 RX ADMIN — ZINC OXIDE 1 APPLICATION(S): 200 OINTMENT TOPICAL at 05:57

## 2022-02-18 RX ADMIN — Medication 1 APPLICATION(S): at 05:57

## 2022-02-18 RX ADMIN — Medication 2 MILLIGRAM(S): at 22:15

## 2022-02-18 RX ADMIN — ALBUTEROL 2 PUFF(S): 90 AEROSOL, METERED ORAL at 20:45

## 2022-02-18 RX ADMIN — LACOSAMIDE 100 MILLIGRAM(S): 50 TABLET ORAL at 09:44

## 2022-02-18 RX ADMIN — Medication 5 MILLIGRAM(S): at 22:15

## 2022-02-18 RX ADMIN — PANTOPRAZOLE SODIUM 40 MILLIGRAM(S): 20 TABLET, DELAYED RELEASE ORAL at 09:45

## 2022-02-18 RX ADMIN — ALBUTEROL 2 PUFF(S): 90 AEROSOL, METERED ORAL at 14:04

## 2022-02-18 NOTE — BRIEF OPERATIVE NOTE - OPERATION/FINDINGS
PEG replacement removed.   [employer] EndoVive PEG placed.   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 Good closure.   J tube extension placed through PEG and clipped into fourth portion of duodenum.

## 2022-02-18 NOTE — PROGRESS NOTE ADULT - SUBJECTIVE AND OBJECTIVE BOX
56M hx TBI w resultant paraplegia 35 years ago  seizures, chronic resp failure s/p trach decannulated  pulmonary fibrosis, recurrent asp PNA, PEG tube,  DM II, GERD, HTN admit from SNF with resp distress AMS hypernatremia. As soon as he hit the floor from the ED rapid response called for hypoxemia and AMS from baseline. Since admission patient was admitted to the ICU for critical care. His PEG was found to be leaking bilious fluid, and was removed and replaced on 2/7/22. PEG feeds may be resumed. Patient was also placed on a protonix drip. Patient is also being treated with antifungals for oral candidiasis.     2/7: No events over night  2/8: No events over night  2/9: Tolerating feeds, no other events  2/10: no overnight events. tolerating feeds.  2/11: straight cath overnight for retention. non-verbal. tolerating feeds.   2/12 - appears comfortable on O2 via trach, tolerating PEG feeds   2/13:  Pt seen.  Tolerating TC.  Tolerating TF.  AS per nursing, PEG leaking.  2/14: Pt seems to be comfortable on TC. tube feeds held overnight for possible PEG repair today, but PEG repair hope for weds. feeds resumed  2/15: Pt appears to be comfortable on TC. plan for PEG repair tomorrow.  2/16 - plan for procedure this afternoon.  no events overnight  2/17 - unable to do procedure yesterday and planned for today  2/18 - unable to get IV access yesterday and procedure was cancelled.   IV access established this AM and dr ott aware      ROS: unable to obtain d/t difficulties speaking.     Vital Signs Last 24 Hrs  T(C): 37 (18 Feb 2022 09:24), Max: 37 (18 Feb 2022 09:24)  T(F): 98.6 (18 Feb 2022 09:24), Max: 98.6 (18 Feb 2022 09:24)  HR: 91 (18 Feb 2022 09:24) (74 - 91)  BP: 112/53 (18 Feb 2022 09:24) (103/55 - 112/53)  BP(mean): --  RR: 19 (18 Feb 2022 09:24) (18 - 19)  SpO2: 95% (18 Feb 2022 09:24) (95% - 95%)    PHYSICAL EXAM:  Constitutional: awake, lying in bed. cachectic. NAD. wearing trach collar.  Eyes: conjunctiva clear, no discharge  Neck: trach stoma clean  Respiratory: CTA b/l. no wheezes, rales, rhonchi   Cardiovascular: regular rate and rhythm, no MRG  Gastrointestinal: abd soft, NT/ND. +BS x4. +PEG site w/o current leakage   Extremities: no pedal edema  Neurological: awake. Paraplegic w RUE contracted  Skin: warm and dry. L ear w sl breakdown.  MSK; markedly reduced muscle mass throughout    LABS: all labs reviewed.      IMAGING/DIAGNOSTICS:  < from: CT Abdomen and Pelvis w/ Oral Cont (02.07.22 @ 18:24) >  Small volume upper abdominal pneumoperitoneum may be mildly increased   versus redistributed.  Second portion duodenal findings most in keeping with intramural hematoma   and duodenal perforation. No extraluminal oral contrast. Inflammation   surrounding the pancreatic head is probably secondary to duodenal   inflammation rather than focal acute pancreatitis however consider   correlation with lipase.  Stercoral proctitis. Mild pancolonic wall thickening suggests nonspecific   acute colitis.  Small pleural effusions. Patchy left basilar airspace disease could   represent aspiration versus other etiologies for pneumonia.      < from: Xray Chest 1 View- PORTABLE-Routine (Xray Chest 1 View- PORTABLE-Routine .) (02.13.22 @ 17:30) >  IMPRESSION:  No active pulmonary disease.    < end of copied text >

## 2022-02-18 NOTE — BRIEF OPERATIVE NOTE - COMMENTS
Call nutrition for J tube rec's.   Feed port/J port for J tube feeds only. Absolutely no meds in this port.   Rx port never to be used.   Suction/G port for meds only with limited water flushes.

## 2022-02-18 NOTE — PROGRESS NOTE ADULT - ASSESSMENT
56M hx TBI w resultant paraplegia 35 years ago seizures, chronic resp failure s/p trach, pulmonary fibrosis, recurrent asp PNA, PEG tube,  DM II, GERD, HTN admit from SNF with resp distress AMS hypernatremia.  Initially admitted to ICU.  His PEG was found to be leaking bilious fluid, and was removed and replaced on 2/7/22. PEG feeds may be resumed. Patient was also placed on a protonix drip. Patient is also being treated with antifungals for oral candidiasis.     #Acute on chronic hypoxic resp failure #Trach collar w supplemental O2:  resolving  - pt off trach collar (stoma mostly closed and opens rarely depending on position as per RT) and only on NC 2 liters.  wean off at [facility]  Check CXR to f/u- no active pulmonary disease  Cont inhalers.      #Aspiration PNA  Completed Zosyn/ vanco.      #Chronic leaking of PEG tube:    GI consult appreciated- PEG removed and replaced 2/7; however, as per RN-- still leaking.    NPO - resume feeds and plan for new PEG placement on 2/18.   Cont reglan/ protonix.    Silver sulfADIAZINE 1% Cream    #COVID +:    Positive 2/8-- after admission.    F/u repeat CXR-- no evidence of active pulmonary disease    No significant infiltrates on last imaging.      #Urine Retention: straight cath prn     #Hypernatremia: resolved     #Altered mental status likely metabolic encephalopathy:    Reorient.      #Oral candidiasis:    Continue diflucan til 2/14-- completed     #Hx GERD  - famotidine 20 mg    #Severe cachexia  PEG feeds as tolerated per GI.      #TBI/ Paraplegia/ Seizures:    Cont home meds.      #VTE: enoxaparin    #GOALS OF CARE  Pt is a full code  MOLST updated    Patient sister Lisa [phone] - updated

## 2022-02-19 LAB
ANION GAP SERPL CALC-SCNC: 9 MMOL/L — SIGNIFICANT CHANGE UP (ref 5–17)
BUN SERPL-MCNC: 11 MG/DL — SIGNIFICANT CHANGE UP (ref 7–23)
CALCIUM SERPL-MCNC: 8.8 MG/DL — SIGNIFICANT CHANGE UP (ref 8.5–10.1)
CHLORIDE SERPL-SCNC: 103 MMOL/L — SIGNIFICANT CHANGE UP (ref 96–108)
CO2 SERPL-SCNC: 24 MMOL/L — SIGNIFICANT CHANGE UP (ref 22–31)
CREAT SERPL-MCNC: 0.21 MG/DL — LOW (ref 0.5–1.3)
GLUCOSE SERPL-MCNC: 62 MG/DL — LOW (ref 70–99)
HCT VFR BLD CALC: 30.9 % — LOW (ref 39–50)
HGB BLD-MCNC: 9.7 G/DL — LOW (ref 13–17)
MCHC RBC-ENTMCNC: 29.2 PG — SIGNIFICANT CHANGE UP (ref 27–34)
MCHC RBC-ENTMCNC: 31.4 GM/DL — LOW (ref 32–36)
MCV RBC AUTO: 93.1 FL — SIGNIFICANT CHANGE UP (ref 80–100)
PLATELET # BLD AUTO: 225 K/UL — SIGNIFICANT CHANGE UP (ref 150–400)
POTASSIUM SERPL-MCNC: 4.3 MMOL/L — SIGNIFICANT CHANGE UP (ref 3.5–5.3)
POTASSIUM SERPL-SCNC: 4.3 MMOL/L — SIGNIFICANT CHANGE UP (ref 3.5–5.3)
RBC # BLD: 3.32 M/UL — LOW (ref 4.2–5.8)
RBC # FLD: 15 % — HIGH (ref 10.3–14.5)
SODIUM SERPL-SCNC: 136 MMOL/L — SIGNIFICANT CHANGE UP (ref 135–145)
WBC # BLD: 3.99 K/UL — SIGNIFICANT CHANGE UP (ref 3.8–10.5)
WBC # FLD AUTO: 3.99 K/UL — SIGNIFICANT CHANGE UP (ref 3.8–10.5)

## 2022-02-19 PROCEDURE — 99232 SBSQ HOSP IP/OBS MODERATE 35: CPT

## 2022-02-19 RX ADMIN — CHLORHEXIDINE GLUCONATE 15 MILLILITER(S): 213 SOLUTION TOPICAL at 10:43

## 2022-02-19 RX ADMIN — Medication 5 MILLIGRAM(S): at 18:30

## 2022-02-19 RX ADMIN — ZINC OXIDE 1 APPLICATION(S): 200 OINTMENT TOPICAL at 23:16

## 2022-02-19 RX ADMIN — Medication 650 MILLIGRAM(S): at 11:41

## 2022-02-19 RX ADMIN — ALBUTEROL 2 PUFF(S): 90 AEROSOL, METERED ORAL at 02:15

## 2022-02-19 RX ADMIN — LEVETIRACETAM 500 MILLIGRAM(S): 250 TABLET, FILM COATED ORAL at 23:19

## 2022-02-19 RX ADMIN — ENOXAPARIN SODIUM 40 MILLIGRAM(S): 100 INJECTION SUBCUTANEOUS at 10:43

## 2022-02-19 RX ADMIN — ZINC OXIDE 1 APPLICATION(S): 200 OINTMENT TOPICAL at 15:17

## 2022-02-19 RX ADMIN — Medication 5 MILLIGRAM(S): at 23:34

## 2022-02-19 RX ADMIN — Medication 10 MILLIGRAM(S): at 10:41

## 2022-02-19 RX ADMIN — Medication 1000 UNIT(S): at 10:42

## 2022-02-19 RX ADMIN — Medication 1 APPLICATION(S): at 06:03

## 2022-02-19 RX ADMIN — ALBUTEROL 2 PUFF(S): 90 AEROSOL, METERED ORAL at 20:39

## 2022-02-19 RX ADMIN — Medication 1 APPLICATION(S): at 11:06

## 2022-02-19 RX ADMIN — LEVETIRACETAM 500 MILLIGRAM(S): 250 TABLET, FILM COATED ORAL at 10:43

## 2022-02-19 RX ADMIN — CHLORHEXIDINE GLUCONATE 15 MILLILITER(S): 213 SOLUTION TOPICAL at 23:19

## 2022-02-19 RX ADMIN — Medication 1 APPLICATION(S): at 23:33

## 2022-02-19 RX ADMIN — Medication 1 APPLICATION(S): at 23:17

## 2022-02-19 RX ADMIN — PANTOPRAZOLE SODIUM 40 MILLIGRAM(S): 20 TABLET, DELAYED RELEASE ORAL at 10:43

## 2022-02-19 RX ADMIN — TIOTROPIUM BROMIDE 1 CAPSULE(S): 18 CAPSULE ORAL; RESPIRATORY (INHALATION) at 09:03

## 2022-02-19 RX ADMIN — LACOSAMIDE 100 MILLIGRAM(S): 50 TABLET ORAL at 10:43

## 2022-02-19 RX ADMIN — Medication 5 MILLIGRAM(S): at 11:07

## 2022-02-19 RX ADMIN — Medication 37.5 MILLIGRAM(S): at 10:42

## 2022-02-19 RX ADMIN — Medication 10 MILLIGRAM(S): at 23:20

## 2022-02-19 RX ADMIN — ALBUTEROL 2 PUFF(S): 90 AEROSOL, METERED ORAL at 14:14

## 2022-02-19 RX ADMIN — PANTOPRAZOLE SODIUM 40 MILLIGRAM(S): 20 TABLET, DELAYED RELEASE ORAL at 23:20

## 2022-02-19 RX ADMIN — Medication 1 APPLICATION(S): at 18:30

## 2022-02-19 RX ADMIN — Medication 2 MILLIGRAM(S): at 23:20

## 2022-02-19 RX ADMIN — Medication 5 MILLIGRAM(S): at 06:03

## 2022-02-19 RX ADMIN — ZINC OXIDE 1 APPLICATION(S): 200 OINTMENT TOPICAL at 06:03

## 2022-02-19 RX ADMIN — LACOSAMIDE 100 MILLIGRAM(S): 50 TABLET ORAL at 23:27

## 2022-02-19 RX ADMIN — Medication 1 APPLICATION(S): at 11:04

## 2022-02-19 RX ADMIN — Medication 650 MILLIGRAM(S): at 10:41

## 2022-02-19 NOTE — CHART NOTE - NSCHARTNOTESELECT_GEN_ALL_CORE
Dietitian BRIEF NOTE
Dietitian PPN Note
CT result/Event Note
Dietitian BRIEF NOTE
Dietitian TF Brief Note
Event Note
procedure

## 2022-02-19 NOTE — PROGRESS NOTE ADULT - SUBJECTIVE AND OBJECTIVE BOX
56M hx TBI w resultant paraplegia 35 years ago  seizures, chronic resp failure s/p trach decannulated  pulmonary fibrosis, recurrent asp PNA, PEG tube,  DM II, GERD, HTN admit from SNF with resp distress AMS hypernatremia. As soon as he hit the floor from the ED rapid response called for hypoxemia and AMS from baseline. Since admission patient was admitted to the ICU for critical care. His PEG was found to be leaking bilious fluid, and was removed and replaced on 2/7/22. PEG feeds may be resumed. Patient was also placed on a protonix drip. Patient is also being treated with antifungals for oral candidiasis.     2/7: No events over night  2/8: No events over night  2/9: Tolerating feeds, no other events  2/10: no overnight events. tolerating feeds.  2/11: straight cath overnight for retention. non-verbal. tolerating feeds.   2/12 - appears comfortable on O2 via trach, tolerating PEG feeds   2/13:  Pt seen.  Tolerating TC.  Tolerating TF.  AS per nursing, PEG leaking.  2/14: Pt seems to be comfortable on TC. tube feeds held overnight for possible PEG repair today, but PEG repair hope for weds. feeds resumed  2/15: Pt appears to be comfortable on TC. plan for PEG repair tomorrow.  2/16 - plan for procedure this afternoon.  no events overnight  2/17 - unable to do procedure yesterday and planned for today  2/18 - unable to get IV access yesterday and procedure was cancelled.   IV access established this AM and dr ott aware  2/19: s/p PEG replacement removed.  [employer] EndoVive PEG placed. 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 Good closure.   J tube extension placed through PEG and clipped into fourth portion of duodenum.    ROS: unable to obtain d/t difficulties speaking.     Vital Signs Last 24 Hrs  T(C): 36.1 (19 Feb 2022 07:39), Max: 37.6 (18 Feb 2022 19:05)  T(F): 97 (19 Feb 2022 07:39), Max: 99.7 (18 Feb 2022 19:05)  HR: 68 (19 Feb 2022 09:03) (68 - 82)  BP: 110/56 (19 Feb 2022 07:39) (110/56 - 131/74)  BP(mean): --  RR: 19 (19 Feb 2022 07:39) (14 - 19)  SpO2: 97% (19 Feb 2022 09:03) (93% - 98%)    PHYSICAL EXAM:  Constitutional: awake, lying in bed. cachectic. NAD, wearing trach collar.  Eyes: conjunctiva clear, no discharge  Neck: trach stoma clean  Respiratory: CTA b/l. no wheezes, rales, rhonchi   Cardiovascular: regular rate and rhythm, no MRG  Gastrointestinal: abd soft, NT/ND. +BS x4. +PEG site w/o current leakage   Extremities: no pedal edema  Neurological: awake. Paraplegic w RUE contracted  Skin: warm and dry. L ear w sl breakdown.  MSK; markedly reduced muscle mass throughout                                           9.7    3.99  )-----------( 225      ( 19 Feb 2022 06:37 )             30.9   02-19    136  |  103  |  11  ----------------------------<  62<L>  4.3   |  24  |  0.21<L>    Ca    8.8      19 Feb 2022 06:37            MEDICATIONS  (STANDING):  ALBUTerol    90 MICROgram(s) HFA Inhaler 2 Puff(s) Inhalation every 6 hours  BACItracin   Ointment 1 Application(s) Topical four times a day  baclofen 10 milliGRAM(s) Oral two times a day  chlorhexidine 0.12% Liquid 15 milliLiter(s) Swish and Spit two times a day  cholecalciferol 1000 Unit(s) Oral daily  doxazosin 2 milliGRAM(s) Oral at bedtime  enoxaparin Injectable 40 milliGRAM(s) SubCutaneous daily  lacosamide Solution 100 milliGRAM(s) Oral two times a day  levETIRAcetam  Solution 500 milliGRAM(s) Oral two times a day  metoclopramide 5 milliGRAM(s) Oral four times a day  pantoprazole   Suspension 40 milliGRAM(s) Oral every 12 hours  silver sulfADIAZINE 1% Cream 1 Application(s) Topical two times a day  tiotropium 18 MICROgram(s) Capsule 1 Capsule(s) Inhalation daily  venlafaxine 37.5 milliGRAM(s) Oral daily  zinc oxide 40% Ointment 1 Application(s) Topical three times a day

## 2022-02-19 NOTE — PROGRESS NOTE ADULT - ASSESSMENT
56M hx TBI w resultant paraplegia 35 years ago seizures, chronic resp failure s/p trach, pulmonary fibrosis, recurrent asp PNA, PEG tube,  DM II, GERD, HTN admit from SNF with resp distress AMS hypernatremia.  Initially admitted to ICU.  His PEG was found to be leaking bilious fluid, and was removed and replaced on 2/7/22. PEG feeds may be resumed. Patient was also placed on a protonix drip. Patient is also being treated with antifungals for oral candidiasis.     #Acute on chronic hypoxic resp failure #Trach collar w supplemental O2:  resolving  - pt off trach collar (stoma mostly closed and opens rarely depending on position as per RT) and only on NC 2 liters.  wean off at [facility]  Check CXR to f/u- no active pulmonary disease  Cont inhalers.        #Chronic leaking of PEG tube:  s/p PEG replacement removed.  [employer] EndoVive PEG placed. 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 Good closure.   J tube extension placed through PEG and clipped into fourth portion of duodenum.      #COVID +:    Positive 2/8-- after admission.    F/u repeat CXR-- no evidence of active pulmonary disease    No significant infiltrates on last imaging.      #Urine Retention: straight cath prn       #TBI/ Paraplegia/ Seizures:    Cont home meds.      #VTE: enoxaparin    #GOALS OF CARE  Pt is a full code  MOLST updated      Dispo: start TF and see if he tolerates; pt resides at [place]  Patient sister Lisa [phone]

## 2022-02-19 NOTE — CHART NOTE - NSCHARTNOTEFT_GEN_A_CORE
Clinical Nutrition TF BRIEF NOTE    * 57 yo admitted for Pneumonitis due to inhalation of food or vomitus    *current status: s/p PEG replacement - repair of fistula. As per GI, feed through J tube only. G tube strictly for medications. See recs below for TF order.    *labs reviewed:  02-19    136  |  103  |  11  ----------------------------<  62<L>  4.3   |  24  |  0.21<L>    Ca    8.8      19 Feb 2022 06:37        *I&O's    02-18-22 @ 07:01  -  02-19-22 @ 07:00  --------------------------------------------------------  IN:    Other (mL): 200 mL  Total IN: 200 mL    OUT:  Total OUT: 0 mL    Total NET: 200 mL      *BM documented: 2/18    *ESTIMATED NUTR NEEDS: based on admit wt: 41Kg  1440-1650Kcal (35-40Kcal/Kg)  74-100g protein (1.8-2.5g/Kg)  1240-1650mL (30-40mL/Kg)      RECOMMENDATIONS:  1) Re-start TF via J-tube of Jevity 1.5 @ 50 cc/hr (1000 mL total volume) with 3 packets of Prosource daily. Provides 1620 kcal, 96g protein, and 760 mL free water.  2) monitor hydration status; adjust free water flushes prn, consider free water flushes of 25 cc/hr (total volume 500 mL) (which provides ~ 1260 mL of total water per day)  3) monitor TF tolerance; keep back of bed > 35 degrees  4) monitor BM: if > 3 days without BM, add bowel regimen prn  5) daily wt checks to track/trend changes    *will continue to monitor and adjust treatment plan prn*  Cheryl Taylor, MS, RDN, [phone]

## 2022-02-20 PROCEDURE — 99232 SBSQ HOSP IP/OBS MODERATE 35: CPT

## 2022-02-20 RX ADMIN — Medication 1 APPLICATION(S): at 11:05

## 2022-02-20 RX ADMIN — PANTOPRAZOLE SODIUM 40 MILLIGRAM(S): 20 TABLET, DELAYED RELEASE ORAL at 11:08

## 2022-02-20 RX ADMIN — ALBUTEROL 2 PUFF(S): 90 AEROSOL, METERED ORAL at 08:19

## 2022-02-20 RX ADMIN — Medication 1 APPLICATION(S): at 05:32

## 2022-02-20 RX ADMIN — Medication 5 MILLIGRAM(S): at 17:11

## 2022-02-20 RX ADMIN — PANTOPRAZOLE SODIUM 40 MILLIGRAM(S): 20 TABLET, DELAYED RELEASE ORAL at 22:53

## 2022-02-20 RX ADMIN — Medication 5 MILLIGRAM(S): at 23:50

## 2022-02-20 RX ADMIN — Medication 1 APPLICATION(S): at 23:50

## 2022-02-20 RX ADMIN — ZINC OXIDE 1 APPLICATION(S): 200 OINTMENT TOPICAL at 15:12

## 2022-02-20 RX ADMIN — Medication 1 APPLICATION(S): at 11:13

## 2022-02-20 RX ADMIN — ALBUTEROL 2 PUFF(S): 90 AEROSOL, METERED ORAL at 14:14

## 2022-02-20 RX ADMIN — Medication 650 MILLIGRAM(S): at 11:10

## 2022-02-20 RX ADMIN — ZINC OXIDE 1 APPLICATION(S): 200 OINTMENT TOPICAL at 05:32

## 2022-02-20 RX ADMIN — CHLORHEXIDINE GLUCONATE 15 MILLILITER(S): 213 SOLUTION TOPICAL at 22:53

## 2022-02-20 RX ADMIN — LACOSAMIDE 100 MILLIGRAM(S): 50 TABLET ORAL at 23:50

## 2022-02-20 RX ADMIN — Medication 1000 UNIT(S): at 11:07

## 2022-02-20 RX ADMIN — Medication 5 MILLIGRAM(S): at 05:32

## 2022-02-20 RX ADMIN — Medication 1 APPLICATION(S): at 22:49

## 2022-02-20 RX ADMIN — LEVETIRACETAM 500 MILLIGRAM(S): 250 TABLET, FILM COATED ORAL at 22:53

## 2022-02-20 RX ADMIN — LACOSAMIDE 100 MILLIGRAM(S): 50 TABLET ORAL at 11:07

## 2022-02-20 RX ADMIN — Medication 1 APPLICATION(S): at 17:11

## 2022-02-20 RX ADMIN — ZINC OXIDE 1 APPLICATION(S): 200 OINTMENT TOPICAL at 22:48

## 2022-02-20 RX ADMIN — Medication 10 MILLIGRAM(S): at 11:07

## 2022-02-20 RX ADMIN — Medication 37.5 MILLIGRAM(S): at 11:08

## 2022-02-20 RX ADMIN — ENOXAPARIN SODIUM 40 MILLIGRAM(S): 100 INJECTION SUBCUTANEOUS at 11:07

## 2022-02-20 RX ADMIN — Medication 2 MILLIGRAM(S): at 22:54

## 2022-02-20 RX ADMIN — Medication 5 MILLIGRAM(S): at 11:09

## 2022-02-20 RX ADMIN — Medication 10 MILLIGRAM(S): at 22:54

## 2022-02-20 RX ADMIN — CHLORHEXIDINE GLUCONATE 15 MILLILITER(S): 213 SOLUTION TOPICAL at 11:08

## 2022-02-20 RX ADMIN — LEVETIRACETAM 500 MILLIGRAM(S): 250 TABLET, FILM COATED ORAL at 11:08

## 2022-02-20 NOTE — PROGRESS NOTE ADULT - ASSESSMENT
56M hx TBI w resultant paraplegia 35 years ago seizures, chronic resp failure s/p trach, pulmonary fibrosis, recurrent asp PNA, PEG tube,  DM II, GERD, HTN admit from SNF with resp distress AMS hypernatremia.  Initially admitted to ICU.  His PEG was found to be leaking bilious fluid, and was removed and replaced on 2/7/22. PEG feeds may be resumed. Patient was also placed on a protonix drip. Patient is also being treated with antifungals for oral candidiasis.     #Acute on chronic hypoxic resp failure #Trach collar w supplemental O2:  resolving  - pt off trach collar (stoma mostly closed and opens rarely depending on position as per RT) and only on NC 2 liters.  wean off at [facility]  Cont inhalers.        #Chronic leaking of PEG tube:  s/p PEG replacement removed.  [employer] EndoVive PEG placed. 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 Good closure.   J tube extension placed through PEG and clipped into fourth portion of duodenum.      #COVID +:    Positive 2/8-- after admission.    F/u repeat CXR-- no evidence of active pulmonary disease    No significant infiltrates on last imaging.      #Urine Retention: straight cath prn       #TBI/ Paraplegia/ Seizures:    Cont home meds.      #VTE: enoxaparin    #GOALS OF CARE  Pt is a full code  MOLST updated      Dispo: start TF and see if he tolerates; pt resides at [place]  Patient sister Lisa [phone] 56M hx TBI w resultant paraplegia 35 years ago seizures, chronic resp failure s/p trach, pulmonary fibrosis, recurrent asp PNA, PEG tube,  DM II, GERD, HTN admit from SNF with resp distress AMS hypernatremia.  Initially admitted to ICU.  His PEG was found to be leaking bilious fluid, and was removed and replaced on 2/7/22. PEG feeds may be resumed. Patient was also placed on a protonix drip. Patient is also being treated with antifungals for oral candidiasis.     #Acute on chronic hypoxic resp failure #Trach collar w supplemental O2:  resolving  - pt off trach collar (stoma mostly closed and opens rarely depending on position as per RT) and only on NC 2 liters.  wean off at [facility]  Cont inhalers.        #Chronic leaking of PEG tube:  s/p PEG replacement removed.  [employer] EndoVive PEG placed. 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 Good closure.   J tube extension placed through PEG and clipped into fourth portion of duodenum.      #COVID +:    Positive 2/8-- after admission.    F/u repeat CXR-- no evidence of active pulmonary disease    No significant infiltrates on last imaging.      #Urine Retention: per nurse pt voiding wet diaper      #TBI/ Paraplegia/ Seizures:    Cont home meds.      #VTE: enoxaparin    #GOALS OF CARE  Pt is a full code  MOLST updated      Dispo: start TF and see if he tolerates; pt resides at [place]  Patient sister Lisa [phone]

## 2022-02-20 NOTE — PROGRESS NOTE ADULT - SUBJECTIVE AND OBJECTIVE BOX
56M hx TBI w resultant paraplegia 35 years ago  seizures, chronic resp failure s/p trach decannulated  pulmonary fibrosis, recurrent asp PNA, PEG tube,  DM II, GERD, HTN admit from SNF with resp distress AMS hypernatremia. As soon as he hit the floor from the ED rapid response called for hypoxemia and AMS from baseline. Since admission patient was admitted to the ICU for critical care. His PEG was found to be leaking bilious fluid, and was removed and replaced on 2/7/22. PEG feeds may be resumed. Patient was also placed on a protonix drip. Patient is also being treated with antifungals for oral candidiasis.     2/7: No events over night  2/8: No events over night  2/9: Tolerating feeds, no other events  2/10: no overnight events. tolerating feeds.  2/11: straight cath overnight for retention. non-verbal. tolerating feeds.   2/12 - appears comfortable on O2 via trach, tolerating PEG feeds   2/13:  Pt seen.  Tolerating TC.  Tolerating TF.  AS per nursing, PEG leaking.  2/14: Pt seems to be comfortable on TC. tube feeds held overnight for possible PEG repair today, but PEG repair hope for weds. feeds resumed  2/15: Pt appears to be comfortable on TC. plan for PEG repair tomorrow.  2/16 - plan for procedure this afternoon.  no events overnight  2/17 - unable to do procedure yesterday and planned for today  2/18 - unable to get IV access yesterday and procedure was cancelled.   IV access established this AM and dr ott aware  2/19: s/p PEG replacement removed.  [employer] EndoVive PEG placed. 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 Good closure.   J tube extension placed through PEG and clipped into fourth portion of duodenum.  2/20: no change in his staus non verbal but happy looking      Vital Signs Last 24 Hrs  T(C): 36.8 (20 Feb 2022 08:21), Max: 36.8 (20 Feb 2022 08:21)  T(F): 98.3 (20 Feb 2022 08:21), Max: 98.3 (20 Feb 2022 08:21)  HR: 77 (20 Feb 2022 08:21) (73 - 90)  BP: 110/62 (20 Feb 2022 08:21) (105/50 - 110/64)  BP(mean): --  RR: 19 (20 Feb 2022 08:21) (18 - 19)  SpO2: 95% (20 Feb 2022 08:21) (92% - 97%)    PHYSICAL EXAM:  Constitutional: awake, lying in bed. cachectic. NAD, wearing trach collar.  Eyes: conjunctiva clear, no discharge  Neck: trach stoma clean  Respiratory: CTA b/l. no wheezes, rales, rhonchi   Cardiovascular: regular rate and rhythm, no MRG  Gastrointestinal: abd soft, NT/ND. +BS x4. +PEG site w/o current leakage   Extremities: no pedal edema  Neurological: awake. Paraplegic w RUE contracted  Skin: warm and dry. L ear w sl breakdown.  MSK; markedly reduced muscle mass throughout                                                9.7    3.99  )-----------( 225      ( 19 Feb 2022 06:37 )             30.9   02-19    136  |  103  |  11  ----------------------------<  62<L>  4.3   |  24  |  0.21<L>    Ca    8.8      19 Feb 2022 06:37                MEDICATIONS  (STANDING):  ALBUTerol    90 MICROgram(s) HFA Inhaler 2 Puff(s) Inhalation every 6 hours  BACItracin   Ointment 1 Application(s) Topical four times a day  baclofen 10 milliGRAM(s) Oral two times a day  chlorhexidine 0.12% Liquid 15 milliLiter(s) Swish and Spit two times a day  cholecalciferol 1000 Unit(s) Oral daily  doxazosin 2 milliGRAM(s) Oral at bedtime  enoxaparin Injectable 40 milliGRAM(s) SubCutaneous daily  lacosamide Solution 100 milliGRAM(s) Oral two times a day  levETIRAcetam  Solution 500 milliGRAM(s) Oral two times a day  metoclopramide 5 milliGRAM(s) Oral four times a day  pantoprazole   Suspension 40 milliGRAM(s) Oral every 12 hours  silver sulfADIAZINE 1% Cream 1 Application(s) Topical two times a day  tiotropium 18 MICROgram(s) Capsule 1 Capsule(s) Inhalation daily  venlafaxine 37.5 milliGRAM(s) Oral daily  zinc oxide 40% Ointment 1 Application(s) Topical three times a day

## 2022-02-21 LAB — SARS-COV-2 RNA SPEC QL NAA+PROBE: DETECTED

## 2022-02-21 PROCEDURE — 99232 SBSQ HOSP IP/OBS MODERATE 35: CPT

## 2022-02-21 RX ADMIN — ALBUTEROL 2 PUFF(S): 90 AEROSOL, METERED ORAL at 14:01

## 2022-02-21 RX ADMIN — LEVETIRACETAM 500 MILLIGRAM(S): 250 TABLET, FILM COATED ORAL at 11:05

## 2022-02-21 RX ADMIN — ALBUTEROL 2 PUFF(S): 90 AEROSOL, METERED ORAL at 21:18

## 2022-02-21 RX ADMIN — ENOXAPARIN SODIUM 40 MILLIGRAM(S): 100 INJECTION SUBCUTANEOUS at 11:04

## 2022-02-21 RX ADMIN — Medication 10 MILLIGRAM(S): at 22:56

## 2022-02-21 RX ADMIN — Medication 1 APPLICATION(S): at 23:18

## 2022-02-21 RX ADMIN — Medication 1 APPLICATION(S): at 22:55

## 2022-02-21 RX ADMIN — ZINC OXIDE 1 APPLICATION(S): 200 OINTMENT TOPICAL at 13:34

## 2022-02-21 RX ADMIN — Medication 10 MILLIGRAM(S): at 11:05

## 2022-02-21 RX ADMIN — ZINC OXIDE 1 APPLICATION(S): 200 OINTMENT TOPICAL at 06:08

## 2022-02-21 RX ADMIN — LEVETIRACETAM 500 MILLIGRAM(S): 250 TABLET, FILM COATED ORAL at 22:58

## 2022-02-21 RX ADMIN — CHLORHEXIDINE GLUCONATE 15 MILLILITER(S): 213 SOLUTION TOPICAL at 11:05

## 2022-02-21 RX ADMIN — CHLORHEXIDINE GLUCONATE 15 MILLILITER(S): 213 SOLUTION TOPICAL at 22:58

## 2022-02-21 RX ADMIN — Medication 5 MILLIGRAM(S): at 23:18

## 2022-02-21 RX ADMIN — Medication 1 APPLICATION(S): at 17:45

## 2022-02-21 RX ADMIN — Medication 5 MILLIGRAM(S): at 06:09

## 2022-02-21 RX ADMIN — Medication 2 MILLIGRAM(S): at 22:56

## 2022-02-21 RX ADMIN — LACOSAMIDE 100 MILLIGRAM(S): 50 TABLET ORAL at 22:59

## 2022-02-21 RX ADMIN — ZINC OXIDE 1 APPLICATION(S): 200 OINTMENT TOPICAL at 22:54

## 2022-02-21 RX ADMIN — Medication 5 MILLIGRAM(S): at 11:06

## 2022-02-21 RX ADMIN — PANTOPRAZOLE SODIUM 40 MILLIGRAM(S): 20 TABLET, DELAYED RELEASE ORAL at 22:58

## 2022-02-21 RX ADMIN — Medication 1000 UNIT(S): at 11:04

## 2022-02-21 RX ADMIN — Medication 1 APPLICATION(S): at 11:06

## 2022-02-21 RX ADMIN — PANTOPRAZOLE SODIUM 40 MILLIGRAM(S): 20 TABLET, DELAYED RELEASE ORAL at 11:06

## 2022-02-21 RX ADMIN — LACOSAMIDE 100 MILLIGRAM(S): 50 TABLET ORAL at 11:04

## 2022-02-21 RX ADMIN — ALBUTEROL 2 PUFF(S): 90 AEROSOL, METERED ORAL at 08:52

## 2022-02-21 RX ADMIN — Medication 1 APPLICATION(S): at 06:08

## 2022-02-21 RX ADMIN — Medication 37.5 MILLIGRAM(S): at 11:05

## 2022-02-21 RX ADMIN — Medication 5 MILLIGRAM(S): at 17:44

## 2022-02-21 NOTE — PROGRESS NOTE ADULT - SUBJECTIVE AND OBJECTIVE BOX
56M hx TBI w resultant paraplegia 35 years ago  seizures, chronic resp failure s/p trach decannulated  pulmonary fibrosis, recurrent asp PNA, PEG tube,  DM II, GERD, HTN admit from SNF with resp distress AMS hypernatremia. As soon as he hit the floor from the ED rapid response called for hypoxemia and AMS from baseline. Since admission patient was admitted to the ICU for critical care. His PEG was found to be leaking bilious fluid, and was removed and replaced on 2/7/22. PEG feeds may be resumed. Patient was also placed on a protonix drip. Patient is also being treated with antifungals for oral candidiasis.     2/7: No events over night  2/8: No events over night  2/9: Tolerating feeds, no other events  2/10: no overnight events. tolerating feeds.  2/11: straight cath overnight for retention. non-verbal. tolerating feeds.   2/12 - appears comfortable on O2 via trach, tolerating PEG feeds   2/13:  Pt seen.  Tolerating TC.  Tolerating TF.  AS per nursing, PEG leaking.  2/14: Pt seems to be comfortable on TC. tube feeds held overnight for possible PEG repair today, but PEG repair hope for weds. feeds resumed  2/15: Pt appears to be comfortable on TC. plan for PEG repair tomorrow.  2/16 - plan for procedure this afternoon.  no events overnight  2/17 - unable to do procedure yesterday and planned for today  2/18 - unable to get IV access yesterday and procedure was cancelled.   IV access established this AM and dr ott aware  2/19: s/p PEG replacement removed.  [employer] EndoVive PEG placed. 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 Good closure.   J tube extension placed through PEG and clipped into fourth portion of duodenum.  2/20: no change in his status non verbal but happy looking  2/21 - no events overnight.      Vital Signs Last 24 Hrs  T(C): 36.4 (21 Feb 2022 08:08), Max: 36.4 (21 Feb 2022 08:08)  T(F): 97.5 (21 Feb 2022 08:08), Max: 97.5 (21 Feb 2022 08:08)  HR: 76 (21 Feb 2022 08:08) (76 - 87)  BP: 104/58 (21 Feb 2022 08:08) (101/61 - 109/62)  BP(mean): 69 (21 Feb 2022 08:08) (69 - 70)  RR: 18 (21 Feb 2022 08:08) (16 - 18)  SpO2: 97% (21 Feb 2022 08:08) (95% - 98%)    PHYSICAL EXAM:  Constitutional: awake, lying in bed. cachectic. NAD, wearing trach collar.  Eyes: conjunctiva clear, no discharge  Neck: trach stoma clean  Respiratory: CTA b/l. no wheezes, rales, rhonchi   Cardiovascular: regular rate and rhythm, no MRG  Gastrointestinal: abd soft, NT/ND. +BS x4. +PEG site w/o current leakage   Extremities: no pedal edema  Neurological: awake. Paraplegic w RUE contracted  Skin: warm and dry. L ear w sl breakdown.  MSK; markedly reduced muscle mass throughout                  labs reviewed

## 2022-02-21 NOTE — PROGRESS NOTE ADULT - ASSESSMENT
56M hx TBI w resultant paraplegia 35 years ago seizures, chronic resp failure s/p trach, pulmonary fibrosis, recurrent asp PNA, PEG tube,  DM II, GERD, HTN admit from SNF with resp distress AMS hypernatremia.  Initially admitted to ICU.  His PEG was found to be leaking bilious fluid, and was removed and replaced on 2/7/22. PEG feeds may be resumed. Patient was also placed on a protonix drip. Patient is also being treated with antifungals for oral candidiasis.     #Acute on chronic hypoxic resp failure #Trach collar w supplemental O2:  resolving  - pt off trach collar (stoma mostly closed and opens rarely depending on position as per RT) and only on NC 2 liters.  wean off at [facility]  Cont inhalers.  currently has it in place as per RT for humidified o2 and does not need this one discharge      #Chronic leaking of PEG tube:  resolved  s/p PEG replacement removed.  [email or web address] EndoVive PEG placed. 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 Good closure.   J tube extension placed through PEG and clipped into fourth portion of duodenum.      #COVID +:    Positive 2/8-- after admission.    F/u repeat CXR-- no evidence of active pulmonary disease    No significant infiltrates on last imaging.      #Urine Retention: per nurse pt voiding wet diaper      #TBI/ Paraplegia/ Seizures:    Cont home meds.      #VTE: enoxaparin    #GOALS OF CARE  Pt is a full code  MOLST updated      Dispo: toelrating tube feeds. case d/w CM and plan for dc tomorrow to [facility]  Patient sister Lisa [phone]

## 2022-02-22 ENCOUNTER — TRANSCRIPTION ENCOUNTER (OUTPATIENT)
Age: 57
End: 2022-02-22

## 2022-02-22 VITALS
SYSTOLIC BLOOD PRESSURE: 112 MMHG | HEART RATE: 78 BPM | TEMPERATURE: 98 F | DIASTOLIC BLOOD PRESSURE: 66 MMHG | OXYGEN SATURATION: 97 % | RESPIRATION RATE: 18 BRPM

## 2022-02-22 PROCEDURE — 99232 SBSQ HOSP IP/OBS MODERATE 35: CPT

## 2022-02-22 PROCEDURE — 99239 HOSP IP/OBS DSCHRG MGMT >30: CPT

## 2022-02-22 RX ORDER — DOXAZOSIN MESYLATE 4 MG
1 TABLET ORAL
Qty: 0 | Refills: 0 | DISCHARGE
Start: 2022-02-22

## 2022-02-22 RX ORDER — DOCUSATE SODIUM 100 MG
20 CAPSULE ORAL
Qty: 0 | Refills: 0 | DISCHARGE

## 2022-02-22 RX ORDER — BACITRACIN ZINC 500 UNIT/G
1 OINTMENT IN PACKET (EA) TOPICAL
Qty: 0 | Refills: 0 | DISCHARGE
Start: 2022-02-22

## 2022-02-22 RX ORDER — LACOSAMIDE 50 MG/1
10 TABLET ORAL
Qty: 0 | Refills: 0 | DISCHARGE
Start: 2022-02-22

## 2022-02-22 RX ORDER — CHOLECALCIFEROL (VITAMIN D3) 125 MCG
1000 CAPSULE ORAL
Qty: 0 | Refills: 0 | DISCHARGE
Start: 2022-02-22

## 2022-02-22 RX ORDER — LACTULOSE 10 G/15ML
30 SOLUTION ORAL
Qty: 0 | Refills: 0 | DISCHARGE

## 2022-02-22 RX ORDER — DOXAZOSIN MESYLATE 4 MG
1 TABLET ORAL
Qty: 0 | Refills: 0 | DISCHARGE

## 2022-02-22 RX ORDER — ZINC OXIDE 200 MG/G
1 OINTMENT TOPICAL
Qty: 0 | Refills: 0 | DISCHARGE
Start: 2022-02-22

## 2022-02-22 RX ORDER — VENLAFAXINE HCL 75 MG
1 CAPSULE, EXT RELEASE 24 HR ORAL
Qty: 0 | Refills: 0 | DISCHARGE

## 2022-02-22 RX ORDER — ONDANSETRON 8 MG/1
1 TABLET, FILM COATED ORAL
Qty: 0 | Refills: 0 | DISCHARGE
Start: 2022-02-22

## 2022-02-22 RX ORDER — LEVETIRACETAM 250 MG/1
5 TABLET, FILM COATED ORAL
Qty: 0 | Refills: 0 | DISCHARGE
Start: 2022-02-22

## 2022-02-22 RX ORDER — VENLAFAXINE HCL 75 MG
1 CAPSULE, EXT RELEASE 24 HR ORAL
Qty: 0 | Refills: 0 | DISCHARGE
Start: 2022-02-22

## 2022-02-22 RX ORDER — PANTOPRAZOLE SODIUM 20 MG/1
40 TABLET, DELAYED RELEASE ORAL
Qty: 0 | Refills: 0 | DISCHARGE
Start: 2022-02-22

## 2022-02-22 RX ORDER — ONDANSETRON 8 MG/1
1 TABLET, FILM COATED ORAL
Qty: 0 | Refills: 0 | DISCHARGE

## 2022-02-22 RX ORDER — LEVETIRACETAM 250 MG/1
5 TABLET, FILM COATED ORAL
Qty: 0 | Refills: 0 | DISCHARGE

## 2022-02-22 RX ORDER — CHOLECALCIFEROL (VITAMIN D3) 125 MCG
1 CAPSULE ORAL
Qty: 0 | Refills: 0 | DISCHARGE

## 2022-02-22 RX ORDER — SODIUM BICARBONATE 1 MEQ/ML
1 SYRINGE (ML) INTRAVENOUS
Qty: 0 | Refills: 0 | DISCHARGE

## 2022-02-22 RX ORDER — BACLOFEN 100 %
1 POWDER (GRAM) MISCELLANEOUS
Qty: 0 | Refills: 0 | DISCHARGE

## 2022-02-22 RX ORDER — LACOSAMIDE 50 MG/1
10 TABLET ORAL
Qty: 0 | Refills: 0 | DISCHARGE

## 2022-02-22 RX ORDER — BACLOFEN 100 %
1 POWDER (GRAM) MISCELLANEOUS
Qty: 0 | Refills: 0 | DISCHARGE
Start: 2022-02-22

## 2022-02-22 RX ADMIN — Medication 1 APPLICATION(S): at 10:28

## 2022-02-22 RX ADMIN — Medication 1000 UNIT(S): at 10:30

## 2022-02-22 RX ADMIN — Medication 5 MILLIGRAM(S): at 13:00

## 2022-02-22 RX ADMIN — Medication 1 APPLICATION(S): at 06:20

## 2022-02-22 RX ADMIN — Medication 10 MILLIGRAM(S): at 10:31

## 2022-02-22 RX ADMIN — ALBUTEROL 2 PUFF(S): 90 AEROSOL, METERED ORAL at 04:08

## 2022-02-22 RX ADMIN — Medication 1 APPLICATION(S): at 12:59

## 2022-02-22 RX ADMIN — LACOSAMIDE 100 MILLIGRAM(S): 50 TABLET ORAL at 10:29

## 2022-02-22 RX ADMIN — PANTOPRAZOLE SODIUM 40 MILLIGRAM(S): 20 TABLET, DELAYED RELEASE ORAL at 10:30

## 2022-02-22 RX ADMIN — ENOXAPARIN SODIUM 40 MILLIGRAM(S): 100 INJECTION SUBCUTANEOUS at 10:29

## 2022-02-22 RX ADMIN — ZINC OXIDE 1 APPLICATION(S): 200 OINTMENT TOPICAL at 06:20

## 2022-02-22 RX ADMIN — Medication 5 MILLIGRAM(S): at 06:20

## 2022-02-22 RX ADMIN — CHLORHEXIDINE GLUCONATE 15 MILLILITER(S): 213 SOLUTION TOPICAL at 10:30

## 2022-02-22 RX ADMIN — LEVETIRACETAM 500 MILLIGRAM(S): 250 TABLET, FILM COATED ORAL at 10:30

## 2022-02-22 RX ADMIN — ALBUTEROL 2 PUFF(S): 90 AEROSOL, METERED ORAL at 08:33

## 2022-02-22 RX ADMIN — Medication 37.5 MILLIGRAM(S): at 10:31

## 2022-02-22 RX ADMIN — ZINC OXIDE 1 APPLICATION(S): 200 OINTMENT TOPICAL at 12:59

## 2022-02-22 RX ADMIN — ALBUTEROL 2 PUFF(S): 90 AEROSOL, METERED ORAL at 13:57

## 2022-02-22 NOTE — PROGRESS NOTE ADULT - ASSESSMENT
56M hx TBI w resultant paraplegia 35 years ago seizures, chronic resp failure s/p trach, pulmonary fibrosis, recurrent asp PNA, PEG tube,  DM II, GERD, HTN admit from SNF with resp distress AMS hypernatremia.  Initially admitted to ICU.  His PEG was found to be leaking bilious fluid, and was removed and replaced on 2/7/22. PEG feeds may be resumed. Patient was also placed on a protonix drip. Patient is also being treated with antifungals for oral candidiasis.     #Acute on chronic hypoxic resp failure #Trach collar w supplemental O2:  resolving  - pt off trach collar (stoma mostly closed and opens rarely depending on position as per RT) and only on NC 2 liters.  wean off at [facility]  Cont inhalers.  currently has it in place as per RT for humidified o2 and does not need this one discharge      #Chronic leaking of PEG tube:  resolved  s/p PEG replacement removed.  [employer] EndoVive PEG placed. 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 Good closure.   J tube extension placed through PEG and clipped into fourth portion of duodenum.      #COVID +:    Positive 2/8-- after admission.    F/u repeat CXR-- no evidence of active pulmonary disease    No significant infiltrates on last imaging.      #Urine Retention: per nurse pt voiding wet diaper      #TBI/ Paraplegia/ Seizures:    Cont home meds.      #VTE: enoxaparin    #GOALS OF CARE  Pt is a full code  MOLST updated      Dispo: toelrating tube feeds. case d/w CM and plan for dc today  Patient sister Lisa [phone]

## 2022-02-22 NOTE — PROGRESS NOTE ADULT - NUTRITIONAL ASSESSMENT
This patient has been assessed with a concern for Malnutrition and has been determined to have a diagnosis/diagnoses of Severe protein-calorie malnutrition and Underweight (BMI < 19).      The following pending diet order is being considered for treatment of Severe protein-calorie malnutrition and Underweight (BMI < 19):  Diet Clear Liquid-  Tube Feeding Modality: Gastrostomy  Jevity 1.5 Edison (JEVITY1.5)  Total Volume for 24 Hours (mL): 1200  Continuous  Starting Tube Feed Rate {mL per Hour}: 10  Increase Tube Feed Rate by (mL): 10     Every 8 hours  Until Goal Tube Feed Rate (mL per Hour): 50  Tube Feed Duration (in Hours): 24  Tube Feed Start Time: 00:00  Free Water Flush Instructions:  @ 35cc/hr (total volume x 24hr-700ml)  No Carb Prosource TF     Qty per Day:  3  Entered: Feb 5 2022  2:10PM  
This patient has been assessed with a concern for Malnutrition and has been determined to have a diagnosis/diagnoses of Severe protein-calorie malnutrition and Underweight (BMI < 19).    This patient is being managed with:   Diet NPO after Midnight-     NPO Start Date: 13-Feb-2022   NPO Start Time: 23:59  Entered: Feb 13 2022 12:41PM    Diet NPO with Tube Feed-  Tube Feeding Modality: Gastrostomy  Jevity 1.5 Edison (JEVITY1.5)  Total Volume for 24 Hours (mL): 1200  Continuous  Starting Tube Feed Rate {mL per Hour}: 30  Increase Tube Feed Rate by (mL): 10     Every 8 hours  Until Goal Tube Feed Rate (mL per Hour): 50  Tube Feed Duration (in Hours): 24  Tube Feed Start Time: 00:00  Free Water Flush  Pump   Rate (mL per Hour): 20  No Carb Prosource TF     Qty per Day:  1 packet  Entered: Feb 11 2022  7:23AM    Diet NPO with Tube Feed-  Tube Feeding Modality: Gastrostomy  Jevity 1.5 Edison (JEVITY1.5)  Total Volume for 24 Hours (mL): 1200  Continuous  Starting Tube Feed Rate {mL per Hour}: 20  Increase Tube Feed Rate by (mL): 10     Every 6 hours  Until Goal Tube Feed Rate (mL per Hour): 50  Tube Feed Duration (in Hours): 24  Tube Feed Start Time: 00:00  Free Water Flush  Free Water Flush Instructions:  20 mL q1hr  No Carb Prosource TF     Qty per Day:  1  Entered: Feb 9 2022  1:37PM    The following pending diet order is being considered for treatment of Severe protein-calorie malnutrition and Underweight (BMI < 19):null
This patient has been assessed with a concern for Malnutrition and has been determined to have a diagnosis/diagnoses of Severe protein-calorie malnutrition and Underweight (BMI < 19).    This patient is being managed with:   Diet NPO with Tube Feed-  Tube Feeding Modality: Gastrostomy  Jevity 1.5 Edison (JEVITY1.5)  Total Volume for 24 Hours (mL): 480  Continuous  Starting Tube Feed Rate {mL per Hour}: 10  Increase Tube Feed Rate by (mL): 10     Every 6 hours  Until Goal Tube Feed Rate (mL per Hour): 20  Tube Feed Duration (in Hours): 24  Tube Feed Start Time: 00:00  Entered: Feb 8 2022 11:20AM    
This patient has been assessed with a concern for Malnutrition and has been determined to have a diagnosis/diagnoses of Severe protein-calorie malnutrition and Underweight (BMI < 19).      The following pending diet order is being considered for treatment of Severe protein-calorie malnutrition and Underweight (BMI < 19):  Diet Clear Liquid-  Tube Feeding Modality: Gastrostomy  Jevity 1.5 Edison (JEVITY1.5)  Total Volume for 24 Hours (mL): 1200  Continuous  Starting Tube Feed Rate {mL per Hour}: 10  Increase Tube Feed Rate by (mL): 10     Every 8 hours  Until Goal Tube Feed Rate (mL per Hour): 50  Tube Feed Duration (in Hours): 24  Tube Feed Start Time: 00:00  Free Water Flush Instructions:  @ 35cc/hr (total volume x 24hr-700ml)  No Carb Prosource TF     Qty per Day:  3  Entered: Feb 5 2022  2:10PM  
This patient has been assessed with a concern for Malnutrition and has been determined to have a diagnosis/diagnoses of Severe protein-calorie malnutrition and Underweight (BMI < 19).    This patient is being managed with:   Diet NPO after Midnight-     NPO Start Date: 13-Feb-2022   NPO Start Time: 23:59  Entered: Feb 13 2022 12:41PM    Diet NPO with Tube Feed-  Tube Feeding Modality: Gastrostomy  Jevity 1.5 Edison (JEVITY1.5)  Total Volume for 24 Hours (mL): 1200  Continuous  Starting Tube Feed Rate {mL per Hour}: 30  Increase Tube Feed Rate by (mL): 10     Every 8 hours  Until Goal Tube Feed Rate (mL per Hour): 50  Tube Feed Duration (in Hours): 24  Tube Feed Start Time: 00:00  Free Water Flush  Pump   Rate (mL per Hour): 20  No Carb Prosource TF     Qty per Day:  1 packet  Entered: Feb 11 2022  7:23AM    Diet NPO with Tube Feed-  Tube Feeding Modality: Gastrostomy  Jevity 1.5 Edison (JEVITY1.5)  Total Volume for 24 Hours (mL): 1200  Continuous  Starting Tube Feed Rate {mL per Hour}: 20  Increase Tube Feed Rate by (mL): 10     Every 6 hours  Until Goal Tube Feed Rate (mL per Hour): 50  Tube Feed Duration (in Hours): 24  Tube Feed Start Time: 00:00  Free Water Flush  Free Water Flush Instructions:  20 mL q1hr  No Carb Prosource TF     Qty per Day:  1  Entered: Feb 9 2022  1:37PM    The following pending diet order is being considered for treatment of Severe protein-calorie malnutrition and Underweight (BMI < 19):null
This patient has been assessed with a concern for Malnutrition and has been determined to have a diagnosis/diagnoses of Severe protein-calorie malnutrition and Underweight (BMI < 19).    This patient is being managed with:   Diet NPO after Midnight-     NPO Start Date: 15-Feb-2022   NPO Start Time: 23:59  Except Medications  Entered: Feb 15 2022  7:54AM    Diet NPO with Tube Feed-  Tube Feeding Modality: Gastrostomy  Jevity 1.5 Edison (JEVITY1.5)  Total Volume for 24 Hours (mL): 1200  Continuous  Starting Tube Feed Rate {mL per Hour}: 30  Increase Tube Feed Rate by (mL): 10     Every 8 hours  Until Goal Tube Feed Rate (mL per Hour): 50  Tube Feed Duration (in Hours): 24  Tube Feed Start Time: 00:00  Free Water Flush  Pump   Rate (mL per Hour): 20  No Carb Prosource TF     Qty per Day:  1 packet  Entered: Feb 11 2022  7:23AM    Diet NPO with Tube Feed-  Tube Feeding Modality: Gastrostomy  Jevity 1.5 Edison (JEVITY1.5)  Total Volume for 24 Hours (mL): 1200  Continuous  Starting Tube Feed Rate {mL per Hour}: 20  Increase Tube Feed Rate by (mL): 10     Every 6 hours  Until Goal Tube Feed Rate (mL per Hour): 50  Tube Feed Duration (in Hours): 24  Tube Feed Start Time: 00:00  Free Water Flush  Free Water Flush Instructions:  20 mL q1hr  No Carb Prosource TF     Qty per Day:  1  Entered: Feb 9 2022  1:37PM    The following pending diet order is being considered for treatment of Severe protein-calorie malnutrition and Underweight (BMI < 19):null
This patient has been assessed with a concern for Malnutrition and has been determined to have a diagnosis/diagnoses of Severe protein-calorie malnutrition and Underweight (BMI < 19).    This patient is being managed with:   Diet NPO with Tube Feed-  Tube Feeding Modality: Gastrostomy  Jevity 1.5 Edison (JEVITY1.5)  Total Volume for 24 Hours (mL): 1200  Continuous  Starting Tube Feed Rate {mL per Hour}: 30  Increase Tube Feed Rate by (mL): 10     Every 8 hours  Until Goal Tube Feed Rate (mL per Hour): 50  Tube Feed Duration (in Hours): 24  Tube Feed Start Time: 00:00  Free Water Flush  Pump   Rate (mL per Hour): 20  No Carb Prosource TF     Qty per Day:  1 packet  Entered: Feb 11 2022  7:23AM    Diet NPO with Tube Feed-  Tube Feeding Modality: Gastrostomy  Jevity 1.5 Edison (JEVITY1.5)  Total Volume for 24 Hours (mL): 1200  Continuous  Starting Tube Feed Rate {mL per Hour}: 20  Increase Tube Feed Rate by (mL): 10     Every 6 hours  Until Goal Tube Feed Rate (mL per Hour): 50  Tube Feed Duration (in Hours): 24  Tube Feed Start Time: 00:00  Free Water Flush  Free Water Flush Instructions:  20 mL q1hr  No Carb Prosource TF     Qty per Day:  1  Entered: Feb 9 2022  1:37PM    The following pending diet order is being considered for treatment of Severe protein-calorie malnutrition and Underweight (BMI < 19):null
This patient has been assessed with a concern for Malnutrition and has been determined to have a diagnosis/diagnoses of Severe protein-calorie malnutrition and Underweight (BMI < 19).    This patient is being managed with:   Diet NPO with Tube Feed-  Tube Feeding Modality: Jejunostomy  Jevity 1.5 Edison (JEVITY1.5)  Total Volume for 24 Hours (mL): 1200  Continuous  Until Goal Tube Feed Rate (mL per Hour): 50  Tube Feed Duration (in Hours): 24  Tube Feed Start Time: 00:00  Free Water Flush  Free Water Flush Instructions:  Free water flush of 25 cc/hr (total volume 500 mL); adjust prn to maintain hydration  No Carb Prosource TF     Qty per Day:  3  Entered: Feb 19 2022 11:27AM    Diet NPO after Midnight-     NPO Start Date: 15-Feb-2022   NPO Start Time: 23:59  Except Medications  Entered: Feb 15 2022  7:54AM    Diet NPO with Tube Feed-  Tube Feeding Modality: Gastrostomy  Jevity 1.5 Edison (JEVITY1.5)  Total Volume for 24 Hours (mL): 1200  Continuous  Starting Tube Feed Rate {mL per Hour}: 20  Increase Tube Feed Rate by (mL): 10     Every 6 hours  Until Goal Tube Feed Rate (mL per Hour): 50  Tube Feed Duration (in Hours): 24  Tube Feed Start Time: 00:00  Free Water Flush  Free Water Flush Instructions:  20 mL q1hr  No Carb Prosource TF     Qty per Day:  1  Entered: Feb 9 2022  1:37PM    The following pending diet order is being considered for treatment of Severe protein-calorie malnutrition and Underweight (BMI < 19):null
This patient has been assessed with a concern for Malnutrition and has been determined to have a diagnosis/diagnoses of Severe protein-calorie malnutrition and Underweight (BMI < 19).    This patient is being managed with:   Diet NPO with Tube Feed-  Tube Feeding Modality: Jejunostomy  Jevity 1.5 Edison (JEVITY1.5)  Total Volume for 24 Hours (mL): 1200  Continuous  Until Goal Tube Feed Rate (mL per Hour): 50  Tube Feed Duration (in Hours): 24  Tube Feed Start Time: 00:00  Free Water Flush  Free Water Flush Instructions:  Free water flush of 25 cc/hr (total volume 500 mL); adjust prn to maintain hydration  No Carb Prosource TF     Qty per Day:  3  Entered: Feb 19 2022 11:27AM    Diet NPO after Midnight-     NPO Start Date: 15-Feb-2022   NPO Start Time: 23:59  Except Medications  Entered: Feb 15 2022  7:54AM    Diet NPO with Tube Feed-  Tube Feeding Modality: Gastrostomy  Jevity 1.5 Edison (JEVITY1.5)  Total Volume for 24 Hours (mL): 1200  Continuous  Starting Tube Feed Rate {mL per Hour}: 20  Increase Tube Feed Rate by (mL): 10     Every 6 hours  Until Goal Tube Feed Rate (mL per Hour): 50  Tube Feed Duration (in Hours): 24  Tube Feed Start Time: 00:00  Free Water Flush  Free Water Flush Instructions:  20 mL q1hr  No Carb Prosource TF     Qty per Day:  1  Entered: Feb 9 2022  1:37PM    The following pending diet order is being considered for treatment of Severe protein-calorie malnutrition and Underweight (BMI < 19):null  This patient has been assessed with a concern for Malnutrition and has been determined to have a diagnosis/diagnoses of Severe protein-calorie malnutrition and Underweight (BMI < 19).    This patient is being managed with:   Diet NPO with Tube Feed-  Tube Feeding Modality: Jejunostomy  Jevity 1.5 Edison (JEVITY1.5)  Total Volume for 24 Hours (mL): 1200  Continuous  Until Goal Tube Feed Rate (mL per Hour): 50  Tube Feed Duration (in Hours): 24  Tube Feed Start Time: 00:00  Free Water Flush  Free Water Flush Instructions:  Free water flush of 25 cc/hr (total volume 500 mL); adjust prn to maintain hydration  No Carb Prosource TF     Qty per Day:  3  Entered: Feb 19 2022 11:27AM    Diet NPO after Midnight-     NPO Start Date: 15-Feb-2022   NPO Start Time: 23:59  Except Medications  Entered: Feb 15 2022  7:54AM    Diet NPO with Tube Feed-  Tube Feeding Modality: Gastrostomy  Jevity 1.5 Edison (JEVITY1.5)  Total Volume for 24 Hours (mL): 1200  Continuous  Starting Tube Feed Rate {mL per Hour}: 20  Increase Tube Feed Rate by (mL): 10     Every 6 hours  Until Goal Tube Feed Rate (mL per Hour): 50  Tube Feed Duration (in Hours): 24  Tube Feed Start Time: 00:00  Free Water Flush  Free Water Flush Instructions:  20 mL q1hr  No Carb Prosource TF     Qty per Day:  1  Entered: Feb 9 2022  1:37PM    The following pending diet order is being considered for treatment of Severe protein-calorie malnutrition and Underweight (BMI < 19):null
This patient has been assessed with a concern for Malnutrition and has been determined to have a diagnosis/diagnoses of Severe protein-calorie malnutrition and Underweight (BMI < 19).      The following pending diet order is being considered for treatment of Severe protein-calorie malnutrition and Underweight (BMI < 19):  Diet Clear Liquid-  Tube Feeding Modality: Gastrostomy  Jevity 1.5 Edison (JEVITY1.5)  Total Volume for 24 Hours (mL): 1200  Continuous  Starting Tube Feed Rate {mL per Hour}: 10  Increase Tube Feed Rate by (mL): 10     Every 8 hours  Until Goal Tube Feed Rate (mL per Hour): 50  Tube Feed Duration (in Hours): 24  Tube Feed Start Time: 00:00  Free Water Flush Instructions:  @ 35cc/hr (total volume x 24hr-700ml)  No Carb Prosource TF     Qty per Day:  3  Entered: Feb 5 2022  2:10PM  
This patient has been assessed with a concern for Malnutrition and has been determined to have a diagnosis/diagnoses of Severe protein-calorie malnutrition and Underweight (BMI < 19).      The following pending diet order is being considered for treatment of Severe protein-calorie malnutrition and Underweight (BMI < 19):  Diet Clear Liquid-  Tube Feeding Modality: Gastrostomy  Jevity 1.5 Edison (JEVITY1.5)  Total Volume for 24 Hours (mL): 1200  Continuous  Starting Tube Feed Rate {mL per Hour}: 10  Increase Tube Feed Rate by (mL): 10     Every 8 hours  Until Goal Tube Feed Rate (mL per Hour): 50  Tube Feed Duration (in Hours): 24  Tube Feed Start Time: 00:00  Free Water Flush Instructions:  @ 35cc/hr (total volume x 24hr-700ml)  No Carb Prosource TF     Qty per Day:  3  Entered: Feb 5 2022  2:10PM  
This patient has been assessed with a concern for Malnutrition and has been determined to have a diagnosis/diagnoses of Severe protein-calorie malnutrition and Underweight (BMI < 19).    This patient is being managed with:   Diet NPO after Midnight-     NPO Start Date: 15-Feb-2022   NPO Start Time: 23:59  Except Medications  Entered: Feb 15 2022  7:54AM    Diet NPO with Tube Feed-  Tube Feeding Modality: Gastrostomy  Jevity 1.5 Edison (JEVITY1.5)  Total Volume for 24 Hours (mL): 1200  Continuous  Starting Tube Feed Rate {mL per Hour}: 30  Increase Tube Feed Rate by (mL): 10     Every 8 hours  Until Goal Tube Feed Rate (mL per Hour): 50  Tube Feed Duration (in Hours): 24  Tube Feed Start Time: 00:00  Free Water Flush  Pump   Rate (mL per Hour): 20  No Carb Prosource TF     Qty per Day:  1 packet  Entered: Feb 11 2022  7:23AM    Diet NPO with Tube Feed-  Tube Feeding Modality: Gastrostomy  Jevity 1.5 Edison (JEVITY1.5)  Total Volume for 24 Hours (mL): 1200  Continuous  Starting Tube Feed Rate {mL per Hour}: 20  Increase Tube Feed Rate by (mL): 10     Every 6 hours  Until Goal Tube Feed Rate (mL per Hour): 50  Tube Feed Duration (in Hours): 24  Tube Feed Start Time: 00:00  Free Water Flush  Free Water Flush Instructions:  20 mL q1hr  No Carb Prosource TF     Qty per Day:  1  Entered: Feb 9 2022  1:37PM    The following pending diet order is being considered for treatment of Severe protein-calorie malnutrition and Underweight (BMI < 19):null
This patient has been assessed with a concern for Malnutrition and has been determined to have a diagnosis/diagnoses of Severe protein-calorie malnutrition and Underweight (BMI < 19).    This patient is being managed with:   Diet NPO after Midnight-     NPO Start Date: 15-Feb-2022   NPO Start Time: 23:59  Except Medications  Entered: Feb 15 2022  7:54AM    Diet NPO with Tube Feed-  Tube Feeding Modality: Gastrostomy  Jevity 1.5 Edison (JEVITY1.5)  Total Volume for 24 Hours (mL): 1200  Continuous  Starting Tube Feed Rate {mL per Hour}: 30  Increase Tube Feed Rate by (mL): 10     Every 8 hours  Until Goal Tube Feed Rate (mL per Hour): 50  Tube Feed Duration (in Hours): 24  Tube Feed Start Time: 00:00  Free Water Flush  Pump   Rate (mL per Hour): 20  No Carb Prosource TF     Qty per Day:  1 packet  Entered: Feb 11 2022  7:23AM    Diet NPO with Tube Feed-  Tube Feeding Modality: Gastrostomy  Jevity 1.5 Edison (JEVITY1.5)  Total Volume for 24 Hours (mL): 1200  Continuous  Starting Tube Feed Rate {mL per Hour}: 20  Increase Tube Feed Rate by (mL): 10     Every 6 hours  Until Goal Tube Feed Rate (mL per Hour): 50  Tube Feed Duration (in Hours): 24  Tube Feed Start Time: 00:00  Free Water Flush  Free Water Flush Instructions:  20 mL q1hr  No Carb Prosource TF     Qty per Day:  1  Entered: Feb 9 2022  1:37PM    The following pending diet order is being considered for treatment of Severe protein-calorie malnutrition and Underweight (BMI < 19):null
This patient has been assessed with a concern for Malnutrition and has been determined to have a diagnosis/diagnoses of Severe protein-calorie malnutrition and Underweight (BMI < 19).    This patient is being managed with:   Diet NPO after Midnight-     NPO Start Date: 13-Feb-2022   NPO Start Time: 23:59  Entered: Feb 13 2022 12:41PM    Diet NPO with Tube Feed-  Tube Feeding Modality: Gastrostomy  Jevity 1.5 Edison (JEVITY1.5)  Total Volume for 24 Hours (mL): 1200  Continuous  Starting Tube Feed Rate {mL per Hour}: 30  Increase Tube Feed Rate by (mL): 10     Every 8 hours  Until Goal Tube Feed Rate (mL per Hour): 50  Tube Feed Duration (in Hours): 24  Tube Feed Start Time: 00:00  Free Water Flush  Pump   Rate (mL per Hour): 20  No Carb Prosource TF     Qty per Day:  1 packet  Entered: Feb 11 2022  7:23AM    Diet NPO with Tube Feed-  Tube Feeding Modality: Gastrostomy  Jevity 1.5 Edison (JEVITY1.5)  Total Volume for 24 Hours (mL): 1200  Continuous  Starting Tube Feed Rate {mL per Hour}: 20  Increase Tube Feed Rate by (mL): 10     Every 6 hours  Until Goal Tube Feed Rate (mL per Hour): 50  Tube Feed Duration (in Hours): 24  Tube Feed Start Time: 00:00  Free Water Flush  Free Water Flush Instructions:  20 mL q1hr  No Carb Prosource TF     Qty per Day:  1  Entered: Feb 9 2022  1:37PM    The following pending diet order is being considered for treatment of Severe protein-calorie malnutrition and Underweight (BMI < 19):null
This patient has been assessed with a concern for Malnutrition and has been determined to have a diagnosis/diagnoses of Severe protein-calorie malnutrition and Underweight (BMI < 19).    This patient is being managed with:   Diet NPO after Midnight-     NPO Start Date: 15-Feb-2022   NPO Start Time: 23:59  Except Medications  Entered: Feb 15 2022  7:54AM    Diet NPO with Tube Feed-  Tube Feeding Modality: Gastrostomy  Jevity 1.5 Edison (JEVITY1.5)  Total Volume for 24 Hours (mL): 1200  Continuous  Starting Tube Feed Rate {mL per Hour}: 30  Increase Tube Feed Rate by (mL): 10     Every 8 hours  Until Goal Tube Feed Rate (mL per Hour): 50  Tube Feed Duration (in Hours): 24  Tube Feed Start Time: 00:00  Free Water Flush  Pump   Rate (mL per Hour): 20  No Carb Prosource TF     Qty per Day:  1 packet  Entered: Feb 11 2022  7:23AM    Diet NPO with Tube Feed-  Tube Feeding Modality: Gastrostomy  Jevity 1.5 Edison (JEVITY1.5)  Total Volume for 24 Hours (mL): 1200  Continuous  Starting Tube Feed Rate {mL per Hour}: 20  Increase Tube Feed Rate by (mL): 10     Every 6 hours  Until Goal Tube Feed Rate (mL per Hour): 50  Tube Feed Duration (in Hours): 24  Tube Feed Start Time: 00:00  Free Water Flush  Free Water Flush Instructions:  20 mL q1hr  No Carb Prosource TF     Qty per Day:  1  Entered: Feb 9 2022  1:37PM    The following pending diet order is being considered for treatment of Severe protein-calorie malnutrition and Underweight (BMI < 19):null
This patient has been assessed with a concern for Malnutrition and has been determined to have a diagnosis/diagnoses of Severe protein-calorie malnutrition and Underweight (BMI < 19).    This patient is being managed with:   Diet NPO with Tube Feed-  Tube Feeding Modality: Jejunostomy  Jevity 1.5 Edison (JEVITY1.5)  Total Volume for 24 Hours (mL): 1200  Continuous  Until Goal Tube Feed Rate (mL per Hour): 50  Tube Feed Duration (in Hours): 24  Tube Feed Start Time: 00:00  Free Water Flush  Free Water Flush Instructions:  Free water flush of 25 cc/hr (total volume 500 mL); adjust prn to maintain hydration  No Carb Prosource TF     Qty per Day:  3  Entered: Feb 19 2022 11:27AM    Diet NPO after Midnight-     NPO Start Date: 15-Feb-2022   NPO Start Time: 23:59  Except Medications  Entered: Feb 15 2022  7:54AM    Diet NPO with Tube Feed-  Tube Feeding Modality: Gastrostomy  Jevity 1.5 Edison (JEVITY1.5)  Total Volume for 24 Hours (mL): 1200  Continuous  Starting Tube Feed Rate {mL per Hour}: 20  Increase Tube Feed Rate by (mL): 10     Every 6 hours  Until Goal Tube Feed Rate (mL per Hour): 50  Tube Feed Duration (in Hours): 24  Tube Feed Start Time: 00:00  Free Water Flush  Free Water Flush Instructions:  20 mL q1hr  No Carb Prosource TF     Qty per Day:  1  Entered: Feb 9 2022  1:37PM    The following pending diet order is being considered for treatment of Severe protein-calorie malnutrition and Underweight (BMI < 19):null
This patient has been assessed with a concern for Malnutrition and has been determined to have a diagnosis/diagnoses of Severe protein-calorie malnutrition and Underweight (BMI < 19).    This patient is being managed with:   Diet NPO with Tube Feed-  Tube Feeding Modality: Gastrostomy  Jevity 1.5 Edison (JEVITY1.5)  Total Volume for 24 Hours (mL): 480  Continuous  Starting Tube Feed Rate {mL per Hour}: 10  Increase Tube Feed Rate by (mL): 10     Every 6 hours  Until Goal Tube Feed Rate (mL per Hour): 20  Tube Feed Duration (in Hours): 24  Tube Feed Start Time: 00:00  Entered: Feb 8 2022 11:20AM    
This patient has been assessed with a concern for Malnutrition and has been determined to have a diagnosis/diagnoses of Severe protein-calorie malnutrition and Underweight (BMI < 19).    This patient is being managed with:   Diet NPO with Tube Feed-  Tube Feeding Modality: Gastrostomy  Jevity 1.5 Edison (JEVITY1.5)  Total Volume for 24 Hours (mL): 480  Continuous  Starting Tube Feed Rate {mL per Hour}: 10  Increase Tube Feed Rate by (mL): 10     Every 6 hours  Until Goal Tube Feed Rate (mL per Hour): 20  Tube Feed Duration (in Hours): 24  Tube Feed Start Time: 00:00  Entered: Feb 8 2022 11:20AM    
This patient has been assessed with a concern for Malnutrition and has been determined to have a diagnosis/diagnoses of Severe protein-calorie malnutrition and Underweight (BMI < 19).    This patient is being managed with:   Diet NPO with Tube Feed-  Tube Feeding Modality: Gastrostomy  Jevity 1.5 Edison (JEVITY1.5)  Total Volume for 24 Hours (mL): 480  Continuous  Starting Tube Feed Rate {mL per Hour}: 10  Increase Tube Feed Rate by (mL): 10     Every 6 hours  Until Goal Tube Feed Rate (mL per Hour): 20  Tube Feed Duration (in Hours): 24  Tube Feed Start Time: 00:00  Entered: Feb 8 2022 11:20AM    Parenteral Nutrition - Adult-  Entered: Feb 7 2022 10:00PM    
This patient has been assessed with a concern for Malnutrition and has been determined to have a diagnosis/diagnoses of Severe protein-calorie malnutrition and Underweight (BMI < 19).    This patient is being managed with:   Parenteral Nutrition - Adult-  Entered: Feb 7 2022 10:00PM    Diet Clear Liquid-  Tube Feeding Modality: Gastrostomy  Jevity 1.5 Edison (JEVITY1.5)  Total Volume for 24 Hours (mL): 1200  Continuous  Starting Tube Feed Rate {mL per Hour}: 10  Increase Tube Feed Rate by (mL): 10     Every 8 hours  Until Goal Tube Feed Rate (mL per Hour): 50  Tube Feed Duration (in Hours): 24  Tube Feed Start Time: 00:00  Free Water Flush Instructions:  @ 35cc/hr (total volume x 24hr-700ml)  No Carb Prosource TF     Qty per Day:  3  Entered: Feb 5 2022  2:10PM

## 2022-02-22 NOTE — DISCHARGE NOTE PROVIDER - NSDCCPCAREPLAN_GEN_ALL_CORE_FT
PRINCIPAL DISCHARGE DIAGNOSIS  Diagnosis: Recurrent aspiration pneumonia  Assessment and Plan of Treatment: compelted course of antibiotics. return to ER if fever or chills  * PEG dislodged - repaired nad replaced on 2/19      SECONDARY DISCHARGE DIAGNOSES  Diagnosis: Acute on chronic respiratory failure with hypoxia  Assessment and Plan of Treatment:     Diagnosis: Dehydration with hypernatremia  Assessment and Plan of Treatment:

## 2022-02-22 NOTE — PROGRESS NOTE ADULT - SUBJECTIVE AND OBJECTIVE BOX
56M hx TBI w resultant paraplegia 35 years ago  seizures, chronic resp failure s/p trach decannulated  pulmonary fibrosis, recurrent asp PNA, PEG tube,  DM II, GERD, HTN admit from SNF with resp distress AMS hypernatremia. As soon as he hit the floor from the ED rapid response called for hypoxemia and AMS from baseline. Since admission patient was admitted to the ICU for critical care. His PEG was found to be leaking bilious fluid, and was removed and replaced on 2/7/22. PEG feeds may be resumed. Patient was also placed on a protonix drip. Patient is also being treated with antifungals for oral candidiasis.     2/7: No events over night  2/8: No events over night  2/9: Tolerating feeds, no other events  2/10: no overnight events. tolerating feeds.  2/11: straight cath overnight for retention. non-verbal. tolerating feeds.   2/12 - appears comfortable on O2 via trach, tolerating PEG feeds   2/13:  Pt seen.  Tolerating TC.  Tolerating TF.  AS per nursing, PEG leaking.  2/14: Pt seems to be comfortable on TC. tube feeds held overnight for possible PEG repair today, but PEG repair hope for weds. feeds resumed  2/15: Pt appears to be comfortable on TC. plan for PEG repair tomorrow.  2/16 - plan for procedure this afternoon.  no events overnight  2/17 - unable to do procedure yesterday and planned for today  2/18 - unable to get IV access yesterday and procedure was cancelled.   IV access established this AM and dr ott aware  2/19: s/p PEG replacement removed.  [employer] EndoVive PEG placed. 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 Good closure.   J tube extension placed through PEG and clipped into fourth portion of duodenum.  2/20: no change in his status non verbal but happy looking  2/21 - no events overnight.  2/22 - no events overnight.  tolerating feeds     Vital Signs Last 24 Hrs  T(C): 36.6 (22 Feb 2022 09:20), Max: 36.6 (22 Feb 2022 09:20)  T(F): 97.9 (22 Feb 2022 09:20), Max: 97.9 (22 Feb 2022 09:20)  HR: 77 (22 Feb 2022 09:20) (77 - 80)  BP: 110/69 (22 Feb 2022 09:20) (104/57 - 112/64)  BP(mean): --  RR: 18 (22 Feb 2022 09:20) (17 - 18)  SpO2: 96% (22 Feb 2022 09:20) (95% - 98%)    PHYSICAL EXAM:  Constitutional: awake, lying in bed. cachectic. NAD, wearing trach collar.  Eyes: conjunctiva clear, no discharge  Neck: trach stoma clean  Respiratory: CTA b/l. no wheezes, rales, rhonchi   Cardiovascular: regular rate and rhythm, no MRG  Gastrointestinal: abd soft, NT/ND. +BS x4. +PEG site w/o current leakage   Extremities: no pedal edema  Neurological: awake. Paraplegic w RUE contracted  Skin: warm and dry. L ear w sl breakdown.  MSK; markedly reduced muscle mass throughout                  labs reviewed

## 2022-02-22 NOTE — PROGRESS NOTE ADULT - ATTENDING COMMENTS
56 year old man admitted with pneumonia and free air on a CT managed conservatively, with large leak from a gastrostomy fistula, now s/p G-J placement and endoscopic suturing closure of fistula using with excellent result.     Great clinical response to suturing. GJ tube being used to bypass the fistula. Continue to use to allow for long term healing.   No skin leak at this point. Continue to monitor.

## 2022-02-22 NOTE — PROGRESS NOTE ADULT - SUBJECTIVE AND OBJECTIVE BOX
Patient is a 56y old  Male who presents with a chief complaint of aspiration pneumonia, hypernatremia.    HPI: 55 y/o male admitted for aspiration PNA. No acute overnight events. Remains at baseline mental status. Tube feedings infusing.     MEDICATIONS  (STANDING):  ALBUTerol    90 MICROgram(s) HFA Inhaler 2 Puff(s) Inhalation every 6 hours  BACItracin   Ointment 1 Application(s) Topical four times a day  baclofen 10 milliGRAM(s) Oral two times a day  chlorhexidine 0.12% Liquid 15 milliLiter(s) Swish and Spit two times a day  cholecalciferol 1000 Unit(s) Oral daily  doxazosin 2 milliGRAM(s) Oral at bedtime  enoxaparin Injectable 40 milliGRAM(s) SubCutaneous daily  lacosamide Solution 100 milliGRAM(s) Oral two times a day  levETIRAcetam  Solution 500 milliGRAM(s) Oral two times a day  metoclopramide 5 milliGRAM(s) Oral four times a day  pantoprazole   Suspension 40 milliGRAM(s) Oral every 12 hours  silver sulfADIAZINE 1% Cream 1 Application(s) Topical two times a day  tiotropium 18 MICROgram(s) Capsule 1 Capsule(s) Inhalation daily  venlafaxine 37.5 milliGRAM(s) Oral daily  zinc oxide 40% Ointment 1 Application(s) Topical three times a day    MEDICATIONS  (PRN):  acetaminophen     Tablet .. 650 milliGRAM(s) Oral every 6 hours PRN Temp greater or equal to 38C (100.4F), Mild Pain (1 - 3)  aluminum hydroxide/magnesium hydroxide/simethicone Suspension 30 milliLiter(s) Oral every 4 hours PRN Dyspepsia  melatonin 3 milliGRAM(s) Oral at bedtime PRN Insomnia  ondansetron   Disintegrating Tablet 4 milliGRAM(s) Oral every 8 hours PRN Vomiting    Vital Signs Last 24 Hrs  T(C): 36.6 (22 Feb 2022 09:20), Max: 36.6 (22 Feb 2022 09:20)  T(F): 97.9 (22 Feb 2022 09:20), Max: 97.9 (22 Feb 2022 09:20)  HR: 77 (22 Feb 2022 09:20) (77 - 80)  BP: 110/69 (22 Feb 2022 09:20) (104/57 - 112/64)  BP(mean): --  RR: 18 (22 Feb 2022 09:20) (17 - 18)  SpO2: 96% (22 Feb 2022 09:20) (95% - 98%)    PHYSICAL EXAM:  Constitutional: NAD, lying in bed tube feedings infusing  Respiratory: clear to ascultation bilaterally, no wheezing  Cardiovascular: S1 and S2, regular rate and rhythm  Gastrointestinal: soft, non-tender, non-distended, G-J in place, site clean dry and intact, tube feedings infusing  Neurological: baseline mental status  Psychiatric: Normal mood, normal affect  Skin: No rashes, not jaundiced    LABS:                RADIOLOGY & ADDITIONAL STUDIES: reviewed Patient is a 56y old  Male who presents with a chief complaint of aspiration pneumonia, hypernatremia.    HPI: 57 y/o male admitted for aspiration PNA. No acute overnight events. Remains at baseline mental status. Tube feedings infusing.     MEDICATIONS  (STANDING):  ALBUTerol    90 MICROgram(s) HFA Inhaler 2 Puff(s) Inhalation every 6 hours  BACItracin   Ointment 1 Application(s) Topical four times a day  baclofen 10 milliGRAM(s) Oral two times a day  chlorhexidine 0.12% Liquid 15 milliLiter(s) Swish and Spit two times a day  cholecalciferol 1000 Unit(s) Oral daily  doxazosin 2 milliGRAM(s) Oral at bedtime  enoxaparin Injectable 40 milliGRAM(s) SubCutaneous daily  lacosamide Solution 100 milliGRAM(s) Oral two times a day  levETIRAcetam  Solution 500 milliGRAM(s) Oral two times a day  metoclopramide 5 milliGRAM(s) Oral four times a day  pantoprazole   Suspension 40 milliGRAM(s) Oral every 12 hours  silver sulfADIAZINE 1% Cream 1 Application(s) Topical two times a day  tiotropium 18 MICROgram(s) Capsule 1 Capsule(s) Inhalation daily  venlafaxine 37.5 milliGRAM(s) Oral daily  zinc oxide 40% Ointment 1 Application(s) Topical three times a day    MEDICATIONS  (PRN):  acetaminophen     Tablet .. 650 milliGRAM(s) Oral every 6 hours PRN Temp greater or equal to 38C (100.4F), Mild Pain (1 - 3)  aluminum hydroxide/magnesium hydroxide/simethicone Suspension 30 milliLiter(s) Oral every 4 hours PRN Dyspepsia  melatonin 3 milliGRAM(s) Oral at bedtime PRN Insomnia  ondansetron   Disintegrating Tablet 4 milliGRAM(s) Oral every 8 hours PRN Vomiting    Vital Signs Last 24 Hrs  T(C): 36.6 (22 Feb 2022 09:20), Max: 36.6 (22 Feb 2022 09:20)  T(F): 97.9 (22 Feb 2022 09:20), Max: 97.9 (22 Feb 2022 09:20)  HR: 77 (22 Feb 2022 09:20) (77 - 80)  BP: 110/69 (22 Feb 2022 09:20) (104/57 - 112/64)  BP(mean): --  RR: 18 (22 Feb 2022 09:20) (17 - 18)  SpO2: 96% (22 Feb 2022 09:20) (95% - 98%)    PHYSICAL EXAM:  Constitutional: NAD, lying in bed tube feedings infusing  Respiratory: clear to ascultation bilaterally, no wheezing  Cardiovascular: S1 and S2, regular rate and rhythm  Gastrointestinal: soft, non-tender, non-distended, G-J in place, site clean dry and intact, tube feedings infusing  Neurological: baseline mental status  Psychiatric: Normal mood, normal affect  Skin: No rashes, not jaundiced    LABS: reviewed    RADIOLOGY & ADDITIONAL STUDIES: reviewed images from closure from endoscopic suturing

## 2022-02-22 NOTE — DISCHARGE NOTE PROVIDER - NSDCFUADDINST_GEN_ALL_CORE_FT
Feed port/J port for J tube feeds only. Absolutely no meds in this port.   Rx port never to be used.   Suction/G port for meds only with limited water flushes.  aspiration precautions

## 2022-02-22 NOTE — DISCHARGE NOTE PROVIDER - INSTRUCTIONS
Diet NPO with Tube Feed-  Tube Feeding Modality: Jejunostomy  Jevity 1.5 Edison (JEVITY1.5)  Total Volume for 24 Hours (mL): 1200  Continuous  Until Goal Tube Feed Rate (mL per Hour): 50  Tube Feed Duration (in Hours): 24  Tube Feed Start Time: 00:00  Free Water Flush  Free Water Flush Instructions:  Free water flush of 25 cc/hr (total volume 500 mL); adjust prn to maintain hydration  No Carb Prosource TF     Qty per Day:  3

## 2022-02-22 NOTE — DISCHARGE NOTE PROVIDER - HOSPITAL COURSE
· Reason for Admission	aspiration pneumonia, bhypernatremia    56M hx TBI w resultant paraplegia 35 years ago  seizures, chronic resp failure s/p trach decannulated  pulmonary fibrosis, recurrent asp PNA, PEG tube,  DM II, GERD, HTN admit from SNF with resp distress AMS hypernatremia. As soon as he hit the floor from the ED rapid response called for hypoxemia and AMS from baseline. Since admission patient was admitted to the ICU for critical care. His PEG was found to be leaking bilious fluid, and was removed and replaced on 2/7/22. PEG feeds may be resumed. Patient was also placed on a protonix drip. Patient is also being treated with antifungals for oral candidiasis.   pt admitted and transferred to medical floor with mental status improving.  on 2/19 [employer] EndoVive PEG placed. 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 Good closure. J tube extension placed through PEG and clipped into fourth portion of duodenum. pt is now tolerating feeds.    pt has trach   2/20: no change in his status non verbal but happy looking  2/21 - no events overnight.  2/22 - no events overnight.  tolerating feeds     Vital Signs Last 24 Hrs  T(C): 36.6 (22 Feb 2022 09:20), Max: 36.6 (22 Feb 2022 09:20)  T(F): 97.9 (22 Feb 2022 09:20), Max: 97.9 (22 Feb 2022 09:20)  HR: 77 (22 Feb 2022 09:20) (77 - 80)  BP: 110/69 (22 Feb 2022 09:20) (104/57 - 112/64)  BP(mean): --  RR: 18 (22 Feb 2022 09:20) (17 - 18)  SpO2: 96% (22 Feb 2022 09:20) (95% - 98%)    PHYSICAL EXAM:  Constitutional: awake, lying in bed. cachectic. NAD, wearing trach collar.  Eyes: conjunctiva clear, no discharge  Neck: trach stoma clean  Respiratory: CTA b/l. no wheezes, rales, rhonchi   Cardiovascular: regular rate and rhythm, no MRG  Gastrointestinal: abd soft, NT/ND. +BS x4. +PEG site w/o current leakage   Extremities: no pedal edema  Neurological: awake. Paraplegic w RUE contracted  Skin: warm and dry. L ear w sl breakdown.  MSK; markedly reduced muscle mass throughout                  labs reviewed                    Assessment and Plan:   · Assessment	  56M hx TBI w resultant paraplegia 35 years ago seizures, chronic resp failure s/p trach, pulmonary fibrosis, recurrent asp PNA, PEG tube,  DM II, GERD, HTN admit from SNF with resp distress AMS hypernatremia.  Initially admitted to ICU.  His PEG was found to be leaking bilious fluid, and was removed and replaced on 2/7/22. PEG feeds may be resumed. Patient was also placed on a protonix drip. Patient is also being treated with antifungals for oral candidiasis.     #Acute on chronic hypoxic resp failure #Trach collar w supplemental O2:  resolving  - pt off trach collar (stoma mostly closed and opens rarely depending on position as per RT) and only on NC 2 liters.  wean off at [facility]  Cont inhalers.  currently has it in place as per RT for humidified o2 and does not need this one discharge      #Chronic leaking of PEG tube:  resolved  s/p PEG replacement removed.  [employer] EndoVive PEG placed. 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 Good closure.   J tube extension placed through PEG and clipped into fourth portion of duodenum.      #COVID +:    Positive 2/8-- after admission.    F/u repeat CXR-- no evidence of active pulmonary disease    No significant infiltrates on last imaging.      #Urine Retention: per nurse pt voiding wet diaper      #TBI/ Paraplegia/ Seizures:    Cont home meds.      #VTE: enoxaparin    #GOALS OF CARE  Pt is a full code  MOLST updated   · Reason for Admission	aspiration pneumonia, bhypernatremia    56M hx TBI w resultant paraplegia 35 years ago  seizures, chronic resp failure s/p trach decannulated  pulmonary fibrosis, recurrent asp PNA, PEG tube,  DM II, GERD, HTN admit from SNF with resp distress AMS hypernatremia. As soon as he hit the floor from the ED rapid response called for hypoxemia and AMS from baseline. Since admission patient was admitted to the ICU for critical care. His PEG was found to be leaking bilious fluid, and was removed and replaced on 2/7/22. PEG feeds may be resumed. Patient was also placed on a protonix drip. Patient is also being treated with antifungals for oral candidiasis.   pt admitted and transferred to medical floor with mental status improving.  on 2/19 [employer] EndoVive PEG placed. 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 Good closure. J tube extension placed through PEG and clipped into fourth portion of duodenum. pt is now tolerating feeds.    pt has trach stoma but scabbed over underneath    Vital Signs Last 24 Hrs  T(C): 36.6 (22 Feb 2022 09:20), Max: 36.6 (22 Feb 2022 09:20)  T(F): 97.9 (22 Feb 2022 09:20), Max: 97.9 (22 Feb 2022 09:20)  HR: 77 (22 Feb 2022 09:20) (77 - 80)  BP: 110/69 (22 Feb 2022 09:20) (104/57 - 112/64)  BP(mean): --  RR: 18 (22 Feb 2022 09:20) (17 - 18)  SpO2: 96% (22 Feb 2022 09:20) (95% - 98%)    PHYSICAL EXAM:  Constitutional: awake, lying in bed. cachectic. NAD, wearing trach collar.  Eyes: conjunctiva clear, no discharge  Neck: trach stoma clean  Respiratory: CTA b/l. no wheezes, rales, rhonchi   Cardiovascular: regular rate and rhythm, no MRG  Gastrointestinal: abd soft, NT/ND. +BS x4. +PEG site w/o current leakage   Extremities: no pedal edema  Neurological: awake. Paraplegic w RUE contracted  Skin: warm and dry. L ear w sl breakdown.  MSK; markedly reduced muscle mass throughout              #Acute on chronic hypoxic resp failure #Trach collar w supplemental O2:  resolving  - pt off trach collar (stoma mostly closed)and now on RA.  if oxygen needed can be administerd via NC   Cont inhalers.  currently has it in place as per RT for humidified o2 and does not need this one discharge      #Chronic leaking of PEG tube:  resolved  s/p PEG replacement removed. 2/19 - [employer] EndoVive PEG placed. 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 Good closure.   J tube extension placed through PEG and clipped into fourth portion of duodenum.    #COVID +:    Positive 2/8-- after admission.    F/u repeat CXR-- no evidence of active pulmonary disease    No significant infiltrates on last imaging.  pt has fully recovered from covid     #Urine Retention: per nurse pt voiding wet diaper    #TBI/ Paraplegia/ Seizures:    Cont home meds.      #VTE: enoxaparin    #GOALS OF CARE  Pt is a full code  MOLST updated    HCP sister updated

## 2022-02-22 NOTE — DISCHARGE NOTE NURSING/CASE MANAGEMENT/SOCIAL WORK - NSDCPEFALRISK_GEN_ALL_CORE
For information on Fall & Injury Prevention, visit: [email or web address] OR  [email or web address] OR  https://www.cdc.gov/steadi/patient.html

## 2022-02-22 NOTE — PROGRESS NOTE ADULT - REASON FOR ADMISSION
aspiration pneumonia  hypernatremia

## 2022-02-22 NOTE — DISCHARGE NOTE PROVIDER - NSDCMRMEDTOKEN_GEN_ALL_CORE_FT
acetaminophen 325 mg oral tablet: 2 tab(s) orally every 6 hours, As Needed  bacitracin 500 units/g topical ointment: Apply topically to affected area on left ear once daily for 7 days  baclofen 10 mg oral tablet: 1 tab(s) orally 2 times a day for muscle spasm  cholecalciferol oral tablet: 1 tab(s) orally once a day  Combivent Respimat 20 mcg-100 mcg/inh inhalation aerosol: 1 puff(s) inhaled every 6 hours, As Needed for asthma  docusate sodium 50 mg/5 mL oral solution: 20 milliliter(s) orally once a day  doxazosin 2 mg oral tablet: 1 tab(s) orally once a day  enoxaparin 40 mg/0.4 mL injectable solution: 0.4 milliliter(s) subcutaneous once a day  famotidine 20 mg oral tablet: 1 tab(s) orally once a day  fluconazole 100 mg oral tablet: 1 tab(s) orally once a day for 7 days for thrush  lacosamide 10 mg/mL oral solution: 10 milliliter(s) orally 2 times a day for seizure  lactulose 10 g/15 mL oral solution: 30 milliliter(s) orally once a day  levETIRAcetam 100 mg/mL oral solution: 5 milliliter(s) orally 2 times a day for seizures  nystatin 100,000 units/mL oral suspension: swish and spit 10ml by mouth 3 times a day for 7 days for thrush  ondansetron 4 mg oral tablet: 1 tab(s) orally every 8 hours, As Needed  Peridex 0.12% mucous membrane liquid: 30 milliliter(s) mucous membrane 3 times a day for thrush for 30 days  Silvadene 1% topical cream: Apply topically to affected area of peg tube site 2 times a day  Silvadene 1% topical cream: Apply topically to affected area once a day, As Needed for soiling and dislocation  sodium bicarbonate 650 mg oral tablet: 1 tab(s) orally once a day for acidosis  venlafaxine 37.5 mg oral tablet: 1 tab(s) orally once a day   acetaminophen 325 mg oral tablet: 2 tab(s) orally every 6 hours, As Needed  bacitracin 500 units/g topical ointment: 1 application topically 4 times a day  baclofen 10 mg oral tablet: 1 tab(s) orally 2 times a day  cholecalciferol oral tablet: 1000 unit(s) orally once a day  Combivent Respimat 20 mcg-100 mcg/inh inhalation aerosol: 1 puff(s) inhaled every 6 hours, As Needed for asthma  doxazosin 2 mg oral tablet: 1 tab(s) orally once a day (at bedtime)  enoxaparin 40 mg/0.4 mL injectable solution: 0.4 milliliter(s) subcutaneous once a day  famotidine 20 mg oral tablet: 1 tab(s) orally once a day  lacosamide 10 mg/mL oral solution: 10 milliliter(s) orally 2 times a day  lactulose 10 g/15 mL oral solution: 30 milliliter(s) orally once a day, As Needed  levETIRAcetam 100 mg/mL oral solution: 5 milliliter(s) orally 2 times a day  ondansetron 4 mg oral tablet, disintegratin tab(s) orally every 8 hours, As needed, Vomiting  pantoprazole 40 mg oral granule, delayed release: 40 milligram(s) orally once a day  silver sulfADIAZINE 1% topical cream: 1 application topically 2 times a day  venlafaxine 37.5 mg oral tablet: 1 tab(s) orally once a day  zinc oxide 40% topical ointment: 1 application topically 3 times a day

## 2022-02-22 NOTE — PROGRESS NOTE ADULT - ASSESSMENT
57 y/o male admitted for aspiration pneumonia. He is s/p PEG replacement, and repair of large gastro-cutaneous fistula using the X-tack system anchored to the PEG, and J tube extension placed through PEG and clipped into fourth portion of duodenum. He is overall doing well since procedure. Site is clean dry and intact. Tube feedings infusing well.    PLAN  C/w tube feedings per nutrition  Feed port/J port for J tube feeds only. Rx port never to be used. Suction/G port for meds only with limited water flushes.  Supportive care    Discussed with Dr. Duron 55 y/o male admitted for aspiration pneumonia. Had a large leak from an old gastrostomy site and he is s/p PEG-J replacement, and repair of large gastro-cutaneous fistula using the X-tack system anchored to the PEG, and J tube extension placed through PEG and clipped into fourth portion of duodenum. He is overall doing well since procedure. Site is clean dry and intact. Tube feedings infusing well.    PLAN  C/w tube feedings per nutrition  Feed port/J port for J tube feeds only. Rx port never to be used. Suction/G port for meds only with limited water flushes.  Supportive care    Discussed with Dr. Duron

## 2022-02-24 RX ORDER — CIPROFLOXACIN AND DEXAMETHASONE 3; 1 MG/ML; MG/ML
4 SUSPENSION/ DROPS AURICULAR (OTIC)
Qty: 0 | Refills: 0 | DISCHARGE
Start: 2022-02-24 | End: 2022-03-02

## 2022-03-01 ENCOUNTER — INPATIENT (INPATIENT)
Facility: HOSPITAL | Age: 57
LOS: 12 days | Discharge: NURSING, REHAB OR LONG-TERM CARE | DRG: 353 | End: 2022-03-14
Attending: INTERNAL MEDICINE | Admitting: INTERNAL MEDICINE
Payer: MEDICARE

## 2022-03-01 VITALS
DIASTOLIC BLOOD PRESSURE: 110 MMHG | TEMPERATURE: 98 F | HEART RATE: 67 BPM | OXYGEN SATURATION: 97 % | SYSTOLIC BLOOD PRESSURE: 150 MMHG | RESPIRATION RATE: 18 BRPM

## 2022-03-01 DIAGNOSIS — J96.12 CHRONIC RESPIRATORY FAILURE WITH HYPERCAPNIA: ICD-10-CM

## 2022-03-01 DIAGNOSIS — R45.1 RESTLESSNESS AND AGITATION: ICD-10-CM

## 2022-03-01 DIAGNOSIS — E87.6 HYPOKALEMIA: ICD-10-CM

## 2022-03-01 DIAGNOSIS — R13.10 DYSPHAGIA, UNSPECIFIED: ICD-10-CM

## 2022-03-01 DIAGNOSIS — H60.322: ICD-10-CM

## 2022-03-01 DIAGNOSIS — K31.6 FISTULA OF STOMACH AND DUODENUM: ICD-10-CM

## 2022-03-01 DIAGNOSIS — R74.8 ABNORMAL LEVELS OF OTHER SERUM ENZYMES: ICD-10-CM

## 2022-03-01 DIAGNOSIS — K94.13 ENTEROSTOMY MALFUNCTION: ICD-10-CM

## 2022-03-01 DIAGNOSIS — K21.9 GASTRO-ESOPHAGEAL REFLUX DISEASE WITHOUT ESOPHAGITIS: ICD-10-CM

## 2022-03-01 DIAGNOSIS — E43 UNSPECIFIED SEVERE PROTEIN-CALORIE MALNUTRITION: ICD-10-CM

## 2022-03-01 DIAGNOSIS — R03.0 ELEVATED BLOOD-PRESSURE READING, WITHOUT DIAGNOSIS OF HYPERTENSION: ICD-10-CM

## 2022-03-01 DIAGNOSIS — Z88.8 ALLERGY STATUS TO OTHER DRUGS, MEDICAMENTS AND BIOLOGICAL SUBSTANCES STATUS: ICD-10-CM

## 2022-03-01 DIAGNOSIS — D64.9 ANEMIA, UNSPECIFIED: ICD-10-CM

## 2022-03-01 DIAGNOSIS — Z87.820 PERSONAL HISTORY OF TRAUMATIC BRAIN INJURY: ICD-10-CM

## 2022-03-01 DIAGNOSIS — R47.01 APHASIA: ICD-10-CM

## 2022-03-01 DIAGNOSIS — E16.2 HYPOGLYCEMIA, UNSPECIFIED: ICD-10-CM

## 2022-03-01 DIAGNOSIS — R56.9 UNSPECIFIED CONVULSIONS: ICD-10-CM

## 2022-03-01 DIAGNOSIS — Z93.1 GASTROSTOMY STATUS: Chronic | ICD-10-CM

## 2022-03-01 DIAGNOSIS — H02.401 UNSPECIFIED PTOSIS OF RIGHT EYELID: ICD-10-CM

## 2022-03-01 LAB
BASOPHILS # BLD AUTO: 0.03 K/UL — SIGNIFICANT CHANGE UP (ref 0–0.2)
BASOPHILS NFR BLD AUTO: 0.6 % — SIGNIFICANT CHANGE UP (ref 0–2)
EOSINOPHIL # BLD AUTO: 0.09 K/UL — SIGNIFICANT CHANGE UP (ref 0–0.5)
EOSINOPHIL NFR BLD AUTO: 1.9 % — SIGNIFICANT CHANGE UP (ref 0–6)
HCT VFR BLD CALC: 39 % — SIGNIFICANT CHANGE UP (ref 39–50)
HGB BLD-MCNC: 12.1 G/DL — LOW (ref 13–17)
IMM GRANULOCYTES NFR BLD AUTO: 0.4 % — SIGNIFICANT CHANGE UP (ref 0–1.5)
LYMPHOCYTES # BLD AUTO: 1.71 K/UL — SIGNIFICANT CHANGE UP (ref 1–3.3)
LYMPHOCYTES # BLD AUTO: 36.3 % — SIGNIFICANT CHANGE UP (ref 13–44)
MCHC RBC-ENTMCNC: 29.1 PG — SIGNIFICANT CHANGE UP (ref 27–34)
MCHC RBC-ENTMCNC: 31 GM/DL — LOW (ref 32–36)
MCV RBC AUTO: 93.8 FL — SIGNIFICANT CHANGE UP (ref 80–100)
MONOCYTES # BLD AUTO: 0.28 K/UL — SIGNIFICANT CHANGE UP (ref 0–0.9)
MONOCYTES NFR BLD AUTO: 5.9 % — SIGNIFICANT CHANGE UP (ref 2–14)
NEUTROPHILS # BLD AUTO: 2.58 K/UL — SIGNIFICANT CHANGE UP (ref 1.8–7.4)
NEUTROPHILS NFR BLD AUTO: 54.9 % — SIGNIFICANT CHANGE UP (ref 43–77)
PLATELET # BLD AUTO: 265 K/UL — SIGNIFICANT CHANGE UP (ref 150–400)
RBC # BLD: 4.16 M/UL — LOW (ref 4.2–5.8)
RBC # FLD: 14.1 % — SIGNIFICANT CHANGE UP (ref 10.3–14.5)
WBC # BLD: 4.71 K/UL — SIGNIFICANT CHANGE UP (ref 3.8–10.5)
WBC # FLD AUTO: 4.71 K/UL — SIGNIFICANT CHANGE UP (ref 3.8–10.5)

## 2022-03-01 PROCEDURE — 99285 EMERGENCY DEPT VISIT HI MDM: CPT

## 2022-03-01 NOTE — ED ADULT TRIAGE NOTE - CHIEF COMPLAINT QUOTE
Transfer from [facility] for j tube replacement. patient was seen at [place] and had unsuccessful attempt at unclogging j tube. patient received with 24G IV in left hand. per EMS and RN handoff from [place] ED RN Jackelyn. patient received 500mg of keppra and zimpat 100mg prior to transport. patient did not receive lovenox prior to transport. patient resident at [facility] nursing home. full code per facility paperwork no MOLST included in paperwork.

## 2022-03-01 NOTE — ED PROVIDER NOTE - NSICDXPASTMEDICALHX_GEN_ALL_CORE_FT
PAST MEDICAL HISTORY:  Aphasia     Chronic respiratory failure with hypercapnia     Diffuse traumatic brain injury     Dysphagia     GERD (gastroesophageal reflux disease)     Hemorrhagic otitis externa     Intracerebral hemorrhage     S/P percutaneous endoscopic gastrostomy (PEG) tube placement     Seizure

## 2022-03-01 NOTE — ED ADULT NURSE NOTE - CHIEF COMPLAINT QUOTE
Transfer from [facility] for j tube replacement. patient was seen at [place] and had unsuccessful attempt at unclogging j tube. patient received with 24G IV in left hand. per EMS and RN handoff from [place] ED RN Jackelyn. patient received 500mg of keppra and zimpat 100mg prior to transport. patient did not receive lovenox prior to transport. patient resident at [facility] nursing home. full code per facility paperwork no MOLST included in paperwork.

## 2022-03-01 NOTE — ED ADULT TRIAGE NOTE - ARRIVAL FROM
SUBJECTIVE: Ms. Garcia is a 78 year old female who is here for follow up of hypertension, diabetes, hyperlipidemia.      She is taking blood pressure medications regularly, denies any chest pain, shortness of breath, dyspnea on exertion.  She is taking cholesterol medications regularly, denies any muscle aches or body aches.  She is NOT taking diabetic medications regularly, denies any hypo or hyperglycemic symptoms.    She reports that she has been diagnosed with cough and chest congestion and occasionally has wheezing which bothers her.    She is here to discuss her lab results and wants medication refill.    The following histories were reviewed and updated with the patient today:  Past Medical History:   Diagnosis Date   • Chronic lymphoid leukemia, without mention of having achieved remission(204.10) (CMS/[facility]) 12/04/2008   • Diaphragmatic hernia without mention of obstruction or gangrene    • Essential hypertension, benign    • Gout, unspecified    • Pain in joint, shoulder region 09/19/2015    rt shoulder/arm   • PNEUMOTHORAX SPONTANEOUS    • Prolapse of vaginal walls without mention of uterine prolapse    • THROMBOPHLEBITIS UNSPECIFIED VENOUS     DVT/PE, x2  - on Coumadin x9 months   • Type II or unspecified type diabetes mellitus without mention of complication, not stated as uncontrolled      Past Surgical History:   Procedure Laterality Date   • Colonoscopy diagnostic  2000s    negative   • Cystoscopy,ureteroscopy,lithotripsy  3/9/2016    Dr. Mendoza   • Cystourethroscopy  1/28/16    Dr. Mendoza   • Genital surg proc, female unlisted  1960s    hysterectomy, procedure unspecified   • Laparoscopic rpr hiatal hernia  1970s   • Total hip replacement  11/2010   • Urology surgery procedure unlisted  2009    bladder surgery for prolapsed bladder-[facility]    • Us guided thyroid biopsy      Melissa     ALLERGIES:  Codeine camsylate  Current Outpatient Medications   Medication   • amoxicillin-clavulanate  (AUGMENTIN) 875-125 MG per tablet   • Promethazine HCl 6.25 MG/5ML Solution   • warfarin (COUMADIN) 5 MG tablet   • warfarin (COUMADIN) 5 MG tablet   • omeprazole (PRILOSEC) 20 MG capsule   • warfarin (COUMADIN) 5 MG tablet   • warfarin (COUMADIN) 5 MG tablet   • CALCIUM-VITAMIN D PO   • ferrous sulfate 325 (65 FE) MG tablet   • Multiple Vitamin (MULTI VITAMIN PO)   • allopurinol (ZYLOPRIM) 100 MG tablet   • amLODIPine (NORVASC) 5 MG tablet   • carvedilol (COREG) 12.5 MG tablet   • furosemide (LASIX) 40 MG tablet   • HYDROcodone-acetaminophen (NORCO) 5-325 MG per tablet   • simvastatin (ZOCOR) 10 MG tablet     No current facility-administered medications for this visit.      Family History   Problem Relation Age of Onset   • Stroke Mother         CVA   • Heart Paternal Grandmother         MI   • Blood Disorder Sister         CLL   • Hypertension Brother    • Respiratory Brother    • Cancer Son         prostate cancer     Social History     Socioeconomic History   • Marital status: /Civil Union     Spouse name: None   • Number of children: 4   • Years of education: None   • Highest education level: None   Social Needs   • Financial resource strain: None   • Food insecurity - worry: None   • Food insecurity - inability: None   • Transportation needs - medical: None   • Transportation needs - non-medical: None   Occupational History   • Occupation: not working   Tobacco Use   • Smoking status: Never Smoker   • Smokeless tobacco: Never Used   Substance and Sexual Activity   • Alcohol use: No     Alcohol/week: 0.0 oz   • Drug use: No   • Sexual activity: No   Other Topics Concern   • None   Social History Narrative   • None       REVIEW OF SYSTEMS:  GENERAL:  Patient denies fever, chills, tiredness, malaise  EYES:  Patient denies blurred vision, double vision, pain  IMMUNOLOGIC:  Patient denies hayfever, itching/watery eyes, sneezing  NEUROLOGIC:  Patient denies headache, tremors, dizzy spells/lightheadedness,  Home numbness/weakness, tingling   ENDOCRINE:  Patient denies change in appettite, excessive thirst, cold intolerance, heat intolerance, tired/sluggishness  GASTROINTESTINAL:  Patient denies abdominal pain, nausea/vomiting, indigestion/heartburn, diarrhea, constipation  CARDIOVASCULAR:  Patient denies chest pain, SOB/dyspnea on exertion, syncope, palpitation  SKIN:  Patient denies skin rashes, boils, persistent itching  MUSCULOSKELETAL:  Patient denies joint pain, stiffness, muscle twitching  EARS, NOSE, THROAT/MOUTH:  Patient denies ear pain/discharge/hearing problems, sneezing, cough, sore throat, sinus problems  GENITOURINARY:  Patient denies urine retention, painful urination, urinary frequency, blood in urine  RESPIRATORY:  Patient denies wheezing, frequent cough, shortness of breath    OBJECTIVE:   Blood pressure 100/78, pulse 72, height 5' 1.5\" (1.562 m), weight 79.9 kg, SpO2 94 %.    EXAM  CONSTITUTIONAL: The patient appears comfortable, oriented x3, normal gait and in no apparent distress.  SKIN: No rashes, ulcers, or suspicious lesions are seen, the skin turgor and hydration is normal, no significant subcutaneous nodules or masses, there are no xanthomas and no xanthelasmas. and warm & dry  HEENT: The conjunctivae/sclera are clear.  No lesions, rashes, or other abnormalities are appreciated on the eyelids.  Pupils are equal and reactive to light and are symmetrical. Lips appear normal and without lesions.  The pharynx is clear without lesions or erythema.The external auditory canals and tympanic membranes are normal bilaterally. and Oral mucosa normal and nasal mucosa normal  NECK: Supple and nontender without masses, lesions, or bruits.  The thyroid is grossly normal to palpation without masses, goiter, asymmetry, or tenderness.  The trachea is midline.  RESPIRATORY:  Lungs are clear to auscultation without rales, wheezes, or rhonchi.  Percussion is within normal limits without dullness or hyperresonance.  The  respiratory effort appears normal.  CARDIAC:  Regular rate and rhythm without S3, S4, heave or thrill. No murmur  ABDOMEN: Soft and nontender with positive bowel sounds in all quadrants.  No hepatosplenomegaly, masses, rebound, guarding, rigidity, ventral hernias, or bruits are appreciated.  EXTREMITIES: Upper extremities are without clubbing, cyanosis, or edema noted., Lower extermities no pedal edema noted and Pedal pulses are normal.  NEUROLOGICAL: Grossly intact with no focal neurological deficit. Deep tendon reflexes are symmetrical bilaterally and normal.  Normal motor and sensory exam.      ASSESSMENT:  DIABETES MELLITUS  Comment:   Hemoglobin A1C (% A1C)   Date Value   06/20/2013 5.8     Plan: Regular exercise and proper diet.   Continue checking blood sugars as recommended.   Will closely follow.    HYPERTENSION  Comment: Stable  Plan: Continue the present medications and no changes made at this time.   Regular exercise and low salt diet.   Will follow.    HYPERLIPIDEMIA  Comment:   CHOLESTEROL (mg/dL)   Date Value   08/07/2015 137     HDL (mg/dL)   Date Value   08/07/2015 66     TRIGLYCERIDE (mg/dL)   Date Value   08/07/2015 76     CALCULATED LDL (mg/dL)   Date Value   08/07/2015 56     Plan: Regular exercise and proper diet.   Will closely follow.    (R05) Cough  (primary encounter diagnosis)  (J20.9) Bronchitis with bronchospasm  Comment: has evidence of bronchitis with wheezing  Plan: on augmentin and promethamzine   Start prednisone   Will follow    (Z79.01) Long term (current) use of anticoagulants [Z79.01]  Comment: stable  Plan: on coumadin    (K21.9) Gastroesophageal reflux disease, esophagitis presence not specified  Comment: stable  Plan: will follow    (C91.90) Chronic lymphoid leukemia, without mention of having achieved remission  Comment: stable  Plan: follow up with Dr. Morejon    (I80.9) THROMBOPHLEBITIS UNSPECIFIED VENOUS  Comment: stable  Plan: on coumadin    (E55.9) Vitamin D  deficiency  Comment: vit D level ordered  Plan: VITAMIN D -25 HYDROXY         Will follow    Patient will return to clinic in 2 weeks and will get lab work done one week prior to the office visit.

## 2022-03-01 NOTE — ED ADULT NURSE NOTE - OBJECTIVE STATEMENT
55 y/o male transferred from [facility] for J tube replacement. pt. was at [facility] where they attempted to replace J tube and was unsuccessful. transferred to  for GI consult, Dr. Jolly accepting. pt. awake and alert in triage, no s/s distress noted.

## 2022-03-01 NOTE — ED PROVIDER NOTE - NSICDXFAMILYHX_GEN_ALL_CORE_FT
FAMILY HISTORY:  Father  Still living? Unknown  Patient unable to provide medical history, Age at diagnosis: Age Unknown    Mother  Still living? Unknown  Patient unable to provide medical history, Age at diagnosis: Age Unknown

## 2022-03-01 NOTE — ED PROVIDER NOTE - OBJECTIVE STATEMENT
55 y/o male in ED transfer from [facility] for J tube replacement by Dr Jolly.   pt from NH found with clogged J tube.   pt was transported to [place] ED and was unable to clear J tube.   as per ED notes, case d/w Dr Jolly and accepted by [facility]ik for transfer to  ED for replacement.   pt with no acute distress.

## 2022-03-02 DIAGNOSIS — U07.1 COVID-19: ICD-10-CM

## 2022-03-02 DIAGNOSIS — K94.13 ENTEROSTOMY MALFUNCTION: ICD-10-CM

## 2022-03-02 DIAGNOSIS — R57.8 OTHER SHOCK: ICD-10-CM

## 2022-03-02 DIAGNOSIS — J47.9 BRONCHIECTASIS, UNCOMPLICATED: ICD-10-CM

## 2022-03-02 DIAGNOSIS — J96.21 ACUTE AND CHRONIC RESPIRATORY FAILURE WITH HYPOXIA: ICD-10-CM

## 2022-03-02 DIAGNOSIS — K94.23 GASTROSTOMY MALFUNCTION: ICD-10-CM

## 2022-03-02 DIAGNOSIS — G93.41 METABOLIC ENCEPHALOPATHY: ICD-10-CM

## 2022-03-02 DIAGNOSIS — B37.0 CANDIDAL STOMATITIS: ICD-10-CM

## 2022-03-02 DIAGNOSIS — E86.0 DEHYDRATION: ICD-10-CM

## 2022-03-02 DIAGNOSIS — G82.20 PARAPLEGIA, UNSPECIFIED: ICD-10-CM

## 2022-03-02 DIAGNOSIS — G40.909 EPILEPSY, UNSPECIFIED, NOT INTRACTABLE, WITHOUT STATUS EPILEPTICUS: ICD-10-CM

## 2022-03-02 DIAGNOSIS — I10 ESSENTIAL (PRIMARY) HYPERTENSION: ICD-10-CM

## 2022-03-02 DIAGNOSIS — Z88.8 ALLERGY STATUS TO OTHER DRUGS, MEDICAMENTS AND BIOLOGICAL SUBSTANCES: ICD-10-CM

## 2022-03-02 DIAGNOSIS — R33.9 RETENTION OF URINE, UNSPECIFIED: ICD-10-CM

## 2022-03-02 DIAGNOSIS — Z78.9 OTHER SPECIFIED HEALTH STATUS: Chronic | ICD-10-CM

## 2022-03-02 DIAGNOSIS — E87.0 HYPEROSMOLALITY AND HYPERNATREMIA: ICD-10-CM

## 2022-03-02 DIAGNOSIS — J84.10 PULMONARY FIBROSIS, UNSPECIFIED: ICD-10-CM

## 2022-03-02 DIAGNOSIS — E11.9 TYPE 2 DIABETES MELLITUS WITHOUT COMPLICATIONS: ICD-10-CM

## 2022-03-02 DIAGNOSIS — Z87.820 PERSONAL HISTORY OF TRAUMATIC BRAIN INJURY: ICD-10-CM

## 2022-03-02 DIAGNOSIS — K21.9 GASTRO-ESOPHAGEAL REFLUX DISEASE WITHOUT ESOPHAGITIS: ICD-10-CM

## 2022-03-02 DIAGNOSIS — J69.0 PNEUMONITIS DUE TO INHALATION OF FOOD AND VOMIT: ICD-10-CM

## 2022-03-02 DIAGNOSIS — E43 UNSPECIFIED SEVERE PROTEIN-CALORIE MALNUTRITION: ICD-10-CM

## 2022-03-02 PROBLEM — Z00.00 ENCOUNTER FOR PREVENTIVE HEALTH EXAMINATION: Status: ACTIVE | Noted: 2022-03-02

## 2022-03-02 LAB
ALBUMIN SERPL ELPH-MCNC: 2.4 G/DL — LOW (ref 3.3–5)
ALBUMIN SERPL ELPH-MCNC: 3 G/DL — LOW (ref 3.3–5)
ALP SERPL-CCNC: 122 U/L — HIGH (ref 40–120)
ALP SERPL-CCNC: 95 U/L — SIGNIFICANT CHANGE UP (ref 40–120)
ALT FLD-CCNC: 16 U/L — SIGNIFICANT CHANGE UP (ref 12–78)
ALT FLD-CCNC: 23 U/L — SIGNIFICANT CHANGE UP (ref 12–78)
ANION GAP SERPL CALC-SCNC: 3 MMOL/L — LOW (ref 5–17)
ANION GAP SERPL CALC-SCNC: 9 MMOL/L — SIGNIFICANT CHANGE UP (ref 5–17)
AST SERPL-CCNC: 15 U/L — SIGNIFICANT CHANGE UP (ref 15–37)
AST SERPL-CCNC: 33 U/L — SIGNIFICANT CHANGE UP (ref 15–37)
BASOPHILS # BLD AUTO: 0.03 K/UL — SIGNIFICANT CHANGE UP (ref 0–0.2)
BASOPHILS NFR BLD AUTO: 0.7 % — SIGNIFICANT CHANGE UP (ref 0–2)
BILIRUB SERPL-MCNC: 0.3 MG/DL — SIGNIFICANT CHANGE UP (ref 0.2–1.2)
BILIRUB SERPL-MCNC: 0.5 MG/DL — SIGNIFICANT CHANGE UP (ref 0.2–1.2)
BUN SERPL-MCNC: 10 MG/DL — SIGNIFICANT CHANGE UP (ref 7–23)
BUN SERPL-MCNC: 13 MG/DL — SIGNIFICANT CHANGE UP (ref 7–23)
CALCIUM SERPL-MCNC: 8.3 MG/DL — LOW (ref 8.5–10.1)
CALCIUM SERPL-MCNC: 9.5 MG/DL — SIGNIFICANT CHANGE UP (ref 8.5–10.1)
CHLORIDE SERPL-SCNC: 108 MMOL/L — SIGNIFICANT CHANGE UP (ref 96–108)
CHLORIDE SERPL-SCNC: 110 MMOL/L — HIGH (ref 96–108)
CO2 SERPL-SCNC: 21 MMOL/L — LOW (ref 22–31)
CO2 SERPL-SCNC: 25 MMOL/L — SIGNIFICANT CHANGE UP (ref 22–31)
CREAT SERPL-MCNC: 0.24 MG/DL — LOW (ref 0.5–1.3)
CREAT SERPL-MCNC: 0.32 MG/DL — LOW (ref 0.5–1.3)
EGFR: 135 ML/MIN/1.73M2 — SIGNIFICANT CHANGE UP
EGFR: 148 ML/MIN/1.73M2 — SIGNIFICANT CHANGE UP
EOSINOPHIL # BLD AUTO: 0.12 K/UL — SIGNIFICANT CHANGE UP (ref 0–0.5)
EOSINOPHIL NFR BLD AUTO: 2.9 % — SIGNIFICANT CHANGE UP (ref 0–6)
GLUCOSE SERPL-MCNC: 54 MG/DL — CRITICAL LOW (ref 70–99)
GLUCOSE SERPL-MCNC: 98 MG/DL — SIGNIFICANT CHANGE UP (ref 70–99)
HCT VFR BLD CALC: 30.8 % — LOW (ref 39–50)
HCV AB S/CO SERPL IA: 0.34 S/CO — SIGNIFICANT CHANGE UP (ref 0–0.99)
HCV AB SERPL-IMP: SIGNIFICANT CHANGE UP
HGB BLD-MCNC: 10 G/DL — LOW (ref 13–17)
IMM GRANULOCYTES NFR BLD AUTO: 0.2 % — SIGNIFICANT CHANGE UP (ref 0–1.5)
INR BLD: 1.22 RATIO — HIGH (ref 0.88–1.16)
LYMPHOCYTES # BLD AUTO: 1.14 K/UL — SIGNIFICANT CHANGE UP (ref 1–3.3)
LYMPHOCYTES # BLD AUTO: 27.7 % — SIGNIFICANT CHANGE UP (ref 13–44)
MCHC RBC-ENTMCNC: 29.7 PG — SIGNIFICANT CHANGE UP (ref 27–34)
MCHC RBC-ENTMCNC: 32.5 GM/DL — SIGNIFICANT CHANGE UP (ref 32–36)
MCV RBC AUTO: 91.4 FL — SIGNIFICANT CHANGE UP (ref 80–100)
MONOCYTES # BLD AUTO: 0.36 K/UL — SIGNIFICANT CHANGE UP (ref 0–0.9)
MONOCYTES NFR BLD AUTO: 8.7 % — SIGNIFICANT CHANGE UP (ref 2–14)
NEUTROPHILS # BLD AUTO: 2.46 K/UL — SIGNIFICANT CHANGE UP (ref 1.8–7.4)
NEUTROPHILS NFR BLD AUTO: 59.8 % — SIGNIFICANT CHANGE UP (ref 43–77)
PLATELET # BLD AUTO: 221 K/UL — SIGNIFICANT CHANGE UP (ref 150–400)
POTASSIUM SERPL-MCNC: 3.6 MMOL/L — SIGNIFICANT CHANGE UP (ref 3.5–5.3)
POTASSIUM SERPL-MCNC: 4.6 MMOL/L — SIGNIFICANT CHANGE UP (ref 3.5–5.3)
POTASSIUM SERPL-SCNC: 3.6 MMOL/L — SIGNIFICANT CHANGE UP (ref 3.5–5.3)
POTASSIUM SERPL-SCNC: 4.6 MMOL/L — SIGNIFICANT CHANGE UP (ref 3.5–5.3)
PROT SERPL-MCNC: 6.9 GM/DL — SIGNIFICANT CHANGE UP (ref 6–8.3)
PROT SERPL-MCNC: 8.5 GM/DL — HIGH (ref 6–8.3)
PROTHROM AB SERPL-ACNC: 14.2 SEC — HIGH (ref 10.5–13.4)
RBC # BLD: 3.37 M/UL — LOW (ref 4.2–5.8)
RBC # FLD: 14.1 % — SIGNIFICANT CHANGE UP (ref 10.3–14.5)
SODIUM SERPL-SCNC: 138 MMOL/L — SIGNIFICANT CHANGE UP (ref 135–145)
SODIUM SERPL-SCNC: 138 MMOL/L — SIGNIFICANT CHANGE UP (ref 135–145)
WBC # BLD: 4.12 K/UL — SIGNIFICANT CHANGE UP (ref 3.8–10.5)
WBC # FLD AUTO: 4.12 K/UL — SIGNIFICANT CHANGE UP (ref 3.8–10.5)

## 2022-03-02 PROCEDURE — U0003: CPT

## 2022-03-02 PROCEDURE — L8699: CPT

## 2022-03-02 PROCEDURE — 97163 PT EVAL HIGH COMPLEX 45 MIN: CPT | Mod: GP

## 2022-03-02 PROCEDURE — 85610 PROTHROMBIN TIME: CPT

## 2022-03-02 PROCEDURE — 82962 GLUCOSE BLOOD TEST: CPT

## 2022-03-02 PROCEDURE — U0005: CPT

## 2022-03-02 PROCEDURE — 85027 COMPLETE CBC AUTOMATED: CPT

## 2022-03-02 PROCEDURE — 99252 IP/OBS CONSLTJ NEW/EST SF 35: CPT

## 2022-03-02 PROCEDURE — C1889: CPT

## 2022-03-02 PROCEDURE — 12345: CPT | Mod: NC

## 2022-03-02 PROCEDURE — 85730 THROMBOPLASTIN TIME PARTIAL: CPT

## 2022-03-02 PROCEDURE — 84132 ASSAY OF SERUM POTASSIUM: CPT

## 2022-03-02 PROCEDURE — 94640 AIRWAY INHALATION TREATMENT: CPT

## 2022-03-02 PROCEDURE — 82607 VITAMIN B-12: CPT

## 2022-03-02 PROCEDURE — 93005 ELECTROCARDIOGRAM TRACING: CPT

## 2022-03-02 PROCEDURE — 80235 DRUG ASSAY LACOSAMIDE: CPT

## 2022-03-02 PROCEDURE — 70450 CT HEAD/BRAIN W/O DYE: CPT

## 2022-03-02 PROCEDURE — 85025 COMPLETE CBC W/AUTO DIFF WBC: CPT

## 2022-03-02 PROCEDURE — C9113: CPT

## 2022-03-02 PROCEDURE — C9254: CPT

## 2022-03-02 PROCEDURE — 99285 EMERGENCY DEPT VISIT HI MDM: CPT | Mod: 25

## 2022-03-02 PROCEDURE — 80048 BASIC METABOLIC PNL TOTAL CA: CPT

## 2022-03-02 PROCEDURE — 36415 COLL VENOUS BLD VENIPUNCTURE: CPT

## 2022-03-02 PROCEDURE — 95816 EEG AWAKE AND DROWSY: CPT

## 2022-03-02 PROCEDURE — 99222 1ST HOSP IP/OBS MODERATE 55: CPT

## 2022-03-02 PROCEDURE — 71045 X-RAY EXAM CHEST 1 VIEW: CPT | Mod: 26

## 2022-03-02 PROCEDURE — 86803 HEPATITIS C AB TEST: CPT

## 2022-03-02 PROCEDURE — 93010 ELECTROCARDIOGRAM REPORT: CPT

## 2022-03-02 PROCEDURE — 80177 DRUG SCRN QUAN LEVETIRACETAM: CPT

## 2022-03-02 PROCEDURE — 80053 COMPREHEN METABOLIC PANEL: CPT

## 2022-03-02 PROCEDURE — 83735 ASSAY OF MAGNESIUM: CPT

## 2022-03-02 PROCEDURE — 71045 X-RAY EXAM CHEST 1 VIEW: CPT

## 2022-03-02 RX ORDER — LACOSAMIDE 50 MG/1
100 TABLET ORAL EVERY 12 HOURS
Refills: 0 | Status: DISCONTINUED | OUTPATIENT
Start: 2022-03-02 | End: 2022-03-07

## 2022-03-02 RX ORDER — BACLOFEN 100 %
10 POWDER (GRAM) MISCELLANEOUS
Refills: 0 | Status: DISCONTINUED | OUTPATIENT
Start: 2022-03-02 | End: 2022-03-02

## 2022-03-02 RX ORDER — SODIUM CHLORIDE 9 MG/ML
1000 INJECTION, SOLUTION INTRAVENOUS
Refills: 0 | Status: DISCONTINUED | OUTPATIENT
Start: 2022-03-02 | End: 2022-03-04

## 2022-03-02 RX ORDER — VENLAFAXINE HCL 75 MG
37.5 CAPSULE, EXT RELEASE 24 HR ORAL DAILY
Refills: 0 | Status: DISCONTINUED | OUTPATIENT
Start: 2022-03-02 | End: 2022-03-10

## 2022-03-02 RX ORDER — CASTOR OIL AND BALSAM, PERU 788; 87 MG/G; MG/G
1 OINTMENT TOPICAL
Refills: 0 | Status: DISCONTINUED | OUTPATIENT
Start: 2022-03-02 | End: 2022-03-02

## 2022-03-02 RX ORDER — NEOMYCIN/POLYMYXIN B/HYDROCORT
4 SUSPENSION, DROPS(FINAL DOSAGE FORM)(ML) OTIC (EAR)
Refills: 0 | Status: DISCONTINUED | OUTPATIENT
Start: 2022-03-02 | End: 2022-03-07

## 2022-03-02 RX ORDER — DIAZEPAM 5 MG
5 TABLET ORAL EVERY 8 HOURS
Refills: 0 | Status: DISCONTINUED | OUTPATIENT
Start: 2022-03-02 | End: 2022-03-02

## 2022-03-02 RX ORDER — CIPROFLOXACIN AND DEXAMETHASONE 3; 1 MG/ML; MG/ML
4 SUSPENSION/ DROPS AURICULAR (OTIC)
Refills: 0 | Status: DISCONTINUED | OUTPATIENT
Start: 2022-03-02 | End: 2022-03-02

## 2022-03-02 RX ORDER — DEXTROSE 10 % IN WATER 10 %
250 INTRAVENOUS SOLUTION INTRAVENOUS
Refills: 0 | Status: COMPLETED | OUTPATIENT
Start: 2022-03-02 | End: 2022-03-02

## 2022-03-02 RX ORDER — PANTOPRAZOLE SODIUM 20 MG/1
40 TABLET, DELAYED RELEASE ORAL
Refills: 0 | Status: DISCONTINUED | OUTPATIENT
Start: 2022-03-02 | End: 2022-03-02

## 2022-03-02 RX ORDER — FAMOTIDINE 10 MG/ML
20 INJECTION INTRAVENOUS DAILY
Refills: 0 | Status: DISCONTINUED | OUTPATIENT
Start: 2022-03-02 | End: 2022-03-02

## 2022-03-02 RX ORDER — HALOPERIDOL DECANOATE 100 MG/ML
3 INJECTION INTRAMUSCULAR EVERY 6 HOURS
Refills: 0 | Status: DISCONTINUED | OUTPATIENT
Start: 2022-03-02 | End: 2022-03-14

## 2022-03-02 RX ORDER — LEVETIRACETAM 250 MG/1
500 TABLET, FILM COATED ORAL EVERY 12 HOURS
Refills: 0 | Status: DISCONTINUED | OUTPATIENT
Start: 2022-03-02 | End: 2022-03-07

## 2022-03-02 RX ORDER — ONDANSETRON 8 MG/1
4 TABLET, FILM COATED ORAL EVERY 8 HOURS
Refills: 0 | Status: DISCONTINUED | OUTPATIENT
Start: 2022-03-02 | End: 2022-03-14

## 2022-03-02 RX ORDER — ENOXAPARIN SODIUM 100 MG/ML
40 INJECTION SUBCUTANEOUS EVERY 24 HOURS
Refills: 0 | Status: DISCONTINUED | OUTPATIENT
Start: 2022-03-02 | End: 2022-03-14

## 2022-03-02 RX ORDER — LANOLIN ALCOHOL/MO/W.PET/CERES
3 CREAM (GRAM) TOPICAL AT BEDTIME
Refills: 0 | Status: DISCONTINUED | OUTPATIENT
Start: 2022-03-02 | End: 2022-03-08

## 2022-03-02 RX ORDER — LEVETIRACETAM 250 MG/1
500 TABLET, FILM COATED ORAL
Refills: 0 | Status: DISCONTINUED | OUTPATIENT
Start: 2022-03-02 | End: 2022-03-02

## 2022-03-02 RX ORDER — HYDRALAZINE HCL 50 MG
10 TABLET ORAL EVERY 6 HOURS
Refills: 0 | Status: DISCONTINUED | OUTPATIENT
Start: 2022-03-02 | End: 2022-03-14

## 2022-03-02 RX ORDER — PANTOPRAZOLE SODIUM 20 MG/1
40 TABLET, DELAYED RELEASE ORAL DAILY
Refills: 0 | Status: DISCONTINUED | OUTPATIENT
Start: 2022-03-02 | End: 2022-03-07

## 2022-03-02 RX ORDER — DIAZEPAM 5 MG
5 TABLET ORAL EVERY 8 HOURS
Refills: 0 | Status: DISCONTINUED | OUTPATIENT
Start: 2022-03-02 | End: 2022-03-07

## 2022-03-02 RX ORDER — LACOSAMIDE 50 MG/1
100 TABLET ORAL
Refills: 0 | Status: DISCONTINUED | OUTPATIENT
Start: 2022-03-02 | End: 2022-03-02

## 2022-03-02 RX ORDER — TIOTROPIUM BROMIDE 18 UG/1
1 CAPSULE ORAL; RESPIRATORY (INHALATION) DAILY
Refills: 0 | Status: DISCONTINUED | OUTPATIENT
Start: 2022-03-02 | End: 2022-03-14

## 2022-03-02 RX ORDER — ALBUTEROL 90 UG/1
2 AEROSOL, METERED ORAL EVERY 4 HOURS
Refills: 0 | Status: DISCONTINUED | OUTPATIENT
Start: 2022-03-02 | End: 2022-03-14

## 2022-03-02 RX ORDER — ACETAMINOPHEN 500 MG
650 TABLET ORAL EVERY 6 HOURS
Refills: 0 | Status: DISCONTINUED | OUTPATIENT
Start: 2022-03-02 | End: 2022-03-08

## 2022-03-02 RX ADMIN — LACOSAMIDE 120 MILLIGRAM(S): 50 TABLET ORAL at 22:01

## 2022-03-02 RX ADMIN — TIOTROPIUM BROMIDE 1 CAPSULE(S): 18 CAPSULE ORAL; RESPIRATORY (INHALATION) at 08:46

## 2022-03-02 RX ADMIN — LEVETIRACETAM 400 MILLIGRAM(S): 250 TABLET, FILM COATED ORAL at 22:33

## 2022-03-02 RX ADMIN — PANTOPRAZOLE SODIUM 40 MILLIGRAM(S): 20 TABLET, DELAYED RELEASE ORAL at 09:59

## 2022-03-02 RX ADMIN — ENOXAPARIN SODIUM 40 MILLIGRAM(S): 100 INJECTION SUBCUTANEOUS at 18:17

## 2022-03-02 RX ADMIN — Medication 1 APPLICATION(S): at 16:02

## 2022-03-02 RX ADMIN — Medication 500 MILLILITER(S): at 02:48

## 2022-03-02 RX ADMIN — Medication 4 DROP(S): at 11:42

## 2022-03-02 RX ADMIN — LACOSAMIDE 120 MILLIGRAM(S): 50 TABLET ORAL at 09:51

## 2022-03-02 RX ADMIN — Medication 4 DROP(S): at 22:03

## 2022-03-02 RX ADMIN — SODIUM CHLORIDE 100 MILLILITER(S): 9 INJECTION, SOLUTION INTRAVENOUS at 00:34

## 2022-03-02 RX ADMIN — LEVETIRACETAM 400 MILLIGRAM(S): 250 TABLET, FILM COATED ORAL at 11:34

## 2022-03-02 RX ADMIN — Medication 5 MILLIGRAM(S): at 22:01

## 2022-03-02 NOTE — H&P ADULT - NSHPPHYSICALEXAM_GEN_ALL_CORE
ICU Vital Signs Last 24 Hrs  T(C): 36.5 (01 Mar 2022 22:47), Max: 36.5 (01 Mar 2022 22:47)  T(F): 97.7 (01 Mar 2022 22:47), Max: 97.7 (01 Mar 2022 22:47)  HR: 67 (01 Mar 2022 22:47) (67 - 67)  BP: 150/110 (01 Mar 2022 22:47) (150/110 - 150/110)  RR: 18 (01 Mar 2022 22:47) (18 - 18)  SpO2: 97% (01 Mar 2022 22:47) (97% - 97%)    General: Awake and alert, cooperative with exam. No acute distress.   Skin: Warm, dry, and pink.   Eyes: Pupils equal and reactive to light. Extraocular eye movements intact. No conjunctival injection, discharge, or scleral icterus.   HEENT: Atraumatic, normocephalic. Moist mucus membranes. Left ear with erythema of the tragus and tender to touch, bleeding from the ear d/t excoriation.   Cardiology: Normal S1, S2. No murmurs, rubs, or gallops. Regular rate and rhythm.   Respiratory: Lungs clear to ascultation bilaterally. Good air exchange. No wheezes, rales, or rhonchi. Normal chest expansion.   Gastrointestinal: Positive bowel sounds. Soft, non-tender, non-distended. No guarding, rigidity, or rebound tenderness. No hepatosplenomegaly. PEG tube secured without erythema or drainage.   Musculoskeletal: 5/5 motor strength in all extremities. Normal range of motion.   Extremities: No peripheral edema bilaterally. Dorsalis pedis pulses 2+ bilaterally.   Neurological: Aphasia, contracted right upper and lower extremities.   Psychiatric: Normal affect. Normal mood.

## 2022-03-02 NOTE — H&P ADULT - HISTORY OF PRESENT ILLNESS
57 y/o M with PMH diffuse traumatic brain injury, intracerebral hemorrhage, dysphagia, aphasia, chronic respiratory failure with hypercapnia asthma, PEG tube, seizures, GERD, MDD, hemorrhagic otitis externa (left ear) presents for a clogged G tube. He was initially seen at [facility] and the tube was attempted to be unclogged without success. He was transferred to  for further management.     ER course: /110. Labs: Hb 12.1, glucose 54, albumin 3.0, alkaline phosphatase 122.     EKG: pending     Imaging:   - CXR: no consolidation, no effusion, no pneumothorax, pt's hand contracted and loated over right lung fields (personally reviewed).     Pt was placed NPO and is being admitted to med/surg.  55 y/o M with PMH diffuse traumatic brain injury, intracerebral hemorrhage, dysphagia, aphasia, non-verbal at baseline, chronic respiratory failure with hypercapnia asthma, PEG tube, seizures, GERD, MDD, hemorrhagic otitis externa (left ear) presents for a clogged G tube. He was initially seen at [facility] and the tube was attempted to be unclogged without success. He was transferred to  for further management.     ER course: /110. Labs: Hb 12.1, glucose 54, albumin 3.0, alkaline phosphatase 122.     EKG: pending     Imaging:   - CXR: no consolidation, no effusion, no pneumothorax, pt's hand contracted and loated over right lung fields (personally reviewed).     Pt was placed NPO and is being admitted to med/surg.  55 y/o M with PMH diffuse traumatic brain injury, intracerebral hemorrhage, dysphagia, aphasia, non-verbal at baseline, chronic respiratory failure with hypercapnia asthma, PEG tube, seizures, GERD, MDD, hemorrhagic otitis externa (left ear) presents for a clogged G tube. He was initially seen at [facility] and the tube was attempted to be unclogged without success. He was transferred to  for further management. Of note, pt had a PEG tube leakage on 2/18 and Dr. Duron closed the fistula tract and converted the G tube to a G-J tube.     ER course: /110. Labs: Hb 12.1, glucose 54, albumin 3.0, alkaline phosphatase 122.     EKG: pending     Imaging:   - CXR: no consolidation, no effusion, no pneumothorax, pt's hand contracted and loated over right lung fields (personally reviewed).     Pt was placed NPO and is being admitted to med/surg.  55 y/o M with PMH diffuse traumatic brain injury, intracerebral hemorrhage, dysphagia, aphasia, non-verbal at baseline, chronic respiratory failure with hypercapnia asthma, PEG tube, seizures, GERD, MDD, hemorrhagic otitis externa (left ear) presents for a clogged G tube. He was initially seen at [facility] and the tube was attempted to be unclogged without success. He was transferred to  for further management. Of note, pt had a PEG tube leakage on 2/18 and Dr. Duron closed the fistula tract and converted the G tube to a G-J tube.     ER course: /110. Labs: Hb 12.1, glucose 54, albumin 3.0, alkaline phosphatase 122.     EKG: pending     Imaging:   - CXR: no consolidation, no effusion, no pneumothorax, pt's hand contracted and located over right lung fields (personally reviewed).     Pt was placed NPO and is being admitted to med/surg.

## 2022-03-02 NOTE — H&P ADULT - NSHPSOCIALHISTORY_GEN_ALL_CORE
Patient resides at [facility]. He is incontinent to urine and stool. He is NPO and received Glucerna via PEG starting at 5 pm until completed (1000 ml/day) at a rate of 50 ml/hr.

## 2022-03-02 NOTE — DIETITIAN INITIAL EVALUATION ADULT. - NAME AND PHONE
Traci Shipman, Dietetic Intern and Graciela Sainz, MS, RDN, CDN, [facility] [phone]

## 2022-03-02 NOTE — DIETITIAN INITIAL EVALUATION ADULT. - ADD RECOMMEND
1. Initiate enteral TF as recommended, 2. Monitor daily wt to track/trend changes, 3. Maintain aspiration precautions, back of bed >35 degrees, 4. Add Vit C 500 mg BID, add Zinc Sulfate 220 mg x 10 days to promote wound healing, 5. MVI w/ minerals daily to ensure 100% RDA met. RDN will continue to monitor labs, hydration, and wt prn. 1. Initiate enteral TF as recommended, 2. Consider nutrition support if pt unable to re-start TF within 1-3 days and re-consult RD:  pt has already been NPO and without enteral feeds x 8 days. 2. Monitor daily wt to track/trend changes, 3. Maintain aspiration precautions, back of bed >35 degrees, 4. Add Vit C 500 mg BID, add Zinc Sulfate 220 mg x 10 days to promote wound healing, 5. MVI w/ minerals daily to ensure 100% RDA met. RDN will continue to monitor labs, hydration, and wt prn.

## 2022-03-02 NOTE — DIETITIAN INITIAL EVALUATION ADULT. - OTHER INFO
Pt is a 55 y/o female w/ PMH TBI, intracerebral hemorrhage, dysphagia, aphasia, chronic respiratory failure w/ hypercapnia asthma, PEG tube, seizures, GERD, MDD, hemorrhagic otitis externa (L ear). Presented to ED 2/2 clogged G tube. Initially was seen at [facility], where they were unsuccessful w/ unclogging tube, transferred to  for further management. Pt is a 55 y/o female w/ PMH TBI, intracerebral hemorrhage, dysphagia, aphasia, chronic respiratory failure w/ hypercapnia asthma, PEG tube, seizures, GERD, MDD, hemorrhagic otitis externa (L ear). Presented to ED 2/2 clogged G tube. Initially was seen at [facility], where they were unsuccessful w/ unclogging tube, transferred to  for further management.    Appears very thin/frail with severe muscle and fat wasting, meeting criteria for PCM. As per RN flowsheet, pt actual bed scale wt today 3/2 at 91#. Previous wt on 2/22 from [facility] 82#. Pt currently NPO, on Glucerna 1.2 via PEG continuous nocturnal feeds @ [facility]. When medically feasible, recommend restarting TF via PEG. See below recommendations. Pt is a 57 y/o male w/ PMH TBI, intracerebral hemorrhage, dysphagia, aphasia, chronic respiratory failure w/ hypercapnia asthma, PEG tube, seizures, GERD, MDD, hemorrhagic otitis externa (L ear). Presented to ED 2/2 clogged G tube. Initially was seen at [facility], where they were unsuccessful w/ unclogging tube, transferred to  for further management.    Appears very thin/frail with severe muscle and fat wasting, meeting criteria for PCM. As per RN flowsheet, pt actual bed scale wt today 3/2 at 91#. Previous wt on 2/22 from [facility] 82#. Pt currently NPO since 2/22 per chart, on Glucerna 1.2 via PEG continuous nocturnal feeds @ [facility]. When medically feasible, recommend restarting TF via PEG. See below recommendations. Pt is a 57 y/o male w/ PMH TBI, intracerebral hemorrhage, dysphagia, aphasia, chronic respiratory failure w/ hypercapnia asthma, PEG tube, seizures, GERD, MDD, hemorrhagic otitis externa (L ear). Presented to ED 2/2 clogged G tube. Initially was seen at [facility], where they were unsuccessful w/ unclogging tube, transferred to  for further management.    Appears very thin/frail with severe muscle and fat wasting, meeting criteria for PCM. As per RN flowsheet, pt actual bed scale wt today 3/2 at 91#. Previous wt on 2/22 from [facility] 82#. Pt currently NPO since 2/22 per chart, on Glucerna 1.2 via PEG continuous nocturnal feeds @ [facility]. Will base TF recs on Glucerna 1.5, closest formula here at . When medically feasible, recommend restarting TF via PEG. See below recommendations.

## 2022-03-02 NOTE — DIETITIAN INITIAL EVALUATION ADULT. - ORAL INTAKE PTA/DIET HISTORY
Unknown PO intake PTA 2/2 pt nonverbal Unknown PO intake PTA 2/2 pt nonverbal. Pt from [facility] on TF via PEG Unknown intake PTA 2/2 pt nonverbal. Pt from SN[facility] on TF via PEG.

## 2022-03-02 NOTE — CONSULT NOTE ADULT - SUBJECTIVE AND OBJECTIVE BOX
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     Patient cannot give history. Per my discussions with nurse, team, and per chart, patient's GJ tube was malfunctioning at his facility. Was brought to [facility] [place] where it was unable to be fixed, so sent to . Prior to me being able to address the J tube clogging, the patient ripped out the entire GJ tube. It was immediately replaced by my service with a tavares catheter.     PAST MEDICAL & SURGICAL HISTORY:  Diffuse traumatic brain injury    Intracerebral hemorrhage    Dysphagia    Aphasia    Chronic respiratory failure with hypercapnia    S/P percutaneous endoscopic gastrostomy (PEG) tube placement    Seizure    GERD (gastroesophageal reflux disease)    Hemorrhagic otitis externa    Patient unable to provide medical history        MEDICATIONS  (STANDING):  dextrose 5% + sodium chloride 0.9%. 1000 milliLiter(s) (100 mL/Hr) IV Continuous <Continuous>  diazepam  Injectable 5 milliGRAM(s) IntraMuscular every 8 hours  enoxaparin Injectable 40 milliGRAM(s) SubCutaneous every 24 hours  hydrocortisone/polymyxin/neomycin Solution 4 Drop(s) Left Ear two times a day  lacosamide IVPB 100 milliGRAM(s) IV Intermittent every 12 hours  levETIRAcetam  IVPB 500 milliGRAM(s) IV Intermittent every 12 hours  pantoprazole  Injectable 40 milliGRAM(s) IV Push daily  silver sulfADIAZINE 1% Cream 1 Application(s) Topical daily  tiotropium 18 MICROgram(s) Capsule 1 Capsule(s) Inhalation daily  venlafaxine XR. 37.5 milliGRAM(s) Oral daily    MEDICATIONS  (PRN):  acetaminophen     Tablet .. 650 milliGRAM(s) Oral every 6 hours PRN Temp greater or equal to 38C (100.4F), Mild Pain (1 - 3)  ALBUTerol    90 MICROgram(s) HFA Inhaler 2 Puff(s) Inhalation every 4 hours PRN Bronchospasm  aluminum hydroxide/magnesium hydroxide/simethicone Suspension 30 milliLiter(s) Oral every 4 hours PRN Dyspepsia  haloperidol    Injectable 3 milliGRAM(s) IntraMuscular every 6 hours PRN agitation  hydrALAZINE Injectable 10 milliGRAM(s) IV Push every 6 hours PRN SBP > 160  melatonin 3 milliGRAM(s) Oral at bedtime PRN Insomnia  ondansetron Injectable 4 milliGRAM(s) IV Push every 8 hours PRN Nausea and/or Vomiting      Allergies    Ativan (Unknown)  Dilantin (Unknown)  Valproate Sodium (Unknown)    Intolerances        SOCIAL HISTORY:  no smoking, drinking or drugs    FAMILY HISTORY:  Patient unable to provide medical history (Father, Mother)        REVIEW OF SYSTEMS:  unable to review    Vital Signs Last 24 Hrs  T(C): 36.6 (02 Mar 2022 15:15), Max: 36.7 (02 Mar 2022 01:36)  T(F): 97.9 (02 Mar 2022 15:15), Max: 98 (02 Mar 2022 01:36)  HR: 69 (02 Mar 2022 15:40) (62 - 78)  BP: 121/60 (02 Mar 2022 15:40) (107/68 - 150/110)  BP(mean): 87 (02 Mar 2022 01:36) (87 - 87)  RR: 19 (02 Mar 2022 15:40) (16 - 19)  SpO2: 95% (02 Mar 2022 15:40) (92% - 99%)    PHYSICAL EXAM:    Constitutional: distressed, angry,   HEENT: unmasked, , not icteric  Neck: supple, no lymphadenopathy  Respiratory: clear to ascultation bilaterally anteriorly, no wheezing  Cardiovascular: S1 and S2, regular rate and rhythm, no murmurs rubs or gallops  Gastrointestinal: soft, non-tender, non-distended, +bowel sounds, no rebound or guarding, + tavares bag in gastrostomy to drainage  Extremities: No peripheral edema, no cyanosis or clubbing  Vascular: 2+ peripheral pulses, no venous stasis  Neurological: A/O x 0, R arm contraction,   Skin: No rashes, not jaundiced    LABS:                        10.0   4.12  )-----------( 221      ( 02 Mar 2022 09:16 )             30.8     03-02    138  |  110<H>  |  10  ----------------------------<  98  3.6   |  25  |  0.24<L>    Ca    8.3<L>      02 Mar 2022 09:16    TPro  6.9  /  Alb  2.4<L>  /  TBili  0.3  /  DBili  x   /  AST  15  /  ALT  16  /  AlkPhos  95  03-02    PT/INR - ( 02 Mar 2022 09:16 )   PT: 14.2 sec;   INR: 1.22 ratio         PTT - ( 02 Mar 2022 01:34 )  PTT:30.7 sec  LIVER FUNCTIONS - ( 02 Mar 2022 09:16 )  Alb: 2.4 g/dL / Pro: 6.9 gm/dL / ALK PHOS: 95 U/L / ALT: 16 U/L / AST: 15 U/L / GGT: x             RADIOLOGY & ADDITIONAL STUDIES:

## 2022-03-02 NOTE — PATIENT PROFILE ADULT - FALL HARM RISK - PATIENT NEEDS ASSISTANCE
[Midline] : trachea located in midline position [Normal] : no rashes [[email or web address]] : Microscopic ear exam with cerumen debridement:\par \par Right ear: Obstructing cerumen was debrided from the ear canal using suction, and curet.  The ear canal was otherwise within normal limits.  The tympanic membrane was intact and noninflamed.\par \par Left ear: Obstructing cerumen was debrided from the ear canal using suction, and curets.  The ear canal was otherwise within normal limits.  The tympanic membrane was intact and noninflamed.\par  Standing/Walking/Toileting/Moving from bed to chair

## 2022-03-02 NOTE — DIETITIAN INITIAL EVALUATION ADULT. - MALNUTRITION
Pt meets criteria for severe malnutrition in context of chronic illness Pt meets criteria for severe malnutrition in context of chronic illness r/t decreased ability to meet increased nutrient needs 2/2 TBI, chronic respiratory failure, stage I PI AEB severe muscle/fat wasting, intake < 75% of ENN x 1 week

## 2022-03-02 NOTE — DIETITIAN INITIAL EVALUATION ADULT. - ETIOLOGY
r/t decreased ability to meet increased nutrient needs 2/2 TBI, chronic respiratory failure, stage I PI

## 2022-03-02 NOTE — PROVIDER CONTACT NOTE (HYPOGLYCEMIA EVENT) - NS PROVIDER CONTACT SITUATION-HYPO
pt arrived to ED with Blood glucose 54 repeat 60.  Prior arrival to unit BGM 78.  on arrival to 2SW blood glucose remains less than 100.  BGM 78.

## 2022-03-02 NOTE — PROVIDER CONTACT NOTE (HYPOGLYCEMIA EVENT) - NS PROVIDER CONTACT BACKGROUND-HYPO
Age: 56y    Gender: Male    POCT Blood Glucose:  76 mg/dL (03-02-22 @ 02:15)  73 mg/dL (03-02-22 @ 01:45)  78 mg/dL (03-02-22 @ 01:28)  60 mg/dL (03-02-22 @ 00:29)      eMAR:

## 2022-03-02 NOTE — DIETITIAN INITIAL EVALUATION ADULT. - PERTINENT LABORATORY DATA
03-02    138  |  110<H>  |  10  ----------------------------<  98  3.6   |  25  |  0.24<L>    Ca    8.3<L>      02 Mar 2022 09:16    TPro  6.9  /  Alb  2.4<L>  /  TBili  0.3  /  DBili  x   /  AST  15  /  ALT  16  /  AlkPhos  95  03-02    POCT Blood Glucose.: 97 mg/dL (03.02.22 @ 12:39)  POCT Blood Glucose.: 200 mg/dL (03.02.22 @ 04:51)  POCT Blood Glucose.: 244 mg/dL (03.02.22 @ 03:35) 03-02    138  |  110<H>  |  10  ----------------------------<  98  3.6   |  25  |  0.24<L>    Ca    8.3<L>      02 Mar 2022 09:16    TPro  6.9  /  Alb  2.4<L>  /  TBili  0.3  /  DBili  x   /  AST  15  /  ALT  16  /  AlkPhos  95  03-02    BMI: BMI (kg/m2): 17.3 (03-02-22 @ 02:05)  HbA1c:   Glucose: POCT Blood Glucose.: 97 mg/dL (03-02-22 @ 12:39)    BP: 107/68 (03-02-22 @ 08:01) (107/68 - 150/110)  Lipid Panel:

## 2022-03-02 NOTE — H&P ADULT - ASSESSMENT
55 y/o M presents for PEG tube replacement     1. PEG tube malfunction   - Admit to med/surg   - NPO after midnight   - Gentle hydration with IVF: D5 NS at 100 ml/hr   - PEG tube replacement tomorrow as per Dr. Jolly   - GI consult - Dr. Jolly     2. Hypoglycemia   - Glucose 54, monitor closely   - Gentle hydration with IVF: D5 NS at 100 ml/hr     3. Elevated alkaline phosphatase   - Alkaline phosphatase 122, trend     4. Normocytic anemia   - Hb 12.1, monitor closely     5. Hypertension   - /110, monitor closely   - Hydralazine PRN for SBP > 160     6. Hemorrhagic otitis externa left ear   - c/w Ciprodex, pt has 1 day of abx left   - Monitor for expansion of redness   - If no improvement may need abx via PEG for cellulitis    7. History of diffuse traumatic brain injury, intracerebral hemorrhage, dysphagia, aphasia, chronic respiratory failure with hypercapnea, asthma, PEG tube, seizures, GERD, MDD, hemorrhagic otitis externa (left ear)   - c/w home medications   - Medications verified from list from facility     DVT ppx: SCDs (after PEG tube placement, please restart lovenox dose)   Code status: Full code (patient agrees to chest compressions and intubation if required, as documented by [facility] for [facility]. Please confirm with the pt's emergency contact, Lisa Stewart [phone] in the morning).  57 y/o M presents for PEG tube replacement     1. PEG tube malfunction   - Admit to med/surg   - NPO after midnight   - Gentle hydration with IVF: D5 NS at 100 ml/hr   - PEG tube replacement tomorrow as per Dr. Jolly   - GI consult - Dr. Jolly     2. Hypoglycemia   - Glucose 54, monitor closely   - Gentle hydration with IVF: D5 NS at 100 ml/hr     3. Elevated alkaline phosphatase   - Alkaline phosphatase 122, trend     4. Normocytic anemia   - Hb 12.1, monitor closely     5. Elevated BP without a diagnosis of HTN   - /110, monitor closely   - Hydralazine PRN for SBP > 160     6. Hemorrhagic otitis externa left ear   - c/w Ciprodex, pt has 1 day of abx left   - Monitor for expansion of redness   - If no improvement may need abx via PEG for cellulitis    7. History of diffuse traumatic brain injury, intracerebral hemorrhage, dysphagia, aphasia, non-verbal, chronic respiratory failure with hypercapnea, asthma, PEG tube, seizures, GERD, MDD, hemorrhagic otitis externa (left ear)   - c/w home medications   - Medications verified from list from facility     DVT ppx: SCDs (after PEG tube placement, please restart lovenox dose)   Code status: Full code (patient agrees to chest compressions and intubation if required, as documented by [facility] for [facility]. Please confirm with the pt's emergency contact, Lisa Stewart [phone] in the morning).

## 2022-03-02 NOTE — CONSULT NOTE ADULT - SUBJECTIVE AND OBJECTIVE BOX
55 y/o male presents with a chief complaint of Clogged PEG tube (02 Mar 2022 09:00). PMHX: Diffuse traumatic brain injury, intracerebral hemorrhage, dysphagia, aphasia, non-verbal at baseline, chronic respiratory failure with hypercapnia asthma, PEG tube, seizures, GERD, MDD, hemorrhagic otitis externa (left ear) presents to [facility] ED for c/o clogged G tube. Reportedly was initially at [facility] and tube was attempted to be unclogged without success. Subsequently transferred to [facility] for further management. He is previously known to Dr. Jolly and Dr. Duron s/p PEG tube leakage on 2/18/22 w/ s/p closure of fistula tract and conversion of G tube to a G-J tube by Dr. Duron. He is currently NPO.       PAST MEDICAL & SURGICAL HISTORY:    Diffuse traumatic brain injury    Intracerebral hemorrhage    Dysphagia    Aphasia    Chronic respiratory failure with hypercapnia    S/P percutaneous endoscopic gastrostomy (PEG) tube placement    Seizure    GERD (gastroesophageal reflux disease)    Hemorrhagic otitis externa    **Patient unable to provide medical history/non-verbal        MEDICATIONS  (STANDING):  baclofen 10 milliGRAM(s) Oral two times a day  dextrose 5% + sodium chloride 0.9%. 1000 milliLiter(s) (100 mL/Hr) IV Continuous <Continuous>  famotidine    Tablet 20 milliGRAM(s) Oral daily  hydrocortisone/polymyxin/neomycin Solution 4 Drop(s) Left Ear two times a day  lacosamide IVPB 100 milliGRAM(s) IV Intermittent every 12 hours  levETIRAcetam  IVPB 500 milliGRAM(s) IV Intermittent every 12 hours  pantoprazole  Injectable 40 milliGRAM(s) IV Push daily  silver sulfADIAZINE 1% Cream 1 Application(s) Topical daily  tiotropium 18 MICROgram(s) Capsule 1 Capsule(s) Inhalation daily  venlafaxine XR. 37.5 milliGRAM(s) Oral daily    MEDICATIONS  (PRN):  acetaminophen     Tablet .. 650 milliGRAM(s) Oral every 6 hours PRN Temp greater or equal to 38C (100.4F), Mild Pain (1 - 3)  ALBUTerol    90 MICROgram(s) HFA Inhaler 2 Puff(s) Inhalation every 4 hours PRN Bronchospasm  aluminum hydroxide/magnesium hydroxide/simethicone Suspension 30 milliLiter(s) Oral every 4 hours PRN Dyspepsia  hydrALAZINE Injectable 10 milliGRAM(s) IV Push every 6 hours PRN SBP > 160  melatonin 3 milliGRAM(s) Oral at bedtime PRN Insomnia  ondansetron Injectable 4 milliGRAM(s) IV Push every 8 hours PRN Nausea and/or Vomiting      REVIEW OF SYSTEMS:    RESPIRATORY: No shortness of breath  CARDIOVASCULAR: No tachycardia  All other review of systems is negative unless indicated above.    Vital Signs Last 24 Hrs  T(C): 36.2 (02 Mar 2022 08:01), Max: 36.7 (02 Mar 2022 01:36)  T(F): 97.1 (02 Mar 2022 08:01), Max: 98 (02 Mar 2022 01:36)  HR: 71 (02 Mar 2022 08:01) (62 - 78)  BP: 107/68 (02 Mar 2022 08:01) (107/68 - 150/110)  BP(mean): 87 (02 Mar 2022 01:36) (87 - 87)  RR: 18 (02 Mar 2022 08:01) (16 - 18)  SpO2: 96% (02 Mar 2022 08:01) (96% - 99%)    PHYSICAL EXAM:    Constitutional: NAD, thin, contracted b/l LE and non-verbal @ baseline  Respiratory: CTAB  Cardiovascular: S1 and S2, RRR  Gastrointestinal: BS+, soft, No grimacing w/ palpation of abdomen, G-J tube clamped w/ some clear oozing danay-stoma site  Extremities: No peripheral edema/protective boots to BLE  Psychiatric: Normal mood/affect given PMHX    LABS:                        10.0   4.12  )-----------( 221      ( 02 Mar 2022 09:16 )             30.8     03-02    138  |  110<H>  |  10  ----------------------------<  98  3.6   |  25  |  0.24<L>    Ca    8.3<L>      02 Mar 2022 09:16    TPro  6.9  /  Alb  2.4<L>  /  TBili  0.3  /  DBili  x   /  AST  15  /  ALT  16  /  AlkPhos  95  03-02    PT/INR - ( 02 Mar 2022 09:16 )   PT: 14.2 sec;   INR: 1.22 ratio         PTT - ( 02 Mar 2022 01:34 )  PTT:30.7 sec  LIVER FUNCTIONS - ( 02 Mar 2022 09:16 )  Alb: 2.4 g/dL / Pro: 6.9 gm/dL / ALK PHOS: 95 U/L / ALT: 16 U/L / AST: 15 U/L / GGT: x             RADIOLOGY & ADDITIONAL STUDIES:  n/a

## 2022-03-02 NOTE — DIETITIAN NUTRITION RISK NOTIFICATION - TREATMENT: THE FOLLOWING DIET HAS BEEN RECOMMENDED
Diet, NPO (03-02-22 @ 00:36) [Active]  Diet, NPO after Midnight:      NPO Start Date: 01-Mar-2022,   NPO Start Time: 23:59 (03-01-22 @ 23:09) [Active]

## 2022-03-02 NOTE — DIETITIAN INITIAL EVALUATION ADULT. - PHYSCIAL ASSESSMENT
Sreedhar score 10  BM - fecal incontinence   PI sacrum stage I   Wound: left ear thin, frail/underweight/emaciated/other (specify)
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
## 2022-03-02 NOTE — PATIENT PROFILE ADULT - FALL HARM RISK - HARM RISK INTERVENTIONS
Assistance with ambulation/Assistance OOB with selected safe patient handling equipment/Communicate Risk of Fall with Harm to all staff/Discuss with provider need for PT consult/Monitor gait and stability/Reinforce activity limits and safety measures with patient and family/Tailored Fall Risk Interventions/Visual Cue: Yellow wristband and red socks/Bed in lowest position, wheels locked, appropriate side rails in place/Call bell, personal items and telephone in reach/Instruct patient to call for assistance before getting out of bed or chair/Non-slip footwear when patient is out of bed/[place] to call system/Physically safe environment - no spills, clutter or unnecessary equipment/Purposeful Proactive Rounding/Room/bathroom lighting operational, light cord in reach

## 2022-03-02 NOTE — DIETITIAN INITIAL EVALUATION ADULT. - ENTERAL
1. Initiate Glucerna 1.5 TF via PEG @ 30 ml/hr, increase by 10 ml/hr each hr until goal rate of 60 ml/hr is met (total volume = 1200 ml - provides 1800 kcal, 99 g protein, 910 ml free water), 2. Provide free water flushes at 45 ml/hr (provides additional 900 ml free water for total water volume 1800 ml)

## 2022-03-02 NOTE — DIETITIAN INITIAL EVALUATION ADULT. - PERTINENT MEDS FT
MEDICATIONS  (STANDING):  baclofen 10 milliGRAM(s) Oral two times a day  dextrose 5% + sodium chloride 0.9%. 1000 milliLiter(s) (100 mL/Hr) IV Continuous <Continuous>  famotidine    Tablet 20 milliGRAM(s) Oral daily  hydrocortisone/polymyxin/neomycin Solution 4 Drop(s) Left Ear two times a day  lacosamide IVPB 100 milliGRAM(s) IV Intermittent every 12 hours  levETIRAcetam  IVPB 500 milliGRAM(s) IV Intermittent every 12 hours  pantoprazole  Injectable 40 milliGRAM(s) IV Push daily  silver sulfADIAZINE 1% Cream 1 Application(s) Topical daily  tiotropium 18 MICROgram(s) Capsule 1 Capsule(s) Inhalation daily  venlafaxine XR. 37.5 milliGRAM(s) Oral daily    MEDICATIONS  (PRN):  acetaminophen     Tablet .. 650 milliGRAM(s) Oral every 6 hours PRN Temp greater or equal to 38C (100.4F), Mild Pain (1 - 3)  ALBUTerol    90 MICROgram(s) HFA Inhaler 2 Puff(s) Inhalation every 4 hours PRN Bronchospasm  aluminum hydroxide/magnesium hydroxide/simethicone Suspension 30 milliLiter(s) Oral every 4 hours PRN Dyspepsia  hydrALAZINE Injectable 10 milliGRAM(s) IV Push every 6 hours PRN SBP > 160  melatonin 3 milliGRAM(s) Oral at bedtime PRN Insomnia  ondansetron Injectable 4 milliGRAM(s) IV Push every 8 hours PRN Nausea and/or Vomiting

## 2022-03-03 LAB — SARS-COV-2 RNA SPEC QL NAA+PROBE: SIGNIFICANT CHANGE UP

## 2022-03-03 PROCEDURE — 99232 SBSQ HOSP IP/OBS MODERATE 35: CPT

## 2022-03-03 RX ADMIN — LACOSAMIDE 120 MILLIGRAM(S): 50 TABLET ORAL at 22:48

## 2022-03-03 RX ADMIN — Medication 1 APPLICATION(S): at 09:07

## 2022-03-03 RX ADMIN — Medication 5 MILLIGRAM(S): at 22:48

## 2022-03-03 RX ADMIN — Medication 5 MILLIGRAM(S): at 14:22

## 2022-03-03 RX ADMIN — TIOTROPIUM BROMIDE 1 CAPSULE(S): 18 CAPSULE ORAL; RESPIRATORY (INHALATION) at 08:22

## 2022-03-03 RX ADMIN — PANTOPRAZOLE SODIUM 40 MILLIGRAM(S): 20 TABLET, DELAYED RELEASE ORAL at 09:06

## 2022-03-03 RX ADMIN — SODIUM CHLORIDE 100 MILLILITER(S): 9 INJECTION, SOLUTION INTRAVENOUS at 10:45

## 2022-03-03 RX ADMIN — Medication 4 DROP(S): at 09:07

## 2022-03-03 RX ADMIN — Medication 5 MILLIGRAM(S): at 05:18

## 2022-03-03 RX ADMIN — LEVETIRACETAM 400 MILLIGRAM(S): 250 TABLET, FILM COATED ORAL at 09:06

## 2022-03-03 RX ADMIN — LEVETIRACETAM 400 MILLIGRAM(S): 250 TABLET, FILM COATED ORAL at 21:44

## 2022-03-03 RX ADMIN — ENOXAPARIN SODIUM 40 MILLIGRAM(S): 100 INJECTION SUBCUTANEOUS at 17:21

## 2022-03-03 RX ADMIN — LACOSAMIDE 120 MILLIGRAM(S): 50 TABLET ORAL at 09:43

## 2022-03-03 RX ADMIN — Medication 4 DROP(S): at 21:47

## 2022-03-03 RX ADMIN — HALOPERIDOL DECANOATE 3 MILLIGRAM(S): 100 INJECTION INTRAMUSCULAR at 17:56

## 2022-03-03 RX ADMIN — SODIUM CHLORIDE 100 MILLILITER(S): 9 INJECTION, SOLUTION INTRAVENOUS at 23:21

## 2022-03-03 NOTE — PROGRESS NOTE ADULT - SUBJECTIVE AND OBJECTIVE BOX
Patient is a 56y old  Male who presents with a chief complaint of Clogged PEG tube.    HPI: 55 y/o male admitted for clogged G-J tube. Yesterday patient pulled out device. He remains at his baseline mental status, and is somewhat combative at times.     MEDICATIONS  (STANDING):  dextrose 5% + sodium chloride 0.9%. 1000 milliLiter(s) (100 mL/Hr) IV Continuous <Continuous>  diazepam  Injectable 5 milliGRAM(s) IntraMuscular every 8 hours  enoxaparin Injectable 40 milliGRAM(s) SubCutaneous every 24 hours  hydrocortisone/polymyxin/neomycin Solution 4 Drop(s) Left Ear two times a day  lacosamide IVPB 100 milliGRAM(s) IV Intermittent every 12 hours  levETIRAcetam  IVPB 500 milliGRAM(s) IV Intermittent every 12 hours  pantoprazole  Injectable 40 milliGRAM(s) IV Push daily  silver sulfADIAZINE 1% Cream 1 Application(s) Topical daily  tiotropium 18 MICROgram(s) Capsule 1 Capsule(s) Inhalation daily  venlafaxine XR. 37.5 milliGRAM(s) Oral daily    MEDICATIONS  (PRN):  acetaminophen     Tablet .. 650 milliGRAM(s) Oral every 6 hours PRN Temp greater or equal to 38C (100.4F), Mild Pain (1 - 3)  ALBUTerol    90 MICROgram(s) HFA Inhaler 2 Puff(s) Inhalation every 4 hours PRN Bronchospasm  aluminum hydroxide/magnesium hydroxide/simethicone Suspension 30 milliLiter(s) Oral every 4 hours PRN Dyspepsia  haloperidol    Injectable 3 milliGRAM(s) IntraMuscular every 6 hours PRN agitation  hydrALAZINE Injectable 10 milliGRAM(s) IV Push every 6 hours PRN SBP > 160  melatonin 3 milliGRAM(s) Oral at bedtime PRN Insomnia  ondansetron Injectable 4 milliGRAM(s) IV Push every 8 hours PRN Nausea and/or Vomiting    Vital Signs Last 24 Hrs  T(C): 35.8 (03 Mar 2022 07:56), Max: 36.6 (02 Mar 2022 15:15)  T(F): 96.4 (03 Mar 2022 07:56), Max: 97.9 (02 Mar 2022 15:15)  HR: 74 (03 Mar 2022 07:56) (69 - 89)  BP: 140/74 (03 Mar 2022 07:56) (108/89 - 140/74)  BP(mean): 89 (03 Mar 2022 07:56) (89 - 89)  RR: 16 (03 Mar 2022 07:56) (16 - 19)  SpO2: 99% (03 Mar 2022 07:56) (92% - 99%)    PHYSICAL EXAM:  Constitutional: sleeping, no acute distress   HEENT: unmasked, not icteric  Respiratory: clear to ascultation bilaterally anteriorly, no wheezing  Cardiovascular: S1 and S2, regular rate and rhythm, no murmurs rubs or gallops  Gastrointestinal: soft, non-tender, non-distended, +bowel sounds, no rebound or guarding, + tavares bag in gastrostomy to drainage, draining brown colored fluid  Extremities: No peripheral edema, no cyanosis or clubbing  Vascular: 2+ peripheral pulses, no venous stasis  Neurological: A/O x 0, R arm contracted   Skin: No rashes, not jaundiced    LABS:                        10.0   4.12  )-----------( 221      ( 02 Mar 2022 09:16 )             30.8     03-02    138  |  110<H>  |  10  ----------------------------<  98  3.6   |  25  |  0.24<L>    Ca    8.3<L>      02 Mar 2022 09:16    TPro  6.9  /  Alb  2.4<L>  /  TBili  0.3  /  DBili  x   /  AST  15  /  ALT  16  /  AlkPhos  95  03-02    PT/INR - ( 02 Mar 2022 09:16 )   PT: 14.2 sec;   INR: 1.22 ratio         PTT - ( 02 Mar 2022 01:34 )  PTT:30.7 sec  LIVER FUNCTIONS - ( 02 Mar 2022 09:16 )  Alb: 2.4 g/dL / Pro: 6.9 gm/dL / ALK PHOS: 95 U/L / ALT: 16 U/L / AST: 15 U/L / GGT: x             RADIOLOGY & ADDITIONAL STUDIES: reviewed

## 2022-03-03 NOTE — PROGRESS NOTE ADULT - ASSESSMENT
57 y/o male with hx of TBI, nonverbal at baseline admitted for GJ tube Malfunction. 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 Patient pulled out GJ tube yesterday, which was then replaced with a tavares catheter.     PLAN  Keep NPO  Leave catheter in gastrostomy site for now, and leave to drainage.  Will plan for repeat endoscopy for tube replacement/suturing with Dr. Duron. Timing to be determined by Dr. Duron.  Will need better management of agitation to prevent patient pulling tube in the future.   Supportive care

## 2022-03-03 NOTE — PROGRESS NOTE ADULT - SUBJECTIVE AND OBJECTIVE BOX
CC:  Patient is a 56y old  Male who presents with a chief complaint of Clogged PEG tube (03 Mar 2022 09:45)    SUBJECTIVE:     -no new complaints or issues at current time.    ROS:  all other review of systems are negative unless indicated above.    acetaminophen     Tablet .. 650 milliGRAM(s) Oral every 6 hours PRN  ALBUTerol    90 MICROgram(s) HFA Inhaler 2 Puff(s) Inhalation every 4 hours PRN  aluminum hydroxide/magnesium hydroxide/simethicone Suspension 30 milliLiter(s) Oral every 4 hours PRN  dextrose 5% + sodium chloride 0.9%. 1000 milliLiter(s) IV Continuous <Continuous>  diazepam  Injectable 5 milliGRAM(s) IntraMuscular every 8 hours  enoxaparin Injectable 40 milliGRAM(s) SubCutaneous every 24 hours  haloperidol    Injectable 3 milliGRAM(s) IntraMuscular every 6 hours PRN  hydrALAZINE Injectable 10 milliGRAM(s) IV Push every 6 hours PRN  hydrocortisone/polymyxin/neomycin Solution 4 Drop(s) Left Ear two times a day  lacosamide IVPB 100 milliGRAM(s) IV Intermittent every 12 hours  levETIRAcetam  IVPB 500 milliGRAM(s) IV Intermittent every 12 hours  melatonin 3 milliGRAM(s) Oral at bedtime PRN  ondansetron Injectable 4 milliGRAM(s) IV Push every 8 hours PRN  pantoprazole  Injectable 40 milliGRAM(s) IV Push daily  silver sulfADIAZINE 1% Cream 1 Application(s) Topical daily  tiotropium 18 MICROgram(s) Capsule 1 Capsule(s) Inhalation daily  venlafaxine XR. 37.5 milliGRAM(s) Oral daily    T(C): 35.8 (03-03-22 @ 07:56), Max: 36.6 (03-02-22 @ 15:15)  HR: 69 (03-03-22 @ 08:22) (69 - 89)  BP: 140/74 (03-03-22 @ 07:56) (108/89 - 140/74)  RR: 16 (03-03-22 @ 07:56) (16 - 19)  SpO2: 96% (03-03-22 @ 08:22) (92% - 99%)    Constitutional: NAD.   HEENT: PERRL, EOMI, MMM.  Neck: Soft and supple, No carotid bruit, No JVD  Respiratory: Breath sounds are clear bilaterally, No wheezing, rales or rhonchi  Cardiovascular: S1 and S2, regular rate and rhythm, no murmur, rub or gallop.  Gastrointestinal: Bowel Sounds present, soft, nontender, nondistended, no guarding, no rebound, no mass.  Extremities: No peripheral edema  Vascular: 2+ peripheral pulses  Neurological: A/O x , no focal deficits  Musculoskeletal: 5/5 strength b/l upper and lower extremities  Skin:  no visible rashes.                         10.0   4.12  )-----------( 221      ( 02 Mar 2022 09:16 )             30.8     PT/INR - ( 02 Mar 2022 09:16 )   PT: 14.2 sec;   INR: 1.22 ratio         PTT - ( 02 Mar 2022 01:34 )  PTT:30.7 sec  03-02    138  |  110<H>  |  10  ----------------------------<  98  3.6   |  25  |  0.24<L>    Ca    8.3<L>      02 Mar 2022 09:16    TPro  6.9  /  Alb  2.4<L>  /  TBili  0.3  /  DBili  x   /  AST  15  /  ALT  16  /  AlkPhos  95  03-02

## 2022-03-04 LAB
ANION GAP SERPL CALC-SCNC: 5 MMOL/L — SIGNIFICANT CHANGE UP (ref 5–17)
BUN SERPL-MCNC: 1 MG/DL — LOW (ref 7–23)
CALCIUM SERPL-MCNC: 8 MG/DL — LOW (ref 8.5–10.1)
CHLORIDE SERPL-SCNC: 112 MMOL/L — HIGH (ref 96–108)
CO2 SERPL-SCNC: 26 MMOL/L — SIGNIFICANT CHANGE UP (ref 22–31)
CREAT SERPL-MCNC: 0.17 MG/DL — LOW (ref 0.5–1.3)
EGFR: 164 ML/MIN/1.73M2 — SIGNIFICANT CHANGE UP
GLUCOSE SERPL-MCNC: 122 MG/DL — HIGH (ref 70–99)
HCT VFR BLD CALC: 32.7 % — LOW (ref 39–50)
HGB BLD-MCNC: 10.5 G/DL — LOW (ref 13–17)
MCHC RBC-ENTMCNC: 29.9 PG — SIGNIFICANT CHANGE UP (ref 27–34)
MCHC RBC-ENTMCNC: 32.1 GM/DL — SIGNIFICANT CHANGE UP (ref 32–36)
MCV RBC AUTO: 93.2 FL — SIGNIFICANT CHANGE UP (ref 80–100)
PLATELET # BLD AUTO: 226 K/UL — SIGNIFICANT CHANGE UP (ref 150–400)
POTASSIUM SERPL-MCNC: 2.5 MMOL/L — CRITICAL LOW (ref 3.5–5.3)
POTASSIUM SERPL-MCNC: 3.3 MMOL/L — LOW (ref 3.5–5.3)
POTASSIUM SERPL-SCNC: 2.5 MMOL/L — CRITICAL LOW (ref 3.5–5.3)
POTASSIUM SERPL-SCNC: 3.3 MMOL/L — LOW (ref 3.5–5.3)
RBC # BLD: 3.51 M/UL — LOW (ref 4.2–5.8)
RBC # FLD: 14.6 % — HIGH (ref 10.3–14.5)
SODIUM SERPL-SCNC: 143 MMOL/L — SIGNIFICANT CHANGE UP (ref 135–145)
WBC # BLD: 4.75 K/UL — SIGNIFICANT CHANGE UP (ref 3.8–10.5)
WBC # FLD AUTO: 4.75 K/UL — SIGNIFICANT CHANGE UP (ref 3.8–10.5)

## 2022-03-04 PROCEDURE — 99232 SBSQ HOSP IP/OBS MODERATE 35: CPT

## 2022-03-04 RX ORDER — DEXTROSE MONOHYDRATE, SODIUM CHLORIDE, AND POTASSIUM CHLORIDE 50; .745; 4.5 G/1000ML; G/1000ML; G/1000ML
1000 INJECTION, SOLUTION INTRAVENOUS
Refills: 0 | Status: DISCONTINUED | OUTPATIENT
Start: 2022-03-04 | End: 2022-03-06

## 2022-03-04 RX ORDER — POTASSIUM CHLORIDE 20 MEQ
10 PACKET (EA) ORAL
Refills: 0 | Status: COMPLETED | OUTPATIENT
Start: 2022-03-04 | End: 2022-03-04

## 2022-03-04 RX ADMIN — Medication 5 MILLIGRAM(S): at 05:52

## 2022-03-04 RX ADMIN — Medication 100 MILLIEQUIVALENT(S): at 14:19

## 2022-03-04 RX ADMIN — Medication 100 MILLIEQUIVALENT(S): at 12:25

## 2022-03-04 RX ADMIN — Medication 4 DROP(S): at 23:17

## 2022-03-04 RX ADMIN — TIOTROPIUM BROMIDE 1 CAPSULE(S): 18 CAPSULE ORAL; RESPIRATORY (INHALATION) at 08:22

## 2022-03-04 RX ADMIN — Medication 1 APPLICATION(S): at 10:58

## 2022-03-04 RX ADMIN — SODIUM CHLORIDE 100 MILLILITER(S): 9 INJECTION, SOLUTION INTRAVENOUS at 10:00

## 2022-03-04 RX ADMIN — LEVETIRACETAM 400 MILLIGRAM(S): 250 TABLET, FILM COATED ORAL at 10:00

## 2022-03-04 RX ADMIN — DEXTROSE MONOHYDRATE, SODIUM CHLORIDE, AND POTASSIUM CHLORIDE 100 MILLILITER(S): 50; .745; 4.5 INJECTION, SOLUTION INTRAVENOUS at 16:30

## 2022-03-04 RX ADMIN — Medication 100 MILLIEQUIVALENT(S): at 10:57

## 2022-03-04 RX ADMIN — Medication 5 MILLIGRAM(S): at 23:16

## 2022-03-04 RX ADMIN — Medication 4 DROP(S): at 10:58

## 2022-03-04 RX ADMIN — LACOSAMIDE 120 MILLIGRAM(S): 50 TABLET ORAL at 22:11

## 2022-03-04 RX ADMIN — LACOSAMIDE 120 MILLIGRAM(S): 50 TABLET ORAL at 08:40

## 2022-03-04 RX ADMIN — LEVETIRACETAM 400 MILLIGRAM(S): 250 TABLET, FILM COATED ORAL at 23:16

## 2022-03-04 RX ADMIN — ENOXAPARIN SODIUM 40 MILLIGRAM(S): 100 INJECTION SUBCUTANEOUS at 18:26

## 2022-03-04 RX ADMIN — PANTOPRAZOLE SODIUM 40 MILLIGRAM(S): 20 TABLET, DELAYED RELEASE ORAL at 08:40

## 2022-03-04 RX ADMIN — Medication 100 MILLIEQUIVALENT(S): at 20:08

## 2022-03-04 RX ADMIN — Medication 100 MILLIEQUIVALENT(S): at 21:01

## 2022-03-04 RX ADMIN — Medication 100 MILLIEQUIVALENT(S): at 23:47

## 2022-03-04 NOTE — CHART NOTE - NSCHARTNOTEFT_GEN_A_CORE
Patient hypokalemic today, unsafe for anesthesia.   This was discussed at length with family.   Case is re-booked for Sunday to allow safe infusion of K and medical optimization.

## 2022-03-04 NOTE — PROGRESS NOTE ADULT - ASSESSMENT
57 y/o male with hx of TBI, nonverbal at baseline admitted for GJ tube Malfunction. 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 Patient pulled out GJ tube yesterday, which was then replaced with a tavares catheter.     PLAN  Keep NPO  Leave catheter in gastrostomy site for now, and leave to drainage.  Will plan for repeat endoscopy for tube replacement/suturing with Dr. Duron. Timing to be determined by Dr. Duron.  Will need better management of agitation to prevent patient pulling tube in the future.   Supportive care      monitor/supplement potassium  f/u BMP and Mg in AM.

## 2022-03-04 NOTE — PROGRESS NOTE ADULT - SUBJECTIVE AND OBJECTIVE BOX
CC:  Patient is a 56y old  Male who presents with a chief complaint of Clogged PEG tube (03 Mar 2022 14:39)    SUBJECTIVE:     -no new complaints or issues at current time.    ROS:  all other review of systems are negative unless indicated above.    acetaminophen     Tablet .. 650 milliGRAM(s) Oral every 6 hours PRN  ALBUTerol    90 MICROgram(s) HFA Inhaler 2 Puff(s) Inhalation every 4 hours PRN  aluminum hydroxide/magnesium hydroxide/simethicone Suspension 30 milliLiter(s) Oral every 4 hours PRN  dextrose 5% + sodium chloride 0.9% with potassium chloride 20 mEq/L 1000 milliLiter(s) IV Continuous <Continuous>  diazepam  Injectable 5 milliGRAM(s) IntraMuscular every 8 hours  enoxaparin Injectable 40 milliGRAM(s) SubCutaneous every 24 hours  haloperidol    Injectable 3 milliGRAM(s) IntraMuscular every 6 hours PRN  hydrALAZINE Injectable 10 milliGRAM(s) IV Push every 6 hours PRN  hydrocortisone/polymyxin/neomycin Solution 4 Drop(s) Left Ear two times a day  lacosamide IVPB 100 milliGRAM(s) IV Intermittent every 12 hours  levETIRAcetam  IVPB 500 milliGRAM(s) IV Intermittent every 12 hours  melatonin 3 milliGRAM(s) Oral at bedtime PRN  ondansetron Injectable 4 milliGRAM(s) IV Push every 8 hours PRN  pantoprazole  Injectable 40 milliGRAM(s) IV Push daily  silver sulfADIAZINE 1% Cream 1 Application(s) Topical daily  tiotropium 18 MICROgram(s) Capsule 1 Capsule(s) Inhalation daily  venlafaxine XR. 37.5 milliGRAM(s) Oral daily    T(C): 36.3 (03-04-22 @ 16:08), Max: 36.3 (03-04-22 @ 16:08)  HR: 82 (03-04-22 @ 16:08) (77 - 83)  BP: 113/71 (03-04-22 @ 16:08) (113/71 - 121/63)  RR: 18 (03-04-22 @ 16:08) (17 - 18)  SpO2: 99% (03-04-22 @ 16:08) (96% - 99%)    Constitutional: NAD.   HEENT: PERRL, EOMI, MMM.  Neck: Soft and supple, No carotid bruit, No JVD  Respiratory: Breath sounds are clear bilaterally, No wheezing, rales or rhonchi  Cardiovascular: S1 and S2, regular rate and rhythm, no murmur, rub or gallop.  Gastrointestinal: Bowel Sounds present, soft, nontender, nondistended, no guarding, no rebound, no mass.  Extremities: No peripheral edema  Vascular: 2+ peripheral pulses  Neurological: A/O x , no focal deficits  Musculoskeletal: 5/5 strength b/l upper and lower extremities  Skin:  no visible rashes.                         10.5   4.75  )-----------( 226      ( 04 Mar 2022 09:09 )             32.7       03-04    143  |  112<H>  |  1<L>  ----------------------------<  122<H>  2.5<LL>   |  26  |  0.17<L>    Ca    8.0<L>      04 Mar 2022 09:09

## 2022-03-05 LAB
ANION GAP SERPL CALC-SCNC: 1 MMOL/L — LOW (ref 5–17)
BUN SERPL-MCNC: <1 MG/DL — LOW (ref 7–23)
CALCIUM SERPL-MCNC: 8.5 MG/DL — SIGNIFICANT CHANGE UP (ref 8.5–10.1)
CHLORIDE SERPL-SCNC: 111 MMOL/L — HIGH (ref 96–108)
CO2 SERPL-SCNC: 29 MMOL/L — SIGNIFICANT CHANGE UP (ref 22–31)
CREAT SERPL-MCNC: 0.26 MG/DL — LOW (ref 0.5–1.3)
EGFR: 144 ML/MIN/1.73M2 — SIGNIFICANT CHANGE UP
GLUCOSE SERPL-MCNC: 145 MG/DL — HIGH (ref 70–99)
MAGNESIUM SERPL-MCNC: 1.8 MG/DL — SIGNIFICANT CHANGE UP (ref 1.6–2.6)
POTASSIUM SERPL-MCNC: 4.2 MMOL/L — SIGNIFICANT CHANGE UP (ref 3.5–5.3)
POTASSIUM SERPL-SCNC: 4.2 MMOL/L — SIGNIFICANT CHANGE UP (ref 3.5–5.3)
SODIUM SERPL-SCNC: 141 MMOL/L — SIGNIFICANT CHANGE UP (ref 135–145)

## 2022-03-05 PROCEDURE — 99232 SBSQ HOSP IP/OBS MODERATE 35: CPT

## 2022-03-05 RX ADMIN — PANTOPRAZOLE SODIUM 40 MILLIGRAM(S): 20 TABLET, DELAYED RELEASE ORAL at 09:09

## 2022-03-05 RX ADMIN — Medication 4 DROP(S): at 21:31

## 2022-03-05 RX ADMIN — Medication 4 DROP(S): at 10:49

## 2022-03-05 RX ADMIN — DEXTROSE MONOHYDRATE, SODIUM CHLORIDE, AND POTASSIUM CHLORIDE 100 MILLILITER(S): 50; .745; 4.5 INJECTION, SOLUTION INTRAVENOUS at 14:49

## 2022-03-05 RX ADMIN — LEVETIRACETAM 400 MILLIGRAM(S): 250 TABLET, FILM COATED ORAL at 10:49

## 2022-03-05 RX ADMIN — Medication 1 APPLICATION(S): at 11:05

## 2022-03-05 RX ADMIN — LACOSAMIDE 120 MILLIGRAM(S): 50 TABLET ORAL at 09:09

## 2022-03-05 RX ADMIN — TIOTROPIUM BROMIDE 1 CAPSULE(S): 18 CAPSULE ORAL; RESPIRATORY (INHALATION) at 08:48

## 2022-03-05 RX ADMIN — LACOSAMIDE 120 MILLIGRAM(S): 50 TABLET ORAL at 22:17

## 2022-03-05 RX ADMIN — Medication 5 MILLIGRAM(S): at 21:31

## 2022-03-05 RX ADMIN — ENOXAPARIN SODIUM 40 MILLIGRAM(S): 100 INJECTION SUBCUTANEOUS at 18:24

## 2022-03-05 RX ADMIN — DEXTROSE MONOHYDRATE, SODIUM CHLORIDE, AND POTASSIUM CHLORIDE 100 MILLILITER(S): 50; .745; 4.5 INJECTION, SOLUTION INTRAVENOUS at 02:29

## 2022-03-05 RX ADMIN — LEVETIRACETAM 400 MILLIGRAM(S): 250 TABLET, FILM COATED ORAL at 21:31

## 2022-03-05 NOTE — PROGRESS NOTE ADULT - SUBJECTIVE AND OBJECTIVE BOX
Patient is a 56y old  Male who presents with a chief complaint of Clogged PEG tube (04 Mar 2022 16:10)      Subective:  Called because tavares was pulled far into abdomen    PAST MEDICAL & SURGICAL HISTORY:  Diffuse traumatic brain injury    Intracerebral hemorrhage    Dysphagia    Aphasia    Chronic respiratory failure with hypercapnia    S/P percutaneous endoscopic gastrostomy (PEG) tube placement    Seizure    GERD (gastroesophageal reflux disease)    Hemorrhagic otitis externa    Patient unable to provide medical history        MEDICATIONS  (STANDING):  dextrose 5% + sodium chloride 0.9% with potassium chloride 20 mEq/L 1000 milliLiter(s) (100 mL/Hr) IV Continuous <Continuous>  diazepam  Injectable 5 milliGRAM(s) IntraMuscular every 8 hours  enoxaparin Injectable 40 milliGRAM(s) SubCutaneous every 24 hours  hydrocortisone/polymyxin/neomycin Solution 4 Drop(s) Left Ear two times a day  lacosamide IVPB 100 milliGRAM(s) IV Intermittent every 12 hours  levETIRAcetam  IVPB 500 milliGRAM(s) IV Intermittent every 12 hours  pantoprazole  Injectable 40 milliGRAM(s) IV Push daily  silver sulfADIAZINE 1% Cream 1 Application(s) Topical daily  tiotropium 18 MICROgram(s) Capsule 1 Capsule(s) Inhalation daily  venlafaxine XR. 37.5 milliGRAM(s) Oral daily    MEDICATIONS  (PRN):  acetaminophen     Tablet .. 650 milliGRAM(s) Oral every 6 hours PRN Temp greater or equal to 38C (100.4F), Mild Pain (1 - 3)  ALBUTerol    90 MICROgram(s) HFA Inhaler 2 Puff(s) Inhalation every 4 hours PRN Bronchospasm  aluminum hydroxide/magnesium hydroxide/simethicone Suspension 30 milliLiter(s) Oral every 4 hours PRN Dyspepsia  haloperidol    Injectable 3 milliGRAM(s) IntraMuscular every 6 hours PRN agitation  hydrALAZINE Injectable 10 milliGRAM(s) IV Push every 6 hours PRN SBP > 160  melatonin 3 milliGRAM(s) Oral at bedtime PRN Insomnia  ondansetron Injectable 4 milliGRAM(s) IV Push every 8 hours PRN Nausea and/or Vomiting      REVIEW OF SYSTEMS:    NA    Vital Signs Last 24 Hrs  T(C): 36.8 (05 Mar 2022 08:24), Max: 36.8 (05 Mar 2022 08:24)  T(F): 98.3 (05 Mar 2022 08:24), Max: 98.3 (05 Mar 2022 08:24)  HR: 83 (05 Mar 2022 08:24) (75 - 83)  BP: 106/56 (05 Mar 2022 08:24) (100/63 - 113/71)  BP(mean): --  RR: 18 (05 Mar 2022 08:24) (18 - 18)  SpO2: 97% (05 Mar 2022 08:24) (97% - 99%)    PHYSICAL EXAM:    Constitutional: NAD,   Respiratory: CTAB  Cardiovascular: S1 and S2, RRR  Gastrointestinal: BS+, soft, NT/ND, Tavares mostly into abdomen  Extremities: No peripheral edema      LABS:                        10.5   4.75  )-----------( 226      ( 04 Mar 2022 09:09 )             32.7     03-05    141  |  111<H>  |  <1<L>  ----------------------------<  145<H>  4.2   |  29  |  0.26<L>    Ca    8.5      05 Mar 2022 08:58  Mg     1.8     03-05            RADIOLOGY & ADDITIONAL STUDIES:

## 2022-03-05 NOTE — PROGRESS NOTE ADULT - ASSESSMENT
Imp:  Balloon obviously pulled into small bowel again    Plan:  Balloon fully deflated and then pulled back into stomach and reinflated and anchored with tape

## 2022-03-06 LAB
ANION GAP SERPL CALC-SCNC: 5 MMOL/L — SIGNIFICANT CHANGE UP (ref 5–17)
BUN SERPL-MCNC: 2 MG/DL — LOW (ref 7–23)
CALCIUM SERPL-MCNC: 8.7 MG/DL — SIGNIFICANT CHANGE UP (ref 8.5–10.1)
CHLORIDE SERPL-SCNC: 111 MMOL/L — HIGH (ref 96–108)
CO2 SERPL-SCNC: 24 MMOL/L — SIGNIFICANT CHANGE UP (ref 22–31)
CREAT SERPL-MCNC: 0.21 MG/DL — LOW (ref 0.5–1.3)
EGFR: 154 ML/MIN/1.73M2 — SIGNIFICANT CHANGE UP
GLUCOSE SERPL-MCNC: 80 MG/DL — SIGNIFICANT CHANGE UP (ref 70–99)
POTASSIUM SERPL-MCNC: 4.1 MMOL/L — SIGNIFICANT CHANGE UP (ref 3.5–5.3)
POTASSIUM SERPL-SCNC: 4.1 MMOL/L — SIGNIFICANT CHANGE UP (ref 3.5–5.3)
SODIUM SERPL-SCNC: 140 MMOL/L — SIGNIFICANT CHANGE UP (ref 135–145)

## 2022-03-06 PROCEDURE — 43246 EGD PLACE GASTROSTOMY TUBE: CPT

## 2022-03-06 PROCEDURE — 99232 SBSQ HOSP IP/OBS MODERATE 35: CPT

## 2022-03-06 RX ORDER — SODIUM CHLORIDE 9 MG/ML
1000 INJECTION INTRAMUSCULAR; INTRAVENOUS; SUBCUTANEOUS
Refills: 0 | Status: DISCONTINUED | OUTPATIENT
Start: 2022-03-06 | End: 2022-03-08

## 2022-03-06 RX ORDER — OXYCODONE HYDROCHLORIDE 5 MG/1
5 TABLET ORAL ONCE
Refills: 0 | Status: DISCONTINUED | OUTPATIENT
Start: 2022-03-06 | End: 2022-03-06

## 2022-03-06 RX ORDER — ONDANSETRON 8 MG/1
4 TABLET, FILM COATED ORAL ONCE
Refills: 0 | Status: DISCONTINUED | OUTPATIENT
Start: 2022-03-06 | End: 2022-03-06

## 2022-03-06 RX ORDER — SODIUM CHLORIDE 9 MG/ML
1000 INJECTION, SOLUTION INTRAVENOUS
Refills: 0 | Status: DISCONTINUED | OUTPATIENT
Start: 2022-03-06 | End: 2022-03-06

## 2022-03-06 RX ORDER — FENTANYL CITRATE 50 UG/ML
25 INJECTION INTRAVENOUS
Refills: 0 | Status: DISCONTINUED | OUTPATIENT
Start: 2022-03-06 | End: 2022-03-06

## 2022-03-06 RX ADMIN — Medication 5 MILLIGRAM(S): at 05:59

## 2022-03-06 RX ADMIN — LEVETIRACETAM 400 MILLIGRAM(S): 250 TABLET, FILM COATED ORAL at 21:07

## 2022-03-06 RX ADMIN — Medication 5 MILLIGRAM(S): at 22:20

## 2022-03-06 RX ADMIN — SODIUM CHLORIDE 80 MILLILITER(S): 9 INJECTION INTRAMUSCULAR; INTRAVENOUS; SUBCUTANEOUS at 17:40

## 2022-03-06 RX ADMIN — Medication 37.5 MILLIGRAM(S): at 15:15

## 2022-03-06 RX ADMIN — ENOXAPARIN SODIUM 40 MILLIGRAM(S): 100 INJECTION SUBCUTANEOUS at 21:07

## 2022-03-06 RX ADMIN — LACOSAMIDE 120 MILLIGRAM(S): 50 TABLET ORAL at 22:02

## 2022-03-06 RX ADMIN — DEXTROSE MONOHYDRATE, SODIUM CHLORIDE, AND POTASSIUM CHLORIDE 100 MILLILITER(S): 50; .745; 4.5 INJECTION, SOLUTION INTRAVENOUS at 03:12

## 2022-03-06 RX ADMIN — LEVETIRACETAM 400 MILLIGRAM(S): 250 TABLET, FILM COATED ORAL at 12:01

## 2022-03-06 RX ADMIN — PANTOPRAZOLE SODIUM 40 MILLIGRAM(S): 20 TABLET, DELAYED RELEASE ORAL at 11:11

## 2022-03-06 RX ADMIN — LACOSAMIDE 120 MILLIGRAM(S): 50 TABLET ORAL at 11:11

## 2022-03-06 RX ADMIN — Medication 4 DROP(S): at 21:08

## 2022-03-06 RX ADMIN — Medication 4 DROP(S): at 12:05

## 2022-03-06 RX ADMIN — Medication 1 APPLICATION(S): at 15:11

## 2022-03-06 NOTE — PROGRESS NOTE ADULT - SUBJECTIVE AND OBJECTIVE BOX
CC:  Patient is a 56y old  Male who presents with a chief complaint of Clogged PEG tube (05 Mar 2022 14:48)    SUBJECTIVE:     -no new complaints or issues at current time.    ROS:  all other review of systems are negative unless indicated above.    acetaminophen     Tablet .. 650 milliGRAM(s) Oral every 6 hours PRN  ALBUTerol    90 MICROgram(s) HFA Inhaler 2 Puff(s) Inhalation every 4 hours PRN  aluminum hydroxide/magnesium hydroxide/simethicone Suspension 30 milliLiter(s) Oral every 4 hours PRN  diazepam  Injectable 5 milliGRAM(s) IntraMuscular every 8 hours  enoxaparin Injectable 40 milliGRAM(s) SubCutaneous every 24 hours  haloperidol    Injectable 3 milliGRAM(s) IntraMuscular every 6 hours PRN  hydrALAZINE Injectable 10 milliGRAM(s) IV Push every 6 hours PRN  hydrocortisone/polymyxin/neomycin Solution 4 Drop(s) Left Ear two times a day  lacosamide IVPB 100 milliGRAM(s) IV Intermittent every 12 hours  levETIRAcetam  IVPB 500 milliGRAM(s) IV Intermittent every 12 hours  melatonin 3 milliGRAM(s) Oral at bedtime PRN  ondansetron Injectable 4 milliGRAM(s) IV Push every 8 hours PRN  pantoprazole  Injectable 40 milliGRAM(s) IV Push daily  silver sulfADIAZINE 1% Cream 1 Application(s) Topical daily  sodium chloride 0.9%. 1000 milliLiter(s) IV Continuous <Continuous>  tiotropium 18 MICROgram(s) Capsule 1 Capsule(s) Inhalation daily  venlafaxine XR. 37.5 milliGRAM(s) Oral daily    T(C): 36.2 (03-06-22 @ 15:05), Max: 36.8 (03-06-22 @ 09:35)  HR: 100 (03-06-22 @ 17:06) (78 - 100)  BP: 102/64 (03-06-22 @ 17:06) (92/57 - 124/84)  RR: 20 (03-06-22 @ 15:05) (16 - 24)  SpO2: 98% (03-06-22 @ 15:05) (93% - 100%)    Constitutional: NAD.   HEENT: PERRL, EOMI, MMM.  Neck: Soft and supple, No carotid bruit, No JVD  Respiratory: Breath sounds are clear bilaterally, No wheezing, rales or rhonchi  Cardiovascular: S1 and S2, regular rate and rhythm, no murmur, rub or gallop.  Gastrointestinal: Bowel Sounds present, soft, nontender, nondistended, no guarding, no rebound, no mass.  Extremities: No peripheral edema  Vascular: 2+ peripheral pulses  Neurological: A/O x , no focal deficits  Musculoskeletal: 5/5 strength b/l upper and lower extremities  Skin:  no visible rashes.       03-06    140  |  111<H>  |  2<L>  ----------------------------<  80  4.1   |  24  |  0.21<L>    Ca    8.7      06 Mar 2022 11:21  Mg     1.8     03-05

## 2022-03-06 NOTE — PROGRESS NOTE ADULT - ASSESSMENT
57 y/o male with hx of TBI, nonverbal at baseline admitted for GJ tube Malfunction. 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 Patient pulled out GJ tube yesterday, which was then replaced with a tavares catheter.     PLAN  resumed TF.  Leave catheter in gastrostomy site for now, and leave to drainage.  Will plan for repeat endoscopy for tube replacement/suturing with Dr. Duron. Timing to be determined by Dr. Duron.  Will need better management of agitation to prevent patient pulling tube in the future.   Supportive care      monitor/supplement potassium  f/u BMP and Mg in AM.    communication:  2W RN.  03/06 daughter at bedside.  updated and questions answered.

## 2022-03-07 PROCEDURE — 99232 SBSQ HOSP IP/OBS MODERATE 35: CPT

## 2022-03-07 PROCEDURE — 70450 CT HEAD/BRAIN W/O DYE: CPT | Mod: 26

## 2022-03-07 RX ORDER — LEVETIRACETAM 250 MG/1
500 TABLET, FILM COATED ORAL
Refills: 0 | Status: DISCONTINUED | OUTPATIENT
Start: 2022-03-07 | End: 2022-03-08

## 2022-03-07 RX ORDER — BACLOFEN 100 %
10 POWDER (GRAM) MISCELLANEOUS
Refills: 0 | Status: DISCONTINUED | OUTPATIENT
Start: 2022-03-07 | End: 2022-03-08

## 2022-03-07 RX ORDER — PANTOPRAZOLE SODIUM 20 MG/1
40 TABLET, DELAYED RELEASE ORAL DAILY
Refills: 0 | Status: DISCONTINUED | OUTPATIENT
Start: 2022-03-07 | End: 2022-03-08

## 2022-03-07 RX ORDER — ASPIRIN/CALCIUM CARB/MAGNESIUM 324 MG
81 TABLET ORAL DAILY
Refills: 0 | Status: DISCONTINUED | OUTPATIENT
Start: 2022-03-07 | End: 2022-03-14

## 2022-03-07 RX ORDER — ATORVASTATIN CALCIUM 80 MG/1
20 TABLET, FILM COATED ORAL AT BEDTIME
Refills: 0 | Status: DISCONTINUED | OUTPATIENT
Start: 2022-03-07 | End: 2022-03-08

## 2022-03-07 RX ORDER — LACOSAMIDE 50 MG/1
100 TABLET ORAL
Refills: 0 | Status: DISCONTINUED | OUTPATIENT
Start: 2022-03-07 | End: 2022-03-08

## 2022-03-07 RX ADMIN — Medication 37.5 MILLIGRAM(S): at 10:07

## 2022-03-07 RX ADMIN — ENOXAPARIN SODIUM 40 MILLIGRAM(S): 100 INJECTION SUBCUTANEOUS at 23:08

## 2022-03-07 RX ADMIN — ATORVASTATIN CALCIUM 20 MILLIGRAM(S): 80 TABLET, FILM COATED ORAL at 23:08

## 2022-03-07 RX ADMIN — LEVETIRACETAM 400 MILLIGRAM(S): 250 TABLET, FILM COATED ORAL at 10:08

## 2022-03-07 RX ADMIN — Medication 1 APPLICATION(S): at 10:07

## 2022-03-07 RX ADMIN — LACOSAMIDE 100 MILLIGRAM(S): 50 TABLET ORAL at 23:10

## 2022-03-07 RX ADMIN — TIOTROPIUM BROMIDE 1 CAPSULE(S): 18 CAPSULE ORAL; RESPIRATORY (INHALATION) at 08:20

## 2022-03-07 RX ADMIN — Medication 10 MILLIGRAM(S): at 23:08

## 2022-03-07 RX ADMIN — PANTOPRAZOLE SODIUM 40 MILLIGRAM(S): 20 TABLET, DELAYED RELEASE ORAL at 10:08

## 2022-03-07 RX ADMIN — Medication 4 DROP(S): at 10:06

## 2022-03-07 RX ADMIN — LEVETIRACETAM 500 MILLIGRAM(S): 250 TABLET, FILM COATED ORAL at 23:08

## 2022-03-07 NOTE — PHYSICAL THERAPY INITIAL EVALUATION ADULT - PHYSICAL ASSIST/NONPHYSICAL ASSIST: GAIT, REHAB EVAL
[facility] Functional Mobility Assessment at this time after discussion with VANDANA Prince and 1:1 CO. Patient is completely dependent and bedbound at baseline at [facility]. Patient is also agitated and combative. Physical Therapy Not Appropriate at this time.

## 2022-03-07 NOTE — PROGRESS NOTE ADULT - SUBJECTIVE AND OBJECTIVE BOX
CC:  Patient is a 56y old  Male who presents with a chief complaint of Clogged PEG tube (06 Mar 2022 17:53)    SUBJECTIVE:     - patient's sister at bedside.  - noted mild R eyelid ptosis.  - first noted 03/06 after being brought back from endoscopy.  PT reassessed at that time w/ 2W RN and sister in attendance.  finding was of unclear significance and appeared to be resolving / resolved.  offered HCT and workup at that time, but she declined citing she would preferred to "wait and see".    ROS:  all other review of systems are negative unless indicated above.    acetaminophen     Tablet .. 650 milliGRAM(s) Oral every 6 hours PRN  ALBUTerol    90 MICROgram(s) HFA Inhaler 2 Puff(s) Inhalation every 4 hours PRN  aluminum hydroxide/magnesium hydroxide/simethicone Suspension 30 milliLiter(s) Oral every 4 hours PRN  baclofen 10 milliGRAM(s) Oral two times a day  enoxaparin Injectable 40 milliGRAM(s) SubCutaneous every 24 hours  haloperidol    Injectable 3 milliGRAM(s) IntraMuscular every 6 hours PRN  hydrALAZINE Injectable 10 milliGRAM(s) IV Push every 6 hours PRN  lacosamide Solution 100 milliGRAM(s) Oral two times a day  levETIRAcetam  Solution 500 milliGRAM(s) Oral two times a day  melatonin 3 milliGRAM(s) Oral at bedtime PRN  ondansetron Injectable 4 milliGRAM(s) IV Push every 8 hours PRN  pantoprazole   Suspension 40 milliGRAM(s) Oral daily  silver sulfADIAZINE 1% Cream 1 Application(s) Topical daily  sodium chloride 0.9%. 1000 milliLiter(s) IV Continuous <Continuous>  tiotropium 18 MICROgram(s) Capsule 1 Capsule(s) Inhalation daily  venlafaxine XR. 37.5 milliGRAM(s) Oral daily    T(C): 35.8 (03-07-22 @ 15:48), Max: 36.5 (03-07-22 @ 08:39)  HR: 83 (03-07-22 @ 15:48) (83 - 100)  BP: 93/57 (03-07-22 @ 15:48) (93/57 - 102/66)  RR: 20 (03-07-22 @ 15:48) (20 - 20)  SpO2: 97% (03-07-22 @ 15:48) (93% - 98%)    Constitutional: NAD.   HEENT: PERRL, EOMI, MMM.  Neck: Soft and supple, No carotid bruit, No JVD  Respiratory: Breath sounds are clear bilaterally, No wheezing, rales or rhonchi  Cardiovascular: S1 and S2, regular rate and rhythm, no murmur, rub or gallop.  Gastrointestinal: Bowel Sounds present, soft, nontender, nondistended, no guarding, no rebound, no mass.  Extremities: No peripheral edema  Vascular: 2+ peripheral pulses  Neurological: A/O x 2-3, no focal deficits  Musculoskeletal: 5/5 strength b/l upper and lower extremities  Skin:  no visible rashes.       03-06    140  |  111<H>  |  2<L>  ----------------------------<  80  4.1   |  24  |  0.21<L>    Ca    8.7      06 Mar 2022 11:21

## 2022-03-07 NOTE — PHYSICAL THERAPY INITIAL EVALUATION ADULT - GENERAL OBSERVATIONS, REHAB EVAL
Pt rec'd supine in bed with 1:1 CO Present, PEG Feeding Tube, Ahuja and BLE Heel Cushions all intact. Pt non-verbal and with global aphasia

## 2022-03-07 NOTE — PROGRESS NOTE ADULT - ASSESSMENT
55 y/o male with hx of TBI, nonverbal at baseline admitted for GJ tube Malfunction. 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 Patient pulled out GJ tube yesterday, which was then replaced with a tavares catheter.     GJ tube malfunction.  - 03/06 endoscopy:  large gastric fistula w/ placement of GT w/ suturing of fistula closed around tube.  Placement of JT extension.  - resumed TF.    mild R ptosis.  - sister initially deferred HCT on 03/06 preferring observation as physical finding was resolving / resolved.  - today, R ptosis is remain noticeable (albeit slight) and daughter now agreeable to HCT.  - HCT.  - B12 level.  - ASA + statin for now.  - Neurology consult.    communication.  - 2W RN.    - 03/06, 03/07 sister at bedside.  updated and questions answered.

## 2022-03-07 NOTE — PHYSICAL THERAPY INITIAL EVALUATION ADULT - PLANNED THERAPY INTERVENTIONS, PT EVAL
NO Further Physical Therapy Needs at this time as patient is already at bedbound baseline functional status. Patient is also agitated and combative. Will d/c patient off physical therapy at this time. VANDANA Prince made aware and in agreement with plan.

## 2022-03-07 NOTE — PHYSICAL THERAPY INITIAL EVALUATION ADULT - IMPAIRMENTS FOUND, PT EVAL
arousal, attention, and cognition/cognitive impairment/gait, locomotion, and balance/muscle strength/posture/ROM/tone

## 2022-03-08 LAB
ANION GAP SERPL CALC-SCNC: 2 MMOL/L — LOW (ref 5–17)
BUN SERPL-MCNC: 8 MG/DL — SIGNIFICANT CHANGE UP (ref 7–23)
CALCIUM SERPL-MCNC: 8.3 MG/DL — LOW (ref 8.5–10.1)
CHLORIDE SERPL-SCNC: 105 MMOL/L — SIGNIFICANT CHANGE UP (ref 96–108)
CO2 SERPL-SCNC: 31 MMOL/L — SIGNIFICANT CHANGE UP (ref 22–31)
CREAT SERPL-MCNC: 0.16 MG/DL — LOW (ref 0.5–1.3)
EGFR: 167 ML/MIN/1.73M2 — SIGNIFICANT CHANGE UP
GLUCOSE SERPL-MCNC: 109 MG/DL — HIGH (ref 70–99)
HCT VFR BLD CALC: 32 % — LOW (ref 39–50)
HGB BLD-MCNC: 10.1 G/DL — LOW (ref 13–17)
MCHC RBC-ENTMCNC: 29.9 PG — SIGNIFICANT CHANGE UP (ref 27–34)
MCHC RBC-ENTMCNC: 31.6 GM/DL — LOW (ref 32–36)
MCV RBC AUTO: 94.7 FL — SIGNIFICANT CHANGE UP (ref 80–100)
PLATELET # BLD AUTO: 249 K/UL — SIGNIFICANT CHANGE UP (ref 150–400)
POTASSIUM SERPL-MCNC: 4.5 MMOL/L — SIGNIFICANT CHANGE UP (ref 3.5–5.3)
POTASSIUM SERPL-SCNC: 4.5 MMOL/L — SIGNIFICANT CHANGE UP (ref 3.5–5.3)
RBC # BLD: 3.38 M/UL — LOW (ref 4.2–5.8)
RBC # FLD: 14.3 % — SIGNIFICANT CHANGE UP (ref 10.3–14.5)
SARS-COV-2 RNA SPEC QL NAA+PROBE: SIGNIFICANT CHANGE UP
SODIUM SERPL-SCNC: 138 MMOL/L — SIGNIFICANT CHANGE UP (ref 135–145)
VIT B12 SERPL-MCNC: 721 PG/ML — SIGNIFICANT CHANGE UP (ref 232–1245)
WBC # BLD: 4.72 K/UL — SIGNIFICANT CHANGE UP (ref 3.8–10.5)
WBC # FLD AUTO: 4.72 K/UL — SIGNIFICANT CHANGE UP (ref 3.8–10.5)

## 2022-03-08 PROCEDURE — 99223 1ST HOSP IP/OBS HIGH 75: CPT

## 2022-03-08 PROCEDURE — 99232 SBSQ HOSP IP/OBS MODERATE 35: CPT

## 2022-03-08 RX ORDER — LEVETIRACETAM 250 MG/1
500 TABLET, FILM COATED ORAL EVERY 12 HOURS
Refills: 0 | Status: DISCONTINUED | OUTPATIENT
Start: 2022-03-08 | End: 2022-03-10

## 2022-03-08 RX ORDER — PANTOPRAZOLE SODIUM 20 MG/1
40 TABLET, DELAYED RELEASE ORAL DAILY
Refills: 0 | Status: DISCONTINUED | OUTPATIENT
Start: 2022-03-08 | End: 2022-03-10

## 2022-03-08 RX ORDER — SODIUM CHLORIDE 9 MG/ML
1000 INJECTION INTRAMUSCULAR; INTRAVENOUS; SUBCUTANEOUS
Refills: 0 | Status: DISCONTINUED | OUTPATIENT
Start: 2022-03-08 | End: 2022-03-09

## 2022-03-08 RX ORDER — LACOSAMIDE 50 MG/1
100 TABLET ORAL EVERY 12 HOURS
Refills: 0 | Status: DISCONTINUED | OUTPATIENT
Start: 2022-03-08 | End: 2022-03-10

## 2022-03-08 RX ADMIN — LEVETIRACETAM 400 MILLIGRAM(S): 250 TABLET, FILM COATED ORAL at 22:10

## 2022-03-08 RX ADMIN — PANTOPRAZOLE SODIUM 40 MILLIGRAM(S): 20 TABLET, DELAYED RELEASE ORAL at 16:02

## 2022-03-08 RX ADMIN — SODIUM CHLORIDE 100 MILLILITER(S): 9 INJECTION INTRAMUSCULAR; INTRAVENOUS; SUBCUTANEOUS at 14:18

## 2022-03-08 RX ADMIN — TIOTROPIUM BROMIDE 1 CAPSULE(S): 18 CAPSULE ORAL; RESPIRATORY (INHALATION) at 08:04

## 2022-03-08 RX ADMIN — LACOSAMIDE 120 MILLIGRAM(S): 50 TABLET ORAL at 22:29

## 2022-03-08 RX ADMIN — Medication 1 APPLICATION(S): at 10:12

## 2022-03-08 NOTE — PROGRESS NOTE ADULT - ASSESSMENT
57 y/o male with hx of TBI, nonverbal at baseline admitted for GJ tube Malfunction. 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 Patient pulled out GJ tube yesterday, which was then replaced with a tavares catheter.     GJ tube malfunction.  - 03/06 endoscopy:  large gastric fistula w/ placement of GT w/ suturing of fistula closed around tube.  Placement of JT extension.  - resumed TF.    mild R ptosis.  - sister initially deferred HCT on 03/06 preferring observation as physical finding was resolving / resolved.  - today, R ptosis is remain noticeable (albeit slight) and daughter now agreeable to HCT.  - HCT.  - B12 level.  - ASA + statin for now.  - Neurology consult.    communication.  - 2W RN.    - 03/06, 03/07 sister at bedside.  updated and questions answered. 57 y/o male with hx of TBI, nonverbal at baseline admitted for GJ tube Malfunction. 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 Patient pulled out GJ tube yesterday, which was then replaced with a tavares catheter.     GJ tube malfunction.  - 03/06 endoscopy:  large gastric fistula w/ placement of GT w/ suturing of fistula closed around tube.  Placement of JT extension.  - resumed TF.    mild R ptosis.  - sister initially deferred HCT on 03/06 preferring observation as physical finding was resolving / resolved.  - today, R ptosis is remain noticeable (albeit slight) and daughter now agreeable to HCT.  - HCT.  - B12 level.  - ASA + statin for now.  - Neurology.     communication.  - 2W RN.    - Neurology.  - 03/06, 03/07, 03/08 sister at bedside.  updated and questions answered.

## 2022-03-08 NOTE — PROGRESS NOTE ADULT - SUBJECTIVE AND OBJECTIVE BOX
CC:  Patient is a 56y old  Male who presents with a chief complaint of Clogged PEG tube (08 Mar 2022 10:57)    SUBJECTIVE:     -no new complaints or issues at current time.    ROS:  all other review of systems are negative unless indicated above.    acetaminophen     Tablet .. 650 milliGRAM(s) Oral every 6 hours PRN  ALBUTerol    90 MICROgram(s) HFA Inhaler 2 Puff(s) Inhalation every 4 hours PRN  aluminum hydroxide/magnesium hydroxide/simethicone Suspension 30 milliLiter(s) Oral every 4 hours PRN  aspirin  chewable 81 milliGRAM(s) Oral daily  atorvastatin 20 milliGRAM(s) Oral at bedtime  baclofen 10 milliGRAM(s) Oral two times a day  enoxaparin Injectable 40 milliGRAM(s) SubCutaneous every 24 hours  haloperidol    Injectable 3 milliGRAM(s) IntraMuscular every 6 hours PRN  hydrALAZINE Injectable 10 milliGRAM(s) IV Push every 6 hours PRN  lacosamide Solution 100 milliGRAM(s) Oral two times a day  levETIRAcetam  Solution 500 milliGRAM(s) Oral two times a day  melatonin 3 milliGRAM(s) Oral at bedtime PRN  ondansetron Injectable 4 milliGRAM(s) IV Push every 8 hours PRN  pantoprazole   Suspension 40 milliGRAM(s) Oral daily  silver sulfADIAZINE 1% Cream 1 Application(s) Topical daily  sodium chloride 0.9%. 1000 milliLiter(s) IV Continuous <Continuous>  tiotropium 18 MICROgram(s) Capsule 1 Capsule(s) Inhalation daily  venlafaxine XR. 37.5 milliGRAM(s) Oral daily    T(C): 35.9 (03-08-22 @ 08:10), Max: 36.1 (03-08-22 @ 00:13)  HR: 78 (03-08-22 @ 08:10) (72 - 83)  BP: 109/70 (03-08-22 @ 08:10) (93/57 - 117/75)  RR: 18 (03-08-22 @ 08:10) (18 - 20)  SpO2: 97% (03-08-22 @ 08:10) (97% - 100%)    Constitutional: NAD.   HEENT: PERRL, EOMI, MMM.  Neck: Soft and supple, No carotid bruit, No JVD  Respiratory: Breath sounds are clear bilaterally, No wheezing, rales or rhonchi  Cardiovascular: S1 and S2, regular rate and rhythm, no murmur, rub or gallop.  Gastrointestinal: Bowel Sounds present, soft, nontender, nondistended, no guarding, no rebound, no mass.  Extremities: No peripheral edema  Vascular: 2+ peripheral pulses  Neurological: A/O x , no focal deficits  Musculoskeletal: 5/5 strength b/l upper and lower extremities  Skin:  no visible rashes.                         10.1   4.72  )-----------( 249      ( 08 Mar 2022 09:56 )             32.0       03-08    138  |  105  |  8   ----------------------------<  109<H>  4.5   |  31  |  0.16<L>    Ca    8.3<L>      08 Mar 2022 09:56

## 2022-03-08 NOTE — CONSULT NOTE ADULT - SUBJECTIVE AND OBJECTIVE BOX
Patient is a 56y old  Male who presents with a chief complaint of Clogged PEG tube (07 Mar 2022 15:58)      HPI:  57 y/o M with PMH diffuse traumatic brain injury, intracerebral hemorrhage, dysphagia, aphasia, non-verbal at baseline, chronic respiratory failure with hypercapnia asthma, PEG tube, seizures, GERD, MDD, hemorrhagic otitis externa (left ear) presents for a clogged G tube. He was initially seen at [facility] and the tube was attempted to be unclogged without success. He was transferred to  for further management. Of note, pt had a PEG tube leakage on 2/18 and Dr. Duron closed the fistula tract and converted the G tube to a G-J tube.     Neurology was called after right sided ptosis was noted following his procedure.  PAST MEDICAL & SURGICAL HISTORY:  Diffuse traumatic brain injury    Intracerebral hemorrhage    Dysphagia    Aphasia    Chronic respiratory failure with hypercapnia    S/P percutaneous endoscopic gastrostomy (PEG) tube placement    Seizure    GERD (gastroesophageal reflux disease)    Hemorrhagic otitis externa    Patient unable to provide medical history        FAMILY HISTORY:  Patient unable to provide medical history (Father, Mother)        Social Hx:  Nonsmoker, no drug or alcohol use    MEDICATIONS  (STANDING):  aspirin  chewable 81 milliGRAM(s) Oral daily  atorvastatin 20 milliGRAM(s) Oral at bedtime  baclofen 10 milliGRAM(s) Oral two times a day  enoxaparin Injectable 40 milliGRAM(s) SubCutaneous every 24 hours  lacosamide Solution 100 milliGRAM(s) Oral two times a day  levETIRAcetam  Solution 500 milliGRAM(s) Oral two times a day  pantoprazole   Suspension 40 milliGRAM(s) Oral daily  silver sulfADIAZINE 1% Cream 1 Application(s) Topical daily  sodium chloride 0.9%. 1000 milliLiter(s) (80 mL/Hr) IV Continuous <Continuous>  tiotropium 18 MICROgram(s) Capsule 1 Capsule(s) Inhalation daily  venlafaxine XR. 37.5 milliGRAM(s) Oral daily       Allergies    Ativan (Unknown)  Dilantin (Unknown)  Valproate Sodium (Unknown)    Intolerances        ROS: Pertinent positives in HPI, all other ROS were reviewed and are negative.      Vital Signs Last 24 Hrs  T(C): 35.9 (08 Mar 2022 08:10), Max: 36.1 (08 Mar 2022 00:13)  T(F): 96.7 (08 Mar 2022 08:10), Max: 97 (08 Mar 2022 00:13)  HR: 78 (08 Mar 2022 08:10) (72 - 83)  BP: 109/70 (08 Mar 2022 08:10) (93/57 - 117/75)  BP(mean): --  RR: 18 (08 Mar 2022 08:10) (18 - 20)  SpO2: 97% (08 Mar 2022 08:10) (97% - 100%)        Constitutional: awake and alert.  HEENT: PERRLA, EOMI,   Neck: Supple.  Respiratory: Breath sounds are clear bilaterally  Cardiovascular: S1 and S2, regular / irregular rhythm  Gastrointestinal: soft, nontender  Extremities:  no edema  Musculoskeletal: no joint swelling/tenderness, no abnormal movements  Skin: No rashes    Neurological exam:  HF: Awake and alert. Nods yes and not to questions. Otherwise nonverbal.    CN: MARY, EOMI, mild right facial weakness  Motor: quadriparesis. Contractures of right upper extremity and b/l lower extremities. Has some movement in left arm.  Sens: Seems to have decreased sensation on right (based on nodding yes and no).  Reflexes: 2+ in right biceps, radialis, 1+ in left biceps, radialis, toes mute b/l  Coord:  unable to test  Gait/Balance: unable to test              Labs:   03-08    138  |  105  |  8   ----------------------------<  109<H>  4.5   |  31  |  0.16<L>    Ca    8.3<L>      08 Mar 2022 09:56                                10.1   4.72  )-----------( 249      ( 08 Mar 2022 09:56 )             32.0       Radiology:  CT head 3/7/22:  Mild to moderate chronic microvascular changes without   evidence of an acute transcortical infarction or hemorrhage. Moderate   bilateral frontal lobe and bilateral cerebellar hemisphere volume.

## 2022-03-09 PROCEDURE — 99232 SBSQ HOSP IP/OBS MODERATE 35: CPT

## 2022-03-09 PROCEDURE — 43246 EGD PLACE GASTROSTOMY TUBE: CPT

## 2022-03-09 RX ORDER — SODIUM CHLORIDE 9 MG/ML
1000 INJECTION, SOLUTION INTRAVENOUS
Refills: 0 | Status: DISCONTINUED | OUTPATIENT
Start: 2022-03-09 | End: 2022-03-09

## 2022-03-09 RX ORDER — ONDANSETRON 8 MG/1
4 TABLET, FILM COATED ORAL EVERY 6 HOURS
Refills: 0 | Status: DISCONTINUED | OUTPATIENT
Start: 2022-03-09 | End: 2022-03-09

## 2022-03-09 RX ORDER — FENTANYL CITRATE 50 UG/ML
25 INJECTION INTRAVENOUS
Refills: 0 | Status: DISCONTINUED | OUTPATIENT
Start: 2022-03-09 | End: 2022-03-09

## 2022-03-09 RX ADMIN — TIOTROPIUM BROMIDE 1 CAPSULE(S): 18 CAPSULE ORAL; RESPIRATORY (INHALATION) at 08:55

## 2022-03-09 RX ADMIN — LACOSAMIDE 120 MILLIGRAM(S): 50 TABLET ORAL at 22:21

## 2022-03-09 RX ADMIN — LACOSAMIDE 120 MILLIGRAM(S): 50 TABLET ORAL at 09:53

## 2022-03-09 RX ADMIN — ENOXAPARIN SODIUM 40 MILLIGRAM(S): 100 INJECTION SUBCUTANEOUS at 22:22

## 2022-03-09 RX ADMIN — SODIUM CHLORIDE 100 MILLILITER(S): 9 INJECTION INTRAMUSCULAR; INTRAVENOUS; SUBCUTANEOUS at 06:00

## 2022-03-09 RX ADMIN — PANTOPRAZOLE SODIUM 40 MILLIGRAM(S): 20 TABLET, DELAYED RELEASE ORAL at 09:12

## 2022-03-09 RX ADMIN — LEVETIRACETAM 400 MILLIGRAM(S): 250 TABLET, FILM COATED ORAL at 22:49

## 2022-03-09 RX ADMIN — LEVETIRACETAM 400 MILLIGRAM(S): 250 TABLET, FILM COATED ORAL at 09:11

## 2022-03-09 RX ADMIN — Medication 1 APPLICATION(S): at 09:12

## 2022-03-09 NOTE — PROGRESS NOTE ADULT - ASSESSMENT
57 y/o M with PMH diffuse traumatic brain injury, intracerebral hemorrhage, dysphagia, aphasia, non-verbal at baseline, chronic respiratory failure with hypercapnia asthma, PEG tube, seizures, GERD, MDD, hemorrhagic otitis externa (left ear) presents for a clogged G tube. He was initially seen at [facility] and the tube was attempted to be unclogged without success. He was transferred to  for further management.    On 3/6/22 he underwent placement of G tube with suturing of fistula and placement of J tube extension.  Following the procedure he was noted to have some right sided ptosis which has been fluctuating.    -No ptosis noted on my exam on 3/8 but I do note ptosis on today's exam 3/9.  -Ptosis seems to fluctuate.   -Pupils are equally round and reactive - no suggestion of Wilian's syndrome  -Overall he seems to have more right sided deficits compared to the left (more contractures on right, right facial droop). Unclear if this is chronic, fluctuating.    If family is certain that this is a new finding, can repeat CT head or get MRI.        Discussed with Dr. D'Amico.

## 2022-03-09 NOTE — PROGRESS NOTE ADULT - SUBJECTIVE AND OBJECTIVE BOX
CC:  Patient is a 56y old  Male who presents with a chief complaint of Clogged PEG tube (09 Mar 2022 12:18)    SUBJECTIVE:     -no new complaints or issues at current time.    ROS:  all other review of systems are negative unless indicated above.    ALBUTerol    90 MICROgram(s) HFA Inhaler 2 Puff(s) Inhalation every 4 hours PRN  aspirin  chewable 81 milliGRAM(s) Oral daily  enoxaparin Injectable 40 milliGRAM(s) SubCutaneous every 24 hours  haloperidol    Injectable 3 milliGRAM(s) IntraMuscular every 6 hours PRN  hydrALAZINE Injectable 10 milliGRAM(s) IV Push every 6 hours PRN  lacosamide IVPB 100 milliGRAM(s) IV Intermittent every 12 hours  levETIRAcetam  IVPB 500 milliGRAM(s) IV Intermittent every 12 hours  ondansetron Injectable 4 milliGRAM(s) IV Push every 8 hours PRN  pantoprazole  Injectable 40 milliGRAM(s) IV Push daily  silver sulfADIAZINE 1% Cream 1 Application(s) Topical daily  tiotropium 18 MICROgram(s) Capsule 1 Capsule(s) Inhalation daily  venlafaxine XR. 37.5 milliGRAM(s) Oral daily    T(C): 36.1 (03-09-22 @ 16:16), Max: 36.6 (03-09-22 @ 15:20)  HR: 72 (03-09-22 @ 16:16) (72 - 85)  BP: 109/62 (03-09-22 @ 16:16) (109/62 - 134/77)  RR: 18 (03-09-22 @ 16:16) (18 - 23)  SpO2: 98% (03-09-22 @ 16:16) (94% - 98%)    Constitutional: NAD.   HEENT: PERRL, EOMI, MMM.  Neck: Soft and supple, No carotid bruit, No JVD  Respiratory: Breath sounds are clear bilaterally, No wheezing, rales or rhonchi  Cardiovascular: S1 and S2, regular rate and rhythm, no murmur, rub or gallop.  Gastrointestinal: Bowel Sounds present, soft, nontender, nondistended, no guarding, no rebound, no mass.  Extremities: No peripheral edema  Vascular: 2+ peripheral pulses  Neurological: A/O x , no focal deficits  Musculoskeletal: 5/5 strength b/l upper and lower extremities  Skin:  no visible rashes.                         10.1   4.72  )-----------( 249      ( 08 Mar 2022 09:56 )             32.0       03-08    138  |  105  |  8   ----------------------------<  109<H>  4.5   |  31  |  0.16<L>    Ca    8.3<L>      08 Mar 2022 09:56

## 2022-03-09 NOTE — PROGRESS NOTE ADULT - SUBJECTIVE AND OBJECTIVE BOX
Interval History:  3/9/22: No new events    MEDICATIONS  (STANDING):  aspirin  chewable 81 milliGRAM(s) Oral daily  enoxaparin Injectable 40 milliGRAM(s) SubCutaneous every 24 hours  lacosamide IVPB 100 milliGRAM(s) IV Intermittent every 12 hours  levETIRAcetam  IVPB 500 milliGRAM(s) IV Intermittent every 12 hours  pantoprazole  Injectable 40 milliGRAM(s) IV Push daily  silver sulfADIAZINE 1% Cream 1 Application(s) Topical daily  sodium chloride 0.9%. 1000 milliLiter(s) (100 mL/Hr) IV Continuous <Continuous>  tiotropium 18 MICROgram(s) Capsule 1 Capsule(s) Inhalation daily  venlafaxine XR. 37.5 milliGRAM(s) Oral daily    MEDICATIONS  (PRN):  ALBUTerol    90 MICROgram(s) HFA Inhaler 2 Puff(s) Inhalation every 4 hours PRN Bronchospasm  haloperidol    Injectable 3 milliGRAM(s) IntraMuscular every 6 hours PRN agitation  hydrALAZINE Injectable 10 milliGRAM(s) IV Push every 6 hours PRN SBP > 160  ondansetron Injectable 4 milliGRAM(s) IV Push every 8 hours PRN Nausea and/or Vomiting      Allergies    Ativan (Unknown)  Dilantin (Unknown)  Valproate Sodium (Unknown)    Intolerances        PHYSICAL EXAM:  Vital Signs Last 24 Hrs  T(F): 97.7 (03-09-22 @ 08:30)  HR: 85 (03-09-22 @ 08:30)  BP: 115/64 (03-09-22 @ 08:30)  RR: 18 (03-09-22 @ 08:30)    GENERAL: NAD, well-groomed  HEAD:  Atraumatic, Normocephalic  Neuro:  HF: Awake and alert. Nods yes and not to questions. Otherwise nonverbal.    CN: MARY, Right ptosis, otherwise EOMI, mild right facial weakness  Motor: quadriparesis. Contractures of right upper extremity and b/l lower extremities. Has some movement in left arm.  Sens: Seems to have decreased sensation on right (based on nodding yes and no).  Reflexes: 2+ in right biceps, radialis, 1+ in left biceps, radialis, toes mute b/l  Coord:  unable to test  Gait/Balance: unable to test          LABS:                        10.1   4.72  )-----------( 249      ( 08 Mar 2022 09:56 )             32.0     03-08    138  |  105  |  8   ----------------------------<  109<H>  4.5   |  31  |  0.16<L>    Ca    8.3<L>      08 Mar 2022 09:56            RADIOLOGY & ADDITIONAL STUDIES:  CT head 3/7/22:  Mild to moderate chronic microvascular changes without   evidence of an acute transcortical infarction or hemorrhage. Moderate   bilateral frontal lobe and bilateral cerebellar hemisphere volume.

## 2022-03-09 NOTE — BRIEF OPERATIVE NOTE - OPERATION/FINDINGS
large gastric fistula. placement of G tube with suturing of fistula closed around tube.   Placement of J tube extension.
Successful replacement of clogged J tube.   G tube functioning well. Not replaced.   Fistula appeared closed over sutures over the G tube.

## 2022-03-09 NOTE — BRIEF OPERATIVE NOTE - COMMENTS
Rx port should never be used for any reason.   Feeds port/J tube port (the port in them middle) for feeds only. No medication ever to be placed through this tube.   Suction port/gastrostomy port for meds only and water flushes if needed.  Ok to use today.
J tube for feeds only. Please have nutrition comment on J tube rate.   G tube/suction port for meds.   Rx port never to be used.

## 2022-03-09 NOTE — PROGRESS NOTE ADULT - ASSESSMENT
55 y/o male with hx of TBI, nonverbal at baseline admitted for GJ tube Malfunction. 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 Patient pulled out GJ tube yesterday, which was then replaced with a tavares catheter.     GJ tube malfunction.  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  Placement of JT extension.  - resumed TF.    mild R ptosis - resolved.  - HCT:  negative.  - B12 level.  - ASA + statin for now.  - Neurology input appreciated, will monitor for now and defer further imaging.    communication.  - 2W RN.    - Neurology.  - 03/06, 03/07, 03/08, 03/09 sister at bedside.  updated and questions answered.    disposition.  - if tolerating TF, consider DC in the next 24-48 hours.

## 2022-03-10 ENCOUNTER — TRANSCRIPTION ENCOUNTER (OUTPATIENT)
Age: 57
End: 2022-03-10

## 2022-03-10 LAB — SARS-COV-2 RNA SPEC QL NAA+PROBE: SIGNIFICANT CHANGE UP

## 2022-03-10 PROCEDURE — 99232 SBSQ HOSP IP/OBS MODERATE 35: CPT

## 2022-03-10 RX ORDER — FAMOTIDINE 10 MG/ML
20 INJECTION INTRAVENOUS DAILY
Refills: 0 | Status: DISCONTINUED | OUTPATIENT
Start: 2022-03-10 | End: 2022-03-14

## 2022-03-10 RX ORDER — LACOSAMIDE 50 MG/1
100 TABLET ORAL
Refills: 0 | Status: DISCONTINUED | OUTPATIENT
Start: 2022-03-10 | End: 2022-03-14

## 2022-03-10 RX ORDER — BACLOFEN 100 %
10 POWDER (GRAM) MISCELLANEOUS
Refills: 0 | Status: DISCONTINUED | OUTPATIENT
Start: 2022-03-10 | End: 2022-03-14

## 2022-03-10 RX ORDER — ASPIRIN/CALCIUM CARB/MAGNESIUM 324 MG
1 TABLET ORAL
Qty: 0 | Refills: 0 | DISCHARGE
Start: 2022-03-10

## 2022-03-10 RX ORDER — LEVETIRACETAM 250 MG/1
500 TABLET, FILM COATED ORAL
Refills: 0 | Status: DISCONTINUED | OUTPATIENT
Start: 2022-03-10 | End: 2022-03-14

## 2022-03-10 RX ORDER — VENLAFAXINE HCL 75 MG
12.5 CAPSULE, EXT RELEASE 24 HR ORAL
Qty: 0 | Refills: 0 | DISCHARGE
Start: 2022-03-10

## 2022-03-10 RX ORDER — VENLAFAXINE HCL 75 MG
12.5 CAPSULE, EXT RELEASE 24 HR ORAL
Refills: 0 | Status: DISCONTINUED | OUTPATIENT
Start: 2022-03-10 | End: 2022-03-14

## 2022-03-10 RX ADMIN — LACOSAMIDE 100 MILLIGRAM(S): 50 TABLET ORAL at 22:40

## 2022-03-10 RX ADMIN — Medication 12.5 MILLIGRAM(S): at 22:40

## 2022-03-10 RX ADMIN — Medication 81 MILLIGRAM(S): at 09:48

## 2022-03-10 RX ADMIN — LEVETIRACETAM 400 MILLIGRAM(S): 250 TABLET, FILM COATED ORAL at 10:34

## 2022-03-10 RX ADMIN — TIOTROPIUM BROMIDE 1 CAPSULE(S): 18 CAPSULE ORAL; RESPIRATORY (INHALATION) at 09:16

## 2022-03-10 RX ADMIN — ENOXAPARIN SODIUM 40 MILLIGRAM(S): 100 INJECTION SUBCUTANEOUS at 22:40

## 2022-03-10 RX ADMIN — LEVETIRACETAM 500 MILLIGRAM(S): 250 TABLET, FILM COATED ORAL at 22:39

## 2022-03-10 RX ADMIN — Medication 1 APPLICATION(S): at 09:48

## 2022-03-10 RX ADMIN — PANTOPRAZOLE SODIUM 40 MILLIGRAM(S): 20 TABLET, DELAYED RELEASE ORAL at 09:47

## 2022-03-10 RX ADMIN — LACOSAMIDE 120 MILLIGRAM(S): 50 TABLET ORAL at 09:47

## 2022-03-10 RX ADMIN — Medication 10 MILLIGRAM(S): at 22:40

## 2022-03-10 NOTE — DISCHARGE NOTE PROVIDER - HOSPITAL COURSE
Vital Signs Last 24 Hrs  T(C): 35.6 (10 Mar 2022 08:49), Max: 36.8 (09 Mar 2022 23:04)  T(F): 96 (10 Mar 2022 08:49), Max: 98.2 (09 Mar 2022 23:04)  HR: 76 (10 Mar 2022 08:56) (72 - 88)  BP: 108/67 (10 Mar 2022 08:49) (107/59 - 134/77)  BP(mean): --  RR: 18 (10 Mar 2022 08:49) (18 - 23)  SpO2: 98% (10 Mar 2022 11:43) (92% - 98%)    HEENT:   pupils equal and reactive, EOMI, no oropharyngeal lesions, erythema, exudates, oral thrush    NECK:   supple, no carotid bruits, no palpable lymph nodes, no thyromegaly    CV:  +S1, +S2, regular, no murmurs or rubs    RESP:   lungs clear to auscultation bilaterally, no wheezing, rales, rhonchi, good air entry bilaterally    BREAST:  not examined    GI:  abdomen soft, non-tender, non-distended, normal BS, no bruits, no abdominal masses, no palpable masses    RECTAL:  not examined    :  not examined    MSK:   normal muscle tone, no atrophy, no rigidity, no contractions    EXT:   no clubbing, no cyanosis, no edema, no calf pain, swelling or erythema    VASCULAR:  pulses equal and symmetric in the upper and lower extremities    NEURO:  AAOX3, no focal neurological deficits, follows all commands, able to move extremities spontaneously    SKIN:  no ulcers, lesions or rashes            Hospital Course:	      55 y/o male with hx of TBI, nonverbal at baseline admitted for GJ tube Malfunction. 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 Patient pulled out GJ tube yesterday, which was then replaced with a tavares catheter.     GJ tube malfunction.  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  Placement of JT extension.  - resumed TF.    mild R ptosis - resolved.  - HCT:  negative.  - B12 level.  - ASA + statin for now.  - Neurology input appreciated, will monitor for now and defer further imaging.    communication.  - 2W RN.    - Neurology.  - 03/06, 03/07, 03/08, 03/09 sister at bedside.  updated and questions answered.    disposition.  - if tolerating TF, consider DC in the next 24-48 hours.

## 2022-03-10 NOTE — PROGRESS NOTE ADULT - SUBJECTIVE AND OBJECTIVE BOX
Interval History:  3/10/22: No new events.    MEDICATIONS  (STANDING):  aspirin  chewable 81 milliGRAM(s) Oral daily  enoxaparin Injectable 40 milliGRAM(s) SubCutaneous every 24 hours  lacosamide IVPB 100 milliGRAM(s) IV Intermittent every 12 hours  levETIRAcetam  IVPB 500 milliGRAM(s) IV Intermittent every 12 hours  pantoprazole  Injectable 40 milliGRAM(s) IV Push daily  silver sulfADIAZINE 1% Cream 1 Application(s) Topical daily  tiotropium 18 MICROgram(s) Capsule 1 Capsule(s) Inhalation daily  venlafaxine XR. 37.5 milliGRAM(s) Oral daily    MEDICATIONS  (PRN):  ALBUTerol    90 MICROgram(s) HFA Inhaler 2 Puff(s) Inhalation every 4 hours PRN Bronchospasm  haloperidol    Injectable 3 milliGRAM(s) IntraMuscular every 6 hours PRN agitation  hydrALAZINE Injectable 10 milliGRAM(s) IV Push every 6 hours PRN SBP > 160  ondansetron Injectable 4 milliGRAM(s) IV Push every 8 hours PRN Nausea and/or Vomiting      Allergies    Ativan (Unknown)  Dilantin (Unknown)  Valproate Sodium (Unknown)    Intolerances        PHYSICAL EXAM:  Vital Signs Last 24 Hrs  T(F): 96 (03-10-22 @ 08:49)  HR: 86 (03-10-22 @ 08:49)  BP: 108/67 (03-10-22 @ 08:49)  RR: 18 (03-10-22 @ 08:49)    GENERAL: NAD, well-groomed  HEAD:  Atraumatic, Normocephalic  Neuro:  HF: Awake and alert. Nods yes and not to questions. Otherwise nonverbal.    CN: MARY, no ptosis noted today, otherwise EOMI, mild right facial weakness  Motor: quadriparesis. Contractures of right upper extremity and b/l lower extremities. Has some movement in left arm.  Sens: Seems to have decreased sensation on right (based on nodding yes and no).  Reflexes: 2+ in right biceps, radialis, 1+ in left biceps, radialis, toes mute b/l  Coord:  unable to test  Gait/Balance: unable to test      LABS:                RADIOLOGY & ADDITIONAL STUDIES:  CT head 3/7/22:  Mild to moderate chronic microvascular changes without   evidence of an acute transcortical infarction or hemorrhage. Moderate   bilateral frontal lobe and bilateral cerebellar hemisphere volume.

## 2022-03-10 NOTE — DISCHARGE NOTE PROVIDER - CARE PROVIDERS DIRECT ADDRESSES
,DirectAddress_Unknown ,DirectAddress_Unknown,[email or web address]

## 2022-03-10 NOTE — DISCHARGE NOTE NURSING/CASE MANAGEMENT/SOCIAL WORK - NSDCPEFALRISK_GEN_ALL_CORE
For information on Fall & Injury Prevention, visit: [email or web address] OR  [email or web address] OR  https://www.cdc.gov/steadi/patient.html

## 2022-03-10 NOTE — DISCHARGE NOTE PROVIDER - DETAILS OF MALNUTRITION DIAGNOSIS/DIAGNOSES
This patient has been assessed with a concern for Malnutrition and was treated during this hospitalization for the following Nutrition diagnosis/diagnoses:     -  03/02/2022: Severe protein-calorie malnutrition   -  03/02/2022: Underweight (BMI < 19)

## 2022-03-10 NOTE — DISCHARGE NOTE NURSING/CASE MANAGEMENT/SOCIAL WORK - PATIENT PORTAL LINK FT
You can access the FollowMyHealth Patient Portal offered by [facility] by registering at the following website: [email or web address]. By joining [facility] FollowMyHealth portal, you will also be able to view your health information using other applications (apps) compatible with our system.

## 2022-03-10 NOTE — DISCHARGE NOTE PROVIDER - CARE PROVIDER_API CALL
Elvin Duron)  Gastroenterology; Internal Medicine  [address] [place] [address]  Phone: [phone]  Fax: [phone]  Follow Up Time:    Elvin Duron)  Gastroenterology; Internal Medicine  [address], [place] [address]  Phone: [phone]  Fax: [phone]  Follow Up Time:     Juanito Stevenson)  Internal Medicine; Neurology  [address] [place] [address], [address]  [place] [address]  Phone: [phone]  Fax: [phone]  Follow Up Time:

## 2022-03-10 NOTE — DISCHARGE NOTE PROVIDER - NSDCMRMEDTOKEN_GEN_ALL_CORE_FT
aspirin 81 mg oral tablet, chewable: 1 tab(s) orally once a day  baclofen 10 mg oral tablet: 1 tab(s) orally 2 times a day  Bisco-Lax 10 mg rectal suppository: 1 suppository(ies) rectal once a day (at bedtime), As Needed if no result for MOM  Ciprodex 0.3%-0.1% otic suspension: 4 drop(s) in the left ear 2 times a day for 7 days  Combivent Respimat 20 mcg-100 mcg/inh inhalation aerosol: 1 puff(s) inhaled every 6 hours, As Needed  enoxaparin 40 mg/0.4 mL injectable solution: 0.4 milliliter(s) subcutaneous once a day (in the morning)  famotidine 20 mg oral tablet: 1 tab(s) orally once a day (in the morning)  Fleet Enema 19 g-7 g rectal enema: 133 milliliter(s) rectal once, As Needed if no result from dulcolax or MOM  lacosamide 10 mg/mL oral solution: 10 milliliter(s) orally 2 times a day at 6am and 6pm  lactulose 10 g/15 mL oral solution: 15 milliliter(s) orally once a day, As Needed  levETIRAcetam 100 mg/mL oral solution: 5 milliliter(s) orally 2 times a day at 9am and 9pm  Milk of Magnesia 8% oral suspension: 30 milliliter(s) orally once a day (at bedtime), As Needed if no BM for 2 days  omeprazole 20 mg oral delayed release tablet: 1 tab(s) orally once a day (in the morning)  ondansetron 2 mg/mL injectable solution: 2 milliliter(s) injectable every 8 hours, As Needed  Silvadene 1% topical cream: Apply topically to affected area once a day and as needed  Tylenol 325 mg oral tablet: 2 tab(s) orally every 6 hours, As Needed for pain  Venelex 788 mg-87 mg/g topical ointment: Apply topically to affected area once a day and as needed  venlafaxine: 12.5 milligram(s) orally 2 times a day (before meals)   aspirin 81 mg oral tablet, chewable: 1 tab(s) orally once a day  baclofen 10 mg oral tablet: 1 tab(s) orally 2 times a day  Bisco-Lax 10 mg rectal suppository: 1 suppository(ies) rectal once a day (at bedtime), As Needed if no result for MOM  Ciprodex 0.3%-0.1% otic suspension: 4 drop(s) in the left ear 2 times a day for 7 days  Combivent Respimat 20 mcg-100 mcg/inh inhalation aerosol: 1 puff(s) inhaled every 6 hours, As Needed  enoxaparin 40 mg/0.4 mL injectable solution: 0.4 milliliter(s) subcutaneous once a day (in the morning)  famotidine 20 mg oral tablet: 1 tab(s) orally once a day (in the morning)  Fleet Enema 19 g-7 g rectal enema: 133 milliliter(s) rectal once, As Needed if no result from dulcolax or MOM  lacosamide 10 mg/mL oral solution: 10 milliliter(s) orally 2 times a day at 6am and 6pm  lactulose 10 g/15 mL oral solution: 15 milliliter(s) orally once a day, As Needed  levETIRAcetam 100 mg/mL oral solution: 7.5 milliliter(s) orally 2 times a day  Milk of Magnesia 8% oral suspension: 30 milliliter(s) orally once a day (at bedtime), As Needed if no BM for 2 days  omeprazole 20 mg oral delayed release tablet: 1 tab(s) orally once a day (in the morning)  ondansetron 2 mg/mL injectable solution: 2 milliliter(s) injectable every 8 hours, As Needed  Silvadene 1% topical cream: Apply topically to affected area once a day and as needed  Tylenol 325 mg oral tablet: 2 tab(s) orally every 6 hours, As Needed for pain  Venelex 788 mg-87 mg/g topical ointment: Apply topically to affected area once a day and as needed  venlafaxine: 12.5 milligram(s) orally 2 times a day (before meals)

## 2022-03-10 NOTE — DISCHARGE NOTE PROVIDER - INSTRUCTIONS
Tube feeds: Glucerna 1.5, 60cc/hr  with 45cc/hr free water flush.    Flush also after each time medications are given.

## 2022-03-10 NOTE — DISCHARGE NOTE PROVIDER - NSDCCPCAREPLAN_GEN_ALL_CORE_FT
PRINCIPAL DISCHARGE DIAGNOSIS  Diagnosis: Malfunction of jejunostomy tube  Assessment and Plan of Treatment: *Tube was previously clogged , needed replacement.   *Tube MUST be flushed every hour. All pills must be crushed into near powder form to prevent clogging of the tube again.   *Tube feeds Glucerna 1.5 at 60cc/hr with 45cc every hour of free water flush.    * Flush also after each time medications are given.   <BR>  IMPORTANT NOTES:   *Rx port should never be used for any reason.   Feeds port/J tube port (the port in them middle) for feeds only. No medication ever to be placed through this tube.   Suction port/gastrostomy port for meds only and water flushes if needed.         PRINCIPAL DISCHARGE DIAGNOSIS  Diagnosis: Malfunction of jejunostomy tube  Assessment and Plan of Treatment:   *Tube was previously clogged , needed replacement.   *Tube MUST be flushed every hour. All pills must be crushed into near powder form to prevent clogging of the tube again.   *Tube feeds Glucerna 1.5 at 60cc/hr with 45cc every hour of free water flush.    * Flush also after each time medications are given.   <BR>  IMPORTANT NOTES:   *Rx port should never be used for any reason.   Feeds port/J tube port (the port in them middle) for feeds only. No medication ever to be placed through this tube.   Suction port/gastrostomy port for meds only and water flushes if needed.  *If any issues in future with tube , please contact Dr. Duron office.         SECONDARY DISCHARGE DIAGNOSES  Diagnosis: Seizure  Assessment and Plan of Treatment:   *History of seizures   *EEG while in hospital did show some seizure activity. His Keppra was increased from 500mg twice a day to 750mg twice a day.   *Reccomend to follow up outpatient with a Neurologist in 2-3 weeks.

## 2022-03-10 NOTE — PROGRESS NOTE ADULT - ASSESSMENT
55 y/o M with PMH diffuse traumatic brain injury, intracerebral hemorrhage, dysphagia, aphasia, non-verbal at baseline, chronic respiratory failure with hypercapnia asthma, PEG tube, seizures, GERD, MDD, hemorrhagic otitis externa (left ear) presents for a clogged G tube. He was initially seen at [facility] and the tube was attempted to be unclogged without success. He was transferred to  for further management.    On 3/6/22 he underwent placement of G tube with suturing of fistula and placement of J tube extension.  Following the procedure he was noted to have some right sided ptosis which has been fluctuating.    -No ptosis noted on my exam on 3/8, ptosis noted on 3/9, no ptosis noted n 3/10.  -Ptosis seems to fluctuate.   -Pupils are equally round and reactive - no suggestion of Wilian's syndrome  -Overall he seems to have more right sided deficits compared to the left (more contractures on right, right facial droop). Unclear if this is chronic, fluctuating.    No acute findings seen on CT head.    Will sign off. Please call back if additional input is needed from neurology service.

## 2022-03-10 NOTE — DISCHARGE NOTE PROVIDER - PROVIDER TOKENS
PROVIDER:[TOKEN:[[id number]]] PROVIDER:[TOKEN:[[id number]]],PROVIDER:[TOKEN:[[id number]]]

## 2022-03-10 NOTE — DISCHARGE NOTE PROVIDER - NS AS DC PROVIDER CONTACT Y/N MULTI
Ramirez Barragan  Preferred Name:   None  Male, 52 year old, 1966  Phone:   [phone] ([phone]  Last Weight:   93.8 kg  PCP:   Fabian Pinzon MD  Allergies:   Shell Fish  Primary Ins:   [employer]  MRN:   [id number]  Patient Portal:   Active  Last Logon:   04/29/19  Next Appt:   With Blake White MD  02/21/2020 at 8:00 AM  Last Appt With Me:   Colonoscopy   Received: Today   Message Contents   NICOLE Patten Nurse Msg Pool   Cc: Charu CASTILLO colonoscopy was ordered for pt to schedule. Our attempts to reach the pt by phone have been unsuccessful but a Contact letter has been mailed.             Thanks,   OA Team   GI Preadmit Scheduling Dept         Yes

## 2022-03-11 PROCEDURE — 99232 SBSQ HOSP IP/OBS MODERATE 35: CPT

## 2022-03-11 RX ADMIN — Medication 10 MILLIGRAM(S): at 22:05

## 2022-03-11 RX ADMIN — Medication 10 MILLIGRAM(S): at 09:03

## 2022-03-11 RX ADMIN — LEVETIRACETAM 500 MILLIGRAM(S): 250 TABLET, FILM COATED ORAL at 22:05

## 2022-03-11 RX ADMIN — LACOSAMIDE 100 MILLIGRAM(S): 50 TABLET ORAL at 22:05

## 2022-03-11 RX ADMIN — Medication 12.5 MILLIGRAM(S): at 09:03

## 2022-03-11 RX ADMIN — LEVETIRACETAM 500 MILLIGRAM(S): 250 TABLET, FILM COATED ORAL at 09:03

## 2022-03-11 RX ADMIN — LACOSAMIDE 100 MILLIGRAM(S): 50 TABLET ORAL at 09:03

## 2022-03-11 RX ADMIN — ENOXAPARIN SODIUM 40 MILLIGRAM(S): 100 INJECTION SUBCUTANEOUS at 22:04

## 2022-03-11 RX ADMIN — FAMOTIDINE 20 MILLIGRAM(S): 10 INJECTION INTRAVENOUS at 09:03

## 2022-03-11 RX ADMIN — Medication 81 MILLIGRAM(S): at 09:03

## 2022-03-11 RX ADMIN — Medication 12.5 MILLIGRAM(S): at 22:05

## 2022-03-11 RX ADMIN — TIOTROPIUM BROMIDE 1 CAPSULE(S): 18 CAPSULE ORAL; RESPIRATORY (INHALATION) at 09:12

## 2022-03-11 RX ADMIN — Medication 1 APPLICATION(S): at 09:04

## 2022-03-11 NOTE — PROGRESS NOTE ADULT - SUBJECTIVE AND OBJECTIVE BOX
Cheif complaints and Diagnosis:  GJ tube malfunction    Subjective: patient at baseline, no distress noted. tube feeds going, no issues.       REVIEW OF SYSTEMS:  unable to obtain secondary to patient non verbal      Vital Signs Last 24 Hrs  T(C): 36.6 (11 Mar 2022 08:08), Max: 36.9 (10 Mar 2022 14:58)  T(F): 97.9 (11 Mar 2022 08:08), Max: 98.5 (10 Mar 2022 14:58)  HR: 74 (11 Mar 2022 09:20) (74 - 84)  BP: 106/62 (11 Mar 2022 08:08) (100/75 - 113/70)  BP(mean): --  RR: 18 (11 Mar 2022 08:08) (18 - 19)  SpO2: 98% (11 Mar 2022 09:20) (91% - 98%)    HEENT:   pupils equal and reactive, EOMI, no oropharyngeal lesions, erythema, exudates, oral thrush    NECK:   supple, no carotid bruits, no palpable lymph nodes, no thyromegaly    CV:  +S1, +S2, regular, no murmurs or rubs    RESP:   lungs clear to auscultation bilaterally, no wheezing, rales, rhonchi, good air entry bilaterally    BREAST:  not examined    GI:  abdomen soft, non-tender, non-distended, normal BS, no bruits, no abdominal masses, no palpable masses    RECTAL:  not examined    :  not examined    MSK:   normal muscle tone, no atrophy, no rigidity, no contractions    EXT:   no clubbing, no cyanosis, no edema, no calf pain, swelling or erythema    VASCULAR:  pulses equal and symmetric in the upper and lower extremities    NEURO:  alert , no focal neurological deficits, follows all commands, able to move extremities spontaneously    SKIN:  no ulcers, lesions or rashes    MEDICATIONS  (STANDING):  aspirin  chewable 81 milliGRAM(s) Oral daily  baclofen 10 milliGRAM(s) Oral two times a day  enoxaparin Injectable 40 milliGRAM(s) SubCutaneous every 24 hours  famotidine    Tablet 20 milliGRAM(s) Oral daily  lacosamide 100 milliGRAM(s) Oral two times a day  levETIRAcetam  Solution 500 milliGRAM(s) Oral two times a day  silver sulfADIAZINE 1% Cream 1 Application(s) Topical daily  tiotropium 18 MICROgram(s) Capsule 1 Capsule(s) Inhalation daily  venlafaxine 12.5 milliGRAM(s) Oral two times a day with meals    MEDICATIONS  (PRN):  ALBUTerol    90 MICROgram(s) HFA Inhaler 2 Puff(s) Inhalation every 4 hours PRN Bronchospasm  haloperidol    Injectable 3 milliGRAM(s) IntraMuscular every 6 hours PRN agitation  hydrALAZINE Injectable 10 milliGRAM(s) IV Push every 6 hours PRN SBP > 160  ondansetron Injectable 4 milliGRAM(s) IV Push every 8 hours PRN Nausea and/or Vomiting          Hospital Course:	      55 y/o male with hx of TBI, nonverbal at baseline admitted for GJ tube Malfunction. 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 Patient pulled out GJ tube yesterday, which was then replaced with a tavares catheter.     GJ tube malfunction.  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  Placement of JT extension.  - resumed TF.    mild R ptosis - resolved.  - HCT:  negative.  - B12 level.  - ASA + statin for now.  - Neurology input appreciated, will monitor for now and defer further imaging.    communication.  - 2W RN.    - Neurology.  - 03/06, 03/07, 03/08, 03/09 sister at bedside.  updated and questions answered.        patient medically cleared for discharge. tolerating tube feeds. Family appealed discharge bc they dont like facilty. other options were provided.  waiting for results of appeal.

## 2022-03-12 PROCEDURE — 99233 SBSQ HOSP IP/OBS HIGH 50: CPT

## 2022-03-12 RX ADMIN — LEVETIRACETAM 500 MILLIGRAM(S): 250 TABLET, FILM COATED ORAL at 21:31

## 2022-03-12 RX ADMIN — Medication 0.5 MILLIGRAM(S): at 13:29

## 2022-03-12 RX ADMIN — ENOXAPARIN SODIUM 40 MILLIGRAM(S): 100 INJECTION SUBCUTANEOUS at 21:32

## 2022-03-12 RX ADMIN — TIOTROPIUM BROMIDE 1 CAPSULE(S): 18 CAPSULE ORAL; RESPIRATORY (INHALATION) at 09:10

## 2022-03-12 RX ADMIN — Medication 10 MILLIGRAM(S): at 09:20

## 2022-03-12 RX ADMIN — LACOSAMIDE 100 MILLIGRAM(S): 50 TABLET ORAL at 21:31

## 2022-03-12 RX ADMIN — Medication 10 MILLIGRAM(S): at 21:31

## 2022-03-12 RX ADMIN — LACOSAMIDE 100 MILLIGRAM(S): 50 TABLET ORAL at 09:21

## 2022-03-12 RX ADMIN — FAMOTIDINE 20 MILLIGRAM(S): 10 INJECTION INTRAVENOUS at 09:20

## 2022-03-12 RX ADMIN — Medication 12.5 MILLIGRAM(S): at 21:32

## 2022-03-12 RX ADMIN — LEVETIRACETAM 500 MILLIGRAM(S): 250 TABLET, FILM COATED ORAL at 09:21

## 2022-03-12 RX ADMIN — Medication 1 APPLICATION(S): at 09:31

## 2022-03-12 RX ADMIN — Medication 81 MILLIGRAM(S): at 09:20

## 2022-03-12 RX ADMIN — Medication 12.5 MILLIGRAM(S): at 09:19

## 2022-03-12 RX ADMIN — Medication 0.5 MILLIGRAM(S): at 14:09

## 2022-03-12 NOTE — PROVIDER CONTACT NOTE (CHANGE IN STATUS NOTIFICATION) - BACKGROUND
Patient with history of seizures. Patient's sister Alejandrina at bedside states that as far as the family is aware, patient has not had seizures for many years.

## 2022-03-12 NOTE — PROVIDER CONTACT NOTE (CHANGE IN STATUS NOTIFICATION) - SITUATION
Patient not responding to stimuli, both eyes moving side to side, right knee jerking intermittently. since 1315pm. Patient not responding to stimuli, eyes wide open with rapid eye movements to both eyes (side to side), right knee jerking intermittently since 1315pm.

## 2022-03-12 NOTE — PROGRESS NOTE ADULT - SUBJECTIVE AND OBJECTIVE BOX
Cheif complaints and Diagnosis: g tube malfunction    Subjective: no complaints      REVIEW OF SYSTEMS:    CONSTITUTIONAL: No weakness, fevers or chills  EYES/ENT: No visual changes;  No vertigo or throat pain   NECK: No pain or stiffness  RESPIRATORY: No cough, wheezing, hemoptysis; No shortness of breath  CARDIOVASCULAR: No chest pain or palpitations  GASTROINTESTINAL: No abdominal or epigastric pain. No nausea, vomiting, or hematemesis; No diarrhea or constipation. No melena or hematochezia.  GENITOURINARY: No dysuria, frequency or hematuria  NEUROLOGICAL: No numbness or weakness  SKIN: No itching, burning, rashes, or lesions   All other review of systems is negative unless indicated above      Vital Signs Last 24 Hrs  T(C): 36.4 (12 Mar 2022 08:03), Max: 37.1 (11 Mar 2022 23:49)  T(F): 97.6 (12 Mar 2022 08:03), Max: 98.7 (11 Mar 2022 23:49)  HR: 76 (12 Mar 2022 08:55) (73 - 81)  BP: 99/58 (12 Mar 2022 08:03) (99/58 - 111/83)  BP(mean): --  RR: 18 (12 Mar 2022 08:03) (18 - 18)  SpO2: 95% (12 Mar 2022 08:03) (95% - 99%)    HEENT:   pupils equal and reactive, EOMI, no oropharyngeal lesions, erythema, exudates, oral thrush    NECK:   supple, no carotid bruits, no palpable lymph nodes, no thyromegaly     CV:  +S1, +S2, regular, no murmurs or rubs    RESP:   lungs clear to auscultation bilaterally, no wheezing, rales, rhonchi, good air entry bilaterally    BREAST:  not examined    GI:  abdomen soft, non-tender, non-distended, normal BS, no bruits, no abdominal masses, no palpable masses    RECTAL:  not examined    :  not examined    MSK:   normal muscle tone, no atrophy, no rigidity, no contractions    EXT:   no clubbing, no cyanosis, no edema, no calf pain, swelling or erythema    VASCULAR:  pulses equal and symmetric in the upper and lower extremities    NEURO:  AAOX3, no focal neurological deficits, follows all commands, able to move extremities spontaneously    SKIN:  no ulcers, lesions or rashes    MEDICATIONS  (STANDING):  aspirin  chewable 81 milliGRAM(s) Oral daily  baclofen 10 milliGRAM(s) Oral two times a day  enoxaparin Injectable 40 milliGRAM(s) SubCutaneous every 24 hours  famotidine    Tablet 20 milliGRAM(s) Oral daily  lacosamide 100 milliGRAM(s) Oral two times a day  levETIRAcetam  Solution 500 milliGRAM(s) Oral two times a day  silver sulfADIAZINE 1% Cream 1 Application(s) Topical daily  tiotropium 18 MICROgram(s) Capsule 1 Capsule(s) Inhalation daily  venlafaxine 12.5 milliGRAM(s) Oral two times a day with meals    MEDICATIONS  (PRN):  ALBUTerol    90 MICROgram(s) HFA Inhaler 2 Puff(s) Inhalation every 4 hours PRN Bronchospasm  haloperidol    Injectable 3 milliGRAM(s) IntraMuscular every 6 hours PRN agitation  hydrALAZINE Injectable 10 milliGRAM(s) IV Push every 6 hours PRN SBP > 160  ondansetron Injectable 4 milliGRAM(s) IV Push every 8 hours PRN Nausea and/or Vomiting            Hospital Course:	      57 y/o male with hx of TBI, nonverbal at baseline admitted for GJ tube Malfunction. 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 Patient pulled out GJ tube yesterday, which was then replaced with a tavares catheter.     GJ tube malfunction.  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  Placement of JT extension.  - resumed TF.    mild R ptosis - resolved.  - HCT:  negative.  - B12 level.  - ASA + statin for now.  - Neurology input appreciated, will monitor for now and defer further imaging.    communication.  - 2W RN.    - Neurology.  - 03/06, 03/07, 03/08, 03/09 sister at bedside.  updated and questions answered.        patient medically cleared for discharge. tolerating tube feeds. Family appealed discharge bc they dont like facilty. other options were provided.  waiting for results of appeal.

## 2022-03-13 PROCEDURE — 99232 SBSQ HOSP IP/OBS MODERATE 35: CPT

## 2022-03-13 RX ADMIN — LEVETIRACETAM 500 MILLIGRAM(S): 250 TABLET, FILM COATED ORAL at 22:06

## 2022-03-13 RX ADMIN — Medication 12.5 MILLIGRAM(S): at 09:20

## 2022-03-13 RX ADMIN — Medication 10 MILLIGRAM(S): at 09:19

## 2022-03-13 RX ADMIN — Medication 81 MILLIGRAM(S): at 09:19

## 2022-03-13 RX ADMIN — FAMOTIDINE 20 MILLIGRAM(S): 10 INJECTION INTRAVENOUS at 09:19

## 2022-03-13 RX ADMIN — Medication 12.5 MILLIGRAM(S): at 22:05

## 2022-03-13 RX ADMIN — Medication 1 APPLICATION(S): at 09:20

## 2022-03-13 RX ADMIN — TIOTROPIUM BROMIDE 1 CAPSULE(S): 18 CAPSULE ORAL; RESPIRATORY (INHALATION) at 09:14

## 2022-03-13 RX ADMIN — LACOSAMIDE 100 MILLIGRAM(S): 50 TABLET ORAL at 09:19

## 2022-03-13 RX ADMIN — Medication 10 MILLIGRAM(S): at 22:06

## 2022-03-13 RX ADMIN — LACOSAMIDE 100 MILLIGRAM(S): 50 TABLET ORAL at 22:06

## 2022-03-13 RX ADMIN — ENOXAPARIN SODIUM 40 MILLIGRAM(S): 100 INJECTION SUBCUTANEOUS at 22:06

## 2022-03-13 RX ADMIN — LEVETIRACETAM 500 MILLIGRAM(S): 250 TABLET, FILM COATED ORAL at 09:19

## 2022-03-13 NOTE — CONSULT NOTE ADULT - ASSESSMENT
57 y/o male presents with a chief complaint of Clogged PEG tube (02 Mar 2022 09:00). PMHX: Diffuse traumatic brain injury, intracerebral hemorrhage, dysphagia, aphasia, non-verbal at baseline, chronic respiratory failure with hypercapnia asthma, PEG tube, seizures, GERD, MDD, hemorrhagic otitis externa (left ear) presents to [facility] ED for c/o clogged G tube. Reportedly was initially at [facility] and tube was attempted to be unclogged without success.      Plan/Assessment: G-J Tube malfunction  -Maintain NPO for now  -Discussed above w/ Alexia FERGUSON NP and will discuss w/ Dr. Duron for eval of G-J tube      Above discussed w/ GI attending Dr. ADONIS Jolly/Roxanne Alvarez NP-C  
55 y/o man with PMH diffuse traumatic brain injury, intracerebral hemorrhage, dysphagia, aphasia, non-verbal at baseline. On 3/6/22 he underwent placement of G tube with suturing of fistula and placement of J tube extension. ? seizure yesterday, resolved.  Suggest:  EEG  continue Keppra 500 mg BID  continue Vimpat 100 mg po, BID  continue supportive care
57 y/o M with PMH diffuse traumatic brain injury, intracerebral hemorrhage, dysphagia, aphasia, non-verbal at baseline, chronic respiratory failure with hypercapnia asthma, PEG tube, seizures, GERD, MDD, hemorrhagic otitis externa (left ear) presents for a clogged G tube. He was initially seen at [facility] and the tube was attempted to be unclogged without success. He was transferred to  for further management.    On 3/6/22 he underwent placement of G tube with suturing of fistula and placement of J tube extension.  Following the procedure he was noted to have some right sided ptosis which has been fluctuating.    -No ptosis noted on my exam this AM  -Pupils are equally round and reactive - no suggestion of Wilian's syndrome  -Overall he seems to have more right sided deficits compared to the left (more contractures on right, right facial droop). It is possible that his deficits became more pronounced following anesthesia.    Will monitor and if he does seem to have a more permanent deficit, can do additional imaging.  Aspirin was added.    Discussed with Dr. D'Amico.  
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     Leave in tavares catheter in gastrostomy for now, leave to drainage.   Convert all critical meds to IV  Better management of his aggression/psych issues to prevent ripping out of any feeding tubes he may need.   Will need repeat endoscopy with G tube +/- GJ tube with suturing depending on what the gastric side of the gastrostomy looks like.

## 2022-03-13 NOTE — CONSULT NOTE ADULT - SUBJECTIVE AND OBJECTIVE BOX
Neurology Consult requested by:   Patient is a 56y old  Male who presents with a chief complaint of Clogged PEG tube (13 Mar 2022 10:10)     HPI:  57 y/o man with PMH diffuse traumatic brain injury, intracerebral hemorrhage, dysphagia, aphasia, non-verbal at baseline, chronic respiratory failure with hypercapnia asthma, PEG tube, seizures, GERD, MDD, hemorrhagic otitis externa (left ear) presents for a clogged G tube. Yesterday as per nurse had change in MS, not responsive, eyes rolled back and forth, leg twitched Treated for possible seizure ativan 0.5 mg x 2 IVP. no further events, today back to baseline.    PAST MEDICAL & SURGICAL HISTORY:  Diffuse traumatic brain injury    Intracerebral hemorrhage    Dysphagia    Aphasia    Chronic respiratory failure with hypercapnia    S/P percutaneous endoscopic gastrostomy (PEG) tube placement    Seizure    GERD (gastroesophageal reflux disease)    Hemorrhagic otitis externa    Patient unable to provide medical history      FAMILY HISTORY:  Patient unable to provide medical history (Father, Mother)      Social Hx:  Nonsmoker, no drug or alcohol use  Medications and Allergies ReviewedMEDICATIONS  (STANDING):  aspirin  chewable 81 milliGRAM(s) Oral daily  baclofen 10 milliGRAM(s) Oral two times a day  enoxaparin Injectable 40 milliGRAM(s) SubCutaneous every 24 hours  famotidine    Tablet 20 milliGRAM(s) Oral daily  lacosamide 100 milliGRAM(s) Oral two times a day  levETIRAcetam  Solution 500 milliGRAM(s) Oral two times a day  silver sulfADIAZINE 1% Cream 1 Application(s) Topical daily  tiotropium 18 MICROgram(s) Capsule 1 Capsule(s) Inhalation daily  venlafaxine 12.5 milliGRAM(s) Oral two times a day with meals     ROS: Pertinent positives in HPI, all other ROS were reviewed and are negative.      Examination:   Vital Signs Last 24 Hrs  T(C): 36.9 (13 Mar 2022 08:15), Max: 36.9 (13 Mar 2022 08:15)  T(F): 98.4 (13 Mar 2022 08:15), Max: 98.4 (13 Mar 2022 08:15)  HR: 87 (13 Mar 2022 08:15) (72 - 92)  BP: 113/68 (13 Mar 2022 08:15) (105/62 - 113/68)  RR: 18 (13 Mar 2022 08:15) (17 - 19)  SpO2: 99% (13 Mar 2022 08:15) (98% - 99%)  General: NAD   NECK: stiff in all directions, no masses  Vascular : no carotid bruits,   Lungs: CTAB  Chest: RRR, no murmurs  Extremities: nontender, no edema  Musculoskeletal: contracted RUE  Skin: no rash    Neurological Examination:  MS: awake, non verbal, doesnt follow commands   CN: Blinks to threat bilat, face grimaces bilat  Motor: contracted RUSE, moves LUE spont not to commands, no spont movements of LEs  Sens: slight withdrawal to PP bilat    Reflexes: 0-1/4 all over, mute toes b/l  Coord:  cannot test   Gait: Cannot test    Labs: Reviewed  Basic Metabolic Panel in AM (03.08.22 @ 09:56)   Sodium, Serum: 138 mmol/L   Potassium, Serum: 4.5 mmol/L   Chloride, Serum: 105 mmol/L   Carbon Dioxide, Serum: 31 mmol/L   Anion Gap, Serum: 2 mmol/L   Blood Urea Nitrogen, Serum: 8 mg/dL   Creatinine, Serum: 0.16 mg/dL   Glucose, Serum: 109 mg/dL   Calcium, Total Serum: 8.3 mg/dL   eGFR: 167    Complete Blood Count in AM (03.08.22 @ 09:56)   WBC Count: 4.72 K/uL   RBC Count: 3.38 M/uL   Hemoglobin: 10.1 g/dL   Hematocrit: 32.0 %   Mean Cell Volume: 94.7 fl   Mean Cell Hemoglobin: 29.9 pg   Mean Cell Hemoglobin Conc: 31.6 gm/dL   Red Cell Distrib Width: 14.3 %   Platelet Count - Automated: 249 K/uL     Imaging:   < from: CT Head No Cont (03.07.22 @ 17:02) >    IMPRESSION:  Mild to moderate chronic microvascular changes without   evidence of an acute transcortical infarction or hemorrhage. Moderate   bilateral frontal lobe and bilateral cerebellar hemisphere volume.    < end of copied text >

## 2022-03-13 NOTE — PROGRESS NOTE ADULT - SUBJECTIVE AND OBJECTIVE BOX
Cheif complaints and Diagnosis: G tube malfunction/ TBI/ seizures    Subjective: at baseline      REVIEW OF SYSTEMS:  unable to obtain secondary to non verbal      Vital Signs Last 24 Hrs  T(C): 36.9 (13 Mar 2022 08:15), Max: 36.9 (13 Mar 2022 08:15)  T(F): 98.4 (13 Mar 2022 08:15), Max: 98.4 (13 Mar 2022 08:15)  HR: 87 (13 Mar 2022 08:15) (72 - 92)  BP: 113/68 (13 Mar 2022 08:15) (105/62 - 113/68)  BP(mean): --  RR: 18 (13 Mar 2022 08:15) (17 - 19)  SpO2: 99% (13 Mar 2022 08:15) (98% - 99%)    HEENT:   pupils equal and reactive, EOMI, no oropharyngeal lesions, erythema, exudates, oral thrush    NECK:   supple, no carotid bruits, no palpable lymph nodes, no thyromegaly    CV:  +S1, +S2, regular, no murmurs or rubs    RESP:   lungs clear to auscultation bilaterally, no wheezing, rales, rhonchi, good air entry bilaterally    BREAST:  not examined    GI:  abdomen soft, non-tender, non-distended, normal BS, no bruits, no abdominal masses, no palpable masses    RECTAL:  not examined    :  not examined    MSK:   normal muscle tone, no atrophy, no rigidity, no contractions    EXT:   no clubbing, no cyanosis, no edema, no calf pain, swelling or erythema    VASCULAR:  pulses equal and symmetric in the upper and lower extremities    NEURO:  Alert, no focal neurological deficits, follows all commands, able to move extremities spontaneously    SKIN:  no ulcers, lesions or rashes    MEDICATIONS  (STANDING):  aspirin  chewable 81 milliGRAM(s) Oral daily  baclofen 10 milliGRAM(s) Oral two times a day  enoxaparin Injectable 40 milliGRAM(s) SubCutaneous every 24 hours  famotidine    Tablet 20 milliGRAM(s) Oral daily  lacosamide 100 milliGRAM(s) Oral two times a day  levETIRAcetam  Solution 500 milliGRAM(s) Oral two times a day  silver sulfADIAZINE 1% Cream 1 Application(s) Topical daily  tiotropium 18 MICROgram(s) Capsule 1 Capsule(s) Inhalation daily  venlafaxine 12.5 milliGRAM(s) Oral two times a day with meals    MEDICATIONS  (PRN):  ALBUTerol    90 MICROgram(s) HFA Inhaler 2 Puff(s) Inhalation every 4 hours PRN Bronchospasm  haloperidol    Injectable 3 milliGRAM(s) IntraMuscular every 6 hours PRN agitation  hydrALAZINE Injectable 10 milliGRAM(s) IV Push every 6 hours PRN SBP > 160  ondansetron Injectable 4 milliGRAM(s) IV Push every 8 hours PRN Nausea and/or Vomiting            Hospital Course:	      57 y/o male with hx of TBI, nonverbal at baseline admitted for GJ tube Malfunction. 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 Patient pulled out GJ tube yesterday, which was then replaced with a tavares catheter.     GJ tube malfunction.  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  Placement of JT extension.  - resumed TF.    mild R ptosis - resolved.  - HCT:  negative.  - B12 level.  - ASA + statin for now.  - Neurology input appreciated, will monitor for now and defer further imaging.    communication.  - 2W RN.    - Neurology.  - 03/06, 03/07, 03/08, 03/09 sister at bedside.  updated and questions answered.    Possible seizure noted on 3/12  -1mg ativan given  -unclear if patient actually had seizure nor not, very difficult to assess given non verbal  and contracted state.   -currently back to baseline  -keppra and vimpat serum blood levels ordered.          patient medically cleared for discharge. tolerating tube feeds. Family appealed discharge bc they dont like facilty. other options were provided.  waiting for results of appeal.

## 2022-03-14 VITALS
TEMPERATURE: 98 F | RESPIRATION RATE: 18 BRPM | HEART RATE: 76 BPM | SYSTOLIC BLOOD PRESSURE: 104 MMHG | DIASTOLIC BLOOD PRESSURE: 60 MMHG | OXYGEN SATURATION: 99 %

## 2022-03-14 LAB — SARS-COV-2 RNA SPEC QL NAA+PROBE: SIGNIFICANT CHANGE UP

## 2022-03-14 PROCEDURE — 99232 SBSQ HOSP IP/OBS MODERATE 35: CPT

## 2022-03-14 PROCEDURE — 99239 HOSP IP/OBS DSCHRG MGMT >30: CPT

## 2022-03-14 PROCEDURE — 95816 EEG AWAKE AND DROWSY: CPT | Mod: 26

## 2022-03-14 RX ORDER — LEVETIRACETAM 250 MG/1
750 TABLET, FILM COATED ORAL
Refills: 0 | Status: DISCONTINUED | OUTPATIENT
Start: 2022-03-14 | End: 2022-03-14

## 2022-03-14 RX ADMIN — Medication 81 MILLIGRAM(S): at 09:52

## 2022-03-14 RX ADMIN — Medication 10 MILLIGRAM(S): at 09:52

## 2022-03-14 RX ADMIN — FAMOTIDINE 20 MILLIGRAM(S): 10 INJECTION INTRAVENOUS at 09:52

## 2022-03-14 RX ADMIN — LEVETIRACETAM 500 MILLIGRAM(S): 250 TABLET, FILM COATED ORAL at 09:53

## 2022-03-14 RX ADMIN — Medication 12.5 MILLIGRAM(S): at 09:53

## 2022-03-14 RX ADMIN — TIOTROPIUM BROMIDE 1 CAPSULE(S): 18 CAPSULE ORAL; RESPIRATORY (INHALATION) at 08:41

## 2022-03-14 RX ADMIN — Medication 1 APPLICATION(S): at 09:52

## 2022-03-14 RX ADMIN — LACOSAMIDE 100 MILLIGRAM(S): 50 TABLET ORAL at 09:53

## 2022-03-14 NOTE — PROGRESS NOTE ADULT - SUBJECTIVE AND OBJECTIVE BOX
Cheif complaints and Diagnosis: PEG tube malfunction/ bedbound/ seizure d/o    Subjective: at baseline      REVIEW OF SYSTEMS:    unable to obtain secondary to cognitive dysfunction      Vital Signs Last 24 Hrs  T(C): 36.4 (14 Mar 2022 08:19), Max: 37.2 (13 Mar 2022 23:03)  T(F): 97.5 (14 Mar 2022 08:19), Max: 99 (13 Mar 2022 23:03)  HR: 76 (14 Mar 2022 08:20) (76 - 83)  BP: 111/65 (14 Mar 2022 08:19) (99/64 - 111/65)  BP(mean): 71 (13 Mar 2022 15:35) (71 - 71)  RR: 18 (14 Mar 2022 08:19) (18 - 18)  SpO2: 96% (14 Mar 2022 08:19) (94% - 100%)    HEENT:   pupils equal and reactive, EOMI, no oropharyngeal lesions, erythema, exudates, oral thrush    NECK:   supple, no carotid bruits, no palpable lymph nodes, no thyromegaly    CV:  +S1, +S2, regular, no murmurs or rubs    RESP:   lungs clear to auscultation bilaterally, no wheezing, rales, rhonchi, good air entry bilaterally    BREAST:  not examined    GI:  abdomen soft, non-tender, non-distended, normal BS, no bruits, no abdominal masses, no palpable masses    RECTAL:  not examined    :  not examined    MSK:   normal muscle tone, no atrophy, no rigidity, no contractions    EXT:   no clubbing, no cyanosis, no edema, no calf pain, swelling or erythema    VASCULAR:  pulses equal and symmetric in the upper and lower extremities    NEURO:  alert, no focal neurological deficits, follows all commands, able to move extremities spontaneously    SKIN:  no ulcers, lesions or rashes    MEDICATIONS  (STANDING):  aspirin  chewable 81 milliGRAM(s) Oral daily  baclofen 10 milliGRAM(s) Oral two times a day  enoxaparin Injectable 40 milliGRAM(s) SubCutaneous every 24 hours  famotidine    Tablet 20 milliGRAM(s) Oral daily  lacosamide 100 milliGRAM(s) Oral two times a day  levETIRAcetam  Solution 500 milliGRAM(s) Oral two times a day  silver sulfADIAZINE 1% Cream 1 Application(s) Topical daily  tiotropium 18 MICROgram(s) Capsule 1 Capsule(s) Inhalation daily  venlafaxine 12.5 milliGRAM(s) Oral two times a day with meals    MEDICATIONS  (PRN):  ALBUTerol    90 MICROgram(s) HFA Inhaler 2 Puff(s) Inhalation every 4 hours PRN Bronchospasm  haloperidol    Injectable 3 milliGRAM(s) IntraMuscular every 6 hours PRN agitation  hydrALAZINE Injectable 10 milliGRAM(s) IV Push every 6 hours PRN SBP > 160  ondansetron Injectable 4 milliGRAM(s) IV Push every 8 hours PRN Nausea and/or Vomiting              Hospital Course:	      55 y/o male with hx of TBI, nonverbal at baseline admitted for GJ tube Malfunction. 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 Patient pulled out GJ tube yesterday, which was then replaced with a tavares catheter.     GJ tube malfunction.  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  Placement of JT extension.  - resumed TF.    mild R ptosis - resolved.  - HCT:  negative.  - B12 level.  - ASA + statin for now.  - Neurology input appreciated, will monitor for now and defer further imaging.    communication.  - 2W RN.    - Neurology.  - 03/06, 03/07, 03/08, 03/09 sister at bedside.  updated and questions answered.    Possible seizure noted on 3/12  -1mg ativan given  -unclear if patient actually had seizure nor not, very difficult to assess given non verbal  and contracted state.   -currently back to baseline  -keppra and vimpat serum blood levels ordered.          patient medically cleared for discharge. tolerating tube feeds. Family appealed >>failed appeal  For DC today

## 2022-03-14 NOTE — PROGRESS NOTE ADULT - ASSESSMENT
57 y/o man with PMH diffuse traumatic brain injury, intracerebral hemorrhage, dysphagia, aphasia, non-verbal at baseline. On 3/6/22 he underwent placement of G tube with suturing of fistula and placement of J tube extension. ? recurrent seizure. EEG generalized slow occasional independent epileptiform activities.  Suggest:  EEG  can increase Keppra 750 mg BID  continue Vimpat 100 mg po, BID  can f/u with my office in 3-4 weeks    Discussed with Dr. Valentine

## 2022-03-14 NOTE — PROGRESS NOTE ADULT - SUBJECTIVE AND OBJECTIVE BOX
Cheif complaints and Diagnosis: PEG malfunction/ seizures    Subjective: at baseline      REVIEW OF SYSTEMS:    CONSTITUTIONAL: No weakness, fevers or chills  EYES/ENT: No visual changes;  No vertigo or throat pain   NECK: No pain or stiffness  RESPIRATORY: No cough, wheezing, hemoptysis; No shortness of breath  CARDIOVASCULAR: No chest pain or palpitations  GASTROINTESTINAL: No abdominal or epigastric pain. No nausea, vomiting, or hematemesis; No diarrhea or constipation. No melena or hematochezia.  GENITOURINARY: No dysuria, frequency or hematuria  NEUROLOGICAL: No numbness or weakness  SKIN: No itching, burning, rashes, or lesions   All other review of systems is negative unless indicated above      Vital Signs Last 24 Hrs  T(C): 36.4 (14 Mar 2022 08:19), Max: 37.2 (13 Mar 2022 23:03)  T(F): 97.5 (14 Mar 2022 08:19), Max: 99 (13 Mar 2022 23:03)  HR: 81 (14 Mar 2022 08:19) (81 - 83)  BP: 111/65 (14 Mar 2022 08:19) (99/64 - 111/65)  BP(mean): 71 (13 Mar 2022 15:35) (71 - 71)  RR: 18 (14 Mar 2022 08:19) (18 - 18)  SpO2: 96% (14 Mar 2022 08:19) (94% - 100%)    HEENT:   pupils equal and reactive, EOMI, no oropharyngeal lesions, erythema, exudates, oral thrush    NECK:   supple, no carotid bruits, no palpable lymph nodes, no thyromegaly    CV:  +S1, +S2, regular, no murmurs or rubs    RESP:   lungs clear to auscultation bilaterally, no wheezing, rales, rhonchi, good air entry bilaterally    BREAST:  not examined    GI:  abdomen soft, non-tender, non-distended, normal BS, no bruits, no abdominal masses, no palpable masses    RECTAL:  not examined    :  not examined    MSK:   normal muscle tone, no atrophy, no rigidity, no contractions    EXT:   no clubbing, no cyanosis, no edema, no calf pain, swelling or erythema    VASCULAR:  pulses equal and symmetric in the upper and lower extremities    NEURO:  AAOX3, no focal neurological deficits, follows all commands, able to move extremities spontaneously    SKIN:  no ulcers, lesions or rashes    MEDICATIONS  (STANDING):  aspirin  chewable 81 milliGRAM(s) Oral daily  baclofen 10 milliGRAM(s) Oral two times a day  enoxaparin Injectable 40 milliGRAM(s) SubCutaneous every 24 hours  famotidine    Tablet 20 milliGRAM(s) Oral daily  lacosamide 100 milliGRAM(s) Oral two times a day  levETIRAcetam  Solution 500 milliGRAM(s) Oral two times a day  silver sulfADIAZINE 1% Cream 1 Application(s) Topical daily  tiotropium 18 MICROgram(s) Capsule 1 Capsule(s) Inhalation daily  venlafaxine 12.5 milliGRAM(s) Oral two times a day with meals    MEDICATIONS  (PRN):  ALBUTerol    90 MICROgram(s) HFA Inhaler 2 Puff(s) Inhalation every 4 hours PRN Bronchospasm  haloperidol    Injectable 3 milliGRAM(s) IntraMuscular every 6 hours PRN agitation  hydrALAZINE Injectable 10 milliGRAM(s) IV Push every 6 hours PRN SBP > 160  ondansetron Injectable 4 milliGRAM(s) IV Push every 8 hours PRN Nausea and/or Vomiting                    Hospital Course:	      57 y/o male with hx of TBI, nonverbal at baseline admitted for GJ tube Malfunction. 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 Patient pulled out GJ tube yesterday, which was then replaced with a tavares catheter.     GJ tube malfunction.  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  Placement of JT extension.  - resumed TF.    mild R ptosis - resolved.  - HCT:  negative.  - B12 level.  - ASA + statin for now.  - Neurology input appreciated, will monitor for now and defer further imaging.    communication.  - 2W RN.    - Neurology.  - 03/06, 03/07, 03/08, 03/09 sister at bedside.  updated and questions answered.    Possible seizure noted on 3/12  -1mg ativan given  -unclear if patient actually had seizure nor not, very difficult to assess given non verbal  and contracted state.   -currently back to baseline  -keppra and vimpat serum blood levels ordered.          patient medically cleared for discharge. tolerating tube feeds. Family appealed discharge bc they dont like facilty. other options were provided.  waiting for results of appeal.

## 2022-03-14 NOTE — PROGRESS NOTE ADULT - PROVIDER SPECIALTY LIST ADULT
Gastroenterology
Hospitalist
Neurology
Gastroenterology
Hospitalist
Neurology
Hospitalist
Neurology
Hospitalist

## 2022-03-14 NOTE — PROGRESS NOTE ADULT - REASON FOR ADMISSION
Clogged PEG tube

## 2022-03-14 NOTE — PROGRESS NOTE ADULT - SUBJECTIVE AND OBJECTIVE BOX
HPI:  55 y/o man with PMH diffuse traumatic brain injury, intracerebral hemorrhage, dysphagia, aphasia, non-verbal at baseline, chronic respiratory failure with hypercapnia asthma, PEG tube, seizures, GERD, MDD, hemorrhagic otitis externa (left ear) presents for a clogged G tube. Yesterday as per nurse had change in MS, not responsive, eyes rolled back and forth, leg twitched Treated for possible seizure ativan 0.5 mg x 2 IVP. no further events.    MEDICATIONS  (STANDING):  aspirin  chewable 81 milliGRAM(s) Oral daily  baclofen 10 milliGRAM(s) Oral two times a day  enoxaparin Injectable 40 milliGRAM(s) SubCutaneous every 24 hours  famotidine    Tablet 20 milliGRAM(s) Oral daily  lacosamide 100 milliGRAM(s) Oral two times a day  levETIRAcetam  Solution 500 milliGRAM(s) Oral two times a day  silver sulfADIAZINE 1% Cream 1 Application(s) Topical daily  tiotropium 18 MICROgram(s) Capsule 1 Capsule(s) Inhalation daily  venlafaxine 12.5 milliGRAM(s) Oral two times a day with meals    MEDICATIONS  (PRN):  ALBUTerol    90 MICROgram(s) HFA Inhaler 2 Puff(s) Inhalation every 4 hours PRN Bronchospasm  haloperidol    Injectable 3 milliGRAM(s) IntraMuscular every 6 hours PRN agitation  hydrALAZINE Injectable 10 milliGRAM(s) IV Push every 6 hours PRN SBP > 160  ondansetron Injectable 4 milliGRAM(s) IV Push every 8 hours PRN Nausea and/or Vomiting    Vital Signs Last 24 Hrs  T(C): 36.4 (14 Mar 2022 15:28), Max: 37.2 (13 Mar 2022 23:03)  T(F): 97.5 (14 Mar 2022 15:28), Max: 99 (13 Mar 2022 23:03)  HR: 76 (14 Mar 2022 15:28) (76 - 81)  BP: 104/60 (14 Mar 2022 15:28) (99/64 - 111/65)  BP(mean): 70 (14 Mar 2022 15:28) (70 - 70)  RR: 18 (14 Mar 2022 15:28) (18 - 18)  SpO2: 99% (14 Mar 2022 15:28) (94% - 99%)  Neurological Examination:  MS: awake, non verbal, doesnt follow commands   CN: Blinks to threat bilat, face grimaces bilat  Motor: contracted RUSE, moves LUE spont, no spont movements of LEs  Sens: slight withdrawal to PP bilat    Reflexes: 0-1/4 all over, mute toes b/l  Coord:  cannot test   Gait: Cannot test        < from: CT Head No Cont (03.07.22 @ 17:02) >    IMPRESSION:  Mild to moderate chronic microvascular changes without   evidence of an acute transcortical infarction or hemorrhage. Moderate   bilateral frontal lobe and bilateral cerebellar hemisphere volume.    < end of copied text >      < from: EEG Awake or Drowsy (03.14.22 @ 12:00) >    IMPRESSION: Abnormal EEG because of slowing of the background activity,   excessive amount of theta range activity, consistent with a diffuse   cerebral dysfunction, maybe on the basis of a diffuse metabolic toxic or   structural body.  Intermittent theta range activity noted over the hemisphere, independent,    at times sharply contoured, which are potentially epileptogenic.  These findings can be seen in the multifocal epilepsies. Clinical   correlation recommended.    < end of copied text >

## 2022-03-16 LAB — LACOSAMIDE (VIMPAT) RESULT: 7.2 UG/ML — SIGNIFICANT CHANGE UP (ref 1–10)

## 2022-03-18 LAB — LEVETIRACETAM SERPL-MCNC: 17.7 UG/ML — SIGNIFICANT CHANGE UP (ref 10–40)

## 2022-05-26 PROBLEM — Z00.00 ENCOUNTER FOR PREVENTIVE HEALTH EXAMINATION: Status: ACTIVE | Noted: 2022-05-26

## 2022-08-09 ENCOUNTER — INPATIENT (INPATIENT)
Facility: HOSPITAL | Age: 57
LOS: 5 days | Discharge: NURSING, REHAB OR LONG-TERM CARE | DRG: 393 | End: 2022-08-15
Attending: STUDENT IN AN ORGANIZED HEALTH CARE EDUCATION/TRAINING PROGRAM | Admitting: STUDENT IN AN ORGANIZED HEALTH CARE EDUCATION/TRAINING PROGRAM
Payer: MEDICARE

## 2022-08-09 VITALS
SYSTOLIC BLOOD PRESSURE: 129 MMHG | HEIGHT: 67 IN | HEART RATE: 73 BPM | TEMPERATURE: 97 F | DIASTOLIC BLOOD PRESSURE: 70 MMHG | RESPIRATION RATE: 19 BRPM | WEIGHT: 98.77 LBS | OXYGEN SATURATION: 97 %

## 2022-08-09 DIAGNOSIS — Z93.1 GASTROSTOMY STATUS: Chronic | ICD-10-CM

## 2022-08-09 DIAGNOSIS — T85.698A OTHER MECHANICAL COMPLICATION OF OTHER SPECIFIED INTERNAL PROSTHETIC DEVICES, IMPLANTS AND GRAFTS, INITIAL ENCOUNTER: ICD-10-CM

## 2022-08-09 DIAGNOSIS — Z78.9 OTHER SPECIFIED HEALTH STATUS: Chronic | ICD-10-CM

## 2022-08-09 NOTE — PATIENT PROFILE ADULT - FUNCTIONAL ASSESSMENT - BASIC MOBILITY 6.
1-calculated by average/Not able to assess (calculate score using [facility] averaging method)

## 2022-08-09 NOTE — PATIENT PROFILE ADULT - FALL HARM RISK - RISK INTERVENTIONS

## 2022-08-10 ENCOUNTER — TRANSCRIPTION ENCOUNTER (OUTPATIENT)
Age: 57
End: 2022-08-10

## 2022-08-10 DIAGNOSIS — K94.23 GASTROSTOMY MALFUNCTION: ICD-10-CM

## 2022-08-10 DIAGNOSIS — Z29.9 ENCOUNTER FOR PROPHYLACTIC MEASURES, UNSPECIFIED: ICD-10-CM

## 2022-08-10 DIAGNOSIS — R56.9 UNSPECIFIED CONVULSIONS: ICD-10-CM

## 2022-08-10 DIAGNOSIS — L89.152 PRESSURE ULCER OF SACRAL REGION, STAGE 2: ICD-10-CM

## 2022-08-10 LAB
ALBUMIN SERPL ELPH-MCNC: 3.8 G/DL — SIGNIFICANT CHANGE UP (ref 3.3–5)
ALP SERPL-CCNC: 100 U/L — SIGNIFICANT CHANGE UP (ref 40–120)
ALT FLD-CCNC: 20 U/L — SIGNIFICANT CHANGE UP (ref 10–45)
ANION GAP SERPL CALC-SCNC: 16 MMOL/L — SIGNIFICANT CHANGE UP (ref 5–17)
APTT BLD: 27.6 SEC — SIGNIFICANT CHANGE UP (ref 27.5–35.5)
AST SERPL-CCNC: 17 U/L — SIGNIFICANT CHANGE UP (ref 10–40)
BASOPHILS # BLD AUTO: 0.02 K/UL — SIGNIFICANT CHANGE UP (ref 0–0.2)
BASOPHILS NFR BLD AUTO: 0.3 % — SIGNIFICANT CHANGE UP (ref 0–2)
BILIRUB SERPL-MCNC: 0.3 MG/DL — SIGNIFICANT CHANGE UP (ref 0.2–1.2)
BLD GP AB SCN SERPL QL: NEGATIVE — SIGNIFICANT CHANGE UP
BUN SERPL-MCNC: 14 MG/DL — SIGNIFICANT CHANGE UP (ref 7–23)
CALCIUM SERPL-MCNC: 9 MG/DL — SIGNIFICANT CHANGE UP (ref 8.4–10.5)
CHLORIDE SERPL-SCNC: 105 MMOL/L — SIGNIFICANT CHANGE UP (ref 96–108)
CO2 SERPL-SCNC: 23 MMOL/L — SIGNIFICANT CHANGE UP (ref 22–31)
CREAT SERPL-MCNC: <0.3 MG/DL — LOW (ref 0.5–1.3)
EGFR: 138 ML/MIN/1.73M2 — SIGNIFICANT CHANGE UP
EOSINOPHIL # BLD AUTO: 0.07 K/UL — SIGNIFICANT CHANGE UP (ref 0–0.5)
EOSINOPHIL NFR BLD AUTO: 1.1 % — SIGNIFICANT CHANGE UP (ref 0–6)
GLUCOSE SERPL-MCNC: 68 MG/DL — LOW (ref 70–99)
HCT VFR BLD CALC: 34.2 % — LOW (ref 39–50)
HGB BLD-MCNC: 11.3 G/DL — LOW (ref 13–17)
IMM GRANULOCYTES NFR BLD AUTO: 0.5 % — SIGNIFICANT CHANGE UP (ref 0–1.5)
INR BLD: 1.21 RATIO — HIGH (ref 0.88–1.16)
LYMPHOCYTES # BLD AUTO: 1.04 K/UL — SIGNIFICANT CHANGE UP (ref 1–3.3)
LYMPHOCYTES # BLD AUTO: 16.2 % — SIGNIFICANT CHANGE UP (ref 13–44)
MAGNESIUM SERPL-MCNC: 1.8 MG/DL — SIGNIFICANT CHANGE UP (ref 1.6–2.6)
MCHC RBC-ENTMCNC: 28.6 PG — SIGNIFICANT CHANGE UP (ref 27–34)
MCHC RBC-ENTMCNC: 33 GM/DL — SIGNIFICANT CHANGE UP (ref 32–36)
MCV RBC AUTO: 86.6 FL — SIGNIFICANT CHANGE UP (ref 80–100)
MONOCYTES # BLD AUTO: 0.42 K/UL — SIGNIFICANT CHANGE UP (ref 0–0.9)
MONOCYTES NFR BLD AUTO: 6.5 % — SIGNIFICANT CHANGE UP (ref 2–14)
NEUTROPHILS # BLD AUTO: 4.84 K/UL — SIGNIFICANT CHANGE UP (ref 1.8–7.4)
NEUTROPHILS NFR BLD AUTO: 75.4 % — SIGNIFICANT CHANGE UP (ref 43–77)
NRBC # BLD: 0 /100 WBCS — SIGNIFICANT CHANGE UP (ref 0–0)
PHOSPHATE SERPL-MCNC: 3.1 MG/DL — SIGNIFICANT CHANGE UP (ref 2.5–4.5)
PLATELET # BLD AUTO: 288 K/UL — SIGNIFICANT CHANGE UP (ref 150–400)
POTASSIUM SERPL-MCNC: 3.5 MMOL/L — SIGNIFICANT CHANGE UP (ref 3.5–5.3)
POTASSIUM SERPL-SCNC: 3.5 MMOL/L — SIGNIFICANT CHANGE UP (ref 3.5–5.3)
PROT SERPL-MCNC: 7.3 G/DL — SIGNIFICANT CHANGE UP (ref 6–8.3)
PROTHROM AB SERPL-ACNC: 14.1 SEC — HIGH (ref 10.5–13.4)
RBC # BLD: 3.95 M/UL — LOW (ref 4.2–5.8)
RBC # FLD: 13.4 % — SIGNIFICANT CHANGE UP (ref 10.3–14.5)
RH IG SCN BLD-IMP: POSITIVE — SIGNIFICANT CHANGE UP
SARS-COV-2 RNA SPEC QL NAA+PROBE: SIGNIFICANT CHANGE UP
SODIUM SERPL-SCNC: 144 MMOL/L — SIGNIFICANT CHANGE UP (ref 135–145)
WBC # BLD: 6.42 K/UL — SIGNIFICANT CHANGE UP (ref 3.8–10.5)
WBC # FLD AUTO: 6.42 K/UL — SIGNIFICANT CHANGE UP (ref 3.8–10.5)

## 2022-08-10 PROCEDURE — 12345: CPT | Mod: NC,GC

## 2022-08-10 PROCEDURE — 99221 1ST HOSP IP/OBS SF/LOW 40: CPT

## 2022-08-10 PROCEDURE — 74176 CT ABD & PELVIS W/O CONTRAST: CPT | Mod: 26

## 2022-08-10 PROCEDURE — 99223 1ST HOSP IP/OBS HIGH 75: CPT

## 2022-08-10 PROCEDURE — 74018 RADEX ABDOMEN 1 VIEW: CPT | Mod: 26

## 2022-08-10 PROCEDURE — 99221 1ST HOSP IP/OBS SF/LOW 40: CPT | Mod: GC

## 2022-08-10 RX ORDER — DEXTROSE 50 % IN WATER 50 %
50 SYRINGE (ML) INTRAVENOUS ONCE
Refills: 0 | Status: COMPLETED | OUTPATIENT
Start: 2022-08-10 | End: 2022-08-10

## 2022-08-10 RX ORDER — SODIUM CHLORIDE 9 MG/ML
1000 INJECTION, SOLUTION INTRAVENOUS
Refills: 0 | Status: DISCONTINUED | OUTPATIENT
Start: 2022-08-10 | End: 2022-08-10

## 2022-08-10 RX ORDER — ACETAMINOPHEN 500 MG
2 TABLET ORAL
Qty: 0 | Refills: 0 | DISCHARGE

## 2022-08-10 RX ORDER — LEVETIRACETAM 250 MG/1
500 TABLET, FILM COATED ORAL EVERY 12 HOURS
Refills: 0 | Status: DISCONTINUED | OUTPATIENT
Start: 2022-08-10 | End: 2022-08-12

## 2022-08-10 RX ORDER — LACOSAMIDE 50 MG/1
100 TABLET ORAL EVERY 12 HOURS
Refills: 0 | Status: DISCONTINUED | OUTPATIENT
Start: 2022-08-10 | End: 2022-08-12

## 2022-08-10 RX ORDER — MORPHINE SULFATE 50 MG/1
4 CAPSULE, EXTENDED RELEASE ORAL ONCE
Refills: 0 | Status: DISCONTINUED | OUTPATIENT
Start: 2022-08-10 | End: 2022-08-10

## 2022-08-10 RX ORDER — SODIUM CHLORIDE 9 MG/ML
1000 INJECTION, SOLUTION INTRAVENOUS
Refills: 0 | Status: DISCONTINUED | OUTPATIENT
Start: 2022-08-10 | End: 2022-08-11

## 2022-08-10 RX ORDER — ENOXAPARIN SODIUM 100 MG/ML
30 INJECTION SUBCUTANEOUS EVERY 24 HOURS
Refills: 0 | Status: DISCONTINUED | OUTPATIENT
Start: 2022-08-10 | End: 2022-08-10

## 2022-08-10 RX ADMIN — MORPHINE SULFATE 4 MILLIGRAM(S): 50 CAPSULE, EXTENDED RELEASE ORAL at 19:12

## 2022-08-10 RX ADMIN — LEVETIRACETAM 400 MILLIGRAM(S): 250 TABLET, FILM COATED ORAL at 03:10

## 2022-08-10 RX ADMIN — Medication 50 MILLILITER(S): at 03:33

## 2022-08-10 RX ADMIN — LACOSAMIDE 120 MILLIGRAM(S): 50 TABLET ORAL at 16:12

## 2022-08-10 RX ADMIN — ENOXAPARIN SODIUM 30 MILLIGRAM(S): 100 INJECTION SUBCUTANEOUS at 03:34

## 2022-08-10 RX ADMIN — LACOSAMIDE 120 MILLIGRAM(S): 50 TABLET ORAL at 03:48

## 2022-08-10 RX ADMIN — SODIUM CHLORIDE 75 MILLILITER(S): 9 INJECTION, SOLUTION INTRAVENOUS at 03:09

## 2022-08-10 RX ADMIN — MORPHINE SULFATE 4 MILLIGRAM(S): 50 CAPSULE, EXTENDED RELEASE ORAL at 18:42

## 2022-08-10 RX ADMIN — LEVETIRACETAM 400 MILLIGRAM(S): 250 TABLET, FILM COATED ORAL at 14:22

## 2022-08-10 RX ADMIN — SODIUM CHLORIDE 75 MILLILITER(S): 9 INJECTION, SOLUTION INTRAVENOUS at 14:17

## 2022-08-10 NOTE — DISCHARGE NOTE PROVIDER - CARE PROVIDER_API CALL
[facility]  Interventional Radiology   [facility]    Routine exchange of GJ tube every 3-4 months.  Phone: [phone]  Fax: (   )    -  Follow Up Time:    [facility]  Interventional Radiology   [facility]    Routine exchange of GJ tube every 3-4 months.  Phone: [phone]  Fax: (   )    -  Scheduled Appointment: 09/01/2022 01:00 PM

## 2022-08-10 NOTE — CONSULT NOTE ADULT - ASSESSMENT
Interventional Radiology    Evaluate for Procedure: G tube replacement.     HPI: 56y Male with leaking G tube/ dislodgement. G tube placed in 2/2022 by GI. IR consulted for G tube replacement.     Allergies: Ativan (Unknown)  phenytoin (Unknown)  Valproate Sodium (Other (Severe))    Medications (Abx/Cardiac/Anticoagulation/Blood Products)    enoxaparin Injectable: 30 milliGRAM(s) SubCutaneous (08-10 @ 03:34)    Data:  170.2  44.8  T(C): 36.6  HR: 81  BP: 117/67  RR: 18  SpO2: 97%    -WBC 6.42 / HgB 11.3 / Hct 34.2 / Plt 288  -Na 144 / Cl 105 / BUN 14 / Glucose 68  -K 3.5 / CO2 23 / Cr <0.30  -ALT 20 / Alk Phos 100 / T.Bili 0.3  -INR 1.21 / PTT 27.6    Radiology:     Assessment/Plan: 56y Male with leaking G tube/ dislodgement. G tube placed in 2/2022 by GI. IR consulted for G tube replacement.      - Will plan for exchange on 8/10.   - Place order under [facility].   - NEED COVID TEST WITHIN  5 DAYS OF PLANNED PROCEDURE.     Interventional Radiology    Evaluate for Procedure: GJ tube replacement.     HPI: 56y Male with leaking GJ tube/ dislodgement. G tube placed in 2/2022 by GI. IR consulted for G tube replacement.     Allergies: Ativan (Unknown)  phenytoin (Unknown)  Valproate Sodium (Other (Severe))    Medications (Abx/Cardiac/Anticoagulation/Blood Products)    enoxaparin Injectable: 30 milliGRAM(s) SubCutaneous (08-10 @ 03:34)    Data:  170.2  44.8  T(C): 36.6  HR: 81  BP: 117/67  RR: 18  SpO2: 97%    -WBC 6.42 / HgB 11.3 / Hct 34.2 / Plt 288  -Na 144 / Cl 105 / BUN 14 / Glucose 68  -K 3.5 / CO2 23 / Cr <0.30  -ALT 20 / Alk Phos 100 / T.Bili 0.3  -INR 1.21 / PTT 27.6    Radiology:     Assessment/Plan: 56y Male with leaking G tube/ dislodgement. G tube placed in 2/2022 by GI. IR consulted for GJ tube replacement. S/p Exchange at [facility] 2 weeks ago.  Per sister, GJ tube needs to be upsized.     - Will plan for exchange on 8/10.   - Place order under [facility].   - NEED COVID TEST WITHIN  5 DAYS OF PLANNED PROCEDURE.

## 2022-08-10 NOTE — DISCHARGE NOTE PROVIDER - DISCHARGING ATTENDING PHYSICIAN:
Patient ID: Sherif is a 72 year old male.    Chief Complaint   Patient presents with   • Follow-up     discuss labs     HPI:    Sherif's last visit was 12/2/20. Sherif with ischemic heart disease and acute on chronic systolic heart failure, bladder cancer s/p treatment, hypertension.    Here to discuss A1c of 6.4.    He had Medicare Wellness in December.    There is diabetes in the family.    Used to go to gym.  During pandemic not.    No stationary bike or treadmill.      Patient's medications, allergies, past medical, surgical, social and family histories were reviewed and updated as appropriate.    Social History     Tobacco Use   • Smoking status: Never Smoker   • Smokeless tobacco: Never Used   Substance Use Topics   • Alcohol use: No     Frequency: Never   • Drug use: No     Social History     Social History Narrative    .    He is from [place] originally.    6 grandchildren (eldest granddaughter is 23). 2 sons (41 and 39).    Worked in [employer].     Spouse, Lorraine, working in housekeeping at [employer].     Physical:  Visit Vitals  /82 (BP Location: LUE - Left upper extremity, Patient Position: Sitting, Cuff Size: Regular)   Pulse 60   Temp 97.1 °F (36.2 °C) (Temporal)   Ht 5' 5.5\" (1.664 m)   Wt 79.5 kg (175 lb 6 oz)   BMI 28.74 kg/m²     Physical Exam   Constitutional: He appears well-developed.   Cardiovascular: Normal rate and regular rhythm.   Pulmonary/Chest: Effort normal and breath sounds normal.   Psychiatric: He has a normal mood and affect.   Nursing note and vitals reviewed.    Assessment & Plan:  Sherif is a 72 year old with above noted PMH.  Prediabetes discussed, provided nutrition consult.  Advised metformin 500 mg daily, but Sherif declines and would like repeat test in 6 months.  Advised regular exercise.    Sherif's bladder cancer and cardiac follow-up is up to date.  These disease states are stable.    F/u in 6 months    CARRIE Pavon MD    
Fabiola Arnett

## 2022-08-10 NOTE — DIETITIAN INITIAL EVALUATION ADULT - REASON FOR ADMISSION
55yo Male with PMH of TBI, HTN, DM, Polio, Seizure, paraplegia, chronic respiratory failure, GJ tube. Pt presented to [facility] on 8/9 with J-tube malfunction; pt transferred to [facility]  for replacement of leaky tube.

## 2022-08-10 NOTE — PROGRESS NOTE ADULT - PROBLEM SELECTOR PLAN 1
Transferred for leaking peg tube. IR will replace tube on 8/11. Diet: per prior admission: pt on Jevity 1.5 continuous feeds 50 cc/h with 35cc/h free water flush, with no carb prosource TF three times a day. MVI with minerals, Vitamin C 500 mg BID.   - NPO, holding PO meds for now  - preprocedure labs, CBC, CMP/Mg/Phos, coags, covid, T+S  - D5LR @75cc/h x12 hr

## 2022-08-10 NOTE — DIETITIAN INITIAL EVALUATION ADULT - ORAL INTAKE PTA/DIET HISTORY
Pt note on Jevity 1.5 @ 50mL/hr x 20hrs with No Carb Prosource TF TID via J-tube.   -Goal rate provides: 1000mL, 1500kcal/day, 63.8g protein/day   -Supplements includes provide: 1620kcal/day, 96g protein/day

## 2022-08-10 NOTE — DISCHARGE NOTE PROVIDER - NSDCFUADDINST_GEN_ALL_CORE_FT
Diet per NJ tube.  Please flush the PEG tube daily to prevent clogging. Apply silvadene to the PEG insertion sites and keep area clean and dry.

## 2022-08-10 NOTE — CONSULT NOTE ADULT - SUBJECTIVE AND OBJECTIVE BOX
Interventional Radiology    Evaluate for Procedure: GJ tube replacement.     HPI: 56y Male with leaking GJ tube/ dislodgement. G tube placed in 2/2022 by GI. IR consulted for G tube replacement.     Allergies: Ativan (Unknown)  phenytoin (Unknown)  Valproate Sodium (Other (Severe))    Medications (Abx/Cardiac/Anticoagulation/Blood Products)    enoxaparin Injectable: 30 milliGRAM(s) SubCutaneous (08-10 @ 03:34)    Data:  170.2  44.8  T(C): 36.6  HR: 81  BP: 117/67  RR: 18  SpO2: 97%    -WBC 6.42 / HgB 11.3 / Hct 34.2 / Plt 288  -Na 144 / Cl 105 / BUN 14 / Glucose 68  -K 3.5 / CO2 23 / Cr <0.30  -ALT 20 / Alk Phos 100 / T.Bili 0.3  -INR 1.21 / PTT 27.6      Assessment/Plan: 56y Male with leaking G tube/ dislodgement. G tube placed in 2/2022 by GI. IR consulted for GJ tube replacement. S/p Exchange at [facility] 2 weeks ago.  Per sister, GJ tube needs to be upsized.     - Will plan for exchange on 8/10.   - Place order under [facility].   - NEED COVID TEST WITHIN  5 DAYS OF PLANNED PROCEDURE.

## 2022-08-10 NOTE — DISCHARGE NOTE PROVIDER - HOSPITAL COURSE
56M with hx of TBI with resultant paraplegia (~1987), seizures, chronic respiratory failure s/p trach decannulated, nonverbal at baseline, pulmonary fibrosis, recurrent aspiration pneumonia, dysphagia s/p peg tube placement, DM2, GERD, HTN transferred from [facility] for peg tube replacement. Per chart review, patient is a resident of [facility] and was BIBEMS to [facility] on 8/9 for J-tube malfunction, where staff noticed bilious drainage around J tube site. However would require IR placement and patient was transferred to [facility] for further care. Patient with prior hospitalization in 2/2022 with aspiration pneumonia, requiring ICU admission. Hospital course c/b PEG tube leaking bilious fluid, removed. Found to have a small pneumoperitoneum and duodenal perforation, no acute surgical intervention, thought to be PEG tube manipulation. PEG tube replaced on 2/7/22 with GJ tube, with closure of large gastrocutaneous fistulous tract around G tube. ED course otherwise unremarkable. Pt was admitted for exchange of GJ tube.      IR consulted to replace GJ tube. Pt NPO in anticipation of procedure; no needs by tube. In patient medications were held for procedure. Transitioned seizure medication (vimpat, keppra) to IV formulation. Provided D5LR fluids. Pt was noted to be too combative to replace tube and will require anesthesia for IR placement.     56M with hx of TBI with resultant paraplegia (~1987), seizures, chronic respiratory failure s/p trach decannulated, nonverbal at baseline, pulmonary fibrosis, recurrent aspiration pneumonia, dysphagia s/p peg tube placement, DM2, GERD, HTN transferred from [facility] for peg tube replacement. Per chart review, patient is a resident of [facility] and was BIBEMS to [facility] on 8/9 for J-tube malfunction, where staff noticed bilious drainage around J tube site. However would require IR placement and patient was transferred to [facility] for further care. Patient with prior hospitalization in 2/2022 with aspiration pneumonia, requiring ICU admission. Hospital course c/b PEG tube leaking bilious fluid, removed. Found to have a small pneumoperitoneum and duodenal perforation, no acute surgical intervention, thought to be PEG tube manipulation. PEG tube replaced on 2/7/22 with GJ tube, with closure of large gastrocutaneous fistulous tract around G tube. ED course otherwise unremarkable. Pt was admitted for exchange of GJ tube.     IR consulted to replace GJ tube. Pt NPO in anticipation of procedure; no needs by tube. In patient medications were held for procedure. Transitioned seizure medication (vimpat, keppra) to IV formulation. Provided D5LR fluids. Pt was noted to be too combative to replace tube and will require anesthesia for IR placement. GJ tube exchanged in OR w/ anestheis under direction of IR team; no complications. After 1 day monitoring, feeds & meds r/s. Pt is stable clinically on day of d/c and will be sent to [facility] on d/c.    56M with hx of TBI with resultant paraplegia (~1987), seizures, chronic respiratory failure s/p trach decannulated, nonverbal at baseline, pulmonary fibrosis, recurrent aspiration pneumonia, dysphagia s/p peg tube placement, DM2, GERD, HTN transferred from [facility] for peg tube replacement. Per chart review, patient is a resident of [facility] and was BIBEMS to [facility] on 8/9 for J-tube malfunction, where staff noticed bilious drainage around J tube site. However would require IR placement and patient was transferred to [facility] for further care. Patient with prior hospitalization in 2/2022 with aspiration pneumonia, requiring ICU admission. Hospital course c/b PEG tube leaking bilious fluid, removed. Found to have a small pneumoperitoneum and duodenal perforation, no acute surgical intervention, thought to be PEG tube manipulation. PEG tube replaced on 2/7/22 with GJ tube, with closure of large gastrocutaneous fistulous tract around G tube. ED course otherwise unremarkable. Pt was admitted for exchange of GJ tube.     IR consulted to replace GJ tube. Pt NPO in anticipation of procedure; no needs by tube. In patient medications were held for procedure. Transitioned seizure medication (vimpat, keppra) to IV formulation. Provided D5LR fluids. Pt was noted to be too combative to replace tube and will require anesthesia for IR placement. GJ tube exchanged in OR w/ anestheis under direction of IR team; no complications. After 1 day monitoring, feeds & meds r/s. Per IR, discharge after procedure was to be expected and pt will f/u with PO IR for replacement for exchange of GJ tube in 3 weeks. Pt is stable clinically on day of d/c and will be sent to [facility] on d/c.    56M with hx of TBI with resultant paraplegia (~1987), seizures, chronic respiratory failure s/p trach decannulated, nonverbal at baseline, pulmonary fibrosis, recurrent aspiration pneumonia, dysphagia s/p peg tube placement, DM2, GERD, HTN transferred from [facility] for peg tube replacement. Per chart review, patient is a resident of [facility] and was BIBEMS to [facility] on 8/9 for J-tube malfunction, where staff noticed bilious drainage around J tube site. However would require IR placement and patient was transferred to [facility] for further care. Patient with prior hospitalization in 2/2022 with aspiration pneumonia, requiring ICU admission. Hospital course c/b PEG tube leaking bilious fluid, removed. Found to have a small pneumoperitoneum and duodenal perforation, no acute surgical intervention, thought to be PEG tube manipulation. PEG tube replaced on 2/7/22 with GJ tube, with closure of large gastrocutaneous fistulous tract around G tube. ED course otherwise unremarkable. Pt was admitted for exchange of GJ tube.     IR consulted to replace GJ tube. Pt NPO in anticipation of procedure; no needs by tube. In patient medications were held for procedure. Transitioned seizure medication (vimpat, keppra) to IV formulation. Provided D5LR fluids. Pt was noted to be too combative to replace tube and will require anesthesia for IR placement. GJ tube exchanged in OR w/ anestheis under direction of IR team; no complications. After 1 day monitoring, feeds & meds r/s. Per IR, discharge after procedure was to be expected and pt will f/u with outpatient IR for replacement for exchange of GJ tube in 3 weeks. Pt is stable clinically on day of d/c and will be sent to [facility] on d/c.    56M with hx of TBI with resultant paraplegia (~1987), seizures, chronic respiratory failure s/p trach decannulated, nonverbal at baseline, pulmonary fibrosis, recurrent aspiration pneumonia, dysphagia s/p peg tube placement, DM2, GERD, HTN transferred from [facility] for peg tube replacement. Per chart review, patient is a resident of [facility] and was BIBEMS to [facility] on 8/9 for J-tube malfunction, where staff noticed bilious drainage around J tube site. However would require IR placement and patient was transferred to [facility] for further care. Patient with prior hospitalization in 2/2022 with aspiration pneumonia, requiring ICU admission. Hospital course c/b PEG tube leaking bilious fluid, removed. Found to have a small pneumoperitoneum and duodenal perforation, no acute surgical intervention, thought to be PEG tube manipulation. PEG tube replaced on 2/7/22 with GJ tube, with closure of large gastrocutaneous fistulous tract around G tube. ED course otherwise unremarkable. Pt was admitted for exchange of GJ tube.     IR consulted to replace GJ tube. Pt NPO in anticipation of procedure; no needs by tube. In patient medications were held for procedure. Transitioned seizure medication (vimpat, keppra) to IV formulation. Provided D5LR fluids. Pt was noted to be too combative to replace tube and will require anesthesia for IR placement. GJ tube exchanged in OR w/ anestheis under direction of IR team; no complications. After 1 day monitoring, feeds & meds r/s. Per IR, discharge after procedure was to be expected and pt will f/u with outpatient IR for replacement for exchange of GJ tube in 3 weeks. It is expected to have drainage from insertion sites in the beginning along with the granulation and healing process per IR, which should subside eventually. Pt is stable clinically on day of d/c and will be sent to [facility] on d/c.

## 2022-08-10 NOTE — CONSULT NOTE ADULT - SUBJECTIVE AND OBJECTIVE BOX
Chief Complaint:  Patient is a 56y old  Male who presents with a chief complaint of displaced PEG tube (10 Aug 2022 00:58)      HPI:    Allergies:  Ativan (Unknown)  phenytoin (Unknown)  Valproate Sodium (Other (Severe))      Home Medications:    Hospital Medications:  dextrose 5% + lactated ringers. 1000 milliLiter(s) IV Continuous <Continuous>  enoxaparin Injectable 30 milliGRAM(s) SubCutaneous every 24 hours  lacosamide IVPB 100 milliGRAM(s) IV Intermittent every 12 hours  levETIRAcetam  IVPB 500 milliGRAM(s) IV Intermittent every 12 hours      PMHX/PSHX:  TBI (traumatic brain injury)    Seizure    Polio    H/O paraplegia    Pneumonia    H/O chronic respiratory failure    Diabetes mellitus    Hypertension    No significant past surgical history    S/P percutaneous endoscopic gastrostomy (PEG) tube placement        Family history:  No pertinent family history in first degree relatives        Social History:     ROS:     General:  No weight loss, fevers, chills, night sweats, fatigue  Eyes:  No vision changes, no yellowing of eyes   ENT:  No throat pain, runny nose  CV:  No chest pain, palpitations  Resp:  No SOB, cough, wheezing  GI:  See HPI  :  No burning with urination, no hematuria   Muscle:  No muscle pain, weakness  Neuro:  No numbness/tingling, memory problems  Psych:  No fatigue, insomnia, mood problems  Heme:  No easy bruisability  Skin:  No rash, itching       PHYSICAL EXAM:     GENERAL:  Appears stated age, well-groomed, well-nourished, no distress  HEENT:  NC/AT,  conjunctivae clear and pink,  no JVD  CHEST:  Full & symmetric excursion, no increased effort, breath sounds clear  HEART:  Regular rhythm, S1, S2, no murmur/rub/S3/S4, no abdominal bruit, no edema  ABDOMEN:  Soft, non-tender, non-distended, normoactive bowel sounds,  no masses ,  EXTREMITIES:  no cyanosis,clubbing or edema  SKIN:  No rash/erythema/ecchymoses/petechiae/wounds/abscess/warm/dry  NEURO:  Alert, oriented    Vital Signs:  Vital Signs Last 24 Hrs  T(C): 36.6 (10 Aug 2022 04:22), Max: 36.6 (10 Aug 2022 04:22)  T(F): 97.9 (10 Aug 2022 04:22), Max: 97.9 (10 Aug 2022 04:22)  HR: 81 (10 Aug 2022 04:22) (73 - 81)  BP: 117/67 (10 Aug 2022 04:22) (117/67 - 129/70)  BP(mean): --  RR: 18 (10 Aug 2022 04:22) (18 - 19)  SpO2: 97% (10 Aug 2022 04:22) (97% - 97%)    Parameters below as of 10 Aug 2022 04:22  Patient On (Oxygen Delivery Method): room air      Daily Height in cm: 170.18 (09 Aug 2022 20:42)    Daily     LABS:                        11.3   6.42  )-----------( 288      ( 10 Aug 2022 01:46 )             34.2     08-10    144  |  105  |  14  ----------------------------<  68<L>  3.5   |  23  |  <0.30<L>    Ca    9.0      10 Aug 2022 01:46  Phos  3.1     08-10  Mg     1.8     08-10    TPro  7.3  /  Alb  3.8  /  TBili  0.3  /  DBili  x   /  AST  17  /  ALT  20  /  AlkPhos  100  08-10    LIVER FUNCTIONS - ( 10 Aug 2022 01:46 )  Alb: 3.8 g/dL / Pro: 7.3 g/dL / ALK PHOS: 100 U/L / ALT: 20 U/L / AST: 17 U/L / GGT: x           PT/INR - ( 10 Aug 2022 04:19 )   PT: 14.1 sec;   INR: 1.21 ratio         PTT - ( 10 Aug 2022 04:19 )  PTT:27.6 sec        Imaging:             Chief Complaint:  Patient is a 56y old  Male who presents with a chief complaint of displaced PEG tube (10 Aug 2022 00:58)      HPI: 56M with hx of TBI with resultant paraplegia (~1987), seizures, chronic respiratory failure s/p trach decannulated, nonverbal at baseline, pulmonary fibrosis, recurrent aspiration pneumonia, dysphagia s/p peg tube placement, DM2, GERD, HTN transferred from [facility] for GJ eval. Contacted by transfer center yesterday reporting no advanced GI at [place] or [facility] to evaluate patient for replacement of GJ tube and therefore transferred to [facility]. Patient is a resident of [facility] and was BIBEMS to [facility] on 8/9 for J-tube malfunction, where staff noticed bilious drainage around J tube site.     Chart review - patient has 2 MRNs  Patient first had PEG placed at [facility]. Admitted to [facility] 2/2022 with aspiration pna requiring ICU admission. Also noted to have PEG tube leaking bilious fluid. Originally balloon was deflated and repositioned however on CT found to have a small pneumoperitoneum and duodenal perforation and PEG was removed. Underwent EGD on 2/18 with xtack suturing of large gastro-cutaneous fistula and placement of PEG with J tube extension. Patient rehospitalized 3/2/2022 with GJ malfunction and then patient ripped out GJ tube entirely. Ahuja placed to keep track open. Underwent EGD 3/6 with GJ replaced, clipped to jejunum and suturing of fistula around G tube. Repeat EGD 3/9 for replacement of clogged J tube.         Allergies:  Ativan (Unknown)  phenytoin (Unknown)  Valproate Sodium (Other (Severe))      Home Medications:    Hospital Medications:  dextrose 5% + lactated ringers. 1000 milliLiter(s) IV Continuous <Continuous>  enoxaparin Injectable 30 milliGRAM(s) SubCutaneous every 24 hours  lacosamide IVPB 100 milliGRAM(s) IV Intermittent every 12 hours  levETIRAcetam  IVPB 500 milliGRAM(s) IV Intermittent every 12 hours      PMHX/PSHX:  TBI (traumatic brain injury)    Seizure    Polio    H/O paraplegia    Pneumonia    H/O chronic respiratory failure    Diabetes mellitus    Hypertension    No significant past surgical history    S/P percutaneous endoscopic gastrostomy (PEG) tube placement        Family history:  No pertinent family history in first degree relatives        Social History:     ROS:     General:  No weight loss, fevers, chills, night sweats, fatigue  Eyes:  No vision changes, no yellowing of eyes   ENT:  No throat pain, runny nose  CV:  No chest pain, palpitations  Resp:  No SOB, cough, wheezing  GI:  See HPI  :  No burning with urination, no hematuria   Muscle:  No muscle pain, weakness  Neuro:  No numbness/tingling, memory problems  Psych:  No fatigue, insomnia, mood problems  Heme:  No easy bruisability  Skin:  No rash, itching       PHYSICAL EXAM:     GENERAL:  Appears stated age, well-groomed, well-nourished, no distress  HEENT:  NC/AT,  conjunctivae clear and pink,  no JVD  CHEST:  Full & symmetric excursion, no increased effort, breath sounds clear  HEART:  Regular rhythm, S1, S2, no murmur/rub/S3/S4, no abdominal bruit, no edema  ABDOMEN:  Soft, non-tender, non-distended, normoactive bowel sounds,  no masses ,  EXTREMITIES:  no cyanosis,clubbing or edema  SKIN:  No rash/erythema/ecchymoses/petechiae/wounds/abscess/warm/dry  NEURO:  Alert, oriented    Vital Signs:  Vital Signs Last 24 Hrs  T(C): 36.6 (10 Aug 2022 04:22), Max: 36.6 (10 Aug 2022 04:22)  T(F): 97.9 (10 Aug 2022 04:22), Max: 97.9 (10 Aug 2022 04:22)  HR: 81 (10 Aug 2022 04:22) (73 - 81)  BP: 117/67 (10 Aug 2022 04:22) (117/67 - 129/70)  BP(mean): --  RR: 18 (10 Aug 2022 04:22) (18 - 19)  SpO2: 97% (10 Aug 2022 04:22) (97% - 97%)    Parameters below as of 10 Aug 2022 04:22  Patient On (Oxygen Delivery Method): room air      Daily Height in cm: 170.18 (09 Aug 2022 20:42)    Daily     LABS:                        11.3   6.42  )-----------( 288      ( 10 Aug 2022 01:46 )             34.2     08-10    144  |  105  |  14  ----------------------------<  68<L>  3.5   |  23  |  <0.30<L>    Ca    9.0      10 Aug 2022 01:46  Phos  3.1     08-10  Mg     1.8     08-10    TPro  7.3  /  Alb  3.8  /  TBili  0.3  /  DBili  x   /  AST  17  /  ALT  20  /  AlkPhos  100  08-10    LIVER FUNCTIONS - ( 10 Aug 2022 01:46 )  Alb: 3.8 g/dL / Pro: 7.3 g/dL / ALK PHOS: 100 U/L / ALT: 20 U/L / AST: 17 U/L / GGT: x           PT/INR - ( 10 Aug 2022 04:19 )   PT: 14.1 sec;   INR: 1.21 ratio         PTT - ( 10 Aug 2022 04:19 )  PTT:27.6 sec        Imaging:             Chief Complaint:  Patient is a 56y old  Male who presents with a chief complaint of displaced PEG tube (10 Aug 2022 00:58)      HPI: 56M with hx of TBI with resultant paraplegia (~1987), seizures, chronic respiratory failure s/p trach decannulated, nonverbal at baseline, pulmonary fibrosis, recurrent aspiration pneumonia, dysphagia s/p peg tube placement, DM2, GERD, HTN transferred from [facility] for GJ eval. Contacted by transfer center yesterday reporting no advanced GI at [place] or [place] to evaluate patient for replacement of GJ tube and therefore transferred to [facility]. Patient is a resident of [facility] and was BIBEMS to [facility] on 8/9 for J-tube malfunction, where staff noticed bilious drainage around GJ tube site.     Chart review - patient has 2 MRNs  Patient first had PEG placed at [facility]. Admitted to [facility] 2/2022 with aspiration pna requiring ICU admission. Also noted to have PEG tube leaking bilious fluid. Originally balloon was deflated and repositioned however on CT found to have a small pneumoperitoneum and duodenal perforation and PEG was removed. Underwent EGD on 2/18 with xtack suturing of large gastro-cutaneous fistula and placement of PEG with J tube extension. Patient rehospitalized 3/2/2022 with GJ malfunction and then patient ripped out GJ tube entirely. Ahuja placed to keep track open. Underwent EGD 3/6 with GJ replaced, clipped to jejunum and suturing of fistula around G tube. Repeat EGD 3/9 for replacement of clogged J tube.         Allergies:  Ativan (Unknown)  phenytoin (Unknown)  Valproate Sodium (Other (Severe))      Home Medications:    Hospital Medications:  dextrose 5% + lactated ringers. 1000 milliLiter(s) IV Continuous <Continuous>  enoxaparin Injectable 30 milliGRAM(s) SubCutaneous every 24 hours  lacosamide IVPB 100 milliGRAM(s) IV Intermittent every 12 hours  levETIRAcetam  IVPB 500 milliGRAM(s) IV Intermittent every 12 hours      PMHX/PSHX:  TBI (traumatic brain injury)    Seizure    Polio    H/O paraplegia    Pneumonia    H/O chronic respiratory failure    Diabetes mellitus    Hypertension    No significant past surgical history    S/P percutaneous endoscopic gastrostomy (PEG) tube placement        Family history:  No pertinent family history in first degree relatives        Social History:     ROS: unable to obtain      PHYSICAL EXAM:     GENERAL: chronically ill appearing  HEENT:  NC/AT,  conjunctivae clear and pink  CHEST:  Full & symmetric excursion, no increased effort  HEART:  Regular rhythm, rate  ABDOMEN:  Soft, non-tender, non-distended, GJ loosely in place, erythema on right abdominal wall  EXTREMITIES:  no cyanosis,clubbing or edema  SKIN:  abdominal wall erythema as above  NEURO:  Alert, orientedx0    Vital Signs:  Vital Signs Last 24 Hrs  T(C): 36.6 (10 Aug 2022 04:22), Max: 36.6 (10 Aug 2022 04:22)  T(F): 97.9 (10 Aug 2022 04:22), Max: 97.9 (10 Aug 2022 04:22)  HR: 81 (10 Aug 2022 04:22) (73 - 81)  BP: 117/67 (10 Aug 2022 04:22) (117/67 - 129/70)  BP(mean): --  RR: 18 (10 Aug 2022 04:22) (18 - 19)  SpO2: 97% (10 Aug 2022 04:22) (97% - 97%)    Parameters below as of 10 Aug 2022 04:22  Patient On (Oxygen Delivery Method): room air      Daily Height in cm: 170.18 (09 Aug 2022 20:42)    Daily     LABS:                        11.3   6.42  )-----------( 288      ( 10 Aug 2022 01:46 )             34.2     08-10    144  |  105  |  14  ----------------------------<  68<L>  3.5   |  23  |  <0.30<L>    Ca    9.0      10 Aug 2022 01:46  Phos  3.1     08-10  Mg     1.8     08-10    TPro  7.3  /  Alb  3.8  /  TBili  0.3  /  DBili  x   /  AST  17  /  ALT  20  /  AlkPhos  100  08-10    LIVER FUNCTIONS - ( 10 Aug 2022 01:46 )  Alb: 3.8 g/dL / Pro: 7.3 g/dL / ALK PHOS: 100 U/L / ALT: 20 U/L / AST: 17 U/L / GGT: x           PT/INR - ( 10 Aug 2022 04:19 )   PT: 14.1 sec;   INR: 1.21 ratio         PTT - ( 10 Aug 2022 04:19 )  PTT:27.6 sec        Imaging:             Chief Complaint:  Patient is a 56y old  Male who presents with a chief complaint of displaced PEG tube (10 Aug 2022 00:58)      HPI: 56M with hx of TBI with resultant paraplegia (~1987), seizures, chronic respiratory failure s/p trach decannulated, nonverbal at baseline, pulmonary fibrosis, recurrent aspiration pneumonia, dysphagia s/p peg tube placement, DM2, GERD, HTN transferred from [facility] for GJ eval. Contacted by transfer center yesterday reporting no advanced GI at [place] or [place] to evaluate patient for replacement of GJ tube and therefore transferred to [facility]. Patient is a resident of [facility] and was BIBEMS to [facility] on 8/9 for J-tube malfunction, where staff noticed bilious drainage around GJ tube site.     Chart review - patient has 2 MRNs  Patient first had PEG placed at [facility]. Admitted to [place] 2/2022 with aspiration pna requiring ICU admission. Also noted to have PEG tube leaking bilious fluid. Originally balloon was deflated and repositioned however on CT found to have a small pneumoperitoneum and duodenal perforation and PEG was removed. Underwent EGD on 2/18 with xtack suturing of large gastro-cutaneous fistula and placement of PEG with J tube extension. Patient rehospitalized 3/2/2022 with GJ malfunction and then patient ripped out GJ tube entirely. Ahuja placed to keep track open. Underwent EGD 3/6 with GJ replaced, clipped to jejunum and suturing of fistula around G tube. Repeat EGD 3/9 for replacement of clogged J tube.         Allergies:  Ativan (Unknown)  phenytoin (Unknown)  Valproate Sodium (Other (Severe))      Home Medications:    Hospital Medications:  dextrose 5% + lactated ringers. 1000 milliLiter(s) IV Continuous <Continuous>  enoxaparin Injectable 30 milliGRAM(s) SubCutaneous every 24 hours  lacosamide IVPB 100 milliGRAM(s) IV Intermittent every 12 hours  levETIRAcetam  IVPB 500 milliGRAM(s) IV Intermittent every 12 hours      PMHX/PSHX:  TBI (traumatic brain injury)    Seizure    Polio    H/O paraplegia    Pneumonia    H/O chronic respiratory failure    Diabetes mellitus    Hypertension    No significant past surgical history    S/P percutaneous endoscopic gastrostomy (PEG) tube placement        Family history:  No pertinent family history in first degree relatives        Social History: Unable to obtain    ROS: unable to obtain      PHYSICAL EXAM:     GENERAL: chronically ill appearing  HEENT:  NC/AT,  conjunctivae clear and pink  CHEST:  Full & symmetric excursion, no increased effort  HEART:  Regular rhythm, rate  ABDOMEN:  Soft, non-tender, non-distended, GJ loosely in place, erythema on right abdominal wall  EXTREMITIES:  no cyanosis,clubbing or edema  SKIN:  abdominal wall erythema as above  NEURO:  Alert, orientedx0    Vital Signs:  Vital Signs Last 24 Hrs  T(C): 36.6 (10 Aug 2022 04:22), Max: 36.6 (10 Aug 2022 04:22)  T(F): 97.9 (10 Aug 2022 04:22), Max: 97.9 (10 Aug 2022 04:22)  HR: 81 (10 Aug 2022 04:22) (73 - 81)  BP: 117/67 (10 Aug 2022 04:22) (117/67 - 129/70)  BP(mean): --  RR: 18 (10 Aug 2022 04:22) (18 - 19)  SpO2: 97% (10 Aug 2022 04:22) (97% - 97%)    Parameters below as of 10 Aug 2022 04:22  Patient On (Oxygen Delivery Method): room air      Daily Height in cm: 170.18 (09 Aug 2022 20:42)    Daily     LABS:                        11.3   6.42  )-----------( 288      ( 10 Aug 2022 01:46 )             34.2     08-10    144  |  105  |  14  ----------------------------<  68<L>  3.5   |  23  |  <0.30<L>    Ca    9.0      10 Aug 2022 01:46  Phos  3.1     08-10  Mg     1.8     08-10    TPro  7.3  /  Alb  3.8  /  TBili  0.3  /  DBili  x   /  AST  17  /  ALT  20  /  AlkPhos  100  08-10    LIVER FUNCTIONS - ( 10 Aug 2022 01:46 )  Alb: 3.8 g/dL / Pro: 7.3 g/dL / ALK PHOS: 100 U/L / ALT: 20 U/L / AST: 17 U/L / GGT: x           PT/INR - ( 10 Aug 2022 04:19 )   PT: 14.1 sec;   INR: 1.21 ratio         PTT - ( 10 Aug 2022 04:19 )  PTT:27.6 sec

## 2022-08-10 NOTE — DISCHARGE NOTE PROVIDER - NSDCCPCAREPLAN_GEN_ALL_CORE_FT
PRINCIPAL DISCHARGE DIAGNOSIS  Diagnosis: Leaking PEG tube  Assessment and Plan of Treatment: You were noted to have a leaking PEG tube in your group home. You were transferred to our hospital to have your tube replaced. Before your procedure done by the interventional radiologists, all your medication were held as a surgical precaution. Procedure was conducted under anesthesia.      SECONDARY DISCHARGE DIAGNOSES  Diagnosis: Seizure  Assessment and Plan of Treatment: Your home Vimpat & Keppra were withheld as a presurgical precaution. You were eventually transitioned to intravenous versions of these medications.     PRINCIPAL DISCHARGE DIAGNOSIS  Diagnosis: Leaking PEG tube  Assessment and Plan of Treatment: You were noted to have a leaking PEG tube in your group home. You were transferred to our hospital to have your tube replaced. Before your procedure done by the interventional radiologists, all your medication were held as a surgical precaution. Procedure was conducted under anesthesia. We restarted your home meds without any issues.      SECONDARY DISCHARGE DIAGNOSES  Diagnosis: Seizure  Assessment and Plan of Treatment: Your home Vimpat & Keppra were withheld as a presurgical precaution. You were eventually transitioned to intravenous versions of these medications. You had no seizure in-patient. After procedure, meds restarted without any issues.     PRINCIPAL DISCHARGE DIAGNOSIS  Diagnosis: Leaking PEG tube  Assessment and Plan of Treatment: You were noted to have a leaking PEG tube in your group home. You were transferred to our hospital to have your tube replaced. Before your procedure done by the interventional radiologists, all your medication were held as a surgical precaution. Procedure was conducted under anesthesia. We restarted your home meds without any issues.  If you expereince pain, please go with non-opioid pain medication first.    And make sure your tube feeding settings at group home are the same as when you are in our hospital.      SECONDARY DISCHARGE DIAGNOSES  Diagnosis: Seizure  Assessment and Plan of Treatment: Your home Vimpat & Keppra were withheld as a presurgical precaution. You were eventually transitioned to intravenous versions of these medications. You had no seizure in-patient. After procedure, meds restarted without any issues.     PRINCIPAL DISCHARGE DIAGNOSIS  Diagnosis: Leaking PEG tube  Assessment and Plan of Treatment: You were noted to have a leaking PEG tube in your group home. You were transferred to our hospital to have your tube replaced. Before your procedure done by the interventional radiologists, all your medication were held as a surgical precaution. Procedure was conducted under anesthesia. We restarted your home meds without any issues.  If you expereince pain, please try non-opioid pain medication such as tylenol first. And make sure your tube feeding settings at group home are the same as when you were in our hospital.      SECONDARY DISCHARGE DIAGNOSES  Diagnosis: Seizure  Assessment and Plan of Treatment: Your home Vimpat & Keppra were withheld as a presurgical precaution. You were eventually transitioned to intravenous versions of these medications. You had no seizure in-patient. After procedure, meds restarted without any issues.     PRINCIPAL DISCHARGE DIAGNOSIS  Diagnosis: Leaking PEG tube  Assessment and Plan of Treatment: You were noted to have a leaking PEG tube in your group home. You were transferred to our hospital to have your tube replaced. Before your procedure done by the interventional radiologists, all your medication were held as a surgical precaution. Procedure was conducted under anesthesia. We restarted your home meds without any issues.  If you expereince pain, please try non-opioid pain medication such as tylenol first. And make sure your tube feeding settings at group home are the same as when you were in our hospital.  The goal of rate for tube feedincc/hour  Begin with 50cc/hour and then increase 10cc every 3 hours unitl meeting the goal.    Continous feeding for 20 hours from 08:00 until 04:00.  Please follow up with interventional radiology in 2-4 weeks after the PEG placement () for GJ tube exchange and possible upsizing.      SECONDARY DISCHARGE DIAGNOSES  Diagnosis: Seizure  Assessment and Plan of Treatment: Your home Vimpat & Keppra were withheld as a presurgical precaution. You were eventually transitioned to intravenous versions of these medications. You had no seizure in-patient. After procedure, meds restarted without any issues.     PRINCIPAL DISCHARGE DIAGNOSIS  Diagnosis: Leaking PEG tube  Assessment and Plan of Treatment: You were noted to have a leaking PEG tube in your group home. You were transferred to our hospital to have your tube replaced. Before your procedure done by the interventional radiologists, all your medication were held as a surgical precaution. Procedure was conducted under anesthesia. We restarted your home meds without any issues. It is normal that you will have some drainage from insertion site initially along with healing process. Eventually it should subside as the time goes by.  If you expereince pain, please try non-opioid pain medication such as tylenol first. And make sure your tube feeding settings at group home are the same as when you were in our hospital.  The goal of rate for tube feedincc/hour  Begin with 50cc/hour and then increase 10cc every 3 hours unitl meeting the goal.    Continous feeding for 20 hours from 08:00 until 04:00.  Please follow up with interventional radiology in 2-4 weeks after the PEG placement () for GJ tube exchange and possible upsizing.      SECONDARY DISCHARGE DIAGNOSES  Diagnosis: Seizure  Assessment and Plan of Treatment: Your home Vimpat & Keppra were withheld as a presurgical precaution. You were eventually transitioned to intravenous versions of these medications. You had no seizure in-patient. After procedure, meds restarted without any issues.

## 2022-08-10 NOTE — DISCHARGE NOTE PROVIDER - NSDCFUADDAPPT_GEN_ALL_CORE_FT
Please follow-up with your primary care as needed.  We scheduled a follow-up appointment interventional radiology for you 1pm on 09/01/2022. Please also make sure you will have COVID-19 test 3-5 days before the appointment. If you     Please follow-up with your primary care as needed.  We scheduled a follow-up appointment interventional radiology for you at 1pm on 09/01/2022 at the [facility]. Please also make sure you will have COVID-19 test 3-5 days before the appointment.     If you prefer another location and/or reschedule it, please call the IR booking office at [phone].     Please follow-up with your primary care as needed.

## 2022-08-10 NOTE — DISCHARGE NOTE PROVIDER - PROVIDER TOKENS
FREE:[LAST:[Jeff],FIRST:[Asaann marie],PHONE:[phone]],FAX:[(   [phone]    -],ADDRESS:[Interventional Radiology   [facility]    Routine exchange of GJ tube every 3-4 months.]] FREE:[LAST:[Aguilar],FIRST:[Parviz],PHONE:[phone]],FAX:[(   [phone]    -],ADDRESS:[Interventional Radiology   [place]    Routine exchange of GJ tube every 3-4 months.],SCHEDULEDAPPT:[09/01/2022],SCHEDULEDAPPTTIME:[01:00 PM]]

## 2022-08-10 NOTE — DISCHARGE NOTE PROVIDER - NSDCMRMEDTOKEN_GEN_ALL_CORE_FT
aspirin 81 mg oral tablet, chewable: 1 tab(s) orally once a day  baclofen 5 mg oral tablet: 1 tab(s) orally 2 times a day  Bisco-Lax 10 mg rectal suppository: 1 suppository(ies) rectal once a day (at bedtime), As Needed  cholecalciferol oral tablet: 1000 unit(s) orally once a day  Combivent Respimat 20 mcg-100 mcg/inh inhalation aerosol: 1 puff(s) inhaled every 6 hours, As Needed for asthma  doxazosin 2 mg oral tablet: 1 tab(s) orally once a day (at bedtime)  enoxaparin 40 mg/0.4 mL injectable solution: 0.4 milliliter(s) subcutaneous once a day  famotidine 20 mg oral tablet: 1 tab(s) orally once a day  lacosamide 10 mg/mL oral solution: 10 milliliter(s) orally 2 times a day  lactulose 10 g/15 mL oral solution: 30 milliliter(s) orally once a day, As Needed  levETIRAcetam 100 mg/mL oral solution: 5 milliliter(s) orally 2 times a day  Melatonin 3 mg oral tablet: 1 tab(s) orally once a day (at bedtime)  omeprazole 20 mg oral tablet, disintegrating, delayed release: 1 tab(s) orally once a day  ondansetron 4 mg oral tablet, disintegratin tab(s) orally every 8 hours, As needed, Vomiting  sertraline 25 mg oral tablet: 1 tab(s) orally once a day  silver sulfADIAZINE 1% topical cream: 1 application topically 2 times a day  venlafaxine 37.5 mg oral tablet: 1 tab(s) orally once a day  zinc oxide 40% topical ointment: 1 application topically 3 times a day   aspirin 81 mg oral tablet, chewable: 1 tab(s) by gastrostomy tube once a day  baclofen 5 mg oral tablet: 1 tab(s) by gastrostomy tube 2 times a day  Bisco-Lax 10 mg rectal suppository: 1 suppository(ies) rectal once a day (at bedtime), As Needed  cholecalciferol oral tablet: 1000 unit(s) by gastrostomy tube once a day  Combivent Respimat 20 mcg-100 mcg/inh inhalation aerosol: 1 puff(s) inhaled every 6 hours, As Needed for asthma  doxazosin 2 mg oral tablet: 1 tab(s) by gastrostomy tube once a day (at bedtime)  enoxaparin 40 mg/0.4 mL injectable solution: 0.4 milliliter(s) subcutaneous once a day  famotidine 20 mg oral tablet: 1 tab(s) by gastrostomy tube once a day  lacosamide 10 mg/mL oral solution: 10 milliliter(s) by gastrostomy tube 2 times a day  lactulose 10 g/15 mL oral solution: 30 milliliter(s) by gastrostomy tube once a day, As Needed  levETIRAcetam 100 mg/mL oral solution: 5 milliliter(s) by gastrostomy tube 2 times a day  Melatonin 3 mg oral tablet: 1 tab(s) by gastrostomy tube once a day (at bedtime)  omeprazole 20 mg oral tablet, disintegrating, delayed release: 1 tab(s) by gastrostomy tube once a day  ondansetron 4 mg oral tablet, disintegratin tab(s) by gastrostomy tube every 8 hours, As Needed  sertraline 25 mg oral tablet: 1 tab(s) by gastrostomy tube once a day  silver sulfADIAZINE 1% topical cream: 1 application topically 2 times a day  venlafaxine 37.5 mg oral tablet: 1 tab(s) by gastrostomy tube once a day  zinc oxide 40% topical ointment: 1 application topically 3 times a day   aspirin 81 mg oral tablet, chewable: 1 tab(s) by gastrostomy tube once a day  baclofen 5 mg oral tablet: 1 tab(s) by gastrostomy tube 2 times a day  Bisco-Lax 10 mg rectal suppository: 1 suppository(ies) rectal once a day (at bedtime), As Needed  cholecalciferol oral tablet: 1000 unit(s) by gastrostomy tube once a day  Combivent Respimat 20 mcg-100 mcg/inh inhalation aerosol: 1 puff(s) inhaled every 6 hours, As Needed for asthma  enoxaparin 40 mg/0.4 mL injectable solution: 0.4 milliliter(s) subcutaneous once a day  famotidine 20 mg oral tablet: 1 tab(s) by gastrostomy tube once a day  lacosamide 10 mg/mL oral solution: 10 milliliter(s) by gastrostomy tube 2 times a day  lactulose 10 g/15 mL oral solution: 30 milliliter(s) by gastrostomy tube once a day, As Needed  levETIRAcetam 100 mg/mL oral solution: 5 milliliter(s) by gastrostomy tube 2 times a day  Melatonin 3 mg oral tablet: 1 tab(s) by gastrostomy tube once a day (at bedtime)  omeprazole 20 mg oral tablet, disintegrating, delayed release: 1 tab(s) by gastrostomy tube once a day  ondansetron 4 mg oral tablet, disintegratin tab(s) by gastrostomy tube every 8 hours, As Needed  sertraline 25 mg oral tablet: 1 tab(s) by gastrostomy tube once a day  silver sulfADIAZINE 1% topical cream: 1 application topically 2 times a day  zinc oxide 40% topical ointment: 1 application topically 3 times a day   aspirin 81 mg oral tablet, chewable: 1 tab(s) by gastrostomy tube once a day  baclofen 5 mg oral tablet: 1 tab(s) by gastrostomy tube 2 times a day  Bisco-Lax 10 mg rectal suppository: 1 suppository(ies) rectal once a day (at bedtime), As Needed  cholecalciferol oral tablet: 1000 unit(s) by gastrostomy tube once a day  Combivent Respimat 20 mcg-100 mcg/inh inhalation aerosol: 1 puff(s) inhaled every 6 hours, As Needed for asthma  enoxaparin: 30 milligram(s) subcutaneous once a day  famotidine 20 mg oral tablet: 1 tab(s) by gastrostomy tube once a day  lacosamide 10 mg/mL oral solution: 10 milliliter(s) by gastrostomy tube 2 times a day  lactulose 10 g/15 mL oral solution: 30 milliliter(s) by gastrostomy tube once a day, As Needed  levETIRAcetam 100 mg/mL oral solution: 5 milliliter(s) by gastrostomy tube 2 times a day  Melatonin 3 mg oral tablet: 1 tab(s) by gastrostomy tube once a day (at bedtime)  omeprazole 20 mg oral tablet, disintegrating, delayed release: 1 tab(s) by gastrostomy tube once a day  ondansetron 4 mg oral tablet, disintegratin tab(s) by gastrostomy tube every 8 hours, As Needed  sertraline 25 mg oral tablet: 1 tab(s) by gastrostomy tube once a day  silver sulfADIAZINE 1% topical cream: 1 application topically 2 times a day  zinc oxide 40% topical ointment: 1 application topically 3 times a day

## 2022-08-10 NOTE — CONSULT NOTE ADULT - ATTENDING COMMENTS
Agree with above. G-J is being replaced by IR. Please call us back if we can be of further assistance.

## 2022-08-10 NOTE — H&P ADULT - NSHPLABSRESULTS_GEN_ALL_CORE
LABS:                     RADIOLOGY, EKG & ADDITIONAL TESTS: Reviewed. LABS:                         11.3   6.42  )-----------( 288      ( 10 Aug 2022 01:46 )             34.2     08-10    144  |  105  |  14  ----------------------------<  68<L>  3.5   |  23  |  <0.30<L>    Ca    9.0      10 Aug 2022 01:46  Phos  3.1     08-10  Mg     1.8     08-10    TPro  7.3  /  Alb  3.8  /  TBili  0.3  /  DBili  x   /  AST  17  /  ALT  20  /  AlkPhos  100  08-10              RADIOLOGY, EKG & ADDITIONAL TESTS: Reviewed. LABS:                         11.3   6.42  )-----------( 288      ( 10 Aug 2022 01:46 )             34.2     08-10    144  |  105  |  14  ----------------------------<  68<L>  3.5   |  23  |  <0.30<L>    Ca    9.0      10 Aug 2022 01:46  Phos  3.1     08-10  Mg     1.8     08-10    TPro  7.3  /  Alb  3.8  /  TBili  0.3  /  DBili  x   /  AST  17  /  ALT  20  /  AlkPhos  100  08-10    RADIOLOGY, EKG & ADDITIONAL TESTS: Reviewed.

## 2022-08-10 NOTE — CONSULT NOTE ADULT - ASSESSMENT
56M with hx of TBI with resultant paraplegia (~1987), seizures, chronic respiratory failure s/p trach decannulated, nonverbal at baseline, pulmonary fibrosis, recurrent aspiration pneumonia, dysphagia s/p peg tube placement, DM2, GERD, HTN transferred from [facility] for GJ eval.    Impression:  #GJ tube with complicated history - first placed 2021 at [facility]. Requiring endoscopy 2/2022 with xtack closure of large gastro-cutaneous fistula and placement of GJ with recurrent suturing 3/2022 and replacement of J tube. Now recurrent leaking.    Recommendation:  - IR to evaluate for replacement of GJ tomorrow  - GI will sign off, please call back if any other issues/unable to replace with IR and need for endoscopy    Note not finalized until signed by attending.    Cheryl Calderon PGY-6  Gastroenterology/Hepatology Fellow  Pager #[phone] (RANDI) or [phone] (NS)  Available on [employer] Teams.  Please contact on-call GI fellow via answering service ([phone]) after 5pm and before 8am, and on weekends.

## 2022-08-10 NOTE — DIETITIAN INITIAL EVALUATION ADULT - OTHER INFO
GI/Intake:   -Presenting with GJ tube malfunction   -Per IR, scheduled for exchange on 8/10; currently NPO   -No BM documented thus far; no bowel regimen noted     Endo:   -Hx of DM; order A1c  -Hypoglycemic; D5 running for coverage     Weight Hx:   -Current dosing weight: 98 pounds   -Per previous RD note: 91 pounds (2/2022)

## 2022-08-10 NOTE — H&P ADULT - PROBLEM SELECTOR PLAN 1
- p/w leaking peg tube  - NPO with meds  - holding PO meds for now  - IR consult  - D5LR @75cc/h x12 h  - nutrition consult  - per prior admission: pt on Jevity 1.5 continuous feeds 50 cc/h with 35cc/h free water flush, with no carb prosource TF three times a day. MVI with minerals, Vitamin C 500 mg BID - p/w leaking peg tube  - NPO with meds  - holding PO meds for now  - preprocedure labs, CBC, CMP/Mg/Phos, coags, covid, T+S  - IR consult  - D5LR @75cc/h x12 h  - nutrition consult  - per prior admission: pt on Jevity 1.5 continuous feeds 50 cc/h with 35cc/h free water flush, with no carb prosource TF three times a day. MVI with minerals, Vitamin C 500 mg BID

## 2022-08-10 NOTE — DIETITIAN INITIAL EVALUATION ADULT - PERTINENT LABORATORY DATA
08-10    144  |  105  |  14  ----------------------------<  68<L>  3.5   |  23  |  <0.30<L>    Ca    9.0      10 Aug 2022 01:46  Phos  3.1     08-10  Mg     1.8     08-10    TPro  7.3  /  Alb  3.8  /  TBili  0.3  /  DBili  x   /  AST  17  /  ALT  20  /  AlkPhos  100  08-10  A1C with Estimated Average Glucose Result: 5.9 % (02-05-22 @ 05:19)

## 2022-08-10 NOTE — DIETITIAN INITIAL EVALUATION ADULT - SIGNS/SYMPTOMS
as evidenced by BMI <19, severe physical signs of muscle/fat loss  as evidenced by multiple pressure injuries

## 2022-08-10 NOTE — H&P ADULT - ASSESSMENT
56M with hx of TBI with resultant paraplegia (~1987), seizures, chronic respiratory failure s/p trach decannulated, nonverbal at baseline, pulmonary fibrosis, recurrent aspiration pneumonia, dysphagia s/p peg tube placement, DM2, GERD, HTN transferred from [facility] for peg tube replacement.

## 2022-08-10 NOTE — DISCHARGE NOTE PROVIDER - NSDCCPTREATMENT_GEN_ALL_CORE_FT
PRINCIPAL PROCEDURE  Procedure: Replacement of gastrojejunal tube  Findings and Treatment: Your leaky tube was replaced under anesthesia with interventional radiologists conducting the procedure. No complications occured after the proceudre. We were able to start your home medications with no issues.      SECONDARY PROCEDURE  Procedure: Replacement of gastrojejunal tube  Findings and Treatment:      PRINCIPAL PROCEDURE  Procedure: Replacement of gastrojejunal tube  Findings and Treatment: Your leaky tube was replaced under anesthesia with interventional radiologists conducting the procedure. No complications occured after the proceudre. We were able to start your home medications with no issues.

## 2022-08-10 NOTE — DIETITIAN INITIAL EVALUATION ADULT - ENTERAL
1) When deemed medically feasible, recommend Jevity 1.2 (in setting of high protein demand) @ 70mL x 20hrs providing 1400mL, 1680kcal/day, 77g protein/day   2) Add Prosource TF x1/day providing a total of: 1720kcal/day, 88g protein   -Based on 44.4kkcal/kg, 2.0g/kg

## 2022-08-10 NOTE — H&P ADULT - PROBLEM SELECTOR PLAN 2
- hold vimpat 10 mg/ml 10 ml by g tube BID  - hold keppra 100 mg/ml solution 5 ml by g tube q12h  - transition to IV formulation: vimpat IVPB 100 mg q12h and keppra IVPB 500 mg q12h pending procedure  - seizure precautions

## 2022-08-10 NOTE — H&P ADULT - HISTORY OF PRESENT ILLNESS
56M with hx of TBI with resultant paraplegia (~1987), seizures, chronic respiratory failure s/p trach decannulated, pulmonary fibrosis, recurrent aspiration pneumonia, dysphagia s/p peg tube placement, DM2, GERD, HTN admitted for peg tube replacement.     Patient with prior hospitalization in 2/2022 with aspiration pneumonia, requiring    PEG tube leaking bilious fluid, removed and replaced on 2/7/22 56M with hx of TBI with resultant paraplegia (~1987), seizures, chronic respiratory failure s/p trach decannulated, nonverbal at baseline, pulmonary fibrosis, recurrent aspiration pneumonia, dysphagia s/p peg tube placement, DM2, GERD, HTN admitted for peg tube replacement.     Patient is a resident of  Patient with prior hospitalization in 2/2022 with aspiration pneumonia, requiring    PEG tube leaking bilious fluid, removed and replaced on 2/7/22 56M with hx of TBI with resultant paraplegia (~1987), seizures, chronic respiratory failure s/p trach decannulated, nonverbal at baseline, pulmonary fibrosis, recurrent aspiration pneumonia, dysphagia s/p peg tube placement, DM2, GERD, HTN transferred from [facility] for peg tube replacement. Per chart review, patient is a resident of [facility] and was BIBEMS to [facility] on 8/9 for J-tube malfunction, where staff noticed bilious drainage around J tube site. However would require IR placement and patient was transferred to [facility] for further care.     Patient with prior hospitalization in 2/2022 with aspiration pneumonia, requiring ICU admission. Hospital course c/b PEG tube leaking bilious fluid, removed. Found to have a small pneumoperitoneum and duodenal perforation, no acute surgical intervention, thought to be PEG tube manipulation. PEG tube replaced on 2/7/22 with GJ tube, with closure of large gastrocutaneous fistulous tract around G tube.

## 2022-08-10 NOTE — DIETITIAN INITIAL EVALUATION ADULT - ADD RECOMMEND
2) Add Chema BID   3) Add multivitamin and Vitamin C daily   4) Monitor diet tolerance, weight trends, labs, GI function, and skin integrity  5) Malnutrition sticker placed

## 2022-08-10 NOTE — CHART NOTE - NSCHARTNOTEFT_GEN_A_CORE
Interventional Radiology    Patient presented to IR for a GJ tube exchange and repositioning. Patient unable to tolerate starting the procedure due to agitation and combativeness. Procedure rescheduled for tomorrow 8/11 with anesthesia.     - IR will plan to perform GJ tube exchange/ repositioning with anesthesia tomorrow 8/11  - Please place order for IR Procedure, approving attending Dr. Gunn  - NPO past midnight tonight. Recommend against using current GJ tube prior to procedure.  - hold DVT ppx on day of procedure  - maintain active type and screen x 2  - Please draw AM labs - CBC/coags/BMP  - PT needs a COVID-19 test within 5 days of procedure.  - Discussed with primary team  = Sister timo made aware    --  Please call IR extension [phone] with any questions, concerns or issues regarding above. Interventional Radiology    Patient presented to IR for a GJ tube exchange and repositioning. Patient unable to tolerate starting the procedure due to agitation and combativeness. Procedure rescheduled for tomorrow 8/11 with anesthesia.     - IR will plan to perform GJ tube exchange/ repositioning with anesthesia tomorrow 8/11  - Please place order for IR Procedure, approving attending Dr. Gunn  - NPO past midnight tonight. Recommend against using current GJ tube prior to procedure.  - hold DVT ppx on day of procedure  - maintain active type and screen x 2  - Please draw AM labs - CBC/coags/BMP  - PT needs a COVID-19 test within 5 days of procedure.  - Discussed with primary team  - Abdominal Xray ordered to evaluate post bedside replacement   = Sister timo made aware    --  Please call IR extension [phone] with any questions, concerns or issues regarding above.

## 2022-08-10 NOTE — H&P ADULT - NSHPSOCIALHISTORY_GEN_ALL_CORE
patient is a resident of [facility], nonsmoker

## 2022-08-10 NOTE — DISCHARGE NOTE PROVIDER - DETAILS OF MALNUTRITION DIAGNOSIS/DIAGNOSES
This patient has been assessed with a concern for Malnutrition and was treated during this hospitalization for the following Nutrition diagnosis/diagnoses:     -  08/10/2022: Severe protein-calorie malnutrition   -  08/10/2022: Underweight (BMI < 19)

## 2022-08-10 NOTE — DIETITIAN INITIAL EVALUATION ADULT - ETIOLOGY
related to inadequate protein-energy intake in the setting of chronic respiratory failure  related to increased physiological demand

## 2022-08-10 NOTE — H&P ADULT - ATTENDING COMMENTS
56M nonverbal with PMH of TBI with paraplegia, seizures, chronic respiratory failure s/p trach now decannulated, pulmonary fibrosis, hx of recurrent aspiration PNA, dysphagia s/p PEG, HTN, T2DM presents from OSH For PEG replacement due to leakage around the tube. Patient's VSS. Labs concerning for hypoglycemia likely 2/2 NPO status. Started on D5LR for now. IR consult placed for replacement of PEG, possible fistula given hx of similar symptoms. Convert po meds to IV. Monitor vitals. Rest of care per plan above.

## 2022-08-10 NOTE — DIETITIAN INITIAL EVALUATION ADULT - PERTINENT MEDS FT
MEDICATIONS  (STANDING):  dextrose 5% + lactated ringers. 1000 milliLiter(s) (75 mL/Hr) IV Continuous <Continuous>  enoxaparin Injectable 30 milliGRAM(s) SubCutaneous every 24 hours  lacosamide IVPB 100 milliGRAM(s) IV Intermittent every 12 hours  levETIRAcetam  IVPB 500 milliGRAM(s) IV Intermittent every 12 hours    MEDICATIONS  (PRN):

## 2022-08-10 NOTE — DIETITIAN INITIAL EVALUATION ADULT - NSFNSPHYEXAMSKINFT_GEN_A_CORE
Pressure Injury 1: sacrum, Suspected deep tissue injury  Pressure Injury 2: Left:,inner knee, Suspected deep tissue injury

## 2022-08-10 NOTE — H&P ADULT - NSHPPHYSICALEXAM_GEN_ALL_CORE
PHYSICAL EXAM:  Vital Signs Last 24 Hrs  T(C): 36.3 (09 Aug 2022 20:42), Max: 36.3 (09 Aug 2022 20:42)  T(F): 97.4 (09 Aug 2022 20:42), Max: 97.4 (09 Aug 2022 20:42)  HR: 73 (09 Aug 2022 20:42) (73 - 73)  BP: 129/70 (09 Aug 2022 20:42) (129/70 - 129/70)  BP(mean): --  RR: 19 (09 Aug 2022 20:42) (19 - 19)  SpO2: 97% (09 Aug 2022 20:42) (97% - 97%)    Parameters below as of 09 Aug 2022 20:42  Patient On (Oxygen Delivery Method): room air      GENERAL: Sitting comfortable in bed, in no acute distress  HENMT: Atraumatic, moist mucous membranes, no oropharyngeal exudates or vesicles, uvula is midline EYES: Clear bilaterally, PERRL, EOMs intact b/l  HEART: RRR, S1/S2, no murmur/gallops/rubs  RESPIRATORY: Clear to auscultation bilaterally, no wheezes/rhonchi/rales  ABDOMEN: +BS, soft, nontender, nondistended  EXTREMITIES: No lower extremity edema, +2 radial pulses b/l  NEURO:  A&Ox4, no focal motor deficits or sensory deficits   Heme/LYMPH: No ecchymosis or bruising, no anterior/posterior cervical or supraclavicular LAD  SKIN:  Skin normal color for race, warm, dry and intact. No evidence of rash. PHYSICAL EXAM:  Vital Signs Last 24 Hrs  T(C): 36.3 (09 Aug 2022 20:42), Max: 36.3 (09 Aug 2022 20:42)  T(F): 97.4 (09 Aug 2022 20:42), Max: 97.4 (09 Aug 2022 20:42)  HR: 73 (09 Aug 2022 20:42) (73 - 73)  BP: 129/70 (09 Aug 2022 20:42) (129/70 - 129/70)  BP(mean): --  RR: 19 (09 Aug 2022 20:42) (19 - 19)  SpO2: 97% (09 Aug 2022 20:42) (97% - 97%)    Parameters below as of 09 Aug 2022 20:42  Patient On (Oxygen Delivery Method): room air      GENERAL: lying comfortable in bed, in no acute distress, cachetic, answers simple yes/no questions  HENMT: Atraumatic, dry mucous membranes, no oropharyngeal exudates or vesicles, uvula is midline EYES: Clear bilaterally, EOMs intact b/l  HEART: RRR, S1/S2, no murmur/gallops/rubs  RESPIRATORY: Clear to auscultation bilaterally, no wheezes/rhonchi/rales  ABDOMEN: +BS, colostomy bag wrapped around peg tube site, draining bilious fluid. soft, nontender, nondistended  EXTREMITIES: No lower extremity edema, +2 radial pulses b/l  NEURO: nonverbal, able to squeeze left hand and wiggle left toes. +chronic flexion contracture of right arm. moves LUE spontaneously  Heme/LYMPH: No ecchymosis or bruising, no anterior/posterior cervical or supraclavicular LAD  SKIN:  Skin normal color for race, warm, dry and intact. No evidence of rash.

## 2022-08-10 NOTE — H&P ADULT - PROBLEM SELECTOR PLAN 4
FEN/GI: strict NPO, D5LR @75cc/h x12 h  Lines: PIV (in right lower extremity)  PPx: lovenox  Dispo: admit to medicine, PT consult, likely back to group home pending clinical course  Code Status: full code, pending discussion

## 2022-08-10 NOTE — PROGRESS NOTE ADULT - SUBJECTIVE AND OBJECTIVE BOX
Quintin Wong  PGY-1 Resident Physician     Patient is a 56y old  Male who presents with a chief complaint of 55yo Male with PMH of TBI, HTN, DM, Polio, Seizure, paraplegia, chronic respiratory failure, GJ tube. Pt presented to [facility] on 8/9 with J-tube malfunction; pt transferred to [facility]  for replacement of leaky tube.      (10 Aug 2022 11:36)      SUBJECTIVE / OVERNIGHT EVENTS:  NAEON. Pt is nonverbal at baseline. Unable to obtain proper interview.     MEDICATIONS  (STANDING):  dextrose 5% + lactated ringers. 1000 milliLiter(s) (75 mL/Hr) IV Continuous <Continuous>  enoxaparin Injectable 30 milliGRAM(s) SubCutaneous every 24 hours  lacosamide IVPB 100 milliGRAM(s) IV Intermittent every 12 hours  levETIRAcetam  IVPB 500 milliGRAM(s) IV Intermittent every 12 hours    MEDICATIONS  (PRN):    Allergies    Ativan (Unknown)  phenytoin (Unknown)  Valproate Sodium (Other (Severe))    Intolerances        Vital Signs Last 24 Hrs  T(C): 36.6 (10 Aug 2022 11:30), Max: 36.6 (10 Aug 2022 04:22)  T(F): 97.8 (10 Aug 2022 11:30), Max: 97.9 (10 Aug 2022 04:22)  HR: 69 (10 Aug 2022 11:30) (69 - 81)  BP: 129/84 (10 Aug 2022 11:30) (117/67 - 129/84)  BP(mean): --  RR: 18 (10 Aug 2022 11:30) (18 - 19)  SpO2: 99% (10 Aug 2022 11:30) (97% - 99%)    Parameters below as of 10 Aug 2022 04:22  Patient On (Oxygen Delivery Method): room air      Daily Height in cm: 170.18 (09 Aug 2022 20:42)    Daily   I&O's Summary    09 Aug 2022 07:01  -  10 Aug 2022 07:00  --------------------------------------------------------  IN: 0 mL / OUT: 200 mL / NET: -200 mL        PHYSICAL EXAM:  General: Nonverbal  HEAD:  Atraumatic, Normocephalic  EYES: EOMI, PERRLA, conjunctiva and sclera clear  NECK: Supple, No JVD  CHEST/LUNG: Clear to auscultation bilaterally; No wheeze  HEART: Regular rate and rhythm; Normal S1 S2, No murmurs, rubs, or gallops  ABDOMEN: Soft, Nontender, Nondistended; Bowel sounds present  EXTREMITIES:  2+ Peripheral Pulses, No clubbing, cyanosis, or edema  PSYCH: AAOx0  NEUROLOGY: non-focal  SKIN: No rashes or lesions    DIAGNOSTICS:                         11.3   6.42  )-----------( 288      ( 10 Aug 2022 01:46 )             34.2     Hgb Trend: 11.3<--  08-10    144  |  105  |  14  ----------------------------<  68<L>  3.5   |  23  |  <0.30<L>    Ca    9.0      10 Aug 2022 01:46  Phos  3.1     08-10  Mg     1.8     08-10    TPro  7.3  /  Alb  3.8  /  TBili  0.3  /  DBili  x   /  AST  17  /  ALT  20  /  AlkPhos  100  08-10    CAPILLARY BLOOD GLUCOSE        Creatinine Trend: <0.30<--  LIVER FUNCTIONS - ( 10 Aug 2022 01:46 )  Alb: 3.8 g/dL / Pro: 7.3 g/dL / ALK PHOS: 100 U/L / ALT: 20 U/L / AST: 17 U/L / GGT: x           PT/INR - ( 10 Aug 2022 04:19 )   PT: 14.1 sec;   INR: 1.21 ratio         PTT - ( 10 Aug 2022 04:19 )  PTT:27.6 sec

## 2022-08-11 LAB
ALBUMIN SERPL ELPH-MCNC: 3.6 G/DL — SIGNIFICANT CHANGE UP (ref 3.3–5)
ALP SERPL-CCNC: 155 U/L — HIGH (ref 40–120)
ALT FLD-CCNC: 33 U/L — SIGNIFICANT CHANGE UP (ref 10–45)
ANION GAP SERPL CALC-SCNC: 12 MMOL/L — SIGNIFICANT CHANGE UP (ref 5–17)
AST SERPL-CCNC: 27 U/L — SIGNIFICANT CHANGE UP (ref 10–40)
BILIRUB SERPL-MCNC: 0.3 MG/DL — SIGNIFICANT CHANGE UP (ref 0.2–1.2)
BUN SERPL-MCNC: 8 MG/DL — SIGNIFICANT CHANGE UP (ref 7–23)
CALCIUM SERPL-MCNC: 8.8 MG/DL — SIGNIFICANT CHANGE UP (ref 8.4–10.5)
CHLORIDE SERPL-SCNC: 107 MMOL/L — SIGNIFICANT CHANGE UP (ref 96–108)
CO2 SERPL-SCNC: 26 MMOL/L — SIGNIFICANT CHANGE UP (ref 22–31)
CREAT SERPL-MCNC: <0.3 MG/DL — LOW (ref 0.5–1.3)
EGFR: 138 ML/MIN/1.73M2 — SIGNIFICANT CHANGE UP
GLUCOSE SERPL-MCNC: 94 MG/DL — SIGNIFICANT CHANGE UP (ref 70–99)
HCT VFR BLD CALC: 36.7 % — LOW (ref 39–50)
HGB BLD-MCNC: 11.8 G/DL — LOW (ref 13–17)
INR BLD: 1.26 RATIO — HIGH (ref 0.88–1.16)
MAGNESIUM SERPL-MCNC: 1.7 MG/DL — SIGNIFICANT CHANGE UP (ref 1.6–2.6)
MCHC RBC-ENTMCNC: 28 PG — SIGNIFICANT CHANGE UP (ref 27–34)
MCHC RBC-ENTMCNC: 32.2 GM/DL — SIGNIFICANT CHANGE UP (ref 32–36)
MCV RBC AUTO: 87.2 FL — SIGNIFICANT CHANGE UP (ref 80–100)
NRBC # BLD: 0 /100 WBCS — SIGNIFICANT CHANGE UP (ref 0–0)
PHOSPHATE SERPL-MCNC: 2.7 MG/DL — SIGNIFICANT CHANGE UP (ref 2.5–4.5)
PLATELET # BLD AUTO: 268 K/UL — SIGNIFICANT CHANGE UP (ref 150–400)
POTASSIUM SERPL-MCNC: 3.5 MMOL/L — SIGNIFICANT CHANGE UP (ref 3.5–5.3)
POTASSIUM SERPL-SCNC: 3.5 MMOL/L — SIGNIFICANT CHANGE UP (ref 3.5–5.3)
PROT SERPL-MCNC: 7.1 G/DL — SIGNIFICANT CHANGE UP (ref 6–8.3)
PROTHROM AB SERPL-ACNC: 14.6 SEC — HIGH (ref 10.5–13.4)
RBC # BLD: 4.21 M/UL — SIGNIFICANT CHANGE UP (ref 4.2–5.8)
RBC # FLD: 13.7 % — SIGNIFICANT CHANGE UP (ref 10.3–14.5)
SODIUM SERPL-SCNC: 145 MMOL/L — SIGNIFICANT CHANGE UP (ref 135–145)
WBC # BLD: 7.91 K/UL — SIGNIFICANT CHANGE UP (ref 3.8–10.5)
WBC # FLD AUTO: 7.91 K/UL — SIGNIFICANT CHANGE UP (ref 3.8–10.5)

## 2022-08-11 PROCEDURE — 49452 REPLACE G-J TUBE PERC: CPT

## 2022-08-11 PROCEDURE — 99232 SBSQ HOSP IP/OBS MODERATE 35: CPT | Mod: GC

## 2022-08-11 RX ORDER — SODIUM CHLORIDE 9 MG/ML
1000 INJECTION, SOLUTION INTRAVENOUS
Refills: 0 | Status: DISCONTINUED | OUTPATIENT
Start: 2022-08-11 | End: 2022-08-11

## 2022-08-11 RX ADMIN — LACOSAMIDE 120 MILLIGRAM(S): 50 TABLET ORAL at 16:49

## 2022-08-11 RX ADMIN — LEVETIRACETAM 400 MILLIGRAM(S): 250 TABLET, FILM COATED ORAL at 15:32

## 2022-08-11 RX ADMIN — LACOSAMIDE 120 MILLIGRAM(S): 50 TABLET ORAL at 04:01

## 2022-08-11 RX ADMIN — LEVETIRACETAM 400 MILLIGRAM(S): 250 TABLET, FILM COATED ORAL at 02:34

## 2022-08-11 NOTE — PROCEDURE NOTE - PROCEDURE FINDINGS AND DETAILS
Successful Exchange/Downsize of a 22 French Gastrojejunostomy to an 18 French 45cm Gastrojejunostomy. An absorbable purse string suture was placed at the tract site.    Plan: Allow granulation tissue to form around the smaller tube and upsize again at the next exchange as needed for resolution of leakage.

## 2022-08-11 NOTE — CONSULT NOTE ADULT - SUBJECTIVE AND OBJECTIVE BOX
Wound Surgery Consult Note:    HPI:  56M with hx of TBI with resultant paraplegia (~1987), seizures, chronic respiratory failure s/p trach decannulated, nonverbal at baseline, pulmonary fibrosis, recurrent aspiration pneumonia, dysphagia s/p peg tube placement, DM2, GERD, HTN transferred from [facility] for peg tube replacement. Per chart review, patient is a resident of [facility] and was BIBEMS to [facility] on 8/9 for J-tube malfunction, where staff noticed bilious drainage around J tube site. However would require IR placement and patient was transferred to [facility] for further care.     Patient with prior hospitalization in 2/2022 with aspiration pneumonia, requiring ICU admission. Hospital course c/b PEG tube leaking bilious fluid, removed. Found to have a small pneumoperitoneum and duodenal perforation, no acute surgical intervention, thought to be PEG tube manipulation. PEG tube replaced on 2/7/22 with GJ tube, with closure of large gastrocutaneous fistulous tract around G tube. (10 Aug 2022 00:58)    Request for wound care consult for sacral/bilateral buttocks skin breakdown received from nursing. Mr. Stewart was encountered on an alternating air with low air loss surface. He is incontinent of stool and urine. His extreme immobility, inactivity, incontinence of urine and stool as well as poor nutritional status all contribute to her high risk for pressure injury development and hinder healing. Identification of the initial signs of deep tissue damage at the level of the skin within 72 hours of admission make this an injury that occurred prior to admission.    PAST MEDICAL & SURGICAL HISTORY:  TBI (traumatic brain injury)  Seizure  [place]  H/O paraplegia  Pneumonia  H/O chronic respiratory failure  Diabetes mellitus  Hypertension  S/P percutaneous endoscopic gastrostomy (PEG) tube placement, 2/2022    MEDICATIONS  (STANDING):  dextrose 5% + lactated ringers. 1000 milliLiter(s) (75 mL/Hr) IV Continuous <Continuous>  lacosamide IVPB 100 milliGRAM(s) IV Intermittent every 12 hours  levETIRAcetam  IVPB 500 milliGRAM(s) IV Intermittent every 12 hours    MEDICATIONS  (PRN):    Allergies    Ativan (Unknown)  phenytoin (Unknown)  Valproate Sodium (Other (Severe))    Intolerances    Vital Signs Last 24 Hrs  T(C): 36.3 (11 Aug 2022 13:02), Max: 36.6 (10 Aug 2022 13:51)  T(F): 97.4 (11 Aug 2022 13:02), Max: 97.9 (10 Aug 2022 13:51)  HR: 83 (11 Aug 2022 13:02) (72 - 90)  BP: 137/91 (11 Aug 2022 13:02) (108/71 - 137/91)  BP(mean): --  RR: 18 (11 Aug 2022 13:02) (18 - 18)  SpO2: 97% (11 Aug 2022 13:02) (96% - 99%)    Parameters below as of 10 Aug 2022 20:55  Patient On (Oxygen Delivery Method): room air    Physical Exam:  General: Alert, cachectic   Respiratory: equal chest rise with respirations  Gastrointestinal: soft NT/ND  Neurology: nonverbal, following commands, tremors  Musculoskeletal: no contractures  Vascular: no BLE edema   Skin:  Sacral/bilateral buttocks with deep maroon discolored intact skin L 3cm X W 3cm x D none, no drainage  No odor, erythema, increased warmth, tenderness  Bilateral knees with intact non discolored skin    LABS:  08-11    145  |  107  |  8   ----------------------------<  94  3.5   |  26  |  <0.30<L>    Ca    8.8      11 Aug 2022 09:09  Phos  2.7     08-11  Mg     1.7     08-11    TPro  7.1  /  Alb  3.6  /  TBili  0.3  /  DBili  x   /  AST  27  /  ALT  33  /  AlkPhos  155<H>  08-11                          11.8   7.91  )-----------( 268      ( 11 Aug 2022 09:09 )             36.7     PT/INR - ( 11 Aug 2022 09:09 )   PT: 14.6 sec;   INR: 1.26 ratio         PTT - ( 10 Aug 2022 04:19 )  PTT:27.6 sec

## 2022-08-11 NOTE — PRE PROCEDURE NOTE - PRE PROCEDURE EVALUATION
Procedure: GJ Exchange    Indication: Leaking Gastrojejunostomy      Clinical History: 56M with a leaking gastrojejunostomy. Now planned for exchange.      Meds:dextrose 5% + lactated ringers. 1000 milliLiter(s) IV Continuous <Continuous>  lacosamide IVPB 100 milliGRAM(s) IV Intermittent every 12 hours  levETIRAcetam  IVPB 500 milliGRAM(s) IV Intermittent every 12 hours      Allergies:Ativan (Unknown)  phenytoin (Unknown)  Valproate Sodium (Other (Severe))        Labs:                           11.8   7.91  )-----------( 268      ( 11 Aug 2022 09:09 )             36.7     PT/INR - ( 11 Aug 2022 09:09 )   PT: 14.6 sec;   INR: 1.26 ratio         PTT - ( 10 Aug 2022 04:19 )  PTT:27.6 sec  08-11    145  |  107  |  8   ----------------------------<  94  3.5   |  26  |  <0.30<L>    Ca    8.8      11 Aug 2022 09:09  Phos  2.7     08-11  Mg     1.7     08-11    TPro  7.1  /  Alb  3.6  /  TBili  0.3  /  DBili  x   /  AST  27  /  ALT  33  /  AlkPhos  155<H>  08-11        Physical Exam: NAD. Gastrojejunostomy catheter in place      Anesthesia: Sedation by Anesthesia    
Interventional Radiology    HPI: 56y Male with history of TBI with resultant paraplegia (~1987), seizures, chronic respiratory failure s/p trach decannulated, nonverbal at baseline, pulmonary fibrosis, recurrent aspiration pneumonia, dysphagia s/p peg tube placement, DM2, GERD, HTN who presents with leaking G tube/ dislodgement. G tube placed in 2/2022 by GI. IR consulted for GJ tube replacement. S/p Exchange at [facility] 2 weeks ago. Per sister, GJ tube needs to be upsized.    Allergies: Ativan (Unknown)  phenytoin (Unknown)  Valproate Sodium (Other (Severe))    Medications (Abx/Cardiac/Anticoagulation/Blood Products)  enoxaparin Injectable: 30 milliGRAM(s) SubCutaneous (08-10 @ 03:34)    Data:  170.2  44.8  T(C): 36.6  HR: 69  BP: 129/84  RR: 18  SpO2: 99%    Exam  General: No acute distress  Chest: Non labored breathing  Abdomen: Non-distended  Extremities: No swelling, warm    -WBC 6.42 / HgB 11.3 / Hct 34.2 / Plt 288  -Na 144 / Cl 105 / BUN 14 / Glucose 68  -K 3.5 / CO2 23 / Cr <0.30  -ALT 20 / Alk Phos 100 / T.Bili 0.3  -INR1.21    Plan: 56y Male presents for g tube replacement +/-  -Risks/Benefits/alternatives explained with the patient and/or healthcare proxy and witnessed informed consent obtained.

## 2022-08-11 NOTE — CONSULT NOTE ADULT - ASSESSMENT
Impression:    Sacral/bilateral Buttocks deep tissue injury present on admission  Incontinence of bowel and bladder  Incontinence Dermatitis    Recommend:  1.) topical therapy: sacral/buttock injury - cleanse with incontinence cleanser, pat dry, apply Triad ointment BID and PRN for incontinent episodes  2.) Incontinence Management - incontinence cleanser, pads, pericare BID, continue external male urinary catheter  3.) Maintain on an alternating air with low air loss surface  4.) Turn and reposition Q 2 hours  5.) Nutrition optimization  6.) Offload heels/feet with complete cair air fluidized boots; ensure that the soles of the feet are not resting on the foot board of the bed.    Care as per medicine. Will not actively follow but will remain available. Please recall for new issues or deterioration.  Upon discharge f/u [facility] at [facility] [address] [place] [address] [phone]  Thank you for this consult  Eula Perry, NP-C, CWOCN [id number]

## 2022-08-11 NOTE — PROGRESS NOTE ADULT - SUBJECTIVE AND OBJECTIVE BOX
Internal Medicine   Oswaldo Leonard | PGY-3    OVERNIGHT EVENTS: Yesterday approximately 7:00 PM, provider notified from nurse that patient appeared in significant pain with small amount of bleeding surrounding GJ tube site. Discussed with IR resident, recommendation for further evaluation. Morphine 2 mg IV given with improvement in symptoms. Hemodynamically stable. Patient brought down for urgent CT abdomen without contrast, no evidence of free air at that time. Imaging reviewed with night radiology resident.       SUBJECTIVE: Patient was seen and examined at bedside this morning. Appears comfortable, nonverbal. Unable to express basic needs.     MEDICATIONS  (STANDING):  dextrose 5% + lactated ringers. 1000 milliLiter(s) (75 mL/Hr) IV Continuous <Continuous>  lacosamide IVPB 100 milliGRAM(s) IV Intermittent every 12 hours  levETIRAcetam  IVPB 500 milliGRAM(s) IV Intermittent every 12 hours    MEDICATIONS  (PRN):        T(F): 97.9 (08-11-22 @ 04:48), Max: 97.9 (08-10-22 @ 13:51)  HR: 89 (08-11-22 @ 04:48) (69 - 90)  BP: 136/83 (08-11-22 @ 04:48) (108/71 - 136/83)  BP(mean): --  RR: 18 (08-11-22 @ 04:48) (18 - 18)  SpO2: 99% (08-11-22 @ 04:48) (96% - 99%)    PHYSICAL EXAM:     GENERAL: NAD, lying in bed comfortably  HEAD:  Atraumatic, Normocephalic  EYES: EOMI, PERRLA, conjunctiva and sclera clear, no nystagmus noted  ENT: Moist mucous membranes,   NECK: Supple, No JVD, trachea midline  CHEST/LUNG: Clear to auscultation bilaterally; No rales, rhonchi, wheezing, or rubs. Unlabored respirations  HEART: Regular rate and rhythm; No murmurs, rubs, or gallops, normal S1/S2  ABDOMEN: normal bowel sounds; Soft, nontender, nondistended, no organomegaly, GJ tube appears in place, mild output noted   EXTREMITIES:  2+ Peripheral Pulses, brisk capillary refill. No clubbing, cyanosis, or edema  MSK: upper extremities contracted   Neurological:  A&Ox0, no focal deficits   SKIN: No rashes or lesions  PSYCH: Normal mood, affect     TELEMETRY:    LABS:                        11.3   6.42  )-----------( 288      ( 10 Aug 2022 01:46 )             34.2     08-10    144  |  105  |  14  ----------------------------<  68<L>  3.5   |  23  |  <0.30<L>    Ca    9.0      10 Aug 2022 01:46  Phos  3.1     08-10  Mg     1.8     08-10    TPro  7.3  /  Alb  3.8  /  TBili  0.3  /  DBili  x   /  AST  17  /  ALT  20  /  AlkPhos  100  08-10        PT/INR - ( 10 Aug 2022 04:19 )   PT: 14.1 sec;   INR: 1.21 ratio         PTT - ( 10 Aug 2022 04:19 )  PTT:27.6 sec    Creatinine Trend: <0.30<--  I&O's Summary    10 Aug 2022 07:01  -  11 Aug 2022 07:00  --------------------------------------------------------  IN: 0 mL / OUT: 500 mL / NET: -500 mL      BNP    RADIOLOGY & ADDITIONAL STUDIES:

## 2022-08-11 NOTE — PRE-ANESTHESIA EVALUATION ADULT - NSATTENDATTESTRD_GEN_ALL_CORE
The patient has been re-examined and I agree with the above assessment or I updated with my findings.
shortness of breath

## 2022-08-11 NOTE — PROGRESS NOTE ADULT - PROBLEM SELECTOR PLAN 1
Transferred for leaking peg tube. IR will replace tube on 8/11. Diet: per prior admission: pt on Jevity 1.5 continuous feeds 50 cc/h with 35cc/h free water flush, with no carb prosource TF three times a day. MVI with minerals, Vitamin C 500 mg BID.   - NPO, holding PO meds for now  - CT abdomen non-contrast not demonstrating evidence of free air  - plan for GJ tube exchange today  - preprocedure labs, CBC, CMP/Mg/Phos, coags, covid, T+S  - D5LR @75cc/h x12 hr

## 2022-08-12 ENCOUNTER — TRANSCRIPTION ENCOUNTER (OUTPATIENT)
Age: 57
End: 2022-08-12

## 2022-08-12 LAB
ALBUMIN SERPL ELPH-MCNC: 3.3 G/DL — SIGNIFICANT CHANGE UP (ref 3.3–5)
ALP SERPL-CCNC: 122 U/L — HIGH (ref 40–120)
ALT FLD-CCNC: 25 U/L — SIGNIFICANT CHANGE UP (ref 10–45)
ANION GAP SERPL CALC-SCNC: 14 MMOL/L — SIGNIFICANT CHANGE UP (ref 5–17)
AST SERPL-CCNC: 21 U/L — SIGNIFICANT CHANGE UP (ref 10–40)
BASOPHILS # BLD AUTO: 0.02 K/UL — SIGNIFICANT CHANGE UP (ref 0–0.2)
BASOPHILS NFR BLD AUTO: 0.4 % — SIGNIFICANT CHANGE UP (ref 0–2)
BILIRUB SERPL-MCNC: 0.2 MG/DL — SIGNIFICANT CHANGE UP (ref 0.2–1.2)
BUN SERPL-MCNC: 9 MG/DL — SIGNIFICANT CHANGE UP (ref 7–23)
CALCIUM SERPL-MCNC: 8.6 MG/DL — SIGNIFICANT CHANGE UP (ref 8.4–10.5)
CHLORIDE SERPL-SCNC: 106 MMOL/L — SIGNIFICANT CHANGE UP (ref 96–108)
CO2 SERPL-SCNC: 27 MMOL/L — SIGNIFICANT CHANGE UP (ref 22–31)
CREAT SERPL-MCNC: <0.3 MG/DL — LOW (ref 0.5–1.3)
EGFR: 138 ML/MIN/1.73M2 — SIGNIFICANT CHANGE UP
EOSINOPHIL # BLD AUTO: 0.04 K/UL — SIGNIFICANT CHANGE UP (ref 0–0.5)
EOSINOPHIL NFR BLD AUTO: 0.8 % — SIGNIFICANT CHANGE UP (ref 0–6)
GLUCOSE SERPL-MCNC: 111 MG/DL — HIGH (ref 70–99)
HCT VFR BLD CALC: 33.2 % — LOW (ref 39–50)
HGB BLD-MCNC: 10.6 G/DL — LOW (ref 13–17)
IMM GRANULOCYTES NFR BLD AUTO: 0.4 % — SIGNIFICANT CHANGE UP (ref 0–1.5)
LYMPHOCYTES # BLD AUTO: 0.76 K/UL — LOW (ref 1–3.3)
LYMPHOCYTES # BLD AUTO: 15.8 % — SIGNIFICANT CHANGE UP (ref 13–44)
MAGNESIUM SERPL-MCNC: 1.8 MG/DL — SIGNIFICANT CHANGE UP (ref 1.6–2.6)
MCHC RBC-ENTMCNC: 28.2 PG — SIGNIFICANT CHANGE UP (ref 27–34)
MCHC RBC-ENTMCNC: 31.9 GM/DL — LOW (ref 32–36)
MCV RBC AUTO: 88.3 FL — SIGNIFICANT CHANGE UP (ref 80–100)
MONOCYTES # BLD AUTO: 0.28 K/UL — SIGNIFICANT CHANGE UP (ref 0–0.9)
MONOCYTES NFR BLD AUTO: 5.8 % — SIGNIFICANT CHANGE UP (ref 2–14)
NEUTROPHILS # BLD AUTO: 3.69 K/UL — SIGNIFICANT CHANGE UP (ref 1.8–7.4)
NEUTROPHILS NFR BLD AUTO: 76.8 % — SIGNIFICANT CHANGE UP (ref 43–77)
NRBC # BLD: 0 /100 WBCS — SIGNIFICANT CHANGE UP (ref 0–0)
PHOSPHATE SERPL-MCNC: 2.9 MG/DL — SIGNIFICANT CHANGE UP (ref 2.5–4.5)
PLATELET # BLD AUTO: 205 K/UL — SIGNIFICANT CHANGE UP (ref 150–400)
POTASSIUM SERPL-MCNC: 3.1 MMOL/L — LOW (ref 3.5–5.3)
POTASSIUM SERPL-SCNC: 3.1 MMOL/L — LOW (ref 3.5–5.3)
PROT SERPL-MCNC: 6.3 G/DL — SIGNIFICANT CHANGE UP (ref 6–8.3)
RBC # BLD: 3.76 M/UL — LOW (ref 4.2–5.8)
RBC # FLD: 13.8 % — SIGNIFICANT CHANGE UP (ref 10.3–14.5)
SARS-COV-2 RNA SPEC QL NAA+PROBE: SIGNIFICANT CHANGE UP
SODIUM SERPL-SCNC: 147 MMOL/L — HIGH (ref 135–145)
WBC # BLD: 4.81 K/UL — SIGNIFICANT CHANGE UP (ref 3.8–10.5)
WBC # FLD AUTO: 4.81 K/UL — SIGNIFICANT CHANGE UP (ref 3.8–10.5)

## 2022-08-12 PROCEDURE — 99232 SBSQ HOSP IP/OBS MODERATE 35: CPT | Mod: GC

## 2022-08-12 PROCEDURE — 99231 SBSQ HOSP IP/OBS SF/LOW 25: CPT

## 2022-08-12 RX ORDER — LACOSAMIDE 50 MG/1
200 TABLET ORAL
Refills: 0 | Status: DISCONTINUED | OUTPATIENT
Start: 2022-08-12 | End: 2022-08-15

## 2022-08-12 RX ORDER — ENOXAPARIN SODIUM 100 MG/ML
30 INJECTION SUBCUTANEOUS EVERY 24 HOURS
Refills: 0 | Status: DISCONTINUED | OUTPATIENT
Start: 2022-08-12 | End: 2022-08-15

## 2022-08-12 RX ORDER — PANTOPRAZOLE SODIUM 20 MG/1
40 TABLET, DELAYED RELEASE ORAL DAILY
Refills: 0 | Status: DISCONTINUED | OUTPATIENT
Start: 2022-08-12 | End: 2022-08-15

## 2022-08-12 RX ORDER — POTASSIUM CHLORIDE 20 MEQ
10 PACKET (EA) ORAL
Refills: 0 | Status: COMPLETED | OUTPATIENT
Start: 2022-08-12 | End: 2022-08-12

## 2022-08-12 RX ORDER — ASPIRIN/CALCIUM CARB/MAGNESIUM 324 MG
81 TABLET ORAL DAILY
Refills: 0 | Status: DISCONTINUED | OUTPATIENT
Start: 2022-08-12 | End: 2022-08-15

## 2022-08-12 RX ORDER — BACLOFEN 100 %
5 POWDER (GRAM) MISCELLANEOUS EVERY 12 HOURS
Refills: 0 | Status: DISCONTINUED | OUTPATIENT
Start: 2022-08-12 | End: 2022-08-15

## 2022-08-12 RX ORDER — SERTRALINE 25 MG/1
25 TABLET, FILM COATED ORAL DAILY
Refills: 0 | Status: DISCONTINUED | OUTPATIENT
Start: 2022-08-12 | End: 2022-08-15

## 2022-08-12 RX ORDER — LEVETIRACETAM 250 MG/1
500 TABLET, FILM COATED ORAL
Refills: 0 | Status: DISCONTINUED | OUTPATIENT
Start: 2022-08-12 | End: 2022-08-15

## 2022-08-12 RX ADMIN — Medication 5 MILLIGRAM(S): at 17:14

## 2022-08-12 RX ADMIN — SERTRALINE 25 MILLIGRAM(S): 25 TABLET, FILM COATED ORAL at 11:56

## 2022-08-12 RX ADMIN — PANTOPRAZOLE SODIUM 40 MILLIGRAM(S): 20 TABLET, DELAYED RELEASE ORAL at 11:56

## 2022-08-12 RX ADMIN — LEVETIRACETAM 400 MILLIGRAM(S): 250 TABLET, FILM COATED ORAL at 03:04

## 2022-08-12 RX ADMIN — LACOSAMIDE 120 MILLIGRAM(S): 50 TABLET ORAL at 04:09

## 2022-08-12 RX ADMIN — Medication 100 MILLIEQUIVALENT(S): at 11:55

## 2022-08-12 RX ADMIN — LEVETIRACETAM 500 MILLIGRAM(S): 250 TABLET, FILM COATED ORAL at 17:14

## 2022-08-12 RX ADMIN — Medication 81 MILLIGRAM(S): at 11:56

## 2022-08-12 RX ADMIN — LACOSAMIDE 200 MILLIGRAM(S): 50 TABLET ORAL at 17:15

## 2022-08-12 RX ADMIN — Medication 100 MILLIEQUIVALENT(S): at 08:43

## 2022-08-12 RX ADMIN — Medication 100 MILLIEQUIVALENT(S): at 13:32

## 2022-08-12 RX ADMIN — ENOXAPARIN SODIUM 30 MILLIGRAM(S): 100 INJECTION SUBCUTANEOUS at 11:56

## 2022-08-12 NOTE — DISCHARGE NOTE NURSING/CASE MANAGEMENT/SOCIAL WORK - PATIENT PORTAL LINK FT
You can access the FollowMyHealth Patient Portal offered by [facility] by registering at the following website: [email or web address]. By joining [facility]’s FollowMyHealth portal, you will also be able to view your health information using other applications (apps) compatible with our system.

## 2022-08-12 NOTE — DISCHARGE NOTE NURSING/CASE MANAGEMENT/SOCIAL WORK - NSDPACMPNY_GEN_ALL_CORE
Traveling alone Complex Repair And Epidermal Autograft Text: The defect edges were debeveled with a #15 scalpel blade.  The primary defect was closed partially with a complex linear closure.  Given the location of the defect, shape of the defect and the proximity to free margins an epidermal autograft was deemed most appropriate to repair the remaining defect.  The graft was trimmed to fit the size of the remaining defect.  The graft was then placed in the primary defect, oriented appropriately, and sutured into place.

## 2022-08-12 NOTE — PROGRESS NOTE ADULT - SUBJECTIVE AND OBJECTIVE BOX
Quintin Wong  PGY-1 Resident Physician     Patient is a 56y old  Male who presents with a chief complaint of displaced PEG tube (12 Aug 2022 10:07)      SUBJECTIVE / OVERNIGHT EVENTS:  NAEON. Site of tube exchange is healing well w/o leaking. ROS unable to obtain as pt is nonverbal at baseline.      MEDICATIONS  (STANDING):  aspirin  chewable 81 milliGRAM(s) Oral daily  baclofen 5 milliGRAM(s) Oral every 12 hours  enoxaparin Injectable 30 milliGRAM(s) SubCutaneous every 24 hours  lacosamide Solution 200 milliGRAM(s) Oral two times a day  levETIRAcetam  Solution 500 milliGRAM(s) Oral two times a day  pantoprazole   Suspension 40 milliGRAM(s) Oral daily  potassium chloride  10 mEq/100 mL IVPB 10 milliEquivalent(s) IV Intermittent every 1 hour  sertraline 25 milliGRAM(s) Oral daily    MEDICATIONS  (PRN):    Allergies    Ativan (Unknown)  phenytoin (Unknown)  Valproate Sodium (Other (Severe))    Intolerances        Vital Signs Last 24 Hrs  T(C): 36.4 (12 Aug 2022 05:10), Max: 37.3 (11 Aug 2022 21:29)  T(F): 97.6 (12 Aug 2022 05:10), Max: 99.2 (11 Aug 2022 21:29)  HR: 68 (12 Aug 2022 05:10) (68 - 83)  BP: 104/65 (12 Aug 2022 05:10) (104/65 - 141/83)  BP(mean): --  RR: 18 (12 Aug 2022 05:10) (18 - 19)  SpO2: 100% (12 Aug 2022 05:10) (96% - 100%)    Parameters below as of 12 Aug 2022 05:10  Patient On (Oxygen Delivery Method): room air      Daily Height in cm: 170.18 (11 Aug 2022 13:44)    Daily   I&O's Summary    11 Aug 2022 07:01  -  12 Aug 2022 07:00  --------------------------------------------------------  IN: 650 mL / OUT: 101 mL / NET: 549 mL        PHYSICAL EXAM:  GENERAL: NAD, well-developed  HEAD:  Atraumatic, Normocephalic  EYES: EOMI, PERRLA, conjunctiva and sclera clear  NECK: Supple, No JVD  CHEST/LUNG: Clear to auscultation bilaterally; No wheeze  HEART: Regular rate and rhythm; Normal S1 S2, No murmurs, rubs, or gallops  ABDOMEN: Soft, Nontender, Nondistended; Bowel sounds present  EXTREMITIES:  2+ Peripheral Pulses, No clubbing, cyanosis, or edema  PSYCH: AAOx3  NEUROLOGY: non-focal  SKIN: No rashes or lesions    DIAGNOSTICS:                         10.6   4.81  )-----------( 205      ( 12 Aug 2022 06:40 )             33.2     Hgb Trend: 10.6<--, 11.8<--, 11.3<--  08-12    147<H>  |  106  |  9   ----------------------------<  111<H>  3.1<L>   |  27  |  <0.30<L>    Ca    8.6      12 Aug 2022 06:42  Phos  2.9     08-12  Mg     1.8     08-12    TPro  6.3  /  Alb  3.3  /  TBili  0.2  /  DBili  x   /  AST  21  /  ALT  25  /  AlkPhos  122<H>  08-12    CAPILLARY BLOOD GLUCOSE        Creatinine Trend: <0.30<--, <0.30<--, <0.30<--  LIVER FUNCTIONS - ( 12 Aug 2022 06:42 )  Alb: 3.3 g/dL / Pro: 6.3 g/dL / ALK PHOS: 122 U/L / ALT: 25 U/L / AST: 21 U/L / GGT: x           PT/INR - ( 11 Aug 2022 09:09 )   PT: 14.6 sec;   INR: 1.26 ratio

## 2022-08-12 NOTE — PROGRESS NOTE ADULT - PROBLEM SELECTOR PLAN 1
Transferred for leaking peg tube. IR will replace tube on 8/11. Diet: per prior admission: pt on Jevity 1.5 continuous feeds 50 cc/h with 35cc/h free water flush, with no carb prosource TF three times a day. MVI with minerals, Vitamin C 500 mg BID. S/p GJ tube exchange.   - r/s home seizure meds; tolerating well Transferred for leaking peg tube. IR will replace tube on 8/11. Diet: per prior admission: pt on Jevity 1.5 continuous feeds 50 cc/h with 35cc/h free water flush, with no carb prosource TF three times a day. MVI with minerals, Vitamin C 500 mg BID. S/p GJ tube exchange.   - r/s home seizure meds; tolerating well  - IR follow up in 4 weeks for GJ tube exchange & possible upsizing --> will place number in discharge

## 2022-08-12 NOTE — DISCHARGE NOTE NURSING/CASE MANAGEMENT/SOCIAL WORK - NSDCPNINST_GEN_ALL_CORE
sacral suspected DTI 8 x 3.5 x 0 (clean with saline, cavilon , allevyn foam)  L. Knee suspected DTI (3 x 4 x 0 (clean with saline, cavilon, allevyn foam).  sacral suspected DTI 8 x 3.5 x 0 (clean with saline, cavilon , allevyn foam)  L. Knee suspected DTI (3 x 4 x 0 (clean with saline, cavilon, allevyn foam).     *erythema to PEGJ site- place xeroform and bacitracin around to help occulde (dr. arizmendi placed suture at insertion site). -site needs time to form more granulation tissue.  -plan to upside in 2-4 weeks  "long standing issue of leaking for months. this is our attempt to stop leaking"

## 2022-08-12 NOTE — PROGRESS NOTE ADULT - SUBJECTIVE AND OBJECTIVE BOX
Interventional Radiology Follow-Up Note.    Patient seen and examined @ bedside.    This is a 56y Male s/p Gastrojejunostomy Exchange on 8/11/22 in Interventional Radiology with Dr. Aguilar.     No complaint offered.          Vitals:  T(F): 97.6, Max: 99.2 (21:29)  HR: 68  BP: 104/65  RR: 18  SpO2: 100%    Physical Exam:  General: Nontoxic, in NAD.  Abdomen: soft, NTND. +GJ tube attached to feeds, dressing c/d/i        LABS:  Na: 147 (08-12 @ 06:42), 145 (08-11 @ 09:09), 144 (08-10 @ 01:46)  K: 3.1 (08-12 @ 06:42), 3.5 (08-11 @ 09:09), 3.5 (08-10 @ 01:46)  Cl: 106 (08-12 @ 06:42), 107 (08-11 @ 09:09), 105 (08-10 @ 01:46)  CO2: 27 (08-12 @ 06:42), 26 (08-11 @ 09:09), 23 (08-10 @ 01:46)  BUN: 9 (08-12 @ 06:42), 8 (08-11 @ 09:09), 14 (08-10 @ 01:46)  Cr: <0.30 (08-12 @ 06:42), <0.30 (08-11 @ 09:09), <0.30 (08-10 @ 01:46)  Glu: 111(08-12 @ 06:42), 94(08-11 @ 09:09), 68(08-10 @ 01:46)    Hgb: 10.6 (08-12 @ 06:40), 11.8 (08-11 @ 09:09), 11.3 (08-10 @ 01:46)  Hct: 33.2 (08-12 @ 06:40), 36.7 (08-11 @ 09:09), 34.2 (08-10 @ 01:46)  WBC: 4.81 (08-12 @ 06:40), 7.91 (08-11 @ 09:09), 6.42 (08-10 @ 01:46)  Plt: 205 (08-12 @ 06:40), 268 (08-11 @ 09:09), 288 (08-10 @ 01:46)    INR: 1.26 08-11-22 @ 09:09, 1.21 08-10-22 @ 04:19  PTT: 27.6 08-10-22 @ 04:19          LIVER FUNCTIONS - ( 12 Aug 2022 06:42 )  Alb: 3.3 g/dL / Pro: 6.3 g/dL / ALK PHOS: 122 U/L / ALT: 25 U/L / AST: 21 U/L / GGT: x                 Bilirubin Total, Serum: 0.2 mg/dL (08-12-22 @ 06:42)    Aspartate Aminotransferase (AST/SGOT): 21 U/L (08-12-22 @ 06:42)  Alanine Aminotransferase (ALT/SGPT): 25 U/L (08-12-22 @ 06:42)  Aspartate Aminotransferase (AST/SGOT): 27 U/L (08-11-22 @ 09:09)  Alanine Aminotransferase (ALT/SGPT): 33 U/L (08-11-22 @ 09:09)      Bilirubin Total, Serum: 0.2 mg/dL (08-12-22 @ 06:42)  Bilirubin Total, Serum: 0.3 mg/dL (08-11-22 @ 09:09)  Bilirubin Total, Serum: 0.3 mg/dL (08-10-22 @ 01:46)          Assessment/Plan:  56y Male admitted with Other mechanical complication of other specified internal prosthetic devices, implants and grafts, initial encounter    Pt most recently s/p Gastrojejunostomy Exchange on 8/11/22 in Interventional Radiology.     - Plan: Allow granulation tissue to form around the smaller tube and upsize again at the next exchange as needed for resolution of leakage.  - Trend vs/labs  - Continue global management per primary team  - Pt can make an appointment with IR by calling the IR booking office at [phone]; recommend IR follow every 3-4 months for routine exchange.  - Pt will benefit from VNS service to help with drainage catheter care; Pt should continue same drainage catheter care as an outpatient.  - IR will sign off     Please call IR at [phone] with any questions, concerns, or issues regarding above.    Also available on [employer] TEAMS.   Interventional Radiology Follow-Up Note.    Patient seen and examined @ bedside.    This is a 56y Male s/p Gastrojejunostomy Exchange on 8/11/22 in Interventional Radiology with Dr. Aguilar.     No complaint offered.          Vitals:  T(F): 97.6, Max: 99.2 (21:29)  HR: 68  BP: 104/65  RR: 18  SpO2: 100%    Physical Exam:  General: Nontoxic, in NAD.  Abdomen: soft, NTND. +GJ tube attached to feeds, dressing c/d/i        LABS:  Na: 147 (08-12 @ 06:42), 145 (08-11 @ 09:09), 144 (08-10 @ 01:46)  K: 3.1 (08-12 @ 06:42), 3.5 (08-11 @ 09:09), 3.5 (08-10 @ 01:46)  Cl: 106 (08-12 @ 06:42), 107 (08-11 @ 09:09), 105 (08-10 @ 01:46)  CO2: 27 (08-12 @ 06:42), 26 (08-11 @ 09:09), 23 (08-10 @ 01:46)  BUN: 9 (08-12 @ 06:42), 8 (08-11 @ 09:09), 14 (08-10 @ 01:46)  Cr: <0.30 (08-12 @ 06:42), <0.30 (08-11 @ 09:09), <0.30 (08-10 @ 01:46)  Glu: 111(08-12 @ 06:42), 94(08-11 @ 09:09), 68(08-10 @ 01:46)    Hgb: 10.6 (08-12 @ 06:40), 11.8 (08-11 @ 09:09), 11.3 (08-10 @ 01:46)  Hct: 33.2 (08-12 @ 06:40), 36.7 (08-11 @ 09:09), 34.2 (08-10 @ 01:46)  WBC: 4.81 (08-12 @ 06:40), 7.91 (08-11 @ 09:09), 6.42 (08-10 @ 01:46)  Plt: 205 (08-12 @ 06:40), 268 (08-11 @ 09:09), 288 (08-10 @ 01:46)    INR: 1.26 08-11-22 @ 09:09, 1.21 08-10-22 @ 04:19  PTT: 27.6 08-10-22 @ 04:19          LIVER FUNCTIONS - ( 12 Aug 2022 06:42 )  Alb: 3.3 g/dL / Pro: 6.3 g/dL / ALK PHOS: 122 U/L / ALT: 25 U/L / AST: 21 U/L / GGT: x                 Bilirubin Total, Serum: 0.2 mg/dL (08-12-22 @ 06:42)    Aspartate Aminotransferase (AST/SGOT): 21 U/L (08-12-22 @ 06:42)  Alanine Aminotransferase (ALT/SGPT): 25 U/L (08-12-22 @ 06:42)  Aspartate Aminotransferase (AST/SGOT): 27 U/L (08-11-22 @ 09:09)  Alanine Aminotransferase (ALT/SGPT): 33 U/L (08-11-22 @ 09:09)      Bilirubin Total, Serum: 0.2 mg/dL (08-12-22 @ 06:42)  Bilirubin Total, Serum: 0.3 mg/dL (08-11-22 @ 09:09)  Bilirubin Total, Serum: 0.3 mg/dL (08-10-22 @ 01:46)          Assessment/Plan: 56y Male with leaking G tube/ dislodgement. G tube placed in 2/2022 by GI. IR consulted for GJ tube replacement. S/p Exchange at [facility] 2 weeks ago.Pt most recently s/p Gastrojejunostomy Exchange on 8/11/22 in Interventional Radiology.     - Plan: Allow granulation tissue to form around the smaller tube and upsize again at the next exchange as needed for resolution of leakage. follow up in 2-4 weeks.  - Trend vs/labs  - Continue global management per primary team  - Pt can make an appointment with IR by calling the IR booking office at [phone]; recommend IR follow in 2-4 weeks for routine exchange.  - Pt will benefit from VNS service to help with drainage catheter care; Pt should continue same drainage catheter care as an outpatient.  - IR will sign off     Please call IR at [phone] with any questions, concerns, or issues regarding above.    Also available on [employer] TEAMS.

## 2022-08-13 PROCEDURE — 99239 HOSP IP/OBS DSCHRG MGMT >30: CPT | Mod: GC

## 2022-08-13 RX ADMIN — PANTOPRAZOLE SODIUM 40 MILLIGRAM(S): 20 TABLET, DELAYED RELEASE ORAL at 11:03

## 2022-08-13 RX ADMIN — Medication 5 MILLIGRAM(S): at 17:22

## 2022-08-13 RX ADMIN — ENOXAPARIN SODIUM 30 MILLIGRAM(S): 100 INJECTION SUBCUTANEOUS at 11:02

## 2022-08-13 RX ADMIN — LACOSAMIDE 200 MILLIGRAM(S): 50 TABLET ORAL at 05:45

## 2022-08-13 RX ADMIN — LEVETIRACETAM 500 MILLIGRAM(S): 250 TABLET, FILM COATED ORAL at 05:45

## 2022-08-13 RX ADMIN — LEVETIRACETAM 500 MILLIGRAM(S): 250 TABLET, FILM COATED ORAL at 17:22

## 2022-08-13 RX ADMIN — SERTRALINE 25 MILLIGRAM(S): 25 TABLET, FILM COATED ORAL at 11:03

## 2022-08-13 RX ADMIN — LACOSAMIDE 200 MILLIGRAM(S): 50 TABLET ORAL at 17:21

## 2022-08-13 RX ADMIN — Medication 5 MILLIGRAM(S): at 05:45

## 2022-08-13 RX ADMIN — Medication 81 MILLIGRAM(S): at 11:02

## 2022-08-13 NOTE — PROGRESS NOTE ADULT - SUBJECTIVE AND OBJECTIVE BOX
Quintin Wong  PGY-1 Resident Physician     Patient is a 56y old  Male who presents with a chief complaint of displaced PEG tube (12 Aug 2022 10:07)      SUBJECTIVE / OVERNIGHT EVENTS:    MEDICATIONS  (STANDING):  aspirin  chewable 81 milliGRAM(s) Oral daily  baclofen 5 milliGRAM(s) Oral every 12 hours  enoxaparin Injectable 30 milliGRAM(s) SubCutaneous every 24 hours  lacosamide Solution 200 milliGRAM(s) Oral two times a day  levETIRAcetam  Solution 500 milliGRAM(s) Oral two times a day  pantoprazole   Suspension 40 milliGRAM(s) Oral daily  sertraline 25 milliGRAM(s) Oral daily    MEDICATIONS  (PRN):    Allergies    Ativan (Unknown)  phenytoin (Unknown)  Valproate Sodium (Other (Severe))    Intolerances        Vital Signs Last 24 Hrs  T(C): 36.2 (13 Aug 2022 04:07), Max: 36.7 (12 Aug 2022 14:01)  T(F): 97.1 (13 Aug 2022 04:07), Max: 98.1 (12 Aug 2022 14:01)  HR: 75 (13 Aug 2022 04:07) (63 - 78)  BP: 103/70 (13 Aug 2022 04:07) (103/70 - 132/74)  BP(mean): --  RR: 18 (13 Aug 2022 04:07) (18 - 20)  SpO2: 96% (13 Aug 2022 04:07) (94% - 97%)    Parameters below as of 13 Aug 2022 04:07  Patient On (Oxygen Delivery Method): room air      Daily     Daily   I&O's Summary    12 Aug 2022 07:01  -  13 Aug 2022 07:00  --------------------------------------------------------  IN: 0 mL / OUT: 600 mL / NET: -600 mL        PHYSICAL EXAM:  GENERAL: NAD, well-developed  HEAD:  Atraumatic, Normocephalic  EYES: EOMI, PERRLA, conjunctiva and sclera clear  NECK: Supple, No JVD  CHEST/LUNG: Clear to auscultation bilaterally; No wheeze  HEART: Regular rate and rhythm; Normal S1 S2, No murmurs, rubs, or gallops  ABDOMEN: Soft, Nontender, Nondistended; Bowel sounds present  EXTREMITIES:  2+ Peripheral Pulses, No clubbing, cyanosis, or edema  PSYCH: AAOx3  NEUROLOGY: non-focal  SKIN: No rashes or lesions    DIAGNOSTICS:                         10.6   4.81  )-----------( 205      ( 12 Aug 2022 06:40 )             33.2     Hgb Trend: 10.6<--, 11.8<--, 11.3<--  08-12    147<H>  |  106  |  9   ----------------------------<  111<H>  3.1<L>   |  27  |  <0.30<L>    Ca    8.6      12 Aug 2022 06:42  Phos  2.9     08-12  Mg     1.8     08-12    TPro  6.3  /  Alb  3.3  /  TBili  0.2  /  DBili  x   /  AST  21  /  ALT  25  /  AlkPhos  122<H>  08-12    CAPILLARY BLOOD GLUCOSE        Creatinine Trend: <0.30<--, <0.30<--, <0.30<--  LIVER FUNCTIONS - ( 12 Aug 2022 06:42 )  Alb: 3.3 g/dL / Pro: 6.3 g/dL / ALK PHOS: 122 U/L / ALT: 25 U/L / AST: 21 U/L / GGT: x                        Quintin Wong  PGY-1 Resident Physician     Patient is a 56y old  Male who presents with a chief complaint of displaced PEG tube (12 Aug 2022 10:07)      SUBJECTIVE / OVERNIGHT EVENTS:  NAEON. ABdominal binding in tact. No noted leak from GJ tube. Pt A&O x 0; unable to assess ROS  MEDICATIONS  (STANDING):  aspirin  chewable 81 milliGRAM(s) Oral daily  baclofen 5 milliGRAM(s) Oral every 12 hours  enoxaparin Injectable 30 milliGRAM(s) SubCutaneous every 24 hours  lacosamide Solution 200 milliGRAM(s) Oral two times a day  levETIRAcetam  Solution 500 milliGRAM(s) Oral two times a day  pantoprazole   Suspension 40 milliGRAM(s) Oral daily  sertraline 25 milliGRAM(s) Oral daily    MEDICATIONS  (PRN):    Allergies    Ativan (Unknown)  phenytoin (Unknown)  Valproate Sodium (Other (Severe))    Intolerances        Vital Signs Last 24 Hrs  T(C): 36.2 (13 Aug 2022 04:07), Max: 36.7 (12 Aug 2022 14:01)  T(F): 97.1 (13 Aug 2022 04:07), Max: 98.1 (12 Aug 2022 14:01)  HR: 75 (13 Aug 2022 04:07) (63 - 78)  BP: 103/70 (13 Aug 2022 04:07) (103/70 - 132/74)  BP(mean): --  RR: 18 (13 Aug 2022 04:07) (18 - 20)  SpO2: 96% (13 Aug 2022 04:07) (94% - 97%)    Parameters below as of 13 Aug 2022 04:07  Patient On (Oxygen Delivery Method): room air      Daily     Daily   I&O's Summary    12 Aug 2022 07:01  -  13 Aug 2022 07:00  --------------------------------------------------------  IN: 0 mL / OUT: 600 mL / NET: -600 mL        PHYSICAL EXAM:  GENERAL: NAD, well-developed  HEAD:  Atraumatic, Normocephalic  EYES: EOMI, PERRLA, conjunctiva and sclera clear  NECK: Supple, No JVD  CHEST/LUNG: Clear to auscultation bilaterally; No wheeze  HEART: Regular rate and rhythm; Normal S1 S2, No murmurs, rubs, or gallops  ABDOMEN: Soft, Nontender, Nondistended; Bowel sounds present. GJ tube w/o leaks  EXTREMITIES:  2+ Peripheral Pulses, No clubbing, cyanosis, or edema. Noted contractures in UE  PSYCH: AAOx0  NEUROLOGY: non-focal  SKIN: No rashes or lesions    DIAGNOSTICS:                         10.6   4.81  )-----------( 205      ( 12 Aug 2022 06:40 )             33.2     Hgb Trend: 10.6<--, 11.8<--, 11.3<--  08-12    147<H>  |  106  |  9   ----------------------------<  111<H>  3.1<L>   |  27  |  <0.30<L>    Ca    8.6      12 Aug 2022 06:42  Phos  2.9     08-12  Mg     1.8     08-12    TPro  6.3  /  Alb  3.3  /  TBili  0.2  /  DBili  x   /  AST  21  /  ALT  25  /  AlkPhos  122<H>  08-12    CAPILLARY BLOOD GLUCOSE        Creatinine Trend: <0.30<--, <0.30<--, <0.30<--  LIVER FUNCTIONS - ( 12 Aug 2022 06:42 )  Alb: 3.3 g/dL / Pro: 6.3 g/dL / ALK PHOS: 122 U/L / ALT: 25 U/L / AST: 21 U/L / GGT: x

## 2022-08-13 NOTE — PROGRESS NOTE ADULT - PROBLEM SELECTOR PLAN 4
FEN/GI: strict NPO, D5LR @75cc/h x12 h  Lines: PIV (in right lower extremity)  PPx: lovenox  Dispo: admit to medicine, PT consult, likely back to group home pending clinical course  Code Status: full code, pending discussion FEN/GI: strict NPO, D5LR @75cc/h x12 h  Lines: PIV (in right lower extremity)  PPx: lovenox  Dispo: transfer to [place]  Code Status: full code, pending discussion

## 2022-08-13 NOTE — PROGRESS NOTE ADULT - PROBLEM SELECTOR PLAN 1
Transferred for leaking peg tube. IR will replace tube on 8/11. Diet: per prior admission: pt on Jevity 1.5 continuous feeds 50 cc/h with 35cc/h free water flush, with no carb prosource TF three times a day. MVI with minerals, Vitamin C 500 mg BID. S/p GJ tube exchange.   - r/s home seizure meds; tolerating well  - IR follow up in 4 weeks for GJ tube exchange & possible upsizing --> will place number in discharge

## 2022-08-14 PROCEDURE — 71045 X-RAY EXAM CHEST 1 VIEW: CPT | Mod: 26

## 2022-08-14 PROCEDURE — 99232 SBSQ HOSP IP/OBS MODERATE 35: CPT | Mod: GC

## 2022-08-14 PROCEDURE — 74018 RADEX ABDOMEN 1 VIEW: CPT | Mod: 26

## 2022-08-14 RX ORDER — MORPHINE SULFATE 50 MG/1
2 CAPSULE, EXTENDED RELEASE ORAL ONCE
Refills: 0 | Status: DISCONTINUED | OUTPATIENT
Start: 2022-08-14 | End: 2022-08-14

## 2022-08-14 RX ORDER — PETROLATUM,WHITE
1 JELLY (GRAM) TOPICAL
Refills: 0 | Status: DISCONTINUED | OUTPATIENT
Start: 2022-08-14 | End: 2022-08-15

## 2022-08-14 RX ORDER — MORPHINE SULFATE 50 MG/1
15 CAPSULE, EXTENDED RELEASE ORAL THREE TIMES A DAY
Refills: 0 | Status: DISCONTINUED | OUTPATIENT
Start: 2022-08-14 | End: 2022-08-15

## 2022-08-14 RX ORDER — ACETAMINOPHEN 500 MG
650 TABLET ORAL EVERY 6 HOURS
Refills: 0 | Status: DISCONTINUED | OUTPATIENT
Start: 2022-08-14 | End: 2022-08-15

## 2022-08-14 RX ADMIN — Medication 1 APPLICATION(S): at 23:10

## 2022-08-14 RX ADMIN — MORPHINE SULFATE 2 MILLIGRAM(S): 50 CAPSULE, EXTENDED RELEASE ORAL at 12:33

## 2022-08-14 RX ADMIN — MORPHINE SULFATE 2 MILLIGRAM(S): 50 CAPSULE, EXTENDED RELEASE ORAL at 09:26

## 2022-08-14 RX ADMIN — SERTRALINE 25 MILLIGRAM(S): 25 TABLET, FILM COATED ORAL at 11:17

## 2022-08-14 RX ADMIN — ENOXAPARIN SODIUM 30 MILLIGRAM(S): 100 INJECTION SUBCUTANEOUS at 11:17

## 2022-08-14 RX ADMIN — Medication 1 APPLICATION(S): at 08:07

## 2022-08-14 RX ADMIN — MORPHINE SULFATE 2 MILLIGRAM(S): 50 CAPSULE, EXTENDED RELEASE ORAL at 08:10

## 2022-08-14 RX ADMIN — MORPHINE SULFATE 2 MILLIGRAM(S): 50 CAPSULE, EXTENDED RELEASE ORAL at 13:24

## 2022-08-14 RX ADMIN — Medication 1 APPLICATION(S): at 21:12

## 2022-08-14 RX ADMIN — Medication 1 APPLICATION(S): at 16:28

## 2022-08-14 RX ADMIN — Medication 1 APPLICATION(S): at 05:55

## 2022-08-14 RX ADMIN — Medication 650 MILLIGRAM(S): at 01:29

## 2022-08-14 RX ADMIN — LEVETIRACETAM 500 MILLIGRAM(S): 250 TABLET, FILM COATED ORAL at 17:08

## 2022-08-14 RX ADMIN — LEVETIRACETAM 500 MILLIGRAM(S): 250 TABLET, FILM COATED ORAL at 05:53

## 2022-08-14 RX ADMIN — Medication 1 APPLICATION(S): at 11:18

## 2022-08-14 RX ADMIN — MORPHINE SULFATE 15 MILLIGRAM(S): 50 CAPSULE, EXTENDED RELEASE ORAL at 18:25

## 2022-08-14 RX ADMIN — Medication 5 MILLIGRAM(S): at 17:08

## 2022-08-14 RX ADMIN — PANTOPRAZOLE SODIUM 40 MILLIGRAM(S): 20 TABLET, DELAYED RELEASE ORAL at 11:17

## 2022-08-14 RX ADMIN — MORPHINE SULFATE 15 MILLIGRAM(S): 50 CAPSULE, EXTENDED RELEASE ORAL at 23:02

## 2022-08-14 RX ADMIN — LACOSAMIDE 200 MILLIGRAM(S): 50 TABLET ORAL at 05:53

## 2022-08-14 RX ADMIN — Medication 1 APPLICATION(S): at 16:25

## 2022-08-14 RX ADMIN — Medication 5 MILLIGRAM(S): at 05:53

## 2022-08-14 RX ADMIN — LACOSAMIDE 200 MILLIGRAM(S): 50 TABLET ORAL at 17:08

## 2022-08-14 RX ADMIN — MORPHINE SULFATE 15 MILLIGRAM(S): 50 CAPSULE, EXTENDED RELEASE ORAL at 17:07

## 2022-08-14 RX ADMIN — Medication 81 MILLIGRAM(S): at 11:18

## 2022-08-14 RX ADMIN — MORPHINE SULFATE 15 MILLIGRAM(S): 50 CAPSULE, EXTENDED RELEASE ORAL at 23:42

## 2022-08-14 RX ADMIN — Medication 650 MILLIGRAM(S): at 01:59

## 2022-08-14 RX ADMIN — Medication 1 APPLICATION(S): at 17:09

## 2022-08-14 NOTE — PROGRESS NOTE ADULT - SUBJECTIVE AND OBJECTIVE BOX
Quintin Wong  PGY-1 Resident Physician     Patient is a 56y old  Male who presents with a chief complaint of displaced PEG tube (13 Aug 2022 09:14)      SUBJECTIVE / OVERNIGHT EVENTS:  Reported significant d/c from GJ tube per nursing. Stopped feeds. During initial inspection of site, no d/c noted. Pt is unable to answer interview questions, but is visibly in pain; he was receptive to morphine which controlled pain well. Pain likely secondary to recent procedure. Pt is otherwise doing well.     MEDICATIONS  (STANDING):  AQUAPHOR (petrolatum Ointment) 1 Application(s) Topical five times a day  aspirin  chewable 81 milliGRAM(s) Oral daily  baclofen 5 milliGRAM(s) Oral every 12 hours  enoxaparin Injectable 30 milliGRAM(s) SubCutaneous every 24 hours  lacosamide Solution 200 milliGRAM(s) Oral two times a day  levETIRAcetam  Solution 500 milliGRAM(s) Oral two times a day  pantoprazole   Suspension 40 milliGRAM(s) Oral daily  sertraline 25 milliGRAM(s) Oral daily  silver sulfADIAZINE 1% Cream 1 Application(s) Topical four times a day    MEDICATIONS  (PRN):  acetaminophen     Tablet .. 650 milliGRAM(s) Oral every 6 hours PRN Mild Pain (1 - 3), Moderate Pain (4 - 6)  aluminum hydroxide/magnesium hydroxide/simethicone Suspension 30 milliLiter(s) Oral every 4 hours PRN Dyspepsia    Allergies    Ativan (Unknown)  phenytoin (Unknown)  Valproate Sodium (Other (Severe))    Intolerances        Vital Signs Last 24 Hrs  T(C): 36.8 (13 Aug 2022 20:59), Max: 36.8 (13 Aug 2022 20:59)  T(F): 98.2 (13 Aug 2022 20:59), Max: 98.2 (13 Aug 2022 20:59)  HR: 85 (13 Aug 2022 20:59) (78 - 85)  BP: 113/75 (13 Aug 2022 20:59) (113/75 - 125/83)  BP(mean): --  RR: 16 (13 Aug 2022 20:59) (16 - 16)  SpO2: 97% (13 Aug 2022 20:59) (97% - 97%)    Parameters below as of 13 Aug 2022 20:59  Patient On (Oxygen Delivery Method): room air      Daily     Daily   I&O's Summary    13 Aug 2022 07:01  -  14 Aug 2022 07:00  --------------------------------------------------------  IN: 680 mL / OUT: 175 mL / NET: 505 mL      PHYSICAL EXAM:  GENERAL: Nonverbal at baseline.   HEAD:  Atraumatic, Normocephalic  EYES: EOMI, PERRLA, conjunctiva and sclera clear  NECK: Supple, No JVD  CHEST/LUNG: Clear to auscultation bilaterally; No wheeze  HEART: Regular rate and rhythm; Normal S1 S2, No murmurs, rubs, or gallops  ABDOMEN: Soft, Nontender, Nondistended; Bowel sounds present. Noted GJ tube   EXTREMITIES:  2+ Peripheral Pulses, No clubbing, cyanosis, or edema  PSYCH: AAOx3  NEUROLOGY: non-focal  SKIN: No rashes or lesions    DIAGNOSTICS:     Hgb Trend: 10.6<--, 11.8<--, 11.3<--        CAPILLARY BLOOD GLUCOSE        Creatinine Trend: <0.30<--, <0.30<--, <0.30<--

## 2022-08-14 NOTE — PROVIDER CONTACT NOTE (OTHER) - SITUATION
Pt J tube skin was red and bleeding. Site was clean and dry 2 hours prior when assessed.
Pt. C/O abdominal pain
Pt. C/O abdominal pain
Patient noted restless verbalized he is pain pointing to his abdomen
pt w/ noticable amount of drainage (gastric/ possible feeds) around GJ site

## 2022-08-14 NOTE — PROVIDER CONTACT NOTE (OTHER) - REASON
Unable to place peripheral IVL, pt with IVL on left leg from another hospital.
J tube site/skin red and bleeding
Pt. C/O abdominal pain
c/o pain and leaking tube feeds
tube leakage
Pt. C/O abdominal pain

## 2022-08-14 NOTE — PROGRESS NOTE ADULT - PROBLEM SELECTOR PLAN 1
Transferred for leaking peg tube. IR will replace tube on 8/11. Diet: per prior admission: pt on Jevity 1.5 continuous feeds 50 cc/h with 35cc/h free water flush, with no carb prosource TF three times a day. MVI with minerals, Vitamin C 500 mg BID. S/p GJ tube exchange. Noted discharge from danay-GJ tube. Per IR no intervention needed at this time; discharge to be expected till tube exchanges   - r/s home seizure meds; tolerating well  - IR follow up in 4 weeks for GJ tube exchange & possible upsizing --> will place number in discharge

## 2022-08-14 NOTE — PROVIDER CONTACT NOTE (OTHER) - BACKGROUND
pt adx for GI tube revision
Admit dx other mechanical complication of other specified internal prosthetic devices, implants and grafts
Dx= Mechanical complication of internal prosthetic device, hx of DM, HTN, Paraplegia
same
same

## 2022-08-14 NOTE — PROVIDER CONTACT NOTE (OTHER) - DATE AND TIME:
14-Aug-2022 08:10
12-Aug-2022
14-Aug-2022 01:15
14-Aug-2022 12:15
10-Aug-2022 01:00
10-Aug-2022 18:17

## 2022-08-14 NOTE — PROVIDER CONTACT NOTE (OTHER) - ACTION/TREATMENT ORDERED:
Md ordered 4 mg of morphine. CT of the abdomen was ordered.
Seen by Dr. Jones and given morphine 2mg IVP.
Assessed by Dr. López and Given Morphine 2mg IV and done abdominal x ray.
MD made aware, as per MD, OK to use IVL, will closely monitor. IVL intact, flushes well.
MD made aware made the ff new orders Acetaminophen for pain, cream for redness surrounding peg tube area and to hold peg tube.
pending IR revisit?

## 2022-08-14 NOTE — CHART NOTE - NSCHARTNOTEFT_GEN_A_CORE
Pt noted to have significant d/c from danay-GJ tube area per nursing. Feeds Pt was evaluated at bed side and noted to have pain. Site appears to be healing appropriately. No noted d.c during inspection. Ordered abdominal xray to evaluation for free in abdomen in light of pain. Provided morphine in interim. Discussed case with IR fellow. Per fellow: at this point d/c is to be expected as granulation tissues is required to form around the site. GJ tube will be exchanged for larger tube in OP IR clinic, during which d/c should end. IR asked to be on board if flatulence was noted. Otherwise pt is clear for d/c per IR. Recommendations appreciated. Pt noted to have significant discharge from danay-GJ tube area per nursing. Feeds stopped prior PM in light of finding. Pt was evaluated at bed side and noted to have pain. Site appears to be healing appropriately. No noted d/c during inspection. Ordered abdominal xray to evaluation for free in abdomen in light of pain, which returned negative. Provided morphine in interim. Discussed case with IR fellow. Per fellow: at this point discharge is to be expected as granulation tissues is required to form around the site. GJ tube will be exchanged for larger tube in OP IR clinic, during which discharge should end. IR asked to be on board if flatulence was noted. Otherwise pt is clear for transfer per IR. Recommendations appreciated.

## 2022-08-14 NOTE — PROGRESS NOTE ADULT - PROBLEM SELECTOR PLAN 4
FEN/GI: strict NPO, D5LR @75cc/h x12 h  Lines: PIV (in right lower extremity)  PPx: lovenox  Dispo: transfer to [place]  Code Status: full code, pending discussion

## 2022-08-14 NOTE — PROVIDER CONTACT NOTE (OTHER) - ASSESSMENT
Alert and responsive, Not in any distress. Patient c/o pain, pointing to his abdomen. upon assessment peg tube area noted with redness.
Pt. is restless
Pt alert, OX2-3, not in acute form of distress.

## 2022-08-15 VITALS
HEART RATE: 82 BPM | DIASTOLIC BLOOD PRESSURE: 73 MMHG | TEMPERATURE: 98 F | OXYGEN SATURATION: 97 % | SYSTOLIC BLOOD PRESSURE: 116 MMHG | RESPIRATION RATE: 18 BRPM

## 2022-08-15 DIAGNOSIS — Z97.8 PRESENCE OF OTHER SPECIFIED DEVICES: ICD-10-CM

## 2022-08-15 PROCEDURE — U0003: CPT

## 2022-08-15 PROCEDURE — 85027 COMPLETE CBC AUTOMATED: CPT

## 2022-08-15 PROCEDURE — L8699: CPT

## 2022-08-15 PROCEDURE — 74018 RADEX ABDOMEN 1 VIEW: CPT

## 2022-08-15 PROCEDURE — U0005: CPT

## 2022-08-15 PROCEDURE — 71045 X-RAY EXAM CHEST 1 VIEW: CPT

## 2022-08-15 PROCEDURE — C1769: CPT

## 2022-08-15 PROCEDURE — 86901 BLOOD TYPING SEROLOGIC RH(D): CPT

## 2022-08-15 PROCEDURE — 86850 RBC ANTIBODY SCREEN: CPT

## 2022-08-15 PROCEDURE — 85610 PROTHROMBIN TIME: CPT

## 2022-08-15 PROCEDURE — 83735 ASSAY OF MAGNESIUM: CPT

## 2022-08-15 PROCEDURE — 86900 BLOOD TYPING SEROLOGIC ABO: CPT

## 2022-08-15 PROCEDURE — 85730 THROMBOPLASTIN TIME PARTIAL: CPT

## 2022-08-15 PROCEDURE — 84100 ASSAY OF PHOSPHORUS: CPT

## 2022-08-15 PROCEDURE — 74176 CT ABD & PELVIS W/O CONTRAST: CPT

## 2022-08-15 PROCEDURE — 49452 REPLACE G-J TUBE PERC: CPT

## 2022-08-15 PROCEDURE — 80053 COMPREHEN METABOLIC PANEL: CPT

## 2022-08-15 PROCEDURE — 85025 COMPLETE CBC W/AUTO DIFF WBC: CPT

## 2022-08-15 PROCEDURE — C1751: CPT

## 2022-08-15 PROCEDURE — 36569 INSJ PICC 5 YR+ W/O IMAGING: CPT

## 2022-08-15 PROCEDURE — C9254: CPT

## 2022-08-15 PROCEDURE — 36415 COLL VENOUS BLD VENIPUNCTURE: CPT

## 2022-08-15 PROCEDURE — 99232 SBSQ HOSP IP/OBS MODERATE 35: CPT | Mod: GC

## 2022-08-15 RX ORDER — SERTRALINE 25 MG/1
1 TABLET, FILM COATED ORAL
Qty: 0 | Refills: 0 | DISCHARGE

## 2022-08-15 RX ORDER — ENOXAPARIN SODIUM 100 MG/ML
0.4 INJECTION SUBCUTANEOUS
Qty: 0 | Refills: 0 | DISCHARGE

## 2022-08-15 RX ORDER — ENOXAPARIN SODIUM 100 MG/ML
30 INJECTION SUBCUTANEOUS
Qty: 0 | Refills: 0 | DISCHARGE
Start: 2022-08-15

## 2022-08-15 RX ORDER — LACTULOSE 10 G/15ML
30 SOLUTION ORAL
Qty: 0 | Refills: 0 | DISCHARGE

## 2022-08-15 RX ORDER — LANOLIN ALCOHOL/MO/W.PET/CERES
1 CREAM (GRAM) TOPICAL
Qty: 0 | Refills: 0 | DISCHARGE

## 2022-08-15 RX ORDER — OMEPRAZOLE 10 MG/1
1 CAPSULE, DELAYED RELEASE ORAL
Qty: 0 | Refills: 0 | DISCHARGE

## 2022-08-15 RX ORDER — BACLOFEN 100 %
1 POWDER (GRAM) MISCELLANEOUS
Qty: 0 | Refills: 0 | DISCHARGE

## 2022-08-15 RX ORDER — ASPIRIN/CALCIUM CARB/MAGNESIUM 324 MG
1 TABLET ORAL
Qty: 0 | Refills: 0 | DISCHARGE

## 2022-08-15 RX ORDER — FAMOTIDINE 10 MG/ML
1 INJECTION INTRAVENOUS
Qty: 0 | Refills: 0 | DISCHARGE

## 2022-08-15 RX ADMIN — LEVETIRACETAM 500 MILLIGRAM(S): 250 TABLET, FILM COATED ORAL at 05:08

## 2022-08-15 RX ADMIN — Medication 81 MILLIGRAM(S): at 12:01

## 2022-08-15 RX ADMIN — Medication 1 APPLICATION(S): at 12:00

## 2022-08-15 RX ADMIN — LACOSAMIDE 200 MILLIGRAM(S): 50 TABLET ORAL at 05:08

## 2022-08-15 RX ADMIN — Medication 650 MILLIGRAM(S): at 06:26

## 2022-08-15 RX ADMIN — ENOXAPARIN SODIUM 30 MILLIGRAM(S): 100 INJECTION SUBCUTANEOUS at 11:59

## 2022-08-15 RX ADMIN — PANTOPRAZOLE SODIUM 40 MILLIGRAM(S): 20 TABLET, DELAYED RELEASE ORAL at 12:01

## 2022-08-15 RX ADMIN — Medication 1 APPLICATION(S): at 05:09

## 2022-08-15 RX ADMIN — Medication 5 MILLIGRAM(S): at 05:08

## 2022-08-15 RX ADMIN — SERTRALINE 25 MILLIGRAM(S): 25 TABLET, FILM COATED ORAL at 12:01

## 2022-08-15 RX ADMIN — Medication 1 APPLICATION(S): at 08:03

## 2022-08-15 RX ADMIN — Medication 650 MILLIGRAM(S): at 05:08

## 2022-08-15 NOTE — PROGRESS NOTE ADULT - PROBLEM SELECTOR PROBLEM 1
Leaking PEG tube

## 2022-08-15 NOTE — PROGRESS NOTE ADULT - PROVIDER SPECIALTY LIST ADULT
Internal Medicine
Intervent Radiology
Internal Medicine
details…

## 2022-08-15 NOTE — PROGRESS NOTE ADULT - SUBJECTIVE AND OBJECTIVE BOX
PROGRESS NOTE:     Patient is a 56y old  Male who presents with a chief complaint of displaced PEG tube (14 Aug 2022 12:03)      SUBJECTIVE / OVERNIGHT EVENTS:  ****    ADDITIONAL REVIEW OF SYSTEMS:    MEDICATIONS  (STANDING):  AQUAPHOR (petrolatum Ointment) 1 Application(s) Topical five times a day  aspirin  chewable 81 milliGRAM(s) Oral daily  baclofen 5 milliGRAM(s) Oral every 12 hours  enoxaparin Injectable 30 milliGRAM(s) SubCutaneous every 24 hours  lacosamide Solution 200 milliGRAM(s) Oral two times a day  levETIRAcetam  Solution 500 milliGRAM(s) Oral two times a day  pantoprazole   Suspension 40 milliGRAM(s) Oral daily  sertraline 25 milliGRAM(s) Oral daily  silver sulfADIAZINE 1% Cream 1 Application(s) Topical four times a day    MEDICATIONS  (PRN):  acetaminophen     Tablet .. 650 milliGRAM(s) Oral every 6 hours PRN Mild Pain (1 - 3), Moderate Pain (4 - 6)  aluminum hydroxide/magnesium hydroxide/simethicone Suspension 30 milliLiter(s) Oral every 4 hours PRN Dyspepsia  morphine  IR 15 milliGRAM(s) Oral three times a day PRN Moderate Pain (4 - 6)      CAPILLARY BLOOD GLUCOSE        I&O's Summary    13 Aug 2022 07:01  -  14 Aug 2022 07:00  --------------------------------------------------------  IN: 680 mL / OUT: 175 mL / NET: 505 mL    14 Aug 2022 07:01  -  15 Aug 2022 06:39  --------------------------------------------------------  IN: 790 mL / OUT: 850 mL / NET: -60 mL        PHYSICAL EXAM:  Vital Signs Last 24 Hrs  T(C): 36.2 (15 Aug 2022 04:42), Max: 36.8 (14 Aug 2022 13:30)  T(F): 97.1 (15 Aug 2022 04:42), Max: 98.3 (14 Aug 2022 21:30)  HR: 83 (15 Aug 2022 04:42) (63 - 83)  BP: 118/76 (15 Aug 2022 04:42) (96/65 - 129/70)  BP(mean): --  RR: 18 (15 Aug 2022 04:42) (18 - 18)  SpO2: 94% (15 Aug 2022 04:42) (94% - 96%)    Parameters below as of 15 Aug 2022 04:42  Patient On (Oxygen Delivery Method): room air        GENERAL: No acute distress, well-developed  HEAD:  Atraumatic, Normocephalic  EYES: EOMI, PERRLA, conjunctiva and sclera clear  NECK: Supple, no lymphadenopathy, no JVD  CHEST/LUNG: CTAB; No wheezes, rales, or rhonchi  HEART: Regular rate and rhythm; No murmurs, rubs, or gallops  ABDOMEN: Soft, non-tender, non-distended; normal bowel sounds, no organomegaly  EXTREMITIES:  2+ peripheral pulses b/l, No clubbing, cyanosis, or edema  NEUROLOGY: A&O x 3, no focal deficits  SKIN: No rashes or lesions    LABS:                      RADIOLOGY & ADDITIONAL TESTS:  Results Reviewed:   Imaging Personally Reviewed:  Electrocardiogram Personally Reviewed:    COORDINATION OF CARE:  Care Discussed with Consultants/Other Providers [Y/N]:  Prior or Outpatient Records Reviewed [Y/N]:

## 2022-08-15 NOTE — PROGRESS NOTE ADULT - PROBLEM SELECTOR PLAN 2
Restarted home vimpat 10 mg/ml 10 ml BID & keppra 100 mg/ml solution 5 ml by gj tube q12h  - seizure precautions
- hold vimpat 10 mg/ml 10 ml by g tube BID  - hold keppra 100 mg/ml solution 5 ml by g tube q12h  - transition to IV formulation: vimpat IVPB 100 mg q12h and keppra IVPB 500 mg q12h pending procedure  - seizure precautions
Restarted home vimpat 10 mg/ml 10 ml BID & keppra 100 mg/ml solution 5 ml by gj tube q12h  - seizure precautions
Restarted home vimpat 10 mg/ml 10 ml BID & keppra 100 mg/ml solution 5 ml by gj tube q12h  - seizure precautions
- hold vimpat 10 mg/ml 10 ml by g tube BID  - hold keppra 100 mg/ml solution 5 ml by g tube q12h  - transition to IV formulation: vimpat IVPB 100 mg q12h and keppra IVPB 500 mg q12h pending procedure  - seizure precautions
Restarted home vimpat 10 mg/ml 10 ml BID & keppra 100 mg/ml solution 5 ml by gj tube q12h  - seizure precautions

## 2022-08-15 NOTE — PROGRESS NOTE ADULT - REASON FOR ADMISSION
displaced PEG tube

## 2022-08-15 NOTE — PROGRESS NOTE ADULT - ASSESSMENT
56M with hx of TBI with resultant paraplegia (~1987), seizures, chronic respiratory failure s/p trach decannulated, nonverbal at baseline, pulmonary fibrosis, recurrent aspiration pneumonia, dysphagia s/p peg tube placement, DM2, GERD, HTN transferred from [facility] for peg tube replacement.
56M with hx of TBI with resultant paraplegia (~1987), seizures, chronic respiratory failure s/p trach decannulated, nonverbal at baseline, pulmonary fibrosis, recurrent aspiration pneumonia, dysphagia s/p peg tube placement, DM2, GERD, HTN transferred from [facility] for peg tube replacement.
56M with hx of TBI with resultant paraplegia (~1987), seizures, chronic respiratory failure s/p trach decannulated, nonverbal at baseline, pulmonary fibrosis, recurrent aspiration pneumonia, dysphagia s/p peg tube placement, DM2, GERD, HTN transferred from [facility] for peg tube replacement.
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

## 2022-08-15 NOTE — PROGRESS NOTE ADULT - PROBLEM SELECTOR PLAN 4
FEN/GI: strict NPO, D5LR @75cc/h x12 h  Lines: PIV (in right lower extremity)  PPx: lovenox  Dispo: transfer to [facility]  Code Status: full code, pending discussion

## 2022-08-15 NOTE — PROGRESS NOTE ADULT - NUTRITIONAL ASSESSMENT
This patient has been assessed with a concern for Malnutrition and has been determined to have a diagnosis/diagnoses of Severe protein-calorie malnutrition and Underweight (BMI < 19).    This patient is being managed with:   Diet NPO with Tube Feed-  Tube Feeding Modality: Jejunostomy  Jevity 1.2 Edison (JEVITY1.2RTH)  Total Volume for 24 Hours (mL): 1400  Continuous  Starting Tube Feed Rate {mL per Hour}: 20  Increase Tube Feed Rate by (mL): 10     Every 6 hours  Until Goal Tube Feed Rate (mL per Hour): 70  Tube Feed Duration (in Hours): 20  Tube Feed Start Time: 00:00  Chema(7 Gm Arginine/7 Gm Glut/1.2 Gm HMB     Qty per Day:  2  No Carb Prosource TF     Qty per Day:  1  Entered: Aug 10 2022 12:10PM    
This patient has been assessed with a concern for Malnutrition and has been determined to have a diagnosis/diagnoses of Severe protein-calorie malnutrition and Underweight (BMI < 19).    This patient is being managed with:   Diet NPO with Tube Feed-  Tube Feeding Modality: Jejunostomy  Jevity 1.2 Edison (JEVITY1.2RTH)  Total Volume for 24 Hours (mL): 1000  Continuous  Starting Tube Feed Rate {mL per Hour}: 20  Increase Tube Feed Rate by (mL): 10     Every 6 hours  Until Goal Tube Feed Rate (mL per Hour): 50  Tube Feed Duration (in Hours): 20  Tube Feed Start Time: 00:00  Chema(7 Gm Arginine/7 Gm Glut/1.2 Gm HMB     Qty per Day:  2  No Carb Prosource TF     Qty per Day:  1  Entered: Aug 14 2022 11:58AM    
This patient has been assessed with a concern for Malnutrition and has been determined to have a diagnosis/diagnoses of Severe protein-calorie malnutrition and Underweight (BMI < 19).    This patient is being managed with:   Diet NPO with Tube Feed-  Tube Feeding Modality: Jejunostomy  Jevity 1.2 Edison (JEVITY1.2RTH)  Total Volume for 24 Hours (mL): 1400  Continuous  Starting Tube Feed Rate {mL per Hour}: 50  Increase Tube Feed Rate by (mL): 10     Every 3 hours  Until Goal Tube Feed Rate (mL per Hour): 70  Tube Feed Duration (in Hours): 20  Tube Feed Start Time: 00:00  Chema(7 Gm Arginine/7 Gm Glut/1.2 Gm HMB     Qty per Day:  2  No Carb Prosource TF     Qty per Day:  1  Entered: Aug 14 2022 12:27PM    
This patient has been assessed with a concern for Malnutrition and has been determined to have a diagnosis/diagnoses of Severe protein-calorie malnutrition and Underweight (BMI < 19).    This patient is being managed with:   Diet NPO after Midnight-     NPO Start Date: 10-Aug-2022   NPO Start Time: 23:59  Entered: Aug 10 2022  3:47PM    Diet NPO with Tube Feed-  Tube Feeding Modality: Jejunostomy  Jevity 1.2 Edison (JEVITY1.2RTH)  Total Volume for 24 Hours (mL): 1400  Continuous  Starting Tube Feed Rate {mL per Hour}: 20  Increase Tube Feed Rate by (mL): 10     Every 6 hours  Until Goal Tube Feed Rate (mL per Hour): 70  Tube Feed Duration (in Hours): 20  Tube Feed Start Time: 00:00  Chema(7 Gm Arginine/7 Gm Glut/1.2 Gm HMB     Qty per Day:  2  No Carb Prosource TF     Qty per Day:  1  Entered: Aug 10 2022 12:10PM    
This patient has been assessed with a concern for Malnutrition and has been determined to have a diagnosis/diagnoses of Severe protein-calorie malnutrition and Underweight (BMI < 19).    This patient is being managed with:   Diet NPO with Tube Feed-  Tube Feeding Modality: Jejunostomy  Jevity 1.2 Edison (JEVITY1.2RTH)  Total Volume for 24 Hours (mL): 1400  Continuous  Starting Tube Feed Rate {mL per Hour}: 20  Increase Tube Feed Rate by (mL): 10     Every 6 hours  Until Goal Tube Feed Rate (mL per Hour): 70  Tube Feed Duration (in Hours): 20  Tube Feed Start Time: 00:00  Chema(7 Gm Arginine/7 Gm Glut/1.2 Gm HMB     Qty per Day:  2  No Carb Prosource TF     Qty per Day:  1  Entered: Aug 10 2022 12:10PM

## 2022-08-15 NOTE — PROGRESS NOTE ADULT - PROBLEM SELECTOR PLAN 3
hx of chronic stage 2 ulcer  - wound consult, appreciate

## 2022-08-15 NOTE — PROGRESS NOTE ADULT - PROBLEM SELECTOR PROBLEM 3
Pressure ulcer of sacral region, stage 2

## 2022-08-15 NOTE — PROGRESS NOTE ADULT - ATTENDING COMMENTS
SECOND TOUCH     Patient transferred from [facility] when he presented there for leaking around GJ tube. IR planning to replace and upsize tube. Patient likely at baseline mentation. Discharge back to [facility] eventually.
Patient seen and examined at bedside. No acute events overnight.  Tolerating feeds through GJ tube and titrating to goal. Restart home medications, but also had left midline now. Per CM may take several days to discharge back to group home. Transfer center called to transfer patient back to [facility] where he came from and presumably the hospital closet to his group home.
Patient to be transferred back to [facility] today. Leaking around GJ tube to be expected and may need to be upsized in approximately 4 weeks. No further intervention by IR. Discharge paperwork to have IR number for exchange in 1 month
56M with hx of TBI with resultant paraplegia (~1987), seizures, chronic respiratory failure s/p trach decannulated, nonverbal at baseline, pulmonary fibrosis, recurrent aspiration pneumonia, dysphagia s/p peg tube placement, DM2, GERD, HTN transferred from [facility] for peg tube replacement. Now s/p IR exchange of GJ tube on 8/11.    Yest had abd pain and noted with increased drainage yest, seen by IR who notes this is part of normal healing.  Today, pt denies any abd pain to me; tolerating tube feeds.   Exam notable for cachectic male, NAD, +prior tracheostomy, lungs CTA, S1, S2, RR, +abdominal binder and PEG in place, RUE and b/l LEs contracted, able to move RUE, calm and following commands.     pt medically stable for discharge home  plan to f/u with IR in 2-3 weeks for upsizing of GJ tube
Patient seen and examined at bedside. No acute events overnight. Pending GJ tube replacement by IR with anesthesia. Anticipate discharge afterwards if GJ tube is functional.
Patient seen and examined at bedside. No acute events overnight. Tolerating feeds with abdominal binder. Plan to transfer back to [facility]. He needs outpatient IR follow up in four weeks where they will also consider upsizing.    Discharge Time: 35 minutes

## 2022-08-30 NOTE — ED PROVIDER NOTE - SKIN NEGATIVE STATEMENT, MLM
[address] INTERNAL MEDICINE AND MEDICATION MANAGEMENT  [place] [facility] [address]  Dept: [phone]  Dept Fax: [phone]: [phone]     Visit Date:  8/30/2022    Patient:  Jana Macias  YOB: 1986    HPI:     Chief Complaint   Patient presents with    Drug Problem     Severe opioid use disorder    ADHD    Follow-up     Jared Christensen presents today for medical evaluation of ADHD, anxiety/depression, chronic pain, HTN     I last seen him 6 weeks ago. Previously followed with Dr. Carlos Valenzuela for MAT. Is now at the methadone clinic. States that he is doing well. Hospitalized in 5/2022 for an overdose. He has had several in the past.      Seen a new PCP, Dr. Eric Rocha on 6/28. States that he switched because he thought I had discharged him. He would like to remain a patient here. Discussed at length that he cannot continue to use illicit drugs and be on a stimulant. I agreed to prescribe under the agreement that use would stop, and as harm reduction; as he complained that the reason for his methamphetamine use was because his ADHD was not controlled. Guanfacine not helpful. Vyvanse previously effective. He is planning to look for a job, and will need to be able to maintain focus. Anxiety/depression well controlled with effexor and abilify. Continue current regimen     Chronic pain controlled with pregabalin, not 150 mg BID. Will increase slightly. BP well controlled with lisinopril 20 mg daily    Medications    Current Outpatient Medications:     pregabalin (LYRICA) 200 MG capsule, Take 1 capsule by mouth 2 times daily for 30 days. , Disp: 60 capsule, Rfl: 0    venlafaxine (EFFEXOR XR) 150 MG extended release capsule, take 1 capsule by mouth once daily, Disp: 30 capsule, Rfl: 3    ARIPiprazole (ABILIFY) 5 MG tablet, Take 0.5 tablets by mouth daily, Disp: 15 tablet, Rfl: 3    lisinopril (PRINIVIL;ZESTRIL) 20 MG tablet, Take 1 tablet by mouth daily, Disp: 30 tablet, Rfl: 3    mirtazapine (REMERON) 45 MG tablet, Take 1 tablet by mouth nightly, Disp: 30 tablet, Rfl: 3    lisdexamfetamine (VYVANSE) 50 MG capsule, Take 1 capsule by mouth every morning for 30 days. , Disp: 30 capsule, Rfl: 0    Lisdexamfetamine Dimesylate (VYVANSE) 40 MG CAPS, Take 40 mg by mouth every morning for 30 days. , Disp: 30 capsule, Rfl: 0    methadone 10 MG/5ML solution, Take 30 mLs by mouth daily for 30 days. , Disp: 30 each, Rfl: 0    naloxone (NARCAN) 4 MG/0.1ML LIQD nasal spray, 1 spray by Nasal route as needed for Opioid Reversal, Disp: 1 each, Rfl: 1    Aspirin-Acetaminophen-Caffeine (EXCEDRIN EXTRA STRENGTH PO), Take by mouth as needed , Disp: , Rfl:     The patient has No Known Allergies. Past Medical History  Willam Purvis  has a past medical history of Anxiety, Depression, Kidney stone, Liver disease, and Opiate abuse, continuous ([facility] [address] Past Surgical History  The patient  has no past surgical history on file. Family History  This patient's family history is not on file. Social History  Willam Purvis  reports that he quit smoking about 20 months ago. His smoking use included cigarettes. He has never used smokeless tobacco. He reports that he does not currently use alcohol. He reports current drug use. Drugs: Opiates , Fentanyl, Amphetamines (Speed), Cocaine, Crack Cocaine, Heroin, Benzodiazepines (Downers/Zannies), Marijuana (Jillene Marvin), Oxycodone (Oxy), Suboxone, Sedatives/Hypnotics, IV, and Methamphetamines (Crystal Meth).     Health Maintenance:    Health Maintenance   Topic Date Due    COVID-19 Vaccine (1) Never done    Hepatitis A vaccine (1 of 2 - Risk 2-dose series) Never done    Varicella vaccine (1 of 2 - 2-dose childhood series) Never done    Pneumococcal 0-64 years Vaccine (1 - PCV) Never done    Diabetic foot exam  Never done    Lipids  Never done    Diabetic microalbuminuria test  Never done    Diabetic retinal exam  Never done    Hepatitis B vaccine (1 of 3 - Risk 3-dose series) Never done    DTaP/Tdap/Td vaccine (1 - Tdap) Never done    A1C test (Diabetic or Prediabetic)  05/24/2022    Flu vaccine (1) Never done    Depression Monitoring  06/28/2023    HIV screen  Completed    Hib vaccine  Aged Out    Meningococcal (ACWY) vaccine  Aged Out       Subjective:      Review of Systems   Constitutional:  Negative for chills, fatigue and fever. HENT:  Negative for congestion, rhinorrhea, sinus pressure, sinus pain, sore throat, tinnitus and trouble swallowing. Eyes:  Negative for photophobia and visual disturbance. Respiratory:  Negative for cough, chest tightness, shortness of breath and wheezing. Cardiovascular:  Negative for chest pain, palpitations and leg swelling. Gastrointestinal:  Negative for abdominal distention, abdominal pain, blood in stool, constipation, diarrhea, nausea and vomiting. Endocrine: Negative for polydipsia, polyphagia and polyuria. Genitourinary:  Negative for difficulty urinating, dysuria, frequency, hematuria and urgency. Musculoskeletal:  Negative for arthralgias and myalgias. Skin:  Negative for rash and wound. Neurological:  Negative for dizziness, light-headedness and headaches. Psychiatric/Behavioral:  Positive for decreased concentration. Negative for dysphoric mood and sleep disturbance. The patient is not nervous/anxious. Objective: There were no vitals taken for this visit. Physical Exam  Vitals reviewed. Constitutional:       General: He is not in acute distress. Appearance: Normal appearance. He is not ill-appearing. HENT:      Head: Normocephalic and atraumatic. Right Ear: External ear normal.      Left Ear: External ear normal.      Nose: Nose normal. No congestion or rhinorrhea. Mouth/Throat:      Mouth: Mucous membranes are moist.   Eyes:      Extraocular Movements: Extraocular movements intact.       Conjunctiva/sclera: Conjunctivae normal.      Pupils: Pupils are equal, round, and reactive to light. Cardiovascular:      Rate and Rhythm: Normal rate and regular rhythm. Pulses: Normal pulses. Heart sounds: Normal heart sounds. Pulmonary:      Effort: Pulmonary effort is normal. No respiratory distress. Musculoskeletal:         General: Normal range of motion. Cervical back: Normal range of motion and neck supple. Right lower leg: No edema. Left lower leg: No edema. Skin:     General: Skin is warm and dry. Neurological:      General: No focal deficit present. Mental Status: He is alert and oriented to person, place, and time. Psychiatric:         Mood and Affect: Mood normal.         Behavior: Behavior normal.         Thought Content: Thought content normal.         Judgment: Judgment normal.       Labs Reviewed 8/30/2022:    Lab Results   Component Value Date    WBC 10.2 07/14/2022    HGB 10.7 (L) 07/14/2022    HCT 35.0 (L) 07/14/2022     07/14/2022     (H) 07/14/2022     (H) 07/14/2022     07/14/2022    K 4.1 07/14/2022     07/14/2022    CREATININE 1.0 07/14/2022    BUN 19 07/14/2022    CO2 21 (L) 07/14/2022    TSH 0.390 (L) 10/03/2021    PSA 0.98 04/25/2019    INR 1.22 (H) 04/12/2022    LABA1C 11.5 (H) 05/24/2021       Assessment/Plan      1. Severe opioid use disorder (HCC)    - POCT Rapid Drug Screen    2. Anxiety and depression    - pregabalin (LYRICA) 200 MG capsule; Take 1 capsule by mouth 2 times daily for 30 days. Dispense: 60 capsule; Refill: 0  - venlafaxine (EFFEXOR XR) 150 MG extended release capsule; take 1 capsule by mouth once daily  Dispense: 30 capsule; Refill: 3  - ARIPiprazole (ABILIFY) 5 MG tablet; Take 0.5 tablets by mouth daily  Dispense: 15 tablet; Refill: 3    3. Attention deficit hyperactivity disorder (ADHD), unspecified ADHD type    - lisdexamfetamine (VYVANSE) 50 MG capsule; Take 1 capsule by mouth every morning for 30 days. Dispense: 30 capsule; Refill: 0    4.  Essential no abrasions, no jaundice, no lesions, no pruritis, and no rashes.

## 2022-09-01 ENCOUNTER — TRANSCRIPTION ENCOUNTER (OUTPATIENT)
Age: 57
End: 2022-09-01

## 2022-09-01 ENCOUNTER — OUTPATIENT (OUTPATIENT)
Dept: OUTPATIENT SERVICES | Facility: HOSPITAL | Age: 57
LOS: 1 days | End: 2022-09-01
Payer: MEDICARE

## 2022-09-01 ENCOUNTER — RESULT REVIEW (OUTPATIENT)
Age: 57
End: 2022-09-01

## 2022-09-01 VITALS
DIASTOLIC BLOOD PRESSURE: 69 MMHG | SYSTOLIC BLOOD PRESSURE: 103 MMHG | HEART RATE: 99 BPM | OXYGEN SATURATION: 99 % | RESPIRATION RATE: 18 BRPM | TEMPERATURE: 98 F

## 2022-09-01 DIAGNOSIS — R13.0 APHAGIA: ICD-10-CM

## 2022-09-01 DIAGNOSIS — Z93.1 GASTROSTOMY STATUS: Chronic | ICD-10-CM

## 2022-09-01 DIAGNOSIS — K94.23 GASTROSTOMY MALFUNCTION: ICD-10-CM

## 2022-09-01 DIAGNOSIS — Z97.8 PRESENCE OF OTHER SPECIFIED DEVICES: ICD-10-CM

## 2022-09-01 DIAGNOSIS — Z78.9 OTHER SPECIFIED HEALTH STATUS: Chronic | ICD-10-CM

## 2022-09-01 PROCEDURE — 49465 FLUORO EXAM OF G/COLON TUBE: CPT

## 2022-09-01 RX ORDER — IPRATROPIUM/ALBUTEROL SULFATE 18-103MCG
1 AEROSOL WITH ADAPTER (GRAM) INHALATION
Qty: 0 | Refills: 0 | DISCHARGE

## 2022-09-01 RX ORDER — LANOLIN ALCOHOL/MO/W.PET/CERES
1 CREAM (GRAM) TOPICAL
Qty: 0 | Refills: 0 | DISCHARGE

## 2022-09-01 RX ORDER — OMEPRAZOLE 10 MG/1
1 CAPSULE, DELAYED RELEASE ORAL
Qty: 0 | Refills: 0 | DISCHARGE

## 2022-09-01 RX ORDER — BACLOFEN 100 %
1 POWDER (GRAM) MISCELLANEOUS
Qty: 0 | Refills: 0 | DISCHARGE

## 2022-09-01 RX ORDER — SERTRALINE 25 MG/1
1 TABLET, FILM COATED ORAL
Qty: 0 | Refills: 0 | DISCHARGE

## 2022-09-01 RX ORDER — ASPIRIN/CALCIUM CARB/MAGNESIUM 324 MG
1 TABLET ORAL
Qty: 0 | Refills: 0 | DISCHARGE

## 2022-09-01 RX ORDER — FAMOTIDINE 10 MG/ML
1 INJECTION INTRAVENOUS
Qty: 0 | Refills: 0 | DISCHARGE

## 2022-09-01 RX ORDER — LACTULOSE 10 G/15ML
30 SOLUTION ORAL
Qty: 0 | Refills: 0 | DISCHARGE

## 2022-09-01 NOTE — PRE PROCEDURE NOTE - PRE PROCEDURE EVALUATION
Interventional Radiology    HPI: 56y Male with feeding tube presents for GJ exchange.     Allergies: Ativan (Unknown)  phenytoin (Unknown)  Valproate Sodium (Other (Severe))    Medications (Abx/Cardiac/Anticoagulation/Blood Products)      Data:    T(C): 36.9  HR: 99  BP: 103/69  RR: 18  SpO2: 99%    Exam  General: No acute distress  Chest: Non labored breathing  Abdomen: Non-distended  Extremities: No swelling, warm    Plan: 56y Male presents for GJ exchange  -Risks/Benefits/alternatives explained with the patient and/or healthcare proxy and witnessed informed consent obtained.

## 2022-09-01 NOTE — PROCEDURE NOTE - PROCEDURE FINDINGS AND DETAILS
successful GJ evaluation- OK to use; instructed to use G lumen for medication and J lumen for feeds; Provided Dr. Li # for new surgical GJ placement.

## 2022-09-01 NOTE — ASU PATIENT PROFILE, ADULT - FALL HARM RISK - FALL HARM RISK
No indicators present handheld; bedside commode only due to unable to connect from monitor as per RN and portable monitor unavailable at time of eval

## 2022-09-01 NOTE — ASU DISCHARGE PLAN (ADULT/PEDIATRIC) - NURSING INSTRUCTIONS
Please feel free to contact us at [phone] if any problems arise. After 6PM, Monday through Friday, on weekends and on holidays, please call [phone] and ask for the radiology resident on call to be paged.

## 2022-09-01 NOTE — ASU PATIENT PROFILE, ADULT - FALL HARM RISK - RISK INTERVENTIONS

## 2022-09-01 NOTE — ASU DISCHARGE PLAN (ADULT/PEDIATRIC) - NS MD DC FALL RISK RISK
For information on Fall & Injury Prevention, visit: [email or web address] OR  [email or web address] OR  https://www.cdc.gov/steadi/patient.html

## 2022-09-01 NOTE — ASU DISCHARGE PLAN (ADULT/PEDIATRIC) - ASU DC SPECIAL INSTRUCTIONSFT
GJ Tube Exchange    Discharge Instructions  - You have had a GJTube exchanged.  - Keep the area clean and dry.  - Do not soak in a tub or pool with the GJTube, however you may shower with the GJTube and dressing covered in plastic wrap.  - Do not put traction on the GJTube and be careful that the GJTube does not get accidentally dislodged.  - You may resume your normal diet.  - You may resume your normal medications. Flush GJTube aggressively with saline to prevent clogging of the GJtube.  - It is normal to experience some pain over the site for the next few days. You may take apply ice to the area (20 minutes on, 20 minutes off) and take Tylenol for that pain. Do not take more frequently than every 6 hours and do not exceed more than 3000mg of Tylenol in a 24 hour period.    Notify your primary physician and/or Interventional Radiology IMMEDIATELY if you experience any of the following       - Fever of 100.4F or 38C       - Chills or Rigors/ Shakes       - Swelling and/or Redness in the area of the puncture site       - Worsening Pain       - Blood soaked bandages or worsening bleeding       - Lightheadedness and/or dizziness upon standing       - Chest Pain/ Tightness       - Shortness of Breath       - Difficulty walking    If you have a problem that you believe requires IMMEDIATE attention, please go to your NEAREST Emergency Room. If you believe your problem can safely wait until you speak to a physician, please call Interventional Radiology for any concerns.    During Normal Weekday Business Hours- You can contact the Interventional Radiology department during normal business hours via telephone.  During Evenings and Weekends- If you need to contact Interventional Radiology during off hours, do so by calling the hospital and requesting to be connected to the Interventional Radiologist on call. GJ Tube Exchange    Discharge Instructions  - You have had a GJTube exchanged.  - Keep the area clean and dry.  - Do not soak in a tub or pool with the GJTube, however you may shower with the GJTube and dressing covered in plastic wrap.  - Do not put traction on the GJTube and be careful that the GJTube does not get accidentally dislodged.  - You may resume your normal diet.  - You may resume your normal medications. Flush GJTube aggressively with saline to prevent clogging of the GJtube.  - It is normal to experience some pain over the site for the next few days. You may take apply ice to the area (20 minutes on, 20 minutes off) and take Tylenol for that pain. Do not take more frequently than every 6 hours and do not exceed more than 3000mg of Tylenol in a 24 hour period.    Notify your primary physician and/or Interventional Radiology IMMEDIATELY if you experience any of the following       - Fever of 100.4F or 38C       - Chills or Rigors/ Shakes       - Swelling and/or Redness in the area of the puncture site       - Worsening Pain       - Blood soaked bandages or worsening bleeding       - Lightheadedness and/or dizziness upon standing       - Chest Pain/ Tightness       - Shortness of Breath       - Difficulty walking    If you have a problem that you believe requires IMMEDIATE attention, please go to your NEAREST Emergency Room. If you believe your problem can safely wait until you speak to a physician, please call Interventional Radiology for any concerns.    During Normal Weekday Business Hours- You can contact the Interventional Radiology department during normal business hours via telephone.  During Evenings and Weekends- If you need to contact Interventional Radiology during off hours, do so by calling the hospital and requesting to be connected to the Interventional Radiologist on call.    CALL FOR CONSULTATION WITH Dr. JOHN GARZON [phone]

## 2022-10-19 NOTE — ED ADULT TRIAGE NOTE - AS HEIGHT TYPE
Detail Level: Generalized
Detail Level: Detailed
Patient Specific Counseling (Will Not Stick From Patient To Patient): Recommend biopsy if not resolved at next visit or if recurs. Pt voices understanding.
stated

## 2022-10-29 NOTE — DISCHARGE NOTE PROVIDER - NSDCCPGOAL_GEN_ALL_CORE_FT
MRI LLE without evidence of osteomyelitis   Abx have been discontinued To get better and follow your care plan as instructed.

## 2023-01-08 NOTE — ED ADULT NURSE NOTE - PLAN OF CARE DISCUSSED WITH:
Chief complaint:   Chief Complaint   Patient presents with   • Office Visit   • Ear Pain       Vitals:  Visit Vitals  /78 (BP Location: RUE - Right upper extremity, Patient Position: Sitting, Cuff Size: Regular)   Pulse 93   Temp 97.9 °F (36.6 °C) (Tympanic)   Resp 16   Wt 74.6 kg (164 lb 8 oz)   SpO2 99%       HISTORY OF PRESENT ILLNESS     HPI     13 yo M presents due to right sided ear discomfort and decreased hearing. Reports clogged-like feeling. Denies any foreign objects nor similar episodes in the past. No drainage nor bleeding.    Other significant problems:  There are no problems to display for this patient.      PAST MEDICAL, FAMILY AND SOCIAL HISTORY     Medications:  Current Outpatient Medications   Medication Sig Dispense Refill   • fluticasone (FLONASE) 50 MCG/ACT nasal spray Spray 1 spray in each nostril daily as needed (For allergic rhinitis). 16 g 5   • Multiple Vitamins-Minerals (MULTIVITAMIN PO) Take  by mouth.       No current facility-administered medications for this visit.       Allergies:  ALLERGIES:  No Known Allergies    Past Medical  History/Surgeries:  Past Medical History:   Diagnosis Date   • Allergy    • Reduced vision     WEARS GLASSES       No past surgical history on file.    Family History:  Family History   Problem Relation Age of Onset   • Osteoarthritis Maternal Grandmother    • Hypertension Maternal Grandmother    • Osteoarthritis Maternal Grandfather    • Heart disease Maternal Grandfather    • High blood pressure Maternal Grandfather    • High cholesterol Maternal Grandfather    • Depression Mother    • Allergic Rhinitis Mother    • Asthma Mother    • Hypertension Mother    • Osteoarthritis Father    • Stroke/TIA Paternal Grandfather    • Seizure Disorder Paternal Grandfather    • Cancer Paternal Grandmother        Social History:  Social History     Tobacco Use   • Smoking status: Never   • Smokeless tobacco: Never   Substance Use Topics   • Alcohol use: Not on file        REVIEW OF SYSTEMS     Review of Systems   Constitutional:        As per HPI unless otherwise specified   All other systems reviewed and are negative.      PHYSICAL EXAM     Physical Exam  HENT:      Right Ear: External ear normal. Decreased hearing noted. No drainage, swelling or tenderness. There is impacted cerumen. No foreign body. No mastoid tenderness. Tympanic membrane is not perforated.      Left Ear: Hearing, tympanic membrane, ear canal and external ear normal.         ASSESSMENT/PLAN     Juan R was seen today for office visit and ear pain.    Diagnoses and all orders for this visit:    Impacted cerumen of right ear  -     REMOVAL IMPACTED CERUMEN USING IRRIGATION LAVAGE UNILATERAL  -     REMOVAL IMPACTED CERUMEN USING IRRIGATION LAVAGE UNILATERAL       Patient

## 2023-01-09 ENCOUNTER — NON-APPOINTMENT (OUTPATIENT)
Age: 58
End: 2023-01-09

## 2023-02-02 NOTE — PROGRESS NOTE ADULT - SUBJECTIVE AND OBJECTIVE BOX
Chief Complaint:  Patient is a 53y old  Male who presents with a chief complaint of ARDS?, sepsis (25 Feb 2019 11:00)      Interval Events: No adverse events overnight.      Allergies:  Valproate Sodium (Other (Severe))      Hospital Medications:  acetaminophen    Suspension .. 650 milliGRAM(s) Oral every 6 hours PRN  ALBUTerol/ipratropium for Nebulization 3 milliLiter(s) Nebulizer every 6 hours  cefTAZidime/avibactam IVPB 2.5 Gram(s) IV Intermittent every 8 hours  cefTAZidime/avibactam IVPB      chlorhexidine 0.12% Liquid 15 milliLiter(s) Oral Mucosa two times a day  chlorhexidine 4% Liquid 1 Application(s) Topical <User Schedule>  cyanocobalamin 1000 MICROGram(s) Oral daily  dextrose 40% Gel 15 Gram(s) Oral once PRN  dextrose 5%. 1000 milliLiter(s) IV Continuous <Continuous>  dextrose 50% Injectable 12.5 Gram(s) IV Push once  dextrose 50% Injectable 25 Gram(s) IV Push once  dextrose 50% Injectable 25 Gram(s) IV Push once  folic acid 1 milliGRAM(s) Enteral Tube daily  glucagon  Injectable 1 milliGRAM(s) IntraMuscular once PRN  heparin  Injectable 5000 Unit(s) SubCutaneous every 8 hours  insulin lispro (HumaLOG) corrective regimen sliding scale   SubCutaneous every 6 hours  lacosamide Solution 100 milliGRAM(s) Oral two times a day  lactobacillus acidophilus 1 Tablet(s) Oral three times a day with meals  metroNIDAZOLE  IVPB      metroNIDAZOLE  IVPB 500 milliGRAM(s) IV Intermittent every 8 hours  psyllium Powder 1 Packet(s) Oral daily  QUEtiapine 25 milliGRAM(s) Oral daily  risperiDONE   Solution 1 milliGRAM(s) Oral daily  vancomycin  IVPB 750 milliGRAM(s) IV Intermittent every 12 hours      PMHX/PSHX:  Seizure  TBI (traumatic brain injury)  S/P percutaneous endoscopic gastrostomy (PEG) tube placement  No significant past surgical history      Family history:      ROS:  unable to obtain      PHYSICAL EXAM:     GENERAL: NAD  HEENT:  NC/AT  CHEST:  vented  ABDOMEN:  Soft, non-tender, non-distended, normoactive bowel sounds  EXTREMITIES:  no cyanosis,clubbing or edema  SKIN:  No rash  NEURO:  Alert  Vital Signs:  Vital Signs Last 24 Hrs  T(C): 37.4 (25 Feb 2019 16:00), Max: 37.9 (25 Feb 2019 10:48)  T(F): 99.4 (25 Feb 2019 16:00), Max: 100.2 (25 Feb 2019 10:48)  HR: 108 (26 Feb 2019 04:37) (108 - 131)  BP: 111/67 (26 Feb 2019 03:00) (98/58 - 129/70)  BP(mean): 77 (26 Feb 2019 03:00) (71 - 86)  RR: 26 (26 Feb 2019 03:00) (18 - 38)  SpO2: 98% (26 Feb 2019 04:37) (90% - 99%)  Daily     Daily     LABS:                        8.1    8.10  )-----------( 422      ( 26 Feb 2019 04:43 )             27.7     02-26    140  |  101  |  9   ----------------------------<  138<H>  3.6   |  31  |  0.29<L>    Ca    8.2<L>      26 Feb 2019 04:43  Phos  2.6     02-26  Mg     1.7     02-26    TPro  7.3  /  Alb  1.9<L>  /  TBili  0.2  /  DBili  x   /  AST  11  /  ALT  6   /  AlkPhos  194<H>  02-26    LIVER FUNCTIONS - ( 26 Feb 2019 04:43 )  Alb: 1.9 g/dL / Pro: 7.3 g/dL / ALK PHOS: 194 u/L / ALT: 6 u/L / AST: 11 u/L / GGT: x                   Imaging:  reviewed Forceps were not used

## 2023-05-02 NOTE — DISCHARGE NOTE NURSING/CASE MANAGEMENT/SOCIAL WORK - PATIENT PORTAL LINK FT
Recovery ended at this time. VSS. Fundus remains firm u/u small rubra bleeding noted.  Moving to postpartum room 2263 delayed at this time due to patient not being able to move lower extremities due to affects of epidural. You can access the FollowMyHealth Patient Portal offered by [facility] by registering at the following website: [email or web address]. By joining [employer]’s FollowMyHealth portal, you will also be able to view your health information using other applications (apps) compatible with our system.

## 2023-06-05 NOTE — DISCHARGE NOTE ADULT - NS AS DC STROKE DX YN
[Normocephalic] : normocephalic [Soft] : soft [Alert] : alert [Well related, good eye contact] : well related, good eye contact [Conversant] : conversant [Follows instructions well] : follows instructions well [VFF] : VFF [Pupils reactive to light and accommodation] : pupils reactive to light and accommodation [Full extraocular movements] : full extraocular movements [No nystagmus] : no nystagmus [No papilledema] : no papilledema [Normal facial sensation to light touch] : normal facial sensation to light touch [No facial asymmetry or weakness] : no facial asymmetry or weakness [Equal palate elevation] : equal palate elevation [Good shoulder shrug] : good shoulder shrug [Normal tongue movement] : normal tongue movement [Normal axial and appendicular muscle tone] : normal axial and appendicular muscle tone [5/5 strength in proximal and distal muscles of arms and legs] : 5/5 strength in proximal and distal muscles of arms and legs no [Walks and runs well] : walks and runs well [Knee jerks] : knee jerks [Ankle jerks] : ankle jerks [No ankle clonus] : no ankle clonus [Bilaterally] : bilaterally [No dysmetria on FTNT] : no dysmetria on FTNT [Good walking balance] : good walking balance [Normal gait] : normal gait [Able to tandem well] : able to tandem well [Negative Romberg] : negative Romberg

## 2023-09-15 NOTE — PROCEDURE NOTE - NSINFORMCONSENT_GEN_A_CORE
This was an emergent procedure.
No indicators present

## 2023-11-21 NOTE — DIETITIAN INITIAL EVALUATION ADULT. - OBTAIN DAILY WEIGHT
Date   11/21/2023 Limb Occlusion Pressure   186 Percent (%) Occlusion   80 Training Occlusion Pressure   149 Exercises Performed   1) squat  2) SL press w/3pl Total time under tourniquet   7 minutes-1 minute- 7 minutes  Immediate effects   Fatigue  Lingering effects (from previous session)   Mild soreness     NOTES:       Blood Flow Restriction Training: Contraindications and precautions reviewed, pt safe for use of modality, Risks and benefits discussed; pt gave informed consent    
yes

## 2024-02-16 NOTE — PROGRESS NOTE ADULT - CONSTITUTIONAL
"Chief Complaint   Patient presents with    Medicare Visit    Establish Care       Initial /82   Pulse 84   Temp 98.6  F (37  C) (Temporal)   Resp 16   Ht 1.689 m (5' 6.5\")   Wt 69 kg (152 lb 3.2 oz)   SpO2 99%   BMI 24.20 kg/m   Estimated body mass index is 24.2 kg/m  as calculated from the following:    Height as of this encounter: 1.689 m (5' 6.5\").    Weight as of this encounter: 69 kg (152 lb 3.2 oz).  Medication Review: complete    The next two questions are to help us understand your food security.  If you are feeling you need any assistance in this area, we have resources available to support you today.          2/9/2024   SDOH- Food Insecurity   Within the past 12 months, did you worry that your food would run out before you got money to buy more? N   Within the past 12 months, did the food you bought just not last and you didn t have money to get more? N         Health Care Directive:  Patient has a Health Care Directive on file      Prachi Marquez CMA      "
detailed exam

## 2024-02-28 NOTE — PROGRESS NOTE ADULT - PROBLEM SELECTOR PLAN 5
Patient discharged per MD orders. Instructions given on medications, wound care, activity, signs of infection, when to call MD, and follow up appointments. Pt verbalized understanding. Telemetry and PIV removed. Patient's family used wheelchair to transfer pt off of unit.   - awake, alert, responds  - contractures  - supportive care

## 2024-03-20 NOTE — ED ADULT NURSE NOTE - CADM POA CENTRAL LINE
[facility] NOTE    History of Present Illness:  The patient is here for evaluation of chronic bilateral hip pain.     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     Hip Pain  The patient recently started exercising regularly which includes squatting, calf raises, and adding a 20 lb medicine ball to these activities. Reports chronic aches/pain in his hips bilaterally but that it has noticeably worsened since starting regular exercise.     Sinus Pressure  Patient also complains of chronic sinus pressure. Endorses associated difficulty breathing at night. Denies using allergy medicine or Flonase, though he states he does occasionally use Dayquil with some relief. The patient localizes the pain to his frontal and maxillary sinuses.     They are requesting possible Osteopathic Manipulative Treatment today.    Physical Exam:  Vitals:    03/20/24 0810   BP: 124/78   Pulse: 77   Resp: 16   Temp: 97.4 °F (36.3 °C)     General:  Alert and oriented, in no acute distress  Psychiatric:  Normal Affect and Judgment  Osteopathic Musculoskeletal Exam:   The following areas were identified for the following:  Tissue texture change, Positional Asymmetry, Restriction of motion, and/or tenderness      Region Somatic Dysfunction Severity (0, 1, 2)   Head     Cervical     Thoracic     Lumbar L1 Fl 1   Sacral R/R forward sacral torsion 2   Pelvic Sideshift to the R, L anterior innominate 1   Lower Extremities Left tibial torsion, Left psoas TP 1   Upper Extremities     Rib Cage     Abdomen & Other Sites         ASSESSMENT:   The patient is a 29 year old male with a past medical history significant for SCFE s/p bilateral hip surgery in 2005, osteoporosis, and SHAZIA who presents to [facility] clinic for evaluation and treatment of hip pain.     Somatic Dysfunction:    lumbar spine somatic dysfunction  sacrum somatic dysfunction  pelvis somatic dysfunction  lower extremity somatic  dysfunction    PLAN:  Osteopathic Manipulative Treatment was discussed today with the patient the with goal of improving motion and function of the skeletal, arthrodial, myofascial and visceral structures as well as related vascular, lymphatic and neural elements related to the somatic dysfunction and compensatory changes identified in the osteopathic structural examination.  The patient provided verbal consent for treatment.    Will defer treatment of sinus pressure to next visit.       Osteopathic Manipulative Treatment Procedure Note    After verbal consent was obtained the following somatic dysfunction was treated:    lumbar spine somatic dysfunction:  treatment performed:   percussion hammer    pain outcome:  improved      range of motion: improved  sacrum somatic dysfunction:  treatment performed:   percussion hammer     pain outcome:  improved      range of motion: improved  pelvic somatic dysfunction:  treatment performed:  counterstrain and muscle energy      pain outcome:  improved      range of motion: improved  lower extremity somatic dysfunction:  treatment performed: percussion hammer     pain outcome:  improved      range of motion: improved    The patient was instructed to increase fluids and apply ice to any sore areas over the next 72 hours.  The patient was instructed that some post treatment soreness is not unusual, but if they experience worsening pain or any worsening numbness, tingling, or any bowel or bladder incontinence to contact the clinic or report to the Emergency Department for further evaluation.  The patient verbalized understanding and agreement with the above.     Cindy Melchor DO  Family Medicine Resident PGY1  3/20/2024   No

## 2024-04-16 RX ORDER — OMEPRAZOLE 10 MG/1
1 CAPSULE, DELAYED RELEASE ORAL
Qty: 0 | Refills: 0 | DISCHARGE

## 2024-04-16 RX ORDER — LACOSAMIDE 50 MG/1
10 TABLET ORAL
Qty: 0 | Refills: 0 | DISCHARGE

## 2024-04-16 RX ORDER — BACLOFEN 100 %
1 POWDER (GRAM) MISCELLANEOUS
Qty: 0 | Refills: 0 | DISCHARGE

## 2024-04-16 RX ORDER — LEVETIRACETAM 250 MG/1
7.5 TABLET, FILM COATED ORAL
Qty: 0 | Refills: 0 | DISCHARGE

## 2024-04-16 RX ORDER — ONDANSETRON 8 MG/1
2 TABLET, FILM COATED ORAL
Qty: 0 | Refills: 0 | DISCHARGE

## 2024-04-16 RX ORDER — MAGNESIUM HYDROXIDE 400 MG/1
30 TABLET, CHEWABLE ORAL
Qty: 0 | Refills: 0 | DISCHARGE

## 2024-04-16 RX ORDER — IPRATROPIUM/ALBUTEROL SULFATE 18-103MCG
1 AEROSOL WITH ADAPTER (GRAM) INHALATION
Qty: 0 | Refills: 0 | DISCHARGE

## 2024-04-16 RX ORDER — VENLAFAXINE HCL 75 MG
1 CAPSULE, EXT RELEASE 24 HR ORAL
Qty: 0 | Refills: 0 | DISCHARGE

## 2024-04-16 RX ORDER — FAMOTIDINE 10 MG/ML
1 INJECTION INTRAVENOUS
Qty: 0 | Refills: 0 | DISCHARGE

## 2024-04-16 RX ORDER — ACETAMINOPHEN 500 MG
2 TABLET ORAL
Qty: 0 | Refills: 0 | DISCHARGE

## 2024-04-16 RX ORDER — LACTULOSE 10 G/15ML
15 SOLUTION ORAL
Qty: 0 | Refills: 0 | DISCHARGE

## 2024-04-16 RX ORDER — LEVETIRACETAM 250 MG/1
5 TABLET, FILM COATED ORAL
Qty: 0 | Refills: 0 | DISCHARGE

## 2024-04-16 RX ORDER — ENOXAPARIN SODIUM 100 MG/ML
0.4 INJECTION SUBCUTANEOUS
Qty: 0 | Refills: 0 | DISCHARGE

## 2024-04-16 RX ORDER — CASTOR OIL AND BALSAM, PERU 788; 87 MG/G; MG/G
1 OINTMENT TOPICAL
Qty: 0 | Refills: 0 | DISCHARGE

## 2024-07-10 NOTE — PROGRESS NOTE ADULT - SUBJECTIVE AND OBJECTIVE BOX
Mr. Stewart is intubated, not on pressors.    ICU Vital Signs Last 24 Hrs  T(C): 37.7 (31 Dec 2018 08:00), Max: 38 (30 Dec 2018 12:00)  T(F): 99.9 (31 Dec 2018 08:00), Max: 100.4 (30 Dec 2018 12:00)  HR: 104 (31 Dec 2018 08:00) (96 - 117)  BP: 101/54 (31 Dec 2018 08:00) (92/53 - 110/76)  BP(mean): 64 (31 Dec 2018 08:00) (54 - 84)  ABP: --  ABP(mean): --  RR: 18 (31 Dec 2018 08:00) (12 - 21)  SpO2: 97% (31 Dec 2018 08:00) (96% - 99%)                          9.2    4.96  )-----------( 386      ( 31 Dec 2018 00:05 )             28.5     Gen: intubated, but appears comfortable  Abd: Soft, mild distension, G-tube in place, rectal tube in place - not bloody    53 year old man with necrotizing pancreatitis and resolving GI bleed  1. Continue supportive care per MICU  2. No surgical intervention required at this time for pancreatitis or GI bleed. balance training/gait training/transfer training

## 2024-10-31 NOTE — ED PROVIDER NOTE - NEURO NEGATIVE STATEMENT, MLM
Quality 137: Melanoma: Continuity Of Care - Recall System: Patient information entered into a recall system that includes: target date for the next exam specified AND a process to follow up with patients regarding missed or unscheduled appointments Quality 47: Advance Care Plan: Advance care planning not documented, reason not otherwise specified. Detail Level: Detailed no loss of consciousness, no gait abnormality, no headache, no sensory deficits, and no weakness.

## 2025-01-27 NOTE — PATIENT PROFILE ADULT - FUNCTIONAL ASSESSMENT - BASIC MOBILITY 2.
Nallely Peacock  tolerated lab draw well with no complications.      Nallely Peacock is aware of future appt on 2/3 at 12:30.     AVS printed and given to Nallely Peacock:  No (Declined by Nallely Peacock       1 = Total assistance

## 2025-02-27 NOTE — PROGRESS NOTE ADULT - NUTRITIONAL ASSESSMENT
This patient has been assessed with a concern for Malnutrition and has been determined to have a diagnosis/diagnoses of Severe protein-calorie malnutrition and Underweight (BMI < 19).    This patient is being managed with:   Diet NPO with Tube Feed-  Tube Feeding Modality: Gastrostomy  Glucerna 1.5 Edison (GLUCERNA1.5)  Total Volume for 24 Hours (mL): 1200  Continuous  Starting Tube Feed Rate {mL per Hour}: 30  Increase Tube Feed Rate by (mL): 10     Every hour  Until Goal Tube Feed Rate (mL per Hour): 60  Tube Feed Duration (in Hours): 20  Tube Feed Start Time: 00:00  Free Water Flush  Free Water Flush Instructions:  Provide free water flushes at 45 ml/hr (provides an additional 900 ml free water)  Entered: Mar  2 2022  3:29PM    Diet NPO after Midnight-     NPO Start Date: 01-Mar-2022   NPO Start Time: 23:59  Entered: Mar  1 2022 11:08PM    
This patient has been assessed with a concern for Malnutrition and has been determined to have a diagnosis/diagnoses of Severe protein-calorie malnutrition and Underweight (BMI < 19).    This patient is being managed with:   Diet NPO-  Entered: Mar  2 2022 12:34AM    Diet NPO after Midnight-     NPO Start Date: 01-Mar-2022   NPO Start Time: 23:59  Entered: Mar  1 2022 11:08PM    The following pending diet order is being considered for treatment of Severe protein-calorie malnutrition and Underweight (BMI < 19):  Diet NPO with Tube Feed-  Tube Feeding Modality: Gastrostomy  Glucerna 1.5 Edison (GLUCERNA1.5)  Total Volume for 24 Hours (mL): 1200  Continuous  Starting Tube Feed Rate {mL per Hour}: 30  Increase Tube Feed Rate by (mL): 10     Every hour  Until Goal Tube Feed Rate (mL per Hour): 60  Tube Feed Duration (in Hours): 20  Tube Feed Start Time: 00:00  Free Water Flush  Free Water Flush Instructions:  Provide free water flushes at 45 ml/hr (provides an additional 900 ml free water)  Entered: Mar  2 2022  3:29PM  
This patient has been assessed with a concern for Malnutrition and has been determined to have a diagnosis/diagnoses of Severe protein-calorie malnutrition and Underweight (BMI < 19).    This patient is being managed with:   Diet NPO after Midnight-     NPO Start Date: 08-Mar-2022   NPO Start Time: 23:59  Entered: Mar  8 2022  1:50PM    Diet NPO with Tube Feed-  Tube Feeding Modality: Gastrostomy  Glucerna 1.5 Edison (GLUCERNA1.5)  Total Volume for 24 Hours (mL): 1200  Continuous  Starting Tube Feed Rate {mL per Hour}: 30  Increase Tube Feed Rate by (mL): 10     Every hour  Until Goal Tube Feed Rate (mL per Hour): 60  Tube Feed Duration (in Hours): 20  Tube Feed Start Time: 00:00  Free Water Flush  Free Water Flush Instructions:  Provide free water flushes at 45 ml/hr (provides an additional 900 ml free water)  Entered: Mar  2 2022  3:29PM    Diet NPO after Midnight-     NPO Start Date: 01-Mar-2022   NPO Start Time: 23:59  Entered: Mar  1 2022 11:08PM    
This patient has been assessed with a concern for Malnutrition and has been determined to have a diagnosis/diagnoses of Severe protein-calorie malnutrition and Underweight (BMI < 19).    This patient is being managed with:   Diet NPO-  Entered: Mar  2 2022 12:34AM    Diet NPO after Midnight-     NPO Start Date: 01-Mar-2022   NPO Start Time: 23:59  Entered: Mar  1 2022 11:08PM    The following pending diet order is being considered for treatment of Severe protein-calorie malnutrition and Underweight (BMI < 19):  Diet NPO with Tube Feed-  Tube Feeding Modality: Gastrostomy  Glucerna 1.5 Edison (GLUCERNA1.5)  Total Volume for 24 Hours (mL): 1200  Continuous  Starting Tube Feed Rate {mL per Hour}: 30  Increase Tube Feed Rate by (mL): 10     Every hour  Until Goal Tube Feed Rate (mL per Hour): 60  Tube Feed Duration (in Hours): 20  Tube Feed Start Time: 00:00  Free Water Flush  Free Water Flush Instructions:  Provide free water flushes at 45 ml/hr (provides an additional 900 ml free water)  Entered: Mar  2 2022  3:29PM  
No

## 2025-03-11 NOTE — ED ADULT NURSE NOTE - SUICIDE SCREENING QUESTION 2
As a final attempt, a third outreach has been made via telephone call to facility. Please see Contacts section for details. This encounter will be closed and completed by end of day. Should we receive the requested information because of previous outreach attempts, the requested patient's chart will be updated appropriately.   Spoke to Michael he will fax over DM eye Exam   Thank you  Mariah Ann MA    Patient unable to complete